# Patient Record
Sex: FEMALE | Race: WHITE | NOT HISPANIC OR LATINO | Employment: OTHER | URBAN - METROPOLITAN AREA
[De-identification: names, ages, dates, MRNs, and addresses within clinical notes are randomized per-mention and may not be internally consistent; named-entity substitution may affect disease eponyms.]

---

## 2017-08-03 ENCOUNTER — TELEPHONE (OUTPATIENT)
Dept: FAMILY MEDICINE | Facility: CLINIC | Age: 62
End: 2017-08-03

## 2017-08-03 NOTE — TELEPHONE ENCOUNTER
Pt scheduled for physical on 8/7/17 on Dr. Anna's schedule, noted not able to eat, feels like she's dying. Please triage. Radha Fritz, CMA

## 2017-08-03 NOTE — TELEPHONE ENCOUNTER
"Pavithra Raines is a 61 year old female-     NURSING ASSESSMENT:  Description:  Patient experiencing decreased appetite and \"desire to eat\" for a while now.  She has had gastric bypass in the past and reports having trouble with eating, this is not new. However, recently she has been feeling more tired, less energy, abdominal cramping and overall not feeling well. Denies chest pain, difficulty breathing, severe abdominal pain, nausea, vomiting or fevers. She is tolerating liquids and drinking plenty. Some days she feels better than others, but it seems as these symptom keep coming back. When writer asked what patient meant by \"feels like she is dying\" she states, \"It's just because I feel as though I have cancer\". No symptoms or pain as to why she feels this way. Declined going to the ER. \"This has been going on for a long time. It's not new.I just finally re-instated my health insurance so I can come in to the doctor\".  Recommended keeping appt on 8/7/2017. Advised to seek emergency care if symptoms worsen. She is in agreement with this plan.    Mel Munoz RN       "

## 2017-08-07 ENCOUNTER — OFFICE VISIT (OUTPATIENT)
Dept: FAMILY MEDICINE | Facility: CLINIC | Age: 62
End: 2017-08-07
Payer: COMMERCIAL

## 2017-08-07 VITALS
TEMPERATURE: 97.7 F | RESPIRATION RATE: 16 BRPM | HEART RATE: 112 BPM | SYSTOLIC BLOOD PRESSURE: 126 MMHG | OXYGEN SATURATION: 99 % | BODY MASS INDEX: 25.69 KG/M2 | HEIGHT: 63 IN | DIASTOLIC BLOOD PRESSURE: 68 MMHG | WEIGHT: 145 LBS

## 2017-08-07 DIAGNOSIS — F41.9 ANXIETY: ICD-10-CM

## 2017-08-07 DIAGNOSIS — Z11.59 NEED FOR HEPATITIS C SCREENING TEST: ICD-10-CM

## 2017-08-07 DIAGNOSIS — F10.10 ALCOHOL ABUSE: ICD-10-CM

## 2017-08-07 DIAGNOSIS — E53.8 VITAMIN B12 DEFICIENCY WITHOUT ANEMIA: ICD-10-CM

## 2017-08-07 DIAGNOSIS — G47.00 INSOMNIA, UNSPECIFIED TYPE: ICD-10-CM

## 2017-08-07 DIAGNOSIS — Z13.6 CARDIOVASCULAR SCREENING; LDL GOAL LESS THAN 160: ICD-10-CM

## 2017-08-07 DIAGNOSIS — R10.13 ABDOMINAL PAIN, EPIGASTRIC: ICD-10-CM

## 2017-08-07 DIAGNOSIS — Z00.01 ENCOUNTER FOR GENERAL ADULT MEDICAL EXAMINATION WITH ABNORMAL FINDINGS: Primary | ICD-10-CM

## 2017-08-07 DIAGNOSIS — F33.9 EPISODE OF RECURRENT MAJOR DEPRESSIVE DISORDER, UNSPECIFIED DEPRESSION EPISODE SEVERITY (H): ICD-10-CM

## 2017-08-07 LAB
ALBUMIN SERPL-MCNC: 2.8 G/DL (ref 3.4–5)
ALP SERPL-CCNC: 208 U/L (ref 40–150)
ALT SERPL W P-5'-P-CCNC: 37 U/L (ref 0–50)
ANION GAP SERPL CALCULATED.3IONS-SCNC: 9 MMOL/L (ref 3–14)
AST SERPL W P-5'-P-CCNC: 71 U/L (ref 0–45)
BASOPHILS # BLD AUTO: 0.1 10E9/L (ref 0–0.2)
BASOPHILS NFR BLD AUTO: 0.5 %
BILIRUB SERPL-MCNC: 0.6 MG/DL (ref 0.2–1.3)
BUN SERPL-MCNC: 4 MG/DL (ref 7–30)
CALCIUM SERPL-MCNC: 9 MG/DL (ref 8.5–10.1)
CHLORIDE SERPL-SCNC: 101 MMOL/L (ref 94–109)
CHOLEST SERPL-MCNC: 155 MG/DL
CO2 SERPL-SCNC: 29 MMOL/L (ref 20–32)
CREAT SERPL-MCNC: 0.73 MG/DL (ref 0.52–1.04)
DIFFERENTIAL METHOD BLD: ABNORMAL
EOSINOPHIL # BLD AUTO: 0.1 10E9/L (ref 0–0.7)
EOSINOPHIL NFR BLD AUTO: 1 %
ERYTHROCYTE [DISTWIDTH] IN BLOOD BY AUTOMATED COUNT: 21.3 % (ref 10–15)
FOLATE SERPL-MCNC: 4.4 NG/ML
GFR SERPL CREATININE-BSD FRML MDRD: 81 ML/MIN/1.7M2
GLUCOSE SERPL-MCNC: 99 MG/DL (ref 70–99)
HCT VFR BLD AUTO: 33.5 % (ref 35–47)
HDLC SERPL-MCNC: 58 MG/DL
HGB BLD-MCNC: 10.4 G/DL (ref 11.7–15.7)
IMM GRANULOCYTES # BLD: 0 10E9/L (ref 0–0.4)
IMM GRANULOCYTES NFR BLD: 0.4 %
LDLC SERPL CALC-MCNC: 80 MG/DL
LIPASE SERPL-CCNC: 173 U/L (ref 73–393)
LYMPHOCYTES # BLD AUTO: 1.9 10E9/L (ref 0.8–5.3)
LYMPHOCYTES NFR BLD AUTO: 17 %
MCH RBC QN AUTO: 25.1 PG (ref 26.5–33)
MCHC RBC AUTO-ENTMCNC: 31 G/DL (ref 31.5–36.5)
MCV RBC AUTO: 81 FL (ref 78–100)
MONOCYTES # BLD AUTO: 1.4 10E9/L (ref 0–1.3)
MONOCYTES NFR BLD AUTO: 12.4 %
NEUTROPHILS # BLD AUTO: 7.6 10E9/L (ref 1.6–8.3)
NEUTROPHILS NFR BLD AUTO: 68.7 %
NONHDLC SERPL-MCNC: 97 MG/DL
PLATELET # BLD AUTO: 505 10E9/L (ref 150–450)
POTASSIUM SERPL-SCNC: 3.4 MMOL/L (ref 3.4–5.3)
PROT SERPL-MCNC: 8 G/DL (ref 6.8–8.8)
RBC # BLD AUTO: 4.14 10E12/L (ref 3.8–5.2)
SODIUM SERPL-SCNC: 139 MMOL/L (ref 133–144)
TRIGL SERPL-MCNC: 86 MG/DL
VIT B12 SERPL-MCNC: 535 PG/ML (ref 193–986)
WBC # BLD AUTO: 11 10E9/L (ref 4–11)

## 2017-08-07 PROCEDURE — 82607 VITAMIN B-12: CPT | Performed by: FAMILY MEDICINE

## 2017-08-07 PROCEDURE — 83690 ASSAY OF LIPASE: CPT | Performed by: FAMILY MEDICINE

## 2017-08-07 PROCEDURE — 80061 LIPID PANEL: CPT | Performed by: FAMILY MEDICINE

## 2017-08-07 PROCEDURE — 82746 ASSAY OF FOLIC ACID SERUM: CPT | Performed by: FAMILY MEDICINE

## 2017-08-07 PROCEDURE — 85025 COMPLETE CBC W/AUTO DIFF WBC: CPT | Performed by: FAMILY MEDICINE

## 2017-08-07 PROCEDURE — 80053 COMPREHEN METABOLIC PANEL: CPT | Performed by: FAMILY MEDICINE

## 2017-08-07 PROCEDURE — 36415 COLL VENOUS BLD VENIPUNCTURE: CPT | Performed by: FAMILY MEDICINE

## 2017-08-07 PROCEDURE — 99214 OFFICE O/P EST MOD 30 MIN: CPT | Mod: 25 | Performed by: FAMILY MEDICINE

## 2017-08-07 PROCEDURE — 99396 PREV VISIT EST AGE 40-64: CPT | Performed by: FAMILY MEDICINE

## 2017-08-07 PROCEDURE — 86803 HEPATITIS C AB TEST: CPT | Performed by: FAMILY MEDICINE

## 2017-08-07 RX ORDER — LORAZEPAM 0.5 MG/1
0.5 TABLET ORAL EVERY 8 HOURS PRN
Qty: 20 TABLET | Refills: 0 | Status: SHIPPED | OUTPATIENT
Start: 2017-08-07 | End: 2017-10-02

## 2017-08-07 RX ORDER — PHENOL 1.4 %
10 AEROSOL, SPRAY (ML) MUCOUS MEMBRANE
COMMUNITY
End: 2018-05-10

## 2017-08-07 RX ORDER — TRAZODONE HYDROCHLORIDE 50 MG/1
50 TABLET, FILM COATED ORAL
Qty: 30 TABLET | Refills: 1 | Status: SHIPPED | OUTPATIENT
Start: 2017-08-07 | End: 2017-10-03

## 2017-08-07 ASSESSMENT — PATIENT HEALTH QUESTIONNAIRE - PHQ9: SUM OF ALL RESPONSES TO PHQ QUESTIONS 1-9: 23

## 2017-08-07 ASSESSMENT — PAIN SCALES - GENERAL: PAINLEVEL: MILD PAIN (3)

## 2017-08-07 NOTE — NURSING NOTE
"Chief Complaint   Patient presents with     Physical       Initial /68  Pulse 112  Temp 97.7  F (36.5  C) (Temporal)  Resp 16  Ht 5' 3\" (1.6 m)  Wt 145 lb (65.8 kg)  SpO2 99%  BMI 25.69 kg/m2 Estimated body mass index is 25.69 kg/(m^2) as calculated from the following:    Height as of this encounter: 5' 3\" (1.6 m).    Weight as of this encounter: 145 lb (65.8 kg).  Medication Reconciliation: complete    "

## 2017-08-07 NOTE — MR AVS SNAPSHOT
After Visit Summary   8/7/2017    Pavithra Raines    MRN: [id number]           Patient Information     Date Of Birth          1955        Visit Information        Provider Department      8/7/2017 11:20 AM Otto Anna MD [facility]        Today's Diagnoses     Abdominal pain, epigastric    -  1    Alcohol abuse        Vitamin B12 deficiency without anemia        Anxiety        Insomnia, unspecified type        CARDIOVASCULAR SCREENING; LDL GOAL LESS THAN 160        Need for hepatitis C screening test          Care Instructions      Preventive Health Recommendations  Female Ages 50 - 64    Yearly exam: See your health care provider every year in order to  o Review health changes.   o Discuss preventive care.    o Review your medicines if your doctor has prescribed any.      Get a Pap test every three years (unless you have an abnormal result and your provider advises testing more often).    If you get Pap tests with HPV test, you only need to test every 5 years, unless you have an abnormal result.     You do not need a Pap test if your uterus was removed (hysterectomy) and you have not had cancer.    You should be tested each year for STDs (sexually transmitted diseases) if you're at risk.     Have a mammogram every 1 to 2 years.    Have a colonoscopy at age 50, or have a yearly FIT test (stool test). These exams screen for colon cancer.      Have a cholesterol test every 5 years, or more often if advised.    Have a diabetes test (fasting glucose) every three years. If you are at risk for diabetes, you should have this test more often.     If you are at risk for osteoporosis (brittle bone disease), think about having a bone density scan (DEXA).    Shots: Get a flu shot each year. Get a tetanus shot every 10 years.    Nutrition:     Eat at least 5 servings of fruits and vegetables each day.    Eat whole-grain bread, whole-wheat pasta and brown rice instead of white  grains and rice.    Talk to your provider about Calcium and Vitamin D.     Lifestyle    Exercise at least 150 minutes a week (30 minutes a day, 5 days a week). This will help you control your weight and prevent disease.    Limit alcohol to one drink per day.    No smoking.     Wear sunscreen to prevent skin cancer.     See your dentist every six months for an exam and cleaning.    See your eye doctor every 1 to 2 years.            Follow-ups after your visit        Your next 10 appointments already scheduled     Aug 10, 2017 11:15 AM CDT   CT ABDOMEN PELVIS W CONTRAST with PHCT1   [facility] CT Scan ([facility])    [address] [id number] [phone]           Please bring any scans or X-rays taken at other hospitals, if similar tests were done. Also bring a list of your medicines, including vitamins, minerals and over-the-counter drugs. It is safest to leave personal items at home.  Be sure to tell your doctor:   If you have any allergies.   If there s any chance you are pregnant.   If you are breastfeeding.   If you have any special needs.  You may have contrast for this exam. To prepare:   Do not eat or drink for 2 hours before your exam. If you need to take medicine, you may take it with small sips of water. (We may ask you to take liquid medicine as well.)   The day before your exam, drink extra fluids at least six 8-ounce glasses (unless your doctor tells you to restrict your fluids).  Patients over 70 or patients with diabetes or kidney problems:   If you haven t had a blood test (creatinine test) within the last 30 days, go to your clinic or Diagnostic Imaging Department for this test.  If you have diabetes:   If your kidney function is normal, continue taking your metformin (Avandamet, Glucophage, Glucovance, Metaglip) on the day of your exam.   If your kidney function is abnormal, wait 48 hours before restarting this medicine.  You will have oral contrast  for this exam:   You will drink the contrast at home. Get this from your clinic or Diagnostic Imaging Department. Please follow the directions given.  Please wear loose clothing, such as a sweat suit or jogging clothes. Avoid snaps, zippers and other metal. We may ask you to undress and put on a hospital gown.  If you have any questions, please call the Imaging Department where you will have your exam.            Aug 14, 2017 10:40 AM CDT   PHYSICAL with Otto Anna MD   [facility] ([facility]    [address] [place] [id number] [phone]              Future tests that were ordered for you today     Open Future Orders        Priority Expected Expires Ordered    CT Abdomen Pelvis w Contrast Routine  8/7/2018 8/7/2017            Who to contact     If you have questions or need follow up information about today's clinic visit or your schedule please contact [facility] directly at [phone].  Normal or non-critical lab and imaging results will be communicated to you by Knozenhart, letter or phone within 4 business days after the clinic has received the results. If you do not hear from us within 7 days, please contact the clinic through [employer] or phone. If you have a critical or abnormal lab result, we will notify you by phone as soon as possible.  Submit refill requests through [employer] or call your pharmacy and they will forward the refill request to us. Please allow 3 business days for your refill to be completed.          Additional Information About Your Visit        [employer] Information     [employer] gives you secure access to your electronic health record. If you see a primary care provider, you can also send messages to your care team and make appointments. If you have questions, please call your primary care clinic.  If you do not have a primary care provider, please call [phone] and they will assist you.        Care EveryWhere ID     This  "is your Care EveryWhere ID. This could be used by other organizations to access your [facility] medical records  [id number]        Your Vitals Were     Pulse Temperature Respirations Height Pulse Oximetry BMI (Body Mass Index)    112 97.7  F (36.5  C) (Temporal) 16 5' 3\" (1.6 m) 99% 25.69 kg/m2       Blood Pressure from Last 3 Encounters:   08/07/17 126/68   06/03/16 120/72   05/27/16 (!) 130/96    Weight from Last 3 Encounters:   08/07/17 145 lb (65.8 kg)   06/03/16 134 lb 14.4 oz (61.2 kg)   05/27/16 130 lb (59 kg)              We Performed the Following     CBC with platelets differential     Comprehensive metabolic panel (BMP + Alb, Alk Phos, ALT, AST, Total. Bili, TP)     Folate     Hepatitis C antibody     Lipase     Lipid Profile     Vitamin B12          Today's Medication Changes          These changes are accurate as of: 8/7/17 12:08 PM.  If you have any questions, ask your nurse or doctor.               Start taking these medicines.        Dose/Directions    LORazepam 0.5 MG tablet   Commonly known as:  ATIVAN   Used for:  Alcohol abuse   Started by:  Otto Anna MD        Dose:  0.5 mg   Take 1 tablet (0.5 mg) by mouth every 8 hours as needed (withdrawl)   Quantity:  20 tablet   Refills:  0       traZODone 50 MG tablet   Commonly known as:  DESYREL   Used for:  Insomnia, unspecified type   Started by:  Otto Anna MD        Dose:  50 mg   Take 1 tablet (50 mg) by mouth nightly as needed for sleep   Quantity:  30 tablet   Refills:  1            Where to get your medicines      These medications were [facility] to [facility] [address]  [address] [place] [address]     Phone:  [phone]     traZODone 50 MG tablet         Some of these will need a paper prescription and others can be bought over the counter.  Ask your nurse if you have questions.     Bring a paper prescription for each of these medications     LORazepam 0.5 MG tablet                " Primary Care Provider Office Phone # Fax #    Soha Lela Boggs [phone]       [facility] [address] [place] [address]        Equal Access to Services     OSCAR FRASER : Suresh don guso Sorubenali, waaxda luqadaha, qaybta kaalmada adeegyada, nazario cadetn t[place] larry robb [place]. So [facility] [phone].    ATENCIÓN: Si habla español, tiene a medina disposición servicios gratuitos de asistencia lingüística. [facility] [phone].    We comply with applicable federal civil rights laws and [place] laws. We do not discriminate on the basis of race, color, national origin, age, disability sex, sexual orientation or gender identity.            Thank you!     Thank you for choosing [facility]  for your care. Our goal is always to provide you with excellent care. Hearing back from our patients is one way we can continue to improve our services. Please take a few minutes to complete the written survey that you may receive in the mail after your visit with us. Thank you!             Your Updated Medication List - Protect others around you: Learn how to safely use, store and throw away your medicines at www.disposemymeds.org.          This list is accurate as of: 8/7/17 12:08 PM.  Always use your most recent med list.                   Brand Name Dispense Instructions for use Diagnosis    BENADRYL PO      Take 25 mg by mouth nightly as needed        LORazepam 0.5 MG tablet    ATIVAN    20 tablet    Take 1 tablet (0.5 mg) by mouth every 8 hours as needed (withdrawl)    Alcohol abuse       Melatonin 10 MG Tabs tablet      Take 10 mg by mouth nightly as needed for sleep        traZODone 50 MG tablet    DESYREL    30 tablet    Take 1 tablet (50 mg) by mouth nightly as needed for sleep    Insomnia, unspecified type
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
## 2017-08-08 LAB — HCV AB SERPL QL IA: ABNORMAL

## 2017-08-10 ENCOUNTER — HOSPITAL ENCOUNTER (OUTPATIENT)
Dept: CT IMAGING | Facility: CLINIC | Age: 62
Discharge: HOME | End: 2017-08-10
Attending: FAMILY MEDICINE | Admitting: FAMILY MEDICINE
Payer: COMMERCIAL

## 2017-08-10 DIAGNOSIS — R10.13 ABDOMINAL PAIN, EPIGASTRIC: ICD-10-CM

## 2017-08-10 PROCEDURE — 25000128 H RX IP 250 OP 636: Performed by: FAMILY MEDICINE

## 2017-08-10 PROCEDURE — 25000125 ZZHC RX 250: Performed by: FAMILY MEDICINE

## 2017-08-10 PROCEDURE — 74177 CT ABD & PELVIS W/CONTRAST: CPT

## 2017-08-10 RX ORDER — IOPAMIDOL 755 MG/ML
500 INJECTION, SOLUTION INTRAVASCULAR ONCE
Status: COMPLETED | OUTPATIENT
Start: 2017-08-10 | End: 2017-08-10

## 2017-08-10 RX ADMIN — IOPAMIDOL 71 ML: 755 INJECTION, SOLUTION INTRAVENOUS at 11:14

## 2017-08-10 RX ADMIN — SODIUM CHLORIDE 60 ML: 9 INJECTION, SOLUTION INTRAVENOUS at 11:14

## 2017-08-14 ENCOUNTER — OFFICE VISIT (OUTPATIENT)
Dept: FAMILY MEDICINE | Facility: CLINIC | Age: 62
End: 2017-08-14
Payer: COMMERCIAL

## 2017-08-14 VITALS
RESPIRATION RATE: 26 BRPM | HEART RATE: 102 BPM | TEMPERATURE: 97.2 F | SYSTOLIC BLOOD PRESSURE: 110 MMHG | WEIGHT: 141 LBS | OXYGEN SATURATION: 99 % | DIASTOLIC BLOOD PRESSURE: 70 MMHG | BODY MASS INDEX: 24.98 KG/M2

## 2017-08-14 DIAGNOSIS — F10.21 ALCOHOL DEPENDENCE IN REMISSION (H): ICD-10-CM

## 2017-08-14 DIAGNOSIS — F41.9 ANXIETY: ICD-10-CM

## 2017-08-14 DIAGNOSIS — B17.10 ACUTE HEPATITIS C VIRUS INFECTION WITHOUT HEPATIC COMA: Primary | ICD-10-CM

## 2017-08-14 PROCEDURE — 87522 HEPATITIS C REVRS TRNSCRPJ: CPT | Performed by: FAMILY MEDICINE

## 2017-08-14 PROCEDURE — 36415 COLL VENOUS BLD VENIPUNCTURE: CPT | Performed by: FAMILY MEDICINE

## 2017-08-14 PROCEDURE — 99214 OFFICE O/P EST MOD 30 MIN: CPT | Performed by: FAMILY MEDICINE

## 2017-08-14 RX ORDER — ESCITALOPRAM OXALATE 10 MG/1
10 TABLET ORAL DAILY
Qty: 30 TABLET | Refills: 1 | Status: SHIPPED | OUTPATIENT
Start: 2017-08-14 | End: 2017-10-13

## 2017-08-14 NOTE — PROGRESS NOTES
CT scan of the abdomen shows no significant abnormalities that would be causing pain. Blood work shows several liver tests off a little bit and screening hepatitis C was positive this needs to be followed up with further tests as this could be a false positive or evidence of past exposure.

## 2017-08-14 NOTE — PROGRESS NOTES
SUBJECTIVE:                                                    Pavithra Raines is a 61 year old female who presents to clinic today for the following health issues:      Chief Complaint   Patient presents with     Results     CT results             Problem list and histories reviewed & adjusted, as indicated.  Additional history: as documented        Reviewed and updated as needed this visit by clinical staff  Tobacco  Allergies  Meds       Reviewed and updated as needed this visit by Provider        SUBJECTIVE:  Pavithra  is a 61 year old female who presents for:  Discussion of her CT results and lab results. She presented for a physical a while back. She has trouble with alcoholism. Is trying to quit on her own. We issued her some Ativan to take she gets shaky and she drops down on her alcohol. She has taken several times which has not been able to get off alcohol. She had some lab abnormalities that we needed to address. She had complained of some epigastric pain. We did a CT scan. She was sure she was dying when I saw her. She presents today with her significant other. He is trying to help her through all this.    Past Medical History:   Diagnosis Date     Abnormal Papanicolaou smear of cervix and cervical HPV 4/07    Colposcopy 2007= HSIL     Genital herpes      History of colposcopy with cervical biopsy 06/2007    HSIL- LEEP recommended     Hypersomnia with sleep apnea, unspecified     CPAP started 2007     Other and unspecified ovarian cyst 2002 or so    s/p resection of ovary and tubes, d&c     Past Surgical History:   Procedure Laterality Date     CHOLECYSTECTOMY, OPEN  age 20s     COLONOSCOPY  3/21/2011    COMBINED COLONOSCOPY, REMOVE TUMOR/POLYP/LESION BY SNARE performed by JUAN FRANCISCO HERNANDEZ at  GI     COLPOSCOPY CERVIX, LOOP ELECTRODE BIOPSY, COMBINED  6/2007    CAN 2/3-patient requires yearly pap smears     GASTRIC BYPASS  3/25/2008    At [facility] DILATION/CURETTAGE  DIAG/THER NON OB  2002     HC ENLARGE BREAST WITH IMPLANT       HC LAPAROSCOPIC MYOMECTOMY, 1 - 4 INTRAMURAL MYOMAS =<250 GM  2002     HC REMOVAL OF BREAST IMPLANT       SALPINGO OOPHORECTOMY,R/L/MATTHEW  2002    Bilateral salpingectomy and unilateral oophorectomy     Social History   Substance Use Topics     Smoking status: Current Some Day Smoker     Last attempt to quit: 8/20/2001     Smokeless tobacco: Never Used      Comment: 2 ppd at this time (chain smoking) 10/2012     Alcohol use Yes     Current Outpatient Prescriptions   Medication Sig Dispense Refill     escitalopram (LEXAPRO) 10 MG tablet Take 1 tablet (10 mg) by mouth daily 30 tablet 1     Melatonin 10 MG TABS tablet Take 10 mg by mouth nightly as needed for sleep       DiphenhydrAMINE HCl (BENADRYL PO) Take 25 mg by mouth nightly as needed       traZODone (DESYREL) 50 MG tablet Take 1 tablet (50 mg) by mouth nightly as needed for sleep 30 tablet 1     LORazepam (ATIVAN) 0.5 MG tablet Take 1 tablet (0.5 mg) by mouth every 8 hours as needed (withdrawl) 20 tablet 0       REVIEW OF SYSTEMS:   5 point ROS negative except as noted above in HPI, including Gen., Resp, CV, GI &  system review.     OBJECTIVE:  Vitals: /70  Pulse 102  Temp 97.2  F (36.2  C) (Temporal)  Resp 26  Wt 141 lb (64 kg)  SpO2 99%  BMI 24.98 kg/m2  BMI= Body mass index is 24.98 kg/(m^2).  She is alert oriented appears in no distress but a little shaky. Appears anxious. Eyes are not jaundiced. Lungs are clear. Heart regular rhythm. Slight tremor noted. Skin is clear. Extremities with no edema. CT of the abdomen shows fatty liver infiltrates. Nothing else very remarkable. Her labs show some abnormal liver function tests and a positive screening hepatitis C.    ASSESSMENT:  #1 positive hepatitis C screen #2 liver abnormalities #3 alcoholism. #4 anxiety    PLAN:  Really counseled her on stopping alcohol. She still has some Ativan left she may need a few more. We'll going to start  her on Lexapro 10 mg a day for the anxiety. Sounds like she was on this once before. Doing RNA for hepatitis today. We'll notify her with the results and move forward from there. No further liver testing done until we find this out.  Tobacco Cessation Action Plan: Self help information given to patient        Otto Anna MD  [facility]

## 2017-08-14 NOTE — MR AVS SNAPSHOT
After Visit Summary   8/14/2017    Pavithra Raines    MRN: [id number]           Patient Information     Date Of Birth          1955        Visit Information        Provider Department      8/14/2017 10:40 AM Otto Anna MD [facility]        Today's Diagnoses     Acute hepatitis C virus infection without hepatic coma    -  1    Anxiety        Alcohol dependence in remission (H)           Follow-ups after your visit        Who to contact     If you have questions or need follow up information about today's clinic visit or your schedule please contact [facility] directly at [phone].  Normal or non-critical lab and imaging results will be communicated to you by [employer], letter or phone within 4 business days after the clinic has received the results. If you do not hear from us within 7 days, please contact the clinic through [employer] or phone. If you have a critical or abnormal lab result, we will notify you by phone as soon as possible.  Submit refill requests through [employer] or call your pharmacy and they will forward the refill request to us. Please allow 3 business days for your refill to be completed.          Additional Information About Your Visit        MyChart Information     [employer] gives you secure access to your electronic health record. If you see a primary care provider, you can also send messages to your care team and make appointments. If you have questions, please call your primary care clinic.  If you do not have a primary care provider, please call [phone] and they will assist you.        Care EveryWhere ID     This is your Care EveryWhere ID. This could be used by other organizations to access your [place] medical records  [id number]        Your Vitals Were     Pulse Temperature Respirations Pulse Oximetry BMI (Body Mass Index)       102 97.2  F (36.2  C) (Temporal) 26 99% 24.98 kg/m2        Blood Pressure from Last 3  Encounters:   08/14/17 110/70   08/07/17 126/68   06/03/16 120/72    Weight from Last 3 Encounters:   08/14/17 141 lb (64 kg)   08/07/17 145 lb (65.8 kg)   06/03/16 134 lb 14.4 oz (61.2 kg)              We Performed the Following     Hepatitis C RNA, quantitative          Today's Medication Changes          These changes are accurate as of: 8/14/17  1:01 PM.  If you have any questions, ask your nurse or doctor.               Start taking these medicines.        Dose/Directions    escitalopram 10 MG tablet   Commonly known as:  LEXAPRO   Used for:  Anxiety   Started by:  Otto Anna MD        Dose:  10 mg   Take 1 tablet (10 mg) by mouth daily   Quantity:  30 tablet   Refills:  1            Where to get your medicines      These medications were sent to [facility] [address]   [address] [facility] [address]     Phone:  [phone]     escitalopram 10 MG tablet                Primary Care Provider Office Phone # Fax #    Soha Lela Boggs [phone]       [address] [facility]   [facility] [address]        Equal Access to Services     [facility] AH: Hadii aad ku hadasho Soomaali, waaxda luqadaha, qaybta kaalmada adeegyada, nazario doherty haylisan [place]. So [facility] [phone].    ATENCIÓN: Si habla español, tiene a medina disposición servicios gratuitos de asistencia lingüística. Llame al [phone].    We comply with applicable federal civil rights laws and [place] laws. We do not discriminate on the basis of race, color, national origin, age, disability sex, sexual orientation or gender identity.            Thank you!     Thank you for choosing [facility]  for your care. Our goal is always to provide you with excellent care. Hearing back from our patients is one way we can continue to improve our services. Please take a few minutes to complete the written survey that you may receive in the mail after your visit with  us. Thank you!             Your Updated Medication List - Protect others around you: Learn how to safely use, store and throw away your medicines at www.disposemymeds.org.          This list is accurate as of: 8/14/17  1:01 PM.  Always use your most recent med list.                   Brand Name Dispense Instructions for use Diagnosis    BENADRYL PO      Take 25 mg by mouth nightly as needed        escitalopram 10 MG tablet    LEXAPRO    30 tablet    Take 1 tablet (10 mg) by mouth daily    Anxiety       LORazepam 0.5 MG tablet    ATIVAN    20 tablet    Take 1 tablet (0.5 mg) by mouth every 8 hours as needed (withdrawl)    Alcohol abuse       Melatonin 10 MG Tabs tablet      Take 10 mg by mouth nightly as needed for sleep        traZODone 50 MG tablet    DESYREL    30 tablet    Take 1 tablet (50 mg) by mouth nightly as needed for sleep    Insomnia, unspecified type

## 2017-08-14 NOTE — NURSING NOTE
"Chief Complaint   Patient presents with     Results     CT results       Initial /70  Pulse 102  Temp 97.2  F (36.2  C) (Temporal)  Resp 26  Wt 141 lb (64 kg)  SpO2 99%  BMI 24.98 kg/m2 Estimated body mass index is 24.98 kg/(m^2) as calculated from the following:    Height as of 8/7/17: 5' 3\" (1.6 m).    Weight as of this encounter: 141 lb (64 kg).  Medication Reconciliation: complete    "

## 2017-08-15 LAB
HCV RNA SERPL NAA+PROBE-ACNC: NORMAL [IU]/ML
HCV RNA SERPL NAA+PROBE-LOG IU: NORMAL LOG IU/ML

## 2017-08-17 ENCOUNTER — TELEPHONE (OUTPATIENT)
Dept: FAMILY MEDICINE | Facility: CLINIC | Age: 62
End: 2017-08-17

## 2017-08-17 NOTE — TELEPHONE ENCOUNTER
Dr. Connolly looked at the test and it was negative.  Patient aware but wants to know what the next step is.

## 2017-08-17 NOTE — TELEPHONE ENCOUNTER
Reason for Call:  Request for results:labs     Name of test or procedure: labs    Date of test of procedure: 8/14/17    Location of the test or procedure: [facility]     OK to leave the result message on voice mail or with a family member? YES    Phone number Patient can be reached at:  Home number on file [phone] (home)    Additional comments: patient would like to get lab results from her lab test for Hep C, please call and advise, Patient is aware that Dr. Anna is out of the office until 8/23/17, patient would like another provider to call her with the results if possible    Call taken on 8/17/2017 at 2:09 PM by Karen Joy

## 2017-08-23 NOTE — TELEPHONE ENCOUNTER
Per Dr. Anna there is not a next step she needs to take and the lab was either a false positive or she previously had an infection that is now cleared up. Patient notified. She is wondering if she can get a higher dose of trazadone because she is taking the trazadone, benadryl, and melatonin and it is still not helping a whole lot and she would like to just take tramadol by itself without the benadryl and the melatonin. Please advise. KB/CMA

## 2017-08-25 NOTE — TELEPHONE ENCOUNTER
Dr. Anna will not increase her trazadone as she is already taking to many medications to sleep and stated she should not be taking the trazadone, benadryl, and melatonin all together. He said she should have a sleep consult if she is having this much trouble sleeping. Patient notified and she does not want us to place a referral at this time. ESSENCE/KARLEE

## 2017-08-27 ENCOUNTER — HEALTH MAINTENANCE LETTER (OUTPATIENT)
Age: 62
End: 2017-08-27

## 2017-09-20 ENCOUNTER — TELEPHONE (OUTPATIENT)
Dept: FAMILY MEDICINE | Facility: CLINIC | Age: 62
End: 2017-09-20

## 2017-09-20 DIAGNOSIS — Z12.11 SPECIAL SCREENING FOR MALIGNANT NEOPLASMS, COLON: Primary | ICD-10-CM

## 2017-09-20 NOTE — TELEPHONE ENCOUNTER
Reason for Call: Request for an order or referral:    Order or referral being requested: colonoscopy     Date needed: as soon as possible    Has the patient been seen by the PCP for this problem? Not Applicable    Additional comments: pt is due for colonoscopy and would like to have order placed so she can have done     Phone number Patient can be reached at:  Home number on file [phone] (home)    Best Time:  Anytime     Can we leave a detailed message on this number?  YES    Call taken on 9/20/2017 at 3:43 PM by Sangeetha Anna

## 2017-09-21 ENCOUNTER — TELEPHONE (OUTPATIENT)
Dept: FAMILY MEDICINE | Facility: CLINIC | Age: 62
End: 2017-09-21

## 2017-09-21 NOTE — TELEPHONE ENCOUNTER
Left message for patient to return call to schedule colonoscopy or EGD. If [place] or [place] are unavailable, please transfer to the surgery center.     OK to schedule with mazin or Alana

## 2017-10-03 DIAGNOSIS — G47.00 INSOMNIA, UNSPECIFIED TYPE: ICD-10-CM

## 2017-10-03 NOTE — TELEPHONE ENCOUNTER
traZODone (DESYREL) 50 MG tablet       Last Written Prescription Date: 8/7/17  Last Fill Quantity: 30; # refills: 1  Last Office Visit with [facility], [facility] or [facility] prescribing provider:  8/14/17   Next 5 appointments (look out 90 days)     Dec 01, 2017  9:00 AM CST   Office Visit with Soha Boggs MD   [facility]    [address] [place] [id number] [phone]                   Last PHQ-9 score on record=   PHQ-9 SCORE 8/7/2017   Total Score -   Total Score 23       Lab Results   Component Value Date    AST 71 08/07/2017     Lab Results   Component Value Date    ALT 37 08/07/2017

## 2017-10-05 RX ORDER — TRAZODONE HYDROCHLORIDE 50 MG/1
TABLET, FILM COATED ORAL
Qty: 30 TABLET | Refills: 1 | Status: SHIPPED | OUTPATIENT
Start: 2017-10-05 | End: 2017-12-01

## 2017-10-05 NOTE — TELEPHONE ENCOUNTER
Prescription approved per [facility] Refill Protocol.    Fatou [facility]  [phone]

## 2017-10-09 ENCOUNTER — ANESTHESIA EVENT (OUTPATIENT)
Dept: GASTROENTEROLOGY | Facility: CLINIC | Age: 62
End: 2017-10-09
Payer: COMMERCIAL

## 2017-10-09 ENCOUNTER — ANESTHESIA (OUTPATIENT)
Dept: GASTROENTEROLOGY | Facility: CLINIC | Age: 62
End: 2017-10-09
Payer: COMMERCIAL

## 2017-10-09 ENCOUNTER — HOSPITAL ENCOUNTER (OUTPATIENT)
Facility: CLINIC | Age: 62
Discharge: HOME | End: 2017-10-09
Attending: FAMILY MEDICINE | Admitting: FAMILY MEDICINE
Payer: COMMERCIAL

## 2017-10-09 ENCOUNTER — SURGERY (OUTPATIENT)
Age: 62
End: 2017-10-09

## 2017-10-09 VITALS
SYSTOLIC BLOOD PRESSURE: 114 MMHG | TEMPERATURE: 99.2 F | RESPIRATION RATE: 14 BRPM | DIASTOLIC BLOOD PRESSURE: 79 MMHG | HEART RATE: 69 BPM | OXYGEN SATURATION: 100 %

## 2017-10-09 LAB — COLONOSCOPY: NORMAL

## 2017-10-09 PROCEDURE — 88305 TISSUE EXAM BY PATHOLOGIST: CPT | Performed by: FAMILY MEDICINE

## 2017-10-09 PROCEDURE — 88305 TISSUE EXAM BY PATHOLOGIST: CPT | Mod: 26 | Performed by: FAMILY MEDICINE

## 2017-10-09 PROCEDURE — 45380 COLONOSCOPY AND BIOPSY: CPT | Performed by: FAMILY MEDICINE

## 2017-10-09 PROCEDURE — 45385 COLONOSCOPY W/LESION REMOVAL: CPT | Performed by: FAMILY MEDICINE

## 2017-10-09 PROCEDURE — 25000125 ZZHC RX 250: Performed by: NURSE ANESTHETIST, CERTIFIED REGISTERED

## 2017-10-09 PROCEDURE — 25000128 H RX IP 250 OP 636: Performed by: NURSE ANESTHETIST, CERTIFIED REGISTERED

## 2017-10-09 PROCEDURE — 45380 COLONOSCOPY AND BIOPSY: CPT | Mod: PT | Performed by: FAMILY MEDICINE

## 2017-10-09 PROCEDURE — 40000297 ZZH STATISTIC ENDO RECOVERY CLASS 1:2 EACH ADDL HOUR: Performed by: FAMILY MEDICINE

## 2017-10-09 PROCEDURE — 40000296 ZZH STATISTIC ENDO RECOVERY CLASS 1:2 FIRST HOUR: Performed by: FAMILY MEDICINE

## 2017-10-09 RX ORDER — ALBUTEROL SULFATE 0.83 MG/ML
2.5 SOLUTION RESPIRATORY (INHALATION) EVERY 4 HOURS PRN
Status: DISCONTINUED | OUTPATIENT
Start: 2017-10-09 | End: 2017-10-09 | Stop reason: HOSPADM

## 2017-10-09 RX ORDER — LIDOCAINE 40 MG/G
CREAM TOPICAL
Status: DISCONTINUED | OUTPATIENT
Start: 2017-10-09 | End: 2017-10-09 | Stop reason: HOSPADM

## 2017-10-09 RX ORDER — PROPOFOL 10 MG/ML
INJECTION, EMULSION INTRAVENOUS CONTINUOUS PRN
Status: DISCONTINUED | OUTPATIENT
Start: 2017-10-09 | End: 2017-10-09

## 2017-10-09 RX ORDER — ONDANSETRON 2 MG/ML
4 INJECTION INTRAMUSCULAR; INTRAVENOUS
Status: DISCONTINUED | OUTPATIENT
Start: 2017-10-09 | End: 2017-10-09 | Stop reason: HOSPADM

## 2017-10-09 RX ORDER — PROPOFOL 10 MG/ML
INJECTION, EMULSION INTRAVENOUS PRN
Status: DISCONTINUED | OUTPATIENT
Start: 2017-10-09 | End: 2017-10-09

## 2017-10-09 RX ORDER — ONDANSETRON 2 MG/ML
4 INJECTION INTRAMUSCULAR; INTRAVENOUS EVERY 30 MIN PRN
Status: DISCONTINUED | OUTPATIENT
Start: 2017-10-09 | End: 2017-10-09 | Stop reason: HOSPADM

## 2017-10-09 RX ORDER — FENTANYL CITRATE 50 UG/ML
25-50 INJECTION, SOLUTION INTRAMUSCULAR; INTRAVENOUS
Status: DISCONTINUED | OUTPATIENT
Start: 2017-10-09 | End: 2017-10-09 | Stop reason: HOSPADM

## 2017-10-09 RX ORDER — SODIUM CHLORIDE, SODIUM LACTATE, POTASSIUM CHLORIDE, CALCIUM CHLORIDE 600; 310; 30; 20 MG/100ML; MG/100ML; MG/100ML; MG/100ML
INJECTION, SOLUTION INTRAVENOUS CONTINUOUS
Status: DISCONTINUED | OUTPATIENT
Start: 2017-10-09 | End: 2017-10-09 | Stop reason: HOSPADM

## 2017-10-09 RX ORDER — ONDANSETRON 4 MG/1
4 TABLET, ORALLY DISINTEGRATING ORAL EVERY 30 MIN PRN
Status: DISCONTINUED | OUTPATIENT
Start: 2017-10-09 | End: 2017-10-09 | Stop reason: HOSPADM

## 2017-10-09 RX ORDER — SODIUM CHLORIDE, SODIUM LACTATE, POTASSIUM CHLORIDE, CALCIUM CHLORIDE 600; 310; 30; 20 MG/100ML; MG/100ML; MG/100ML; MG/100ML
INJECTION, SOLUTION INTRAVENOUS CONTINUOUS PRN
Status: DISCONTINUED | OUTPATIENT
Start: 2017-10-09 | End: 2017-10-09

## 2017-10-09 RX ORDER — LIDOCAINE HYDROCHLORIDE 20 MG/ML
INJECTION, SOLUTION INFILTRATION; PERINEURAL PRN
Status: DISCONTINUED | OUTPATIENT
Start: 2017-10-09 | End: 2017-10-09

## 2017-10-09 RX ORDER — NALOXONE HYDROCHLORIDE 0.4 MG/ML
.1-.4 INJECTION, SOLUTION INTRAMUSCULAR; INTRAVENOUS; SUBCUTANEOUS
Status: DISCONTINUED | OUTPATIENT
Start: 2017-10-09 | End: 2017-10-09 | Stop reason: HOSPADM

## 2017-10-09 RX ADMIN — LIDOCAINE HYDROCHLORIDE 40 MG: 20 INJECTION, SOLUTION INFILTRATION; PERINEURAL at 11:55

## 2017-10-09 RX ADMIN — PROPOFOL 50 MG: 10 INJECTION, EMULSION INTRAVENOUS at 11:55

## 2017-10-09 RX ADMIN — PROPOFOL 30 MG: 10 INJECTION, EMULSION INTRAVENOUS at 11:58

## 2017-10-09 RX ADMIN — SODIUM CHLORIDE, POTASSIUM CHLORIDE, SODIUM LACTATE AND CALCIUM CHLORIDE: 600; 310; 30; 20 INJECTION, SOLUTION INTRAVENOUS at 11:41

## 2017-10-09 RX ADMIN — PROPOFOL 20 MG: 10 INJECTION, EMULSION INTRAVENOUS at 12:11

## 2017-10-09 RX ADMIN — PROPOFOL 150 MCG/KG/MIN: 10 INJECTION, EMULSION INTRAVENOUS at 11:55

## 2017-10-09 ASSESSMENT — LIFESTYLE VARIABLES: TOBACCO_USE: 1

## 2017-10-09 NOTE — IP AVS SNAPSHOT
[facility]    [address] [facility] [address]    Phone:  [phone]                                       After Visit Summary   10/9/2017    Pavithra Raines    MRN: [id number]           After Visit Summary Signature Page     I have received my discharge instructions, and my questions have been answered. I have discussed any challenges I see with this plan with the nurse or doctor.    ..........................................................................................................................................  Patient/Patient Representative Signature      ..........................................................................................................................................  Patient Representative Print Name and Relationship to Patient    ..................................................               ................................................  Date                                            Time    ..........................................................................................................................................  Reviewed by Signature/Title    ...................................................              ..............................................  Date                                                            Time

## 2017-10-09 NOTE — DISCHARGE INSTRUCTIONS
[facility]    Home Care Following Endoscopy    Dr. Sylvester Bright    Activity:    You have just undergone an endoscopic procedure usually performed with conscious sedation.  Do not work or operate machinery (including a car) for at least 12 hours.      I encourage you to walk and attempt to pass this air as soon as possible.    Diet:    Return to the diet you were on before your procedure but eat lightly for the first 12-24 hours.    Drink plenty of water.    Resume any regular medications unless otherwise advised by your physician.  Please begin any new medication prescribed as a result of your procedure as directed by your physician.     If you had any biopsy or polyp removed please refrain from aspirin or aspirin products for 2 days.  If on Coumadin please restart as instructed by your physician.   Pain:    You may take Tylenol as needed for pain.  Expected Recovery:    You can expect some mild abdominal fullness and/or discomfort due to the air used to inflate your intestinal tract. It is also normal to have a mild sore throat after upper endoscopy.    Call Your Physician if You Have:        After Colonoscopy:  o Worsening persisting abdominal pain which is worse with activity.  o Fevers (>101 degrees F), chills or shakes.  o Passage of continued blood with bowel movements.   Any questions or concerns about your recovery, please call [phone] or after hours [phone] Nurse Advice Line.    Follow-up Care:    You should receive a call or letter with your results within 1 week. Please call if you have not received a notification of your results.    If asked to return to clinic please make an appointment 1 week after your procedure.  Call [phone].

## 2017-10-09 NOTE — ANESTHESIA PREPROCEDURE EVALUATION
Anesthesia Evaluation     . Pt has had prior anesthetic. Type: General           ROS/MED HX    ENT/Pulmonary:     (+)tobacco use, Current use , . .    Neurologic:  - neg neurologic ROS     Cardiovascular:     (+) Dyslipidemia, ----. : . . . :. .       METS/Exercise Tolerance:     Hematologic:  - neg hematologic  ROS       Musculoskeletal:  - neg musculoskeletal ROS       GI/Hepatic: Comment: Gastric bypass    (+) GERD Asymptomatic on medication,       Renal/Genitourinary:  - ROS Renal section negative       Endo:  - neg endo ROS       Psychiatric:     (+) psychiatric history anxiety and depression      Infectious Disease:  - neg infectious disease ROS       Malignancy:      - no malignancy   Other:    - neg other ROS                 Physical Exam  Normal systems: cardiovascular and pulmonary    Airway   Mallampati: II  TM distance: >3 FB  Neck ROM: full    Dental   (+) upper dentures    Cardiovascular   Rhythm and rate: regular and normal      Pulmonary    breath sounds clear to auscultation                    Anesthesia Plan      History & Physical Review  History and physical reviewed and following examination; no interval change.    ASA Status:  2 .    NPO Status:  > 6 hours    Plan for MAC Reason for MAC:  Deep or markedly invasive procedure (G8)  PONV prophylaxis:  Ondansetron (or other 5HT-3)  Pt verbalizes understanding of MAC anesthesia; denies further questions.        Postoperative Care  Postoperative pain management:  IV analgesics and Oral pain medications.      Consents  Anesthetic plan, risks, benefits and alternatives discussed with:  Patient.  Use of blood products discussed: No .   .                          .

## 2017-10-09 NOTE — ANESTHESIA POSTPROCEDURE EVALUATION
Patient: Pavithra Raines    Procedure(s):  Colonoscopy with polypectomies by biopsy and snare - Wound Class: II-Clean Contaminated   - Wound Class: II-Clean Contaminated    Diagnosis:screening  Diagnosis Additional Information: No value filed.    Anesthesia Type:  MAC    Note:  Anesthesia Post Evaluation    Patient location during evaluation: Phase 2  Patient participation: Able to fully participate in evaluation  Level of consciousness: awake  Pain management: adequate  Airway patency: patent  Cardiovascular status: acceptable and hemodynamically stable  Respiratory status: acceptable, room air and nonlabored ventilation  Hydration status: stable  PONV: none     Anesthetic complications: None    Comments: Patient was happy with the anesthesia care received and no anesthesia related complications were noted.  I will follow up with the patient again if it is needed.        Last vitals:  Vitals:    10/09/17 1113   BP: 113/90   Pulse: 81   Resp: 16   Temp: 99.2  F (37.3  C)         Electronically Signed By: RICARDO Mathews CRNA  October 9, 2017  12:54 PM

## 2017-10-09 NOTE — ANESTHESIA CARE TRANSFER NOTE
Patient: Pavithra Raines    Procedure(s):  Colonoscopy with polypectomies by biopsy and snare - Wound Class: II-Clean Contaminated   - Wound Class: II-Clean Contaminated    Diagnosis: screening  Diagnosis Additional Information: No value filed.    Anesthesia Type:   MAC     Note:  Airway :Room Air  Patient transferred to:Phase II        Vitals: (Last set prior to Anesthesia Care Transfer)    CRNA VITALS  10/9/2017 1201 - 10/9/2017 1236      10/9/2017             SpO2: 100 %                Electronically Signed By: RICARDO Mathews CRNA  October 9, 2017  12:36 PM

## 2017-10-09 NOTE — H&P
Pre-Endoscopy History and Physical     Pavithra Raines MRN# [id number]   YOB: 1955 Age: 62 year old     Date of Procedure: 10/9/2017  Primary care provider: Soha Boggs  Type of Endoscopy: colonoscopy  Reason for Procedure: surveillance or screening colon cancer  Type of Anesthesia Anticipated: MAC    HPI:    Pavithra is a 62 year old female who will be undergoing the above procedure.      Pavithra is feeling well today. Can get up a single flight of stairs without dyspnea. Estimated METS > 4. The risks and benefits of the procedure and the sedation options and risks were discussed with the patient. These include infection, bleeding, and small risk of colon perforation (1/1000 to 1/73236 depending on patient characteristics and type of procedure). Pavithra was also explained to alternatives for colo-rectal screening. All questions were answered and informed consent was obtained.      Problem list, medications, surgical history, reviewed    REVIEW OF SYSTEMS:     5 point ROS negative except as noted above in HPI, including Gen., Resp., CV, GI &  system review.      PHYSICAL EXAM:   /90  Pulse 81  Temp 99.2  F (37.3  C) (Oral)  Resp 16   GENERAL APPEARANCE: alert and no distress  RESP: lungs clear to auscultation  CV: regular  ABD: soft, nt/nd    DIAGNOSTICS:    Not indicated      IMPRESSION   ASA Class 2 - Mild systemic disease        PLAN:     Plan for colonoscopy. No medical contraindications to proceed, or further work up needed. We discussed the risks, benefits and alternatives and the patient wished to proceed.    The above has been forwarded to the consulting provider.      Signed Electronically by: Sylvester Bright  October 9, 2017

## 2017-10-09 NOTE — IP AVS SNAPSHOT
MRN:[id number]                      After Visit Summary   10/9/2017    Pavithra Raines    MRN: [id number]           Thank you!     Thank you for choosing [facility] for your care. Our goal is always to provide you with excellent care. Hearing back from our patients is one way we can continue to improve our services. Please take a few minutes to complete the written survey that you may receive in the mail after you visit with us. Thank you!        Patient Information     Date Of Birth          1955        About your hospital stay     You were admitted on:  October 9, 2017 You last received care in the:  [facility]    You were discharged on:  October 9, 2017       Who to Call     For medical emergencies, please call 911.  For non-urgent questions about your medical care, please call your primary care provider or clinic, [phone]  For questions related to your surgery, please call your surgery clinic        Attending Provider     Provider Sylvester Gallo MD Family Practice       Primary Care Provider Office Phone # Fax #    Soha Boggs [phone]      Your next 10 appointments already scheduled     Dec 01, 2017  9:00 AM CST   Office Visit with Soha Boggs MD   [facility]    [address] [place] [id number] [phone]           Bring a current list of meds and any records pertaining to this visit. For Physicals, please bring immunization records and any forms needing to be filled out. Please arrive 10 minutes early to complete paperwork.              Further instructions from your care team         [facility]    Home Care Following Endoscopy    Dr. Sylvester Bright    Activity:    You have just undergone an endoscopic procedure usually performed with conscious sedation.  Do not work or operate machinery (including a car) for at  least 12 hours.      I encourage you to walk and attempt to pass this air as soon as possible.    Diet:    Return to the diet you were on before your procedure but eat lightly for the first 12-24 hours.    Drink plenty of water.    Resume any regular medications unless otherwise advised by your physician.  Please begin any new medication prescribed as a result of your procedure as directed by your physician.     If you had any biopsy or polyp removed please refrain from aspirin or aspirin products for 2 days.  If on Coumadin please restart as instructed by your physician.   Pain:    You may take Tylenol as needed for pain.  Expected Recovery:    You can expect some mild abdominal fullness and/or discomfort due to the air used to inflate your intestinal tract. It is also normal to have a mild sore throat after upper endoscopy.    Call Your Physician if You Have:        After Colonoscopy:  o Worsening persisting abdominal pain which is worse with activity.  o Fevers (>101 degrees F), chills or shakes.  o Passage of continued blood with bowel movements.   Any questions or concerns about your recovery, please call [phone] or after hours [phone] Nurse Advice Line.    Follow-up Care:    You should receive a call or letter with your results within 1 week. Please call if you have not received a notification of your results.    If asked to return to clinic please make an appointment 1 week after your procedure.  Call [phone].        Pending Results     Date and Time Order Name Status Description    10/9/2017 1221 Surgical pathology exam In process             Admission Information     Date & Time Provider Department Dept. Phone    10/9/2017 Sylvester Bright MD [facility] Endoscopy [phone]      Your Vitals Were     Blood Pressure Pulse Temperature Respirations          113/90 81 99.2  F (37.3  C) (Oral) 16        MyChart Information     [employer] gives you secure access to your electronic  health record. If you see a primary care provider, you can also send messages to your care team and make appointments. If you have questions, please call your primary care clinic.  If you do not have a primary care provider, please call [phone] and they will assist you.        Care EveryWhere ID     This is your Care EveryWhere ID. This could be used by other organizations to access your [place] medical records  [id number]        Equal Access to Services     OSCAR FRASER : Hadii mesfin ku hadsonamo Soomaali, waaxda luqadaha, qaybta kaalmada adeegyada, waxay idiin haylisaenrico avitiagriseldahank zamudio . So [facility] [phone].    ATENCIÓN: Si habla español, tiene a medina disposición servicios gratuitos de asistencia lingüística. Llame al [phone].    We comply with applicable federal civil rights laws and [place] laws. We do not discriminate on the basis of race, color, national origin, age, disability, sex, sexual orientation, or gender identity.               Review of your medicines      UNREVIEWED medicines. Ask your doctor about these medicines        Dose / Directions    BENADRYL PO        Dose:  25 mg   Take 25 mg by mouth nightly as needed   Refills:  0       escitalopram 10 MG tablet   Commonly known as:  LEXAPRO   Used for:  Anxiety        Dose:  10 mg   Take 1 tablet (10 mg) by mouth daily   Quantity:  30 tablet   Refills:  1       Melatonin 10 MG Tabs tablet        Dose:  10 mg   Take 10 mg by mouth nightly as needed for sleep   Refills:  0       traZODone 50 MG tablet   Commonly known as:  DESYREL   Used for:  Insomnia, unspecified type        TAKE ONE TABLET BY MOUTH NIGHTLY AS NEEDED FOR SLEEP   Quantity:  30 tablet   Refills:  1                Protect others around you: Learn how to safely use, store and throw away your medicines at www.disposemymeds.org.             Medication List: This is a list of all your medications and when to take them. Check marks below indicate your daily home schedule. Keep this  list as a reference.      Medications           Morning Afternoon Evening Bedtime As Needed    BENADRYL PO   Take 25 mg by mouth nightly as needed                                escitalopram 10 MG tablet   Commonly known as:  LEXAPRO   Take 1 tablet (10 mg) by mouth daily                                Melatonin 10 MG Tabs tablet   Take 10 mg by mouth nightly as needed for sleep                                traZODone 50 MG tablet   Commonly known as:  DESYREL   TAKE ONE TABLET BY MOUTH NIGHTLY AS NEEDED FOR SLEEP

## 2017-10-11 LAB — COPATH REPORT: NORMAL

## 2017-10-13 DIAGNOSIS — F41.9 ANXIETY: ICD-10-CM

## 2017-10-13 NOTE — TELEPHONE ENCOUNTER
Lexapro     Last Written Prescription Date: 8/14/2017  Last Fill Quantity: 30, # refills: 1  [place] [facility] [place] [facility] provider:  8/14/2017   Next 5 appointments (look out 90 days)     Dec 01, 2017  9:00 AM CST   Office Visit with Soha Boggs MD   [facility]    [address] [place] [id number] [phone]                   Last PHQ-9 score on record=   PHQ-9 SCORE 8/7/2017   Total Score -   Total Score 23

## 2017-10-16 ENCOUNTER — HOSPITAL ENCOUNTER (EMERGENCY)
Facility: CLINIC | Age: 62
Discharge: HOME | End: 2017-10-17
Attending: EMERGENCY MEDICINE | Admitting: EMERGENCY MEDICINE
Payer: COMMERCIAL

## 2017-10-16 ENCOUNTER — CARE COORDINATION (OUTPATIENT)
Dept: SURGERY | Facility: CLINIC | Age: 62
End: 2017-10-16

## 2017-10-16 ENCOUNTER — TELEPHONE (OUTPATIENT)
Dept: FAMILY MEDICINE | Facility: CLINIC | Age: 62
End: 2017-10-16

## 2017-10-16 DIAGNOSIS — R52 PAIN AGGRAVATED BY EATING OR DRINKING: ICD-10-CM

## 2017-10-16 DIAGNOSIS — Z98.84 HISTORY OF ROUX-EN-Y GASTRIC BYPASS: Primary | ICD-10-CM

## 2017-10-16 DIAGNOSIS — R10.13 ABDOMINAL PAIN, EPIGASTRIC: ICD-10-CM

## 2017-10-16 DIAGNOSIS — D63.8 ANEMIA IN OTHER CHRONIC DISEASES CLASSIFIED ELSEWHERE: ICD-10-CM

## 2017-10-16 LAB
ALBUMIN SERPL-MCNC: 2.6 G/DL (ref 3.4–5)
ALP SERPL-CCNC: 107 U/L (ref 40–150)
ALT SERPL W P-5'-P-CCNC: 13 U/L (ref 0–50)
ANION GAP SERPL CALCULATED.3IONS-SCNC: 10 MMOL/L (ref 3–14)
AST SERPL W P-5'-P-CCNC: 19 U/L (ref 0–45)
BASOPHILS # BLD AUTO: 0 10E9/L (ref 0–0.2)
BASOPHILS NFR BLD AUTO: 0.2 %
BILIRUB SERPL-MCNC: 0.7 MG/DL (ref 0.2–1.3)
BUN SERPL-MCNC: 11 MG/DL (ref 7–30)
CALCIUM SERPL-MCNC: 8.7 MG/DL (ref 8.5–10.1)
CHLORIDE SERPL-SCNC: 104 MMOL/L (ref 94–109)
CO2 SERPL-SCNC: 24 MMOL/L (ref 20–32)
CREAT SERPL-MCNC: 0.84 MG/DL (ref 0.52–1.04)
DIFFERENTIAL METHOD BLD: ABNORMAL
EOSINOPHIL # BLD AUTO: 0.2 10E9/L (ref 0–0.7)
EOSINOPHIL NFR BLD AUTO: 1.2 %
ERYTHROCYTE [DISTWIDTH] IN BLOOD BY AUTOMATED COUNT: 19.4 % (ref 10–15)
GFR SERPL CREATININE-BSD FRML MDRD: 69 ML/MIN/1.7M2
GLUCOSE BLDC GLUCOMTR-MCNC: 91 MG/DL (ref 70–99)
GLUCOSE SERPL-MCNC: 82 MG/DL (ref 70–99)
HCT VFR BLD AUTO: 30.3 % (ref 35–47)
HGB BLD-MCNC: 9.5 G/DL (ref 11.7–15.7)
IMM GRANULOCYTES # BLD: 0 10E9/L (ref 0–0.4)
IMM GRANULOCYTES NFR BLD: 0.2 %
INR PPP: 1.09 (ref 0.86–1.14)
LACTATE BLD-SCNC: 0.9 MMOL/L (ref 0.7–2)
LIPASE SERPL-CCNC: 117 U/L (ref 73–393)
LYMPHOCYTES # BLD AUTO: 3.1 10E9/L (ref 0.8–5.3)
LYMPHOCYTES NFR BLD AUTO: 25.3 %
MCH RBC QN AUTO: 24.5 PG (ref 26.5–33)
MCHC RBC AUTO-ENTMCNC: 31.4 G/DL (ref 31.5–36.5)
MCV RBC AUTO: 78 FL (ref 78–100)
MONOCYTES # BLD AUTO: 1.1 10E9/L (ref 0–1.3)
MONOCYTES NFR BLD AUTO: 8.6 %
NEUTROPHILS # BLD AUTO: 7.9 10E9/L (ref 1.6–8.3)
NEUTROPHILS NFR BLD AUTO: 64.5 %
NRBC # BLD AUTO: 0 10*3/UL
NRBC BLD AUTO-RTO: 0 /100
PLATELET # BLD AUTO: 421 10E9/L (ref 150–450)
POTASSIUM SERPL-SCNC: 3.4 MMOL/L (ref 3.4–5.3)
PROT SERPL-MCNC: 7.1 G/DL (ref 6.8–8.8)
RBC # BLD AUTO: 3.87 10E12/L (ref 3.8–5.2)
SODIUM SERPL-SCNC: 139 MMOL/L (ref 133–144)
TROPONIN I SERPL-MCNC: <0.015 UG/L (ref 0–0.04)
WBC # BLD AUTO: 12.2 10E9/L (ref 4–11)

## 2017-10-16 PROCEDURE — 00000146 ZZHCL STATISTIC GLUCOSE BY METER IP

## 2017-10-16 PROCEDURE — 99285 EMERGENCY DEPT VISIT HI MDM: CPT | Mod: 25

## 2017-10-16 PROCEDURE — 80053 COMPREHEN METABOLIC PANEL: CPT | Performed by: EMERGENCY MEDICINE

## 2017-10-16 PROCEDURE — 99285 EMERGENCY DEPT VISIT HI MDM: CPT | Mod: 25 | Performed by: EMERGENCY MEDICINE

## 2017-10-16 PROCEDURE — 93005 ELECTROCARDIOGRAM TRACING: CPT

## 2017-10-16 PROCEDURE — 85610 PROTHROMBIN TIME: CPT | Performed by: EMERGENCY MEDICINE

## 2017-10-16 PROCEDURE — 25000128 H RX IP 250 OP 636: Performed by: EMERGENCY MEDICINE

## 2017-10-16 PROCEDURE — 93010 ELECTROCARDIOGRAM REPORT: CPT | Mod: Z6 | Performed by: EMERGENCY MEDICINE

## 2017-10-16 PROCEDURE — 85025 COMPLETE CBC W/AUTO DIFF WBC: CPT | Performed by: EMERGENCY MEDICINE

## 2017-10-16 PROCEDURE — 83690 ASSAY OF LIPASE: CPT | Performed by: EMERGENCY MEDICINE

## 2017-10-16 PROCEDURE — 83605 ASSAY OF LACTIC ACID: CPT | Performed by: EMERGENCY MEDICINE

## 2017-10-16 PROCEDURE — 96374 THER/PROPH/DIAG INJ IV PUSH: CPT

## 2017-10-16 PROCEDURE — 96375 TX/PRO/DX INJ NEW DRUG ADDON: CPT

## 2017-10-16 PROCEDURE — 84484 ASSAY OF TROPONIN QUANT: CPT | Performed by: EMERGENCY MEDICINE

## 2017-10-16 RX ORDER — ONDANSETRON 2 MG/ML
8 INJECTION INTRAMUSCULAR; INTRAVENOUS ONCE
Status: COMPLETED | OUTPATIENT
Start: 2017-10-16 | End: 2017-10-16

## 2017-10-16 RX ADMIN — ONDANSETRON 8 MG: 2 INJECTION INTRAMUSCULAR; INTRAVENOUS at 23:01

## 2017-10-16 NOTE — PROGRESS NOTES
"  Possible Revision  Received: Today       Heather Connolly Christina, ILDA; Melissa Escalante, RN       Phone Number: [phone]                     Pt stated that about 13 years ago that she had a sudha en y at [facility]. She stated that she is now having complications (specifically \"stabbing pain). I spoke with Lesly who informed the pt to have a referral letter and records sent and then to [facility] you to contact the pt. Please call her back to discuss.     Thank you!     KI     Please DO NOT send this message and/or reply back to sender.  Call Center Representatives DO NOT respond to messages.                  Patient has hx of sudha en y gastric bypass at [facility] 2004, original surgeon Dr. Pop. Patient is no longer in their network for insurance. Patient has had 20 lb weight loss within the last few months. Patient states her PCP advised her not to drink ETOH. Has not drank alcohol in the past 3 months, patient is able to drink water, juice. Able to eat pureed foods. Still has pain, feels it is chronic and \" crampy\", happens one hour after she eats, radiates to the back. Passes when she rests. Middle of chest. Chills, doubled over in unbearable pain, constipated, has BM q3 days very small.     Advised patient since her symptoms are getting worse and she is dropping weight, she is also unable to eat, she should present to the ED if she is able to make it here safely. She is being referred by her PCP.     Patient verbalized understanding and agreed with plan.                 "

## 2017-10-16 NOTE — ED AVS SNAPSHOT
[facility], [place], Emergency Department    [address] [place] [address]    Phone:  [phone]                                       Pavithra Raines   MRN: [id number]    Department:  [facility], Emergency Department   Date of Visit:  10/16/2017           After Visit Summary Signature Page     I have received my discharge instructions, and my questions have been answered. I have discussed any challenges I see with this plan with the nurse or doctor.    ..........................................................................................................................................  Patient/Patient Representative Signature      ..........................................................................................................................................  Patient Representative Print Name and Relationship to Patient    ..................................................               ................................................  Date                                            Time    ..........................................................................................................................................  Reviewed by Signature/Title    ...................................................              ..............................................  Date                                                            Time

## 2017-10-16 NOTE — ED AVS SNAPSHOT
[facility], Emergency Department    [address] [address]    Phone:  [phone]                                       Pavithra Raines   MRN: [id number]    Department:  [facility], Emergency Department   Date of Visit:  10/16/2017           Patient Information     Date Of Birth          1955        Your diagnoses for this visit were:     Abdominal pain, epigastric     Pain aggravated by eating or drinking     Anemia in other chronic diseases classified elsewhere        You were seen by Ramses Flores MD.        Discharge Instructions       Please make an appointment to follow up with Surgery (Bariatric) (phone: [phone]) as soon as possible.    Return to the ED if you are having worsening symptoms, or any other urgent/life-threatening concerns.       Future Appointments        Provider Department Dept Phone Center    12/1/2017 9:00 AM Soha Boggs MD [facility] [phone] [facility]      24 Hour Appointment Hotline       To make an appointment at any [facility], call [phone] ([phone]). If you don't have a family doctor or clinic, we will help you find one. [facility] (formerly [facility]3] are conveniently located to serve the needs of you and your family.             Review of your medicines      START taking        Dose / Directions Last dose taken    alum & mag hydroxide-simethicone 400-400-40 MG/5ML Susp suspension   Commonly known as:  MYLANTA ES/MAALOX  ES   Dose:  30 mL   Quantity:  355 mL        Take 30 mLs by mouth every 4 hours as needed (epigastric pain)   Refills:  0        ranitidine 150 MG tablet   Commonly known as:  ZANTAC   Dose:  150 mg   Quantity:  40 tablet        Take 1 tablet (150 mg) by mouth 2 times daily for 15 days   Refills:  0          Our records show that you are taking the medicines listed below. If these are incorrect, please call your family doctor or clinic.        Dose / Directions Last dose  taken    BENADRYL PO   Dose:  25 mg        Take 25 mg by mouth nightly as needed   Refills:  0        escitalopram 10 MG tablet   Commonly known as:  LEXAPRO   Dose:  10 mg   Quantity:  30 tablet        Take 1 tablet (10 mg) by mouth daily   Refills:  1        Melatonin 10 MG Tabs tablet   Dose:  10 mg        Take 10 mg by mouth nightly as needed for sleep   Refills:  0        traZODone 50 MG tablet   Commonly known as:  DESYREL   Quantity:  30 tablet        TAKE ONE TABLET BY MOUTH NIGHTLY AS NEEDED FOR SLEEP   Refills:  1                Prescriptions were sent or printed at these locations (2 Prescriptions)                   Other Prescriptions                Printed at Department/Unit printer (2 of 2)         alum & mag hydroxide-simethicone (MYLANTA ES/MAALOX  ES) 400-400-40 MG/5ML SUSP suspension               ranitidine (ZANTAC) 150 MG tablet                Procedures and tests performed during your visit     CBC with platelets differential    CT Abdomen Pelvis w Contrast    Comprehensive metabolic panel    EKG 12 lead    Glucose by meter    INR    Lactic acid whole blood    Lipase    Troponin I      Orders Needing Specimen Collection     None      Pending Results     Date and Time Order Name Status Description    10/16/2017 2230 CT Abdomen Pelvis w Contrast Preliminary     10/16/2017 2230 EKG 12 lead Preliminary             Pending Culture Results     No orders found for last 3 day(s).            Pending Results Instructions     If you had any lab results that were not finalized at the time of your Discharge, you can call the ED Lab Result RN at [phone]. You will be contacted by this team for any positive Lab results or changes in treatment. The nurses are available 7 days a week from 10A to 6:30P.  You can leave a message 24 hours per day and they will return your call.        Thank you for choosing [facility]       Thank you for choosing [facility] for your care. Our goal is always to provide you with  excellent care. Hearing back from our patients is one way we can continue to improve our services. Please take a few minutes to complete the written survey that you may receive in the mail after you visit with us. Thank you!        [facility] Information     [facility] gives you secure access to your electronic health record. If you see a primary care provider, you can also send messages to your care team and make appointments. If you have questions, please call your primary care clinic.  If you do not have a primary care provider, please call [phone] and they will assist you.        Care EveryWhere ID     This is your Care EveryWhere ID. This could be used by other organizations to access your [facility] medical records  [id number]        Equal Access to Services     OSCAR FRASER : Suresh Saavedra, minesh fisher, niya devries, nazario corral. So [facility] [phone].    ATENCIÓN: Si habla español, tiene a medina disposición servicios gratuitos de asistencia lingüística. Llame al [phone].    We comply with applicable federal civil rights laws and [place] laws. We do not discriminate on the basis of race, color, national origin, age, disability, sex, sexual orientation, or gender identity.            After Visit Summary       This is your record. Keep this with you and show to your community pharmacist(s) and doctor(s) at your next visit.

## 2017-10-16 NOTE — TELEPHONE ENCOUNTER
Ok for referral to [facility]. Please assist patient in scheduling this appointment.  Luz Stiles, CMA

## 2017-10-16 NOTE — TELEPHONE ENCOUNTER
Reason for Call: Request for an order or referral:    Order or referral being requested: referral to Bariatric Center. [facility].  Fax# [phone].    Date needed: as soon as possible    Has the patient been seen by the PCP for this problem? NO    Additional comments: pt is having complications from Tom-en-Y surgery done 12 yrs ago at [facility] in [place]. Pt may have a hernia.    Phone number Patient can be reached at:      Best Time:      Can we leave a detailed message on this number?  YES    Call taken on 10/16/2017 at 9:50 AM by Linda Gutierres

## 2017-10-17 ENCOUNTER — CARE COORDINATION (OUTPATIENT)
Dept: SURGERY | Facility: CLINIC | Age: 62
End: 2017-10-17

## 2017-10-17 ENCOUNTER — APPOINTMENT (OUTPATIENT)
Dept: CT IMAGING | Facility: CLINIC | Age: 62
End: 2017-10-17
Attending: EMERGENCY MEDICINE
Payer: COMMERCIAL

## 2017-10-17 VITALS
RESPIRATION RATE: 16 BRPM | BODY MASS INDEX: 22.2 KG/M2 | OXYGEN SATURATION: 97 % | SYSTOLIC BLOOD PRESSURE: 156 MMHG | DIASTOLIC BLOOD PRESSURE: 85 MMHG | TEMPERATURE: 99.3 F | WEIGHT: 125.3 LBS | HEART RATE: 75 BPM

## 2017-10-17 LAB
INTERPRETATION ECG - MUSE: NORMAL
RADIOLOGIST FLAGS: NORMAL

## 2017-10-17 PROCEDURE — 25000128 H RX IP 250 OP 636: Performed by: EMERGENCY MEDICINE

## 2017-10-17 PROCEDURE — 74177 CT ABD & PELVIS W/CONTRAST: CPT

## 2017-10-17 PROCEDURE — 25000125 ZZHC RX 250: Performed by: EMERGENCY MEDICINE

## 2017-10-17 RX ORDER — HYDROMORPHONE HYDROCHLORIDE 1 MG/ML
0.5 INJECTION, SOLUTION INTRAMUSCULAR; INTRAVENOUS; SUBCUTANEOUS
Status: COMPLETED | OUTPATIENT
Start: 2017-10-17 | End: 2017-10-17

## 2017-10-17 RX ORDER — IOPAMIDOL 755 MG/ML
77 INJECTION, SOLUTION INTRAVASCULAR ONCE
Status: COMPLETED | OUTPATIENT
Start: 2017-10-17 | End: 2017-10-17

## 2017-10-17 RX ORDER — ALUMINA, MAGNESIA, AND SIMETHICONE 2400; 2400; 240 MG/30ML; MG/30ML; MG/30ML
30 SUSPENSION ORAL EVERY 4 HOURS PRN
Qty: 355 ML | Refills: 0 | Status: ON HOLD | OUTPATIENT
Start: 2017-10-17 | End: 2018-02-09

## 2017-10-17 RX ADMIN — IOPAMIDOL 77 ML: 755 INJECTION, SOLUTION INTRAVENOUS at 00:39

## 2017-10-17 RX ADMIN — HYDROMORPHONE HYDROCHLORIDE 0.5 MG: 1 INJECTION, SOLUTION INTRAMUSCULAR; INTRAVENOUS; SUBCUTANEOUS at 01:09

## 2017-10-17 RX ADMIN — SODIUM CHLORIDE 70 ML: 9 INJECTION, SOLUTION INTRAVENOUS at 00:39

## 2017-10-17 NOTE — DISCHARGE INSTRUCTIONS
Please make an appointment to follow up with Surgery (Bariatric) (phone: [phone]) as soon as possible.    Return to the ED if you are having worsening symptoms, or any other urgent/life-threatening concerns.

## 2017-10-17 NOTE — PROGRESS NOTES
Patient calling in to schedule appt. Was seen in ED last night.     She was told to follow up with bariatric surgery team in regards to her gastritis. She had sudha en Y gastric bypass by Dr. Pop at [facility] in 2004. Which we do not have the records. Advised her once again we will need these records to see her, writer also did try to call and request records to sent to us however they need patient medical release form to be signed prior to sending. Message sent to Dr Sanchez for review of patient s chart to see which team patient should be scheduled with. GI vs Bariatric, or both.     Will wait to hear from the surgeon prior to scheduling.

## 2017-10-17 NOTE — TELEPHONE ENCOUNTER
Faxed information to [facility] and informed on fax to contact patient with questions as we have no records for this patients previous surgery.   Sangeetha TO

## 2017-10-17 NOTE — ED PROVIDER NOTES
[place] EMERGENCY DEPARTMENT ([facility])  10/16/17   ED 18    History     Chief Complaint   Patient presents with     Abdominal Pain     The history is provided by the patient and medical records.     Pavithra Raines is a 62-year-old female who underwent gastric bypass Tom-en-Y surgery more than 10 years ago presenting with epigastric pain.  Patient reports she has had similar but less severe pains over the last several months intermittently.  The pains generally happen after eating.  They start about 45 minutes after a meal, and last about another 45 minutes.  Over the past week these become progressively more frequent, more intense, and lasting longer.  She has been trying Gas-X and other over-the-counter medications for pain without improvement.  She also endorses constipation, with small, bxbzgyzfn-py-nohi bowel movement every few days.  She has had nausea but no vomiting.  No dysuria, no urgency, no urinary frequency, no fevers. Of note the patient had a CT scan in August which was remarkable only for some fatty liver and no other acute findings.  She also had colonoscopy one week ago with some polyps but no other major abnormalities.      This part of the document was transcribed by Malinda Rico, Medical Scribe.      I have reviewed the Medications, Allergies, Past Medical and Surgical History, and Social History in the Epic system.    Review of Systems  A complete 14-system review of systems was completed with pertinent positives and negatives noted in the HPI, otherwise negative.      Physical Exam   BP: 153/90  Heart Rate: 81  Temp: 99.4  F (37.4  C)  Resp: 16  Weight: 56.8 kg (125 lb 4.8 oz)  SpO2: 100 %       Physical Exam  /85  Temp 99.3  F (37.4  C) (Oral)  Resp 16  Wt 56.8 kg (125 lb 4.8 oz)  SpO2 94%  BMI 22.2 kg/m2  General: well-appearing, no acute distress. Thin female.   HENT: MMM, no oropharyngeal lesions  Eyes: PERRL, normal sclerae, no conjunctival  pallor  Neck: non-tender, supple  Cardio: RRR, normal heart sounds, extremities well perfused  Resp: Normal work of breathing, clear breath sounds  Chest/Back: no visual signs of trauma, no CVA tenderness  Abdomen: epigastric moderate tenderness, non-distended, no rebound, no guarding. No other areas of tenderness. Old surgical scar in LUQ.   Neuro: alert and fully oriented. CN II-XII grossly intact. Grossly normal strength and sensation in all extremities.   MSK: no deformities.   Integumentary/Skin: no rash, normal color  Psych: normal affect, normal behavior    ED Course     ED Course     Procedures             EKG Interpretation:      Interpreted by Ramses Flroes  Time reviewed: 2240  Symptoms at time of EKG: None   Rhythm: normal sinus   Rate: Normal  Axis: Normal  Ectopy: none  Conduction: normal  ST Segments/ T Waves: No acute ischemic changes  Q Waves: none  Comparison to prior: Unchanged from 6/3/2016    Clinical Impression: normal EKG    Critical Care time:  none           Labs Ordered and Resulted from Time of ED Arrival Up to the Time of Departure from the ED   CBC WITH PLATELETS DIFFERENTIAL - Abnormal; Notable for the following:        Result Value    WBC 12.2 (*)     Hemoglobin 9.5 (*)     Hematocrit 30.3 (*)     MCH 24.5 (*)     MCHC 31.4 (*)     RDW 19.4 (*)     All other components within normal limits   COMPREHENSIVE METABOLIC PANEL - Abnormal; Notable for the following:     Albumin 2.6 (*)     All other components within normal limits   GLUCOSE BY METER   INR   LIPASE   LACTIC ACID WHOLE BLOOD   TROPONIN I            Assessments & Plan (with Medical Decision Making)   In the ED, the patient was afebrile and hemodynamically stable. History notable for worsening abdominal pain, mainly after eating, especially in the past week but somewhat present the past few months. Exam notable for epigastric tenderness. No fever nor toxic appearance to suggest serious bacterial infection. ECG without  evidence of acute ischemia, troponin negative - ACS very unlikely. Labs with slightly decreased Hgb since check 2 months ago. Mild leukocytosis. Hypoalbuminemia consistent with poor malnutrition. CT abdomen/pelvis most consistent with gastritis of gastric remnant by initial impression.     The complete clinical picture is most consistent with gastritis. After counseling on the diagnosis, work-up, and treatment plan, the patient was discharged to home. Maalox and ranitidine prescription provided. The patient was advised to follow-up with bariatric surgery in a few days. The patient was advised to return to the ED if worsening symptoms, or if there are any urgent/life-threatening concerns.       Clinical Impression:   Epigastric pain, acute on chronic  Pain worsening with PO intake   Protein calorie malnutrition   Acute on chronic anemia       Ramses Flores MD  Emergency Medicine       I have reviewed the nursing notes.    I have reviewed the findings, diagnosis, plan and need for follow up with the patient.    New Prescriptions    ALUM & MAG HYDROXIDE-SIMETHICONE (MYLANTA ES/MAALOX  ES) 400-400-40 MG/5ML SUSP SUSPENSION    Take 30 mLs by mouth every 4 hours as needed (epigastric pain)    RANITIDINE (ZANTAC) 150 MG TABLET    Take 1 tablet (150 mg) by mouth 2 times daily for 15 days       Final diagnoses:   Abdominal pain, epigastric   Pain aggravated by eating or drinking   Anemia in other chronic diseases classified elsewhere       10/16/2017   [facility], [place], EMERGENCY DEPARTMENT     Ramses Flores MD  10/17/17 0039       Ramses Flores MD  10/17/17 0111

## 2017-10-17 NOTE — ED NOTES
Pt arrived to the ED with c/o abdominal pain. Pt reports she had bariatric surgery 12 years ago. She has had abdominal pain for the past few months and the pain has slowly increased. She alled the nurse line to set up a doctor appointment and they directed her to the ED. Per pt she has also had a steady weight loss. Vitals stable, afebrile.

## 2017-10-17 NOTE — TELEPHONE ENCOUNTER
Routing refill request to provider for review/approval because:  Labs out of range:  PHQ-9  Mey Mary RN

## 2017-10-18 RX ORDER — ESCITALOPRAM OXALATE 10 MG/1
TABLET ORAL
Qty: 90 TABLET | Refills: 0 | Status: SHIPPED | OUTPATIENT
Start: 2017-10-18 | End: 2017-12-01 | Stop reason: DRUGHIGH

## 2017-10-24 ENCOUNTER — CARE COORDINATION (OUTPATIENT)
Dept: SURGERY | Facility: CLINIC | Age: 62
End: 2017-10-24

## 2017-10-24 NOTE — PROGRESS NOTES
RE: Pt questions  Received: Today       Rush Gamble Nisha D, RN       Phone Number: [phone]                     no            Previous Messages       ----- Message -----      From: Melissa Escalante RN      Sent: 10/23/2017   2:48 PM        To: Rush Gamble   Subject: FW: Pt questions                                 Tanner Beverly-     Did you get any records on this patient?     ----- Message -----      From: Nathalie Hernandez      Sent: 10/23/2017   2:32 PM        To: Melissa Escalante RN   Subject: Pt questions                                     The pt is asking for an update from Melissa - have the recs been rcvd? What is the next step? Please call the pt at [phone]     Thanks Abdelrahman Zelaya     Please DO NOT send this message and/or reply back to sender.  Call Center Representatives DO NOT respond to messages.                        Called and spoke to patient. We have not yet received her records. She states she is doing better since she has been taking the medication given to her at the hospital.     She asked about also seeing GI team regarding her reflux. She was advised that she should schedule now since they are booked out. She stated she would like to wait until she sees Dr. Carrasco.    Will wait for records and call patient with plan once these are received.

## 2017-11-01 ENCOUNTER — CARE COORDINATION (OUTPATIENT)
Dept: SURGERY | Facility: CLINIC | Age: 62
End: 2017-11-01

## 2017-11-01 NOTE — PROGRESS NOTES
Called patient and she will call willy to get records sent to us due to the confusion on were they needed to be sent.   She is unable to go back to original surgeon due to her insurance and he is no longer practicing.

## 2017-11-02 ENCOUNTER — TELEPHONE (OUTPATIENT)
Dept: FAMILY MEDICINE | Facility: CLINIC | Age: 62
End: 2017-11-02

## 2017-11-02 NOTE — TELEPHONE ENCOUNTER
Panel Management Review      Patient has the following on her problem list: None      Composite cancer screening  Chart review shows that this patient is due/due soon for the following Mammogram  Summary:    Patient is due/failing the following:   MAMMOGRAM    Action needed:   Needs mammo    Type of outreach:    Sent letter.    Questions for provider review:    None                                                                                                                                    ERS     Chart routed to none .

## 2017-11-02 NOTE — LETTER
[address]   [address]  Tel. [phone] / Fax [phone]    November 2, 2017        Pavithra Raines  [address] [facility] [address]          Dear Pavithra,    Our records show that you are due for a mammogram. You will receive a free breast cancer awareness tote at your visit.  Please call [phone] to schedule your appointment. Thank you.    Sincerely,        Otto Anna M.D.

## 2017-11-07 ENCOUNTER — CARE COORDINATION (OUTPATIENT)
Dept: SURGERY | Facility: CLINIC | Age: 62
End: 2017-11-07

## 2017-11-07 NOTE — PROGRESS NOTES
Having symptoms of pain in upper(L) side pain, when its bad stabbing pain. She is only able to eat small amounts 1/2 cp and then a few hours later pain starts. Has been doing just smoothies that she sips on all day. Weighed 145 in august and now she weighs 122, 5'4. She feels like she is getting anemic due to ER MD told her not to. She is currently taking ranitidine and Mylanta.     Patient is wondering is she has a ulcer? Thinking she may need upper endo?  Will call patient after taking with PA.

## 2017-11-28 DIAGNOSIS — G47.00 INSOMNIA, UNSPECIFIED TYPE: ICD-10-CM

## 2017-11-28 RX ORDER — TRAZODONE HYDROCHLORIDE 50 MG/1
TABLET, FILM COATED ORAL
Qty: 30 TABLET | Refills: 1 | Status: CANCELLED | OUTPATIENT
Start: 2017-11-28

## 2017-12-01 ENCOUNTER — HOSPITAL ENCOUNTER (OUTPATIENT)
Dept: MAMMOGRAPHY | Facility: CLINIC | Age: 62
Discharge: HOME | End: 2017-12-01
Attending: FAMILY MEDICINE | Admitting: FAMILY MEDICINE
Payer: COMMERCIAL

## 2017-12-01 ENCOUNTER — OFFICE VISIT (OUTPATIENT)
Dept: FAMILY MEDICINE | Facility: CLINIC | Age: 62
End: 2017-12-01
Payer: COMMERCIAL

## 2017-12-01 VITALS
SYSTOLIC BLOOD PRESSURE: 102 MMHG | WEIGHT: 119.8 LBS | HEART RATE: 110 BPM | OXYGEN SATURATION: 100 % | BODY MASS INDEX: 21.22 KG/M2 | TEMPERATURE: 97 F | DIASTOLIC BLOOD PRESSURE: 60 MMHG

## 2017-12-01 DIAGNOSIS — R10.13 EPIGASTRIC PAIN: ICD-10-CM

## 2017-12-01 DIAGNOSIS — D50.8 OTHER IRON DEFICIENCY ANEMIA: ICD-10-CM

## 2017-12-01 DIAGNOSIS — Z12.4 SCREENING FOR MALIGNANT NEOPLASM OF CERVIX: Primary | ICD-10-CM

## 2017-12-01 DIAGNOSIS — Z98.84 STATUS POST GASTRIC BYPASS FOR OBESITY: ICD-10-CM

## 2017-12-01 DIAGNOSIS — G47.00 INSOMNIA, UNSPECIFIED TYPE: ICD-10-CM

## 2017-12-01 DIAGNOSIS — Z12.39 SCREENING FOR MALIGNANT NEOPLASM OF BREAST: ICD-10-CM

## 2017-12-01 DIAGNOSIS — D06.9 CARCINOMA IN SITU OF CERVIX, UNSPECIFIED LOCATION: ICD-10-CM

## 2017-12-01 DIAGNOSIS — F41.9 ANXIETY: ICD-10-CM

## 2017-12-01 DIAGNOSIS — F33.9 EPISODE OF RECURRENT MAJOR DEPRESSIVE DISORDER, UNSPECIFIED DEPRESSION EPISODE SEVERITY (H): ICD-10-CM

## 2017-12-01 DIAGNOSIS — D51.9 ANEMIA DUE TO VITAMIN B12 DEFICIENCY, UNSPECIFIED B12 DEFICIENCY TYPE: ICD-10-CM

## 2017-12-01 LAB
ERYTHROCYTE [DISTWIDTH] IN BLOOD BY AUTOMATED COUNT: 18.5 % (ref 10–15)
FERRITIN SERPL-MCNC: 26 NG/ML (ref 8–252)
HCT VFR BLD AUTO: 35.4 % (ref 35–47)
HGB BLD-MCNC: 10.7 G/DL (ref 11.7–15.7)
MCH RBC QN AUTO: 24.6 PG (ref 26.5–33)
MCHC RBC AUTO-ENTMCNC: 30.2 G/DL (ref 31.5–36.5)
MCV RBC AUTO: 81 FL (ref 78–100)
PLATELET # BLD AUTO: 412 10E9/L (ref 150–450)
RBC # BLD AUTO: 4.35 10E12/L (ref 3.8–5.2)
VIT B12 SERPL-MCNC: 304 PG/ML (ref 193–986)
WBC # BLD AUTO: 9.7 10E9/L (ref 4–11)

## 2017-12-01 PROCEDURE — 99214 OFFICE O/P EST MOD 30 MIN: CPT | Mod: 25 | Performed by: FAMILY MEDICINE

## 2017-12-01 PROCEDURE — 87624 HPV HI-RISK TYP POOLED RSLT: CPT | Performed by: FAMILY MEDICINE

## 2017-12-01 PROCEDURE — 36415 COLL VENOUS BLD VENIPUNCTURE: CPT | Performed by: FAMILY MEDICINE

## 2017-12-01 PROCEDURE — 77063 BREAST TOMOSYNTHESIS BI: CPT

## 2017-12-01 PROCEDURE — 82607 VITAMIN B-12: CPT | Performed by: FAMILY MEDICINE

## 2017-12-01 PROCEDURE — 85027 COMPLETE CBC AUTOMATED: CPT | Performed by: FAMILY MEDICINE

## 2017-12-01 PROCEDURE — G0145 SCR C/V CYTO,THINLAYER,RESCR: HCPCS | Performed by: FAMILY MEDICINE

## 2017-12-01 PROCEDURE — G0202 SCR MAMMO BI INCL CAD: HCPCS

## 2017-12-01 PROCEDURE — 82728 ASSAY OF FERRITIN: CPT | Performed by: FAMILY MEDICINE

## 2017-12-01 PROCEDURE — 96372 THER/PROPH/DIAG INJ SC/IM: CPT | Performed by: FAMILY MEDICINE

## 2017-12-01 RX ORDER — TRAZODONE HYDROCHLORIDE 50 MG/1
100 TABLET, FILM COATED ORAL AT BEDTIME
Qty: 60 TABLET | Refills: 3 | Status: SHIPPED | OUTPATIENT
Start: 2017-12-01 | End: 2018-04-13

## 2017-12-01 RX ORDER — ESCITALOPRAM OXALATE 20 MG/1
20 TABLET ORAL DAILY
Qty: 30 TABLET | Refills: 5 | Status: SHIPPED | OUTPATIENT
Start: 2017-12-01 | End: 2017-12-26

## 2017-12-01 NOTE — PROGRESS NOTES
Pavithra, Your iron is low but not that low, and your blood count is actually better than last time, which I'm very happy to see. Still waiting on a couple results.   Soha Boggs MD

## 2017-12-01 NOTE — NURSING NOTE
"Chief Complaint   Patient presents with     Gyn Exam       Initial /60  Pulse 110  Temp 97  F (36.1  C) (Temporal)  Wt 119 lb 12.8 oz (54.3 kg)  SpO2 100%  BMI 21.22 kg/m2 Estimated body mass index is 21.22 kg/(m^2) as calculated from the following:    Height as of 8/7/17: 5' 3\" (1.6 m).    Weight as of this encounter: 119 lb 12.8 oz (54.3 kg).  Medication Reconciliation: complete   Radha Fritz CMA    "

## 2017-12-01 NOTE — NURSING NOTE
The following medication was given:     MEDICATION: Vitamin B12  1000mcg  ROUTE: IM  SITE: Deltoid - Right  DOSE: 1ml  LOT #: [id number]  :  [employer]  EXPIRATION DATE:  02/2019  NDC#: 6546-4723-16  Prior to injection verified patient identity using patient's name and date of birth.  Radha Fritz CMA

## 2017-12-01 NOTE — MR AVS SNAPSHOT
After Visit Summary   12/1/2017    Pavithra Raines    MRN: [id number]           Patient Information     Date Of Birth          1955        Visit Information        Provider Department      12/1/2017 9:00 AM Soha Boggs MD [facility]        Today's Diagnoses     Screening for malignant neoplasm of cervix    -  1    Carcinoma in situ of cervix, unspecified location        Screening for malignant neoplasm of breast        Insomnia, unspecified type        Anxiety        Other iron deficiency anemia        Episode of recurrent major depressive disorder, unspecified depression episode severity (H)        Anemia due to vitamin B12 deficiency, unspecified B12 deficiency type        Epigastric pain        Status post gastric bypass for obesity           Follow-ups after your visit        Who to contact     If you have questions or need follow up information about today's clinic visit or your schedule please contact [facility] directly at [phone].  Normal or non-critical lab and imaging results will be communicated to you by [employer], letter or phone within 4 business days after the clinic has received the results. If you do not hear from us within 7 days, please contact the clinic through [employer] or phone. If you have a critical or abnormal lab result, we will notify you by phone as soon as possible.  Submit refill requests through [employer] or call your pharmacy and they will forward the refill request to us. Please allow 3 business days for your refill to be completed.          Additional Information About Your Visit        MyChart Information     [employer] gives you secure access to your electronic health record. If you see a primary care provider, you can also send messages to your care team and make appointments. If you have questions, please call your primary care clinic.  If you do not have a primary care provider, please call [phone]  and they will assist you.        Care EveryWhere ID     This is your Care EveryWhere ID. This could be used by other organizations to access your [place] medical records  [id number]        Your Vitals Were     Pulse Temperature Pulse Oximetry BMI (Body Mass Index)          110 97  F (36.1  C) (Temporal) 100% 21.22 kg/m2         Blood Pressure from Last 3 Encounters:   12/01/17 102/60   10/17/17 156/85   10/09/17 114/79    Weight from Last 3 Encounters:   12/01/17 119 lb 12.8 oz (54.3 kg)   10/16/17 125 lb 4.8 oz (56.8 kg)   08/14/17 141 lb (64 kg)              We Performed the Following     B12 - 1000 MCG     CBC with platelets     Ferritin     HPV High Risk Types DNA Cervical     INJECTION INTRAMUSCULAR OR SUB-Q     Pap imaged thin layer screen with HPV - recommended age 30 - 65 years (select HPV order below)     Vitamin B12          Today's Medication Changes          These changes are accurate as of: 12/1/17 11:59 PM.  If you have any questions, ask your nurse or doctor.               These medicines have changed or have updated prescriptions.        Dose/Directions    escitalopram 20 MG tablet   Commonly known as:  LEXAPRO   This may have changed:  See the new instructions.   Used for:  Anxiety   Changed by:  Soha Boggs MD        Dose:  20 mg   Take 1 tablet (20 mg) by mouth daily   Quantity:  30 tablet   Refills:  5       traZODone 50 MG tablet   Commonly known as:  DESYREL   This may have changed:  See the new instructions.   Used for:  Insomnia, unspecified type   Changed by:  Soha Boggs MD        Dose:  100 mg   Take 2 tablets (100 mg) by mouth At Bedtime   Quantity:  60 tablet   Refills:  3            Where to get your medicines      These medications were sent to [facility]  [place], [place] - [address]  [address], [place] [address]     Phone:  [phone]     escitalopram 20 MG tablet    traZODone 50 MG tablet                Primary  Care Provider Office Phone # Fax #    Soha Lela Boggs [phone] [address] [place] [address]        Equal Access to Services     GURWINDERRAFAEL BRIA : Hadii mesfin ku guso Sorubenali, waaxda luqadaha, qaybta kaalmada jaycob, nazario godinezenrico ellis. So [facility] [phone].    ATENCIÓN: Si habla español, tiene a medina disposición servicios gratuitos de asistencia lingüística. Llame al [phone].    We comply with applicable federal civil rights laws and [place] laws. We do not discriminate on the basis of race, color, national origin, age, disability, sex, sexual orientation, or gender identity.            Thank you!     Thank you for choosing [facility]  for your care. Our goal is always to provide you with excellent care. Hearing back from our patients is one way we can continue to improve our services. Please take a few minutes to complete the written survey that you may receive in the mail after your visit with us. Thank you!             Your Updated Medication List - Protect others around you: Learn how to safely use, store and throw away your medicines at www.disposemymeds.org.          This list is accurate as of: 12/1/17 11:59 PM.  Always use your most recent med list.                   Brand Name Dispense Instructions for use Diagnosis    alum & mag hydroxide-simethicone 400-400-40 MG/5ML Susp suspension    MYLANTA ES/MAALOX  ES    355 mL    Take 30 mLs by mouth every 4 hours as needed (epigastric pain)        escitalopram 20 MG tablet    LEXAPRO    30 tablet    Take 1 tablet (20 mg) by mouth daily    Anxiety       Melatonin 10 MG Tabs tablet      Take 10 mg by mouth nightly as needed for sleep        ranitidine 150 MG tablet    ZANTAC     Take 150 mg by mouth 2 times daily        traZODone 50 MG tablet    DESYREL    60 tablet    Take 2 tablets (100 mg) by mouth At Bedtime    Insomnia, unspecified type

## 2017-12-01 NOTE — LETTER
December 12, 2017    Pavithra Raines  [address]  [facility] [address]    Dear Pavithra,  We are happy to inform you that your PAP smear result from 12/1/17 is normal.  We are now able to do a follow up test on PAP smears. The DNA test is for HPV (Human Papilloma Virus). Cervical cancer is closely linked with certain types of HPV. Your result showed no evidence of high risk HPV.  Therefore we recommend you return in 3 years for your next pap smear and HPV test.  You will still need to return to the clinic every year for an annual exam and other preventive tests.  Please contact the clinic at [phone] with any questions.  Sincerely,    Soha Boggs MD/dudley

## 2017-12-03 NOTE — PROGRESS NOTES
S:  Pavithra Raines is a 62 year old female here for follow up of the following issues:     Screening for malignant neoplasm of cervix  Carcinoma in situ of cervix, unspecified location  Screening for malignant neoplasm of breast  Insomnia, unspecified type  Anxiety  Other iron deficiency anemia  Episode of recurrent major depressive disorder, unspecified depression episode severity (H)  Anemia due to vitamin B12 deficiency, unspecified B12 deficiency type     She is due for a pap smear.  She had a physical with Dr. Anna in August, but declined her pap smear at that time. 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  Her last pap was 4/11 and was NIL.  She was recommended to have a repeat in 1 year.   She denies any problems related to her pelvis. She is no longer menstruating, no vaginal bleeding.     She has a history of alcohol abuse.  She has been admitted to the ICU due to acute alcohol poisoning, and was also told earlier this year that she had hepatitis C but it came back with a negative RNA test so she was told it was a false positive.  She states that this scared her, and she has completely stopped drinking.  She states that her current fiance has been supportive. She was drinking a lot with her last relationship, but her current relationship is better.      She is having abdominal pain, not sure if it's an ulcer, but she was seen in the ED in October and they diagnosed her with gastritis.  She was told to stop taking her iron supplements.  She was told to follow up with her bariatric surgeon, but can't get an appointment, has been trying to schedule at the [facility].  They have had difficulty finding her records, per patient.  She has pain after eating, is on a very bland diet because she can't tolerate much due to pain. She can't tolerate any meat chunks. She is trying to get protein in supplements, but having a hard time finding one she can tolerate and likes to drink. She  is lactose intolerant and sugar really makes her sick so has to find something low sugar as well.      She has a history of iron deficiency anemia and her last ferritin level was 51 in April 2013.  She also has B12 deficiency, presumably due ot her bariatric surgery but also possibly due to alcohol use.  She used to be on B12 injections but doesn't remember when her last injection was, it has been a while.      She has depression and anxiety, and is on Lexapro. Feels she might need a higher dose.  She is on 10 mg.      She is on Trazodone 50 mg for sleep, and it is not helping that much. She is wondering what else she can take.        O:  Vitals noted.  Patient alert, oriented, and in no acute distress. Oral:  Oropharynx nl without erythema, exudate, mass or other lesions. Neck:  Supple without lymphadenopathy, JVD or masses.   CV:  RRR without murmur.   Respiratory:  Lungs clear to auscultation bilaterally.   Breasts: Visual inspection of the breasts is normal without skin changes.  Palpation reveals no obvious masses or nipple discharge.  No axillary masses.   Abdomen:  Soft, nontender, nondistended with good bowel sounds and no masses or hepatosplenomegaly. Vaginal exam reveals normal external and internal genitalia.  Cervix is closed, long and thick.  No lesions or abnormalities seen. Ap smear collected without incident. Bimanual exam reveals a nontender, nongravid uterus with no CMT.  No adnexal masses or tenderness.          Orders Placed This Encounter   Procedures     B12 - 1000 MCG     INJECTION INTRAMUSCULAR OR SUB-Q     Pap imaged thin layer screen with HPV - recommended age 30 - 65 years (select HPV order below)     HPV High Risk Types DNA Cervical     CBC with platelets     Ferritin     Vitamin B12        A:      ICD-10-CM    1. Screening for malignant neoplasm of cervix Z12.4 Pap imaged thin layer screen with HPV - recommended age 30 - 65 years (select HPV order below)     HPV High Risk Types DNA  Cervical   2. Carcinoma in situ of cervix, unspecified location D06.9    3. Screening for malignant neoplasm of breast Z12.31 CANCELED: *MA Screening Digital Bilateral   4. Insomnia, unspecified type G47.00 traZODone (DESYREL) 50 MG tablet   5. Anxiety F41.9 escitalopram (LEXAPRO) 20 MG tablet   6. Other iron deficiency anemia D50.8 CBC with platelets     Ferritin   7. Episode of recurrent major depressive disorder, unspecified depression episode severity (H) F33.9    8. Anemia due to vitamin B12 deficiency, unspecified B12 deficiency type D51.9 CBC with platelets     Vitamin B12     B12 - 1000 MCG     INJECTION INTRAMUSCULAR OR SUB-Q   9. Epigastric pain R10.13    10. Status post gastric bypass for obesity Z98.84         P:  Will notify with pap smear results.   Will schedule mammogram, recommend every 1-2 years.    Discussed monthly SBE.   She is trying to get in at the . Will discuss with them re: her gastritis symptoms and try to get her an appointment.  She states she is completely off alcohol.  I congratulated her on this and encouraged ongoing abstinence.    I opted to have labs drawn today to recheck her Hgb, and also her B12 since it has been a while since she has been on injections for replacement for this.  Don't know why they told her to stop her iron other than to see if it might be bothering her stomach, or to try to separate it out from calcium supplements.      I refilled her Lexapro but we increased the dose to 20 mg daily. See orders.  Will fu on this in the next 2 months.   I also increased her dose of Trazodone to 100 mg at hs, see orders.      She is given a B12 injection today.  Will notify her with results and recommend she continue to get these, probably monthly, depending on her level.     Will discuss with nutritionist about recommended supplements for lactose free, low sugar protein/calorie supplement options to help her keep her weight up.   shannon now will continue on her ranitidine.       Soha Boggs MD

## 2017-12-04 ENCOUNTER — TELEPHONE (OUTPATIENT)
Dept: FAMILY MEDICINE | Facility: CLINIC | Age: 62
End: 2017-12-04

## 2017-12-04 PROBLEM — G47.00 INSOMNIA, UNSPECIFIED TYPE: Status: ACTIVE | Noted: 2017-12-04

## 2017-12-04 PROBLEM — D50.8 OTHER IRON DEFICIENCY ANEMIA: Status: ACTIVE | Noted: 2017-12-04

## 2017-12-04 PROBLEM — D51.9 ANEMIA DUE TO VITAMIN B12 DEFICIENCY, UNSPECIFIED B12 DEFICIENCY TYPE: Status: ACTIVE | Noted: 2017-12-04

## 2017-12-04 NOTE — TELEPHONE ENCOUNTER
Patient called requesting her lab results from 12/1. I relayed the message from Dr. Boggs under the lab tests. She had no further questions or concerns.     Thank you  Brendan Ceja  Patient Representative

## 2017-12-05 LAB
COPATH REPORT: NORMAL
PAP: NORMAL

## 2017-12-07 LAB
FINAL DIAGNOSIS: NORMAL
HPV HR 12 DNA CVX QL NAA+PROBE: NEGATIVE
HPV16 DNA SPEC QL NAA+PROBE: NEGATIVE
HPV18 DNA SPEC QL NAA+PROBE: NEGATIVE
SPECIMEN DESCRIPTION: NORMAL

## 2017-12-12 RX ORDER — CYANOCOBALAMIN 1000 UG/ML
1 INJECTION, SOLUTION INTRAMUSCULAR; SUBCUTANEOUS
Qty: 1 ML | Refills: 11 | Status: ON HOLD | OUTPATIENT
Start: 2017-12-12 | End: 2018-02-09

## 2017-12-12 NOTE — PROGRESS NOTES
Pavithra, your B12 level is ok, but would likely improve with continuing your B12 injections once monthly. I recommend you do continue to have these done every month.  Please call the clinic to schedule an injection with the nurse at the beginning of every month. I'll make sure you have orders in.   Soha Boggs MD

## 2017-12-14 ENCOUNTER — TELEPHONE (OUTPATIENT)
Dept: FAMILY MEDICINE | Facility: CLINIC | Age: 62
End: 2017-12-14

## 2017-12-14 NOTE — TELEPHONE ENCOUNTER
Reason for Call:  Request for results:    Name of test or procedure: B-12 and pap results     Date of test of procedure: 12/01/17    Location of the test or procedure: [facility]     OK to leave the result message on voice mail or with a family member? YES    Phone number Patient can be reached at:  Cell number on file:    Telephone Information:   Mobile [phone]       Additional comments: patient calling would like to get the results from these tests    Call taken on 12/14/2017 at 12:32 PM by Karen Joy

## 2017-12-15 ENCOUNTER — OFFICE VISIT (OUTPATIENT)
Dept: URGENT CARE | Facility: RETAIL CLINIC | Age: 62
End: 2017-12-15
Payer: COMMERCIAL

## 2017-12-15 VITALS
DIASTOLIC BLOOD PRESSURE: 79 MMHG | TEMPERATURE: 97.1 F | SYSTOLIC BLOOD PRESSURE: 147 MMHG | HEART RATE: 62 BPM | OXYGEN SATURATION: 100 %

## 2017-12-15 DIAGNOSIS — R80.9 PROTEINURIA, UNSPECIFIED TYPE: ICD-10-CM

## 2017-12-15 DIAGNOSIS — R30.0 DYSURIA: Primary | ICD-10-CM

## 2017-12-15 DIAGNOSIS — R03.0 ELEVATED BLOOD PRESSURE READING WITHOUT DIAGNOSIS OF HYPERTENSION: ICD-10-CM

## 2017-12-15 DIAGNOSIS — R82.4 KETONURIA: ICD-10-CM

## 2017-12-15 LAB
APPEARANCE UR: ABNORMAL
BILIRUB UR QL: ABNORMAL
COLOR UR: YELLOW
GLUCOSE URINE: ABNORMAL MG/DL
HGB UR QL: ABNORMAL
KETONES UR QL: ABNORMAL MG/DL
LEUKOCYTE ESTERASE URINE: ABNORMAL
NITRITE UR QL STRIP: ABNORMAL
PH UR STRIP: 6.5 PH (ref 5–7)
PROTEIN ALBUMIN URINE: 30 MG/DL
SOURCE: ABNORMAL
SP GR UR STRIP: 1.03 (ref 1–1.03)
UROBILINOGEN UR QL STRIP: 0.2 EU/DL (ref 0.2–1)

## 2017-12-15 PROCEDURE — 99213 OFFICE O/P EST LOW 20 MIN: CPT | Performed by: PHYSICIAN ASSISTANT

## 2017-12-15 PROCEDURE — 81002 URINALYSIS NONAUTO W/O SCOPE: CPT | Mod: QW | Performed by: PHYSICIAN ASSISTANT

## 2017-12-15 PROCEDURE — 87086 URINE CULTURE/COLONY COUNT: CPT | Performed by: PHYSICIAN ASSISTANT

## 2017-12-15 RX ORDER — NITROFURANTOIN 25; 75 MG/1; MG/1
100 CAPSULE ORAL 2 TIMES DAILY
Qty: 10 CAPSULE | Refills: 0 | Status: SHIPPED | OUTPATIENT
Start: 2017-12-15 | End: 2017-12-17

## 2017-12-15 NOTE — Clinical Note
FYI - came in [facility] for [facility] UTI but I am concerned about her proteinuria and ketonuria and BP - I asked her to contact you for FOLLOW UP plan. ANGUS Tamayo, PAC

## 2017-12-15 NOTE — NURSING NOTE
"Chief Complaint   Patient presents with     Rule out Urinary Tract Infection     burning, frequency x 2 weeks       Initial /79  Pulse 62  Temp 97.1  F (36.2  C) (Oral)  SpO2 100% Estimated body mass index is 21.22 kg/(m^2) as calculated from the following:    Height as of 8/7/17: 5' 3\" (1.6 m).    Weight as of 12/1/17: 119 lb 12.8 oz (54.3 kg).  Medication Reconciliation: complete   Mel Pop      "

## 2017-12-15 NOTE — MR AVS SNAPSHOT
"              After Visit Summary   12/15/2017    Pavithra Raines    MRN: [id number]           Patient Information     Date Of Birth          1955        Visit Information        Provider Department      12/15/2017 5:40 PM Sharon Tamayo PA-C [facility]        Today's Diagnoses     Dysuria    -  1    Ketonuria        Proteinuria, unspecified type        Elevated blood pressure reading without diagnosis of hypertension          Care Instructions       * BLADDER INFECTION,Female (Adult)    A bladder infection (\"cystitis\" or \"UTI\") usually causes a constant urge to urinate and a burning when passing urine. Urine may be cloudy, smelly or dark. There may be pain in the lower abdomen. A bladder infection occurs when bacteria from the vaginal area enter the bladder opening (urethra). This can occur from sexual intercourse, wearing tight clothing, dehydration and other factors.  HOME CARE:  1. Drink lots of fluids (at least 6-8 glasses a day, unless you must restrict fluids for other medical reasons). This will force the medicine into your urinary system and flush the bacteria out of your body. Cranberry juice has been shown to help clear out the bacteria.  2. Avoid sexual intercourse until your symptoms are gone.  3. A bladder infection is treated with antibiotics. You may also be given Pyridium (generic = phenazopyridine) to reduce the burning sensation. This medicine will cause your urine to become a bright orange color. The orange urine may stain clothing. You may wear a pad or panty-liner to protect clothing.  PREVENTING FUTURE INFECTIONS:  1. Always wipe from front to back after a bowel movement.  2. Keep the genital area clean and dry.  3. Drink plenty of fluids each day to avoid dehydration.  4. Urinate right after intercourse to flush out the bladder.  5. Wear cotton underwear and cotton-lined panty hose; avoid tight-fitting pants.  6. If you are on birth control pills and are " having frequent bladder infections, discuss with your doctor.  FOLLOW UP: Return to this facility or see your doctor if ALL symptoms are not gone after three days of treatment.  GET PROMPT MEDICAL ATTENTION if any of the following occur:    Fever over 101 F (38.3 C)    No improvement by the third day of treatment    Increasing back or abdominal pain    Repeated vomiting; unable to keep medicine down    Weakness, dizziness or fainting    Vaginal discharge    Pain, redness or swelling in the labia (outer vaginal area)    3243-0158 [employer]. [address] [place] [address]. All rights reserved. This information is not intended as a substitute for professional medical care. Always follow your healthcare professional's instructions.  This information has been modified by your health care provider with permission from the publisher.    We will contact you if the urine culture indicates a change in treatment plan.    Please FOLLOW UP at primary care clinic if not improving, new symptoms, worse or this does not resolve.  [facility]  [phone]  ....................................  Your blood pressure is elevated at today's visit.  Please check your BP twice in the next week or so.  You should follow up with your primary provider regarding possible hypertension if your recheck are greater than 140/90.  Check your blood pressure several times in the next week or so. You can do this at local pharmacies, grocery stores or with the float nurse at the [facility]. Record your readings and take them with you to your appointment.  Goal BP <140/90 (new < 130/80)  Do not take decongestants - they can raise your BP.  If you have chest pain, unusual headaches, vision changes or any sign or symptoms of stroke seek prompt medical attention.    /79  Pulse 62  Temp 97.1  F (36.2  C) (Oral)  SpO2 100%     ......................  Please contact Dr Boggs about follow up - BP, and  the protein and ketone in your urine   [facility]              Follow-ups after your visit        Who to contact     You can reach your care team any time of the day by calling [phone].  Notification of test results:  If you have an abnormal lab result, we will notify you by phone as soon as possible.         Additional Information About Your Visit        [employer] Information     [employer] gives you secure access to your electronic health record. If you see a primary care provider, you can also send messages to your care team and make appointments. If you have questions, please call your primary care clinic.  If you do not have a primary care provider, please call [phone] and they will assist you.        Care EveryWhere ID     This is your Care EveryWhere ID. This could be used by other organizations to access your [place] medical records  [id number]        Your Vitals Were     Pulse Temperature Pulse Oximetry             62 97.1  F (36.2  C) (Oral) 100%          Blood Pressure from Last 3 Encounters:   12/15/17 147/79   12/01/17 102/60   10/17/17 156/85    Weight from Last 3 Encounters:   12/01/17 119 lb 12.8 oz (54.3 kg)   10/16/17 125 lb 4.8 oz (56.8 kg)   08/14/17 141 lb (64 kg)              We Performed the Following     HCL U/A, W/O MICRO, NON AUTO     Urine Culture Aerobic Bacterial          Today's Medication Changes          These changes are accurate as of: 12/15/17  6:13 PM.  If you have any questions, ask your nurse or doctor.               Start taking these medicines.        Dose/Directions    nitroFURantoin (macrocrystal-monohydrate) 100 MG capsule   Commonly known as:  MACROBID   Used for:  Dysuria   Started by:  Sharon Tamayo PA-C        Dose:  100 mg   Take 1 capsule (100 mg) by mouth 2 times daily   Quantity:  10 capsule   Refills:  0            Where to get your medicines      [facility] [place] [place] - [address]  [address] [place] [address]     Phone:  [phone]     nitroFURantoin (macrocrystal-monohydrate) 100 MG capsule                Primary Care Provider Office Phone # Fax #    Soha Lela Boggs [phone] [address] [place] [address]        Equal Access to Services     [facility]: Hadii aad ku hadasho Soomaali, waaxda luqadaha, qaybta kaalmada adeegyada, waxay idiin hayaan adeeg kharash la'aan . So [facility] [phone].    ATENCIÓN: Si habla español, tiene a medina disposición servicios gratuitos de asistencia lingüística. Flavioame al [phone].    We comply with applicable federal civil rights laws and [place] laws. We do not discriminate on the basis of race, color, national origin, age, disability, sex, sexual orientation, or gender identity.            Thank you!     Thank you for choosing [facility]  for your care. Our goal is always to provide you with excellent care. Hearing back from our patients is one way we can continue to improve our services. Please take a few minutes to complete the written survey that you may receive in the mail after your visit with us. Thank you!             Your Updated Medication List - Protect others around you: Learn how to safely use, store and throw away your medicines at www.disposemymeds.org.          This list is accurate as of: 12/15/17  6:13 PM.  Always use your most recent med list.                   Brand Name Dispense Instructions for use Diagnosis    alum & mag hydroxide-simethicone 400-400-40 MG/5ML Susp suspension    MYLANTA ES/MAALOX  ES    355 mL    Take 30 mLs by mouth every 4 hours as needed (epigastric pain)        cyanocobalamin 1000 MCG/ML injection    VITAMIN B12    1 mL    Inject 1 mL (1,000 mcg) into the muscle every 30 days    Anemia due to vitamin B12 deficiency, unspecified B12 deficiency type       escitalopram 20 MG tablet    LEXAPRO    30 tablet    Take 1 tablet (20 mg) by mouth daily     Anxiety       Melatonin 10 MG Tabs tablet      Take 10 mg by mouth nightly as needed for sleep        nitroFURantoin (macrocrystal-monohydrate) 100 MG capsule    MACROBID    10 capsule    Take 1 capsule (100 mg) by mouth 2 times daily    Dysuria       ranitidine 150 MG tablet    ZANTAC     Take 150 mg by mouth 2 times daily        traZODone 50 MG tablet    DESYREL    60 tablet    Take 2 tablets (100 mg) by mouth At Bedtime    Insomnia, unspecified type

## 2017-12-15 NOTE — PROGRESS NOTES
Pavithra Raines is a 62 year old female who  Presents to the [facility] today for a possible UTI. Symptoms of dysuria, frequency, and polyuria have been going on for 10-14 days. Seems a little worse past few days. There is no history of backache, fever, chills, nausea or vomiting.  No recent history of vaginal discharge or infection. No history of pyelonephritis. No concerns for STD's. This patient does  have a  Remote history of urinary tract infections.       ROS:  ENT - denies ear pain, throat pain. No nasal congestion.  CP - no cough,SOB or chest pain.   GI/ - Appetite - fir. No nausea, vomiting or diarrhea.   No bowel  changes   MSK - no joint pain or swelling.   Skin-  No rashes. No lesions.    Past Medical History:   Diagnosis Date     Abnormal Papanicolaou smear of cervix and cervical HPV 4/07    Colposcopy 2007= HSIL     Genital herpes      History of colposcopy with cervical biopsy 06/2007    HSIL- LEEP recommended     Hypersomnia with sleep apnea, unspecified     CPAP started 2007     Other and unspecified ovarian cyst 2002 or so    s/p resection of ovary and tubes, d&c     Past Surgical History:   Procedure Laterality Date     CHOLECYSTECTOMY, OPEN  age 20s     COLONOSCOPY  3/21/2011    COMBINED COLONOSCOPY, REMOVE TUMOR/POLYP/LESION BY SNARE performed by JUAN FRANCISCO HERNANDEZ at  GI     COLONOSCOPY N/A 10/9/2017    Procedure: COMBINED COLONOSCOPY, SINGLE OR MULTIPLE BIOPSY/POLYPECTOMY BY BIOPSY;;  Surgeon: Sylvester Bright MD;  Location:  GI     COLPOSCOPY CERVIX, LOOP ELECTRODE BIOPSY, COMBINED  6/2007    CAN 2/3-patient requires yearly pap smears     GASTRIC BYPASS  3/25/2008    At [facility]     HC DILATION/CURETTAGE DIAG/THER NON OB  2002     HC ENLARGE BREAST WITH IMPLANT       HC LAPAROSCOPIC MYOMECTOMY, 1 - 4 INTRAMURAL MYOMAS =<250 GM  2002     HC REMOVAL OF BREAST IMPLANT       SALPINGO OOPHORECTOMY,R/L/MATTHEW  2002    Bilateral salpingectomy and unilateral  oophorectomy     Patient Active Problem List   Diagnosis     Esophageal reflux     Restless legs syndrome (RLS)     Hypersomnia with sleep apnea     Anxiety     Status post gastric bypass for obesity     Genital herpes     CARDIOVASCULAR SCREENING; LDL GOAL LESS THAN 160     MDD (major depressive disorder)     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 grade 3     Alcohol dependence in remission (H)     Anemia due to vitamin B12 deficiency, unspecified B12 deficiency type     Insomnia, unspecified type     Other iron deficiency anemia     Current Outpatient Prescriptions   Medication     cyanocobalamin (VITAMIN B12) 1000 MCG/ML injection     ranitidine (ZANTAC) 150 MG tablet     traZODone (DESYREL) 50 MG tablet     escitalopram (LEXAPRO) 20 MG tablet     alum & mag hydroxide-simethicone (MYLANTA ES/MAALOX  ES) 400-400-40 MG/5ML SUSP suspension     Melatonin 10 MG TABS tablet     No current facility-administered medications for this visit.          O: /79  Pulse 62  Temp 97.1  F (36.2  C) (Oral)  SpO2 100%     The patient appears comfortable and in no apparent distress.  Afebrile.  Back: no CVA or flank tenderness.  Abdomen: soft, positive for bowel sounds, some  supra pubic tenderness.  Neck: supple, no adenopathy, and thyroid normal size, non-tender, without nodularity.  Lungs -clear    See lab results.    A:    Dysuria  Ketonuria  Proteinuria, unspecified type  Elevated blood pressure reading without diagnosis of hypertension    P: Prescriptions as below. Discussed indications, dosing, side affects and adverse reactions of medications with  Patient -Macrobid until UC is back  Take a probiotic or eat yogurt while on antibiotics.  Drink plenty of fluids.    Prevention and treatment of UTI's discussed.  Signs and symptoms of pyelonephritis mentioned.    Urine culture pending. Pt will be contacted if change in treatment plan is indicated.  If symptoms do not improve in a few days or if at anytime she is worse  "she will follow up with her primary care provider and needs FOLLOW UP re: proteinuria and ketonuria.  Discussed BP  AVS given and discussed:    Patient Instructions      * BLADDER INFECTION,Female (Adult)    A bladder infection (\"cystitis\" or \"UTI\") usually causes a constant urge to urinate and a burning when passing urine. Urine may be cloudy, smelly or dark. There may be pain in the lower abdomen. A bladder infection occurs when bacteria from the vaginal area enter the bladder opening (urethra). This can occur from sexual intercourse, wearing tight clothing, dehydration and other factors.  HOME CARE:  1. Drink lots of fluids (at least 6-8 glasses a day, unless you must restrict fluids for other medical reasons). This will force the medicine into your urinary system and flush the bacteria out of your body. Cranberry juice has been shown to help clear out the bacteria.  2. Avoid sexual intercourse until your symptoms are gone.  3. A bladder infection is treated with antibiotics. You may also be given Pyridium (generic = phenazopyridine) to reduce the burning sensation. This medicine will cause your urine to become a bright orange color. The orange urine may stain clothing. You may wear a pad or panty-liner to protect clothing.  PREVENTING FUTURE INFECTIONS:  1. Always wipe from front to back after a bowel movement.  2. Keep the genital area clean and dry.  3. Drink plenty of fluids each day to avoid dehydration.  4. Urinate right after intercourse to flush out the bladder.  5. Wear cotton underwear and cotton-lined panty hose; avoid tight-fitting pants.  6. If you are on birth control pills and are having frequent bladder infections, discuss with your doctor.  FOLLOW UP: Return to this facility or see your doctor if ALL symptoms are not gone after three days of treatment.  GET PROMPT MEDICAL ATTENTION if any of the following occur:    Fever over 101 F (38.3 C)    No improvement by the third day of " treatment    Increasing back or abdominal pain    Repeated vomiting; unable to keep medicine down    Weakness, dizziness or fainting    Vaginal discharge    Pain, redness or swelling in the labia (outer vaginal area)    6457-8463 [employer]. [address] [place] [address], [place] [address]. All rights reserved. This information is not intended as a substitute for professional medical care. Always follow your healthcare professional's instructions.  This information has been modified by your health care provider with permission from the publisher.    We will contact you if the urine culture indicates a change in treatment plan.    Please FOLLOW UP at primary care clinic if not improving, new symptoms, worse or this does not resolve.  [facility]  [phone]  ....................................  Your blood pressure is elevated at today's visit.  Please check your BP twice in the next week or so.  You should follow up with your primary provider regarding possible hypertension if your recheck are greater than 140/90.  Check your blood pressure several times in the next week or so. You can do this at local pharmacies, grocery stores or with the float nurse at the [facility]. Record your readings and take them with you to your appointment.  Goal BP <140/90 (new < 130/80)  Do not take decongestants - they can raise your BP.  If you have chest pain, unusual headaches, vision changes or any sign or symptoms of stroke seek prompt medical attention.    /79  Pulse 62  Temp 97.1  F (36.2  C) (Oral)  SpO2 100%     ......................  Please contact Dr Boggs about follow up - BP, and the protein and ketone in your urine   [facility]      Pt is comfortable with this plan.  Electronically signed,  ANGUS Tamayo, PAC

## 2017-12-16 NOTE — PATIENT INSTRUCTIONS
"   * BLADDER INFECTION,Female (Adult)    A bladder infection (\"cystitis\" or \"UTI\") usually causes a constant urge to urinate and a burning when passing urine. Urine may be cloudy, smelly or dark. There may be pain in the lower abdomen. A bladder infection occurs when bacteria from the vaginal area enter the bladder opening (urethra). This can occur from sexual intercourse, wearing tight clothing, dehydration and other factors.  HOME CARE:  1. Drink lots of fluids (at least 6-8 glasses a day, unless you must restrict fluids for other medical reasons). This will force the medicine into your urinary system and flush the bacteria out of your body. Cranberry juice has been shown to help clear out the bacteria.  2. Avoid sexual intercourse until your symptoms are gone.  3. A bladder infection is treated with antibiotics. You may also be given Pyridium (generic = phenazopyridine) to reduce the burning sensation. This medicine will cause your urine to become a bright orange color. The orange urine may stain clothing. You may wear a pad or panty-liner to protect clothing.  PREVENTING FUTURE INFECTIONS:  1. Always wipe from front to back after a bowel movement.  2. Keep the genital area clean and dry.  3. Drink plenty of fluids each day to avoid dehydration.  4. Urinate right after intercourse to flush out the bladder.  5. Wear cotton underwear and cotton-lined panty hose; avoid tight-fitting pants.  6. If you are on birth control pills and are having frequent bladder infections, discuss with your doctor.  FOLLOW UP: Return to this facility or see your doctor if ALL symptoms are not gone after three days of treatment.  GET PROMPT MEDICAL ATTENTION if any of the following occur:    Fever over 101 F (38.3 C)    No improvement by the third day of treatment    Increasing back or abdominal pain    Repeated vomiting; unable to keep medicine down    Weakness, dizziness or fainting    Vaginal discharge    Pain, redness or swelling in " the labia (outer vaginal area)    4024-3784 [employer]. [address] [place] [address], [place] [address]. All rights reserved. This information is not intended as a substitute for professional medical care. Always follow your healthcare professional's instructions.  This information has been modified by your health care provider with permission from the publisher.    We will contact you if the urine culture indicates a change in treatment plan.    Please FOLLOW UP at primary care clinic if not improving, new symptoms, worse or this does not resolve.  [facility]  [phone]  ....................................  Your blood pressure is elevated at today's visit.  Please check your BP twice in the next week or so.  You should follow up with your primary provider regarding possible hypertension if your recheck are greater than 140/90.  Check your blood pressure several times in the next week or so. You can do this at local pharmacies, grocery stores or with the float nurse at the [facility]. Record your readings and take them with you to your appointment.  Goal BP <140/90 (new < 130/80)  Do not take decongestants - they can raise your BP.  If you have chest pain, unusual headaches, vision changes or any sign or symptoms of stroke seek prompt medical attention.    /79  Pulse 62  Temp 97.1  F (36.2  C) (Oral)  SpO2 100%     ......................  Please contact Dr Boggs about follow up - BP, and the protein and ketone in your urine   [facility]

## 2017-12-17 ENCOUNTER — TELEPHONE (OUTPATIENT)
Dept: URGENT CARE | Facility: RETAIL CLINIC | Age: 62
End: 2017-12-17

## 2017-12-17 ENCOUNTER — NURSE TRIAGE (OUTPATIENT)
Dept: NURSING | Facility: CLINIC | Age: 62
End: 2017-12-17

## 2017-12-17 LAB
BACTERIA SPEC CULT: NO GROWTH
Lab: NORMAL
SPECIMEN SOURCE: NORMAL

## 2017-12-17 NOTE — TELEPHONE ENCOUNTER
Please contact patient - UC shows she does not have a bladder infection and she should stop the antibiotic. She needs followup with Dr Boggs or colleague and I encourage her to contact her tomorrow.  [facility]  [phone]  ANGUS Tamayo, PAC

## 2017-12-18 ENCOUNTER — HOSPITAL ENCOUNTER (EMERGENCY)
Facility: CLINIC | Age: 62
Discharge: HOME | End: 2017-12-19
Attending: EMERGENCY MEDICINE | Admitting: EMERGENCY MEDICINE
Payer: COMMERCIAL

## 2017-12-18 ENCOUNTER — NURSE TRIAGE (OUTPATIENT)
Dept: NURSING | Facility: CLINIC | Age: 62
End: 2017-12-18

## 2017-12-18 DIAGNOSIS — M54.6 BILATERAL THORACIC BACK PAIN, UNSPECIFIED CHRONICITY: ICD-10-CM

## 2017-12-18 DIAGNOSIS — R10.13 ABDOMINAL PAIN, EPIGASTRIC: ICD-10-CM

## 2017-12-18 LAB
ALBUMIN SERPL-MCNC: 3 G/DL (ref 3.4–5)
ALBUMIN UR-MCNC: NEGATIVE MG/DL
ALP SERPL-CCNC: 121 U/L (ref 40–150)
ALT SERPL W P-5'-P-CCNC: 12 U/L (ref 0–50)
ANION GAP SERPL CALCULATED.3IONS-SCNC: 10 MMOL/L (ref 3–14)
APPEARANCE UR: CLEAR
AST SERPL W P-5'-P-CCNC: 12 U/L (ref 0–45)
BACTERIA #/AREA URNS HPF: ABNORMAL /HPF
BASOPHILS # BLD AUTO: 0 10E9/L (ref 0–0.2)
BASOPHILS NFR BLD AUTO: 0.2 %
BILIRUB SERPL-MCNC: 0.6 MG/DL (ref 0.2–1.3)
BILIRUB UR QL STRIP: NEGATIVE
BUN SERPL-MCNC: 10 MG/DL (ref 7–30)
CALCIUM SERPL-MCNC: 8.5 MG/DL (ref 8.5–10.1)
CHLORIDE SERPL-SCNC: 104 MMOL/L (ref 94–109)
CO2 SERPL-SCNC: 23 MMOL/L (ref 20–32)
COLOR UR AUTO: YELLOW
CREAT SERPL-MCNC: 0.82 MG/DL (ref 0.52–1.04)
CRP SERPL-MCNC: <2.9 MG/L (ref 0–8)
DIFFERENTIAL METHOD BLD: ABNORMAL
EOSINOPHIL # BLD AUTO: 0.1 10E9/L (ref 0–0.7)
EOSINOPHIL NFR BLD AUTO: 1.5 %
ERYTHROCYTE [DISTWIDTH] IN BLOOD BY AUTOMATED COUNT: 18 % (ref 10–15)
ERYTHROCYTE [SEDIMENTATION RATE] IN BLOOD BY WESTERGREN METHOD: 45 MM/H (ref 0–30)
GFR SERPL CREATININE-BSD FRML MDRD: 71 ML/MIN/1.7M2
GLUCOSE SERPL-MCNC: 85 MG/DL (ref 70–99)
GLUCOSE UR STRIP-MCNC: NEGATIVE MG/DL
HCT VFR BLD AUTO: 30.4 % (ref 35–47)
HGB BLD-MCNC: 9.4 G/DL (ref 11.7–15.7)
HGB UR QL STRIP: NEGATIVE
HYALINE CASTS #/AREA URNS LPF: 3 /LPF (ref 0–2)
IMM GRANULOCYTES # BLD: 0 10E9/L (ref 0–0.4)
IMM GRANULOCYTES NFR BLD: 0.1 %
KETONES UR STRIP-MCNC: 5 MG/DL
LACTATE BLD-SCNC: 1.2 MMOL/L (ref 0.7–2)
LEUKOCYTE ESTERASE UR QL STRIP: NEGATIVE
LIPASE SERPL-CCNC: 156 U/L (ref 73–393)
LYMPHOCYTES # BLD AUTO: 2.9 10E9/L (ref 0.8–5.3)
LYMPHOCYTES NFR BLD AUTO: 30.2 %
MCH RBC QN AUTO: 24.4 PG (ref 26.5–33)
MCHC RBC AUTO-ENTMCNC: 30.9 G/DL (ref 31.5–36.5)
MCV RBC AUTO: 79 FL (ref 78–100)
MONOCYTES # BLD AUTO: 1 10E9/L (ref 0–1.3)
MONOCYTES NFR BLD AUTO: 10.2 %
MUCOUS THREADS #/AREA URNS LPF: PRESENT /LPF
NEUTROPHILS # BLD AUTO: 5.5 10E9/L (ref 1.6–8.3)
NEUTROPHILS NFR BLD AUTO: 57.8 %
NITRATE UR QL: NEGATIVE
PH UR STRIP: 8 PH (ref 5–7)
PLATELET # BLD AUTO: 392 10E9/L (ref 150–450)
POTASSIUM SERPL-SCNC: 3.3 MMOL/L (ref 3.4–5.3)
PROT SERPL-MCNC: 7.1 G/DL (ref 6.8–8.8)
RBC # BLD AUTO: 3.85 10E12/L (ref 3.8–5.2)
RBC #/AREA URNS AUTO: <1 /HPF (ref 0–2)
SODIUM SERPL-SCNC: 137 MMOL/L (ref 133–144)
SOURCE: ABNORMAL
SP GR UR STRIP: 1.01 (ref 1–1.03)
SQUAMOUS #/AREA URNS AUTO: 6 /HPF (ref 0–1)
UROBILINOGEN UR STRIP-MCNC: 0 MG/DL (ref 0–2)
WBC # BLD AUTO: 9.5 10E9/L (ref 4–11)
WBC #/AREA URNS AUTO: 4 /HPF (ref 0–2)

## 2017-12-18 PROCEDURE — 85652 RBC SED RATE AUTOMATED: CPT | Performed by: EMERGENCY MEDICINE

## 2017-12-18 PROCEDURE — 80053 COMPREHEN METABOLIC PANEL: CPT | Performed by: EMERGENCY MEDICINE

## 2017-12-18 PROCEDURE — 96361 HYDRATE IV INFUSION ADD-ON: CPT | Performed by: EMERGENCY MEDICINE

## 2017-12-18 PROCEDURE — 99285 EMERGENCY DEPT VISIT HI MDM: CPT | Mod: Z6 | Performed by: EMERGENCY MEDICINE

## 2017-12-18 PROCEDURE — 81001 URINALYSIS AUTO W/SCOPE: CPT | Performed by: EMERGENCY MEDICINE

## 2017-12-18 PROCEDURE — 83690 ASSAY OF LIPASE: CPT | Performed by: EMERGENCY MEDICINE

## 2017-12-18 PROCEDURE — 99284 EMERGENCY DEPT VISIT MOD MDM: CPT | Mod: 25 | Performed by: EMERGENCY MEDICINE

## 2017-12-18 PROCEDURE — 96360 HYDRATION IV INFUSION INIT: CPT | Performed by: EMERGENCY MEDICINE

## 2017-12-18 PROCEDURE — 25000128 H RX IP 250 OP 636: Performed by: EMERGENCY MEDICINE

## 2017-12-18 PROCEDURE — 86140 C-REACTIVE PROTEIN: CPT | Performed by: EMERGENCY MEDICINE

## 2017-12-18 PROCEDURE — 83605 ASSAY OF LACTIC ACID: CPT | Performed by: EMERGENCY MEDICINE

## 2017-12-18 PROCEDURE — 85025 COMPLETE CBC W/AUTO DIFF WBC: CPT | Performed by: EMERGENCY MEDICINE

## 2017-12-18 RX ORDER — SODIUM CHLORIDE 9 MG/ML
1000 INJECTION, SOLUTION INTRAVENOUS CONTINUOUS
Status: DISCONTINUED | OUTPATIENT
Start: 2017-12-18 | End: 2017-12-19 | Stop reason: HOSPADM

## 2017-12-18 RX ORDER — OXYCODONE HYDROCHLORIDE 5 MG/1
5 TABLET ORAL
Qty: 2 TABLET | Refills: 0 | Status: SHIPPED | OUTPATIENT
Start: 2017-12-18 | End: 2017-12-26

## 2017-12-18 RX ADMIN — SODIUM CHLORIDE 1000 ML: 9 INJECTION, SOLUTION INTRAVENOUS at 22:13

## 2017-12-18 NOTE — ED AVS SNAPSHOT
[facility] Emergency Department    [address]     [place] [address]    Phone:  [phone]    Fax:  [phone]                                       Pavithra Raines   MRN: [id number]    Department:  [facility]   Date of Visit:  12/18/2017           Patient Information     Date Of Birth          1955        Your diagnoses for this visit were:     Abdominal pain, epigastric     Bilateral thoracic back pain, unspecified chronicity        You were seen by Jose Emery MD.      Follow-up Information     Follow up with Soha Boggs MD.    Specialty:  Family Practice    Why:  as scheduled    Contact information:    [address] [place] [id number] [phone]          Discharge Instructions         *Abdominal Pain, Unknown Cause (Female)    The exact cause of your abdominal (stomach) pain is not certain. This does not mean that this is something to worry about, or the right tests were not done. Everyone likes to know the exact cause of the problem, but sometimes with abdominal pain, there is no clear-cut cause, and this could be a good thing. The good news is that your symptoms can be treated, and you will feel better.   Your condition does not seem serious now; however, sometimes the signs of a serious problem may take more time to appear. For this reason, it is important for you to watch for any new symptoms, problems, or worsening of your condition.  Over the next few days, the abdominal pain may come and go, or be continuous. Other common symptoms can include nausea and vomiting. Sometimes it can be difficult to tell if you feel nauseous, you may just feel bad and not associate that feeling with nausea. Constipation, diarrhea, and a fever may go along with the pain.  The pain may continue even if treated correctly over the following days. Depending on how things go, sometimes the cause can become clear and may require  further or different treatment. Additional evaluations, medications, or tests may be needed.  Home care  Your health care provider may prescribe medications for pain, symptoms, or an infection.  Follow the health care provider's instructions for taking these medications.  General care    Rest until your next exam. No strenuous activities.    Try to find positions that ease discomfort. A small pillow placed on the abdomen may help relieve pain.    Something warm on your abdomen (such as a heating pad) may help, but be careful not to burn yourself.  Diet    Do not force yourself to eat, especially if having cramps, vomiting, or diarrhea.    Water is important so you do not get dehydrated. Soup may also be good. Sports drinks may also help, especially if they are not too acidic. Make sure you don't drink sugary drinks as this can make things worse. Take liquids in small amounts. Do not guzzle them.    Caffeine sometimes makes the pain and cramping worse.    Avoid dairy products if you have vomiting or diarrhea.    Don't eat large amounts at a time. Wait a few minutes between bites.    Eat a diet low in fiber (called a low-residue diet). Foods allowed include refined breads, white rice, fruit and vegetable juices without pulp, tender meats. These foods will pass more easily through the intestine.    Avoid fried or fatty foods, dairy, alcohol and spicy foods until your symptoms go away.  Follow-up care  Follow up with your health care provider as instructed, or if your pain does not begin to improve in the next 24 hours.  When to seek medical care  Seek prompt medical care if any of the following occur:    Pain gets worse or moves to the right lower abdomen    New or worsening vomiting or diarrhea    Swelling of the abdomen    Unable to pass stool for more than three days    New fever over 101  F (38.3 C), or rising fever    Blood in vomit or bowel movements (dark red or black color)    Jaundice (yellow color of eyes and  skin)    Weakness, dizziness    Chest, arm, back, neck or jaw pain    Unexpected vaginal bleeding or missed period  Call 911  Call emergency services if any of the following occur:    Trouble breathing    Confusion    Fainting or loss of consciousness    Rapid heart rate    Seizure    4827-1674 Jannette Echols, [address] [place] [address], [place] [address]. All rights reserved. This information is not intended as a substitute for professional medical care. Always follow your healthcare professional's instructions.          24 Hour Appointment Hotline       To make an appointment at any [facility], call [phone] ([phone]). If you don't have a family doctor or clinic, we will help you find one. [facility] are conveniently located to serve the needs of you and your family.             Review of your medicines      START taking        Dose / Directions Last dose taken    oxyCODONE IR 5 MG tablet   Commonly known as:  ROXICODONE   Dose:  5 mg   Quantity:  2 tablet        Take 1 tablet (5 mg) by mouth nightly as needed for moderate to severe pain   Refills:  0          Our records show that you are taking the medicines listed below. If these are incorrect, please call your family doctor or clinic.        Dose / Directions Last dose taken    alum & mag hydroxide-simethicone 400-400-40 MG/5ML Susp suspension   Commonly known as:  MYLANTA ES/MAALOX  ES   Dose:  30 mL   Quantity:  355 mL        Take 30 mLs by mouth every 4 hours as needed (epigastric pain)   Refills:  0        cyanocobalamin 1000 MCG/ML injection   Commonly known as:  VITAMIN B12   Dose:  1 mL   Quantity:  1 mL        Inject 1 mL (1,000 mcg) into the muscle every 30 days   Refills:  11        escitalopram 20 MG tablet   Commonly known as:  LEXAPRO   Dose:  20 mg   Quantity:  30 tablet        Take 1 tablet (20 mg) by mouth daily   Refills:  5        Melatonin 10 MG Tabs tablet   Dose:  10 mg        Take 10 mg by mouth nightly as needed for  sleep   Refills:  0        ranitidine 150 MG tablet   Commonly known as:  ZANTAC   Dose:  150 mg        Take 150 mg by mouth 2 times daily   Refills:  0        traZODone 50 MG tablet   Commonly known as:  DESYREL   Dose:  100 mg   Quantity:  60 tablet        Take 2 tablets (100 mg) by mouth At Bedtime   Refills:  3                Prescriptions were sent or printed at these locations (1 Prescription)                   [facility]   [address] [address]    Telephone:  [phone]   Fax:  [phone]   Hours:                  Printed at Department/Unit printer (1 of 1)         oxyCODONE IR (ROXICODONE) 5 MG tablet                Procedures and tests performed during your visit     CBC with platelets differential    CRP inflammation    Comprehensive metabolic panel    Erythrocyte sedimentation rate auto    Lactic acid whole blood    Lipase    Peripheral IV catheter    UA with Microscopic      Orders Needing Specimen Collection     None      Pending Results     No orders found for last 3 day(s).            Pending Culture Results     No orders found for last 3 day(s).            Pending Results Instructions     If you had any lab results that were not finalized at the time of your Discharge, you can call the ED Lab Result RN at [phone]. You will be contacted by this team for any positive Lab results or changes in treatment. The nurses are available 7 days a week from 10A to 6:30P.  You can leave a message 24 hours per day and they will return your call.        Thank you for choosing [facility]       Thank you for choosing [facility] for your care. Our goal is always to provide you with excellent care. Hearing back from our patients is one way we can continue to improve our services. Please take a few minutes to complete the written survey that you may receive in the mail after you visit with us. Thank you!        [facility] Information     [facility] [employer] gives you secure access  to your electronic health record. If you see a primary care provider, you can also send messages to your care team and make appointments. If you have questions, please call your primary care clinic.  If you do not have a primary care provider, please call [phone] and they will assist you.        Care EveryWhere ID     This is your Care EveryWhere ID. This could be used by other organizations to access your [facility] medical records  [id number]        Equal Access to Services     OSCAR FRASER : Suresh Saavedra, minesh fisher, qahteron kaalmaluisa devries, nazario corral. So [facility] [phone].    ATENCIÓN: Si habla español, tiene a medina disposición servicios gratuitos de asistencia lingüística. Llame al [phone].    We comply with applicable federal civil rights laws and [place] laws. We do not discriminate on the basis of race, color, national origin, age, disability, sex, sexual orientation, or gender identity.            After Visit Summary       This is your record. Keep this with you and show to your community pharmacist(s) and doctor(s) at your next visit.

## 2017-12-18 NOTE — TELEPHONE ENCOUNTER
Pt was advised of result note below. Pt states she stopped antibiotic yesterday so she had 3 days of treatment along with a couple days of Azo. States her symptoms improved after starting medication but she did stop the medication after getting the message about the culture. Pt is questioning if she should still be concerned about the symptoms she had had since they seem to now have improved or just keep watching and be seen again if symptoms start again? Radha Fritz, CMA

## 2017-12-18 NOTE — TELEPHONE ENCOUNTER
Yes -see my initial note -she needs followup with Dr CHAPITO Tamayo, PAC  Spoke with patient - reminded her to FOLLOW UP with Dr URIARTE

## 2017-12-18 NOTE — ED AVS SNAPSHOT
[facility]    [address] [place] [address]    Phone:  [phone]    Fax:  [phone]                                       Pavithra Raines   MRN: [id number]    Department:  [facility]   Date of Visit:  12/18/2017           After Visit Summary Signature Page     I have received my discharge instructions, and my questions have been answered. I have discussed any challenges I see with this plan with the nurse or doctor.    ..........................................................................................................................................  Patient/Patient Representative Signature      ..........................................................................................................................................  Patient Representative Print Name and Relationship to Patient    ..................................................               ................................................  Date                                            Time    ..........................................................................................................................................  Reviewed by Signature/Title    ...................................................              ..............................................  Date                                                            Time

## 2017-12-19 VITALS
TEMPERATURE: 97.3 F | BODY MASS INDEX: 20.32 KG/M2 | WEIGHT: 119 LBS | RESPIRATION RATE: 20 BRPM | SYSTOLIC BLOOD PRESSURE: 140 MMHG | OXYGEN SATURATION: 99 % | DIASTOLIC BLOOD PRESSURE: 95 MMHG | HEART RATE: 71 BPM | HEIGHT: 64 IN

## 2017-12-19 ASSESSMENT — ENCOUNTER SYMPTOMS
DIARRHEA: 0
VOMITING: 0
ABDOMINAL PAIN: 1
FREQUENCY: 0
NAUSEA: 0
DIFFICULTY URINATING: 0
DYSURIA: 0
FEVER: 1
TROUBLE SWALLOWING: 0
BACK PAIN: 1

## 2017-12-19 NOTE — ED PROVIDER NOTES
History     Chief Complaint   Patient presents with     Back Pain     The history is provided by the patient.     Pavithra Raines is a 62 year old female who presented to the ED for evaluation of ongoing epigastric and back pain.  Patient has been seen in the ED several times for this recently and was initially thought to have a UTI when she was seen in urgent care and she was started on antibiotics.  Was reevaluated in the clinic and was determined that she did not have UTI so antibiotics were stopped.  Her symptoms seem to have recurred since then prompting her presentation for evaluation tonight.  Complicating the situation is the patient has had bariatric surgery with the Tom-en-Y resulting in chronic ongoing gastritis with possible ulceration of her anastomosis.  She does report that more recently it has been difficult for her to eat solids and that they get hung up and actually cause her to vomit.  Question is whether she would benefit from an endoscopy to evaluate for Schatzki's ring development or achalasia.  Tonight she reports that she has had a low-grade fever with a temperature of 100 F.  In addition she was pretty hypertensive with a pressure 146/120.  This did improve during her stay in the ED.    Problem List:    Patient Active Problem List    Diagnosis Date Noted     Anemia due to vitamin B12 deficiency, unspecified B12 deficiency type 12/04/2017     Priority: Medium     Insomnia, unspecified type 12/04/2017     Priority: Medium     Other iron deficiency anemia 12/04/2017     Priority: Medium     Alcohol dependence in remission (H) 04/20/2013     Priority: Medium     Brief relapse in April 2013 after 10 years of sobriety.       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 grade 3 04/07/2011     Priority: Medium     12/15/06 ASCUS + high risk HPV  colpo in 2007 showed high grade KAITLYN and LEEP was recommended  LEEP was done in June,2007 with CAN 2/3 confirmed  10/15/07 NIL  9/30/08 NIL/neg  HPV  10/21/09 NIL/neg HPV  4/5/11 NIL- repeat in one year (4/2012 12/1/17 NIL pap/neg HR HPV. Will need pap screening until 2027, regardless of age.  Plan: cotest due 3 yr.       MDD (major depressive disorder) 04/05/2011     Priority: Medium     Needs to est primary care.       CARDIOVASCULAR SCREENING; LDL GOAL LESS THAN 160 10/31/2010     Priority: Medium     Genital herpes      Priority: Medium     IMO update changed this record. Please review for accuracy       Anxiety 08/27/2008     Priority: Medium     Status post gastric bypass for obesity 03/25/2008     Priority: Medium     Performed at [facility].       Restless legs syndrome (RLS) 05/15/2007     Priority: Medium     Hypersomnia with sleep apnea 05/15/2007     Priority: Medium     Problem list name updated by automated process. Provider to review       Esophageal reflux 04/18/2007     Priority: Medium        Past Medical History:    Past Medical History:   Diagnosis Date     Abnormal Papanicolaou smear of cervix and cervical HPV 4/07     Genital herpes      History of colposcopy with cervical biopsy 06/2007     Hypersomnia with sleep apnea, unspecified      Other and unspecified ovarian cyst 2002 or so       Past Surgical History:    Past Surgical History:   Procedure Laterality Date     CHOLECYSTECTOMY, OPEN  age 20s     COLONOSCOPY  3/21/2011    COMBINED COLONOSCOPY, REMOVE TUMOR/POLYP/LESION BY SNARE performed by JUAN FRANCISCO HERNANDEZ at [facility]     COLONOSCOPY N/A 10/9/2017    Procedure: COMBINED COLONOSCOPY, SINGLE OR MULTIPLE BIOPSY/POLYPECTOMY BY BIOPSY;;  Surgeon: Sylvester Bright MD;  Location: [facility]     COLPOSCOPY CERVIX, LOOP ELECTRODE BIOPSY, COMBINED  6/2007    CAN 2/3-patient requires yearly pap smears     GASTRIC BYPASS  3/25/2008    At [facility]     HC DILATION/CURETTAGE DIAG/THER NON OB  2002     HC ENLARGE BREAST WITH IMPLANT       HC LAPAROSCOPIC MYOMECTOMY, 1 - 4 INTRAMURAL MYOMAS =<250 GM  2002     HC REMOVAL OF BREAST IMPLANT    "    SALPINGO OOPHORECTOMY,R/L/MATTHEW  2002    Bilateral salpingectomy and unilateral oophorectomy       Family History:    Family History   Problem Relation Age of Onset     Unknown/Adopted Father      doesn't know birth father     Family History Negative Other        Social History:  Marital Status:  Single [1]  Social History   Substance Use Topics     Smoking status: Current Some Day Smoker     Packs/day: 2.00     Last attempt to quit: 8/20/2001     Smokeless tobacco: Never Used      Comment: 2 ppd at this time (chain smoking) 10/2012     Alcohol use Yes        Medications:      oxyCODONE IR (ROXICODONE) 5 MG tablet   cyanocobalamin (VITAMIN B12) 1000 MCG/ML injection   ranitidine (ZANTAC) 150 MG tablet   traZODone (DESYREL) 50 MG tablet   escitalopram (LEXAPRO) 20 MG tablet   alum & mag hydroxide-simethicone (MYLANTA ES/MAALOX  ES) 400-400-40 MG/5ML SUSP suspension   Melatonin 10 MG TABS tablet         Review of Systems   Constitutional: Positive for fever.   HENT: Negative for trouble swallowing.    Gastrointestinal: Positive for abdominal pain (Epigastric). Negative for diarrhea, nausea and vomiting.   Genitourinary: Negative for difficulty urinating, dysuria, frequency and urgency.   Musculoskeletal: Positive for back pain.   All other systems reviewed and are negative.      Physical Exam   BP: (!) 146/120  Pulse: 71  Heart Rate: 73  Temp: 100  F (37.8  C)  Resp: 20  Height: 162.6 cm (5' 4\")  Weight: 54 kg (119 lb)  SpO2: 99 %      Physical Exam   Constitutional: She is oriented to person, place, and time. She appears well-developed. No distress.   HENT:   Head: Atraumatic.   Mouth/Throat: Oropharynx is clear and moist.   Eyes: EOM are normal. Pupils are equal, round, and reactive to light.   Neck: Normal range of motion.   Cardiovascular: Normal rate, normal heart sounds and intact distal pulses.    Pulmonary/Chest: Effort normal and breath sounds normal.   Abdominal: Soft. Bowel sounds are normal. There is " tenderness (Mild to moderate tenderness with palpation in the right upper quadrant, epigastric region, and left upper quadrant.).   Musculoskeletal: Normal range of motion.   Neurological: She is alert and oriented to person, place, and time.   Skin: Skin is warm.   Psychiatric: She has a normal mood and affect.   Nursing note and vitals reviewed.      ED Course     ED Course     Procedures           Results for orders placed or performed during the hospital encounter of 12/18/17 (from the past 48 hour(s))   CBC with platelets differential   Result Value Ref Range    WBC 9.5 4.0 - 11.0 10e9/L    RBC Count 3.85 3.8 - 5.2 10e12/L    Hemoglobin 9.4 (L) 11.7 - 15.7 g/dL    Hematocrit 30.4 (L) 35.0 - 47.0 %    MCV 79 78 - 100 fl    MCH 24.4 (L) 26.5 - 33.0 pg    MCHC 30.9 (L) 31.5 - 36.5 g/dL    RDW 18.0 (H) 10.0 - 15.0 %    Platelet Count 392 150 - 450 10e9/L    Diff Method Automated Method     % Neutrophils 57.8 %    % Lymphocytes 30.2 %    % Monocytes 10.2 %    % Eosinophils 1.5 %    % Basophils 0.2 %    % Immature Granulocytes 0.1 %    Absolute Neutrophil 5.5 1.6 - 8.3 10e9/L    Absolute Lymphocytes 2.9 0.8 - 5.3 10e9/L    Absolute Monocytes 1.0 0.0 - 1.3 10e9/L    Absolute Eosinophils 0.1 0.0 - 0.7 10e9/L    Absolute Basophils 0.0 0.0 - 0.2 10e9/L    Abs Immature Granulocytes 0.0 0 - 0.4 10e9/L   Comprehensive metabolic panel   Result Value Ref Range    Sodium 137 133 - 144 mmol/L    Potassium 3.3 (L) 3.4 - 5.3 mmol/L    Chloride 104 94 - 109 mmol/L    Carbon Dioxide 23 20 - 32 mmol/L    Anion Gap 10 3 - 14 mmol/L    Glucose 85 70 - 99 mg/dL    Urea Nitrogen 10 7 - 30 mg/dL    Creatinine 0.82 0.52 - 1.04 mg/dL    GFR Estimate 71 >60 mL/min/1.7m2    GFR Estimate If Black 86 >60 mL/min/1.7m2    Calcium 8.5 8.5 - 10.1 mg/dL    Bilirubin Total 0.6 0.2 - 1.3 mg/dL    Albumin 3.0 (L) 3.4 - 5.0 g/dL    Protein Total 7.1 6.8 - 8.8 g/dL    Alkaline Phosphatase 121 40 - 150 U/L    ALT 12 0 - 50 U/L    AST 12 0 - 45 U/L    Lipase   Result Value Ref Range    Lipase 156 73 - 393 U/L   Lactic acid whole blood   Result Value Ref Range    Lactic Acid 1.2 0.7 - 2.0 mmol/L   CRP inflammation   Result Value Ref Range    CRP Inflammation <2.9 0.0 - 8.0 mg/L   Erythrocyte sedimentation rate auto   Result Value Ref Range    Sed Rate 45 (H) 0 - 30 mm/h   UA with Microscopic   Result Value Ref Range    Color Urine Yellow     Appearance Urine Clear     Glucose Urine Negative NEG^Negative mg/dL    Bilirubin Urine Negative NEG^Negative    Ketones Urine 5 (A) NEG^Negative mg/dL    Specific Gravity Urine 1.006 1.003 - 1.035    Blood Urine Negative NEG^Negative    pH Urine 8.0 (H) 5.0 - 7.0 pH    Protein Albumin Urine Negative NEG^Negative mg/dL    Urobilinogen mg/dL 0.0 0.0 - 2.0 mg/dL    Nitrite Urine Negative NEG^Negative    Leukocyte Esterase Urine Negative NEG^Negative    Source Midstream Urine     WBC Urine 4 (H) 0 - 2 /HPF    RBC Urine <1 0 - 2 /HPF    Bacteria Urine Few (A) NEG^Negative /HPF    Squamous Epithelial /HPF Urine 6 (H) 0 - 1 /HPF    Mucous Urine Present (A) NEG^Negative /LPF    Hyaline Casts 3 (H) 0 - 2 /LPF         Assessments & Plan (with Medical Decision Making)  Pavithra Raines is a 62-year-old female presenting to the ED for evaluation of ongoing back pain, epigastric pain and a low-grade fever.  She has been seen by a provider several times in the last several weeks and was initially diagnosed with UTI at urgent care and started on antibiotics.  She had a subsequent follow-up with the clinic who instructed her that there was not any urinary tract infection and to discontinue the antibiotics.  Tonight she presents again with a low-grade fever of 100 F and increasing epigastric and back pain.  She denies any urinary symptoms, nausea or vomiting, constipation or diarrhea, or difficulty swallowing.  On exam, she has significant tenderness to palpation in the right upper quadrant, left upper quadrant, and epigastric  region.  She has normal bowel sounds.  There is no guarding or rebound tenderness.  Remainder of her exam is unremarkable.  Labs were obtained with a CBC showing a hemoglobin of 9.4 with a hematocrit of 30.4.  There is no elevation of her leukocyte count.  A conference of metabolic panel is significant for potassium of 3.3 and an albumin of 3.0 but otherwise is unremarkable.  Her AST and ALT are both 12.  Her lipase is 156.  Lactic acid was obtained and is 1.2.  Her C-reactive protein is less than 2.9.  Erythrocyte sedimentation rate is elevated at 45.  Urinalysis was obtained showing few bacteria, 4 WBCs, squamous epithelium, and mucous.  This most likely is due to contaminant.  Both nitrite and leukocyte esterase are negative eliminating the possibility of a UTI.  I do not provide any additional imaging as these are chronic ongoing symptoms.  She does probably need to have an endoscopy to evaluate for Schatzki's ring and chronic ulceration with scarring chest pain especially at the anastomosis.  I have arranged patient to follow-up with her primary care provider after the first of the year who can facilitate this line of testing.  Patient is in agreement with this plan and is suitable for discharge in satisfactory condition.     I have reviewed the nursing notes.    I have reviewed the findings, diagnosis, plan and need for follow up with the patient.       Discharge Medication List as of 12/19/2017 12:00 AM      START taking these medications    Details   oxyCODONE IR (ROXICODONE) 5 MG tablet Take 1 tablet (5 mg) by mouth nightly as needed for moderate to severe pain, Disp-2 tablet, R-0, Local Print             Final diagnoses:   Abdominal pain, epigastric   Bilateral thoracic back pain, unspecified chronicity       12/18/2017   [facility] EMERGENCY DEPARTMENT     Jose Emery MD  12/19/17 0018

## 2017-12-19 NOTE — DISCHARGE INSTRUCTIONS
*Abdominal Pain, Unknown Cause (Female)    The exact cause of your abdominal (stomach) pain is not certain. This does not mean that this is something to worry about, or the right tests were not done. Everyone likes to know the exact cause of the problem, but sometimes with abdominal pain, there is no clear-cut cause, and this could be a good thing. The good news is that your symptoms can be treated, and you will feel better.   Your condition does not seem serious now; however, sometimes the signs of a serious problem may take more time to appear. For this reason, it is important for you to watch for any new symptoms, problems, or worsening of your condition.  Over the next few days, the abdominal pain may come and go, or be continuous. Other common symptoms can include nausea and vomiting. Sometimes it can be difficult to tell if you feel nauseous, you may just feel bad and not associate that feeling with nausea. Constipation, diarrhea, and a fever may go along with the pain.  The pain may continue even if treated correctly over the following days. Depending on how things go, sometimes the cause can become clear and may require further or different treatment. Additional evaluations, medications, or tests may be needed.  Home care  Your health care provider may prescribe medications for pain, symptoms, or an infection.  Follow the health care provider's instructions for taking these medications.  General care    Rest until your next exam. No strenuous activities.    Try to find positions that ease discomfort. A small pillow placed on the abdomen may help relieve pain.    Something warm on your abdomen (such as a heating pad) may help, but be careful not to burn yourself.  Diet    Do not force yourself to eat, especially if having cramps, vomiting, or diarrhea.    Water is important so you do not get dehydrated. Soup may also be good. Sports drinks may also help, especially if they are not too acidic. Make sure you  don't drink sugary drinks as this can make things worse. Take liquids in small amounts. Do not guzzle them.    Caffeine sometimes makes the pain and cramping worse.    Avoid dairy products if you have vomiting or diarrhea.    Don't eat large amounts at a time. Wait a few minutes between bites.    Eat a diet low in fiber (called a low-residue diet). Foods allowed include refined breads, white rice, fruit and vegetable juices without pulp, tender meats. These foods will pass more easily through the intestine.    Avoid fried or fatty foods, dairy, alcohol and spicy foods until your symptoms go away.  Follow-up care  Follow up with your health care provider as instructed, or if your pain does not begin to improve in the next 24 hours.  When to seek medical care  Seek prompt medical care if any of the following occur:    Pain gets worse or moves to the right lower abdomen    New or worsening vomiting or diarrhea    Swelling of the abdomen    Unable to pass stool for more than three days    New fever over 101  F (38.3 C), or rising fever    Blood in vomit or bowel movements (dark red or black color)    Jaundice (yellow color of eyes and skin)    Weakness, dizziness    Chest, arm, back, neck or jaw pain    Unexpected vaginal bleeding or missed period  Call 911  Call emergency services if any of the following occur:    Trouble breathing    Confusion    Fainting or loss of consciousness    Rapid heart rate    Seizure    [id number] [facility], [address] [place] [address], [place] [address]. All rights reserved. This information is not intended as a substitute for professional medical care. Always follow your healthcare professional's instructions.

## 2017-12-19 NOTE — TELEPHONE ENCOUNTER
"  Reason for Disposition    [1] Unable to urinate (or only a few drops) > 4 hours AND     [2] bladder feels very full (e.g., palpable bladder or strong urge to urinate)    Additional Information    Negative: Shock suspected (e.g., cold/pale/clammy skin, too weak to stand, low BP, rapid pulse)    Negative: Sounds like a life-threatening emergency to the triager    Negative: Followed a genital area injury    Negative: Followed a genital area injury (penis, scrotum)    Negative: Vaginal discharge    Negative: Pus (white, yellow) or bloody discharge from end of penis    Negative: [1] Taking antibiotic for urinary tract infection (UTI) AND [2] female    Negative: [1] Taking antibiotic for urinary tract infection (UTI) AND [2] male    Negative: [1] Discomfort (pain, burning or stinging) when passing urine AND [2] pregnant    Negative: [1] Discomfort (pain, burning or stinging) when passing urine AND [2] postpartum < 1 month    Negative: [1] Discomfort (pain, burning or stinging) when passing urine AND [2] female    Negative: [1] Discomfort (pain, burning or stinging) when passing urine AND [2] male    Negative: Pain or itching in the vulvar area    Negative: Pain in scrotum is main symptom    Negative: Blood in the urine is main symptom    Negative: Symptoms arising from use of a urinary catheter (Barney or Coude)    Protocols used: URINARY SYMPTOMS-ADULT-    Patient reports that she was recently treated for a UTI, but was told to stop antibiotics due to her urine culture not having any growth.  However, the patient reports urinary retention and lower back pain with onset today.  She reports that she has only been able to urinate, \"A few drops,\" since this morning.  Additionally, she reports bladder fullness and shaking/chills.  Advised that patient be seen in the ED per guideline.  Patient with plans to go to [place] ED.    Marbella Fletcher RN  [facility]    "

## 2017-12-19 NOTE — ED NOTES
Started fever and back pain, has been seen a couple times recently was treated for bladder infection and then seen again and told to stop antibiotics

## 2017-12-20 ENCOUNTER — MYC MEDICAL ADVICE (OUTPATIENT)
Dept: FAMILY MEDICINE | Facility: OTHER | Age: 62
End: 2017-12-20

## 2017-12-26 ENCOUNTER — OFFICE VISIT (OUTPATIENT)
Dept: FAMILY MEDICINE | Facility: CLINIC | Age: 62
End: 2017-12-26
Payer: COMMERCIAL

## 2017-12-26 VITALS
BODY MASS INDEX: 20.43 KG/M2 | SYSTOLIC BLOOD PRESSURE: 136 MMHG | TEMPERATURE: 98.1 F | HEART RATE: 90 BPM | OXYGEN SATURATION: 99 % | DIASTOLIC BLOOD PRESSURE: 78 MMHG | WEIGHT: 119 LBS

## 2017-12-26 DIAGNOSIS — Z98.84 STATUS POST GASTRIC BYPASS FOR OBESITY: ICD-10-CM

## 2017-12-26 DIAGNOSIS — F41.9 ANXIETY: ICD-10-CM

## 2017-12-26 DIAGNOSIS — K21.00 GASTROESOPHAGEAL REFLUX DISEASE WITH ESOPHAGITIS: ICD-10-CM

## 2017-12-26 DIAGNOSIS — R10.11 RUQ ABDOMINAL PAIN: Primary | ICD-10-CM

## 2017-12-26 DIAGNOSIS — Z71.6 ENCOUNTER FOR TOBACCO USE CESSATION COUNSELING: ICD-10-CM

## 2017-12-26 PROCEDURE — 99213 OFFICE O/P EST LOW 20 MIN: CPT | Performed by: FAMILY MEDICINE

## 2017-12-26 RX ORDER — ESCITALOPRAM OXALATE 20 MG/1
10 TABLET ORAL DAILY
Start: 2017-12-26 | End: 2018-07-09

## 2017-12-26 RX ORDER — BUPROPION HYDROCHLORIDE 150 MG/1
TABLET, EXTENDED RELEASE ORAL
Qty: 60 TABLET | Refills: 2 | Status: SHIPPED | OUTPATIENT
Start: 2017-12-26 | End: 2018-04-13

## 2017-12-26 ASSESSMENT — PAIN SCALES - GENERAL: PAINLEVEL: MILD PAIN (3)

## 2017-12-26 NOTE — Clinical Note
Can you please call [facility] about getting her in ASAP for upper endoscopy?  We are worried about possible esophageal obstruction/ring/web. She has had sudha-en-Y gastric bypass.  Referral was made a couple months ago and she hasn't heard anything.  Thanks.

## 2017-12-26 NOTE — NURSING NOTE
"Chief Complaint   Patient presents with     [facility] F/U     12/18 ER for abd and back pain       Initial /78  Pulse 90  Temp 98.1  F (36.7  C) (Temporal)  Wt 119 lb (54 kg)  SpO2 99%  BMI 20.43 kg/m2 Estimated body mass index is 20.43 kg/(m^2) as calculated from the following:    Height as of 12/18/17: 5' 4\" (1.626 m).    Weight as of this encounter: 119 lb (54 kg).  Medication Reconciliation: complete   Luz Stiles CMA    "

## 2017-12-26 NOTE — PATIENT INSTRUCTIONS
I would like to start you on a medication called Wellbutrin.  This is intended to help you quit smoking. It may also help with anxiety and depression symptoms.    You will start taking 1 pill daily in the morning for the first 3 days. Then on the 4th day, you'll take two per day, once in the morning and once in the afternoon.      Also you will decrease your dose of Lexapro from 20 mg down to 10 mg. Please cut your pills in half and only take 1/2 per day. If you can't cut yours, let me know and I'll order you a new Rx for 10 mg pills.     I'll be contacting the GI department at the  first thing tomorrow morning to try to get you an appointment.     Soha Boggs MD

## 2017-12-26 NOTE — MR AVS SNAPSHOT
After Visit Summary   12/26/2017    Pavithra Raines    MRN: [id number]           Patient Information     Date Of Birth          1955        Visit Information        Provider Department      12/26/2017 5:15 PM Soha Boggs MD [facility]        Today's Diagnoses     Encounter for tobacco use cessation counseling    -  1    Anxiety          Care Instructions    I would like to start you on a medication called Wellbutrin.  This is intended to help you quit smoking. It may also help with anxiety and depression symptoms.    You will start taking 1 pill daily in the morning for the first 3 days. Then on the 4th day, you'll take two per day, once in the morning and once in the afternoon.      Also you will decrease your dose of Lexapro from 20 mg down to 10 mg. Please cut your pills in half and only take 1/2 per day. If you can't cut yours, let me know and I'll order you a new Rx for 10 mg pills.     I'll be contacting the GI department at the  first thing tomorrow morning to try to get you an appointment.     Soha Boggs MD           Follow-ups after your visit        Who to contact     If you have questions or need follow up information about today's clinic visit or your schedule please contact [facility] directly at [phone].  Normal or non-critical lab and imaging results will be communicated to you by MyChart, letter or phone within 4 business days after the clinic has received the results. If you do not hear from us within 7 days, please contact the clinic through MyChart or phone. If you have a critical or abnormal lab result, we will notify you by phone as soon as possible.  Submit refill requests through [employer] or call your pharmacy and they will forward the refill request to us. Please allow 3 business days for your refill to be completed.          Additional Information About Your Visit        MyChart Information      "LineHop lets you send messages to your doctor, view your test results, renew your prescriptions, schedule appointments and more. To sign up, go to [email or web address] . Click on \"Log in\" on the left side of the screen, which will take you to the Welcome page. Then click on \"Sign up Now\" on the right side of the page.     You will be asked to enter the access code listed below, as well as some personal information. Please follow the directions to create your username and password.     Your access code is: [email or web address]  Expires: 3/26/2018  6:02 PM     Your access code will  in 90 days. If you need help or a new code, please call your [place] clinic or [phone].        Care EveryWhere ID     This is your Care EveryWhere ID. This could be used by other organizations to access your [place] medical records  [id number]        Your Vitals Were     Pulse Temperature Pulse Oximetry BMI (Body Mass Index)          90 98.1  F (36.7  C) (Temporal) 99% 20.43 kg/m2         Blood Pressure from Last 3 Encounters:   17 136/78   17 (!) 140/95   12/15/17 147/79    Weight from Last 3 Encounters:   17 119 lb (54 kg)   17 119 lb (54 kg)   17 119 lb 12.8 oz (54.3 kg)              Today, you had the following     No orders found for display         Today's Medication Changes          These changes are accurate as of: 17  6:02 PM.  If you have any questions, ask your nurse or doctor.               Start taking these medicines.        Dose/Directions    buPROPion 150 MG 12 hr tablet   Commonly known as:  WELLBUTRIN SR   Used for:  Encounter for tobacco use cessation counseling   Started by:  Soha Boggs MD        Take 1 tablet once daily for 3 days and increase to 1 tablet twice daily on the 4th day.   Quantity:  60 tablet   Refills:  2         These medicines have changed or have updated prescriptions.        Dose/Directions    escitalopram 20 MG tablet   Commonly known " as:  LEXAPRO   This may have changed:  how much to take   Used for:  Anxiety   Changed by:  Soha Boggs MD        Dose:  10 mg   Take 0.5 tablets (10 mg) by mouth daily   Refills:  0            Where to get your medicines      These medications were sent to [facility], [place] - [address]  [address] [facility] [address]     Phone:  [phone]     buPROPion 150 MG 12 hr tablet         Some of these will need a paper prescription and others can be bought over the counter.  Ask your nurse if you have questions.     You don't need a prescription for these medications     escitalopram 20 MG tablet                Primary Care Provider Office Phone # Fax #    Soha Boggs [phone] [address]   [facility] [address]        Equal Access to Services     [facility]: Hadii mesfin don hadasho Soomaali, waaxda luqadaha, qaybta kaalmada adeegyada, nazario doherty haymarc zamudio . So [facility] [phone].    ATENCIÓN: Si habla español, tiene a medina disposición servicios gratuitos de asistencia lingüística. Llame al [phone].    We comply with applicable federal civil rights laws and [place] laws. We do not discriminate on the basis of race, color, national origin, age, disability, sex, sexual orientation, or gender identity.            Thank you!     Thank you for choosing [facility]  for your care. Our goal is always to provide you with excellent care. Hearing back from our patients is one way we can continue to improve our services. Please take a few minutes to complete the written survey that you may receive in the mail after your visit with us. Thank you!             Your Updated Medication List - Protect others around you: Learn how to safely use, store and throw away your medicines at www.disposemymeds.org.          This list is accurate as of: 12/26/17  6:02 PM.  Always use your most recent med list.                    Brand Name Dispense Instructions for use Diagnosis    alum & mag hydroxide-simethicone 400-400-40 MG/5ML Susp suspension    MYLANTA ES/MAALOX  ES    355 mL    Take 30 mLs by mouth every 4 hours as needed (epigastric pain)        buPROPion 150 MG 12 hr tablet    WELLBUTRIN SR    60 tablet    Take 1 tablet once daily for 3 days and increase to 1 tablet twice daily on the 4th day.    Encounter for tobacco use cessation counseling       cyanocobalamin 1000 MCG/ML injection    VITAMIN B12    1 mL    Inject 1 mL (1,000 mcg) into the muscle every 30 days    Anemia due to vitamin B12 deficiency, unspecified B12 deficiency type       escitalopram 20 MG tablet    LEXAPRO     Take 0.5 tablets (10 mg) by mouth daily    Anxiety       Melatonin 10 MG Tabs tablet      Take 10 mg by mouth nightly as needed for sleep        ranitidine 150 MG tablet    ZANTAC     Take 150 mg by mouth 2 times daily        traZODone 50 MG tablet    DESYREL    60 tablet    Take 2 tablets (100 mg) by mouth At Bedtime    Insomnia, unspecified type

## 2017-12-26 NOTE — Clinical Note
Tanner Pittman Pavithra is having a hard time getting enough calories in due to esophageal and stomach issues. Any recommendations for a liquid supplement that is lactose free, low sugar, and high in protein/calories? She doesn't tolerate sweet things well. Thanks.  Soha Boggs MD

## 2017-12-27 NOTE — NURSING NOTE
Call placed to [facility] to inquirer on the status of patients appointment. Noted patient is having on going issues and needs to be seen for possible esophageal obstruction. Informed of last noted entry was on 11/7/17 by Cheryl Farley LPN. Informed they would be sending an encounter back to Cheryl to address and to contact patient as soon as possible. Catrina Payne LPN
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
## 2017-12-28 NOTE — TELEPHONE ENCOUNTER
Call to patient to inform we had called the [facility] to inquirer on the status of her appointment. Informed patient of information below and that they would be routing a message back to the care coordinator/nurses for further follow up and to contact patient. Name of nurse given to patient as well as the number to call if she does not hear anything from them today that she should call them again tomorrow. Catrina Payne LPN

## 2017-12-28 NOTE — TELEPHONE ENCOUNTER
Call to [facility] on 12/27/17 to inquirer on status of patients appointment. Informed last entry noted was on 11/7/17 and nothing since. Patient was here and office stating she had not heard back about a scheduled appointment. Stressed that patient needs to be seen for further evaluation for possible esophageal obstruction, history of bariatric surgery. They were stating they would send message back to the nurse to contact and follow up with scheduling the patient. Catrina Payne LPN

## 2017-12-29 ENCOUNTER — TELEPHONE (OUTPATIENT)
Dept: FAMILY MEDICINE | Facility: CLINIC | Age: 62
End: 2017-12-29

## 2017-12-29 NOTE — TELEPHONE ENCOUNTER
Reason for Call:  Other       Detailed comments: Pavithra calls to let Dr Boggs know that she is still having a hard time getting Upper endoscopy scheduled    Phone Number Patient can be reached at: Home number on file [phone] (home)    Best Time: any    Can we leave a detailed message on this number? YES    Call taken on 12/29/2017 at 3:45 PM by Brooke Hernandez

## 2018-01-02 NOTE — TELEPHONE ENCOUNTER
Nursing Note   Catrina Payne LPN (Licensed Nurse)      Call placed to [facility] to inquirer on the status of patients appointment. Noted patient is having on going issues and needs to be seen for possible esophageal obstruction. Informed of last noted entry was on 11/7/17 by Cheryl Farley LPN. Informed they would be sending an encounter back to Cheryl to address and to contact patient as soon as possible. Catrina Payne LPN

## 2018-01-09 ENCOUNTER — CARE COORDINATION (OUTPATIENT)
Dept: SURGERY | Facility: CLINIC | Age: 63
End: 2018-01-09

## 2018-01-09 NOTE — PROGRESS NOTES
Called and left message for patient to call back to discuss getting records and what we can due for her.

## 2018-01-09 NOTE — TELEPHONE ENCOUNTER
Lm for Melissa to return my call to discuss who is supposed to be calling Pavithra back as she has not heard from anyone.

## 2018-01-09 NOTE — TELEPHONE ENCOUNTER
Cheryl at the [facility] talked to Pavithra and they will get her set up once they get records from [place].

## 2018-01-10 NOTE — TELEPHONE ENCOUNTER
Call to patient to inquirer if she had received her message from [facility]? Patient stating yes she has spoken with Cheryl and that they do have her information and that she is just waiting on a return call of next step in plan of treatment for her symptoms. Catrina Payne LPN

## 2018-01-10 NOTE — TELEPHONE ENCOUNTER
TC please look into this again for patient. [facility] contacted patient yesterday and left message.  Luz Stiles, CMA

## 2018-01-11 ENCOUNTER — CARE COORDINATION (OUTPATIENT)
Dept: SURGERY | Facility: CLINIC | Age: 63
End: 2018-01-11

## 2018-01-18 ENCOUNTER — OFFICE VISIT (OUTPATIENT)
Dept: GASTROENTEROLOGY | Facility: CLINIC | Age: 63
End: 2018-01-18

## 2018-01-18 ENCOUNTER — OFFICE VISIT (OUTPATIENT)
Dept: SURGERY | Facility: CLINIC | Age: 63
End: 2018-01-18

## 2018-01-18 VITALS
OXYGEN SATURATION: 100 % | HEIGHT: 64 IN | TEMPERATURE: 99.8 F | BODY MASS INDEX: 19.17 KG/M2 | DIASTOLIC BLOOD PRESSURE: 76 MMHG | SYSTOLIC BLOOD PRESSURE: 126 MMHG | WEIGHT: 112.3 LBS | HEART RATE: 75 BPM

## 2018-01-18 DIAGNOSIS — R10.13 ABDOMINAL PAIN, EPIGASTRIC: ICD-10-CM

## 2018-01-18 DIAGNOSIS — Z98.84 STATUS POST GASTRIC BYPASS FOR OBESITY: Primary | ICD-10-CM

## 2018-01-18 DIAGNOSIS — Z71.3 NUTRITIONAL COUNSELING: ICD-10-CM

## 2018-01-18 RX ORDER — OMEPRAZOLE 40 MG/1
40 CAPSULE, DELAYED RELEASE ORAL DAILY
Qty: 90 CAPSULE | Refills: 3 | Status: SHIPPED | OUTPATIENT
Start: 2018-01-18 | End: 2018-04-18

## 2018-01-18 ASSESSMENT — PAIN SCALES - GENERAL: PAINLEVEL: NO PAIN (0)

## 2018-01-18 NOTE — LETTER
1/18/2018       RE: Pavithra Raines  [address]  [place] [facility] [address]     Dear Colleague,    Thank you for referring your patient, Pavithra Raines, to the [facility] GENERAL SURGERY at [facility]. Please see a copy of my visit note below.    General Surgery Consultation Note    I was asked by Soha Boggs to see this patient for the following problem:    CC: Abdominal pain, epigastric pain, dysphagia, weight loss    HPI:  Patient is here establishing care in relation to weight loss surgery. Since RYGB in 2004 by Dr Pop, has not returned for a follow up visit. Recently seen by her primary care physician as patient was worried about cancer given continued weight loss and inability to eat. Malignancy less favored after workup, and has been referred here for EGD and to establish care. Symptoms typical for marginal ulcer. Has quit alcohol (August 2017), and is down from 2.5 packs of cigarette per day to 0.5 packs per 2 days. Weighed >200 lbs pre-RNYGB, was 145 lbs in August last year, now 112 at clinic visit today.     Location: Epigastric, abdominal  Severity: Moderate  Timing: Intermittent to constant, no specified timing   Duration: Constant   Modifying Factors: None, smoking makes worse, coffee makes worse   Associated Signs/Symptoms: Anorexia, weight loss, intermittent vomiting, abdominal pain     Past Medical History:  Past Medical History:   Diagnosis Date     Abnormal Papanicolaou smear of cervix and cervical HPV 4/07    Colposcopy 2007= HSIL     Genital herpes      History of colposcopy with cervical biopsy 06/2007    HSIL- LEEP recommended     Hypersomnia with sleep apnea, unspecified     CPAP started 2007     Other and unspecified ovarian cyst 2002 or so    s/p resection of ovary and tubes, d&c     Patient Active Problem List   Diagnosis     Esophageal reflux     Restless legs syndrome (RLS)     Hypersomnia with sleep apnea      Anxiety     Status post gastric bypass for obesity     Genital herpes     CARDIOVASCULAR SCREENING; LDL GOAL LESS THAN 160     MDD (major depressive disorder)     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 grade 3     Alcohol dependence in remission (H)     Anemia due to vitamin B12 deficiency, unspecified B12 deficiency type     Insomnia, unspecified type     Other iron deficiency anemia       Surgical History:  Past Surgical History:   Procedure Laterality Date     CHOLECYSTECTOMY, OPEN  age 20s     COLONOSCOPY  3/21/2011    COMBINED COLONOSCOPY, REMOVE TUMOR/POLYP/LESION BY SNARE performed by JUAN FRANCISCO HERNANDEZ at  GI     COLONOSCOPY N/A 10/9/2017    Procedure: COMBINED COLONOSCOPY, SINGLE OR MULTIPLE BIOPSY/POLYPECTOMY BY BIOPSY;;  Surgeon: Sylvester Bright MD;  Location:  GI     COLPOSCOPY CERVIX, LOOP ELECTRODE BIOPSY, COMBINED  6/2007    CAN 2/3-patient requires yearly pap smears     GASTRIC BYPASS  3/25/2008    At [facility]     HC DILATION/CURETTAGE DIAG/THER NON OB  2002     HC ENLARGE BREAST WITH IMPLANT       HC LAPAROSCOPIC MYOMECTOMY, 1 - 4 INTRAMURAL MYOMAS =<250 GM  2002     HC REMOVAL OF BREAST IMPLANT       SALPINGO OOPHORECTOMY,R/L/MATTHEW  2002    Bilateral salpingectomy and unilateral oophorectomy     Medications:  Prior to Admission medications    Medication Sig Start Date End Date Taking? Authorizing Provider   buPROPion (WELLBUTRIN SR) 150 MG 12 hr tablet Take 1 tablet once daily for 3 days and increase to 1 tablet twice daily on the 4th day. 12/26/17  Yes Soha Boggs MD   escitalopram (LEXAPRO) 20 MG tablet Take 0.5 tablets (10 mg) by mouth daily 12/26/17  Yes Soha Boggs MD   cyanocobalamin (VITAMIN B12) 1000 MCG/ML injection Inject 1 mL (1,000 mcg) into the muscle every 30 days 12/12/17 12/12/18 Yes Soha Boggs MD   ranitidine (ZANTAC) 150 MG tablet Take 150 mg by mouth 2 times daily   Yes Reported, Patient   traZODone  (DESYREL) 50 MG tablet Take 2 tablets (100 mg) by mouth At Bedtime 12/1/17  Yes Soha Boggs MD   alum & mag hydroxide-simethicone (MYLANTA ES/MAALOX  ES) 400-400-40 MG/5ML SUSP suspension Take 30 mLs by mouth every 4 hours as needed (epigastric pain) 10/17/17  Yes Ramses Flores MD   Melatonin 10 MG TABS tablet Take 10 mg by mouth nightly as needed for sleep   Yes Reported, Patient     Current Outpatient Prescriptions   Medication Sig Dispense Refill     buPROPion (WELLBUTRIN SR) 150 MG 12 hr tablet Take 1 tablet once daily for 3 days and increase to 1 tablet twice daily on the 4th day. 60 tablet 2     escitalopram (LEXAPRO) 20 MG tablet Take 0.5 tablets (10 mg) by mouth daily       cyanocobalamin (VITAMIN B12) 1000 MCG/ML injection Inject 1 mL (1,000 mcg) into the muscle every 30 days 1 mL 11     ranitidine (ZANTAC) 150 MG tablet Take 150 mg by mouth 2 times daily       traZODone (DESYREL) 50 MG tablet Take 2 tablets (100 mg) by mouth At Bedtime 60 tablet 3     alum & mag hydroxide-simethicone (MYLANTA ES/MAALOX  ES) 400-400-40 MG/5ML SUSP suspension Take 30 mLs by mouth every 4 hours as needed (epigastric pain) 355 mL 0     Melatonin 10 MG TABS tablet Take 10 mg by mouth nightly as needed for sleep       Allergies:  Allergies   Allergen Reactions     Codeine Itching     Vicodin [Hydrocodone-Acetaminophen] Itching     Social History:  Social History     Social History     Marital status: Single     Spouse name: Elijah Hatfield     Number of children: 0     Years of education: N/A     Occupational History     care director [employer]     [employer]      [employer]     Social History Main Topics     Smoking status: Current Some Day Smoker     Packs/day: 2.00     Last attempt to quit: 8/20/2001     Smokeless tobacco: Never Used      Comment: 2 ppd at this time (chain smoking) 10/2012     Alcohol use Yes     Drug use: No     Sexual activity: Yes     Partners: Male  "    Other Topics Concern      Service No     Blood Transfusions No     Social History Narrative     Family History:  Family History   Problem Relation Age of Onset     Unknown/Adopted Father      doesn't know birth father     Family History Negative Other      Review of Systems:  A 14 point review of systems was reviewed in our paper questionnaire.     Physical Examination:  Vital Signs: /76 (BP Location: Left arm, Patient Position: Chair, Cuff Size: Adult Regular)  Pulse 75  Temp 99.8  F (37.7  C)  Ht 1.626 m (5' 4\")  Wt 50.9 kg (112 lb 4.8 oz)  SpO2 100%  BMI 19.28 kg/m2  HEENT: NCAT; MMM; EOMSI; PERRLA  Lungs: Breathing unlabored  Abdomen: soft/nontender/nondistended; incisions  General: Cachectic   Ext WWP  Neuro, no gross deficits     Imaging:  CT's reviewed     Laboratory testing:  Lab Results   Component Value Date    WBC 9.5 12/18/2017     Lab Results   Component Value Date    RBC 3.85 12/18/2017     Lab Results   Component Value Date    HGB 9.4 12/18/2017     Lab Results   Component Value Date    HCT 30.4 12/18/2017     No components found for: MCT  Lab Results   Component Value Date    MCV 79 12/18/2017     Lab Results   Component Value Date    MCH 24.4 12/18/2017     Lab Results   Component Value Date    MCHC 30.9 12/18/2017     Lab Results   Component Value Date    RDW 18.0 12/18/2017     Lab Results   Component Value Date     12/18/2017     Last Basic Metabolic Panel:  Lab Results   Component Value Date     12/18/2017      Lab Results   Component Value Date    POTASSIUM 3.3 12/18/2017     Lab Results   Component Value Date    CHLORIDE 104 12/18/2017     Lab Results   Component Value Date    SHON 8.5 12/18/2017     Lab Results   Component Value Date    CO2 23 12/18/2017     Lab Results   Component Value Date    BUN 10 12/18/2017     Lab Results   Component Value Date    CR 0.82 12/18/2017     Lab Results   Component Value Date    GLC 85 12/18/2017     INR/Prothrombin " Time  Liver Function Studies -   Recent Labs   Lab Test  17   2152   PROTTOTAL  7.1   ALBUMIN  3.0*   BILITOTAL  0.6   ALKPHOS  121   AST  12   ALT  12     Plan:  1. Stop smoking.  2. Schedule endoscopy by Dr. Sanchez.  (Call [facility] to find available times for [phone].)  3. You need to take vitamins.  4. I would like you to schedule a dietician visit.  5. Start omeprazole (prescribed to [facility]); stop ranitidine    Oneal Sanchez MD  Surgery  [phone] (hospital )  [phone] (clinic nurses)

## 2018-01-18 NOTE — PATIENT INSTRUCTIONS
It was nice meeting you today:  -Can try Fairlife lactose free milk (higher protein)  -Consistently eat 3 meals per day with protein at all meals (can have homemade protein smoothie for a meal). At this time, recommend having a protein smoothie each day.   -Prepare/plan meals ahead of time (i/e put cottage cheese in individual containers to take to work or to easily grab and take along individual canned peaches.)  -Restart vitamin and mineral supplements. (see handout)  -Do not drink fluids with meals and stop drinking liquids 30 minutes before meals and 30 minutes after meals.  -Chew food well down to applesauce consistency before swallowing  If you would like to schedule a follow up appointment with Luz Castillo Registered Dietitian, please call [phone].

## 2018-01-18 NOTE — PATIENT INSTRUCTIONS
It was a pleasure meeting with you today.     Thank you for allowing us the privilege of caring for you. We hope we provided you with the excellent service you deserve.     Please let us know if there is anything else we can do for you so that we can be sure you are leaving completely satisfied with your care experience.      You saw Dr Sanchez today.     Instructions per today's visit:       1. Stop smoking.    2. Schedule endoscopy by Dr. Sanchez.  (Call [facility] to find available times for [phone].)    3. You need to take vitamins.    4. I would like you to schedule a dietician visit.    5. Start omeprazole (prescribed to [facility]); stop ranitidine      Endoscopy Instructions:    -Call the Endoscopy Department at [phone] to schedule an endoscopy to be done by your surgeon.  Date:___________  Time:___________    -Have nothing to eat or drink for 6 hours prior to the check-in time.   -Check-in one hour before the procedure time.  -Please bring a  to drive you home after the procedure.  -You cannot drive for 24 hours after the procedure due to the sedation.  -The Endoscopy Department is located on the 1st floor of the [facility].  -The [facility] is at [address] [place], [place] [id number].    -Ph: [phone] for scheduling, directions or questions.  - parking at the front door is available for the same price as parking in the [place] ramp.            To schedule appointments with our team, please call [phone] option #1    Please call during clinic hours Monday through Friday 8:00a - 4:00p if you have questions or you can contact us via [facility] at anytime.      Nurses: [phone] Option # 3 for nurse advice line.  Fax: [phone]  Surgery Scheduler: [phone]    Please call the hospital at [phone] to speak with our on call MDs if you have urgent needs after hours, during weekends, or holidays.    *For  patients who are currently enrolled in our program and have not yet had weight loss surgery please note*:    You must be at your required goal weight at the time of your Anesthesia clinic visit as well as the day of surgery or your surgery will be cancelled. You will not be able to obtain a new surgery date until you have met your goal weight.    Weight loss medications before and after surgery protocol:    Adipex (phentermine): Stop two days before surgery. Do not restart after surgery. May reconsider restarting as needed in the future.    Topimax (topiramate): Can take right up to surgery including morning of surgery and restart post op day #1.    Qsymia (phentermine/topiramate): Hold morning of surgery. Restart after surgery post op day #1    Contrave (bupropion/naltrxone): Fast taper before surgery. 1 tablet twice daily for 1 week and then discontinue 4 days before surgery.  Will reconsider restarting at postop appt once no longer taking narcotic pain meds.  Will slowly ramp up again.    Naltrexone: Stop 4 days before surgery.  Will reconsider starting again at postop appts when no longer taking narcotic pain meds.    Belviq (locaserin): Stop 2 days before surgery and restart immediately after surgery    Victoza(liraglutide)/Saxenda(liraglutide 3mg)/Trulicity (dulaglutide) (Any GLP-1): Can take up to surgery date and restart immediately after surgery.    Main follow-up parameters for all bariatric patients     Patients who have undergone Bariatric surgery:    1. Follow-up interval during the first year: ~1 week, 1 month, 3 month, 6 month,12 months.  Every 6 months until 5 years; and then annually thereafter.  The goal of follow-up sessions is to ensure that food intake behavior (choice of food and eating speed) and exercise/activity level are set appropriately.    2. Yearly lab tests ordered by our clinic.      3. The bariatric team should be aware and potentially evaluate all adverse gastrointestinal symptoms  (dysphagia, abdominal pain, nausea, vomiting, diarrhea, heartburn, reflux, etc), which can be a sign of complication.  The bariatric surgeons perform general surgery procedures in addition to bariatric surgery (laparoscopic cholecystectomy, etc.)    4. Inability to tolerate textured food (chicken, steak, fish, eggs, beans) is NOT normal and may need to be evaluated by endoscopy performed by the bariatric surgeon.

## 2018-01-18 NOTE — PROGRESS NOTES
General Surgery Consultation Note    I was asked by Soha Boggs to see this patient for the following problem:    CC: Abdominal pain, epigastric pain, dysphagia, weight loss    HPI:  Patient is here establishing care in relation to weight loss surgery. Since RYGB in 2004 by Dr Pop, has not returned for a follow up visit. Recently seen by her primary care physician as patient was worried about cancer given continued weight loss and inability to eat. Malignancy less favored after workup, and has been referred here for EGD and to establish care. Symptoms typical for marginal ulcer. Has quit alcohol (August 2017), and is down from 2.5 packs of cigarette per day to 0.5 packs per 2 days. Weighed >200 lbs pre-RNYGB, was 145 lbs in August last year, now 112 at clinic visit today.     Location: Epigastric, abdominal  Severity: Moderate  Timing: Intermittent to constant, no specified timing   Duration: Constant   Modifying Factors: None, smoking makes worse, coffee makes worse   Associated Signs/Symptoms: Anorexia, weight loss, intermittent vomiting, abdominal pain     Past Medical History:  Past Medical History:   Diagnosis Date     Abnormal Papanicolaou smear of cervix and cervical HPV 4/07    Colposcopy 2007= HSIL     Genital herpes      History of colposcopy with cervical biopsy 06/2007    HSIL- LEEP recommended     Hypersomnia with sleep apnea, unspecified     CPAP started 2007     Other and unspecified ovarian cyst 2002 or so    s/p resection of ovary and tubes, d&c     Patient Active Problem List   Diagnosis     Esophageal reflux     Restless legs syndrome (RLS)     Hypersomnia with sleep apnea     Anxiety     Status post gastric bypass for obesity     Genital herpes     CARDIOVASCULAR SCREENING; LDL GOAL LESS THAN 160     MDD (major depressive disorder)     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 grade 3     Alcohol dependence in remission (H)     Anemia due to vitamin B12 deficiency, unspecified  B12 deficiency type     Insomnia, unspecified type     Other iron deficiency anemia       Surgical History:  Past Surgical History:   Procedure Laterality Date     CHOLECYSTECTOMY, OPEN  age 20s     COLONOSCOPY  3/21/2011    COMBINED COLONOSCOPY, REMOVE TUMOR/POLYP/LESION BY SNARE performed by JUAN FRANCISCO HERNANDEZ at  GI     COLONOSCOPY N/A 10/9/2017    Procedure: COMBINED COLONOSCOPY, SINGLE OR MULTIPLE BIOPSY/POLYPECTOMY BY BIOPSY;;  Surgeon: Sylvester Bright MD;  Location:  GI     COLPOSCOPY CERVIX, LOOP ELECTRODE BIOPSY, COMBINED  6/2007    CAN 2/3-patient requires yearly pap smears     GASTRIC BYPASS  3/25/2008    At [facility]     HC DILATION/CURETTAGE DIAG/THER NON OB  2002     HC ENLARGE BREAST WITH IMPLANT       HC LAPAROSCOPIC MYOMECTOMY, 1 - 4 INTRAMURAL MYOMAS =<250 GM  2002     HC REMOVAL OF BREAST IMPLANT       SALPINGO OOPHORECTOMY,R/L/MATTHEW  2002    Bilateral salpingectomy and unilateral oophorectomy     Medications:  Prior to Admission medications    Medication Sig Start Date End Date Taking? Authorizing Provider   buPROPion (WELLBUTRIN SR) 150 MG 12 hr tablet Take 1 tablet once daily for 3 days and increase to 1 tablet twice daily on the 4th day. 12/26/17  Yes Soha Boggs MD   escitalopram (LEXAPRO) 20 MG tablet Take 0.5 tablets (10 mg) by mouth daily 12/26/17  Yes Soha Boggs MD   cyanocobalamin (VITAMIN B12) 1000 MCG/ML injection Inject 1 mL (1,000 mcg) into the muscle every 30 days 12/12/17 12/12/18 Yes Soha Boggs MD   ranitidine (ZANTAC) 150 MG tablet Take 150 mg by mouth 2 times daily   Yes Reported, Patient   traZODone (DESYREL) 50 MG tablet Take 2 tablets (100 mg) by mouth At Bedtime 12/1/17  Yes Soha Boggs MD   alum & mag hydroxide-simethicone (MYLANTA ES/MAALOX  ES) 400-400-40 MG/5ML SUSP suspension Take 30 mLs by mouth every 4 hours as needed (epigastric pain) 10/17/17  Yes Ramses Flores MD    Melatonin 10 MG TABS tablet Take 10 mg by mouth nightly as needed for sleep   Yes Reported, Patient     Current Outpatient Prescriptions   Medication Sig Dispense Refill     buPROPion (WELLBUTRIN SR) 150 MG 12 hr tablet Take 1 tablet once daily for 3 days and increase to 1 tablet twice daily on the 4th day. 60 tablet 2     escitalopram (LEXAPRO) 20 MG tablet Take 0.5 tablets (10 mg) by mouth daily       cyanocobalamin (VITAMIN B12) 1000 MCG/ML injection Inject 1 mL (1,000 mcg) into the muscle every 30 days 1 mL 11     ranitidine (ZANTAC) 150 MG tablet Take 150 mg by mouth 2 times daily       traZODone (DESYREL) 50 MG tablet Take 2 tablets (100 mg) by mouth At Bedtime 60 tablet 3     alum & mag hydroxide-simethicone (MYLANTA ES/MAALOX  ES) 400-400-40 MG/5ML SUSP suspension Take 30 mLs by mouth every 4 hours as needed (epigastric pain) 355 mL 0     Melatonin 10 MG TABS tablet Take 10 mg by mouth nightly as needed for sleep       Allergies:  Allergies   Allergen Reactions     Codeine Itching     Vicodin [Hydrocodone-Acetaminophen] Itching     Social History:  Social History     Social History     Marital status: Single     Spouse name: Elijah Hatfield     Number of children: 0     Years of education: N/A     Occupational History     care director [employer]      [employer]     Social History Main Topics     Smoking status: Current Some Day Smoker     Packs/day: 2.00     Last attempt to quit: 8/20/2001     Smokeless tobacco: Never Used      Comment: 2 ppd at this time (chain smoking) 10/2012     Alcohol use Yes     Drug use: No     Sexual activity: Yes     Partners: Male     Other Topics Concern      Service No     Blood Transfusions No     Social History Narrative     Family History:  Family History   Problem Relation Age of Onset     Unknown/Adopted Father      doesn't know birth father     Family History Negative Other      Review of Systems:  A 14 point review of  "systems was reviewed in our paper questionnaire.     Physical Examination:  Vital Signs: /76 (BP Location: Left arm, Patient Position: Chair, Cuff Size: Adult Regular)  Pulse 75  Temp 99.8  F (37.7  C)  Ht 1.626 m (5' 4\")  Wt 50.9 kg (112 lb 4.8 oz)  SpO2 100%  BMI 19.28 kg/m2  HEENT: NCAT; MMM; EOMSI; PERRLA  Lungs: Breathing unlabored  Abdomen: soft/nontender/nondistended; incisions  General: Cachectic   Ext WWP  Neuro, no gross deficits     Imaging:  CT's reviewed     Laboratory testing:  Lab Results   Component Value Date    WBC 9.5 2017     Lab Results   Component Value Date    RBC 3.85 2017     Lab Results   Component Value Date    HGB 9.4 2017     Lab Results   Component Value Date    HCT 30.4 2017     No components found for: MCT  Lab Results   Component Value Date    MCV 79 2017     Lab Results   Component Value Date    MCH 24.4 2017     Lab Results   Component Value Date    MCHC 30.9 2017     Lab Results   Component Value Date    RDW 18.0 2017     Lab Results   Component Value Date     2017     Last Basic Metabolic Panel:  Lab Results   Component Value Date     2017      Lab Results   Component Value Date    POTASSIUM 3.3 2017     Lab Results   Component Value Date    CHLORIDE 104 2017     Lab Results   Component Value Date    SHON 8.5 2017     Lab Results   Component Value Date    CO2 23 2017     Lab Results   Component Value Date    BUN 10 2017     Lab Results   Component Value Date    CR 0.82 2017     Lab Results   Component Value Date    GLC 85 2017     INR/Prothrombin Time  Liver Function Studies -   Recent Labs   Lab Test  17   2152   PROTTOTAL  7.1   ALBUMIN  3.0*   BILITOTAL  0.6   ALKPHOS  121   AST  12   ALT  12     Plan:  1. Stop smoking.  2. Schedule endoscopy by Dr. Sanchez.  (Call [facility] to find available times for [phone].)  3. You need to take " vitamins.  4. I would like you to schedule a dietician visit.  5. Start omeprazole (prescribed to  [place]); stop ranitidine    Oneal Sanchez MD  Surgery  [phone] (hospital )  [phone] (clinic nurses)

## 2018-01-18 NOTE — PROGRESS NOTES
[facility] Outpatient Medical Nutrition Therapy      Time Spent:  45 minutes  Session Type:  Initial Individual Session  Referring Physician:  Dr. Oneal Sanchez  Reason for RD Visit:   S/p gastric bypass ~12 years ago     Nutrition Assessment:  Patient is here for initial visit with Registered Dietitian (RD).  Patient is a 62 y.o. female with history of gastric bypass surgery about 12 years ago at [facility]. Patient stated that she is having some trouble eating some foods especially meat and having pain with eating. Per MD, possible ulcer and will have endoscopy. Patient stated that she eats about 2 meals per day sometimes 3 meals depending on whether or not she can get a lunch break. Sometimes wakes up in middle of night and if hungry has a greek yogurt. Patient stated that she can tolerate soft boiled eggs, 2x/week will make a large homemade smoothie with greek yogurt, frozen fruit and protein powder, eats banana, split pea soup, stuffed pepper if she removes the outer skin of the pepper, and just found out that she can tolerate cottage cheese and canned peaches and lightly toasted toast with some peanut butter. Drinks 16 oz of whole milk per day and believes ~64 oz water/day. Drinks fluids with meals and tries to chew foods well but unable to take 20-30 minutes to eat meals most times. Currently working a few jobs, at [employer] so sometimes will eat a fish sandwich at work for a meal.Reported that she is working on decreasing smoking. Prior to New Year's she was smoking 2.5 ppd, start wellbutrin and now down to 1/2 pack every few days. Reported history of alcohol abuse and about 3 years ago restarted drinking 3 alcoholic drinks per day up until this past August 2017.     Diet Recall:  (some usual meals)  Meal Food    Breakfast Soft cooked egg with 3-4 ritz crackers   Lunch Skips OR [employer] Breaded fish sandwich (cuts off crust)   Dinner Baked potato with butter OR Breaded fish sandwich (no crust on bread)  "OR 2x/week homemade smoothie (yogurt, protein powder, tart cherry juice and frozen cherry and ghassan and papaya and some times OR split pea soup   Snacks Smoothie OR greek yogurt OR none   Beverages 16 oz whole Lactose free milk, ~64 oz water, ~3 times per week has  iced plain coffee with half and half and splenda. ~2 cans diet soda per week sometimes diet root beer with half and half   Alcohol Intake Discontinued all ETOH in August. Previously drinking 3 drinks per day (wine and/or mixed drinks daily)     Height:   Ht Readings from Last 1 Encounters:   01/18/18 1.626 m (5' 4\")     Weight:  Has had significant weight loss of 10% over the past 3 months  Wt Readings from Last 10 Encounters:   01/18/18 50.9 kg (112 lb 4.8 oz)   12/26/17 54 kg (119 lb)   12/18/17 54 kg (119 lb)   12/01/17 54.3 kg (119 lb 12.8 oz)   10/16/17 56.8 kg (125 lb 4.8 oz)   08/14/17 64 kg (141 lb)   08/07/17 65.8 kg (145 lb)   06/03/16 61.2 kg (134 lb 14.4 oz)   05/27/16 59 kg (130 lb)   02/11/16 59.4 kg (131 lb)      BMI: 19.32 healthy     Labs:  On 12/18/2017 decreased potassium (3.3), albumin (3.0) and hemoglobin (9.4). Reviewed EMR  Pertinent Medications/vitamin and mineral supplements:   Taking gummy women's multivitamin. Stated that she has not been taking other recommended supplements and uncertain of which ones to take. Did discuss starting B12 with MD prior to RD visit today.   Food Allergies:  NKFA. does not tolerate liquid milk or ice cream but does tolerate cheese and yogurt and half and half.    MALNUTRITION:  % Weight Loss:  > 7.5% in 3 months (severe malnutrition)  % Intake:  </= 75% for >/= 1 month (severe malnutrition)  Subcutaneous Fat Loss:  Buccal region visually observed.  Muscle Loss:  Did not perform physical exam  Fluid Retention:  Did not perform physical exam    Malnutrition Diagnosis: Severe malnutrition  In Context of:  Acute illness or injury    Nutrition Diagnosis:    Weight: unintentional weight loss related to " decreased ability to tolerated PO  as evidenced by patient report and significant weight loss of 10% over the past 3 months.    Nutrition Prescription: Recommended consistently eating at least 3 balanced meals with protein at all meals each day consisting of foods patient tolerates +  At least 1 protein drink/smoothie each day.    Nutrition Intervention:    Nutrition Education/Counseling:  Provided diet education. REviewed surgery guidelines and encouraged patient. Encouraged patient to consistently eat 3 balanced meals with protein at all. Discussed sources of protein and tips for softer foods that may be better tolerated by patient in addition to chewing food to applesauce consistency before swallowing. Discussed some sample meals based on patient's preferences and encouraged her to plan ahead/prepare meals ahead of time so she does not skip. Encouraged her to increase and make/drink one protein drink/homemade smoothie each day. Reviewed protein drink guidelines. Reviewed vitamin/mineral supplement recommendations and discuss tips for taking them. Answered all of patient's questions at this time. Patient verbalized understanding of education provided. See Goals below.     Educational Materials Provided:  Vitamin/mineral supplements after RNY, Weight maintenance handout with guidelines after surgery.    Goals:  -Can try Fairlife lactose free milk (higher protein)  -Consistently eat 3 meals per day with protein at all meals (can have homemade protein smoothie for a meal). At this time, recommend having a protein smoothie each day.   -Prepare/plan meals ahead of time (i/e put cottage cheese in individual containers to take to work or to easily grab and take along individual canned peaches.)  -Restart vitamin and mineral supplements. (see handout)  -Do not drink fluids with meals and stop drinking liquids 30 minutes before meals and 30 minutes after meals.  -Chew food well down to applesauce consistency before  swallowing    Nutrition Monitoring and Evaluation: Will monitor adherence to nutrition recommendations at any future RD visits.     Further Medical Nutrition Therapy:  PRN  Next Appointment (if applicable):  PRN  Patient was encouraged to call/contact RD with any further questions.    Luz Castillo MS, RD, LD

## 2018-01-18 NOTE — MR AVS SNAPSHOT
After Visit Summary   2018    Pavithra Raines    MRN: [id number]           Patient Information     Date Of Birth          1955        Visit Information        Provider Department      2018 1:00 PM Oneal Sanchez MD [facility] Surgery        Today's Diagnoses     Status post gastric bypass for obesity    -  1      Care Instructions    It was a pleasure meeting with you today.     Thank you for allowing us the privilege of caring for you. We hope we provided you with the excellent service you deserve.     Please let us know if there is anything else we can do for you so that we can be sure you are leaving completely satisfied with your care experience.      You saw Dr Sanchez today.     Instructions per today's visit:       1. Stop smoking.    2. Schedule endoscopy by Dr. Sanchez.  (Call [facility] to find available times for [phone].)    3. You need to take vitamins.    4. I would like you to schedule a dietician visit.    5. Start omeprazole (prescribed to [facility]); stop ranitidine      Endoscopy Instructions:    -Call the Endoscopy Department at [phone] to schedule an endoscopy to be done by your surgeon.  Date:___________  Time:___________    -Have nothing to eat or drink for 6 hours prior to the check-in time.   -Check-in one hour before the procedure time.  -Please bring a  to drive you home after the procedure.  -You cannot drive for 24 hours after the procedure due to the sedation.  -The Endoscopy Department is located on the 1st floor of the [facility].  -The [facility] is at [address] [place] [address].    -Ph: [phone] for scheduling, directions or questions.  - parking at the front door is available for the same price as parking in the [facility] ramp.            To schedule appointments with our team, please call [phone] option #1    Please call  during clinic hours Monday through Friday 8:00a - 4:00p if you have questions or you can contact us via Hythiam at anytime.      Nurses: [phone] Option # 3 for nurse advice line.  Fax: [phone]  Surgery Scheduler: [phone]    Please call the hospital at [phone] to speak with our on call MDs if you have urgent needs after hours, during weekends, or holidays.    *For patients who are currently enrolled in our program and have not yet had weight loss surgery please note*:    You must be at your required goal weight at the time of your Anesthesia clinic visit as well as the day of surgery or your surgery will be cancelled. You will not be able to obtain a new surgery date until you have met your goal weight.    Weight loss medications before and after surgery protocol:    Adipex (phentermine): Stop two days before surgery. Do not restart after surgery. May reconsider restarting as needed in the future.    Topimax (topiramate): Can take right up to surgery including morning of surgery and restart post op day #1.    Qsymia (phentermine/topiramate): Hold morning of surgery. Restart after surgery post op day #1    Contrave (bupropion/naltrxone): Fast taper before surgery. 1 tablet twice daily for 1 week and then discontinue 4 days before surgery.  Will reconsider restarting at postop appt once no longer taking narcotic pain meds.  Will slowly ramp up again.    Naltrexone: Stop 4 days before surgery.  Will reconsider starting again at postop appts when no longer taking narcotic pain meds.    Belviq (locaserin): Stop 2 days before surgery and restart immediately after surgery    Victoza(liraglutide)/Saxenda(liraglutide 3mg)/Trulicity (dulaglutide) (Any GLP-1): Can take up to surgery date and restart immediately after surgery.    Main follow-up parameters for all bariatric patients     Patients who have undergone Bariatric surgery:    1. Follow-up interval during the first year: ~1 week, 1 month, 3 month, 6  month,12 months.  Every 6 months until 5 years; and then annually thereafter.  The goal of follow-up sessions is to ensure that food intake behavior (choice of food and eating speed) and exercise/activity level are set appropriately.    2. Yearly lab tests ordered by our clinic.      3. The bariatric team should be aware and potentially evaluate all adverse gastrointestinal symptoms (dysphagia, abdominal pain, nausea, vomiting, diarrhea, heartburn, reflux, etc), which can be a sign of complication.  The bariatric surgeons perform general surgery procedures in addition to bariatric surgery (laparoscopic cholecystectomy, etc.)    4. Inability to tolerate textured food (chicken, steak, fish, eggs, beans) is NOT normal and may need to be evaluated by endoscopy performed by the bariatric surgeon.                          Follow-ups after your visit        Follow-up notes from your care team     Return in about 4 weeks (around 2/15/2018).      Who to contact     Please call your clinic at [phone] to:    Ask questions about your health    Make or cancel appointments    Discuss your medicines    Learn about your test results    Speak to your doctor   If you have compliments or concerns about an experience at your clinic, or if you wish to file a complaint, please contact [facility] Patient Relations at [phone] or email us at [email or web address]         Additional Information About Your Visit        MxBiodevicesharGreycork Information     Keukey is an electronic gateway that provides easy, online access to your medical records. With Keukey, you can request a clinic appointment, read your test results, renew a prescription or communicate with your care team.     To sign up for Keukey visit the website at [email or web address]   You will be asked to enter the access code listed below, as well as some personal information. Please follow the directions to create your username and  "password.     Your access code is: [email or web address]  Expires: 3/26/2018  6:02 PM     Your access code will  in 90 days. If you need help or a new code, please contact your [facility] or call [phone] for assistance.        Care EveryWhere ID     This is your Care EveryWhere ID. This could be used by other organizations to access your [facility] medical records  [id number]        Your Vitals Were     Pulse Temperature Height Pulse Oximetry BMI (Body Mass Index)       75 99.8  F (37.7  C) 5' 4\" 100% 19.28 kg/m2        Blood Pressure from Last 3 Encounters:   18 126/76   17 136/78   17 (!) 140/95    Weight from Last 3 Encounters:   18 112 lb 4.8 oz   17 119 lb   17 119 lb              Today, you had the following     No orders found for display         Today's Medication Changes          These changes are accurate as of: 18  2:11 PM.  If you have any questions, ask your nurse or doctor.               Start taking these medicines.        Dose/Directions    omeprazole 40 MG capsule   Commonly known as:  priLOSEC   Used for:  Status post gastric bypass for obesity   Started by:  Oneal Sanchez MD        Dose:  40 mg   Take 1 capsule (40 mg) by mouth daily for 90 doses   Quantity:  90 capsule   Refills:  3            Where to get your medicines      These medications [facility] to [facility] [place] [address] [facility]   [address] [place] [address] , [facility] [address]     Phone:  [phone]     omeprazole 40 MG capsule                Primary Care Provider Office Phone # Fax #    Soha Lela Boggs [phone]       [address] [place] [address]   [facility] [address]        Equal Access to Services     OSCAR FRASER AH: Hadii mesfin webero Sobambi, waaxda luqadaha, qaybta kaalmada adeegyada, nazario corral. So [facility] [phone].    ATENCIÓN: Si habla español, tiene a medina disposición servicios " ginny de asistencia lingüística. Genia morales [phone].    We comply with applicable federal civil rights laws and [place] laws. We do not discriminate on the basis of race, color, national origin, age, disability, sex, sexual orientation, or gender identity.            Thank you!     Thank you for choosing [facility]  for your care. Our goal is always to provide you with excellent care. Hearing back from our patients is one way we can continue to improve our services. Please take a few minutes to complete the written survey that you may receive in the mail after your visit with us. Thank you!             Your Updated Medication List - Protect others around you: Learn how to safely use, store and throw away your medicines at www.disposemymeds.org.          This list is accurate as of: 1/18/18  2:11 PM.  Always use your most recent med list.                   Brand Name Dispense Instructions for use Diagnosis    alum & mag hydroxide-simethicone 400-400-40 MG/5ML Susp suspension    MYLANTA ES/MAALOX  ES    355 mL    Take 30 mLs by mouth every 4 hours as needed (epigastric pain)        buPROPion 150 MG 12 hr tablet    WELLBUTRIN SR    60 tablet    Take 1 tablet once daily for 3 days and increase to 1 tablet twice daily on the 4th day.    Encounter for tobacco use cessation counseling       cyanocobalamin 1000 MCG/ML injection    VITAMIN B12    1 mL    Inject 1 mL (1,000 mcg) into the muscle every 30 days    Anemia due to vitamin B12 deficiency, unspecified B12 deficiency type       escitalopram 20 MG tablet    LEXAPRO     Take 0.5 tablets (10 mg) by mouth daily    Anxiety       Melatonin 10 MG Tabs tablet      Take 10 mg by mouth nightly as needed for sleep        omeprazole 40 MG capsule    priLOSEC    90 capsule    Take 1 capsule (40 mg) by mouth daily for 90 doses    Status post gastric bypass for obesity       ranitidine 150 MG tablet    ZANTAC     Take 150 mg by mouth 2 times daily         traZODone 50 MG tablet    DESYREL    60 tablet    Take 2 tablets (100 mg) by mouth At Bedtime    Insomnia, unspecified type

## 2018-01-18 NOTE — MR AVS SNAPSHOT
After Visit Summary   1/18/2018    Pavithra Raines    MRN: [id number]           Patient Information     Date Of Birth          1955        Visit Information        Provider Department      1/18/2018 2:00 PM Luz Castillo RD M [facility]        Care Instructions    It was nice meeting you today:  -Can try Fairlife lactose free milk (higher protein)  -Consistently eat 3 meals per day with protein at all meals (can have homemade protein smoothie for a meal). At this time, recommend having a protein smoothie each day.   -Prepare/plan meals ahead of time (i/e put cottage cheese in individual containers to take to work or to easily grab and take along individual canned peaches.)  -Restart vitamin and mineral supplements. (see handout)  -Do not drink fluids with meals and stop drinking liquids 30 minutes before meals and 30 minutes after meals.  -Chew food well down to applesauce consistency before swallowing  If you would like to schedule a follow up appointment with Luz Castillo, Registered Dietitian, please call [phone].              Follow-ups after your visit        Who to contact     Please call your clinic at [phone] to:    Ask questions about your health    Make or cancel appointments    Discuss your medicines    Learn about your test results    Speak to your doctor   If you have compliments or concerns about an experience at your clinic, or if you wish to file a complaint, please contact [facility] Patient Relations at [phone] or email us at [email or web address]         Additional Information About Your Visit        Mob Sciencehart Information     ECKey is an electronic gateway that provides easy, online access to your medical records. With ECKey, you can request a clinic appointment, read your test results, renew a prescription or communicate with your care team.     To sign up for ECKey visit the website  at [email or web address]   You will be asked to enter the access code listed below, as well as some personal information. Please follow the directions to create your username and password.     Your access code is: [email or web address]  Expires: 3/26/2018  6:02 PM     Your access code will  in 90 days. If you need help or a new code, please contact your [facility] or call [phone] for assistance.        Care EveryWhere ID     This is your Care EveryWhere ID. This could be used by other organizations to access your [facility] medical records  [id number]         Blood Pressure from Last 3 Encounters:   18 126/76   17 136/78   17 (!) 140/95    Weight from Last 3 Encounters:   18 50.9 kg (112 lb 4.8 oz)   17 54 kg (119 lb)   17 54 kg (119 lb)              Today, you had the following     No orders found for display         Today's Medication Changes          These changes are accurate as of: 18  3:11 PM.  If you have any questions, ask your nurse or doctor.               Start taking these medicines.        Dose/Directions    omeprazole 40 MG capsule   Commonly known as:  priLOSEC   Used for:  Status post gastric bypass for obesity   Started by:  Oneal Sanchez MD        Dose:  40 mg   Take 1 capsule (40 mg) by mouth daily for 90 doses   Quantity:  90 capsule   Refills:  3         Stop taking these medicines if you haven't already. Please contact your care team if you have questions.     ranitidine 150 MG tablet   Commonly known as:  ZANTAC   Stopped by:  Oneal Sanchez MD                Where to get your medicines      These medications were sent to [facility] [place] [facility] [address] [facility]   [address] [facility] , [facility] [address]     Phone:  [phone]     omeprazole 40 MG capsule                Primary Care Provider Office Phone # Fax #    Soha Lela Boggs [phone] [place] [address]        Equal Access to Services     OSCAR FRASER : Hadii aad ku hadsonamcherelle Saavedra, minesh fisher, qanazario sherman. So [facility] [phone].    ATENCIÓN: Si habla español, tiene a medina disposición servicios gratuitos de asistencia lingüística. [facility] [phone].    We comply with applicable federal civil rights laws and [place] laws. We do not discriminate on the basis of race, color, national origin, age, disability, sex, sexual orientation, or gender identity.            Thank you!     Thank you for choosing [facility]  for your care. Our goal is always to provide you with excellent care. Hearing back from our patients is one way we can continue to improve our services. Please take a few minutes to complete the written survey that you may receive in the mail after your visit with us. Thank you!             Your Updated Medication List - Protect others around you: Learn how to safely use, store and throw away your medicines at www.disposemymeds.org.          This list is accurate as of: 1/18/18  3:11 PM.  Always use your most recent med list.                   Brand Name Dispense Instructions for use Diagnosis    alum & mag hydroxide-simethicone 400-400-40 MG/5ML Susp suspension    MYLANTA ES/MAALOX  ES    355 mL    Take 30 mLs by mouth every 4 hours as needed (epigastric pain)        buPROPion 150 MG 12 hr tablet    WELLBUTRIN SR    60 tablet    Take 1 tablet once daily for 3 days and increase to 1 tablet twice daily on the 4th day.    Encounter for tobacco use cessation counseling       cyanocobalamin 1000 MCG/ML injection    VITAMIN B12    1 mL    Inject 1 mL (1,000 mcg) into the muscle every 30 days    Anemia due to vitamin B12 deficiency, unspecified B12 deficiency type       escitalopram 20 MG tablet    LEXAPRO     Take 0.5 tablets (10 mg) by mouth daily    Anxiety       Melatonin 10 MG Tabs  tablet      Take 10 mg by mouth nightly as needed for sleep        omeprazole 40 MG capsule    priLOSEC    90 capsule    Take 1 capsule (40 mg) by mouth daily for 90 doses    Status post gastric bypass for obesity       traZODone 50 MG tablet    DESYREL    60 tablet    Take 2 tablets (100 mg) by mouth At Bedtime    Insomnia, unspecified type

## 2018-01-18 NOTE — LETTER
1/18/2018       RE: Pavithra Raines  [address]  [facility] [address]     Dear Colleague,    Thank you for referring your patient, Pavithra Raines, to the [facility] AND IBD CLINIC at [facility]. Please see a copy of my visit note below.    [facility] Outpatient Medical Nutrition Therapy      Time Spent:  45 minutes  Session Type:  Initial Individual Session  Referring Physician:  Dr. Oneal Sanchez  Reason for RD Visit:   S/p gastric bypass ~12 years ago     Nutrition Assessment:  Patient is here for initial visit with Registered Dietitian ([place]).  Patient is a 62 y.o. female with history of gastric bypass surgery about 12 years ago at [facility]. Patient stated that she is having some trouble eating some foods especially meat and having pain with eating. Per MD, possible ulcer and will have endoscopy. Patient stated that she eats about 2 meals per day sometimes 3 meals depending on whether or not she can get a lunch break. Sometimes wakes up in middle of night and if hungry has a greek yogurt. Patient stated that she can tolerate soft boiled eggs, 2x/week will make a large homemade smoothie with greek yogurt, frozen fruit and protein powder, eats banana, split pea soup, stuffed pepper if she removes the outer skin of the pepper, and just found out that she can tolerate cottage cheese and canned peaches and lightly toasted toast with some peanut butter. Drinks 16 oz of whole milk per day and believes ~64 oz water/day. Drinks fluids with meals and tries to chew foods well but unable to take 20-30 minutes to eat meals most times. Currently working a few jobs, at [employer] so sometimes will eat a fish sandwich at work for a meal.Reported that she is working on decreasing smoking. Prior to New Year's she was smoking 2.5 ppd, start wellbutrin and now down to 1/2 pack every few days. Reported history of alcohol abuse and about 3 years ago  "restarted drinking 3 alcoholic drinks per day up until this past August 2017.     Diet Recall:  (some usual meals)  Meal Food    Breakfast Soft cooked egg with 3-4 ritz crackers   Lunch Skips OR Knox's Breaded fish sandwich (cuts off crust)   Dinner Baked potato with butter OR Breaded fish sandwich (no crust on bread) OR 2x/week homemade smoothie (yogurt, protein powder, tart cherry juice and frozen cherry and ghassan and papaya and some times OR split pea soup   Snacks Smoothie OR greek yogurt OR none   Beverages 16 oz whole Lactose free milk, ~64 oz water, ~3 times per week has  iced plain coffee with half and half and splenda. ~2 cans diet soda per week sometimes diet root beer with half and half   Alcohol Intake Discontinued all ETOH in August. Previously drinking 3 drinks per day (wine and/or mixed drinks daily)     Height:   Ht Readings from Last 1 Encounters:   01/18/18 1.626 m (5' 4\")     Weight:  Has had significant weight loss of 10% over the past 3 months  Wt Readings from Last 10 Encounters:   01/18/18 50.9 kg (112 lb 4.8 oz)   12/26/17 54 kg (119 lb)   12/18/17 54 kg (119 lb)   12/01/17 54.3 kg (119 lb 12.8 oz)   10/16/17 56.8 kg (125 lb 4.8 oz)   08/14/17 64 kg (141 lb)   08/07/17 65.8 kg (145 lb)   06/03/16 61.2 kg (134 lb 14.4 oz)   05/27/16 59 kg (130 lb)   02/11/16 59.4 kg (131 lb)      BMI: 19.32 healthy     Labs:  On 12/18/2017 decreased potassium (3.3), albumin (3.0) and hemoglobin (9.4). Reviewed EMR  Pertinent Medications/vitamin and mineral supplements:   Taking gummy women's multivitamin. Stated that she has not been taking other recommended supplements and uncertain of which ones to take. Did discuss starting B12 with MD prior to RD visit today.   Food Allergies:  NKFA. does not tolerate liquid milk or ice cream but does tolerate cheese and yogurt and half and half.    MALNUTRITION:  % Weight Loss:  > 7.5% in 3 months (severe malnutrition)  % Intake:  </= 75% for >/= 1 month (severe " malnutrition)  Subcutaneous Fat Loss:  Buccal region visually observed.  Muscle Loss:  Did not perform physical exam  Fluid Retention:  Did not perform physical exam    Malnutrition Diagnosis: Severe malnutrition  In Context of:  Acute illness or injury    Nutrition Diagnosis:    Weight: unintentional weight loss related to decreased ability to tolerated PO  as evidenced by patient report and significant weight loss of 10% over the past 3 months.    Nutrition Prescription: Recommended consistently eating at least 3 balanced meals with protein at all meals each day consisting of foods patient tolerates +  At least 1 protein drink/smoothie each day.    Nutrition Intervention:    Nutrition Education/Counseling:  Provided diet education. REviewed surgery guidelines and encouraged patient. Encouraged patient to consistently eat 3 balanced meals with protein at all. Discussed sources of protein and tips for softer foods that may be better tolerated by patient in addition to chewing food to applesauce consistency before swallowing. Discussed some sample meals based on patient's preferences and encouraged her to plan ahead/prepare meals ahead of time so she does not skip. Encouraged her to increase and make/drink one protein drink/homemade smoothie each day. Reviewed protein drink guidelines. Reviewed vitamin/mineral supplement recommendations and discuss tips for taking them. Answered all of patient's questions at this time. Patient verbalized understanding of education provided. See Goals below.     Educational Materials Provided:  Vitamin/mineral supplements after RNY, Weight maintenance handout with guidelines after surgery.    Goals:  -Can try Fairlife lactose free milk (higher protein)  -Consistently eat 3 meals per day with protein at all meals (can have homemade protein smoothie for a meal). At this time, recommend having a protein smoothie each day.   -Prepare/plan meals ahead of time (i/e put cottage cheese in  individual containers to take to work or to easily grab and take along individual canned peaches.)  -Restart vitamin and mineral supplements. (see handout)  -Do not drink fluids with meals and stop drinking liquids 30 minutes before meals and 30 minutes after meals.  -Chew food well down to applesauce consistency before swallowing    Nutrition Monitoring and Evaluation: Will monitor adherence to nutrition recommendations at any future RD visits.     Further Medical Nutrition Therapy:  PRN  Next Appointment (if applicable):  PRN  Patient was encouraged to call/contact RD with any further questions.      Again, thank you for allowing me to participate in the care of your patient.      Sincerely,    Luz Castillo RD

## 2018-01-18 NOTE — NURSING NOTE
"Chief Complaint   Patient presents with     Consult     New Consultation       Vitals:    01/18/18 1235   BP: 126/76   BP Location: Left arm   Patient Position: Chair   Cuff Size: Adult Regular   Pulse: 75   Temp: 99.8  F (37.7  C)   SpO2: 100%   Weight: 112 lb 4.8 oz   Height: 5' 4\"       Body mass index is 19.28 kg/(m^2).    Adrianna Cruz                          "

## 2018-01-22 ENCOUNTER — CARE COORDINATION (OUTPATIENT)
Dept: SURGERY | Facility: CLINIC | Age: 63
End: 2018-01-22

## 2018-01-22 DIAGNOSIS — R13.19 ESOPHAGEAL DYSPHAGIA: Primary | ICD-10-CM

## 2018-01-30 ENCOUNTER — TELEPHONE (OUTPATIENT)
Dept: GASTROENTEROLOGY | Facility: CLINIC | Age: 63
End: 2018-01-30

## 2018-01-30 NOTE — TELEPHONE ENCOUNTER
Patient scheduled for egd    Indication for procedure. Abdominal pain, food sticking    Referring Provider. Dr. Boggs    ? no    Arrival time verified? Yes, 7:30    Facility location verified? [address] [facility], Room 1[address]    Instructions given regarding prep and procedure    Prep Type NPO for 6 hours prior to procedure    Are you taking any anticoagulants or blood thinners? no    Instructions given? Yes, verbally    Electronic implanted devices? no    Pre procedure teaching completed? Yes    Transportation from procedure? Yes, friend    H&P / Pre op physical completed? Na    Fozia Portillo RN

## 2018-02-09 ENCOUNTER — HOSPITAL ENCOUNTER (OUTPATIENT)
Facility: CLINIC | Age: 63
Discharge: HOME | End: 2018-02-09
Attending: SURGERY | Admitting: SURGERY
Payer: COMMERCIAL

## 2018-02-09 ENCOUNTER — SURGERY (OUTPATIENT)
Age: 63
End: 2018-02-09

## 2018-02-09 VITALS
DIASTOLIC BLOOD PRESSURE: 71 MMHG | OXYGEN SATURATION: 100 % | SYSTOLIC BLOOD PRESSURE: 114 MMHG | RESPIRATION RATE: 18 BRPM

## 2018-02-09 DIAGNOSIS — K28.9 GASTROJEJUNAL ULCER: Primary | ICD-10-CM

## 2018-02-09 LAB — UPPER GI ENDOSCOPY: NORMAL

## 2018-02-09 PROCEDURE — 25000128 H RX IP 250 OP 636: Performed by: SURGERY

## 2018-02-09 PROCEDURE — 43235 EGD DIAGNOSTIC BRUSH WASH: CPT | Performed by: SURGERY

## 2018-02-09 PROCEDURE — 99152 MOD SED SAME PHYS/QHP 5/>YRS: CPT | Performed by: SURGERY

## 2018-02-09 PROCEDURE — 25000125 ZZHC RX 250: Performed by: SURGERY

## 2018-02-09 RX ORDER — OMEPRAZOLE 40 MG/1
40 CAPSULE, DELAYED RELEASE ORAL 2 TIMES DAILY
Qty: 180 CAPSULE | Refills: 2 | Status: SHIPPED | OUTPATIENT
Start: 2018-02-09 | End: 2018-07-09

## 2018-02-09 RX ORDER — MULTIPLE VITAMINS W/ MINERALS TAB 9MG-400MCG
1 TAB ORAL DAILY
COMMUNITY
End: 2024-09-30

## 2018-02-09 RX ORDER — CALCIUM CARBONATE 500(1250)
1 TABLET ORAL 2 TIMES DAILY
COMMUNITY

## 2018-02-09 RX ORDER — FENTANYL CITRATE 50 UG/ML
INJECTION, SOLUTION INTRAMUSCULAR; INTRAVENOUS PRN
Status: DISCONTINUED | OUTPATIENT
Start: 2018-02-09 | End: 2018-02-09 | Stop reason: HOSPADM

## 2018-02-09 RX ADMIN — MIDAZOLAM 1 MG: 1 INJECTION INTRAMUSCULAR; INTRAVENOUS at 08:44

## 2018-02-09 RX ADMIN — BENZOCAINE 1 SPRAY: 220 SPRAY, METERED PERIODONTAL at 08:44

## 2018-02-09 RX ADMIN — MIDAZOLAM 1 MG: 1 INJECTION INTRAMUSCULAR; INTRAVENOUS at 08:43

## 2018-02-09 RX ADMIN — MIDAZOLAM 1 MG: 1 INJECTION INTRAMUSCULAR; INTRAVENOUS at 08:41

## 2018-02-09 RX ADMIN — FENTANYL CITRATE 50 MCG: 50 INJECTION, SOLUTION INTRAMUSCULAR; INTRAVENOUS at 08:46

## 2018-02-09 RX ADMIN — FENTANYL CITRATE 100 MCG: 50 INJECTION, SOLUTION INTRAMUSCULAR; INTRAVENOUS at 08:41

## 2018-02-09 NOTE — IP AVS SNAPSHOT
[facility], [place], Endoscopy    [address] [facility] [address]    Phone:  [phone]                                       After Visit Summary   2/9/2018    Pavithra Raines    MRN: [id number]           After Visit Summary Signature Page     I have received my discharge instructions, and my questions have been answered. I have discussed any challenges I see with this plan with the nurse or doctor.    ..........................................................................................................................................  Patient/Patient Representative Signature      ..........................................................................................................................................  Patient Representative Print Name and Relationship to Patient    ..................................................               ................................................  Date                                            Time    ..........................................................................................................................................  Reviewed by Signature/Title    ...................................................              ..............................................  Date                                                            Time

## 2018-02-09 NOTE — IP AVS SNAPSHOT
MRN:[id number]                      After Visit Summary   2/9/2018    Pavithra Raines    MRN: [id number]           Thank you!     Thank you for choosing [facility] for your care. Our goal is always to provide you with excellent care. Hearing back from our patients is one way we can continue to improve our services. Please take a few minutes to complete the written survey that you may receive in the mail after you visit with us. Thank you!        Patient Information     Date Of Birth          1955        About your hospital stay     You were admitted on:  February 9, 2018 You last received care in the:  [facility], Endoscopy    You were discharged on:  February 9, 2018       Who to Call     For medical emergencies, please call 911.  For non-urgent questions about your medical care, please call your primary care provider or clinic, [phone]  For questions related to your surgery, please call your surgery clinic        Attending Provider     Provider Oneal Vidal MD Surgery       Primary Care Provider Office Phone # Fax #    Soha Lela Boggs [phone]      Further instructions from your care team       Discharge Instructions after  Upper Endoscopy (EGD) by Dr Sanchez    Activity and Diet  You were given medicine for pain. You may be dizzy or sleepy.  For 24 hours:    Do not drive or use heavy equipment.    Do not make important decisions.    Do not drink any alcohol.  _X__ You may return to your regular diet.    Discomfort  You may have a sore throat for 2 to 3 days. It may help to:    Use sore throat lozenges.    Gargle as needed with salt water up to 4 times a day. Mix 1 cup of warm water  with 1 teaspoon of salt. Do not swallow.  You may take Tylenol (acetaminophen) for pain unless your doctor has told you not to.    Follow-up  _X__ We took small tissue samples for study.  The physician will send you the results of the test and  "follow up with your Primary physician for further care.    When to call us:  Problems are rare. Call right away if you have:    Unusual throat pain or trouble swallowing    Unusual pain in belly or chest that is not relieved by belching or passing air    Black stools (tar-like looking bowel movement)    Temperature above 100.6  F. (37.5  C).    If you vomit blood or have severe pain, go to an emergency room.    If you have questions, call:  Monday to Friday, 7 a.m. to 4:30 p.m.: Endoscopy: [phone] (We may have to call you back)    After hours: Hospital: [phone] (Ask for the GI fellow on call)    Pending Results     No orders found from 2018 to 2/10/2018.            Admission Information     Date & Time Provider Department Dept. Phone    2018 Oneal Sanchez MD [facility], [place], Endoscopy [phone]      Your Vitals Were     Blood Pressure Respirations Pulse Oximetry             134/86 12 99%         [employer] Information     [employer] lets you send messages to your doctor, view your test results, renew your prescriptions, schedule appointments and more. To sign up, go to [email or web address] . Click on \"Log in\" on the left side of the screen, which will take you to the Welcome page. Then click on \"Sign up Now\" on the right side of the page.     You will be asked to enter the access code listed below, as well as some personal information. Please follow the directions to create your username and password.     Your access code is: [email or web address]  Expires: 3/26/2018  6:02 PM     Your access code will  in 90 days. If you need help or a new code, please call your [place] clinic or [phone].        Care EveryWhere ID     This is your Care EveryWhere ID. This could be used by other organizations to access your [place] medical records  [id number]        Equal Access to Services     [facility] BRIA AH: Suresh Saavedra, minesh fisher, nazario okeefe " [place] avitiajaniya la'aan ah. So [facility] [phone].    ATENCIÓN: Si aminala julia, tiene a medina disposición servicios gratuitos de asistencia lingüística. Genia al [phone].    We comply with applicable federal civil rights laws and [place] laws. We do not discriminate on the basis of race, color, national origin, age, disability, sex, sexual orientation, or gender identity.               Review of your medicines      UNREVIEWED medicines. Ask your doctor about these medicines        Dose / Directions    buPROPion 150 MG 12 hr tablet   Commonly known as:  WELLBUTRIN SR   Used for:  Encounter for tobacco use cessation counseling        Take 1 tablet once daily for 3 days and increase to 1 tablet twice daily on the 4th day.   Quantity:  60 tablet   Refills:  2       calcium carbonate 1250 MG tablet   Commonly known as:  OS-SHON 500 mg [place]. Ca        Dose:  1 tablet   Take 1 tablet by mouth 2 times daily   Refills:  0       escitalopram 20 MG tablet   Commonly known as:  LEXAPRO   Used for:  Anxiety        Dose:  10 mg   Take 0.5 tablets (10 mg) by mouth daily   Refills:  0       Melatonin 10 MG Tabs tablet        Dose:  10 mg   Take 10 mg by mouth nightly as needed for sleep   Refills:  0       Multi-vitamin Tabs tablet        Dose:  1 tablet   Take 1 tablet by mouth daily   Refills:  0       omeprazole 40 MG capsule   Commonly known as:  priLOSEC   Used for:  Status post gastric bypass for obesity        Dose:  40 mg   Take 1 capsule (40 mg) by mouth daily for 90 doses   Quantity:  90 capsule   Refills:  3       traZODone 50 MG tablet   Commonly known as:  DESYREL   Used for:  Insomnia, unspecified type        Dose:  100 mg   Take 2 tablets (100 mg) by mouth At Bedtime   Quantity:  60 tablet   Refills:  3       VITAMIN B 12 PO        Refills:  0                Protect others around you: Learn how to safely use, store and throw away your medicines at www.disposemymeds.org.             Medication List: This is a list of  all your medications and when to take them. Check marks below indicate your daily home schedule. Keep this list as a reference.      Medications           Morning Afternoon Evening Bedtime As Needed    buPROPion 150 MG 12 hr tablet   Commonly known as:  WELLBUTRIN SR   Take 1 tablet once daily for 3 days and increase to 1 tablet twice daily on the 4th day.                                calcium carbonate 1250 MG tablet   Commonly known as:  OS-SHON 500 mg [place]. Ca   Take 1 tablet by mouth 2 times daily                                escitalopram 20 MG tablet   Commonly known as:  LEXAPRO   Take 0.5 tablets (10 mg) by mouth daily                                Melatonin 10 MG Tabs tablet   Take 10 mg by mouth nightly as needed for sleep                                Multi-vitamin Tabs tablet   Take 1 tablet by mouth daily                                omeprazole 40 MG capsule   Commonly known as:  priLOSEC   Take 1 capsule (40 mg) by mouth daily for 90 doses                                traZODone 50 MG tablet   Commonly known as:  DESYREL   Take 2 tablets (100 mg) by mouth At Bedtime                                VITAMIN B 12 PO

## 2018-02-09 NOTE — DISCHARGE INSTRUCTIONS
Discharge Instructions after  Upper Endoscopy (EGD) by Dr Sanchez    Activity and Diet  You were given medicine for pain. You may be dizzy or sleepy.  For 24 hours:    Do not drive or use heavy equipment.    Do not make important decisions.    Do not drink any alcohol.  _X__ You may return to your regular diet.    Discomfort  You may have a sore throat for 2 to 3 days. It may help to:    Use sore throat lozenges.    Gargle as needed with salt water up to 4 times a day. Mix 1 cup of warm water  with 1 teaspoon of salt. Do not swallow.  You may take Tylenol (acetaminophen) for pain unless your doctor has told you not to.    Follow-up  _X__ We took small tissue samples for study.  The physician will send you the results of the test and follow up with your Primary physician for further care.    When to call us:  Problems are rare. Call right away if you have:    Unusual throat pain or trouble swallowing    Unusual pain in belly or chest that is not relieved by belching or passing air    Black stools (tar-like looking bowel movement)    Temperature above 100.6  F. (37.5  C).    If you vomit blood or have severe pain, go to an emergency room.    If you have questions, call:  Monday to Friday, 7 a.m. to 4:30 p.m.: Endoscopy: [phone] (We may have to call you back)    After hours: Hospital: [phone] (Ask for the GI fellow on call)

## 2018-02-19 ENCOUNTER — TELEPHONE (OUTPATIENT)
Dept: GASTROENTEROLOGY | Facility: CLINIC | Age: 63
End: 2018-02-19

## 2018-02-19 DIAGNOSIS — K25.7 GASTRIC ULCER DUE TO HELICOBACTER PYLORI, CHRONIC: Primary | ICD-10-CM

## 2018-02-19 DIAGNOSIS — B96.81 GASTRIC ULCER DUE TO HELICOBACTER PYLORI, CHRONIC: Primary | ICD-10-CM

## 2018-02-19 NOTE — TELEPHONE ENCOUNTER
Per procedure recommendation: REPEAT PROCEDURE IN 3 MONTHS TO DOCUMENT HEALING    Please place order for scheduling    Fozia Portillo RN          I ordered.    Oneal Sanchez MD  Surgery  [phone] (hospital )  [phone] (clinic nurses)

## 2018-02-28 ENCOUNTER — TELEPHONE (OUTPATIENT)
Dept: FAMILY MEDICINE | Facility: CLINIC | Age: 63
End: 2018-02-28

## 2018-02-28 NOTE — TELEPHONE ENCOUNTER
Patient is due for a PHQ-9.  Index start date:1/07/2018  Index end date:3/07/2018    Please call patient.

## 2018-03-07 NOTE — TELEPHONE ENCOUNTER
I have attempted to call the pt to update a PHQ-9. I left message for pt to call back. I will call back another time. Brenda Varma CMA ([facility])

## 2018-03-08 NOTE — TELEPHONE ENCOUNTER
Pt didn't return phone call. PHQ-9 missed. Will close encounter until further outreach. Brenda Varma CMA ([facility])

## 2018-03-16 NOTE — TELEPHONE ENCOUNTER
Message left for patient to call back to schedule follow up EGD with Dr. Sanchez in 3 months. See order.    Fozia Portillo RN

## 2018-03-21 ENCOUNTER — TELEPHONE (OUTPATIENT)
Dept: GASTROENTEROLOGY | Facility: CLINIC | Age: 63
End: 2018-03-21

## 2018-04-13 DIAGNOSIS — G47.00 INSOMNIA, UNSPECIFIED TYPE: ICD-10-CM

## 2018-04-13 DIAGNOSIS — Z71.6 ENCOUNTER FOR TOBACCO USE CESSATION COUNSELING: ICD-10-CM

## 2018-04-13 RX ORDER — TRAZODONE HYDROCHLORIDE 50 MG/1
TABLET, FILM COATED ORAL
Qty: 60 TABLET | Refills: 3 | Status: SHIPPED | OUTPATIENT
Start: 2018-04-13 | End: 2019-01-02

## 2018-04-13 RX ORDER — BUPROPION HYDROCHLORIDE 150 MG/1
150 TABLET, EXTENDED RELEASE ORAL 2 TIMES DAILY
Qty: 60 TABLET | Refills: 2 | Status: SHIPPED | OUTPATIENT
Start: 2018-04-13 | End: 2018-07-09

## 2018-04-13 NOTE — TELEPHONE ENCOUNTER
"Requested Prescriptions   Pending Prescriptions Disp Refills     buPROPion (WELLBUTRIN SR) 150 MG 12 hr tablet [Pharmacy Med Name: BUPROPION HCL ER (SR) 150MG TB12] 60 tablet 2     Sig: TAKE ONE TABLET BY MOUTH EVERY DAY FOR 3 DAYS, THEN INCREASE TO TAKE ONE TABLET BY MOUTH TWICE A DAY ON THE 4TH DAY    SSRIs Protocol Passed    4/13/2018  9:28 AM       Passed - Recent (12 mo) or future (30 days) visit within the authorizing provider's specialty    Patient had office visit in the last 12 months or has a visit in the next 30 days with authorizing provider or within the authorizing provider's specialty.  See \"Patient Info\" tab in inbasket, or \"Choose Columns\" in Meds & Orders section of the refill encounter.           Passed - Medication is Bupropion    If the medication is Bupropion (Wellbutrin), and the patient is taking for smoking cessation; OK to refill.         Passed - Patient is age 18 or older       Passed - No active pregnancy on record       Passed - No positive pregnancy test in last 12 months        traZODone (DESYREL) 50 MG tablet [Pharmacy Med Name: TRAZODONE HCL 50MG TABS] 60 tablet 3     Sig: TAKE TWO TABLETS BY MOUTH EVERY NIGHT AT BEDTIME    Serotonin Modulators Passed    4/13/2018  9:28 AM       Passed - Recent (12 mo) or future (30 days) visit within the authorizing provider's specialty    Patient had office visit in the last 12 months or has a visit in the next 30 days with authorizing provider or within the authorizing provider's specialty.  See \"Patient Info\" tab in inbasket, or \"Choose Columns\" in Meds & Orders section of the refill encounter.           Passed - Patient is age 18 or older       Passed - No active pregnancy on record       Passed - No positive pregnancy test in past 12 months          Last Written Prescription Date:  12/26/17  Last Fill Quantity: 60,  # refills: 2   Last Office Visit with G, P or [facility] prescribing provider:  **12/16/17*   Future Office Visit:   "   Trazodone 50 MG       Last Written Prescription Date:  12/1/17  Last Fill Quantity: 60,   # refills: 3  Last Office Visit: 12/26/17  Future Office visit:

## 2018-05-04 ENCOUNTER — TELEPHONE (OUTPATIENT)
Dept: GASTROENTEROLOGY | Facility: CLINIC | Age: 63
End: 2018-05-04

## 2018-05-04 NOTE — TELEPHONE ENCOUNTER
Patient scheduled for EGD    Indication for procedure. 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 Laura     ? No     Arrival time verified? Patient to arrive at 0700     Facility location verified? [address], [address]     Instructions given regarding prep and procedure. Transportation policy reviewed; verbalized understanding     Prep Type NPO 6-8 hours     Are you taking any anticoagulants or blood thinners? Denies     Instructions given? Yes     Electronic implanted devices? Denies     Pre procedure teaching completed? Yes    Transportation from procedure? Yes,      H&P / Pre op physical completed? N/A    Neil Johnson RN

## 2018-05-07 ENCOUNTER — TELEPHONE (OUTPATIENT)
Dept: FAMILY MEDICINE | Facility: CLINIC | Age: 63
End: 2018-05-07

## 2018-05-07 NOTE — TELEPHONE ENCOUNTER
Patient has been having itching for the past year.  She has tried oatmeal baths and all types of creams and lotions.  She states she has been having other health issues so has kept this on the back burner.  It has been worsening, so is wanting to get this looked at.  She is reporting at one point she thought she had scabies, but does not think she has scabies.  She states she has some very small tiny scabs on her back, but cannot see them to report what they look like.    She is unable to be seen at the opening PCP has on 5/14/18, and would like to know if she can be seen some time in the next couple weeks for this after 1:30 as she works at [employer] and can then just walk up the road to her appointment when she is done working at 1:00.    Routing to PCP for further advice.    Mey Mary, RN    Telephone Triage Protocols for Nurses, 5th edition - Zara Desouza: Itching

## 2018-05-07 NOTE — TELEPHONE ENCOUNTER
Reason for call:  Patient reporting a symptom    Symptom or request: itchy skin/ scalp- neck- trunk- back- arms     Duration (how long have symptoms been present): ongoing over 1 year     Have you been treated for this before? No    Additional comments: Pt would like to speak to a nurse with advice.     Phone Number patient can be reached at:  Home number on file [phone] (home)    Best Time:  Any     Can we leave a detailed message on this number:  YES    Call taken on 5/7/2018 at 1:58 PM by Radha Alba

## 2018-05-08 NOTE — TELEPHONE ENCOUNTER
Patient can be worked in for rash and itching on Tuesday 5/15 at 1:30pm. Please contact patient to advise and confirm. Appointment made to hold time.  Luz Stiles, CMA

## 2018-05-10 ENCOUNTER — TELEPHONE (OUTPATIENT)
Dept: FAMILY MEDICINE | Facility: CLINIC | Age: 63
End: 2018-05-10

## 2018-05-10 NOTE — TELEPHONE ENCOUNTER
Panel Management Review      Patient has the following on her problem list:     Depression / Dysthymia review    Measure:  Needs PHQ-9 score of 4 or less during index window.  Administer PHQ-9 and if score is 5 or more, send encounter to provider for next steps.    5   7 month window range: 2/7/2018    PHQ-9 SCORE 1/29/2016 6/3/2016 8/7/2017   Total Score - - -   Total Score 7 3 23       If PHQ-9 recheck is 5 or more, route to provider for next steps.    Patient is due for:  PHQ9      Composite cancer screening  Chart review shows that this patient is due/due soon for the following None  Summary:    Patient is due/failing the following:   PHQ9    Action needed:   Patient needs to do PHQ9.    Type of outreach:    Phone, left message for patient to call back.     Questions for provider review:    None                                                                                                                                    Luz Stiles CMA      Chart routed to Care Team .

## 2018-05-10 NOTE — TELEPHONE ENCOUNTER
Called pt and completed PHQ9. Pt states she is going through a lot right now with working 3 jobs and health issues so that is why most of her answers are 2 or 3's. She stated she has thought that sometimes it would just be better if she weren't around anymore, but wouldn't hurt herself. Wants it to happen naturally. Routed to provider to review. Radha Fritz, CMA

## 2018-05-11 ENCOUNTER — HOSPITAL ENCOUNTER (OUTPATIENT)
Facility: CLINIC | Age: 63
Discharge: HOME | End: 2018-05-11
Attending: SURGERY | Admitting: SURGERY
Payer: COMMERCIAL

## 2018-05-11 ENCOUNTER — SURGERY (OUTPATIENT)
Age: 63
End: 2018-05-11

## 2018-05-11 VITALS
SYSTOLIC BLOOD PRESSURE: 95 MMHG | RESPIRATION RATE: 11 BRPM | DIASTOLIC BLOOD PRESSURE: 64 MMHG | OXYGEN SATURATION: 97 %

## 2018-05-11 LAB — UPPER GI ENDOSCOPY: NORMAL

## 2018-05-11 PROCEDURE — 25000128 H RX IP 250 OP 636: Performed by: SURGERY

## 2018-05-11 PROCEDURE — 43249 ESOPH EGD DILATION <30 MM: CPT | Performed by: SURGERY

## 2018-05-11 PROCEDURE — 25000132 ZZH RX MED GY IP 250 OP 250 PS 637: Performed by: SURGERY

## 2018-05-11 PROCEDURE — 99152 MOD SED SAME PHYS/QHP 5/>YRS: CPT | Performed by: SURGERY

## 2018-05-11 PROCEDURE — 25000125 ZZHC RX 250: Performed by: SURGERY

## 2018-05-11 RX ORDER — FENTANYL CITRATE 50 UG/ML
INJECTION, SOLUTION INTRAMUSCULAR; INTRAVENOUS PRN
Status: DISCONTINUED | OUTPATIENT
Start: 2018-05-11 | End: 2018-05-11 | Stop reason: HOSPADM

## 2018-05-11 RX ORDER — SIMETHICONE
LIQUID (ML) MISCELLANEOUS PRN
Status: DISCONTINUED | OUTPATIENT
Start: 2018-05-11 | End: 2018-05-11 | Stop reason: HOSPADM

## 2018-05-11 RX ADMIN — MIDAZOLAM 1 MG: 1 INJECTION INTRAMUSCULAR; INTRAVENOUS at 08:20

## 2018-05-11 RX ADMIN — FENTANYL CITRATE 100 MCG: 50 INJECTION, SOLUTION INTRAMUSCULAR; INTRAVENOUS at 08:16

## 2018-05-11 RX ADMIN — TOPICAL ANESTHETIC 1 SPRAY: 200 SPRAY DENTAL; PERIODONTAL at 08:18

## 2018-05-11 RX ADMIN — MIDAZOLAM 1 MG: 1 INJECTION INTRAMUSCULAR; INTRAVENOUS at 08:32

## 2018-05-11 RX ADMIN — MIDAZOLAM 1 MG: 1 INJECTION INTRAMUSCULAR; INTRAVENOUS at 08:17

## 2018-05-11 RX ADMIN — Medication 2 ML: at 08:20

## 2018-05-11 ASSESSMENT — PATIENT HEALTH QUESTIONNAIRE - PHQ9: SUM OF ALL RESPONSES TO PHQ QUESTIONS 1-9: 18

## 2018-05-11 NOTE — OR NURSING
EGD done w/ balloon dilation 8ATM (15 mm) held for 5 mins.  Tolerated well, under conscious sedation on RA w/ VSS.

## 2018-05-11 NOTE — BRIEF OP NOTE
[facility], [place]    Brief Operative Note    Pre-operative diagnosis: S/p RNYGB, anastomotic ulcer  Post-operative diagnosis Same  Procedure: Procedure(s):  EGD - Wound Class: II-Clean Contaminated  Surgeon: Surgeon(s) and Role:     * Oneal Sanchez MD - Primary     * Andrea Amaral - Resident  Anesthesia: Conscious Sedation (100 mcg fentanyl, 3 mg versed)  Estimated blood loss: None  Drains: None  Specimens: * No specimens in log *  Findings:   Resolution of anastomotic ulcer, distal stomach/ GJ dilated ( 8 servando, 15 mm, 5 mins)  Complications: .  Implants: None.

## 2018-05-11 NOTE — DISCHARGE INSTRUCTIONS
Discharge Instructions after  Upper Endoscopy (EGD)    Activity and Diet  You were given medicine for pain. You may be dizzy or sleepy.  For 24 hours:    Do not drive or use heavy equipment.    Do not make important decisions.    Do not drink any alcohol.  ___ You may return to your regular diet.    Discomfort  You may have a sore throat for 2 to 3 days. It may help to:    Avoid hot liquids for 24 hours.    Use sore throat lozenges.    Gargle as needed with salt water up to 4 times a day. Mix 1 cup of warm water  with 1 teaspoon of salt. Do not swallow.  __x_ Your esophagus was dilated (opened) or banded during the exam:    Drink only cool liquids for the rest of the day. Eat a soft diet for the next few days.    You may have a sore chest for 2 to 3 days.    You may take Tylenol (acetaminophen) for pain unless your doctor has told you not to.    Do not take aspirin or ibuprofen (Advil, Motrin) or other NSAIDS  (anti-inflammatory drugs) for __3_ days.      Other instructions_____Continue Omeprazole twice daily___________________________________________________    When to call us:  Problems are rare. Call right away if you have:    Unusual throat pain or trouble swallowing    Unusual pain in belly or chest that is not relieved by belching or passing air    Black stools (tar-like looking bowel movement)    Temperature above 100.6  F. (37.5  C).    If you vomit blood or have severe pain, go to an emergency room.    If you have questions, call:  Monday to Friday, 7 a.m. to 4:30 p.m.: Endoscopy: [phone] (We may have to call you back)    After hours: Hospital: [phone] (Ask for the GI fellow on call)

## 2018-05-11 NOTE — LETTER
May 11, 2018      TO: Pavithra Raines  [address] [facility] [address]         Dear Ms. Pavithra Raines,    We received and reviewed your test results done on 05/11/2018.  You may receive more than one letter if we receive the results on multiple days.  Please share all lab and test results with your primary care provider and keep a copy for your own records.        Your test results are listed below:    If you have any questions, feel free contact us at the Call Center [phone].      Resulted Orders   UPPER GI ENDOSCOPY   Result Value Ref Range    Upper GI Endoscopy       [address] [place] [address] [phone]     Endoscopy Department  _______________________________________________________________________________  Patient Name: Pavithra Raines  Procedure Date: 5/11/2018 7:39 AM  MRN: [id number]                       Account Number: [id number]  YOB: 1955              Admit Type: Outpatient  Age: 62                               Room: [facility]  Gender: Female                        Note Status: Finalized  Attending MD: Oneal Sanchez MD  Total Sedation Time:   _______________________________________________________________________________     Procedure:           Upper GI endoscopy  Providers:           Oneal Sanchez MD, Otto Quezada RN  Patient Profile:     62 yr old underwent prior gastric bypass, [facility]. Has had GJ ulcer last seen by me in                        February; has used ppi, imeprazole since that time.                        Has had weight loss and dysphagia.  Referring MD:        Oneal Sanchez MD  Medicines:           Fentanyl 100 micrograms IV, Midazolam 3 mg IV  Complications:       No immediate complications.  _______________________________________________________________________________  Procedure:           Pre-Anesthesia Assessment:                        - Prior to the procedure, a History and Physical was                        performed, and patient medications and allergies were                        reviewed. The patient is competent. The risks and                        benefits of the procedure and the sedation options and                        risks were discussed with the patient. All questions                        were answered and informed consent was obtained. Patient                        identification and proposed procedure were verified by                        the physician and the nurse in the procedure room.                        Mental Status Examination: alert and  oriented. Airway                        Examination: normal oropharyngeal airway and neck                        mobility. Respiratory Examination: clear to                        auscultation. CV Examination: normal. ASA Grade                        Assessment: II - A patient with mild systemic disease.                        After reviewing the risks and benefits, the patient was                        deemed in satisfactory condition to undergo the                        procedure. The anesthesia plan was to use moderate                        sedation / analgesia (conscious sedation). Immediately                        prior to administration of medications, the patient was                        re-assessed for adequacy to receive sedatives. The heart                        rate, respiratory rate, oxygen saturations, blood                        pressure, adequacy of pulmonary ventilation, and                        response to care were monitored throughout the                         procedure. The physical status of the patient was                        re-assessed after the procedure.                       After obtaining informed consent, the endoscope was                        passed under direct vision. Throughout the procedure,                         the patient's blood pressure, pulse, and oxygen                        saturations were monitored continuously. The Endoscope                        was introduced through the mouth, and advanced to the                        jejunum. The upper GI endoscopy was accomplished with                        ease. The patient tolerated the procedure well.                                                                                   Findings:       The gastroesophageal junction was normal.       Anatomy consistent with prior Tom-en-Y gastric bypass       The gastric pouch measures ~50cc (allows a tight retroflexion of the        scope). Prior ulcer no longer present. Minimal friability at anastomosis.        Gastrojejunostomy measures 10-15 mm on retroflexion view. A TTS dilator        was passed through the scope. Dilation with a 12-13.5-15 mm anastomotic        balloon dilator was performed. The dilation site was examined and showed        moderate improvement in luminal narrowing.                                                                                   Impression:          - Normal gastroesophageal junction.                       - Gastrojejunostomy measures 10-15 mm on retroflexion                        view.                       - Normal Tom limb. Prior ulcer not visible. Some very                        early ulcer diathesis.                       - No specimens collected.  Recommendation:      - Discharge patient to home (ambulatory).                       - Continue omeprazole indefinitely. Smoking cessation;                        complete this.                       - f/u in my office in 3 months.                                                                                      _______________________  Oneal Sanchez MD  5/11/2018 10:35:29 AM  I was physically present for the entire viewing portion of the exam.  __________________________  Signature of teaching physician  Nichole/[email or web address]  MD Laura  Number of Addenda: 0    Note Initiated On: 5/11/2018 7:39 AM  Scope In:  Scope Out:             Sincerely,      Oneal Sanchez MD

## 2018-05-11 NOTE — IP AVS SNAPSHOT
[facility], [place], Endoscopy    [address] [facility] [address]    Phone:  [phone]                                       After Visit Summary   5/11/2018    Pavithra Raines    MRN: [id number]           After Visit Summary Signature Page     I have received my discharge instructions, and my questions have been answered. I have discussed any challenges I see with this plan with the nurse or doctor.    ..........................................................................................................................................  Patient/Patient Representative Signature      ..........................................................................................................................................  Patient Representative Print Name and Relationship to Patient    ..................................................               ................................................  Date                                            Time    ..........................................................................................................................................  Reviewed by Signature/Title    ...................................................              ..............................................  Date                                                            Time

## 2018-05-11 NOTE — IP AVS SNAPSHOT
MRN:[id number]                      After Visit Summary   5/11/2018    Pavithra Raines    MRN: [id number]           Thank you!     Thank you for choosing [facility] for your care. Our goal is always to provide you with excellent care. Hearing back from our patients is one way we can continue to improve our services. Please take a few minutes to complete the written survey that you may receive in the mail after you visit with us. Thank you!        Patient Information     Date Of Birth          1955        About your hospital stay     You were admitted on:  May 11, 2018 You last received care in the:  [facility], Endoscopy    You were discharged on:  May 11, 2018       Who to Call     For medical emergencies, please call 911.  For non-urgent questions about your medical care, please call your primary care provider or clinic, [phone]  For questions related to your surgery, please call your surgery clinic        Attending Provider     Provider Specialty    Oneal Sanchez MD Surgery       Primary Care Provider Office Phone # Fax #    Soha Boggs [phone]      After Care Instructions     Discharge Instructions       Continue PPI BID  Follow-up with Dr. Sanchez in September 2018                  Follow-up Appointments     Adult [facility] Follow-up and recommended labs and tests       Followup with Dr. Sanchez in September 2018    Appointments on [place] and/or [facility] (with [facility] or [facility] provider or service). Call [phone] if you haven't heard regarding these appointments within 7 days of discharge.                  Your next 10 appointments already scheduled     May 15, 2018  1:30 PM CDT   SHORT with Soha Boggs MD   [facility]    [address] [place] [id number] [phone]              Further instructions from your care team         Discharge  Instructions after  Upper Endoscopy (EGD)    Activity and Diet  You were given medicine for pain. You may be dizzy or sleepy.  For 24 hours:    Do not drive or use heavy equipment.    Do not make important decisions.    Do not drink any alcohol.  ___ You may return to your regular diet.    Discomfort  You may have a sore throat for 2 to 3 days. It may help to:    Avoid hot liquids for 24 hours.    Use sore throat lozenges.    Gargle as needed with salt water up to 4 times a day. Mix 1 cup of warm water  with 1 teaspoon of salt. Do not swallow.  __x_ Your esophagus was dilated (opened) or banded during the exam:    Drink only cool liquids for the rest of the day. Eat a soft diet for the next few days.    You may have a sore chest for 2 to 3 days.    You may take Tylenol (acetaminophen) for pain unless your doctor has told you not to.    Do not take aspirin or ibuprofen (Advil, Motrin) or other NSAIDS  (anti-inflammatory drugs) for __3_ days.      Other instructions_____Continue Omeprazole twice daily___________________________________________________    When to call us:  Problems are rare. Call right away if you have:    Unusual throat pain or trouble swallowing    Unusual pain in belly or chest that is not relieved by belching or passing air    Black stools (tar-like looking bowel movement)    Temperature above 100.6  F. (37.5  C).    If you vomit blood or have severe pain, go to an emergency room.    If you have questions, call:  Monday to Friday, 7 a.m. to 4:30 p.m.: Endoscopy: [phone] (We may have to call you back)    After hours: Hospital: [phone] (Ask for the GI fellow on call)    Pending Results     No orders found from 5/9/2018 to 5/12/2018.            Admission Information     Date & Time Provider Department Dept. Phone    5/11/2018 Oneal Sanchez MD [facility], [place], Endoscopy [phone]      Your Vitals Were     Blood Pressure Respirations Pulse Oximetry             92/62 11 98%        "  MyChart Information     [employer] lets you send messages to your doctor, view your test results, renew your prescriptions, schedule appointments and more. To sign up, go to [email or web address] . Click on \"Log in\" on the left side of the screen, which will take you to the Welcome page. Then click on \"Sign up Now\" on the right side of the page.     You will be asked to enter the access code listed below, as well as some personal information. Please follow the directions to create your username and password.     Your access code is: [email or web address]  Expires: 2018  8:58 AM     Your access code will  in 90 days. If you need help or a new code, please call your [facility] clinic or [phone].        Care EveryWhere ID     This is your Care EveryWhere ID. This could be used by other organizations to access your [facility] medical records  [id number]        Equal Access to Services     OSCAR FRASER : Suresh yeung Sobambi, waaxda luroseanne, qaybta kaalmada adelisa, nazario zamudio . So [facility] [phone].    ATENCIÓN: Si aminala español, tiene a medina disposición servicios gratuitos de asistencia lingüística. Llame al [phone].    We comply with applicable federal civil rights laws and [place] laws. We do not discriminate on the basis of race, color, national origin, age, disability, sex, sexual orientation, or gender identity.               Review of your medicines      CONTINUE these medicines which have NOT CHANGED        Dose / Directions    buPROPion 150 MG 12 hr tablet   Commonly known as:  WELLBUTRIN SR   Used for:  Encounter for tobacco use cessation counseling        Dose:  150 mg   Take 1 tablet (150 mg) by mouth 2 times daily   Quantity:  60 tablet   Refills:  2       calcium carbonate 500 MG tablet   Commonly known as:  OS-SHON 500 mg [place]. Ca        Dose:  1 tablet   Take 1 tablet by mouth 2 times daily   Refills:  0       escitalopram 20 MG tablet   Commonly known " as:  LEXAPRO   Used for:  Anxiety        Dose:  10 mg   Take 0.5 tablets (10 mg) by mouth daily   Refills:  0       Multi-vitamin Tabs tablet        Dose:  1 tablet   Take 1 tablet by mouth daily   Refills:  0       OMEPRAZOLE PO        Refills:  0       traZODone 50 MG tablet   Commonly known as:  DESYREL   Used for:  Insomnia, unspecified type        TAKE TWO TABLETS BY MOUTH EVERY NIGHT AT BEDTIME   Quantity:  60 tablet   Refills:  3       VITAMIN B 12 PO        Refills:  0                Protect others around you: Learn how to safely use, store and throw away your medicines at www.disposemymeds.org.             Medication List: This is a list of all your medications and when to take them. Check marks below indicate your daily home schedule. Keep this list as a reference.      Medications           Morning Afternoon Evening Bedtime As Needed    buPROPion 150 MG 12 hr tablet   Commonly known as:  WELLBUTRIN SR   Take 1 tablet (150 mg) by mouth 2 times daily                                calcium carbonate 500 MG tablet   Commonly known as:  OS-SHON 500 mg [place]. Ca   Take 1 tablet by mouth 2 times daily                                escitalopram 20 MG tablet   Commonly known as:  LEXAPRO   Take 0.5 tablets (10 mg) by mouth daily                                Multi-vitamin Tabs tablet   Take 1 tablet by mouth daily                                OMEPRAZOLE PO                                traZODone 50 MG tablet   Commonly known as:  DESYREL   TAKE TWO TABLETS BY MOUTH EVERY NIGHT AT BEDTIME                                VITAMIN B 12 PO

## 2018-05-11 NOTE — TELEPHONE ENCOUNTER
Discussed with MD, she will discuss with patient at her upcoming appointment on 5/15.  Luz Stiles CMA

## 2018-05-15 ENCOUNTER — OFFICE VISIT (OUTPATIENT)
Dept: FAMILY MEDICINE | Facility: CLINIC | Age: 63
End: 2018-05-15
Payer: COMMERCIAL

## 2018-05-15 VITALS
TEMPERATURE: 97.7 F | SYSTOLIC BLOOD PRESSURE: 118 MMHG | HEART RATE: 76 BPM | BODY MASS INDEX: 19.4 KG/M2 | OXYGEN SATURATION: 100 % | WEIGHT: 113 LBS | DIASTOLIC BLOOD PRESSURE: 60 MMHG | RESPIRATION RATE: 14 BRPM

## 2018-05-15 DIAGNOSIS — L28.0 NEURODERMATITIS: Primary | ICD-10-CM

## 2018-05-15 DIAGNOSIS — Z71.6 ENCOUNTER FOR TOBACCO USE CESSATION COUNSELING: ICD-10-CM

## 2018-05-15 PROCEDURE — 99213 OFFICE O/P EST LOW 20 MIN: CPT | Performed by: FAMILY MEDICINE

## 2018-05-15 RX ORDER — DESLORATADINE 5 MG/1
5 TABLET ORAL EVERY MORNING
Qty: 30 TABLET | Refills: 1 | Status: SHIPPED | OUTPATIENT
Start: 2018-05-15 | End: 2018-07-09

## 2018-05-15 ASSESSMENT — PAIN SCALES - GENERAL: PAINLEVEL: NO PAIN (0)

## 2018-05-15 NOTE — PROGRESS NOTES
SUBJECTIVE:   Pavithra Raines is a 62 year old female who presents to clinic today complaining of a rash that has been going on for over a year, seems to be getting worse and it is starting to itch. And when she does scratch it, it gets really inflamed. Has treated with oatmeal baths and eczema lotions. She denies any new exposures, new products, or symptoms of viral illness such as fever.      Patient also asked about smoking patches, she was wondering if it would irritate her skin.  She smokes as little as 3 cigarettes to half a pack a day at the most, and is trying hard to quit.      Overall she is doing much better.  She has been seen at the [facility] and her GI issues are much better.  She just had upper endoscopy on 5/11 that showed resolution of prior ulcer.  She will be seeing GI again in September.      Problem list and histories reviewed & adjusted, as indicated.  Additional history: as documented    Patient Active Problem List   Diagnosis     Esophageal reflux     Restless legs syndrome (RLS)     Hypersomnia with sleep apnea     Anxiety     Status post gastric bypass for obesity     Genital herpes     CARDIOVASCULAR SCREENING; LDL GOAL LESS THAN 160     MDD (major depressive disorder)     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 grade 3     Alcohol dependence in remission (H)     Anemia due to vitamin B12 deficiency, unspecified B12 deficiency type     Insomnia, unspecified type     Other iron deficiency anemia     Past Surgical History:   Procedure Laterality Date     CHOLECYSTECTOMY, OPEN  age 20s     COLONOSCOPY  3/21/2011    COMBINED COLONOSCOPY, REMOVE TUMOR/POLYP/LESION BY SNARE performed by JUAN FRANCISCO HERNANDEZ at  GI     COLONOSCOPY N/A 10/9/2017    Procedure: COMBINED COLONOSCOPY, SINGLE OR MULTIPLE BIOPSY/POLYPECTOMY BY BIOPSY;;  Surgeon: Sylvester Bright MD;  Location:  GI     COLPOSCOPY CERVIX, LOOP ELECTRODE BIOPSY, COMBINED  6/2007    CAN 2/3-patient requires yearly pap  smears     ESOPHAGOSCOPY, GASTROSCOPY, DUODENOSCOPY (EGD), COMBINED N/A 2/9/2018    Procedure: COMBINED ESOPHAGOSCOPY, GASTROSCOPY, DUODENOSCOPY (EGD);  EGD;  Surgeon: Oneal Sanchez MD;  [facility]     GASTRIC BYPASS  3/25/2008    At [facility]     HC DILATION/CURETTAGE DIAG/THER NON OB  2002     HC ENLARGE BREAST WITH IMPLANT       HC LAPAROSCOPIC MYOMECTOMY, 1 - 4 INTRAMURAL MYOMAS =<250 GM  2002     HC REMOVAL OF BREAST IMPLANT       SALPINGO OOPHORECTOMY,R/L/MATTHEW  2002    Bilateral salpingectomy and unilateral oophorectomy       Social History   Substance Use Topics     Smoking status: Current Some Day Smoker     Packs/day: 2.00     Last attempt to quit: 8/20/2001     Smokeless tobacco: Never Used      Comment: 2 ppd at this time (chain smoking) 10/2012     Alcohol use No     Family History   Problem Relation Age of Onset     Unknown/Adopted Father      doesn't know birth father     Family History Negative Other          Current Outpatient Prescriptions   Medication Sig Dispense Refill     buPROPion (WELLBUTRIN SR) 150 MG 12 hr tablet Take 1 tablet (150 mg) by mouth 2 times daily 60 tablet 2     calcium carbonate (OS-SHON 500 MG [place]. [place]) 1250 MG tablet Take 1 tablet by mouth 2 times daily       Cyanocobalamin (VITAMIN B 12 PO)        desloratadine (CLARINEX) 5 MG tablet Take 1 tablet (5 mg) by mouth every morning 30 tablet 1     escitalopram (LEXAPRO) 20 MG tablet Take 0.5 tablets (10 mg) by mouth daily       multivitamin, therapeutic with minerals (MULTI-VITAMIN) TABS tablet Take 1 tablet by mouth daily       nicotine (NICODERM CQ) 7 MG/24HR 24 hr patch Place 1 patch onto the skin every 24 hours 30 patch 0     OMEPRAZOLE PO        traZODone (DESYREL) 50 MG tablet TAKE TWO TABLETS BY MOUTH EVERY NIGHT AT BEDTIME 60 tablet 3     Allergies   Allergen Reactions     Codeine Itching     Vicodin [Hydrocodone-Acetaminophen] Itching       Reviewed and updated as needed this visit by clinical staff  Tobacco   Allergies  Meds  Soc Hx      Reviewed and updated as needed this visit by Provider         ROS:  7 pt ROS is otherwise negative except as noted in HPI.       OBJECTIVE:     /60 (BP Location: Left arm, Patient Position: Sitting, Cuff Size: Adult Regular)  Pulse 76  Temp 97.7  F (36.5  C) (Temporal)  Resp 14  Wt 51.3 kg (113 lb)  SpO2 100%  BMI 19.4 kg/m2  Body mass index is 19.4 kg/(m^2).   GENERAL: healthy, alert and no distress  SKIN: she has no suspicious lesions.  Overall her skin looks mostly clear.  She has a slight excoriation on the anterior right ankle, and on right elbow she has a 4 small red palpables that look consistent with neurodermatitis.  No burrows, vesicles, or other concerning rashes.      Diagnostic Test Results:  No orders of the defined types were placed in this encounter.      ASSESSMENT:       ICD-10-CM    1. Neurodermatitis L28.0 desloratadine (CLARINEX) 5 MG tablet   2. Encounter for tobacco use cessation counseling Z71.6 nicotine (NICODERM CQ) 7 MG/24HR 24 hr patch       PLAN:   Discussed that this appears to be consistent with neurodermatitis. She denies picking, but states that the rash appears AFTER scratching at her skin. Will try her on antihistamine, see orders for clarinex 5mg.  Recommended against scratching, and may use topical moisturizer and anti-itch cream if helpful. Cool towels recommended.     Also suggested nicotine patches can be tried, but only the lowest dose based on how much she is currently smoking.  See orders for Nicotine patches   This document serves as a record of services personally performed by Soha Boggs MD. It was created on their behalf by Tori Huitron, a trained medical scribe. The creation of this record is based on the provider's personal observations and the statements of the patient. This document has been checked and approved by the attending provider.    Soha Boggs MD  [facility]

## 2018-05-15 NOTE — MR AVS SNAPSHOT
"              After Visit Summary   5/15/2018    Pavithra Raines    MRN: [id number]           Patient Information     Date Of Birth          1955        Visit Information        Provider Department      5/15/2018 1:30 PM Soha Boggs MD [facility]        Today's Diagnoses     Neurodermatitis    -  1    Encounter for tobacco use cessation counseling           Follow-ups after your visit        Who to contact     If you have questions or need follow up information about today's clinic visit or your schedule please contact [facility] directly at [phone].  Normal or non-critical lab and imaging results will be communicated to you by Vdancerhart, letter or phone within 4 business days after the clinic has received the results. If you do not hear from us within 7 days, please contact the clinic through [facility] [employer] / [email or web address] or phone. If you have a critical or abnormal lab result, we will notify you by phone as soon as possible.  Submit refill requests through BuildFax or call your pharmacy and they will forward the refill request to us. Please allow 3 business days for your refill to be completed.          Additional Information About Your Visit        MyChart Information     BuildFax lets you send messages to your doctor, view your test results, renew your prescriptions, schedule appointments and more. To sign up, go to [email or web address] . Click on \"Log in\" on the left side of the screen, which will take you to the Welcome page. Then click on \"Sign up Now\" on the right side of the page.     You will be asked to enter the access code listed below, as well as some personal information. Please follow the directions to create your username and password.     Your access code is: [email or web address]  Expires: 2018  8:58 AM     Your access code will  in 90 days. If you need help or a new code, please call your [facility] or [phone].        Care EveryWhere ID     " This is your Care EveryWhere ID. This could be used by other organizations to access your [facility] records  [id number]        Your Vitals Were     Pulse Temperature Respirations Pulse Oximetry BMI (Body Mass Index)       76 97.7  F (36.5  C) (Temporal) 14 100% 19.4 kg/m2        Blood Pressure from Last 3 Encounters:   05/15/18 118/60   05/11/18 95/64   02/09/18 114/71    Weight from Last 3 Encounters:   05/15/18 113 lb (51.3 kg)   01/18/18 112 lb 4.8 oz (50.9 kg)   12/26/17 119 lb (54 kg)              Today, you had the following     No orders found for display         Today's Medication Changes          These changes are accurate as of 5/15/18 11:59 PM.  If you have any questions, ask your nurse or doctor.               Start taking these medicines.        Dose/Directions    desloratadine 5 MG tablet   Commonly known as:  CLARINEX   Used for:  Neurodermatitis   Started by:  Soha Boggs MD        Dose:  5 mg   Take 1 tablet (5 mg) by mouth every morning   Quantity:  30 tablet   Refills:  1       nicotine 7 MG/24HR 24 hr patch   Commonly known as:  NICODERM CQ   Used for:  Encounter for tobacco use cessation counseling   Started by:  Soha Boggs MD        Dose:  1 patch   Place 1 patch onto the skin every 24 hours   Quantity:  30 patch   Refills:  0            Where to get your medicines      These medications [facility] [place] [address] [facility]   Critical access hospital [facility] [address] [facility] [address]     Phone:  [phone]     desloratadine 5 MG tablet    nicotine 7 MG/24HR 24 hr patch                Primary Care Provider Office Phone # Fax #    Soha Boggs [phone]       [address] [address]        Equal Access to Services     RAFAEL FRASER AH: Suresh webero Sobambi, waaxda luqadaha, qaybta kaalmada adeegyada, waxay chas corral. So [facility] [phone].    ATENCIÓN: Si  barrie dumont, tiene a medina disposición servicios gratuitos de asistencia lingüística. Genia morales [phone].    We comply with applicable federal civil rights laws and [place] laws. We do not discriminate on the basis of race, color, national origin, age, disability, sex, sexual orientation, or gender identity.            Thank you!     Thank you for choosing [facility]  for your care. Our goal is always to provide you with excellent care. Hearing back from our patients is one way we can continue to improve our services. Please take a few minutes to complete the written survey that you may receive in the mail after your visit with us. Thank you!             Your Updated Medication List - Protect others around you: Learn how to safely use, store and throw away your medicines at www.disposemymeds.org.          This list is accurate as of 5/15/18 11:59 PM.  Always use your most recent med list.                   Brand Name Dispense Instructions for use Diagnosis    buPROPion 150 MG 12 hr tablet    WELLBUTRIN SR    60 tablet    Take 1 tablet (150 mg) by mouth 2 times daily    Encounter for tobacco use cessation counseling       calcium carbonate 500 MG tablet    OS-SHON 500 mg [place]. Ca     Take 1 tablet by mouth 2 times daily        desloratadine 5 MG tablet    CLARINEX    30 tablet    Take 1 tablet (5 mg) by mouth every morning    Neurodermatitis       escitalopram 20 MG tablet    LEXAPRO     Take 0.5 tablets (10 mg) by mouth daily    Anxiety       Multi-vitamin Tabs tablet      Take 1 tablet by mouth daily        nicotine 7 MG/24HR 24 hr patch    NICODERM CQ    30 patch    Place 1 patch onto the skin every 24 hours    Encounter for tobacco use cessation counseling       OMEPRAZOLE PO           traZODone 50 MG tablet    DESYREL    60 tablet    TAKE TWO TABLETS BY MOUTH EVERY NIGHT AT BEDTIME    Insomnia, unspecified type       VITAMIN B 12 PO

## 2018-05-18 ENCOUNTER — TELEPHONE (OUTPATIENT)
Dept: FAMILY MEDICINE | Facility: CLINIC | Age: 63
End: 2018-05-18

## 2018-05-18 NOTE — TELEPHONE ENCOUNTER
Pavithra just wanted to call and say thank you to Dr Boggs for taking care of her rash.  She has been dealing with it for a year and it was taken care of in about 3 days.  She always takes such good care of me said Pavithra.    No need to call her back, she just really wanted to let Dr Boggs know how much she is appreciated.  Thank you,  Violet RODRIGUES

## 2018-05-31 ENCOUNTER — TELEPHONE (OUTPATIENT)
Dept: FAMILY MEDICINE | Facility: CLINIC | Age: 63
End: 2018-05-31

## 2018-05-31 NOTE — TELEPHONE ENCOUNTER
Pt advised provider is out of clinic until Monday 6/4/18, she is ok with waiting to hear until then. Radha Fritz, CMA

## 2018-05-31 NOTE — TELEPHONE ENCOUNTER
Reason for call:  Patient reporting a symptom    Symptom or request: Rash    Duration (how long have symptoms been present): two weeks    Have you been treated for this before? Yes    Additional comments: patient states that the medication she was given for the rash worked at first and then the rash came back and the medication has stopped working.  Should she increase the dose?      Phone Number patient can be reached at:  Home number on file [phone] (home)    Best Time:  Anytime but she is at work so please leave a message    Can we leave a detailed message on this number:  YES    Call taken on 5/31/2018 at 1:54 PM by Cris Lee

## 2018-06-06 NOTE — TELEPHONE ENCOUNTER
Per Soha Boggs MD the medication she prescribed patient (desloratadine) is only meant to help her symptoms such as itching. The rash that patient has is a chronic rash that will she will have flare ups of but it will not go away. KA is ok with patient increasing the dose of this medication to 2 times per day to help with her symptoms if needed.  LM for patient to call [phone]   Luz Stiles CMA

## 2018-06-07 NOTE — TELEPHONE ENCOUNTER
Patient notified of message.  No further questions at this time.    Thank You,  Cris Lee  Patient Representative, Dyad 1

## 2018-06-08 ENCOUNTER — TELEPHONE (OUTPATIENT)
Dept: FAMILY MEDICINE | Facility: CLINIC | Age: 63
End: 2018-06-08

## 2018-06-08 ENCOUNTER — NURSE TRIAGE (OUTPATIENT)
Dept: NURSING | Facility: CLINIC | Age: 63
End: 2018-06-08

## 2018-06-09 NOTE — TELEPHONE ENCOUNTER
Reason for call:  Same Day Appointment   Requested Provider: Dr. Boggs    PCP: Soha Boggs MD    Reason for visit: episode of feeling faint/collapsing at work    Duration of symptoms: Just happened today at work ([employer])    Have you been treated for this in the past? No    Additional comments: Could be because she works 3 jobs. Thinks she needs blood work. FNA triaged to be seen on Monday. No appointments with any provider until Wednesday. Can Dr. Boggs work her in on Monday, possibly even Tuesday? She doesn't work anywhere on Tuesday.      Phone number to reach patient:  Home number on file [phone] (home) or Cell number on file:    Telephone Information:   Mobile [phone]       Best Time:  Any time    Can we leave a detailed message on this number?  YES     Brooke ALSTON  Central Scheduler

## 2018-06-09 NOTE — TELEPHONE ENCOUNTER
"Caller report she had a  Near fainting  Episode while at work at [employer]  At 11 AM today; states she felt very weak and collapsed to the floor; did not loose consciousness but was unable to get to  sitting position without help; left work, having someone else drive her and has felt better, but fatigued all day.   Caller reports he had multiple episodes of this  Prior to one month ago  before she had  A \"blockage \" cleared by gastro enterology  that resulted from her past bariatric procedure; caller states she know she is anemic; caller states she  Is over worker and exhausted; caller states she did not eat or drink anything for along period of time before this incident   Caller is inquiring if  this is \"serious\"   Triage protocol and EMR reviewed   Advised to be seen in clinic for evaluation of current nutritional and physical state before returning to work in  as further  incidents can be a danger to self or others.  Advised to  Rest, stay well hydrated and nourished  Advised to I call or go to ED if she has further episodes of near syncope    Understands and will comply  Transferred to    Carole Curiel RN  FNA         Additional Information    Dizziness caused by sudden or prolonged standing (all triage questions negative)    Protocols used: DIZZINESS - LIGHTHEADEDNESS-ADULT-AH      "

## 2018-06-11 ENCOUNTER — OFFICE VISIT (OUTPATIENT)
Dept: FAMILY MEDICINE | Facility: CLINIC | Age: 63
End: 2018-06-11
Payer: COMMERCIAL

## 2018-06-11 VITALS
DIASTOLIC BLOOD PRESSURE: 78 MMHG | TEMPERATURE: 98.3 F | WEIGHT: 113.6 LBS | HEART RATE: 106 BPM | SYSTOLIC BLOOD PRESSURE: 144 MMHG | BODY MASS INDEX: 19.5 KG/M2 | OXYGEN SATURATION: 100 %

## 2018-06-11 DIAGNOSIS — R55 SYNCOPE, UNSPECIFIED SYNCOPE TYPE: Primary | ICD-10-CM

## 2018-06-11 LAB
ANION GAP SERPL CALCULATED.3IONS-SCNC: 10 MMOL/L (ref 3–14)
BUN SERPL-MCNC: 10 MG/DL (ref 7–30)
CALCIUM SERPL-MCNC: 8.5 MG/DL (ref 8.5–10.1)
CHLORIDE SERPL-SCNC: 105 MMOL/L (ref 94–109)
CO2 SERPL-SCNC: 26 MMOL/L (ref 20–32)
CREAT SERPL-MCNC: 0.87 MG/DL (ref 0.52–1.04)
ERYTHROCYTE [DISTWIDTH] IN BLOOD BY AUTOMATED COUNT: 18.8 % (ref 10–15)
GFR SERPL CREATININE-BSD FRML MDRD: 66 ML/MIN/1.7M2
GLUCOSE SERPL-MCNC: 92 MG/DL (ref 70–99)
HCT VFR BLD AUTO: 33.7 % (ref 35–47)
HGB BLD-MCNC: 10.4 G/DL (ref 11.7–15.7)
MCH RBC QN AUTO: 24.9 PG (ref 26.5–33)
MCHC RBC AUTO-ENTMCNC: 30.9 G/DL (ref 31.5–36.5)
MCV RBC AUTO: 81 FL (ref 78–100)
PLATELET # BLD AUTO: 278 10E9/L (ref 150–450)
POTASSIUM SERPL-SCNC: 3.9 MMOL/L (ref 3.4–5.3)
RBC # BLD AUTO: 4.17 10E12/L (ref 3.8–5.2)
SODIUM SERPL-SCNC: 141 MMOL/L (ref 133–144)
TSH SERPL DL<=0.005 MIU/L-ACNC: 1.14 MU/L (ref 0.4–4)
WBC # BLD AUTO: 6.5 10E9/L (ref 4–11)

## 2018-06-11 PROCEDURE — 99214 OFFICE O/P EST MOD 30 MIN: CPT | Performed by: FAMILY MEDICINE

## 2018-06-11 PROCEDURE — 93000 ELECTROCARDIOGRAM COMPLETE: CPT | Performed by: FAMILY MEDICINE

## 2018-06-11 PROCEDURE — 85027 COMPLETE CBC AUTOMATED: CPT | Performed by: FAMILY MEDICINE

## 2018-06-11 PROCEDURE — 84443 ASSAY THYROID STIM HORMONE: CPT | Performed by: FAMILY MEDICINE

## 2018-06-11 PROCEDURE — 80048 BASIC METABOLIC PNL TOTAL CA: CPT | Performed by: FAMILY MEDICINE

## 2018-06-11 PROCEDURE — 36415 COLL VENOUS BLD VENIPUNCTURE: CPT | Performed by: FAMILY MEDICINE

## 2018-06-11 ASSESSMENT — PAIN SCALES - GENERAL: PAINLEVEL: NO PAIN (0)

## 2018-06-11 NOTE — PROGRESS NOTES
SUBJECTIVE:   S:  Pavithra Raines is a 62 year old female who complains of syncope. On June 8th she passed out at work.  She said she felt faint and jittery, was stressed, and she went to her manager to tell her how she was feeling and she ended up passing out, reportedly she was shaking when she fell. There was concern for a seizure.  She did say she managed to make it to her knees before falling, she didn't black out but it was more like tunnel vision. It took about 30 minutes to be back to her self, she had to have someone come pick her up, because they wouldn't let her drive home.  She did eat lunch and had something to drink on break as well before this episode, so she doesn't think it was from low blood sugar or dehydration.  She also had water by her while she was working in the back even though they don't allow it, she snuck some back there. She does say that her breathing did get heavier and was having trouble catching her breath before she fainted. She denies any smells such as an olfactory aura.    She denies any injuries from the fall such as cuts/lacerations or bruises. She is sore.  Denies urine incontinence or tongue biting.     Problem list and histories reviewed & adjusted, as indicated.  Additional history: as documented    Patient Active Problem List   Diagnosis     Esophageal reflux     Restless legs syndrome (RLS)     Hypersomnia with sleep apnea     Anxiety     Status post gastric bypass for obesity     Genital herpes     CARDIOVASCULAR SCREENING; LDL GOAL LESS THAN 160     MDD (major depressive disorder)     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 grade 3     Alcohol dependence in remission (H)     Anemia due to vitamin B12 deficiency, unspecified B12 deficiency type     Insomnia, unspecified type     Other iron deficiency anemia     Past Surgical History:   Procedure Laterality Date     CHOLECYSTECTOMY, OPEN  age 20s     COLONOSCOPY  3/21/2011    COMBINED COLONOSCOPY, REMOVE  TUMOR/POLYP/LESION BY SNARE performed by JUAN FRANCISCO HERNANDEZ at  GI     COLONOSCOPY N/A 10/9/2017    Procedure: COMBINED COLONOSCOPY, SINGLE OR MULTIPLE BIOPSY/POLYPECTOMY BY BIOPSY;;  Surgeon: Sylvester Bright MD;  Location:  GI     COLPOSCOPY CERVIX, LOOP ELECTRODE BIOPSY, COMBINED  6/2007    CAN 2/3-patient requires yearly pap smears     ESOPHAGOSCOPY, GASTROSCOPY, DUODENOSCOPY (EGD), COMBINED N/A 2/9/2018    Procedure: COMBINED ESOPHAGOSCOPY, GASTROSCOPY, DUODENOSCOPY (EGD);  EGD;  Surgeon: Oneal Sanchez MD;  Location:  GI     GASTRIC BYPASS  3/25/2008    At [facility]     HC DILATION/CURETTAGE DIAG/THER NON OB  2002     HC ENLARGE BREAST WITH IMPLANT       HC LAPAROSCOPIC MYOMECTOMY, 1 - 4 INTRAMURAL MYOMAS =<250 GM  2002     HC REMOVAL OF BREAST IMPLANT       SALPINGO OOPHORECTOMY,R/L/MATTHEW  2002    Bilateral salpingectomy and unilateral oophorectomy       Social History   Substance Use Topics     Smoking status: Current Some Day Smoker     Packs/day: 2.00     Last attempt to quit: 8/20/2001     Smokeless tobacco: Never Used      Comment: 2 ppd at this time (chain smoking) 10/2012     Alcohol use No     Family History   Problem Relation Age of Onset     Unknown/Adopted Father      doesn't know birth father     Family History Negative Other          Current Outpatient Prescriptions   Medication Sig Dispense Refill     calcium carbonate (OS-SHON 500 MG [place]. CA) 1250 MG tablet Take 1 tablet by mouth 2 times daily       Cyanocobalamin (VITAMIN B 12 PO)        multivitamin, therapeutic with minerals (MULTI-VITAMIN) TABS tablet Take 1 tablet by mouth daily       nicotine (NICODERM CQ) 7 MG/24HR 24 hr patch Place 1 patch onto the skin every 24 hours (Patient not taking: Reported on 7/9/2018) 30 patch 0     OMEPRAZOLE PO        traZODone (DESYREL) 50 MG tablet TAKE TWO TABLETS BY MOUTH EVERY NIGHT AT BEDTIME (Patient taking differently: TAKE TWO TABLETS BY MOUTH EVERY NIGHT AT BEDTIME, prn) 60  tablet 3     CALCIUM ANTACID EXTRA STRENGTH 750 MG CHEW Take 2 tablet by mouth every four hours while awake as needed and at bed  4     clonazePAM (KLONOPIN) 0.5 MG tablet TAKE ONE TABLET BY MOUTH EVERY NIGHT AT BEDTIME AS DIRECTED 30 tablet 0     desloratadine (CLARINEX) 5 MG tablet Take 1 tablet (5 mg) by mouth every morning 90 tablet 1     escitalopram (LEXAPRO) 20 MG tablet Take 1 tablet (20 mg) by mouth daily 90 tablet 1     omeprazole (PRILOSEC) 40 MG capsule Take 1 capsule (40 mg) by mouth 2 times daily 180 capsule 3     sucralfate (CARAFATE) 1 GM tablet Take 1 tablet by mouth four times a day one hour before meals and at bed  3     Allergies   Allergen Reactions     Codeine Itching     Vicodin [Hydrocodone-Acetaminophen] Itching       Reviewed and updated as needed this visit by clinical staff  Tobacco  Allergies  Meds  Med Hx  Surg Hx  Fam Hx  Soc Hx      Reviewed and updated as needed this visit by Provider         ROS:  7 point ROS is negative except as noted in HPI.     OBJECTIVE:     /78  Pulse 106  Temp 98.3  F (36.8  C) (Temporal)  Wt 113 lb 9.6 oz (51.5 kg)  SpO2 100%  BMI 19.5 kg/m2  Body mass index is 19.5 kg/(m^2).   Vitals noted.  Patient alert, oriented, and in no acute distress.  Eyes:  PERRLA, EOMI.  Fundi normal bilaterally.   Ears:  Canals clear, TM's nl bilaterally.  No erythema or fluid.   Neck:  Supple without lymphadenopathy, JVD or masses.   CV:  RRR without murmur.  Respiratory:  Lungs clear to auscultation bilaterally.  Gait normal.   Neuro grossly intact bilateral upper extremities and lower extremities     Diagnostic Test Results:  Orders Placed This Encounter   Procedures     TSH with free T4 reflex     Basic metabolic panel     CBC with platelets     EKG 12-lead complete w/read - Clinics     EKG is normal.     CBC is normal but others are not in yet.  Lab Results   Component Value Date    WBC 6.5 06/11/2018     Lab Results   Component Value Date    RBC 4.17  06/11/2018     Lab Results   Component Value Date    HGB 10.4 06/11/2018     Lab Results   Component Value Date    HCT 33.7 06/11/2018     No components found for: MCT  Lab Results   Component Value Date    MCV 81 06/11/2018     Lab Results   Component Value Date    MCH 24.9 06/11/2018     Lab Results   Component Value Date    MCHC 30.9 06/11/2018     Lab Results   Component Value Date    RDW 18.8 06/11/2018     Lab Results   Component Value Date     06/11/2018          ASSESSMENT:       ICD-10-CM    1. Syncope, unspecified syncope type R55 TSH with free T4 reflex     Basic metabolic panel     CBC with platelets     EKG 12-lead complete w/read - Clinics         PLAN:   We discussed the possible causes of this episode, including vasovagal, seizure, cardiogenic, stress/functional, hypotensive, metabolic/glucose/electrolyte imbalance.    This sounds like it may have been vasovagal but cannot be sure.  Will await labs and since her EKG is normal, if her labs are normal will monitor for recurrence and try to prevent by being aware of early symptoms.  Eat small regular meals and push fluids with electrolytes.    If this happens again, will push for more neuro or cardiac eval.      This document serves as a record of services personally performed by Soha Boggs MD. It was created on their behalf by Tori Huitron, a trained medical scribe. The creation of this record is based on the provider's personal observations and the statements of the patient. This document has been checked and approved by the attending provider.    Soha Boggs MD  [facility]

## 2018-06-11 NOTE — TELEPHONE ENCOUNTER
Spoke with patient and discussed how she is feeling today, she is fatigued and concerned about her recent surgery wondering if she might be bleeding internally. She also states that the fatigue might also be from working 3 jobs. Discussed if she still thinks she would like to be seen and she would like to be seen today. Appointment made   Luz Stiles CMA

## 2018-06-11 NOTE — MR AVS SNAPSHOT
"              After Visit Summary   2018    Pavithra Raines    MRN: [id number]           Patient Information     Date Of Birth          1955        Visit Information        Provider Department      2018 1:15 PM Soha Boggs MD [facility]        Today's Diagnoses     Syncope, unspecified syncope type    -  1       Follow-ups after your visit        Who to contact     If you have questions or need follow up information about today's clinic visit or your schedule please contact [facility] directly at [phone].  Normal or non-critical lab and imaging results will be communicated to you by Vidmakerhart, letter or phone within 4 business days after the clinic has received the results. If you do not hear from us within 7 days, please contact the clinic through Vidmakerhart or phone. If you have a critical or abnormal lab result, we will notify you by phone as soon as possible.  Submit refill requests through OANDA or call your pharmacy and they will forward the refill request to us. Please allow 3 business days for your refill to be completed.          Additional Information About Your Visit        MyChart Information     OANDA lets you send messages to your doctor, view your test results, renew your prescriptions, schedule appointments and more. To sign up, go to [email or web address] . Click on \"Log in\" on the left side of the screen, which will take you to the Welcome page. Then click on \"Sign up Now\" on the right side of the page.     You will be asked to enter the access code listed below, as well as some personal information. Please follow the directions to create your username and password.     Your access code is: [email or web address]  Expires: 2018  8:58 AM     Your access code will  in 90 days. If you need help or a new code, please call your [facility] or [phone].        Care EveryWhere ID     This is your Care EveryWhere ID. " This could be used by other organizations to access your [facility] medical records  [id number]        Your Vitals Were     Pulse Temperature Pulse Oximetry BMI (Body Mass Index)          106 98.3  F (36.8  C) (Temporal) 100% 19.5 kg/m2         Blood Pressure from Last 3 Encounters:   06/11/18 144/78   05/15/18 118/60   05/11/18 95/64    Weight from Last 3 Encounters:   06/11/18 113 lb 9.6 oz (51.5 kg)   05/15/18 113 lb (51.3 kg)   01/18/18 112 lb 4.8 oz (50.9 kg)              We Performed the Following     Basic metabolic panel     CBC with platelets     TSH with free T4 reflex        Primary Care Provider Office Phone # Fax #    Soha Lela Boggs [phone] [address] [place] [address]        Equal Access to Services     [facility]: Hadii aad ku hadasho Sobambi, waaxda luqadaha, qaybta kaalmada adeluciayada, nazario zamudio . So [facility] [phone].    ATENCIÓN: Si habla español, tiene a medina disposición servicios gratuitos de asistencia lingüística. Llame al [phone].    We comply with applicable federal civil rights laws and [place] laws. We do not discriminate on the basis of race, color, national origin, age, disability, sex, sexual orientation, or gender identity.            Thank you!     Thank you for choosing [facility]  for your care. Our goal is always to provide you with excellent care. Hearing back from our patients is one way we can continue to improve our services. Please take a few minutes to complete the written survey that you may receive in the mail after your visit with us. Thank you!             Your Updated Medication List - Protect others around you: Learn how to safely use, store and throw away your medicines at www.disposemymeds.org.          This list is accurate as of 6/11/18  3:17 PM.  Always use your most recent med list.                   Brand Name Dispense Instructions for use Diagnosis     buPROPion 150 MG 12 hr tablet    WELLBUTRIN SR    60 tablet    Take 1 tablet (150 mg) by mouth 2 times daily    Encounter for tobacco use cessation counseling       calcium carbonate 500 MG tablet    OS-SHON 500 mg [place]. Ca     Take 1 tablet by mouth 2 times daily        desloratadine 5 MG tablet    CLARINEX    30 tablet    Take 1 tablet (5 mg) by mouth every morning    Neurodermatitis       escitalopram 20 MG tablet    LEXAPRO     Take 0.5 tablets (10 mg) by mouth daily    Anxiety       Multi-vitamin Tabs tablet      Take 1 tablet by mouth daily        nicotine 7 MG/24HR 24 hr patch    NICODERM CQ    30 patch    Place 1 patch onto the skin every 24 hours    Encounter for tobacco use cessation counseling       OMEPRAZOLE PO           traZODone 50 MG tablet    DESYREL    60 tablet    TAKE TWO TABLETS BY MOUTH EVERY NIGHT AT BEDTIME    Insomnia, unspecified type       VITAMIN B 12 PO

## 2018-06-15 ENCOUNTER — TELEPHONE (OUTPATIENT)
Dept: FAMILY MEDICINE | Facility: CLINIC | Age: 63
End: 2018-06-15

## 2018-06-15 NOTE — PROGRESS NOTES
Please notify patient with nl result. Send copy of labs. Her Hgb is low but actually improved from previous.   Soha Boggs MD

## 2018-06-15 NOTE — TELEPHONE ENCOUNTER
Reason for Call:  Request for results:    Name of test or procedure: labs     Date of test of procedure: 6/11    Location of the test or procedure: [facility] to leave the result message on voice mail or with a family member? YES    Phone number Patient can be reached at:  Home number on file [phone] (home)    Additional comments: any     Call taken on 6/15/2018 at 3:27 PM by Radha Alba

## 2018-06-18 NOTE — TELEPHONE ENCOUNTER
----- Message from Soha Boggs MD sent at 6/15/2018  5:31 PM CDT -----  Please notify patient with nl result. Send copy of labs. Her Hgb is low but actually improved from previous.   Soha Boggs MD

## 2018-06-26 ENCOUNTER — TRANSFERRED RECORDS (OUTPATIENT)
Dept: HEALTH INFORMATION MANAGEMENT | Facility: CLINIC | Age: 63
End: 2018-06-26

## 2018-06-26 ENCOUNTER — TELEPHONE (OUTPATIENT)
Dept: FAMILY MEDICINE | Facility: CLINIC | Age: 63
End: 2018-06-26

## 2018-06-26 NOTE — TELEPHONE ENCOUNTER
Leydi from the ED in [place] is calling to get patient's med list, allergies, problem list and other helpful information as patient was brought in via ambulance and they have no history for this patient.  Patient is on the phone stating she would like information sent as she is having a hard time remembering things.    RN sent info via fax to Leydi working with Dr. Morrow.  Closing this encounter.  Mey Mary, LATRICEN, RN

## 2018-06-28 ENCOUNTER — TELEPHONE (OUTPATIENT)
Dept: FAMILY MEDICINE | Facility: CLINIC | Age: 63
End: 2018-06-28

## 2018-06-28 NOTE — TELEPHONE ENCOUNTER
Reason for Call:  Other question     Detailed comments: Pt states she was told in the ER that she has failure to thrive. She has been reading about this online and is getting concerned. She is hoping a nurse can call and explain it to her better.     Phone Number Patient can be reached at: Home number on file [phone] (home)    Best Time: any     Can we leave a detailed message on this number? YES    Call taken on 6/28/2018 at 3:04 PM by Radha Alba

## 2018-06-28 NOTE — TELEPHONE ENCOUNTER
Patient can be worked in for ER follow up on 7/9 at 1:30pm. If she would like to be seen sooner she will need to see another available provider. Please contact patient to advise and confirm. Appointment made to hold time.  Luz Stiles CMA

## 2018-06-28 NOTE — TELEPHONE ENCOUNTER
"Patient is called, she states when she was in the ED and was a little incoherent, she heard one of the staff say she was \"failure to thrive\".  She has been on Google looking at \"Failure to Thrive\" and would like to know if she is dying.  Patient is informed that sometimes that just means someone is losing weight unintentionally.  She states that yes,. This is true and she just bought a juicer to help her gain weight.  She is informed that the way for her to gain weight is to be taking in lots of protein.  She states she cannot have sugar or milk products and that her body does not absorb food or pills very well.  She is informed that a liquid form is sometimes a good way to help this.  She is informed she should use her time looking on the web for a protein shake/liquid form that is not milk based and low in sugar/glucose.  She will continue to juice to get the vitamins/minrerals via liquid form.    Patient would like to know if she should be taking additional iron as currently she is taking a [place] vitamin with Iron.  Routing to PCP for further advice.    Mey Mary RN    "

## 2018-06-28 NOTE — TELEPHONE ENCOUNTER
Reason for Call:  Same Day Appointment, Requested Provider:  Soha Boggs M.D.    PCP: Soha Boggs    Reason for visit: ER F/U - Seizures - unknown reason - [facility] is aware that Dr Boggs is not in until next week    Duration of symptoms: Tuesday 06/26 in the morning    Have you been treated for this in the past? No    Additional comments: Was told to f/u with PCP in 10 days    Can we leave a detailed message on this number? YES    Phone number patient can be reached at: Home number on file [phone] (home)    Best Time: any    Call taken on 6/28/2018 at 10:48 AM by Brooke Hernandez

## 2018-07-09 ENCOUNTER — OFFICE VISIT (OUTPATIENT)
Dept: FAMILY MEDICINE | Facility: CLINIC | Age: 63
End: 2018-07-09
Payer: COMMERCIAL

## 2018-07-09 VITALS
WEIGHT: 116.4 LBS | DIASTOLIC BLOOD PRESSURE: 72 MMHG | BODY MASS INDEX: 19.98 KG/M2 | SYSTOLIC BLOOD PRESSURE: 116 MMHG | TEMPERATURE: 98.3 F | HEART RATE: 106 BPM | OXYGEN SATURATION: 100 %

## 2018-07-09 DIAGNOSIS — F41.9 ANXIETY: ICD-10-CM

## 2018-07-09 DIAGNOSIS — G47.00 INSOMNIA, UNSPECIFIED TYPE: ICD-10-CM

## 2018-07-09 DIAGNOSIS — L28.0 NEURODERMATITIS: ICD-10-CM

## 2018-07-09 DIAGNOSIS — R55 SYNCOPE, UNSPECIFIED SYNCOPE TYPE: Primary | ICD-10-CM

## 2018-07-09 DIAGNOSIS — R55 NEAR SYNCOPE: ICD-10-CM

## 2018-07-09 DIAGNOSIS — K28.9 GASTROJEJUNAL ULCER: ICD-10-CM

## 2018-07-09 DIAGNOSIS — G25.81 RESTLESS LEGS SYNDROME (RLS): ICD-10-CM

## 2018-07-09 PROCEDURE — 99213 OFFICE O/P EST LOW 20 MIN: CPT | Performed by: FAMILY MEDICINE

## 2018-07-09 RX ORDER — OMEPRAZOLE 40 MG/1
40 CAPSULE, DELAYED RELEASE ORAL 2 TIMES DAILY
Qty: 180 CAPSULE | Refills: 3 | Status: SHIPPED | OUTPATIENT
Start: 2018-07-09 | End: 2019-07-17

## 2018-07-09 RX ORDER — CLONAZEPAM 0.5 MG/1
TABLET ORAL
Qty: 30 TABLET | Refills: 0 | Status: SHIPPED | OUTPATIENT
Start: 2018-07-09 | End: 2018-09-10

## 2018-07-09 RX ORDER — DESLORATADINE 5 MG/1
5 TABLET ORAL EVERY MORNING
Qty: 90 TABLET | Refills: 1 | Status: SHIPPED | OUTPATIENT
Start: 2018-07-09 | End: 2018-12-17

## 2018-07-09 RX ORDER — ESCITALOPRAM OXALATE 20 MG/1
20 TABLET ORAL DAILY
Qty: 90 TABLET | Refills: 1 | Status: SHIPPED | OUTPATIENT
Start: 2018-07-09 | End: 2018-12-17

## 2018-07-09 RX ORDER — DESLORATADINE 5 MG/1
TABLET ORAL
Qty: 30 TABLET | Refills: 0 | Status: CANCELLED | OUTPATIENT
Start: 2018-07-09

## 2018-07-09 RX ORDER — CLONAZEPAM 0.5 MG/1
TABLET ORAL
Refills: 0 | COMMUNITY
Start: 2018-06-26 | End: 2018-07-09

## 2018-07-09 RX ORDER — CALCIUM CARBONATE 750 MG/1
TABLET, CHEWABLE ORAL
Refills: 4 | COMMUNITY
Start: 2018-06-26 | End: 2022-04-20

## 2018-07-09 RX ORDER — SUCRALFATE 1 G/1
TABLET ORAL
Refills: 3 | COMMUNITY
Start: 2018-06-26 | End: 2019-01-18

## 2018-07-09 ASSESSMENT — PAIN SCALES - GENERAL: PAINLEVEL: NO PAIN (0)

## 2018-07-09 NOTE — MR AVS SNAPSHOT
After Visit Summary   7/9/2018    Pavithra Raines    MRN: [id number]           Patient Information     Date Of Birth          1955        Visit Information        Provider Department      7/9/2018 1:30 PM Soha Boggs MD [facility]        Today's Diagnoses     Syncope, unspecified syncope type    -  1    Near syncope        Insomnia, unspecified type        Anxiety        Restless legs syndrome (RLS)        Encounter for tobacco use cessation counseling        Gastrojejunal ulcer        Neurodermatitis           Follow-ups after your visit        Additional Services     NEUROLOGY ADULT REFERRAL       Your provider has referred you for the following:   [place] [facility] [facility] [phone]   [email or web address] and EEG at  [facility] [facility] [phone]   [email or web address]    Please be aware that coverage of these services is subject to the terms and limitations of your health insurance plan.  Call member services at your health plan with any benefit or coverage questions.      Please bring the following with you to your appointment:    (1) Any X-Rays, CTs or MRIs which have been performed.  Contact the facility where they were done to arrange for  prior to your scheduled appointment.    (2) List of current medications  (3) This referral request   (4) Any documents/labs given to you for this referral                  Who to contact     If you have questions or need follow up information about today's clinic visit or your schedule please contact [facility] directly at [phone].  Normal or non-critical lab and imaging results will be communicated to you by MyChart, letter or phone within 4 business days after the clinic has received the results. If  "you do not hear from us within 7 days, please contact the clinic through [employer] or phone. If you have a critical or abnormal lab result, we will notify you by phone as soon as possible.  Submit refill requests through [employer] or call your pharmacy and they will forward the refill request to us. Please allow 3 business days for your refill to be completed.          Additional Information About Your Visit        [employer] Information     [employer] lets you send messages to your doctor, view your test results, renew your prescriptions, schedule appointments and more. To sign up, go to [email or web address] . Click on \"Log in\" on the left side of the screen, which will take you to the Welcome page. Then click on \"Sign up Now\" on the right side of the page.     You will be asked to enter the access code listed below, as well as some personal information. Please follow the directions to create your username and password.     Your access code is: [email or web address]  Expires: 2018  8:58 AM     Your access code will  in 90 days. If you need help or a new code, please call your [facility] clinic or [phone].        Care EveryWhere ID     This is your Care EveryWhere ID. This could be used by other organizations to access your [facility] medical records  [id number]        Your Vitals Were     Pulse Temperature Pulse Oximetry BMI (Body Mass Index)          106 98.3  F (36.8  C) (Temporal) 100% 19.98 kg/m2         Blood Pressure from Last 3 Encounters:   18 116/72   18 144/78   05/15/18 118/60    Weight from Last 3 Encounters:   18 116 lb 6.4 oz (52.8 kg)   18 113 lb 9.6 oz (51.5 kg)   05/15/18 113 lb (51.3 kg)              We Performed the Following     NEUROLOGY ADULT REFERRAL          Today's Medication Changes          These changes are accurate as of 18  2:23 PM.  If you have any questions, ask your nurse or doctor.               These medicines have changed or have updated prescriptions.  "       Dose/Directions    escitalopram 20 MG tablet   Commonly known as:  LEXAPRO   This may have changed:  how much to take   Used for:  Anxiety   Changed by:  Soha Boggs MD        Dose:  20 mg   Take 1 tablet (20 mg) by mouth daily   Quantity:  90 tablet   Refills:  1       * OMEPRAZOLE PO   This may have changed:  Another medication with the same name was added. Make sure you understand how and when to take each.   Changed by:  Soha Boggs MD        Refills:  0       * omeprazole 40 MG capsule   Commonly known as:  priLOSEC   This may have changed:  You were already taking a medication with the same name, and this prescription was added. Make sure you understand how and when to take each.   Used for:  Gastrojejunal ulcer   Changed by:  Soha Boggs MD        Dose:  40 mg   Take 1 capsule (40 mg) by mouth 2 times daily   Quantity:  180 capsule   Refills:  3       traZODone 50 MG tablet   Commonly known as:  DESYREL   This may have changed:  See the new instructions.   Used for:  Insomnia, unspecified type        TAKE TWO TABLETS BY MOUTH EVERY NIGHT AT BEDTIME   Quantity:  60 tablet   Refills:  3       * Notice:  This list has 2 medication(s) that are the same as other medications prescribed for you. Read the directions carefully, and ask your doctor or other care provider to review them with you.         Where to get your medicines      These medications were sent to [facility] [place] - [address]   [address] , [place] [address]     Phone:  [phone]     desloratadine 5 MG tablet    escitalopram 20 MG tablet    omeprazole 40 MG capsule         Some of these will need a paper prescription and others can be bought over the counter.  Ask your nurse if you have questions.     Bring a paper prescription for each of these medications     clonazePAM 0.5 MG tablet                Primary Care Provider Office Phone # Fax #     Soha Boggs [phone]       [address] [place] [address]        Equal Access to Services     OSCAR FRASER : Suresh mesfin don gamal Acunaali, wadanielda anuproseanne, qavinayakta kalayo devries, nazario larry lamahsaenrico ellis. So [facility] [phone].    ATENCIÓN: Si habla español, tiene a medina disposición servicios gratuitos de asistencia lingüística. Llame al [phone].    We comply with applicable federal civil rights laws and [place] laws. We do not discriminate on the basis of race, color, national origin, age, disability, sex, sexual orientation, or gender identity.            Thank you!     Thank you for choosing [facility]  for your care. Our goal is always to provide you with excellent care. Hearing back from our patients is one way we can continue to improve our services. Please take a few minutes to complete the written survey that you may receive in the mail after your visit with us. Thank you!             Your Updated Medication List - Protect others around you: Learn how to safely use, store and throw away your medicines at www.disposemymeds.org.          This list is accurate as of 7/9/18  2:23 PM.  Always use your most recent med list.                   Brand Name Dispense Instructions for use Diagnosis    CALCIUM ANTACID EXTRA STRENGTH 750 MG Chew   Generic drug:  calcium carbonate      Take 2 tablet by mouth every four hours while awake as needed and at bed        calcium carbonate 500 MG tablet    OS-SHON 500 mg [place]. [place]     Take 1 tablet by mouth 2 times daily        clonazePAM 0.5 MG tablet    klonoPIN    30 tablet    TAKE ONE TABLET BY MOUTH EVERY NIGHT AT BEDTIME AS DIRECTED    Insomnia, unspecified type, Anxiety, Restless legs syndrome (RLS)       desloratadine 5 MG tablet    CLARINEX    90 tablet    Take 1 tablet (5 mg) by mouth every morning    Neurodermatitis       escitalopram 20 MG tablet    LEXAPRO    90 tablet    Take 1 tablet  (20 mg) by mouth daily    Anxiety       Multi-vitamin Tabs tablet      Take 1 tablet by mouth daily        nicotine 7 MG/24HR 24 hr patch    NICODERM CQ    30 patch    Place 1 patch onto the skin every 24 hours    Encounter for tobacco use cessation counseling       * OMEPRAZOLE PO           * omeprazole 40 MG capsule    priLOSEC    180 capsule    Take 1 capsule (40 mg) by mouth 2 times daily    Gastrojejunal ulcer       sucralfate 1 GM tablet    CARAFATE     Take 1 tablet by mouth four times a day one hour before meals and at bed        traZODone 50 MG tablet    DESYREL    60 tablet    TAKE TWO TABLETS BY MOUTH EVERY NIGHT AT BEDTIME    Insomnia, unspecified type       VITAMIN B 12 PO           * Notice:  This list has 2 medication(s) that are the same as other medications prescribed for you. Read the directions carefully, and ask your doctor or other care provider to review them with you.

## 2018-07-09 NOTE — TELEPHONE ENCOUNTER
"Requested Prescriptions   Pending Prescriptions Disp Refills     escitalopram (LEXAPRO) 20 MG tablet [Pharmacy Med Name: ESCITALOPRAM OXALATE 20MG TABS] 30 tablet 5     Sig: TAKE ONE TABLET BY MOUTH EVERY DAY    SSRIs Protocol Passed    7/9/2018 10:33 AM       Passed - Recent (12 mo) or future (30 days) visit within the authorizing provider's specialty    Patient had office visit in the last 12 months or has a visit in the next 30 days with authorizing provider or within the authorizing provider's specialty.  See \"Patient Info\" tab in inbasket, or \"Choose Columns\" in Meds & Orders section of the refill encounter.           Passed - Patient is age 18 or older       Passed - No active pregnancy on record       Passed - No positive pregnancy test in last 12 months          Last Written Prescription Date:  7/9/18  Last Fill Quantity: 90,  # refills: 1   Last Office Visit with G, P or [facility] prescribing provider:  7/9/18   Future Office Visit:         "

## 2018-07-09 NOTE — NURSING NOTE
Referrals placed and faxed to the [facility] for need of scheduling of an EEG, and consult with a Neurologist. Patient given the information that they will call her with the appointment. Did instruct if she does not here from them within 3-5 working business days to call. Instructed once she has seen Neurology she should follow up with provider with an appointment. Catrina Payne LPN

## 2018-07-10 NOTE — PROGRESS NOTES
Visit Date:   07/09/2018      SUBJECTIVE:  Pavithra comes in today for followup of another syncopal episode.  I saw her for this in early June because she had an episode in May where she passed out at work.  She states she had another similar episode.  Ambulance was called and although I do not have the ambulance report, she states that they reported she was seizing in the car and they had to give her medication to stop her from shaking.  She admits that she was stressed out at work and she felt like she was going to pass out.  She was seen in the Emergency Room at [facility] in [place], [place].  They described that she had flipped out, started being tremulous and then became near syncopal and nearly passed out or did pass out; they were not sure.   She was tremulous in the ambulance.  She was given benzodiazepines and she was slightly hypertensive and her pulse was in the 90s in the ER.  They treated her just supportively with clonazepam and they checked some labs.  They actually noted that she was still slightly tremulous in the ER.  Her white blood count was 10.6, hemoglobin 11.2, MCV 79 with normal differential.  Iron test and sed rate were drawn.  Urine tox screen was positive for benzos which she had been given in the ED and otherwise was negative.  They attributed her symptoms to fatigue and anemia but could not rule out other causes such as seizure or cardiogenic syncope or presyncope.  They did give her a small prescription for clonazepam to go home with.  Besides the EKG I did for her in June, she has not had any other cardiac or neurologic workup for this.  She does have significant medical history for GI issues and has been following with GI at the .  Her diet has been stable and she is actually starting to put on a couple of pounds, so she feels good about that.  She does admit to being significantly stressed and her Lexapro dose was increased from 10 to 20 mg when in the ER.  She  does need a refill.  She also thinks she needs refills on her omeprazole and Klonopin.  Also, she thinks that her Clarinex is running low and will need a refill on that as well.      OBJECTIVE:   VITAL SIGNS:  Noted.   GENERAL:  The patient is alert, oriented and in no acute distress.  She looks well today.  She has good color.  She is not tremoring.  She has clear speech and normal affect.     NECK:  Supple without lymphadenopathy or JVD.   CARDIOVASCULAR:  Regular rate and rhythm without murmur.   LUNGS:  Clear to auscultation bilaterally.     EXTREMITIES:  Gait is normal.      ASSESSMENT:   1.  Syncope.   2.  Near-syncope.   3.  Insomnia.   4.  Anxiety.   5.  Restless leg syndrome.   6.  Gastrojejunal ulcer.   7.  Neurodermatitis.      I talked with Pavithra about her symptoms.  It is not really clear if she actually had syncope or near-syncope but she has had both so I am going to include both on her list today.  We talked about the other possibilities as well as seizure, cardiogenic syncope, vasovagal syncope, metabolic issues such as low blood sugar, high blood sugar, dehydration and so on.  Certainly, this could just be a stress related functional syncope as well.  We are going to continue her on the higher dose of Celexa and I did give her refills today.  Because of her history of ulcers, I am going to have her refill her omeprazole and stay on that b.i.d.  She was given some Carafate but denies any acid reflux or heartburn symptoms so I told her she can use this p.r.n. in addition to Tums p.r.n.  I would like her to have workup through Neurology to rule out seizure disorder before anything further.  I advised her of the dangers of an undiagnosed seizure disorder and although I think the likelihood of this is low in this case, I think it is worth checking for given that this is the second episode.  If this is negative, then we will pursue other cardiac or metabolic causes.  Initial labs that were checked  last time were normal, so I am less concerned about an acute metabolic syndrome.         MIMI GARZA MD             D: 07/10/2018   T: 07/10/2018   MT: GRIS      Name:     LUIS ALBERTO OAKLEY   MRN:      [id number]        Account:      [id number]   :      1955           Visit Date:   2018      Document: [id number]

## 2018-07-11 RX ORDER — ESCITALOPRAM OXALATE 20 MG/1
TABLET ORAL
Qty: 30 TABLET | Refills: 5 | OUTPATIENT
Start: 2018-07-11

## 2018-07-11 NOTE — TELEPHONE ENCOUNTER
Rx was sent 7/9/2018 for 3 months and 1 refills. Patient should have medication through 1/9/2019  Pharmacy notified via E-prescribe refusal  Mel Munoz, RN, BSN

## 2018-07-19 ENCOUNTER — TRANSFERRED RECORDS (OUTPATIENT)
Dept: HEALTH INFORMATION MANAGEMENT | Facility: CLINIC | Age: 63
End: 2018-07-19

## 2018-07-25 ENCOUNTER — NURSE TRIAGE (OUTPATIENT)
Dept: NURSING | Facility: CLINIC | Age: 63
End: 2018-07-25

## 2018-07-26 NOTE — TELEPHONE ENCOUNTER
"Patient says she has \"toruble eating\" and she ate some eggs that tasted funny tonight. She is concerned that they could make her sick. She has no symptoms whatsoever. Advised patient to call back if she should develop any symptoms.  Dara Griffiths RN  [facility]    "

## 2018-08-30 ENCOUNTER — TRANSFERRED RECORDS (OUTPATIENT)
Dept: HEALTH INFORMATION MANAGEMENT | Facility: CLINIC | Age: 63
End: 2018-08-30

## 2018-08-30 DIAGNOSIS — R25.1 TREMOR: Primary | ICD-10-CM

## 2018-08-30 LAB
ETHANOL SERPL-MCNC: 0.07 G/DL
VIT B12 SERPL-MCNC: 319 PG/ML (ref 193–986)

## 2018-08-30 PROCEDURE — 99000 SPECIMEN HANDLING OFFICE-LAB: CPT | Performed by: PSYCHIATRY & NEUROLOGY

## 2018-08-30 PROCEDURE — 36415 COLL VENOUS BLD VENIPUNCTURE: CPT | Performed by: PSYCHIATRY & NEUROLOGY

## 2018-08-30 PROCEDURE — 84425 ASSAY OF VITAMIN B-1: CPT | Mod: 90 | Performed by: PSYCHIATRY & NEUROLOGY

## 2018-08-30 PROCEDURE — 82607 VITAMIN B-12: CPT | Performed by: PSYCHIATRY & NEUROLOGY

## 2018-08-30 PROCEDURE — 80320 DRUG SCREEN QUANTALCOHOLS: CPT | Performed by: PSYCHIATRY & NEUROLOGY

## 2018-09-03 LAB — VIT B1 BLD-MCNC: 116 NMOL/L (ref 70–180)

## 2018-09-10 DIAGNOSIS — G25.81 RESTLESS LEGS SYNDROME (RLS): ICD-10-CM

## 2018-09-10 DIAGNOSIS — F41.9 ANXIETY: ICD-10-CM

## 2018-09-10 DIAGNOSIS — G47.00 INSOMNIA, UNSPECIFIED TYPE: ICD-10-CM

## 2018-09-10 NOTE — TELEPHONE ENCOUNTER
Clonazepam    0.5 MG    Last Written Prescription Date:  7/9/18  Last Fill Quantity: 30,   # refills: 0  Last Office Visit: 7/9/18  Future Office visit:       Routing refill request to provider for review/approval because:  Drug not on the [facility], P or [facility] refill protocol or controlled substance

## 2018-09-11 RX ORDER — CLONAZEPAM 0.5 MG/1
TABLET ORAL
Qty: 30 TABLET | Refills: 0 | Status: SHIPPED | OUTPATIENT
Start: 2018-09-11 | End: 2018-10-22

## 2018-09-14 ENCOUNTER — HOSPITAL ENCOUNTER (OUTPATIENT)
Dept: MRI IMAGING | Facility: CLINIC | Age: 63
Discharge: HOME | End: 2018-09-14
Attending: PSYCHIATRY & NEUROLOGY | Admitting: PSYCHIATRY & NEUROLOGY
Payer: COMMERCIAL

## 2018-09-14 DIAGNOSIS — R25.1 SPELLS OF TREMBLING: ICD-10-CM

## 2018-09-14 PROCEDURE — 70551 MRI BRAIN STEM W/O DYE: CPT

## 2018-09-18 ENCOUNTER — NURSE TRIAGE (OUTPATIENT)
Dept: NURSING | Facility: CLINIC | Age: 63
End: 2018-09-18

## 2018-09-19 NOTE — TELEPHONE ENCOUNTER
Pavithra calling for MRI results;  Did advise of impression (no changes) noted in EPIC.  Transferred to scheduling to make f/u appt with PCP.        Additional Information    [1] Follow-up call to recent contact AND [2] information only call, no triage required    Protocols used: INFORMATION ONLY CALL-ADULT-

## 2018-09-20 ENCOUNTER — TELEPHONE (OUTPATIENT)
Dept: FAMILY MEDICINE | Facility: CLINIC | Age: 63
End: 2018-09-20

## 2018-09-20 NOTE — TELEPHONE ENCOUNTER
Reason for Call:  Same Day Appointment, Requested Provider:  Soha Boggs M.D.    PCP: Soha Boggs    Reason for visit: post MRI, discuss plan from here, as results were normal.    Duration of symptoms: ongoing    Have you been treated for this in the past? Yes    Additional comments: asking to see Dr Boggs    Can we leave a detailed message on this number? YES    Phone number patient can be reached at: Cell number on file:    Telephone Information:   Mobile [phone]       Best Time: any time    Call taken on 9/20/2018 at 10:37 AM by Taisha Moss

## 2018-09-21 NOTE — TELEPHONE ENCOUNTER
Left message for patient to call back and speak with any .  Thank you,  Meka Sanders  Patient Representative

## 2018-09-21 NOTE — TELEPHONE ENCOUNTER
Patient can be worked in for MRI results discuss next step on 10/1 at 1:30pm. Please contact patient to advise and confirm. Appointment made to hold time.  Luz Stiles CMA

## 2018-10-01 ENCOUNTER — OFFICE VISIT (OUTPATIENT)
Dept: FAMILY MEDICINE | Facility: CLINIC | Age: 63
End: 2018-10-01
Payer: COMMERCIAL

## 2018-10-01 VITALS
HEART RATE: 98 BPM | TEMPERATURE: 97.5 F | SYSTOLIC BLOOD PRESSURE: 104 MMHG | BODY MASS INDEX: 21.39 KG/M2 | OXYGEN SATURATION: 99 % | DIASTOLIC BLOOD PRESSURE: 56 MMHG | WEIGHT: 124.6 LBS

## 2018-10-01 DIAGNOSIS — F33.9 EPISODE OF RECURRENT MAJOR DEPRESSIVE DISORDER, UNSPECIFIED DEPRESSION EPISODE SEVERITY (H): ICD-10-CM

## 2018-10-01 DIAGNOSIS — G40.909 SEIZURE DISORDER (H): ICD-10-CM

## 2018-10-01 DIAGNOSIS — E53.8 VITAMIN B12 DEFICIENCY (NON ANEMIC): ICD-10-CM

## 2018-10-01 LAB
ALBUMIN SERPL-MCNC: 3 G/DL (ref 3.4–5)
ALP SERPL-CCNC: 185 U/L (ref 40–150)
ALT SERPL W P-5'-P-CCNC: 40 U/L (ref 0–50)
ANION GAP SERPL CALCULATED.3IONS-SCNC: 8 MMOL/L (ref 3–14)
AST SERPL W P-5'-P-CCNC: 77 U/L (ref 0–45)
BILIRUB SERPL-MCNC: 0.6 MG/DL (ref 0.2–1.3)
BUN SERPL-MCNC: 5 MG/DL (ref 7–30)
CALCIUM SERPL-MCNC: 7.8 MG/DL (ref 8.5–10.1)
CHLORIDE SERPL-SCNC: 102 MMOL/L (ref 94–109)
CO2 SERPL-SCNC: 26 MMOL/L (ref 20–32)
CREAT SERPL-MCNC: 0.62 MG/DL (ref 0.52–1.04)
ERYTHROCYTE [DISTWIDTH] IN BLOOD BY AUTOMATED COUNT: 19.8 % (ref 10–15)
ETHANOL SERPL-MCNC: 0.14 G/DL
GFR SERPL CREATININE-BSD FRML MDRD: >90 ML/MIN/1.7M2
GLUCOSE SERPL-MCNC: 80 MG/DL (ref 70–99)
HCT VFR BLD AUTO: 36.8 % (ref 35–47)
HGB BLD-MCNC: 12.1 G/DL (ref 11.7–15.7)
MCH RBC QN AUTO: 29.5 PG (ref 26.5–33)
MCHC RBC AUTO-ENTMCNC: 32.9 G/DL (ref 31.5–36.5)
MCV RBC AUTO: 90 FL (ref 78–100)
PLATELET # BLD AUTO: 217 10E9/L (ref 150–450)
POTASSIUM SERPL-SCNC: 3.7 MMOL/L (ref 3.4–5.3)
PROT SERPL-MCNC: 7.4 G/DL (ref 6.8–8.8)
RBC # BLD AUTO: 4.1 10E12/L (ref 3.8–5.2)
SODIUM SERPL-SCNC: 136 MMOL/L (ref 133–144)
VIT B12 SERPL-MCNC: 543 PG/ML (ref 193–986)
WBC # BLD AUTO: 8.8 10E9/L (ref 4–11)

## 2018-10-01 PROCEDURE — 99215 OFFICE O/P EST HI 40 MIN: CPT | Mod: 25 | Performed by: FAMILY MEDICINE

## 2018-10-01 PROCEDURE — 80320 DRUG SCREEN QUANTALCOHOLS: CPT | Performed by: FAMILY MEDICINE

## 2018-10-01 PROCEDURE — 84425 ASSAY OF VITAMIN B-1: CPT | Mod: 90 | Performed by: FAMILY MEDICINE

## 2018-10-01 PROCEDURE — 36415 COLL VENOUS BLD VENIPUNCTURE: CPT | Performed by: FAMILY MEDICINE

## 2018-10-01 PROCEDURE — 82607 VITAMIN B-12: CPT | Performed by: FAMILY MEDICINE

## 2018-10-01 PROCEDURE — 85027 COMPLETE CBC AUTOMATED: CPT | Performed by: FAMILY MEDICINE

## 2018-10-01 PROCEDURE — 99000 SPECIMEN HANDLING OFFICE-LAB: CPT | Performed by: FAMILY MEDICINE

## 2018-10-01 PROCEDURE — 80053 COMPREHEN METABOLIC PANEL: CPT | Performed by: FAMILY MEDICINE

## 2018-10-01 PROCEDURE — 96372 THER/PROPH/DIAG INJ SC/IM: CPT | Performed by: FAMILY MEDICINE

## 2018-10-01 RX ORDER — CYANOCOBALAMIN 1000 UG/ML
1 INJECTION, SOLUTION INTRAMUSCULAR; SUBCUTANEOUS
Qty: 1 ML | Refills: 11 | COMMUNITY
Start: 2018-10-01 | End: 2020-01-06

## 2018-10-01 RX ORDER — LORAZEPAM 1 MG/1
TABLET ORAL
Qty: 30 TABLET | Refills: 0 | Status: SHIPPED | OUTPATIENT
Start: 2018-10-01 | End: 2018-10-08

## 2018-10-01 ASSESSMENT — PAIN SCALES - GENERAL: PAINLEVEL: NO PAIN (0)

## 2018-10-01 NOTE — MR AVS SNAPSHOT
After Visit Summary   10/1/2018    Pavithra Raines    MRN: [id number]           Patient Information     Date Of Birth          1955        Visit Information        Provider Department      10/1/2018 1:30 PM Soha Boggs MD [facility]        Today's Diagnoses     Seizure disorder (H)    -  1    Alcohol dependence in remission (H)        Vitamin B12 deficiency (non anemic)        Alcohol abuse, episodic drinking behavior           Follow-ups after your visit        Your next 10 appointments already scheduled     Nov 02, 2018  1:00 PM CDT   SHORT with Soha Boggs MD   [facility]    [address] [place] [address] [phone]              Who to contact     If you have questions or need follow up information about today's clinic visit or your schedule please contact [facility] directly at [phone].  Normal or non-critical lab and imaging results will be communicated to you by MyChart, letter or phone within 4 business days after the clinic has received the results. If you do not hear from us within 7 days, please contact the clinic through MyChart or phone. If you have a critical or abnormal lab result, we will notify you by phone as soon as possible.  Submit refill requests through [employer] or call your pharmacy and they will forward the refill request to us. Please allow 3 business days for your refill to be completed.          Additional Information About Your Visit        Care EveryWhere ID     This is your Care EveryWhere ID. This could be used by other organizations to access your [place] medical records  [id number]        Your Vitals Were     Pulse Temperature Last Period Pulse Oximetry Breastfeeding? BMI (Body Mass Index)    98 97.5  F (36.4  C) (Temporal) (LMP Unknown) 99% No 21.39 kg/m2       Blood Pressure from Last 3 Encounters:   10/01/18 104/56    07/09/18 116/72   06/11/18 144/78    Weight from Last 3 Encounters:   10/01/18 124 lb 9.6 oz (56.5 kg)   07/09/18 116 lb 6.4 oz (52.8 kg)   06/11/18 113 lb 9.6 oz (51.5 kg)              We Performed the Following     Alcohol ethyl     CBC with platelets     Comprehensive metabolic panel (BMP + Alb, Alk Phos, ALT, AST, Total. Bili, TP)     DEPRESSION ACTION PLAN (DAP)     Vitamin B1 whole blood     Vitamin B12          Today's Medication Changes          These changes are accurate as of 10/1/18  3:32 PM.  If you have any questions, ask your nurse or doctor.               Start taking these medicines.        Dose/Directions    LORazepam 1 MG tablet   Commonly known as:  ATIVAN   Used for:  Alcohol abuse, episodic drinking behavior, Seizure disorder (H)   Started by:  Soha Boggs MD        Take 1 tablet every 1-2 hours as needed for withdrawal symptoms or seizure activity   Quantity:  30 tablet   Refills:  0         These medicines have changed or have updated prescriptions.        Dose/Directions    omeprazole 40 MG capsule   Commonly known as:  priLOSEC   This may have changed:  additional instructions   Used for:  Gastrojejunal ulcer        Dose:  40 mg   Take 1 capsule (40 mg) by mouth 2 times daily   Quantity:  180 capsule   Refills:  3       traZODone 50 MG tablet   Commonly known as:  DESYREL   This may have changed:  See the new instructions.   Used for:  Insomnia, unspecified type        TAKE TWO TABLETS BY MOUTH EVERY NIGHT AT BEDTIME   Quantity:  60 tablet   Refills:  3            Where to get your medicines      Some of these will need a paper prescription and others can be bought over the counter.  Ask your nurse if you have questions.     Bring a paper prescription for each of these medications     LORazepam 1 MG tablet                Primary Care Provider Office Phone # Fax #    Soha Boggs [phone]       [address] [place] [address]         Equal Access to Services     [facility]: Hadii aad ku hadsonamcherelle Judibambi, waaxda luqadaha, qaybta kaalmanazario singleton. So [facility] [phone].    ATENCIÓN: Si habla julia, tiene a medina disposición servicios gratuitos de asistencia lingüística. Flavioame al [phone].    We comply with applicable federal civil rights laws and [place] laws. We do not discriminate on the basis of race, color, national origin, age, disability, sex, sexual orientation, or gender identity.            Thank you!     Thank you for choosing [facility]  for your care. Our goal is always to provide you with excellent care. Hearing back from our patients is one way we can continue to improve our services. Please take a few minutes to complete the written survey that you may receive in the mail after your visit with us. Thank you!             Your Updated Medication List - Protect others around you: Learn how to safely use, store and throw away your medicines at www.disposemymeds.org.          This list is accurate as of 10/1/18  3:32 PM.  Always use your most recent med list.                   Brand Name Dispense Instructions for use Diagnosis    CALCIUM ANTACID EXTRA STRENGTH 750 MG Chew   Generic drug:  calcium carbonate      Take 2 tablet by mouth every four hours while awake as needed and at bed        calcium carbonate 500 mg {elemental} 500 MG tablet    OS-SHON     Take 1 tablet by mouth 2 times daily        clonazePAM 0.5 MG tablet    klonoPIN    30 tablet    TAKE ONE TABLET BY MOUTH EVERY NIGHT AT BEDTIME AS DIRECTED    Insomnia, unspecified type, Anxiety, Restless legs syndrome (RLS)       desloratadine 5 MG tablet    CLARINEX    90 tablet    Take 1 tablet (5 mg) by mouth every morning    Neurodermatitis       escitalopram 20 MG tablet    LEXAPRO    90 tablet    Take 1 tablet (20 mg) by mouth daily    Anxiety       LORazepam 1 MG tablet    ATIVAN    30 tablet    Take 1 tablet  every 1-2 hours as needed for withdrawal symptoms or seizure activity    Alcohol abuse, episodic drinking behavior, Seizure disorder (H)       Multi-vitamin Tabs tablet      Take 1 tablet by mouth daily        nicotine 7 MG/24HR 24 hr patch    NICODERM CQ    30 patch    Place 1 patch onto the skin every 24 hours    Encounter for tobacco use cessation counseling       omeprazole 40 MG capsule    priLOSEC    180 capsule    Take 1 capsule (40 mg) by mouth 2 times daily    Gastrojejunal ulcer       sucralfate 1 GM tablet    CARAFATE     Take 1 tablet by mouth four times a day one hour before meals and at bed        traZODone 50 MG tablet    DESYREL    60 tablet    TAKE TWO TABLETS BY MOUTH EVERY NIGHT AT BEDTIME    Insomnia, unspecified type       * VITAMIN B 12 PO           * cyanocobalamin 1000 MCG/ML injection    VITAMIN B12    1 mL    Inject 1 mL (1,000 mcg) into the muscle every 30 days    Vitamin B12 deficiency (non anemic)       * Notice:  This list has 2 medication(s) that are the same as other medications prescribed for you. Read the directions carefully, and ask your doctor or other care provider to review them with you.

## 2018-10-02 ASSESSMENT — PATIENT HEALTH QUESTIONNAIRE - PHQ9: SUM OF ALL RESPONSES TO PHQ QUESTIONS 1-9: 13
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
## 2018-10-05 LAB — VIT B1 BLD-MCNC: 102 NMOL/L (ref 70–180)

## 2018-10-08 DIAGNOSIS — G40.909 SEIZURE DISORDER (H): ICD-10-CM

## 2018-10-08 RX ORDER — LORAZEPAM 1 MG/1
TABLET ORAL
Qty: 30 TABLET | Refills: 0 | Status: SHIPPED | OUTPATIENT
Start: 2018-10-08 | End: 2018-10-22

## 2018-10-08 NOTE — TELEPHONE ENCOUNTER
Lorazepam  1 MG      Last Written Prescription Date:  10/1/18  Last Fill Quantity: 30,   # refills: 0  Last Office Visit: 10/1/18  Future Office visit:    Next 5 appointments (look out 90 days)     Nov 02, 2018  1:00 PM CDT   SHORT with Soha Boggs MD   [facility]    [address] [place] [id number]   [phone]                   Routing refill request to provider for review/approval because:  Drug not on the [facility], [facility] or [facility] refill protocol or controlled substance

## 2018-10-12 ENCOUNTER — TELEPHONE (OUTPATIENT)
Dept: FAMILY MEDICINE | Facility: CLINIC | Age: 63
End: 2018-10-12

## 2018-10-12 NOTE — TELEPHONE ENCOUNTER
Reason for Call:  Other patient information     Detailed comments: patient calling states that provider wanted her to call her after her assessment to let her know how it went. Please call.     Phone Number Patient can be reached at: Cell number on file:    Telephone Information:   Mobile [phone]       Best Time: any    Can we leave a detailed message on this number? YES    Call taken on 10/12/2018 at 12:15 PM by Karen Joy

## 2018-10-15 NOTE — TELEPHONE ENCOUNTER
Spoke with patient regarding her recent assessment. She states she is on her way in to her recovery and is so far doing good. She will be meeting with [facility] every Wednesday and Thursday for 2 hours outpatient. If insurance covers it she will meet with psychologist for her dual diagnosis. She is unsure when that will start because she is waiting on her insurance. She will keep MD updated.  Luz Stiles, CMA

## 2018-10-22 DIAGNOSIS — G25.81 RESTLESS LEGS SYNDROME (RLS): ICD-10-CM

## 2018-10-22 DIAGNOSIS — G47.00 INSOMNIA, UNSPECIFIED TYPE: ICD-10-CM

## 2018-10-22 DIAGNOSIS — G40.909 SEIZURE DISORDER (H): ICD-10-CM

## 2018-10-22 DIAGNOSIS — F41.9 ANXIETY: ICD-10-CM
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
## 2018-10-23 RX ORDER — LORAZEPAM 1 MG/1
TABLET ORAL
Qty: 30 TABLET | Refills: 0 | Status: SHIPPED | OUTPATIENT
Start: 2018-10-23 | End: 2018-11-12

## 2018-10-23 RX ORDER — CLONAZEPAM 0.5 MG/1
TABLET ORAL
Qty: 30 TABLET | Refills: 0 | Status: SHIPPED | OUTPATIENT
Start: 2018-10-23 | End: 2018-12-05

## 2018-10-23 NOTE — TELEPHONE ENCOUNTER
Clonazepam 0.5 MG       Last Written Prescription Date:  9-11-18  Last Fill Quantity: 30,   # refills: 0  Last Office Visit: 10/1/18  Future Office visit:    Next 5 appointments (look out 90 days)     Nov 02, 2018  1:00 PM CDT   SHORT with Soha Boggs MD   [facility] ([address] [address]   [phone]                   Routing refill request to provider for review/approval because:  Drug not on the [facility], [facility] or [facility] refill protocol or controlled substance  Lorazepam 1 MG       Last Written Prescription Date:  10/8/18  Last Fill Quantity: 30,   # refills: 0  Last Office Visit: 10/1/18  Future Office visit:    Next 5 appointments (look out 90 days)     Nov 02, 2018  1:00 PM CDT   SHORT with Soha Boggs MD   [facility] ([address] [address]   [phone]                   Routing refill request to provider for review/approval because:  Drug not on the [facility], [facility] or [facility] refill protocol or controlled substance

## 2018-11-02 ENCOUNTER — OFFICE VISIT (OUTPATIENT)
Dept: FAMILY MEDICINE | Facility: CLINIC | Age: 63
End: 2018-11-02
Payer: COMMERCIAL

## 2018-11-02 VITALS
OXYGEN SATURATION: 100 % | WEIGHT: 123 LBS | SYSTOLIC BLOOD PRESSURE: 92 MMHG | BODY MASS INDEX: 21.11 KG/M2 | HEART RATE: 90 BPM | DIASTOLIC BLOOD PRESSURE: 62 MMHG | TEMPERATURE: 98.1 F

## 2018-11-02 DIAGNOSIS — E53.8 VITAMIN B12 DEFICIENCY (NON ANEMIC): ICD-10-CM

## 2018-11-02 DIAGNOSIS — F10.21 ALCOHOL DEPENDENCE IN REMISSION (H): Primary | ICD-10-CM

## 2018-11-02 DIAGNOSIS — R79.89 ELEVATED LFTS: ICD-10-CM

## 2018-11-02 DIAGNOSIS — Z23 NEED FOR PROPHYLACTIC VACCINATION AND INOCULATION AGAINST INFLUENZA: ICD-10-CM

## 2018-11-02 LAB
ALBUMIN SERPL-MCNC: 3 G/DL (ref 3.4–5)
ALP SERPL-CCNC: 92 U/L (ref 40–150)
ALT SERPL W P-5'-P-CCNC: 20 U/L (ref 0–50)
ANION GAP SERPL CALCULATED.3IONS-SCNC: 6 MMOL/L (ref 3–14)
AST SERPL W P-5'-P-CCNC: 18 U/L (ref 0–45)
BILIRUB SERPL-MCNC: 0.2 MG/DL (ref 0.2–1.3)
BUN SERPL-MCNC: 8 MG/DL (ref 7–30)
CALCIUM SERPL-MCNC: 7.7 MG/DL (ref 8.5–10.1)
CHLORIDE SERPL-SCNC: 109 MMOL/L (ref 94–109)
CO2 SERPL-SCNC: 27 MMOL/L (ref 20–32)
CREAT SERPL-MCNC: 0.91 MG/DL (ref 0.52–1.04)
GFR SERPL CREATININE-BSD FRML MDRD: 63 ML/MIN/1.7M2
GLUCOSE SERPL-MCNC: 82 MG/DL (ref 70–99)
POTASSIUM SERPL-SCNC: 4.2 MMOL/L (ref 3.4–5.3)
PROT SERPL-MCNC: 7.2 G/DL (ref 6.8–8.8)
SODIUM SERPL-SCNC: 142 MMOL/L (ref 133–144)

## 2018-11-02 PROCEDURE — 36415 COLL VENOUS BLD VENIPUNCTURE: CPT | Performed by: FAMILY MEDICINE

## 2018-11-02 PROCEDURE — 96372 THER/PROPH/DIAG INJ SC/IM: CPT | Performed by: FAMILY MEDICINE

## 2018-11-02 PROCEDURE — 99214 OFFICE O/P EST MOD 30 MIN: CPT | Mod: 25 | Performed by: FAMILY MEDICINE

## 2018-11-02 PROCEDURE — 90682 RIV4 VACC RECOMBINANT DNA IM: CPT | Performed by: FAMILY MEDICINE

## 2018-11-02 PROCEDURE — 80053 COMPREHEN METABOLIC PANEL: CPT | Performed by: FAMILY MEDICINE

## 2018-11-02 PROCEDURE — 90471 IMMUNIZATION ADMIN: CPT | Performed by: FAMILY MEDICINE

## 2018-11-02 ASSESSMENT — PAIN SCALES - GENERAL: PAINLEVEL: NO PAIN (0)

## 2018-11-02 NOTE — MR AVS SNAPSHOT
After Visit Summary   11/2/2018    Pavithra Raines    MRN: [id number]           Patient Information     Date Of Birth          1955        Visit Information        Provider Department      11/2/2018 1:00 PM Soha Boggs MD [facility]        Today's Diagnoses     Alcohol dependence in remission (H)    -  1    Anemia due to vitamin B12 deficiency, unspecified B12 deficiency type        Vitamin B12 deficiency (non anemic)           Follow-ups after your visit        Your next 10 appointments already scheduled     Dec 07, 2018  1:15 PM CST   SHORT with Soha Boggs MD   [facility]    [address] [place] [address] [phone]              Who to contact     If you have questions or need follow up information about today's clinic visit or your schedule please contact [facility] directly at [phone].  Normal or non-critical lab and imaging results will be communicated to you by MyChart, letter or phone within 4 business days after the clinic has received the results. If you do not hear from us within 7 days, please contact the clinic through MyChart or phone. If you have a critical or abnormal lab result, we will notify you by phone as soon as possible.  Submit refill requests through [employer] or call your pharmacy and they will forward the refill request to us. Please allow 3 business days for your refill to be completed.          Additional Information About Your Visit        Care EveryWhere ID     This is your Care EveryWhere ID. This could be used by other organizations to access your [place] [facility] medical records  [id number]        Your Vitals Were     Pulse Temperature Last Period Pulse Oximetry BMI (Body Mass Index)       90 98.1  F (36.7  C) (Temporal) (LMP Unknown) 100% 21.11 kg/m2        Blood Pressure from Last 3 Encounters:   11/02/18 92/62   10/01/18 104/56    07/09/18 116/72    Weight from Last 3 Encounters:   11/02/18 123 lb (55.8 kg)   10/01/18 124 lb 9.6 oz (56.5 kg)   07/09/18 116 lb 6.4 oz (52.8 kg)              We Performed the Following     Comprehensive metabolic panel (BMP + Alb, Alk Phos, ALT, AST, Total. Bili, TP)          Today's Medication Changes          These changes are accurate as of 11/2/18  2:04 PM.  If you have any questions, ask your nurse or doctor.               These medicines have changed or have updated prescriptions.        Dose/Directions    cyanocobalamin 1000 MCG/ML injection   Commonly known as:  VITAMIN B12   This may have changed:  Another medication with the same name was removed. Continue taking this medication, and follow the directions you see here.   Used for:  Vitamin B12 deficiency (non anemic)   Changed by:  Soha Boggs MD        Dose:  1 mL   Inject 1 mL (1,000 mcg) into the muscle every 30 days   Quantity:  1 mL   Refills:  11       omeprazole 40 MG capsule   Commonly known as:  priLOSEC   This may have changed:  additional instructions   Used for:  Gastrojejunal ulcer        Dose:  40 mg   Take 1 capsule (40 mg) by mouth 2 times daily   Quantity:  180 capsule   Refills:  3       traZODone 50 MG tablet   Commonly known as:  DESYREL   This may have changed:  See the new instructions.   Used for:  Insomnia, unspecified type        TAKE TWO TABLETS BY MOUTH EVERY NIGHT AT BEDTIME   Quantity:  60 tablet   Refills:  3                Primary Care Provider Office Phone # Fax #    Soha Boggs [phone]       [address] [place] [address]        Equal Access to Services     [facility]: Hadii aad ku hadasho Soomaali, waaxda luqadaha, qaybta kaalmada adeegyada, nazario corral. So [facility] [phone].    ATENCIÓN: Si habla español, tiene a medina disposición servicios gratuitos de asistencia lingüística. Llame al [phone].    We comply with  applicable federal civil rights laws and [place] laws. We do not discriminate on the basis of race, color, national origin, age, disability, sex, sexual orientation, or gender identity.            Thank you!     Thank you for choosing [facility]  for your care. Our goal is always to provide you with excellent care. Hearing back from our patients is one way we can continue to improve our services. Please take a few minutes to complete the written survey that you may receive in the mail after your visit with us. Thank you!             Your Updated Medication List - Protect others around you: Learn how to safely use, store and throw away your medicines at www.disposemymeds.org.          This list is accurate as of 11/2/18  2:04 PM.  Always use your most recent med list.                   Brand Name Dispense Instructions for use Diagnosis    CALCIUM ANTACID EXTRA STRENGTH 750 MG Chew   Generic drug:  calcium carbonate      Take 2 tablet by mouth every four hours while awake as needed and at bed        calcium carbonate 500 mg (elemental) 500 MG tablet    OS-SHON     Take 1 tablet by mouth 2 times daily        clonazePAM 0.5 MG tablet    klonoPIN    30 tablet    TAKE ONE TABLET BY MOUTH EVERY NIGHT AT BEDTIME AS DIRECTED    Insomnia, unspecified type, Anxiety, Restless legs syndrome (RLS)       cyanocobalamin 1000 MCG/ML injection    VITAMIN B12    1 mL    Inject 1 mL (1,000 mcg) into the muscle every 30 days    Vitamin B12 deficiency (non anemic)       desloratadine 5 MG tablet    CLARINEX    90 tablet    Take 1 tablet (5 mg) by mouth every morning    Neurodermatitis       escitalopram 20 MG tablet    LEXAPRO    90 tablet    Take 1 tablet (20 mg) by mouth daily    Anxiety       LORazepam 1 MG tablet    ATIVAN    30 tablet    Take 1 tablet every 1-2 hours as needed for withdrawal symptoms or seizure activity    Seizure disorder (H), Alcoholism /alcohol abuse (H)       Multi-vitamin Tabs tablet      Take  1 tablet by mouth daily        nicotine 7 MG/24HR 24 hr patch    NICODERM CQ    30 patch    Place 1 patch onto the skin every 24 hours    Encounter for tobacco use cessation counseling       omeprazole 40 MG capsule    priLOSEC    180 capsule    Take 1 capsule (40 mg) by mouth 2 times daily    Gastrojejunal ulcer       sucralfate 1 GM tablet    CARAFATE     Take 1 tablet by mouth four times a day one hour before meals and at bed        traZODone 50 MG tablet    DESYREL    60 tablet    TAKE TWO TABLETS BY MOUTH EVERY NIGHT AT BEDTIME    Insomnia, unspecified type

## 2018-11-05 ENCOUNTER — TELEPHONE (OUTPATIENT)
Dept: FAMILY MEDICINE | Facility: CLINIC | Age: 63
End: 2018-11-05

## 2018-11-05 NOTE — PROGRESS NOTES
Notify her that her liver tests are back to normal. This is very good, keep up the good work on avoiding alcohol and continuing with treatment.    Soha Boggs MD

## 2018-11-05 NOTE — TELEPHONE ENCOUNTER
Patient called back and I gave her the message about results. Patient has no other question at this time.       Thank you,  Marsha Ramirez   for [facility]

## 2018-11-05 NOTE — TELEPHONE ENCOUNTER
----- Message from Soha Boggs MD sent at 11/5/2018  9:30 AM CST -----  Notify her that her liver tests are back to normal. This is very good, keep up the good work on avoiding alcohol and continuing with treatment.    Soha Boggs MD

## 2018-11-06 NOTE — PROGRESS NOTES
Visit Date:   11/02/2018      SUBJECTIVE:  Pavithra comes in today to follow up on her alcoholism.  I last saw her on 10/01/2018.  At that visit, we discussed the need to quit drinking and she states that she has since quit.  She is doing well.  She is going through outpatient therapy twice a week.  She had an initial intake assessment done.  She states that she still thinks about drinking a lot.  She thinks about it every morning when she wakes up because that is what she used to do.  She is itching a lot as well.  She is taking Ativan in the morning and she is taking Klonopin at night, actually on the way home from work because it takes a while to kick it.  She has an appointment coming up on 11/29 with Ruddy Hdz.  She has had 2 seizures since her last appointment with me where she blacked out and she was at the store.  Ambulance was called, but she did not go in.  She basically refused care because she thought she was okay.  On one of the occasions, her blood pressure and sugar were a little bit low and they gave her something to eat and then they let her go home.  Otherwise, she feels like she is doing well.  She would like some vaccine today, including a shingles shot and a flu shot and she would like her B12 injection.  She is due for a lab test.  Her recent liver functions have been elevated, but improving.  No other concerns today.      Please see Epic for her past medical history, problem list and medication list, which were reviewed today.      OBJECTIVE:   VITAL SIGNS:  Noted.   GENERAL:  The patient is alert, oriented and in no acute distress.  There is no smell of alcohol.  She is not shaking.  Her speech is clear and her thoughts are coherent.  She makes good eye contact.   NECK:  Supple without lymphadenopathy.   CARDIOVASCULAR:  Regular rate and rhythm without murmur.   LUNGS:  Clear to auscultation bilaterally.     EXTREMITIES:  Her hands show very minimal tremor; nothing more than what I  would consider normal.      Comprehensive panel is pending.      ASSESSMENT:   1.  Alcohol dependence, in remission.   2.  Vitamin B12 deficiency.   3.  Influenza vaccination.   4.  Elevated liver function tests.      PLAN:  I congratulated Shanika on her continued abstinence from alcohol.  She asked about using Vivitrol, and I explained to her that this would be a possibility, but that I do not prescribe it and she would have to be connected with a provider who prescribes that for ongoing monitoring.  She is going to continue with her outpatient treatment and consider this.  I will look into available providers for her.  We will notify her with her lab results.  We did talk about getting the shingles vaccine at a local pharmacy, and she is going to look into that.  I am going to see her back in 1 month for recheck and I will see her sooner if needed.  She was also given her B12 injection and her flu shot today.         MIMI GARZA MD             D: 2018   T: 2018   MT: SAEED      Name:     LUIS ALBERTO OAKLEY   MRN:      [id number]        Account:      [id number]   :      1955           Visit Date:   2018      Document: [id number]

## 2018-11-12 DIAGNOSIS — G40.909 SEIZURE DISORDER (H): ICD-10-CM

## 2018-11-13 RX ORDER — LORAZEPAM 1 MG/1
TABLET ORAL
Qty: 30 TABLET | Refills: 0 | Status: SHIPPED | OUTPATIENT
Start: 2018-11-13 | End: 2018-12-05

## 2018-11-14 ENCOUNTER — TELEPHONE (OUTPATIENT)
Dept: FAMILY MEDICINE | Facility: CLINIC | Age: 63
End: 2018-11-14

## 2018-11-14 NOTE — TELEPHONE ENCOUNTER
Called to let patient know her paper script is ready and to question if she wanted to pick it up or have it walked over to the pharmacy.   Sabine TORREZ MA

## 2018-12-05 DIAGNOSIS — F41.9 ANXIETY: ICD-10-CM

## 2018-12-05 DIAGNOSIS — G40.909 SEIZURE DISORDER (H): ICD-10-CM

## 2018-12-05 DIAGNOSIS — G25.81 RESTLESS LEGS SYNDROME (RLS): ICD-10-CM

## 2018-12-05 DIAGNOSIS — G47.00 INSOMNIA, UNSPECIFIED TYPE: ICD-10-CM

## 2018-12-06 NOTE — TELEPHONE ENCOUNTER
Lorazepam  1 MG      Last Written Prescription Date:  11/13/18  Last Fill Quantity: 30,   # refills: 0  Last Office Visit: 11/2/18  Future Office visit:    Next 5 appointments (look out 90 days)     Dec 07, 2018  1:15 PM CST   SHORT with Soha Boggs MD   [facility] ([address] [address]   [phone]                   Routing refill request to provider for review/approval because:  Drug not on the [facility], [facility] or [facility] refill protocol or controlled substance  Clonazepam 0.5 MG       Last Written Prescription Date:  10/23/18  Last Fill Quantity: 30,   # refills: 0  Last Office Visit: 11/2/18  Future Office visit:    Next 5 appointments (look out 90 days)     Dec 07, 2018  1:15 PM CST   SHORT with Soha Boggs MD   [facility] ([address] [address]   [phone]                   Routing refill request to provider for review/approval because:  Drug not on the [facility], [facility] or [facility] refill protocol or controlled substance

## 2018-12-07 ENCOUNTER — TELEPHONE (OUTPATIENT)
Dept: FAMILY MEDICINE | Facility: CLINIC | Age: 63
End: 2018-12-07

## 2018-12-07 RX ORDER — CLONAZEPAM 0.5 MG/1
TABLET ORAL
Qty: 30 TABLET | Refills: 0 | Status: SHIPPED | OUTPATIENT
Start: 2018-12-07 | End: 2019-01-07

## 2018-12-07 RX ORDER — LORAZEPAM 1 MG/1
TABLET ORAL
Qty: 30 TABLET | Refills: 0 | Status: SHIPPED | OUTPATIENT
Start: 2018-12-07 | End: 2019-01-07

## 2018-12-07 NOTE — TELEPHONE ENCOUNTER
Called patient and informed her that her prescriptions were ready, and that I was walking to pharmacy.   Sabine TORREZ MA

## 2018-12-17 ENCOUNTER — OFFICE VISIT (OUTPATIENT)
Dept: FAMILY MEDICINE | Facility: CLINIC | Age: 63
End: 2018-12-17
Payer: COMMERCIAL

## 2018-12-17 ENCOUNTER — TELEPHONE (OUTPATIENT)
Dept: FAMILY MEDICINE | Facility: CLINIC | Age: 63
End: 2018-12-17

## 2018-12-17 VITALS
OXYGEN SATURATION: 100 % | RESPIRATION RATE: 18 BRPM | TEMPERATURE: 97.8 F | HEART RATE: 106 BPM | BODY MASS INDEX: 20.39 KG/M2 | WEIGHT: 118.8 LBS | SYSTOLIC BLOOD PRESSURE: 98 MMHG | DIASTOLIC BLOOD PRESSURE: 60 MMHG

## 2018-12-17 DIAGNOSIS — F41.9 ANXIETY: ICD-10-CM

## 2018-12-17 DIAGNOSIS — L28.0 NEURODERMATITIS: ICD-10-CM

## 2018-12-17 DIAGNOSIS — F43.10 PTSD (POST-TRAUMATIC STRESS DISORDER): ICD-10-CM

## 2018-12-17 DIAGNOSIS — D51.9 ANEMIA DUE TO VITAMIN B12 DEFICIENCY, UNSPECIFIED B12 DEFICIENCY TYPE: ICD-10-CM

## 2018-12-17 DIAGNOSIS — F10.21 ALCOHOL DEPENDENCE IN REMISSION (H): Primary | ICD-10-CM

## 2018-12-17 PROCEDURE — 99214 OFFICE O/P EST MOD 30 MIN: CPT | Performed by: FAMILY MEDICINE

## 2018-12-17 RX ORDER — ESCITALOPRAM OXALATE 20 MG/1
20 TABLET ORAL DAILY
Qty: 90 TABLET | Refills: 1 | Status: SHIPPED | OUTPATIENT
Start: 2018-12-17 | End: 2019-06-21

## 2018-12-17 RX ORDER — BUSPIRONE HYDROCHLORIDE 5 MG/1
5 TABLET ORAL 2 TIMES DAILY
Qty: 90 TABLET | Refills: 1 | Status: SHIPPED | OUTPATIENT
Start: 2018-12-17 | End: 2019-02-15

## 2018-12-17 RX ORDER — CYANOCOBALAMIN 1000 UG/ML
1000 INJECTION, SOLUTION INTRAMUSCULAR; SUBCUTANEOUS
Status: DISPENSED | COMMUNITY
Start: 2018-12-17 | End: 2019-11-12

## 2018-12-17 RX ORDER — DESLORATADINE 5 MG/1
5 TABLET ORAL 2 TIMES DAILY
Qty: 180 TABLET | Refills: 1 | Status: SHIPPED | OUTPATIENT
Start: 2018-12-17 | End: 2019-06-06 | Stop reason: ALTCHOICE

## 2018-12-17 ASSESSMENT — PAIN SCALES - GENERAL: PAINLEVEL: NO PAIN (0)

## 2018-12-17 NOTE — PATIENT INSTRUCTIONS
Stay on your lexapro, I sent in refills for you.     Also, I'm starting you on a medication for anxiety called Buspar. Take 1 pill twice daily, in the morning and in the afternoon.     If you don't think it's really helping in the next week, increase to 1 1/2 pills twice daily.     I'll be seeing you back in 1 month to recheck again.     I also placed an order for a referral to mental health again to see if we can find you a counselor that does PTSD treatment.     Also, increase your itch pills (Clarinex or desloratidine) to twice daily.      Keep up the good work on NOT using alcohol.     Call or see me sooner if you are not doing well.     Soha Boggs MD

## 2018-12-18 NOTE — TELEPHONE ENCOUNTER
Prior Authorization Retail Medication Request    Medication/Dose: Clarinex 5mg, insurance allows 1 tablet daily, needs prior auth  ICD code (if different than what is on RX):  --  Previously Tried and Failed:  --  Rationale:  Prior Auth Needed for 2 tablets daily on Clarinex 5mg    Insurance Name:  [employer] [phone]  Insurance ID:  [id number]      Pharmacy Information (if different than what is on RX)  Name:  [facility]  Phone:  [phone]    Mireya Corona, Pharmacy Technician  [facility]  [phone]

## 2018-12-18 NOTE — PROGRESS NOTES
S:  Pavithra Raines is a 63 year old female here for follow up of her alcohol addiction. She missed her appt on 12/7, thought it was today (12/17).  She states she has not been drinking.  No alcohol use since before her last appointment on November 2.  She denies any seizure activity.  She continues taking her Lexapro and clonazepam and has not needed her Ativan often.  She still has a bottle with her and she is wondering if I would take it since she does not think she needs it anymore.  She is drinking more coffee.  She feels like she has replaced her alcohol addiction with coffee addiction.  She is disappointed today because her appointment with Ruddy Hdz had to get canceled because he was not covered by her insurance.  She would like to see a provider who does PTSD treatment.    She thinks the trazodone helps her sleep.  She takes 2 before bed and sometimes if she wakes up at night she takes a third.  She still feels anxious however.  Wondering if there is something better for the anxiety than Ativan.    Also in need of a B12 shot today.    She is still itching a lot from her neurodermatitis and wonders if she can increase her anti-itch pills.    Patient Active Problem List    Diagnosis Date Noted     Anemia due to vitamin B12 deficiency, unspecified B12 deficiency type 12/04/2017     Priority: Medium     Insomnia, unspecified type 12/04/2017     Priority: Medium     Other iron deficiency anemia 12/04/2017     Priority: Medium     Alcohol dependence in remission (H) 04/20/2013     Priority: Medium     Brief relapse in April 2013 after 10 years of sobriety.       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 grade 3 04/07/2011     Priority: Medium     12/15/06 ASCUS + high risk HPV  colpo in 2007 showed high grade KAITLYN and LEEP was recommended  LEEP was done in June,2007 with CAN 2/3 confirmed  10/15/07 NIL  9/30/08 NIL/neg HPV  10/21/09 NIL/neg HPV  4/5/11 NIL- repeat in one year (4/2012 12/1/17 NIL  pap/neg HR HPV. Will need pap screening until 2027, regardless of age.  Plan: cotest due 3 yr.       MDD (major depressive disorder) 04/05/2011     Priority: Medium     Needs to est primary care.       CARDIOVASCULAR SCREENING; LDL GOAL LESS THAN 160 10/31/2010     Priority: Medium     Genital herpes      Priority: Medium     IMO update changed this record. Please review for accuracy       Anxiety 08/27/2008     Priority: Medium     Status post gastric bypass for obesity 03/25/2008     Priority: Medium     Performed at [facility].       Restless legs syndrome (RLS) 05/15/2007     Priority: Medium     Hypersomnia with sleep apnea 05/15/2007     Priority: Medium     Problem list name updated by automated process. Provider to review       Esophageal reflux 04/18/2007     Priority: Medium        Past Medical History:   Diagnosis Date     Abnormal Papanicolaou smear of cervix and cervical HPV 4/07    Colposcopy 2007= HSIL     Genital herpes      History of colposcopy with cervical biopsy 06/2007    HSIL- LEEP recommended     Hypersomnia with sleep apnea, unspecified     CPAP started 2007     Other and unspecified ovarian cyst 2002 or so    s/p resection of ovary and tubes, d&c        Past Surgical History:   Procedure Laterality Date     CHOLECYSTECTOMY, OPEN  age 20s     COLONOSCOPY  3/21/2011    COMBINED COLONOSCOPY, REMOVE TUMOR/POLYP/LESION BY SNARE performed by JUAN FRANCISCO HERNANDEZ at  GI     COLONOSCOPY N/A 10/9/2017    Procedure: COMBINED COLONOSCOPY, SINGLE OR MULTIPLE BIOPSY/POLYPECTOMY BY BIOPSY;;  Surgeon: Sylvester Bright MD;  Location:  GI     COLPOSCOPY CERVIX, LOOP ELECTRODE BIOPSY, COMBINED  6/2007    CAN 2/3-patient requires yearly pap smears     ESOPHAGOSCOPY, GASTROSCOPY, DUODENOSCOPY (EGD), COMBINED N/A 2/9/2018    Procedure: COMBINED ESOPHAGOSCOPY, GASTROSCOPY, DUODENOSCOPY (EGD);  EGD;  Surgeon: Oneal Sanchez MD;  Location:  GI     GASTRIC BYPASS  3/25/2008    At [facility]      HC DILATION/CURETTAGE DIAG/THER NON OB  2002     HC ENLARGE BREAST WITH IMPLANT       HC LAPAROSCOPIC MYOMECTOMY, 1 - 4 INTRAMURAL MYOMAS =<250 GM  2002     HC REMOVAL OF BREAST IMPLANT       SALPINGO OOPHORECTOMY,R/L/MATTHEW  2002    Bilateral salpingectomy and unilateral oophorectomy        Family History   Problem Relation Age of Onset     Unknown/Adopted Father         doesn't know birth father     Family History Negative Other          O:   Vitals:    12/17/18 1328   BP: 98/60   Pulse: 106   Resp: 18   Temp: 97.8  F (36.6  C)   TempSrc: Temporal   SpO2: 100%   Weight: 53.9 kg (118 lb 12.8 oz)      Vitals noted.  Patient alert, oriented, and in no acute distress.   No smell of alcohol.   There is a smell of smoke or cloves or something scented.   Her speech is clear. Affect is normal. She makes good eye contact.   She is scratching a little bit at her skin but no obvious rash or open lesions.     She is not otherwise examined.    Assessment:    ICD-10-CM    1. Alcohol dependence in remission (H) F10.21 MENTAL HEALTH REFERRAL  - Adult; Outpatient Treatment; Individual/Couples/Family/Group Therapy/Health Psychology; [facility] [phone]; We will contact you to schedule the appointment or please call with any questions   2. PTSD (post-traumatic stress disorder) F43.10 MENTAL HEALTH REFERRAL  - Adult; Outpatient Treatment; Individual/Couples/Family/Group Therapy/Health Psychology; [facility] [phone]; We will contact you to schedule the appointment or please call with any questions   3. Anxiety F41.9 MENTAL HEALTH REFERRAL  - Adult; Outpatient Treatment; Individual/Couples/Family/Group Therapy/Health Psychology; [facility] [phone]; We will contact you to schedule the appointment or please call with any questions     busPIRone (BUSPAR) 5 MG tablet     escitalopram (LEXAPRO) 20 MG tablet   4. Neurodermatitis L28.0 desloratadine (CLARINEX)  5 MG tablet   5. Anemia due to vitamin B12 deficiency, unspecified B12 deficiency type D51.9 vitamin B-12 (CYANOCOBALAMIN) injection 1,000 mcg       P: We decided to keep her on her Lexapro and I refilled this.  We talked about adding an anxiety pill that is not addictive that she can use daily.  I am starting her on BuSpar low-dose.  We will start with 5 mg twice daily and after a week if no help she can increase to 7-1/2 mg twice daily.  See instructions below.  I encouraged her to keep her bottle of Ativan because there might come a time when she needs it again.  She feels safe not abusing it.  She has kept at this long and she feels she can just put it away and keep it for emergencies.    She will increase her Clarinex to 5 mg twice daily.    She was given a B12 shot today.    I placed a new referral for mental health evaluation to try to find another counselor for her that can do PTSD treatment.    I will see her back again in 1 month and reminded her to call or come in sooner if things are worsening or any medication issues.  Congratulated her on her sobriety for now.    Total time spent face to face with patient was 30 minutes, over 50% in counselling.     Stay on your lexapro, I sent in refills for you.     Also, I'm starting you on a medication for anxiety called Buspar. Take 1 pill twice daily, in the morning and in the afternoon.     If you don't think it's really helping in the next week, increase to 1 1/2 pills twice daily.     I'll be seeing you back in 1 month to recheck again.     I also placed an order for a referral to mental health again to see if we can find you a counselor that does PTSD treatment.     Also, increase your itch pills (Clarinex or desloratidine) to twice daily.      Keep up the good work on NOT using alcohol.     Call or see me sooner if you are not doing well.     Soha Boggs MD

## 2018-12-19 ENCOUNTER — TELEPHONE (OUTPATIENT)
Dept: FAMILY MEDICINE | Facility: CLINIC | Age: 63
End: 2018-12-19

## 2018-12-19 NOTE — TELEPHONE ENCOUNTER
Reason for Call:  Other appointment    Detailed comments: Pt states you were going to refer her to a specialist that deals with PTSD. She wanted to let you know that her insurance will be changing 1-1-19, so you may want to wait until after that to make sure the provider will be in her network. New insurance is [employer]     Phone Number Patient can be reached at: Home number on file [phone] (home)    Best Time: any     Can we leave a detailed message on this number? YES    Call taken on 12/19/2018 at 12:17 PM by Radha Alba

## 2018-12-19 NOTE — TELEPHONE ENCOUNTER
PA Initiation    Medication: Clarinex 5mg  Insurance Company: [employer] - Phone [phone] Fax [phone]  Pharmacy Filling the Rx: [address]   Filling Pharmacy Phone: [phone]  Filling Pharmacy Fax:    Start Date: 12/19/2018    Central Prior Authorization Team   Phone: [phone]

## 2018-12-21 NOTE — TELEPHONE ENCOUNTER
Prior Authorization Not Needed per Insurance    Medication: Clarinex 5mg  Insurance Company: [employer] - Phone [phone] Fax [phone]  Pharmacy Filling the Rx: [facility] [address]   Pharmacy Notified: Yes pharmacy was able to get it thorough with DUR override **Pharmacy will notify patient when script is ready for .**

## 2019-01-02 DIAGNOSIS — G47.00 INSOMNIA, UNSPECIFIED TYPE: ICD-10-CM

## 2019-01-04 RX ORDER — TRAZODONE HYDROCHLORIDE 50 MG/1
TABLET, FILM COATED ORAL
Qty: 60 TABLET | Refills: 0 | Status: SHIPPED | OUTPATIENT
Start: 2019-01-04 | End: 2019-01-21

## 2019-01-04 NOTE — TELEPHONE ENCOUNTER
"Requested Prescriptions   Pending Prescriptions Disp Refills     traZODone (DESYREL) 50 MG tablet [Pharmacy Med Name: TRAZODONE HCL 50MG TABS] 60 tablet 3    Last Written Prescription Date:  4/13/18  Last Fill Quantity: 60,  # refills: 3   Last office visit: 12/17/2018 with prescribing provider:     Future Office Visit:   Next 5 appointments (look out 90 days)    Jan 18, 2019  8:45 AM CST  SHORT with Soha Boggs MD  [address] [address]  [phone]   Feb 15, 2019 10:30 AM CST  Office Visit with Soha Boggs MD  [address] [address]  [phone]          Sig: TAKE TWO TABLETS BY MOUTH EVERY NIGHT AT BEDTIME    Serotonin Modulators Passed - 1/2/2019  4:36 PM       Passed - Recent (12 mo) or future (30 days) visit within the authorizing provider's specialty    Patient had office visit in the last 12 months or has a visit in the next 30 days with authorizing provider or within the authorizing provider's specialty.  See \"Patient Info\" tab in inbasket, or \"Choose Columns\" in Meds & Orders section of the refill encounter.           Passed - Patient is age 18 or older       Passed - No active pregnancy on record       Passed - No positive pregnancy test in past 12 months      Prescription approved per [facility] Refill Protocol.    Amanda Pickering RN      "

## 2019-01-07 DIAGNOSIS — G40.909 SEIZURE DISORDER (H): ICD-10-CM

## 2019-01-07 DIAGNOSIS — F41.9 ANXIETY: ICD-10-CM

## 2019-01-07 DIAGNOSIS — G47.00 INSOMNIA, UNSPECIFIED TYPE: ICD-10-CM

## 2019-01-07 DIAGNOSIS — G25.81 RESTLESS LEGS SYNDROME (RLS): ICD-10-CM

## 2019-01-07 RX ORDER — LORAZEPAM 1 MG/1
TABLET ORAL
Qty: 30 TABLET | Refills: 0 | Status: SHIPPED | OUTPATIENT
Start: 2019-01-07 | End: 2019-02-15

## 2019-01-07 RX ORDER — CLONAZEPAM 0.5 MG/1
TABLET ORAL
Qty: 30 TABLET | Refills: 0 | Status: SHIPPED | OUTPATIENT
Start: 2019-01-07 | End: 2019-03-04

## 2019-01-07 NOTE — TELEPHONE ENCOUNTER
Clonazepam 0.5 MG       Last Written Prescription Date:  12/7/18  Last Fill Quantity: 30,   # refills: 0  Last Office Visit: 12/17/18  Future Office visit:    Next 5 appointments (look out 90 days)    Jan 18, 2019  8:45 AM CST  SHORT with Soha Boggs MD  [facility] ([address] [address]  [phone]   Feb 15, 2019 10:30 AM CST  [facility] Visit with S[facility]gs [facility]  [facility] [address] [place] [address]  [phone]           Routing refill request to provider for review/approval because:  Drug not on the [facility], [facility] or [facility] refill protocol or controlled substance  Lorazepam 1 MG       Last Written Prescription Date:  12/7/18  Last Fill Quantity: 30,   # refills: 0  Last Office Visit: 12/17/18  Future Office visit:    Next 5 appointments (look out 90 days)    Jan 18, 2019  8:45 AM CST  SHORT with Soha Boggs MD  [facility] [address] [place] [address]  [phone]   Feb 15, 2019 10:30 AM CST  Office Visit with Soha Boggs MD  [facility] [address] [place] [address]  [phone]           Routing refill request to provider for review/approval because:  Drug not on the [facility], [facility] or [facility] refill protocol or controlled substance

## 2019-01-18 ENCOUNTER — OFFICE VISIT (OUTPATIENT)
Dept: FAMILY MEDICINE | Facility: CLINIC | Age: 64
End: 2019-01-18

## 2019-01-18 VITALS
HEART RATE: 72 BPM | HEIGHT: 64 IN | WEIGHT: 120 LBS | SYSTOLIC BLOOD PRESSURE: 90 MMHG | DIASTOLIC BLOOD PRESSURE: 54 MMHG | RESPIRATION RATE: 18 BRPM | TEMPERATURE: 97 F | BODY MASS INDEX: 20.49 KG/M2 | OXYGEN SATURATION: 100 %

## 2019-01-18 DIAGNOSIS — F10.21 ALCOHOL DEPENDENCE IN REMISSION (H): Primary | ICD-10-CM

## 2019-01-18 DIAGNOSIS — F33.9 EPISODE OF RECURRENT MAJOR DEPRESSIVE DISORDER, UNSPECIFIED DEPRESSION EPISODE SEVERITY (H): ICD-10-CM

## 2019-01-18 DIAGNOSIS — E53.8 VITAMIN B12 DEFICIENCY (NON ANEMIC): ICD-10-CM

## 2019-01-18 DIAGNOSIS — G47.00 INSOMNIA, UNSPECIFIED TYPE: ICD-10-CM

## 2019-01-18 DIAGNOSIS — F41.9 ANXIETY: ICD-10-CM

## 2019-01-18 DIAGNOSIS — K28.9 GASTROJEJUNAL ULCER: ICD-10-CM

## 2019-01-18 DIAGNOSIS — G25.81 RESTLESS LEGS SYNDROME (RLS): ICD-10-CM

## 2019-01-18 DIAGNOSIS — G40.909 SEIZURE DISORDER (H): ICD-10-CM

## 2019-01-18 PROCEDURE — 99214 OFFICE O/P EST MOD 30 MIN: CPT | Performed by: FAMILY MEDICINE

## 2019-01-18 ASSESSMENT — PAIN SCALES - GENERAL: PAINLEVEL: NO PAIN (0)

## 2019-01-18 ASSESSMENT — ANXIETY QUESTIONNAIRES
3. WORRYING TOO MUCH ABOUT DIFFERENT THINGS: NEARLY EVERY DAY
5. BEING SO RESTLESS THAT IT IS HARD TO SIT STILL: SEVERAL DAYS
1. FEELING NERVOUS, ANXIOUS, OR ON EDGE: SEVERAL DAYS
7. FEELING AFRAID AS IF SOMETHING AWFUL MIGHT HAPPEN: MORE THAN HALF THE DAYS
6. BECOMING EASILY ANNOYED OR IRRITABLE: NOT AT ALL
IF YOU CHECKED OFF ANY PROBLEMS ON THIS QUESTIONNAIRE, HOW DIFFICULT HAVE THESE PROBLEMS MADE IT FOR YOU TO DO YOUR WORK, TAKE CARE OF THINGS AT HOME, OR GET ALONG WITH OTHER PEOPLE: NOT DIFFICULT AT ALL
2. NOT BEING ABLE TO STOP OR CONTROL WORRYING: MORE THAN HALF THE DAYS
GAD7 TOTAL SCORE: 9

## 2019-01-18 ASSESSMENT — MIFFLIN-ST. JEOR: SCORE: 1094.32

## 2019-01-18 ASSESSMENT — PATIENT HEALTH QUESTIONNAIRE - PHQ9
SUM OF ALL RESPONSES TO PHQ QUESTIONS 1-9: 15
5. POOR APPETITE OR OVEREATING: NOT AT ALL

## 2019-01-18 NOTE — PROGRESS NOTES
"  SUBJECTIVE:   Pavithra Raines is a 63 year old female who presents to clinic today for the following health issues:       Alcohol dependence in remission (H)  Anxiety  Episode of recurrent major depressive disorder, unspecified depression episode severity (H)  Vitamin B12 deficiency (non anemic)  Insomnia, unspecified type  Restless legs syndrome (RLS)  Seizure disorder (H)  Gastrojejunal ulcer     She quit drinking several months ago but then she had a \"slip up\" on New Years Michelle. She drank 3 drinks of Louann/coke because her SO and her neighbors were drinking and they told her \"you can have one\", then one led to 2 and 2 led to 3.  Then she quit.   She felt horrible and hungover the next day. She doesn't want to drink again.   She is using Ativan approximately twice daily. She is not really using the Klonopin often.      Depression and Anxiety Follow-Up    Status since last visit: Improved but now getting bad again.  She did get a new counselor, saw Ruddy Hdz. She thought it went well and he told her he thinks she is on the right meds. She finds the Buspar helpful.  She will be going back to see him again on 1/24.  She also has new insurance.      Other associated symptoms:None    Complicating factors:     Significant life event: No     Current substance abuse: Alcohol-New Years Michelle as above.     PHQ 5/10/2018 10/1/2018 1/18/2019   PHQ-9 Total Score 18 13 15   Q9: Suicide Ideation Nearly every day Several days Nearly every day   F/U: Thoughts of suicide or self-harm No - Yes   F/U: Self harm-plan - - No   F/U: Self-harm action - - No   F/U: Safety concerns No - No     DANYEL-7 SCORE 6/3/2016 1/18/2019   Total Score 5 9     see below     PHQ-9  English  PHQ-9   Any Language  DANYEL-7  Suicide Assessment Five-step Evaluation and Treatment (SAFE-T)    Amount of exercise or physical activity: 6-7 days/week for an average of greater than 60 minutes, she walks a lot.     Problems taking medications regularly: " No    Medication side effects: none    Diet: regular (no restrictions)    She is working on keeping her weight stable. She is still smoking about 1 1/2 ppd.    She is taking her omeprazole twice daily.  Not using carafate.   She still has diarrhea fairly often, wondering if it's ok to use OTC meds or something to help slow it down.     Problem list and histories reviewed & adjusted, as indicated.  Additional history: as documented    Patient Active Problem List   Diagnosis     Esophageal reflux     Restless legs syndrome (RLS)     Hypersomnia with sleep apnea     Anxiety     Status post gastric bypass for obesity     Genital herpes     CARDIOVASCULAR SCREENING; LDL GOAL LESS THAN 160     MDD (major depressive disorder)     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 grade 3     Alcohol dependence in remission (H)     Anemia due to vitamin B12 deficiency, unspecified B12 deficiency type     Insomnia, unspecified type     Other iron deficiency anemia     Past Surgical History:   Procedure Laterality Date     CHOLECYSTECTOMY, OPEN  age 20s     COLONOSCOPY  3/21/2011    COMBINED COLONOSCOPY, REMOVE TUMOR/POLYP/LESION BY SNARE performed by JUAN FRANCISCO HERNANDEZ at  GI     COLONOSCOPY N/A 10/9/2017    Procedure: COMBINED COLONOSCOPY, SINGLE OR MULTIPLE BIOPSY/POLYPECTOMY BY BIOPSY;;  Surgeon: Sylvester Bright MD;  Location:  GI     COLPOSCOPY CERVIX, LOOP ELECTRODE BIOPSY, COMBINED  6/2007    CAN 2/3-patient requires yearly pap smears     ESOPHAGOSCOPY, GASTROSCOPY, DUODENOSCOPY (EGD), COMBINED N/A 2/9/2018    Procedure: COMBINED ESOPHAGOSCOPY, GASTROSCOPY, DUODENOSCOPY (EGD);  EGD;  Surgeon: Oneal Sanchez MD;  Location:  GI     GASTRIC BYPASS  3/25/2008    At [facility]     HC DILATION/CURETTAGE DIAG/THER NON OB  2002     HC ENLARGE BREAST WITH IMPLANT       HC LAPAROSCOPIC MYOMECTOMY, 1 - 4 INTRAMURAL MYOMAS =<250 GM  2002     HC REMOVAL OF BREAST IMPLANT       SALPINGO OOPHORECTOMY,R/L/MATTHEW  2002     "Bilateral salpingectomy and unilateral oophorectomy       Social History     Tobacco Use     Smoking status: Current Some Day Smoker     Packs/day: 2.00     Last attempt to quit: 2001     Years since quittin.4     Smokeless tobacco: Never Used     Tobacco comment: 2 ppd at this time (chain smoking) 10/2012   Substance Use Topics     Alcohol use: Yes     Comment: 2-3 boxes of wine per week     Family History   Problem Relation Age of Onset     Unknown/Adopted Father         doesn't know birth father     Family History Negative Other            Reviewed and updated as needed this visit by clinical staff  Tobacco  Allergies  Meds  Problems  Med Hx  Surg Hx  Fam Hx       Reviewed and updated as needed this visit by Provider  Tobacco  Allergies  Meds  Problems  Med Hx  Surg Hx  Fam Hx         ROS:  10 pt ROS is otherwise negative except as noted in HPI.      OBJECTIVE:     BP 90/54   Pulse 72   Temp 97  F (36.1  C) (Temporal)   Resp 18   Ht 1.626 m (5' 4\")   Wt 54.4 kg (120 lb)   LMP  (LMP Unknown)   SpO2 100%   Breastfeeding? No   BMI 20.60 kg/m    Body mass index is 20.6 kg/m .  Neck:  Supple without lymphadenopathy, JVD or masses.   CV:  RRR without murmur.   Respiratory:  Lungs clear to auscultation bilaterally.     Diagnostic Test Results:  None today     ASSESSMENT/PLAN:       ICD-10-CM    1. Alcohol dependence in remission (H) F10.21    2. Anxiety F41.9    3. Episode of recurrent major depressive disorder, unspecified depression episode severity (H) F33.9    4. Vitamin B12 deficiency (non anemic) E53.8    5. Insomnia, unspecified type G47.00 traZODone (DESYREL) 50 MG tablet   6. Restless legs syndrome (RLS) G25.81    7. Seizure disorder (H) G40.909    8. Gastrojejunal ulcer K28.9      We discussed the need to have Jacky abstain from drinking especially when around her, and to help encourage her in her efforts for sobriety. Also she cannot hang around her neighbors if they are going " to encourage her to drink.   She was given a B12 injection today as she was due.   Had her complete REBECCA forms for me to communicate with Ruddy Andreina regarding her treatment plan. For now I did not make any changes to her anxiety medication.   I am going to see her back in 2 months, she'll have probably 3-4 visits with Ruddy between now and then.   Will try decreasing her Omeprazole, as she is not drinking often she may do better on a lower dose.   Will allow her to use OTC anti-diarrheals.   She'll call when she needs refill on her Ativan.   Encouraged her to quit smoking but for now she is not ready.     See Patient Instructions below.       Try reducing your Omeprazole to once daily for the next few weeks and see if the diarrhea slows down.     Also you can use over the counter diarrhea medications such as Imodium.      See me back again in 2 months to follow up on your sobriety.     You will still need to come in 1 month for your B12 injection (shot).       Soha Boggs MD  [facility]

## 2019-01-18 NOTE — NURSING NOTE
The following medication was given:     MEDICATION: Vitamin B12  1000mcg  ROUTE: IM  SITE: Deltoid - Left  DOSE: 1ml  LOT #: [id number]  :  [employer] [place]  EXPIRATION DATE:  5/2020  NDC#: 1311-4385-75  Luz Stiles CMA

## 2019-01-18 NOTE — PATIENT INSTRUCTIONS
Try reducing your Omeprazole to once daily for the next few weeks and see if the diarrhea slows down.     Also you can use over the counter diarrhea medications such as Imodium.      See me back again in 2 months to follow up on your sobriety.     You will still need to come in 1 month for your B12 injection (shot).     Soha Boggs MD

## 2019-01-19 ASSESSMENT — ANXIETY QUESTIONNAIRES: GAD7 TOTAL SCORE: 9

## 2019-01-21 RX ORDER — TRAZODONE HYDROCHLORIDE 50 MG/1
TABLET, FILM COATED ORAL
Qty: 60 TABLET | Refills: 0 | Status: CANCELLED | OUTPATIENT
Start: 2019-01-21

## 2019-01-21 RX ORDER — LORAZEPAM 1 MG/1
TABLET ORAL
Qty: 30 TABLET | Refills: 0 | Status: CANCELLED | OUTPATIENT
Start: 2019-01-21

## 2019-01-21 RX ORDER — BUSPIRONE HYDROCHLORIDE 5 MG/1
5 TABLET ORAL 2 TIMES DAILY
Qty: 90 TABLET | Refills: 1 | Status: CANCELLED | OUTPATIENT
Start: 2019-01-21

## 2019-01-21 RX ORDER — CLONAZEPAM 0.5 MG/1
TABLET ORAL
Qty: 30 TABLET | Refills: 0 | Status: CANCELLED | OUTPATIENT
Start: 2019-01-21

## 2019-01-21 RX ORDER — ESCITALOPRAM OXALATE 20 MG/1
20 TABLET ORAL DAILY
Qty: 90 TABLET | Refills: 1 | Status: CANCELLED | OUTPATIENT
Start: 2019-01-21

## 2019-01-21 RX ORDER — OMEPRAZOLE 40 MG/1
40 CAPSULE, DELAYED RELEASE ORAL 2 TIMES DAILY
Qty: 180 CAPSULE | Refills: 3 | Status: CANCELLED | OUTPATIENT
Start: 2019-01-21

## 2019-01-21 RX ORDER — TRAZODONE HYDROCHLORIDE 50 MG/1
TABLET, FILM COATED ORAL
Qty: 60 TABLET | Refills: 3 | Status: SHIPPED | OUTPATIENT
Start: 2019-01-21 | End: 2019-03-15

## 2019-01-29 ENCOUNTER — MEDICAL CORRESPONDENCE (OUTPATIENT)
Dept: HEALTH INFORMATION MANAGEMENT | Facility: CLINIC | Age: 64
End: 2019-01-29

## 2019-02-15 ENCOUNTER — ALLIED HEALTH/NURSE VISIT (OUTPATIENT)
Dept: FAMILY MEDICINE | Facility: CLINIC | Age: 64
End: 2019-02-15
Payer: COMMERCIAL

## 2019-02-15 DIAGNOSIS — G40.909 SEIZURE DISORDER (H): ICD-10-CM

## 2019-02-15 DIAGNOSIS — E53.8 VITAMIN B12 DEFICIENCY (NON ANEMIC): Primary | ICD-10-CM

## 2019-02-15 PROCEDURE — 96372 THER/PROPH/DIAG INJ SC/IM: CPT

## 2019-02-15 RX ORDER — LORAZEPAM 1 MG/1
TABLET ORAL
Qty: 30 TABLET | Refills: 0 | Status: SHIPPED | OUTPATIENT
Start: 2019-02-15 | End: 2019-03-04

## 2019-02-15 RX ADMIN — CYANOCOBALAMIN 1000 MCG: 1000 INJECTION, SOLUTION INTRAMUSCULAR; SUBCUTANEOUS at 10:47

## 2019-02-15 NOTE — TELEPHONE ENCOUNTER
Pt was in for her B12 injection today. Pt is requesting a refill on Lorazepam today ASAP. States she is out and doesn't want to have to come back to the clinic to .   Lorazepam      Last Written Prescription Date:  1/7/19  Last Fill Quantity: 30,   # refills: 0  Last Office Visit: 1/18/19  Future Office visit:    Next 5 appointments (look out 90 days)    Mar 15, 2019 10:15 AM CDT  SHORT with Soha Boggs MD  [facility] [address] [place] [address] [phone]           Routing refill request to provider for review/approval because:  Drug not on the [facility], [facility] or [facility] refill protocol or controlled substance

## 2019-03-04 DIAGNOSIS — G40.909 SEIZURE DISORDER (H): ICD-10-CM

## 2019-03-04 DIAGNOSIS — F41.9 ANXIETY: ICD-10-CM

## 2019-03-04 DIAGNOSIS — G47.00 INSOMNIA, UNSPECIFIED TYPE: ICD-10-CM

## 2019-03-04 DIAGNOSIS — G25.81 RESTLESS LEGS SYNDROME (RLS): ICD-10-CM

## 2019-03-04 RX ORDER — CLONAZEPAM 0.5 MG/1
TABLET ORAL
Qty: 30 TABLET | Refills: 0 | Status: SHIPPED | OUTPATIENT
Start: 2019-03-04 | End: 2019-04-05

## 2019-03-04 RX ORDER — LORAZEPAM 1 MG/1
TABLET ORAL
Qty: 30 TABLET | Refills: 0 | Status: CANCELLED | OUTPATIENT
Start: 2019-03-04

## 2019-03-04 RX ORDER — LORAZEPAM 1 MG/1
TABLET ORAL
Qty: 30 TABLET | Refills: 0 | Status: SHIPPED | OUTPATIENT
Start: 2019-03-04 | End: 2019-05-31

## 2019-03-04 NOTE — TELEPHONE ENCOUNTER
Reason for Call:  Other prescription    Detailed comments: patient states she called this morning and requested a refill from both pharmacy and the clinic.  She would like to get refills of her Clonazepam and Trazodone.  She states she is out and would like to get these today if at all possible.    Phone Number Patient can be reached at: Home number on file [phone] (home)    Best Time: any    Can we leave a detailed message on this number? yes    Call taken on 3/4/2019 at 1:09 PM by Dion Herrera

## 2019-03-04 NOTE — TELEPHONE ENCOUNTER
Pt calling again. She does NOT need the Trazodone refilled. She is needing the Lorazepam and Klonopin refilled.   Thank you,  Radha Alba- Pt Rep.

## 2019-03-06 NOTE — TELEPHONE ENCOUNTER
Requested Prescriptions   Pending Prescriptions Disp Refills     LORazepam (ATIVAN) 1 MG tablet 30 tablet 0     Sig: Take 1 tablet every 1-2 hours as needed for withdrawal symptoms or seizure activity    There is no refill protocol information for this order        Last Written Prescription Date:  3/4/19  Last Fill Quantity: 30,  # refills: 0   Last office visit: 1/18/2019 with prescribing provider:     Future Office Visit:   Next 5 appointments (look out 90 days)    Mar 15, 2019 10:15 AM CDT  SHORT with Soha Boggs MD  [facility]) [address] [place] [address] [phone]           This was filled on 3/4/19.  Declined.  Closing this encounter.  Mey Mary, LATRICEN, RN

## 2019-03-07 ENCOUNTER — TELEPHONE (OUTPATIENT)
Dept: FAMILY MEDICINE | Facility: CLINIC | Age: 64
End: 2019-03-07

## 2019-03-07 NOTE — TELEPHONE ENCOUNTER
"Patient is due for a PHQ-9.  Index start date:2/01/2019  Index end date:6/01/2019    Please call patient. Pt has an appt scheduled for 3/15/2019. I have put \"Give PHQ-9\" in the appt note and will postpone the encounter. Brenda Varma CMA ([facility])      "

## 2019-03-08 ENCOUNTER — TELEPHONE (OUTPATIENT)
Dept: FAMILY MEDICINE | Facility: CLINIC | Age: 64
End: 2019-03-08

## 2019-03-08 DIAGNOSIS — F41.9 ANXIETY: ICD-10-CM

## 2019-03-08 RX ORDER — BUSPIRONE HYDROCHLORIDE 5 MG/1
10 TABLET ORAL 2 TIMES DAILY
Qty: 180 TABLET | Refills: 1 | Status: SHIPPED | OUTPATIENT
Start: 2019-03-08 | End: 2019-03-15

## 2019-03-08 NOTE — TELEPHONE ENCOUNTER
Reason for Call:  Other call back    Detailed comments: patient calling would like Dr. Boggs to call her, as she has a lot going on right now,patient didn't want to tell me what it was about.     Phone Number Patient can be reached at: Cell number on file:    Telephone Information:   Mobile [phone]       Best Time: any    Can we leave a detailed message on this number? YES    Call taken on 3/8/2019 at 10:48 AM by Karen Joy
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
## 2019-03-15 ENCOUNTER — OFFICE VISIT (OUTPATIENT)
Dept: FAMILY MEDICINE | Facility: CLINIC | Age: 64
End: 2019-03-15
Payer: COMMERCIAL

## 2019-03-15 VITALS
OXYGEN SATURATION: 100 % | TEMPERATURE: 98 F | SYSTOLIC BLOOD PRESSURE: 92 MMHG | DIASTOLIC BLOOD PRESSURE: 52 MMHG | HEART RATE: 100 BPM | BODY MASS INDEX: 20.49 KG/M2 | WEIGHT: 119.4 LBS | RESPIRATION RATE: 14 BRPM

## 2019-03-15 DIAGNOSIS — Z98.84 STATUS POST GASTRIC BYPASS FOR OBESITY: ICD-10-CM

## 2019-03-15 DIAGNOSIS — G47.00 INSOMNIA, UNSPECIFIED TYPE: ICD-10-CM

## 2019-03-15 DIAGNOSIS — F10.21 ALCOHOL DEPENDENCE IN REMISSION (H): ICD-10-CM

## 2019-03-15 DIAGNOSIS — L28.0 NEURODERMATITIS: ICD-10-CM

## 2019-03-15 DIAGNOSIS — F41.9 ANXIETY: Primary | ICD-10-CM

## 2019-03-15 LAB
ALBUMIN SERPL-MCNC: 3.5 G/DL (ref 3.4–5)
ALP SERPL-CCNC: 97 U/L (ref 40–150)
ALT SERPL W P-5'-P-CCNC: 14 U/L (ref 0–50)
ANION GAP SERPL CALCULATED.3IONS-SCNC: 6 MMOL/L (ref 3–14)
AST SERPL W P-5'-P-CCNC: 14 U/L (ref 0–45)
BILIRUB SERPL-MCNC: 0.4 MG/DL (ref 0.2–1.3)
BUN SERPL-MCNC: 10 MG/DL (ref 7–30)
CALCIUM SERPL-MCNC: 8.5 MG/DL (ref 8.5–10.1)
CHLORIDE SERPL-SCNC: 112 MMOL/L (ref 94–109)
CO2 SERPL-SCNC: 25 MMOL/L (ref 20–32)
CREAT SERPL-MCNC: 0.75 MG/DL (ref 0.52–1.04)
ERYTHROCYTE [DISTWIDTH] IN BLOOD BY AUTOMATED COUNT: 17.2 % (ref 10–15)
GFR SERPL CREATININE-BSD FRML MDRD: 86 ML/MIN/{1.73_M2}
GLUCOSE SERPL-MCNC: 91 MG/DL (ref 70–99)
HCT VFR BLD AUTO: 36.5 % (ref 35–47)
HGB BLD-MCNC: 11.1 G/DL (ref 11.7–15.7)
MCH RBC QN AUTO: 26.4 PG (ref 26.5–33)
MCHC RBC AUTO-ENTMCNC: 30.4 G/DL (ref 31.5–36.5)
MCV RBC AUTO: 87 FL (ref 78–100)
PLATELET # BLD AUTO: 310 10E9/L (ref 150–450)
POTASSIUM SERPL-SCNC: 4 MMOL/L (ref 3.4–5.3)
PROT SERPL-MCNC: 7.4 G/DL (ref 6.8–8.8)
RBC # BLD AUTO: 4.2 10E12/L (ref 3.8–5.2)
SODIUM SERPL-SCNC: 143 MMOL/L (ref 133–144)
WBC # BLD AUTO: 7.8 10E9/L (ref 4–11)

## 2019-03-15 PROCEDURE — 80053 COMPREHEN METABOLIC PANEL: CPT | Performed by: FAMILY MEDICINE

## 2019-03-15 PROCEDURE — 85027 COMPLETE CBC AUTOMATED: CPT | Performed by: FAMILY MEDICINE

## 2019-03-15 PROCEDURE — 36415 COLL VENOUS BLD VENIPUNCTURE: CPT | Performed by: FAMILY MEDICINE

## 2019-03-15 PROCEDURE — 96372 THER/PROPH/DIAG INJ SC/IM: CPT | Performed by: FAMILY MEDICINE

## 2019-03-15 PROCEDURE — 99214 OFFICE O/P EST MOD 30 MIN: CPT | Mod: 25 | Performed by: FAMILY MEDICINE

## 2019-03-15 RX ORDER — BUSPIRONE HYDROCHLORIDE 5 MG/1
10 TABLET ORAL 3 TIMES DAILY
Qty: 180 TABLET | Refills: 3 | Status: SHIPPED | OUTPATIENT
Start: 2019-03-15 | End: 2019-08-22

## 2019-03-15 RX ORDER — TRAZODONE HYDROCHLORIDE 50 MG/1
TABLET, FILM COATED ORAL
Qty: 60 TABLET | Refills: 3 | Status: SHIPPED | OUTPATIENT
Start: 2019-03-15 | End: 2023-03-06

## 2019-03-15 RX ORDER — TRIAMCINOLONE ACETONIDE 1 MG/G
CREAM TOPICAL
Qty: 60 G | Refills: 1 | Status: SHIPPED | OUTPATIENT
Start: 2019-03-15 | End: 2019-12-10

## 2019-03-15 RX ADMIN — CYANOCOBALAMIN 1000 MCG: 1000 INJECTION, SOLUTION INTRAMUSCULAR; SUBCUTANEOUS at 11:02

## 2019-03-15 ASSESSMENT — PAIN SCALES - GENERAL: PAINLEVEL: NO PAIN (0)

## 2019-03-15 ASSESSMENT — PATIENT HEALTH QUESTIONNAIRE - PHQ9: SUM OF ALL RESPONSES TO PHQ QUESTIONS 1-9: 26

## 2019-03-15 NOTE — PROGRESS NOTES
Prior to injection, verified patient identity using patient's name and date of birth.  Due to injection administration, patient instructed to remain in clinic for 15 minutes  afterwards, and to report any adverse reaction to me immediately.    Vitamin B12 1000 mcg    Drug Amount Wasted:  None.  Vial/Syringe: Single dose vial  Expiration Date:  8/2020    Luz Stiles CMA

## 2019-03-15 NOTE — TELEPHONE ENCOUNTER
Pt completed PHQ-9 at [facility].    PHQ-9 SCORE 3/15/2019   PHQ-9 Total Score -   PHQ-9 Total Score 26     Brenda Varma CMA ([facility])
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
## 2019-04-03 DIAGNOSIS — F41.9 ANXIETY: ICD-10-CM

## 2019-04-03 DIAGNOSIS — G25.81 RESTLESS LEGS SYNDROME (RLS): ICD-10-CM

## 2019-04-03 DIAGNOSIS — G47.00 INSOMNIA, UNSPECIFIED TYPE: ICD-10-CM

## 2019-04-05 RX ORDER — CLONAZEPAM 0.5 MG/1
TABLET ORAL
Qty: 30 TABLET | Refills: 0 | Status: SHIPPED | OUTPATIENT
Start: 2019-04-05 | End: 2019-04-22

## 2019-04-09 ENCOUNTER — TELEPHONE (OUTPATIENT)
Dept: FAMILY MEDICINE | Facility: CLINIC | Age: 64
End: 2019-04-09

## 2019-04-09 NOTE — TELEPHONE ENCOUNTER
Reason for call:  Patient reporting a symptom    Symptom or request: Patient was helping someone move and was carrying a mini refrigerator down two sets of steps. She is having a lot of pain ever since. She is thinking that she might have broken a rib. Wondering if she needs to be seen or if they are able to do anything for a broken rib.     Duration (how long have symptoms been present): 3 days    Have you been treated for this before? No    Additional comments:     Phone Number patient can be reached at:  Cell number on file:    Telephone Information:   Mobile [phone]     Best Time:  any    Can we leave a detailed message on this number:  YES    Call taken on 4/9/2019 at 11:04 AM by Pavithra Wilson

## 2019-04-09 NOTE — TELEPHONE ENCOUNTER
": 1955  PHONE #'s: [phone] (home)     PRESENTING PROBLEM:  C/O pain with coughing and laying in bed at night after carrying a little mini-frig. 3 days ago. She is wondering if she could of Fx a rib?  I have a sore sport on the left side. \" I was moving my Ex out and wanted everything out so I moved it myself. \"     NURSING ASSESSMENT  Description:   Said she is wearing a back brace to help her during the day.   Onset/duration:   3 days ago.   Precip. factors:   As above.   Assoc. Sx:  Just pain when she coughs hard, sneezes, or turns a certain way in bed.   Improves/worsens Sx:   same  Pain scale (1-10)   6/10 at times of movement , but not constant.   Sx specific meds:   Ibuprofen 400 mg 3 times per day since onset.   LMP/preg/breast feeding:   NA  Last exam/Tx:  Has NOT been seen .     Rn reviewed what to do for fractured rib as patient inquired about it.    Rib fractures don't need a cast like other bones. They will heal by themselves in about 4 to 6 weeks. The first 3 to 4 weeks will be the most painful. During this time deep breathing, coughing, or changing position from sitting to lying down, may cause the broken ends to move slightly.  Home care    Rest. You should not be doing any heavy lifting or strenuous exertion until the pain goes away.    It hurts to breathe when you have a broken rib. This puts you at risk of getting pneumonia from poor airflow through your lungs. To prevent this:  ? Take several very deep breaths once an hour while you're awake. Breathe out through pursed lips as if you are blowing up a balloon. If possible, actually blow up a balloon or a rubber glove. This exercise builds up pressure inside the lung and prevents collapse of the small air sacs of the lung. This exercise may cause some pain at the site of injury. This is normal.  ? You may have gotten a breathing exercise device called an incentive spirometer. Use it at least 4 times a day, or as directed.    Apply an " ice pack over the injured area for 15 to 20 minutes every 1 to 2 hours. You should do this for the first 24 to 48 hours. To make an ice pack, put ice cubes in a plastic bag that seals at the top. Wrap the bag in a clean, thin towel or cloth. Never put ice or an ice pack directly on the skin. Keep using ice packs as needed for the relief of pain and swelling.    You may use over-the-counter pain medicine to control pain, unless another pain medicine was prescribed. If you have chronic liver or kidney disease or ever had a stomach ulcer or GI (gastrointestinal) bleeding, talk with your healthcare provider before using these medicines.    If your pain is not controlled, contact your healthcare provider. Sometimes a stronger pain medicine may be needed. A nerve block can be done in case of severe pain. It will numb the nerve between the ribs.  Follow-up care  Follow up with your healthcare provider, or as advised. In rare cases, a broken rib will cause complications in the first few days that may not be clearly seen during your initial exam. This can include collapsed lung, bleeding around the lung or into the belly (abdomen), or pneumonia. So watch for the signs below.  If X-rays were taken, you will be told of any new findings that may affect your care.  Call 911  Call 911 if you have:    Dizziness, weakness or fainting    Shortness of breath with or without chest discomfort    New or worsening abdominal pain    Discomfort in other areas of your upper body such as your shoulders, jaw, neck, or arms  When to seek medical advice  Call your healthcare provider right away if any of these occur:    Increasing chest pain with breathing    Fever of 100.4 F (38 C) or above, or as directed by your healthcare provider    Congested cough, nausea, or vomiting  Date Last Reviewed: 4/1/2018 2000-2018 [employer]. [address] [facility], [place] [address]. All rights reserved. This information is not intended as a  substitute for professional medical care. Always follow your healthcare professional's instructions.    She should call back and schedule appt if pain worsens, She would need to be seen.     Will comply with recommendation: YES   If further questions/concerns or if Sx do not improve, worsen or new Sx develop, call your PCP or [facility] as soon as possible.    NOTES:  Disposition was determined by the first positive assessment question, therefore all previous assessment questions were negative.  Informed to check provider manual or call insurance company to assure coverage.    Guideline used: Rib / Back pain and RX rib in Clinical References.   Telephone Triage Protocols for Nurses, Fifth Edition, Zara Lopez RN

## 2019-04-09 NOTE — TELEPHONE ENCOUNTER
Patient Contact    Attempt # 1    Was call answered?  No.  Left message on voicemail with information to call the clinic back, [phone]. She may speak with any RN......................DIAMOND Harvey

## 2019-04-19 DIAGNOSIS — G47.00 INSOMNIA, UNSPECIFIED TYPE: ICD-10-CM

## 2019-04-19 DIAGNOSIS — F41.9 ANXIETY: ICD-10-CM

## 2019-04-19 DIAGNOSIS — G25.81 RESTLESS LEGS SYNDROME (RLS): ICD-10-CM

## 2019-04-19 NOTE — TELEPHONE ENCOUNTER
Requested Prescriptions   Pending Prescriptions Disp Refills     clonazePAM (KLONOPIN) 0.5 MG tablet 30 tablet 0     Sig: TAKE ONE TABLET BY MOUTH EVERY NIGHT AT BEDTIME AS DIRECTED       There is no refill protocol information for this order      Last Written Prescription Date:  4/5/19  Last Fill Quantity: 30,  # refills: 0   Last office visit: 3/15/2019 with prescribing provider:     Future Office Visit:   Next 5 appointments (look out 90 days)    May 24, 2019 10:15 AM CDT  SHORT with S[facility]gs MD  [facility] [address] [place] [address] [id number] [phone]       Routing refill request to provider for review/approval because:  Drug not on the [facility] refill protocol     Amanda Pickering RN

## 2019-04-22 RX ORDER — CLONAZEPAM 0.5 MG/1
0.5 TABLET ORAL 2 TIMES DAILY PRN
Qty: 45 TABLET | Refills: 0 | Status: SHIPPED | OUTPATIENT
Start: 2019-04-22 | End: 2019-08-02

## 2019-04-22 NOTE — TELEPHONE ENCOUNTER
If this is the once nightly med, she should have plenty left. Please clarify with patient how she is taking and make sure she requested this refill.   Soha Boggs MD

## 2019-04-22 NOTE — TELEPHONE ENCOUNTER
"Spoke with pt and she stated she \"may be taking more than 1 a day\". States her brother is nearing the end and her mother is not doing well. States she probably has been using more than 1 a day during more stressful times. States if she were to take 1/day now she would maybe have about 1 weeks worth of medication. Radha Fritz, [facility]    "

## 2019-04-22 NOTE — TELEPHONE ENCOUNTER
Notify her that I will change her dosage instructions for now so that she can still use it occasionally more than once a day.  I still want her to try to keep it to a minimum if possible but I understand she might need something more than once a day.  Soha Boggs MD

## 2019-04-24 NOTE — TELEPHONE ENCOUNTER
Pavithra calls about this refill again this morning to update Dr Boggs.  Pavithra states she found a bottle of Clonazepam at her home that still had #25 pills in it.  She is ok with that at this time if Dr Boggs doesn't want her to have this additional refill.

## 2019-04-26 ENCOUNTER — ALLIED HEALTH/NURSE VISIT (OUTPATIENT)
Dept: FAMILY MEDICINE | Facility: CLINIC | Age: 64
End: 2019-04-26
Payer: COMMERCIAL

## 2019-04-26 ENCOUNTER — TELEPHONE (OUTPATIENT)
Dept: FAMILY MEDICINE | Facility: CLINIC | Age: 64
End: 2019-04-26

## 2019-04-26 DIAGNOSIS — E53.8 VITAMIN B12 DEFICIENCY (NON ANEMIC): Primary | ICD-10-CM

## 2019-04-26 PROCEDURE — 96372 THER/PROPH/DIAG INJ SC/IM: CPT

## 2019-04-26 PROCEDURE — 99207 ZZC NO CHARGE NURSE ONLY: CPT

## 2019-04-26 RX ADMIN — CYANOCOBALAMIN 1000 MCG: 1000 INJECTION, SOLUTION INTRAMUSCULAR; SUBCUTANEOUS at 16:20

## 2019-04-26 NOTE — NURSING NOTE
Prior to injection, verified patient identity using patient's name and date of birth.  Due to injection administration, patient instructed to remain in clinic for 15 minutes  afterwards, and to report any adverse reaction to me immediately.    B12    Drug Amount Wasted:  None.  Vial/Syringe: Single dose vial  Expiration Date:  11/2019  Mauricio Fritz CMA

## 2019-04-26 NOTE — TELEPHONE ENCOUNTER
"Patient is due for a PHQ-9.  Index start date:1/31/2019  Index end date:5/31/2019    Please call patient. Pt has an appt scheduled for 5/24/2019. I have put \"Give PHQ-9\" in the appt note and will postpone the encounter. Brenda Varma CMA ([facility])      "

## 2019-05-24 ENCOUNTER — OFFICE VISIT (OUTPATIENT)
Dept: FAMILY MEDICINE | Facility: CLINIC | Age: 64
End: 2019-05-24
Payer: COMMERCIAL

## 2019-05-24 VITALS
SYSTOLIC BLOOD PRESSURE: 118 MMHG | DIASTOLIC BLOOD PRESSURE: 70 MMHG | TEMPERATURE: 97.5 F | OXYGEN SATURATION: 98 % | HEART RATE: 100 BPM | WEIGHT: 125.6 LBS | RESPIRATION RATE: 22 BRPM | BODY MASS INDEX: 21.56 KG/M2

## 2019-05-24 DIAGNOSIS — F33.9 EPISODE OF RECURRENT MAJOR DEPRESSIVE DISORDER, UNSPECIFIED DEPRESSION EPISODE SEVERITY (H): ICD-10-CM

## 2019-05-24 DIAGNOSIS — F41.9 ANXIETY: Primary | ICD-10-CM

## 2019-05-24 DIAGNOSIS — G40.909 SEIZURE DISORDER (H): ICD-10-CM

## 2019-05-24 PROCEDURE — 96372 THER/PROPH/DIAG INJ SC/IM: CPT | Performed by: FAMILY MEDICINE

## 2019-05-24 PROCEDURE — 99214 OFFICE O/P EST MOD 30 MIN: CPT | Mod: 25 | Performed by: FAMILY MEDICINE

## 2019-05-24 RX ORDER — LORAZEPAM 1 MG/1
TABLET ORAL
Qty: 30 TABLET | Refills: 0 | Status: CANCELLED | OUTPATIENT
Start: 2019-05-24

## 2019-05-24 RX ADMIN — CYANOCOBALAMIN 1000 MCG: 1000 INJECTION, SOLUTION INTRAMUSCULAR; SUBCUTANEOUS at 11:38

## 2019-05-24 ASSESSMENT — PATIENT HEALTH QUESTIONNAIRE - PHQ9
SUM OF ALL RESPONSES TO PHQ QUESTIONS 1-9: 11
5. POOR APPETITE OR OVEREATING: NOT AT ALL

## 2019-05-24 ASSESSMENT — ANXIETY QUESTIONNAIRES
3. WORRYING TOO MUCH ABOUT DIFFERENT THINGS: NEARLY EVERY DAY
GAD7 TOTAL SCORE: 14
6. BECOMING EASILY ANNOYED OR IRRITABLE: NOT AT ALL
2. NOT BEING ABLE TO STOP OR CONTROL WORRYING: NEARLY EVERY DAY
IF YOU CHECKED OFF ANY PROBLEMS ON THIS QUESTIONNAIRE, HOW DIFFICULT HAVE THESE PROBLEMS MADE IT FOR YOU TO DO YOUR WORK, TAKE CARE OF THINGS AT HOME, OR GET ALONG WITH OTHER PEOPLE: SOMEWHAT DIFFICULT
7. FEELING AFRAID AS IF SOMETHING AWFUL MIGHT HAPPEN: NEARLY EVERY DAY
1. FEELING NERVOUS, ANXIOUS, OR ON EDGE: NEARLY EVERY DAY
5. BEING SO RESTLESS THAT IT IS HARD TO SIT STILL: MORE THAN HALF THE DAYS
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
## 2019-05-25 ASSESSMENT — ANXIETY QUESTIONNAIRES: GAD7 TOTAL SCORE: 14

## 2019-05-28 NOTE — TELEPHONE ENCOUNTER
Pt completed PHQ-9 at [facility].    PHQ-9 SCORE 5/24/2019   PHQ-9 Total Score -   PHQ-9 Total Score 11     Brenda Varma CMA ([facility])

## 2019-05-31 DIAGNOSIS — G40.909 SEIZURE DISORDER (H): ICD-10-CM

## 2019-05-31 RX ORDER — LORAZEPAM 1 MG/1
TABLET ORAL
Qty: 30 TABLET | Refills: 0 | Status: SHIPPED | OUTPATIENT
Start: 2019-05-31 | End: 2019-07-19

## 2019-05-31 NOTE — TELEPHONE ENCOUNTER
Requested Prescriptions   Pending Prescriptions Disp Refills     LORazepam (ATIVAN) 1 MG tablet 30 tablet 0     Sig: Take 1 tablet every 1-2 hours as needed for withdrawal symptoms or seizure activity       There is no refill protocol information for this order        Last Written Prescription Date:  3/4/2019  Last Fill Quantity: 30,  # refills: 0   Last office visit: No previous visit found with prescribing provider:     Future Office Visit:   Next 5 appointments (look out 90 days)    Jul 26, 2019  1:15 PM CDT  Office Visit with Soha [facility]gs MD  [facility] [address] [place] [phone] [phone]         Routing refill request to provider for review/approval because:  Drug not on the [facility] refill protocol     Amanda Pickering RN

## 2019-06-06 DIAGNOSIS — L28.0 NEURODERMATITIS: Primary | ICD-10-CM

## 2019-06-06 NOTE — TELEPHONE ENCOUNTER
RN T'd up new medication.  This medication says it exceeds recommended dose if she takes it BID as the last one was prescribed.  This may need to be switched.  Routing to PCP for further advice.    Mey Mary RN

## 2019-06-06 NOTE — TELEPHONE ENCOUNTER
Patient's insurance excludes Desloratadine from her plan.(Out of pocket cost is 73.54) Insurance formulary is Levocetirizine and would cost 20.00 per month. Please send new Rx if you would like to change to this. Thanks  Radha Giordano [facility] Float [facility].  [facility]

## 2019-06-07 ENCOUNTER — HOSPITAL ENCOUNTER (EMERGENCY)
Facility: CLINIC | Age: 64
Discharge: HOME | End: 2019-06-07
Attending: EMERGENCY MEDICINE | Admitting: EMERGENCY MEDICINE
Payer: COMMERCIAL

## 2019-06-07 ENCOUNTER — NURSE TRIAGE (OUTPATIENT)
Dept: FAMILY MEDICINE | Facility: CLINIC | Age: 64
End: 2019-06-07

## 2019-06-07 ENCOUNTER — TELEPHONE (OUTPATIENT)
Dept: FAMILY MEDICINE | Facility: CLINIC | Age: 64
End: 2019-06-07

## 2019-06-07 VITALS
SYSTOLIC BLOOD PRESSURE: 117 MMHG | OXYGEN SATURATION: 100 % | BODY MASS INDEX: 22.14 KG/M2 | TEMPERATURE: 98.6 F | HEART RATE: 72 BPM | DIASTOLIC BLOOD PRESSURE: 76 MMHG | WEIGHT: 129 LBS | RESPIRATION RATE: 20 BRPM

## 2019-06-07 DIAGNOSIS — F43.0 ACUTE REACTION TO STRESS: ICD-10-CM

## 2019-06-07 PROCEDURE — 99283 EMERGENCY DEPT VISIT LOW MDM: CPT | Mod: Z6 | Performed by: EMERGENCY MEDICINE

## 2019-06-07 PROCEDURE — 99282 EMERGENCY DEPT VISIT SF MDM: CPT | Performed by: EMERGENCY MEDICINE

## 2019-06-07 RX ORDER — LEVOCETIRIZINE DIHYDROCHLORIDE 5 MG/1
5 TABLET, FILM COATED ORAL EVERY EVENING
Qty: 90 TABLET | Refills: 1 | Status: SHIPPED | OUTPATIENT
Start: 2019-06-07 | End: 2020-01-09

## 2019-06-07 NOTE — ED AVS SNAPSHOT
[facility] Emergency Department  [address] [place] [address]  Phone:  [phone]  Fax:  [phone]                                    Pavithra Raines   MRN: [id number]    Department:  [facility] Emergency Department   Date of Visit:  6/7/2019           After Visit Summary Signature Page    I have received my discharge instructions, and my questions have been answered. I have discussed any challenges I see with this plan with the nurse or doctor.    ..........................................................................................................................................  Patient/Patient Representative Signature      ..........................................................................................................................................  Patient Representative Print Name and Relationship to Patient    ..................................................               ................................................  Date                                   Time    ..........................................................................................................................................  Reviewed by Signature/Title    ...................................................              ..............................................  Date                                               Time          22EPIC Rev 08/18

## 2019-06-07 NOTE — TELEPHONE ENCOUNTER
Left detailed msg for pt letting her know Dr. Boggs has already left for the day from clinic. Will need to wait to address until Monday. Radha Fritz, CMA

## 2019-06-07 NOTE — ED TRIAGE NOTES
"Patient states she had a seizure at 0900 this morning that was witnessed by co-workers. \"I need a note clearing me to go back to work on Monday\".   "

## 2019-06-07 NOTE — TELEPHONE ENCOUNTER
Pavithra Raines is a 63 year old female who calls with concerns of seizures today.  She states she got up this am and noticed her face had swelling on one side. She has two bug bites on her face.  She noticed on her way to work she was drooling and had numbness in her arms/hands.  She then stopped at one of her places of employement and they called 911. The paramentics said she did not have a stroke but should go into the ED to have a CT scan done.  Pavithra declined.  She was able to have someone drive her home. Pavithra then stopped at the pharmacy and said she had another seizue.  Her legs were trembling and had to sit down in a wheel chair.  Pavithra is home now and states she feels better. She said she is working three jobs, trying to do yard work and her brother is in the hospital with cancer. She states she sometimes has seizure activity with stress.  She is now worried about her job and she may need a note to return to work.  Pavithra states she just took a klonopin and is calming down now.      RECOMMENDED DISPOSITION:  To ED, another person to drive   Will comply with recommendation: No- Barriers to comply with plan of care states she is better now but will go in if symptoms return. .  If further questions/concerns or if symptoms do not improve, worsen or new symptoms develop, call your PCP or [facility] as soon as possible.      Guideline used:  Telephone Triage Protocols for Nurses, Fifth Edition, Zara Anna RN

## 2019-06-07 NOTE — DISCHARGE INSTRUCTIONS
Return to the ER if you develop new or worsening symptoms.  Otherwise follow-up with your primary care doctor.

## 2019-06-07 NOTE — LETTER
June 7, 2019      To Whom It May Concern:      Pavithra Raines was seen in our Emergency Department today, 06/07/19.  I expect her condition to improve over the next 1 day.  She may return to work on Monday.     Sincerely,        Oneal Brooks MD

## 2019-06-07 NOTE — TELEPHONE ENCOUNTER
Reason for Call:  Other note for work    Detailed comments: Pt missed work today due to a seizure. She is needing a note stating it is OK for her to go to work on Monday. Can you write her a letter? Will  at  when ready.     Phone Number Patient can be reached at: Home number on file [phone] (home)    Best Time: any     Can we leave a detailed message on this number? YES    Call taken on 6/7/2019 at 4:14 PM by Radha Alba

## 2019-06-07 NOTE — ED PROVIDER NOTES
History     Chief Complaint   Patient presents with     Seizures     The history is provided by the patient.     Pavithra Raines is a 63 year old female who presents to the emergency department requesting a doctor's note to return to work. The patient has a history of what she calls seizure activity related to stress and PTSD. She has been under stress lately with working three jobs and her brother recently starting chemo treatments. The patient woke up this morning and had some numbness in her bilateral hands. While she was driving to work she stopped at one of her work places and was drinking coffee and was drooling down her mouth. When the patient got in her car she remembers her body shaking. Her coworkers called 911 and EMS checked the patient out. They ruled out a stroke, but EMS recommended that she come to the ED anyway. The patient did not want to come to the ED because she has had this seizure like activity several times in the past and she knew there was nothing wrong. The patient says she now needs a note saying she is able to return to work.  She had no loss of consciousness and remembers everything pull over safely to the side of the road.  She thinks this is all related to stress.  She has been seen by neurology in the past and had an EEG and was told that she does not have epilepsy.  These are stress related reactions.    Allergies:  Allergies   Allergen Reactions     Codeine Itching     Vicodin [Hydrocodone-Acetaminophen] Itching       Problem List:    Patient Active Problem List    Diagnosis Date Noted     Anemia due to vitamin B12 deficiency, unspecified B12 deficiency type 12/04/2017     Priority: Medium     Insomnia, unspecified type 12/04/2017     Priority: Medium     Other iron deficiency anemia 12/04/2017     Priority: Medium     Alcohol dependence in remission (H) 04/20/2013     Priority: Medium     Brief relapse in April 2013 after 10 years of sobriety.       CAN 3 - cervical  intraepithelial neoplasia grade 3 04/07/2011     Priority: Medium     12/15/06 ASCUS + high risk HPV  colpo in 2007 showed high grade KAITLYN and LEEP was recommended  LEEP was done in June,2007 with CAN 2/3 confirmed  10/15/07 NIL  9/30/08 NIL/neg HPV  10/21/09 NIL/neg HPV  4/5/11 NIL- repeat in one year (4/2012 12/1/17 NIL pap/neg HR HPV. Will need pap screening until 2027, regardless of age.  Plan: cotest due 3 yr.       MDD (major depressive disorder) 04/05/2011     Priority: Medium     Needs to est primary care.       CARDIOVASCULAR SCREENING; LDL GOAL LESS THAN 160 10/31/2010     Priority: Medium     Genital herpes      Priority: Medium     IMO update changed this record. Please review for accuracy       Anxiety 08/27/2008     Priority: Medium     Patient is followed by MIMI GARZA for ongoing prescription of benzodiazepines.  All refills should be approved by this provider, or covering partner.    Medication(s): Clonazepam.   Maximum quantity per month: 30  Clinic visit frequency required:      Controlled substance agreement on file: No  Benzodiazepine use reviewed by psychiatry:  No    Last [facility] website verification:  done on 4/5/19  [email or web address]         Status post gastric bypass for obesity 03/25/2008     Priority: Medium     Performed at [facility].       Restless legs syndrome (RLS) 05/15/2007     Priority: Medium     Hypersomnia with sleep apnea 05/15/2007     Priority: Medium     Problem list name updated by automated process. Provider to review       Esophageal reflux 04/18/2007     Priority: Medium        Past Medical History:    Past Medical History:   Diagnosis Date     Abnormal Papanicolaou smear of cervix and cervical HPV 4/07     Genital herpes      History of colposcopy with cervical biopsy 06/2007     Hypersomnia with sleep apnea, unspecified      Other and unspecified ovarian cyst 2002 or so       Past Surgical History:    Past Surgical History:    Procedure Laterality Date     CHOLECYSTECTOMY, OPEN  age 20s     COLONOSCOPY  3/21/2011    COMBINED COLONOSCOPY, REMOVE TUMOR/POLYP/LESION BY SNARE performed by JUAN FRANCISCO HERNANDEZ at  GI     COLONOSCOPY N/A 10/9/2017    Procedure: COMBINED COLONOSCOPY, SINGLE OR MULTIPLE BIOPSY/POLYPECTOMY BY BIOPSY;;  Surgeon: Sylvester Bright MD;  Location:  GI     COLPOSCOPY CERVIX, LOOP ELECTRODE BIOPSY, COMBINED  2007    CAN 2/3-patient requires yearly pap smears     ESOPHAGOSCOPY, GASTROSCOPY, DUODENOSCOPY (EGD), COMBINED N/A 2018    Procedure: COMBINED ESOPHAGOSCOPY, GASTROSCOPY, DUODENOSCOPY (EGD);  EGD;  Surgeon: Oneal Sanchez MD;  Location:  GI     GASTRIC BYPASS  3/25/2008    At [facility]     HC DILATION/CURETTAGE DIAG/THER NON OB       HC ENLARGE BREAST WITH IMPLANT       HC LAPAROSCOPIC MYOMECTOMY, 1 - 4 INTRAMURAL MYOMAS =<250 GM       HC REMOVAL OF BREAST IMPLANT       SALPINGO OOPHORECTOMY,R/L/MATTHEW      Bilateral salpingectomy and unilateral oophorectomy       Family History:    Family History   Problem Relation Age of Onset     Unknown/Adopted Father         doesn't know birth father     Family History Negative Other        Social History:  Marital Status:  Single [1]  Social History     Tobacco Use     Smoking status: Current Some Day Smoker     Packs/day: 2.00     Last attempt to quit: 2001     Years since quittin.8     Smokeless tobacco: Never Used     Tobacco comment: 2 ppd at this time (chain smoking) 10/2012   Substance Use Topics     Alcohol use: Yes     Comment: 2-3 boxes of wine per week     Drug use: No        Medications:      busPIRone (BUSPAR) 5 MG tablet   CALCIUM ANTACID EXTRA STRENGTH 750 MG CHEW   calcium carbonate (OS-SHON 500 MG [place]. [place]) 1250 MG tablet   clonazePAM (KLONOPIN) 0.5 MG tablet   cyanocobalamin (VITAMIN B12) 1000 MCG/ML injection   escitalopram (LEXAPRO) 20 MG tablet   levocetirizine (XYZAL) 5 MG tablet   LORazepam (ATIVAN) 1 MG  tablet   multivitamin, therapeutic with minerals (MULTI-VITAMIN) TABS tablet   omeprazole (PRILOSEC) 40 MG capsule   traZODone (DESYREL) 50 MG tablet   triamcinolone (KENALOG) 0.1 % external cream         Review of Systems   All other systems reviewed and are negative.      Physical Exam   BP: (!) 135/91  Pulse: 86  Temp: 98.6  F (37  C)  Resp: 20  Weight: 58.5 kg (129 lb)  SpO2: 100 %      Physical Exam   Constitutional: She is oriented to person, place, and time. She appears well-developed and well-nourished. No distress.   Does not smell of alcohol   HENT:   Head: Normocephalic and atraumatic.   Eyes: No scleral icterus.   Neck: Normal range of motion. Neck supple.   Cardiovascular: Normal rate.   Pulmonary/Chest: Effort normal.   Neurological: She is alert and oriented to person, place, and time.   Skin: Skin is warm and dry. No rash noted. She is not diaphoretic. No erythema. No pallor.   Psychiatric: She has a normal mood and affect. Her behavior is normal.   Nursing note and vitals reviewed.      ED Course        Procedures                 No results found for this or any previous visit (from the past 24 hour(s)).    Medications - No data to display    Assessments & Plan (with Medical Decision Making)  63-year-old female who is here for work note.  I do not think she had seizures today epilepsy apparently has been ruled out by neurology according to the patient.  She was conscious the whole time.  There is no evidence for stroke.  I think these are anxiety/stress reactions.  The patient is in agreement with that.  I think she is okay to go to back to work on Monday.  Work note given.      I have reviewed the nursing notes.    I have reviewed the findings, diagnosis, plan and need for follow up with the patient.         Medication List      There are no discharge medications for this visit.         Final diagnoses:   Acute reaction to stress     This document serves as a record of services personally performed  by Oneal Brooks MD. It was created on their behalf by Pat Berumen, a trained medical scribe. The creation of this record is based on the provider's personal observations and the statements of the patient. This document has been checked and approved by the attending provider.  Note: Chart documentation done in part with Dragon Voice Recognition software. Although reviewed after completion, some word and grammatical errors may remain.    6/7/2019   [facility] EMERGENCY DEPARTMENT     Oneal Brooks MD  06/07/19 0213

## 2019-06-07 NOTE — ED NOTES
Pt states she has been under a lot of stress lately. Feels that is why she had two seizures today.

## 2019-06-10 NOTE — TELEPHONE ENCOUNTER
Patient called back, she is in [place] working today. She is working every this week.     Patient states call at anytime -  [phone].

## 2019-06-10 NOTE — TELEPHONE ENCOUNTER
Called patient and left message for her to call clinic back. Dr. Boggs would like to have a phone visit with Pavithra today. Can you please schedule @ 1:00 if she calls back. Thank you.  Radha Kathleen CMA

## 2019-06-21 DIAGNOSIS — F41.9 ANXIETY: ICD-10-CM

## 2019-06-25 RX ORDER — ESCITALOPRAM OXALATE 20 MG/1
TABLET ORAL
Qty: 90 TABLET | Refills: 1 | Status: SHIPPED | OUTPATIENT
Start: 2019-06-25 | End: 2020-01-09

## 2019-06-25 NOTE — TELEPHONE ENCOUNTER
"Requested Prescriptions   Pending Prescriptions Disp Refills     escitalopram (LEXAPRO) 20 MG tablet [Pharmacy Med Name: ESCITALOPRAM OXALATE 20MG TABS] 90 tablet 1     Sig: TAKE ONE TABLET BY MOUTH ONCE DAILY   Last Written Prescription Date:  12/17/18  Last Fill Quantity: 90,  # refills: 1   Last office visit: No previous visit found with prescribing provider:  5/24/19   Future Office Visit:   Next 5 appointments (look out 90 days)    Jul 26, 2019  1:15 PM CDT  [facility] [address] [place] [id number] [phone]             SSRIs Protocol Passed - 6/21/2019  4:59 PM   PHQ-9 SCORE 1/18/2019 3/15/2019 5/24/2019   PHQ-9 Total Score - - -   PHQ-9 Total Score 15 26 11          Passed - Recent (12 mo) or future (30 days) visit within the authorizing provider's specialty     Patient had office visit in the last 12 months or has a visit in the next 30 days with authorizing provider or within the authorizing provider's specialty.  See \"Patient Info\" tab in inbasket, or \"Choose Columns\" in Meds & Orders section of the refill encounter.              Passed - Medication is active on med list        Passed - Patient is age 18 or older        Passed - No active pregnancy on record        Passed - No positive pregnancy test in last 12 months        Routing refill request to provider for review/approval because:  Labs out of range:  PHQ-9    LATRICE CorreaN, RN      "

## 2019-07-03 NOTE — TELEPHONE ENCOUNTER
I have attempted to contact this patient by phone with the following results: left message to return my call on answering machine- ok to leave detailed message.    I left detailed message stating she would need a phone visit IF she still needs this letter for work. I told her to call us back and let us know either way.  Jordana Ramirez, [facility]

## 2019-07-17 DIAGNOSIS — K28.9 GASTROJEJUNAL ULCER: ICD-10-CM

## 2019-07-17 DIAGNOSIS — G40.909 SEIZURE DISORDER (H): ICD-10-CM

## 2019-07-17 RX ORDER — OMEPRAZOLE 40 MG/1
CAPSULE, DELAYED RELEASE ORAL
Qty: 180 CAPSULE | Refills: 3 | Status: SHIPPED | OUTPATIENT
Start: 2019-07-17 | End: 2020-09-01

## 2019-07-17 NOTE — TELEPHONE ENCOUNTER
"Requested Prescriptions   Pending Prescriptions Disp Refills     omeprazole (PRILOSEC) 40 MG DR capsule [Pharmacy Med Name: OMEPRAZOLE 40MG CPDR] 180 capsule 3     Sig: TAKE ONE CAPSULE BY MOUTH TWICE A DAY   Last Written Prescription Date:  7/9/19  Last Fill Quantity: 180,  # refills: 3   Last office visit: 5/24/2019 with prescribing provider:     Future Office Visit:   Next 5 appointments (look out 90 days)    Aug 02, 2019 12:45 PM CDT  Office Visit with Soha Boggs MD  [facility] ([facility]) [address] [place] [address] [phone]             PPI Protocol Passed - 7/17/2019  4:26 PM        Passed - Not on Clopidogrel (unless Pantoprazole ordered)        Passed - No diagnosis of osteoporosis on record        Passed - Recent (12 mo) or future (30 days) visit within the authorizing provider's specialty     Patient had office visit in the last 12 months or has a visit in the next 30 days with authorizing provider or within the authorizing provider's specialty.  See \"Patient Info\" tab in inbasket, or \"Choose Columns\" in Meds & Orders section of the refill encounter.              Passed - Medication is active on med list        Passed - Patient is age 18 or older        Passed - No active pregnacy on record        Passed - No positive pregnancy test in past 12 months        Rx refilled per RN protocol.  YAYO Correa, RN    "

## 2019-07-18 NOTE — TELEPHONE ENCOUNTER
Requested Prescriptions   Pending Prescriptions Disp Refills     LORazepam (ATIVAN) 1 MG tablet 30 tablet 0     Sig: Take 1 tablet every 1-2 hours as needed for withdrawal symptoms or seizure activity       There is no refill protocol information for this order        Last Written Prescription Date:  5/31/19  Last Fill Quantity: 30,  # refills: 0   Last office visit: 5/24/2019 with prescribing provider:     Future Office Visit:   Next 5 appointments (look out 90 days)    Aug 02, 2019 12:45 PM CDT  Office Visit with Soha [facility]gs MD  [facility] [address] [place] [id number] [phone]         Routing refill request to provider for review/approval because:  Drug not on the [facility] refill protocol   LATRICE CorreaN, RN

## 2019-07-19 RX ORDER — LORAZEPAM 1 MG/1
TABLET ORAL
Qty: 30 TABLET | Refills: 0 | Status: SHIPPED | OUTPATIENT
Start: 2019-07-19 | End: 2019-08-22

## 2019-08-02 ENCOUNTER — OFFICE VISIT (OUTPATIENT)
Dept: FAMILY MEDICINE | Facility: CLINIC | Age: 64
End: 2019-08-02
Payer: COMMERCIAL

## 2019-08-02 VITALS
HEART RATE: 90 BPM | SYSTOLIC BLOOD PRESSURE: 104 MMHG | OXYGEN SATURATION: 98 % | BODY MASS INDEX: 22.14 KG/M2 | DIASTOLIC BLOOD PRESSURE: 60 MMHG | TEMPERATURE: 97 F | WEIGHT: 129 LBS

## 2019-08-02 DIAGNOSIS — G47.00 INSOMNIA, UNSPECIFIED TYPE: ICD-10-CM

## 2019-08-02 DIAGNOSIS — F10.21 ALCOHOL USE DISORDER, MODERATE, IN EARLY REMISSION (H): Primary | ICD-10-CM

## 2019-08-02 DIAGNOSIS — G25.81 RESTLESS LEGS SYNDROME (RLS): ICD-10-CM

## 2019-08-02 DIAGNOSIS — F41.9 ANXIETY: ICD-10-CM

## 2019-08-02 PROCEDURE — 96372 THER/PROPH/DIAG INJ SC/IM: CPT | Performed by: FAMILY MEDICINE

## 2019-08-02 PROCEDURE — 99214 OFFICE O/P EST MOD 30 MIN: CPT | Mod: 25 | Performed by: FAMILY MEDICINE

## 2019-08-02 RX ORDER — CLONAZEPAM 0.5 MG/1
0.5 TABLET ORAL 2 TIMES DAILY PRN
Qty: 45 TABLET | Refills: 0 | Status: SHIPPED | OUTPATIENT
Start: 2019-08-02 | End: 2019-09-03

## 2019-08-02 RX ADMIN — CYANOCOBALAMIN 1000 MCG: 1000 INJECTION, SOLUTION INTRAMUSCULAR; SUBCUTANEOUS at 13:46

## 2019-08-02 ASSESSMENT — ANXIETY QUESTIONNAIRES
GAD7 TOTAL SCORE: 6
4. TROUBLE RELAXING: NOT AT ALL
GAD7 TOTAL SCORE: 6
GAD7 TOTAL SCORE: 6
3. WORRYING TOO MUCH ABOUT DIFFERENT THINGS: SEVERAL DAYS
7. FEELING AFRAID AS IF SOMETHING AWFUL MIGHT HAPPEN: NEARLY EVERY DAY
6. BECOMING EASILY ANNOYED OR IRRITABLE: NOT AT ALL
1. FEELING NERVOUS, ANXIOUS, OR ON EDGE: SEVERAL DAYS
7. FEELING AFRAID AS IF SOMETHING AWFUL MIGHT HAPPEN: NEARLY EVERY DAY
2. NOT BEING ABLE TO STOP OR CONTROL WORRYING: SEVERAL DAYS
5. BEING SO RESTLESS THAT IT IS HARD TO SIT STILL: NOT AT ALL

## 2019-08-02 ASSESSMENT — PATIENT HEALTH QUESTIONNAIRE - PHQ9
SUM OF ALL RESPONSES TO PHQ QUESTIONS 1-9: 5
SUM OF ALL RESPONSES TO PHQ QUESTIONS 1-9: 5

## 2019-08-02 NOTE — NURSING NOTE
Clinic Administered Medication Documentation      Injectable Medication Documentation    Patient was given Cyanocobalamin (B-12). Prior to medication administration, verified patients identity using patient s name and date of birth. Please see MAR and medication order for additional information. Patient instructed to remain in clinic for 15 minutes.      Was entire vial of medication used? Yes  Vial/Syringe: Single dose vial  Expiration Date:  08/20  Was this medication supplied by the patient? No   Radha Fritz CMA

## 2019-08-03 ASSESSMENT — ANXIETY QUESTIONNAIRES: GAD7 TOTAL SCORE: 6

## 2019-08-14 ENCOUNTER — HOSPITAL ENCOUNTER (EMERGENCY)
Facility: CLINIC | Age: 64
Discharge: HOME | End: 2019-08-14
Attending: FAMILY MEDICINE | Admitting: FAMILY MEDICINE
Payer: COMMERCIAL

## 2019-08-14 VITALS
DIASTOLIC BLOOD PRESSURE: 105 MMHG | OXYGEN SATURATION: 100 % | RESPIRATION RATE: 30 BRPM | SYSTOLIC BLOOD PRESSURE: 138 MMHG | TEMPERATURE: 98.2 F

## 2019-08-14 DIAGNOSIS — S69.91XA FISH HOOK INJURY OF FINGER OF RIGHT HAND, INITIAL ENCOUNTER: ICD-10-CM

## 2019-08-14 PROCEDURE — 99284 EMERGENCY DEPT VISIT MOD MDM: CPT | Mod: 25 | Performed by: FAMILY MEDICINE

## 2019-08-14 PROCEDURE — 99283 EMERGENCY DEPT VISIT LOW MDM: CPT | Mod: 25 | Performed by: FAMILY MEDICINE

## 2019-08-14 PROCEDURE — 10120 INC&RMVL FB SUBQ TISS SMPL: CPT | Mod: Z6 | Performed by: FAMILY MEDICINE

## 2019-08-14 PROCEDURE — 10120 INC&RMVL FB SUBQ TISS SMPL: CPT | Performed by: FAMILY MEDICINE

## 2019-08-14 RX ORDER — CEPHALEXIN 500 MG/1
500 CAPSULE ORAL 2 TIMES DAILY
Qty: 10 CAPSULE | Refills: 0 | Status: SHIPPED | OUTPATIENT
Start: 2019-08-14 | End: 2019-08-19

## 2019-08-14 NOTE — ED AVS SNAPSHOT
[facility] Emergency Department  [address] [place] [address]  Phone:  [phone]  Fax:  [phone]                                    Pavithra Raines   MRN: [id number]    Department:  [facility] Emergency Department   Date of Visit:  8/14/2019           After Visit Summary Signature Page    I have received my discharge instructions, and my questions have been answered. I have discussed any challenges I see with this plan with the nurse or doctor.    ..........................................................................................................................................  Patient/Patient Representative Signature      ..........................................................................................................................................  Patient Representative Print Name and Relationship to Patient    ..................................................               ................................................  Date                                   Time    ..........................................................................................................................................  Reviewed by Signature/Title    ...................................................              ..............................................  Date                                               Time          22EPIC Rev 08/18

## 2019-08-15 ENCOUNTER — ALLIED HEALTH/NURSE VISIT (OUTPATIENT)
Dept: FAMILY MEDICINE | Facility: CLINIC | Age: 64
End: 2019-08-15
Payer: COMMERCIAL

## 2019-08-15 DIAGNOSIS — F10.21 ALCOHOL USE DISORDER, MODERATE, IN EARLY REMISSION (H): Primary | ICD-10-CM

## 2019-08-15 PROCEDURE — 96372 THER/PROPH/DIAG INJ SC/IM: CPT

## 2019-08-15 NOTE — DISCHARGE INSTRUCTIONS
We are sorry you got a fishhook in the right index finger.  We were able to remove this.  Keep the wound clean, and begin daily wound care by washing gently with soap and water, and covering with a bandage.  Wear a glove if you are working.  Take Keflex 500 mg 2 times a day for 5 days.

## 2019-08-15 NOTE — PROGRESS NOTES
Clinic Administered Medication Documentation      Vivitrol Documentation     Prior to injection, verified patient identity using patient's name and date of birth. Medication was administered. Please see MAR and medication order for additional information. Patient instructed to remain in clinic for 15 minutes and report any adverse reaction to staff immediately .    Indication: Vivitrol  (naltrexone for extended-release injectable suspension) is a prescription medicine used for the:    Treatment of alcohol dependence in patients who are able to abstain from  alcohol in an outpatient setting. Patients should not be actively drinking at  the  time of initial Vivitrol administration.    Prevention of relapse to opioid dependence, following opioid detoxification.    Patients must be opioid-free for a minimum of 7-10 days before starting Vivitrol  treatment.    Vivitrol should be part of a comprehensive management program that includes  psychosocial support.    1.  When was the last injection?  This was the first injection  2.  Did they check with their insurance for this calendar year?  Unknown  3.  Is there a current order in the chart?  Yes    The following steps were completed to comply with the REMS program for Vivitrol:     Reviewed information in the Medication Guide and Patient Counseling Tool,  including the serious risks of Vivitrol  and the symptoms of each risk.    Advised patient to seek prompt medical attention if they have signs or symptoms  of any of the serious risks.    Provided each patient a copy of the Medication Guide    Was entire vial of medication used? Yes  Vial/Syringe: Single dose vial  Expiration Date:  09/2021  Ginny Howard CMA ([facility])

## 2019-08-15 NOTE — ED PROVIDER NOTES
ED Provider Note     Pavithra Raines  [id number]  August 14, 2019      CC:     Chief Complaint   Patient presents with     Hand Injury          History is obtained from the patient.  She is accompanied by her significant other.    HPI: Pavithra Raines is a 63 year old female presenting with fishhook to the right index finger.  Patient states that she was stuck to a chair.  Her significant other had to cut the hook.  Injury occurred about a half an hour ago.  Patient reportedly had a seizure and cannot stop shaking.  She has a history of anxiety.  Her last tetanus shot was less than 5 years ago.    PMH/Problem List:   Past Medical History:   Diagnosis Date     Abnormal Papanicolaou smear of cervix and cervical HPV 4/07     Genital herpes      History of colposcopy with cervical biopsy 06/2007     Hypersomnia with sleep apnea, unspecified      Other and unspecified ovarian cyst 2002 or so       PSH:   Past Surgical History:   Procedure Laterality Date     CHOLECYSTECTOMY, OPEN  age 20s     COLONOSCOPY  3/21/2011    COMBINED COLONOSCOPY, REMOVE TUMOR/POLYP/LESION BY SNARE performed by UJAN FRANCISCO HERNANDEZ at  GI     COLONOSCOPY N/A 10/9/2017    Procedure: COMBINED COLONOSCOPY, SINGLE OR MULTIPLE BIOPSY/POLYPECTOMY BY BIOPSY;;  Surgeon: Sylvester Bright MD;  Location:  GI     COLPOSCOPY CERVIX, LOOP ELECTRODE BIOPSY, COMBINED  6/2007    CAN 2/3-patient requires yearly pap smears     ESOPHAGOSCOPY, GASTROSCOPY, DUODENOSCOPY (EGD), COMBINED N/A 2/9/2018    Procedure: COMBINED ESOPHAGOSCOPY, GASTROSCOPY, DUODENOSCOPY (EGD);  EGD;  Surgeon: Oneal Sanchez MD;  Location:  GI     GASTRIC BYPASS  3/25/2008    At [facility]     HC DILATION/CURETTAGE DIAG/THER NON OB  2002     HC ENLARGE BREAST WITH IMPLANT       HC LAPAROSCOPIC MYOMECTOMY, 1 - 4 INTRAMURAL MYOMAS =<250 GM  2002     HC REMOVAL OF BREAST IMPLANT       SALPINGO  OOPHORECTOMY,R/L/MATTHEW  2002    Bilateral salpingectomy and unilateral oophorectomy       MEDS:   Current Facility-Administered Medications on File Prior to Encounter:  naltrexone (VIVITROL) injection 380 mg   vitamin B-12 (CYANOCOBALAMIN) injection 1,000 mcg     Current Outpatient Medications on File Prior to Encounter:  busPIRone (BUSPAR) 5 MG tablet Take 2 tablets (10 mg) by mouth 3 times daily (Patient taking differently: Take 10 mg by mouth 3 times daily )   CALCIUM ANTACID EXTRA STRENGTH 750 MG CHEW Take 2 tablet by mouth every four hours while awake as needed and at bed   calcium carbonate (OS-SHON 500 MG [place]. [place]) 1250 MG tablet Take 1 tablet by mouth 2 times daily   clonazePAM (KLONOPIN) 0.5 MG tablet Take 1 tablet (0.5 mg) by mouth 2 times daily as needed for anxiety TAKE ONE TABLET BY MOUTH EVERY NIGHT AT BEDTIME AS DIRECTED   cyanocobalamin (VITAMIN B12) 1000 MCG/ML injection Inject 1 mL (1,000 mcg) into the muscle every 30 days   escitalopram (LEXAPRO) 20 MG tablet TAKE ONE TABLET BY MOUTH ONCE DAILY   levocetirizine (XYZAL) 5 MG tablet Take 1 tablet (5 mg) by mouth every evening   LORazepam (ATIVAN) 1 MG tablet Take 1 tablet every 1-2 hours as needed for withdrawal symptoms or seizure activity   multivitamin, therapeutic with minerals (MULTI-VITAMIN) TABS tablet Take 1 tablet by mouth daily   omeprazole (PRILOSEC) 40 MG DR capsule TAKE ONE CAPSULE BY MOUTH TWICE A DAY   traZODone (DESYREL) 50 MG tablet TAKE TWO TABLETS BY MOUTH EVERY NIGHT AT BEDTIME   triamcinolone (KENALOG) 0.1 % external cream Apply sparingly to itchy rash areas up to three times daily as needed       Allergies: Codeine and Vicodin [hydrocodone-acetaminophen]    Triage and nursing notes were reviewed.    ROS: All other systems were reviewed and are negative    Physical Exam:  Vitals:    08/14/19 1858   BP: (!) 138/105   Resp: 30   Temp: 98.2  F (36.8  C)   TempSrc: Temporal   SpO2: 100%     GENERAL APPEARANCE: Alert, anxious,  shaking  HEAD: atraumatic  EYES: PER  HENT: Normal external exam  RESP:  normal respiratory effort  EXT: [place] in the right index finger with the hook cut proximally  SKIN:  as above  NEURO: Alert and oriented, no signs of postictal state    Labs/Imaging Results:  No results found for this or any previous visit (from the past 24 hour(s)).      Procedure Note: The patient's right index finger was locally anesthetized with lidocaine without epinephrine.  The hook was pushed through the other end, and eventually grasped with a needle stiles and pulled through.      Impression:  Final diagnoses:   Fish hook injury of finger of right hand, initial encounter         ED Course & Medical Decision Making (Plan):  Pavithra Raines is a 63 year old female with an embedded fishhook to the right index finger.  This was cut fairly short, and I had difficulty getting the fishhook from the other side.  After making a small incision, was able to grasp the barbed and did not pull the rest of the hook through.  The wound was then cleaned, and dressed.  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  She would like to start an antibiotic.  Take Keflex 500 mg twice a day for 5 days.  Keep the wound clean.  Recheck as needed.    Written after-visit summary and instructions were given at the time of discharge.              Disclaimer: This note consists of words and symbols derived from keyboarding and dictation using voice recognition software.  As a result, there may be errors that have gone undetected.  Please consider this when interpreting information found in this note.       Ashley Mraia MD  08/14/19 7730

## 2019-08-16 ENCOUNTER — TELEPHONE (OUTPATIENT)
Dept: FAMILY MEDICINE | Facility: CLINIC | Age: 64
End: 2019-08-16

## 2019-08-16 NOTE — TELEPHONE ENCOUNTER
Notified that she will have to have her injections done in infusion. See therapy plan/orders.    Soha Boggs MD

## 2019-08-22 DIAGNOSIS — G40.909 SEIZURE DISORDER (H): ICD-10-CM

## 2019-08-22 DIAGNOSIS — F41.9 ANXIETY: ICD-10-CM

## 2019-08-22 NOTE — TELEPHONE ENCOUNTER
Requested Prescriptions   Pending Prescriptions Disp Refills     LORazepam (ATIVAN) 1 MG tablet 30 tablet 0     Sig: Take 1 tablet every 1-2 hours as needed for withdrawal symptoms or seizure activity       There is no refill protocol information for this order        Last Written Prescription Date:  7/19/2019  Last Fill Quantity: 30,  # refills: 0   Last office visit: 8/2/2019 with prescribing provider:     Future Office Visit:   Next 5 appointments (look out 90 days)    Oct 11, 2019 12:45 PM CDT  SHORT with [facility] [address] [place] [id number] [phone]         Seizure disorder (H) [G40.909]       Alcoholism /alcohol abuse (H) [F10.20]           Routing refill request to provider for review/approval because:  Drug not on the [facility] refill protocol     Amanda Pickering RN

## 2019-08-23 RX ORDER — LORAZEPAM 1 MG/1
TABLET ORAL
Qty: 30 TABLET | Refills: 0 | Status: SHIPPED | OUTPATIENT
Start: 2019-08-23 | End: 2019-12-10

## 2019-08-23 RX ORDER — BUSPIRONE HYDROCHLORIDE 10 MG/1
TABLET ORAL
Qty: 90 TABLET | Refills: 5 | Status: SHIPPED | OUTPATIENT
Start: 2019-08-23 | End: 2020-03-02

## 2019-08-23 NOTE — TELEPHONE ENCOUNTER
"Last Written Prescription Date:  3/15/19  Last Fill Quantity: 180,  # refills: 3   Last office visit: 8/15/2019 with prescribing provider:  Soha Boggs   Future Office Visit:   Next 5 appointments (look out 90 days)    Oct 11, 2019 12:45 PM CDT  SHORT with Soha Boggs MD  [facility]) [address] [place] [id number] [phone]         Requested Prescriptions   Pending Prescriptions Disp Refills     busPIRone (BUSPAR) 10 MG tablet [Pharmacy Med Name: BUSPIRONE HCL 10MG TABS] 90 tablet 3     Sig: TAKE ONE TABLET BY MOUTH THREE TIMES A DAY       Atypical Antidepressants Protocol Passed - 8/22/2019 10:34 AM        Passed - Recent (12 mo) or future (30 days) visit within the authorizing provider's specialty     Patient had office visit in the last 12 months or has a visit in the next 30 days with authorizing provider or within the authorizing provider's specialty.  See \"Patient Info\" tab in inbasket, or \"Choose Columns\" in Meds & Orders section of the refill encounter.              Passed - Medication active on med list        Passed - Patient is age 18 or older        Passed - No active pregnancy on record        Passed - No positive pregnancy test in past 12 mos        Routing refill request to provider for review/approval because:  Patient reported taking differently than prescribed. PCP to review sig on Rx.    Carleen Zabala RN on 8/23/2019 at 9:52 AM          Carleen Zabala RN on 8/23/2019 at 9:51 AM    "

## 2019-09-03 DIAGNOSIS — G47.00 INSOMNIA, UNSPECIFIED TYPE: ICD-10-CM

## 2019-09-03 DIAGNOSIS — G25.81 RESTLESS LEGS SYNDROME (RLS): ICD-10-CM

## 2019-09-03 DIAGNOSIS — F41.9 ANXIETY: ICD-10-CM

## 2019-09-04 RX ORDER — CLONAZEPAM 0.5 MG/1
0.5 TABLET ORAL
Qty: 45 TABLET | Refills: 0 | Status: SHIPPED | OUTPATIENT
Start: 2019-09-04 | End: 2019-12-10

## 2019-09-04 NOTE — TELEPHONE ENCOUNTER
She is a little early on this medication. I will approve it but she can't get it filled until 9/11. If she is out, let me know and we can try to fill it earlier.     Soha Boggs MD

## 2019-09-04 NOTE — TELEPHONE ENCOUNTER
Routing refill request to provider for review/approval because:  Drug not on the [facility] refill protocol     Klonopin  Last Written Prescription Date:  8/2/2019  Last Fill Quantity: 45,  # refills: 0   Last office visit: 8/2/2019 with prescribing provider:  Flakita   Future Office Visit:   Next 5 appointments (look out 90 days)    Oct 11, 2019 12:45 PM CDT  SHORT with S[facility]gs MD  [facility] [address] [place] [id number] [phone]           Requested Prescriptions   Pending Prescriptions Disp Refills     clonazePAM (KLONOPIN) 0.5 MG tablet 45 tablet 0     Sig: Take 1 tablet (0.5 mg) by mouth 2 times daily as needed for anxiety TAKE ONE TABLET BY MOUTH EVERY NIGHT AT BEDTIME AS DIRECTED       There is no refill protocol information for this order        Mel Munoz RN on 9/4/2019 at 10:01 AM

## 2019-09-06 ENCOUNTER — INFUSION THERAPY VISIT (OUTPATIENT)
Dept: INFUSION THERAPY | Facility: CLINIC | Age: 64
End: 2019-09-06
Attending: FAMILY MEDICINE
Payer: COMMERCIAL

## 2019-09-06 ENCOUNTER — TELEPHONE (OUTPATIENT)
Dept: FAMILY MEDICINE | Facility: CLINIC | Age: 64
End: 2019-09-06

## 2019-09-06 ENCOUNTER — ALLIED HEALTH/NURSE VISIT (OUTPATIENT)
Dept: FAMILY MEDICINE | Facility: CLINIC | Age: 64
End: 2019-09-06
Payer: COMMERCIAL

## 2019-09-06 VITALS
HEART RATE: 61 BPM | TEMPERATURE: 98.1 F | SYSTOLIC BLOOD PRESSURE: 122 MMHG | DIASTOLIC BLOOD PRESSURE: 71 MMHG | OXYGEN SATURATION: 100 % | RESPIRATION RATE: 16 BRPM

## 2019-09-06 DIAGNOSIS — F10.21 ALCOHOL DEPENDENCE IN REMISSION (H): Primary | ICD-10-CM

## 2019-09-06 DIAGNOSIS — D51.9 ANEMIA DUE TO VITAMIN B12 DEFICIENCY, UNSPECIFIED B12 DEFICIENCY TYPE: Primary | ICD-10-CM

## 2019-09-06 PROCEDURE — 96372 THER/PROPH/DIAG INJ SC/IM: CPT

## 2019-09-06 PROCEDURE — 25000128 H RX IP 250 OP 636: Performed by: FAMILY MEDICINE

## 2019-09-06 RX ORDER — CYANOCOBALAMIN 1000 UG/ML
1000 INJECTION, SOLUTION INTRAMUSCULAR; SUBCUTANEOUS
Status: CANCELLED
Start: 2019-10-04

## 2019-09-06 RX ADMIN — CYANOCOBALAMIN 1000 MCG: 1000 INJECTION, SOLUTION INTRAMUSCULAR; SUBCUTANEOUS at 11:32

## 2019-09-06 RX ADMIN — NALTREXONE 380 MG: KIT at 13:29

## 2019-09-06 ASSESSMENT — PAIN SCALES - GENERAL: PAINLEVEL: NO PAIN (0)

## 2019-09-06 NOTE — PROGRESS NOTES
Infusion Nursing Note:  Pavithra JALEN Olu presents today for Vivitrol injection. .    Patient seen by provider today: No   present during visit today: Not Applicable.    Note: Pt rec'd B12 injection in clinic prior to visit.  Message sent to Dr Boggs for orders to be able to get the B12 here next time she comes for her Vivitrol.  Medication guide given to patient and copy of pt counseling tool/ reviewed before med administration.     Intravenous Access:  No Intravenous access/labs at this visit.    Treatment Conditions:  Not Applicable.      Post Infusion Assessment:  Patient tolerated injection without incident.  Patient observed for 15 minutes post Vivitrol injection per protocol.       Discharge Plan:   Discharge instructions reviewed with: Patient.  Patient discharged in stable condition accompanied by: self.  Departure Mode: Ambulatory.    Serena De Leon RN, RN                        Indication: Vivitrol  (naltrexone for extended-release injectable suspension) is a prescription medicine used for the:      Treatment of alcohol dependence in patients who are able to abstain from alcohol in an outpatient setting. Patients should not be actively drinking at the time of initial Vivitrol administration    Prevention of relapse to opioid dependence, following opioid detoxification    Patients must be opioid-free for a minimum of 7-10 days before starting Vivitrol treatment.    Vivitrol should be part of a comprehensive management program that includes psychosocial support     1.  When was the last injection? 8/15/2019  2.  Did they check with their insurance for this calendar year?  yes  3.  Is there a current order in the chart?  yes  4.  Did you have the patient wait 15 minutes after the injection?  Yes   5.  Remember to use .injection under the medication notes    The following steps were completed to comply with the REMS program for Vivitrol:  Reviewed information in the Medication Guide and  Patient Counseling Tool, including the serious risks of Vivitrol  and the symptoms of each risk.  Advised patient to seek prompt medical attention if they have signs or symptoms of any of the serious risks.  Provided each patient a copy of the Medication Guide  I   To go home

## 2019-09-06 NOTE — NURSING NOTE
Clinic Administered Medication Documentation    MEDICATION LIST:   Injectable Medication Documentation    Patient was given Cyanocobalamin (B-12). Prior to medication administration, verified patients identity using patient s name and date of birth. Please see MAR and medication order for additional information. Patient instructed to remain in clinic for 15 minutes and report any adverse reaction to staff immediately .      Was entire vial of medication used? Yes  Vial/Syringe: Single dose vial  Expiration Date:  11/210  Was this medication supplied by the patient? No     Patient is requesting to get vivitrol injection today. This cannot be done in clinic anymore. [facility] through infusion. Patient was also notified of this by Dr. Boggs on 8/16/2019.   Heather Krause MA     9/6/2019

## 2019-09-06 NOTE — TELEPHONE ENCOUNTER
----- Message from Serena De Leon RN sent at 9/6/2019  1:42 PM CDT -----  Regarding: b12  Dr Tristian Arshad would like to get her B12 here in Infusion when she gets her Vivitrol.    Is that ok with you and if so, can you please enter orders in the therapy plan?    Thank you - Serena

## 2019-09-09 ENCOUNTER — TELEPHONE (OUTPATIENT)
Dept: FAMILY MEDICINE | Facility: CLINIC | Age: 64
End: 2019-09-09

## 2019-09-09 NOTE — TELEPHONE ENCOUNTER
Per KA patient needs appointment to discuss switching to pill. Patient states that she will return call to schedule appointment when she knows she is available.  Marbella Vance MA

## 2019-09-09 NOTE — TELEPHONE ENCOUNTER
Reason for Call:  Other call back    Detailed comments: Patient states this is the 2nd time she has received this Vivitrol injection and it gives her very bad side effects. Can she switch to the pill form?     Phone Number Patient can be reached at: Cell number on file:    Telephone Information:   Mobile [phone]       Best Time: any     Can we leave a detailed message on this number? YES    Call taken on 9/9/2019 at 1:29 PM by Pavithra Sanchez

## 2019-10-04 ENCOUNTER — INFUSION THERAPY VISIT (OUTPATIENT)
Dept: INFUSION THERAPY | Facility: CLINIC | Age: 64
End: 2019-10-04
Attending: FAMILY MEDICINE
Payer: COMMERCIAL

## 2019-10-04 VITALS
SYSTOLIC BLOOD PRESSURE: 106 MMHG | HEART RATE: 95 BPM | BODY MASS INDEX: 20.84 KG/M2 | OXYGEN SATURATION: 100 % | RESPIRATION RATE: 18 BRPM | WEIGHT: 122.1 LBS | TEMPERATURE: 98 F | DIASTOLIC BLOOD PRESSURE: 65 MMHG | HEIGHT: 64 IN

## 2019-10-04 DIAGNOSIS — D51.9 ANEMIA DUE TO VITAMIN B12 DEFICIENCY, UNSPECIFIED B12 DEFICIENCY TYPE: ICD-10-CM

## 2019-10-04 DIAGNOSIS — F10.21 ALCOHOL DEPENDENCE IN REMISSION (H): Primary | ICD-10-CM

## 2019-10-04 PROCEDURE — 96372 THER/PROPH/DIAG INJ SC/IM: CPT

## 2019-10-04 PROCEDURE — 25000128 H RX IP 250 OP 636: Performed by: FAMILY MEDICINE

## 2019-10-04 RX ORDER — CYANOCOBALAMIN 1000 UG/ML
1000 INJECTION, SOLUTION INTRAMUSCULAR; SUBCUTANEOUS
Status: DISCONTINUED | OUTPATIENT
Start: 2019-10-04 | End: 2019-10-04 | Stop reason: HOSPADM

## 2019-10-04 RX ORDER — CYANOCOBALAMIN 1000 UG/ML
1000 INJECTION, SOLUTION INTRAMUSCULAR; SUBCUTANEOUS
Status: CANCELLED
Start: 2019-10-31

## 2019-10-04 RX ADMIN — CYANOCOBALAMIN 1000 MCG: 1000 INJECTION, SOLUTION INTRAMUSCULAR; SUBCUTANEOUS at 09:20

## 2019-10-04 RX ADMIN — NALTREXONE 380 MG: KIT at 09:38

## 2019-10-04 ASSESSMENT — MIFFLIN-ST. JEOR: SCORE: 1098.84

## 2019-10-04 ASSESSMENT — PAIN SCALES - GENERAL: PAINLEVEL: NO PAIN (0)

## 2019-10-04 NOTE — PATIENT INSTRUCTIONS
Pt to return on 11/01/19 for Vivitrol/B12. Copies of medication list and upcoming appointments given prior to discharge.

## 2019-10-04 NOTE — PROGRESS NOTES
Indication: Vivitrol  (naltrexone for extended-release injectable suspension) is a prescription medicine used for the:      Treatment of alcohol dependence in patients who are able to abstain from alcohol in an outpatient setting. Patients should not be actively drinking at the time of initial Vivitrol administration    Prevention of relapse to opioid dependence, following opioid detoxification    Patients must be opioid-free for a minimum of 7-10 days before starting Vivitrol treatment.    Vivitrol should be part of a comprehensive management program that includes psychosocial support     1.  When was the last injection?  09/06/19  2.  Did they check with their insurance for this calendar year?  Yes  3.  Is there a current order in the chart?  Yes  4.  Did you have the patient wait 15 minutes after the injection?  Yes  5.  Remember to use .injection under the medication notes    The following steps were completed to comply with the REMS program for Vivitrol:  Reviewed information in the Medication Guide and Patient Counseling Tool, including the serious risks of Vivitrol  and the symptoms of each risk.  Advised patient to seek prompt medical attention if they have signs or symptoms of any of the serious risks.  Provided each patient a copy of the Medication Guide. Patient declined to take both handouts as she has them at home from last injection.    Infusion Nursing Note:  Pavithra Raines presents today for Vivitrol.    Patient seen by provider today: No   present during visit today: Not Applicable.    Note: Patient states she gets flu-like symptoms, achey joints, chills, malaise, the day after injection and lasts about 3-4 days. Is going to monitor this cycle and sees MD next week for follow up.    Intravenous Access:  No Intravenous access/labs at this visit.    Treatment Conditions:  See above checklist for Vivitrol.      Post Infusion Assessment:  Patient tolerated injection without  incident.  Patient observed for 15 minutes post injection per protocol.  Site patent and intact, free from redness, edema or discomfort.       Discharge Plan:   Discharge instructions reviewed with: Patient.  Patient and/or family verbalized understanding of discharge instructions and all questions answered.  Patient discharged in stable condition accompanied by: self.  Departure Mode: Ambulatory.    Sharon Mcmullen RN, RN                    \

## 2019-10-11 ENCOUNTER — OFFICE VISIT (OUTPATIENT)
Dept: FAMILY MEDICINE | Facility: CLINIC | Age: 64
End: 2019-10-11
Payer: COMMERCIAL

## 2019-10-11 VITALS
OXYGEN SATURATION: 100 % | TEMPERATURE: 98.4 F | DIASTOLIC BLOOD PRESSURE: 68 MMHG | WEIGHT: 119.8 LBS | BODY MASS INDEX: 20.56 KG/M2 | HEART RATE: 82 BPM | SYSTOLIC BLOOD PRESSURE: 116 MMHG | RESPIRATION RATE: 18 BRPM

## 2019-10-11 DIAGNOSIS — D64.9 ANEMIA, UNSPECIFIED TYPE: ICD-10-CM

## 2019-10-11 DIAGNOSIS — R20.2 PARESTHESIA OF BOTH HANDS: ICD-10-CM

## 2019-10-11 DIAGNOSIS — F10.21 ALCOHOL DEPENDENCE IN REMISSION (H): Primary | ICD-10-CM

## 2019-10-11 DIAGNOSIS — F41.9 ANXIETY: ICD-10-CM

## 2019-10-11 DIAGNOSIS — Z12.31 OTHER SCREENING MAMMOGRAM: ICD-10-CM

## 2019-10-11 DIAGNOSIS — G25.81 RESTLESS LEGS SYNDROME (RLS): ICD-10-CM

## 2019-10-11 DIAGNOSIS — D50.8 OTHER IRON DEFICIENCY ANEMIA: ICD-10-CM

## 2019-10-11 LAB
ERYTHROCYTE [DISTWIDTH] IN BLOOD BY AUTOMATED COUNT: 17.3 % (ref 10–15)
FERRITIN SERPL-MCNC: 15 NG/ML (ref 8–252)
HCT VFR BLD AUTO: 34.7 % (ref 35–47)
HGB BLD-MCNC: 10.6 G/DL (ref 11.7–15.7)
IRON SATN MFR SERPL: 4 % (ref 15–46)
IRON SERPL-MCNC: 17 UG/DL (ref 35–180)
MCH RBC QN AUTO: 26.1 PG (ref 26.5–33)
MCHC RBC AUTO-ENTMCNC: 30.5 G/DL (ref 31.5–36.5)
MCV RBC AUTO: 86 FL (ref 78–100)
PLATELET # BLD AUTO: 390 10E9/L (ref 150–450)
RBC # BLD AUTO: 4.06 10E12/L (ref 3.8–5.2)
TIBC SERPL-MCNC: 419 UG/DL (ref 240–430)
WBC # BLD AUTO: 9.2 10E9/L (ref 4–11)

## 2019-10-11 PROCEDURE — 83550 IRON BINDING TEST: CPT | Performed by: FAMILY MEDICINE

## 2019-10-11 PROCEDURE — 85027 COMPLETE CBC AUTOMATED: CPT | Performed by: FAMILY MEDICINE

## 2019-10-11 PROCEDURE — 82728 ASSAY OF FERRITIN: CPT | Performed by: FAMILY MEDICINE

## 2019-10-11 PROCEDURE — 99214 OFFICE O/P EST MOD 30 MIN: CPT | Performed by: FAMILY MEDICINE

## 2019-10-11 PROCEDURE — 83540 ASSAY OF IRON: CPT | Performed by: FAMILY MEDICINE

## 2019-10-11 PROCEDURE — 36415 COLL VENOUS BLD VENIPUNCTURE: CPT | Performed by: FAMILY MEDICINE

## 2019-10-11 RX ORDER — NALTREXONE HYDROCHLORIDE 50 MG/1
50 TABLET, FILM COATED ORAL DAILY
Qty: 30 TABLET | Refills: 1 | Status: SHIPPED | OUTPATIENT
Start: 2019-10-11 | End: 2019-12-10

## 2019-10-11 RX ORDER — FERROUS GLUCONATE 324(38)MG
TABLET ORAL
Qty: 120 TABLET | Refills: 3 | Status: SHIPPED | OUTPATIENT
Start: 2019-10-11 | End: 2021-01-19

## 2019-10-11 NOTE — PROGRESS NOTES
Subjective     Pavithra Raines is a 63 year old female who presents to clinic today for the following health issues:       Alcohol use disorder, moderate, in early remission (H)  Insomnia, unspecified type  Anxiety  Restless legs syndrome (RLS)     She is not drinking, but still dealing with a lot of anxiety and would like to go on Naltrexone to deter from alcohol use.   Has talked with her therapist about this and he recommended it.   She is using her ativan prn cravings or withdrawal symptoms, has 15 left from the Rx for 30 pills she received on 7/19.   She is using her Klonopin prn at bedtime, has 1 left from her Rx for 45 pills on 4/23.    She is taking her buspar 1 pill tid, thinks it is helpful.     She would like to see a provider named Kiran Bauer in [place] that does hypnosis for anxiety and addiction.     She is also due for her B12 injection today.   Patient Active Problem List    Diagnosis Date Noted     Anemia due to vitamin B12 deficiency, unspecified B12 deficiency type 12/04/2017     Priority: Medium     Insomnia, unspecified type 12/04/2017     Priority: Medium     Other iron deficiency anemia 12/04/2017     Priority: Medium     Alcohol dependence in remission (H) 04/20/2013     Priority: Medium     Brief relapse in April 2013 after 10 years of sobriety.       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 grade 3 04/07/2011     Priority: Medium     12/15/06 ASCUS + high risk HPV  colpo in 2007 showed high grade KAITLYN and LEEP was recommended  LEEP was done in June,2007 with CAN 2/3 confirmed  10/15/07 NIL  9/30/08 NIL/neg HPV  10/21/09 NIL/neg HPV  4/5/11 NIL- repeat in one year (4/2012 12/1/17 NIL pap/neg HR HPV. Will need pap screening until 2027, regardless of age.  Plan: cotest due 3 yr.       MDD (major depressive disorder) 04/05/2011     Priority: Medium     Needs to est primary care.       CARDIOVASCULAR SCREENING; LDL GOAL LESS THAN 160 10/31/2010     Priority: Medium     Genital herpes       Priority: Medium     IMO update changed this record. Please review for accuracy       Anxiety 08/27/2008     Priority: Medium     Patient is followed by MIMI GARZA for ongoing prescription of benzodiazepines.  All refills should be approved by this provider, or covering partner.    Medication(s): Clonazepam.   Maximum quantity per month: 30  Clinic visit frequency required:      Controlled substance agreement on file: No  Benzodiazepine use reviewed by psychiatry:  No    Last [facility] website verification:  done on 4/5/19  [email or web address]         Status post gastric bypass for obesity 03/25/2008     Priority: Medium     Performed at [facility].       Restless legs syndrome (RLS) 05/15/2007     Priority: Medium     Hypersomnia with sleep apnea 05/15/2007     Priority: Medium     Problem list name updated by automated process. Provider to review       Esophageal reflux 04/18/2007     Priority: Medium        Past Medical History:   Diagnosis Date     Abnormal Papanicolaou smear of cervix and cervical HPV 4/07    Colposcopy 2007= HSIL     Genital herpes      History of colposcopy with cervical biopsy 06/2007    HSIL- LEEP recommended     Hypersomnia with sleep apnea, unspecified     CPAP started 2007     Other and unspecified ovarian cyst 2002 or so    s/p resection of ovary and tubes, d&c        Past Surgical History:   Procedure Laterality Date     CHOLECYSTECTOMY, OPEN  age 20s     COLONOSCOPY  3/21/2011    COMBINED COLONOSCOPY, REMOVE TUMOR/POLYP/LESION BY SNARE performed by JUAN FRANCISCO HERNANDEZ at  GI     COLONOSCOPY N/A 10/9/2017    Procedure: COMBINED COLONOSCOPY, SINGLE OR MULTIPLE BIOPSY/POLYPECTOMY BY BIOPSY;;  Surgeon: Sylvester Bright MD;  Location:  GI     COLPOSCOPY CERVIX, LOOP ELECTRODE BIOPSY, COMBINED  6/2007    CAN 2/3-patient requires yearly pap smears     ESOPHAGOSCOPY, GASTROSCOPY, DUODENOSCOPY (EGD), COMBINED N/A 2/9/2018    Procedure: COMBINED  ESOPHAGOSCOPY, GASTROSCOPY, DUODENOSCOPY (EGD);  EGD;  Surgeon: Oneal Sanchez MD;  Location:  GI     GASTRIC BYPASS  3/25/2008    At [facility]     HC DILATION/CURETTAGE DIAG/THER NON OB  2002     HC ENLARGE BREAST WITH IMPLANT       HC LAPAROSCOPIC MYOMECTOMY, 1 - 4 INTRAMURAL MYOMAS =<250 GM  2002     HC REMOVAL OF BREAST IMPLANT       SALPINGO OOPHORECTOMY,R/L/MATTHEW  2002    Bilateral salpingectomy and unilateral oophorectomy        Family History   Problem Relation Age of Onset     Unknown/Adopted Father         doesn't know birth father     Lung Cancer Brother      Family History Negative Other            Amount of exercise or physical activity: always running doing things, cleaning houses, dog grooming    Problems taking medications regularly: No    Medication side effects: none    Diet: regular (no restrictions)    Review of Systems   10 pt ROS is otherwise negative except as noted in HPI.    O:  /60   Pulse 90   Temp 97  F (36.1  C) (Temporal)   Wt 58.5 kg (129 lb)   LMP  (LMP Unknown)   SpO2 98%   BMI 22.14 kg/m    Vitals noted.  Patient alert, oriented, and in no acute distress. She is coherent, looks tired and slightly disheveled but pleasant and calm.   Neck:  Supple without lymphadenopathy, JVD or masses.   CV:  RRR without murmur.   Respiratory:  Lungs clear to auscultation bilaterally.     A:      ICD-10-CM    1. Alcohol use disorder, moderate, in early remission (H) F10.21 naltrexone (VIVITROL) injection 380 mg     MENTAL HEALTH REFERRAL  - Adult; Outpatient Treatment; Chemical Dependency Treatment; Other: Not Listed - Enter Referral Details in Scheduling Comments Below     INJECTION INTRAMUSCULAR OR SUB-Q     VITAMIN B12 INJ /1000MCG   2. Insomnia, unspecified type G47.00 DISCONTINUED: clonazePAM (KLONOPIN) 0.5 MG tablet   3. Anxiety F41.9 DISCONTINUED: clonazePAM (KLONOPIN) 0.5 MG tablet   4. Restless legs syndrome (RLS) G25.81 DISCONTINUED: clonazePAM (KLONOPIN) 0.5 MG tablet       P:    I did place a referral for the provider she wants to see in [place], hopefully she can get an appointment and it will be covered by her insurance.   Also I did agree to give her a shot of Naltrexone.    I did refill her Klonopin today.   She was given her B12 injection today.   Will see her back in 2 months for ongoing follow up, continue current medications and continue abstinence from alcohol.   Follow up sooner prn.     Soha Boggs MD   Answers for HPI/ROS submitted by the patient on 8/2/2019   PHQ9 TOTAL SCORE: 5  DANYEL 7 TOTAL SCORE: 6

## 2019-10-11 NOTE — PROGRESS NOTES
"Subjective     Pavithra Raines is a 64 year old female who presents to clinic today for the following health issues:    HPI     (F10.21) Alcohol dependence in remission (H)   Comment: She has a history of Alcohol Dependence in Remission. At her last visit, she requested to start Naltrexone injections for this and wanted a referral to Kiran Bauer for hypnosis for anxiety and addiction. She has been getting Vivitrol injections since then, has had 3 times. She contacted the clinic on 2019 complaining that she has been experiencing bad side effects from the Vivitrol and she'd like to switch to the pill. I told her she needed to follow up in clinic to discuss this. Her last Vivitrol infusion was on 10/4/2019. She says that with her first Vivitrol injection, she experienced 2 days of soreness at the injection site, fatigue, and myalgias. The second time she got the injection, she experienced these symptoms as well as a fever and they lasted 4 days. Her last injection was on 10/4, 7 days ago. She experienced these symptoms again, they were slightly less severe than the time before, lasting 2-3 days. She otherwise feels that this injection works well. She hasn't been drinking. She says that \"she wouldn't have been able to get through the death of her brother and her Mom's illness if she had been drinking. She is hoping that she can switch to the Vivitrol pills because of the side effects of the injection.   Her brother  in August and her mom is very ill with advanced COPD, lives alone. This has been hard on Shanika and the family.       (G25.81) Restless legs syndrome (RLS)  Comment: The has a history of restless leg syndrome treated in the past with Mirapex. She is on Klonopin for anxiety and sleep, and says that the Klonopin helps her to sleep, but doesn't reduce the sensation of restless legs. Massaging her legs gives her some relief.      (R20.2) Paresthesia of both hands  Comment: She complains that her " bilateral hands are often numb. Her fingers occasionally cramp up/ stiffen up, sometimes so severely she has to assist with the other hand to move her fingers. She has also noticed that her fingers are not as strong as they used to be, she has been dropping things more often. She often experiences these symptoms in all her fingers, but not all at the same time, can be more severe in different fingers at different times. She does experience these symptoms in her pinky fingers as well. This wakes her up in the night occasionally.  She works as a , so using her hands a lot.     (D64.9) Anemia, unspecified type  Comment: Her HGB was low on her CBC on 3/15/2019. She is due for a recheck today.     (F41.9) Anxiety  Comment: She has a history of Anxiety treated with Buspar 10 mg tid, Lexapro 20 mg qd, Ativan 1 mg prn (hasn't used it often lately), and Klonopin 0.5 mg prn. Her brother passed away about 2 months ago and now her mother is in end stage COPD so she has been increasingly stressed (as above). She has helped care for her Mom and set her Mom up with home health care. She said that she temporarily took more Klonopin and Ativan during this stressful time, but she is feeling better and not using as much now. She says that work is going well and she feels good overall.     (Z12.31) Other screening mammogram  Comment: Her last mammogram was negative in 12/2017. She is due for a repeat mammogram.     Patient Active Problem List   Diagnosis     Esophageal reflux     Restless legs syndrome (RLS)     Hypersomnia with sleep apnea     Anxiety     Status post gastric bypass for obesity     Genital herpes     CARDIOVASCULAR SCREENING; LDL GOAL LESS THAN 160     MDD (major depressive disorder)     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 grade 3     Alcohol dependence in remission (H)     Anemia due to vitamin B12 deficiency, unspecified B12 deficiency type     Insomnia, unspecified type     Other iron deficiency  anemia     Past Surgical History:   Procedure Laterality Date     CHOLECYSTECTOMY, OPEN  age 20s     COLONOSCOPY  3/21/2011    COMBINED COLONOSCOPY, REMOVE TUMOR/POLYP/LESION BY SNARE performed by JUAN FRANCISCO HERNANDEZ at  GI     COLONOSCOPY N/A 10/9/2017    polyps, repeat 3 years     COLPOSCOPY CERVIX, LOOP ELECTRODE BIOPSY, COMBINED  2007    CAN 2/3-patient requires yearly pap smears     ESOPHAGOSCOPY, GASTROSCOPY, DUODENOSCOPY (EGD), COMBINED N/A 2018    Procedure: COMBINED ESOPHAGOSCOPY, GASTROSCOPY, DUODENOSCOPY (EGD);  EGD;  Surgeon: Oneal Sanchez MD;  Location:  GI     GASTRIC BYPASS  3/25/2008    At [facility]     HC DILATION/CURETTAGE DIAG/THER NON OB       HC ENLARGE BREAST WITH IMPLANT       HC LAPAROSCOPIC MYOMECTOMY, 1 - 4 INTRAMURAL MYOMAS =<250 GM       HC REMOVAL OF BREAST IMPLANT       SALPINGO OOPHORECTOMY,R/L/MATTHEW      Bilateral salpingectomy and unilateral oophorectomy       Social History     Tobacco Use     Smoking status: Current Some Day Smoker     Packs/day: 2.00     Last attempt to quit: 2001     Years since quittin.1     Smokeless tobacco: Never Used     Tobacco comment: 2 ppd at this time (chain smoking) 10/2012   Substance Use Topics     Alcohol use: Yes     Comment: 2-3 boxes of wine per week     Family History   Problem Relation Age of Onset     Unknown/Adopted Father         doesn't know birth father     Lung Cancer Brother      Family History Negative Other          Current Outpatient Medications   Medication Sig Dispense Refill     busPIRone (BUSPAR) 10 MG tablet TAKE ONE TABLET BY MOUTH THREE TIMES A DAY 90 tablet 5     clonazePAM (KLONOPIN) 0.5 MG tablet Take 1 tablet (0.5 mg) by mouth nightly as needed for anxiety 45 tablet 0     cyanocobalamin (VITAMIN B12) 1000 MCG/ML injection Inject 1 mL into the muscle every 30 days Given 10/04/19 [id number] Exp: See MAR 1 mL 11     escitalopram (LEXAPRO) 20 MG tablet TAKE ONE TABLET BY MOUTH ONCE  DAILY 90 tablet 1     levocetirizine (XYZAL) 5 MG tablet Take 1 tablet (5 mg) by mouth every evening 90 tablet 1     LORazepam (ATIVAN) 1 MG tablet Take 1 tablet every 1-2 hours as needed for withdrawal symptoms or seizure activity 30 tablet 0     multivitamin, therapeutic with minerals (MULTI-VITAMIN) TABS tablet Take 1 tablet by mouth daily       naltrexone (DEPADE/REVIA) 50 MG tablet Take 1 tablet (50 mg) by mouth daily 30 tablet 1     naltrexone (VIVITROL) 380 MG SUSR Inject 380 mg into the muscle every 28 days Given 10/04/19 [id number] Exp:09/21 See Mar       omeprazole (PRILOSEC) 40 MG DR capsule TAKE ONE CAPSULE BY MOUTH TWICE A  capsule 3     traZODone (DESYREL) 50 MG tablet TAKE TWO TABLETS BY MOUTH EVERY NIGHT AT BEDTIME 60 tablet 3     triamcinolone (KENALOG) 0.1 % external cream Apply sparingly to itchy rash areas up to three times daily as needed 60 g 1     CALCIUM ANTACID EXTRA STRENGTH 750 MG CHEW Take 2 tablet by mouth every four hours while awake as needed and at bed  4     calcium carbonate (OS-SHON 500 MG [place]. [place]) 1250 MG tablet Take 1 tablet by mouth 2 times daily       Allergies   Allergen Reactions     Codeine Itching     Vicodin [Hydrocodone-Acetaminophen] Itching     BP Readings from Last 3 Encounters:   10/11/19 116/68   10/04/19 106/65   09/06/19 122/71    Wt Readings from Last 3 Encounters:   10/11/19 54.3 kg (119 lb 12.8 oz)   10/04/19 55.4 kg (122 lb 1.6 oz)   08/02/19 58.5 kg (129 lb)              Review of Systems   ENT: Positive for dentures. She is following up with her dentist regularly.   10 point ROS is negative except as noted in HPI or above.       Objective    /68   Pulse 82   Temp 98.4  F (36.9  C) (Temporal)   Resp 18   Wt 54.3 kg (119 lb 12.8 oz)   LMP  (LMP Unknown)   SpO2 100%   BMI 20.56 kg/m    Body mass index is 20.56 kg/m .  Physical Exam   Vitals noted.  Patient alert, oriented, and in no acute distress. She is pleasant, in good spirits,  bright. No smell of alcohol. No shaking.  Speech is clear.    CV:  RRR without murmur. She has strong radial and ulnar pulses bilaterally. Good capillary refill of bilateral hands. Normal  strength. Hands are warm to touch.   Respiratory:  Lungs clear to auscultation bilaterally.  Extremities: warm and dry without cyanosis, clubbing or edema. Mild degenerative arthritic changes in bilateral hands.       Diagnostic Test Results:  Orders Placed This Encounter   Procedures     *MA Screening Digital Bilateral     CBC with platelets     Ferritin     Iron and iron binding capacity           Assessment & Plan   Assessment    ICD-10-CM    1. Alcohol dependence in remission (H) F10.21 naltrexone (DEPADE/REVIA) 50 MG tablet   2. Restless legs syndrome (RLS) G25.81    3. Paresthesia of both hands R20.2    4. Anemia, unspecified type D64.9 CBC with platelets     Ferritin     Iron and iron binding capacity   5. Anxiety F41.9    6. Other screening mammogram Z12.31 *MA Screening Digital Bilateral   7. Other iron deficiency anemia D50.8 ferrous gluconate (FERGON) 324 (38 Fe) MG tablet       Plan    (F10.21) Alcohol dependence in remission (H)   Plan: I am fine with her switching from the Naltrexone injection to the tablets. Prescribed Naltrexone 50 mg every day, see orders. Discussed that she should not start on this until next month, as she just got her Naltrexone injection. Encouraged her to continue to abstain from alcohol. If she is not compliant with the pills, will recommend she return to the injection. Continue counseling/therapy for addiction.     naltrexone (DEPADE/REVIA) 50 MG tablet    (G25.81) Restless legs syndrome (RLS)  Plan: Discussed stretches that she can do to relieve her restless leg syndrome. She will work on doing these nightly and monitor for improvement. If her symptoms persist, I will consider starting her back on Mirapex.     (R20.2) Paresthesia of both hands  Plan: Labs collected, will notify with  results. Discussed that her paresthesias may be related to anemia. If her blood tests do not help, I may recommend nighttime braces or an EMG. She does have one brace at home. I encouraged her to use this brace at night to see if the treated hand improves and the unbraced hand does not. Also minimize overuse at work.     (D64.9) Anemia, unspecified type  Plan: CBC, Ferritin, and Iron labs collected, will notify with results and discuss further evaluation/ treatment if needed at that time.    CBC with platelets,     Ferritin,     Iron and iron binding capacity    (F41.9) Anxiety  Plan: She will continue on Buspar and Lexapro every day and Ativan and Klonopin prn. She uses the Klonopin more at night and the ativan really only for cravings or withdrawal symptoms.     (Z12.31) Other screening mammogram  Plan: Mammogram ordered. She will schedule this at her convenience. Will notify with results and discuss further evaluation/ treatment if needed.     *MA Screening Digital Bilateral    This document serves as a record of services personally performed by Soha Boggs MD. It was created on their behalf by Ginny Fleming, a trained medical scribe. The creation of this record is based on the provider's personal observations and the statements of the patient. This document has been checked and approved by the attending provider.    Soha Boggs MD  [facility]

## 2019-10-11 NOTE — RESULT ENCOUNTER NOTE
Notify Shanika that her iron is really low again.  I am going to prescribe her iron pills to take twice daily for 1 month, then decrease to once daily.  This could be why her hands are tingling.  Also I sent a prescription for her naltrexone pills to take the place of her shot.  She can start that within a few days of when her shot would be due.  Soha Boggs MD

## 2019-10-18 ENCOUNTER — HOSPITAL ENCOUNTER (OUTPATIENT)
Dept: MAMMOGRAPHY | Facility: CLINIC | Age: 64
Discharge: HOME | End: 2019-10-18
Attending: FAMILY MEDICINE | Admitting: FAMILY MEDICINE
Payer: COMMERCIAL

## 2019-10-18 DIAGNOSIS — Z12.31 VISIT FOR SCREENING MAMMOGRAM: ICD-10-CM

## 2019-10-18 PROCEDURE — 77063 BREAST TOMOSYNTHESIS BI: CPT

## 2019-12-08 ENCOUNTER — HOSPITAL ENCOUNTER (EMERGENCY)
Facility: CLINIC | Age: 64
Discharge: HOME | End: 2019-12-08
Attending: FAMILY MEDICINE | Admitting: FAMILY MEDICINE
Payer: COMMERCIAL

## 2019-12-08 VITALS
RESPIRATION RATE: 18 BRPM | DIASTOLIC BLOOD PRESSURE: 93 MMHG | OXYGEN SATURATION: 98 % | SYSTOLIC BLOOD PRESSURE: 120 MMHG

## 2019-12-08 DIAGNOSIS — S61.012A THUMB LACERATION, LEFT, INITIAL ENCOUNTER: ICD-10-CM

## 2019-12-08 PROCEDURE — 99283 EMERGENCY DEPT VISIT LOW MDM: CPT | Performed by: FAMILY MEDICINE

## 2019-12-08 PROCEDURE — 12001 RPR S/N/AX/GEN/TRNK 2.5CM/<: CPT | Mod: Z6 | Performed by: FAMILY MEDICINE

## 2019-12-08 PROCEDURE — 99283 EMERGENCY DEPT VISIT LOW MDM: CPT | Mod: 25 | Performed by: FAMILY MEDICINE

## 2019-12-08 PROCEDURE — 12001 RPR S/N/AX/GEN/TRNK 2.5CM/<: CPT | Performed by: FAMILY MEDICINE

## 2019-12-08 NOTE — DISCHARGE INSTRUCTIONS
We are sorry that you had a laceration to the left thumb.  This was closed with 3 stitches.  Keep the dressing clean and dry for the next 12-24 hours, and then begin daily wound care.  Wash gently with soap and water, and cover with bacitracin ointment and sterile bandage.  Change the dressing more than once a day if needed.  Have your stitches removed in 10 days.  Return if you have any signs of infection--this is the time we would start an antibiotic.  I hope that everything turns out well for you with your bank and the IRS.

## 2019-12-08 NOTE — ED AVS SNAPSHOT
[facility] Emergency Department  [address] [place] [address]  Phone:  [phone]  Fax:  [phone]                                    Pavithra Raines   MRN: [id number]    Department:  [facility] Emergency Department   Date of Visit:  12/8/2019           After Visit Summary Signature Page    I have received my discharge instructions, and my questions have been answered. I have discussed any challenges I see with this plan with the nurse or doctor.    ..........................................................................................................................................  Patient/Patient Representative Signature      ..........................................................................................................................................  Patient Representative Print Name and Relationship to Patient    ..................................................               ................................................  Date                                   Time    ..........................................................................................................................................  Reviewed by Signature/Title    ...................................................              ..............................................  Date                                               Time          22EPIC Rev 08/18

## 2019-12-08 NOTE — ED PROVIDER NOTES
ED Provider Note     Pavithra Raines  [id number]  December 8, 2019      CC:     Chief Complaint   Patient presents with     Laceration          History is obtained from the patient.    HPI: Pavithra Raines is a 64 year old female presenting with laceration to the left thumb.  Patient was cooking chili, and cutting some celery with a sharp knife when she excellently cut her left thumb.  She was playing some cards with neighbors, and when she cut herself, her neighbors drove her in.  She was smoking marijuana and had to rum drinks before coming.  While here in the emergency department, patient reported that she was feeling depressed with the news of her mother being airlifted to the hospital earlier this week, and she had all of her wages garnished and money taken out of her account by the [email or web address].  She called the [employer], and is not able to deal with this until Monday.  Patient denies any suicidal plans.  She feels shaky and anxious when she is stressed out and has functional seizures.    PMH/Problem List:   Past Medical History:   Diagnosis Date     Abnormal Papanicolaou smear of cervix and cervical HPV 4/07     Genital herpes      History of colposcopy with cervical biopsy 06/2007     Hypersomnia with sleep apnea, unspecified      Other and unspecified ovarian cyst 2002 or so       PSH:   Past Surgical History:   Procedure Laterality Date     CHOLECYSTECTOMY, OPEN  age 20s     COLONOSCOPY  3/21/2011    COMBINED COLONOSCOPY, REMOVE TUMOR/POLYP/LESION BY SNARE performed by JUAN FRANCISCO HERNANDEZ at  GI     COLONOSCOPY N/A 10/9/2017    polyps, repeat 3 years     COLPOSCOPY CERVIX, LOOP ELECTRODE BIOPSY, COMBINED  6/2007    CAN 2/3-patient requires yearly pap smears     ESOPHAGOSCOPY, GASTROSCOPY, DUODENOSCOPY (EGD), COMBINED N/A 2/9/2018    Procedure: COMBINED ESOPHAGOSCOPY, GASTROSCOPY, DUODENOSCOPY (EGD);  EGD;  Surgeon: Oneal Sanchez MD;   Location: [facility]     GASTRIC BYPASS  3/25/2008    At [facility]     HC DILATION/CURETTAGE DIAG/THER NON OB       HC ENLARGE BREAST WITH IMPLANT       HC LAPAROSCOPIC MYOMECTOMY, 1 - 4 INTRAMURAL MYOMAS =<250 GM       HC REMOVAL OF BREAST IMPLANT       SALPINGO OOPHORECTOMY,R/L/MATTHEW      Bilateral salpingectomy and unilateral oophorectomy       MEDS: No current facility-administered medications on file prior to encounter.   busPIRone (BUSPAR) 10 MG tablet, TAKE ONE TABLET BY MOUTH THREE TIMES A DAY  CALCIUM ANTACID EXTRA STRENGTH 750 MG CHEW, Take 2 tablet by mouth every four hours while awake as needed and at bed  calcium carbonate (OS-SHON 500 MG [place]. [place]) 1250 MG tablet, Take 1 tablet by mouth 2 times daily  [] cephALEXin (KEFLEX) 500 MG capsule, Take 1 capsule (500 mg) by mouth 2 times daily for 5 days  clonazePAM (KLONOPIN) 0.5 MG tablet, Take 1 tablet (0.5 mg) by mouth nightly as needed for anxiety  cyanocobalamin (VITAMIN B12) 1000 MCG/ML injection, Inject 1 mL into the muscle every 30 days Given 10/04/19 [id number] Exp: See MAR  escitalopram (LEXAPRO) 20 MG tablet, TAKE ONE TABLET BY MOUTH ONCE DAILY  ferrous gluconate (FERGON) 324 (38 Fe) MG tablet, Take 1 tablet (324 mg) by mouth 2 times daily (with meals) for 30 days, THEN 1 tablet (324 mg) daily (with breakfast).  levocetirizine (XYZAL) 5 MG tablet, Take 1 tablet (5 mg) by mouth every evening  LORazepam (ATIVAN) 1 MG tablet, Take 1 tablet every 1-2 hours as needed for withdrawal symptoms or seizure activity  multivitamin, therapeutic with minerals (MULTI-VITAMIN) TABS tablet, Take 1 tablet by mouth daily  naltrexone (DEPADE/REVIA) 50 MG tablet, Take 1 tablet (50 mg) by mouth daily  naltrexone (VIVITROL) 380 MG SUSR, Inject 380 mg into the muscle every 28 days Given 10/04/19 [id number] Exp: See Mar  omeprazole (PRILOSEC) 40 MG DR capsule, TAKE ONE CAPSULE BY MOUTH TWICE A DAY  traZODone (DESYREL) 50 MG tablet, TAKE TWO  TABLETS BY MOUTH EVERY NIGHT AT BEDTIME  triamcinolone (KENALOG) 0.1 % external cream, Apply sparingly to itchy rash areas up to three times daily as needed        Allergies: Codeine and Vicodin [hydrocodone-acetaminophen]    Triage and nursing notes were reviewed.    ROS: All other systems were reviewed and are negative    Physical Exam:  Vitals:    12/08/19 0054 12/08/19 0055   BP:  (!) 120/93   Resp: 18    SpO2: 98%      GENERAL APPEARANCE: Alert, patient was shaking from a functional seizure when I walked into the room.  She is responsive and I am able to distract her enough that she stop shaking.  HEAD: atraumatic  EYES: PER  HENT: Normal external exam  RESP: Normal respiratory effort  EXT: 1 cm laceration on the radial side of the distal left thumb.  Laceration does not involve the nail  SKIN: as above  NEURO: mentation and speech normal; no focal deficits  PSYCH: Patient endorses feeling anxious and stressed out    Labs/Imaging Results:  No results found for this or any previous visit (from the past 24 hour(s)).      Procedure Note: Patient has a 1 cm laceration to the left distal thumb.  It does not involve the nail.  The wound was locally anesthetized with approximately 1/2 cc of 1% lidocaine without epinephrine.  The laceration was closed with 3 stitches.  Aftercare instructions were discussed.      Impression:  Final diagnoses:   Thumb laceration, left, initial encounter         ED Course & Medical Decision Making (Plan):  Pavithra Raines is a 64 year old female with a 1 semi-laceration to the left distal thumb.  Patient's tetanus status is up-to-date.  Her laceration was still bleeding and it was subsequently closed with 3 stitches.  Continue daily wound care, and remove stitches in approximately 10 days.  Follow-up if any signs of infection.  Patient was assessed for her suicidality, and she feels safe and would not do anything to hurt her self.  She has distal care for her mother who is in the  hospital still.  She will deal with the garnished wages and her absent bank funds on Monday.    Written after-visit summary and instructions were given at the time of discharge.              Disclaimer: This note consists of words and symbols derived from keyboarding and dictation using voice recognition software.  As a result, there may be errors that have gone undetected.  Please consider this when interpreting information found in this note.       Ashley Maria MD  12/08/19 0144

## 2019-12-08 NOTE — ED NOTES
Dr Maria states that pt is not suicidal and is not at risk of self harm at this time.  No additional mental health precautions needed.

## 2019-12-08 NOTE — ED NOTES
Tdap last in 2011.  Pt states that she has been drinking alcohol tonight and smoked pot on the way to the hospital.  Pt answered yes to the suicide questions.  Pt stated that maybe she had thoughts tonight of suicide.  Denied that laceration was an attempt.  Pt breathing into a blue emesis bag, she stated that she feels a panic attack coming and this helps.  Will have provider assess mental health.

## 2019-12-10 DIAGNOSIS — G47.00 INSOMNIA, UNSPECIFIED TYPE: ICD-10-CM

## 2019-12-10 DIAGNOSIS — G40.909 SEIZURE DISORDER (H): ICD-10-CM

## 2019-12-10 DIAGNOSIS — F41.9 ANXIETY: ICD-10-CM

## 2019-12-10 DIAGNOSIS — F10.21 ALCOHOL DEPENDENCE IN REMISSION (H): ICD-10-CM

## 2019-12-10 DIAGNOSIS — L28.0 NEURODERMATITIS: ICD-10-CM

## 2019-12-10 DIAGNOSIS — G25.81 RESTLESS LEGS SYNDROME (RLS): ICD-10-CM

## 2019-12-10 RX ORDER — TRIAMCINOLONE ACETONIDE 1 MG/G
CREAM TOPICAL
Qty: 60 G | Refills: 1 | Status: SHIPPED | OUTPATIENT
Start: 2019-12-10 | End: 2020-12-14

## 2019-12-10 NOTE — TELEPHONE ENCOUNTER
"Lorazepam  Last Written Prescription Date:  08/23/2019  Last Fill Quantity: 30,  # refills: 0   Last office visit: 10/11/2019 with prescribing provider:     Future Office Visit:      Clonazepam  Last Written Prescription Date:  09/04/2019  Last Fill Quantity: 45,  # refills: 0   Last office visit: 10/11/2019 with prescribing provider:     Future Office Visit:      Naltrexone  Last Written Prescription Date:  10/11/2019  Last Fill Quantity: 30,  # refills: 1   Last office visit: 10/11/2019 with prescribing provider:     Future Office Visit:      Triamcinolone  Last Written Prescription Date:  03/15/2019  Last Fill Quantity: 60 gm,  # refills: 1   Last office visit: 10/11/2019 with prescribing provider:     Future Office Visit:      Requested Prescriptions   Pending Prescriptions Disp Refills     triamcinolone (KENALOG) 0.1 % external cream [Pharmacy Med Name: TRIAMCINOLONE ACETONIDE 0.1% CREA] 60 g 1     Sig: APPLY SPARINGLY TO ITCHY RASH AREAS UP TO THREE TIMES A DAY AS NEEDED       Topical Steroids and Nonsteroidals Protocol Passed - 12/10/2019 11:20 AM        Passed - Patient is age 6 or older        Passed - Authorizing prescriber's most recent note related to this medication read.     If refill request is for ophthalmic use, please forward request to provider for approval.          Passed - High potency steroid not ordered        Passed - Recent (12 mo) or future (30 days) visit within the authorizing provider's specialty     Patient has had an office visit with the authorizing provider or a provider within the authorizing providers department within the previous 12 mos or has a future within next 30 days. See \"Patient Info\" tab in inbasket, or \"Choose Columns\" in Meds & Orders section of the refill encounter.              Passed - Medication is active on med list        naltrexone (DEPADE/REVIA) 50 MG tablet [Pharmacy Med Name: NALTREXONE HCL 50MG TABS] 30 tablet 1     Sig: TAKE ONE TABLET BY MOUTH ONCE DAILY "       There is no refill protocol information for this order        clonazePAM (KLONOPIN) 0.5 MG tablet [Pharmacy Med Name: CLONAZEPAM 0.5MG TABS] 45 tablet 0     Sig: TAKE ONE TABLET (0.5 MG) BY MOUTH NIGHTLY AS NEEDED FOR ANXIETY. OK TO FILL ON/AFTER 09/11/19.       There is no refill protocol information for this order        LORazepam (ATIVAN) 1 MG tablet [Pharmacy Med Name: LORAZEPAM 1MG TABS] 30 tablet 0     Sig: TAKE ONE TABLET BY MOUTH EVERY 1-2 HOURS AS NEEDED FOR WITHDRAWAL SYMPTOMS OR SEIZURE ACTIVITY       There is no refill protocol information for this order

## 2019-12-10 NOTE — TELEPHONE ENCOUNTER
Unable to fill lorazepam, clonazepam, and naltrexone.  Routing refill request to provider for review/approval because:  Drug not on the [facility] refill protocol       Karissa Luong RN BSN

## 2019-12-12 RX ORDER — NALTREXONE HYDROCHLORIDE 50 MG/1
TABLET, FILM COATED ORAL
Qty: 30 TABLET | Refills: 1 | Status: SHIPPED | OUTPATIENT
Start: 2019-12-12 | End: 2020-01-30

## 2019-12-12 RX ORDER — CLONAZEPAM 0.5 MG/1
TABLET ORAL
Qty: 45 TABLET | Refills: 0 | Status: SHIPPED | OUTPATIENT
Start: 2019-12-12 | End: 2020-01-30

## 2019-12-12 RX ORDER — LORAZEPAM 1 MG/1
TABLET ORAL
Qty: 30 TABLET | Refills: 0 | Status: SHIPPED | OUTPATIENT
Start: 2019-12-12 | End: 2020-03-18

## 2019-12-30 ENCOUNTER — TELEPHONE (OUTPATIENT)
Dept: FAMILY MEDICINE | Facility: CLINIC | Age: 64
End: 2019-12-30

## 2019-12-30 ENCOUNTER — ALLIED HEALTH/NURSE VISIT (OUTPATIENT)
Dept: FAMILY MEDICINE | Facility: CLINIC | Age: 64
End: 2019-12-30
Payer: COMMERCIAL

## 2019-12-30 DIAGNOSIS — E53.8 VITAMIN B12 DEFICIENCY (NON ANEMIC): ICD-10-CM

## 2019-12-30 DIAGNOSIS — D51.9 ANEMIA DUE TO VITAMIN B12 DEFICIENCY, UNSPECIFIED B12 DEFICIENCY TYPE: Primary | ICD-10-CM

## 2019-12-30 PROCEDURE — 96372 THER/PROPH/DIAG INJ SC/IM: CPT

## 2019-12-30 PROCEDURE — 99207 ZZC NO CHARGE NURSE ONLY: CPT

## 2019-12-30 RX ORDER — CYANOCOBALAMIN 1000 UG/ML
1000 INJECTION, SOLUTION INTRAMUSCULAR; SUBCUTANEOUS
Status: CANCELLED | OUTPATIENT
Start: 2020-01-29 | End: 2020-11-23

## 2019-12-30 RX ORDER — CYANOCOBALAMIN 1000 UG/ML
1000 INJECTION, SOLUTION INTRAMUSCULAR; SUBCUTANEOUS
Status: DISCONTINUED | OUTPATIENT
Start: 2019-12-30 | End: 2023-12-11

## 2019-12-30 RX ADMIN — CYANOCOBALAMIN 1000 MCG: 1000 INJECTION, SOLUTION INTRAMUSCULAR; SUBCUTANEOUS at 14:49

## 2019-12-30 NOTE — PROGRESS NOTES
Clinic Administered Medication Documentation    MEDICATION LIST:   Injectable Medication Documentation    Patient was given Cyanocobalamin (B-12). Prior to medication administration, verified patients identity using patient s name and date of birth. Please see MAR and medication order for additional information. Patient instructed to remain in clinic for 15 minutes and report any adverse reaction to staff immediately .      Was entire vial of medication used? No, The remainder 0.4ML of 1.4ML was discarded as unavoidable waste.  Vial/Syringe: Single dose vial  Expiration Date:  04/21  Was this medication supplied by the patient? No     Ginny Howard CMA ([facility])

## 2019-12-30 NOTE — TELEPHONE ENCOUNTER
Please add B12 to patient's MAR if you would like the patient to continue receiving. Patient came in and there was no order in her chart.     Order pending for signature.     Thank you,    Ginny Howard CMA ([facility])

## 2020-01-06 RX ORDER — CYANOCOBALAMIN 1000 UG/ML
1 INJECTION, SOLUTION INTRAMUSCULAR; SUBCUTANEOUS
Qty: 1 ML | Refills: 11 | Status: SHIPPED | DISCHARGE
Start: 2020-01-06 | End: 2021-10-22

## 2020-01-08 RX ORDER — CYANOCOBALAMIN 1000 UG/ML
1000 INJECTION, SOLUTION INTRAMUSCULAR; SUBCUTANEOUS
Status: DISCONTINUED | OUTPATIENT
Start: 2020-01-08 | End: 2022-04-01

## 2020-01-09 DIAGNOSIS — F41.9 ANXIETY: ICD-10-CM

## 2020-01-09 DIAGNOSIS — L28.0 NEURODERMATITIS: ICD-10-CM

## 2020-01-09 RX ORDER — LEVOCETIRIZINE DIHYDROCHLORIDE 5 MG/1
TABLET, FILM COATED ORAL
Qty: 90 TABLET | Refills: 1 | Status: SHIPPED | OUTPATIENT
Start: 2020-01-09 | End: 2020-10-06

## 2020-01-09 RX ORDER — ESCITALOPRAM OXALATE 20 MG/1
TABLET ORAL
Qty: 90 TABLET | Refills: 1 | Status: SHIPPED | OUTPATIENT
Start: 2020-01-09 | End: 2020-07-27

## 2020-01-09 NOTE — TELEPHONE ENCOUNTER
"lexapro  Last Written Prescription Date:  06/25/2019  Last Fill Quantity: 90,  # refills: 1   Last office visit: 10/11/2019 with prescribing provider:     Future Office Visit:      xyzal  Last Written Prescription Date:  06/07/2019  Last Fill Quantity: 90,  # refills: 1   Last office visit: 10/11/2019 with prescribing provider:     Future Office Visit:      Requested Prescriptions   Pending Prescriptions Disp Refills     levocetirizine (XYZAL) 5 MG tablet [Pharmacy Med Name: LEVOCETIRIZINE 5MG TAB] 90 tablet 1     Sig: TAKE ONE TABLET BY MOUTH EVERY EVENING       Antihistamines Protocol Passed - 1/9/2020 12:53 PM        Passed - Patient is 3-64 years of age     Apply weight-based dosing for peds patients age 3 - 12 years of age.    Forward request to provider for patients under the age of 3 or over the age of 64.          Passed - Recent (12 mo) or future (30 days) visit within the authorizing provider's specialty     Patient has had an office visit with the authorizing provider or a provider within the authorizing providers department within the previous 12 mos or has a future within next 30 days. See \"Patient Info\" tab in inbasket, or \"Choose Columns\" in Meds & Orders section of the refill encounter.              Passed - Medication is active on med list        escitalopram (LEXAPRO) 20 MG tablet [Pharmacy Med Name: ESCITALOPRAM OXALATE 20MG TABS] 90 tablet 1     Sig: TAKE ONE TABLET BY MOUTH ONCE DAILY       SSRIs Protocol Passed - 1/9/2020 12:53 PM        Passed - Recent (12 mo) or future (30 days) visit within the authorizing provider's specialty     Patient has had an office visit with the authorizing provider or a provider within the authorizing providers department within the previous 12 mos or has a future within next 30 days. See \"Patient Info\" tab in inbasket, or \"Choose Columns\" in Meds & Orders section of the refill encounter.              Passed - Medication is active on med list        Passed - " Patient is age 18 or older        Passed - No active pregnancy on record        Passed - No positive pregnancy test in last 12 months

## 2020-01-30 DIAGNOSIS — G47.00 INSOMNIA, UNSPECIFIED TYPE: ICD-10-CM

## 2020-01-30 DIAGNOSIS — F10.21 ALCOHOL DEPENDENCE IN REMISSION (H): ICD-10-CM

## 2020-01-30 DIAGNOSIS — F41.9 ANXIETY: ICD-10-CM

## 2020-01-30 DIAGNOSIS — G25.81 RESTLESS LEGS SYNDROME (RLS): ICD-10-CM

## 2020-01-30 NOTE — TELEPHONE ENCOUNTER
Reason for Call:  Medication or medication refill:    Do you use a [facility]?  Name of the pharmacy and phone number for the current request:  [facility] [phone]    Name of the medication requested: clonazePAM & 1 other medication (Stated DULCE will know-stated it is fast acting).    Other request: pt stated she is in need of 2 medications to help her through her alcohol withdrawals. Stated OK to wait for DULCE to address tomorrow    Can we leave a detailed message on this number? YES    Phone number patient can be reached at: Home number on file [phone] (home)    Best Time:     Call taken on 1/30/2020 at 10:17 AM by Linda Gutierres

## 2020-01-30 NOTE — TELEPHONE ENCOUNTER
CLONAZEPAM      Last Written Prescription Date:  12/12/19  Last Fill Quantity: 45,   # refills: 0  Last Office Visit: 10/11/19  Future Office visit:       Routing refill request to provider for review/approval because:  Drug not on the [facility], [facility] or [facility] refill protocol or controlled substance    NALTREXONE      Last Written Prescription Date:  12/12/19  Last Fill Quantity: 30,   # refills: 1  Last Office Visit: 10/11/19  Future Office visit:       Routing refill request to provider for review/approval because:  Drug not on the [facility], [facility] or [facility] refill protocol or controlled substance

## 2020-01-31 RX ORDER — CLONAZEPAM 0.5 MG/1
TABLET ORAL
Qty: 45 TABLET | Refills: 0 | Status: SHIPPED | OUTPATIENT
Start: 2020-01-31 | End: 2020-04-03

## 2020-01-31 RX ORDER — NALTREXONE HYDROCHLORIDE 50 MG/1
50 TABLET, FILM COATED ORAL DAILY
Qty: 30 TABLET | Refills: 1 | Status: SHIPPED | OUTPATIENT
Start: 2020-01-31 | End: 2020-04-03

## 2020-02-12 ENCOUNTER — NURSE TRIAGE (OUTPATIENT)
Dept: FAMILY MEDICINE | Facility: CLINIC | Age: 65
End: 2020-02-12

## 2020-02-12 ENCOUNTER — APPOINTMENT (OUTPATIENT)
Dept: CT IMAGING | Facility: CLINIC | Age: 65
End: 2020-02-12
Attending: FAMILY MEDICINE
Payer: COMMERCIAL

## 2020-02-12 ENCOUNTER — HOSPITAL ENCOUNTER (EMERGENCY)
Facility: CLINIC | Age: 65
Discharge: HOME | End: 2020-02-12
Attending: FAMILY MEDICINE | Admitting: FAMILY MEDICINE
Payer: COMMERCIAL

## 2020-02-12 VITALS
SYSTOLIC BLOOD PRESSURE: 93 MMHG | HEART RATE: 75 BPM | RESPIRATION RATE: 29 BRPM | OXYGEN SATURATION: 96 % | DIASTOLIC BLOOD PRESSURE: 55 MMHG

## 2020-02-12 DIAGNOSIS — R56.9 ALCOHOL RELATED SEIZURE (H): ICD-10-CM

## 2020-02-12 DIAGNOSIS — F10.929 ALCOHOLIC INTOXICATION WITH COMPLICATION (H): ICD-10-CM

## 2020-02-12 LAB
ALBUMIN SERPL-MCNC: 3 G/DL (ref 3.4–5)
ALP SERPL-CCNC: 159 U/L (ref 40–150)
ALT SERPL W P-5'-P-CCNC: 19 U/L (ref 0–50)
ANION GAP SERPL CALCULATED.3IONS-SCNC: 10 MMOL/L (ref 3–14)
AST SERPL W P-5'-P-CCNC: 21 U/L (ref 0–45)
BASOPHILS # BLD AUTO: 0 10E9/L (ref 0–0.2)
BASOPHILS NFR BLD AUTO: 0.5 %
BILIRUB SERPL-MCNC: 0.3 MG/DL (ref 0.2–1.3)
BUN SERPL-MCNC: 4 MG/DL (ref 7–30)
CALCIUM SERPL-MCNC: 7.8 MG/DL (ref 8.5–10.1)
CHLORIDE SERPL-SCNC: 109 MMOL/L (ref 94–109)
CO2 SERPL-SCNC: 23 MMOL/L (ref 20–32)
CREAT SERPL-MCNC: 0.73 MG/DL (ref 0.52–1.04)
DIFFERENTIAL METHOD BLD: ABNORMAL
EOSINOPHIL NFR BLD AUTO: 1.3 %
ERYTHROCYTE [DISTWIDTH] IN BLOOD BY AUTOMATED COUNT: 19.8 % (ref 10–15)
ETHANOL SERPL-MCNC: 0.32 G/DL
GFR SERPL CREATININE-BSD FRML MDRD: 88 ML/MIN/{1.73_M2}
GLUCOSE SERPL-MCNC: 75 MG/DL (ref 70–99)
HCT VFR BLD AUTO: 35 % (ref 35–47)
HGB BLD-MCNC: 11.7 G/DL (ref 11.7–15.7)
IMM GRANULOCYTES # BLD: 0 10E9/L (ref 0–0.4)
IMM GRANULOCYTES NFR BLD: 0.2 %
LIPASE SERPL-CCNC: 175 U/L (ref 73–393)
LYMPHOCYTES # BLD AUTO: 2.2 10E9/L (ref 0.8–5.3)
LYMPHOCYTES NFR BLD AUTO: 36 %
MCH RBC QN AUTO: 29.8 PG (ref 26.5–33)
MCHC RBC AUTO-ENTMCNC: 33.4 G/DL (ref 31.5–36.5)
MCV RBC AUTO: 89 FL (ref 78–100)
MONOCYTES # BLD AUTO: 0.6 10E9/L (ref 0–1.3)
MONOCYTES NFR BLD AUTO: 9 %
NEUTROPHILS # BLD AUTO: 3.3 10E9/L (ref 1.6–8.3)
NEUTROPHILS NFR BLD AUTO: 53 %
NRBC # BLD AUTO: 0 10*3/UL
NRBC BLD AUTO-RTO: 0 /100
PLATELET # BLD AUTO: 288 10E9/L (ref 150–450)
POTASSIUM SERPL-SCNC: 3.6 MMOL/L (ref 3.4–5.3)
PROT SERPL-MCNC: 6.9 G/DL (ref 6.8–8.8)
RBC # BLD AUTO: 3.93 10E12/L (ref 3.8–5.2)
SODIUM SERPL-SCNC: 142 MMOL/L (ref 133–144)
WBC # BLD AUTO: 6.2 10E9/L (ref 4–11)

## 2020-02-12 PROCEDURE — 25800030 ZZH RX IP 258 OP 636: Performed by: FAMILY MEDICINE

## 2020-02-12 PROCEDURE — 99285 EMERGENCY DEPT VISIT HI MDM: CPT | Mod: 25 | Performed by: FAMILY MEDICINE

## 2020-02-12 PROCEDURE — 96361 HYDRATE IV INFUSION ADD-ON: CPT | Performed by: FAMILY MEDICINE

## 2020-02-12 PROCEDURE — 70450 CT HEAD/BRAIN W/O DYE: CPT

## 2020-02-12 PROCEDURE — 96374 THER/PROPH/DIAG INJ IV PUSH: CPT | Performed by: FAMILY MEDICINE

## 2020-02-12 PROCEDURE — 83690 ASSAY OF LIPASE: CPT | Performed by: FAMILY MEDICINE

## 2020-02-12 PROCEDURE — 80053 COMPREHEN METABOLIC PANEL: CPT | Performed by: FAMILY MEDICINE

## 2020-02-12 PROCEDURE — 99285 EMERGENCY DEPT VISIT HI MDM: CPT | Mod: Z6 | Performed by: FAMILY MEDICINE

## 2020-02-12 PROCEDURE — 25000128 H RX IP 250 OP 636: Performed by: FAMILY MEDICINE

## 2020-02-12 PROCEDURE — 82075 ASSAY OF BREATH ETHANOL: CPT | Performed by: FAMILY MEDICINE

## 2020-02-12 PROCEDURE — 80320 DRUG SCREEN QUANTALCOHOLS: CPT | Performed by: FAMILY MEDICINE

## 2020-02-12 PROCEDURE — 85025 COMPLETE CBC W/AUTO DIFF WBC: CPT | Performed by: FAMILY MEDICINE

## 2020-02-12 RX ORDER — SODIUM CHLORIDE 9 MG/ML
1000 INJECTION, SOLUTION INTRAVENOUS CONTINUOUS
Status: DISCONTINUED | OUTPATIENT
Start: 2020-02-12 | End: 2020-02-12 | Stop reason: HOSPADM

## 2020-02-12 RX ORDER — LORAZEPAM 2 MG/ML
1 INJECTION INTRAMUSCULAR ONCE
Status: COMPLETED | OUTPATIENT
Start: 2020-02-12 | End: 2020-02-12

## 2020-02-12 RX ADMIN — SODIUM CHLORIDE 1000 ML: 9 INJECTION, SOLUTION INTRAVENOUS at 16:23

## 2020-02-12 RX ADMIN — LORAZEPAM 1 MG: 2 INJECTION INTRAMUSCULAR; INTRAVENOUS at 15:57

## 2020-02-12 RX ADMIN — SODIUM CHLORIDE 1000 ML: 9 INJECTION, SOLUTION INTRAVENOUS at 15:50

## 2020-02-12 NOTE — TELEPHONE ENCOUNTER
S-(situation): Shanika states she had two seizers this am.     B-(background): hx of seizures.    A-(assessment): states she feels a bit confused and her face is droopy on one side.    R-(recommendations): call 911.       Reason for Disposition    Two or more seizures and stays confused between seizures    Protocols used: [id number]

## 2020-02-12 NOTE — ED TRIAGE NOTES
Presents to ER with complaints of headache and seizure activity times two today. She states the right side of her face is numb. Upon rooming pt noted to stumble and admits to drinking today. Code stroke cancelled

## 2020-02-12 NOTE — ED PROVIDER NOTES
History     Chief Complaint   Patient presents with     Headache     Seizures     Numbness     right side of face     HPI  Pavithra Raines is a 64 year old female who presents with concerns of 2 seizures today and a headache and some facial numbness.  Patient is a known alcoholic and states that she did drink last night but states she only had 1 glass of wine today.  She states she started getting a headache and then she had 2 seizures.  She denies any recent trauma.  She denies any current nausea or any vomiting.  Denies any neck pain.  She states that she took 1 of her lorazepam earlier but did not take her other medications.    Allergies:  Allergies   Allergen Reactions     Codeine Itching     Vicodin [Hydrocodone-Acetaminophen] Itching       Problem List:    Patient Active Problem List    Diagnosis Date Noted     Anemia due to vitamin B12 deficiency, unspecified B12 deficiency type 12/04/2017     Priority: Medium     Insomnia, unspecified type 12/04/2017     Priority: Medium     Other iron deficiency anemia 12/04/2017     Priority: Medium     Alcohol dependence in remission (H) 04/20/2013     Priority: Medium     Brief relapse in April 2013 after 10 years of sobriety.       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 grade 3 04/07/2011     Priority: Medium     12/15/06 ASCUS + high risk HPV  colpo in 2007 showed high grade KAITLYN and LEEP was recommended  LEEP was done in June,2007 with CAN 2/3 confirmed  10/15/07 NIL  9/30/08 NIL/neg HPV  10/21/09 NIL/neg HPV  4/5/11 NIL- repeat in one year (4/2012 12/1/17 NIL pap/neg HR HPV. Will need pap screening until 2027, regardless of age.  Plan: cotest due 3 yr.       MDD (major depressive disorder) 04/05/2011     Priority: Medium     Needs to est primary care.       CARDIOVASCULAR SCREENING; LDL GOAL LESS THAN 160 10/31/2010     Priority: Medium     Genital herpes      Priority: Medium     IMO update changed this record. Please review for accuracy       Anxiety  08/27/2008     Priority: Medium     Patient is followed by MIMI GARZA for ongoing prescription of benzodiazepines.  All refills should be approved by this provider, or covering partner.    Medication(s): Clonazepam.   Maximum quantity per month: 30  Clinic visit frequency required:      Controlled substance agreement on file: No  Benzodiazepine use reviewed by psychiatry:  No    Last [facility] website verification:  done on 4/5/19  [email or web address]         Status post gastric bypass for obesity 03/25/2008     Priority: Medium     Performed at [facility].       Restless legs syndrome (RLS) 05/15/2007     Priority: Medium     Hypersomnia with sleep apnea 05/15/2007     Priority: Medium     Problem list name updated by automated process. Provider to review       Esophageal reflux 04/18/2007     Priority: Medium        Past Medical History:    Past Medical History:   Diagnosis Date     Abnormal Papanicolaou smear of cervix and cervical HPV 4/07     Genital herpes      History of colposcopy with cervical biopsy 06/2007     Hypersomnia with sleep apnea, unspecified      Other and unspecified ovarian cyst 2002 or so       Past Surgical History:    Past Surgical History:   Procedure Laterality Date     CHOLECYSTECTOMY, OPEN  age 20s     COLONOSCOPY  3/21/2011    COMBINED COLONOSCOPY, REMOVE TUMOR/POLYP/LESION BY SNARE performed by JUAN FRANCISCO HERNANDEZ at  GI     COLONOSCOPY N/A 10/9/2017    polyps, repeat 3 years     COLPOSCOPY CERVIX, LOOP ELECTRODE BIOPSY, COMBINED  6/2007    CAN 2/3-patient requires yearly pap smears     ESOPHAGOSCOPY, GASTROSCOPY, DUODENOSCOPY (EGD), COMBINED N/A 2/9/2018    Procedure: COMBINED ESOPHAGOSCOPY, GASTROSCOPY, DUODENOSCOPY (EGD);  EGD;  Surgeon: Oneal Sanchez MD;  Location:  GI     GASTRIC BYPASS  3/25/2008    At [facility]     HC DILATION/CURETTAGE DIAG/THER NON OB  2002     HC ENLARGE BREAST WITH IMPLANT       HC LAPAROSCOPIC MYOMECTOMY, 1 -  4 INTRAMURAL MYOMAS =<250 GM       HC REMOVAL OF BREAST IMPLANT       SALPINGO OOPHORECTOMY,R/L/MATTHEW  2002    Bilateral salpingectomy and unilateral oophorectomy       Family History:    Family History   Problem Relation Age of Onset     Unknown/Adopted Father         doesn't know birth father     Lung Cancer Brother      Family History Negative Other        Social History:  Marital Status:  Single [1]  Social History     Tobacco Use     Smoking status: Current Some Day Smoker     Packs/day: 1.00     Years: 10.00     Pack years: 10.00     Last attempt to quit: 2001     Years since quittin.4     Smokeless tobacco: Never Used     Tobacco comment: 2 ppd at this time (chain smoking) 10/2012   Substance Use Topics     Alcohol use: Yes     Comment: 2-3 boxes of wine per week     Drug use: Yes     Types: Marijuana     Comment: smoked tonight        Medications:    busPIRone (BUSPAR) 10 MG tablet  CALCIUM ANTACID EXTRA STRENGTH 750 MG CHEW  calcium carbonate (OS-SHON 500 MG [place]) 1250 MG tablet  clonazePAM (KLONOPIN) 0.5 MG tablet  cyanocobalamin (CYANOCOBALAMIN) 1000 MCG/ML injection  escitalopram (LEXAPRO) 20 MG tablet  levocetirizine (XYZAL) 5 MG tablet  LORazepam (ATIVAN) 1 MG tablet  multivitamin, therapeutic with minerals (MULTI-VITAMIN) TABS tablet  naltrexone (DEPADE/REVIA) 50 MG tablet  naltrexone (VIVITROL) 380 MG SUSR  omeprazole (PRILOSEC) 40 MG DR capsule  traZODone (DESYREL) 50 MG tablet  triamcinolone (KENALOG) 0.1 % external cream          Review of Systems   All other systems reviewed and are negative.      Physical Exam   BP: 102/69  Pulse: 67  Heart Rate: 68  Resp: 23  SpO2: 96 %      Physical Exam  Vitals signs and nursing note reviewed.   Constitutional:       General: She is not in acute distress.     Appearance: She is well-developed. She is not diaphoretic.      Comments: There is alcohol in the breath   HENT:      Head: Normocephalic and atraumatic.      Nose: Nose normal.       Mouth/Throat:      Pharynx: No oropharyngeal exudate.   Eyes:      Conjunctiva/sclera: Conjunctivae normal.   Neck:      Musculoskeletal: Normal range of motion and neck supple.   Cardiovascular:      Rate and Rhythm: Normal rate and regular rhythm.      Heart sounds: Normal heart sounds. No murmur. No friction rub.   Pulmonary:      Effort: Pulmonary effort is normal. No respiratory distress.      Breath sounds: Normal breath sounds. No stridor. No wheezing or rales.   Abdominal:      General: Bowel sounds are normal. There is no distension.      Palpations: Abdomen is soft. There is no mass.      Tenderness: There is no abdominal tenderness. There is no guarding.   Musculoskeletal: Normal range of motion.         General: No tenderness.   Skin:     General: Skin is warm and dry.      Capillary Refill: Capillary refill takes less than 2 seconds.      Findings: No erythema.   Neurological:      Mental Status: She is alert and oriented to person, place, and time.   Psychiatric:         Judgment: Judgment normal.         ED Course        Procedures               Results for orders placed or performed during the hospital encounter of 02/12/20 (from the past 24 hour(s))   CBC with platelets differential   Result Value Ref Range    WBC 6.2 4.0 - 11.0 10e9/L    RBC Count 3.93 3.8 - 5.2 10e12/L    Hemoglobin 11.7 11.7 - 15.7 g/dL    Hematocrit 35.0 35.0 - 47.0 %    MCV 89 78 - 100 fl    MCH 29.8 26.5 - 33.0 pg    MCHC 33.4 31.5 - 36.5 g/dL    RDW 19.8 (H) 10.0 - 15.0 %    Platelet Count 288 150 - 450 10e9/L    Diff Method Automated Method     % Neutrophils 53.0 %    % Lymphocytes 36.0 %    % Monocytes 9.0 %    % Eosinophils 1.3 %    % Basophils 0.5 %    % Immature Granulocytes 0.2 %    Nucleated RBCs 0 0 /100    Absolute Neutrophil 3.3 1.6 - 8.3 10e9/L    Absolute Lymphocytes 2.2 0.8 - 5.3 10e9/L    Absolute Monocytes 0.6 0.0 - 1.3 10e9/L    Absolute Basophils 0.0 0.0 - 0.2 10e9/L    Abs Immature Granulocytes 0.0 0 - 0.4  10e9/L    Absolute Nucleated RBC 0.0    Comprehensive metabolic panel   Result Value Ref Range    Sodium 142 133 - 144 mmol/L    Potassium 3.6 3.4 - 5.3 mmol/L    Chloride 109 94 - 109 mmol/L    Carbon Dioxide 23 20 - 32 mmol/L    Anion Gap 10 3 - 14 mmol/L    Glucose 75 70 - 99 mg/dL    Urea Nitrogen 4 (L) 7 - 30 mg/dL    Creatinine 0.73 0.52 - 1.04 mg/dL    GFR Estimate 88 >60 mL/min/[1.73_m2]    GFR Estimate If Black >90 >60 mL/min/[1.73_m2]    Calcium 7.8 (L) 8.5 - 10.1 mg/dL    Bilirubin Total 0.3 0.2 - 1.3 mg/dL    Albumin 3.0 (L) 3.4 - 5.0 g/dL    Protein Total 6.9 6.8 - 8.8 g/dL    Alkaline Phosphatase 159 (H) 40 - 150 U/L    ALT 19 0 - 50 U/L    AST 21 0 - 45 U/L   Lipase   Result Value Ref Range    Lipase 175 73 - 393 U/L   Alcohol ethyl   Result Value Ref Range    Ethanol g/dL 0.32 (HH) <0.01 g/dL   CT Head w/o Contrast    Narrative    CT OF THE HEAD WITHOUT CONTRAST 2/12/2020 4:08 PM     COMPARISON: Brain MR 9/14/2018    HISTORY: Headache, recent seizures.    TECHNIQUE: 5 mm thick axial CT images of the head were acquired  without IV contrast material.    FINDINGS: There is mild diffuse cerebral volume loss. The ventricles  and basal cisterns are within normal limits in configuration given the  degree of cerebral volume loss. There is no midline shift. There are  no extra-axial fluid collections.    No intracranial hemorrhage, mass or recent infarct.    The visualized paranasal sinuses are well-aerated. There is no  mastoiditis. There are no fractures of the visualized bones.      Impression    IMPRESSION: Mild, diffuse cerebral volume loss again noted. No  evidence for acute intracranial pathology.        Radiation dose for this scan was reduced using automated exposure  control, adjustment of the mA and/or kV according to patient size, or  iterative reconstruction technique         Medications   0.9% sodium chloride BOLUS (0 mLs Intravenous Stopped 2/12/20 2733)     Followed by   sodium chloride 0.9%  infusion (1,000 mLs Intravenous New Bag 2/12/20 4968)   LORazepam (ATIVAN) injection 1 mg (1 mg Intravenous Given 2/12/20 6267)     Labs are reviewed and were unremarkable.  CT scan of the head was normal.  Patient was given some Ativan and allowed to rest.  Patient's blood alcohol level was significantly high, I think she is minimizing her alcohol use and this is most likely why she had her seizures earlier.  I offered her multiple times to go to detox or some facility to help her get sober but she states she has too many things to do at home and wants to go home.  Patient has sobered up now and has not talking with slurred words and is able to walk with a steady gait.  She walked unassisted to the bathroom.  At this point think she has a mental capacity to make decisions to discharged home.  Friend is willing to take her home and will stay with her this evening.  Patient is safe to be discharged home.    Assessments & Plan (with Medical Decision Making)  Alcohol intoxication, seizures     I have reviewed the nursing notes.    I have reviewed the findings, diagnosis, plan and need for follow up with the patient.              2/12/2020   [facility] EMERGENCY DEPARTMENT     Aayush Pinto MD  02/12/20 6798

## 2020-02-12 NOTE — ED AVS SNAPSHOT
[facility] Emergency Department  [address] [place] [address]  Phone:  [phone]  Fax:  [phone]                                    Pavithra Raines   MRN: [id number]    Department:  [facility] Emergency Department   Date of Visit:  2/12/2020           After Visit Summary Signature Page    I have received my discharge instructions, and my questions have been answered. I have discussed any challenges I see with this plan with the nurse or doctor.    ..........................................................................................................................................  Patient/Patient Representative Signature      ..........................................................................................................................................  Patient Representative Print Name and Relationship to Patient    ..................................................               ................................................  Date                                   Time    ..........................................................................................................................................  Reviewed by Signature/Title    ...................................................              ..............................................  Date                                               Time          22EPIC Rev 08/18

## 2020-02-29 DIAGNOSIS — F41.9 ANXIETY: ICD-10-CM

## 2020-03-01 ENCOUNTER — HOSPITAL ENCOUNTER (EMERGENCY)
Facility: CLINIC | Age: 65
Discharge: HOME | End: 2020-03-01
Attending: FAMILY MEDICINE | Admitting: FAMILY MEDICINE
Payer: COMMERCIAL

## 2020-03-01 VITALS
HEART RATE: 81 BPM | WEIGHT: 120 LBS | DIASTOLIC BLOOD PRESSURE: 96 MMHG | RESPIRATION RATE: 16 BRPM | BODY MASS INDEX: 20.6 KG/M2 | TEMPERATURE: 99.2 F | OXYGEN SATURATION: 96 % | SYSTOLIC BLOOD PRESSURE: 134 MMHG
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
## 2020-03-01 DIAGNOSIS — S61.052A CAT BITE OF LEFT THUMB, INITIAL ENCOUNTER: ICD-10-CM

## 2020-03-01 DIAGNOSIS — L03.114 LEFT ARM CELLULITIS: ICD-10-CM

## 2020-03-01 LAB
BASOPHILS # BLD AUTO: 0 10E9/L (ref 0–0.2)
BASOPHILS NFR BLD AUTO: 0.4 %
CRP SERPL-MCNC: <2.9 MG/L (ref 0–8)
DIFFERENTIAL METHOD BLD: ABNORMAL
EOSINOPHIL NFR BLD AUTO: 0.5 %
ERYTHROCYTE [DISTWIDTH] IN BLOOD BY AUTOMATED COUNT: 19.3 % (ref 10–15)
HCT VFR BLD AUTO: 36.6 % (ref 35–47)
HGB BLD-MCNC: 12.3 G/DL (ref 11.7–15.7)
IMM GRANULOCYTES # BLD: 0.1 10E9/L (ref 0–0.4)
IMM GRANULOCYTES NFR BLD: 0.5 %
LYMPHOCYTES # BLD AUTO: 1.7 10E9/L (ref 0.8–5.3)
LYMPHOCYTES NFR BLD AUTO: 16.1 %
MCH RBC QN AUTO: 31.2 PG (ref 26.5–33)
MCHC RBC AUTO-ENTMCNC: 33.6 G/DL (ref 31.5–36.5)
MCV RBC AUTO: 93 FL (ref 78–100)
MONOCYTES # BLD AUTO: 1.4 10E9/L (ref 0–1.3)
MONOCYTES NFR BLD AUTO: 12.5 %
NEUTROPHILS # BLD AUTO: 7.6 10E9/L (ref 1.6–8.3)
NEUTROPHILS NFR BLD AUTO: 70 %
NRBC # BLD AUTO: 0 10*3/UL
NRBC BLD AUTO-RTO: 0 /100
PLATELET # BLD AUTO: 224 10E9/L (ref 150–450)
RBC # BLD AUTO: 3.94 10E12/L (ref 3.8–5.2)
WBC # BLD AUTO: 10.8 10E9/L (ref 4–11)

## 2020-03-01 PROCEDURE — 90471 IMMUNIZATION ADMIN: CPT | Performed by: FAMILY MEDICINE

## 2020-03-01 PROCEDURE — 86140 C-REACTIVE PROTEIN: CPT | Performed by: FAMILY MEDICINE

## 2020-03-01 PROCEDURE — 25000132 ZZH RX MED GY IP 250 OP 250 PS 637: Performed by: FAMILY MEDICINE

## 2020-03-01 PROCEDURE — 99283 EMERGENCY DEPT VISIT LOW MDM: CPT | Mod: 25 | Performed by: FAMILY MEDICINE

## 2020-03-01 PROCEDURE — 85025 COMPLETE CBC W/AUTO DIFF WBC: CPT | Performed by: FAMILY MEDICINE

## 2020-03-01 PROCEDURE — 25000128 H RX IP 250 OP 636: Performed by: FAMILY MEDICINE

## 2020-03-01 PROCEDURE — 99284 EMERGENCY DEPT VISIT MOD MDM: CPT | Mod: Z6 | Performed by: FAMILY MEDICINE

## 2020-03-01 PROCEDURE — 90715 TDAP VACCINE 7 YRS/> IM: CPT | Performed by: FAMILY MEDICINE

## 2020-03-01 PROCEDURE — 99284 EMERGENCY DEPT VISIT MOD MDM: CPT | Performed by: FAMILY MEDICINE

## 2020-03-01 RX ADMIN — CLOSTRIDIUM TETANI TOXOID ANTIGEN (FORMALDEHYDE INACTIVATED), CORYNEBACTERIUM DIPHTHERIAE TOXOID ANTIGEN (FORMALDEHYDE INACTIVATED), BORDETELLA PERTUSSIS TOXOID ANTIGEN (GLUTARALDEHYDE INACTIVATED), BORDETELLA PERTUSSIS FILAMENTOUS HEMAGGLUTININ ANTIGEN (FORMALDEHYDE INACTIVATED), BORDETELLA PERTUSSIS PERTACTIN ANTIGEN, AND BORDETELLA PERTUSSIS FIMBRIAE 2/3 ANTIGEN 0.5 ML: 5; 2; 2.5; 5; 3; 5 INJECTION, SUSPENSION INTRAMUSCULAR at 20:08

## 2020-03-01 RX ADMIN — AMOXICILLIN AND CLAVULANATE POTASSIUM 1 TABLET: 875; 125 TABLET, FILM COATED ORAL at 20:12

## 2020-03-01 ASSESSMENT — ENCOUNTER SYMPTOMS
WOUND: 1
CHILLS: 1
FATIGUE: 1

## 2020-03-01 NOTE — ED AVS SNAPSHOT
[facility] Emergency Department  [address] [place] [address]  Phone:  [phone]  Fax:  [phone]                                    Pavithra Raines   MRN: [id number]    Department:  [facility] Emergency Department   Date of Visit:  3/1/2020           After Visit Summary Signature Page    I have received my discharge instructions, and my questions have been answered. I have discussed any challenges I see with this plan with the nurse or doctor.    ..........................................................................................................................................  Patient/Patient Representative Signature      ..........................................................................................................................................  Patient Representative Print Name and Relationship to Patient    ..................................................               ................................................  Date                                   Time    ..........................................................................................................................................  Reviewed by Signature/Title    ...................................................              ..............................................  Date                                               Time          22EPIC Rev 08/18

## 2020-03-02 RX ORDER — BUSPIRONE HYDROCHLORIDE 10 MG/1
TABLET ORAL
Qty: 90 TABLET | Refills: 0 | Status: SHIPPED | OUTPATIENT
Start: 2020-03-02 | End: 2020-09-30

## 2020-03-02 NOTE — ED TRIAGE NOTES
Cat bite to the left thumb. Healing puncture noted.  Streaking up the forearm noted. Patient states she knows that the cat is vaccinated.

## 2020-03-02 NOTE — TELEPHONE ENCOUNTER
"Buspar  Last Written Prescription Date:  08/23/2019  Last Fill Quantity: 90,  # refills: 5   Last office visit: 10/11/2019 with prescribing provider:  Flakita   Future Office Visit:   Next 5 appointments (look out 90 days)    Mar 05, 2020 11:00 AM CST  SHORT with Rush Roland NP  [address] [address]  [phone]   Mar 06, 2020  2:20 PM CST  Nurse Only with MEL HUERTA MA  [address] [address]  [phone]         Requested Prescriptions   Pending Prescriptions Disp Refills     busPIRone (BUSPAR) 10 MG tablet [Pharmacy Med Name: BUSPIRONE HCL 10MG TABS] 90 tablet 5     Sig: TAKE ONE TABLET BY MOUTH THREE TIMES A DAY       Atypical Antidepressants Protocol Passed - 2/29/2020 12:19 PM        Passed - Recent (12 mo) or future (30 days) visit within the authorizing provider's specialty     Patient has had an office visit with the authorizing provider or a provider within the authorizing providers department within the previous 12 mos or has a future within next 30 days. See \"Patient Info\" tab in inbasket, or \"Choose Columns\" in Meds & Orders section of the refill encounter.              Passed - Medication active on med list        Passed - Patient is age 18 or older        Passed - No active pregnancy on record        Passed - No positive pregnancy test in past 12 mos        Karissa Luong RN BSN    "

## 2020-03-02 NOTE — ED PROVIDER NOTES
History     Chief Complaint   Patient presents with     Cat Bite     The history is provided by the patient.     Pavithra Raines is a 64 year old female who presents to the emergency department with a cat bite to her left thumb yesterday morning. The patient started to feel very fatigued last night. When she woke up this morning she noticed a red streak going up her forearm. She has been feeling chilled and generally not well today. Patient notes that the last time she had a cat bite, she became septic and had to be on IV antibiotics for a day.  She is hopeful she came in early enough this time to be placed on oral antibiotics because she plans to go to [place] to assist her mother who just got out a hospital yesterday with her post hospital cares.    Allergies:  Allergies   Allergen Reactions     Codeine Itching     Vicodin [Hydrocodone-Acetaminophen] Itching       Problem List:    Patient Active Problem List    Diagnosis Date Noted     Anemia due to vitamin B12 deficiency, unspecified B12 deficiency type 12/04/2017     Priority: Medium     Insomnia, unspecified type 12/04/2017     Priority: Medium     Other iron deficiency anemia 12/04/2017     Priority: Medium     Alcohol dependence in remission (H) 04/20/2013     Priority: Medium     Brief relapse in April 2013 after 10 years of sobriety.       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 grade 3 04/07/2011     Priority: Medium     12/15/06 ASCUS + high risk HPV  colpo in 2007 showed high grade KAITLYN and LEEP was recommended  LEEP was done in June,2007 with CAN 2/3 confirmed  10/15/07 NIL  9/30/08 NIL/neg HPV  10/21/09 NIL/neg HPV  4/5/11 NIL- repeat in one year (4/2012 12/1/17 NIL pap/neg HR HPV. Will need pap screening until 2027, regardless of age.  Plan: cotest due 3 yr.       MDD (major depressive disorder) 04/05/2011     Priority: Medium     Needs to est primary care.       CARDIOVASCULAR SCREENING; LDL GOAL LESS THAN 160 10/31/2010     Priority:  Medium     Genital herpes      Priority: Medium     IMO update changed this record. Please review for accuracy       Anxiety 08/27/2008     Priority: Medium     Patient is followed by MIMI GARZA for ongoing prescription of benzodiazepines.  All refills should be approved by this provider, or covering partner.    Medication(s): Clonazepam.   Maximum quantity per month: 30  Clinic visit frequency required:      Controlled substance agreement on file: No  Benzodiazepine use reviewed by psychiatry:  No    Last [facility] website verification:  done on 4/5/19  [email or web address]         Status post gastric bypass for obesity 03/25/2008     Priority: Medium     Performed at [facility].       Restless legs syndrome (RLS) 05/15/2007     Priority: Medium     Hypersomnia with sleep apnea 05/15/2007     Priority: Medium     Problem list name updated by automated process. Provider to review       Esophageal reflux 04/18/2007     Priority: Medium        Past Medical History:    Past Medical History:   Diagnosis Date     Abnormal Papanicolaou smear of cervix and cervical HPV 4/07     Genital herpes      History of colposcopy with cervical biopsy 06/2007     Hypersomnia with sleep apnea, unspecified      Other and unspecified ovarian cyst 2002 or so       Past Surgical History:    Past Surgical History:   Procedure Laterality Date     CHOLECYSTECTOMY, OPEN  age 20s     COLONOSCOPY  3/21/2011    COMBINED COLONOSCOPY, REMOVE TUMOR/POLYP/LESION BY SNARE performed by JUAN FRANCISCO HERNANDEZ at  GI     COLONOSCOPY N/A 10/9/2017    polyps, repeat 3 years     COLPOSCOPY CERVIX, LOOP ELECTRODE BIOPSY, COMBINED  6/2007    CAN 2/3-patient requires yearly pap smears     ESOPHAGOSCOPY, GASTROSCOPY, DUODENOSCOPY (EGD), COMBINED N/A 2/9/2018    Procedure: COMBINED ESOPHAGOSCOPY, GASTROSCOPY, DUODENOSCOPY (EGD);  EGD;  Surgeon: Oneal Sanchez MD;  Location:  GI     GASTRIC BYPASS  3/25/2008    At  [facility]     HC DILATION/CURETTAGE DIAG/THER NON OB       HC ENLARGE BREAST WITH IMPLANT       HC LAPAROSCOPIC MYOMECTOMY, 1 - 4 INTRAMURAL MYOMAS =<250 GM       HC REMOVAL OF BREAST IMPLANT       SALPINGO OOPHORECTOMY,R/L/MATTHEW      Bilateral salpingectomy and unilateral oophorectomy       Family History:    Family History   Problem Relation Age of Onset     Unknown/Adopted Father         doesn't know birth father     Lung Cancer Brother      Family History Negative Other        Social History:  Marital Status:  Single [1]  Social History     Tobacco Use     Smoking status: Current Some Day Smoker     Packs/day: 1.00     Years: 10.00     Pack years: 10.00     Last attempt to quit: 2001     Years since quittin.5     Smokeless tobacco: Never Used     Tobacco comment: 2 ppd at this time (chain smoking) 10/2012   Substance Use Topics     Alcohol use: Yes     Comment: 2-3 boxes of wine per week     Drug use: Yes     Types: Marijuana     Comment: smoked tonight        Medications:    amoxicillin-clavulanate (AUGMENTIN) 875-125 MG tablet  busPIRone (BUSPAR) 10 MG tablet  CALCIUM ANTACID EXTRA STRENGTH 750 MG CHEW  calcium carbonate (OS-SHON 500 MG [place]) 1250 MG tablet  clonazePAM (KLONOPIN) 0.5 MG tablet  cyanocobalamin (CYANOCOBALAMIN) 1000 MCG/ML injection  escitalopram (LEXAPRO) 20 MG tablet  levocetirizine (XYZAL) 5 MG tablet  LORazepam (ATIVAN) 1 MG tablet  multivitamin, therapeutic with minerals (MULTI-VITAMIN) TABS tablet  naltrexone (DEPADE/REVIA) 50 MG tablet  naltrexone (VIVITROL) 380 MG SUSR  omeprazole (PRILOSEC) 40 MG DR capsule  traZODone (DESYREL) 50 MG tablet  triamcinolone (KENALOG) 0.1 % external cream      Immunization History   Administered Date(s) Administered     Influenza (IIV3) PF 12/15/2006, 2009, 2010, 2012     Influenza Quad, Recombinant, p-free (RIV4) 2018     Influenza Vaccine IM > 6 months Valent IIV4 2019     Mantoux Tuberculin Skin  Test 07/25/2006, 04/10/2007     TD (ADULT, 7+) 06/12/2007     TDAP Vaccine (Adacel) 04/05/2011     Zoster vaccine recombinant adjuvanted (SHINGRIX) 09/25/2019         Review of Systems   Constitutional: Positive for chills and fatigue.   Skin: Positive for rash (left anterior forearm - streak.) and wound (left thumb).   All other systems reviewed and are negative.      Physical Exam   BP: (!) 134/96  Pulse: 81  Temp: 99.2  F (37.3  C)  Resp: 16  Weight: 54.4 kg (120 lb)  SpO2: 96 %      Physical Exam  Vitals signs and nursing note reviewed.   Constitutional:       General: She is not in acute distress.  Musculoskeletal:         General: Signs of injury (closed wound base of thumb no obvious fluid collection or fluctuance. Full ROM.) present.   Skin:     Capillary Refill: Capillary refill takes less than 2 seconds.      Findings: Erythema (See picture), lesion (Small lesion to the skin of the base of hier left thumb/thenar eminence.) and rash present.   Neurological:      Mental Status: She is alert and oriented to person, place, and time.                ED Course        Procedures        The area of inflammation and rash was outlined with a skin marker.         Critical Care time:  none               Results for orders placed or performed during the hospital encounter of 03/01/20 (from the past 24 hour(s))   CBC with platelets differential   Result Value Ref Range    WBC 10.8 4.0 - 11.0 10e9/L    RBC Count 3.94 3.8 - 5.2 10e12/L    Hemoglobin 12.3 11.7 - 15.7 g/dL    Hematocrit 36.6 35.0 - 47.0 %    MCV 93 78 - 100 fl    MCH 31.2 26.5 - 33.0 pg    MCHC 33.6 31.5 - 36.5 g/dL    RDW 19.3 (H) 10.0 - 15.0 %    Platelet Count 224 150 - 450 10e9/L    Diff Method Automated Method     % Neutrophils 70.0 %    % Lymphocytes 16.1 %    % Monocytes 12.5 %    % Eosinophils 0.5 %    % Basophils 0.4 %    % Immature Granulocytes 0.5 %    Nucleated RBCs 0 0 /100    Absolute Neutrophil 7.6 1.6 - 8.3 10e9/L    Absolute Lymphocytes 1.7  0.8 - 5.3 10e9/L    Absolute Monocytes 1.4 (H) 0.0 - 1.3 10e9/L    Absolute Basophils 0.0 0.0 - 0.2 10e9/L    Abs Immature Granulocytes 0.1 0 - 0.4 10e9/L    Absolute Nucleated RBC 0.0    CRP inflammation   Result Value Ref Range    CRP Inflammation <2.9 0.0 - 8.0 mg/L       Medications   Tdap (tetanus-diphtheria-acell pertussis) (ADACEL) injection 0.5 mL (has no administration in time range)   amoxicillin-clavulanate (AUGMENTIN) 875-125 MG per tablet 1 tablet (has no administration in time range)       Assessments & Plan (with Medical Decision Making)  64-year-old female to the ER with history for a cat bite to her left thumb area occurring yesterday morning and now with increased redness and a streak going up her left forearm.  The area of streaking was outlined with a skin marker.  Patient has been afebrile without chills or significant pain to the area.  No drainage from the wound site.  No obvious signs of fluctuance or abscess on exam.  Patient needs to leave for [place] to care for her mother and so would like trial of outpatient treatment.  She was initiated on a course of Augmentin.  She was updated on her tetanus booster today.  She was encouraged to be rechecked in 2 days to make sure that the redness is improving.  She is to be back in this area on Wednesday and appointment was made for her for recheck in the clinic.  To return to the ER if she has increase or worsening symptoms.     I have reviewed the nursing notes.    I have reviewed the findings, diagnosis, plan and need for follow up with the patient.       New Prescriptions    AMOXICILLIN-CLAVULANATE (AUGMENTIN) 875-125 MG TABLET    Take 1 tablet by mouth 2 times daily for 7 days            I verbally discussed the findings of the evaluation today in the ER. I have verbally discussed with Pavithra the suggested treatment(s) as described in the discharge instructions and handouts. I have prescribed the above listed medications and instructed her  on appropriate use of these medications.      I have verbally suggested she follow-up in her clinic or return to the ER for increased symptoms. See the follow-up recommendations documented  in the after visit summary in this visit's EPIC chart.    Final diagnoses:   Cat bite of left thumb, initial encounter   Left arm cellulitis     This document serves as a record of services personally performed by Kimani Palacios,*. It was created on their behalf by Pat Berumen, a trained medical scribe. The creation of this record is based on the provider's personal observations and the statements of the patient. This document has been checked and approved by the attending provider.  Note: Chart documentation done in part with Dragon Voice Recognition software. Although reviewed after completion, some word and grammatical errors may remain.    3/1/2020   [facility] EMERGENCY DEPARTMENT     Kimani Palacios,   03/01/20 [id number]

## 2020-03-17 DIAGNOSIS — G40.909 SEIZURE DISORDER (H): ICD-10-CM

## 2020-03-17 NOTE — TELEPHONE ENCOUNTER
lorazepam      Last Written Prescription Date:  12/12/19  Last Fill Quantity: 30,   # refills: 0  Last Office Visit: 3/5/20  Future Office visit:       Routing refill request to provider for review/approval because:  Drug not on the [facility], P or [facility] refill protocol or controlled substance

## 2020-03-18 RX ORDER — LORAZEPAM 1 MG/1
TABLET ORAL
Qty: 30 TABLET | Refills: 0 | Status: SHIPPED | OUTPATIENT
Start: 2020-03-18 | End: 2020-04-28

## 2020-04-01 DIAGNOSIS — D51.9 ANEMIA DUE TO VITAMIN B12 DEFICIENCY, UNSPECIFIED B12 DEFICIENCY TYPE: Primary | ICD-10-CM

## 2020-04-01 DIAGNOSIS — F10.21 ALCOHOL DEPENDENCE IN REMISSION (H): ICD-10-CM

## 2020-04-01 DIAGNOSIS — G25.81 RESTLESS LEGS SYNDROME (RLS): ICD-10-CM

## 2020-04-01 DIAGNOSIS — F41.9 ANXIETY: ICD-10-CM

## 2020-04-01 DIAGNOSIS — G47.00 INSOMNIA, UNSPECIFIED TYPE: ICD-10-CM

## 2020-04-01 NOTE — TELEPHONE ENCOUNTER
Reason for Call: Request for an order or referral:    Order or referral being requested: Patient usually comes in the clinic monthly for a B12 injection. She would prefer to not come into the clinic at this time. She used to give them to herself at home. Wondering if that would be an option. Please call her back to discuss.     Date needed: as soon as possible    Has the patient been seen by the PCP for this problem? YES    Additional comments: Uses [facility]    Phone number Patient can be reached at:  Home number on file [phone] (home)    Best Time:  any    Can we leave a detailed message on this number?  YES    Call taken on 4/1/2020 at 11:19 AM by Pavithra Wilson CNA

## 2020-04-01 NOTE — TELEPHONE ENCOUNTER
clonazepam  Last Written Prescription Date:  1/31/2020  Last Fill Quantity: 45,  # refills: 0   Last office visit: 12/30/2019 with prescribing provider:      Future Office Visit:      Requested Prescriptions   Pending Prescriptions Disp Refills     clonazePAM (KLONOPIN) 0.5 MG tablet [Pharmacy Med Name: CLONAZEPAM 0.5MG TABS] 45 tablet 0     Sig: TAKE 1 TABLET BY MOUTH NIGHTLY AS NEEDED FOR ANXIETY . OK TO FILL ON / AFTER 09/11/19       There is no refill protocol information for this order          Routing refill request to provider for review/approval because:  Drug not on the [facility] refill protocol        Kayce Anna RN on 4/1/2020 at 11:29 AM

## 2020-04-02 RX ORDER — SYRINGE AND NEEDLE,INSULIN,1ML 25GX1"
SYRINGE, EMPTY DISPOSABLE MISCELLANEOUS
Qty: 3 EACH | Refills: 3 | Status: SHIPPED | OUTPATIENT
Start: 2020-04-02 | End: 2022-01-08

## 2020-04-02 RX ORDER — CYANOCOBALAMIN 1000 UG/ML
1 INJECTION, SOLUTION INTRAMUSCULAR; SUBCUTANEOUS
Qty: 3 ML | Refills: 3 | Status: SHIPPED | OUTPATIENT
Start: 2020-04-02 | End: 2021-08-04

## 2020-04-02 NOTE — TELEPHONE ENCOUNTER
Patient notified of order being placed and sent to the pharmacy for her B12. Patient stating she has picked up already. Patient is now also requesting refill on her Naltrexone. Catrina Payne LPN    NALTREXONE      Last Written Prescription Date:  1/31/2020  Last Fill Quantity: 30,   # refills: 1  Last Office Visit: 10/11/19  Future Office visit:       Routing refill request to provider for review/approval because:  Drug not on the [facility], P or [facility] refill protocol or controlled substance

## 2020-04-02 NOTE — TELEPHONE ENCOUNTER
Patient calling back, states her insurance is ok with it, as she has done this in the past, just needs an order for it.

## 2020-04-03 RX ORDER — CLONAZEPAM 0.5 MG/1
TABLET ORAL
Qty: 45 TABLET | Refills: 0 | Status: SHIPPED | OUTPATIENT
Start: 2020-04-03 | End: 2020-06-01

## 2020-04-03 RX ORDER — NALTREXONE HYDROCHLORIDE 50 MG/1
50 TABLET, FILM COATED ORAL DAILY
Qty: 30 TABLET | Refills: 3 | Status: SHIPPED | OUTPATIENT
Start: 2020-04-03 | End: 2020-10-06

## 2020-04-27 ENCOUNTER — NURSE TRIAGE (OUTPATIENT)
Dept: NURSING | Facility: CLINIC | Age: 65
End: 2020-04-27

## 2020-04-27 ENCOUNTER — HOSPITAL ENCOUNTER (EMERGENCY)
Facility: CLINIC | Age: 65
Discharge: HOME | End: 2020-04-28
Attending: FAMILY MEDICINE | Admitting: FAMILY MEDICINE
Payer: COMMERCIAL

## 2020-04-27 DIAGNOSIS — E83.39 HYPOPHOSPHATEMIA: ICD-10-CM

## 2020-04-27 DIAGNOSIS — E87.6 HYPOKALEMIA: ICD-10-CM

## 2020-04-27 DIAGNOSIS — E83.51 HYPOCALCEMIA: ICD-10-CM

## 2020-04-27 DIAGNOSIS — M62.81 GENERALIZED MUSCLE WEAKNESS: ICD-10-CM

## 2020-04-27 DIAGNOSIS — E86.0 DEHYDRATION: Primary | ICD-10-CM

## 2020-04-27 PROCEDURE — 82330 ASSAY OF CALCIUM: CPT | Performed by: FAMILY MEDICINE

## 2020-04-27 PROCEDURE — 25800030 ZZH RX IP 258 OP 636: Performed by: FAMILY MEDICINE

## 2020-04-27 PROCEDURE — 83605 ASSAY OF LACTIC ACID: CPT | Performed by: FAMILY MEDICINE

## 2020-04-27 PROCEDURE — 84484 ASSAY OF TROPONIN QUANT: CPT | Performed by: FAMILY MEDICINE

## 2020-04-27 PROCEDURE — 93005 ELECTROCARDIOGRAM TRACING: CPT | Performed by: FAMILY MEDICINE

## 2020-04-27 PROCEDURE — 83880 ASSAY OF NATRIURETIC PEPTIDE: CPT | Performed by: FAMILY MEDICINE

## 2020-04-27 PROCEDURE — 36415 COLL VENOUS BLD VENIPUNCTURE: CPT | Mod: 59 | Performed by: FAMILY MEDICINE

## 2020-04-27 PROCEDURE — 80053 COMPREHEN METABOLIC PANEL: CPT | Performed by: FAMILY MEDICINE

## 2020-04-27 PROCEDURE — 82306 VITAMIN D 25 HYDROXY: CPT | Performed by: FAMILY MEDICINE

## 2020-04-27 PROCEDURE — 99284 EMERGENCY DEPT VISIT MOD MDM: CPT | Mod: Z6 | Performed by: FAMILY MEDICINE

## 2020-04-27 PROCEDURE — 84443 ASSAY THYROID STIM HORMONE: CPT | Performed by: FAMILY MEDICINE

## 2020-04-27 PROCEDURE — 84100 ASSAY OF PHOSPHORUS: CPT | Performed by: FAMILY MEDICINE

## 2020-04-27 PROCEDURE — 85025 COMPLETE CBC W/AUTO DIFF WBC: CPT | Performed by: FAMILY MEDICINE

## 2020-04-27 PROCEDURE — 99285 EMERGENCY DEPT VISIT HI MDM: CPT | Mod: 25 | Performed by: FAMILY MEDICINE

## 2020-04-27 PROCEDURE — 83735 ASSAY OF MAGNESIUM: CPT | Performed by: FAMILY MEDICINE

## 2020-04-27 PROCEDURE — 96361 HYDRATE IV INFUSION ADD-ON: CPT | Performed by: FAMILY MEDICINE

## 2020-04-27 PROCEDURE — 83970 ASSAY OF PARATHORMONE: CPT | Performed by: FAMILY MEDICINE

## 2020-04-27 RX ORDER — SODIUM CHLORIDE 9 MG/ML
1000 INJECTION, SOLUTION INTRAVENOUS CONTINUOUS
Status: DISCONTINUED | OUTPATIENT
Start: 2020-04-28 | End: 2020-04-28 | Stop reason: HOSPADM

## 2020-04-27 RX ADMIN — SODIUM CHLORIDE 1000 ML: 9 INJECTION, SOLUTION INTRAVENOUS at 23:30

## 2020-04-27 ASSESSMENT — MIFFLIN-ST. JEOR: SCORE: 1066.64

## 2020-04-27 NOTE — ED AVS SNAPSHOT
[facility]  [address] [place] [address]  Phone:  [phone]  Fax:  [phone]                                    Pavithra Raines   MRN: [id number]    Department:  [facility] Emergency Department   Date of Visit:  4/27/2020           After Visit Summary Signature Page    I have received my discharge instructions, and my questions have been answered. I have discussed any challenges I see with this plan with the nurse or doctor.    ..........................................................................................................................................  Patient/Patient Representative Signature      ..........................................................................................................................................  Patient Representative Print Name and Relationship to Patient    ..................................................               ................................................  Date                                   Time    ..........................................................................................................................................  Reviewed by Signature/Title    ...................................................              ..............................................  Date                                               Time          22EPIC Rev 08/18

## 2020-04-28 ENCOUNTER — VIRTUAL VISIT (OUTPATIENT)
Dept: FAMILY MEDICINE | Facility: CLINIC | Age: 65
End: 2020-04-28
Payer: COMMERCIAL

## 2020-04-28 ENCOUNTER — APPOINTMENT (OUTPATIENT)
Dept: GENERAL RADIOLOGY | Facility: CLINIC | Age: 65
End: 2020-04-28
Attending: FAMILY MEDICINE
Payer: COMMERCIAL

## 2020-04-28 VITALS
BODY MASS INDEX: 19.63 KG/M2 | HEIGHT: 64 IN | DIASTOLIC BLOOD PRESSURE: 80 MMHG | SYSTOLIC BLOOD PRESSURE: 107 MMHG | HEART RATE: 83 BPM | TEMPERATURE: 98.7 F | RESPIRATION RATE: 20 BRPM | WEIGHT: 115 LBS | OXYGEN SATURATION: 97 %

## 2020-04-28 DIAGNOSIS — G40.909 SEIZURE DISORDER (H): ICD-10-CM

## 2020-04-28 DIAGNOSIS — E55.9 VITAMIN D DEFICIENCY: ICD-10-CM

## 2020-04-28 LAB
ALBUMIN SERPL-MCNC: 2.4 G/DL (ref 3.4–5)
ALBUMIN UR-MCNC: NEGATIVE MG/DL
ALP SERPL-CCNC: 123 U/L (ref 40–150)
ALT SERPL W P-5'-P-CCNC: 54 U/L (ref 0–50)
ANION GAP SERPL CALCULATED.3IONS-SCNC: 9 MMOL/L (ref 3–14)
APPEARANCE UR: CLEAR
AST SERPL W P-5'-P-CCNC: 70 U/L (ref 0–45)
BASOPHILS # BLD AUTO: 0.1 10E9/L (ref 0–0.2)
BASOPHILS NFR BLD AUTO: 0.6 %
BILIRUB SERPL-MCNC: 0.5 MG/DL (ref 0.2–1.3)
BILIRUB UR QL STRIP: NEGATIVE
BUN SERPL-MCNC: 10 MG/DL (ref 7–30)
CA-I BLD-MCNC: 4 MG/DL (ref 4.4–5.2)
CALCIUM SERPL-MCNC: 6.9 MG/DL (ref 8.5–10.1)
CHLORIDE SERPL-SCNC: 109 MMOL/L (ref 94–109)
CO2 SERPL-SCNC: 21 MMOL/L (ref 20–32)
COLOR UR AUTO: YELLOW
CREAT SERPL-MCNC: 0.79 MG/DL (ref 0.52–1.04)
DEPRECATED CALCIDIOL+CALCIFEROL SERPL-MC: <4 UG/L (ref 20–75)
DIFFERENTIAL METHOD BLD: ABNORMAL
EOSINOPHIL NFR BLD AUTO: 1.5 %
ERYTHROCYTE [DISTWIDTH] IN BLOOD BY AUTOMATED COUNT: 14.1 % (ref 10–15)
GFR SERPL CREATININE-BSD FRML MDRD: 80 ML/MIN/{1.73_M2}
GLUCOSE SERPL-MCNC: 84 MG/DL (ref 70–99)
GLUCOSE UR STRIP-MCNC: NEGATIVE MG/DL
HCT VFR BLD AUTO: 32.8 % (ref 35–47)
HGB BLD-MCNC: 11.1 G/DL (ref 11.7–15.7)
HGB UR QL STRIP: NEGATIVE
IMM GRANULOCYTES # BLD: 0 10E9/L (ref 0–0.4)
IMM GRANULOCYTES NFR BLD: 0.5 %
KETONES UR STRIP-MCNC: NEGATIVE MG/DL
LACTATE BLD-SCNC: 2.3 MMOL/L (ref 0.7–2)
LACTATE BLD-SCNC: 2.6 MMOL/L (ref 0.7–2)
LEUKOCYTE ESTERASE UR QL STRIP: NEGATIVE
LYMPHOCYTES # BLD AUTO: 2.5 10E9/L (ref 0.8–5.3)
LYMPHOCYTES NFR BLD AUTO: 28.2 %
MAGNESIUM SERPL-MCNC: 1.9 MG/DL (ref 1.6–2.3)
MCH RBC QN AUTO: 34.6 PG (ref 26.5–33)
MCHC RBC AUTO-ENTMCNC: 33.8 G/DL (ref 31.5–36.5)
MCV RBC AUTO: 102 FL (ref 78–100)
MONOCYTES # BLD AUTO: 0.8 10E9/L (ref 0–1.3)
MONOCYTES NFR BLD AUTO: 9.5 %
MUCOUS THREADS #/AREA URNS LPF: PRESENT /LPF
NEUTROPHILS # BLD AUTO: 5.3 10E9/L (ref 1.6–8.3)
NEUTROPHILS NFR BLD AUTO: 59.7 %
NITRATE UR QL: NEGATIVE
NRBC # BLD AUTO: 0 10*3/UL
NRBC BLD AUTO-RTO: 0 /100
NT-PROBNP SERPL-MCNC: 182 PG/ML (ref 0–900)
PH UR STRIP: 5 PH (ref 5–7)
PHOSPHATE SERPL-MCNC: 2.2 MG/DL (ref 2.5–4.5)
PLATELET # BLD AUTO: 210 10E9/L (ref 150–450)
POTASSIUM SERPL-SCNC: 3.3 MMOL/L (ref 3.4–5.3)
PROT SERPL-MCNC: 5.5 G/DL (ref 6.8–8.8)
PTH-INTACT SERPL-MCNC: 83 PG/ML (ref 18–80)
RBC # BLD AUTO: 3.21 10E12/L (ref 3.8–5.2)
RBC #/AREA URNS AUTO: <1 /HPF (ref 0–2)
SODIUM SERPL-SCNC: 139 MMOL/L (ref 133–144)
SOURCE: ABNORMAL
SP GR UR STRIP: 1 (ref 1–1.03)
SQUAMOUS #/AREA URNS AUTO: <1 /HPF (ref 0–1)
TROPONIN I SERPL-MCNC: <0.015 UG/L (ref 0–0.04)
TSH SERPL DL<=0.005 MIU/L-ACNC: 2.99 MU/L (ref 0.4–4)
UROBILINOGEN UR STRIP-MCNC: 0 MG/DL (ref 0–2)
WBC # BLD AUTO: 8.8 10E9/L (ref 4–11)
WBC #/AREA URNS AUTO: <1 /HPF (ref 0–5)

## 2020-04-28 PROCEDURE — 96361 HYDRATE IV INFUSION ADD-ON: CPT | Performed by: FAMILY MEDICINE

## 2020-04-28 PROCEDURE — 81001 URINALYSIS AUTO W/SCOPE: CPT | Performed by: FAMILY MEDICINE

## 2020-04-28 PROCEDURE — 96374 THER/PROPH/DIAG INJ IV PUSH: CPT | Performed by: FAMILY MEDICINE

## 2020-04-28 PROCEDURE — 25000128 H RX IP 250 OP 636: Performed by: FAMILY MEDICINE

## 2020-04-28 PROCEDURE — 71045 X-RAY EXAM CHEST 1 VIEW: CPT | Mod: TC

## 2020-04-28 PROCEDURE — 36415 COLL VENOUS BLD VENIPUNCTURE: CPT | Performed by: FAMILY MEDICINE

## 2020-04-28 PROCEDURE — 25800030 ZZH RX IP 258 OP 636: Performed by: FAMILY MEDICINE

## 2020-04-28 PROCEDURE — 83605 ASSAY OF LACTIC ACID: CPT | Performed by: FAMILY MEDICINE

## 2020-04-28 PROCEDURE — 25000132 ZZH RX MED GY IP 250 OP 250 PS 637: Performed by: FAMILY MEDICINE

## 2020-04-28 PROCEDURE — 99214 OFFICE O/P EST MOD 30 MIN: CPT | Mod: 95 | Performed by: FAMILY MEDICINE

## 2020-04-28 RX ORDER — LORAZEPAM 1 MG/1
TABLET ORAL
Qty: 30 TABLET | Refills: 0 | Status: SHIPPED | OUTPATIENT
Start: 2020-04-28 | End: 2020-04-28

## 2020-04-28 RX ORDER — CALCIUM GLUCONATE 94 MG/ML
1 INJECTION, SOLUTION INTRAVENOUS ONCE
Status: COMPLETED | OUTPATIENT
Start: 2020-04-28 | End: 2020-04-28

## 2020-04-28 RX ORDER — CHOLECALCIFEROL (VITAMIN D3) 50 MCG
1 TABLET ORAL DAILY
Qty: 90 TABLET | Refills: 1 | Status: SHIPPED | OUTPATIENT
Start: 2020-04-28 | End: 2020-12-14

## 2020-04-28 RX ORDER — LORAZEPAM 1 MG/1
TABLET ORAL
Qty: 30 TABLET | Refills: 0 | Status: SHIPPED | OUTPATIENT
Start: 2020-04-28 | End: 2020-07-10

## 2020-04-28 RX ORDER — POTASSIUM CHLORIDE 1.5 G/1.58G
40 POWDER, FOR SOLUTION ORAL ONCE
Status: COMPLETED | OUTPATIENT
Start: 2020-04-28 | End: 2020-04-28

## 2020-04-28 RX ADMIN — POTASSIUM CHLORIDE 40 MEQ: 1.5 POWDER, FOR SOLUTION ORAL at 01:50

## 2020-04-28 RX ADMIN — CALCIUM GLUCONATE 1 G: 98 INJECTION, SOLUTION INTRAVENOUS at 01:58

## 2020-04-28 RX ADMIN — SODIUM CHLORIDE 1000 ML: 9 INJECTION, SOLUTION INTRAVENOUS at 00:11

## 2020-04-28 ASSESSMENT — PATIENT HEALTH QUESTIONNAIRE - PHQ9: SUM OF ALL RESPONSES TO PHQ QUESTIONS 1-9: 8

## 2020-04-28 NOTE — ED PROVIDER NOTES
"  History     Chief Complaint   Patient presents with     Dizziness     HPI  Pavithra Raines is a 64 year old female who presents to the ED this evening with increasing fatigue and dyspnea on exertion.  She has not felt well since last week.  There were a couple of days where she slept essentially 24 hours and just got up to go to the bathroom and then went back to bed.  Oral intake was down during that time.  Now over the past 3 days she reports fatigue with any kind of exertion such as walking in the house or climbing stairs.  She has no stamina and does note some dyspnea on exertion.  Fluid intake has been down and she gets a bit lightheaded if she gets up.  She denies any fevers chills or sweats.  She does have a little bit of a cough in the morning but that is usual for her as she smokes.  Status post gastric bypass and does have some problems with vomiting when things get stuck.  She now has had some occasional diarrhea maybe 2 or 3 times a day.    She did have some fish to eat today and some hydration fluid that she ordered over the Internet.    She states she is not diabetic and has no underlying lung disease.  No hypertension.  She tells me that she is on some seizure medicines and mentions Klonopin and Ativan and BuSpar.  Sounds like she takes all those more for anxiety.  She will take the Ativan if she feels a \"seizure \"not coming on.  States that her medication is managed by Dr. Boggs is she also sees Ruddy Hdz for counseling.        Allergies:  Allergies   Allergen Reactions     Codeine Itching     Vicodin [Hydrocodone-Acetaminophen] Itching       Problem List:    Patient Active Problem List    Diagnosis Date Noted     Anemia due to vitamin B12 deficiency, unspecified B12 deficiency type 12/04/2017     Priority: Medium     Insomnia, unspecified type 12/04/2017     Priority: Medium     Other iron deficiency anemia 12/04/2017     Priority: Medium     Alcohol dependence in remission (H) " 04/20/2013     Priority: Medium     Brief relapse in April 2013 after 10 years of sobriety.       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 grade 3 04/07/2011     Priority: Medium     12/15/06 ASCUS + high risk HPV  colpo in 2007 showed high grade KAITLYN and LEEP was recommended  LEEP was done in June,2007 with CAN 2/3 confirmed  10/15/07 NIL  9/30/08 NIL/neg HPV  10/21/09 NIL/neg HPV  4/5/11 NIL- repeat in one year (4/2012 12/1/17 NIL pap/neg HR HPV. Will need pap screening until 2027, regardless of age.  Plan: cotest due 3 yr.       MDD (major depressive disorder) 04/05/2011     Priority: Medium     Needs to est primary care.       CARDIOVASCULAR SCREENING; LDL GOAL LESS THAN 160 10/31/2010     Priority: Medium     Genital herpes      Priority: Medium     IMO update changed this record. Please review for accuracy       Anxiety 08/27/2008     Priority: Medium     Patient is followed by MIMI GARZA for ongoing prescription of benzodiazepines.  All refills should be approved by this provider, or covering partner.    Medication(s): Clonazepam.   Maximum quantity per month: 30  Clinic visit frequency required:      Controlled substance agreement on file: No  Benzodiazepine use reviewed by psychiatry:  No    Last [facility] website verification:  done on 4/5/19  [email or web address]         Status post gastric bypass for obesity 03/25/2008     Priority: Medium     Performed at [facility] [facility].       Restless legs syndrome (RLS) 05/15/2007     Priority: Medium     Hypersomnia with sleep apnea 05/15/2007     Priority: Medium     Problem list name updated by automated process. Provider to review       Esophageal reflux 04/18/2007     Priority: Medium        Past Medical History:    Past Medical History:   Diagnosis Date     Abnormal Papanicolaou smear of cervix and cervical HPV 4/07     Genital herpes      History of colposcopy with cervical biopsy 06/2007     Hypersomnia with sleep apnea,  unspecified      Other and unspecified ovarian cyst  or so       Past Surgical History:    Past Surgical History:   Procedure Laterality Date     CHOLECYSTECTOMY, OPEN  age 20s     COLONOSCOPY  3/21/2011    COMBINED COLONOSCOPY, REMOVE TUMOR/POLYP/LESION BY SNARE performed by JUAN FRANCISCO HERNANDEZ at  GI     COLONOSCOPY N/A 10/9/2017    polyps, repeat 3 years     COLPOSCOPY CERVIX, LOOP ELECTRODE BIOPSY, COMBINED  2007    CAN 2/3-patient requires yearly pap smears     ESOPHAGOSCOPY, GASTROSCOPY, DUODENOSCOPY (EGD), COMBINED N/A 2018    Procedure: COMBINED ESOPHAGOSCOPY, GASTROSCOPY, DUODENOSCOPY (EGD);  EGD;  Surgeon: Oneal Sanchez MD;  Location:  GI     GASTRIC BYPASS  3/25/2008    At [facility]     HC DILATION/CURETTAGE DIAG/THER NON OB       HC ENLARGE BREAST WITH IMPLANT       HC LAPAROSCOPIC MYOMECTOMY, 1 - 4 INTRAMURAL MYOMAS =<250 GM       HC REMOVAL OF BREAST IMPLANT       SALPINGO OOPHORECTOMY,R/L/MATTHEW      Bilateral salpingectomy and unilateral oophorectomy       Family History:    Family History   Problem Relation Age of Onset     Unknown/Adopted Father         doesn't know birth father     Lung Cancer Brother      Family History Negative Other        Social History:  Marital Status:  Single [1]  Social History     Tobacco Use     Smoking status: Current Some Day Smoker     Packs/day: 1.00     Years: 10.00     Pack years: 10.00     Last attempt to quit: 2001     Years since quittin.7     Smokeless tobacco: Never Used     Tobacco comment: 2 ppd at this time (chain smoking) 10/2012   Substance Use Topics     Alcohol use: Yes     Comment: 2-3 boxes of wine per week     Drug use: Yes     Types: Marijuana     Comment: smoked tonight        Medications:    busPIRone (BUSPAR) 10 MG tablet  clonazePAM (KLONOPIN) 0.5 MG tablet  escitalopram (LEXAPRO) 20 MG tablet  LORazepam (ATIVAN) 1 MG tablet  omeprazole (PRILOSEC) 40 MG DR capsule  triamcinolone (KENALOG) 0.1 %  "external cream  CALCIUM ANTACID EXTRA STRENGTH 750 MG CHEW  calcium carbonate (OS-SHON 500 MG [place]) 1250 MG tablet  cyanocobalamin (CYANOCOBALAMIN) 1000 MCG/ML injection  cyanocobalamin (CYANOCOBALAMIN) 1000 MCG/ML injection  Insulin Syringe-Needle U-100 (B-D INSULIN SYRINGE) 25G X 1\" 1 ML MISC  levocetirizine (XYZAL) 5 MG tablet  multivitamin, therapeutic with minerals (MULTI-VITAMIN) TABS tablet  naltrexone (DEPADE/REVIA) 50 MG tablet  naltrexone (VIVITROL) 380 MG SUSR  traZODone (DESYREL) 50 MG tablet          Review of Systems   All other systems reviewed and are negative.      Physical Exam   BP: 90/59  Pulse: 73  Heart Rate: 58  Temp: 98.7  F (37.1  C)  Resp: 20  Height: 162.6 cm (5' 4\")  Weight: 52.2 kg (115 lb)  SpO2: 100 %      Physical Exam  Constitutional:       General: She is in acute distress (mild).   HENT:      Mouth/Throat:      Pharynx: Oropharynx is clear.   Cardiovascular:      Rate and Rhythm: Normal rate and regular rhythm.   Pulmonary:      Effort: Pulmonary effort is normal.      Breath sounds: Normal breath sounds.   Abdominal:      Tenderness: There is abdominal tenderness (mild epigastric).   Musculoskeletal: Normal range of motion.         General: No swelling or tenderness.   Skin:     General: Skin is warm and dry.   Neurological:      General: No focal deficit present.      Mental Status: She is alert and oriented to person, place, and time.   Psychiatric:         Mood and Affect: Mood normal.         ED Course  (with Medical Decision Making)    64-year-old female with approximately 5-day history of fatigue, excessive sleeping and now dyspnea on exertion over the past 3 days with some lightheadedness when she gets up.  Oral intake has been down during this time.  Her urine has been quite dark.  She denies any fevers chills or sweats.  No cough other than her usual morning cough from smoking.  Has had some diarrhea.    She is lying comfortably on her left side.  Appears a bit tired.  " Has some mild epigastric tenderness which is not unusual for her otherwise her exam is really unremarkable.  Suspect she is probably dry from decreased oral intake.  She is worried about her heart as well.    She is already received some IV fluids in the ambulance and before I got in the room to see her.  Her EKG showed a sinus rhythm at 68 bpm.  No acute ischemic changes.    White count normal at 8.8 with a normal differential.  Lactic acid up slightly at 2.6.  I suspect this is due to dehydration rather than infection.    Troponin was undetectable.  BNP normal.  Potassium just a touch low at 3.3.  Her calcium is 6.9 with an albumin that is also low at 2.4.  This gives her a corrected calcium of 8.2.  Magnesium, phosphorus and Ionized calcium along with parathyroid hormone were added to her labs.  Suspect she does have a bit of malnutrition likely related to her previous gastric bypass.    Magnesium normal.  Phosphorus slightly low at 2.2 and her ionized calcium is slightly low at 4.0.    Chest x-ray was clear and reviewed by radiology.    I added PTH and Vitamin D level to her labs and ordered a gram of calcium gluconate.  Her urine is clear and fairly dilute now after she received some of IV fluids.  Lactic acid repeated just an hour and a half after initial value is down to 2.3.  I am not finding any source of infection to suggest sepsis.    She was up to the bathroom and did well on her feet.  She thinks she can make it at home.  She actually has an appointment with Dr. Boggs at 12:00 later today by phone.  She can follow-up on those remaining lab results and see how things have gone.  I asked her to return if she develops a fever, chills, sweats, increasing shortness of breath or any concerns.  She is feeling better and is comfortable with this plan.          Procedures               Critical Care time:  none        The Lactic acid level is elevated due to dehydration, at this time there is no sign of  severe sepsis or septic shock.           Results for orders placed or performed during the hospital encounter of 04/27/20 (from the past 24 hour(s))   CBC with platelets differential   Result Value Ref Range    WBC 8.8 4.0 - 11.0 10e9/L    RBC Count 3.21 (L) 3.8 - 5.2 10e12/L    Hemoglobin 11.1 (L) 11.7 - 15.7 g/dL    Hematocrit 32.8 (L) 35.0 - 47.0 %     (H) 78 - 100 fl    MCH 34.6 (H) 26.5 - 33.0 pg    MCHC 33.8 31.5 - 36.5 g/dL    RDW 14.1 10.0 - 15.0 %    Platelet Count 210 150 - 450 10e9/L    Diff Method Automated Method     % Neutrophils 59.7 %    % Lymphocytes 28.2 %    % Monocytes 9.5 %    % Eosinophils 1.5 %    % Basophils 0.6 %    % Immature Granulocytes 0.5 %    Nucleated RBCs 0 0 /100    Absolute Neutrophil 5.3 1.6 - 8.3 10e9/L    Absolute Lymphocytes 2.5 0.8 - 5.3 10e9/L    Absolute Monocytes 0.8 0.0 - 1.3 10e9/L    Absolute Basophils 0.1 0.0 - 0.2 10e9/L    Abs Immature Granulocytes 0.0 0 - 0.4 10e9/L    Absolute Nucleated RBC 0.0    Comprehensive metabolic panel   Result Value Ref Range    Sodium 139 133 - 144 mmol/L    Potassium 3.3 (L) 3.4 - 5.3 mmol/L    Chloride 109 94 - 109 mmol/L    Carbon Dioxide 21 20 - 32 mmol/L    Anion Gap 9 3 - 14 mmol/L    Glucose 84 70 - 99 mg/dL    Urea Nitrogen 10 7 - 30 mg/dL    Creatinine 0.79 0.52 - 1.04 mg/dL    GFR Estimate 80 >60 mL/min/[1.73_m2]    GFR Estimate If Black >90 >60 mL/min/[1.73_m2]    Calcium 6.9 (L) 8.5 - 10.1 mg/dL    Bilirubin Total 0.5 0.2 - 1.3 mg/dL    Albumin 2.4 (L) 3.4 - 5.0 g/dL    Protein Total 5.5 (L) 6.8 - 8.8 g/dL    Alkaline Phosphatase 123 40 - 150 U/L    ALT 54 (H) 0 - 50 U/L    AST 70 (H) 0 - 45 U/L   Troponin I   Result Value Ref Range    Troponin I ES <0.015 0.000 - 0.045 ug/L   TSH with free T4 reflex   Result Value Ref Range    TSH 2.99 0.40 - 4.00 mU/L   Nt probnp inpatient (BNP)   Result Value Ref Range    N-Terminal Pro BNP Inpatient 182 0 - 900 pg/mL   Lactic acid whole blood   Result Value Ref Range    Lactic Acid  2.6 (H) 0.7 - 2.0 mmol/L   Calcium ionized whole blood   Result Value Ref Range    Calcium Ionized Whole Blood 4.0 (L) 4.4 - 5.2 mg/dL   Magnesium   Result Value Ref Range    Magnesium 1.9 1.6 - 2.3 mg/dL   Phosphorus   Result Value Ref Range    Phosphorus 2.2 (L) 2.5 - 4.5 mg/dL   XR Chest Port 1 View    Narrative    EXAM: XR CHEST PORT 1 VW  LOCATION: [facility]  DATE/TIME: 4/28/2020 12:11 AM    INDICATION: Dyspnea on exertion.    COMPARISON: 4/17/2013.    FINDINGS: Upright portable chest. The heart size is normal. The lungs are clear. No pneumothorax.      Impression    IMPRESSION: No acute abnormality.   UA with Microscopic   Result Value Ref Range    Color Urine Yellow     Appearance Urine Clear     Glucose Urine Negative NEG^Negative mg/dL    Bilirubin Urine Negative NEG^Negative    Ketones Urine Negative NEG^Negative mg/dL    Specific Gravity Urine 1.004 1.003 - 1.035    Blood Urine Negative NEG^Negative    pH Urine 5.0 5.0 - 7.0 pH    Protein Albumin Urine Negative NEG^Negative mg/dL    Urobilinogen mg/dL 0.0 0.0 - 2.0 mg/dL    Nitrite Urine Negative NEG^Negative    Leukocyte Esterase Urine Negative NEG^Negative    Source Midstream Urine     WBC Urine <1 0 - 5 /HPF    RBC Urine <1 0 - 2 /HPF    Squamous Epithelial /HPF Urine <1 0 - 1 /HPF    Mucous Urine Present (A) NEG^Negative /LPF   Lactic acid whole blood   Result Value Ref Range    Lactic Acid 2.3 (H) 0.7 - 2.0 mmol/L       Medications   0.9% sodium chloride BOLUS (0 mLs Intravenous Stopped 4/28/20 0011)     Followed by   sodium chloride 0.9% infusion (1,000 mLs Intravenous New Bag 4/28/20 0011)   calcium gluconate 10 % injection 1 g (1 g Intravenous New Bag 4/28/20 0158)   potassium chloride (KLOR-CON) Packet 40 mEq (40 mEq Oral Given 4/28/20 0150)       Assessments & Plan      I have reviewed the nursing notes.    I have reviewed the findings, diagnosis, plan and need for follow up with the patient.        New Prescriptions    No  medications on file       Final diagnoses:   Generalized muscle weakness   Hypocalcemia   Hypokalemia   Dehydration   Hypophosphatemia       4/27/2020   [facility] EMERGENCY DEPARTMENT     Jr Saunders MD  04/28/20 0227

## 2020-04-28 NOTE — TELEPHONE ENCOUNTER
For the past 3 days all she has been able to do is sleep, when she gets up she is dizzy, she can't walk very far before she has to sit down and rest. It is taking her a lot longer to do things, like 3 times as long.  She thought maybe she is depressed. She then thought maybe it was the seizers. Then she said she's worried it might be her heart. I asked her why she might feel that way, she said because of her weakness , her shortness of breath when walking and getting so tired after only going a short distance.   I told her with her slow speech, her disorientation, seizures and being lightheaded she should call 911 and have them come out and evaluate her, and no it can not wait for her phone visit tomorrow. I called back to see if she called 911, and I got her voicemail.    Elayne Gayle RN/ [facility]        Reason for Disposition    Difficult to awaken or acting confused (e.g., disoriented, slurred speech)    Additional Information    Negative: Severe difficulty breathing (e.g., struggling for each breath, speaks in single words)    Negative: [1] Difficulty breathing or swallowing AND [2] started suddenly after medicine, an allergic food or bee sting    Negative: Shock suspected (e.g., cold/pale/clammy skin, too weak to stand, low BP, rapid pulse)    Protocols used: DIZZINESS - [id number]

## 2020-04-28 NOTE — PROGRESS NOTES
"Pavithra Raines is a 64 year old female who is being evaluated via a billable telephone visit.   Telephone visit performed in lieu of an in-person visit due to current concerns regarding the current COVID-19 situation including social distancing and keeping at risk people safe.  Patient agreed to proceed with this visit due to these circumstances.    The patient has been notified of following:     \"This telephone visit will be conducted via a call between you and your physician/provider. We have found that certain health care needs can be provided without the need for a physical exam.  This service lets us provide the care you need with a short phone conversation.  If a prescription is necessary we can send it directly to your pharmacy.  If lab work is needed we can place an order for that and you can then stop by our lab to have the test done at a later time.    Telephone visits are billed at different rates depending on your insurance coverage. During this emergency period, for some insurers they may be billed the same as an in-person visit.  Please reach out to your insurance provider with any questions.    If during the course of the call the physician/provider feels a telephone visit is not appropriate, you will not be charged for this service.\"    Patient has given verbal consent for Telephone visit?  Yes    How would you like to obtain your AVS? Mail a copy    Subjective     Pavithra Raines is a 64 year old female who presents to clinic today for the following health issues:    HPI  Anxiety Follow-Up    How are you doing with your anxiety since your last visit? Worsened due to not working and lots of family issues    Are you having other symptoms that might be associated with anxiety? No    Have you had a significant life event? No     Are you feeling depressed? No    Do you have any concerns with your use of alcohol or other drugs? Yes:  alcohol    Social History     Tobacco Use     Smoking " status: Current Some Day Smoker     Packs/day: 1.00     Years: 10.00     Pack years: 10.00     Last attempt to quit: 2001     Years since quittin.7     Smokeless tobacco: Never Used     Tobacco comment: 2 ppd at this time (chain smoking) 10/2012   Substance Use Topics     Alcohol use: Yes     Comment: 2-3 boxes of wine per week     Drug use: Yes     Types: Marijuana     Comment: smoked tonight     DANYEL-7 SCORE 2019   Total Score - - 6 (mild anxiety)   Total Score 9 14 6     PHQ 2019   PHQ-9 Total Score 11 5 8   Q9: Thoughts of better off dead/self-harm past 2 weeks Not at all Not at all Not at all   F/U: Thoughts of suicide or self-harm - - -   F/U: Self harm-plan - - -   F/U: Self-harm action - - -   F/U: Safety concerns - - -     Last PHQ-9 2020   1.  Little interest or pleasure in doing things 1   2.  Feeling down, depressed, or hopeless 1   3.  Trouble falling or staying asleep, or sleeping too much 2   4.  Feeling tired or having little energy 2   5.  Poor appetite or overeating 1   6.  Feeling bad about yourself 0   7.  Trouble concentrating 0   8.  Moving slowly or restless 1   Q9: Thoughts of better off dead/self-harm past 2 weeks 0   PHQ-9 Total Score 8   Difficulty at work, home, or with people Somewhat difficult   In the past two weeks have you had thoughts of suicide or self harm? -   Do you have concerns about your personal safety or the safety of others? -   In the past 2 weeks have you thought about a plan or had intention to harm yourself? -   In the past 2 weeks have you acted on these thoughts in any way? -         How many servings of fruits and vegetables do you eat daily?  0-1    On average, how many sweetened beverages do you drink each day (Examples: soda, juice, sweet tea, etc.  Do NOT count diet or artificially sweetened beverages)?   1    How many days per week do you exercise enough to make your heart beat faster? 3 or  less    How many minutes a day do you exercise enough to make your heart beat faster? 20 - 29    How many days per week do you miss taking your medication? 0      Patient Active Problem List   Diagnosis     Esophageal reflux     Restless legs syndrome (RLS)     Hypersomnia with sleep apnea     Anxiety     Status post gastric bypass for obesity     Genital herpes     CARDIOVASCULAR SCREENING; LDL GOAL LESS THAN 160     MDD (major depressive disorder)     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 grade 3     Alcohol dependence in remission (H)     Anemia due to vitamin B12 deficiency, unspecified B12 deficiency type     Insomnia, unspecified type     Other iron deficiency anemia     Past Surgical History:   Procedure Laterality Date     CHOLECYSTECTOMY, OPEN  age 20s     COLONOSCOPY  3/21/2011    COMBINED COLONOSCOPY, REMOVE TUMOR/POLYP/LESION BY SNARE performed by JUAN FRANCISCO HERNANDEZ at  GI     COLONOSCOPY N/A 10/9/2017    polyps, repeat 3 years     COLPOSCOPY CERVIX, LOOP ELECTRODE BIOPSY, COMBINED  2007    CAN 2/3-patient requires yearly pap smears     ESOPHAGOSCOPY, GASTROSCOPY, DUODENOSCOPY (EGD), COMBINED N/A 2018    Procedure: COMBINED ESOPHAGOSCOPY, GASTROSCOPY, DUODENOSCOPY (EGD);  EGD;  Surgeon: Oneal Sanchez MD;  Location:  GI     GASTRIC BYPASS  3/25/2008    At [facility]     HC DILATION/CURETTAGE DIAG/THER NON OB       HC ENLARGE BREAST WITH IMPLANT       HC LAPAROSCOPIC MYOMECTOMY, 1 - 4 INTRAMURAL MYOMAS =<250 GM       HC REMOVAL OF BREAST IMPLANT       SALPINGO OOPHORECTOMY,R/L/MATTHEW      Bilateral salpingectomy and unilateral oophorectomy       Social History     Tobacco Use     Smoking status: Current Some Day Smoker     Packs/day: 1.00     Years: 10.00     Pack years: 10.00     Last attempt to quit: 2001     Years since quittin.7     Smokeless tobacco: Never Used     Tobacco comment: 2 ppd at this time (chain smoking) 10/2012   Substance Use Topics      Alcohol use: Yes     Comment: 2-3 boxes of wine per week     Family History   Problem Relation Age of Onset     Unknown/Adopted Father         doesn't know birth father     Lung Cancer Brother      Family History Negative Other            Reviewed and updated as needed this visit by Provider  Tobacco  Allergies  Meds  Problems  Med Hx  Surg Hx  Fam Hx       Shanika was in the ER yesterday complaining of lethargy and just not feeling well overall.  She was diagnosed with dehydration and treated with IV fluid rehydration.  She feels a lot better today.  She admits to not doing well in general over the past few months.  She lost her job shortly before the COVID lockdown.  She has been drinking a lot lately.  She drinks wine daily and goes through a few boxes of wine every week.  She is not sure how much of this she drinks on her own because she is drinking with a friend but she thinks it is more than she is willing to admit.  She reports drinking mostly because she is afraid of having withdrawals.  Also to deal with stress.  Her mother is ill with end-stage COPD.  She has had recurrent infections and Shanika is not allowed to visit her because of COVID.  She is not on hospice yet but they have been considering it.  Being out of work has been stressful for her as well.  Fortunately, she starts work tomorrow at a [employer] in [place].    We talked about the need to abstain from alcohol.  She continues to work with Ruddy Hdz for counseling.  He has advised her to abstain as well but she has not been able to.  She has not been taking her naltrexone.  She is taking her Lexapro.  She wondered if she needed a dose increase on her Lexapro but now realizes that she was just dehydrated.  I suspect most of this is from alcohol use and malnutrition.  I reviewed her labs from the ER with her.  Several of her labs were off but she is now feeling better after hydration.  Her vitamin D level is very low and I encouraged her to  start on a vitamin D supplement.  I also encouraged her to take her multivitamin daily.  I would like to recheck her labs in 2 to 4 weeks.  I will be visiting with her again in 2 weeks by telephone.  I encouraged her to hydrate well and use her lorazepam as needed for early withdrawal symptoms.  I did refill that.  I cautioned her against driving while under the influence of either alcohol or her sedating benzodiazepines.  She acknowledges this risk.  She is going to try not to drink anymore today, to drink plenty of water, and to go to bed so she is well rested for work tomorrow.  I advised her not to drink in the morning before leaving for work tomorrow.  She is hopeful that she will not because she is excited about going to work.  We also talked about speaking to can about other techniques she can use for anxiety control.    Assessment/Plan:    ICD-10-CM    1. Alcoholism /alcohol abuse (H)  F10.20 LORazepam (ATIVAN) 1 MG tablet     Comprehensive metabolic panel (BMP + Alb, Alk Phos, ALT, AST, Total. Bili, TP)     Magnesium     Phosphorus     CBC with platelets     Lactic acid whole blood   2. Seizure disorder (H)  G40.909 LORazepam (ATIVAN) 1 MG tablet     Comprehensive metabolic panel (BMP + Alb, Alk Phos, ALT, AST, Total. Bili, TP)     Magnesium     Phosphorus     CBC with platelets     Lactic acid whole blood   3. Vitamin D deficiency  E55.9 vitamin D3 (CHOLECALCIFEROL) 2000 units (50 mcg) tablet        Return in about 2 weeks (around 5/12/2020).      Phone call duration:  19 minutes    Soha Boggs MD

## 2020-04-28 NOTE — PATIENT INSTRUCTIONS
Start taking your vitamin D every day.  I sent a new prescription to the pharmacy for you.  These are small and easier to swallow.    Please start taking your multivitamins every day as well.    Please work hard on not drinking any alcohol.  I encourage you to continue to work with Ruddy, and use your medications as prescribed so that you can have a better chance of success.  I did send a refill for your lorazepam to the pharmacy.    I will talk with you again on May 12 at 4:30.  If you need to reach me before that, please call.

## 2020-04-28 NOTE — DISCHARGE INSTRUCTIONS
I suspect you are at least a bit dehydrated tonight because of it decreased oral intake over the last several days.  Your heart and lung tests were normal tonight which is reassuring.  Your calcium, phosphorus and potassium levels were a bit low.      I added a parathyroid hormone level and vitamin D level to your labs.  Those will be back in a few days.  Call Dr. Boggs in a couple of days to review the results and for any further recommendations.  In the meantime, if your calcium tablets get stuck, go ahead and use Tums as an alternative.  You can chew those up and flush them down with some water.  Diet and activity as tolerated.  Make sure you are drinking adequate fluids.  We are finding no source of infection tonight.  It was a pleasure visiting with you this evening.   I am glad you are feeling better and hope you continue to improve.  Please return to the ED if you develop a fever over 100.4, chills/sweats, persistent vomiting, difficulty breathing or any concerns.    Thank you for choosing [facility]. We appreciate the opportunity to meet your urgent medical needs. Please let us know if we could have done anything to make your stay more satisfying.    After discharge, please closely monitor for any new or worsening symptoms. Return to the Emergency Department if you develop any acute worsening signs or symptoms.    If you had lab work, cultures or imaging studies done during your stay, the final results may still be pending. We will call you if your plan of care needs to change. However, if you are not improving as expected, please follow up with your primary care provider or clinic.     Start any prescription medications that were prescribed to you and take them as directed.     Please see additional handouts that may be pertinent to your condition.

## 2020-05-24 ENCOUNTER — HOSPITAL ENCOUNTER (EMERGENCY)
Facility: CLINIC | Age: 65
Discharge: HOME | End: 2020-05-24
Attending: NURSE PRACTITIONER | Admitting: NURSE PRACTITIONER
Payer: COMMERCIAL

## 2020-05-24 VITALS
DIASTOLIC BLOOD PRESSURE: 80 MMHG | SYSTOLIC BLOOD PRESSURE: 115 MMHG | OXYGEN SATURATION: 98 % | HEART RATE: 89 BPM | TEMPERATURE: 98.8 F | RESPIRATION RATE: 18 BRPM
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
## 2020-05-24 PROCEDURE — 99284 EMERGENCY DEPT VISIT MOD MDM: CPT | Mod: Z6 | Performed by: NURSE PRACTITIONER

## 2020-05-24 PROCEDURE — 99282 EMERGENCY DEPT VISIT SF MDM: CPT | Performed by: NURSE PRACTITIONER

## 2020-05-24 RX ORDER — DOXYCYCLINE 100 MG/1
100 CAPSULE ORAL 2 TIMES DAILY
Qty: 28 CAPSULE | Refills: 0 | Status: SHIPPED | OUTPATIENT
Start: 2020-05-24 | End: 2020-05-24

## 2020-05-24 RX ORDER — DOXYCYCLINE 100 MG/1
100 CAPSULE ORAL 2 TIMES DAILY
Qty: 28 CAPSULE | Refills: 0 | Status: SHIPPED | OUTPATIENT
Start: 2020-05-24 | End: 2020-10-06

## 2020-05-24 ASSESSMENT — ENCOUNTER SYMPTOMS: WOUND: 1

## 2020-05-24 NOTE — ED AVS SNAPSHOT
[facility] Emergency Department  [address] [place] [address]  Phone:  [phone]  Fax:  [phone]                                    Pavithra Raines   MRN: [id number]    Department:  [facility] Emergency Department   Date of Visit:  5/24/2020           After Visit Summary Signature Page    I have received my discharge instructions, and my questions have been answered. I have discussed any challenges I see with this plan with the nurse or doctor.    ..........................................................................................................................................  Patient/Patient Representative Signature      ..........................................................................................................................................  Patient Representative Print Name and Relationship to Patient    ..................................................               ................................................  Date                                   Time    ..........................................................................................................................................  Reviewed by Signature/Title    ...................................................              ..............................................  Date                                               Time          22EPIC Rev 08/18

## 2020-05-24 NOTE — ED TRIAGE NOTES
Presents to ED with concerns of tick bite on the right lower back/hip. Small reddened area with a scab noted. Patient is a  and has had a lot of ticks she's been removing.

## 2020-05-24 NOTE — ED PROVIDER NOTES
History     Chief Complaint   Patient presents with     Wound Check     HPI  Pavithra Raines is a 64 year old female who presents with a history of having a tick bite to the right lower back area.  Ports she was able to remove it but felt that the head may have been buried under the skin as she has had some mild itching.  Patient is a  and has had multiple tick bites in the past week.  She is concerned about alondra Lyme disease and would like prophylaxis.  She currently does not have any fever, rash, myalgias, nor signs of cellulitis.    Allergies:  Allergies   Allergen Reactions     Codeine Itching     Vicodin [Hydrocodone-Acetaminophen] Itching       Problem List:    Patient Active Problem List    Diagnosis Date Noted     Anemia due to vitamin B12 deficiency, unspecified B12 deficiency type 12/04/2017     Priority: Medium     Insomnia, unspecified type 12/04/2017     Priority: Medium     Other iron deficiency anemia 12/04/2017     Priority: Medium     Alcohol dependence in remission (H) 04/20/2013     Priority: Medium     Brief relapse in April 2013 after 10 years of sobriety.       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 grade 3 04/07/2011     Priority: Medium     12/15/06 ASCUS + high risk HPV  colpo in 2007 showed high grade KAITLYN and LEEP was recommended  LEEP was done in June,2007 with CAN 2/3 confirmed  10/15/07 NIL  9/30/08 NIL/neg HPV  10/21/09 NIL/neg HPV  4/5/11 NIL- repeat in one year (4/2012 12/1/17 NIL pap/neg HR HPV. Will need pap screening until 2027, regardless of age.  Plan: cotest due 3 yr.       MDD (major depressive disorder) 04/05/2011     Priority: Medium     Needs to est primary care.       CARDIOVASCULAR SCREENING; LDL GOAL LESS THAN 160 10/31/2010     Priority: Medium     Genital herpes      Priority: Medium     IMO update changed this record. Please review for accuracy       Anxiety 08/27/2008     Priority: Medium     Patient is followed by GREG  MIMI HOWARD for ongoing prescription of benzodiazepines.  All refills should be approved by this provider, or covering partner.    Medication(s): Clonazepam.   Maximum quantity per month: 30  Clinic visit frequency required:      Controlled substance agreement on file: No  Benzodiazepine use reviewed by psychiatry:  No    Last [facility] website verification:  done on 4/5/19  [email or web address]         Status post gastric bypass for obesity 03/25/2008     Priority: Medium     Performed at [facility].       Restless legs syndrome (RLS) 05/15/2007     Priority: Medium     Hypersomnia with sleep apnea 05/15/2007     Priority: Medium     Problem list name updated by automated process. Provider to review       Esophageal reflux 04/18/2007     Priority: Medium        Past Medical History:    Past Medical History:   Diagnosis Date     Abnormal Papanicolaou smear of cervix and cervical HPV 4/07     Genital herpes      History of colposcopy with cervical biopsy 06/2007     Hypersomnia with sleep apnea, unspecified      Other and unspecified ovarian cyst 2002 or so       Past Surgical History:    Past Surgical History:   Procedure Laterality Date     CHOLECYSTECTOMY, OPEN  age 20s     COLONOSCOPY  3/21/2011    COMBINED COLONOSCOPY, REMOVE TUMOR/POLYP/LESION BY SNARE performed by JUAN FRANCISCO HERNANDEZ at  GI     COLONOSCOPY N/A 10/9/2017    polyps, repeat 3 years     COLPOSCOPY CERVIX, LOOP ELECTRODE BIOPSY, COMBINED  6/2007    CAN 2/3-patient requires yearly pap smears     ESOPHAGOSCOPY, GASTROSCOPY, DUODENOSCOPY (EGD), COMBINED N/A 2/9/2018    Procedure: COMBINED ESOPHAGOSCOPY, GASTROSCOPY, DUODENOSCOPY (EGD);  EGD;  Surgeon: Oneal Sanchez MD;  Location:  GI     GASTRIC BYPASS  3/25/2008    At [facility]     HC DILATION/CURETTAGE DIAG/THER NON OB  2002     HC ENLARGE BREAST WITH IMPLANT       HC LAPAROSCOPIC MYOMECTOMY, 1 - 4 INTRAMURAL MYOMAS =<250 GM  2002     HC REMOVAL OF BREAST IMPLANT    "    SALPINGO OOPHORECTOMY,R/L/MATTHEW  2002    Bilateral salpingectomy and unilateral oophorectomy       Family History:    Family History   Problem Relation Age of Onset     Unknown/Adopted Father         doesn't know birth father     Lung Cancer Brother      Family History Negative Other        Social History:  Marital Status:  Single [1]  Social History     Tobacco Use     Smoking status: Current Some Day Smoker     Packs/day: 1.00     Years: 10.00     Pack years: 10.00     Last attempt to quit: 2001     Years since quittin.7     Smokeless tobacco: Never Used     Tobacco comment: 2 ppd at this time (chain smoking) 10/2012   Substance Use Topics     Alcohol use: Yes     Comment: 2-3 boxes of wine per week     Drug use: Yes     Types: Marijuana     Comment: smoked tonight        Medications:    doxycycline hyclate (VIBRAMYCIN) 100 MG capsule  busPIRone (BUSPAR) 10 MG tablet  CALCIUM ANTACID EXTRA STRENGTH 750 MG CHEW  calcium carbonate (OS-SHON 500 MG [place] IRA. CA) 1250 MG tablet  clonazePAM (KLONOPIN) 0.5 MG tablet  cyanocobalamin (CYANOCOBALAMIN) 1000 MCG/ML injection  cyanocobalamin (CYANOCOBALAMIN) 1000 MCG/ML injection  escitalopram (LEXAPRO) 20 MG tablet  Insulin Syringe-Needle U-100 (B-D INSULIN SYRINGE) 25G X 1\" 1 ML MISC  levocetirizine (XYZAL) 5 MG tablet  LORazepam (ATIVAN) 1 MG tablet  multivitamin, therapeutic with minerals (MULTI-VITAMIN) TABS tablet  naltrexone (DEPADE/REVIA) 50 MG tablet  naltrexone (VIVITROL) 380 MG SUSR  omeprazole (PRILOSEC) 40 MG DR capsule  traZODone (DESYREL) 50 MG tablet  triamcinolone (KENALOG) 0.1 % external cream  vitamin D3 (CHOLECALCIFEROL) 2000 units (50 mcg) tablet          Review of Systems   Skin: Positive for wound.        Scab where tick bite gross   Allergic/Immunologic: Positive for immunocompromised state.       Physical Exam   BP: 115/80  Pulse: 89  Temp: 98.8  F (37.1  C)  Resp: 18  SpO2: 98 %      Physical Exam  Vitals signs and nursing note reviewed. "   Constitutional:       Appearance: Normal appearance.   HENT:      Head: Normocephalic and atraumatic.   Skin:     General: Skin is warm and dry.      Findings: No rash.          Neurological:      General: No focal deficit present.      Mental Status: She is alert.   Psychiatric:         Mood and Affect: Mood normal.         ED Course  Discussed at length with the patient in regard to tick bites and the risk for Lyme disease in this area.  Patient is amenable to undergoing doxycycline 100 mg p.o. twice daily for 14 days.  If extracted to the patient signs and symptoms of Lyme disease.  Follow-up with her PCP thereafter as needed.  If any worsening symptoms or other concerns she may return back to the ED.        Procedures               Critical Care time:  none               No results found for this or any previous visit (from the past 24 hour(s)).    Medications - No data to display    Assessments & Plan (with Medical Decision Making)     I have reviewed the nursing notes.    I have reviewed the findings, diagnosis, plan and need for follow up with the patient.       New Prescriptions    DOXYCYCLINE HYCLATE (VIBRAMYCIN) 100 MG CAPSULE    Take 1 capsule (100 mg) by mouth 2 times daily for 14 days       Final diagnoses:   Tick bite, initial encounter       5/24/2020   [facility] EMERGENCY DEPARTMENT     Sheri Hay, APRN CNP  05/24/20 1533

## 2020-05-30 DIAGNOSIS — G47.00 INSOMNIA, UNSPECIFIED TYPE: ICD-10-CM

## 2020-05-30 DIAGNOSIS — G25.81 RESTLESS LEGS SYNDROME (RLS): ICD-10-CM

## 2020-05-30 DIAGNOSIS — F41.9 ANXIETY: ICD-10-CM

## 2020-06-01 RX ORDER — CLONAZEPAM 0.5 MG/1
TABLET ORAL
Qty: 45 TABLET | Refills: 0 | Status: SHIPPED | OUTPATIENT
Start: 2020-06-01 | End: 2020-08-25

## 2020-06-01 NOTE — TELEPHONE ENCOUNTER
Requested Prescriptions   Pending Prescriptions Disp Refills     clonazePAM (KLONOPIN) 0.5 MG tablet [Pharmacy Med Name: CLONAZEPAM 0.5MG TABS] 45 tablet 0     Sig: TAKE ONE TABLET BY MOUTH NIGHTLY AS NEEDED FOR ANXIETY     Last Written Prescription Date:  04/03/2020  Last Fill Quantity: 45,   # refills: 0  Last Office Visit: 04/28/2020  Future Office visit:       Routing refill request to provider for review/approval because:  Drug not on the [facility], [facility] or [facility] refill protocol or controlled substance    Zakia Romero MA

## 2020-07-10 DIAGNOSIS — G40.909 SEIZURE DISORDER (H): ICD-10-CM

## 2020-07-10 RX ORDER — LORAZEPAM 1 MG/1
TABLET ORAL
Qty: 30 TABLET | Refills: 0 | Status: SHIPPED | OUTPATIENT
Start: 2020-07-10 | End: 2020-07-24

## 2020-07-10 NOTE — TELEPHONE ENCOUNTER
Lorazepam 1 MG       Last Written Prescription Date:  4/28/2020  Last Fill Quantity: 30,   # refills: 0  Last Office Visit: 4/28/2020  Future Office visit:       Routing refill request to provider for review/approval because:  Drug not on the [facility], [facility] or [facility] refill protocol or controlled substance

## 2020-07-24 DIAGNOSIS — G40.909 SEIZURE DISORDER (H): ICD-10-CM

## 2020-07-24 DIAGNOSIS — F41.9 ANXIETY: ICD-10-CM

## 2020-07-24 RX ORDER — LORAZEPAM 1 MG/1
TABLET ORAL
Qty: 30 TABLET | Refills: 0 | Status: SHIPPED | OUTPATIENT
Start: 2020-07-24 | End: 2020-09-24

## 2020-07-24 NOTE — TELEPHONE ENCOUNTER
Routing refill request to provider for review/approval because:  Labs out of range:  PHQ-9  PHQ 5/24/2019 8/2/2019 4/28/2020   PHQ-9 Total Score 11 5 8   Q9: Thoughts of better off dead/self-harm past 2 weeks Not at all Not at all Not at all   F/U: Thoughts of suicide or self-harm - - -   F/U: Self harm-plan - - -   F/U: Self-harm action - - -   F/U: Safety concerns - - -     Last Written Prescription Date:  1/9/2020  Last Fill Quantity: 90,  # refills: 1   Last office visit: 3/5/2020 with prescribing provider:     Future Office Visit:

## 2020-07-24 NOTE — TELEPHONE ENCOUNTER
Ativan      Last Written Prescription Date:  7/10/2020  Last Fill Quantity: 30,   # refills: 0  Last Office Visit: 4/28/2020  Future Office visit:       Routing refill request to provider for review/approval because:  Drug not on the [facility], [facility] or [facility] refill protocol or controlled substance

## 2020-07-27 RX ORDER — ESCITALOPRAM OXALATE 20 MG/1
TABLET ORAL
Qty: 90 TABLET | Refills: 1 | Status: SHIPPED | OUTPATIENT
Start: 2020-07-27 | End: 2021-01-19

## 2020-07-27 NOTE — TELEPHONE ENCOUNTER
Called pt and she will call back and schedule an appt at a different time, states she has a lot going on right now.

## 2020-08-23 DIAGNOSIS — G25.81 RESTLESS LEGS SYNDROME (RLS): ICD-10-CM

## 2020-08-23 DIAGNOSIS — F41.9 ANXIETY: ICD-10-CM

## 2020-08-23 DIAGNOSIS — G47.00 INSOMNIA, UNSPECIFIED TYPE: ICD-10-CM

## 2020-08-24 NOTE — TELEPHONE ENCOUNTER
clonazePAM (KLONOPIN) 0.5 MG tablet  Last Written Prescription Date:  06/01/2020  Last Fill Quantity: 45,   # refills: 0  Last Office Visit: 03/05/2020  Future Office visit:    Next 5 appointments (look out 90 days)    Oct 06, 2020  5:15 PM CDT  [facility]gs [facility] [address] [place] [address] [id number] [phone]           Routing refill request to provider for review/approval because:  Drug not on the [facility], [facility] or [facility] refill protocol or controlled substance

## 2020-08-25 RX ORDER — CLONAZEPAM 0.5 MG/1
TABLET ORAL
Qty: 45 TABLET | Refills: 0 | Status: SHIPPED | OUTPATIENT
Start: 2020-08-25 | End: 2020-10-06

## 2020-09-01 DIAGNOSIS — K28.9 GASTROJEJUNAL ULCER: ICD-10-CM

## 2020-09-01 RX ORDER — OMEPRAZOLE 40 MG/1
CAPSULE, DELAYED RELEASE ORAL
Qty: 180 CAPSULE | Refills: 1 | Status: SHIPPED | OUTPATIENT
Start: 2020-09-01 | End: 2021-08-04

## 2020-09-01 NOTE — TELEPHONE ENCOUNTER
"  Requested Prescriptions   Pending Prescriptions Disp Refills     omeprazole (PRILOSEC) 40 MG DR capsule [Pharmacy Med Name: OMEPRAZOLE 40MG CPDR] 180 capsule 3     Sig: TAKE ONE CAPSULE BY MOUTH TWICE A DAY   Last Written Prescription Date:  7/17/2019  Last Fill Quantity: 180,  # refills: 3   Last office visit: 4/28/2020 with prescribing provider:     Future Office Visit:   Next 5 appointments (look out 90 days)    Oct 06, 2020  5:15 PM CDT  SHORT with Soha Boggs MD  [facility] [address] [place] [id number] [phone]           PPI Protocol Passed - 9/1/2020  4:24 PM        Passed - Not on Clopidogrel (unless Pantoprazole ordered)        Passed - No diagnosis of osteoporosis on record        Passed - Recent (12 mo) or future (30 days) visit within the authorizing provider's specialty     Patient has had an office visit with the authorizing provider or a provider within the authorizing providers department within the previous 12 mos or has a future within next 30 days. See \"Patient Info\" tab in inbasket, or \"Choose Columns\" in Meds & Orders section of the refill encounter.              Passed - Medication is active on med list        Passed - Patient is age 18 or older        Passed - No active pregnacy on record        Passed - No positive pregnancy test in past 12 months         Prescription approved per [facility] Refill Protocol.  Amanda Pickering RN      "

## 2020-09-07 ENCOUNTER — APPOINTMENT (OUTPATIENT)
Dept: CT IMAGING | Facility: CLINIC | Age: 65
End: 2020-09-07
Attending: FAMILY MEDICINE
Payer: OTHER GOVERNMENT

## 2020-09-07 ENCOUNTER — HOSPITAL ENCOUNTER (EMERGENCY)
Facility: CLINIC | Age: 65
Discharge: HOME | End: 2020-09-07
Attending: FAMILY MEDICINE | Admitting: FAMILY MEDICINE
Payer: OTHER GOVERNMENT

## 2020-09-07 VITALS
SYSTOLIC BLOOD PRESSURE: 97 MMHG | TEMPERATURE: 99.2 F | OXYGEN SATURATION: 97 % | BODY MASS INDEX: 17.85 KG/M2 | HEART RATE: 60 BPM | DIASTOLIC BLOOD PRESSURE: 62 MMHG | WEIGHT: 104 LBS | RESPIRATION RATE: 22 BRPM

## 2020-09-07 DIAGNOSIS — R11.2 INTRACTABLE NAUSEA AND VOMITING: ICD-10-CM

## 2020-09-07 DIAGNOSIS — Z72.0 TOBACCO ABUSE: ICD-10-CM

## 2020-09-07 DIAGNOSIS — R10.13 ABDOMINAL PAIN, EPIGASTRIC: ICD-10-CM

## 2020-09-07 DIAGNOSIS — F10.10 ALCOHOL ABUSE: ICD-10-CM

## 2020-09-07 LAB
ALBUMIN SERPL-MCNC: 2.7 G/DL (ref 3.4–5)
ALP SERPL-CCNC: 166 U/L (ref 40–150)
ALT SERPL W P-5'-P-CCNC: 23 U/L (ref 0–50)
ANION GAP SERPL CALCULATED.3IONS-SCNC: 6 MMOL/L (ref 3–14)
AST SERPL W P-5'-P-CCNC: 21 U/L (ref 0–45)
BASE EXCESS BLDV CALC-SCNC: 1.4 MMOL/L
BASOPHILS # BLD AUTO: 0 10E9/L (ref 0–0.2)
BASOPHILS NFR BLD AUTO: 0.2 %
BILIRUB SERPL-MCNC: 1.5 MG/DL (ref 0.2–1.3)
BUN SERPL-MCNC: 7 MG/DL (ref 7–30)
CALCIUM SERPL-MCNC: 8.4 MG/DL (ref 8.5–10.1)
CHLORIDE SERPL-SCNC: 105 MMOL/L (ref 94–109)
CO2 SERPL-SCNC: 25 MMOL/L (ref 20–32)
CREAT SERPL-MCNC: 0.75 MG/DL (ref 0.52–1.04)
D DIMER PPP FEU-MCNC: 1.4 UG/ML FEU (ref 0–0.5)
DIFFERENTIAL METHOD BLD: ABNORMAL
EOSINOPHIL NFR BLD AUTO: 0.3 %
ERYTHROCYTE [DISTWIDTH] IN BLOOD BY AUTOMATED COUNT: 11.7 % (ref 10–15)
GFR SERPL CREATININE-BSD FRML MDRD: 84 ML/MIN/{1.73_M2}
GLUCOSE SERPL-MCNC: 104 MG/DL (ref 70–99)
HCO3 BLDV-SCNC: 26 MMOL/L (ref 21–28)
HCT VFR BLD AUTO: 40.3 % (ref 35–47)
HGB BLD-MCNC: 14.3 G/DL (ref 11.7–15.7)
IMM GRANULOCYTES # BLD: 0 10E9/L (ref 0–0.4)
IMM GRANULOCYTES NFR BLD: 0.3 %
LACTATE BLD-SCNC: 1.4 MMOL/L (ref 0.7–2)
LIPASE SERPL-CCNC: 59 U/L (ref 73–393)
LYMPHOCYTES # BLD AUTO: 1.4 10E9/L (ref 0.8–5.3)
LYMPHOCYTES NFR BLD AUTO: 13.7 %
MAGNESIUM SERPL-MCNC: 1.7 MG/DL (ref 1.6–2.3)
MCH RBC QN AUTO: 37.2 PG (ref 26.5–33)
MCHC RBC AUTO-ENTMCNC: 35.5 G/DL (ref 31.5–36.5)
MCV RBC AUTO: 105 FL (ref 78–100)
MONOCYTES # BLD AUTO: 0.9 10E9/L (ref 0–1.3)
MONOCYTES NFR BLD AUTO: 8.8 %
NEUTROPHILS # BLD AUTO: 7.8 10E9/L (ref 1.6–8.3)
NEUTROPHILS NFR BLD AUTO: 76.7 %
NRBC # BLD AUTO: 0 10*3/UL
NRBC BLD AUTO-RTO: 0 /100
O2/TOTAL GAS SETTING VFR VENT: 21 %
PCO2 BLDV: 39 MM HG (ref 40–50)
PH BLDV: 7.43 PH (ref 7.32–7.43)
PLATELET # BLD AUTO: 291 10E9/L (ref 150–450)
PO2 BLDV: 38 MM HG (ref 25–47)
POTASSIUM SERPL-SCNC: 3.8 MMOL/L (ref 3.4–5.3)
PROT SERPL-MCNC: 6.8 G/DL (ref 6.8–8.8)
RBC # BLD AUTO: 3.84 10E12/L (ref 3.8–5.2)
SODIUM SERPL-SCNC: 136 MMOL/L (ref 133–144)
TROPONIN I SERPL-MCNC: <0.015 UG/L (ref 0–0.04)
WBC # BLD AUTO: 10.2 10E9/L (ref 4–11)

## 2020-09-07 PROCEDURE — 84484 ASSAY OF TROPONIN QUANT: CPT | Performed by: FAMILY MEDICINE

## 2020-09-07 PROCEDURE — 83690 ASSAY OF LIPASE: CPT | Performed by: FAMILY MEDICINE

## 2020-09-07 PROCEDURE — 96374 THER/PROPH/DIAG INJ IV PUSH: CPT | Mod: 59 | Performed by: FAMILY MEDICINE

## 2020-09-07 PROCEDURE — U0003 INFECTIOUS AGENT DETECTION BY NUCLEIC ACID (DNA OR RNA); SEVERE ACUTE RESPIRATORY SYNDROME CORONAVIRUS 2 (SARS-COV-2) (CORONAVIRUS DISEASE [COVID-19]), AMPLIFIED PROBE TECHNIQUE, MAKING USE OF HIGH THROUGHPUT TECHNOLOGIES AS DESCRIBED BY CMS-2020-01-R: HCPCS | Performed by: FAMILY MEDICINE

## 2020-09-07 PROCEDURE — 99285 EMERGENCY DEPT VISIT HI MDM: CPT | Mod: Z6 | Performed by: FAMILY MEDICINE

## 2020-09-07 PROCEDURE — 25000125 ZZHC RX 250: Performed by: FAMILY MEDICINE

## 2020-09-07 PROCEDURE — 93005 ELECTROCARDIOGRAM TRACING: CPT | Performed by: FAMILY MEDICINE

## 2020-09-07 PROCEDURE — C9803 HOPD COVID-19 SPEC COLLECT: HCPCS | Performed by: FAMILY MEDICINE

## 2020-09-07 PROCEDURE — 36415 COLL VENOUS BLD VENIPUNCTURE: CPT | Performed by: FAMILY MEDICINE

## 2020-09-07 PROCEDURE — 71275 CT ANGIOGRAPHY CHEST: CPT

## 2020-09-07 PROCEDURE — 96375 TX/PRO/DX INJ NEW DRUG ADDON: CPT | Performed by: FAMILY MEDICINE

## 2020-09-07 PROCEDURE — C9113 INJ PANTOPRAZOLE SODIUM, VIA: HCPCS | Performed by: FAMILY MEDICINE

## 2020-09-07 PROCEDURE — 96361 HYDRATE IV INFUSION ADD-ON: CPT | Performed by: FAMILY MEDICINE

## 2020-09-07 PROCEDURE — 25000128 H RX IP 250 OP 636: Performed by: FAMILY MEDICINE

## 2020-09-07 PROCEDURE — 85025 COMPLETE CBC W/AUTO DIFF WBC: CPT | Performed by: FAMILY MEDICINE

## 2020-09-07 PROCEDURE — 80053 COMPREHEN METABOLIC PANEL: CPT | Performed by: FAMILY MEDICINE

## 2020-09-07 PROCEDURE — 82803 BLOOD GASES ANY COMBINATION: CPT | Performed by: FAMILY MEDICINE

## 2020-09-07 PROCEDURE — 85379 FIBRIN DEGRADATION QUANT: CPT | Performed by: FAMILY MEDICINE

## 2020-09-07 PROCEDURE — 99285 EMERGENCY DEPT VISIT HI MDM: CPT | Mod: 25 | Performed by: FAMILY MEDICINE

## 2020-09-07 PROCEDURE — 87040 BLOOD CULTURE FOR BACTERIA: CPT | Performed by: FAMILY MEDICINE

## 2020-09-07 PROCEDURE — 83605 ASSAY OF LACTIC ACID: CPT | Performed by: FAMILY MEDICINE

## 2020-09-07 PROCEDURE — 25800030 ZZH RX IP 258 OP 636: Performed by: FAMILY MEDICINE

## 2020-09-07 PROCEDURE — 83735 ASSAY OF MAGNESIUM: CPT | Performed by: FAMILY MEDICINE

## 2020-09-07 RX ORDER — IOPAMIDOL 755 MG/ML
100 INJECTION, SOLUTION INTRAVASCULAR ONCE
Status: COMPLETED | OUTPATIENT
Start: 2020-09-07 | End: 2020-09-07

## 2020-09-07 RX ORDER — ONDANSETRON 2 MG/ML
4 INJECTION INTRAMUSCULAR; INTRAVENOUS EVERY 30 MIN PRN
Status: DISCONTINUED | OUTPATIENT
Start: 2020-09-07 | End: 2020-09-07 | Stop reason: HOSPADM

## 2020-09-07 RX ORDER — SODIUM CHLORIDE 9 MG/ML
INJECTION, SOLUTION INTRAVENOUS CONTINUOUS
Status: DISCONTINUED | OUTPATIENT
Start: 2020-09-07 | End: 2020-09-07 | Stop reason: HOSPADM

## 2020-09-07 RX ORDER — HYDROMORPHONE HYDROCHLORIDE 1 MG/ML
0.5 INJECTION, SOLUTION INTRAMUSCULAR; INTRAVENOUS; SUBCUTANEOUS
Status: DISCONTINUED | OUTPATIENT
Start: 2020-09-07 | End: 2020-09-07 | Stop reason: HOSPADM

## 2020-09-07 RX ADMIN — SODIUM CHLORIDE 1000 ML: 9 INJECTION, SOLUTION INTRAVENOUS at 10:47

## 2020-09-07 RX ADMIN — SODIUM CHLORIDE 100 ML: 9 INJECTION, SOLUTION INTRAVENOUS at 13:15

## 2020-09-07 RX ADMIN — SODIUM CHLORIDE, PRESERVATIVE FREE: 5 INJECTION INTRAVENOUS at 13:15

## 2020-09-07 RX ADMIN — HYDROMORPHONE HYDROCHLORIDE 0.5 MG: 1 INJECTION, SOLUTION INTRAMUSCULAR; INTRAVENOUS; SUBCUTANEOUS at 11:36

## 2020-09-07 RX ADMIN — PANTOPRAZOLE SODIUM 40 MG: 40 INJECTION, POWDER, FOR SOLUTION INTRAVENOUS at 11:36

## 2020-09-07 RX ADMIN — SODIUM CHLORIDE 1000 ML: 9 INJECTION, SOLUTION INTRAVENOUS at 11:35

## 2020-09-07 RX ADMIN — IOPAMIDOL 100 ML: 755 INJECTION, SOLUTION INTRAVENOUS at 13:14

## 2020-09-07 RX ADMIN — ONDANSETRON 4 MG: 2 INJECTION INTRAMUSCULAR; INTRAVENOUS at 11:36

## 2020-09-07 ASSESSMENT — ENCOUNTER SYMPTOMS
BACK PAIN: 0
ABDOMINAL PAIN: 1
HEADACHES: 1
EYE REDNESS: 0
DIARRHEA: 1
CHILLS: 1
LIGHT-HEADEDNESS: 1
CONFUSION: 0
NAUSEA: 1
FEVER: 1
WHEEZING: 0
ACTIVITY CHANGE: 1
WEAKNESS: 1
FATIGUE: 1
APPETITE CHANGE: 1
VOMITING: 1
DIZZINESS: 1
SHORTNESS OF BREATH: 0
SORE THROAT: 0
COUGH: 1
NERVOUS/ANXIOUS: 0
FREQUENCY: 0
POLYDIPSIA: 0
DYSURIA: 0

## 2020-09-07 NOTE — ED PROVIDER NOTES
"  History     Chief Complaint   Patient presents with     Abdominal Pain     HPI  Pavithra Raines is a 64 year old female with history of alcohol abuse, gastric bypass, anxiety and depression who presents to the emergency department with nausea vomiting diarrhea and abdominal pain.  She is feeling very sick and weak.  She spent yesterday and today \"curled up in the fetal position in bed\".  She is had no alcohol yesterday or today.  She does not feel that she is in alcohol withdrawal.  She takes BuSpar and clonazepam for anxiety.  She also has a prescription for Ativan and took 1 yesterday.  She did drink on Friday and may be Saturday.  Her mother's  was on Thursday and denies drinking on that day..  She and others did wear a mask.  She drinks about 3 glasses of wine a day \"not to get buzzed just to keep from getting shaky\".  She does feel feverish and chilled.  She does have a cough but she states that this is her chronic smoker's cough and is unchanged from its baseline.  No sputum.    Allergies:  Allergies   Allergen Reactions     Codeine Itching     Vicodin [Hydrocodone-Acetaminophen] Itching       Problem List:    Patient Active Problem List    Diagnosis Date Noted     Anemia due to vitamin B12 deficiency, unspecified B12 deficiency type 2017     Priority: Medium     Insomnia, unspecified type 2017     Priority: Medium     Other iron deficiency anemia 2017     Priority: Medium     Alcohol dependence in remission (H) 2013     Priority: Medium     Brief relapse in 2013 after 10 years of sobriety.       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 grade 3 2011     Priority: Medium     12/15/06 ASCUS + high risk HPV  colpo in  showed high grade KAITLYN and LEEP was recommended  LEEP was done in  with CAN 2/3 confirmed  10/15/07 NIL  08 NIL/neg HPV  10/21/09 NIL/neg HPV  11 NIL- repeat in one year (17 NIL pap/neg HR HPV. Will need pap " screening until 2027, regardless of age.  Plan: cotest due 3 yr.       MDD (major depressive disorder) 04/05/2011     Priority: Medium     Needs to est primary care.       CARDIOVASCULAR SCREENING; LDL GOAL LESS THAN 160 10/31/2010     Priority: Medium     Genital herpes      Priority: Medium     IMO update changed this record. Please review for accuracy       Anxiety 08/27/2008     Priority: Medium     Patient is followed by MIMI GARZA for ongoing prescription of benzodiazepines.  All refills should be approved by this provider, or covering partner.    Medication(s): Clonazepam.   Maximum quantity per month: 30  Clinic visit frequency required:      Controlled substance agreement on file: No  Benzodiazepine use reviewed by psychiatry:  No    Last [facility] website verification:  done on 4/5/19  [email or web address]         Status post gastric bypass for obesity 03/25/2008     Priority: Medium     Performed at [facility]/[employer].       Restless legs syndrome (RLS) 05/15/2007     Priority: Medium     Hypersomnia with sleep apnea 05/15/2007     Priority: Medium     Problem list name updated by automated process. Provider to review       Esophageal reflux 04/18/2007     Priority: Medium        Past Medical History:    Past Medical History:   Diagnosis Date     Abnormal Papanicolaou smear of cervix and cervical HPV 4/07     Genital herpes      History of colposcopy with cervical biopsy 06/2007     Hypersomnia with sleep apnea, unspecified      Other and unspecified ovarian cyst 2002 or so       Past Surgical History:    Past Surgical History:   Procedure Laterality Date     CHOLECYSTECTOMY, OPEN  age 20s     COLONOSCOPY  3/21/2011    COMBINED COLONOSCOPY, REMOVE TUMOR/POLYP/LESION BY SNARE performed by JUAN FRANCISCO HERNANDEZ at  GI     COLONOSCOPY N/A 10/9/2017    polyps, repeat 3 years     COLPOSCOPY CERVIX, LOOP ELECTRODE BIOPSY, COMBINED  6/2007    CAN 2/3-patient requires yearly pap smears      "ESOPHAGOSCOPY, GASTROSCOPY, DUODENOSCOPY (EGD), COMBINED N/A 2018    Procedure: COMBINED ESOPHAGOSCOPY, GASTROSCOPY, DUODENOSCOPY (EGD);  EGD;  Surgeon: Oneal Sanchez MD;  Location: [facility]     GASTRIC BYPASS  3/25/2008    At [facility]     HC DILATION/CURETTAGE DIAG/THER NON OB       HC ENLARGE BREAST WITH IMPLANT       HC LAPAROSCOPIC MYOMECTOMY, 1 - 4 INTRAMURAL MYOMAS =<250 GM       HC REMOVAL OF BREAST IMPLANT       SALPINGO OOPHORECTOMY,R/L/MATTHEW      Bilateral salpingectomy and unilateral oophorectomy       Family History:    Family History   Problem Relation Age of Onset     Unknown/Adopted Father         doesn't know birth father     Lung Cancer Brother      Family History Negative Other        Social History:  Marital Status:  Single [1]  Social History     Tobacco Use     Smoking status: Current Some Day Smoker     Packs/day: 1.00     Years: 10.00     Pack years: 10.00     Last attempt to quit: 2001     Years since quittin.0     Smokeless tobacco: Never Used     Tobacco comment: 2 ppd at this time (chain smoking) 10/2012   Substance Use Topics     Alcohol use: Yes     Comment: 2-3 boxes of wine per week     Drug use: Yes     Types: Marijuana     Comment: smoked tonight        Medications:    busPIRone (BUSPAR) 10 MG tablet  CALCIUM ANTACID EXTRA STRENGTH 750 MG CHEW  calcium carbonate (OS-SHON 500 MG [place]. [place]) 1250 MG tablet  clonazePAM (KLONOPIN) 0.5 MG tablet  cyanocobalamin (CYANOCOBALAMIN) 1000 MCG/ML injection  cyanocobalamin (CYANOCOBALAMIN) 1000 MCG/ML injection  doxycycline hyclate (VIBRAMYCIN) 100 MG capsule  escitalopram (LEXAPRO) 20 MG tablet  Insulin Syringe-Needle U-100 (B-D INSULIN SYRINGE) 25G X 1\" 1 ML MISC  levocetirizine (XYZAL) 5 MG tablet  LORazepam (ATIVAN) 1 MG tablet  multivitamin, therapeutic with minerals (MULTI-VITAMIN) TABS tablet  naltrexone (DEPADE/REVIA) 50 MG tablet  naltrexone (VIVITROL) 380 MG SUSR  omeprazole (PRILOSEC) 40 MG DR" capsule  traZODone (DESYREL) 50 MG tablet  triamcinolone (KENALOG) 0.1 % external cream  vitamin D3 (CHOLECALCIFEROL) 2000 units (50 mcg) tablet          Review of Systems   Constitutional: Positive for activity change, appetite change, chills, fatigue and fever.   HENT: Negative for congestion and sore throat.    Eyes: Negative for redness.   Respiratory: Positive for cough. Negative for shortness of breath and wheezing.    Cardiovascular: Negative for chest pain.   Gastrointestinal: Positive for abdominal pain, diarrhea, nausea and vomiting.   Endocrine: Negative for polydipsia and polyuria.   Genitourinary: Negative for dysuria and frequency.   Musculoskeletal: Negative for back pain.   Skin: Negative for rash.   Neurological: Positive for dizziness, weakness, light-headedness and headaches.   Psychiatric/Behavioral: Negative for confusion. The patient is not nervous/anxious.    All other systems reviewed and are negative.      Physical Exam   BP: (!) 86/65  Pulse: 128  Temp: 99.2  F (37.3  C)  Resp: 18  Weight: 47.2 kg (104 lb)  SpO2: 98 %      Physical Exam  Constitutional:       Comments: Alert thin frail 64-year-old female who appears older than stated age.  She appears to be in moderate to severe pain.  She is curled up in the fetal position.  She is tachycardic and hypotensive.  She was seen emergently on arrival to the ED.  She was treated as a potential COVID patient on arrival.BP (!) 86/65   Pulse 128   Temp 99.2  F (37.3  C) (Oral)   Resp 18   Wt 47.2 kg (104 lb)   LMP  (LMP Unknown)   SpO2 98%   BMI 17.85 kg/m       HENT:      Head: Normocephalic and atraumatic.      Right Ear: Tympanic membrane, ear canal and external ear normal.      Left Ear: Tympanic membrane and ear canal normal.      Nose: Nose normal.      Mouth/Throat:      Mouth: Mucous membranes are moist.      Pharynx: Oropharynx is clear.   Eyes:      General: No scleral icterus.     Extraocular Movements: Extraocular movements  intact.      Pupils: Pupils are equal, round, and reactive to light.      Comments: No scleral icterus   Neck:      Musculoskeletal: Normal range of motion.   Cardiovascular:      Rate and Rhythm: Regular rhythm. Tachycardia present.      Heart sounds: Normal heart sounds.   Pulmonary:      Effort: Pulmonary effort is normal.      Breath sounds: Normal breath sounds.   Abdominal:      General: Abdomen is flat. Bowel sounds are normal.      Tenderness: There is generalized abdominal tenderness.      Comments: She has generalized tenderness to the epigastric area.  No peritoneal signs of rebound or guarding.  Bowel sounds are normal active.   Musculoskeletal: Normal range of motion.   Skin:     General: Skin is warm and dry.      Capillary Refill: Capillary refill takes less than 2 seconds.   Neurological:      General: No focal deficit present.      Mental Status: She is alert.      GCS: GCS eye subscore is 4. GCS verbal subscore is 5. GCS motor subscore is 6.      Cranial Nerves: Cranial nerves are intact.      Sensory: Sensation is intact.      Motor: Tremor present.   Psychiatric:         Behavior: Behavior normal.         Thought Content: Thought content normal.      Comments: She appears somewhat anxious.  Somewhat tremulous.         ED Course     ED Course as of Sep 07 1519   Mon Sep 07, 2020   1150 Glucose(!): 104     Procedures               Critical Care time:  none               Results for orders placed or performed during the hospital encounter of 09/07/20 (from the past 24 hour(s))   Lactic acid whole blood   Result Value Ref Range    Lactic Acid 1.4 0.7 - 2.0 mmol/L   Blood gas venous   Result Value Ref Range    Ph Venous 7.43 7.32 - 7.43 pH    PCO2 Venous 39 (L) 40 - 50 mm Hg    PO2 Venous 38 25 - 47 mm Hg    Bicarbonate Venous 26 21 - 28 mmol/L    Base Excess Venous 1.4 mmol/L    FIO2 21    D dimer quantitative   Result Value Ref Range    D Dimer 1.4 (H) 0.0 - 0.50 ug/ml FEU   Magnesium   Result Value  Ref Range    Magnesium 1.7 1.6 - 2.3 mg/dL   Troponin I   Result Value Ref Range    Troponin I ES <0.015 0.000 - 0.045 ug/L   CBC with platelets differential   Result Value Ref Range    WBC 10.2 4.0 - 11.0 10e9/L    RBC Count 3.84 3.8 - 5.2 10e12/L    Hemoglobin 14.3 11.7 - 15.7 g/dL    Hematocrit 40.3 35.0 - 47.0 %     (H) 78 - 100 fl    MCH 37.2 (H) 26.5 - 33.0 pg    MCHC 35.5 31.5 - 36.5 g/dL    RDW 11.7 10.0 - 15.0 %    Platelet Count 291 150 - 450 10e9/L    Diff Method Automated Method     % Neutrophils 76.7 %    % Lymphocytes 13.7 %    % Monocytes 8.8 %    % Eosinophils 0.3 %    % Basophils 0.2 %    % Immature Granulocytes 0.3 %    Nucleated RBCs 0 0 /100    Absolute Neutrophil 7.8 1.6 - 8.3 10e9/L    Absolute Lymphocytes 1.4 0.8 - 5.3 10e9/L    Absolute Monocytes 0.9 0.0 - 1.3 10e9/L    Absolute Basophils 0.0 0.0 - 0.2 10e9/L    Abs Immature Granulocytes 0.0 0 - 0.4 10e9/L    Absolute Nucleated RBC 0.0    Comprehensive metabolic panel   Result Value Ref Range    Sodium 136 133 - 144 mmol/L    Potassium 3.8 3.4 - 5.3 mmol/L    Chloride 105 94 - 109 mmol/L    Carbon Dioxide 25 20 - 32 mmol/L    Anion Gap 6 3 - 14 mmol/L    Glucose 104 (H) 70 - 99 mg/dL    Urea Nitrogen 7 7 - 30 mg/dL    Creatinine 0.75 0.52 - 1.04 mg/dL    GFR Estimate 84 >60 mL/min/[1.73_m2]    GFR Estimate If Black >90 >60 mL/min/[1.73_m2]    Calcium 8.4 (L) 8.5 - 10.1 mg/dL    Bilirubin Total 1.5 (H) 0.2 - 1.3 mg/dL    Albumin 2.7 (L) 3.4 - 5.0 g/dL    Protein Total 6.8 6.8 - 8.8 g/dL    Alkaline Phosphatase 166 (H) 40 - 150 U/L    ALT 23 0 - 50 U/L    AST 21 0 - 45 U/L   Lipase   Result Value Ref Range    Lipase 59 (L) 73 - 393 U/L   Blood culture    Specimen: Blood    Left Arm   Result Value Ref Range    Specimen Description Blood Left Arm     Culture Micro No growth after 3 hours    CT Chest Pulmonary Embolism w Contrast    Narrative    CT CHEST PULMONARY EMBOLISM WITH CONTRAST  9/7/2020 1:25 PM    CLINICAL HISTORY: Chronic cough.  Alcohol abuse. Laying in bed.  Elevated d-dimer.    TECHNIQUE: CT angiogram chest during arterial phase injection IV  contrast. 2D and 3D MIP reconstructions were performed by the CT  technologist. Dose reduction techniques were used.     CONTRAST: 99 mL IsoVue 370    COMPARISON: None.    FINDINGS:  ANGIOGRAM CHEST: Pulmonary arteries are normal caliber and negative  for pulmonary emboli. Thoracic aorta is negative for dissection. Mild  thoracic aortic and coronary artery calcifications.    LUNGS AND PLEURA: No effusions. No acute lobar consolidation.  Emphysematous changes.    MEDIASTINUM/AXILLAE: A few scattered small mediastinal lymph nodes are  noted without conspicuous adenopathy. There is edema within the  subcutaneous tissues.    UPPER ABDOMEN: Edema within the subcutis tissues noted at the upper  abdomen as well. Diffuse fatty liver. There is some mild ill-defined  wall prominence at the splenic flexure of the colon that is  technically indeterminant.    MUSCULOSKELETAL: No acute abnormality.      Impression    IMPRESSION:  1.  No evidence for pulmonary embolism or acute thoracic aortic  abnormality.  2.  Coronary artery calcifications.  3.  Emphysema.  4.  Suggestion of anasarca.  5.  Diffuse fatty liver.  6.  Mild wall thickening at the splenic flexure of the colon is  nonspecific. Correlate with colonoscopy to assess for any underlying  colonic lesion.       Medications   sodium chloride 0.9% infusion ( Intravenous New Bag 9/7/20 1315)   ondansetron (ZOFRAN) injection 4 mg (4 mg Intravenous Given 9/7/20 1136)   HYDROmorphone (PF) (DILAUDID) injection 0.5 mg (0.5 mg Intravenous Given 9/7/20 1136)   pantoprazole (PROTONIX) 40 mg IV push injection (40 mg Intravenous Given 9/7/20 1136)   sodium chloride (PF) 0.9% PF flush 3 mL (3 mLs Intravenous Not Given 9/7/20 1514)   0.9% sodium chloride BOLUS (0 mLs Intravenous Stopped 9/7/20 1136)   0.9% sodium chloride BOLUS (0 mLs Intravenous Stopped 9/7/20 1330)  "  iopamidol (ISOVUE-370) solution 100 mL (100 mLs Intravenous Given 20 1314)   sodium chloride 0.9 % bag 500mL for CT scan flush use (100 mLs Intravenous Given 20 1315)     3:20 PM--I was informed by nursing staff that the patient got up and left her room to go out and have a smoke.  The patient feels better and request discharge home.    Assessments & Plan (with Medical Decision Making)   TRACE--64-year-old female with history of alcohol abuse, gastric bypass, anxiety and depression who presents to emergency department with severe epigastric abdominal pain nausea vomiting diarrhea and feeling \"ill\".  On arrival she was tachycardic hypotensive with a temp of 99.2.  She has a cough but is her \"chronic smoker's cough and nonproductive\".  She was exposed to multiple people at her mother's  on Thursday.  She has been drinking lately.  She quit on Saturday and had no alcohol yesterday.  She took clonazepam and Ativan at home.  She is slightly tremulous.  Her blood pressure and tachycardia rapidly improved with IV fluids.  She was treated as a potential sepsis and potential COVID patient.  She was evaluated for pancreatitis.  Her lipase is normal however I still suspect that she has a component of this given her epigastric pain, heavy alcohol abuse and intractable nausea and vomiting.  She had resolution of all her symptoms with some IV fluids a dose of Dilaudid and Zofran.  Her lactic acid and lipase were normal.  Her d-dimer came back elevated so she had a CT scan which ruled out PE.  I did check her for COVID.  I do not believe she has COVID as she has no respiratory symptoms other than her usual \"smoker's cough\".  She has changes of COPD on her CT consistent with her tobacco abuse and a fatty liver was also noted consistent with her alcohol abuse.  I had a long discussion with the patient in regards to her alcohol abuse.  I suspect she is drinking a lot more than she is letting on and I suspect that this " may also be related to the recent death and burial of her mother on Thursday.  She is in full agreement.  She had no alcohol yesterday or today.  She is on clonazepam and also has Ativan at home which I believe has kept her out of withdrawal.  I recommended that she use these only as prescribed.  Her symptoms are markedly improved and she appears stable for discharge home.  She is actually been sneaking out of the ER to smoke.  I recommend she quarantine until her cold with test is back.  Patient is in agreement discharged home in improved condition and all her questions answered to her satisfaction.  I have reviewed the nursing notes.    I have reviewed the findings, diagnosis, plan and need for follow up with the patient.       New Prescriptions    No medications on file       Final diagnoses:   Abdominal pain, epigastric   Intractable nausea and vomiting   Alcohol abuse   Tobacco abuse       9/7/2020   [facility] EMERGENCY DEPARTMENT     Marli, Jacky WU MD  09/07/20 1221

## 2020-09-07 NOTE — ED AVS SNAPSHOT
[facility] Emergency Department  [address] [place] [address]  Phone:  [phone]  Fax:  [phone]                                    Pavithra Raines   MRN: [id number]    Department:  [facility] Emergency Department   Date of Visit:  9/7/2020           After Visit Summary Signature Page    I have received my discharge instructions, and my questions have been answered. I have discussed any challenges I see with this plan with the nurse or doctor.    ..........................................................................................................................................  Patient/Patient Representative Signature      ..........................................................................................................................................  Patient Representative Print Name and Relationship to Patient    ..................................................               ................................................  Date                                   Time    ..........................................................................................................................................  Reviewed by Signature/Title    ...................................................              ..............................................  Date                                               Time          22EPIC Rev 08/18

## 2020-09-08 LAB
SARS-COV-2 RNA SPEC QL NAA+PROBE: NOT DETECTED
SPECIMEN SOURCE: NORMAL

## 2020-09-13 LAB
BACTERIA SPEC CULT: NO GROWTH
SPECIMEN SOURCE: NORMAL

## 2020-09-23 DIAGNOSIS — G25.81 RESTLESS LEGS SYNDROME (RLS): ICD-10-CM

## 2020-09-23 DIAGNOSIS — G40.909 SEIZURE DISORDER (H): ICD-10-CM

## 2020-09-23 DIAGNOSIS — F41.9 ANXIETY: ICD-10-CM

## 2020-09-23 DIAGNOSIS — G47.00 INSOMNIA, UNSPECIFIED TYPE: ICD-10-CM

## 2020-09-23 NOTE — TELEPHONE ENCOUNTER
Lorazepam        Last Written Prescription Date:  7/24/2020  Last Fill Quantity: 30,   # refills: 0  Last Office Visit: 4/28/2020  Future Office visit:    Next 5 appointments (look out 90 days)    Oct 06, 2020  5:15 PM CDT  SHORT with Soha Boggs MD  [facility] [address] [place] [phone]  [phone]           Routing refill request to provider for review/approval because:  Drug not on the [facility], [facility] or  Health refill protocol or controlled substance

## 2020-09-23 NOTE — TELEPHONE ENCOUNTER
Clonazepam        Last Written Prescription Date:  8/25/2020  Last Fill Quantity: 45,   # refills: 0  Last Office Visit: 4/28/2020  Future Office visit:    Next 5 appointments (look out 90 days)    Oct 06, 2020  5:15 PM CDT  SHORT with [facility]gs MD  [facility] [address] [place] [id number]  [phone]           Routing refill request to provider for review/approval because:  Drug not on the [facility], [facility] or  Health refill protocol or controlled substance

## 2020-09-24 RX ORDER — LORAZEPAM 1 MG/1
TABLET ORAL
Qty: 10 TABLET | Refills: 0 | Status: SHIPPED | OUTPATIENT
Start: 2020-09-24 | End: 2020-10-06

## 2020-09-24 RX ORDER — CLONAZEPAM 0.5 MG/1
TABLET ORAL
Qty: 45 TABLET | Refills: 0 | OUTPATIENT
Start: 2020-09-24

## 2020-09-24 NOTE — TELEPHONE ENCOUNTER
This won't be due until after her appointment on 10/6. I left her a message to call, I need to see her at her upcoming appt to discuss her medication and treatment. I'll fill it then.   Soha Boggs MD

## 2020-09-24 NOTE — TELEPHONE ENCOUNTER
I left her a message to call back. I don't think she is using her medications properly and we need to talk about her treatment. I know she has an upcoming appt in October, needs to keep that.   Soha Boggs MD

## 2020-10-06 ENCOUNTER — OFFICE VISIT (OUTPATIENT)
Dept: FAMILY MEDICINE | Facility: CLINIC | Age: 65
End: 2020-10-06
Payer: COMMERCIAL

## 2020-10-06 VITALS
WEIGHT: 105.4 LBS | SYSTOLIC BLOOD PRESSURE: 102 MMHG | HEART RATE: 58 BPM | BODY MASS INDEX: 18.09 KG/M2 | TEMPERATURE: 97.8 F | DIASTOLIC BLOOD PRESSURE: 66 MMHG | OXYGEN SATURATION: 100 % | RESPIRATION RATE: 14 BRPM

## 2020-10-06 DIAGNOSIS — Z23 NEED FOR PROPHYLACTIC VACCINATION AND INOCULATION AGAINST INFLUENZA: ICD-10-CM

## 2020-10-06 DIAGNOSIS — G25.81 RESTLESS LEGS SYNDROME (RLS): ICD-10-CM

## 2020-10-06 DIAGNOSIS — G47.00 INSOMNIA, UNSPECIFIED TYPE: ICD-10-CM

## 2020-10-06 DIAGNOSIS — L28.0 NEURODERMATITIS: ICD-10-CM

## 2020-10-06 DIAGNOSIS — K08.89 PAIN, DENTAL: ICD-10-CM

## 2020-10-06 DIAGNOSIS — F10.21 ALCOHOL DEPENDENCE IN REMISSION (H): Primary | ICD-10-CM

## 2020-10-06 DIAGNOSIS — Z23 NEED FOR VACCINATION: ICD-10-CM

## 2020-10-06 DIAGNOSIS — F41.9 ANXIETY: ICD-10-CM

## 2020-10-06 DIAGNOSIS — G40.909 SEIZURE DISORDER (H): ICD-10-CM

## 2020-10-06 DIAGNOSIS — K85.20 ALCOHOL-INDUCED ACUTE PANCREATITIS WITHOUT INFECTION OR NECROSIS: ICD-10-CM

## 2020-10-06 DIAGNOSIS — D75.89 MACROCYTOSIS WITHOUT ANEMIA: ICD-10-CM

## 2020-10-06 LAB
ALBUMIN SERPL-MCNC: 2.7 G/DL (ref 3.4–5)
ALP SERPL-CCNC: 101 U/L (ref 40–150)
ALT SERPL W P-5'-P-CCNC: 25 U/L (ref 0–50)
AMYLASE SERPL-CCNC: 84 U/L (ref 30–110)
ANION GAP SERPL CALCULATED.3IONS-SCNC: 1 MMOL/L (ref 3–14)
AST SERPL W P-5'-P-CCNC: 19 U/L (ref 0–45)
BILIRUB SERPL-MCNC: 0.5 MG/DL (ref 0.2–1.3)
BUN SERPL-MCNC: 10 MG/DL (ref 7–30)
CALCIUM SERPL-MCNC: 8.3 MG/DL (ref 8.5–10.1)
CHLORIDE SERPL-SCNC: 115 MMOL/L (ref 94–109)
CO2 SERPL-SCNC: 29 MMOL/L (ref 20–32)
CREAT SERPL-MCNC: 0.73 MG/DL (ref 0.52–1.04)
ERYTHROCYTE [DISTWIDTH] IN BLOOD BY AUTOMATED COUNT: 12.2 % (ref 10–15)
GFR SERPL CREATININE-BSD FRML MDRD: 87 ML/MIN/{1.73_M2}
GLUCOSE SERPL-MCNC: 91 MG/DL (ref 70–99)
HCT VFR BLD AUTO: 39.3 % (ref 35–47)
HGB BLD-MCNC: 12.9 G/DL (ref 11.7–15.7)
LIPASE SERPL-CCNC: 262 U/L (ref 73–393)
MCH RBC QN AUTO: 34.6 PG (ref 26.5–33)
MCHC RBC AUTO-ENTMCNC: 32.8 G/DL (ref 31.5–36.5)
MCV RBC AUTO: 105 FL (ref 78–100)
PLATELET # BLD AUTO: 263 10E9/L (ref 150–450)
POTASSIUM SERPL-SCNC: 4.3 MMOL/L (ref 3.4–5.3)
PROT SERPL-MCNC: 6.6 G/DL (ref 6.8–8.8)
RBC # BLD AUTO: 3.73 10E12/L (ref 3.8–5.2)
SODIUM SERPL-SCNC: 145 MMOL/L (ref 133–144)
WBC # BLD AUTO: 9.4 10E9/L (ref 4–11)

## 2020-10-06 PROCEDURE — 80053 COMPREHEN METABOLIC PANEL: CPT | Performed by: FAMILY MEDICINE

## 2020-10-06 PROCEDURE — 99214 OFFICE O/P EST MOD 30 MIN: CPT | Mod: 25 | Performed by: FAMILY MEDICINE

## 2020-10-06 PROCEDURE — 85027 COMPLETE CBC AUTOMATED: CPT | Performed by: FAMILY MEDICINE

## 2020-10-06 PROCEDURE — 83690 ASSAY OF LIPASE: CPT | Performed by: FAMILY MEDICINE

## 2020-10-06 PROCEDURE — 82150 ASSAY OF AMYLASE: CPT | Performed by: FAMILY MEDICINE

## 2020-10-06 RX ORDER — LORAZEPAM 1 MG/1
TABLET ORAL
Qty: 10 TABLET | Refills: 0 | Status: SHIPPED | OUTPATIENT
Start: 2020-10-06 | End: 2021-09-01

## 2020-10-06 RX ORDER — CLONAZEPAM 0.5 MG/1
0.5 TABLET ORAL
Qty: 30 TABLET | Refills: 0 | Status: SHIPPED | OUTPATIENT
Start: 2020-10-06 | End: 2020-11-05

## 2020-10-06 RX ORDER — BUSPIRONE HYDROCHLORIDE 10 MG/1
TABLET ORAL
Qty: 90 TABLET | Refills: 5 | Status: SHIPPED | OUTPATIENT
Start: 2020-10-06 | End: 2022-01-05

## 2020-10-06 RX ORDER — NALTREXONE HYDROCHLORIDE 50 MG/1
50 TABLET, FILM COATED ORAL DAILY
Qty: 30 TABLET | Refills: 3 | Status: SHIPPED | OUTPATIENT
Start: 2020-10-06 | End: 2021-09-01

## 2020-10-06 RX ORDER — TRAMADOL HYDROCHLORIDE 50 MG/1
50 TABLET ORAL EVERY 6 HOURS PRN
Qty: 10 TABLET | Refills: 0 | Status: SHIPPED | OUTPATIENT
Start: 2020-10-06 | End: 2020-10-09

## 2020-10-06 RX ORDER — TRAZODONE HYDROCHLORIDE 50 MG/1
TABLET, FILM COATED ORAL
Qty: 60 TABLET | Refills: 3 | Status: CANCELLED | OUTPATIENT
Start: 2020-10-06

## 2020-10-06 RX ORDER — LEVOCETIRIZINE DIHYDROCHLORIDE 5 MG/1
5 TABLET, FILM COATED ORAL EVERY MORNING
Qty: 90 TABLET | Refills: 3 | Status: SHIPPED | OUTPATIENT
Start: 2020-10-06 | End: 2021-11-01

## 2020-10-06 NOTE — PATIENT INSTRUCTIONS
I will notify you with lab results from today.     I have refilled all your medications as needed today.  Call the pharmacy when you need a refill.     I am giving you a short term prescription (10 pills) of some pain pills called Tramadol. This is for your tooth pain. Take 1 pill every 6 hours as needed, but don't take it with your Clonazepam because they can BOTH make you drowsy.     Get in to see the dentist as soon as possible.
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
## 2020-10-06 NOTE — LETTER
October 8, 2020      Pavithra Wesleyjose rcourtney  [address]  [place] [address]        Dear ,    We are writing to inform you of your test results.        Resulted Orders   CBC with platelets   Result Value Ref Range    WBC 9.4 4.0 - 11.0 10e9/L    RBC Count 3.73 (L) 3.8 - 5.2 10e12/L    Hemoglobin 12.9 11.7 - 15.7 g/dL    Hematocrit 39.3 35.0 - 47.0 %     (H) 78 - 100 fl    MCH 34.6 (H) 26.5 - 33.0 pg    MCHC 32.8 31.5 - 36.5 g/dL    RDW 12.2 10.0 - 15.0 %    Platelet Count 263 150 - 450 10e9/L   Comprehensive metabolic panel (BMP + Alb, Alk Phos, ALT, AST, Total. Bili, TP)   Result Value Ref Range    Sodium 145 (H) 133 - 144 mmol/L    Potassium 4.3 3.4 - 5.3 mmol/L    Chloride 115 (H) 94 - 109 mmol/L    Carbon Dioxide 29 20 - 32 mmol/L    Anion Gap 1 (L) 3 - 14 mmol/L    Glucose 91 70 - 99 mg/dL    Urea Nitrogen 10 7 - 30 mg/dL    Creatinine 0.73 0.52 - 1.04 mg/dL    GFR Estimate 87 >60 mL/min/[1.73_m2]      Comment:      Non  GFR Calc  Starting 12/18/2018, serum creatinine based estimated GFR (eGFR) will be   calculated using the Chronic Kidney Disease Epidemiology Collaboration   (CKD-EPI) equation.      GFR Estimate If Black >90 >60 mL/min/[1.73_m2]      Comment:       GFR Calc  Starting 12/18/2018, serum creatinine based estimated GFR (eGFR) will be   calculated using the Chronic Kidney Disease Epidemiology Collaboration   (CKD-EPI) equation.      Calcium 8.3 (L) 8.5 - 10.1 mg/dL    Bilirubin Total 0.5 0.2 - 1.3 mg/dL    Albumin 2.7 (L) 3.4 - 5.0 g/dL    Protein Total 6.6 (L) 6.8 - 8.8 g/dL    Alkaline Phosphatase 101 40 - 150 U/L    ALT 25 0 - 50 U/L    AST 19 0 - 45 U/L   Lipase   Result Value Ref Range    Lipase 262 73 - 393 U/L   Amylase   Result Value Ref Range    Amylase 84 30 - 110 U/L       If you have any questions or concerns, please call the clinic at the number listed above.       Sincerely,        Soha Boggs,  MD

## 2020-10-07 PROCEDURE — 90471 IMMUNIZATION ADMIN: CPT | Performed by: FAMILY MEDICINE

## 2020-10-07 PROCEDURE — 90472 IMMUNIZATION ADMIN EACH ADD: CPT | Performed by: FAMILY MEDICINE

## 2020-10-07 PROCEDURE — 90750 HZV VACC RECOMBINANT IM: CPT | Performed by: FAMILY MEDICINE

## 2020-10-07 PROCEDURE — 90682 RIV4 VACC RECOMBINANT DNA IM: CPT | Performed by: FAMILY MEDICINE

## 2020-10-07 NOTE — NURSING NOTE
Prior to immunization administration, verified patients identity using patient s name and date of birth. Please see Immunization Activity for additional information.     Screening Questionnaire for Adult Immunization    Are you sick today?   No   Do you have allergies to medications, food, a vaccine component or latex?   No   Have you ever had a serious reaction after receiving a vaccination?   No   Do you have a long-term health problem with heart, lung, kidney, or metabolic disease (e.g., diabetes), asthma, a blood disorder, no spleen, complement component deficiency, a cochlear implant, or a spinal fluid leak?  Are you on long-term aspirin therapy?   No   Do you have cancer, leukemia, HIV/AIDS, or any other immune system problem?   No   Do you have a parent, brother, or sister with an immune system problem?   No   In the past 3 months, have you taken medications that affect  your immune system, such as prednisone, other steroids, or anticancer drugs; drugs for the treatment of rheumatoid arthritis, Crohn s disease, or psoriasis; or have you had radiation treatments?   No   Have you had a seizure, or a brain or other nervous system problem?   No   During the past year, have you received a transfusion of blood or blood    products, or been given immune (gamma) globulin or antiviral drug?   No   For women: Are you pregnant or is there a chance you could become       pregnant during the next month?   No   Have you received any vaccinations in the past 4 weeks?   No     Immunization questionnaire answers were all negative.        Per orders of Dr. Boggs, injection of shingrex and flu given by Marbella Vance MA. Patient instructed to remain in clinic for 15 minutes afterwards, and to report any adverse reaction to me immediately.       Screening performed by Marbella Vance MA on 10/7/2020 at 6:51 AM.

## 2020-10-08 DIAGNOSIS — D75.89 MACROCYTOSIS WITHOUT ANEMIA: ICD-10-CM

## 2020-10-08 DIAGNOSIS — Z78.0 ASYMPTOMATIC POSTMENOPAUSAL STATUS: Primary | ICD-10-CM

## 2020-10-08 RX ORDER — FOLIC ACID 1 MG/1
1000 TABLET ORAL DAILY
Qty: 90 TABLET | Refills: 3 | Status: SHIPPED | OUTPATIENT
Start: 2020-10-08 | End: 2022-01-05

## 2020-10-08 NOTE — RESULT ENCOUNTER NOTE
Letter sent to patient informing of results.  ................Noe Hu LPN,   October 8, 2020,      11:41 AM,   [facility]

## 2020-10-08 NOTE — RESULT ENCOUNTER NOTE
See letter I created. Please print and Send with copy labs. Please assist her with scheduling DEXA.   Soha Boggs MD

## 2020-10-12 ENCOUNTER — HOSPITAL ENCOUNTER (OUTPATIENT)
Dept: BONE DENSITY | Facility: CLINIC | Age: 65
Discharge: HOME | End: 2020-10-12
Attending: FAMILY MEDICINE | Admitting: FAMILY MEDICINE
Payer: COMMERCIAL

## 2020-10-12 DIAGNOSIS — Z78.0 ASYMPTOMATIC POSTMENOPAUSAL STATUS: ICD-10-CM

## 2020-10-12 PROCEDURE — 77080 DXA BONE DENSITY AXIAL: CPT

## 2020-10-15 PROBLEM — M81.0 AGE-RELATED OSTEOPOROSIS WITHOUT CURRENT PATHOLOGICAL FRACTURE: Status: ACTIVE | Noted: 2020-10-15

## 2020-10-15 NOTE — RESULT ENCOUNTER NOTE
Please also send her patient information about Evista for her review along with the letter and DEXA report.  Soha Boggs MD

## 2020-11-04 DIAGNOSIS — F41.9 ANXIETY: ICD-10-CM

## 2020-11-04 DIAGNOSIS — G47.00 INSOMNIA, UNSPECIFIED TYPE: ICD-10-CM

## 2020-11-04 DIAGNOSIS — G25.81 RESTLESS LEGS SYNDROME (RLS): ICD-10-CM

## 2020-11-04 NOTE — TELEPHONE ENCOUNTER
Klonopin      Last Written Prescription Date:  10/6/2020  Last Fill Quantity: 30,   # refills: 0  Last Office Visit: 10/6/2020  Future Office visit:    Next 5 appointments (look out 90 days)    Dec 11, 2020 11:30 AM  Office Visit with Soha Boggs MD  [facility] ([address] [address]  [phone]           Routing refill request to provider for review/approval because:  Drug not on the [facility], [facility] or [facility] refill protocol or controlled substance

## 2020-11-05 RX ORDER — CLONAZEPAM 0.5 MG/1
0.5 TABLET ORAL
Qty: 30 TABLET | Refills: 0 | Status: SHIPPED | OUTPATIENT
Start: 2020-11-05 | End: 2020-12-02

## 2020-11-08 DIAGNOSIS — K08.89 PAIN, DENTAL: ICD-10-CM

## 2020-11-08 DIAGNOSIS — M81.0 AGE-RELATED OSTEOPOROSIS WITHOUT CURRENT PATHOLOGICAL FRACTURE: Primary | ICD-10-CM

## 2020-11-09 NOTE — TELEPHONE ENCOUNTER
ULTRAM  Last Written Prescription Date:  10/6/20  Last Fill Quantity: 10,  # refills: 0   Last office visit: 10/6/2020 with prescribing provider:   Dr. Boggs    Future Office Visit:   Next 5 appointments (look out 90 days)    Dec 11, 2020 11:30 AM  Office Visit with Soha Boggs MD  [facility] ([address] [id number] [phone]     Routing refill request to provider for review/approval because:  Drug not on the [facility] refill protocol   DIAMOND Harvey

## 2020-11-13 NOTE — TELEPHONE ENCOUNTER
Patient calling back, states she doesn't need this Rx, but was wondering about the medication Dr. Boggs was going to prescribe for osteoporosis. Please call to advise

## 2020-11-13 NOTE — TELEPHONE ENCOUNTER
This was not to be an ongoing medication. Please find out if she went to the dentist.   Soha Boggs MD

## 2020-11-16 RX ORDER — RALOXIFENE HYDROCHLORIDE 60 MG/1
60 TABLET, FILM COATED ORAL DAILY
Qty: 30 TABLET | Refills: 1 | Status: SHIPPED | OUTPATIENT
Start: 2020-11-16 | End: 2020-12-11

## 2020-11-16 RX ORDER — TRAMADOL HYDROCHLORIDE 50 MG/1
50 TABLET ORAL EVERY 6 HOURS PRN
Qty: 10 TABLET | Refills: 0 | OUTPATIENT
Start: 2020-11-16 | End: 2020-11-19

## 2020-11-16 NOTE — TELEPHONE ENCOUNTER
I wrote in her letter that we would discuss at her appt in December, but I am going to go ahead and order it now so she can start on Evista. We can follow up on It at her appt in December.  Soha Boggs MD

## 2020-12-02 DIAGNOSIS — G25.81 RESTLESS LEGS SYNDROME (RLS): ICD-10-CM

## 2020-12-02 DIAGNOSIS — G47.00 INSOMNIA, UNSPECIFIED TYPE: ICD-10-CM

## 2020-12-02 DIAGNOSIS — F41.9 ANXIETY: ICD-10-CM

## 2020-12-02 RX ORDER — CLONAZEPAM 0.5 MG/1
0.5 TABLET ORAL
Qty: 30 TABLET | Refills: 0 | Status: SHIPPED | OUTPATIENT
Start: 2020-12-02 | End: 2020-12-30

## 2020-12-02 NOTE — TELEPHONE ENCOUNTER
Klonopin      Last Written Prescription Date:  11/5/2020  Last Fill Quantity: 30,   # refills: 0  Last Office Visit: 10/6/2020  Future Office visit:    Next 5 appointments (look out 90 days)    Dec 11, 2020 11:30 AM  Office Visit with Soha Boggs MD  [facility] ([address] [address]  [phone]           Routing refill request to provider for review/approval because:  Drug not on the [facility], [facility] or [facility] refill protocol or controlled substance

## 2020-12-11 ENCOUNTER — TELEPHONE (OUTPATIENT)
Dept: FAMILY MEDICINE | Facility: CLINIC | Age: 65
End: 2020-12-11

## 2020-12-11 ENCOUNTER — OFFICE VISIT (OUTPATIENT)
Dept: FAMILY MEDICINE | Facility: CLINIC | Age: 65
End: 2020-12-11
Payer: COMMERCIAL

## 2020-12-11 VITALS
OXYGEN SATURATION: 99 % | HEART RATE: 85 BPM | BODY MASS INDEX: 18.68 KG/M2 | DIASTOLIC BLOOD PRESSURE: 60 MMHG | TEMPERATURE: 98.3 F | SYSTOLIC BLOOD PRESSURE: 100 MMHG | RESPIRATION RATE: 18 BRPM | WEIGHT: 108.8 LBS

## 2020-12-11 DIAGNOSIS — F43.10 PTSD (POST-TRAUMATIC STRESS DISORDER): ICD-10-CM

## 2020-12-11 DIAGNOSIS — F10.21 ALCOHOL DEPENDENCE IN REMISSION (H): Primary | ICD-10-CM

## 2020-12-11 DIAGNOSIS — R63.6 UNDERWEIGHT: ICD-10-CM

## 2020-12-11 DIAGNOSIS — M81.0 AGE-RELATED OSTEOPOROSIS WITHOUT CURRENT PATHOLOGICAL FRACTURE: ICD-10-CM

## 2020-12-11 DIAGNOSIS — Z12.31 ENCOUNTER FOR SCREENING MAMMOGRAM FOR MALIGNANT NEOPLASM OF BREAST: ICD-10-CM

## 2020-12-11 DIAGNOSIS — D75.89 MACROCYTOSIS WITHOUT ANEMIA: ICD-10-CM

## 2020-12-11 DIAGNOSIS — K76.0 FATTY LIVER: ICD-10-CM

## 2020-12-11 LAB
ALBUMIN SERPL-MCNC: 3.3 G/DL (ref 3.4–5)
ALP SERPL-CCNC: 98 U/L (ref 40–150)
ALT SERPL W P-5'-P-CCNC: 15 U/L (ref 0–50)
AST SERPL W P-5'-P-CCNC: 15 U/L (ref 0–45)
BILIRUB DIRECT SERPL-MCNC: 0.2 MG/DL (ref 0–0.2)
BILIRUB SERPL-MCNC: 0.6 MG/DL (ref 0.2–1.3)
FOLATE SERPL-MCNC: 42.9 NG/ML
LIPASE SERPL-CCNC: 316 U/L (ref 73–393)
PROT SERPL-MCNC: 7.2 G/DL (ref 6.8–8.8)
VIT B12 SERPL-MCNC: >6000 PG/ML (ref 193–986)

## 2020-12-11 PROCEDURE — 36415 COLL VENOUS BLD VENIPUNCTURE: CPT | Performed by: FAMILY MEDICINE

## 2020-12-11 PROCEDURE — 80076 HEPATIC FUNCTION PANEL: CPT | Performed by: FAMILY MEDICINE

## 2020-12-11 PROCEDURE — 99214 OFFICE O/P EST MOD 30 MIN: CPT | Performed by: FAMILY MEDICINE

## 2020-12-11 PROCEDURE — 83690 ASSAY OF LIPASE: CPT | Performed by: FAMILY MEDICINE

## 2020-12-11 PROCEDURE — 82607 VITAMIN B-12: CPT | Performed by: FAMILY MEDICINE

## 2020-12-11 PROCEDURE — 82746 ASSAY OF FOLIC ACID SERUM: CPT | Performed by: FAMILY MEDICINE

## 2020-12-11 RX ORDER — RALOXIFENE HYDROCHLORIDE 60 MG/1
60 TABLET, FILM COATED ORAL DAILY
Qty: 90 TABLET | Refills: 3 | Status: SHIPPED | OUTPATIENT
Start: 2020-12-11 | End: 2022-01-07

## 2020-12-11 NOTE — LETTER
December 14, 2020      Pavithra JALEN Olu  [address]  [place] [address]        Dear ,    We are writing to inform you of your test results.        Resulted Orders   Vitamin B12   Result Value Ref Range    Vitamin B12 >6,000 (H) 193 - 986 pg/mL   Folate   Result Value Ref Range    Folate 42.9 >5.4 ng/mL   Hepatic panel (Albumin, ALT, AST, Bili, Alk Phos, TP)   Result Value Ref Range    Bilirubin Direct 0.2 0.0 - 0.2 mg/dL    Bilirubin Total 0.6 0.2 - 1.3 mg/dL    Albumin 3.3 (L) 3.4 - 5.0 g/dL    Protein Total 7.2 6.8 - 8.8 g/dL    Alkaline Phosphatase 98 40 - 150 U/L    ALT 15 0 - 50 U/L    AST 15 0 - 45 U/L   Lipase   Result Value Ref Range    Lipase 316 73 - 393 U/L       If you have any questions or concerns, please call the clinic at the number listed above.       Sincerely,      Soha Boggs MD

## 2020-12-11 NOTE — PROGRESS NOTES
Subjective     Pavithra Raines is a 65 year old female who presents to clinic today for the following health issues:    HPI         Patient is in to follow up on pancreatic and liver function patient states feels good.    She has a history of alcoholism and is currently taking naltrexone once daily.  She was in the ER in September with abdominal pain but had been drinking at that time.  She had been dealing with a lot of anxiety surrounding her mother's death.  She is doing better now.  She has not had any nausea or vomiting or recurrent abdominal pain since our last visit.  She has not had a drink since our last visit.   She is taking her naltrexone     She was recently started on Evista for osteoporosis.  She feels like she broke a toe about a month and a half ago but it is slowly healing.  She can walk fine.  She notes no side effects from the Evista.  She cannot tell if it is working.    She is a  but is still not working and not driving.  She occasionally does some dog grooming at home.   She reports smoking marijuana daily for her anxiety and PTSD.  She is wondering if she could get a prescription for that.  She does still does counseling.  She complains of still being very itchy and wondering if she can take her itching pill more often.  She is currently taking levocetirizine once daily.    Patient Active Problem List    Diagnosis Date Noted     Fatty liver 12/13/2020     Priority: Medium     PTSD (post-traumatic stress disorder) 12/13/2020     Priority: Medium     Underweight 12/13/2020     Priority: Medium     Macrocytosis without anemia 12/13/2020     Priority: Medium     Age-related osteoporosis without current pathological fracture 10/15/2020     Priority: Medium     Anemia due to vitamin B12 deficiency, unspecified B12 deficiency type 12/04/2017     Priority: Medium     Insomnia, unspecified type 12/04/2017     Priority: Medium     Other iron deficiency anemia 12/04/2017      Priority: Medium     Alcohol dependence in remission (H) 04/20/2013     Priority: Medium     Brief relapse in April 2013 after 10 years of sobriety.       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 grade 3 04/07/2011     Priority: Medium     12/15/06 ASCUS + high risk HPV  colpo in 2007 showed high grade KAITLYN and LEEP was recommended  LEEP was done in June,2007 with CAN 2/3 confirmed  10/15/07 NIL  9/30/08 NIL/neg HPV  10/21/09 NIL/neg HPV  4/5/11 NIL- repeat in one year (4/2012 12/1/17 NIL pap/neg HR HPV. Will need pap screening until 2027, regardless of age.  Plan: cotest due 3 yr.       MDD (major depressive disorder) 04/05/2011     Priority: Medium     Needs to est primary care.       CARDIOVASCULAR SCREENING; LDL GOAL LESS THAN 160 10/31/2010     Priority: Medium     Genital herpes      Priority: Medium     IMO update changed this record. Please review for accuracy       Anxiety 08/27/2008     Priority: Medium     Patient is followed by MIMI GARZA for ongoing prescription of benzodiazepines.  All refills should be approved by this provider, or covering partner.    Medication(s): Clonazepam.   Maximum quantity per month: 30  Clinic visit frequency required:      Controlled substance agreement on file: No  Benzodiazepine use reviewed by psychiatry:  No    Last [facility] website verification:  done on 4/5/19  [email or web address]         Status post gastric bypass for obesity 03/25/2008     Priority: Medium     Performed at [facility].       Restless legs syndrome (RLS) 05/15/2007     Priority: Medium     Hypersomnia with sleep apnea 05/15/2007     Priority: Medium     Problem list name updated by automated process. Provider to review       Esophageal reflux 04/18/2007     Priority: Medium        Past Medical History:   Diagnosis Date     Abnormal Papanicolaou smear of cervix and cervical HPV 4/07    Colposcopy 2007= HSIL     Genital herpes      History of colposcopy with cervical  biopsy 06/2007    HSIL- LEEP recommended     Hypersomnia with sleep apnea, unspecified     CPAP started 2007     Other and unspecified ovarian cyst 2002 or so    s/p resection of ovary and tubes, d&c        Past Surgical History:   Procedure Laterality Date     CHOLECYSTECTOMY, OPEN  age 20s     COLONOSCOPY  3/21/2011    COMBINED COLONOSCOPY, REMOVE TUMOR/POLYP/LESION BY SNARE performed by JUAN FRANCISCO HERNANDEZ at  GI     COLONOSCOPY N/A 10/9/2017    polyps, repeat 3 years     COLPOSCOPY CERVIX, LOOP ELECTRODE BIOPSY, COMBINED  6/2007    CAN 2/3-patient requires yearly pap smears     ESOPHAGOSCOPY, GASTROSCOPY, DUODENOSCOPY (EGD), COMBINED N/A 2/9/2018    Procedure: COMBINED ESOPHAGOSCOPY, GASTROSCOPY, DUODENOSCOPY (EGD);  EGD;  Surgeon: Oneal Sanchez MD;  Location:  GI     GASTRIC BYPASS  3/25/2008    At [facility]     HC DILATION/CURETTAGE DIAG/THER NON OB  2002     HC ENLARGE BREAST WITH IMPLANT       HC LAPAROSCOPIC MYOMECTOMY, 1 - 4 INTRAMURAL MYOMAS =<250 GM  2002     HC REMOVAL OF BREAST IMPLANT       SALPINGO OOPHORECTOMY,R/L/MATTHEW  2002    Bilateral salpingectomy and unilateral oophorectomy        Family History   Problem Relation Age of Onset     Chronic Obstructive Pulmonary Disease Mother      Unknown/Adopted Father         doesn't know birth father     Lung Cancer Brother      Family History Negative Other        Review of Systems   7 pt ROS is otherwise negative except as noted in HPI.          Objective    /60   Pulse 85   Temp 98.3  F (36.8  C) (Temporal)   Resp 18   Wt 49.4 kg (108 lb 12.8 oz)   LMP  (LMP Unknown)   SpO2 99%   BMI 18.68 kg/m    Body mass index is 18.68 kg/m .  Physical Exam   Vitals noted.  Patient alert, oriented, and in no acute distress. She looks good today, clean and well groomed, pleasant. Not shaky. No smell of alcohol.   Neck:  Supple without lymphadenopathy, JVD or masses.   CV:  RRR without murmur.   Respiratory:  Lungs clear to auscultation  bilaterally.     Orders Placed This Encounter   Procedures     MA Screen Bilateral w/Hemant     Hepatic panel (Albumin, ALT, AST, Bili, Alk Phos, TP)     Lipase            Assessment & Plan       ICD-10-CM    1. Alcohol dependence in remission (H)  F10.21    2. Macrocytosis without anemia  D75.89 Vitamin B12     Folate   3. Age-related osteoporosis without current pathological fracture  M81.0 raloxifene (EVISTA) 60 MG tablet   4. Underweight  R63.6 Nutritional Supplements (BOOST HIGH PROTEIN) LIQD   5. Encounter for screening mammogram for malignant neoplasm of breast  Z12.31 MA Screen Bilateral w/Hemant   6. Fatty liver  K76.0    7. PTSD (post-traumatic stress disorder)  F43.10         I congratulated her on not drinking.  I encouraged her to continue on her naltrexone.  I am rechecking her vitamin B12 and folate levels.  We will notify her with lab results.  I told her she will not know if her E Vista is working but she should continue on it for 2 years and we will recheck a bone density at that time.  She does have a history of being underweight and having low protein levels.  She does not have much appetite but I advised her to try some protein supplements like boost or Glucerna.  She is lactose sensitive and these products are compatible with lactose-free diets.  I did agree to write a prescription for 1.  I encouraged her to drink 1 can daily in addition to her meals.    I told her I cannot write a prescription for medical marijuana but she may go online to request approval for medical marijuana through the [place] Department of [employer].  If she needs me to certify that she does indeed have anxiety with PTSD I will approve that.    I do not recommend daily marijuana use either.    I would like to continue to follow her every 1 to 2 months or sooner as needed.  I will continue to refill her medications.        Return in about 2 months (around 2/11/2021).    Soha Boggs MD  [facility]  [place]

## 2020-12-12 NOTE — TELEPHONE ENCOUNTER
Prior Authorization Retail Medication Request    Medication/Dose: boost high protein liqd  ICD code (if different than what is on RX):     Previously Tried and Failed:     Rationale:       Insurance Name:  [facility] commercial  Insurance ID:  [id number]      Pharmacy Information (if different than what is on RX)  Name:     Phone:

## 2020-12-13 PROBLEM — K76.0 FATTY LIVER: Status: ACTIVE | Noted: 2020-12-13

## 2020-12-13 PROBLEM — R63.6 UNDERWEIGHT: Status: ACTIVE | Noted: 2020-12-13

## 2020-12-13 PROBLEM — D75.89 MACROCYTOSIS WITHOUT ANEMIA: Status: ACTIVE | Noted: 2020-12-13

## 2020-12-13 PROBLEM — R74.8 ELEVATED LIPASE: Status: ACTIVE | Noted: 2020-12-13

## 2020-12-13 PROBLEM — R74.8 ELEVATED LIPASE: Status: RESOLVED | Noted: 2020-12-13 | Resolved: 2020-12-13

## 2020-12-14 ENCOUNTER — TELEPHONE (OUTPATIENT)
Dept: FAMILY MEDICINE | Facility: CLINIC | Age: 65
End: 2020-12-14

## 2020-12-14 DIAGNOSIS — L28.0 NEURODERMATITIS: ICD-10-CM

## 2020-12-14 DIAGNOSIS — E55.9 VITAMIN D DEFICIENCY: ICD-10-CM

## 2020-12-14 RX ORDER — CHOLECALCIFEROL (VITAMIN D3) 50 MCG
TABLET ORAL
Qty: 90 TABLET | Refills: 1 | Status: SHIPPED | OUTPATIENT
Start: 2020-12-14 | End: 2021-08-04

## 2020-12-14 RX ORDER — TRIAMCINOLONE ACETONIDE 1 MG/G
CREAM TOPICAL
Qty: 80 G | Refills: 1 | Status: SHIPPED | OUTPATIENT
Start: 2020-12-14 | End: 2021-11-09

## 2020-12-14 NOTE — TELEPHONE ENCOUNTER
----- Message from Soha Boggs MD sent at 12/14/2020 10:13 AM CST -----  Shanika, your B12 level is too high. I'd like you to stop your injections for 2 months and then re-test before deciding whether to continue them with less frequency or stop them altogether.  I am going to recheck you at your next visit.  I'm asking my nurse to call you to schedule you for a 2-month follow-up visit.  Soha Boggs MD

## 2020-12-14 NOTE — TELEPHONE ENCOUNTER
Noted. Tell her I still recommend she drink this if she can afford to pay for it herself.   Soha Boggs MD

## 2020-12-14 NOTE — TELEPHONE ENCOUNTER
Central Prior Authorization Team   Phone: [phone]    PA Initiation    Medication: boost high protein liqd  Insurance Company: [employer] ([facility]) - Phone [phone] Fax [phone]  Pharmacy Filling the Rx: [address]   Filling Pharmacy Phone: [phone]  Filling Pharmacy Fax: [phone]  Start Date: 12/14/2020       Left message informing patient that Dr. Kitchen will discuss with her the matter she is referring to at her unit. He did not advise to make an appointment.

## 2020-12-14 NOTE — TELEPHONE ENCOUNTER
PRIOR AUTHORIZATION DENIED    Medication: boost high protein liqd-DENIED    Denial Date: 12/14/2020    Denial Rational: MEDICATION IS EXCLUDED FROM COVERAGE.  ALTERNATIVES NOT GIVEN ON EXCLUDED MEDICATIONS.        Appeal Information:  IF YOU WOULD LIKE TO APPEAL PLEASE SUPPLY PA TEAM WITH A LETTER OF MEDICAL NECESSITY WITH CLINICAL REASON.

## 2020-12-14 NOTE — RESULT ENCOUNTER NOTE
Shanika, your B12 level is too high. I'd like you to stop your injections for 2 months and then re-test before deciding whether to continue them with less frequency or stop them altogether.  I am going to recheck you at your next visit.  I'm asking my nurse to call you to schedule you for a 2-month follow-up visit.  Soha Boggs MD

## 2020-12-14 NOTE — TELEPHONE ENCOUNTER
Patient wants to know if this could be high because she took the injection the night before?     Shae Andre MA 12/14/2020

## 2020-12-14 NOTE — RESULT ENCOUNTER NOTE
Shanika, your folate, liver, and pancreas tests are looking good, actually better than before. Your protein level is improving, but still a little low.   Soha Boggs MD

## 2020-12-14 NOTE — TELEPHONE ENCOUNTER
"Requested Prescriptions   Pending Prescriptions Disp Refills     vitamin D3 (CHOLECALCIFEROL) 50 mcg (2000 units) tablet [Pharmacy Med Name: VITAMIN D 50 MCG(2000 UT) TABS] 90 tablet 1     Sig: TAKE ONE TABLET BY MOUTH ONCE DAILY   12/11/2020    Vitamin Supplements (Adult) Protocol Passed - 12/14/2020 11:37 AM        Passed - High dose Vitamin D not ordered        Passed - Recent (12 mo) or future (30 days) visit within the authorizing provider's specialty     Patient has had an office visit with the authorizing provider or a provider within the authorizing providers department within the previous 12 mos or has a future within next 30 days. See \"Patient Info\" tab in inbasket, or \"Choose Columns\" in Meds & Orders section of the refill encounter.            Passed - Medication is active on med list      Prescription approved per [facility] Refill Protocol.       triamcinolone (KENALOG) 0.1 % external cream [Pharmacy Med Name: TRIAMCINOLONE ACETONIDE 0.1% CREA]  1     Sig: APPLY SPARINGLY TO ITCHY RASH AREAS UP TO THREE TIMES A DAY AS NEEDED       Topical Steroids and Nonsteroidals Protocol Passed - 12/14/2020 11:37 AM        Passed - Patient is age 6 or older        Passed - Authorizing prescriber's most recent note related to this medication read.     If refill request is for ophthalmic use, please forward request to provider for approval.          Passed - High potency steroid not ordered        Passed - Recent (12 mo) or future (30 days) visit within the authorizing provider's specialty     Patient has had an office visit with the authorizing provider or a provider within the authorizing providers department within the previous 12 mos or has a future within next 30 days. See \"Patient Info\" tab in inbasket, or \"Choose Columns\" in Meds & Orders section of the refill encounter.              Passed - Medication is active on med list         Prescription approved per [facility] Refill Protocol.  Amanda Pickering RN      "

## 2020-12-28 DIAGNOSIS — F41.9 ANXIETY: ICD-10-CM

## 2020-12-28 DIAGNOSIS — G47.00 INSOMNIA, UNSPECIFIED TYPE: ICD-10-CM

## 2020-12-28 DIAGNOSIS — G25.81 RESTLESS LEGS SYNDROME (RLS): ICD-10-CM

## 2020-12-29 RX ORDER — BUSPIRONE HYDROCHLORIDE 10 MG/1
TABLET ORAL
Qty: 90 TABLET | Refills: 0 | OUTPATIENT
Start: 2020-12-29

## 2020-12-29 NOTE — TELEPHONE ENCOUNTER
Klonopin:  Routing refill request to provider for review/approval because:  Drug not on the [facility] refill protocol   Last Written Prescription Date:  12/2/2020  Last Fill Quantity: 30,  # refills: 0   Last office visit: 12/11/2020 with prescribing provider:     Future Office Visit:   Next 5 appointments (look out 90 days)    Feb 15, 2021  9:45 AM  Office Visit with Soha Boggs MD  [facility] ([facility]) [address] [place] [id number] [phone]           LATRICE CorreaN, RN

## 2020-12-30 RX ORDER — CLONAZEPAM 0.5 MG/1
0.5 TABLET ORAL
Qty: 30 TABLET | Refills: 0 | Status: SHIPPED | OUTPATIENT
Start: 2020-12-30 | End: 2021-02-02

## 2021-01-02 DIAGNOSIS — F41.9 ANXIETY: ICD-10-CM

## 2021-01-02 DIAGNOSIS — G25.81 RESTLESS LEGS SYNDROME (RLS): ICD-10-CM

## 2021-01-02 DIAGNOSIS — G47.00 INSOMNIA, UNSPECIFIED TYPE: ICD-10-CM

## 2021-01-04 NOTE — TELEPHONE ENCOUNTER
Klonopin      Last Written Prescription Date:  12/30/2020  Last Fill Quantity: 30,   # refills: 0  Last Office Visit: 12/11/2020  Future Office visit:    Next 5 appointments (look out 90 days)    Feb 15, 2021  9:45 AM  Office Visit with Soha Boggs MD  [facility] ([address] [id number] [phone]           Routing refill request to provider for review/approval because:  Drug not on the [facility], [facility] or [facility] refill protocol or controlled substance

## 2021-01-05 RX ORDER — CLONAZEPAM 0.5 MG/1
0.5 TABLET ORAL
Qty: 30 TABLET | Refills: 0 | OUTPATIENT
Start: 2021-01-05

## 2021-01-15 ENCOUNTER — HEALTH MAINTENANCE LETTER (OUTPATIENT)
Age: 66
End: 2021-01-15

## 2021-01-19 ENCOUNTER — MYC MEDICAL ADVICE (OUTPATIENT)
Dept: FAMILY MEDICINE | Facility: CLINIC | Age: 66
End: 2021-01-19

## 2021-01-19 DIAGNOSIS — D50.8 OTHER IRON DEFICIENCY ANEMIA: ICD-10-CM

## 2021-01-19 DIAGNOSIS — F41.9 ANXIETY: ICD-10-CM

## 2021-01-19 NOTE — TELEPHONE ENCOUNTER
"Requested Prescriptions   Pending Prescriptions Disp Refills     ferrous gluconate (FERGON) 324 (38 Fe) MG tablet [Pharmacy Med Name: FERROUS GLUCONATE 324MG TABS] 120 tablet 3     Sig: TAKE ONE TABLET BY MOUTH TWICE A DAY WITH MEALS FOR 30 DAYS THEN TAKE ONE TABLET BY MOUTH ONCE DAILY WITH BREAKFAST       Iron Supplements Failed - 1/19/2021 10:17 AM        Failed - Medication is active on med list        Passed - Patient is 12 years of age or older        Passed - Recent (12 mo) or future (30 days) visit within the authorizing provider's specialty     Patient has had an office visit with the authorizing provider or a provider within the authorizing providers department within the previous 12 mos or has a future within next 30 days. See \"Patient Info\" tab in inbasket, or \"Choose Columns\" in Meds & Orders section of the refill encounter.              Passed - Hgb OR Hct on record within the past 12 mos.     Patient need only have had a HGB or HCT on file in the past 12 mos. That result does not need to be normal.    Recent Labs   Lab Test 10/06/20  1829 09/07/20  1043 04/27/20  2353   HGB 12.9 14.3 11.1*     Recent Labs   Lab Test 10/06/20  1829 09/07/20  1043 04/27/20  2353   HCT 39.3 40.3 32.8*       Please verify a HGB or HCT has been checked SINCE THE LAST DOSE CHANGE.               escitalopram (LEXAPRO) 20 MG tablet [Pharmacy Med Name: ESCITALOPRAM OXALATE 20MG TABS] 90 tablet 1     Sig: TAKE ONE TABLET BY MOUTH ONCE DAILY   PHQ-9 score:    PHQ 4/28/2020   PHQ-9 Total Score 8   Q9: Thoughts of better off dead/self-harm past 2 weeks Not at all   F/U: Thoughts of suicide or self-harm -   F/U: Self harm-plan -   F/U: Self-harm action -   F/U: Safety concerns -         SSRIs Protocol Passed - 1/19/2021 10:17 AM        Passed - Recent (12 mo) or future (30 days) visit within the authorizing provider's specialty     Patient has had an office visit with the authorizing provider or a provider within the authorizing " "providers department within the previous 12 mos or has a future within next 30 days. See \"Patient Info\" tab in inbasket, or \"Choose Columns\" in Meds & Orders section of the refill encounter.              Passed - Medication is active on med list        Passed - Patient is age 18 or older        Passed - No active pregnancy on record        Passed - No positive pregnancy test in last 12 months             "

## 2021-01-19 NOTE — TELEPHONE ENCOUNTER
ferrous gluconate (FERGON) 324 (38 Fe) MG tablet [Pharmacy Med Name: FERROUS GLUCONATE 324MG TABS] 120 tablet 3    Sig: TAKE ONE TABLET BY MOUTH TWICE A DAY WITH MEALS FOR 30 DAYS THEN TAKE ONE TABLET BY MOUTH ONCE DAILY WITH BREAKFAST   Last Written Prescription Date:  NA  Last Fill Quantity: NA,  # refills: NA   Last office visit: 12/11/2020 with prescribing provider:     Future Office Visit:   Next 5 appointments (look out 90 days)    Feb 15, 2021  9:45 AM  Office Visit with Soha Boggs MD  [facility] ([address] [id number] [phone]      Routing refill request to provider for review/approval because:  Drug not active on patient's medication list        escitalopram (LEXAPRO) 20 MG tablet [Pharmacy Med Name: ESCITALOPRAM OXALATE 20MG TABS] 90 tablet 1     Sig: TAKE ONE TABLET BY MOUTH ONCE DAILY   PHQ-9 score:    PHQ 4/28/2020   PHQ-9 Total Score 8   Q9: Thoughts of better off dead/self-harm past 2 weeks Not at all   F/U: Thoughts of suicide or self-harm -   F/U: Self harm-plan -   F/U: Self-harm action -   F/U: Safety concerns -   Last Written Prescription Date:  7/27/2020  Last Fill Quantity: 90,  # refills: 1   Last office visit: 12/11/2020 with prescribing provider:        Routing refill request to provider for review/approval because:  PHQ9 score not current, out of range  T'd up 1 month for provider review.    Amanda Pickering, RN

## 2021-01-19 NOTE — TELEPHONE ENCOUNTER
Patient has appointment 2/15 for f/u patient states she is fine waiting until that appointment to discuss medication patient informed will place on our list of patients wanting vaccine  Marbella Vance MA

## 2021-01-21 RX ORDER — FERROUS GLUCONATE 324(38)MG
324 TABLET ORAL
Qty: 90 TABLET | Refills: 3 | Status: SHIPPED | OUTPATIENT
Start: 2021-01-21 | End: 2022-01-08

## 2021-01-21 RX ORDER — ESCITALOPRAM OXALATE 20 MG/1
TABLET ORAL
Qty: 90 TABLET | Refills: 1 | Status: SHIPPED | OUTPATIENT
Start: 2021-01-21 | End: 2021-08-04

## 2021-02-02 DIAGNOSIS — G25.81 RESTLESS LEGS SYNDROME (RLS): ICD-10-CM

## 2021-02-02 DIAGNOSIS — G47.00 INSOMNIA, UNSPECIFIED TYPE: ICD-10-CM

## 2021-02-02 DIAGNOSIS — F41.9 ANXIETY: ICD-10-CM

## 2021-02-02 RX ORDER — CLONAZEPAM 0.5 MG/1
TABLET ORAL
Qty: 30 TABLET | Refills: 0 | Status: SHIPPED | OUTPATIENT
Start: 2021-02-02 | End: 2021-03-03

## 2021-02-02 NOTE — TELEPHONE ENCOUNTER
Requested Prescriptions   Pending Prescriptions Disp Refills     clonazePAM (KLONOPIN) 0.5 MG tablet [Pharmacy Med Name: CLONAZEPAM 0.5MG TABS] 30 tablet 0     Sig: TAKE ONE TABLET BY MOUTH NIGHTLY AS NEEDED FOR ANXIETY     Last Written Prescription Date:  12/30/2020  Last Fill Quantity: 30,   # refills: 0  Last Office Visit: 12/11/2020  Future Office visit:    Next 5 appointments (look out 90 days)    Feb 15, 2021  9:45 AM  Office Visit with Soha Boggs MD  [facility] ([facility]) [address] [place] [id number] [phone]           Routing refill request to provider for review/approval because:  Drug not on the [facility], [facility] or [facility] refill protocol or controlled substance    Zakia Romero MA

## 2021-02-14 ENCOUNTER — HEALTH MAINTENANCE LETTER (OUTPATIENT)
Age: 66
End: 2021-02-14

## 2021-02-15 ENCOUNTER — OFFICE VISIT (OUTPATIENT)
Dept: FAMILY MEDICINE | Facility: CLINIC | Age: 66
End: 2021-02-15
Payer: COMMERCIAL

## 2021-02-15 VITALS
HEART RATE: 89 BPM | DIASTOLIC BLOOD PRESSURE: 56 MMHG | BODY MASS INDEX: 19.43 KG/M2 | OXYGEN SATURATION: 98 % | TEMPERATURE: 98.7 F | WEIGHT: 113.2 LBS | SYSTOLIC BLOOD PRESSURE: 100 MMHG | RESPIRATION RATE: 16 BRPM

## 2021-02-15 DIAGNOSIS — F10.21 ALCOHOL DEPENDENCE IN REMISSION (H): ICD-10-CM

## 2021-02-15 DIAGNOSIS — N30.01 ACUTE CYSTITIS WITH HEMATURIA: Primary | ICD-10-CM

## 2021-02-15 DIAGNOSIS — D75.89 MACROCYTOSIS WITHOUT ANEMIA: ICD-10-CM

## 2021-02-15 DIAGNOSIS — E53.8 VITAMIN B12 DEFICIENCY (NON ANEMIC): ICD-10-CM

## 2021-02-15 DIAGNOSIS — R63.6 UNDERWEIGHT: ICD-10-CM

## 2021-02-15 DIAGNOSIS — R68.83 CHILLS (WITHOUT FEVER): ICD-10-CM

## 2021-02-15 DIAGNOSIS — F41.9 ANXIETY: ICD-10-CM

## 2021-02-15 DIAGNOSIS — D50.8 OTHER IRON DEFICIENCY ANEMIA: ICD-10-CM

## 2021-02-15 DIAGNOSIS — R11.0 NAUSEA: ICD-10-CM

## 2021-02-15 DIAGNOSIS — F43.10 PTSD (POST-TRAUMATIC STRESS DISORDER): ICD-10-CM

## 2021-02-15 DIAGNOSIS — R30.0 DYSURIA: ICD-10-CM

## 2021-02-15 LAB
ALBUMIN SERPL-MCNC: 3.6 G/DL (ref 3.4–5)
ALBUMIN UR-MCNC: NEGATIVE MG/DL
ALP SERPL-CCNC: 115 U/L (ref 40–150)
ALT SERPL W P-5'-P-CCNC: 17 U/L (ref 0–50)
ANION GAP SERPL CALCULATED.3IONS-SCNC: 4 MMOL/L (ref 3–14)
APPEARANCE UR: ABNORMAL
AST SERPL W P-5'-P-CCNC: 16 U/L (ref 0–45)
BACTERIA #/AREA URNS HPF: ABNORMAL /HPF
BILIRUB SERPL-MCNC: 0.7 MG/DL (ref 0.2–1.3)
BILIRUB UR QL STRIP: NEGATIVE
BUN SERPL-MCNC: 12 MG/DL (ref 7–30)
CALCIUM SERPL-MCNC: 8.6 MG/DL (ref 8.5–10.1)
CHLORIDE SERPL-SCNC: 107 MMOL/L (ref 94–109)
CO2 SERPL-SCNC: 28 MMOL/L (ref 20–32)
COLOR UR AUTO: YELLOW
CREAT SERPL-MCNC: 0.72 MG/DL (ref 0.52–1.04)
ERYTHROCYTE [DISTWIDTH] IN BLOOD BY AUTOMATED COUNT: 14.8 % (ref 10–15)
FERRITIN SERPL-MCNC: 90 NG/ML (ref 8–252)
GFR SERPL CREATININE-BSD FRML MDRD: 89 ML/MIN/{1.73_M2}
GLUCOSE SERPL-MCNC: 98 MG/DL (ref 70–99)
GLUCOSE UR STRIP-MCNC: NEGATIVE MG/DL
HCT VFR BLD AUTO: 35.5 % (ref 35–47)
HGB BLD-MCNC: 11.6 G/DL (ref 11.7–15.7)
HGB UR QL STRIP: ABNORMAL
KETONES UR STRIP-MCNC: NEGATIVE MG/DL
LABORATORY COMMENT REPORT: NORMAL
LEUKOCYTE ESTERASE UR QL STRIP: ABNORMAL
MCH RBC QN AUTO: 29.4 PG (ref 26.5–33)
MCHC RBC AUTO-ENTMCNC: 32.7 G/DL (ref 31.5–36.5)
MCV RBC AUTO: 90 FL (ref 78–100)
MUCOUS THREADS #/AREA URNS LPF: PRESENT /LPF
NITRATE UR QL: NEGATIVE
PH UR STRIP: 5 PH (ref 5–7)
PLATELET # BLD AUTO: 233 10E9/L (ref 150–450)
POTASSIUM SERPL-SCNC: 3.8 MMOL/L (ref 3.4–5.3)
PROT SERPL-MCNC: 7.4 G/DL (ref 6.8–8.8)
RBC # BLD AUTO: 3.95 10E12/L (ref 3.8–5.2)
RBC #/AREA URNS AUTO: 23 /HPF (ref 0–2)
SARS-COV-2 RNA RESP QL NAA+PROBE: NEGATIVE
SARS-COV-2 RNA RESP QL NAA+PROBE: NORMAL
SODIUM SERPL-SCNC: 139 MMOL/L (ref 133–144)
SOURCE: ABNORMAL
SP GR UR STRIP: 1.02 (ref 1–1.03)
SPECIMEN SOURCE: NORMAL
SPECIMEN SOURCE: NORMAL
SQUAMOUS #/AREA URNS AUTO: 6 /HPF (ref 0–1)
UROBILINOGEN UR STRIP-MCNC: 2 MG/DL (ref 0–2)
VIT B12 SERPL-MCNC: 409 PG/ML (ref 193–986)
WBC # BLD AUTO: 8.9 10E9/L (ref 4–11)
WBC #/AREA URNS AUTO: 130 /HPF (ref 0–5)
WBC CLUMPS #/AREA URNS HPF: PRESENT /HPF

## 2021-02-15 PROCEDURE — 87186 SC STD MICRODIL/AGAR DIL: CPT | Performed by: FAMILY MEDICINE

## 2021-02-15 PROCEDURE — 82607 VITAMIN B-12: CPT | Performed by: FAMILY MEDICINE

## 2021-02-15 PROCEDURE — U0005 INFEC AGEN DETEC AMPLI PROBE: HCPCS | Performed by: FAMILY MEDICINE

## 2021-02-15 PROCEDURE — 81001 URINALYSIS AUTO W/SCOPE: CPT | Performed by: FAMILY MEDICINE

## 2021-02-15 PROCEDURE — 87086 URINE CULTURE/COLONY COUNT: CPT | Performed by: FAMILY MEDICINE

## 2021-02-15 PROCEDURE — 85027 COMPLETE CBC AUTOMATED: CPT | Performed by: FAMILY MEDICINE

## 2021-02-15 PROCEDURE — 36415 COLL VENOUS BLD VENIPUNCTURE: CPT | Performed by: FAMILY MEDICINE

## 2021-02-15 PROCEDURE — 82728 ASSAY OF FERRITIN: CPT | Performed by: FAMILY MEDICINE

## 2021-02-15 PROCEDURE — 87088 URINE BACTERIA CULTURE: CPT | Performed by: FAMILY MEDICINE

## 2021-02-15 PROCEDURE — 99214 OFFICE O/P EST MOD 30 MIN: CPT | Performed by: FAMILY MEDICINE

## 2021-02-15 PROCEDURE — U0003 INFECTIOUS AGENT DETECTION BY NUCLEIC ACID (DNA OR RNA); SEVERE ACUTE RESPIRATORY SYNDROME CORONAVIRUS 2 (SARS-COV-2) (CORONAVIRUS DISEASE [COVID-19]), AMPLIFIED PROBE TECHNIQUE, MAKING USE OF HIGH THROUGHPUT TECHNOLOGIES AS DESCRIBED BY CMS-2020-01-R: HCPCS | Performed by: FAMILY MEDICINE

## 2021-02-15 PROCEDURE — 80053 COMPREHEN METABOLIC PANEL: CPT | Performed by: FAMILY MEDICINE

## 2021-02-15 RX ORDER — NITROFURANTOIN 25; 75 MG/1; MG/1
100 CAPSULE ORAL 2 TIMES DAILY
Qty: 10 CAPSULE | Refills: 0 | Status: SHIPPED | OUTPATIENT
Start: 2021-02-15 | End: 2021-02-25

## 2021-02-15 ASSESSMENT — PATIENT HEALTH QUESTIONNAIRE - PHQ9: SUM OF ALL RESPONSES TO PHQ QUESTIONS 1-9: 6

## 2021-02-15 NOTE — PROGRESS NOTES
Assessment & Plan       ICD-10-CM    1. Acute cystitis with hematuria  N30.01 *UA reflex to Microscopic and Culture ([facility]; [facility]; [facility]; [facility]; [facility]; [place]; [place])     CBC with platelets     Urine Culture Aerobic Bacterial     nitroFURantoin macrocrystal-monohydrate (MACROBID) 100 MG capsule   2. PTSD (post-traumatic stress disorder)  F43.10    3. Alcohol dependence in remission (H)  F10.21    4. Dysuria  R30.0 *UA reflex to Microscopic and Culture ([facility]; [facility]; [facility]; [facility]; [facility]; [place]; [place])     Comprehensive metabolic panel (BMP + Alb, Alk Phos, ALT, AST, Total. Bili, TP)     CBC with platelets     Urine Culture Aerobic Bacterial     SARS-CoV-2 COVID-19 Virus (Coronavirus) by PCR   5. Chills (without fever)  R68.83 *UA reflex to Microscopic and Culture ([place]; [facility]; [facility]; [facility]; [facility]; [facility]; [place]; [place])     Symptomatic COVID-19 Virus (Coronavirus) by PCR     Comprehensive metabolic panel (BMP + Alb, Alk Phos, ALT, AST, Total. Bili, TP)     CBC with platelets     SARS-CoV-2 COVID-19 Virus (Coronavirus) by PCR   6. Nausea  R11.0 Symptomatic COVID-19 Virus (Coronavirus) by PCR     Comprehensive metabolic panel (BMP + Alb, Alk Phos, ALT, AST, Total. Bili, TP)     CBC with platelets     SARS-CoV-2 COVID-19 Virus (Coronavirus) by PCR   7. Vitamin B12 deficiency (non anemic)  E53.8 Vitamin B12   8. Other iron deficiency anemia  D50.8 Ferritin   9. Underweight  R63.6    10. Anxiety  F41.9    11. Macrocytosis without anemia  D75.89 Vitamin B12     CBC with platelets          25 minutes spent on the date of the encounter doing chart review, history and exam, documentation and further activities as noted above     I am going to treat her for UTI, see orders for Macrobid. Also opted to run a COVID test today. I am reassured by her normal CBC. In  fact this is improved in regards to her MCV as well, likely due to her B12 injections. Will notify her with remainder of results, assuming her B12 is down a bit she will be recommended to resume her injections.     Discussed ongoing sobriety. I also agreed to certify her for medical marijuana on the basis of her PTSD history. Online application completed today, she will follow up with [place] Dept of Health for ongoing registration. Recommend she continue to take her preventive meds as prescribed and use her prn meds as needed. She has been appropriate with these.     Her weight is up a little, stable, and she is happy about that. She is not using the supplement drinks, doesn't like them. Encourage ongoing healthy eating.   I would like to see her again in about 2 months for physical with pap smear, sooner if she is not improving from infection standpoint.     Return in about 2 months (around 4/15/2021) for Routine preventive.    Soha Boggs MD  [facility]    Martha Voss is a 65 year old who presents for the following health issues:    HPI       Genitourinary - Female  Onset/Duration: 3 days  Description:   Painful urination (Dysuria): YES           Frequency: YES  Blood in urine (Hematuria): no  Delay in urine (Hesitency): YES  Intensity: moderate  Progression of Symptoms:  worsening  Accompanying Signs & Symptoms:  Fever/chills: YES- chills  Flank pain: no  Nausea and vomiting: YES  Vaginal symptoms: none  Abdominal/Pelvic Pain: YES  History:   History of frequent UTI s: YES  History of kidney stones: no  Sexually Active: YES  Possibility of pregnancy: No  Precipitating or alleviating factors: None  Therapies tried and outcome: none tried      Also needs routine follow up on her chronic health issues related to alcohol use, anxiety, PTSD, B12 deficiency.   Overall lately she has been doing well. She did have a lapse, used alcohol about 3 weeks ago. She took a drink before  she even realized what she was doing, didn't like it and didn't finish. She was cleaning out her mom's stuff and this was stressful for her.   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   Her anxiety has been stable except today she was more anxious because her ride failed her, but then she was able to get another ride here. She was able to pull herself together and not let it overwhelm her. She occasionally forgets her Buspar. She takes her klonopin every morning.     She is not feeling well today, as above. She thinks she may have a bladder infection. She is tired, feels dizzy, nauseated, some pain with urination. No blood in urine but has to go often and nothing comes out. Feels chilled. Also has a slight cough, nothing new for her, not taking her temp.     Her hands get numb, this is somewhat chronic for her.     She is using THC daily for her anxiety/PTSD. Wondering about medical marijuana. She did look into registering, states she needs certification from me as to her diagnosis of PTSD.   She is not using her lorazepam, has none, and not using trazodone.     She is still taking omeprazole every morning, sometimes bid if she remembers. Takes her xyzal daily in am.     Review of Systems   Constitutional, HEENT, cardiovascular, pulmonary, GI, , musculoskeletal, neuro, skin, endocrine and psych systems are negative, except as otherwise noted.      Objective    /56   Pulse 89   Temp 98.7  F (37.1  C) (Temporal)   Resp 16   Wt 51.3 kg (113 lb 3.2 oz)   LMP  (LMP Unknown)   SpO2 98%   BMI 19.43 kg/m    Body mass index is 19.43 kg/m .  Physical Exam   Vitals noted.  Patient alert, oriented, and in no acute distress.   CV:  RRR without murmur.   Neck:  Supple without lymphadenopathy, JVD or masses.   Respiratory:  Lungs clear to auscultation bilaterally.   Abdomen:  Soft, nontender, nondistended with good bowel sounds and no masses or hepatosplenomegaly.      Orders Placed This Encounter   Procedures     Vitamin B12     *UA reflex to Microscopic and Culture ([place]; [facility]; [facility]; [facility]; [facility]; [facility]; [place]; [place])     Symptomatic COVID-19 Virus (Coronavirus) by PCR     Comprehensive metabolic panel (BMP + Alb, Alk Phos, ALT, AST, Total. Bili, TP)     CBC with platelets     Ferritin     SARS-CoV-2 COVID-19 Virus (Coronavirus) by PCR      Color Urine (no units)   Date Value   02/15/2021 Yellow     Appearance Urine (no units)   Date Value   02/15/2021 Cloudy     Glucose Urine (mg/dL)   Date Value   02/15/2021 Negative     Bilirubin Urine (no units)   Date Value   02/15/2021 Negative     Ketones Urine (mg/dL)   Date Value   02/15/2021 Negative     Specific Gravity Urine (no units)   Date Value   02/15/2021 1.023     pH Urine (pH)   Date Value   02/15/2021 5.0     Protein Albumin Urine (mg/dL)   Date Value   02/15/2021 Negative     Urobilinogen Urine (EU/dL)   Date Value   12/15/2017 0.2     Nitrite Urine (no units)   Date Value   02/15/2021 Negative     Leukocyte Esterase Urine (no units)   Date Value   02/15/2021 Moderate (A)

## 2021-02-16 LAB
BACTERIA SPEC CULT: ABNORMAL
Lab: ABNORMAL
SPECIMEN SOURCE: ABNORMAL

## 2021-02-17 ENCOUNTER — TELEPHONE (OUTPATIENT)
Dept: FAMILY MEDICINE | Facility: CLINIC | Age: 66
End: 2021-02-17

## 2021-02-17 NOTE — RESULT ENCOUNTER NOTE
Notify Shanika that her B12 level is back down, she can resume her shots. They were definitely helping.   Also her COVID test is negative. Her urine culture shows that the antibiotics I put her on should work well for her bladder infection. Her iron level is much better also.   Soha Boggs MD

## 2021-02-17 NOTE — TELEPHONE ENCOUNTER
Called and LM for patient to call back. Please relay results below from Dr. Boggs below.   Zakia Romero MA

## 2021-02-17 NOTE — TELEPHONE ENCOUNTER
----- Message from Soha Boggs MD sent at 2/16/2021 10:40 PM CST -----  Notify Shanika that her B12 level is back down, she can resume her shots. They were definitely helping.   Also her COVID test is negative. Her urine culture shows that the antibiotics I put her on should work well for her bladder infection. Her iron level is much better also.   Soha Boggs MD

## 2021-02-22 DIAGNOSIS — N30.01 ACUTE CYSTITIS WITH HEMATURIA: ICD-10-CM

## 2021-02-23 ENCOUNTER — MYC MEDICAL ADVICE (OUTPATIENT)
Dept: FAMILY MEDICINE | Facility: CLINIC | Age: 66
End: 2021-02-23

## 2021-02-23 NOTE — TELEPHONE ENCOUNTER
Requested Prescriptions   Pending Prescriptions Disp Refills     nitroFURantoin macrocrystal-monohydrate (MACROBID) 100 MG capsule [Pharmacy Med Name: NITROFURANTOIN MONOHYD  CAPS] 10 capsule 0     Sig: TAKE 1 CAPSULE (100 MG) BY MOUTH 2 TIMES DAILY     Last Written Prescription Date:  02/15/2021  Last Fill Quantity: 10,   # refills: 0  Last Office Visit: 02/15/2021  Future Office visit:       Routing refill request to provider for review/approval because:  Drug not on the [facility], [facility] or [facility] refill protocol or controlled substance    Zakia Romero MA

## 2021-02-25 RX ORDER — NITROFURANTOIN 25; 75 MG/1; MG/1
100 CAPSULE ORAL 2 TIMES DAILY
Qty: 10 CAPSULE | Refills: 0 | Status: SHIPPED | OUTPATIENT
Start: 2021-02-25 | End: 2021-05-19

## 2021-03-01 ENCOUNTER — TELEPHONE (OUTPATIENT)
Dept: FAMILY MEDICINE | Facility: CLINIC | Age: 66
End: 2021-03-01

## 2021-03-01 NOTE — RESULT ENCOUNTER NOTE
Please see the previous note, I did recommend she resume her shots. Please clarify this with the patient.   Soha Boggs MD

## 2021-03-01 NOTE — TELEPHONE ENCOUNTER
----- Message from Soha Boggs MD sent at 3/1/2021  6:54 AM CST -----  Please see the previous note, I did recommend she resume her shots. Please clarify this with the patient.   Soha Boggs MD

## 2021-03-02 DIAGNOSIS — G47.00 INSOMNIA, UNSPECIFIED TYPE: ICD-10-CM

## 2021-03-02 DIAGNOSIS — G25.81 RESTLESS LEGS SYNDROME (RLS): ICD-10-CM

## 2021-03-02 DIAGNOSIS — F41.9 ANXIETY: ICD-10-CM

## 2021-03-02 NOTE — TELEPHONE ENCOUNTER
Routing refill request to provider for review/approval because:  Drug not on the [facility] refill protocol     Requested Prescriptions   Pending Prescriptions Disp Refills     clonazePAM (KLONOPIN) 0.5 MG tablet [Pharmacy Med Name: CLONAZEPAM 0.5MG TABS] 30 tablet 0     Sig: TAKE ONE TABLE BY MOUTH NIGHTLY AS NEEDED FOR ANXIETY       There is no refill protocol information for this order      Last Written Prescription Date:  2/2/2021  Last Fill Quantity: 30,  # refills: 0   Last office visit: 2/15/2021 with prescribing provider:     Future Office Visit:    Insomnia, unspecified type [G47.00]       Anxiety [F41.9]       Restless legs syndrome (RLS) [G25.81]         Amanda Pickering RN

## 2021-03-03 RX ORDER — CLONAZEPAM 0.5 MG/1
TABLET ORAL
Qty: 30 TABLET | Refills: 0 | Status: SHIPPED | OUTPATIENT
Start: 2021-03-03 | End: 2021-03-31

## 2021-03-07 ENCOUNTER — MYC REFILL (OUTPATIENT)
Dept: FAMILY MEDICINE | Facility: CLINIC | Age: 66
End: 2021-03-07

## 2021-03-07 DIAGNOSIS — G47.00 INSOMNIA, UNSPECIFIED TYPE: ICD-10-CM

## 2021-03-07 DIAGNOSIS — F41.9 ANXIETY: ICD-10-CM

## 2021-03-07 DIAGNOSIS — G25.81 RESTLESS LEGS SYNDROME (RLS): ICD-10-CM

## 2021-03-08 RX ORDER — CLONAZEPAM 0.5 MG/1
TABLET ORAL
Qty: 30 TABLET | Refills: 0 | OUTPATIENT
Start: 2021-03-08

## 2021-03-08 NOTE — TELEPHONE ENCOUNTER
Just wanted to let you know that your patient requested a refill of this medication 5 days after you sent her 30 tablets. I have refused this refill.    James Vega PA-C on 3/8/2021 at 1:10 PM

## 2021-03-08 NOTE — TELEPHONE ENCOUNTER
clonazePAM (KLONOPIN) 0.5 MG tablet    Last Written Prescription Date:  03/03/2021  Last Fill Quantity: 30,   # refills: 0  Last Office Visit: 02/15/2021  Future Office visit:       Routing refill request to provider for review/approval because:  Drug not on the [facility], [facility] or [facility] refill protocol or controlled substance

## 2021-03-11 NOTE — TELEPHONE ENCOUNTER
I verified  date from pharmacy was 3/8, previous 2/8 so she was not aware the one written on 3/3 was approved. (Klonopin)  This is appropriate.   Soha Boggs MD

## 2021-03-29 DIAGNOSIS — G25.81 RESTLESS LEGS SYNDROME (RLS): ICD-10-CM

## 2021-03-29 DIAGNOSIS — G47.00 INSOMNIA, UNSPECIFIED TYPE: ICD-10-CM

## 2021-03-29 DIAGNOSIS — F41.9 ANXIETY: ICD-10-CM

## 2021-03-29 NOTE — TELEPHONE ENCOUNTER
Requested Prescriptions   Pending Prescriptions Disp Refills     clonazePAM (KLONOPIN) 0.5 MG tablet [Pharmacy Med Name: CLONAZEPAM 0.5MG TABS] 30 tablet 0     Sig: TAKE ONE TABLE BY MOUTH NIGHTLY AS NEEDED FOR ANXIETY     Last Written Prescription Date:  03/03/2021  Last Fill Quantity: 30,   # refills: 0  Last Office Visit: 02/15/2021  Future Office visit:       Routing refill request to provider for review/approval because:  Drug not on the [facility], [facility] or [facility] refill protocol or controlled substance    Zakia Romero MA

## 2021-03-31 RX ORDER — CLONAZEPAM 0.5 MG/1
TABLET ORAL
Qty: 30 TABLET | Refills: 0 | Status: SHIPPED | OUTPATIENT
Start: 2021-03-31 | End: 2021-05-05

## 2021-04-16 ENCOUNTER — HOSPITAL ENCOUNTER (EMERGENCY)
Facility: CLINIC | Age: 66
Discharge: HOME | End: 2021-04-16
Attending: EMERGENCY MEDICINE | Admitting: EMERGENCY MEDICINE
Payer: COMMERCIAL

## 2021-04-16 VITALS
RESPIRATION RATE: 14 BRPM | TEMPERATURE: 97.7 F | OXYGEN SATURATION: 98 % | BODY MASS INDEX: 20.94 KG/M2 | SYSTOLIC BLOOD PRESSURE: 90 MMHG | DIASTOLIC BLOOD PRESSURE: 64 MMHG | WEIGHT: 122 LBS

## 2021-04-16 DIAGNOSIS — S01.81XA LACERATION OF FOREHEAD, INITIAL ENCOUNTER: ICD-10-CM

## 2021-04-16 PROCEDURE — 12011 RPR F/E/E/N/L/M 2.5 CM/<: CPT | Performed by: EMERGENCY MEDICINE

## 2021-04-16 PROCEDURE — 272N000047 HC ADHESIVE DERMABOND SKIN: Performed by: EMERGENCY MEDICINE

## 2021-04-16 PROCEDURE — 99282 EMERGENCY DEPT VISIT SF MDM: CPT | Mod: 25 | Performed by: EMERGENCY MEDICINE

## 2021-04-16 PROCEDURE — 99282 EMERGENCY DEPT VISIT SF MDM: CPT | Performed by: EMERGENCY MEDICINE

## 2021-04-16 NOTE — DISCHARGE INSTRUCTIONS
The glue acts like a scab to help keep the wound edges together.  Do not place any ointments, socially anything Vaseline-based, over the glue as it will break it down.  It should stay in place about 5 days and start to peel off on its own.    Watch for any signs of infection and follow-up in clinic or return for concerns.    I hope you have a wonderful wedding in [place]!!

## 2021-04-16 NOTE — ED PROVIDER NOTES
History     Chief Complaint   Patient presents with     Laceration     HPI  History per patient and medical records    This is a 65-year-old female, history as below, presenting with forehead laceration.  Patient was attempting to fix a buzzing noise on her television.  The sound bar fell off the top and hit her in the forehead.  She noted a lot of bleeding but it is now controlled.  No loss of consciousness.  This occurred about 2 hours ago.  She acknowledges that she is alcoholic and has been drinking.  Her tetanus is up-to-date.  She is not on blood thinners.  No other injuries or complaints.    Allergies:  Allergies   Allergen Reactions     Codeine Itching     Vicodin [Hydrocodone-Acetaminophen] Itching       Problem List:    Patient Active Problem List    Diagnosis Date Noted     Fatty liver 12/13/2020     Priority: Medium     PTSD (post-traumatic stress disorder) 12/13/2020     Priority: Medium     Underweight 12/13/2020     Priority: Medium     Macrocytosis without anemia 12/13/2020     Priority: Medium     Age-related osteoporosis without current pathological fracture 10/15/2020     Priority: Medium     Anemia due to vitamin B12 deficiency, unspecified B12 deficiency type 12/04/2017     Priority: Medium     Insomnia, unspecified type 12/04/2017     Priority: Medium     Other iron deficiency anemia 12/04/2017     Priority: Medium     Alcohol dependence in remission (H) 04/20/2013     Priority: Medium     Brief relapse in April 2013 after 10 years of sobriety.       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 grade 3 04/07/2011     Priority: Medium     12/15/06 ASCUS + high risk HPV  colpo in 2007 showed high grade KAITLYN and LEEP was recommended  LEEP was done in June,2007 with CAN 2/3 confirmed  10/15/07 NIL  9/30/08 NIL/neg HPV  10/21/09 NIL/neg HPV  4/5/11 NIL- repeat in one year (4/2012 12/1/17 NIL pap/neg HR HPV. Will need pap screening until 2027, regardless of age.  Plan: cotest due 3 yr.       MDD  (major depressive disorder) 04/05/2011     Priority: Medium     Needs to est primary care.       CARDIOVASCULAR SCREENING; LDL GOAL LESS THAN 160 10/31/2010     Priority: Medium     Genital herpes      Priority: Medium     IMO update changed this record. Please review for accuracy       Anxiety 08/27/2008     Priority: Medium     Patient is followed by MIMI GARZA for ongoing prescription of benzodiazepines.  All refills should be approved by this provider, or covering partner.    Medication(s): Clonazepam.   Maximum quantity per month: 30  Clinic visit frequency required:      Controlled substance agreement on file: No  Benzodiazepine use reviewed by psychiatry:  No    Last [facility] website verification:  done on 4/5/19  [email or web address]         Status post gastric bypass for obesity 03/25/2008     Priority: Medium     Performed at [facility].       Restless legs syndrome (RLS) 05/15/2007     Priority: Medium     Hypersomnia with sleep apnea 05/15/2007     Priority: Medium     Problem list name updated by automated process. Provider to review       Esophageal reflux 04/18/2007     Priority: Medium        Past Medical History:    Past Medical History:   Diagnosis Date     Abnormal Papanicolaou smear of cervix and cervical HPV 4/07     Genital herpes      History of colposcopy with cervical biopsy 06/2007     Hypersomnia with sleep apnea, unspecified      Other and unspecified ovarian cyst 2002 or so       Past Surgical History:    Past Surgical History:   Procedure Laterality Date     CHOLECYSTECTOMY, OPEN  age 20s     COLONOSCOPY  3/21/2011    COMBINED COLONOSCOPY, REMOVE TUMOR/POLYP/LESION BY SNARE performed by JUAN FRANCISCO HERNANDEZ at  GI     COLONOSCOPY N/A 10/9/2017    polyps, repeat 3 years     COLPOSCOPY CERVIX, LOOP ELECTRODE BIOPSY, COMBINED  6/2007    CAN 2/3-patient requires yearly pap smears     ESOPHAGOSCOPY, GASTROSCOPY, DUODENOSCOPY (EGD), COMBINED N/A 2/9/2018     "Procedure: COMBINED ESOPHAGOSCOPY, GASTROSCOPY, DUODENOSCOPY (EGD);  EGD;  Surgeon: Oneal Sanchez MD;  Location: [facility]     GASTRIC BYPASS  3/25/2008    At [facility]     HC DILATION/CURETTAGE DIAG/THER NON OB       HC ENLARGE BREAST WITH IMPLANT       HC LAPAROSCOPIC MYOMECTOMY, 1 - 4 INTRAMURAL MYOMAS =<250 GM       HC REMOVAL OF BREAST IMPLANT       SALPINGO OOPHORECTOMY,R/L/MATTHEW      Bilateral salpingectomy and unilateral oophorectomy       Family History:    Family History   Problem Relation Age of Onset     Chronic Obstructive Pulmonary Disease Mother      Unknown/Adopted Father         doesn't know birth father     Lung Cancer Brother      Family History Negative Other        Social History:  Marital Status:  Single [1]  Social History     Tobacco Use     Smoking status: Current Some Day Smoker     Packs/day: 1.00     Years: 10.00     Pack years: 10.00     Last attempt to quit: 2001     Years since quittin.6     Smokeless tobacco: Never Used     Tobacco comment: 2 ppd at this time (chain smoking) 10/2012   Substance Use Topics     Alcohol use: Yes     Comment: 2-3 boxes of wine per week     Drug use: Yes     Types: Marijuana     Comment: smoked tonight        Medications:    busPIRone (BUSPAR) 10 MG tablet  CALCIUM ANTACID EXTRA STRENGTH 750 MG CHEW  calcium carbonate (OS-SHON 500 MG [place]. [place]) 1250 MG tablet  clonazePAM (KLONOPIN) 0.5 MG tablet  cyanocobalamin (CYANOCOBALAMIN) 1000 MCG/ML injection  cyanocobalamin (CYANOCOBALAMIN) 1000 MCG/ML injection  escitalopram (LEXAPRO) 20 MG tablet  ferrous gluconate (FERGON) 324 (38 Fe) MG tablet  folic acid (FOLVITE) 1 MG tablet  Insulin Syringe-Needle U-100 (B-D INSULIN SYRINGE) 25G X 1\" 1 ML MISC  levocetirizine (XYZAL) 5 MG tablet  LORazepam (ATIVAN) 1 MG tablet  multivitamin, therapeutic with minerals (MULTI-VITAMIN) TABS tablet  naltrexone (DEPADE/REVIA) 50 MG tablet  nitroFURantoin macrocrystal-monohydrate (MACROBID) 100 MG " capsule  Nutritional Supplements (BOOST HIGH PROTEIN) LIQD  omeprazole (PRILOSEC) 40 MG DR capsule  raloxifene (EVISTA) 60 MG tablet  traZODone (DESYREL) 50 MG tablet  triamcinolone (KENALOG) 0.1 % external cream  vitamin D3 (CHOLECALCIFEROL) 50 mcg (2000 units) tablet          Review of Systems   All other ROS reviewed and are negative or non-contributory except as stated in HPI.     Physical Exam   BP: 90/64  Temp: 97.7  F (36.5  C)  Resp: 14  Weight: 55.3 kg (122 lb)  SpO2: 98 %      Physical Exam  Vitals signs and nursing note reviewed.   Constitutional:       Comments: Thin female sitting in the bed   HENT:      Head:     Eyes:      Extraocular Movements: Extraocular movements intact.      Conjunctiva/sclera: Conjunctivae normal.   Neck:      Musculoskeletal: Normal range of motion and neck supple.   Cardiovascular:      Rate and Rhythm: Normal rate.   Pulmonary:      Effort: Pulmonary effort is normal.   Musculoskeletal: Normal range of motion.   Skin:     General: Skin is warm and dry.   Neurological:      General: No focal deficit present.      Mental Status: She is alert.   Psychiatric:         Behavior: Behavior normal.      Comments: Mildly anxious         ED Course (with Medical Decision Making)    Pt seen and examined by me.  RN and EPIC notes reviewed.       Patient with forehead laceration.  On exam, it is superficial but there is a small area that gapes.    Please see procedure note.    Laceration care discussed.  Watch for signs of infection and follow-up in clinic.  Return for concerns.        [facility]    -Laceration Repair    Date/Time: 4/16/2021 9:13 AM  Performed by: Sherice Lynne MD  Authorized by: Sherice Lynne MD     LACERATION DETAILS     Location:  Face    Face location:  Forehead    Length (cm):  2    REPAIR TYPE:     Repair type:  Simple      EXPLORATION:     Wound exploration comment:  Wound explored and entire depth  visualized    Contaminated: no      TREATMENT:     Amount of cleaning:  Standard    Irrigation solution:  Sterile water    Irrigation volume:  200    Visualized foreign bodies/material removed: no      SKIN REPAIR     Repair method:  Tissue adhesive    APPROXIMATION     Approximation:  Close    POST-PROCEDURE DETAILS     Dressing:  Adhesive bandage      PROCEDURE   Patient Tolerance:  Patient tolerated the procedure well with no immediate complications          Assessments & Plan     I have reviewed the findings, diagnosis, plan and need for follow up with the patient.    Discharge Medication List as of 4/16/2021  8:51 AM          Final diagnoses:   Laceration of forehead, initial encounter     Disposition: Patient discharged home in stable condition.  Plan as above.  Return for concerns.     Note: Chart documentation done in part with Dragon Voice Recognition software. Although reviewed after completion, some word and grammatical errors may remain.     4/16/2021   [facility] EMERGENCY DEPT     Sherice Lynne MD  04/16/21 0915

## 2021-04-16 NOTE — ED TRIAGE NOTES
Here with laceration to mid forehead. States her sound bar fell off the wall and hit head. Denies LOC.

## 2021-05-01 DIAGNOSIS — G25.81 RESTLESS LEGS SYNDROME (RLS): ICD-10-CM

## 2021-05-01 DIAGNOSIS — G47.00 INSOMNIA, UNSPECIFIED TYPE: ICD-10-CM

## 2021-05-01 DIAGNOSIS — F41.9 ANXIETY: ICD-10-CM

## 2021-05-03 NOTE — TELEPHONE ENCOUNTER
Routing refill request to provider for review/approval because:  Drug not on the [facility] refill protocol     Requested Prescriptions   Pending Prescriptions Disp Refills     clonazePAM (KLONOPIN) 0.5 MG tablet [Pharmacy Med Name: CLONAZEPAM 0.5MG TABS] 30 tablet 0     Sig: TAKE ONE TABLE BY MOUTH NIGHTLY AS NEEDED FOR ANXIETY       There is no refill protocol information for this order      Last Written Prescription Date:  3/31/2021  Last Fill Quantity: 30,  # refills: 0   Last office visit: 2/15/2021 with prescribing provider:     Future Office Visit:    Insomnia, unspecified type [G47.00]       Anxiety [F41.9]       Restless legs syndrome (RLS) [G25.81]         Amanda Pickering RN

## 2021-05-05 RX ORDER — CLONAZEPAM 0.5 MG/1
TABLET ORAL
Qty: 30 TABLET | Refills: 0 | Status: SHIPPED | OUTPATIENT
Start: 2021-05-05 | End: 2021-06-02

## 2021-05-17 ENCOUNTER — IMMUNIZATION (OUTPATIENT)
Dept: FAMILY MEDICINE | Facility: CLINIC | Age: 66
End: 2021-05-17
Payer: COMMERCIAL

## 2021-05-17 PROCEDURE — 91301 PR COVID VAC MODERNA 100 MCG/0.5 ML IM: CPT

## 2021-05-17 PROCEDURE — 0011A PR COVID VAC MODERNA 100 MCG/0.5 ML IM: CPT

## 2021-05-19 ENCOUNTER — OFFICE VISIT (OUTPATIENT)
Dept: FAMILY MEDICINE | Facility: CLINIC | Age: 66
End: 2021-05-19
Payer: COMMERCIAL

## 2021-05-19 VITALS
SYSTOLIC BLOOD PRESSURE: 126 MMHG | TEMPERATURE: 98.7 F | DIASTOLIC BLOOD PRESSURE: 72 MMHG | HEIGHT: 64 IN | WEIGHT: 122 LBS | RESPIRATION RATE: 16 BRPM | HEART RATE: 80 BPM | BODY MASS INDEX: 20.83 KG/M2

## 2021-05-19 DIAGNOSIS — F10.21 ALCOHOL DEPENDENCE IN REMISSION (H): ICD-10-CM

## 2021-05-19 DIAGNOSIS — Z11.52 ENCOUNTER FOR SCREENING FOR COVID-19: ICD-10-CM

## 2021-05-19 DIAGNOSIS — Z98.84 STATUS POST GASTRIC BYPASS FOR OBESITY: ICD-10-CM

## 2021-05-19 DIAGNOSIS — Z01.818 PREOP GENERAL PHYSICAL EXAM: Primary | ICD-10-CM

## 2021-05-19 DIAGNOSIS — Z41.1 ENCOUNTER FOR COSMETIC SURGERY: ICD-10-CM

## 2021-05-19 LAB
ERYTHROCYTE [DISTWIDTH] IN BLOOD BY AUTOMATED COUNT: 14.1 % (ref 10–15)
HCT VFR BLD AUTO: 34.1 % (ref 35–47)
HGB BLD-MCNC: 11.4 G/DL (ref 11.7–15.7)
LABORATORY COMMENT REPORT: NORMAL
MCH RBC QN AUTO: 31.1 PG (ref 26.5–33)
MCHC RBC AUTO-ENTMCNC: 33.4 G/DL (ref 31.5–36.5)
MCV RBC AUTO: 93 FL (ref 78–100)
PLATELET # BLD AUTO: 259 10E9/L (ref 150–450)
RBC # BLD AUTO: 3.67 10E12/L (ref 3.8–5.2)
SARS-COV-2 RNA RESP QL NAA+PROBE: NEGATIVE
SARS-COV-2 RNA RESP QL NAA+PROBE: NORMAL
SPECIMEN SOURCE: NORMAL
SPECIMEN SOURCE: NORMAL
WBC # BLD AUTO: 7.4 10E9/L (ref 4–11)

## 2021-05-19 PROCEDURE — 36415 COLL VENOUS BLD VENIPUNCTURE: CPT | Performed by: PHYSICIAN ASSISTANT

## 2021-05-19 PROCEDURE — 99214 OFFICE O/P EST MOD 30 MIN: CPT | Performed by: PHYSICIAN ASSISTANT

## 2021-05-19 PROCEDURE — U0005 INFEC AGEN DETEC AMPLI PROBE: HCPCS | Performed by: PHYSICIAN ASSISTANT

## 2021-05-19 PROCEDURE — 85027 COMPLETE CBC AUTOMATED: CPT | Performed by: PHYSICIAN ASSISTANT

## 2021-05-19 PROCEDURE — U0003 INFECTIOUS AGENT DETECTION BY NUCLEIC ACID (DNA OR RNA); SEVERE ACUTE RESPIRATORY SYNDROME CORONAVIRUS 2 (SARS-COV-2) (CORONAVIRUS DISEASE [COVID-19]), AMPLIFIED PROBE TECHNIQUE, MAKING USE OF HIGH THROUGHPUT TECHNOLOGIES AS DESCRIBED BY CMS-2020-01-R: HCPCS | Performed by: PHYSICIAN ASSISTANT

## 2021-05-19 ASSESSMENT — MIFFLIN-ST. JEOR: SCORE: 1085.45

## 2021-05-19 NOTE — PROGRESS NOTES
[address] [address]  Phone: [phone]  Fax: [phone]  Primary Provider: Soha Boggs  Pre-op Performing Provider: ANTONIO SANTOS    PREOPERATIVE EVALUATION:  Today's date: 5/19/2021    Pavithra Raines is a 65 year old female who presents for a preoperative evaluation.    Surgical Information:  Surgery/Procedure: breast augmentation  Surgery Location: [facility] phone [phone]  Sur[facility]  Surgery Date: 5/26/21  Time of Surgery:   Where patient plans to recover: At home with family  Fax number for surgical facility: [phone]    Type of Anesthesia Anticipated: to be determined    Subjective     HPI related to upcoming procedure: breast augmentation    Preop Questions 5/19/2021   1. Have you ever had a heart attack or stroke? No   2. Have you ever had surgery on your heart or blood vessels, such as a stent placement, a coronary artery bypass, or surgery on an artery in your head, neck, heart, or legs? No   3. Do you have chest pain with activity? No   4. Do you have a history of  heart failure? No   5. Do you currently have a cold, bronchitis or symptoms of other infection? No   6. Do you have a cough, shortness of breath, or wheezing? No   7. Do you or anyone in your family have previous history of blood clots? No   8. Do you or does anyone in your family have a serious bleeding problem such as prolonged bleeding following surgeries or cuts? No   9. Have you ever had problems with anemia or been told to take iron pills? YES - chronic iron and B12 deficiency as S/P bariatric surgery   10. Have you had any abnormal blood loss such as black, tarry or bloody stools, or abnormal vaginal bleeding? No   11. Have you ever had a blood transfusion? No   12. Are you willing to have a blood transfusion if it is medically needed before, during, or after your surgery? Yes   13. Have you or any  of your relatives ever had problems with anesthesia? No   14. Do you have sleep apnea, excessive snoring or daytime drowsiness? No   15. Do you have any artifical heart valves or other implanted medical devices like a pacemaker, defibrillator, or continuous glucose monitor? No   16. Do you have artificial joints? No   17. Are you allergic to latex? No       Health Care Directive:  Patient does not have a Health Care Directive or Living Will: Discussed advance care planning with patient; however, patient declined at this time.    Preoperative Review of :   reviewed - controlled substances reflected in medication list.    Status of Chronic Conditions:  See problem list for active medical problems.  Problems all longstanding and stable, except as noted/documented.  See ROS for pertinent symptoms related to these conditions.      Review of Systems  CONSTITUTIONAL: NEGATIVE for fever, chills, change in weight  INTEGUMENTARY/SKIN: NEGATIVE for worrisome rashes, moles or lesions  EYES: NEGATIVE for vision changes or irritation  ENT/MOUTH: NEGATIVE for ear, mouth and throat problems  RESP: NEGATIVE for significant cough or SOB  BREAST: NEGATIVE for masses, tenderness or discharge  CV: NEGATIVE for chest pain, palpitations or peripheral edema  GI: NEGATIVE for nausea, abdominal pain, heartburn, or change in bowel habits  : NEGATIVE for frequency, dysuria, or hematuria  MUSCULOSKELETAL: NEGATIVE for significant arthralgias or myalgia  NEURO: NEGATIVE for weakness, dizziness or paresthesias  ENDOCRINE: NEGATIVE for temperature intolerance, skin/hair changes  HEME: NEGATIVE for bleeding problems  PSYCHIATRIC: NEGATIVE for changes in mood or affect    Patient Active Problem List    Diagnosis Date Noted     Fatty liver 12/13/2020     Priority: Medium     PTSD (post-traumatic stress disorder) 12/13/2020     Priority: Medium     Underweight 12/13/2020     Priority: Medium     Macrocytosis without anemia 12/13/2020      Priority: Medium     Age-related osteoporosis without current pathological fracture 10/15/2020     Priority: Medium     Anemia due to vitamin B12 deficiency, unspecified B12 deficiency type 12/04/2017     Priority: Medium     Insomnia, unspecified type 12/04/2017     Priority: Medium     Other iron deficiency anemia 12/04/2017     Priority: Medium     Alcohol dependence in remission (H) 04/20/2013     Priority: Medium     Brief relapse in April 2013 after 10 years of sobriety.       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 grade 3 04/07/2011     Priority: Medium     12/15/06 ASCUS + high risk HPV  colpo in 2007 showed high grade KAITLYN and LEEP was recommended  LEEP was done in June,2007 with CAN 2/3 confirmed  10/15/07 NIL  9/30/08 NIL/neg HPV  10/21/09 NIL/neg HPV  4/5/11 NIL- repeat in one year (4/2012 12/1/17 NIL pap/neg HR HPV. Will need pap screening until 2027, regardless of age.  Plan: cotest due 3 yr.       MDD (major depressive disorder) 04/05/2011     Priority: Medium     Needs to est primary care.       CARDIOVASCULAR SCREENING; LDL GOAL LESS THAN 160 10/31/2010     Priority: Medium     Genital herpes      Priority: Medium     IMO update changed this record. Please review for accuracy       Anxiety 08/27/2008     Priority: Medium     Patient is followed by MIMI GARZA for ongoing prescription of benzodiazepines.  All refills should be approved by this provider, or covering partner.    Medication(s): Clonazepam.   Maximum quantity per month: 30  Clinic visit frequency required:      Controlled substance agreement on file: No  Benzodiazepine use reviewed by psychiatry:  No    Last [facility] website verification:  done on 4/5/19  [email or web address]         Status post gastric bypass for obesity 03/25/2008     Priority: Medium     Performed at [facility].       Restless legs syndrome (RLS) 05/15/2007     Priority: Medium     Hypersomnia with sleep apnea 05/15/2007      Priority: Medium     Problem list name updated by automated process. Provider to review       Esophageal reflux 04/18/2007     Priority: Medium      Past Medical History:   Diagnosis Date     Abnormal Papanicolaou smear of cervix and cervical HPV 4/07    Colposcopy 2007= HSIL     Genital herpes      History of colposcopy with cervical biopsy 06/2007    HSIL- LEEP recommended     Hypersomnia with sleep apnea, unspecified     CPAP started 2007     Other and unspecified ovarian cyst 2002 or so    s/p resection of ovary and tubes, d&c     Past Surgical History:   Procedure Laterality Date     CHOLECYSTECTOMY, OPEN  age 20s     COLONOSCOPY  3/21/2011    COMBINED COLONOSCOPY, REMOVE TUMOR/POLYP/LESION BY SNARE performed by JUAN FRANCISCO HERNANDEZ at  GI     COLONOSCOPY N/A 10/9/2017    polyps, repeat 3 years     COLPOSCOPY CERVIX, LOOP ELECTRODE BIOPSY, COMBINED  6/2007    CAN 2/3-patient requires yearly pap smears     ESOPHAGOSCOPY, GASTROSCOPY, DUODENOSCOPY (EGD), COMBINED N/A 2/9/2018    Procedure: COMBINED ESOPHAGOSCOPY, GASTROSCOPY, DUODENOSCOPY (EGD);  EGD;  Surgeon: Oneal [place] MD;  Location:  GI     GASTRIC BYPASS  3/25/2008    At [facility]     HC DILATION/CURETTAGE DIAG/THER NON OB  2002     HC ENLARGE BREAST WITH IMPLANT       HC LAPAROSCOPIC MYOMECTOMY, 1 - 4 INTRAMURAL MYOMAS =<250 GM  2002     HC REMOVAL OF BREAST IMPLANT       SALPINGO OOPHORECTOMY,R/L/MATTHEW  2002    Bilateral salpingectomy and unilateral oophorectomy     Current Outpatient Medications   Medication Sig Dispense Refill     busPIRone (BUSPAR) 10 MG tablet TAKE ONE TABLET BY MOUTH THREE TIMES A DAY 90 tablet 5     CALCIUM ANTACID EXTRA STRENGTH 750 MG CHEW Take 2 tablet by mouth every four hours while awake as needed and at bed  4     calcium carbonate (OS-SHON 500 MG [place]. [place]) 1250 MG tablet Take 1 tablet by mouth 2 times daily       clonazePAM (KLONOPIN) 0.5 MG tablet TAKE ONE TABLE BY MOUTH NIGHTLY AS NEEDED FOR ANXIETY 30 tablet 0  "    cyanocobalamin (CYANOCOBALAMIN) 1000 MCG/ML injection Inject 1 mL (1,000 mcg) into the muscle every 30 days Given 10/04/19 [id number] Exp:12/20 See MAR 1 mL 11     escitalopram (LEXAPRO) 20 MG tablet TAKE ONE TABLET BY MOUTH ONCE DAILY 90 tablet 1     ferrous gluconate (FERGON) 324 (38 Fe) MG tablet Take 1 tablet (324 mg) by mouth daily (with breakfast) 90 tablet 3     folic acid (FOLVITE) 1 MG tablet Take 1 tablet (1,000 mcg) by mouth daily 90 tablet 3     Insulin Syringe-Needle U-100 (B-D INSULIN SYRINGE) 25G X 1\" 1 ML MISC Use once every 30 days for B12 injection 3 each 3     levocetirizine (XYZAL) 5 MG tablet Take 1 tablet (5 mg) by mouth every morning 90 tablet 3     LORazepam (ATIVAN) 1 MG tablet TAKE ONE TABLET BY MOUTH EVERY 1 TO 2 HOURS AS NEEDED FOR ONSET OF WITHDRAWAL SYMPTOMS OR SEIZURES 10 tablet 0     omeprazole (PRILOSEC) 40 MG DR capsule TAKE ONE CAPSULE BY MOUTH TWICE A  capsule 1     raloxifene (EVISTA) 60 MG tablet Take 1 tablet (60 mg) by mouth daily 90 tablet 3     traZODone (DESYREL) 50 MG tablet TAKE TWO TABLETS BY MOUTH EVERY NIGHT AT BEDTIME 60 tablet 3     triamcinolone (KENALOG) 0.1 % external cream APPLY SPARINGLY TO ITCHY RASH AREAS UP TO THREE TIMES A DAY AS NEEDED 80 g 1     vitamin D3 (CHOLECALCIFEROL) 50 mcg (2000 units) tablet TAKE ONE TABLET BY MOUTH ONCE DAILY 90 tablet 1     cyanocobalamin (CYANOCOBALAMIN) 1000 MCG/ML injection Inject 1 mL (1,000 mcg) into the muscle every 30 days (Patient not taking: Reported on 2/15/2021) 3 mL 3     multivitamin, therapeutic with minerals (MULTI-VITAMIN) TABS tablet Take 1 tablet by mouth daily       naltrexone (DEPADE/REVIA) 50 MG tablet Take 1 tablet (50 mg) by mouth daily 30 tablet 3       Allergies   Allergen Reactions     Codeine Itching     Vicodin [Hydrocodone-Acetaminophen] Itching        Social History     Tobacco Use     Smoking status: Former Smoker     Packs/day: 1.00     Years: 10.00     Pack years: 10.00     Quit date: " "2021     Years since quittin.2     Smokeless tobacco: Never Used     Tobacco comment: 2 ppd at this time (chain smoking) 10/2012   Substance Use Topics     Alcohol use: Yes     Comment: 2-3 boxes of wine per week     Family History   Problem Relation Age of Onset     Chronic Obstructive Pulmonary Disease Mother      Unknown/Adopted Father         doesn't know birth father     Lung Cancer Brother      Family History Negative Other      History   Drug Use     Types: Marijuana     Comment: smoked tonight         Objective     /72   Pulse 80   Temp 98.7  F (37.1  C) (Temporal)   Resp 16   Ht 1.613 m (5' 3.5\")   Wt 55.3 kg (122 lb)   LMP  (LMP Unknown)   BMI 21.27 kg/m      Physical Exam    GENERAL APPEARANCE: healthy, alert and no distress     EYES: EOMI, PERRL     HENT: ear canals and TM's normal and nose and mouth without ulcers or lesions     NECK: no adenopathy, no asymmetry, masses, or scars and thyroid normal to palpation     RESP: lungs clear to auscultation - no rales, rhonchi or wheezes     CV: regular rates and rhythm, normal S1 S2, no S3 or S4 and no murmur, click or rub     ABDOMEN:  soft, nontender, no HSM or masses and bowel sounds normal     MS: extremities normal- no gross deformities noted, no evidence of inflammation in joints, FROM in all extremities.     SKIN: no suspicious lesions or rashes     NEURO: Normal strength and tone, sensory exam grossly normal, mentation intact and speech normal     PSYCH: mentation appears normal. and affect normal/bright     LYMPHATICS: No cervical adenopathy    Recent Labs   Lab Test 02/15/21  1044 10/06/20  1829   HGB 11.6* 12.9    263    145*   POTASSIUM 3.8 4.3   CR 0.72 0.73      Diagnostics:  Recent Results (from the past 720 hour(s))   CBC with platelets    Collection Time: 21  1:38 PM   Result Value Ref Range    WBC 7.4 4.0 - 11.0 10e9/L    RBC Count 3.67 (L) 3.8 - 5.2 10e12/L    Hemoglobin 11.4 (L) 11.7 - 15.7 g/dL    " Hematocrit 34.1 (L) 35.0 - 47.0 %    MCV 93 78 - 100 fl    MCH 31.1 26.5 - 33.0 pg    MCHC 33.4 31.5 - 36.5 g/dL    RDW 14.1 10.0 - 15.0 %    Platelet Count 259 150 - 450 10e9/L      No EKG required, no history of coronary heart disease, significant arrhythmia, peripheral arterial disease or other structural heart disease.    Revised Cardiac Risk Index (RCRI):  The patient has the following serious cardiovascular risks for perioperative complications:   - No serious cardiac risks = 0 points     RCRI Interpretation: 0 points: Class I (very low risk - 0.4% complication rate)        Assessment & Plan     The proposed surgical procedure is considered INTERMEDIATE risk.    Preop general physical exam  - CBC with platelets    Encounter for cosmetic surgery    Status post gastric bypass for obesity    Alcohol dependence in remission (H)  Long-term remission    Encounter for screening for COVID-19  - Asymptomatic COVID-19 Virus (Coronavirus) by PCR; Future  - Asymptomatic COVID-19 Virus (Coronavirus) by PCR    Risks and Recommendations:  The patient has the following additional risks and recommendations for perioperative complications:   - No identified additional risk factors other than previously addressed   - COVID testing completed today    Medication Instructions:  Patient is to take all scheduled medications on the day of surgery    RECOMMENDATION:  APPROVAL GIVEN to proceed with proposed procedure, without further diagnostic evaluation.    Signed Electronically by: Miladis Dalal PA-C  Copy of this evaluation report is provided to requesting physician.

## 2021-05-19 NOTE — PATIENT INSTRUCTIONS

## 2021-06-02 DIAGNOSIS — G25.81 RESTLESS LEGS SYNDROME (RLS): ICD-10-CM

## 2021-06-02 DIAGNOSIS — F41.9 ANXIETY: ICD-10-CM

## 2021-06-02 DIAGNOSIS — G47.00 INSOMNIA, UNSPECIFIED TYPE: ICD-10-CM

## 2021-06-02 RX ORDER — CLONAZEPAM 0.5 MG/1
TABLET ORAL
Qty: 30 TABLET | Refills: 0 | Status: SHIPPED | OUTPATIENT
Start: 2021-06-02 | End: 2021-07-02

## 2021-06-14 ENCOUNTER — IMMUNIZATION (OUTPATIENT)
Dept: FAMILY MEDICINE | Facility: CLINIC | Age: 66
End: 2021-06-14
Attending: FAMILY MEDICINE
Payer: COMMERCIAL

## 2021-06-14 PROCEDURE — 91301 PR COVID VAC MODERNA 100 MCG/0.5 ML IM: CPT

## 2021-06-14 PROCEDURE — 0012A PR COVID VAC MODERNA 100 MCG/0.5 ML IM: CPT

## 2021-07-02 DIAGNOSIS — F41.9 ANXIETY: ICD-10-CM

## 2021-07-02 DIAGNOSIS — G47.00 INSOMNIA, UNSPECIFIED TYPE: ICD-10-CM

## 2021-07-02 DIAGNOSIS — G25.81 RESTLESS LEGS SYNDROME (RLS): ICD-10-CM

## 2021-07-02 RX ORDER — CLONAZEPAM 0.5 MG/1
TABLET ORAL
Qty: 30 TABLET | Refills: 0 | Status: SHIPPED | OUTPATIENT
Start: 2021-07-02 | End: 2021-08-03

## 2021-07-02 NOTE — TELEPHONE ENCOUNTER
Clonazepam        Last Written Prescription Date:  6/2/2021  Last Fill Quantity: 30,   # refills: 0  Last Office Visit: 2/15/2021  Future Office visit:       Routing refill request to provider for review/approval because:  Drug not on the [facility], P or [facility] refill protocol or controlled substance

## 2021-07-10 ENCOUNTER — HOSPITAL ENCOUNTER (EMERGENCY)
Facility: CLINIC | Age: 66
Discharge: HOME | End: 2021-07-10
Attending: FAMILY MEDICINE | Admitting: FAMILY MEDICINE
Payer: COMMERCIAL

## 2021-07-10 VITALS
WEIGHT: 119.9 LBS | TEMPERATURE: 98.3 F | HEART RATE: 64 BPM | DIASTOLIC BLOOD PRESSURE: 88 MMHG | BODY MASS INDEX: 20.91 KG/M2 | SYSTOLIC BLOOD PRESSURE: 110 MMHG | RESPIRATION RATE: 18 BRPM | OXYGEN SATURATION: 100 %
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
## 2021-07-10 DIAGNOSIS — S61.452A CAT BITE OF LEFT HAND, INITIAL ENCOUNTER: ICD-10-CM

## 2021-07-10 PROCEDURE — 99284 EMERGENCY DEPT VISIT MOD MDM: CPT | Performed by: FAMILY MEDICINE

## 2021-07-10 PROCEDURE — 96365 THER/PROPH/DIAG IV INF INIT: CPT | Performed by: FAMILY MEDICINE

## 2021-07-10 PROCEDURE — 96375 TX/PRO/DX INJ NEW DRUG ADDON: CPT | Performed by: FAMILY MEDICINE

## 2021-07-10 PROCEDURE — 99284 EMERGENCY DEPT VISIT MOD MDM: CPT | Mod: 25 | Performed by: FAMILY MEDICINE

## 2021-07-10 PROCEDURE — 250N000011 HC RX IP 250 OP 636: Performed by: FAMILY MEDICINE

## 2021-07-10 RX ORDER — KETOROLAC TROMETHAMINE 30 MG/ML
30 INJECTION, SOLUTION INTRAMUSCULAR; INTRAVENOUS ONCE
Status: COMPLETED | OUTPATIENT
Start: 2021-07-10 | End: 2021-07-10

## 2021-07-10 RX ORDER — AMPICILLIN AND SULBACTAM 2; 1 G/1; G/1
3 INJECTION, POWDER, FOR SOLUTION INTRAMUSCULAR; INTRAVENOUS ONCE
Status: COMPLETED | OUTPATIENT
Start: 2021-07-10 | End: 2021-07-10

## 2021-07-10 RX ADMIN — KETOROLAC TROMETHAMINE 30 MG: 30 INJECTION, SOLUTION INTRAMUSCULAR; INTRAVENOUS at 17:18

## 2021-07-10 RX ADMIN — AMPICILLIN SODIUM AND SULBACTAM SODIUM 3 G: 2; 1 INJECTION, POWDER, FOR SOLUTION INTRAMUSCULAR; INTRAVENOUS at 16:52

## 2021-07-10 NOTE — ED TRIAGE NOTES
"Pt stated \"about half hour ago I was grooming a cat and the cat turned and bit my left wrist, it was squirting everywhere. I was able to wrap it really tight and stop the bleeding.\"   "

## 2021-07-10 NOTE — ED PROVIDER NOTES
"                                                            ED Provider Note     Pavithra Raines  [id number]  July 10, 2021      CC:     Chief Complaint   Patient presents with     Cat Bite          History is obtained from patient.    HPI: Pavithra Raines is a 65 year old female presenting with cat bite to the left hand.  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  However she was just combing the cat at the very end and remove the muscle when she was bitten in the left hand.  She had some significant swelling and bleeding from the left hand.  She is concerned because this is the worst bite she has ever had.  She has had previous \"sepsis\" from the cat bite requiring IV antibiotics.  She states that this has happened within a few hours of her bite.  She had her hand wrapped tightly by her boyfriend.  The patient's last tetanus shot was in 2020.      PMH/Problem List:   Past Medical History:   Diagnosis Date     Abnormal Papanicolaou smear of cervix and cervical HPV 4/07     Genital herpes      History of colposcopy with cervical biopsy 06/2007     Hypersomnia with sleep apnea, unspecified      Other and unspecified ovarian cyst 2002 or so       PSH:   Past Surgical History:   Procedure Laterality Date     CHOLECYSTECTOMY, OPEN  age 20s     COLONOSCOPY  3/21/2011    COMBINED COLONOSCOPY, REMOVE TUMOR/POLYP/LESION BY SNARE performed by JUAN FRANCISCO HERNANDEZ at  GI     COLONOSCOPY N/A 10/9/2017    polyps, repeat 3 years     COLPOSCOPY CERVIX, LOOP ELECTRODE BIOPSY, COMBINED  6/2007    CAN 2/3-patient requires yearly pap smears     ESOPHAGOSCOPY, GASTROSCOPY, DUODENOSCOPY (EGD), COMBINED N/A 2/9/2018    Procedure: COMBINED ESOPHAGOSCOPY, GASTROSCOPY, DUODENOSCOPY (EGD);  EGD;  Surgeon: Oneal [place] MD;  Location:  GI     GASTRIC BYPASS  3/25/2008    At [facility] DILATION/CURETTAGE DIAG/THER NON OB  " "2002     HC ENLARGE BREAST WITH IMPLANT       HC LAPAROSCOPIC MYOMECTOMY, 1 - 4 INTRAMURAL MYOMAS =<250 GM  2002     HC REMOVAL OF BREAST IMPLANT       SALPINGO OOPHORECTOMY,R/L/MATTHEW  2002    Bilateral salpingectomy and unilateral oophorectomy       MEDS: cyanocobalamin injection 1,000 mcg  cyanocobalamin injection 1,000 mcg    busPIRone (BUSPAR) 10 MG tablet, TAKE ONE TABLET BY MOUTH THREE TIMES A DAY  CALCIUM ANTACID EXTRA STRENGTH 750 MG CHEW, Take 2 tablet by mouth every four hours while awake as needed and at bed  calcium carbonate (OS-SHON 500 MG [place]. [place]) 1250 MG tablet, Take 1 tablet by mouth 2 times daily  clonazePAM (KLONOPIN) 0.5 MG tablet, TAKE ONE TABLET BY MOUTH NIGHTLY AS NEEDED FOR ANXIETY  cyanocobalamin (CYANOCOBALAMIN) 1000 MCG/ML injection, Inject 1 mL (1,000 mcg) into the muscle every 30 days (Patient not taking: Reported on 2/15/2021)  cyanocobalamin (CYANOCOBALAMIN) 1000 MCG/ML injection, Inject 1 mL (1,000 mcg) into the muscle every 30 days Given 10/04/19 [id number] Exp:12/20 See MAR  escitalopram (LEXAPRO) 20 MG tablet, TAKE ONE TABLET BY MOUTH ONCE DAILY  ferrous gluconate (FERGON) 324 (38 Fe) MG tablet, Take 1 tablet (324 mg) by mouth daily (with breakfast)  folic acid (FOLVITE) 1 MG tablet, Take 1 tablet (1,000 mcg) by mouth daily  Insulin Syringe-Needle U-100 (B-D INSULIN SYRINGE) 25G X 1\" 1 ML MISC, Use once every 30 days for B12 injection  levocetirizine (XYZAL) 5 MG tablet, Take 1 tablet (5 mg) by mouth every morning  LORazepam (ATIVAN) 1 MG tablet, TAKE ONE TABLET BY MOUTH EVERY 1 TO 2 HOURS AS NEEDED FOR ONSET OF WITHDRAWAL SYMPTOMS OR SEIZURES  multivitamin, therapeutic with minerals (MULTI-VITAMIN) TABS tablet, Take 1 tablet by mouth daily  naltrexone (DEPADE/REVIA) 50 MG tablet, Take 1 tablet (50 mg) by mouth daily  omeprazole (PRILOSEC) 40 MG DR capsule, TAKE ONE CAPSULE BY MOUTH TWICE A DAY  raloxifene (EVISTA) 60 MG tablet, Take 1 tablet (60 mg) by mouth daily  traZODone " (DESYREL) 50 MG tablet, TAKE TWO TABLETS BY MOUTH EVERY NIGHT AT BEDTIME  triamcinolone (KENALOG) 0.1 % external cream, APPLY SPARINGLY TO ITCHY RASH AREAS UP TO THREE TIMES A DAY AS NEEDED  vitamin D3 (CHOLECALCIFEROL) 50 mcg (2000 units) tablet, TAKE ONE TABLET BY MOUTH ONCE DAILY        Allergies: Codeine and Vicodin [hydrocodone-acetaminophen]    Triage and nursing notes were reviewed.    ROS: All other systems were reviewed and are negative    Physical Exam:  Vitals:    07/10/21 1610   BP: 110/88   Pulse: 64   Resp: 18   Temp: 98.3  F (36.8  C)   TempSrc: Oral   SpO2: 100%   Weight: 54.4 kg (119 lb 14.4 oz)     GENERAL APPEARANCE: Alert, no distress; patient is anxious  HEAD: atraumatic  EYES: PER  HENT: Normal external exam  RESP: Normal respiratory effort  EXT: Left hand with swelling over the dorsal aspect.  There is several minor puncture wounds without any active bleeding at this time.  There was a very tight pressure dressing that was placed and this was removed.  SKIN: No active bleeding.  Significant bruising and swelling of the dorsal aspect of the hand  NEURO: mentation and speech normal; no focal deficits    Labs/Imaging Results:  No results found for this or any previous visit (from the past 24 hour(s)).          Impression:  Final diagnoses:   Cat bite of left hand         ED Course & Medical Decision Making (Plan):  Pavithra Raines is a 65 year old female with acute cat bite to the left hand resulting in active bleeding.  A pressure dressing was applied tightly.  Patient was seen shortly after arrival.  Vital signs were stable.  Patient had a dusky appearance to the fingers.  Pressure dressing was relieved.  She had good CMS following this.  Bleeding has been controlled and there is no active bleeding.  The wound was dressed with gauze and Kerlix.  Patient was given a dose of IV Unasyn and will begin Augmentin 8 and 75 mg twice a day for 10 days.  Follow-up in 2-3 days for recheck.   Return to the ED if symptoms worsen.    Written after-visit summary and instructions were given at the time of discharge.          Follow Up Plan:  Soha Boggs MD  [address] [place] [address]  [phone]    In 3 days  if not improving    [facility]  [address] [place] [id number] [phone]    If symptoms worsen        Discharge Instructions:  Please leave the dressing in place for the next 24 hours.  Begin routine wound care and dressing changes tomorrow.  Begin Augmentin 875 mg twice a day for 10 days.  Return to the emergency department at any time if your symptoms worsen.        Disclaimer: This note consists of words and symbols derived from keyboarding and dictation using voice recognition software.  As a result, there may be errors that have gone undetected.  Please consider this when interpreting information found in this note.       Ashley Maria MD  07/10/21 3046

## 2021-07-10 NOTE — DISCHARGE INSTRUCTIONS
Please leave the dressing in place for the next 24 hours.  Begin routine wound care and dressing changes tomorrow.  Begin Augmentin 875 mg twice a day for 10 days.  Return to the emergency department at any time if your symptoms worsen.

## 2021-08-02 DIAGNOSIS — K28.9 GASTROJEJUNAL ULCER: ICD-10-CM

## 2021-08-02 DIAGNOSIS — F41.9 ANXIETY: ICD-10-CM

## 2021-08-02 DIAGNOSIS — G47.00 INSOMNIA, UNSPECIFIED TYPE: ICD-10-CM

## 2021-08-02 DIAGNOSIS — D51.9 ANEMIA DUE TO VITAMIN B12 DEFICIENCY, UNSPECIFIED B12 DEFICIENCY TYPE: ICD-10-CM

## 2021-08-02 DIAGNOSIS — G25.81 RESTLESS LEGS SYNDROME (RLS): ICD-10-CM

## 2021-08-02 DIAGNOSIS — E55.9 VITAMIN D DEFICIENCY: ICD-10-CM

## 2021-08-02 RX ORDER — SYRINGE WITH NEEDLE, 1 ML 25GX5/8"
SYRINGE, EMPTY DISPOSABLE MISCELLANEOUS
Qty: 3 EACH | Refills: 3 | Status: CANCELLED | OUTPATIENT
Start: 2021-08-02

## 2021-08-03 RX ORDER — CLONAZEPAM 0.5 MG/1
TABLET ORAL
Qty: 30 TABLET | Refills: 0 | Status: SHIPPED | OUTPATIENT
Start: 2021-08-03 | End: 2021-09-10

## 2021-08-03 NOTE — TELEPHONE ENCOUNTER
Klonopin       Last Written Prescription Date:  7/2/2021  Last Fill Quantity: 30,   # refills: 0  Last Office Visit: 5/19/2021  Future Office visit:       Routing refill request to provider for review/approval because:  Drug not on the [facility], P or [facility] refill protocol or controlled substance

## 2021-08-04 RX ORDER — OMEPRAZOLE 40 MG/1
CAPSULE, DELAYED RELEASE ORAL
Qty: 180 CAPSULE | Refills: 1 | Status: SHIPPED | OUTPATIENT
Start: 2021-08-04 | End: 2022-01-08

## 2021-08-04 RX ORDER — ESCITALOPRAM OXALATE 20 MG/1
TABLET ORAL
Qty: 90 TABLET | Refills: 1 | Status: SHIPPED | OUTPATIENT
Start: 2021-08-04 | End: 2022-01-08

## 2021-08-04 RX ORDER — CHOLECALCIFEROL (VITAMIN D3) 50 MCG
TABLET ORAL
Qty: 90 TABLET | Refills: 1 | Status: SHIPPED | OUTPATIENT
Start: 2021-08-04 | End: 2022-01-08

## 2021-08-04 RX ORDER — CYANOCOBALAMIN 1000 UG/ML
INJECTION, SOLUTION INTRAMUSCULAR; SUBCUTANEOUS
Qty: 3 ML | Refills: 1 | Status: SHIPPED | OUTPATIENT
Start: 2021-08-04 | End: 2022-02-23

## 2021-08-04 NOTE — TELEPHONE ENCOUNTER
Routing refill request to provider for review/approval because:  Patient has Osteoporosis.    Last Written Prescription Date:  9/1/2020  Last Fill Quantity: 180,  # refills: 1   Last office visit: 5/19/2021 with prescribing provider:     Future Office Visit:      LATRICE CorreaN, RN

## 2021-09-01 ENCOUNTER — HOSPITAL ENCOUNTER (EMERGENCY)
Facility: CLINIC | Age: 66
Discharge: HOME | End: 2021-09-01
Attending: FAMILY MEDICINE | Admitting: FAMILY MEDICINE
Payer: COMMERCIAL

## 2021-09-01 VITALS
TEMPERATURE: 98.2 F | SYSTOLIC BLOOD PRESSURE: 151 MMHG | RESPIRATION RATE: 12 BRPM | BODY MASS INDEX: 19.81 KG/M2 | OXYGEN SATURATION: 98 % | HEART RATE: 64 BPM | DIASTOLIC BLOOD PRESSURE: 93 MMHG | HEIGHT: 64 IN | WEIGHT: 116 LBS

## 2021-09-01 DIAGNOSIS — K29.20 ACUTE ALCOHOLIC GASTRITIS WITHOUT HEMORRHAGE: ICD-10-CM

## 2021-09-01 DIAGNOSIS — F10.239 ALCOHOL DEPENDENCE WITH WITHDRAWAL WITH COMPLICATION (H): ICD-10-CM

## 2021-09-01 LAB
ALBUMIN SERPL-MCNC: 2.9 G/DL (ref 3.4–5)
ALP SERPL-CCNC: 167 U/L (ref 40–150)
ALT SERPL W P-5'-P-CCNC: 17 U/L (ref 0–50)
AMYLASE SERPL-CCNC: 60 U/L (ref 30–110)
ANION GAP SERPL CALCULATED.3IONS-SCNC: 7 MMOL/L (ref 3–14)
APTT PPP: 27 SECONDS (ref 22–38)
AST SERPL W P-5'-P-CCNC: 15 U/L (ref 0–45)
BASOPHILS # BLD AUTO: 0.1 10E3/UL (ref 0–0.2)
BASOPHILS NFR BLD AUTO: 1 %
BILIRUB DIRECT SERPL-MCNC: 0.4 MG/DL (ref 0–0.2)
BILIRUB SERPL-MCNC: 1 MG/DL (ref 0.2–1.3)
BUN SERPL-MCNC: 6 MG/DL (ref 7–30)
CALCIUM SERPL-MCNC: 8.5 MG/DL (ref 8.5–10.1)
CHLORIDE BLD-SCNC: 108 MMOL/L (ref 94–109)
CO2 SERPL-SCNC: 23 MMOL/L (ref 20–32)
CREAT SERPL-MCNC: 0.81 MG/DL (ref 0.52–1.04)
EOSINOPHIL # BLD AUTO: 0.2 10E3/UL (ref 0–0.7)
EOSINOPHIL NFR BLD AUTO: 2 %
ERYTHROCYTE [DISTWIDTH] IN BLOOD BY AUTOMATED COUNT: 13.4 % (ref 10–15)
ETHANOL SERPL-MCNC: <0.01 G/DL
GFR SERPL CREATININE-BSD FRML MDRD: 78 ML/MIN/1.73M2
GLUCOSE BLD-MCNC: 102 MG/DL (ref 70–99)
HCT VFR BLD AUTO: 37.9 % (ref 35–47)
HGB BLD-MCNC: 13 G/DL (ref 11.7–15.7)
HOLD SPECIMEN: NORMAL
IMM GRANULOCYTES # BLD: 0 10E3/UL
IMM GRANULOCYTES NFR BLD: 0 %
INR PPP: 1 (ref 0.85–1.15)
LIPASE SERPL-CCNC: 57 U/L (ref 73–393)
LYMPHOCYTES # BLD AUTO: 2.1 10E3/UL (ref 0.8–5.3)
LYMPHOCYTES NFR BLD AUTO: 21 %
MCH RBC QN AUTO: 33.4 PG (ref 26.5–33)
MCHC RBC AUTO-ENTMCNC: 34.3 G/DL (ref 31.5–36.5)
MCV RBC AUTO: 97 FL (ref 78–100)
MONOCYTES # BLD AUTO: 1 10E3/UL (ref 0–1.3)
MONOCYTES NFR BLD AUTO: 10 %
NEUTROPHILS # BLD AUTO: 6.8 10E3/UL (ref 1.6–8.3)
NEUTROPHILS NFR BLD AUTO: 66 %
NRBC # BLD AUTO: 0 10E3/UL
NRBC BLD AUTO-RTO: 0 /100
PLATELET # BLD AUTO: 285 10E3/UL (ref 150–450)
POTASSIUM BLD-SCNC: 3.7 MMOL/L (ref 3.4–5.3)
PROT SERPL-MCNC: 7.1 G/DL (ref 6.8–8.8)
RBC # BLD AUTO: 3.89 10E6/UL (ref 3.8–5.2)
SARS-COV-2 RNA RESP QL NAA+PROBE: NEGATIVE
SODIUM SERPL-SCNC: 138 MMOL/L (ref 133–144)
TROPONIN I SERPL-MCNC: <0.015 UG/L (ref 0–0.04)
WBC # BLD AUTO: 10.2 10E3/UL (ref 4–11)

## 2021-09-01 PROCEDURE — 82040 ASSAY OF SERUM ALBUMIN: CPT | Performed by: FAMILY MEDICINE

## 2021-09-01 PROCEDURE — 83690 ASSAY OF LIPASE: CPT | Performed by: FAMILY MEDICINE

## 2021-09-01 PROCEDURE — 82077 ASSAY SPEC XCP UR&BREATH IA: CPT | Performed by: FAMILY MEDICINE

## 2021-09-01 PROCEDURE — C9803 HOPD COVID-19 SPEC COLLECT: HCPCS

## 2021-09-01 PROCEDURE — 87635 SARS-COV-2 COVID-19 AMP PRB: CPT | Performed by: FAMILY MEDICINE

## 2021-09-01 PROCEDURE — 96361 HYDRATE IV INFUSION ADD-ON: CPT

## 2021-09-01 PROCEDURE — 85025 COMPLETE CBC W/AUTO DIFF WBC: CPT | Performed by: FAMILY MEDICINE

## 2021-09-01 PROCEDURE — 36415 COLL VENOUS BLD VENIPUNCTURE: CPT | Performed by: FAMILY MEDICINE

## 2021-09-01 PROCEDURE — 85610 PROTHROMBIN TIME: CPT | Performed by: FAMILY MEDICINE

## 2021-09-01 PROCEDURE — 258N000003 HC RX IP 258 OP 636: Performed by: FAMILY MEDICINE

## 2021-09-01 PROCEDURE — 80048 BASIC METABOLIC PNL TOTAL CA: CPT | Performed by: FAMILY MEDICINE

## 2021-09-01 PROCEDURE — 99284 EMERGENCY DEPT VISIT MOD MDM: CPT | Mod: 25

## 2021-09-01 PROCEDURE — 96374 THER/PROPH/DIAG INJ IV PUSH: CPT

## 2021-09-01 PROCEDURE — 99284 EMERGENCY DEPT VISIT MOD MDM: CPT | Performed by: FAMILY MEDICINE

## 2021-09-01 PROCEDURE — 84484 ASSAY OF TROPONIN QUANT: CPT | Performed by: FAMILY MEDICINE

## 2021-09-01 PROCEDURE — 250N000011 HC RX IP 250 OP 636: Performed by: FAMILY MEDICINE

## 2021-09-01 PROCEDURE — 85730 THROMBOPLASTIN TIME PARTIAL: CPT | Performed by: FAMILY MEDICINE

## 2021-09-01 PROCEDURE — 82150 ASSAY OF AMYLASE: CPT | Performed by: FAMILY MEDICINE

## 2021-09-01 RX ORDER — SODIUM CHLORIDE 9 MG/ML
INJECTION, SOLUTION INTRAVENOUS CONTINUOUS
Status: DISCONTINUED | OUTPATIENT
Start: 2021-09-01 | End: 2021-09-01 | Stop reason: HOSPADM

## 2021-09-01 RX ORDER — SUCRALFATE 1 G/1
1 TABLET ORAL 4 TIMES DAILY
Qty: 40 TABLET | Refills: 0 | Status: SHIPPED | OUTPATIENT
Start: 2021-09-01 | End: 2021-09-11

## 2021-09-01 RX ORDER — LORAZEPAM 2 MG/ML
1 INJECTION INTRAMUSCULAR ONCE
Status: COMPLETED | OUTPATIENT
Start: 2021-09-01 | End: 2021-09-01

## 2021-09-01 RX ADMIN — LORAZEPAM 1 MG: 2 INJECTION INTRAMUSCULAR; INTRAVENOUS at 08:10

## 2021-09-01 RX ADMIN — SODIUM CHLORIDE 1000 ML: 9 INJECTION, SOLUTION INTRAVENOUS at 08:08

## 2021-09-01 ASSESSMENT — MIFFLIN-ST. JEOR: SCORE: 1066.17

## 2021-09-01 NOTE — ED TRIAGE NOTES
Patient presents with concerns for possible pancreatitis. Nausea, vomiting and diarrhea. She reports being a daily drinker of one box of wine a day and returned yesterday from a month long vacation/wedding to [facility]. She has had ETOH W/D before. She is tachypnec and appears in distress. Her last drink was yesterday at the airport about 0900. Shanelle Ngo RN

## 2021-09-01 NOTE — ED PROVIDER NOTES
History     Chief Complaint   Patient presents with     Vomiting     HPI  Pavithra Raines is a 65 year old female who presents with nausea vomiting and abdominal pain which feels similar to when she had pancreatitis.  Patient just came back from [place] from a 10-day trip where she is drinking pretty heavily on her trip.  Patient has a history of alcohol abuse and has been in treatment before.  Patient states last time she had pancreatitis like 3 months ago it was related to alcohol then also.  She states most the pain is on the left side of her abdomen and radiates through to her back.  Denies any recent fevers or chills.  Patient has a chronic cough which is from smoking.  Patient states that she has had some darker stools but denies any blood in her vomit.  Nothing makes her symptoms better or worse.    Allergies:  Allergies   Allergen Reactions     Codeine Itching     Vicodin [Hydrocodone-Acetaminophen] Itching       Problem List:    Patient Active Problem List    Diagnosis Date Noted     Fatty liver 12/13/2020     Priority: Medium     PTSD (post-traumatic stress disorder) 12/13/2020     Priority: Medium     Underweight 12/13/2020     Priority: Medium     Macrocytosis without anemia 12/13/2020     Priority: Medium     Age-related osteoporosis without current pathological fracture 10/15/2020     Priority: Medium     Anemia due to vitamin B12 deficiency, unspecified B12 deficiency type 12/04/2017     Priority: Medium     Insomnia, unspecified type 12/04/2017     Priority: Medium     Other iron deficiency anemia 12/04/2017     Priority: Medium     Alcohol dependence in remission (H) 04/20/2013     Priority: Medium     Brief relapse in April 2013 after 10 years of sobriety.       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 grade 3 04/07/2011     Priority: Medium     12/15/06 ASCUS + high risk HPV  colpo in 2007 showed high grade KAITLYN and LEEP was recommended  LEEP was done in June,2007 with CAN 2/3  confirmed  10/15/07 NIL  9/30/08 NIL/neg HPV  10/21/09 NIL/neg HPV  4/5/11 NIL- repeat in one year (4/2012 12/1/17 NIL pap/neg HR HPV. Will need pap screening until 2027, regardless of age.  Plan: cotest due 3 yr.       MDD (major depressive disorder) 04/05/2011     Priority: Medium     Needs to est primary care.       CARDIOVASCULAR SCREENING; LDL GOAL LESS THAN 160 10/31/2010     Priority: Medium     Genital herpes      Priority: Medium     IMO update changed this record. Please review for accuracy       Anxiety 08/27/2008     Priority: Medium     Patient is followed by MIMI GARZA for ongoing prescription of benzodiazepines.  All refills should be approved by this provider, or covering partner.    Medication(s): Clonazepam.   Maximum quantity per month: 30  Clinic visit frequency required:      Controlled substance agreement on file: No  Benzodiazepine use reviewed by psychiatry:  No    Last [facility] website verification:  done on 4/5/19  [email or web address]         Status post gastric bypass for obesity 03/25/2008     Priority: Medium     Performed at [email or web address].       Restless legs syndrome (RLS) 05/15/2007     Priority: Medium     Hypersomnia with sleep apnea 05/15/2007     Priority: Medium     Problem list name updated by automated process. Provider to review       Esophageal reflux 04/18/2007     Priority: Medium        Past Medical History:    Past Medical History:   Diagnosis Date     Abnormal Papanicolaou smear of cervix and cervical HPV 4/07     Genital herpes      History of colposcopy with cervical biopsy 06/2007     Hypersomnia with sleep apnea, unspecified      Other and unspecified ovarian cyst 2002 or so       Past Surgical History:    Past Surgical History:   Procedure Laterality Date     CHOLECYSTECTOMY, OPEN  age 20s     COLONOSCOPY  3/21/2011    COMBINED COLONOSCOPY, REMOVE TUMOR/POLYP/LESION BY SNARE performed by JUAN FRANCISCO HERNANDEZ at  GI     COLONOSCOPY  N/A 10/9/2017    polyps, repeat 3 years     COLPOSCOPY CERVIX, LOOP ELECTRODE BIOPSY, COMBINED  2007    CAN 2/3-patient requires yearly pap smears     ESOPHAGOSCOPY, GASTROSCOPY, DUODENOSCOPY (EGD), COMBINED N/A 2018    Procedure: COMBINED ESOPHAGOSCOPY, GASTROSCOPY, DUODENOSCOPY (EGD);  EGD;  Surgeon: Oneal Sanchez MD;  Location:  GI     GASTRIC BYPASS  3/25/2008    At [facility]     HC DILATION/CURETTAGE DIAG/THER NON OB       HC ENLARGE BREAST WITH IMPLANT       HC LAPAROSCOPIC MYOMECTOMY, 1 - 4 INTRAMURAL MYOMAS =<250 GM       HC REMOVAL OF BREAST IMPLANT       SALPINGO OOPHORECTOMY,R/L/MATTHEW      Bilateral salpingectomy and unilateral oophorectomy       Family History:    Family History   Problem Relation Age of Onset     Chronic Obstructive Pulmonary Disease Mother      Unknown/Adopted Father         doesn't know birth father     Lung Cancer Brother      Family History Negative Other        Social History:  Marital Status:   [2]  Social History     Tobacco Use     Smoking status: Former Smoker     Packs/day: 1.00     Years: 10.00     Pack years: 10.00     Quit date: 2021     Years since quittin.5     Smokeless tobacco: Never Used     Tobacco comment: 2 ppd at this time (chain smoking) 10/2012   Substance Use Topics     Alcohol use: Yes     Comment: 2-3 boxes of wine per week     Drug use: Yes     Types: Marijuana     Comment: smoked tonight        Medications:    busPIRone (BUSPAR) 10 MG tablet  CALCIUM ANTACID EXTRA STRENGTH 750 MG CHEW  calcium carbonate (OS-SHON 500 MG [place]. [place]) 1250 MG tablet  clonazePAM (KLONOPIN) 0.5 MG tablet  cyanocobalamin (CYANOCOBALAMIN) 1000 MCG/ML injection  escitalopram (LEXAPRO) 20 MG tablet  ferrous gluconate (FERGON) 324 (38 Fe) MG tablet  folic acid (FOLVITE) 1 MG tablet  levocetirizine (XYZAL) 5 MG tablet  multivitamin, therapeutic with minerals (MULTI-VITAMIN) TABS tablet  omeprazole (PRILOSEC) 40 MG DR capsule  raloxifene (EVISTA)  "60 MG tablet  traZODone (DESYREL) 50 MG tablet  VITAMIN D3 50 MCG (2000 UT) tablet  cyanocobalamin (CYANOCOBALAMIN) 1000 MCG/ML injection  Insulin Syringe-Needle U-100 (B-D INSULIN SYRINGE) 25G X 1\" 1 ML MISC  syringe/needle, disp, (LUER LOCK SAFETY SYRINGES) 25G X 1\" 3 ML MISC  triamcinolone (KENALOG) 0.1 % external cream          Review of Systems   All other systems reviewed and are negative.      Physical Exam   BP: (!) 142/97  Pulse: 71  Temp: 98.7  F (37.1  C)  Resp: 24  Height: 162.6 cm (5' 4\")  Weight: 52.6 kg (116 lb)  SpO2: 99 %      Physical Exam  Vitals and nursing note reviewed.   Constitutional:       General: She is not in acute distress.     Appearance: She is well-developed. She is not diaphoretic.   HENT:      Head: Normocephalic and atraumatic.      Nose: Nose normal.      Mouth/Throat:      Pharynx: No oropharyngeal exudate.   Eyes:      Conjunctiva/sclera: Conjunctivae normal.   Cardiovascular:      Rate and Rhythm: Normal rate and regular rhythm.      Heart sounds: Normal heart sounds. No murmur heard.   No friction rub.   Pulmonary:      Effort: Pulmonary effort is normal. No respiratory distress.      Breath sounds: Normal breath sounds. No stridor. No wheezing or rales.   Abdominal:      General: Bowel sounds are normal. There is no distension.      Palpations: Abdomen is soft. There is no mass.      Tenderness: There is abdominal tenderness (luq). There is no guarding.   Musculoskeletal:         General: No tenderness. Normal range of motion.      Cervical back: Normal range of motion and neck supple.   Skin:     General: Skin is warm and dry.      Capillary Refill: Capillary refill takes less than 2 seconds.      Findings: No erythema.   Neurological:      Mental Status: She is alert and oriented to person, place, and time.   Psychiatric:         Judgment: Judgment normal.         ED Course        Procedures        Results for orders placed or performed during the hospital encounter of " 09/01/21 (from the past 24 hour(s))   Melvin Draw    Narrative    The following orders were created for panel order Melvin Draw.  Procedure                               Abnormality         Status                     ---------                               -----------         ------                     Extra Blue Top Tube[[id number]]                              Final result               Extra Red Top Tube[[id number]]                               Final result               Extra Green Top (Lithium...[[id number]]                      Final result               Extra Green Top (Lithium...[[id number]]                      Final result                 Please view results for these tests on the individual orders.   Extra Blue Top Tube   Result Value Ref Range    Hold Specimen JIC    Extra Red Top Tube   Result Value Ref Range    Hold Specimen JIC    Extra Green Top (Lithium Heparin) ON ICE   Result Value Ref Range    Hold Specimen JIC    Extra Green Top (Lithium Heparin) ON ICE   Result Value Ref Range    Hold Specimen JIC    Melvin Draw    Narrative    The following orders were created for panel order Melvin Draw.  Procedure                               Abnormality         Status                     ---------                               -----------         ------                     Extra Green Top (Lithium...[[id number]]                      Final result               Extra Purple Top Tube[[id number]]                            Final result                 Please view results for these tests on the individual orders.   Extra Green Top (Lithium Heparin) Tube   Result Value Ref Range    Hold Specimen JIC    Extra Purple Top Tube   Result Value Ref Range    Hold Specimen JIC    CBC with platelets differential    Narrative    The following orders were created for panel order CBC with platelets differential.  Procedure                               Abnormality         Status                     ---------                                -----------         ------                     CBC with platelets and d...[[id number]]  Abnormal            Final result                 Please view results for these tests on the individual orders.   INR   Result Value Ref Range    INR 1.00 0.85 - 1.15   Partial thromboplastin time   Result Value Ref Range    aPTT 27 22 - 38 Seconds   Basic metabolic panel   Result Value Ref Range    Sodium 138 133 - 144 mmol/L    Potassium 3.7 3.4 - 5.3 mmol/L    Chloride 108 94 - 109 mmol/L    Carbon Dioxide (CO2) 23 20 - 32 mmol/L    Anion Gap 7 3 - 14 mmol/L    Urea Nitrogen 6 (L) 7 - 30 mg/dL    Creatinine 0.81 0.52 - 1.04 mg/dL    Calcium 8.5 8.5 - 10.1 mg/dL    Glucose 102 (H) 70 - 99 mg/dL    GFR Estimate 78 >60 mL/min/1.73m2   Lipase   Result Value Ref Range    Lipase 57 (L) 73 - 393 U/L   Hepatic panel   Result Value Ref Range    Bilirubin Total 1.0 0.2 - 1.3 mg/dL    Bilirubin Direct 0.4 (H) 0.0 - 0.2 mg/dL    Protein Total 7.1 6.8 - 8.8 g/dL    Albumin 2.9 (L) 3.4 - 5.0 g/dL    Alkaline Phosphatase 167 (H) 40 - 150 U/L    AST 15 0 - 45 U/L    ALT 17 0 - 50 U/L   Amylase   Result Value Ref Range    Amylase 60 30 - 110 U/L   Troponin I   Result Value Ref Range    Troponin I <0.015 0.000 - 0.045 ug/L   Ethyl Alcohol Level   Result Value Ref Range    Alcohol ethyl <0.01 <=0.01 g/dL   CBC with platelets and differential   Result Value Ref Range    WBC Count 10.2 4.0 - 11.0 10e3/uL    RBC Count 3.89 3.80 - 5.20 10e6/uL    Hemoglobin 13.0 11.7 - 15.7 g/dL    Hematocrit 37.9 35.0 - 47.0 %    MCV 97 78 - 100 fL    MCH 33.4 (H) 26.5 - 33.0 pg    MCHC 34.3 31.5 - 36.5 g/dL    RDW 13.4 10.0 - 15.0 %    Platelet Count 285 150 - 450 10e3/uL    % Neutrophils 66 %    % Lymphocytes 21 %    % Monocytes 10 %    % Eosinophils 2 %    % Basophils 1 %    % Immature Granulocytes 0 %    NRBCs per 100 WBC 0 <1 /100    Absolute Neutrophils 6.8 1.6 - 8.3 10e3/uL    Absolute Lymphocytes 2.1 0.8 - 5.3 10e3/uL    Absolute Monocytes 1.0  0.0 - 1.3 10e3/uL    Absolute Eosinophils 0.2 0.0 - 0.7 10e3/uL    Absolute Basophils 0.1 0.0 - 0.2 10e3/uL    Absolute Immature Granulocytes 0.0 <=0.0 10e3/uL    Absolute NRBCs 0.0 10e3/uL   Symptomatic COVID-19 Virus (Coronavirus) by PCR Nasopharyngeal    Specimen: Nasopharyngeal; Swab   Result Value Ref Range    SARS CoV2 PCR Negative Negative    Narrative    Testing was performed using the paty  SARS-CoV-2 & Influenza A/B Assay on the paty  Maryan  System.  This test should be ordered for the detection of SARS-COV-2 in individuals who meet SARS-CoV-2 clinical and/or epidemiological criteria. Test performance is unknown in asymptomatic patients.  This test is for in vitro diagnostic use under the FDA EUA for laboratories certified under CLIA to perform moderate and/or high complexity testing. This test has not been FDA cleared or approved.  A negative test does not rule out the presence of PCR inhibitors in the specimen or target RNA in concentration below the limit of detection for the assay. The possibility of a false negative should be considered if the patient's recent exposure or clinical presentation suggests COVID-19.  [facility] are certified under the Clinical Laboratory Improvement Amendments of 1988 (CLIA-88) as qualified to perform moderate and/or high complexity laboratory testing.       Medications - No data to display     Labs all came back and were normal including a normal lipase and amylase. There is no signs of acute pancreatitis. Covid test also came back negative. At this point I think that her belly pain could be more from her recent drinking and may be some gastritis. I am going to add Carafate to her omeprazole that she is taking to get better acid blocking coverage. Recommend that she follow-up with her doctor for further treatment. Patient requested some benzodiazepines to help her get over her alcohol withdrawal but I told her this is not something that we do from  the emergency department. She can contact her psychiatrist or primary care doctor to see if that would be an option that they feel comfortable doing. Otherwise we will give her some treatment options for her alcohol use and she can talk to her doctor about this also.  Patient has no signs of severe withdrawal, no indications for hospitalization to treat this.    Assessments & Plan (with Medical Decision Making)  Gastritis, alcohol withdrawal     I have reviewed the nursing notes.    I have reviewed the findings, diagnosis, plan and need for follow up with the patient.              9/1/2021   [facility] EMERGENCY DEPT     Aayush Pinto MD  09/01/21 3055

## 2021-09-08 DIAGNOSIS — F41.9 ANXIETY: ICD-10-CM

## 2021-09-08 DIAGNOSIS — G25.81 RESTLESS LEGS SYNDROME (RLS): ICD-10-CM

## 2021-09-08 DIAGNOSIS — G47.00 INSOMNIA, UNSPECIFIED TYPE: ICD-10-CM

## 2021-09-09 NOTE — TELEPHONE ENCOUNTER
Pending Prescriptions:                       Disp   Refills    clonazePAM (KLONOPIN) 0.5 MG tablet [Pharm*30 tab*0        Sig: TAKE ONE TABLE BY MOUTH NIGHTLY AS NEEDED FOR ANXIETY    Routing refill request to provider for review/approval because:  Drug not on the [facility] refill protocol

## 2021-09-10 RX ORDER — CLONAZEPAM 0.5 MG/1
TABLET ORAL
Qty: 30 TABLET | Refills: 0 | Status: SHIPPED | OUTPATIENT
Start: 2021-09-10 | End: 2021-10-08

## 2021-09-25 ENCOUNTER — HEALTH MAINTENANCE LETTER (OUTPATIENT)
Age: 66
End: 2021-09-25

## 2021-10-08 ENCOUNTER — TELEPHONE (OUTPATIENT)
Dept: FAMILY MEDICINE | Facility: CLINIC | Age: 66
End: 2021-10-08

## 2021-10-08 DIAGNOSIS — G25.81 RESTLESS LEGS SYNDROME (RLS): ICD-10-CM

## 2021-10-08 DIAGNOSIS — G47.00 INSOMNIA, UNSPECIFIED TYPE: ICD-10-CM

## 2021-10-08 DIAGNOSIS — F41.9 ANXIETY: ICD-10-CM

## 2021-10-08 RX ORDER — CLONAZEPAM 0.5 MG/1
TABLET ORAL
Qty: 30 TABLET | Refills: 0 | Status: SHIPPED | OUTPATIENT
Start: 2021-10-08 | End: 2021-11-11

## 2021-10-08 NOTE — TELEPHONE ENCOUNTER
If she has a mass, she needs to be seen for evaluation to have diagnostic testing ordered.  Will have staff notify patient and set up appointment.    Tristan Connolly MD

## 2021-10-08 NOTE — TELEPHONE ENCOUNTER
Patient is calling and stated she has a scheduled office visit with Dr. Flakita MD on 10/22/2021.  She stated she has discovered 3 lumps in her right breast that are tender to the touch and seem to be growing even in the past few days.  She stated she felt a lump approximately 2-3 days ago and now there are 3.  Patient stated her grandmother  of breast cancer and she is very worried on the seemingly rapid growth.  Patient verbalized she had a mammogram this past spring and there were no concerns at this time.  Patient is requesting a order for radiology as soon as possible prior to her scheduled visit.  Patient is also requesting a refill on her anxiety medication.    Will forward to provider in PCP absence for review.  Patient is requesting to have a team staff call her with medication request and radiology request information today.    Amanda Pickering RN

## 2021-10-08 NOTE — TELEPHONE ENCOUNTER
Reason for Call:  Medication or medication refill:    Do you use a [facility]?  Name of the pharmacy and phone number for the current request:  [facility] - [place] - [phone]    Name of the medication requested: clonazePAM (KLONOPIN) 0.5 MG tablet    Other request: Patient is concerned about finding the lumps in her breast. Would like to get in sooner    Can we leave a detailed message on this number? yes    Phone number patient can be reached at: Home number on file [phone] (home)    Best Time: anytime    Call taken on 10/8/2021 at 10:28 AM by Nika Patrick

## 2021-10-12 ENCOUNTER — NURSE TRIAGE (OUTPATIENT)
Dept: FAMILY MEDICINE | Facility: CLINIC | Age: 66
End: 2021-10-12

## 2021-10-12 NOTE — CONFIDENTIAL NOTE
"S-(situation): Pt can feel 3 breast lumps in her R outer breast area. \" 2 are the size of a pea and one is large like a 50 cent piece. \" RN asked if she has any other SX?   \" I do have some white discharge coming out of my nipple. \"    B-(background):  Last mammogram  10/18/19- 3D Digital = negative.    A-(assessment): breast lumps of unknown etiology.     R-(recommendations):  Needs to be seen . Given appt with Dr. Geronimo Hussein in [place],  tomorrow at 10:40 AM..............DIAMOND Harvey                  "

## 2021-10-13 ENCOUNTER — HOSPITAL ENCOUNTER (OUTPATIENT)
Dept: MAMMOGRAPHY | Facility: CLINIC | Age: 66
Discharge: HOME | End: 2021-10-13
Attending: STUDENT IN AN ORGANIZED HEALTH CARE EDUCATION/TRAINING PROGRAM | Admitting: STUDENT IN AN ORGANIZED HEALTH CARE EDUCATION/TRAINING PROGRAM
Payer: COMMERCIAL

## 2021-10-13 ENCOUNTER — OFFICE VISIT (OUTPATIENT)
Dept: FAMILY MEDICINE | Facility: CLINIC | Age: 66
End: 2021-10-13
Payer: COMMERCIAL

## 2021-10-13 VITALS
OXYGEN SATURATION: 100 % | SYSTOLIC BLOOD PRESSURE: 136 MMHG | RESPIRATION RATE: 20 BRPM | TEMPERATURE: 97.1 F | BODY MASS INDEX: 20.32 KG/M2 | WEIGHT: 118.38 LBS | HEART RATE: 120 BPM | DIASTOLIC BLOOD PRESSURE: 70 MMHG

## 2021-10-13 DIAGNOSIS — N63.10 BREAST MASS, RIGHT: Primary | ICD-10-CM

## 2021-10-13 DIAGNOSIS — N63.10 BREAST MASS, RIGHT: ICD-10-CM

## 2021-10-13 PROCEDURE — 99213 OFFICE O/P EST LOW 20 MIN: CPT | Performed by: STUDENT IN AN ORGANIZED HEALTH CARE EDUCATION/TRAINING PROGRAM

## 2021-10-13 PROCEDURE — 77063 BREAST TOMOSYNTHESIS BI: CPT

## 2021-10-13 ASSESSMENT — PAIN SCALES - GENERAL: PAINLEVEL: NO PAIN (0)

## 2021-10-13 ASSESSMENT — PATIENT HEALTH QUESTIONNAIRE - PHQ9: SUM OF ALL RESPONSES TO PHQ QUESTIONS 1-9: 17

## 2021-10-13 NOTE — PROGRESS NOTES
Assessment & Plan     Breast mass, right  Possible scar tissue from previous implants.  We will have her obtain mammogram now and go from there.  Follow-up as needed.   - *MA Screening Digital Bilateral      Return for Follow up pending imaging.    Geronimo Hussein MD  [facility] is a 66 year old who presents for the following health issues    HPI     Breast Concern  Onset/Duration:  Last Thursday  Description:   Location: upper outer quadrant  Pain or tenderness: YES, when touched  Redness:  no   Intensity: moderate  Progression of Symptoms: worsening, grew in size  Accompanying Signs & Symptoms:  Any lumps in axillary region:  no   Movable: YES  Nipple discharge: whitish thick and then will be clear  Changes in the skin or nipple: None  On Hormone therapy:  no   Does it change with menstrual cycle: not applicable  Previous history of similar problem: None  First degree relative with breast cancer: a positive family history for breast cancer in her maternal grandmother,  of breast cancer in her 80's  Precipitating factors:           Worsened by: None  Alleviating factors:            Improved by: None  Therapies tried and outcome: None  No LMP recorded (lmp unknown). Patient is postmenopausal.    History of breast implants when she was younger and had removed due to irritation.  Does not note any redness but does note some possible discharge when she works on squeezing her nipple.  Says she has been able to do that for years.  Most recent lump was realized when she recently got .  She does note wanting to get breast implants in the spring but was not able to do so due to a sprained ankle.  Says she had further imaging at that time but nothing able to be seen in care everywhere or in her records.  Did have a mammogram 2 years ago that was normal.    Review of Systems   Constitutional, HEENT, cardiovascular, pulmonary, gi and gu systems are negative,  except as otherwise noted.      Objective    /70   Pulse 120   Temp 97.1  F (36.2  C) (Temporal)   Resp 20   Wt 53.7 kg (118 lb 6 oz)   LMP  (LMP Unknown)   SpO2 100%   Breastfeeding Yes   BMI 20.32 kg/m    Body mass index is 20.32 kg/m .  Physical Exam   GENERAL: healthy, alert and no distress  EYES: Eyes grossly normal to inspection, PERRL and conjunctivae and sclerae normal  HENT: ear canals and TM's normal, nose and mouth without ulcers or lesions  NECK: no adenopathy, no asymmetry, masses, or scars and thyroid normal to palpation  RESP: lungs clear to auscultation - no rales, rhonchi or wheezes  BREAST: right upper quadrant palpable mass  Mildtenderness at mass, no nipple discharge and no palpable axillary masses or adenopathy  CV: regular rate and rhythm, normal S1 S2, no S3 or S4, no murmur, click or rub, no peripheral edema and peripheral pulses strong  MS: no gross musculoskeletal defects noted, no edema  SKIN: no suspicious lesions or rashes  NEURO: Normal strength and tone, mentation intact and speech normal  PSYCH: mentation appears normal, affect normal/bright

## 2021-10-19 ENCOUNTER — HOSPITAL ENCOUNTER (OUTPATIENT)
Dept: ULTRASOUND IMAGING | Facility: CLINIC | Age: 66
End: 2021-10-19
Attending: STUDENT IN AN ORGANIZED HEALTH CARE EDUCATION/TRAINING PROGRAM
Payer: COMMERCIAL

## 2021-10-19 ENCOUNTER — HOSPITAL ENCOUNTER (OUTPATIENT)
Dept: MAMMOGRAPHY | Facility: CLINIC | Age: 66
End: 2021-10-19
Attending: STUDENT IN AN ORGANIZED HEALTH CARE EDUCATION/TRAINING PROGRAM
Payer: COMMERCIAL

## 2021-10-19 DIAGNOSIS — R92.8 ABNORMAL MAMMOGRAM: ICD-10-CM

## 2021-10-19 PROCEDURE — 77061 BREAST TOMOSYNTHESIS UNI: CPT | Mod: LT

## 2021-10-19 PROCEDURE — 76642 ULTRASOUND BREAST LIMITED: CPT | Mod: 50

## 2021-10-22 ENCOUNTER — OFFICE VISIT (OUTPATIENT)
Dept: FAMILY MEDICINE | Facility: CLINIC | Age: 66
End: 2021-10-22
Payer: COMMERCIAL

## 2021-10-22 ENCOUNTER — HOSPITAL ENCOUNTER (EMERGENCY)
Facility: CLINIC | Age: 66
Discharge: NURSING, REHAB OR LONG-TERM CARE | End: 2021-10-22
Attending: EMERGENCY MEDICINE | Admitting: EMERGENCY MEDICINE
Payer: COMMERCIAL

## 2021-10-22 VITALS
DIASTOLIC BLOOD PRESSURE: 65 MMHG | HEART RATE: 102 BPM | TEMPERATURE: 96.8 F | RESPIRATION RATE: 20 BRPM | OXYGEN SATURATION: 97 % | SYSTOLIC BLOOD PRESSURE: 100 MMHG

## 2021-10-22 VITALS
DIASTOLIC BLOOD PRESSURE: 66 MMHG | WEIGHT: 122 LBS | BODY MASS INDEX: 20.94 KG/M2 | HEART RATE: 76 BPM | OXYGEN SATURATION: 98 % | RESPIRATION RATE: 16 BRPM | SYSTOLIC BLOOD PRESSURE: 96 MMHG | TEMPERATURE: 96.8 F

## 2021-10-22 DIAGNOSIS — F10.220 ALCOHOL DEPENDENCE WITH UNCOMPLICATED INTOXICATION (H): Primary | ICD-10-CM

## 2021-10-22 DIAGNOSIS — F10.20 ALCOHOLISM (H): ICD-10-CM

## 2021-10-22 LAB
ALBUMIN SERPL-MCNC: 2.8 G/DL (ref 3.4–5)
ALP SERPL-CCNC: 151 U/L (ref 40–150)
ALT SERPL W P-5'-P-CCNC: 28 U/L (ref 0–50)
ANION GAP SERPL CALCULATED.3IONS-SCNC: 7 MMOL/L (ref 3–14)
AST SERPL W P-5'-P-CCNC: 33 U/L (ref 0–45)
BILIRUB SERPL-MCNC: 0.3 MG/DL (ref 0.2–1.3)
BUN SERPL-MCNC: 4 MG/DL (ref 7–30)
CALCIUM SERPL-MCNC: 7.4 MG/DL (ref 8.5–10.1)
CHLORIDE BLD-SCNC: 107 MMOL/L (ref 94–109)
CO2 SERPL-SCNC: 26 MMOL/L (ref 20–32)
CREAT SERPL-MCNC: 0.72 MG/DL (ref 0.52–1.04)
ERYTHROCYTE [DISTWIDTH] IN BLOOD BY AUTOMATED COUNT: 13.3 % (ref 10–15)
ETHANOL SERPL-MCNC: 0.34 G/DL
GFR SERPL CREATININE-BSD FRML MDRD: 89 ML/MIN/1.73M2
GLUCOSE BLD-MCNC: 89 MG/DL (ref 70–99)
HCT VFR BLD AUTO: 38.1 % (ref 35–47)
HGB BLD-MCNC: 12.9 G/DL (ref 11.7–15.7)
MCH RBC QN AUTO: 33.7 PG (ref 26.5–33)
MCHC RBC AUTO-ENTMCNC: 33.9 G/DL (ref 31.5–36.5)
MCV RBC AUTO: 100 FL (ref 78–100)
PLATELET # BLD AUTO: 201 10E3/UL (ref 150–450)
POTASSIUM BLD-SCNC: 3.6 MMOL/L (ref 3.4–5.3)
PROT SERPL-MCNC: 6.8 G/DL (ref 6.8–8.8)
RBC # BLD AUTO: 3.83 10E6/UL (ref 3.8–5.2)
SODIUM SERPL-SCNC: 140 MMOL/L (ref 133–144)
WBC # BLD AUTO: 6.1 10E3/UL (ref 4–11)

## 2021-10-22 PROCEDURE — 96372 THER/PROPH/DIAG INJ SC/IM: CPT | Performed by: EMERGENCY MEDICINE

## 2021-10-22 PROCEDURE — 99214 OFFICE O/P EST MOD 30 MIN: CPT | Performed by: FAMILY MEDICINE

## 2021-10-22 PROCEDURE — 85027 COMPLETE CBC AUTOMATED: CPT | Performed by: FAMILY MEDICINE

## 2021-10-22 PROCEDURE — 250N000013 HC RX MED GY IP 250 OP 250 PS 637: Performed by: EMERGENCY MEDICINE

## 2021-10-22 PROCEDURE — 82077 ASSAY SPEC XCP UR&BREATH IA: CPT | Performed by: FAMILY MEDICINE

## 2021-10-22 PROCEDURE — 250N000011 HC RX IP 250 OP 636: Performed by: EMERGENCY MEDICINE

## 2021-10-22 PROCEDURE — 80053 COMPREHEN METABOLIC PANEL: CPT | Performed by: FAMILY MEDICINE

## 2021-10-22 PROCEDURE — 99285 EMERGENCY DEPT VISIT HI MDM: CPT | Performed by: EMERGENCY MEDICINE

## 2021-10-22 PROCEDURE — 99285 EMERGENCY DEPT VISIT HI MDM: CPT | Mod: 25

## 2021-10-22 PROCEDURE — 36415 COLL VENOUS BLD VENIPUNCTURE: CPT | Performed by: FAMILY MEDICINE

## 2021-10-22 RX ORDER — LORAZEPAM 2 MG/ML
1 INJECTION INTRAMUSCULAR ONCE
Status: COMPLETED | OUTPATIENT
Start: 2021-10-22 | End: 2021-10-22

## 2021-10-22 RX ORDER — NICOTINE 21 MG/24HR
1 PATCH, TRANSDERMAL 24 HOURS TRANSDERMAL DAILY
Status: DISCONTINUED | OUTPATIENT
Start: 2021-10-22 | End: 2021-10-22 | Stop reason: HOSPADM

## 2021-10-22 RX ADMIN — LORAZEPAM 1 MG: 2 INJECTION INTRAMUSCULAR; INTRAVENOUS at 11:57

## 2021-10-22 RX ADMIN — Medication 1 PATCH: at 11:59

## 2021-10-22 NOTE — ED PROVIDER NOTES
"  History     Chief Complaint   Patient presents with     Alcohol Problem     HPI  Pavithra Raines is a 66 year old female alcoholic who presents to the emergency department secondary to needing detox.  She was seen in the clinic today and has struggled with alcohol for many many years and is currently intoxicated at a level of 0.3.  She drinks a box of wine every day.  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  She fell off the wagon again and its \"been a while \".  She has tried AA in the past which did not work out for her.  She has been to detox a long time ago in her 20s.  She has a plan to go to a women's group for sobriety this time around but would like to start with detox.  She was brought here from the clinic in order to arrange transport to detox and give her Ativan as she is getting tremulous.  Her last drink was just a few hours prior to arrival.  She normally drinks all day every day.  She has not had any nausea, vomiting, abdominal pain, bloody stools.  She normally does not have any trouble with nausea and vomiting.  She does have history of PTSD and has a counselor.    Allergies:  Allergies   Allergen Reactions     Codeine Itching     Vicodin [Hydrocodone-Acetaminophen] Itching       Problem List:    Patient Active Problem List    Diagnosis Date Noted     Fatty liver 12/13/2020     Priority: Medium     PTSD (post-traumatic stress disorder) 12/13/2020     Priority: Medium     Underweight 12/13/2020     Priority: Medium     Macrocytosis without anemia 12/13/2020     Priority: Medium     Age-related osteoporosis without current pathological fracture 10/15/2020     Priority: Medium     Anemia due to vitamin B12 deficiency, unspecified B12 deficiency type 12/04/2017     Priority: Medium     Insomnia, unspecified type 12/04/2017     Priority: Medium     Other iron deficiency anemia 12/04/2017     Priority: Medium     Alcohol dependence in remission (H) " 04/20/2013     Priority: Medium     Brief relapse in April 2013 after 10 years of sobriety.       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 grade 3 04/07/2011     Priority: Medium     12/15/06 ASCUS + high risk HPV  colpo in 2007 showed high grade KAITLYN and LEEP was recommended  LEEP was done in June,2007 with CAN 2/3 confirmed  10/15/07 NIL  9/30/08 NIL/neg HPV  10/21/09 NIL/neg HPV  4/5/11 NIL- repeat in one year (4/2012 12/1/17 NIL pap/neg HR HPV. Will need pap screening until 2027, regardless of age.  Plan: cotest due 3 yr.       MDD (major depressive disorder) 04/05/2011     Priority: Medium     Needs to est primary care.       CARDIOVASCULAR SCREENING; LDL GOAL LESS THAN 160 10/31/2010     Priority: Medium     Genital herpes      Priority: Medium     IMO update changed this record. Please review for accuracy       Anxiety 08/27/2008     Priority: Medium     Patient is followed by MIMI GARZA for ongoing prescription of benzodiazepines.  All refills should be approved by this provider, or covering partner.    Medication(s): Clonazepam.   Maximum quantity per month: 30  Clinic visit frequency required:      Controlled substance agreement on file: No  Benzodiazepine use reviewed by psychiatry:  No    Last [facility] website verification:  done on 4/5/19  [email or web address]         Status post gastric bypass for obesity 03/25/2008     Priority: Medium     Performed at [facility].       Restless legs syndrome (RLS) 05/15/2007     Priority: Medium     Hypersomnia with sleep apnea 05/15/2007     Priority: Medium     Problem list name updated by automated process. Provider to review       Esophageal reflux 04/18/2007     Priority: Medium        Past Medical History:    Past Medical History:   Diagnosis Date     Abnormal Papanicolaou smear of cervix and cervical HPV 4/07     Genital herpes      History of colposcopy with cervical biopsy 06/2007     Hypersomnia with sleep apnea,  unspecified      Other and unspecified ovarian cyst  or so       Past Surgical History:    Past Surgical History:   Procedure Laterality Date     CHOLECYSTECTOMY, OPEN  age 20s     COLONOSCOPY  3/21/2011    COMBINED COLONOSCOPY, REMOVE TUMOR/POLYP/LESION BY SNARE performed by JUAN FRANCISCO HERNANDEZ at  GI     COLONOSCOPY N/A 10/9/2017    polyps, repeat 3 years     COLPOSCOPY CERVIX, LOOP ELECTRODE BIOPSY, COMBINED  2007    CAN 2/3-patient requires yearly pap smears     ESOPHAGOSCOPY, GASTROSCOPY, DUODENOSCOPY (EGD), COMBINED N/A 2018    Procedure: COMBINED ESOPHAGOSCOPY, GASTROSCOPY, DUODENOSCOPY (EGD);  EGD;  Surgeon: Oneal Sanchez MD;  Location:  GI     GASTRIC BYPASS  3/25/2008    At [facility]     HC DILATION/CURETTAGE DIAG/THER NON OB       HC ENLARGE BREAST WITH IMPLANT       HC LAPAROSCOPIC MYOMECTOMY, 1 - 4 INTRAMURAL MYOMAS =<250 GM       HC REMOVAL OF BREAST IMPLANT       SALPINGO OOPHORECTOMY,R/L/MATTHEW      Bilateral salpingectomy and unilateral oophorectomy       Family History:    Family History   Problem Relation Age of Onset     Chronic Obstructive Pulmonary Disease Mother      Unknown/Adopted Father         doesn't know birth father     Lung Cancer Brother      Family History Negative Other        Social History:  Marital Status:   [2]  Social History     Tobacco Use     Smoking status: Former Smoker     Packs/day: 1.00     Years: 10.00     Pack years: 10.00     Quit date: 2021     Years since quittin.7     Smokeless tobacco: Never Used     Tobacco comment: 2 ppd at this time (chain smoking) 10/2012   Vaping Use     Vaping Use: Never used   Substance Use Topics     Alcohol use: Yes     Comment: 2-3 boxes of wine per week     Drug use: Yes     Types: Marijuana     Comment: smoked tonight        Medications:    busPIRone (BUSPAR) 10 MG tablet  CALCIUM ANTACID EXTRA STRENGTH 750 MG CHEW  calcium carbonate (OS-SHON 500 MG [place]. [place]) 1250 MG tablet  clonazePAM  "(KLONOPIN) 0.5 MG tablet  cyanocobalamin (CYANOCOBALAMIN) 1000 MCG/ML injection  escitalopram (LEXAPRO) 20 MG tablet  ferrous gluconate (FERGON) 324 (38 Fe) MG tablet  folic acid (FOLVITE) 1 MG tablet  Insulin Syringe-Needle U-100 (B-D INSULIN SYRINGE) 25G X 1\" 1 ML MISC  levocetirizine (XYZAL) 5 MG tablet  multivitamin, therapeutic with minerals (MULTI-VITAMIN) TABS tablet  omeprazole (PRILOSEC) 40 MG DR capsule  raloxifene (EVISTA) 60 MG tablet  syringe/needle, disp, (LUER LOCK SAFETY SYRINGES) 25G X 1\" 3 ML MISC  traZODone (DESYREL) 50 MG tablet  triamcinolone (KENALOG) 0.1 % external cream  VITAMIN D3 50 MCG (2000 UT) tablet          Review of Systems   All other systems reviewed and are negative.      Physical Exam   BP: 109/63  Pulse: 120  Temp: 96.8  F (36  C)  Resp: 20  SpO2: 99 %      Physical Exam  Vitals and nursing note reviewed.   Constitutional:       General: She is not in acute distress.     Appearance: She is well-developed. She is not diaphoretic.   HENT:      Head: Normocephalic and atraumatic.      Right Ear: External ear normal.      Left Ear: External ear normal.      Mouth/Throat:      Mouth: Mucous membranes are moist.   Eyes:      General: No scleral icterus.     Pupils: Pupils are equal, round, and reactive to light.   Cardiovascular:      Rate and Rhythm: Normal rate and regular rhythm.   Pulmonary:      Effort: Pulmonary effort is normal.      Breath sounds: Normal breath sounds.   Musculoskeletal:         General: Normal range of motion.      Cervical back: Normal range of motion and neck supple.      Right lower leg: No edema.      Left lower leg: No edema.   Skin:     General: Skin is warm and dry.      Coloration: Skin is not pale.      Findings: No erythema or rash.   Neurological:      Mental Status: She is alert and oriented to person, place, and time.      Comments: Resting tremor, right hand   Psychiatric:         Mood and Affect: Mood normal.         Behavior: Behavior normal. "         ED Course        Procedures                  Results for orders placed or performed in visit on 10/22/21 (from the past 24 hour(s))   CBC with platelets   Result Value Ref Range    WBC Count 6.1 4.0 - 11.0 10e3/uL    RBC Count 3.83 3.80 - 5.20 10e6/uL    Hemoglobin 12.9 11.7 - 15.7 g/dL    Hematocrit 38.1 35.0 - 47.0 %     78 - 100 fL    MCH 33.7 (H) 26.5 - 33.0 pg    MCHC 33.9 31.5 - 36.5 g/dL    RDW 13.3 10.0 - 15.0 %    Platelet Count 201 150 - 450 10e3/uL   Comprehensive metabolic panel (BMP + Alb, Alk Phos, ALT, AST, Total. Bili, TP)   Result Value Ref Range    Sodium 140 133 - 144 mmol/L    Potassium 3.6 3.4 - 5.3 mmol/L    Chloride 107 94 - 109 mmol/L    Carbon Dioxide (CO2) 26 20 - 32 mmol/L    Anion Gap 7 3 - 14 mmol/L    Urea Nitrogen 4 (L) 7 - 30 mg/dL    Creatinine 0.72 0.52 - 1.04 mg/dL    Calcium 7.4 (L) 8.5 - 10.1 mg/dL    Glucose 89 70 - 99 mg/dL    Alkaline Phosphatase 151 (H) 40 - 150 U/L    AST 33 0 - 45 U/L    ALT 28 0 - 50 U/L    Protein Total 6.8 6.8 - 8.8 g/dL    Albumin 2.8 (L) 3.4 - 5.0 g/dL    Bilirubin Total 0.3 0.2 - 1.3 mg/dL    GFR Estimate 89 >60 mL/min/1.73m2   Ethyl Alcohol Level   Result Value Ref Range    Alcohol ethyl 0.34 (HH) <=0.01 g/dL       Medications   LORazepam (ATIVAN) injection 1 mg (1 mg Intramuscular Given 10/22/21 1157)       Assessments & Plan (with Medical Decision Making)  Alcoholic, currently intoxicated but also having withdrawal symptoms.  Patient was given IM Ativan with improvement of her symptoms.  She was transferred to detox in stable condition.  Treatment course discussed with the patient who agrees with the plan.     I have reviewed the nursing notes.    I have reviewed the findings, diagnosis, plan and need for follow up with the patient.      Discharge Medication List as of 10/22/2021 12:19 PM          Final diagnoses:   Alcoholism (H)       10/22/2021   [facility] EMERGENCY DEPT     Oneal Brooks MD  10/22/21  [id number]

## 2021-10-22 NOTE — PROGRESS NOTES
"  Assessment & Plan       ICD-10-CM    1. Alcohol dependence with uncomplicated intoxication (H)  F10.220 CBC with platelets     Comprehensive metabolic panel (BMP + Alb, Alk Phos, ALT, AST, Total. Bili, TP)     Ethyl Alcohol Level     CBC with platelets     Comprehensive metabolic panel (BMP + Alb, Alk Phos, ALT, AST, Total. Bili, TP)     Ethyl Alcohol Level        40 minutes spent on the date of the encounter doing chart review, history and exam, documentation and further activities per the note     I made contact with [facility] center and they agreed to take Shanika in for detox. However, while waiting for her labs to come back and arrangements to be made for transfer to detox, her  got upset at the wait and left with a friend. This was upsetting to Shanika, and along with her anxiety and desire for alcohol she started to get very shaky and anxious. She took a Klonopin of her own supply while here in the office, but it didn't help enough.     We agreed that she needed transport to detox, as she was unable to drive herself and her ride left. We agreed that bringing her to the ED to receive an IV and some Ativan prior to ambulance transfer would be safest for Shanika, so she was brought to ED for further treatment and transportation via ambulance. She continued to voice desire and willingness to sign herself willingly into detox, stating \"I need help\" and \"I don't want to do this again\" referring to drinking.     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 I advised her that she will need to have assessment for ability and need for ongoing treatment after detox.     No follow-ups on file.    Soha Boggs MD  [facility]    Subjective   Shanika is a 66 year old who presents for the following health issues  accompanied by her spouse Bran.   They report that they were " "recently  in August in [place], and that she has been drinking heavily for quite some time. She would like to have detox, has been trying to find a place to take her in but can't do it alone, needs help. She has been drinking \"continuously\" up until now, including this am. She states she has been drinking at home (lives in [place]) as well as at a bar in [place]. She is afraid of having seizures when she stops. She has been taking her medications as prescribed, as well, and brings her bag of medication bottles with her today.   She feels well otherwise, not sick at this time.     She has been concerned about left breast pain. Her nipple is sore. She was recently seen and had a screening mammogram which showed asymmetry, and followed up with diagnostic mammo and ultrasound which were normal. She continues to worry about the left nipple. No drainage.     HPI     Chief Complaint   Patient presents with     Breast Mass     follow up breast mass and go over mammogram and u/s results     Her bigger concern is that she wants to go to detox. She has a longstanding history of alcoholism, has been in remission in the past, has been treated with Naltrexone and has done outpatient counseling. She has had complications with pancreatitis in 2020, but not recently. In September she was in ED with abdominal pain suspected due to gastritis from alcohol use. Her labs were normal at that time.     Her  Bran denies any alcohol use but states he uses Meth, was using last night but they both deny that Shanika uses any meth or any other drug besides alcohol.     Review of Systems   Constitutional, HEENT, cardiovascular, pulmonary, gi and gu systems are negative, except as otherwise noted.      Objective    BP 96/66   Pulse 76   Temp 96.8  F (36  C) (Temporal)   Resp 16   Wt 55.3 kg (122 lb)   LMP  (LMP Unknown)   SpO2 98%   BMI 20.94 kg/m    Body mass index is 20.94 kg/m .  Physical Exam   Vitals noted.  Patient " alert, oriented, and in no acute distress. She is obviously intoxicated but is not combative or aggressive. Her gait is staggering, but her speech is clear. She is kind and appreciative.   CV:  RRR without murmur.   Neck:  Supple without lymphadenopathy, JVD or masses.   Respiratory:  Lungs clear to auscultation bilaterally.     Results for orders placed or performed in visit on 10/22/21   CBC with platelets     Status: Abnormal   Result Value Ref Range    WBC Count 6.1 4.0 - 11.0 10e3/uL    RBC Count 3.83 3.80 - 5.20 10e6/uL    Hemoglobin 12.9 11.7 - 15.7 g/dL    Hematocrit 38.1 35.0 - 47.0 %     78 - 100 fL    MCH 33.7 (H) 26.5 - 33.0 pg    MCHC 33.9 31.5 - 36.5 g/dL    RDW 13.3 10.0 - 15.0 %    Platelet Count 201 150 - 450 10e3/uL   Comprehensive metabolic panel (BMP + Alb, Alk Phos, ALT, AST, Total. Bili, TP)     Status: Abnormal   Result Value Ref Range    Sodium 140 133 - 144 mmol/L    Potassium 3.6 3.4 - 5.3 mmol/L    Chloride 107 94 - 109 mmol/L    Carbon Dioxide (CO2) 26 20 - 32 mmol/L    Anion Gap 7 3 - 14 mmol/L    Urea Nitrogen 4 (L) 7 - 30 mg/dL    Creatinine 0.72 0.52 - 1.04 mg/dL    Calcium 7.4 (L) 8.5 - 10.1 mg/dL    Glucose 89 70 - 99 mg/dL    Alkaline Phosphatase 151 (H) 40 - 150 U/L    AST 33 0 - 45 U/L    ALT 28 0 - 50 U/L    Protein Total 6.8 6.8 - 8.8 g/dL    Albumin 2.8 (L) 3.4 - 5.0 g/dL    Bilirubin Total 0.3 0.2 - 1.3 mg/dL    GFR Estimate 89 >60 mL/min/1.73m2   Ethyl Alcohol Level     Status: Abnormal   Result Value Ref Range    Alcohol ethyl 0.34 (HH) <=0.01 g/dL

## 2021-10-22 NOTE — DISCHARGE INSTRUCTIONS
-you will go directly to detox  -I hope you get sober and find success in your treatment  -it was a pleasure to meet you

## 2021-10-22 NOTE — ED NOTES
"States she drank wine at 0730. She reports drinking daily. \"sometimes I get up in the middle of the night to get me through\".  "

## 2021-10-31 DIAGNOSIS — L28.0 NEURODERMATITIS: ICD-10-CM

## 2021-11-01 RX ORDER — LEVOCETIRIZINE DIHYDROCHLORIDE 5 MG/1
5 TABLET, FILM COATED ORAL EVERY MORNING
Qty: 90 TABLET | Refills: 3 | Status: SHIPPED | OUTPATIENT
Start: 2021-11-01 | End: 2023-01-12

## 2021-11-02 ENCOUNTER — TELEPHONE (OUTPATIENT)
Dept: FAMILY MEDICINE | Facility: CLINIC | Age: 66
End: 2021-11-02
Payer: COMMERCIAL

## 2021-11-02 NOTE — TELEPHONE ENCOUNTER
Patient states she went to detox Friday night thru the weekend and was released on Monday. She states she is very fatigued she thinks from the withdrawal. She wonders if Dr. Boggs can prescribe the medication she did before to help with craving the alcohol. Please advise

## 2021-11-07 DIAGNOSIS — L28.0 NEURODERMATITIS: ICD-10-CM

## 2021-11-08 DIAGNOSIS — G47.00 INSOMNIA, UNSPECIFIED TYPE: ICD-10-CM

## 2021-11-08 DIAGNOSIS — G25.81 RESTLESS LEGS SYNDROME (RLS): ICD-10-CM

## 2021-11-08 DIAGNOSIS — F41.9 ANXIETY: ICD-10-CM

## 2021-11-09 RX ORDER — TRIAMCINOLONE ACETONIDE 1 MG/G
CREAM TOPICAL
Qty: 80 G | Refills: 1 | Status: SHIPPED | OUTPATIENT
Start: 2021-11-09 | End: 2022-07-26

## 2021-11-09 NOTE — TELEPHONE ENCOUNTER
Prescription approved per [facility] Refill Protocol.  Liz Soto RN on 11/9/2021 at 7:57 AM

## 2021-11-09 NOTE — TELEPHONE ENCOUNTER
Pending Prescriptions:                       Disp   Refills    clonazePAM (KLONOPIN) 0.5 MG tablet [Pharm*30 tab*0        Sig: TAKE ONE TABLE BY MOUTH NIGHTLY AS NEEDED FOR ANXIETY      Routing refill request to provider for review/approval because:  Drug not on the [facility] [place] refill protocol     Liz Soto RN on 11/9/2021 at 7:07 AM

## 2021-11-11 ENCOUNTER — MYC REFILL (OUTPATIENT)
Dept: FAMILY MEDICINE | Facility: CLINIC | Age: 66
End: 2021-11-11
Payer: COMMERCIAL

## 2021-11-11 DIAGNOSIS — G47.00 INSOMNIA, UNSPECIFIED TYPE: ICD-10-CM

## 2021-11-11 DIAGNOSIS — F41.9 ANXIETY: ICD-10-CM

## 2021-11-11 DIAGNOSIS — G25.81 RESTLESS LEGS SYNDROME (RLS): ICD-10-CM

## 2021-11-11 RX ORDER — CLONAZEPAM 0.5 MG/1
TABLET ORAL
Qty: 30 TABLET | Refills: 0 | Status: SHIPPED | OUTPATIENT
Start: 2021-11-11 | End: 2021-12-08

## 2021-11-11 NOTE — TELEPHONE ENCOUNTER
clonazepam      Last Written Prescription Date:  10-8-2021  Last Fill Quantity: 30,   # refills: 0  Last Office Visit: 10-  Future Office visit:       Routing refill request to provider for review/approval because:  Drug not on the [facility] or [facility] refill protocol or controlled substance

## 2021-11-12 RX ORDER — CLONAZEPAM 0.5 MG/1
TABLET ORAL
Qty: 30 TABLET | Refills: 0 | OUTPATIENT
Start: 2021-11-12

## 2021-12-07 DIAGNOSIS — G47.00 INSOMNIA, UNSPECIFIED TYPE: ICD-10-CM

## 2021-12-07 DIAGNOSIS — G25.81 RESTLESS LEGS SYNDROME (RLS): ICD-10-CM

## 2021-12-07 DIAGNOSIS — F41.9 ANXIETY: ICD-10-CM

## 2021-12-08 RX ORDER — CLONAZEPAM 0.5 MG/1
TABLET ORAL
Qty: 30 TABLET | Refills: 0 | Status: SHIPPED | OUTPATIENT
Start: 2021-12-08 | End: 2022-01-07

## 2021-12-08 NOTE — TELEPHONE ENCOUNTER
Called patient to let know refill was sent and to schedule a follow up for further refills.    Tori Tamayo CMA

## 2021-12-08 NOTE — TELEPHONE ENCOUNTER
Patient is calling to see of she can get this medication filled today as she doesn't drive and has a ride to the pharmacy for today?  Please call to notify.

## 2021-12-08 NOTE — TELEPHONE ENCOUNTER
reviewed refill sent patient will need to follow-up with her primary care for further refills    Thank you  Carmita Vance CNP

## 2021-12-08 NOTE — TELEPHONE ENCOUNTER
Pending Prescriptions:                       Disp   Refills    clonazePAM (KLONOPIN) 0.5 MG tablet [Pharm*30 tab*0        Sig: TAKE ONE TABLE BY MOUTH NIGHTLY AS NEEDED FOR ANXIETY      Routing refill request to provider for review/approval because:  Drug not on the [facility] refill protocol     Liz Soto RN on 12/8/2021 at 5:30 AM

## 2022-01-04 DIAGNOSIS — F10.20 ALCOHOL DEPENDENCE, CONTINUOUS (H): ICD-10-CM

## 2022-01-04 DIAGNOSIS — G25.81 RESTLESS LEGS SYNDROME (RLS): ICD-10-CM

## 2022-01-04 DIAGNOSIS — D75.89 MACROCYTOSIS WITHOUT ANEMIA: ICD-10-CM

## 2022-01-04 DIAGNOSIS — F41.9 ANXIETY: ICD-10-CM

## 2022-01-04 DIAGNOSIS — G47.00 INSOMNIA, UNSPECIFIED TYPE: ICD-10-CM

## 2022-01-04 NOTE — TELEPHONE ENCOUNTER
Pending Prescriptions:                       Disp   Refills    clonazePAM (KLONOPIN) 0.5 MG tablet [Pharm*30 tab*0        Sig: TAKE ONE TABLET BY MOUTH NIGHTLY AS NEEDED FOR           ANXIETY    Routing refill request to provider for review/approval because:  Drug not on the [facility] refill protocol     clonazePAM (KLONOPIN) 0.5 MG tablet 30 tablet 0 12/8/2021  No   Sig: TAKE ONE TABLE BY MOUTH NIGHTLY AS NEEDED FOR ANXIETY   Sent to pharmacy as: clonazePAM 0.5 MG Oral Tablet (klonoPIN)   Class: E-Prescribe   Order: [id number]   E-Prescribing Status: Receipt confirmed by pharmacy (12/8/2021  8:50 AM CST)

## 2022-01-05 RX ORDER — CLONAZEPAM 0.5 MG/1
TABLET ORAL
Qty: 30 TABLET | Refills: 0 | OUTPATIENT
Start: 2022-01-05

## 2022-01-05 RX ORDER — FOLIC ACID 1 MG/1
1000 TABLET ORAL DAILY
Qty: 90 TABLET | Refills: 1 | Status: SHIPPED | OUTPATIENT
Start: 2022-01-05 | End: 2022-06-14

## 2022-01-05 NOTE — TELEPHONE ENCOUNTER
I have not seen this patient in past. Last month rehana refill given. She will need to follow up with PCP for further refills         Thank you  Carmita Vance CNP

## 2022-01-05 NOTE — TELEPHONE ENCOUNTER
Naltrexone  Routing refill request to provider for review/approval because:  Drug not on the [facility] refill protocol     Folvite  Prescription approved per [facility] Refill Protocol.    Buspar  Routing refill request to provider for review/approval because:  Drug interaction warning      Amanda Pickering RN

## 2022-01-06 RX ORDER — NALTREXONE HYDROCHLORIDE 50 MG/1
TABLET, FILM COATED ORAL
Qty: 30 TABLET | Refills: 1 | Status: SHIPPED | OUTPATIENT
Start: 2022-01-06 | End: 2022-04-01

## 2022-01-06 RX ORDER — BUSPIRONE HYDROCHLORIDE 10 MG/1
TABLET ORAL
Qty: 90 TABLET | Refills: 3 | Status: SHIPPED | OUTPATIENT
Start: 2022-01-06 | End: 2022-01-07

## 2022-01-07 ENCOUNTER — VIRTUAL VISIT (OUTPATIENT)
Dept: FAMILY MEDICINE | Facility: CLINIC | Age: 67
End: 2022-01-07
Payer: COMMERCIAL

## 2022-01-07 DIAGNOSIS — Z23 NEED FOR COVID-19 VACCINE: ICD-10-CM

## 2022-01-07 DIAGNOSIS — G40.909 SEIZURE DISORDER (H): ICD-10-CM

## 2022-01-07 DIAGNOSIS — G47.00 INSOMNIA, UNSPECIFIED TYPE: ICD-10-CM

## 2022-01-07 DIAGNOSIS — F33.9 EPISODE OF RECURRENT MAJOR DEPRESSIVE DISORDER, UNSPECIFIED DEPRESSION EPISODE SEVERITY (H): ICD-10-CM

## 2022-01-07 DIAGNOSIS — Z23 NEED FOR INFLUENZA VACCINATION: ICD-10-CM

## 2022-01-07 DIAGNOSIS — F10.21 ALCOHOL DEPENDENCE IN REMISSION (H): Primary | ICD-10-CM

## 2022-01-07 DIAGNOSIS — E55.9 VITAMIN D DEFICIENCY: ICD-10-CM

## 2022-01-07 DIAGNOSIS — K28.9 GASTROJEJUNAL ULCER: ICD-10-CM

## 2022-01-07 DIAGNOSIS — F43.10 PTSD (POST-TRAUMATIC STRESS DISORDER): ICD-10-CM

## 2022-01-07 DIAGNOSIS — M81.0 AGE-RELATED OSTEOPOROSIS WITHOUT CURRENT PATHOLOGICAL FRACTURE: ICD-10-CM

## 2022-01-07 DIAGNOSIS — G25.81 RESTLESS LEGS SYNDROME (RLS): ICD-10-CM

## 2022-01-07 DIAGNOSIS — D50.8 OTHER IRON DEFICIENCY ANEMIA: ICD-10-CM

## 2022-01-07 DIAGNOSIS — D51.9 ANEMIA DUE TO VITAMIN B12 DEFICIENCY, UNSPECIFIED B12 DEFICIENCY TYPE: ICD-10-CM

## 2022-01-07 DIAGNOSIS — F41.9 ANXIETY: ICD-10-CM

## 2022-01-07 PROCEDURE — 99214 OFFICE O/P EST MOD 30 MIN: CPT | Mod: TEL | Performed by: FAMILY MEDICINE

## 2022-01-07 PROCEDURE — 96127 BRIEF EMOTIONAL/BEHAV ASSMT: CPT | Mod: TEL | Performed by: FAMILY MEDICINE

## 2022-01-07 RX ORDER — BUSPIRONE HYDROCHLORIDE 15 MG/1
15 TABLET ORAL 2 TIMES DAILY
Qty: 60 TABLET | Refills: 3 | Status: SHIPPED | OUTPATIENT
Start: 2022-01-07 | End: 2022-03-27

## 2022-01-07 RX ORDER — RALOXIFENE HYDROCHLORIDE 60 MG/1
60 TABLET, FILM COATED ORAL DAILY
Qty: 90 TABLET | Refills: 3 | Status: SHIPPED | OUTPATIENT
Start: 2022-01-07 | End: 2023-03-01

## 2022-01-07 RX ORDER — CLONAZEPAM 0.5 MG/1
0.5 TABLET ORAL AT BEDTIME
Qty: 30 TABLET | Refills: 0 | Status: SHIPPED | OUTPATIENT
Start: 2022-01-07 | End: 2022-02-08

## 2022-01-07 ASSESSMENT — ANXIETY QUESTIONNAIRES
5. BEING SO RESTLESS THAT IT IS HARD TO SIT STILL: SEVERAL DAYS
7. FEELING AFRAID AS IF SOMETHING AWFUL MIGHT HAPPEN: NEARLY EVERY DAY
1. FEELING NERVOUS, ANXIOUS, OR ON EDGE: NEARLY EVERY DAY
6. BECOMING EASILY ANNOYED OR IRRITABLE: SEVERAL DAYS
IF YOU CHECKED OFF ANY PROBLEMS ON THIS QUESTIONNAIRE, HOW DIFFICULT HAVE THESE PROBLEMS MADE IT FOR YOU TO DO YOUR WORK, TAKE CARE OF THINGS AT HOME, OR GET ALONG WITH OTHER PEOPLE: SOMEWHAT DIFFICULT
GAD7 TOTAL SCORE: 15
3. WORRYING TOO MUCH ABOUT DIFFERENT THINGS: NEARLY EVERY DAY
2. NOT BEING ABLE TO STOP OR CONTROL WORRYING: NEARLY EVERY DAY

## 2022-01-07 ASSESSMENT — PATIENT HEALTH QUESTIONNAIRE - PHQ9
5. POOR APPETITE OR OVEREATING: SEVERAL DAYS
SUM OF ALL RESPONSES TO PHQ QUESTIONS 1-9: 14

## 2022-01-07 NOTE — PROGRESS NOTES
"Shanika is a 66 year old who is being evaluated via a billable telephone visit.      Soha Boggs MD     What phone number would you like to be contacted at? [phone]  How would you like to obtain your AVS? MyChart    Assessment & Plan       ICD-10-CM    1. Alcohol dependence in remission (H)  F10.21    2. Anxiety  F41.9 busPIRone (BUSPAR) 15 MG tablet     clonazePAM (KLONOPIN) 0.5 MG tablet     escitalopram (LEXAPRO) 20 MG tablet   3. Insomnia, unspecified type  G47.00 clonazePAM (KLONOPIN) 0.5 MG tablet   4. Restless legs syndrome (RLS)  G25.81 clonazePAM (KLONOPIN) 0.5 MG tablet   5. Age-related osteoporosis without current pathological fracture  M81.0 raloxifene (EVISTA) 60 MG tablet   6. PTSD (post-traumatic stress disorder)  F43.10    7. Episode of recurrent major depressive disorder, unspecified depression episode severity (H)  F33.9    8. Seizure disorder (H)  G40.909    9. Other iron deficiency anemia  D50.8 ferrous gluconate (FERGON) 324 (38 Fe) MG tablet   10. Anemia due to vitamin B12 deficiency, unspecified B12 deficiency type  D51.9 Insulin Syringe-Needle U-100 (B-D INSULIN SYRINGE) 25G X 1\" 1 ML MISC   11. Gastrojejunal ulcer  K28.9 omeprazole (PRILOSEC) 40 MG DR capsule   12. Vitamin D deficiency  E55.9 vitamin D3 (VITAMIN D3) 50 mcg (2000 units) tablet   13. Need for influenza vaccination  Z23 FLU VACCINE, 3 YRS +, IM (FLUZONE)   14. Need for COVID-19 vaccine  Z23 COVID-19,PF,MODERNA (18+ YRS BOOSTER .25ML)      I did refill her medications.    She asked about increasing her dose of clonazepam, but she is not using her BuSpar as she should.  I encouraged her to try going up on her BuSpar more regularly before increasing her Klonopin. because she cannot remember 3 times a day, we're going to increase her to 15 mg twice daily instead and we could go up from there if needed.  I congratulated her on her sobriety and encouraged her to continue taking her medications and avoiding alcohol use.  " She continues to do counseling and will continue to do so.    I am going to follow-up with her in 2 months and this can be virtual or in person depending on what she prefers.    She is also due for a preventive physical with Pap smear and colonoscopy and other preventive services need to be reviewed.  She is also going to make a float appointment to have her flu and COVID vaccines.    25 minutes spent on the date of the encounter doing chart review, history and exam, documentation and further activities per the note      Return in about 2 months (around 3/7/2022).    Soha Boggs MD  [facility]    Martha Voss is a 66 year old who presents for the following health issues:    HPI     Alcohol dependence, Depression and Anxiety Follow-Up    How are you doing with your depression since your last visit? Improved     How are you doing with your anxiety since your last visit?  Improved     Are you having other symptoms that might be associated with depression or anxiety? No    Have you had a significant life event? Health Concerns No longer able to drink alcohol     Do you have any concerns with your use of alcohol or other drugs? Yes:  See above    I saw her in the clinic in October and she was admitted to detox for alcoholism. since then she states she has not been drinking at all. she feels good about her sobriety, but sometimes still would like to drink.  She does have cravings.  She will sometimes allow herself a nonalcoholic beer just to feel like she is treating herself.  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    She feels her anxiety is a concern.  She is only taking her BuSpar once on most days because she forgets her second or third dose she is wondering if she can go up higher on her Klonopin.    She has trazodone at home, has plenty left from previous prescriptions, but only takes it very occasionally.  She is taking her other medications as  prescribed.  Social History     Tobacco Use     Smoking status: Former Smoker     Packs/day: 1.00     Years: 10.00     Pack years: 10.00     Quit date: 2021     Years since quittin.9     Smokeless tobacco: Never Used     Tobacco comment: 2 ppd at this time (chain smoking) 10/2012   Vaping Use     Vaping Use: Never used   Substance Use Topics     Alcohol use: Yes     Comment: 2-3 boxes of wine per week     Drug use: Yes     Types: Marijuana     Comment: smoked tonight     PHQ 2020 2/15/2021 10/13/2021   PHQ-9 Total Score 8 6 17   Q9: Thoughts of better off dead/self-harm past 2 weeks Not at all Not at all Not at all   F/U: Thoughts of suicide or self-harm - - -   F/U: Self harm-plan - - -   F/U: Self-harm action - - -   F/U: Safety concerns - - -     DANYEL-7 SCORE 2019   Total Score - - 6 (mild anxiety)   Total Score 9 14 6     Last PHQ-9 2022   1.  Little interest or pleasure in doing things 2   2.  Feeling down, depressed, or hopeless 1   3.  Trouble falling or staying asleep, or sleeping too much 3   4.  Feeling tired or having little energy 3   5.  Poor appetite or overeating 3   6.  Feeling bad about yourself 1   7.  Trouble concentrating 0   8.  Moving slowly or restless 1   Q9: Thoughts of better off dead/self-harm past 2 weeks 0   PHQ-9 Total Score 14   Difficulty at work, home, or with people Somewhat difficult   In the past two weeks have you had thoughts of suicide or self harm? -   Do you have concerns about your personal safety or the safety of others? -   In the past 2 weeks have you thought about a plan or had intention to harm yourself? -   In the past 2 weeks have you acted on these thoughts in any way? -     DANYEL-7  2022   1. Feeling nervous, anxious, or on edge 3   2. Not being able to stop or control worrying 3   3. Worrying too much about different things 3   4. Trouble relaxing 1   5. Being so restless that it is hard to sit still 1   6. Becoming easily  annoyed or irritable 1   7. Feeling afraid, as if something awful might happen 3   DANYEL-7 Total Score 15   If you checked any problems, how difficult have they made it for you to do your work, take care of things at home, or get along with other people? Somewhat difficult       Suicide Assessment Five-step Evaluation and Treatment (SAFE-T)      How many servings of fruits and vegetables do you eat daily?  0-1    On average, how many sweetened beverages do you drink each day (Examples: soda, juice, sweet tea, etc.  Do NOT count diet or artificially sweetened beverages)?   0    How many days per week do you exercise enough to make your heart beat faster? 3 or less    How many minutes a day do you exercise enough to make your heart beat faster? 9 or less  How many days per week do you miss taking your medication? 1    What makes it hard for you to take your medications?  remembering to take        Review of Systems   Constitutional, HEENT, cardiovascular, pulmonary, gi and gu systems are negative, except as otherwise noted.      Objective           Vitals:  No vitals were obtained today due to virtual visit.    Physical Exam   healthy, alert and no distress  PSYCH: Alert and oriented times 3; coherent speech, normal   rate and volume, able to articulate logical thoughts, able   to abstract reason, no tangential thoughts, no hallucinations   or delusions  Her affect is normal and pleasant  RESP: No cough, no audible wheezing, able to talk in full sentences  Remainder of exam unable to be completed due to telephone visits          Phone call duration: 14 minutes

## 2022-01-08 PROBLEM — G40.909 SEIZURE DISORDER (H): Status: ACTIVE | Noted: 2022-01-08

## 2022-01-08 PROBLEM — F33.9 EPISODE OF RECURRENT MAJOR DEPRESSIVE DISORDER, UNSPECIFIED DEPRESSION EPISODE SEVERITY (H): Status: ACTIVE | Noted: 2022-01-08

## 2022-01-08 PROBLEM — F10.21 ALCOHOL DEPENDENCE IN REMISSION (H): Status: ACTIVE | Noted: 2022-01-08

## 2022-01-08 RX ORDER — CHOLECALCIFEROL (VITAMIN D3) 50 MCG
1 TABLET ORAL DAILY
Qty: 90 TABLET | Refills: 3 | Status: ON HOLD | OUTPATIENT
Start: 2022-01-08 | End: 2023-12-11

## 2022-01-08 RX ORDER — OMEPRAZOLE 40 MG/1
CAPSULE, DELAYED RELEASE ORAL
Qty: 180 CAPSULE | Refills: 3 | Status: SHIPPED | OUTPATIENT
Start: 2022-01-08 | End: 2023-03-01

## 2022-01-08 RX ORDER — FERROUS GLUCONATE 324(38)MG
324 TABLET ORAL
Qty: 90 TABLET | Refills: 3 | Status: SHIPPED | OUTPATIENT
Start: 2022-01-08 | End: 2023-02-28

## 2022-01-08 RX ORDER — SYRINGE AND NEEDLE,INSULIN,1ML 25GX1"
SYRINGE, EMPTY DISPOSABLE MISCELLANEOUS
Qty: 3 EACH | Refills: 3 | Status: SHIPPED | OUTPATIENT
Start: 2022-01-08 | End: 2023-03-06

## 2022-01-08 RX ORDER — ESCITALOPRAM OXALATE 20 MG/1
20 TABLET ORAL DAILY
Qty: 90 TABLET | Refills: 3 | Status: SHIPPED | OUTPATIENT
Start: 2022-01-08 | End: 2022-03-27

## 2022-01-08 ASSESSMENT — ANXIETY QUESTIONNAIRES: GAD7 TOTAL SCORE: 15

## 2022-01-08 NOTE — PATIENT INSTRUCTIONS
Shanika, I refilled your medications and I put in orders for you to get your flu and COVID vaccines.     I will see you back in 2 months, but you are also due for a physical. Please schedule that soon, since you are due for a pap smear and colonoscopy and other preventive services.     Soha Boggs MD

## 2022-01-10 ENCOUNTER — ALLIED HEALTH/NURSE VISIT (OUTPATIENT)
Dept: FAMILY MEDICINE | Facility: CLINIC | Age: 67
End: 2022-01-10
Payer: COMMERCIAL

## 2022-01-10 DIAGNOSIS — Z23 NEED FOR PROPHYLACTIC VACCINATION AND INOCULATION AGAINST INFLUENZA: Primary | ICD-10-CM

## 2022-01-10 PROCEDURE — 91306 COVID-19,PF,MODERNA (18+ YRS BOOSTER .25ML): CPT

## 2022-01-10 PROCEDURE — 99207 PR NO CHARGE NURSE ONLY: CPT

## 2022-01-10 PROCEDURE — 90682 RIV4 VACC RECOMBINANT DNA IM: CPT

## 2022-01-10 PROCEDURE — 90471 IMMUNIZATION ADMIN: CPT

## 2022-01-10 PROCEDURE — 0064A COVID-19,PF,MODERNA (18+ YRS BOOSTER .25ML): CPT

## 2022-02-06 DIAGNOSIS — G25.81 RESTLESS LEGS SYNDROME (RLS): ICD-10-CM

## 2022-02-06 DIAGNOSIS — G47.00 INSOMNIA, UNSPECIFIED TYPE: ICD-10-CM

## 2022-02-06 DIAGNOSIS — F41.9 ANXIETY: ICD-10-CM

## 2022-02-08 RX ORDER — CLONAZEPAM 0.5 MG/1
0.5 TABLET ORAL AT BEDTIME
Qty: 30 TABLET | Refills: 0 | Status: SHIPPED | OUTPATIENT
Start: 2022-02-08 | End: 2022-03-11

## 2022-02-08 NOTE — TELEPHONE ENCOUNTER
Pending Prescriptions:                       Disp   Refills    clonazePAM (KLONOPIN) 0.5 MG tablet [Pharm*30 tab*0        Sig: Take 1 tablet (0.5 mg) by mouth At Bedtime    Routing refill request to provider for review/approval because:  Drug not on the [facility] refill protocol     clonazePAM (KLONOPIN) 0.5 MG tablet 30 tablet 0 1/7/2022  No   Sig - Route: Take 1 tablet (0.5 mg) by mouth At Bedtime - Oral   Sent to pharmacy as: clonazePAM 0.5 MG Oral Tablet (klonoPIN)   Class: E-Prescribe   Order: [id number]   E-Prescribing Status: Receipt confirmed by pharmacy (1/7/2022  2:25 PM CST)

## 2022-02-23 DIAGNOSIS — D51.9 ANEMIA DUE TO VITAMIN B12 DEFICIENCY, UNSPECIFIED B12 DEFICIENCY TYPE: ICD-10-CM

## 2022-02-23 RX ORDER — CYANOCOBALAMIN 1000 UG/ML
INJECTION, SOLUTION INTRAMUSCULAR; SUBCUTANEOUS
Qty: 3 ML | Refills: 3 | Status: SHIPPED | OUTPATIENT
Start: 2022-02-23 | End: 2022-07-18

## 2022-03-06 ENCOUNTER — HEALTH MAINTENANCE LETTER (OUTPATIENT)
Age: 67
End: 2022-03-06

## 2022-03-07 ENCOUNTER — TELEPHONE (OUTPATIENT)
Dept: FAMILY MEDICINE | Facility: CLINIC | Age: 67
End: 2022-03-07

## 2022-03-07 NOTE — TELEPHONE ENCOUNTER
Pt called in and she missed her appt. Thought it was virtual but we had it as in person. Next virtual available isn't until 25th . Able to work her in sooner?

## 2022-03-09 NOTE — TELEPHONE ENCOUNTER
Patient calling to state unsure of what happened and the mix up that she missed her appointment on Monday as she thought it was a phone visit. Patient has been rescheduled for a virtual to further discuss her meds for anxiety and depression. Virtual is scheduled for 3/23 at 1:40 pm. Catrina Payne LPN

## 2022-03-11 DIAGNOSIS — G25.81 RESTLESS LEGS SYNDROME (RLS): ICD-10-CM

## 2022-03-11 DIAGNOSIS — G47.00 INSOMNIA, UNSPECIFIED TYPE: ICD-10-CM

## 2022-03-11 DIAGNOSIS — F41.9 ANXIETY: ICD-10-CM

## 2022-03-11 RX ORDER — CLONAZEPAM 0.5 MG/1
0.5 TABLET ORAL AT BEDTIME
Qty: 30 TABLET | Refills: 0 | Status: SHIPPED | OUTPATIENT
Start: 2022-03-11 | End: 2022-04-12

## 2022-03-11 NOTE — TELEPHONE ENCOUNTER
Pending Prescriptions:                       Disp   Refills    clonazePAM (KLONOPIN) 0.5 MG tablet [Pharm*30 tab*0        Sig: TAKE 1 TABLET (0.5 MG) BY MOUTH AT BEDTIME    Routing refill request to provider for review/approval because:  Drug not on the [facility] refill protocol     Amanda Pickering RN

## 2022-03-23 ENCOUNTER — NURSE TRIAGE (OUTPATIENT)
Dept: FAMILY MEDICINE | Facility: CLINIC | Age: 67
End: 2022-03-23

## 2022-03-23 ENCOUNTER — VIRTUAL VISIT (OUTPATIENT)
Dept: FAMILY MEDICINE | Facility: CLINIC | Age: 67
End: 2022-03-23
Payer: COMMERCIAL

## 2022-03-23 DIAGNOSIS — F33.9 EPISODE OF RECURRENT MAJOR DEPRESSIVE DISORDER, UNSPECIFIED DEPRESSION EPISODE SEVERITY (H): ICD-10-CM

## 2022-03-23 DIAGNOSIS — F43.10 PTSD (POST-TRAUMATIC STRESS DISORDER): ICD-10-CM

## 2022-03-23 DIAGNOSIS — F41.9 ANXIETY: Primary | ICD-10-CM

## 2022-03-23 DIAGNOSIS — F10.21 ALCOHOL DEPENDENCE IN REMISSION (H): ICD-10-CM

## 2022-03-23 PROCEDURE — 99214 OFFICE O/P EST MOD 30 MIN: CPT | Mod: TEL | Performed by: FAMILY MEDICINE

## 2022-03-23 PROCEDURE — 96127 BRIEF EMOTIONAL/BEHAV ASSMT: CPT | Mod: TEL | Performed by: FAMILY MEDICINE

## 2022-03-23 ASSESSMENT — PATIENT HEALTH QUESTIONNAIRE - PHQ9
SUM OF ALL RESPONSES TO PHQ QUESTIONS 1-9: 23
5. POOR APPETITE OR OVEREATING: SEVERAL DAYS

## 2022-03-23 ASSESSMENT — ANXIETY QUESTIONNAIRES
7. FEELING AFRAID AS IF SOMETHING AWFUL MIGHT HAPPEN: NEARLY EVERY DAY
GAD7 TOTAL SCORE: 19
2. NOT BEING ABLE TO STOP OR CONTROL WORRYING: NEARLY EVERY DAY
6. BECOMING EASILY ANNOYED OR IRRITABLE: NEARLY EVERY DAY
5. BEING SO RESTLESS THAT IT IS HARD TO SIT STILL: NEARLY EVERY DAY
3. WORRYING TOO MUCH ABOUT DIFFERENT THINGS: NEARLY EVERY DAY
IF YOU CHECKED OFF ANY PROBLEMS ON THIS QUESTIONNAIRE, HOW DIFFICULT HAVE THESE PROBLEMS MADE IT FOR YOU TO DO YOUR WORK, TAKE CARE OF THINGS AT HOME, OR GET ALONG WITH OTHER PEOPLE: EXTREMELY DIFFICULT
1. FEELING NERVOUS, ANXIOUS, OR ON EDGE: NEARLY EVERY DAY
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
## 2022-03-24 ASSESSMENT — ANXIETY QUESTIONNAIRES: GAD7 TOTAL SCORE: 19

## 2022-03-27 DIAGNOSIS — F41.9 ANXIETY: ICD-10-CM

## 2022-03-27 RX ORDER — BUSPIRONE HYDROCHLORIDE 15 MG/1
TABLET ORAL
Qty: 60 TABLET | Refills: 3 | OUTPATIENT
Start: 2022-03-27

## 2022-03-27 RX ORDER — BUSPIRONE HYDROCHLORIDE 15 MG/1
15 TABLET ORAL 3 TIMES DAILY
Qty: 90 TABLET | Refills: 3 | Status: SHIPPED | OUTPATIENT
Start: 2022-03-27 | End: 2023-03-06

## 2022-03-27 RX ORDER — ESCITALOPRAM OXALATE 20 MG/1
20 TABLET ORAL DAILY
Qty: 90 TABLET | Refills: 3 | OUTPATIENT
Start: 2022-03-27

## 2022-03-27 RX ORDER — ESCITALOPRAM OXALATE 20 MG/1
30 TABLET ORAL DAILY
Qty: 135 TABLET | Refills: 3 | Status: SHIPPED | OUTPATIENT
Start: 2022-03-27 | End: 2023-03-06

## 2022-04-01 ENCOUNTER — NURSE TRIAGE (OUTPATIENT)
Dept: FAMILY MEDICINE | Facility: CLINIC | Age: 67
End: 2022-04-01

## 2022-04-01 ENCOUNTER — TELEPHONE (OUTPATIENT)
Dept: FAMILY MEDICINE | Facility: CLINIC | Age: 67
End: 2022-04-01

## 2022-04-01 ENCOUNTER — VIRTUAL VISIT (OUTPATIENT)
Dept: FAMILY MEDICINE | Facility: OTHER | Age: 67
End: 2022-04-01
Payer: COMMERCIAL

## 2022-04-01 DIAGNOSIS — N30.00 ACUTE CYSTITIS WITHOUT HEMATURIA: Primary | ICD-10-CM

## 2022-04-01 PROCEDURE — 99213 OFFICE O/P EST LOW 20 MIN: CPT | Mod: TEL | Performed by: PHYSICIAN ASSISTANT

## 2022-04-01 RX ORDER — NITROFURANTOIN 25; 75 MG/1; MG/1
100 CAPSULE ORAL 2 TIMES DAILY
Qty: 10 CAPSULE | Refills: 0 | Status: SHIPPED | OUTPATIENT
Start: 2022-04-01 | End: 2022-04-06

## 2022-04-01 NOTE — TELEPHONE ENCOUNTER
"Patient is reporting dysuria, burning with urination, and chills. She reports she has a history of UTI's and believes she has another one. She is unable to drive, so she was scheduled for a virtual visit today.     Isabela Donovan RN    Reason for Disposition    Patient wants to be seen    Additional Information    Negative: Shock suspected (e.g., cold/pale/clammy skin, too weak to stand, low BP, rapid pulse)    Negative: Sounds like a life-threatening emergency to the triager    Negative: Followed a genital area injury    Negative: Followed a genital area injury (penis, scrotum)    Negative: Vaginal discharge    Negative: Pus (white, yellow) or bloody discharge from end of penis    Negative: Discomfort (pain, burning or stinging) when passing urine and pregnant    Negative: Discomfort (pain, burning or stinging) when passing urine and female    Negative: Discomfort (pain, burning or stinging) when passing urine and male    Negative: Pain or itching in the vulvar area    Negative: Pain in scrotum is main symptom    Negative: Blood in the urine is main symptom    Negative: Symptoms arising from use of a urinary catheter (Barney or Coude)    Negative: Unable to urinate (or only a few drops) > 4 hours and bladder feels very full (e.g., palpable bladder or strong urge to urinate)    Negative: Decreased urination and drinking very little and dehydration suspected (e.g., dark urine, no urine > 12 hours, very dry mouth, very lightheaded)    Negative: Patient sounds very sick or weak to the triager    Negative: Fever > 100.4 F (38.0 C)    Answer Assessment - Initial Assessment Questions  1. SYMPTOM: \"What's the main symptom you're concerned about?\" (e.g., frequency, incontinence)      Dysuria, pain with urination, chills  2. ONSET: \"When did the  symptoms  start?\"      The last 2 days  3. PAIN: \"Is there any pain?\" If so, ask: \"How bad is it?\" (Scale: 1-10; mild, moderate, severe)      Moderate pain with urination  4. CAUSE: " "\"What do you think is causing the symptoms?\"      UTI  5. OTHER SYMPTOMS: \"Do you have any other symptoms?\" (e.g., fever, flank pain, blood in urine, pain with urination)      Pain with urination, dysuria, urgency  6. PREGNANCY: \"Is there any chance you are pregnant?\" \"When was your last menstrual period?\"      N/A    Protocols used: URINARY SYMPTOMS-A-[place]      "

## 2022-04-01 NOTE — PROGRESS NOTES
Shanika is a 66 year old who is being evaluated via a billable telephone visit.      What phone number would you like to be contacted at? [phone]  How would you like to obtain your AVS? MyChart    Assessment & Plan     Acute cystitis without hematuria  Symptoms are consistent with acute UTI, she isn't on antibiotics regularly.  She was on macrobid for last UTI a year ago and tolerated well.  Will try this again. Advised if not improving she needs to be seen next week or if worse over the weekend needs to go to .  Encouraged to increase water intake as well.    - nitroFURantoin macrocrystal-monohydrate (MACROBID) 100 MG capsule; Take 1 capsule (100 mg) by mouth 2 times daily for 5 days    Return in about 3 days (around 4/4/2022) for If not improving, sooner if worse or new concerns.     Options for treatment and follow-up care were reviewed with the patient and/or guardian. Patient and/or guardian engaged in the decision making process and verbalized understanding of the options discussed and agreed with the final plan.     Xavier Nichole PA-C  [facility]   Shanika is a 66 year old who presents for the following health issues     Genitourinary - Female  Onset/Duration: 1 day - Started yesterday, started overnight, the chills started.    Description:   Painful urination (Dysuria): YES           Frequency: YES  Blood in urine (Hematuria): no  Delay in urine (Hesitency): no  Intensity: moderate  Progression of Symptoms:  worsening  Accompanying Signs & Symptoms:  Fever/chills: YES - a lot of chills and sweating.  Doesn't have a thermometer.   Flank pain: no  Nausea and vomiting: no  Vaginal symptoms: none  Abdominal/Pelvic Pain: no  History:   History of frequent UTI s: YES  History of kidney stones: no  Sexually Active: YES  Possibility of pregnancy: No  Precipitating or alleviating factors: None  Therapies tried and outcome: none    - Has had UTIs in the past.  Just a few drops  will trickle out.    - No vaginal symptoms.   - Not drinking as much water and is having hard time eating due to dental surgeries.      Review of Systems   Constitutional, HEENT, cardiovascular, pulmonary, gi and gu systems are negative, except as otherwise noted.      Objective           Vitals:  No vitals were obtained today due to virtual visit.    Physical Exam   healthy, alert and no distress  PSYCH: Alert and oriented times 3; coherent speech, normal   rate and volume, able to articulate logical thoughts, able   to abstract reason, no tangential thoughts, no hallucinations   or delusions  Her affect is normal  RESP: No cough, no audible wheezing, able to talk in full sentences  Remainder of exam unable to be completed due to telephone visits            Phone call duration: 5 minutes

## 2022-04-11 DIAGNOSIS — G25.81 RESTLESS LEGS SYNDROME (RLS): ICD-10-CM

## 2022-04-11 DIAGNOSIS — F41.9 ANXIETY: ICD-10-CM

## 2022-04-11 DIAGNOSIS — G47.00 INSOMNIA, UNSPECIFIED TYPE: ICD-10-CM

## 2022-04-12 RX ORDER — CLONAZEPAM 0.5 MG/1
0.5 TABLET ORAL AT BEDTIME
Qty: 30 TABLET | Refills: 0 | Status: SHIPPED | OUTPATIENT
Start: 2022-04-12 | End: 2022-05-12

## 2022-04-20 ENCOUNTER — VIRTUAL VISIT (OUTPATIENT)
Dept: FAMILY MEDICINE | Facility: CLINIC | Age: 67
End: 2022-04-20

## 2022-04-20 DIAGNOSIS — F33.9 EPISODE OF RECURRENT MAJOR DEPRESSIVE DISORDER, UNSPECIFIED DEPRESSION EPISODE SEVERITY (H): ICD-10-CM

## 2022-04-20 DIAGNOSIS — F10.21 ALCOHOL DEPENDENCE IN REMISSION (H): Primary | ICD-10-CM

## 2022-04-20 DIAGNOSIS — F41.9 ANXIETY: ICD-10-CM

## 2022-04-20 DIAGNOSIS — F43.10 PTSD (POST-TRAUMATIC STRESS DISORDER): ICD-10-CM

## 2022-04-20 PROCEDURE — 99213 OFFICE O/P EST LOW 20 MIN: CPT | Mod: 95 | Performed by: FAMILY MEDICINE

## 2022-04-20 ASSESSMENT — PATIENT HEALTH QUESTIONNAIRE - PHQ9
5. POOR APPETITE OR OVEREATING: NOT AT ALL
SUM OF ALL RESPONSES TO PHQ QUESTIONS 1-9: 7

## 2022-04-20 ASSESSMENT — ANXIETY QUESTIONNAIRES
2. NOT BEING ABLE TO STOP OR CONTROL WORRYING: MORE THAN HALF THE DAYS
5. BEING SO RESTLESS THAT IT IS HARD TO SIT STILL: NOT AT ALL
3. WORRYING TOO MUCH ABOUT DIFFERENT THINGS: NEARLY EVERY DAY
GAD7 TOTAL SCORE: 9
1. FEELING NERVOUS, ANXIOUS, OR ON EDGE: MORE THAN HALF THE DAYS
7. FEELING AFRAID AS IF SOMETHING AWFUL MIGHT HAPPEN: MORE THAN HALF THE DAYS
IF YOU CHECKED OFF ANY PROBLEMS ON THIS QUESTIONNAIRE, HOW DIFFICULT HAVE THESE PROBLEMS MADE IT FOR YOU TO DO YOUR WORK, TAKE CARE OF THINGS AT HOME, OR GET ALONG WITH OTHER PEOPLE: NOT DIFFICULT AT ALL
6. BECOMING EASILY ANNOYED OR IRRITABLE: NOT AT ALL

## 2022-04-20 NOTE — PROGRESS NOTES
Shanika is a 66 year old who is being evaluated via a billable telephone visit.      Telephone visit performed in lieu of an in-person visit due to current concerns regarding social distancing and keeping people safe.  Physician and patient agreed this was appropriate for concerns addressed.     What phone number would you like to be contacted at? [phone]  How would you like to obtain your AVS? MyChart    Assessment & Plan       ICD-10-CM    1. Alcohol dependence in remission (H)  F10.21    2. PTSD (post-traumatic stress disorder)  F43.10    3. Episode of recurrent major depressive disorder, unspecified depression episode severity (H)  F33.9    4. Anxiety  F41.9       She is currently doing very well.  I did not make any changes to her medications today.  I previously gave her refills.  If she continues doing well I would like to see her in 3 months.  I encouraged her to remain sober and continue with her mental health care.  I encouraged her to follow-up sooner with any changes or stresses.    15 minutes spent on the date of the encounter doing chart review, history and exam, documentation and further activities per the note      No follow-ups on file.    Soha Boggs MD  [facility]    Shanika is a 66 year old who presents for the following health issues     HPI     Depression and Anxiety Follow-Up    How are you doing with your depression since your last visit? Improved     How are you doing with your anxiety since your last visit?  Improved     Are you having other symptoms that might be associated with depression or anxiety? No    Have you had a significant life event? No     Do you have any concerns with your use of alcohol or other drugs? No    Social History     Tobacco Use     Smoking status: Current Every Day Smoker     Packs/day: 1.50     Years: 10.00     Pack years: 15.00     Last attempt to quit: 2021     Years since quittin.3     Smokeless  tobacco: Never Used     Tobacco comment: 2 ppd at this time (chain smoking) 10/2012   Vaping Use     Vaping Use: Every day     Substances: Nicotine, THC     Devices: Pre-filled or refillable cartridge   Substance Use Topics     Alcohol use: Not Currently     Comment: Quit 6/6/2022     Drug use: Yes     Types: Marijuana     Comment: smoked tonight     PHQ 3/23/2022 4/20/2022 6/14/2022   PHQ-9 Total Score 23 7 5   Q9: Thoughts of better off dead/self-harm past 2 weeks Nearly every day Not at all Not at all   F/U: Thoughts of suicide or self-harm - - -   F/U: Self harm-plan - - -   F/U: Self-harm action - - -   F/U: Safety concerns - - -     DANYEL-7 SCORE 1/7/2022 3/23/2022 4/20/2022   Total Score - - -   Total Score 15 19 9     Last PHQ-9 6/14/2022   1.  Little interest or pleasure in doing things 1   2.  Feeling down, depressed, or hopeless 1   3.  Trouble falling or staying asleep, or sleeping too much 1   4.  Feeling tired or having little energy 1   5.  Poor appetite or overeating 0   6.  Feeling bad about yourself 1   7.  Trouble concentrating 0   8.  Moving slowly or restless 0   Q9: Thoughts of better off dead/self-harm past 2 weeks 0   PHQ-9 Total Score 5   Difficulty at work, home, or with people -   In the past two weeks have you had thoughts of suicide or self harm? -   Do you have concerns about your personal safety or the safety of others? -   In the past 2 weeks have you thought about a plan or had intention to harm yourself? -   In the past 2 weeks have you acted on these thoughts in any way? -     DANYEL-7  4/20/2022   1. Feeling nervous, anxious, or on edge 2   2. Not being able to stop or control worrying 2   3. Worrying too much about different things 3   4. Trouble relaxing 0   5. Being so restless that it is hard to sit still 0   6. Becoming easily annoyed or irritable 0   7. Feeling afraid, as if something awful might happen 2   DANYEL-7 Total Score 9   If you checked any problems, how difficult have  they made it for you to do your work, take care of things at home, or get along with other people? Not difficult at all       Suicide Assessment Five-step Evaluation and Treatment (SAFE-T)      How many servings of fruits and vegetables do you eat daily?  0-1    On average, how many sweetened beverages do you drink each day (Examples: soda, juice, sweet tea, etc.  Do NOT count diet or artificially sweetened beverages)?   0    How many days per week do you exercise enough to make your heart beat faster? yoga    How many minutes a day do you exercise enough to make your heart beat faster? yoga    How many days per week do you miss taking your medication? 0    She has a history of alcohol abuse as well as depression and anxiety, and PTSD.  Our last visit was March 23.  At that time I increased her Lexapro to 30 mg daily and her BuSpar to 15 mg 3 times daily.  She finds this very helpful.  Since then she is doing great.  She states that she and her  are doing great.  She remains sober.  The stress at home has been much less and she has been handling it well.  She and Bran are going on a trip in May for 1 week, [place] to [place].  She is very much looking forward to this.  She has no additional concerns today.    Review of Systems   Constitutional, HEENT, cardiovascular, pulmonary, gi and gu systems are negative, except as otherwise noted.      Objective           Vitals:  No vitals were obtained today due to virtual visit.    Physical Exam   healthy, alert and no distress  PSYCH: Alert and oriented times 3; coherent speech, normal   rate and volume, able to articulate logical thoughts, able   to abstract reason, no tangential thoughts, no hallucinations   or delusions  Her affect is normal and pleasant  RESP: No cough, no audible wheezing, able to talk in full sentences  Remainder of exam unable to be completed due to telephone visits          Phone call duration: 8 minutes

## 2022-04-20 NOTE — TELEPHONE ENCOUNTER
This has been reviewed and she has had her virtual visit.  Closing this encounter.  LATRICE CorreaN, RN

## 2022-04-21 ASSESSMENT — ANXIETY QUESTIONNAIRES: GAD7 TOTAL SCORE: 9

## 2022-05-08 DIAGNOSIS — G25.81 RESTLESS LEGS SYNDROME (RLS): ICD-10-CM

## 2022-05-08 DIAGNOSIS — F41.9 ANXIETY: ICD-10-CM

## 2022-05-08 DIAGNOSIS — G47.00 INSOMNIA, UNSPECIFIED TYPE: ICD-10-CM

## 2022-05-12 RX ORDER — CLONAZEPAM 0.5 MG/1
0.5 TABLET ORAL AT BEDTIME
Qty: 30 TABLET | Refills: 0 | Status: SHIPPED | OUTPATIENT
Start: 2022-05-12 | End: 2022-06-09

## 2022-05-12 NOTE — TELEPHONE ENCOUNTER
Patient is leaving on vacation tomorrow, she will be gone for a week and will run out of the medication while she is gone.  Patient is calling for a refill prior to leaving tomorrow and will need to have the medication APPROVED FOR AN EARLY FILL.    Please let patient know when this has been taken care of.    Zaida Zabala XRO/

## 2022-05-31 DIAGNOSIS — F41.9 ANXIETY: ICD-10-CM

## 2022-05-31 DIAGNOSIS — G47.00 INSOMNIA, UNSPECIFIED TYPE: ICD-10-CM

## 2022-05-31 DIAGNOSIS — G25.81 RESTLESS LEGS SYNDROME (RLS): ICD-10-CM

## 2022-06-01 ENCOUNTER — VIRTUAL VISIT (OUTPATIENT)
Dept: FAMILY MEDICINE | Facility: CLINIC | Age: 67
End: 2022-06-01

## 2022-06-01 DIAGNOSIS — F10.220 ALCOHOL DEPENDENCE WITH UNCOMPLICATED INTOXICATION (H): Primary | ICD-10-CM

## 2022-06-01 PROCEDURE — 99213 OFFICE O/P EST LOW 20 MIN: CPT | Mod: 95 | Performed by: FAMILY MEDICINE

## 2022-06-01 NOTE — TELEPHONE ENCOUNTER
Klonopin      Last Written Prescription Date:  5/12/2022  Last Fill Quantity: 30,   # refills: 0  Last Office Visit: 4/20/2022  Future Office visit:    Next 5 appointments (look out 90 days)      Jul 20, 2022  1:00 PM  (Arrive by 12:40 PM)  Provider Visit with Soha Boggs MD  [facility] ) [address] [place] [id number] [phone]             Routing refill request to provider for review/approval because:  Drug not on the [facility], [facility] or [facility] refill protocol or controlled substance

## 2022-06-01 NOTE — PROGRESS NOTES
Shanika is a 66 year old who is being evaluated via a billable telephone visit.      What phone number would you like to be contacted at? [phone]  How would you like to obtain your AVS? MyChart    Assessment & Plan     Alcohol dependence with uncomplicated intoxication (H)  She admits to going back to drinking alcohol and wants to get into detox.  She admits to drinking today.  I told her she needs to go to the emergency room so they can make arrangements for detox.  She was put on my schedule (as virtual) just a short time before this visit.  She spoke with an RN and I am not sure why the advice  to go into the ER was not suggested because that is my advice.  She seems to understand.  She states she is busy but I told her she needs to get in.  Looks like I will refill has been made for her Klonopin.  She seems motivated to get into detox and stop drinking again.                   No follow-ups on file.    Otto Anna MD  [facility]   Shanika is a 66 year old who presents for the following health issues : Alcohol dependence and wants to get into detox.    HPI     Chief Complaint   Patient presents with     Alcohol Problem     Discuss detox           Review of Systems   Constitutional, HEENT, cardiovascular, pulmonary, gi and gu systems are negative, except as otherwise noted.      Objective           Vitals:  No vitals were obtained today due to virtual visit.    Physical Exam   healthy, alert and no distress  PSYCH: Alert and oriented times 3; coherent speech, normal   rate and volume, able to articulate logical thoughts, able   to abstract reason, no tangential thoughts, no hallucinations   or delusions  Her affect is normal and pleasant  RESP: No cough, no audible wheezing, able to talk in full sentences  Remainder of exam unable to be completed due to telephone visits                Phone call duration: 7 minutes

## 2022-06-04 ENCOUNTER — NURSE TRIAGE (OUTPATIENT)
Dept: NURSING | Facility: CLINIC | Age: 67
End: 2022-06-04

## 2022-06-04 NOTE — TELEPHONE ENCOUNTER
Pt called in yesterday, stating she is at the point where she needs detox again, pt had virtual visit with PCP.  Pt was told yesterday to go into ED.  Pt calling today to confirm this plan - Triage RN read Dr. Anna's note to pt, pt states she will today follow this plan.  Alma Nichole RN  [facility] Nurse Advisor

## 2022-06-05 ENCOUNTER — HOSPITAL ENCOUNTER (EMERGENCY)
Facility: CLINIC | Age: 67
Discharge: HOME | End: 2022-06-05
Attending: EMERGENCY MEDICINE | Admitting: EMERGENCY MEDICINE

## 2022-06-05 VITALS
SYSTOLIC BLOOD PRESSURE: 109 MMHG | BODY MASS INDEX: 19.22 KG/M2 | OXYGEN SATURATION: 96 % | RESPIRATION RATE: 16 BRPM | TEMPERATURE: 99 F | WEIGHT: 112 LBS | HEART RATE: 76 BPM | DIASTOLIC BLOOD PRESSURE: 75 MMHG

## 2022-06-05 DIAGNOSIS — F10.20 ALCOHOLISM (H): ICD-10-CM

## 2022-06-05 DIAGNOSIS — F10.930 ALCOHOL WITHDRAWAL SYNDROME WITHOUT COMPLICATION (H): ICD-10-CM

## 2022-06-05 PROCEDURE — 99285 EMERGENCY DEPT VISIT HI MDM: CPT | Performed by: EMERGENCY MEDICINE

## 2022-06-05 PROCEDURE — 96372 THER/PROPH/DIAG INJ SC/IM: CPT | Performed by: EMERGENCY MEDICINE

## 2022-06-05 PROCEDURE — 250N000011 HC RX IP 250 OP 636: Performed by: EMERGENCY MEDICINE

## 2022-06-05 RX ORDER — GABAPENTIN 100 MG/1
CAPSULE ORAL
Qty: 90 CAPSULE | Refills: 0 | Status: SHIPPED | OUTPATIENT
Start: 2022-06-05 | End: 2023-03-06

## 2022-06-05 RX ORDER — LORAZEPAM 2 MG/ML
1 INJECTION INTRAMUSCULAR ONCE
Status: COMPLETED | OUTPATIENT
Start: 2022-06-05 | End: 2022-06-05

## 2022-06-05 RX ORDER — ONDANSETRON 4 MG/1
4 TABLET, ORALLY DISINTEGRATING ORAL ONCE
Status: COMPLETED | OUTPATIENT
Start: 2022-06-05 | End: 2022-06-05

## 2022-06-05 RX ORDER — LORAZEPAM 1 MG/1
1 TABLET ORAL EVERY 6 HOURS PRN
Qty: 20 TABLET | Refills: 0 | Status: ON HOLD | OUTPATIENT
Start: 2022-06-05 | End: 2023-10-24

## 2022-06-05 RX ADMIN — LORAZEPAM 1 MG: 2 INJECTION INTRAMUSCULAR; INTRAVENOUS at 10:52

## 2022-06-05 RX ADMIN — ONDANSETRON 4 MG: 4 TABLET, ORALLY DISINTEGRATING ORAL at 10:52

## 2022-06-05 NOTE — ED NOTES
"Patient is declining offer for transfer to Detox. \"I have to work on Wednesday. My  will be home to keep an eye on me.\" meds given as ordered. Seizure pads are in place.   "

## 2022-06-05 NOTE — ED NOTES
Discharge reviewed with patient and her . They both stated they understand med instructions and will return as needed. She denies pain. Tremors continue but are improved.

## 2022-06-05 NOTE — ED TRIAGE NOTES
Patient is requesting assistance with alcohol withdrawal. She states she has a history of seizures when withdrawing in the past. Her last drink was at 0920     Triage Assessment     Row Name 06/05/22 0929       Triage Assessment (Adult)    Airway WDL WDL       Respiratory WDL    Respiratory WDL WDL       Skin Circulation/Temperature WDL    Skin Circulation/Temperature WDL WDL       Cardiac WDL    Cardiac WDL WDL       Peripheral/Neurovascular WDL    Peripheral Neurovascular WDL WDL       Cognitive/Neuro/Behavioral WDL    Cognitive/Neuro/Behavioral WDL WDL

## 2022-06-05 NOTE — DISCHARGE INSTRUCTIONS
Continue to drink plenty of fluids, frequent small sips.    Start an multivitamin with thiamine daily.    Neurontin as prescribed.  This medication is used to help with withdrawal and decrease seizure potential.    Lorazepam every 6 hours as needed for withdrawal symptoms.      If you have any significant concerns return promptly at any time.    You can do this!  Please be sure you follow-up with your therapist!

## 2022-06-05 NOTE — ED PROVIDER NOTES
"  History     Chief Complaint   Patient presents with     Alcohol Intoxication     HPI  History per patient and medical records    This is a 66-year-old female, history of alcoholism, PTSD, fatty liver, anxiety, other history as below presenting with request for alcohol detoxification.  Patient states that she is a \"hard-core alcoholic\".  She drinks about 1 box of wine per day.  Last drink was just prior to coming to the ED.  She notes that it is \"time\".  She was sober for 20 years but started drinking after her mother passed away and has been on and off alcohol for quite some time since then.  She currently has been drinking for about 2 months and prior to that was sober for about 2 months.  Per past medical records, patient was in detox in fall 2021 at her request.  She notes nausea, significant decreased oral intake, generally feeling unwell.  She has detoxed at home in the past and she would like to try again.  She states she has blood pressure monitors, nurse friends, and the support of her  who does not drink alcohol.  She is also a smoker.  She has had a smoker's cough.  She denies fevers, chills, urinary symptoms.    Allergies:  Allergies   Allergen Reactions     Codeine Itching     Vicodin [Hydrocodone-Acetaminophen] Itching       Problem List:    Patient Active Problem List    Diagnosis Date Noted     Alcohol dependence in remission (H) 01/08/2022     Priority: Medium     Episode of recurrent major depressive disorder, unspecified depression episode severity (H) 01/08/2022     Priority: Medium     Seizure disorder (H) 01/08/2022     Priority: Medium     Fatty liver 12/13/2020     Priority: Medium     PTSD (post-traumatic stress disorder) 12/13/2020     Priority: Medium     Underweight 12/13/2020     Priority: Medium     Macrocytosis without anemia 12/13/2020     Priority: Medium     Age-related osteoporosis without current pathological fracture 10/15/2020     Priority: Medium     Anemia due to " vitamin B12 deficiency, unspecified B12 deficiency type 12/04/2017     Priority: Medium     Insomnia, unspecified type 12/04/2017     Priority: Medium     Other iron deficiency anemia 12/04/2017     Priority: Medium     Alcohol dependence with intoxication (H) 04/20/2013     Priority: Medium     Brief relapse in April 2013 after 10 years of sobriety.       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 grade 3 04/07/2011     Priority: Medium     12/15/06 ASCUS + high risk HPV  colpo in 2007 showed high grade KAITLYN and LEEP was recommended  LEEP was done in June,2007 with CAN 2/3 confirmed  10/15/07 NIL  9/30/08 NIL/neg HPV  10/21/09 NIL/neg HPV  4/5/11 NIL- repeat in one year (4/2012 12/1/17 NIL pap/neg HR HPV. Will need pap screening until 2027, regardless of age.  Plan: cotest due 3 yr.       MDD (major depressive disorder) 04/05/2011     Priority: Medium     Needs to est primary care.       CARDIOVASCULAR SCREENING; LDL GOAL LESS THAN 160 10/31/2010     Priority: Medium     Genital herpes      Priority: Medium     IMO update changed this record. Please review for accuracy       Anxiety 08/27/2008     Priority: Medium     Patient is followed by MIMI GARZA for ongoing prescription of benzodiazepines.  All refills should be approved by this provider, or covering partner.    Medication(s): Clonazepam.   Maximum quantity per month: 30  Clinic visit frequency required:      Controlled substance agreement on file: No  Benzodiazepine use reviewed by psychiatry:  No    Last [facility] website verification:  done on 4/5/19  [email or web address]         Status post gastric bypass for obesity 03/25/2008     Priority: Medium     Performed at [facility].       Restless legs syndrome (RLS) 05/15/2007     Priority: Medium     Hypersomnia with sleep apnea 05/15/2007     Priority: Medium     Problem list name updated by automated process. Provider to review       Esophageal reflux 04/18/2007     Priority:  Medium        Past Medical History:    Past Medical History:   Diagnosis Date     Abnormal Papanicolaou smear of cervix and cervical HPV      Genital herpes      History of colposcopy with cervical biopsy 2007     Hypersomnia with sleep apnea, unspecified      Other and unspecified ovarian cyst  or so       Past Surgical History:    Past Surgical History:   Procedure Laterality Date     CHOLECYSTECTOMY, OPEN  age 20s     COLONOSCOPY  2011    COMBINED COLONOSCOPY, REMOVE TUMOR/POLYP/LESION BY SNARE performed by JUAN FRANCISCO HERNANDEZ at  GI     COLONOSCOPY N/A 10/09/2017    polyps, repeat 3 years     COLPOSCOPY CERVIX, LOOP ELECTRODE BIOPSY, COMBINED  2007    CAN 2/3-patient requires yearly pap smears     dental pegs       ESOPHAGOSCOPY, GASTROSCOPY, DUODENOSCOPY (EGD), COMBINED N/A 2018    Procedure: COMBINED ESOPHAGOSCOPY, GASTROSCOPY, DUODENOSCOPY (EGD);  EGD;  Surgeon: Oneal Sanchez MD;  Location:  GI     GASTRIC BYPASS  2008    At [facility]     HC DILATION/CURETTAGE DIAG/THER NON OB       HC ENLARGE BREAST WITH IMPLANT       HC LAPAROSCOPIC MYOMECTOMY, 1 - 4 INTRAMURAL MYOMAS =<250 GM       HC REMOVAL OF BREAST IMPLANT       SALPINGO OOPHORECTOMY,R/L/MATTHEW      Bilateral salpingectomy and unilateral oophorectomy       Family History:    Family History   Problem Relation Age of Onset     Chronic Obstructive Pulmonary Disease Mother      Unknown/Adopted Father         doesn't know birth father     Lung Cancer Brother      Family History Negative Other        Social History:  Marital Status:   [2]  Social History     Tobacco Use     Smoking status: Current Every Day Smoker     Packs/day: 1.50     Years: 10.00     Pack years: 15.00     Last attempt to quit: 2021     Years since quittin.3     Smokeless tobacco: Never Used     Tobacco comment: 2 ppd at this time (chain smoking) 10/2012   Vaping Use     Vaping Use: Every day     Substances: Nicotine,  "THC     Devices: Pre-filled or refillable cartridge   Substance Use Topics     Alcohol use: Yes     Comment: 2-3 boxes of wine per week     Drug use: Yes     Types: Marijuana     Comment: smoked tonight        Medications:    gabapentin (NEURONTIN) 100 MG capsule  LORazepam (ATIVAN) 1 MG tablet  busPIRone (BUSPAR) 15 MG tablet  calcium carbonate (OS-SHON 500 MG [place]. [place]) 1250 MG tablet  clonazePAM (KLONOPIN) 0.5 MG tablet  cyanocobalamin (CYANOCOBALAMIN) 1000 MCG/ML injection  escitalopram (LEXAPRO) 20 MG tablet  ferrous gluconate (FERGON) 324 (38 Fe) MG tablet  folic acid (FOLVITE) 1 MG tablet  Insulin Syringe-Needle U-100 (B-D INSULIN SYRINGE) 25G X 1\" 1 ML MISC  levocetirizine (XYZAL) 5 MG tablet  multivitamin w/minerals (THERA-VIT-M) tablet  omeprazole (PRILOSEC) 40 MG DR capsule  raloxifene (EVISTA) 60 MG tablet  syringe/needle, disp, (LUER LOCK SAFETY SYRINGES) 25G X 1\" 3 ML MISC  traZODone (DESYREL) 50 MG tablet  triamcinolone (KENALOG) 0.1 % external cream  vitamin D3 (VITAMIN D3) 50 mcg (2000 units) tablet          Review of Systems   All other ROS reviewed and are negative or non-contributory except as stated in HPI.     Physical Exam   BP: 103/70  Pulse: 88  Temp: 99  F (37.2  C)  Resp: 16  Weight: 50.8 kg (112 lb)  SpO2: 99 %      Physical Exam  Vitals and nursing note reviewed.   Constitutional:       Comments: Very thin female sitting in the bed   HENT:      Head: Normocephalic.      Nose: Nose normal.      Mouth/Throat:      Mouth: Mucous membranes are moist.      Pharynx: Oropharynx is clear.      Comments: Edentulous  Eyes:      General: No scleral icterus.     Extraocular Movements: Extraocular movements intact.      Conjunctiva/sclera: Conjunctivae normal.   Cardiovascular:      Rate and Rhythm: Normal rate and regular rhythm.      Pulses: Normal pulses.      Heart sounds: Normal heart sounds.   Pulmonary:      Effort: Pulmonary effort is normal.      Breath sounds: Normal breath sounds. "   Abdominal:      Palpations: Abdomen is soft.   Musculoskeletal:         General: Normal range of motion.      Cervical back: Normal range of motion and neck supple.      Right lower leg: No edema.      Left lower leg: No edema.   Skin:     General: Skin is warm and dry.      Coloration: Skin is not jaundiced.   Neurological:      General: No focal deficit present.      Mental Status: She is alert. Mental status is at baseline.   Psychiatric:         Behavior: Behavior normal.         ED Course (with Medical Decision Making)    Pt seen and examined by me.  RN and EPIC notes reviewed.       Patient with alcoholism presenting with requests to help assist with alcohol withdrawal.  She would like to try to do this at home although in the past she has gone to detox.  She states that she is motivated.    It is very unfortunate that she has relapsed many many times after 20 years of sobriety.  I did try to talk to her about getting some increased mental health assistance as this may help her stay sober.  She apparently has a lot of friends and family that can help her out.  In the ED, her breathalyzer is 0.08.    In the ED, she was given IM Ativan and ODT Zofran.    I will try to give her the benefit of the doubt and prescribe her Ativan and gabapentin to use for her withdrawal symptoms.  She is a small lady so I am going to go for lower doses.  Her  was comfortable with trying to manage her at home.  We talked about calling 911 at anytime for concerns.  I will send a message to her primary care provider about her ED visit.      Procedures      No results found for this or any previous visit (from the past 24 hour(s)).    Medications   ondansetron (ZOFRAN ODT) ODT tab 4 mg (4 mg Oral Given 6/5/22 1052)   LORazepam (ATIVAN) injection 1 mg (1 mg Intramuscular Given 6/5/22 1052)       Assessments & Plan      I have reviewed the findings, diagnosis, plan and need for follow up with the patient.    Discharge Medication  List as of 6/5/2022 11:40 AM      START taking these medications    Details   gabapentin (NEURONTIN) 100 MG capsule Take 6 capsules (600 mg) by mouth 3 times daily for 3 days, THEN 3 capsules (300 mg) 3 times daily for 3 days, THEN 1 capsule (100 mg) 3 times daily for 3 days., Disp-90 capsule, R-0, E-Prescribe      LORazepam (ATIVAN) 1 MG tablet Take 1 tablet (1 mg) by mouth every 6 hours as needed for anxiety or withdrawal, Disp-20 tablet, R-0, E-Prescribe             Final diagnoses:   Alcoholism (H)   Alcohol withdrawal syndrome without complication (H)     Disposition: Patient discharged home in stable condition.  Plan as above.  Return for concerns.     Note: Chart documentation done in part with Dragon Voice Recognition software. Although reviewed after completion, some word and grammatical errors may remain.     6/5/2022   [facility] EMERGENCY DEPT     Sherice Lynne MD  06/06/22 0240

## 2022-06-06 ENCOUNTER — TELEPHONE (OUTPATIENT)
Dept: FAMILY MEDICINE | Facility: CLINIC | Age: 67
End: 2022-06-06

## 2022-06-06 NOTE — TELEPHONE ENCOUNTER
Patient returning Dr. Boggs's missed call from today    Please call patient back on phone #[phone] may leave a message if sh does not answer.    Richa Wood     [facility]

## 2022-06-07 NOTE — TELEPHONE ENCOUNTER
----- Message from Sherice Lynne MD sent at 6/6/2022  2:40 AM CDT -----  Hello,    I saw this patient in the ED for alcohol use.  She wants to try to detox outpatient on her own.  I am not sure if this will be successful but I gave her a little bit of medicine to try to manage.  I also encouraged her heavily to be in close contact with her therapist.  I am hoping you can check in with her and make sure she is managing.    Thank you,  Sherice     0

## 2022-06-07 NOTE — TELEPHONE ENCOUNTER
I called and left Shanika a voicemail today.  She has tried outpatient therapy so many times that I am not recommending it again.  I really think she needs inpatient residential treatment for alcohol addiction.  Would like her to set up an appointment with me or another provider as soon as possible.  She has quit so many times recently and continues to go back to it, her sobriety has not been long-lasting.  I am worried about her.  I know this is hard on her financially to be away from work, but she is not going to be able to work long term if she does not get her sobriety on track.   Soha Boggs MD

## 2022-06-07 NOTE — TELEPHONE ENCOUNTER
Patient scheduled appointment on June 14th. At 10:30 with Dr. Patton.  Thank you.  Claribel WINKLER

## 2022-06-09 RX ORDER — CLONAZEPAM 0.5 MG/1
0.5 TABLET ORAL AT BEDTIME
Qty: 30 TABLET | Refills: 0 | Status: SHIPPED | OUTPATIENT
Start: 2022-06-09 | End: 2022-06-14

## 2022-06-09 NOTE — TELEPHONE ENCOUNTER
Patient informed via mychart to schedule, 6/14 reminder if not read send letter.     Zakia Romero MA

## 2022-06-14 ENCOUNTER — OFFICE VISIT (OUTPATIENT)
Dept: FAMILY MEDICINE | Facility: CLINIC | Age: 67
End: 2022-06-14

## 2022-06-14 VITALS — BODY MASS INDEX: 19.4 KG/M2 | WEIGHT: 113 LBS | TEMPERATURE: 97.2 F | OXYGEN SATURATION: 99 % | HEART RATE: 96 BPM

## 2022-06-14 DIAGNOSIS — D50.8 OTHER IRON DEFICIENCY ANEMIA: ICD-10-CM

## 2022-06-14 DIAGNOSIS — K21.00 GASTROESOPHAGEAL REFLUX DISEASE WITH ESOPHAGITIS WITHOUT HEMORRHAGE: ICD-10-CM

## 2022-06-14 DIAGNOSIS — F43.10 PTSD (POST-TRAUMATIC STRESS DISORDER): ICD-10-CM

## 2022-06-14 DIAGNOSIS — D75.89 MACROCYTOSIS WITHOUT ANEMIA: ICD-10-CM

## 2022-06-14 DIAGNOSIS — K76.0 FATTY LIVER: ICD-10-CM

## 2022-06-14 DIAGNOSIS — D51.9 ANEMIA DUE TO VITAMIN B12 DEFICIENCY, UNSPECIFIED B12 DEFICIENCY TYPE: ICD-10-CM

## 2022-06-14 DIAGNOSIS — Z12.11 SCREEN FOR COLON CANCER: ICD-10-CM

## 2022-06-14 DIAGNOSIS — R63.6 UNDERWEIGHT: ICD-10-CM

## 2022-06-14 DIAGNOSIS — E55.9 VITAMIN D DEFICIENCY: ICD-10-CM

## 2022-06-14 DIAGNOSIS — F10.21 ALCOHOL DEPENDENCE IN REMISSION (H): Primary | ICD-10-CM

## 2022-06-14 DIAGNOSIS — F41.9 ANXIETY: ICD-10-CM

## 2022-06-14 DIAGNOSIS — G47.00 INSOMNIA, UNSPECIFIED TYPE: ICD-10-CM

## 2022-06-14 DIAGNOSIS — G25.81 RESTLESS LEGS SYNDROME (RLS): ICD-10-CM

## 2022-06-14 LAB
ALBUMIN SERPL-MCNC: 2.7 G/DL (ref 3.4–5)
ALP SERPL-CCNC: 100 U/L (ref 40–150)
ALT SERPL W P-5'-P-CCNC: 17 U/L (ref 0–50)
ANION GAP SERPL CALCULATED.3IONS-SCNC: 4 MMOL/L (ref 3–14)
AST SERPL W P-5'-P-CCNC: 15 U/L (ref 0–45)
BILIRUB SERPL-MCNC: 0.3 MG/DL (ref 0.2–1.3)
BUN SERPL-MCNC: 7 MG/DL (ref 7–30)
CALCIUM SERPL-MCNC: 7.9 MG/DL (ref 8.5–10.1)
CHLORIDE BLD-SCNC: 111 MMOL/L (ref 94–109)
CO2 SERPL-SCNC: 27 MMOL/L (ref 20–32)
CREAT SERPL-MCNC: 0.86 MG/DL (ref 0.52–1.04)
DEPRECATED CALCIDIOL+CALCIFEROL SERPL-MC: 44 UG/L (ref 20–75)
ERYTHROCYTE [DISTWIDTH] IN BLOOD BY AUTOMATED COUNT: 13.7 % (ref 10–15)
GFR SERPL CREATININE-BSD FRML MDRD: 75 ML/MIN/1.73M2
GLUCOSE BLD-MCNC: 106 MG/DL (ref 70–99)
HCT VFR BLD AUTO: 33.2 % (ref 35–47)
HGB BLD-MCNC: 11.2 G/DL (ref 11.7–15.7)
MCH RBC QN AUTO: 35 PG (ref 26.5–33)
MCHC RBC AUTO-ENTMCNC: 33.7 G/DL (ref 31.5–36.5)
MCV RBC AUTO: 104 FL (ref 78–100)
PLATELET # BLD AUTO: 336 10E3/UL (ref 150–450)
POTASSIUM BLD-SCNC: 4 MMOL/L (ref 3.4–5.3)
PROT SERPL-MCNC: 6.3 G/DL (ref 6.8–8.8)
RBC # BLD AUTO: 3.2 10E6/UL (ref 3.8–5.2)
SODIUM SERPL-SCNC: 142 MMOL/L (ref 133–144)
TSH SERPL DL<=0.005 MIU/L-ACNC: 1.48 MU/L (ref 0.4–4)
VIT B12 SERPL-MCNC: 1029 PG/ML (ref 193–986)
WBC # BLD AUTO: 7.1 10E3/UL (ref 4–11)

## 2022-06-14 PROCEDURE — 90471 IMMUNIZATION ADMIN: CPT | Performed by: FAMILY MEDICINE

## 2022-06-14 PROCEDURE — 82607 VITAMIN B-12: CPT | Performed by: FAMILY MEDICINE

## 2022-06-14 PROCEDURE — 36415 COLL VENOUS BLD VENIPUNCTURE: CPT | Performed by: FAMILY MEDICINE

## 2022-06-14 PROCEDURE — 90677 PCV20 VACCINE IM: CPT | Performed by: FAMILY MEDICINE

## 2022-06-14 PROCEDURE — 80053 COMPREHEN METABOLIC PANEL: CPT | Performed by: FAMILY MEDICINE

## 2022-06-14 PROCEDURE — 99215 OFFICE O/P EST HI 40 MIN: CPT | Mod: 25 | Performed by: FAMILY MEDICINE

## 2022-06-14 PROCEDURE — 82306 VITAMIN D 25 HYDROXY: CPT | Performed by: FAMILY MEDICINE

## 2022-06-14 PROCEDURE — 85027 COMPLETE CBC AUTOMATED: CPT | Performed by: FAMILY MEDICINE

## 2022-06-14 PROCEDURE — 84443 ASSAY THYROID STIM HORMONE: CPT | Performed by: FAMILY MEDICINE

## 2022-06-14 RX ORDER — FOLIC ACID 1 MG/1
1000 TABLET ORAL DAILY
Qty: 90 TABLET | Refills: 3 | Status: SHIPPED | OUTPATIENT
Start: 2022-06-14 | End: 2023-03-06

## 2022-06-14 RX ORDER — CLONAZEPAM 0.5 MG/1
0.5 TABLET ORAL AT BEDTIME
Qty: 30 TABLET | Refills: 0 | Status: SHIPPED | OUTPATIENT
Start: 2022-06-14 | End: 2022-07-14

## 2022-06-14 ASSESSMENT — PATIENT HEALTH QUESTIONNAIRE - PHQ9
SUM OF ALL RESPONSES TO PHQ QUESTIONS 1-9: 5
10. IF YOU CHECKED OFF ANY PROBLEMS, HOW DIFFICULT HAVE THESE PROBLEMS MADE IT FOR YOU TO DO YOUR WORK, TAKE CARE OF THINGS AT HOME, OR GET ALONG WITH OTHER PEOPLE: SOMEWHAT DIFFICULT
SUM OF ALL RESPONSES TO PHQ QUESTIONS 1-9: 5

## 2022-06-14 ASSESSMENT — PAIN SCALES - GENERAL: PAINLEVEL: NO PAIN (0)

## 2022-06-14 NOTE — NURSING NOTE
Prior to immunization administration, verified patients identity using patient s name and date of birth. Please see Immunization Activity for additional information.     Screening Questionnaire for Adult Immunization    Are you sick today?   No   Do you have allergies to medications, food, a vaccine component or latex?   No   Have you ever had a serious reaction after receiving a vaccination?   No   Do you have a long-term health problem with heart, lung, kidney, or metabolic disease (e.g., diabetes), asthma, a blood disorder, no spleen, complement component deficiency, a cochlear implant, or a spinal fluid leak?  Are you on long-term aspirin therapy?   No   Do you have cancer, leukemia, HIV/AIDS, or any other immune system problem?   No   Do you have a parent, brother, or sister with an immune system problem?   No   In the past 3 months, have you taken medications that affect  your immune system, such as prednisone, other steroids, or anticancer drugs; drugs for the treatment of rheumatoid arthritis, Crohn s disease, or psoriasis; or have you had radiation treatments?   No   Have you had a seizure, or a brain or other nervous system problem?   No   During the past year, have you received a transfusion of blood or blood    products, or been given immune (gamma) globulin or antiviral drug?   No   For women: Are you pregnant or is there a chance you could become       pregnant during the next month?   No   Have you received any vaccinations in the past 4 weeks?   No     Immunization questionnaire answers were all negative.        Per orders of Dr. Boggs, injection of Prevnar 20 given by Brenda Varma CMA. Patient instructed to remain in clinic for 15 minutes afterwards, and to report any adverse reaction to me immediately.       Screening performed by Brenda Varma CMA on 6/14/2022 at 11:16 AM.

## 2022-06-14 NOTE — PROGRESS NOTES
Answers for HPI/ROS submitted by the patient on 6/14/2022  If you checked off any problems, how difficult have these problems made it for you to do your work, take care of things at home, or get along with other people?: Somewhat difficult  PHQ9 TOTAL SCORE: 5  What is the reason for your visit today? : Follow up  How many servings of fruits and vegetables do you eat daily?: 2-3  On average, how many sweetened beverages do you drink each day (Examples: soda, juice, sweet tea, etc.  Do NOT count diet or artificially sweetened beverages)?: 1  How many minutes a day do you exercise enough to make your heart beat faster?: 10 to 19  How many days a week do you exercise enough to make your heart beat faster?: 4  How many days per week do you miss taking your medication?: 0      Assessment & Plan       ICD-10-CM    1. Alcohol dependence in remission (H)  F10.21 Pneumococcal 20 Valent Conjugate (Prevnar 20)     Comprehensive metabolic panel (BMP + Alb, Alk Phos, ALT, AST, Total. Bili, TP)     Vitamin B12     Comprehensive metabolic panel (BMP + Alb, Alk Phos, ALT, AST, Total. Bili, TP)     Vitamin B12   2. PTSD (post-traumatic stress disorder)  F43.10 Comprehensive metabolic panel (BMP + Alb, Alk Phos, ALT, AST, Total. Bili, TP)     Comprehensive metabolic panel (BMP + Alb, Alk Phos, ALT, AST, Total. Bili, TP)   3. Anxiety  F41.9 clonazePAM (KLONOPIN) 0.5 MG tablet   4. Underweight  R63.6 TSH with free T4 reflex     TSH with free T4 reflex   5. Fatty liver  K76.0 Comprehensive metabolic panel (BMP + Alb, Alk Phos, ALT, AST, Total. Bili, TP)     Comprehensive metabolic panel (BMP + Alb, Alk Phos, ALT, AST, Total. Bili, TP)   6. Anemia due to vitamin B12 deficiency, unspecified B12 deficiency type  D51.9 Pneumococcal 20 Valent Conjugate (Prevnar 20)     CBC with platelets     Vitamin B12     CBC with platelets     Vitamin B12   7. Macrocytosis without anemia  D75.89 folic acid (FOLVITE) 1 MG tablet     CBC with platelets      CBC with platelets   8. Other iron deficiency anemia  D50.8 CBC with platelets     CBC with platelets   9. Restless legs syndrome (RLS)  G25.81 clonazePAM (KLONOPIN) 0.5 MG tablet   10. Vitamin D deficiency  E55.9 Vitamin D Deficiency     Vitamin D Deficiency   11. Gastroesophageal reflux disease with esophagitis without hemorrhage  K21.00 Comprehensive metabolic panel (BMP + Alb, Alk Phos, ALT, AST, Total. Bili, TP)     Comprehensive metabolic panel (BMP + Alb, Alk Phos, ALT, AST, Total. Bili, TP)   12. Insomnia, unspecified type  G47.00 clonazePAM (KLONOPIN) 0.5 MG tablet   13. Screen for colon cancer  Z12.11 Adult Gastro Ref - Procedure Only      I advised Shanika that I recommend inpatient treatment for her.  I have recommended this in the past and I recommended again now.  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  She is aware of that but states that she is not going to drink anymore.  We discussed the difficulty in quitting when there are underlying disorders that she is treating with alcohol, such as PTSD and anxiety.  Her  is not effectively supportive.  He says that he is going to help her by not using drugs anymore, but he enables her.  She tells me that he has agreed to give up drugs as well because he saw her so close to death.  I told her I hope this is true that they both remain abstinent from here on but my concern remains, based on her past pattern of behavior, that she does not have all of the coping skills necessary to avoid future use.  She assures me she is never going to drink again.    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  I did renew her Klonopin because the previous prescription from June 9 was never received by the pharmacy.  I  am checking her routine labs today because of her history of anemia, iron deficiency, vitamin D deficiency B12 deficiency and liver disease.  I am checking a thyroid level on her because of her underweight status although she has no history of thyroid disease.    She is also due for routine preventive care and we did schedule a physical for her in September.  She will be due for a Pap smear at that time.  Also I gave her pneumonia vaccine today and referred her for colonoscopy which is due.    Will notify her with lab results.  I did not make any medication changes today.    45 minutes spent on the date of the encounter doing chart review, history and exam, documentation and further activities per the note       Return in about 12 weeks (around 9/6/2022) for Routine preventive.    Soha Boggs MD  [facility] [place] BETTY Voss is a 66 year old who presents for the following health issues     History of Present Illness       Reason for visit:  Follow up    She eats 2-3 servings of fruits and vegetables daily.She consumes 1 sweetened beverage(s) daily.She exercises with enough effort to increase her heart rate 10 to 19 minutes per day.  She exercises with enough effort to increase her heart rate 4 days per week.   She is taking medications regularly.    Today's PHQ-9         PHQ-9 Total Score: 5    PHQ-9 Q9 Thoughts of better off dead/self-harm past 2 weeks :   Not at all    How difficult have these problems made it for you to do your work, take care of things at home, or get along with other people: Somewhat difficult       ED/UC Followup:    Facility:  [facility]  Date of visit: 6/5/2022  Reason for visit: Alcoolism  Current Status: Patient states that things are really good.     She has a history of alcohol abuse.  She and her  were recently on vacation and she was using alcohol before and during the trip.  She has a lot of anxiety and states that her   "is not easy to travel with.  He wanted her to put his drugs in her mouth so they could get through security screening.  On the shuttle to their lodging she lost her pills so she went through withdrawal and ended up having seizures and ending up in the emergency room on her vacation in [place].  She also was in the emergency room here on June 5 after returning from her vacation.    She states that she drank alcohol because she mentioned to her  that she wished she could have a drink and her  encouraged her to go out to dinner with him and celebrate and that she \"earned it\".  Then while on their vacation she wanted to try a bunch of different drinks because they looked good and she wanted to enjoy her vacation.    She is feeling better.  She states she is back to feeling good, having no physical issues right now.  She will need a refill on her Klonopin.  I filled on June 9 but they did not receive it at the pharmacy.  She has only a couple pills left.  She is not taking her trazodone except for very rarely.  She has no trouble falling asleep.  She is working on maintaining her weight, drinking protein shakes and has gained 1 pound.  She is using her BuSpar at least 2 times a day, usually 3 but sometimes forgets the dose.  She denies any stomach issues at this time.    She does have a history of significant vitamin D deficiency as well as B12 deficiency.  She is on B12 shots and needs to  her refill.  She also has been high on B12 in the past and is due for recheck.    Review of Systems   Constitutional, HEENT, cardiovascular, pulmonary, gi and gu systems are negative, except as otherwise noted.      Objective    Pulse 96   Temp 97.2  F (36.2  C) (Temporal)   Wt 51.3 kg (113 lb)   LMP  (LMP Unknown)   SpO2 99%   BMI 19.40 kg/m    Body mass index is 19.4 kg/m .  Physical Exam   Vitals noted.  Patient alert, oriented, and in no acute distress.   She does look good today.  Speech is clear. "  No smell of alcohol.  She is not shaky.  Her color is good.  CV:  RRR without murmur.   Respiratory:  Lungs clear to auscultation bilaterally.   Neck:  Supple without lymphadenopathy, JVD or masses.   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    Orders Placed This Encounter   Procedures     Pneumococcal 20 Valent Conjugate (Prevnar 20)     CBC with platelets     Comprehensive metabolic panel (BMP + Alb, Alk Phos, ALT, AST, Total. Bili, TP)     Vitamin B12     TSH with free T4 reflex     Vitamin D Deficiency     Adult Gastro Ref - Procedure Only

## 2022-06-15 NOTE — RESULT ENCOUNTER NOTE
Shanika, your vitamin D level is much better.  Please continue on your vitamin D.  Your vitamin B-12 level is also good.  Please continue on that.  Your thyroid is normal.  Your liver, kidneys, electrolytes, and blood count are all reasonably normal.  As long as you remain off alcohol, this should stay well.  Soha Boggs MD

## 2022-06-20 ENCOUNTER — HOSPITAL ENCOUNTER (OUTPATIENT)
Facility: CLINIC | Age: 67
End: 2022-06-20
Attending: SPECIALIST | Admitting: SPECIALIST

## 2022-06-20 ENCOUNTER — TELEPHONE (OUTPATIENT)
Dept: GASTROENTEROLOGY | Facility: CLINIC | Age: 67
End: 2022-06-20

## 2022-06-20 NOTE — TELEPHONE ENCOUNTER
Screening Questions  BlueKIND OF PREP RedLOCATION [review exclusion criteria] GreenSEDATION TYPE  1. Are you active on mychart? Yes- send info by mail    2. Ordering/Referring Provider: Soha Boggs MD    3. What insurance is in the chart? united     4. Do you have a legal guardian or medical Power of ?  Are you able to give consent for your medical care? n   (Sedation review/consideration needed)    3.   BMI 19.40 [BMI OVER 40-EXTENDED PREP]  If greater than 40 review exclusion criteria [PAC APPT IF @ [facility]]      4. Have you had a positive covid test in the last 90 days? n     5.  Respiratory Screening :  [If yes to any of the following HOSPITAL setting only]     Do you use daily home oxygen? n  Do you have mod to severe Obstructive Sleep Apnea? n [[facility]]   Do you have Pulmonary Hypertension? n   Do you have UNCONTROLLED asthma? n      6.   Have you had a heart or lung transplant? n      7.   Are you currently on dialysis? n [ If yes, G-PREP & HOSPITAL setting only]     8.   Do you have chronic kidney disease? n[ If yes, G-PREP ]    9.   Have you had a stroke or Transient ischemic attack (TIA - aka  mini stroke ) within 6 months?  n(If yes, please review exclusion criteria)    10.   In the past 6 months, have you had any heart related issues including cardiomyopathy or heart attack? n          If yes, did it require cardiac stenting or other implantable device? n      11.   Do you have any implantable devices in your body (pacemaker, defib, LVAD)? n (If yes, please review exclusion criteria)    12.   Do you take nitroglycerin? n           If yes, how often? n  (if yes, HOSPITAL setting ONLY)    13.   Are you currently taking any blood thinners? n           [IF YES, INFORM PATIENT TO FOLLOW UP W/ ORDERING PROVIDER FOR BRIDGING INSTRUCTIONS]     14.   Do you have a diagnosis of diabetes? n [ If yes, G-PREP ]    15.   [FEMALES] Are you currently pregnant? n    If yes,  how many weeks? n    16.   Are you taking any prescription pain medications on a routine schedule?  n [ If yes, EXTENDED PREP.] [If yes, MAC]    17.   Do you have any chemical dependencies such as alcohol, street drugs, or methadone?  alch [If yes, MAC]    18.   Do you have any history of post-traumatic stress syndrome, severe anxiety or history of psychosis?  yes [If yes, MAC]    19.   Do you transfer independently?  y    20.  On a regular basis do you go 3-5 days between bowel movements? n [ If yes, EXTENDED PREP.]    21.   Preferred LOCAL Pharmacy for Pre Prescription      [facility] [address]         Scheduling Details      Caller :  Pavithra   (Please ask for phone number if not scheduled by patient)    Type of Procedure Scheduled: Lower Endoscopy [Colonoscopy]  Which Colonoscopy Prep was Sent?: [email or web address]    Surgeon: shamir  Date of Procedure: 09/20 1130am  Location:     Sedation Type: mac    Conscious Sedation- Needs  for 6 hours after the procedure  MAC/General-Needs  for 24 hours after procedure    Pre-op Required at [facility] [place], [place] and OR for MAC sedation: n  (advise patient they will need a pre-op prior to procedure -)      Informed patient they will need an adult  y  Cannot take any type of public or medical transportation alone    Pre-Procedure Covid test to be completed at [facility] or Externally: home    Confirmed Nurse will call to complete assessment y    Additional comments:    Info sent via Letter

## 2022-07-13 DIAGNOSIS — G25.81 RESTLESS LEGS SYNDROME (RLS): ICD-10-CM

## 2022-07-13 DIAGNOSIS — F41.9 ANXIETY: ICD-10-CM

## 2022-07-13 DIAGNOSIS — G47.00 INSOMNIA, UNSPECIFIED TYPE: ICD-10-CM

## 2022-07-14 DIAGNOSIS — D51.9 ANEMIA DUE TO VITAMIN B12 DEFICIENCY, UNSPECIFIED B12 DEFICIENCY TYPE: ICD-10-CM

## 2022-07-14 RX ORDER — CLONAZEPAM 0.5 MG/1
TABLET ORAL
Qty: 30 TABLET | Refills: 0 | Status: SHIPPED | OUTPATIENT
Start: 2022-07-14 | End: 2022-07-26

## 2022-07-14 NOTE — TELEPHONE ENCOUNTER
Pending Prescriptions:                       Disp   Refills    clonazePAM (KLONOPIN) 0.5 MG tablet [Pharm*30 tab*0        Sig: TAKE ONE TABLET BY MOUTH EVERY NIGHT AT BEDTIME    Last Written Prescription Date:  6/14/2022  Last Fill Quantity: 30,   # refills: 0  Last Office Visit: 6/22/2022  Future Office visit:    Next 5 appointments (look out 90 days)      Jul 20, 2022  1:00 PM  (Arrive by 12:40 PM)  [facility] [address] [place] [id number]  [phone]     Sep 02, 2022 11:20 AM  (Arrive by 11:00 AM)  Adult Preventative Visit with Soha [facility]gs MD  [facility] [address] [place] [id number]  [phone]             Routing refill request to provider for review/approval because:  Drug not on the [facility], [facility] or [facility] refill protocol or controlled substance      Familia Corcoran RN

## 2022-07-18 RX ORDER — CYANOCOBALAMIN 1000 UG/ML
INJECTION, SOLUTION INTRAMUSCULAR; SUBCUTANEOUS
Qty: 3 ML | Refills: 1 | Status: SHIPPED | OUTPATIENT
Start: 2022-07-18 | End: 2023-03-06

## 2022-07-25 DIAGNOSIS — G25.81 RESTLESS LEGS SYNDROME (RLS): ICD-10-CM

## 2022-07-25 DIAGNOSIS — F41.9 ANXIETY: ICD-10-CM

## 2022-07-25 DIAGNOSIS — G47.00 INSOMNIA, UNSPECIFIED TYPE: ICD-10-CM

## 2022-07-25 NOTE — TELEPHONE ENCOUNTER
clonazepam      Last Written Prescription Date:  7/14/2022  Last Fill Quantity: 30,   # refills: 0  Last Office Visit: 6/14/2022  Future Office visit:    Next 5 appointments (look out 90 days)      Aug 31, 2022  2:00 PM  (Arrive by 1:40 PM)  Provider Visit with Soha Boggs MD  [facility] [address] [place] [id number]  [phone]     Sep 02, 2022 11:20 AM  (Arrive by 11:00 AM)  Adult Preventative Visit with Soha Boggs MD  [facility] ([facility] ) [address] [place] [id number]  [phone]             Routing refill request to provider for review/approval because:  Drug not on the [facility], [facility] or [facility] refill protocol or controlled substance    Familia Corcoran RN

## 2022-07-26 ENCOUNTER — MYC MEDICAL ADVICE (OUTPATIENT)
Dept: FAMILY MEDICINE | Facility: CLINIC | Age: 67
End: 2022-07-26

## 2022-07-26 DIAGNOSIS — G25.81 RESTLESS LEGS SYNDROME (RLS): ICD-10-CM

## 2022-07-26 DIAGNOSIS — F41.9 ANXIETY: ICD-10-CM

## 2022-07-26 DIAGNOSIS — G47.00 INSOMNIA, UNSPECIFIED TYPE: ICD-10-CM

## 2022-07-26 RX ORDER — CLONAZEPAM 0.5 MG/1
TABLET ORAL
Qty: 30 TABLET | Refills: 0 | OUTPATIENT
Start: 2022-07-26

## 2022-07-26 RX ORDER — CLONAZEPAM 0.5 MG/1
0.5 TABLET ORAL AT BEDTIME
Qty: 30 TABLET | Refills: 0 | Status: SHIPPED | OUTPATIENT
Start: 2022-08-13 | End: 2022-09-23

## 2022-07-26 NOTE — TELEPHONE ENCOUNTER
Clonazepam was refilled 7/14 - instructions to take 1 nightly. Should not be out of medication. Rx denied.     Miladis Dalal PA-C  Covering for Soha Boggs

## 2022-07-26 NOTE — TELEPHONE ENCOUNTER
Med refilled x1 in Dr. Boggs's absence.  Will have staff notify patient that she can schedule her follow-up appointment with Dr. Boggs whenever she decides makes the most sense but will need to discuss her raloxifene options with Dr. Boggs at that follow-up visit.    Could also get a referral for Tylor Somers PharmD [facility] pharmacist to discuss medication pricing options.    Tristan Connolly MD

## 2022-09-21 DIAGNOSIS — G25.81 RESTLESS LEGS SYNDROME (RLS): ICD-10-CM

## 2022-09-21 DIAGNOSIS — G47.00 INSOMNIA, UNSPECIFIED TYPE: ICD-10-CM

## 2022-09-21 DIAGNOSIS — F41.9 ANXIETY: ICD-10-CM

## 2022-09-23 RX ORDER — CLONAZEPAM 0.5 MG/1
0.5 TABLET ORAL AT BEDTIME
Qty: 30 TABLET | Refills: 0 | Status: SHIPPED | OUTPATIENT
Start: 2022-09-23 | End: 2022-10-28

## 2022-09-28 ENCOUNTER — TELEPHONE (OUTPATIENT)
Dept: FAMILY MEDICINE | Facility: CLINIC | Age: 67
End: 2022-09-28

## 2022-09-28 NOTE — TELEPHONE ENCOUNTER
Patient calling to state she had to cancel her colonoscopy due to no ride, and having had bariatric surgery, she can not tolerate all the liquid/prep that she needs to complete. Patient then proceeded to state she has been having pain to left breast, noting lumps, and drainage from nipple. States this has been going on for 1 month. Informed she should be seen for evaluation of her breast. Patient stating her brother passed away recently and it would be best for the appointment next week. Appointment scheduled with Dr. Connolly on Monday,10/3 with a 9:20 am arrival. Catrina Payne LPN

## 2022-10-03 ENCOUNTER — OFFICE VISIT (OUTPATIENT)
Dept: FAMILY MEDICINE | Facility: CLINIC | Age: 67
End: 2022-10-03

## 2022-10-03 ENCOUNTER — E-CONSULT (OUTPATIENT)
Dept: GASTROENTEROLOGY | Facility: CLINIC | Age: 67
End: 2022-10-03
Payer: COMMERCIAL

## 2022-10-03 VITALS
HEART RATE: 105 BPM | WEIGHT: 111.5 LBS | DIASTOLIC BLOOD PRESSURE: 60 MMHG | SYSTOLIC BLOOD PRESSURE: 102 MMHG | TEMPERATURE: 96.8 F | BODY MASS INDEX: 19.04 KG/M2 | HEIGHT: 64 IN | OXYGEN SATURATION: 98 %

## 2022-10-03 DIAGNOSIS — Z86.0100 HISTORY OF COLONIC POLYPS: ICD-10-CM

## 2022-10-03 DIAGNOSIS — Z23 NEED FOR PROPHYLACTIC VACCINATION AND INOCULATION AGAINST INFLUENZA: ICD-10-CM

## 2022-10-03 DIAGNOSIS — N64.4 BREAST PAIN, LEFT: ICD-10-CM

## 2022-10-03 DIAGNOSIS — N64.52 BREAST DISCHARGE: Primary | ICD-10-CM

## 2022-10-03 DIAGNOSIS — Z98.84 STATUS POST GASTRIC BYPASS FOR OBESITY: ICD-10-CM

## 2022-10-03 DIAGNOSIS — Z23 HIGH PRIORITY FOR 2019-NCOV VACCINE: ICD-10-CM

## 2022-10-03 PROCEDURE — 90471 IMMUNIZATION ADMIN: CPT | Performed by: FAMILY MEDICINE

## 2022-10-03 PROCEDURE — 90662 IIV NO PRSV INCREASED AG IM: CPT | Performed by: FAMILY MEDICINE

## 2022-10-03 PROCEDURE — 0124A COVID-19,PF,PFIZER BOOSTER BIVALENT: CPT | Performed by: FAMILY MEDICINE

## 2022-10-03 PROCEDURE — 99451 NTRPROF PH1/NTRNET/EHR 5/>: CPT | Performed by: INTERNAL MEDICINE

## 2022-10-03 PROCEDURE — 91312 COVID-19,PF,PFIZER BOOSTER BIVALENT: CPT | Performed by: FAMILY MEDICINE

## 2022-10-03 PROCEDURE — 99213 OFFICE O/P EST LOW 20 MIN: CPT | Mod: 25 | Performed by: FAMILY MEDICINE

## 2022-10-03 ASSESSMENT — PAIN SCALES - GENERAL: PAINLEVEL: MILD PAIN (2)

## 2022-10-03 NOTE — PROGRESS NOTES
10/3/2022     E-Consult has been accepted.    Interprofessional consultation requested by:  Tristan Connolly MD      Clinical Question/Purpose: MY CLINICAL QUESTION IS: best option for colon cancer screening in patient who has known polyps, recommended 3 years follow-up colonoscopy and cannot tolerate traditional bowel prep options based on history of gastric bypass and inability to take in large fluid volumes.    Patient assessment and information reviewed:   1. History of small tubular adenomas of the colon (3 in 2017). Recommendation was to have repeat colonoscopy in 3 years  2. Difficulty with volume of prep  3. History of gastric bypass    Given the patient has had tubular adenomas in the past the best test for her is a colonoscopy. Stool tests (including FIT and cologuard) are not appropriate for patients with history of adenomatous colon polyps.    The smallest volume prep is the suprep. Pt can be prescribed this with anti-emetics (zofran) to help tolerate the prep. Insurance often does not cover suprep and patient may have to pay out of pocket.    If patient refuses any prep choices a CT colonography could be ordered but patient does also have to take some bowel prep with this. If CT colonography shows a polyp then the patient would require a colonoscopy    Recommendations:   -colonoscopy is preferred test given her history of tubular adenomas  -suprep is the lowest volume prep  -if patient is not able to do even that prep then a CT colonography can be ordered (not ideal given known history of colon polyps) - some prep is needed prior to CT colonography to ensure a good exam.       The recommendations provided in this E-Consult are based on a review of clinical data pertinent to the clinical question presented, without a review of the patient's complete medical record or, the benefit of a comprehensive in-person or virtual patient evaluation. This consultation should not replace the clinical  judgement and evaluation of the provider ordering this E-Consult. Any new clinical issues, or changes in patient status since the filing of this E-Consult will need to be taken into account when assessing these recommendations. Please contact me if you have further questions.    My total time spent reviewing clinical information and formulating assessment was 15 minutes.        Feliciano Armendariz MD

## 2022-10-03 NOTE — PROGRESS NOTES
Assessment & Plan     ASSESSMENT/ORDERS:    ICD-10-CM    1. Breast discharge  N64.52 MA Diagnostic Digital Bilateral     US Breast Left Limited 1-3 Quadrants   2. Breast pain, left  N64.4 MA Diagnostic Digital Bilateral     US Breast Left Limited 1-3 Quadrants   3. Status post gastric bypass for obesity  Z98.84 Adult E-Consult to Gastroenterology (Outpt Provider to Specialist Written Question & Response)   4. History of colonic polyps  Z86.010 Adult E-Consult to Gastroenterology (Outpt Provider to Specialist Written Question & Response)   5. Need for prophylactic vaccination and inoculation against influenza  Z23 INFLUENZA, QUAD, HIGH DOSE, PF, 65YR + (FLUZONE HD)   6. High priority for 2019-nCoV vaccine  Z23 COVID-19,PF,PFIZER BOOSTER BIVALENT 12+Yrs     PLAN:  1.  Diagnostic mammogram and ultrasound ordered.  2.  Patient consent to e-consult with GI to find out best colon cancer screening options.                 No follow-ups on file.    Tristan Connolly MD  [facility]    Martha Voss is a 67 year old, presenting for the following health issues:  Breast Pain, Imm/Inj (Flu Shot), and Imm/Inj (COVID-19 VACCINE)      History of Present Illness       Reason for visit:  L breast    She eats 0-1 servings of fruits and vegetables daily.She consumes 0 sweetened beverage(s) daily.She exercises with enough effort to increase her heart rate 20 to 29 minutes per day.    She is taking medications regularly.           Discharge left breast 1 month ago now resolved.  Bloody, purulent with increased left breast pain.  All better except mild pain around nipple.  No masses noted. Previous breast implants, now removed.  Last mammogram/ultrasound 1 year ago and within normal limits.  Done for right breast mass and left brest changes.    She also wants to discuss colon cancer screening opitons.  Cannot take in large fluid volumes due to s/p gastric bypass.  She cannot complete prep.  She had polyps  "on 2017 colonoscopy adenomatous and recommended 3 year follow-up colonosocopy.     Review of Systems         Objective    /60   Pulse 105   Temp 96.8  F (36  C) (Temporal)   Ht 1.626 m (5' 4\")   Wt 50.6 kg (111 lb 8 oz)   LMP  (LMP Unknown)   SpO2 98%   BMI 19.14 kg/m    Body mass index is 19.14 kg/m .  Physical Exam  Constitutional:       Appearance: She is underweight.   Chest:   Breasts:      Right: No swelling, bleeding, inverted nipple, mass, nipple discharge, skin change, tenderness or axillary adenopathy.      Left: No swelling, bleeding, inverted nipple, mass, nipple discharge, skin change, tenderness or axillary adenopathy.         Lymphadenopathy:      Upper Body:      Right upper body: No axillary adenopathy.      Left upper body: No axillary adenopathy.   Neurological:      Mental Status: She is alert.                            "

## 2022-10-17 ENCOUNTER — HOSPITAL ENCOUNTER (OUTPATIENT)
Dept: MAMMOGRAPHY | Facility: CLINIC | Age: 67
Discharge: HOME | End: 2022-10-17
Attending: FAMILY MEDICINE
Payer: COMMERCIAL

## 2022-10-17 ENCOUNTER — HOSPITAL ENCOUNTER (OUTPATIENT)
Dept: ULTRASOUND IMAGING | Facility: CLINIC | Age: 67
Discharge: HOME | End: 2022-10-17
Attending: FAMILY MEDICINE
Payer: COMMERCIAL

## 2022-10-17 DIAGNOSIS — N64.52 BREAST DISCHARGE: ICD-10-CM

## 2022-10-17 DIAGNOSIS — N64.4 BREAST PAIN, LEFT: ICD-10-CM

## 2022-10-17 PROCEDURE — 76642 ULTRASOUND BREAST LIMITED: CPT | Mod: LT

## 2022-10-17 PROCEDURE — 77062 BREAST TOMOSYNTHESIS BI: CPT

## 2022-10-24 DIAGNOSIS — G47.00 INSOMNIA, UNSPECIFIED TYPE: ICD-10-CM

## 2022-10-24 DIAGNOSIS — F41.9 ANXIETY: ICD-10-CM

## 2022-10-24 DIAGNOSIS — G25.81 RESTLESS LEGS SYNDROME (RLS): ICD-10-CM

## 2022-10-25 NOTE — TELEPHONE ENCOUNTER
Routing refill request to provider for review/approval because:    Requested Prescriptions   Pending Prescriptions Disp Refills    clonazePAM (KLONOPIN) 0.5 MG tablet [Pharmacy Med Name: CLONAZEPAM 0.5MG TABS] 30 tablet 0     Sig: TAKE ONE TABLET BY MOUTH EVERY NIGHT AT BEDTIME       There is no refill protocol information for this order                 No

## 2022-10-28 RX ORDER — CLONAZEPAM 0.5 MG/1
TABLET ORAL
Qty: 30 TABLET | Refills: 0 | Status: SHIPPED | OUTPATIENT
Start: 2022-10-28 | End: 2022-12-06

## 2022-10-31 NOTE — RESULT ENCOUNTER NOTE
Shanika,  I see your results were discussed with you by the radiology team.  Please let me know if you have any questions.    Sincerely,  Dr. Connolly

## 2022-12-06 DIAGNOSIS — G25.81 RESTLESS LEGS SYNDROME (RLS): ICD-10-CM

## 2022-12-06 DIAGNOSIS — F41.9 ANXIETY: ICD-10-CM

## 2022-12-06 DIAGNOSIS — G47.00 INSOMNIA, UNSPECIFIED TYPE: ICD-10-CM

## 2022-12-06 NOTE — TELEPHONE ENCOUNTER
Requested Prescriptions   Pending Prescriptions Disp Refills    clonazePAM (KLONOPIN) 0.5 MG tablet 30 tablet 0     Sig: Take 1 tablet (0.5 mg) by mouth At Bedtime       There is no refill protocol information for this order          LATRICE TrujilloN, RN

## 2022-12-07 RX ORDER — CLONAZEPAM 0.5 MG/1
0.5 TABLET ORAL AT BEDTIME
Qty: 30 TABLET | Refills: 0 | Status: SHIPPED | OUTPATIENT
Start: 2022-12-07 | End: 2023-01-09

## 2022-12-10 ENCOUNTER — HOSPITAL ENCOUNTER (EMERGENCY)
Facility: CLINIC | Age: 67
Discharge: OTHER INSTITUTION | End: 2022-12-10
Attending: FAMILY MEDICINE | Admitting: FAMILY MEDICINE
Payer: COMMERCIAL

## 2022-12-10 VITALS
SYSTOLIC BLOOD PRESSURE: 110 MMHG | OXYGEN SATURATION: 93 % | HEART RATE: 77 BPM | TEMPERATURE: 98.8 F | WEIGHT: 114.5 LBS | DIASTOLIC BLOOD PRESSURE: 45 MMHG | RESPIRATION RATE: 18 BRPM | BODY MASS INDEX: 19.65 KG/M2

## 2022-12-10 DIAGNOSIS — F10.920 ALCOHOLIC INTOXICATION WITHOUT COMPLICATION (H): ICD-10-CM

## 2022-12-10 LAB — ALCOHOL BREATH TEST: 0.25 (ref 0–0.01)

## 2022-12-10 PROCEDURE — 82075 ASSAY OF BREATH ETHANOL: CPT | Performed by: FAMILY MEDICINE

## 2022-12-10 PROCEDURE — 99283 EMERGENCY DEPT VISIT LOW MDM: CPT | Performed by: FAMILY MEDICINE

## 2022-12-10 PROCEDURE — 99284 EMERGENCY DEPT VISIT MOD MDM: CPT | Performed by: FAMILY MEDICINE

## 2022-12-10 PROCEDURE — 250N000013 HC RX MED GY IP 250 OP 250 PS 637: Performed by: FAMILY MEDICINE

## 2022-12-10 RX ORDER — LORAZEPAM 1 MG/1
1 TABLET ORAL ONCE
Status: COMPLETED | OUTPATIENT
Start: 2022-12-10 | End: 2022-12-10

## 2022-12-10 RX ADMIN — LORAZEPAM 1 MG: 1 TABLET ORAL at 18:03

## 2022-12-10 RX ADMIN — LORAZEPAM 1 MG: 1 TABLET ORAL at 18:15

## 2022-12-10 ASSESSMENT — ACTIVITIES OF DAILY LIVING (ADL)
ADLS_ACUITY_SCORE: 35
ADLS_ACUITY_SCORE: 35

## 2022-12-10 NOTE — ED TRIAGE NOTES
"Pt reports she is wanting detox and can not do it on her own, her alcohol of choice is wine, she reports it is really bad now, she is sick and can not eat and she says \"I have to get sober and I need to go to detox\"      "

## 2022-12-10 NOTE — ED PROVIDER NOTES
History     Chief Complaint   Patient presents with     Alcohol Problem     HPI  Pavithra Raines is a 67 year old female who presents requesting to get help with her alcoholism.  Patient states that she has been trying to get off the alcohol but she just cannot.  When she stops drinking she starts getting really shaky and then has to start drinking right away again.  She has been in detox and treatment once before.  She states currently she is drinking about a box of wine a day and she has been adding fireball also.  Denies any pain anywhere.  Denies any shortness of breath.  Has not noticed any abdominal swelling or leg swelling.  Patient has been eating and drinking normally, denies any recent vomiting.  Patient has not noticed any melena or bloody stools.  Patient denies any extremity numbness or tingling.  Patient denies any drug use.  Patient states that when she stopped drinking before she has had a history of seizures.    Allergies:  Allergies   Allergen Reactions     Codeine Itching     Vicodin [Hydrocodone-Acetaminophen] Itching       Problem List:    Patient Active Problem List    Diagnosis Date Noted     Alcohol dependence in remission (H) 01/08/2022     Priority: Medium     Episode of recurrent major depressive disorder, unspecified depression episode severity (H) 01/08/2022     Priority: Medium     Seizure disorder (H) 01/08/2022     Priority: Medium     Fatty liver 12/13/2020     Priority: Medium     PTSD (post-traumatic stress disorder) 12/13/2020     Priority: Medium     Underweight 12/13/2020     Priority: Medium     Macrocytosis without anemia 12/13/2020     Priority: Medium     Age-related osteoporosis without current pathological fracture 10/15/2020     Priority: Medium     Anemia due to vitamin B12 deficiency, unspecified B12 deficiency type 12/04/2017     Priority: Medium     Insomnia, unspecified type 12/04/2017     Priority: Medium     Other iron deficiency anemia 12/04/2017      Priority: Medium     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 grade 3 04/07/2011     Priority: Medium     12/15/06 ASCUS + high risk HPV  colpo in 2007 showed high grade KAITLYN and LEEP was recommended  LEEP was done in June,2007 with CAN 2/3 confirmed  10/15/07 NIL  9/30/08 NIL/neg HPV  10/21/09 NIL/neg HPV  4/5/11 NIL- repeat in one year (4/2012 12/1/17 NIL pap/neg HR HPV. Will need pap screening until 2027, regardless of age.  Plan: cotest due 3 yr.       MDD (major depressive disorder) 04/05/2011     Priority: Medium     Needs to est primary care.       CARDIOVASCULAR SCREENING; LDL GOAL LESS THAN 160 10/31/2010     Priority: Medium     Genital herpes      Priority: Medium     IMO update changed this record. Please review for accuracy       Anxiety 08/27/2008     Priority: Medium     Patient is followed by MIMI GARZA for ongoing prescription of benzodiazepines.  All refills should be approved by this provider, or covering partner.    Medication(s): Clonazepam.   Maximum quantity per month: 30  Clinic visit frequency required:      Controlled substance agreement on file: No  Benzodiazepine use reviewed by psychiatry:  No    Last [facility] website verification:  done on 4/5/19  [email or web address]         Status post gastric bypass for obesity 03/25/2008     Priority: Medium     Performed at [facility].       Restless legs syndrome (RLS) 05/15/2007     Priority: Medium     Hypersomnia with sleep apnea 05/15/2007     Priority: Medium     Problem list name updated by automated process. Provider to review       Esophageal reflux 04/18/2007     Priority: Medium        Past Medical History:    Past Medical History:   Diagnosis Date     Abnormal Papanicolaou smear of cervix and cervical HPV 4/07     Genital herpes      History of colposcopy with cervical biopsy 06/2007     Hypersomnia with sleep apnea, unspecified      Other and unspecified ovarian cyst 2002 or so       Past Surgical  History:    Past Surgical History:   Procedure Laterality Date     CHOLECYSTECTOMY, OPEN  age 20s     COLONOSCOPY  03/21/2011    COMBINED COLONOSCOPY, REMOVE TUMOR/POLYP/LESION BY SNARE performed by JUAN FRANCISCO HERNANDEZ at  GI     COLONOSCOPY N/A 10/09/2017    polyps, repeat 3 years     COLPOSCOPY CERVIX, LOOP ELECTRODE BIOPSY, COMBINED  06/2007    CAN 2/3-patient requires yearly pap smears     dental pegs       ESOPHAGOSCOPY, GASTROSCOPY, DUODENOSCOPY (EGD), COMBINED N/A 02/09/2018    Procedure: COMBINED ESOPHAGOSCOPY, GASTROSCOPY, DUODENOSCOPY (EGD);  EGD;  Surgeon: Oneal Sanchez MD;  Location:  GI     GASTRIC BYPASS  03/25/2008    At [facility]     HC DILATION/CURETTAGE DIAG/THER NON OB  2002     HC ENLARGE BREAST WITH IMPLANT       HC LAPAROSCOPIC MYOMECTOMY, 1 - 4 INTRAMURAL MYOMAS =<250 GM  2002     HC REMOVAL OF BREAST IMPLANT       SALPINGO OOPHORECTOMY,R/L/MATTHEW  2002    Bilateral salpingectomy and unilateral oophorectomy       Family History:    Family History   Problem Relation Age of Onset     Chronic Obstructive Pulmonary Disease Mother      Unknown/Adopted Father         doesn't know birth father     Lung Cancer Brother      Family History Negative Other        Social History:  Marital Status:   [2]  Social History     Tobacco Use     Smoking status: Every Day     Packs/day: 2.00     Years: 10.00     Pack years: 20.00     Types: Cigarettes     Smokeless tobacco: Never     Tobacco comments:     2 ppd at this time (chain smoking) 10/2012   Vaping Use     Vaping Use: Every day     Substances: Nicotine, THC     Devices: Pre-filled or refillable cartridge   Substance Use Topics     Alcohol use: Yes     Comment: daily, drinks a box     Drug use: Yes     Types: Marijuana     Comment: medical        Medications:    busPIRone (BUSPAR) 15 MG tablet  calcium carbonate (OS-SHON 500 MG [place]. [place]) 1250 MG tablet  clonazePAM (KLONOPIN) 0.5 MG tablet  cyanocobalamin (CYANOCOBALAMIN) 1000 MCG/ML  "injection  escitalopram (LEXAPRO) 20 MG tablet  ferrous gluconate (FERGON) 324 (38 Fe) MG tablet  folic acid (FOLVITE) 1 MG tablet  gabapentin (NEURONTIN) 100 MG capsule  Insulin Syringe-Needle U-100 (B-D INSULIN SYRINGE) 25G X 1\" 1 ML MISC  levocetirizine (XYZAL) 5 MG tablet  LORazepam (ATIVAN) 1 MG tablet  multivitamin w/minerals (THERA-VIT-M) tablet  omeprazole (PRILOSEC) 40 MG DR capsule  raloxifene (EVISTA) 60 MG tablet  traZODone (DESYREL) 50 MG tablet  triamcinolone (KENALOG) 0.1 % external cream  vitamin D3 (VITAMIN D3) 50 mcg (2000 units) tablet          Review of Systems   All other systems reviewed and are negative.      Physical Exam   BP: 105/74  Pulse: 71  Temp: 98.8  F (37.1  C)  Resp: 18  Weight: 51.9 kg (114 lb 8 oz)  SpO2: 99 %      Physical Exam  Vitals and nursing note reviewed.   Constitutional:       General: She is not in acute distress.     Appearance: She is well-developed and well-nourished. She is not diaphoretic.      Comments: Patient is visibly intoxicated   HENT:      Mouth/Throat:      Mouth: Oropharynx is clear and moist.   Eyes:      Conjunctiva/sclera: Conjunctivae normal.   Cardiovascular:      Rate and Rhythm: Normal rate and regular rhythm.      Pulses: Intact distal pulses.      Heart sounds: Normal heart sounds. No murmur heard.    No friction rub. No gallop.   Pulmonary:      Effort: Pulmonary effort is normal. No respiratory distress.      Breath sounds: Normal breath sounds. No wheezing or rales.   Chest:      Chest wall: No tenderness.   Abdominal:      General: Bowel sounds are normal. There is no distension.      Palpations: Abdomen is soft. There is no mass.      Tenderness: There is no abdominal tenderness. There is no guarding.   Musculoskeletal:         General: No tenderness or edema. Normal range of motion.      Cervical back: Normal range of motion and neck supple.   Skin:     General: Skin is warm and dry.      Findings: No rash.   Neurological:      Mental " Status: She is alert and oriented to person, place, and time.   Psychiatric:         Mood and Affect: Mood and affect normal.         Judgment: Judgment normal.         ED Course                 Procedures           Results for orders placed or performed during the hospital encounter of 12/10/22 (from the past 24 hour(s))   Alcohol breath test POCT   Result Value Ref Range    Alcohol Breath Test 0.251 (A) 0.00 - 0.01       Medications - No data to display     This is a 67-year-old female who is presenting requesting to go to detox.  Vitals are stable exam is unremarkable.  I do not think we necessarily need to do any screening labs.  Patient is having no vomiting and can take oral well.  Patient's breathalyzer shows a blood alcohol of 0.251.  We did call over to [facility] and they do have a bed and will review her case.    Patient was accepted at [facility], patient will be transferred via ground ambulance at this time.  Patient remained stable and is safe for transfer.    Assessments & Plan (with Medical Decision Making)  Alcohol intoxication     I have reviewed the nursing notes.    I have reviewed the findings, diagnosis, plan and need for follow up with the patient.              12/10/2022   [facility] EMERGENCY DEPT     Aayush Pinto MD  12/10/22 0092

## 2022-12-26 ENCOUNTER — HEALTH MAINTENANCE LETTER (OUTPATIENT)
Age: 67
End: 2022-12-26

## 2023-01-06 DIAGNOSIS — G47.00 INSOMNIA, UNSPECIFIED TYPE: ICD-10-CM

## 2023-01-06 DIAGNOSIS — G25.81 RESTLESS LEGS SYNDROME (RLS): ICD-10-CM

## 2023-01-06 DIAGNOSIS — F41.9 ANXIETY: ICD-10-CM

## 2023-01-09 ENCOUNTER — MYC MEDICAL ADVICE (OUTPATIENT)
Dept: FAMILY MEDICINE | Facility: CLINIC | Age: 68
End: 2023-01-09

## 2023-01-09 DIAGNOSIS — L28.0 NEURODERMATITIS: ICD-10-CM

## 2023-01-09 RX ORDER — CLONAZEPAM 0.5 MG/1
0.5 TABLET ORAL AT BEDTIME
Qty: 30 TABLET | Refills: 0 | Status: SHIPPED | OUTPATIENT
Start: 2023-01-09 | End: 2023-02-10

## 2023-01-09 NOTE — TELEPHONE ENCOUNTER
Clonazepam refill sent to pharmacy but patient needs a follow-up with PCP. Please ensure this is scheduled upon PCP return.     Miladis Dalal PA-C

## 2023-01-10 NOTE — TELEPHONE ENCOUNTER
"Requested Prescriptions   Pending Prescriptions Disp Refills    levocetirizine (XYZAL) 5 MG tablet [Pharmacy Med Name: LEVOCETIRIZINE 5MG TAB] 90 tablet 3     Sig: TAKE 1 TABLET (5 MG) BY MOUTH EVERY MORNING       Antihistamines Protocol Failed - 1/9/2023  8:50 AM        Failed - Patient is 3-64 years of age     Apply weight-based dosing for peds patients age 3 - 12 years of age.    Forward request to provider for patients under the age of 3 or over the age of 64.          Passed - Recent (12 mo) or future (30 days) visit within the authorizing provider's specialty     Patient has had an office visit with the authorizing provider or a provider within the authorizing providers department within the previous 12 mos or has a future within next 30 days. See \"Patient Info\" tab in inbasket, or \"Choose Columns\" in Meds & Orders section of the refill encounter.              Passed - Medication is active on med list             "

## 2023-01-12 RX ORDER — LEVOCETIRIZINE DIHYDROCHLORIDE 5 MG/1
5 TABLET, FILM COATED ORAL EVERY MORNING
Qty: 90 TABLET | Refills: 0 | Status: SHIPPED | OUTPATIENT
Start: 2023-01-12 | End: 2023-06-01

## 2023-02-10 DIAGNOSIS — G47.00 INSOMNIA, UNSPECIFIED TYPE: ICD-10-CM

## 2023-02-10 DIAGNOSIS — F41.9 ANXIETY: ICD-10-CM

## 2023-02-10 DIAGNOSIS — G25.81 RESTLESS LEGS SYNDROME (RLS): ICD-10-CM

## 2023-02-10 RX ORDER — CLONAZEPAM 0.5 MG/1
TABLET ORAL
Qty: 30 TABLET | Refills: 0 | Status: SHIPPED | OUTPATIENT
Start: 2023-02-10 | End: 2023-03-06

## 2023-02-25 DIAGNOSIS — D50.8 OTHER IRON DEFICIENCY ANEMIA: ICD-10-CM

## 2023-02-25 DIAGNOSIS — M81.0 AGE-RELATED OSTEOPOROSIS WITHOUT CURRENT PATHOLOGICAL FRACTURE: ICD-10-CM

## 2023-02-25 DIAGNOSIS — K28.9 GASTROJEJUNAL ULCER: ICD-10-CM

## 2023-02-28 RX ORDER — FERROUS GLUCONATE 324(38)MG
324 TABLET ORAL
Qty: 90 TABLET | Refills: 1 | Status: SHIPPED | OUTPATIENT
Start: 2023-02-28 | End: 2024-05-06

## 2023-02-28 NOTE — TELEPHONE ENCOUNTER
Marc  [facility]  Routing refill request to provider for review/approval because:   Failed - Dexa on file within past 2 years    Please review last Dexa result.          Fergon  Prescription approved per [facility] Refill Protocol.    Amanda Pickering RN

## 2023-03-01 RX ORDER — OMEPRAZOLE 40 MG/1
CAPSULE, DELAYED RELEASE ORAL
Qty: 180 CAPSULE | Refills: 0 | Status: ON HOLD | OUTPATIENT
Start: 2023-03-01 | End: 2023-12-21

## 2023-03-01 RX ORDER — RALOXIFENE HYDROCHLORIDE 60 MG/1
TABLET, FILM COATED ORAL
Qty: 90 TABLET | Refills: 0 | Status: SHIPPED | OUTPATIENT
Start: 2023-03-01 | End: 2023-06-16

## 2023-03-06 ENCOUNTER — TELEPHONE (OUTPATIENT)
Dept: FAMILY MEDICINE | Facility: CLINIC | Age: 68
End: 2023-03-06

## 2023-03-06 ENCOUNTER — VIRTUAL VISIT (OUTPATIENT)
Dept: FAMILY MEDICINE | Facility: CLINIC | Age: 68
End: 2023-03-06

## 2023-03-06 DIAGNOSIS — G40.909 SEIZURE DISORDER (H): ICD-10-CM

## 2023-03-06 DIAGNOSIS — F33.9 EPISODE OF RECURRENT MAJOR DEPRESSIVE DISORDER, UNSPECIFIED DEPRESSION EPISODE SEVERITY (H): ICD-10-CM

## 2023-03-06 DIAGNOSIS — G25.81 RESTLESS LEGS SYNDROME (RLS): ICD-10-CM

## 2023-03-06 DIAGNOSIS — G47.00 INSOMNIA, UNSPECIFIED TYPE: ICD-10-CM

## 2023-03-06 DIAGNOSIS — D51.9 ANEMIA DUE TO VITAMIN B12 DEFICIENCY, UNSPECIFIED B12 DEFICIENCY TYPE: ICD-10-CM

## 2023-03-06 DIAGNOSIS — F41.9 ANXIETY: Primary | ICD-10-CM

## 2023-03-06 DIAGNOSIS — D75.89 MACROCYTOSIS WITHOUT ANEMIA: ICD-10-CM

## 2023-03-06 DIAGNOSIS — F10.21 ALCOHOL DEPENDENCE IN REMISSION (H): ICD-10-CM

## 2023-03-06 PROCEDURE — 99214 OFFICE O/P EST MOD 30 MIN: CPT | Mod: 95 | Performed by: FAMILY MEDICINE

## 2023-03-06 RX ORDER — BUSPIRONE HYDROCHLORIDE 15 MG/1
15 TABLET ORAL 3 TIMES DAILY
Qty: 90 TABLET | Refills: 3 | Status: SHIPPED | OUTPATIENT
Start: 2023-03-06 | End: 2024-05-06

## 2023-03-06 RX ORDER — SYRINGE AND NEEDLE,INSULIN,1ML 25GX1"
SYRINGE, EMPTY DISPOSABLE MISCELLANEOUS
Qty: 3 EACH | Refills: 3 | Status: SHIPPED | OUTPATIENT
Start: 2023-03-06 | End: 2024-05-06

## 2023-03-06 RX ORDER — TRAZODONE HYDROCHLORIDE 50 MG/1
25 TABLET, FILM COATED ORAL AT BEDTIME
Qty: 30 TABLET | Refills: 5 | Status: SHIPPED | OUTPATIENT
Start: 2023-03-06 | End: 2023-10-26

## 2023-03-06 RX ORDER — ESCITALOPRAM OXALATE 20 MG/1
30 TABLET ORAL DAILY
Qty: 135 TABLET | Refills: 3 | Status: SHIPPED | OUTPATIENT
Start: 2023-03-06 | End: 2024-02-01

## 2023-03-06 RX ORDER — CLONAZEPAM 0.5 MG/1
TABLET ORAL
Qty: 30 TABLET | Refills: 0 | Status: SHIPPED | OUTPATIENT
Start: 2023-03-06 | End: 2023-04-24

## 2023-03-06 RX ORDER — CYANOCOBALAMIN 1000 UG/ML
INJECTION, SOLUTION INTRAMUSCULAR; SUBCUTANEOUS
Qty: 3 ML | Refills: 3 | Status: ON HOLD | OUTPATIENT
Start: 2023-03-06 | End: 2023-10-24

## 2023-03-06 RX ORDER — VENLAFAXINE HYDROCHLORIDE 37.5 MG/1
37.5 CAPSULE, EXTENDED RELEASE ORAL DAILY
Qty: 30 CAPSULE | Refills: 1 | Status: SHIPPED | OUTPATIENT
Start: 2023-03-06 | End: 2023-04-05

## 2023-03-06 RX ORDER — FOLIC ACID 1 MG/1
1000 TABLET ORAL DAILY
Qty: 90 TABLET | Refills: 3 | Status: SHIPPED | OUTPATIENT
Start: 2023-03-06 | End: 2024-02-26

## 2023-03-06 NOTE — TELEPHONE ENCOUNTER
Patient returning call to make a 1 month follow up appointment with Dr. Boggs. Virtual/phone visit has been scheduled on 4/3 at 1:00 pm for follow up on her anxiety and depression. Catrina Payne LPN
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
## 2023-03-10 ENCOUNTER — TELEPHONE (OUTPATIENT)
Dept: FAMILY MEDICINE | Facility: CLINIC | Age: 68
End: 2023-03-10

## 2023-03-10 NOTE — TELEPHONE ENCOUNTER
Reason for Call:  Patient calling to find out where to schedule her appointment for a follow up mammogram that was done in October of 2022.      Patient was so intoxicated I did not even understand her, she was very confused.  But what  I did make of the conversation she wants to know where she needs to go follow up for the 10/22 results.... she thinks [facility] down by or [facility]    Please call patient for any information you can find for her follow up.    Phone Number Patient can be reached at:  [phone]    Best Time: Anytime    Can we leave a detailed message on this number? YES    Call taken on 3/10/2023 at 4:05 PM by Richa Wood

## 2023-03-21 NOTE — TELEPHONE ENCOUNTER
Called patient and left a message on phone #  [phone].       [facility]

## 2023-03-21 NOTE — TELEPHONE ENCOUNTER
She has orders in for a breast MRI, hasn't yet been completed and that was the recommended back in October. Please call and help direct her how to schedule this, I suspect she can do this in [place].   Soha Boggs MD

## 2023-03-27 NOTE — TELEPHONE ENCOUNTER
Called patient on phone number [phone] and left a second message.  Closing the encounter patient has not returned our calls.       [facility]

## 2023-04-03 ENCOUNTER — VIRTUAL VISIT (OUTPATIENT)
Dept: FAMILY MEDICINE | Facility: CLINIC | Age: 68
End: 2023-04-03

## 2023-04-03 DIAGNOSIS — F33.9 EPISODE OF RECURRENT MAJOR DEPRESSIVE DISORDER, UNSPECIFIED DEPRESSION EPISODE SEVERITY (H): ICD-10-CM

## 2023-04-03 DIAGNOSIS — F41.9 ANXIETY: Primary | ICD-10-CM

## 2023-04-03 PROCEDURE — 99214 OFFICE O/P EST MOD 30 MIN: CPT | Mod: 95 | Performed by: FAMILY MEDICINE

## 2023-04-03 ASSESSMENT — PATIENT HEALTH QUESTIONNAIRE - PHQ9
SUM OF ALL RESPONSES TO PHQ QUESTIONS 1-9: 8
10. IF YOU CHECKED OFF ANY PROBLEMS, HOW DIFFICULT HAVE THESE PROBLEMS MADE IT FOR YOU TO DO YOUR WORK, TAKE CARE OF THINGS AT HOME, OR GET ALONG WITH OTHER PEOPLE: SOMEWHAT DIFFICULT
SUM OF ALL RESPONSES TO PHQ QUESTIONS 1-9: 8

## 2023-04-03 ASSESSMENT — ANXIETY QUESTIONNAIRES
GAD7 TOTAL SCORE: 9
2. NOT BEING ABLE TO STOP OR CONTROL WORRYING: SEVERAL DAYS
8. IF YOU CHECKED OFF ANY PROBLEMS, HOW DIFFICULT HAVE THESE MADE IT FOR YOU TO DO YOUR WORK, TAKE CARE OF THINGS AT HOME, OR GET ALONG WITH OTHER PEOPLE?: NOT DIFFICULT AT ALL
1. FEELING NERVOUS, ANXIOUS, OR ON EDGE: SEVERAL DAYS
7. FEELING AFRAID AS IF SOMETHING AWFUL MIGHT HAPPEN: NEARLY EVERY DAY
4. TROUBLE RELAXING: NOT AT ALL
IF YOU CHECKED OFF ANY PROBLEMS ON THIS QUESTIONNAIRE, HOW DIFFICULT HAVE THESE PROBLEMS MADE IT FOR YOU TO DO YOUR WORK, TAKE CARE OF THINGS AT HOME, OR GET ALONG WITH OTHER PEOPLE: NOT DIFFICULT AT ALL
GAD7 TOTAL SCORE: 9
3. WORRYING TOO MUCH ABOUT DIFFERENT THINGS: NEARLY EVERY DAY
GAD7 TOTAL SCORE: 9
6. BECOMING EASILY ANNOYED OR IRRITABLE: SEVERAL DAYS
5. BEING SO RESTLESS THAT IT IS HARD TO SIT STILL: NOT AT ALL
7. FEELING AFRAID AS IF SOMETHING AWFUL MIGHT HAPPEN: NEARLY EVERY DAY

## 2023-04-03 ASSESSMENT — ENCOUNTER SYMPTOMS: COUGH: 1
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
## 2023-04-05 RX ORDER — VENLAFAXINE HYDROCHLORIDE 37.5 MG/1
37.5 CAPSULE, EXTENDED RELEASE ORAL DAILY
Qty: 30 CAPSULE | Refills: 1 | Status: SHIPPED | OUTPATIENT
Start: 2023-04-05 | End: 2023-05-12

## 2023-04-10 ENCOUNTER — TELEPHONE (OUTPATIENT)
Dept: FAMILY MEDICINE | Facility: CLINIC | Age: 68
End: 2023-04-10
Payer: MEDICARE

## 2023-04-10 DIAGNOSIS — Z63.6 CAREGIVER BURDEN: Primary | ICD-10-CM

## 2023-04-10 SDOH — SOCIAL STABILITY - SOCIAL INSECURITY: DEPENDENT RELATIVE NEEDING CARE AT HOME: Z63.6

## 2023-04-10 NOTE — TELEPHONE ENCOUNTER
Provider asked this RN to call and check on patient. Wanting RN to get more information on her stressful situation and grandbaby. Wanting to let patient know provider will be calling her later this afternoon.

## 2023-04-10 NOTE — TELEPHONE ENCOUNTER
I did speak with Shanika today, and I advised her that I think the next step would be to get some  involvement.  She thinks maybe the baby girl, Milla, is starting to improve a little bit with her motor skills but Shanika really does not feel adequately prepared to handle this baby and the parents are not doing their part.  For example they went to the grocery store and Shanika asked them to get milk for the baby because they are out, and they got doughnuts but no milk for the baby.  They really do not seem to want to be involved in her care.  The other grandmother that had been involved lives in [place] and all the baby's records are in [place].    I did place a call to our social work care coordination and also placed a referral in epic so hopefully someone can reach out to Shanika and give her some resources.  Soha Boggs MD

## 2023-04-10 NOTE — TELEPHONE ENCOUNTER
RN did call and speak with patient.  Patient states she has quite the situation going on. Patient had her granddaughter and the parents show up on their door and ask for help. Came from [place]. No socks, no shoes, no jacket, etc. Patient states the grand baby is 1 yo and is very delayed in development. She will only lay on her back. She does not walk, does not crawl.  She does not get stimulation unless patient gives her it. She said baby is doing well. Eating 5-6 times her day. Always has a sippy cup. Has many wet diapers a day. Denies any sickness, denies fevers or cough. Denies feeling baby is having any emergent issues.  She has been propping baby up in a high chair to get her sat up and to eat. Patient denies knowing what patient's medical background is. She could find out more. She is in contact with the baby's other grandmother, Fozia, in [place]. The baby's mother and father figure are staying with patient and her , though they do not really care for the baby, they leave it up to the patient. Patient believes the baby is supposed to be on a special formula and doing therapy, all of this was left behind in [place]. The parents do not work. Patient does have other grandmother's phone number but did not have it readily available. She is not sure where to get or how to get her records. Patient states she (patient) is doing ok, stressed out, not quite sure what to do other than care for grand baby as she needs her. Patient does state she has never had a child to know for sure that she is doing everything.     Advised patient that Dr. Boggs will call her later today and discuss all of this with her and to also discuss patient's stress level and how she is doing.     Patient verbalized understanding. She will gladly await a call back from provider.

## 2023-04-11 ENCOUNTER — PATIENT OUTREACH (OUTPATIENT)
Dept: CARE COORDINATION | Facility: CLINIC | Age: 68
End: 2023-04-11
Payer: MEDICARE

## 2023-04-11 NOTE — PROGRESS NOTES
Clinic Care Coordination Contact  [facility]/Voicemail    Referral Source: PCP  Clinical Data: Care Coordinator Outreach  Outreach attempted x 1.  Left message on patient's voicemail with call back information and requested return call.  Plan: Care Coordinator will send care coordination introduction letter with care coordinator contact information and explanation of care coordination services via [employer]. Care Coordinator will try to reach patient again in 1-2 business days.    ANUP Walker  [facility] - Care Coordinator   4/11/2023   9:54 AM  [phone]

## 2023-04-12 NOTE — PROGRESS NOTES
Clinic Care Coordination Contact  [facility]/Voicemail    Referral Source: PCP  Clinical Data: Care Coordinator Outreach  Outreach attempted x 2.  Left message on patient's voicemail with call back information and requested return call.  Plan: Care Coordinator sent care coordination introduction letter on 4/11/23 via [employer]. Care Coordinator will do no further outreaches at this time.    ANUP Walker  [facility] - Care Coordinator   4/12/2023   1:55 PM  [phone]

## 2023-04-13 NOTE — PROGRESS NOTES
Clinic Care Coordination Contact  Patient returned call.  She talked about her situation with the baby and parent appearing at her door step.  At this time the baby is with her spouses ex-wife.  Discussed option and did provide the [facility] number.     Pt noted challenges with finances and mental health.  She as no insurance noted and she does have Medicare.  Encouraged  Her to call billing to update this information so that they bill correctly.      Scheduled assessment for care coordination for 2 pm on 4/18/23.     Sent questionnaires, billing office, and [place]  via [employer].      Will call at planned outreach date/time.     ANUP Walker  [facility] - Care Coordinator   4/13/2023   1:14 PM  [phone]

## 2023-04-18 ENCOUNTER — PATIENT OUTREACH (OUTPATIENT)
Dept: FAMILY MEDICINE | Facility: CLINIC | Age: 68
End: 2023-04-18
Payer: MEDICARE

## 2023-04-18 SDOH — ECONOMIC STABILITY: TRANSPORTATION INSECURITY
IN THE PAST 12 MONTHS, HAS THE LACK OF TRANSPORTATION KEPT YOU FROM MEDICAL APPOINTMENTS OR FROM GETTING MEDICATIONS?: NO

## 2023-04-18 SDOH — ECONOMIC STABILITY: FOOD INSECURITY: WITHIN THE PAST 12 MONTHS, YOU WORRIED THAT YOUR FOOD WOULD RUN OUT BEFORE YOU GOT MONEY TO BUY MORE.: NEVER TRUE

## 2023-04-18 SDOH — ECONOMIC STABILITY: FOOD INSECURITY: WITHIN THE PAST 12 MONTHS, THE FOOD YOU BOUGHT JUST DIDN'T LAST AND YOU DIDN'T HAVE MONEY TO GET MORE.: NEVER TRUE

## 2023-04-18 SDOH — ECONOMIC STABILITY: INCOME INSECURITY: IN THE LAST 12 MONTHS, WAS THERE A TIME WHEN YOU WERE NOT ABLE TO PAY THE MORTGAGE OR RENT ON TIME?: NO

## 2023-04-18 SDOH — HEALTH STABILITY: PHYSICAL HEALTH: ON AVERAGE, HOW MANY MINUTES DO YOU ENGAGE IN EXERCISE AT THIS LEVEL?: 0 MIN

## 2023-04-18 SDOH — ECONOMIC STABILITY: TRANSPORTATION INSECURITY
IN THE PAST 12 MONTHS, HAS LACK OF TRANSPORTATION KEPT YOU FROM MEETINGS, WORK, OR FROM GETTING THINGS NEEDED FOR DAILY LIVING?: NO

## 2023-04-18 SDOH — HEALTH STABILITY: PHYSICAL HEALTH: ON AVERAGE, HOW MANY DAYS PER WEEK DO YOU ENGAGE IN MODERATE TO STRENUOUS EXERCISE (LIKE A BRISK WALK)?: 0 DAYS

## 2023-04-18 ASSESSMENT — SOCIAL DETERMINANTS OF HEALTH (SDOH)
HOW OFTEN DO YOU ATTEND CHURCH OR RELIGIOUS SERVICES?: NEVER
HOW OFTEN DO YOU ATTENT MEETINGS OF THE CLUB OR ORGANIZATION YOU BELONG TO?: 1 TO 4 TIMES PER YEAR
HOW HARD IS IT FOR YOU TO PAY FOR THE VERY BASICS LIKE FOOD, HOUSING, MEDICAL CARE, AND HEATING?: VERY HARD
HOW OFTEN DO YOU GET TOGETHER WITH FRIENDS OR RELATIVES?: MORE THAN THREE TIMES A WEEK
DO YOU BELONG TO ANY CLUBS OR ORGANIZATIONS SUCH AS CHURCH GROUPS UNIONS, FRATERNAL OR ATHLETIC GROUPS, OR SCHOOL GROUPS?: YES
IN A TYPICAL WEEK, HOW MANY TIMES DO YOU TALK ON THE PHONE WITH FAMILY, FRIENDS, OR NEIGHBORS?: MORE THAN THREE TIMES A WEEK

## 2023-04-18 ASSESSMENT — ACTIVITIES OF DAILY LIVING (ADL): DEPENDENT_IADLS:: TRANSPORTATION

## 2023-04-18 NOTE — PROGRESS NOTES
Clinic Care Coordination Contact    Clinic Care Coordination Contact  OUTREACH    Referral Information:  Referral Source: PCP    Primary Diagnosis: Behavioral Health    Chief Complaint   Patient presents with     Clinic Care Coordination - Initial     SW CC        Supply Utilization: potential under utilization related to finances  Clinic Utilization  Difficulty keeping appointments:: No  Compliance Concerns: No  No-Show Concerns: No  No PCP office visit in Past Year: No  Utilization    Hospital Admissions  0             ED Visits  2             No Show Count (past year)  0                Current as of: 4/17/2023  7:06 AM              Clinical Concerns:  Current Medical Concerns:  Pt noted nutrition challenges due to affects of bariatric surgery.  She noted it is hard to eat much and some foods are hard to eat due to no teeth.     Current Behavioral Concerns: pt has PTSD from past trauma and is working with counselor and on medications.  She notes that she also has nightmares that she says are story nightmares and that if she does not get up to do something after her spouse wakes her, she will go right back into them.   Pt feels that some of her past trauma and incidents interferes in her ability to complete paperwork as it causes a lot of stress.  Education Provided to patient: Discussed the importance of continuing to work with her counselor on her mental health.  Discussed how her past trauma can be triggered by events and understanding the triggers is important.      Encouraged pt to  Put herself first sometimes and to work with counselor on how to manage her responses to triggers.      Pain  Pain (GOAL):: No  Health Maintenance Reviewed: Due/Overdue   Health Maintenance Due   Topic Date Due     COLORECTAL CANCER SCREENING  10/09/2020     LUNG CANCER SCREENING  09/07/2021     LIPID  08/07/2022     MEDICARE ANNUAL WELLNESS VISIT  09/17/2022       Clinical Pathway: None    Medication Management:  Medication  review status: Medications reviewed and no changes reported per patient.        Pt noted no challenges with taking her medications as prescribed.     Functional Status:  Dependent ADLs:: Independent  Dependent IADLs:: Transportation  Bed or wheelchair confined:: No  Mobility Status: Independent  Fallen 2 or more times in the past year?: No  Any fall with injury in the past year?: No    Living Situation:  Current living arrangement:: I live in a private home with spouse  Type of residence:: Private home - stairs    Lifestyle & Psychosocial Needs:    Social Determinants of Health     Tobacco Use: High Risk (4/3/2023)    Patient History      Smoking Tobacco Use: Every Day      Smokeless Tobacco Use: Never      Passive Exposure: Not on file   Alcohol Use: Not on file   Financial Resource Strain: High Risk (4/18/2023)    Overall Financial Resource Strain (CARDIA)      Difficulty of Paying Living Expenses: Very hard   Food Insecurity: No Food Insecurity (4/18/2023)    Hunger Vital Sign      Worried About Running Out of Food in the Last Year: Never true      Ran Out of Food in the Last Year: Never true   Transportation Needs: No Transportation Needs (4/18/2023)    PRAPARE - Transportation      Lack of Transportation (Medical): No      Lack of Transportation (Non-Medical): No   Physical Activity: Inactive (4/18/2023)    Exercise Vital Sign      Days of Exercise per Week: 0 days      Minutes of Exercise per Session: 0 min   Stress: Stress Concern Present (4/18/2023)    [employer] - Occupational Stress Questionnaire      Feeling of Stress : Rather much   Social Connections: Moderately Integrated (4/18/2023)    Social Connection and Isolation Panel [NHANES]      Frequency of Communication with Friends and Family: More than three times a week      Frequency of Social Gatherings with Friends and Family: More than three times a week      Attends Adventism Services: Never      Active Member of Clubs  or Organizations: Yes      Attends Club or Organization Meetings: 1 to 4 times per year      Marital Status:    Intimate Partner Violence: Not on file   Depression: Not at risk (4/3/2023)    PHQ-2      PHQ-2 Score: 2   Housing Stability: Unknown (4/18/2023)    Housing Stability Vital Sign      Unable to Pay for Housing in the Last Year: No      Number of Places Lived in the Last Year: Not on file      Unstable Housing in the Last Year: No     Diet:: Regular  Inadequate nutrition (GOAL):: Yes (related to the bariatric surgery and can only eat a little at a time and no teeth)  Tube Feeding: No  Inadequate activity/exercise (GOAL):: No  Significant changes in sleep pattern (GOAL): Yes (nightmares - story - don't quit)  Transportation means:: Family, Friend     Uatsdin or spiritual beliefs that impact treatment:: No  Mental health DX:: Yes  Mental health DX how managed:: Medication, Outpatient Counseling  Mental health management concern (GOAL):: No  Chemical Dependency Status: Past Concern  Chemical Dependency Management: Other (see comment) (counseling - on line program [facility])  Informal Support system:: Spouse, Friends, Other (dog)        Pt noted that she has financial challenges.  She is behind on her taxes and would like to get a start on getting these up to date.  She noted that she struggles with paperwork and was not fully able to describe what part of the process stops her.  A goal was started yet not fully outlined as pt was feeling overwhelmed to try and do this today.  An appointment was scheduled for one week and that appointment will focus on identifying action steps to help her make progress on her taxes, as this is a big stressor of hers. She owns her home yet struggles with insurance and taxes.  Due to her work from home she does need to maintain her utilities.       Patient noted that her spouse, some friends, and her dog are her supports.  She has had on and off again struggles with alcohol  and utilizes counseling and an online HAMMS program to help her.  She noted that AA was not a good option for her.  She socializes on the phone a lot and sees neighbors/friends often.  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     Patient and spouse do not drive and this can be a limiting factor in meeting needs.       Resources and Interventions:  Current Resources:      Community Resources: None  Supplies Currently Used at Home: None  Equipment Currently Used at Home: none  Employment Status: employed part-time         Advance Care Plan/Directive  Advanced Care Plans/Directives on file:: No            Care Plan:  Will develop on next call    Patient/Caregiver understanding: n/a       Future Appointments              In 1 week [facility]          Plan: will call pt in one week to complete goal development.     ANUP Walker  [facility] - Care Coordinator   4/18/2023   3:18 PM  [phone]

## 2023-04-21 DIAGNOSIS — G25.81 RESTLESS LEGS SYNDROME (RLS): ICD-10-CM

## 2023-04-21 DIAGNOSIS — F41.9 ANXIETY: ICD-10-CM

## 2023-04-21 DIAGNOSIS — G47.00 INSOMNIA, UNSPECIFIED TYPE: ICD-10-CM

## 2023-04-24 RX ORDER — CLONAZEPAM 0.5 MG/1
TABLET ORAL
Qty: 30 TABLET | Refills: 0 | Status: SHIPPED | OUTPATIENT
Start: 2023-04-24 | End: 2023-06-01

## 2023-04-24 RX ORDER — CLONAZEPAM 0.5 MG/1
TABLET ORAL
Qty: 30 TABLET | Refills: 0 | OUTPATIENT
Start: 2023-04-24

## 2023-04-28 ENCOUNTER — NURSE TRIAGE (OUTPATIENT)
Dept: FAMILY MEDICINE | Facility: CLINIC | Age: 68
End: 2023-04-28
Payer: MEDICARE

## 2023-04-28 ENCOUNTER — PATIENT OUTREACH (OUTPATIENT)
Dept: CARE COORDINATION | Facility: CLINIC | Age: 68
End: 2023-04-28
Payer: MEDICARE

## 2023-04-28 NOTE — TELEPHONE ENCOUNTER
"Patient is calling and stated she has a small area inside the inner part of her right leg by her knee.  She stated intermittently it will feel like a \"swoosh\" of warm water from the inside and last approximately 1-2 seconds.  She stated it started approximately 1 month ago and is now a little more frequent, maybe up to 20 times a day.  She stated no other symptoms are present at this time.  Advised patient to monitor for redness, warm to the touch, fever, rash, the feeling going down or up her leg and seek urgent/ emergency care for difficulty breathing, chest pain, and SOB.  Patient stated understanding and will call to schedule a visit if it gets more bothersome.    Additional Information    Negative: Sounds like a life-threatening emergency to the triager    Negative: Information only call about a Well Adult (no illness or injury)    Negative: Nursing judgment    Negative: Nursing judgment    Negative: Nursing judgment    Negative: Nursing judgment    Negative: Nursing judgment    Negative: Nursing judgment    Negative: Patient wants to be seen    Negative: Nursing judgment    Negative: Nursing judgment    Negative: Nursing judgment    Negative: Nursing judgment    Nursing judgment    Answer Assessment - Initial Assessment Questions  1. REASON FOR CALL: \"What is your main concern right now?\"      Right leg inside by knee and thigh, weird feeling intermittently that will feel like a warm swoosh of blood - lasts 3-5 seconds happening approximately 20 times a day only in approximately the size of a quarter in area that the warm feeling is present    2. ONSET: \"When did this start?\"      Approximately 1 month ago  - doesn't matter if standing or sitting, not painful    3. SEVERITY: \"How bad is the condition?\"      Unknown    4. FEVER: \"Do you have a fever?\"      No    5. OTHER SYMPTOMS: \"Do you have any other new symptoms?\"      No    6. INTERVENTIONS AND RESPONSE: \"What have you done so far to try to make this " "better? What medications have you used?\"      Not tried anything  7. PREGNANCY: \"Is there any chance you are pregnant?\"     Unknown    Protocols used: NO PROTOCOL AVAILABLE - SICK ADULT-A-[place]    Amanda Pickering RN    "

## 2023-04-28 NOTE — PROGRESS NOTES
Clinic Care Coordination Contact  [facility]/Voicemail       Clinical Data: Care Coordinator Outreach  Outreach attempted x 1.  Left message on patient's voicemail with call back information and requested return call.  Plan: Care Coordinator will send mychart message to ask to reschedule missed appt (clinician initiated).  Care Coordinator will try to reach patient again in 7-10 business days.    ANUP Walker  [facility] - Care Coordinator   4/28/2023   1:34 PM  [phone]

## 2023-05-11 ASSESSMENT — PATIENT HEALTH QUESTIONNAIRE - PHQ9: SUM OF ALL RESPONSES TO PHQ QUESTIONS 1-9: 9

## 2023-05-11 NOTE — PROGRESS NOTES
Shanika is a 67 year old who is being evaluated via a billable telephone visit.      Telephone visit performed in lieu of an in-person visit due to current concerns regarding social distancing and keeping people safe.  Physician and patient agreed this was appropriate for concerns addressed.     What phone number would you like to be contacted at? [phone]  How would you like to obtain your AVS? MyChart  Distant Location (provider location):  On-site    Assessment & Plan       ICD-10-CM    1. Anxiety  F41.9 venlafaxine (EFFEXOR XR) 37.5 MG 24 hr capsule      2. Episode of recurrent major depressive disorder, unspecified depression episode severity (H)  F33.9 venlafaxine (EFFEXOR XR) 37.5 MG 24 hr capsule      3. Incontinence of feces, unspecified fecal incontinence type  R15.9 Adult GI  Referral - Consult Only      4. Urge incontinence of urine  N39.41 UA Macroscopic with reflex to Microscopic and Culture         I agreed to refill her medication, we will keep her on the same dose.  I refilled for 6 months, but would like to follow-up with her in about 2 months for stability.  I congratulated her on not drinking and with connecting with her AA support people.    Regarding her urine incontinence, I recommend she leave a urinalysis to check for infection.  I am also going to set her up for a GI referral because of her fecal incontinence concerns.  She is due for screening colonoscopy but may need to have other work-up for the bowel movements.  Then if all is normal and she is still having issues then we might need to refer her for pelvic floor physical therapy.    16 minutes spent by me on the date of the encounter doing chart review, history and exam, documentation and further activities per the note       Soha Boggs MD  [facility]   Shanika is a 67 year old, presenting for the following health issues:  Recheck Medication        4/3/2023    12:46 PM    Additional Questions   Roomed by Tim Rosario     History of Present Illness       Reason for visit:  Recheck medication        Medication Followup of venlafaxine    Taking Medication as prescribed: yes    Side Effects:  None    Medication Helping Symptoms:  yes    Pt states is having incontinence issues wanting to discuss with doctor    Emotionally, overall she is doing well.  She is taking the venlafaxine without side effects.  The previous whooshing side effect is gone.  She thinks it is helpful and wants to stay on it.  She has not had any alcohol intake since our last visit.  She states that things are much less stressful at home.  The baby she was caring for has been taken over by the other grandma and the kids that were staying with her have moved onto their own place.  Work is very busy for her.  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  She feels well supported and has connected with some of her AA friends that she used to know.  She is going to some speaker meetings, will not go to group meetings because of some bad contacts she has had in the past.    Her mother-in-law is not doing well.  She has cancer and COPD and has just been put on hospice.  This has been hard on her  but they are doing okay.  She does have a Buddhism counselor that she has a good relationship with.  She also went back on her trazodone for sleep and she finds it is cutting down on her bad dreams.    Her bigger concern is she has had fecal and urinary incontinence and urgency.  She says has been going on for couple years but really is gotten worse in the last few months.  She has had several accidents if she does not make it to the bathroom fast enough.  She also has nocturia couple times each night.      Review of Systems   Constitutional, HEENT, cardiovascular, pulmonary, gi and gu systems are negative, except as otherwise noted.      Objective             Physical Exam   healthy, alert  and no distress  PSYCH: Alert and oriented times 3; coherent speech, normal   rate and volume, able to articulate logical thoughts, able   to abstract reason, no tangential thoughts, no hallucinations   or delusions  Her affect is normal and pleasant  RESP: No cough, no audible wheezing, able to talk in full sentences  Remainder of exam unable to be completed due to telephone visits    Orders Placed This Encounter   Procedures     UA Macroscopic with reflex to Microscopic and Culture     Adult GI  Referral - Consult Only            Phone call duration: 11 minutes

## 2023-05-12 ENCOUNTER — VIRTUAL VISIT (OUTPATIENT)
Dept: FAMILY MEDICINE | Facility: CLINIC | Age: 68
End: 2023-05-12
Payer: MEDICARE

## 2023-05-12 DIAGNOSIS — F33.9 EPISODE OF RECURRENT MAJOR DEPRESSIVE DISORDER, UNSPECIFIED DEPRESSION EPISODE SEVERITY (H): ICD-10-CM

## 2023-05-12 DIAGNOSIS — N39.41 URGE INCONTINENCE OF URINE: ICD-10-CM

## 2023-05-12 DIAGNOSIS — R15.9 INCONTINENCE OF FECES, UNSPECIFIED FECAL INCONTINENCE TYPE: ICD-10-CM

## 2023-05-12 DIAGNOSIS — F41.9 ANXIETY: Primary | ICD-10-CM

## 2023-05-12 PROCEDURE — 99214 OFFICE O/P EST MOD 30 MIN: CPT | Mod: 95 | Performed by: FAMILY MEDICINE

## 2023-05-12 RX ORDER — VENLAFAXINE HYDROCHLORIDE 37.5 MG/1
37.5 CAPSULE, EXTENDED RELEASE ORAL DAILY
Qty: 90 CAPSULE | Refills: 1 | Status: SHIPPED | OUTPATIENT
Start: 2023-05-12 | End: 2024-02-01

## 2023-05-12 NOTE — PATIENT INSTRUCTIONS
Shanika, I went ahead and refilled your medication.  I also put a lab order in for a urine test so call and make a lab appointment to come in and drop off a urine sample.  Lets make sure you do not have a bladder infection.  I also put in a referral for you to see the stomach doctor, also known as a gastroenterologist.  I would like them to visit with you to try to figure out what is going on with your bowel movements.  They can also talk with you about getting set up for your next colonoscopy.    Lets plan to follow-up again in about 2 months but I am happy to see you sooner if you need to.  Keep up the good work.

## 2023-05-15 ENCOUNTER — PATIENT OUTREACH (OUTPATIENT)
Dept: CARE COORDINATION | Facility: CLINIC | Age: 68
End: 2023-05-15

## 2023-05-15 NOTE — PROGRESS NOTES
Clinic Care Coordination Contact  [facility]/Voicemail       Clinical Data: Care Coordinator Outreach  Outreach attempted x 2.  Left message on patient's voicemail with call back information and requested return call.  Plan:  Care Coordinator will try to reach patient again in 8-10 business days.    ANUP Walker  [facility] - Care Coordinator   5/15/2023   1:53 PM  [phone]

## 2023-05-17 NOTE — PROGRESS NOTES
Clinic Care Coordination Contact  [facility]/Voicemail       Clinical Data: Care Coordinator Outreach  Outreach attempted x 3.  Pt did leave a message after last call.  Left message on patient's voicemail with call back information and requested return call.  Plan:  Care Coordinator will try to reach patient again in 5-7 business days.    ANUP Walker  [facility] - Care Coordinator   5/17/2023   3:05 PM  [phone]

## 2023-05-19 NOTE — PROGRESS NOTES
Clinic Care Coordination Contact    Spoke with pt and scheduled a phone call for 5/25/23 at 2 pm.  She noted she does not have insurance as she missed her premiums.  She noted that she was told she has to wait for open enrollment to reapply.  She is not sure of her income for potential [place] or [place].  She has not filed taxes for 5 years and uncertain how this will affect her abilities to get insurance/supports.     appt scheduled for Thursday 5/25/23 to talk and Sw will do some research to try and find out if no taxes completed will affect her ability to get help.     ANUP Walker  [facility] - Care Coordinator   5/19/2023   1:12 PM  [phone]

## 2023-05-22 DIAGNOSIS — L28.0 NEURODERMATITIS: ICD-10-CM

## 2023-05-24 NOTE — TELEPHONE ENCOUNTER
"Routing refill request to provider for review/approval because:    Requested Prescriptions   Pending Prescriptions Disp Refills    levocetirizine (XYZAL) 5 MG tablet [Pharmacy Med Name: LEVOCETIRIZINE 5MG TAB] 90 tablet 0     Sig: TAKE 1 TABLET (5 MG) BY MOUTH EVERY MORNING       Antihistamines Protocol Failed - 5/22/2023  1:14 PM        Failed - Patient is 3-64 years of age     Apply weight-based dosing for peds patients age 3 - 12 years of age.    Forward request to provider for patients under the age of 3 or over the age of 64.            Passed - Recent (12 mo) or future (30 days) visit within the authorizing provider's specialty     Patient has had an office visit with the authorizing provider or a provider within the authorizing providers department within the previous 12 mos or has a future within next 30 days. See \"Patient Info\" tab in inbasket, or \"Choose Columns\" in Meds & Orders section of the refill encounter.              Passed - Medication is active on med list                   "

## 2023-05-25 ENCOUNTER — PATIENT OUTREACH (OUTPATIENT)
Dept: FAMILY MEDICINE | Facility: CLINIC | Age: 68
End: 2023-05-25

## 2023-05-25 NOTE — PROGRESS NOTES
Clinic Care Coordination Contact    Follow Up Progress Note      Assessment: talked with patient and formalized the goal to help with financial stability.  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  She is worried about getting her meds and insurance and food.     Completed the application for the weekly food box that will be delivered and sent her the name.    Provided pt with information about financial supports (see letter sent) and where to get a free phone.     Completed the FRW assessment for assistance in applying for insurance.     Care Gaps:    Health Maintenance Due   Topic Date Due     COLORECTAL CANCER SCREENING  10/09/2020     LUNG CANCER SCREENING  09/07/2021     LIPID  08/07/2022     MEDICARE ANNUAL WELLNESS VISIT  09/17/2022     COVID-19 Vaccine (5 - Moderna series) 02/03/2023       Declined due to no insurance     Care Plans  Care Plan: Financial Wellbeing     Problem: Patient expresses financial resource strain     Goal: Create an action plan to increase financial stability     Start Date: 5/25/2023 Expected End Date: 11/21/2023    Note:     Barriers: limited income, miss deadlines  Strengths: understanding the need  Patient expressed understanding of goal: yes  Action steps to achieve this goal:  1. I will answer the Financial resource worker call and work with them to apply for insurance  2. I will reach out to resources sent  3. I will reach out to Jenny as needs arise between scheduled calls.                           Intervention/Education provided during outreach: encouraged follow through on the resources sent via [employer] and mail.         Plan:   Pt to answer when FRW calls.  Pt to reach out to resources sent.   Care Coordinator will follow up in one month       Financial Resource Worker Screening    County Benefits  Is patient requesting help applying for county benefits?: Yes  Have you recently applied for any county benefits?: No  How many people in your  household?: 2  Do you buy/eat food together?: Yes  What is the monthly gross income for the household (wages, social security, workers comp, and pension)? : 2000    Insurance:  [place]?: No  Is this an insurance renewal?: No  Is this a new insurance application request?: Yes  Have you recently applied for insurance?: No  How many people in your household? : 2  Do you file taxes?: No  What is the monthly gross income for the household (wages, social security, workers comp, and pension)? : 2000    Any other information for the FRW?: to apply for SNAP and any programs she qualifies for    Care Coordination team will tell patient:   Thank you for answering all the questions, based on screening questions, our Financial Resource Worker will reach out to you with additional questions and next steps.    ANUP Walker  [facility] - Care Coordinator   5/25/2023   3:17 PM  [phone]

## 2023-05-25 NOTE — LETTER
[address] [address]  Clinic: [phone]      5/25/2023      Pavithra Raines  [address] [facility] [address]      Dear  Shanika,    Here are the resources we talked about on the phone.  I hope you find them helpful.     Warmly,     Jenny Hewitt, [facility] Primary Care - Care Coordination  [facility]   [phone]      If you can get to these websites they have a variety of support options     "[employer]   https://[email or web address] enter your zip code to access a multitude of resource options  30 days foundation - check out the akhil section for applying for help offers up to $500 and depends on availability of funds - not available for multiple use by one person  https://www.knm24-xzmczpdrbfflid.org      [employer] program is the program we applied for    Transportation  [employer] bus/cab                   [phone] or [phone]  (Amandeep Winkler [place], [place], [place])    [employer]  [phone]                                            senior transportation, volunteers    Phone:    Need to talk to someone about your eligibility or application, or need technical support? Please call us first!  8-488-YydpZfrv ([phone])    Life line assistance program  1-774.732.2790. [employer] is a service of [facility] ([employer])   2-139-W-LINK43/ [phone]

## 2023-05-30 DIAGNOSIS — F41.9 ANXIETY: ICD-10-CM

## 2023-05-30 DIAGNOSIS — G47.00 INSOMNIA, UNSPECIFIED TYPE: ICD-10-CM

## 2023-05-30 DIAGNOSIS — G25.81 RESTLESS LEGS SYNDROME (RLS): ICD-10-CM

## 2023-05-31 DIAGNOSIS — L28.0 NEURODERMATITIS: ICD-10-CM

## 2023-06-01 ENCOUNTER — PATIENT OUTREACH (OUTPATIENT)
Dept: CARE COORDINATION | Facility: CLINIC | Age: 68
End: 2023-06-01

## 2023-06-01 ENCOUNTER — HOSPITAL ENCOUNTER (EMERGENCY)
Facility: CLINIC | Age: 68
Discharge: HOME | End: 2023-06-01
Attending: FAMILY MEDICINE | Admitting: FAMILY MEDICINE
Payer: MEDICARE

## 2023-06-01 VITALS
HEART RATE: 62 BPM | BODY MASS INDEX: 18.78 KG/M2 | SYSTOLIC BLOOD PRESSURE: 120 MMHG | RESPIRATION RATE: 16 BRPM | WEIGHT: 110 LBS | DIASTOLIC BLOOD PRESSURE: 72 MMHG | OXYGEN SATURATION: 98 % | HEIGHT: 64 IN | TEMPERATURE: 98.2 F
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
## 2023-06-01 DIAGNOSIS — S51.851A CAT BITE OF FOREARM, RIGHT, INITIAL ENCOUNTER: ICD-10-CM

## 2023-06-01 LAB
BASOPHILS # BLD AUTO: 0.1 10E3/UL (ref 0–0.2)
BASOPHILS NFR BLD AUTO: 0 %
CRP SERPL-MCNC: <3 MG/L
EOSINOPHIL # BLD AUTO: 0.3 10E3/UL (ref 0–0.7)
EOSINOPHIL NFR BLD AUTO: 3 %
ERYTHROCYTE [DISTWIDTH] IN BLOOD BY AUTOMATED COUNT: 12.8 % (ref 10–15)
HCT VFR BLD AUTO: 34.1 % (ref 35–47)
HGB BLD-MCNC: 11.7 G/DL (ref 11.7–15.7)
IMM GRANULOCYTES # BLD: 0 10E3/UL
IMM GRANULOCYTES NFR BLD: 0 %
LYMPHOCYTES # BLD AUTO: 2.4 10E3/UL (ref 0.8–5.3)
LYMPHOCYTES NFR BLD AUTO: 20 %
MCH RBC QN AUTO: 33.1 PG (ref 26.5–33)
MCHC RBC AUTO-ENTMCNC: 34.3 G/DL (ref 31.5–36.5)
MCV RBC AUTO: 97 FL (ref 78–100)
MONOCYTES # BLD AUTO: 0.8 10E3/UL (ref 0–1.3)
MONOCYTES NFR BLD AUTO: 7 %
NEUTROPHILS # BLD AUTO: 8.1 10E3/UL (ref 1.6–8.3)
NEUTROPHILS NFR BLD AUTO: 70 %
NRBC # BLD AUTO: 0 10E3/UL
NRBC BLD AUTO-RTO: 0 /100
PLATELET # BLD AUTO: 214 10E3/UL (ref 150–450)
RBC # BLD AUTO: 3.53 10E6/UL (ref 3.8–5.2)
WBC # BLD AUTO: 11.7 10E3/UL (ref 4–11)

## 2023-06-01 PROCEDURE — 99284 EMERGENCY DEPT VISIT MOD MDM: CPT | Performed by: FAMILY MEDICINE

## 2023-06-01 PROCEDURE — 96365 THER/PROPH/DIAG IV INF INIT: CPT | Performed by: FAMILY MEDICINE

## 2023-06-01 PROCEDURE — 86140 C-REACTIVE PROTEIN: CPT | Performed by: FAMILY MEDICINE

## 2023-06-01 PROCEDURE — 36415 COLL VENOUS BLD VENIPUNCTURE: CPT | Performed by: FAMILY MEDICINE

## 2023-06-01 PROCEDURE — 85025 COMPLETE CBC W/AUTO DIFF WBC: CPT | Performed by: FAMILY MEDICINE

## 2023-06-01 PROCEDURE — 99284 EMERGENCY DEPT VISIT MOD MDM: CPT | Mod: 25 | Performed by: FAMILY MEDICINE

## 2023-06-01 PROCEDURE — 250N000011 HC RX IP 250 OP 636: Performed by: FAMILY MEDICINE

## 2023-06-01 RX ORDER — AMPICILLIN AND SULBACTAM 2; 1 G/1; G/1
3 INJECTION, POWDER, FOR SOLUTION INTRAMUSCULAR; INTRAVENOUS ONCE
Status: COMPLETED | OUTPATIENT
Start: 2023-06-01 | End: 2023-06-01

## 2023-06-01 RX ORDER — LEVOCETIRIZINE DIHYDROCHLORIDE 5 MG/1
5 TABLET, FILM COATED ORAL EVERY MORNING
Qty: 90 TABLET | Refills: 0 | Status: ON HOLD | OUTPATIENT
Start: 2023-06-01 | End: 2023-10-24

## 2023-06-01 RX ORDER — CLONAZEPAM 0.5 MG/1
TABLET ORAL
Qty: 30 TABLET | Refills: 0 | Status: SHIPPED | OUTPATIENT
Start: 2023-06-01 | End: 2023-07-07

## 2023-06-01 RX ORDER — TRIAMCINOLONE ACETONIDE 1 MG/G
CREAM TOPICAL
Qty: 80 G | Refills: 0 | Status: SHIPPED | OUTPATIENT
Start: 2023-06-01 | End: 2024-02-01

## 2023-06-01 RX ADMIN — AMPICILLIN SODIUM AND SULBACTAM SODIUM 3 G: 2; 1 INJECTION, POWDER, FOR SOLUTION INTRAMUSCULAR; INTRAVENOUS at 04:19

## 2023-06-01 ASSESSMENT — ACTIVITIES OF DAILY LIVING (ADL): ADLS_ACUITY_SCORE: 35

## 2023-06-01 ASSESSMENT — ENCOUNTER SYMPTOMS
WOUND: 1
FEVER: 0
CHILLS: 0

## 2023-06-01 NOTE — ED TRIAGE NOTES
"Has multiple cat bites on right arm.  Feeling \"achy\" now.     Triage Assessment     Row Name 06/01/23 0339       Triage Assessment (Adult)    Airway WDL WDL       Respiratory WDL    Respiratory WDL WDL       Skin Circulation/Temperature WDL    Skin Circulation/Temperature WDL WDL       Cardiac WDL    Cardiac WDL WDL       Peripheral/Neurovascular WDL    Peripheral Neurovascular WDL WDL       Cognitive/Neuro/Behavioral WDL    Cognitive/Neuro/Behavioral WDL WDL              "

## 2023-06-01 NOTE — PROGRESS NOTES
Clinic Care Coordination Contact    Situation: Patient chart reviewed by care coordinator.    Background: pt to ED due to cat bites    Assessment: patient was to the ED to get preventative treatment for cat bites.  Previously she had sepses due to cat bite so came in for ABX.    Pt treated and educated on what to watch for and discharged.     Plan/Recommendations: will not call related to this ED visit.  Will follow up as planned.    ANUP Walker  [facility] Primary [facility] - Care Coordinator   6/1/2023   7:58 AM  [phone]

## 2023-06-01 NOTE — TELEPHONE ENCOUNTER
Three medication refills all completed as requested.    Will have staff notify patient.    Tristan Connolly MD

## 2023-06-01 NOTE — TELEPHONE ENCOUNTER
Patient calling on status of this refill requested on 5/22/23 and hoping this can be filled today. Catrina Payne LPN

## 2023-06-01 NOTE — ED PROVIDER NOTES
History     Chief Complaint   Patient presents with     Cat Bite     HPI  Pavithra Raines is a 67 year old female who presented to the emergency room today secondary to concerns of multiple cat bites to the right wrist area.  Bites occurred about 3 to 4 hours ago per patient.  Bites were caused by a rescue cat the patient took in.  Patient states that she is not aware of the cats immunization status but states that it is an indoor cat and has not been outside or exposed to other animals.  Patient states that she is up-to-date on her tetanus immunization.  She is concerned about possible cat bite infection.  Patient's had issues with cat bites in the past and subsequent sepsis.  She denies any fever chills or red streaks up the arm but states came in early this time to initiate treatment to prevent sepsis.    Allergies:  Allergies   Allergen Reactions     Codeine Itching     Vicodin [Hydrocodone-Acetaminophen] Itching       Problem List:    Patient Active Problem List    Diagnosis Date Noted     Alcohol dependence in remission (H) 01/08/2022     Priority: Medium     Episode of recurrent major depressive disorder, unspecified depression episode severity (H) 01/08/2022     Priority: Medium     Seizure disorder (H) 01/08/2022     Priority: Medium     Fatty liver 12/13/2020     Priority: Medium     PTSD (post-traumatic stress disorder) 12/13/2020     Priority: Medium     Underweight 12/13/2020     Priority: Medium     Macrocytosis without anemia 12/13/2020     Priority: Medium     Age-related osteoporosis without current pathological fracture 10/15/2020     Priority: Medium     Anemia due to vitamin B12 deficiency, unspecified B12 deficiency type 12/04/2017     Priority: Medium     Insomnia, unspecified type 12/04/2017     Priority: Medium     Other iron deficiency anemia 12/04/2017     Priority: Medium     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 grade 3 04/07/2011     Priority: Medium     12/15/06 ASCUS +  high risk HPV  colpo in 2007 showed high grade KAITLYN and LEEP was recommended  LEEP was done in June,2007 with CAN 2/3 confirmed  10/15/07 NIL  9/30/08 NIL/neg HPV  10/21/09 NIL/neg HPV  4/5/11 NIL- repeat in one year (4/2012 12/1/17 NIL pap/neg HR HPV. Will need pap screening until 2027, regardless of age.  Plan: cotest due 3 yr.       MDD (major depressive disorder) 04/05/2011     Priority: Medium     Needs to est primary care.       CARDIOVASCULAR SCREENING; LDL GOAL LESS THAN 160 10/31/2010     Priority: Medium     Genital herpes      Priority: Medium     IMO update changed this record. Please review for accuracy       Anxiety 08/27/2008     Priority: Medium     Patient is followed by MIMI GARZA for ongoing prescription of benzodiazepines.  All refills should be approved by this provider, or covering partner.    Medication(s): Clonazepam.   Maximum quantity per month: 30  Clinic visit frequency required:      Controlled substance agreement on file: No  Benzodiazepine use reviewed by psychiatry:  No    Last [facility] website verification:  done on 4/5/19  [email or web address]         Status post gastric bypass for obesity 03/25/2008     Priority: Medium     Performed at [facility].       Restless legs syndrome (RLS) 05/15/2007     Priority: Medium     Hypersomnia with sleep apnea 05/15/2007     Priority: Medium     Problem list name updated by automated process. Provider to review       Esophageal reflux 04/18/2007     Priority: Medium        Past Medical History:    Past Medical History:   Diagnosis Date     Abnormal Papanicolaou smear of cervix and cervical HPV 4/07     Genital herpes      History of colposcopy with cervical biopsy 06/2007     Hypersomnia with sleep apnea, unspecified      Other and unspecified ovarian cyst 2002 or so       Past Surgical History:    Past Surgical History:   Procedure Laterality Date     CHOLECYSTECTOMY, OPEN  age 20s     COLONOSCOPY  03/21/2011     COMBINED COLONOSCOPY, REMOVE TUMOR/POLYP/LESION BY SNARE performed by JUAN FRANCISCO HERNANDEZ at  GI     COLONOSCOPY N/A 10/09/2017    polyps, repeat 3 years     COLPOSCOPY CERVIX, LOOP ELECTRODE BIOPSY, COMBINED  06/2007    CAN 2/3-patient requires yearly pap smears     dental pegs       ESOPHAGOSCOPY, GASTROSCOPY, DUODENOSCOPY (EGD), COMBINED N/A 02/09/2018    Procedure: COMBINED ESOPHAGOSCOPY, GASTROSCOPY, DUODENOSCOPY (EGD);  EGD;  Surgeon: Oneal Sanchez MD;  Location:  GI     GASTRIC BYPASS  03/25/2008    At [facility]     HC DILATION/CURETTAGE DIAG/THER NON OB  2002     HC ENLARGE BREAST WITH IMPLANT       HC LAPAROSCOPIC MYOMECTOMY, 1 - 4 INTRAMURAL MYOMAS =<250 GM  2002     HC REMOVAL OF BREAST IMPLANT       SALPINGO OOPHORECTOMY,R/L/MATTHEW  2002    Bilateral salpingectomy and unilateral oophorectomy       Family History:    Family History   Problem Relation Age of Onset     Chronic Obstructive Pulmonary Disease Mother      Unknown/Adopted Father         doesn't know birth father     Lung Cancer Brother      Family History Negative Other        Social History:  Marital Status:   [2]  Social History     Tobacco Use     Smoking status: Every Day     Packs/day: 2.00     Years: 10.00     Pack years: 20.00     Types: Cigarettes     Smokeless tobacco: Never     Tobacco comments:     2 ppd at this time (chain smoking) 10/2012   Vaping Use     Vaping status: Every Day     Substances: Nicotine, THC     Devices: Pre-filled or refillable cartridge     Passive vaping exposure: Yes   Substance Use Topics     Alcohol use: Yes     Comment: daily, drinks a box     Drug use: Yes     Types: Marijuana     Comment: medical        Medications:    amoxicillin-clavulanate (AUGMENTIN) 875-125 MG tablet  busPIRone (BUSPAR) 15 MG tablet  calcium carbonate (OS-SHON 500 MG [place]. CA) 1250 MG tablet  clonazePAM (KLONOPIN) 0.5 MG tablet  cyanocobalamin (CYANOCOBALAMIN) 1000 MCG/ML injection  escitalopram (LEXAPRO) 20 MG  "tablet  ferrous gluconate (FERGON) 324 (38 Fe) MG tablet  folic acid (FOLVITE) 1 MG tablet  Insulin Syringe-Needle U-100 (B-D INSULIN SYRINGE) 25G X 1\" 1 ML MISC  levocetirizine (XYZAL) 5 MG tablet  LORazepam (ATIVAN) 1 MG tablet  multivitamin w/minerals (THERA-VIT-M) tablet  omeprazole (PRILOSEC) 40 MG DR capsule  raloxifene (EVISTA) 60 MG tablet  traZODone (DESYREL) 50 MG tablet  triamcinolone (KENALOG) 0.1 % external cream  venlafaxine (EFFEXOR XR) 37.5 MG 24 hr capsule  vitamin D3 (VITAMIN D3) 50 mcg (2000 units) tablet      Immunization History   Administered Date(s) Administered     COVID-19 Bivalent 12+ (Pfizer) 10/03/2022     COVID-19 Monovalent 18+ (Moderna) 05/17/2021, 06/14/2021     COVID-19 Monovalent Booster 18+ (Moderna) 01/10/2022     Influenza (IIV3) PF 12/15/2006, 09/24/2009, 09/28/2010, 09/28/2012     Influenza Vaccine 50-64 or 18-64 w/egg allergy (Flublok) 11/02/2018, 10/07/2020, 01/10/2022     Influenza Vaccine 65+ (Fluzone HD) 10/03/2022     Influenza Vaccine >6 months (Alfuria,Fluzone) 09/25/2019     Mantoux Tuberculin Skin Test 07/25/2006, 04/10/2007     Pneumococcal 20 valent Conjugate (Prevnar 20) 06/14/2022     TD,PF 7+ (Tenivac) 06/12/2007     TDAP Vaccine (Adacel) 04/05/2011, 03/01/2020     Zoster recombinant adjuvanted (SHINGRIX) 09/25/2019, 10/07/2020         Review of Systems   Constitutional: Negative for chills and fever.   Skin: Positive for wound (right wrist area cat bites - multiple).   All other systems reviewed and are negative.      Physical Exam   BP: 123/75  Pulse: 66  Temp: 98.2  F (36.8  C)  Resp: 16  Height: 162.6 cm (5' 4\")  Weight: 49.9 kg (110 lb)  SpO2: 100 %      Physical Exam  Vitals and nursing note reviewed.   Skin:     Capillary Refill: Capillary refill takes less than 2 seconds.      Comments: Patient with 7 puncture sites to the right wrist and hand consistent with cat bites per the patient's history.  Some tenderness surrounding the area of bites.  No " significant erythema noted.  See the picture below.   Neurological:      Mental Status: She is alert and oriented to person, place, and time.             ED Course                 Procedures              Critical Care time:  none               Results for orders placed or performed during the hospital encounter of 06/01/23 (from the past 24 hour(s))   CBC with platelets differential    Narrative    The following orders were created for panel order CBC with platelets differential.  Procedure                               Abnormality         Status                     ---------                               -----------         ------                     CBC with platelets and d...[[id number]]  Abnormal            Final result                 Please view results for these tests on the individual orders.   CRP inflammation   Result Value Ref Range    CRP Inflammation <3.00 <5.00 mg/L   CBC with platelets and differential   Result Value Ref Range    WBC Count 11.7 (H) 4.0 - 11.0 10e3/uL    RBC Count 3.53 (L) 3.80 - 5.20 10e6/uL    Hemoglobin 11.7 11.7 - 15.7 g/dL    Hematocrit 34.1 (L) 35.0 - 47.0 %    MCV 97 78 - 100 fL    MCH 33.1 (H) 26.5 - 33.0 pg    MCHC 34.3 31.5 - 36.5 g/dL    RDW 12.8 10.0 - 15.0 %    Platelet Count 214 150 - 450 10e3/uL    % Neutrophils 70 %    % Lymphocytes 20 %    % Monocytes 7 %    % Eosinophils 3 %    % Basophils 0 %    % Immature Granulocytes 0 %    NRBCs per 100 WBC 0 <1 /100    Absolute Neutrophils 8.1 1.6 - 8.3 10e3/uL    Absolute Lymphocytes 2.4 0.8 - 5.3 10e3/uL    Absolute Monocytes 0.8 0.0 - 1.3 10e3/uL    Absolute Eosinophils 0.3 0.0 - 0.7 10e3/uL    Absolute Basophils 0.1 0.0 - 0.2 10e3/uL    Absolute Immature Granulocytes 0.0 <=0.4 10e3/uL    Absolute NRBCs 0.0 10e3/uL       Medications   ampicillin-sulbactam (UNASYN) 3 g vial to attach to  mL bag (3 g Intravenous $New Bag 6/1/23 1709)       Assessments & Plan (with Medical Decision Making)  67-year-old female to the ER  secondary concerns of cat bite to her right wrist area.  Bite occurred about 3 hours prior.  Patient's had history of sepsis associated with cat bite in the past so came in early this time after being bitten to initiate antibiotic treatment.  Patient states that the cat can be monitored for the next 2 weeks and is an indoor cat and has not been exposed to others despite not knowing its immunization status.  She was warned of the potential risk of rabies and agrees to monitor the cat closely for any signs of illness and if noted to get the cat checked at the vet and she will return to the ER.  Patient initiated on antibiotic therapy.  I have asked her to make an appointment for recheck and follow-up in the clinic in the next 3 days.  To return the ER for increase or worsening symptoms.     I have reviewed the nursing notes.    I have reviewed the findings, diagnosis, plan and need for follow up with the patient.           Medical Decision Making  The patient's presentation was of moderate complexity (an acute complicated injury).    The patient's evaluation involved:  ordering and/or review of 2 test(s) in this encounter (see separate area of note for details)    The patient's management necessitated moderate risk (prescription drug management including medications given in the ED).        New Prescriptions    AMOXICILLIN-CLAVULANATE (AUGMENTIN) 875-125 MG TABLET    Take 1 tablet by mouth 2 times daily for 7 days            I verbally discussed the findings of the evaluation today in the ER. I have verbally discussed with Pavithra the suggested treatment(s) as described in the discharge instructions and handouts. I have prescribed the above listed medications and instructed her on appropriate use of these medications.      I have verbally suggested she follow-up in her clinic or return to the ER for increased symptoms. See the follow-up recommendations documented  in the after visit summary in this visit's EPIC  chart.      Disclaimer: This note consists of words and symbols derived from keyboarding and dictation using voice recognition software.  As a result, there may be errors that have gone undetected.  Please consider this when interpreting information found in this note.    Final diagnoses:   Cat bite of forearm, right, initial encounter       6/1/2023   [facility] EMERGENCY DEPT     Kimani Palacios, DO  06/01/23 0447

## 2023-06-01 NOTE — TELEPHONE ENCOUNTER
Patient stating she is out and hoping this can be filled today. Catrina Payne HETALN    CLONAZEPAM 0.5 mg      Last Written Prescription Date:  4/24/23  Last Fill Quantity: 30,   # refills: 0  Last Office Visit: 5/12/23, virtual with Flakita  Future Office visit:    Next 5 appointments (look out 90 days)    Aug 18, 2023  7:00 AM  (Arrive by 6:45 AM)  Annual Wellness Visit with Soha Boggs MD  [facility] ([facility] ) [address] [place] [id number] [phone]           Routing refill request to provider for review/approval because:  Drug not on the [facility], [facility] or [facility] refill protocol or controlled substance

## 2023-06-01 NOTE — TELEPHONE ENCOUNTER
Attempted to call patient but unavailable. Did send mychart message letting her know medication was sent to the pharmacy.     Tori BAR CMA

## 2023-06-05 ENCOUNTER — PATIENT OUTREACH (OUTPATIENT)
Dept: CARE COORDINATION | Facility: CLINIC | Age: 68
End: 2023-06-05

## 2023-06-05 NOTE — TELEPHONE ENCOUNTER
Clinic Care Coordination Contact  Program: [facility] Benefit-Snap  County: [facility] Case #:  [facility] Worker:   Kevin #:   Subscriber #:   Renewal:  Date Applied:     FRW Outreach:   6/5/23- Outreach attempted x 1. Left message on voicemail with call back information and requested return call.  Plan: FRW will call again within one week.         Health Insurance:      Referral/Screening:  Financial Resource Worker Screening     [facility] Benefits  Is patient requesting help applying for [facility] benefits?: Yes  Have you recently applied for any [facility] benefits?: No  How many people in your household?: 2  Do you buy/eat food together?: Yes  What is the monthly gross income for the household (wages, social security, workers comp, and pension)? : 2000     Insurance:  [place] insurance?: No  Is this an insurance renewal?: No  Is this a new insurance application request?: Yes  Have you recently applied for insurance?: No  How many people in your household? : 2  Do you file taxes?: No  What is the monthly gross income for the household (wages, social security, workers comp, and pension)? : 2000     Any other information for the FRW?: to apply for SNAP and any programs she qualifies for

## 2023-06-12 ENCOUNTER — PATIENT OUTREACH (OUTPATIENT)
Dept: CARE COORDINATION | Facility: CLINIC | Age: 68
End: 2023-06-12

## 2023-06-12 NOTE — TELEPHONE ENCOUNTER
Clinic Care Coordination Contact  Program: [facility] Benefit-Snap  County: [facility] Case #:  [facility] Worker:   Kevin #:   Subscriber #:   Renewal:  Date Applied:     FRW Outreach:   6/12/23- FRW spoke with pt, she was in middle of something and she requested an appt for 6/13/23 at 10 am. frw will call pt then to discuss more.   6/5/23- Outreach attempted x 1. Left message on voicemail with call back information and requested return call.  Plan: FRW will call again within one week.         Health Insurance:      Referral/Screening:  Financial Resource Worker Screening     [facility] Benefits  Is patient requesting help applying for [facility] benefits?: Yes  Have you recently applied for any [facility] benefits?: No  How many people in your household?: 2  Do you buy/eat food together?: Yes  What is the monthly gross income for the household (wages, social security, workers comp, and pension)? : 2000     Insurance:  [place]?: No  Is this an insurance renewal?: No  Is this a new insurance application request?: Yes  Have you recently applied for insurance?: No  How many people in your household? : 2  Do you file taxes?: No  What is the monthly gross income for the household (wages, social security, workers comp, and pension)? : 2000     Any other information for the FRW?: to apply for SNAP and any programs she qualifies for

## 2023-06-13 ENCOUNTER — PATIENT OUTREACH (OUTPATIENT)
Dept: CARE COORDINATION | Facility: CLINIC | Age: 68
End: 2023-06-13

## 2023-06-13 ENCOUNTER — APPOINTMENT (OUTPATIENT)
Dept: CARE COORDINATION | Facility: CLINIC | Age: 68
End: 2023-06-13

## 2023-06-13 NOTE — TELEPHONE ENCOUNTER
Clinic Care Coordination Contact  Program: [facility] Benefit-Snap  County: [facility] Case #:  [facility] Worker:   Kevin #:   Subscriber #:   Renewal:  Date Applied:     FRW Outreach:   6/13/23- FRW called pt at appt time, no answer left a vm. I'll follow up in a week.    6/12/23- FRW spoke with pt, she was in middle of something and she requested an appt for 6/13/23 at 10 am. frw will call pt then to discuss more.   6/5/23- Outreach attempted x 1. Left message on voicemail with call back information and requested return call.  Plan: FRW will call again within one week.         Health Insurance:      Referral/Screening:  Financial Resource Worker Screening     [facility] Benefits  Is patient requesting help applying for [facility] benefits?: Yes  Have you recently applied for any [facility] benefits?: No  How many people in your household?: 2  Do you buy/eat food together?: Yes  What is the monthly gross income for the household (wages, social security, workers comp, and pension)? : 2000     Insurance:  [place]?: No  Is this an insurance renewal?: No  Is this a new insurance application request?: Yes  Have you recently applied for insurance?: No  How many people in your household? : 2  Do you file taxes?: No  What is the monthly gross income for the household (wages, social security, workers comp, and pension)? : 2000     Any other information for the FRW?: to apply for SNAP and any programs she qualifies for

## 2023-06-13 NOTE — TELEPHONE ENCOUNTER
Clinic Care Coordination Contact  Program: [facility] Benefit-[place]  County: [facility] Case #:  [facility] Worker:   Negraure #:   Subscriber #:   Renewal:  Date Applied:     FRW Outreach:     23- pt called back frw, I screened her but we couldn't complete application as she didn't have her  information and income. Pt request another appt on 23 at 11 am, frw will follow up then.     SNAP/CASH Application Screenin. Have you had [facility] benefits before? no  2. How many people in the household, do you eat/buy food together? 2  3. What is your monthly income (include all tax members)? $2600  4. Do you have a bank account?   5. Do you have any utility bills (electricity, rent, mortgage, phone, insurance, medical bills, etc.)? Utilities- $600  6. Do you have social security cards and/or green cards? yes      23- FRW called pt at appt time, no answer left a vm. I'll follow up in a week.    23- FRW spoke with pt, she was in middle of something and she requested an appt for 23 at 10 am. frw will call pt then to discuss more.   23- Outreach attempted x 1. Left message on voicemail with call back information and requested return call.  Plan: FRW will call again within one week.         Health Insurance:      Referral/Screening:  Financial Resource Worker Screening     [facility] Benefits  Is patient requesting help applying for [facility] benefits?: Yes  Have you recently applied for any [facility] benefits?: No  How many people in your household?: 2  Do you buy/eat food together?: Yes  What is the monthly gross income for the household (wages, social security, workers comp, and pension)? :      Ins[place]:  [place] No  Is this an insurance renewal?: No  Is this a new insurance application request?: Yes  Have you recently applied for insurance?: No  How many people in your household? : 2  Do you file taxes?: No  What is the monthly gross income for the household  (wages, social security, workers comp, and pension)? : 2000     Any other information for the FRW?: to apply for SNAP and any programs she qualifies for

## 2023-06-16 DIAGNOSIS — M81.0 AGE-RELATED OSTEOPOROSIS WITHOUT CURRENT PATHOLOGICAL FRACTURE: ICD-10-CM

## 2023-06-16 RX ORDER — RALOXIFENE HYDROCHLORIDE 60 MG/1
TABLET, FILM COATED ORAL
Qty: 90 TABLET | Refills: 0 | Status: ON HOLD | OUTPATIENT
Start: 2023-06-16 | End: 2023-12-21

## 2023-06-16 NOTE — TELEPHONE ENCOUNTER
"Requested Prescriptions   Pending Prescriptions Disp Refills    raloxifene (EVISTA) 60 MG tablet [Pharmacy Med Name: RALOXIFENE HCL 60MG TABS] 90 tablet 0     Sig: TAKE ONE TABLET BY MOUTH ONCE DAILY       Bisphosphonates Failed - 6/16/2023 11:15 AM        Failed - Dexa on file within past 2 years     Please review last Dexa result.             Failed - Normal serum creatinine on file within past 12 months     Recent Labs   Lab Test 06/14/22  1121   CR 0.86       Ok to refill medication if creatinine is low          Passed - Recent (12 mo) or future (30 days) visit within the authorizing provider's specialty     Patient has had an office visit with the authorizing provider or a provider within the authorizing providers department within the previous 12 mos or has a future within next 30 days. See \"Patient Info\" tab in inbasket, or \"Choose Columns\" in Meds & Orders section of the refill encounter.              Passed - Medication is active on med list        Passed - Patient is age 18 or older             "

## 2023-06-19 ENCOUNTER — PATIENT OUTREACH (OUTPATIENT)
Dept: CARE COORDINATION | Facility: CLINIC | Age: 68
End: 2023-06-19

## 2023-06-19 NOTE — TELEPHONE ENCOUNTER
Clinic Care Coordination Contact  Program: [facility] Benefit-Snap  County: [facility] Case #:  [facility] Worker:   Kevin #:   Subscriber #:   Renewal:  Date Applied:     FRW Outreach:   23- FRW spoke with pt, after recalculating their income. Shanika and  do not qualify for SNAP. Pt is interesting yasemin care, as she some medical bills however can provide tax document as she hasn't filed in 5 year. FRW will reach to billing team to ask about other alternative, then follow up with pt in a week.    23- pt called back frw, I screened her but we couldn't complete application as she didn't have her  information and income. Pt request another appt on 23 at 11 am, frw will follow up then.     SNAP/CASH Application Screenin. Have you had [facility] benefits before? no  2. How many people in the household, do you eat/buy food together? 2  3. What is your monthly income (include all tax members)? $2600  4. Do you have a bank account?   5. Do you have any utility bills (electricity, rent, mortgage, phone, insurance, medical bills, etc.)? Utilities- $600  6. Do you have social security cards and/or green cards? yes      23- FRW called pt at appt time, no answer left a vm. I'll follow up in a week.    23- FRW spoke with pt, she was in middle of something and she requested an appt for 23 at 10 am. frw will call pt then to discuss more.   23- Outreach attempted x 1. Left message on voicemail with call back information and requested return call.  Plan: FRW will call again within one week.         Health Insurance:      Referral/Screening:  Financial Resource Worker Screening     [facility] Benefits  Is patient requesting help applying for [facility] benefits?: Yes  Have you recently applied for any [facility] benefits?: No  How many people in your household?: 2  Do you buy/eat food together?: Yes  What is the monthly gross income for the household (wages, social security, workers comp, and  pension)? : 2000     Insurance:  [place]?: No  Is this an insurance renewal?: No  Is this a new insurance application request?: Yes  Have you recently applied for insurance?: No  How many people in your household? : 2  Do you file taxes?: No  What is the monthly gross income for the household (wages, social security, workers comp, and pension)? : 2000     Any other information for the FRW?: to apply for SNAP and any programs she qualifies for

## 2023-06-26 ENCOUNTER — PATIENT OUTREACH (OUTPATIENT)
Dept: CARE COORDINATION | Facility: CLINIC | Age: 68
End: 2023-06-26

## 2023-06-26 NOTE — TELEPHONE ENCOUNTER
Clinic Care Coordination Contact  Program: [facility] Benefit/yasemin care  County: [facility] Case #:  [facility] Worker:   Negraure #:   Subscriber #:   Renewal:  Date Applied:     FRW Outreach:   23- FRW spoke with pt, I have informed her that she can provide bank statement and self-employed income statement instead of the 1040 forms and Bank statements for her  who gets $1900 in [place] payment.  She said that she overwhelmed right now with her mother passing and want me to check in 2 weeks; frw will follow up then.     23- FRW spoke with pt, after recalculating their income. Shanika and  do not qualify for SNAP. Pt is interesting yasemin care, as she some medical bills however can provide tax document as she hasn't filed in 5 year. FRW will reach to billing team to ask about other alternative, then follow up with pt in a week.    23- pt called back frw, I screened her but we couldn't complete application as she didn't have her  information and income. Pt request another appt on 23 at 11 am, frw will follow up then.     SNAP/CASH Application Screenin. Have you had [facility] benefits before? no  2. How many people in the household, do you eat/buy food together? 2  3. What is your monthly income (include all tax members)? $2600  4. Do you have a bank account?   5. Do you have any utility bills (electricity, rent, mortgage, phone, insurance, medical bills, etc.)? Utilities- $600  6. Do you have social security cards and/or green cards? yes      23- FRW called pt at appt time, no answer left a vm. I'll follow up in a week.    23- FRW spoke with pt, she was in middle of something and she requested an appt for 23 at 10 am. frw will call pt then to discuss more.   23- Outreach attempted x 1. Left message on voicemail with call back information and requested return call.  Plan: FRW will call again within one week.         Health  Insurance:      Referral/Screening:  Financial Resource Worker Screening     County Benefits  Is patient requesting help applying for county benefits?: Yes  Have you recently applied for any county benefits?: No  How many people in your household?: 2  Do you buy/eat food together?: Yes  What is the monthly gross income for the household (wages, social security, workers comp, and pension)? : 2000     Insurance:  [place]?: No  Is this an insurance renewal?: No  Is this a new insurance application request?: Yes  Have you recently applied for insurance?: No  How many people in your household? : 2  Do you file taxes?: No  What is the monthly gross income for the household (wages, social security, workers comp, and pension)? : 2000     Any other information for the FRW?: to apply for SNAP and any programs she qualifies for

## 2023-06-26 NOTE — PROGRESS NOTES
Clinic Care Coordination Contact  [facility]/Voicemail       Clinical Data: Care Coordinator Outreach  Outreach attempted x 1.  Left message on patient's voicemail with call back information and requested return call.  Plan:  Care Coordinator will try to reach patient again in 8-10 business days.    ANUP Walker  [facility] Primary Care - Care Coordinator   6/26/2023   10:17 AM  [phone]

## 2023-06-30 NOTE — PROGRESS NOTES
Clinic Care Coordination Contact  Follow Up Progress Note      Assessment: patient noted she is working to get her bank account copies for the FRW.  She did not just loose her mother as the FRW had noted.  She is worried she will not qualify for insurance.  Encouraged her to still try as she may qualify for [place], [place] or [place]emin care.     She has not gotten the Friday food box and SW will follow up on trying to find out what happened.    Care Gaps:    Health Maintenance Due   Topic Date Due    COLORECTAL CANCER SCREENING  10/09/2020    LUNG CANCER SCREENING  09/07/2021    LIPID  08/07/2022    MEDICARE ANNUAL WELLNESS VISIT  09/17/2022    COVID-19 Vaccine (5 - Moderna series) 02/03/2023       Postponed to after she gets insurance     Care Plans  Care Plan: Financial Wellbeing       Problem: Patient expresses financial resource strain       Goal: Create an action plan to increase financial stability       Start Date: 5/25/2023 Expected End Date: 11/21/2023    This Visit's Progress: 10%    Note:     Barriers: limited income, miss deadlines  Strengths: understanding the need  Patient expressed understanding of goal: yes  Action steps to achieve this goal:  1. I will answer the Financial resource worker call and work with them to apply for insurance  2. I will reach out to resources sent  3. I will reach out to Jenny as needs arise between scheduled calls.                                 Intervention/Education provided during outreach: encouraged pt to continue to work with the FRW to apply for supports/insurance.      Outreach Frequency: monthly      Plan:   Pt to compile information for FRW and apply.   Care Coordinator will follow up in one month - or sooner if information about food box is found.     ANUP Walker  [facility] Primary Care - Care Coordinator   6/30/2023   2:54 PM  [phone]

## 2023-06-30 NOTE — PROGRESS NOTES
Clinic Care Coordination Contact  [facility]/Voicemail       Clinical Data: Care Coordinator Outreach  Outreach attempted x 2, pt did leave a message back after last call.  Left message on patient's voicemail with call back information and requested return call.  Plan: Care Coordinator will try to reach patient again in 3-5 business days.    ANUP Walker  [facility] Primary Care - Care Coordinator   6/30/2023   1:48 PM  [phone]

## 2023-07-05 DIAGNOSIS — F41.9 ANXIETY: ICD-10-CM

## 2023-07-05 DIAGNOSIS — G47.00 INSOMNIA, UNSPECIFIED TYPE: ICD-10-CM

## 2023-07-05 DIAGNOSIS — G25.81 RESTLESS LEGS SYNDROME (RLS): ICD-10-CM

## 2023-07-07 RX ORDER — CLONAZEPAM 0.5 MG/1
TABLET ORAL
Qty: 30 TABLET | Refills: 0 | Status: SHIPPED | OUTPATIENT
Start: 2023-07-07 | End: 2023-08-21

## 2023-07-14 ENCOUNTER — PATIENT OUTREACH (OUTPATIENT)
Dept: CARE COORDINATION | Facility: CLINIC | Age: 68
End: 2023-07-14

## 2023-07-14 NOTE — TELEPHONE ENCOUNTER
Clinic Care Coordination Contact  Program: [facility] Benefit/yasemin care  County: [facility] Case #:  [facility] Worker:   Kevin #:   Subscriber #:   Renewal:  Date Applied:     FRW Outreach:   23- FRW called pt no answer left a vm. I'll follow up in a week.  Leona Negrete  Financial Resource Worker  [facility]  Clinic Care Coordination  [phone]     23- FRW spoke with pt, I have informed her that she can provide bank statement and self-employed income statement instead of the 1040 forms and Bank statements for her  who gets $1900 in [place] payment.  She said that she overwhelmed right now with her mother passing and want me to check in 2 weeks; frw will follow up then.     23- FRW spoke with pt, after recalculating their income. Shanika and  do not qualify for SNAP. Pt is interesting yasemin care, as she some medical bills however can provide tax document as she hasn't filed in 5 year. FRW will reach to billing team to ask about other alternative, then follow up with pt in a week.    23- pt called back frw, I screened her but we couldn't complete application as she didn't have her  information and income. Pt request another appt on 23 at 11 am, frw will follow up then.     SNAP/CASH Application Screenin. Have you had county benefits before? no  2. How many people in the household, do you eat/buy food together? 2  3. What is your monthly income (include all tax members)? $2600  4. Do you have a bank account?   5. Do you have any utility bills (electricity, rent, mortgage, phone, insurance, medical bills, etc.)? Utilities- $600  6. Do you have social security cards and/or green cards? yes      23- FRW called pt at appt time, no answer left a vm. I'll follow up in a week.    23- FRW spoke with pt, she was in middle of something and she requested an appt for 23 at 10 am. frw will call pt then to discuss more.   23- Outreach attempted  x 1. Left message on voicemail with call back information and requested return call.  Plan: FRW will call again within one week.         Health Insurance:      Referral/Screening:  Financial Resource Worker Screening     County Benefits  Is patient requesting help applying for [facility] benefits?: Yes  Have you recently applied for any county benefits?: No  How many people in your household?: 2  Do you buy/eat food together?: Yes  What is the monthly gross income for the household (wages, social security, workers comp, and pension)? : 2000     Insurance:  [place]?: No  Is this an insurance renewal?: No  Is this a new insurance application request?: Yes  Have you recently applied for insurance?: No  How many people in your household? : 2  Do you file taxes?: No  What is the monthly gross income for the household (wages, social security, workers comp, and pension)? : 2000     Any other information for the FRW?: to apply for SNAP and any programs she qualifies for

## 2023-07-28 ENCOUNTER — PATIENT OUTREACH (OUTPATIENT)
Dept: CARE COORDINATION | Facility: CLINIC | Age: 68
End: 2023-07-28

## 2023-07-28 NOTE — PROGRESS NOTES
Clinic Care Coordination Contact  [facility]/Voicemail       Clinical Data: Care Coordinator Outreach  Outreach attempted x 1.  Left message on patient's voicemail with call back information and requested return call.  Plan: Care Coordinator will try to reach patient again in 3-5 business days.    ANUP Walker  [facility] Primary Care - Care Coordinator   7/28/2023   2:54 PM  [phone]

## 2023-07-28 NOTE — LETTER
I have been trying to reach you for a check in.  I hope you are now getting the food boxes each week.  Please give me a call when you can.  I would like to provide you with the enclosed information for your records.  As part of care coordination, we are developing care plans to assist in accomplishing your health care goals.  When we speak next, please feel free to let me know if you want to add or change any information on your care plans.    As always, feel free to contact me if you have any questions or concerns.  I look forward to working with you in the effort to achieve your health care and wellness goals .        Sincerely,      Jenny Hewitt, [facility]  Clinic Care Coordination  [phone]    [facility]  Patient Centered Plan of Care  About Me:        Patient Name:  Pavithra Raines    YOB: 1955  Age:         67 year old   Lissette MRN:    [id number] Telephone Information:  Home Phone [phone]   Mobile [phone]       Address:  [address] [place] [id number] Email address:  [email or web address]      Emergency Contact(s)    Name Relationship Lgl Grd Work Phone Home Phone Mobile Phone   1. RIANFRAN Friend   [phone]   2. SAMANTA GENAO Spouse   [phone]   3. KANIKA LOPEZ Friend   [phone]            Primary language:  English     needed? No   [employer]:  [phone] op. 1  Other communication barriers:Other (does not do well if addressed with a negative tone or other person coming across accusatory/lieing, any triggers similar to  past experiences can cause lack of communication)    Preferred Method of Communication:  Mail  Current living arrangement: I live in a private home with spouse    Mobility Status/ Medical Equipment: Independent        Health Maintenance  Health Maintenance Reviewed: Due/Overdue   Health Maintenance Due   Topic Date Due    COLORECTAL CANCER SCREENING  10/09/2020     LUNG CANCER SCREENING  09/07/2021    LIPID  08/07/2022    MEDICARE ANNUAL WELLNESS VISIT  09/17/2022    COVID-19 Vaccine (5 - Moderna series) 02/03/2023           My Access Plan  Medical Emergency 911   Primary Clinic Line [facility] - [phone]   24 Hour Appointment Line [phone] or  [phone]) (toll-free)   24 Hour Nurse Line [phone] (toll-free)   Preferred Urgent Care [facility], [phone]     Preferred Hospital [facility] [place]  [phone]     Preferred Pharmacy [facility], [place] - [address]      Behavioral Health Crisis Line The National Suicide Prevention Lifeline at 1-578.478.5730 or Text/Call 338             My Care Team Members  Patient Care Team         Relationship Specialty Notifications Start End    Soha Boggs MD PCP - General Family Medicine  6/1/23     Phone: [phone] Fax: [phone] [address] [place] [address]    Soha Boggs MD Assigned PCP   12/31/17     Phone: [phone] Fax: [phone] [address] [place] [address]    Jenny Hewitt LSW Lead Care Coordinator Primary Care - CC Admissions 4/11/23     Phone: [phone] Fax: [phone]        Selina Lester APRN CNP Nurse Practitioner Gastroenterology  5/18/23     Phone: [phone] Fax: [phone]         [address] [place] [address]              My Care Plans  Self Management and Treatment Plan  Care Plan  Care Plan: Financial Wellbeing       Problem: Patient expresses financial resource strain       Goal: Create an action plan to increase financial stability       Start Date: 5/25/2023 Expected End Date: 11/21/2023    This Visit's Progress: 10%    Note:     Barriers: limited income, miss deadlines  Strengths: understanding the need  Patient expressed understanding of goal: yes  Action steps to achieve this  goal:  1. I will answer the Financial resource worker call and work with them to apply for insurance  2. I will reach out to resources sent  3. I will reach out to Jenny as needs arise between scheduled calls.                                  Action Plans on File:                       Advance Care Plans/Directives Type:   No data recorded    My Medical and Care Information  Problem List   Patient Active Problem List   Diagnosis    Esophageal reflux    Restless legs syndrome (RLS)    Hypersomnia with sleep apnea    Anxiety    Status post gastric bypass for obesity    Genital herpes    CARDIOVASCULAR SCREENING; LDL GOAL LESS THAN 160    MDD (major depressive disorder)    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 grade 3    Anemia due to vitamin B12 deficiency, unspecified B12 deficiency type    Insomnia, unspecified type    Other iron deficiency anemia    Age-related osteoporosis without current pathological fracture    Fatty liver    PTSD (post-traumatic stress disorder)    Underweight    Macrocytosis without anemia    Alcohol dependence in remission (H)    Episode of recurrent major depressive disorder, unspecified depression episode severity (H)    Seizure disorder (H)      Current Medications and Allergies:     Allergies   Allergen Reactions    Codeine Itching    Vicodin [Hydrocodone-Acetaminophen] Itching         Current Outpatient Medications:     busPIRone (BUSPAR) 15 MG tablet, Take 1 tablet (15 mg) by mouth 3 times daily, Disp: 90 tablet, Rfl: 3    calcium carbonate (OS-SHON 500 MG [place]. [place]) 1250 MG tablet, Take 1 tablet by mouth 2 times daily, Disp: , Rfl:     clonazePAM (KLONOPIN) 0.5 MG tablet, TAKE 1 TABLET (0.5 MG) BY MOUTH DAILY AS NEEDED FOR ANXIETY, Disp: 30 tablet, Rfl: 0    cyanocobalamin (CYANOCOBALAMIN) 1000 MCG/ML injection, INJECT 1 ML (1,000 MCG) INTRAMUSCULARLY ONCE EVERY 30 DAYS, Disp: 3 mL, Rfl: 3    escitalopram (LEXAPRO) 20 MG tablet, Take 1.5 tablets (30 mg) by mouth daily, Disp: 135  "tablet, Rfl: 3    ferrous gluconate (FERGON) 324 (38 Fe) MG tablet, TAKE 1 TABLET (324 MG) BY MOUTH DAILY (WITH BREAKFAST), Disp: 90 tablet, Rfl: 1    folic acid (FOLVITE) 1 MG tablet, Take 1 tablet (1,000 mcg) by mouth daily, Disp: 90 tablet, Rfl: 3    Insulin Syringe-Needle U-100 (B-D INSULIN SYRINGE) 25G X 1\" 1 ML MISC, Use once every 30 days for B12 injection, Disp: 3 each, Rfl: 3    levocetirizine (XYZAL) 5 MG tablet, TAKE 1 TABLET (5 MG) BY MOUTH EVERY MORNING, Disp: 90 tablet, Rfl: 0    LORazepam (ATIVAN) 1 MG tablet, Take 1 tablet (1 mg) by mouth every 6 hours as needed for anxiety or withdrawal, Disp: 20 tablet, Rfl: 0    multivitamin w/minerals (THERA-VIT-M) tablet, Take 1 tablet by mouth daily, Disp: , Rfl:     omeprazole (PRILOSEC) 40 MG DR capsule, TAKE ONE CAPSULE BY MOUTH TWICE A DAY, Disp: 180 capsule, Rfl: 0    raloxifene (EVISTA) 60 MG tablet, TAKE ONE TABLET BY MOUTH ONCE DAILY, Disp: 90 tablet, Rfl: 0    traZODone (DESYREL) 50 MG tablet, Take 0.5 tablets (25 mg) by mouth At Bedtime, Disp: 30 tablet, Rfl: 5    triamcinolone (KENALOG) 0.1 % external cream, APPLY SPARINGLY TO ITCHY RASH AREAS UP TO THREE TIMES A DAY AS NEEDED, Disp: 80 g, Rfl: 0    venlafaxine (EFFEXOR XR) 37.5 MG 24 hr capsule, Take 1 capsule (37.5 mg) by mouth daily, Disp: 90 capsule, Rfl: 1    vitamin D3 (VITAMIN D3) 50 mcg (2000 units) tablet, Take 1 tablet (50 mcg) by mouth daily, Disp: 90 tablet, Rfl: 3    Current Facility-Administered Medications:     cyanocobalamin injection 1,000 mcg, 1,000 mcg, Intramuscular, Q30 Days, Sam Chang MD, 1,000 mcg at 12/30/19 1449      Care Coordination Start Date: 4/10/2023   Frequency of Care Coordination: monthly     Form Last Updated: 08/01/2023       "

## 2023-08-01 ENCOUNTER — PATIENT OUTREACH (OUTPATIENT)
Dept: CARE COORDINATION | Facility: CLINIC | Age: 68
End: 2023-08-01

## 2023-08-01 NOTE — TELEPHONE ENCOUNTER
Clinic Care Coordination Contact  Program: [facility] Benefit/yasemin care  County: [facility] Case #:  [facility] Worker:   Kevin #:   Subscriber #:   Renewal:  Date Applied:     FRW Outreach:   23- FRW called pt, she still hesitant about applying for yasemin care as she is currently dealing with a lot. Pt ask that I share application and she will apply. FRW will graduate pt and remove from panel.  Leona Negrete  Financial Resource Worker  [facility] Care Coordination  [phone]     23- FRW called pt no answer left a vm. I'll follow up in a week.  Leona Negrete  Financial Resource Worker  [facility] Care Coordination  [phone]     23- FRW spoke with pt, I have informed her that she can provide bank statement and self-employed income statement instead of the 1040 forms and Bank statements for her  who gets $1900 in [employer] payment.  She said that she overwhelmed right now with her mother passing and want me to check in 2 weeks; frw will follow up then.     23- FRW spoke with pt, after recalculating their income. [place] and  do not qualify for SNAP. Pt is interesting yasemin care, as she some medical bills however can provide tax document as she hasn't filed in 5 year. FRW will reach to billing team to ask about other alternative, then follow up with pt in a week.    23- pt called back frw, I screened her but we couldn't complete application as she didn't have her  information and income. Pt request another appt on 23 at 11 am, frw will follow up then.     SNAP/CASH Application Screenin. Have you had county benefits before? no  2. How many people in the household, do you eat/buy food together? 2  3. What is your monthly income (include all tax members)? $2600  4. Do you have a bank account?   5. Do you have any utility bills (electricity, rent, mortgage, phone, insurance, medical bills, etc.)? Utilities- $600  6. Do  you have social security cards and/or green cards? yes      6/13/23- FRW called pt at appt time, no answer left a vm. I'll follow up in a week.    6/12/23- FRW spoke with pt, she was in middle of something and she requested an appt for 6/13/23 at 10 am. frw will call pt then to discuss more.   6/5/23- Outreach attempted x 1. Left message on voicemail with call back information and requested return call.  Plan: FRW will call again within one week.         Health Insurance:      Referral/Screening:  Financial Resource Worker Screening     [facility] Benefits  Is patient requesting help applying for Critical access hospital benefits?: Yes  Have you recently applied for any Critical access hospital benefits?: No  How many people in your household?: 2  Do you buy/eat food together?: Yes  What is the monthly gross income for the household (wages, social security, workers comp, and pension)? : 2000     Insurance:  Was [place] for active insurance?: No  Is this an insurance renewal?: No  Is this a new insurance application request?: Yes  Have you recently applied for insurance?: No  How many people in your household? : 2  Do you file taxes?: No  What is the monthly gross income for the household (wages, social security, workers comp, and pension)? : 2000     Any other information for the FRW?: to apply for SNAP and any programs she qualifies for

## 2023-08-01 NOTE — PROGRESS NOTES
Clinic Care Coordination Contact  [facility]/Voicemail       Clinical Data: Care Coordinator Outreach  Outreach attempted x 2.  Left message on patient's voicemail with call back information and requested return call.  Plan: Care Coordinator will send unable to contact letter with care coordinator contact information via mail along with care plan. Care Coordinator will try to reach patient again in 1 month.    ANUP Walker  [facility] - Care Coordinator   8/1/2023   1:05 PM  [phone]

## 2023-08-21 DIAGNOSIS — G25.81 RESTLESS LEGS SYNDROME (RLS): ICD-10-CM

## 2023-08-21 DIAGNOSIS — G47.00 INSOMNIA, UNSPECIFIED TYPE: ICD-10-CM

## 2023-08-21 DIAGNOSIS — F41.9 ANXIETY: ICD-10-CM

## 2023-08-21 RX ORDER — CLONAZEPAM 0.5 MG/1
TABLET ORAL
Qty: 30 TABLET | Refills: 0 | Status: SHIPPED | OUTPATIENT
Start: 2023-08-21 | End: 2023-09-25

## 2023-08-29 ENCOUNTER — PATIENT OUTREACH (OUTPATIENT)
Dept: CARE COORDINATION | Facility: CLINIC | Age: 68
End: 2023-08-29

## 2023-08-29 NOTE — PROGRESS NOTES
Clinic Care Coordination Contact  Follow Up Progress Note      Assessment: patient reported that she has been finding the food boxes very beneficial.  She said that the quality was great and the recipes have been great.  She has gotten the utility bills figured out and the phone bill.        She is still wanting to put the medical stuff on hold.  She has fallen and noted she is sore and that she needs to move.  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      Care Gaps:    Health Maintenance Due   Topic Date Due    COLORECTAL CANCER SCREENING  10/09/2020    LUNG CANCER SCREENING  09/07/2021    LIPID  08/07/2022    MEDICARE ANNUAL WELLNESS VISIT  09/17/2022    COVID-19 Vaccine (5 - Moderna series) 02/03/2023       Declined due to no insurance     Care Plans  Care Plan: Financial Wellbeing       Problem: Patient expresses financial resource strain       Goal: Create an action plan to increase financial stability       Start Date: 5/25/2023 Expected End Date: 11/21/2023    This Visit's Progress: 30% Recent Progress: 10%    Note:     Barriers: limited income, miss deadlines  Strengths: understanding the need  Patient expressed understanding of goal: yes  Action steps to achieve this goal:  1. I will answer the Financial resource worker call and work with them to apply for insurance  2. I will reach out to resources sent  3. I will reach out to Jenny as needs arise between scheduled calls.                                 Intervention/Education provided during outreach: encouraged continued work on improving her finances.  She is not wanting to apply for yasemin care or other programs until she is better situated with compiling her records.      Outreach Frequency: monthly        Plan:   Pt to continue to work on her finances.  Care Coordinator will follow up in one month.     ANUP Walker  [facility] - Care Coordinator   8/29/2023   12:47  PM  [phone]

## 2023-09-21 DIAGNOSIS — F41.9 ANXIETY: ICD-10-CM

## 2023-09-21 DIAGNOSIS — G47.00 INSOMNIA, UNSPECIFIED TYPE: ICD-10-CM

## 2023-09-21 DIAGNOSIS — G25.81 RESTLESS LEGS SYNDROME (RLS): ICD-10-CM

## 2023-09-25 RX ORDER — CLONAZEPAM 0.5 MG/1
TABLET ORAL
Qty: 30 TABLET | Refills: 0 | Status: ON HOLD | OUTPATIENT
Start: 2023-09-25 | End: 2023-10-24

## 2023-09-28 ENCOUNTER — PATIENT OUTREACH (OUTPATIENT)
Dept: CARE COORDINATION | Facility: CLINIC | Age: 68
End: 2023-09-28

## 2023-09-28 NOTE — LETTER
[facility] [address] [address]  Clinic: [phone]      10/5/2023      Pavithra Raines  [address] [facility] [id number]      Dear  Radhika Arshad is the Colleen care application.     colleen care application    Instructions:     How do I apply?    Thank you for your interest in [facility] Financial Assistance program.    To apply, review the instructions below and complete our Financial Assistance application form. The application is also available Bulgarian, Nicaraguan, Bengali, Hmong and Toya.    Step 1: Complete and sign this form.    List the names and birth dates for each family member applying for the program. If you do not list them on the form, they will not be included.  If your spouse is also applying for this program, both of you must sign the form.  Your family includes a spouse, dependent children and any person for whom you have legal guardianship.    Step 2: Attach these items to the form. We will keep your records confidential (private). Please include records for all adults in your household.    A copy of your most recent 1040 Federal Income Tax form. Do not include W2 forms.  Records of income are to include copies of the two most recent payroll stubs. (Example: pay stubs that show your year-to-date earnings.)  Copies of bank statements for all checking and savings accounts for the last 30 days. Include the last statement for any CDs (Certificates of Deposit).  Records of all FDC savings: employee pension plans, 401K plans, 403b plans, annuities, IRAs.  Record of current balances in all health savings accounts (HSA).  Optional: a letter explaining any recent events that might affect your ability to pay your medical bills.  Step 3: Return the form with the above records to the following address:    Patient Financial Services  Attn:  Financial Assistance  [address] [facility] [address] [facility]  [address]th [place] [address]    Step 4: If  you have applied for insurance coverage via [facility] or the Affordable Care Act, send the application results.    You will keep receiving bills until we have your complete application. This includes the records listed above. If there are legal fees related to your account, you are not eligible for financial assistance.      Jenny Campos, RUFUS   [facility] - Care Coordination  [facility]   [phone]

## 2023-09-28 NOTE — PROGRESS NOTES
Clinic Care Coordination Contact  [facility]/Voicemail       Clinical Data: Care Coordinator Outreach  Outreach attempted x 1.  Left message on patient's voicemail with call back information and requested return call.  Plan: Care Coordinator will try to reach patient again in 12-14 business days - SW out of office in a week.    ANUP Walker  [facility] - Care Coordinator   9/28/2023   3:28 PM  [phone]

## 2023-10-04 NOTE — PROGRESS NOTES
Clinic Care Coordination Contact  [facility]/Voicemail       Clinical Data: Care Coordinator Outreach  Outreach attempted x 2, pt left a message on  voice mail yesterday afternoon.  Left message on patient's voicemail with call back information and requested return call.  Plan: Care Coordinator will try to reach patient again in 10 business days.    ANUP Walker  [facility] Primary Care - Care Coordinator   10/4/2023   2:18 PM  [phone]

## 2023-10-05 NOTE — PROGRESS NOTES
Clinic Care Coordination Contact  Follow Up Progress Note      Assessment: patient reports she still struggles day to day with finances.  Her spouse took one of his recent checks and gambled it away.  She will apply for insurance through open enrollment and hope she gets something affordable.      She is able to obtain her meds and sending yasemin care.  She continues to be hesitant to complete applications due to not having taxes completed the last couple of years.  Encouraged her to try.     Care Gaps:    Health Maintenance Due   Topic Date Due    COLORECTAL CANCER SCREENING  10/09/2020    LUNG CANCER SCREENING  09/07/2021    LIPID  08/07/2022    MEDICARE ANNUAL WELLNESS VISIT  09/17/2022    INFLUENZA VACCINE (1) 09/01/2023    COVID-19 Vaccine (5 - 2023-24 season) 09/01/2023       After she has insurance she will schedule    Care Plans  Care Plan: Financial Wellbeing       Problem: Patient expresses financial resource strain       Goal: Create an action plan to increase financial stability       Start Date: 5/25/2023 Expected End Date: 11/21/2023    This Visit's Progress: 20% Recent Progress: 30%    Note:     Barriers: limited income, miss deadlines  Strengths: understanding the need  Patient expressed understanding of goal: yes  Action steps to achieve this goal:  1. I will answer the Financial resource worker call and work with them to apply for insurance  2. I will reach out to resources sent  3. I will reach out to Jenny as needs arise between scheduled calls.                                 Intervention/Education provided during outreach: educated on yasemin care and veggie boxes available at the clinic.      Outreach Frequency: monthly      Plan:   Pt to complete yasemin care application.  Pt to checkout the veggie drops at the clinic on Wednesday.   Care Coordinator will follow up in one month,     ANUP Walker  [facility] Primary Care - Care Coordinator   10/5/2023   1:39  PM  [phone]

## 2023-10-07 ENCOUNTER — HOSPITAL ENCOUNTER (INPATIENT)
Facility: CLINIC | Age: 68
LOS: 17 days | Discharge: NURSING, REHAB OR LONG-TERM CARE | DRG: 870 | End: 2023-10-24
Attending: FAMILY MEDICINE | Admitting: INTERNAL MEDICINE
Payer: MEDICARE

## 2023-10-07 ENCOUNTER — APPOINTMENT (OUTPATIENT)
Dept: CT IMAGING | Facility: CLINIC | Age: 68
DRG: 870 | End: 2023-10-07
Attending: FAMILY MEDICINE
Payer: MEDICARE

## 2023-10-07 DIAGNOSIS — Z72.0 TOBACCO ABUSE: ICD-10-CM

## 2023-10-07 DIAGNOSIS — E46 MALNUTRITION, UNSPECIFIED TYPE (H): ICD-10-CM

## 2023-10-07 DIAGNOSIS — J18.9 MULTIFOCAL PNEUMONIA: ICD-10-CM

## 2023-10-07 DIAGNOSIS — E87.1 HYPONATREMIA: ICD-10-CM

## 2023-10-07 DIAGNOSIS — E86.0 DEHYDRATION: ICD-10-CM

## 2023-10-07 DIAGNOSIS — R53.1 WEAKNESS GENERALIZED: ICD-10-CM

## 2023-10-07 DIAGNOSIS — F10.20 ALCOHOL USE DISORDER, SEVERE, DEPENDENCE (H): ICD-10-CM

## 2023-10-07 DIAGNOSIS — R78.81 GRAM-POSITIVE BACTEREMIA: Primary | ICD-10-CM

## 2023-10-07 DIAGNOSIS — F10.931 ALCOHOL WITHDRAWAL, WITH DELIRIUM (H): ICD-10-CM

## 2023-10-07 LAB
ALBUMIN SERPL BCG-MCNC: 2.3 G/DL (ref 3.5–5.2)
ALP SERPL-CCNC: 119 U/L (ref 35–104)
ALT SERPL W P-5'-P-CCNC: 13 U/L (ref 0–50)
ANION GAP SERPL CALCULATED.3IONS-SCNC: 17 MMOL/L (ref 7–15)
AST SERPL W P-5'-P-CCNC: 39 U/L (ref 0–45)
BASE EXCESS BLDV CALC-SCNC: -0.7 MMOL/L (ref -7.7–1.9)
BASO+EOS+MONOS # BLD AUTO: ABNORMAL 10*3/UL
BASO+EOS+MONOS NFR BLD AUTO: ABNORMAL %
BASOPHILS # BLD AUTO: 0.1 10E3/UL (ref 0–0.2)
BASOPHILS NFR BLD AUTO: 1 %
BILIRUB SERPL-MCNC: 1.3 MG/DL
BUN SERPL-MCNC: 23.9 MG/DL (ref 8–23)
CALCIUM SERPL-MCNC: 8.3 MG/DL (ref 8.8–10.2)
CHLORIDE SERPL-SCNC: 87 MMOL/L (ref 98–107)
CREAT SERPL-MCNC: 1.22 MG/DL (ref 0.51–0.95)
D DIMER PPP FEU-MCNC: 4 UG/ML FEU (ref 0–0.5)
DEPRECATED HCO3 PLAS-SCNC: 19 MMOL/L (ref 22–29)
EGFRCR SERPLBLD CKD-EPI 2021: 49 ML/MIN/1.73M2
EOSINOPHIL # BLD AUTO: 0.3 10E3/UL (ref 0–0.7)
EOSINOPHIL NFR BLD AUTO: 3 %
ERYTHROCYTE [DISTWIDTH] IN BLOOD BY AUTOMATED COUNT: 12.5 % (ref 10–15)
ETHANOL SERPL-MCNC: <0.01 G/DL
FLUAV RNA SPEC QL NAA+PROBE: NEGATIVE
FLUBV RNA RESP QL NAA+PROBE: NEGATIVE
GLUCOSE SERPL-MCNC: 80 MG/DL (ref 70–99)
HCO3 BLDV-SCNC: 23 MMOL/L (ref 21–28)
HCT VFR BLD AUTO: 35 % (ref 35–47)
HGB BLD-MCNC: 12.6 G/DL (ref 11.7–15.7)
IMM GRANULOCYTES # BLD: 0.3 10E3/UL
IMM GRANULOCYTES NFR BLD: 4 %
LACTATE SERPL-SCNC: 4.4 MMOL/L (ref 0.7–2)
LIPASE SERPL-CCNC: 9 U/L (ref 13–60)
LYMPHOCYTES # BLD AUTO: 0.3 10E3/UL (ref 0.8–5.3)
LYMPHOCYTES NFR BLD AUTO: 3 %
MAGNESIUM SERPL-MCNC: 1.6 MG/DL (ref 1.7–2.3)
MCH RBC QN AUTO: 34.6 PG (ref 26.5–33)
MCHC RBC AUTO-ENTMCNC: 36 G/DL (ref 31.5–36.5)
MCV RBC AUTO: 96 FL (ref 78–100)
MONOCYTES # BLD AUTO: 0.3 10E3/UL (ref 0–1.3)
MONOCYTES NFR BLD AUTO: 4 %
NEUTROPHILS # BLD AUTO: 7.4 10E3/UL (ref 1.6–8.3)
NEUTROPHILS NFR BLD AUTO: 85 %
NRBC # BLD AUTO: 0 10E3/UL
NRBC BLD AUTO-RTO: 0 /100
NT-PROBNP SERPL-MCNC: 1791 PG/ML (ref 0–900)
O2/TOTAL GAS SETTING VFR VENT: 21 %
PCO2 BLDV: 35 MM HG (ref 40–50)
PH BLDV: 7.43 [PH] (ref 7.32–7.43)
PLATELET # BLD AUTO: 168 10E3/UL (ref 150–450)
PO2 BLDV: 15 MM HG (ref 25–47)
POTASSIUM SERPL-SCNC: 3.3 MMOL/L (ref 3.4–5.3)
PROT SERPL-MCNC: 6.5 G/DL (ref 6.4–8.3)
RBC # BLD AUTO: 3.64 10E6/UL (ref 3.8–5.2)
RSV RNA SPEC NAA+PROBE: NEGATIVE
SARS-COV-2 RNA RESP QL NAA+PROBE: NEGATIVE
SODIUM SERPL-SCNC: 123 MMOL/L (ref 135–145)
TROPONIN T SERPL HS-MCNC: 9 NG/L
WBC # BLD AUTO: 8.7 10E3/UL (ref 4–11)

## 2023-10-07 PROCEDURE — 258N000003 HC RX IP 258 OP 636: Performed by: FAMILY MEDICINE

## 2023-10-07 PROCEDURE — 80053 COMPREHEN METABOLIC PANEL: CPT | Performed by: FAMILY MEDICINE

## 2023-10-07 PROCEDURE — 85379 FIBRIN DEGRADATION QUANT: CPT | Performed by: FAMILY MEDICINE

## 2023-10-07 PROCEDURE — 250N000011 HC RX IP 250 OP 636: Mod: JZ | Performed by: INTERNAL MEDICINE

## 2023-10-07 PROCEDURE — 87149 DNA/RNA DIRECT PROBE: CPT | Performed by: FAMILY MEDICINE

## 2023-10-07 PROCEDURE — 36415 COLL VENOUS BLD VENIPUNCTURE: CPT | Performed by: FAMILY MEDICINE

## 2023-10-07 PROCEDURE — 84484 ASSAY OF TROPONIN QUANT: CPT | Performed by: FAMILY MEDICINE

## 2023-10-07 PROCEDURE — 250N000011 HC RX IP 250 OP 636: Performed by: FAMILY MEDICINE

## 2023-10-07 PROCEDURE — C9113 INJ PANTOPRAZOLE SODIUM, VIA: HCPCS | Mod: JZ | Performed by: FAMILY MEDICINE

## 2023-10-07 PROCEDURE — 96361 HYDRATE IV INFUSION ADD-ON: CPT

## 2023-10-07 PROCEDURE — 250N000009 HC RX 250: Performed by: FAMILY MEDICINE

## 2023-10-07 PROCEDURE — 82803 BLOOD GASES ANY COMBINATION: CPT | Performed by: FAMILY MEDICINE

## 2023-10-07 PROCEDURE — 120N000001 HC R&B MED SURG/OB

## 2023-10-07 PROCEDURE — 83690 ASSAY OF LIPASE: CPT | Performed by: FAMILY MEDICINE

## 2023-10-07 PROCEDURE — 83735 ASSAY OF MAGNESIUM: CPT | Performed by: FAMILY MEDICINE

## 2023-10-07 PROCEDURE — 99285 EMERGENCY DEPT VISIT HI MDM: CPT | Mod: 25

## 2023-10-07 PROCEDURE — 87077 CULTURE AEROBIC IDENTIFY: CPT | Performed by: FAMILY MEDICINE

## 2023-10-07 PROCEDURE — 85027 COMPLETE CBC AUTOMATED: CPT | Performed by: INTERNAL MEDICINE

## 2023-10-07 PROCEDURE — 96365 THER/PROPH/DIAG IV INF INIT: CPT | Mod: 59

## 2023-10-07 PROCEDURE — 87637 SARSCOV2&INF A&B&RSV AMP PRB: CPT | Performed by: FAMILY MEDICINE

## 2023-10-07 PROCEDURE — 71275 CT ANGIOGRAPHY CHEST: CPT | Mod: ME

## 2023-10-07 PROCEDURE — 83880 ASSAY OF NATRIURETIC PEPTIDE: CPT | Performed by: FAMILY MEDICINE

## 2023-10-07 PROCEDURE — 99285 EMERGENCY DEPT VISIT HI MDM: CPT | Mod: 25 | Performed by: FAMILY MEDICINE

## 2023-10-07 PROCEDURE — 83605 ASSAY OF LACTIC ACID: CPT | Performed by: FAMILY MEDICINE

## 2023-10-07 PROCEDURE — 85025 COMPLETE CBC W/AUTO DIFF WBC: CPT | Performed by: FAMILY MEDICINE

## 2023-10-07 PROCEDURE — 96375 TX/PRO/DX INJ NEW DRUG ADDON: CPT

## 2023-10-07 PROCEDURE — 82077 ASSAY SPEC XCP UR&BREATH IA: CPT | Performed by: FAMILY MEDICINE

## 2023-10-07 PROCEDURE — 36415 COLL VENOUS BLD VENIPUNCTURE: CPT | Performed by: INTERNAL MEDICINE

## 2023-10-07 RX ORDER — BUSPIRONE HYDROCHLORIDE 5 MG/1
15 TABLET ORAL 3 TIMES DAILY
Status: DISCONTINUED | OUTPATIENT
Start: 2023-10-08 | End: 2023-10-24 | Stop reason: HOSPADM

## 2023-10-07 RX ORDER — NICOTINE POLACRILEX 4 MG
15-30 LOZENGE BUCCAL
Status: DISCONTINUED | OUTPATIENT
Start: 2023-10-07 | End: 2023-10-08

## 2023-10-07 RX ORDER — ONDANSETRON 2 MG/ML
4 INJECTION INTRAMUSCULAR; INTRAVENOUS ONCE
Status: COMPLETED | OUTPATIENT
Start: 2023-10-07 | End: 2023-10-07

## 2023-10-07 RX ORDER — PANTOPRAZOLE SODIUM 40 MG/1
40 TABLET, DELAYED RELEASE ORAL 2 TIMES DAILY
Status: DISCONTINUED | OUTPATIENT
Start: 2023-10-08 | End: 2023-10-10

## 2023-10-07 RX ORDER — VITAMIN B COMPLEX
50 TABLET ORAL DAILY
Status: DISCONTINUED | OUTPATIENT
Start: 2023-10-08 | End: 2023-10-24 | Stop reason: HOSPADM

## 2023-10-07 RX ORDER — THIAMINE HYDROCHLORIDE 100 MG/ML
200 INJECTION, SOLUTION INTRAMUSCULAR; INTRAVENOUS 3 TIMES DAILY
Status: DISPENSED | OUTPATIENT
Start: 2023-10-08 | End: 2023-10-10

## 2023-10-07 RX ORDER — FOLIC ACID 5 MG/ML
1 INJECTION, SOLUTION INTRAMUSCULAR; INTRAVENOUS; SUBCUTANEOUS ONCE
Status: DISCONTINUED | OUTPATIENT
Start: 2023-10-07 | End: 2023-10-08

## 2023-10-07 RX ORDER — LEVOCETIRIZINE DIHYDROCHLORIDE 5 MG/1
5 TABLET, FILM COATED ORAL EVERY MORNING
Status: DISCONTINUED | OUTPATIENT
Start: 2023-10-08 | End: 2023-10-10

## 2023-10-07 RX ORDER — VENLAFAXINE HYDROCHLORIDE 37.5 MG/1
37.5 CAPSULE, EXTENDED RELEASE ORAL DAILY
Status: DISCONTINUED | OUTPATIENT
Start: 2023-10-08 | End: 2023-10-24 | Stop reason: HOSPADM

## 2023-10-07 RX ORDER — FOLIC ACID 1 MG/1
1 TABLET ORAL DAILY
Status: DISCONTINUED | OUTPATIENT
Start: 2023-10-10 | End: 2023-10-17

## 2023-10-07 RX ORDER — HALOPERIDOL 5 MG/ML
2.5-5 INJECTION INTRAMUSCULAR EVERY 4 HOURS PRN
Status: DISCONTINUED | OUTPATIENT
Start: 2023-10-07 | End: 2023-10-10

## 2023-10-07 RX ORDER — FOLIC ACID 1 MG/1
1000 TABLET ORAL DAILY
Status: DISCONTINUED | OUTPATIENT
Start: 2023-10-08 | End: 2023-10-08

## 2023-10-07 RX ORDER — FERROUS GLUCONATE 324(38)MG
324 TABLET ORAL
Status: DISCONTINUED | OUTPATIENT
Start: 2023-10-08 | End: 2023-10-24 | Stop reason: HOSPADM

## 2023-10-07 RX ORDER — BUDESONIDE 0.5 MG/2ML
0.5 INHALANT ORAL 2 TIMES DAILY
Status: DISCONTINUED | OUTPATIENT
Start: 2023-10-07 | End: 2023-10-08

## 2023-10-07 RX ORDER — METOPROLOL TARTRATE 1 MG/ML
5 INJECTION, SOLUTION INTRAVENOUS EVERY 6 HOURS PRN
Status: DISCONTINUED | OUTPATIENT
Start: 2023-10-07 | End: 2023-10-11

## 2023-10-07 RX ORDER — ESCITALOPRAM OXALATE 10 MG/1
30 TABLET ORAL DAILY
Status: DISCONTINUED | OUTPATIENT
Start: 2023-10-08 | End: 2023-10-17

## 2023-10-07 RX ORDER — LORAZEPAM 1 MG/1
1-2 TABLET ORAL EVERY 30 MIN PRN
Status: DISCONTINUED | OUTPATIENT
Start: 2023-10-07 | End: 2023-10-09

## 2023-10-07 RX ORDER — IOPAMIDOL 755 MG/ML
500 INJECTION, SOLUTION INTRAVASCULAR ONCE
Status: COMPLETED | OUTPATIENT
Start: 2023-10-07 | End: 2023-10-07

## 2023-10-07 RX ORDER — CEFTRIAXONE 2 G/1
2 INJECTION, POWDER, FOR SOLUTION INTRAMUSCULAR; INTRAVENOUS EVERY 24 HOURS
Status: DISCONTINUED | OUTPATIENT
Start: 2023-10-07 | End: 2023-10-08

## 2023-10-07 RX ORDER — FLUMAZENIL 0.1 MG/ML
0.2 INJECTION, SOLUTION INTRAVENOUS
Status: DISCONTINUED | OUTPATIENT
Start: 2023-10-07 | End: 2023-10-11

## 2023-10-07 RX ORDER — AZITHROMYCIN 500 MG/1
500 INJECTION, POWDER, LYOPHILIZED, FOR SOLUTION INTRAVENOUS EVERY 24 HOURS
Status: DISCONTINUED | OUTPATIENT
Start: 2023-10-08 | End: 2023-10-10

## 2023-10-07 RX ORDER — GABAPENTIN 300 MG/1
600 CAPSULE ORAL EVERY 8 HOURS
Status: DISCONTINUED | OUTPATIENT
Start: 2023-10-11 | End: 2023-10-08

## 2023-10-07 RX ORDER — IPRATROPIUM BROMIDE AND ALBUTEROL SULFATE 2.5; .5 MG/3ML; MG/3ML
3 SOLUTION RESPIRATORY (INHALATION)
Status: DISCONTINUED | OUTPATIENT
Start: 2023-10-08 | End: 2023-10-08

## 2023-10-07 RX ORDER — GABAPENTIN 400 MG/1
1200 CAPSULE ORAL ONCE
Status: COMPLETED | OUTPATIENT
Start: 2023-10-07 | End: 2023-10-08

## 2023-10-07 RX ORDER — MULTIPLE VITAMINS W/ MINERALS TAB 9MG-400MCG
1 TAB ORAL DAILY
Status: DISCONTINUED | OUTPATIENT
Start: 2023-10-08 | End: 2023-10-08

## 2023-10-07 RX ORDER — AZITHROMYCIN 500 MG/1
500 INJECTION, POWDER, LYOPHILIZED, FOR SOLUTION INTRAVENOUS EVERY 24 HOURS
Status: COMPLETED | OUTPATIENT
Start: 2023-10-07 | End: 2023-10-08

## 2023-10-07 RX ORDER — FOLIC ACID 5 MG/ML
1 INJECTION, SOLUTION INTRAMUSCULAR; INTRAVENOUS; SUBCUTANEOUS DAILY
Status: COMPLETED | OUTPATIENT
Start: 2023-10-08 | End: 2023-10-09

## 2023-10-07 RX ORDER — RALOXIFENE HYDROCHLORIDE 60 MG/1
60 TABLET, FILM COATED ORAL DAILY
Status: DISCONTINUED | OUTPATIENT
Start: 2023-10-08 | End: 2023-10-24 | Stop reason: HOSPADM

## 2023-10-07 RX ORDER — CLONIDINE HYDROCHLORIDE 0.1 MG/1
0.1 TABLET ORAL EVERY 8 HOURS
Status: DISCONTINUED | OUTPATIENT
Start: 2023-10-07 | End: 2023-10-10

## 2023-10-07 RX ORDER — GABAPENTIN 300 MG/1
900 CAPSULE ORAL EVERY 8 HOURS
Status: DISCONTINUED | OUTPATIENT
Start: 2023-10-08 | End: 2023-10-08

## 2023-10-07 RX ORDER — ONDANSETRON 2 MG/ML
4 INJECTION INTRAMUSCULAR; INTRAVENOUS EVERY 6 HOURS PRN
Status: DISCONTINUED | OUTPATIENT
Start: 2023-10-07 | End: 2023-10-24 | Stop reason: HOSPADM

## 2023-10-07 RX ORDER — ONDANSETRON 4 MG/1
4 TABLET, ORALLY DISINTEGRATING ORAL EVERY 6 HOURS PRN
Status: DISCONTINUED | OUTPATIENT
Start: 2023-10-07 | End: 2023-10-24 | Stop reason: HOSPADM

## 2023-10-07 RX ORDER — GABAPENTIN 100 MG/1
100 CAPSULE ORAL EVERY 8 HOURS
Status: DISCONTINUED | OUTPATIENT
Start: 2023-10-15 | End: 2023-10-08

## 2023-10-07 RX ORDER — CEFTRIAXONE 1 G/1
1 INJECTION, POWDER, FOR SOLUTION INTRAMUSCULAR; INTRAVENOUS EVERY 24 HOURS
Status: DISCONTINUED | OUTPATIENT
Start: 2023-10-08 | End: 2023-10-08

## 2023-10-07 RX ORDER — DEXTROSE MONOHYDRATE 25 G/50ML
25-50 INJECTION, SOLUTION INTRAVENOUS
Status: DISCONTINUED | OUTPATIENT
Start: 2023-10-07 | End: 2023-10-08

## 2023-10-07 RX ORDER — CLONAZEPAM 0.5 MG/1
0.5 TABLET ORAL DAILY PRN
Status: DISCONTINUED | OUTPATIENT
Start: 2023-10-07 | End: 2023-10-10

## 2023-10-07 RX ORDER — NICOTINE 21 MG/24HR
1 PATCH, TRANSDERMAL 24 HOURS TRANSDERMAL DAILY
Status: DISCONTINUED | OUTPATIENT
Start: 2023-10-08 | End: 2023-10-24 | Stop reason: HOSPADM

## 2023-10-07 RX ORDER — MULTIPLE VITAMINS W/ MINERALS TAB 9MG-400MCG
1 TAB ORAL DAILY
Status: DISCONTINUED | OUTPATIENT
Start: 2023-10-08 | End: 2023-10-10

## 2023-10-07 RX ORDER — LORAZEPAM 2 MG/ML
1-2 INJECTION INTRAMUSCULAR EVERY 30 MIN PRN
Status: DISCONTINUED | OUTPATIENT
Start: 2023-10-07 | End: 2023-10-09

## 2023-10-07 RX ORDER — GABAPENTIN 300 MG/1
300 CAPSULE ORAL EVERY 8 HOURS
Status: DISCONTINUED | OUTPATIENT
Start: 2023-10-13 | End: 2023-10-08

## 2023-10-07 RX ADMIN — ONDANSETRON 4 MG: 2 INJECTION INTRAMUSCULAR; INTRAVENOUS at 20:41

## 2023-10-07 RX ADMIN — PANTOPRAZOLE SODIUM 40 MG: 40 INJECTION, POWDER, FOR SOLUTION INTRAVENOUS at 20:40

## 2023-10-07 RX ADMIN — IOPAMIDOL 50 ML: 755 INJECTION, SOLUTION INTRAVENOUS at 21:23

## 2023-10-07 RX ADMIN — ONDANSETRON 4 MG: 2 INJECTION INTRAMUSCULAR; INTRAVENOUS at 23:58

## 2023-10-07 RX ADMIN — SODIUM CHLORIDE 70 ML: 9 INJECTION, SOLUTION INTRAVENOUS at 21:22

## 2023-10-07 RX ADMIN — FOLIC ACID: 5 INJECTION, SOLUTION INTRAMUSCULAR; INTRAVENOUS; SUBCUTANEOUS at 21:05

## 2023-10-07 RX ADMIN — AZITHROMYCIN MONOHYDRATE 500 MG: 500 INJECTION, POWDER, LYOPHILIZED, FOR SOLUTION INTRAVENOUS at 23:05

## 2023-10-07 RX ADMIN — SODIUM CHLORIDE 1000 ML: 9 INJECTION, SOLUTION INTRAVENOUS at 20:56

## 2023-10-07 RX ADMIN — CEFTRIAXONE SODIUM 2 G: 2 INJECTION, POWDER, FOR SOLUTION INTRAMUSCULAR; INTRAVENOUS at 22:24

## 2023-10-07 RX ADMIN — SODIUM CHLORIDE 1000 ML: 9 INJECTION, SOLUTION INTRAVENOUS at 20:36

## 2023-10-07 ASSESSMENT — ACTIVITIES OF DAILY LIVING (ADL)
ADLS_ACUITY_SCORE: 37
ADLS_ACUITY_SCORE: 37

## 2023-10-08 PROBLEM — Z72.0 TOBACCO ABUSE: Status: ACTIVE | Noted: 2023-10-08

## 2023-10-08 PROBLEM — J43.9 EMPHYSEMA/COPD (H): Status: ACTIVE | Noted: 2023-10-08

## 2023-10-08 PROBLEM — Z87.898 HISTORY OF SEIZURE DUE TO ALCOHOL WITHDRAWAL: Status: ACTIVE | Noted: 2023-10-08

## 2023-10-08 PROBLEM — F43.10 PTSD (POST-TRAUMATIC STRESS DISORDER): Status: ACTIVE | Noted: 2020-12-13

## 2023-10-08 PROBLEM — Z86.59 HISTORY OF SEIZURE DUE TO ALCOHOL WITHDRAWAL: Status: ACTIVE | Noted: 2023-10-08

## 2023-10-08 PROBLEM — E83.42 HYPOMAGNESEMIA: Status: ACTIVE | Noted: 2023-10-08

## 2023-10-08 PROBLEM — R78.81 GRAM-POSITIVE BACTEREMIA: Status: ACTIVE | Noted: 2023-10-08

## 2023-10-08 PROBLEM — R65.21 SEPTIC SHOCK (H): Status: ACTIVE | Noted: 2023-10-08

## 2023-10-08 PROBLEM — A41.9 SEPTIC SHOCK (H): Status: ACTIVE | Noted: 2023-10-08

## 2023-10-08 PROBLEM — E46 MALNUTRITION (H): Status: ACTIVE | Noted: 2023-10-08

## 2023-10-08 LAB
ALBUMIN SERPL BCG-MCNC: 1.9 G/DL (ref 3.5–5.2)
ALBUMIN UR-MCNC: 30 MG/DL
ALLEN'S TEST: YES
ALP SERPL-CCNC: 59 U/L (ref 35–104)
ALT SERPL W P-5'-P-CCNC: 9 U/L (ref 0–50)
AMPHETAMINES UR QL SCN: NORMAL
ANION GAP SERPL CALCULATED.3IONS-SCNC: 11 MMOL/L (ref 7–15)
APPEARANCE UR: ABNORMAL
AST SERPL W P-5'-P-CCNC: 28 U/L (ref 0–45)
BARBITURATES UR QL SCN: NORMAL
BASE EXCESS BLDA CALC-SCNC: -5 MMOL/L (ref -9–1.8)
BENZODIAZ UR QL SCN: NORMAL
BILIRUB SERPL-MCNC: 0.5 MG/DL
BILIRUB UR QL STRIP: NEGATIVE
BUN SERPL-MCNC: 23.4 MG/DL (ref 8–23)
BZE UR QL SCN: NORMAL
C PNEUM DNA SPEC QL NAA+PROBE: NOT DETECTED
CALCIUM SERPL-MCNC: 6.9 MG/DL (ref 8.8–10.2)
CANNABINOIDS UR QL SCN: NORMAL
CHLORIDE SERPL-SCNC: 96 MMOL/L (ref 98–107)
CHOLEST SERPL-MCNC: 49 MG/DL
COLOR UR AUTO: ABNORMAL
CREAT SERPL-MCNC: 1.11 MG/DL (ref 0.51–0.95)
CREAT UR-MCNC: 132.1 MG/DL
CRP SERPL-MCNC: 188.57 MG/L
DEPRECATED HCO3 PLAS-SCNC: 19 MMOL/L (ref 22–29)
EGFRCR SERPLBLD CKD-EPI 2021: 55 ML/MIN/1.73M2
ENTEROCOCCUS FAECALIS: NOT DETECTED
ENTEROCOCCUS FAECIUM: NOT DETECTED
ERYTHROCYTE [DISTWIDTH] IN BLOOD BY AUTOMATED COUNT: 12.5 % (ref 10–15)
ERYTHROCYTE [DISTWIDTH] IN BLOOD BY AUTOMATED COUNT: 12.9 % (ref 10–15)
FENTANYL UR QL: NORMAL
FLUAV H1 2009 PAND RNA SPEC QL NAA+PROBE: NOT DETECTED
FLUAV H1 RNA SPEC QL NAA+PROBE: NOT DETECTED
FLUAV H3 RNA SPEC QL NAA+PROBE: NOT DETECTED
FLUAV RNA SPEC QL NAA+PROBE: NOT DETECTED
FLUBV RNA SPEC QL NAA+PROBE: NOT DETECTED
GLUCOSE BLDC GLUCOMTR-MCNC: 162 MG/DL (ref 70–99)
GLUCOSE BLDC GLUCOMTR-MCNC: 234 MG/DL (ref 70–99)
GLUCOSE BLDC GLUCOMTR-MCNC: 260 MG/DL (ref 70–99)
GLUCOSE BLDC GLUCOMTR-MCNC: 269 MG/DL (ref 70–99)
GLUCOSE BLDC GLUCOMTR-MCNC: 77 MG/DL (ref 70–99)
GLUCOSE SERPL-MCNC: 195 MG/DL (ref 70–99)
GLUCOSE UR STRIP-MCNC: NEGATIVE MG/DL
HADV DNA SPEC QL NAA+PROBE: NOT DETECTED
HCO3 BLD-SCNC: 20 MMOL/L (ref 21–28)
HCOV PNL SPEC NAA+PROBE: NOT DETECTED
HCT VFR BLD AUTO: 25.4 % (ref 35–47)
HCT VFR BLD AUTO: 28.1 % (ref 35–47)
HDLC SERPL-MCNC: 10 MG/DL
HGB BLD-MCNC: 10.1 G/DL (ref 11.7–15.7)
HGB BLD-MCNC: 9 G/DL (ref 11.7–15.7)
HGB UR QL STRIP: ABNORMAL
HMPV RNA SPEC QL NAA+PROBE: NOT DETECTED
HOLD SPECIMEN: NORMAL
HOLD SPECIMEN: NORMAL
HPIV1 RNA SPEC QL NAA+PROBE: NOT DETECTED
HPIV2 RNA SPEC QL NAA+PROBE: NOT DETECTED
HPIV3 RNA SPEC QL NAA+PROBE: NOT DETECTED
HPIV4 RNA SPEC QL NAA+PROBE: NOT DETECTED
KETONES UR STRIP-MCNC: 5 MG/DL
LACTATE SERPL-SCNC: 1.3 MMOL/L (ref 0.7–2)
LACTATE SERPL-SCNC: 1.4 MMOL/L (ref 0.7–2)
LDLC SERPL CALC-MCNC: 11 MG/DL
LEUKOCYTE ESTERASE UR QL STRIP: ABNORMAL
LISTERIA SPECIES (DETECTED/NOT DETECTED): NOT DETECTED
M PNEUMO DNA SPEC QL NAA+PROBE: NOT DETECTED
MAGNESIUM SERPL-MCNC: 1.5 MG/DL (ref 1.7–2.3)
MAGNESIUM SERPL-MCNC: 2.3 MG/DL (ref 1.7–2.3)
MCH RBC QN AUTO: 34.7 PG (ref 26.5–33)
MCH RBC QN AUTO: 34.7 PG (ref 26.5–33)
MCHC RBC AUTO-ENTMCNC: 35.4 G/DL (ref 31.5–36.5)
MCHC RBC AUTO-ENTMCNC: 35.9 G/DL (ref 31.5–36.5)
MCV RBC AUTO: 97 FL (ref 78–100)
MCV RBC AUTO: 98 FL (ref 78–100)
MICROALBUMIN UR-MCNC: <12 MG/L
MICROALBUMIN/CREAT UR: NORMAL MG/G{CREAT}
MRSA DNA SPEC QL NAA+PROBE: NEGATIVE
MUCOUS THREADS #/AREA URNS LPF: PRESENT /LPF
NITRATE UR QL: NEGATIVE
NONHDLC SERPL-MCNC: 39 MG/DL
O2/TOTAL GAS SETTING VFR VENT: 21 %
OPIATES UR QL SCN: NORMAL
OSMOLALITY SERPL: 273 MMOL/KG (ref 280–301)
OSMOLALITY UR: 454 MMOL/KG (ref 100–1200)
PCO2 BLD: 35 MM HG (ref 35–45)
PCP QUAL URINE (ROCHE): NORMAL
PH BLD: 7.36 [PH] (ref 7.35–7.45)
PH UR STRIP: 5 [PH] (ref 5–7)
PHOSPHATE SERPL-MCNC: 2.9 MG/DL (ref 2.5–4.5)
PLATELET # BLD AUTO: 121 10E3/UL (ref 150–450)
PLATELET # BLD AUTO: 139 10E3/UL (ref 150–450)
PO2 BLD: 63 MM HG (ref 80–105)
POTASSIUM SERPL-SCNC: 2.7 MMOL/L (ref 3.4–5.3)
POTASSIUM SERPL-SCNC: 3.5 MMOL/L (ref 3.4–5.3)
PROCALCITONIN SERPL IA-MCNC: 10.57 NG/ML
PROT SERPL-MCNC: 4.7 G/DL (ref 6.4–8.3)
RBC # BLD AUTO: 2.59 10E6/UL (ref 3.8–5.2)
RBC # BLD AUTO: 2.91 10E6/UL (ref 3.8–5.2)
RBC URINE: 2 /HPF
RSV RNA SPEC QL NAA+PROBE: NOT DETECTED
RSV RNA SPEC QL NAA+PROBE: NOT DETECTED
RV+EV RNA SPEC QL NAA+PROBE: NOT DETECTED
SA TARGET DNA: NEGATIVE
SODIUM SERPL-SCNC: 124 MMOL/L (ref 135–145)
SODIUM SERPL-SCNC: 126 MMOL/L (ref 135–145)
SODIUM SERPL-SCNC: 126 MMOL/L (ref 135–145)
SODIUM SERPL-SCNC: 127 MMOL/L (ref 135–145)
SODIUM SERPL-SCNC: 128 MMOL/L (ref 135–145)
SODIUM SERPL-SCNC: 130 MMOL/L (ref 135–145)
SODIUM UR-SCNC: <20 MMOL/L
SP GR UR STRIP: 1.04 (ref 1–1.03)
SQUAMOUS EPITHELIAL: 4 /HPF
STAPHYLOCOCCUS AUREUS: NOT DETECTED
STAPHYLOCOCCUS EPIDERMIDIS: NOT DETECTED
STAPHYLOCOCCUS LUGDUNENSIS: NOT DETECTED
STAPHYLOCOCCUS SPECIES: NOT DETECTED
STREPTOCOCCUS AGALACTIAE: NOT DETECTED
STREPTOCOCCUS ANGINOSUS GROUP: NOT DETECTED
STREPTOCOCCUS PNEUMONIAE: DETECTED
STREPTOCOCCUS PYOGENES: NOT DETECTED
TRANSITIONAL EPI: <1 /HPF
TRIGL SERPL-MCNC: 142 MG/DL
TSH SERPL DL<=0.005 MIU/L-ACNC: 0.64 UIU/ML (ref 0.3–4.2)
UROBILINOGEN UR STRIP-MCNC: 2 MG/DL
WBC # BLD AUTO: 8.1 10E3/UL (ref 4–11)
WBC # BLD AUTO: 8.5 10E3/UL (ref 4–11)
WBC URINE: 10 /HPF

## 2023-10-08 PROCEDURE — 84300 ASSAY OF URINE SODIUM: CPT | Performed by: INTERNAL MEDICINE

## 2023-10-08 PROCEDURE — 87493 C DIFF AMPLIFIED PROBE: CPT | Performed by: FAMILY MEDICINE

## 2023-10-08 PROCEDURE — P9047 ALBUMIN (HUMAN), 25%, 50ML: HCPCS | Performed by: INTERNAL MEDICINE

## 2023-10-08 PROCEDURE — 86140 C-REACTIVE PROTEIN: CPT | Performed by: FAMILY MEDICINE

## 2023-10-08 PROCEDURE — 80061 LIPID PANEL: CPT | Performed by: INTERNAL MEDICINE

## 2023-10-08 PROCEDURE — 250N000009 HC RX 250

## 2023-10-08 PROCEDURE — 87040 BLOOD CULTURE FOR BACTERIA: CPT | Performed by: FAMILY MEDICINE

## 2023-10-08 PROCEDURE — 250N000011 HC RX IP 250 OP 636: Mod: JZ | Performed by: FAMILY MEDICINE

## 2023-10-08 PROCEDURE — 99418 PROLNG IP/OBS E/M EA 15 MIN: CPT | Performed by: INTERNAL MEDICINE

## 2023-10-08 PROCEDURE — 83735 ASSAY OF MAGNESIUM: CPT | Performed by: INTERNAL MEDICINE

## 2023-10-08 PROCEDURE — 87641 MR-STAPH DNA AMP PROBE: CPT | Performed by: FAMILY MEDICINE

## 2023-10-08 PROCEDURE — 36415 COLL VENOUS BLD VENIPUNCTURE: CPT | Performed by: FAMILY MEDICINE

## 2023-10-08 PROCEDURE — 87581 M.PNEUMON DNA AMP PROBE: CPT | Performed by: FAMILY MEDICINE

## 2023-10-08 PROCEDURE — 83930 ASSAY OF BLOOD OSMOLALITY: CPT | Performed by: INTERNAL MEDICINE

## 2023-10-08 PROCEDURE — 87040 BLOOD CULTURE FOR BACTERIA: CPT | Performed by: INTERNAL MEDICINE

## 2023-10-08 PROCEDURE — 250N000013 HC RX MED GY IP 250 OP 250 PS 637: Performed by: FAMILY MEDICINE

## 2023-10-08 PROCEDURE — 84443 ASSAY THYROID STIM HORMONE: CPT | Performed by: FAMILY MEDICINE

## 2023-10-08 PROCEDURE — 82803 BLOOD GASES ANY COMBINATION: CPT | Performed by: INTERNAL MEDICINE

## 2023-10-08 PROCEDURE — 84132 ASSAY OF SERUM POTASSIUM: CPT | Performed by: INTERNAL MEDICINE

## 2023-10-08 PROCEDURE — 250N000011 HC RX IP 250 OP 636: Mod: JZ | Performed by: INTERNAL MEDICINE

## 2023-10-08 PROCEDURE — 250N000011 HC RX IP 250 OP 636: Mod: JZ | Performed by: STUDENT IN AN ORGANIZED HEALTH CARE EDUCATION/TRAINING PROGRAM

## 2023-10-08 PROCEDURE — 84295 ASSAY OF SERUM SODIUM: CPT | Performed by: INTERNAL MEDICINE

## 2023-10-08 PROCEDURE — 83935 ASSAY OF URINE OSMOLALITY: CPT | Performed by: INTERNAL MEDICINE

## 2023-10-08 PROCEDURE — 250N000009 HC RX 250: Performed by: FAMILY MEDICINE

## 2023-10-08 PROCEDURE — 85027 COMPLETE CBC AUTOMATED: CPT | Performed by: FAMILY MEDICINE

## 2023-10-08 PROCEDURE — 84134 ASSAY OF PREALBUMIN: CPT | Performed by: INTERNAL MEDICINE

## 2023-10-08 PROCEDURE — 81001 URINALYSIS AUTO W/SCOPE: CPT | Performed by: FAMILY MEDICINE

## 2023-10-08 PROCEDURE — 250N000009 HC RX 250: Performed by: INTERNAL MEDICINE

## 2023-10-08 PROCEDURE — 84100 ASSAY OF PHOSPHORUS: CPT | Performed by: INTERNAL MEDICINE

## 2023-10-08 PROCEDURE — 258N000003 HC RX IP 258 OP 636: Performed by: INTERNAL MEDICINE

## 2023-10-08 PROCEDURE — 36415 COLL VENOUS BLD VENIPUNCTURE: CPT | Performed by: INTERNAL MEDICINE

## 2023-10-08 PROCEDURE — 250N000012 HC RX MED GY IP 250 OP 636 PS 637: Performed by: INTERNAL MEDICINE

## 2023-10-08 PROCEDURE — 83605 ASSAY OF LACTIC ACID: CPT | Performed by: INTERNAL MEDICINE

## 2023-10-08 PROCEDURE — 99291 CRITICAL CARE FIRST HOUR: CPT | Performed by: FAMILY MEDICINE

## 2023-10-08 PROCEDURE — 82570 ASSAY OF URINE CREATININE: CPT | Performed by: INTERNAL MEDICINE

## 2023-10-08 PROCEDURE — 80053 COMPREHEN METABOLIC PANEL: CPT | Performed by: INTERNAL MEDICINE

## 2023-10-08 PROCEDURE — 80307 DRUG TEST PRSMV CHEM ANLYZR: CPT | Performed by: FAMILY MEDICINE

## 2023-10-08 PROCEDURE — 250N000013 HC RX MED GY IP 250 OP 250 PS 637: Performed by: INTERNAL MEDICINE

## 2023-10-08 PROCEDURE — 84145 PROCALCITONIN (PCT): CPT | Performed by: FAMILY MEDICINE

## 2023-10-08 PROCEDURE — 200N000001 HC R&B ICU

## 2023-10-08 PROCEDURE — 99233 SBSQ HOSP IP/OBS HIGH 50: CPT | Mod: 25 | Performed by: INTERNAL MEDICINE

## 2023-10-08 PROCEDURE — 83735 ASSAY OF MAGNESIUM: CPT | Performed by: STUDENT IN AN ORGANIZED HEALTH CARE EDUCATION/TRAINING PROGRAM

## 2023-10-08 RX ORDER — ALBUMIN (HUMAN) 12.5 G/50ML
25 SOLUTION INTRAVENOUS ONCE
Status: COMPLETED | OUTPATIENT
Start: 2023-10-08 | End: 2023-10-08

## 2023-10-08 RX ORDER — IPRATROPIUM BROMIDE AND ALBUTEROL SULFATE 2.5; .5 MG/3ML; MG/3ML
3 SOLUTION RESPIRATORY (INHALATION) EVERY 4 HOURS PRN
Status: DISCONTINUED | OUTPATIENT
Start: 2023-10-08 | End: 2023-10-21

## 2023-10-08 RX ORDER — SODIUM CHLORIDE 9 MG/ML
INJECTION, SOLUTION INTRAVENOUS CONTINUOUS
Status: DISCONTINUED | OUTPATIENT
Start: 2023-10-08 | End: 2023-10-08

## 2023-10-08 RX ORDER — MIDODRINE HYDROCHLORIDE 2.5 MG/1
5 TABLET ORAL ONCE
Status: COMPLETED | OUTPATIENT
Start: 2023-10-08 | End: 2023-10-08

## 2023-10-08 RX ORDER — NICOTINE POLACRILEX 4 MG
15-30 LOZENGE BUCCAL
Status: DISCONTINUED | OUTPATIENT
Start: 2023-10-08 | End: 2023-10-24 | Stop reason: HOSPADM

## 2023-10-08 RX ORDER — MAGNESIUM OXIDE 400 MG/1
400 TABLET ORAL 2 TIMES DAILY
Status: DISCONTINUED | OUTPATIENT
Start: 2023-10-08 | End: 2023-10-08

## 2023-10-08 RX ORDER — VANCOMYCIN HYDROCHLORIDE 1 G/200ML
1000 INJECTION, SOLUTION INTRAVENOUS EVERY 24 HOURS
Status: DISCONTINUED | OUTPATIENT
Start: 2023-10-08 | End: 2023-10-09

## 2023-10-08 RX ORDER — ROPIVACAINE IN 0.9% SOD CHL/PF 0.1 %
.03-.125 PLASTIC BAG, INJECTION (ML) EPIDURAL CONTINUOUS
Status: DISCONTINUED | OUTPATIENT
Start: 2023-10-08 | End: 2023-10-11

## 2023-10-08 RX ORDER — ACETAMINOPHEN 325 MG/1
650 TABLET ORAL EVERY 6 HOURS PRN
Status: DISCONTINUED | OUTPATIENT
Start: 2023-10-08 | End: 2023-10-10

## 2023-10-08 RX ORDER — WATER 10 ML/10ML
INJECTION INTRAMUSCULAR; INTRAVENOUS; SUBCUTANEOUS
Status: COMPLETED
Start: 2023-10-08 | End: 2023-10-08

## 2023-10-08 RX ORDER — CEFTRIAXONE 1 G/1
1 INJECTION, POWDER, FOR SOLUTION INTRAMUSCULAR; INTRAVENOUS ONCE
Status: COMPLETED | OUTPATIENT
Start: 2023-10-08 | End: 2023-10-08

## 2023-10-08 RX ORDER — CEFTRIAXONE 2 G/1
2 INJECTION, POWDER, FOR SOLUTION INTRAMUSCULAR; INTRAVENOUS EVERY 24 HOURS
Status: DISCONTINUED | OUTPATIENT
Start: 2023-10-08 | End: 2023-10-16

## 2023-10-08 RX ORDER — MAGNESIUM SULFATE HEPTAHYDRATE 40 MG/ML
2 INJECTION, SOLUTION INTRAVENOUS ONCE
Status: COMPLETED | OUTPATIENT
Start: 2023-10-08 | End: 2023-10-08

## 2023-10-08 RX ORDER — POTASSIUM CHLORIDE 750 MG/1
10 TABLET, EXTENDED RELEASE ORAL ONCE
Status: COMPLETED | OUTPATIENT
Start: 2023-10-08 | End: 2023-10-08

## 2023-10-08 RX ORDER — POTASSIUM CHLORIDE 1500 MG/1
20 TABLET, EXTENDED RELEASE ORAL ONCE
Status: COMPLETED | OUTPATIENT
Start: 2023-10-08 | End: 2023-10-08

## 2023-10-08 RX ORDER — DEXTROSE MONOHYDRATE 25 G/50ML
25-50 INJECTION, SOLUTION INTRAVENOUS
Status: DISCONTINUED | OUTPATIENT
Start: 2023-10-08 | End: 2023-10-24 | Stop reason: HOSPADM

## 2023-10-08 RX ORDER — POTASSIUM CHLORIDE 7.45 MG/ML
10 INJECTION INTRAVENOUS
Status: COMPLETED | OUTPATIENT
Start: 2023-10-08 | End: 2023-10-08

## 2023-10-08 RX ORDER — METHYLPREDNISOLONE SODIUM SUCCINATE 40 MG/ML
30 INJECTION, POWDER, LYOPHILIZED, FOR SOLUTION INTRAMUSCULAR; INTRAVENOUS EVERY 12 HOURS
Status: DISCONTINUED | OUTPATIENT
Start: 2023-10-08 | End: 2023-10-11

## 2023-10-08 RX ADMIN — MIDODRINE HYDROCHLORIDE 5 MG: 2.5 TABLET ORAL at 06:42

## 2023-10-08 RX ADMIN — ONDANSETRON 4 MG: 4 TABLET, ORALLY DISINTEGRATING ORAL at 22:04

## 2023-10-08 RX ADMIN — Medication 1 PATCH: at 09:25

## 2023-10-08 RX ADMIN — DEXTROSE AND SODIUM CHLORIDE: 5; 450 INJECTION, SOLUTION INTRAVENOUS at 23:31

## 2023-10-08 RX ADMIN — BUSPIRONE HYDROCHLORIDE 15 MG: 5 TABLET ORAL at 16:18

## 2023-10-08 RX ADMIN — THIAMINE HYDROCHLORIDE 200 MG: 100 INJECTION, SOLUTION INTRAMUSCULAR; INTRAVENOUS at 20:07

## 2023-10-08 RX ADMIN — PANTOPRAZOLE SODIUM 40 MG: 40 TABLET, DELAYED RELEASE ORAL at 09:23

## 2023-10-08 RX ADMIN — FLUMAZENIL 0.2 MG: 0.1 INJECTION, SOLUTION INTRAVENOUS at 05:50

## 2023-10-08 RX ADMIN — CEFTRIAXONE SODIUM 1 G: 1 INJECTION, POWDER, FOR SOLUTION INTRAMUSCULAR; INTRAVENOUS at 09:17

## 2023-10-08 RX ADMIN — CEFTRIAXONE SODIUM 2 G: 2 INJECTION, POWDER, FOR SOLUTION INTRAMUSCULAR; INTRAVENOUS at 20:07

## 2023-10-08 RX ADMIN — ALBUMIN HUMAN 25 G: 0.25 SOLUTION INTRAVENOUS at 04:27

## 2023-10-08 RX ADMIN — VENLAFAXINE HYDROCHLORIDE 37.5 MG: 37.5 CAPSULE, EXTENDED RELEASE ORAL at 09:23

## 2023-10-08 RX ADMIN — METHYLPREDNISOLONE SODIUM SUCCINATE 30 MG: 40 INJECTION INTRAMUSCULAR; INTRAVENOUS at 06:37

## 2023-10-08 RX ADMIN — MAGNESIUM SULFATE HEPTAHYDRATE 2 G: 40 INJECTION, SOLUTION INTRAVENOUS at 03:26

## 2023-10-08 RX ADMIN — MULTIPLE VITAMINS W/ MINERALS TAB 1 TABLET: TAB at 09:23

## 2023-10-08 RX ADMIN — INSULIN ASPART 1 UNITS: 100 INJECTION, SOLUTION INTRAVENOUS; SUBCUTANEOUS at 09:21

## 2023-10-08 RX ADMIN — GABAPENTIN 1200 MG: 400 CAPSULE ORAL at 02:21

## 2023-10-08 RX ADMIN — POTASSIUM CHLORIDE 10 MEQ: 10 INJECTION, SOLUTION INTRAVENOUS at 07:51

## 2023-10-08 RX ADMIN — METHYLPREDNISOLONE SODIUM SUCCINATE 30 MG: 40 INJECTION INTRAMUSCULAR; INTRAVENOUS at 18:10

## 2023-10-08 RX ADMIN — BUSPIRONE HYDROCHLORIDE 15 MG: 5 TABLET ORAL at 09:23

## 2023-10-08 RX ADMIN — POTASSIUM CHLORIDE 10 MEQ: 10 INJECTION, SOLUTION INTRAVENOUS at 08:47

## 2023-10-08 RX ADMIN — FOLIC ACID 1 MG: 5 INJECTION, SOLUTION INTRAMUSCULAR; INTRAVENOUS; SUBCUTANEOUS at 09:31

## 2023-10-08 RX ADMIN — AZITHROMYCIN MONOHYDRATE 500 MG: 500 INJECTION, POWDER, LYOPHILIZED, FOR SOLUTION INTRAVENOUS at 20:56

## 2023-10-08 RX ADMIN — NOREPINEPHRINE BITARTRATE 0.03 MCG/KG/MIN: 0.02 INJECTION, SOLUTION INTRAVENOUS at 05:53

## 2023-10-08 RX ADMIN — LEVOCETIRIZINE DIHYDROCHLORIDE 5 MG: 5 TABLET ORAL at 09:23

## 2023-10-08 RX ADMIN — BUSPIRONE HYDROCHLORIDE 15 MG: 5 TABLET ORAL at 20:07

## 2023-10-08 RX ADMIN — DEXTROSE AND SODIUM CHLORIDE 500 ML: 5; 450 INJECTION, SOLUTION INTRAVENOUS at 04:00

## 2023-10-08 RX ADMIN — POTASSIUM CHLORIDE 10 MEQ: 10 INJECTION, SOLUTION INTRAVENOUS at 09:47

## 2023-10-08 RX ADMIN — PANTOPRAZOLE SODIUM 40 MG: 40 TABLET, DELAYED RELEASE ORAL at 20:07

## 2023-10-08 RX ADMIN — INSULIN ASPART 1 UNITS: 100 INJECTION, SOLUTION INTRAVENOUS; SUBCUTANEOUS at 18:03

## 2023-10-08 RX ADMIN — ESCITALOPRAM OXALATE 30 MG: 10 TABLET ORAL at 09:22

## 2023-10-08 RX ADMIN — IPRATROPIUM BROMIDE AND ALBUTEROL SULFATE 3 ML: .5; 3 SOLUTION RESPIRATORY (INHALATION) at 23:30

## 2023-10-08 RX ADMIN — THIAMINE HYDROCHLORIDE 200 MG: 100 INJECTION, SOLUTION INTRAMUSCULAR; INTRAVENOUS at 09:27

## 2023-10-08 RX ADMIN — FERROUS GLUCONATE 324 MG: 324 TABLET ORAL at 09:23

## 2023-10-08 RX ADMIN — THIAMINE HYDROCHLORIDE 200 MG: 100 INJECTION, SOLUTION INTRAMUSCULAR; INTRAVENOUS at 16:20

## 2023-10-08 RX ADMIN — Medication 50 MCG: at 09:22

## 2023-10-08 RX ADMIN — Medication 1 TABLET: at 09:23

## 2023-10-08 RX ADMIN — BUDESONIDE 0.5 MG: 0.5 INHALANT RESPIRATORY (INHALATION) at 02:06

## 2023-10-08 RX ADMIN — Medication 1 TABLET: at 20:07

## 2023-10-08 RX ADMIN — DEXTROSE AND SODIUM CHLORIDE: 5; 450 INJECTION, SOLUTION INTRAVENOUS at 11:32

## 2023-10-08 RX ADMIN — POTASSIUM CHLORIDE 10 MEQ: 750 TABLET, EXTENDED RELEASE ORAL at 16:18

## 2023-10-08 RX ADMIN — WATER 1 ML: 1 INJECTION INTRAMUSCULAR; INTRAVENOUS; SUBCUTANEOUS at 06:38

## 2023-10-08 RX ADMIN — VANCOMYCIN HYDROCHLORIDE 1000 MG: 1 INJECTION, SOLUTION INTRAVENOUS at 19:05

## 2023-10-08 RX ADMIN — INSULIN ASPART 2 UNITS: 100 INJECTION, SOLUTION INTRAVENOUS; SUBCUTANEOUS at 13:19

## 2023-10-08 RX ADMIN — WATER 10 ML: 1 INJECTION INTRAMUSCULAR; INTRAVENOUS; SUBCUTANEOUS at 18:13

## 2023-10-08 RX ADMIN — POTASSIUM CHLORIDE 10 MEQ: 10 INJECTION, SOLUTION INTRAVENOUS at 06:39

## 2023-10-08 RX ADMIN — RALOXIFENE HYDROCHLORIDE 60 MG: 60 TABLET, FILM COATED ORAL at 09:23

## 2023-10-08 RX ADMIN — POTASSIUM CHLORIDE 20 MEQ: 1500 TABLET, EXTENDED RELEASE ORAL at 03:00

## 2023-10-08 ASSESSMENT — ACTIVITIES OF DAILY LIVING (ADL)
ADLS_ACUITY_SCORE: 41
ADLS_ACUITY_SCORE: 39
ADLS_ACUITY_SCORE: 41
ADLS_ACUITY_SCORE: 39
ADLS_ACUITY_SCORE: 41
ADLS_ACUITY_SCORE: 41
ADLS_ACUITY_SCORE: 40
ADLS_ACUITY_SCORE: 41
ADLS_ACUITY_SCORE: 41
ADLS_ACUITY_SCORE: 39
ADLS_ACUITY_SCORE: 41
ADLS_ACUITY_SCORE: 39

## 2023-10-08 NOTE — PROVIDER NOTIFICATION
DATE: 10/8/2023    TIME OF RECEIPT FROM LAB:  0820  LAB TEST:  Procalcitonin  LAB VALUE:  10.57  RESULTS GIVEN WITH READ-BACK TO (PROVIDER):  Dr Connolly  TIME LAB VALUE REPORTED TO PROVIDER:   0825  NEW ORDERS: no

## 2023-10-08 NOTE — PROVIDER NOTIFICATION
MAP 60 and patient is lethargic. Provider notified that unable to give midodrine. New orders for levophed and to give flumazenil. Within 30 seconds of giving flumazenil dose, patient became awake and was able to converse, asking for her phone, making phone calls to friend. Patient denies taking any type of benzodiazapine. Stating that she only took the medications that were given to her by our nurses. MAP remained <65 so levophed was started at 0.03 and was effective.

## 2023-10-08 NOTE — PROVIDER NOTIFICATION
Notified Dr. Sister: Do you want to modify BP parameters for Clonidine? SBP 95, but concern for BP decreasing with current illness. Also, BG =77. Pt states she is not diabetic. What other IV fluids do you want after her current banana bag that is infusing?   New orders received to hold Clonidine for SBP<130, IV fluids ordered.

## 2023-10-08 NOTE — H&P
CC: Weakness, fatigue, weight loss     HPI: Pavithra Raines is an 68 year old female who presents with above complaints.  Patient's past medical history significant for alcohol dependence, seizures related to alcohol withdrawal, history of gastric bypass, emphysema, chronic tobacco abuse, psychiatric illness.  The patient complains of decreased appetite, weight loss.  She stated that she does not have dentures therefore digesting food is difficult, although she agrees that she is able to tolerate mashed potatoes and related food.  Patient continue to drink heavily.  Patient endorses nausea, vomiting and diarrhea.  She denies any hematemesis or hematochezia.  Patient currently incontinent of urine and stool.  On evaluation in the emergency room patient found to be febrile.  Signs of dehydration.  Imaging studies suggestive of pneumonia.  Patient's sodium level checked out to be 123.            Past Medical History:   Diagnosis Date    Abnormal Papanicolaou smear of cervix and cervical HPV 4/07    Colposcopy 2007= HSIL    Genital herpes     History of colposcopy with cervical biopsy 06/2007    HSIL- LEEP recommended    Hypersomnia with sleep apnea, unspecified     CPAP started 2007    Other and unspecified ovarian cyst 2002 or so    s/p resection of ovary and tubes, d&c       Allergies:   Allergies   Allergen Reactions    Codeine Itching    Vicodin [Hydrocodone-Acetaminophen] Itching       Principal Problem:    Multifocal pneumonia  Active Problems:    Dehydration    Hyponatremia    Weakness generalized    Alcohol use disorder, severe, dependence (H)    Blood pressure 102/71, pulse 101, temperature 99.6  F (37.6  C), temperature source Oral, resp. rate 19, SpO2 96 %, not currently breastfeeding.    Review of Systems  ROS:    1.General: See above  2.HEENT: no HA, no blurry vision, no swallow problems, no nasal congestion, no sore throat  3.Pulmonary: no cough, SOB, wheezes  4.CVS: no CP, no palpitations, no  CARVALHO, no SOB, no intermittent claudication  5.GI: no nausea, vomiting or diarrhea, no abdominal pain, no constipation, no hematemesis or hematochezia  6.: no renal colic, no hematuria, urinary frequency or urgency  7.Extremities: no edema  8.Neurological: no dizziness, vertigo, double or blurry vision, no no focal weakness, no paresthesia, no swallow or speech problems  9.Musculosceletal: no joint pains, no joint swelling, no back pain  10.Dermatological: no skin rashes, no lesions, no pruritus  11.Hematological: no bleeding, no hx/o clots  12.Endocrinological: no heat/cold intolerance, no hx/o diabetes  13.Psychiatric: no suicidal or homicidal thoughts  Physical Exam  Physical Exam:    Not in distress, pleasant, lucid, cooperative, somewhat somnolent but easily arousable.  Thin stature, frail.  Muscle wasting.  Lacking dentures.  Head - atraumatic, eyes - pupils equal, round, reactive to light, extra ocular movement intact, MMM  Neck - supple, thyroid not enlarged, LN not palpated  Lungs -coarse breath sounds bilaterally, mild wheezing, bibasilar Rales.    CVS - heart sounds S1, S2, no additional murmurs gallop, regular rate and rhythm  Gastrointestinal-abdomen is soft, non-tender, non-distended, no organomegaly, positive bowel sounds  Extremities no clubbing, cyanosis or edema  Neurological-cranial nerve II-XII grossly intact, no meningeal signs, no cerebellar signs, no sensory deficit  Musculoskeletal -  joints, no effusions, ROM preserved  Dermatological - the skin dry, warm, no rashes  Psychiatric-patient is AAO X3, patient has normal affect    Results of the lab work  Studies reviewed independently by me    Assessment  And plan:    Community-acquired pneumonia in a patient with underlying COPD-continues oxygen supplementation  Started on empiric broad-spectrum antibiotics  Follow-up results of the cultures    2.  COPD exacerbation/chronic tobacco use-patient counseled to stop tobacco  Provide nicotine  patch  Started on inhaled bronchodilators and steroids  As above    3.  Weight loss-suspect multifactorial.  Possibly related to absence of dentures and may be some swallow problems.  Alcohol-related starvation?.  Mild ingestion due to chronic gastritis or pancreatitis due to chronic alcoholism?  I have the prealbumin  Requested nutritional assessment  Going to ask for speech therapy also evaluate and treat    4.  ETOH withdrawal - will initiate CIWA protocol utilizing frequient Ativan administration based on scoring  - close monitoring of the VSs  - will use Clonidin PRN for elevated BP  - monitor and replace electrolytes as needed  - will start on Thyamin and Folate daily    5. Hyponatremia-reason unclear  Suspect chronic-patient continued to maintain reasonably well  We will check urine and serum osmolarity  We will check random urine creatinine and protein  I am going to check urine sodium excretion  Continue close monitoring of sodium levels.  Goal of correction 6-8 mEq/ 24 hours    6.  CODE STATUS-patient opted to be DNR/DNI      As the provider for the telehealth service, I attest that I introduced myself to the patient, provided my credentials, disclosed by location and determined that based on a review of the patient's chart and discussion with members of the patient's treatment team, telemedicine via real-time, 2 way, and interactive audio and video platform is an appropriate and effective means of providing the service.  The patient and I mutually agree this visit is appropriate for telemedicine.  The virtual encounter was taken place from  [place], [place].  The encounter took approximately 35 minutes.  The nurse was present during the entire time and I was able to move the stethoscope in appropriate directions.  The patient was evaluated at the Hospital         Portions of this note may be dictated using Dragon voice recognition software. Variances in spelling and vocabulary are possible and unintentional.  Not all errors may be caught and/or corrected. Please notify the author if any discrepancies are noted and/or if the meaning of any statement is unclear.          Patient verbally consented for treatment via video visit with patient currently located at [facility] and provider located in [place].         Time Spent: 40 minutes     Mark Madera MD  10/7/2023

## 2023-10-08 NOTE — PROGRESS NOTES
S-(situation): Patient arrives to room 270 via cart from ED    B-(background): multifocal pneumonia    A-(assessment): pt is alert and oriented x4. Generalized weakness, reports generalized pain 7/10, achy. Voided 50mL.     R-(recommendations): Orders reviewed with pt. Will monitor patient per MD orders. Monitor on tele, labs, electrolyte replacements, IV fluids.     Inpatient nursing criteria listed below were met:    Health care directives status obtained and documented: Yes  VTE ordered/documented: Yes  Skin issues/needs documented:Yes  Isolation addressed and Signage used: Yes  Fall Prevention: Care plan updated Yes Education given and documented Yes  Care Plan initiated and Co-Morbidities added: Yes  Education Assessment documented:Yes  Admission Education Documented: Yes  If present CAUTI/CLABI Education done: NA  New medication patient education completed and documented (Possible Side Effects of Common Medications handout): Yes  Allergies Reviewed: Yes  Admission Medication Reconciliation completed: Yes  Home medications if not able to send immediately home with family stored here: Yes  Reminder note placed in discharge instructions regarding home meds: Yes  Individualized care needs/preferences addressed and charted: Yes  Provider Notified that patient has arrived to the unit: Yes     270

## 2023-10-08 NOTE — PROGRESS NOTES
S- Transfer to ICU from med/surg Rm 270.    B- multifocal pneumonia    A- Brief systems assessment: pt is lethargic, awakes to gentle shaking/voice. BP 70s/50s. Dr. Madera camera into room to see pt. See provider notification notes. ICU transfers order placed.    R- Transfer to ICU per physician orders. Continue to monitor pt and update physician as needed.      Code status: DNR / DNI  Skin: pale, dry skin  Fall Risk: Yes- Department fall risk interventions implemented.  Isolation and Signage: Droplet  Medication drips upon transfer: Albumin IV infusion, IV multivitamin bag, D51/2 NS  Blue Bin checked and medications transfer out with patient Yes

## 2023-10-08 NOTE — PHARMACY-VANCOMYCIN DOSING SERVICE
Pharmacy Vancomycin Initial Note  Date of Service 2023  Patient's  1955  68 year old, female    Indication: Bacteremia and Sepsis    Current estimated CrCl = Estimated Creatinine Clearance: 38.3 mL/min (A) (based on SCr of 1.11 mg/dL (H)).    Creatinine for last 3 days  10/7/2023:  8:31 PM Creatinine 1.22 mg/dL  10/8/2023:  5:31 AM Creatinine 1.11 mg/dL    Recent Vancomycin Level(s) for last 3 days  No results found for requested labs within last 3 days.      Vancomycin IV Administrations (past 72 hours)        No vancomycin orders with administrations in past 72 hours.                    Nephrotoxins and other renal medications (From now, onward)      Start     Dose/Rate Route Frequency Ordered Stop    10/08/23 1730  vancomycin (VANCOCIN) 1,000 mg in 200 mL dextrose intermittent infusion         1,000 mg  200 mL/hr over 1 Hours Intravenous EVERY 24 HOURS 10/08/23 1700      10/08/23 0600  norepinephrine (LEVOPHED) 4 mg in  mL PERIPHERAL infusion         0.03-0.125 mcg/kg/min × 48.6 kg  5.5-22.8 mL/hr  Intravenous CONTINUOUS 10/08/23 0542              Contrast Orders - past 72 hours (72h ago, onward)      Start     Dose/Rate Route Frequency Stop    10/07/23 2120  iopamidol (ISOVUE-370) solution 500 mL         500 mL Intravenous ONCE 10/07/23 2123            [employer] Prediction of Planned Initial Vancomycin Regimen  Regimen: 1000 mg IV every 24 hours.  Start time: 16:59 on 10/08/2023  Exposure target: AUC24 (range)400-600 mg/L.hr   AUC24,ss: 488 mg/L.hr  Probability of AUC24 > 400: 74 %  Ctrough,ss: 14.5 mg/L  Probability of Ctrough,ss > 20: 17 %  Probability of nephrotoxicity (Lodise CALVIN ): 10 %          Plan:  Start vancomycin  1000 mg IV q24h.   Vancomycin monitoring method: AUC  Vancomycin therapeutic monitoring goal: 400-600 mg*h/L  Pharmacy will check vancomycin levels as appropriate in 1-3 Days.    Serum creatinine levels will be ordered daily for the first week of therapy and at  least twice weekly for subsequent weeks.      Jacky Levi, RPH

## 2023-10-08 NOTE — ED PROVIDER NOTES
[facility] ED Provider Note   Patient: Pavithra Raines  MRN #:  [id number]  Date of Visit: October 7, 2023    CC:     Chief Complaint   Patient presents with    Shortness of Breath    Abdominal Pain    Flank Pain    Generalized Weakness     HPI:  Pavithra Raines is a 68 year old female with history of alcohol dependence, seizures with alcohol withdrawal, status post gastric bypass, emphysema with continued tobacco use, mood disorder and malnutrition who presented to the emergency department with decreased appetite over the past week, with generalized weakness, shortness of breath, abdominal and flank pain with a decline over the past several weeks.  Patient has a history of alcohol dependence, and tobacco use, with reported last use by her  about 30 minutes ago.  Per her  Bran, patient drinks daily, and her usual consumption is 12 beers, 1 bottle of wine, and 10 shooters of fireball.  Patient states that she has not been able to keep anything down for the past several days.  Patient has felt subjectively hot and cold, has a nonproductive cough, feels short of breath, and has been developing some incontinence of both urine and stool.  Her stools have been slightly loose and dark-colored but she thinks it is from taking iron.  Patient states that she has some chest wall pain from broken ribs.  She denies any recent falls.  Patient states that she was urged to come to the ED by her  and friends.  She has not been able to get out of bed without assistance.    Problem List:  Patient Active Problem List    Diagnosis Date Noted    Dehydration 10/07/2023     Priority: Medium    Hyponatremia 10/07/2023     Priority: Medium    Weakness generalized 10/07/2023     Priority: Medium    Alcohol use disorder, severe, dependence (H) 10/07/2023     Priority: Medium    Multifocal pneumonia 10/07/2023     Priority: Medium     Alcohol dependence in remission (H) 01/08/2022     Priority: Medium    Episode of recurrent major depressive disorder, unspecified depression episode severity (H24) 01/08/2022     Priority: Medium    Seizure disorder (H) 01/08/2022     Priority: Medium    Fatty liver 12/13/2020     Priority: Medium    PTSD (post-traumatic stress disorder) 12/13/2020     Priority: Medium    Underweight 12/13/2020     Priority: Medium    Macrocytosis without anemia 12/13/2020     Priority: Medium    Age-related osteoporosis without current pathological fracture 10/15/2020     Priority: Medium    Anemia due to vitamin B12 deficiency, unspecified B12 deficiency type 12/04/2017     Priority: Medium    Insomnia, unspecified type 12/04/2017     Priority: Medium    Other iron deficiency anemia 12/04/2017     Priority: Medium    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 grade 3 04/07/2011     Priority: Medium     12/15/06 ASCUS + high risk HPV  colpo in 2007 showed high grade KAITLYN and LEEP was recommended  LEEP was done in June,2007 with CAN 2/3 confirmed  10/15/07 NIL  9/30/08 NIL/neg HPV  10/21/09 NIL/neg HPV  4/5/11 NIL- repeat in one year (4/2012 12/1/17 NIL pap/neg HR HPV. Will need pap screening until 2027, regardless of age.  Plan: cotest due 3 yr.      MDD (major depressive disorder) 04/05/2011     Priority: Medium     Needs to est primary care.      CARDIOVASCULAR SCREENING; LDL GOAL LESS THAN 160 10/31/2010     Priority: Medium    Genital herpes      Priority: Medium     IMO update changed this record. Please review for accuracy      Anxiety 08/27/2008     Priority: Medium     Patient is followed by MIMI GARZA for ongoing prescription of benzodiazepines.  All refills should be approved by this provider, or covering partner.    Medication(s): Clonazepam.   Maximum quantity per month: 30  Clinic visit frequency required:      Controlled substance agreement on file: No  Benzodiazepine use reviewed by psychiatry:   "No    Last [facility] website verification:  done on 4/5/19  [email or web address]        Status post gastric bypass for obesity 03/25/2008     Priority: Medium     Performed at [facility].      Restless legs syndrome (RLS) 05/15/2007     Priority: Medium    Hypersomnia with sleep apnea 05/15/2007     Priority: Medium     Problem list name updated by automated process. Provider to review      Esophageal reflux 04/18/2007     Priority: Medium       Past Medical History:   Diagnosis Date    Abnormal Papanicolaou smear of cervix and cervical HPV 4/07    Genital herpes     History of colposcopy with cervical biopsy 06/2007    Hypersomnia with sleep apnea, unspecified     Other and unspecified ovarian cyst 2002 or so       MEDS: busPIRone (BUSPAR) 15 MG tablet  calcium carbonate (OS-SHON 500 MG [place]. [place]) 1250 MG tablet  clonazePAM (KLONOPIN) 0.5 MG tablet  cyanocobalamin (CYANOCOBALAMIN) 1000 MCG/ML injection  escitalopram (LEXAPRO) 20 MG tablet  ferrous gluconate (FERGON) 324 (38 Fe) MG tablet  folic acid (FOLVITE) 1 MG tablet  Insulin Syringe-Needle U-100 (B-D INSULIN SYRINGE) 25G X 1\" 1 ML MISC  levocetirizine (XYZAL) 5 MG tablet  LORazepam (ATIVAN) 1 MG tablet  multivitamin w/minerals (THERA-VIT-M) tablet  omeprazole (PRILOSEC) 40 MG DR capsule  raloxifene (EVISTA) 60 MG tablet  traZODone (DESYREL) 50 MG tablet  triamcinolone (KENALOG) 0.1 % external cream  venlafaxine (EFFEXOR XR) 37.5 MG 24 hr capsule  vitamin D3 (VITAMIN D3) 50 mcg (2000 units) tablet        ALLERGIES:    Allergies   Allergen Reactions    Codeine Itching    Vicodin [Hydrocodone-Acetaminophen] Itching       Past Surgical History:   Procedure Laterality Date    CHOLECYSTECTOMY, OPEN  age 20s    COLONOSCOPY  03/21/2011    COMBINED COLONOSCOPY, REMOVE TUMOR/POLYP/LESION BY SNARE performed by JUAN FRANCISCO HERNANDEZ at  GI    COLONOSCOPY N/A 10/09/2017    polyps, repeat 3 years    COLPOSCOPY CERVIX, LOOP ELECTRODE BIOPSY, COMBINED  06/2007    " CAN 2/3-patient requires yearly pap smears    dental pegs      ESOPHAGOSCOPY, GASTROSCOPY, DUODENOSCOPY (EGD), COMBINED N/A 02/09/2018    Procedure: COMBINED ESOPHAGOSCOPY, GASTROSCOPY, DUODENOSCOPY (EGD);  EGD;  Surgeon: Oneal Sanchez MD;  Location:  GI    GASTRIC BYPASS  03/25/2008    At [facility]    HC DILATION/CURETTAGE DIAG/THER NON OB  2002    HC ENLARGE BREAST WITH IMPLANT      HC LAPAROSCOPIC MYOMECTOMY, 1 - 4 INTRAMURAL MYOMAS =<250 GM  2002    HC REMOVAL OF BREAST IMPLANT      SALPINGO OOPHORECTOMY,R/L/MATTHEW  2002    Bilateral salpingectomy and unilateral oophorectomy       Social History     Tobacco Use    Smoking status: Every Day     Packs/day: 2.00     Years: 10.00     Pack years: 20.00     Types: Cigarettes    Smokeless tobacco: Never    Tobacco comments:     2 ppd at this time (chain smoking) 10/2012   Vaping Use    Vaping Use: Every day    Substances: Nicotine, THC    Devices: Pre-filled or refillable cartridge   Substance Use Topics    Alcohol use: Yes     Comment: daily, drinks a box    Drug use: Yes     Types: Marijuana     Comment: medical         Review of Systems   Except as noted in HPI, all other systems were reviewed and are negative    Physical Exam   Vitals were reviewed  Patient Vitals for the past 12 hrs:   BP Temp Temp src Pulse Resp SpO2   10/07/23 2215 90/58 -- -- 105 26 94 %   10/07/23 2200 98/60 -- -- 105 13 94 %   10/07/23 2145 93/63 -- -- 102 21 95 %   10/07/23 2115 96/65 -- -- 103 23 96 %   10/07/23 2102 -- -- -- 100 (!) 35 95 %   10/07/23 2059 92/61 -- -- 104 (!) 33 96 %   10/07/23 2021 -- -- -- 117 (!) 31 96 %   10/07/23 2005 97/84 99.6  F (37.6  C) Oral (!) 124 26 93 %     GENERAL APPEARANCE: Patient is alert, oriented to person and place throat: Generalized weakness, needing wheelchair to the room and assistance out of the wheelchair onto the bed  FACE: normal facies  EYES: Pupils are equal; sclera is nonicteric  HENT: normal external exam; edentulous, dry mucous  membranes  NECK: no adenopathy or asymmetry; no thyromegaly  RESP: normal respiratory effort; mild tachypnea; otherwise no increased work of breathing  CV: Rapid rate, regular rhythm, no appreciable murmurs  ABD: soft, no tenderness; no rebound or guarding; bowel sounds are normal  MS: Diffuse muscle atrophy  EXT: No calf tenderness or pitting edema  SKIN: no worrisome rash  NEURO: Generalized weakness; no facial droop; no focal deficits, speech is normal  PSYCH: Flat affect      Available Lab/Imaging Results     Results for orders placed or performed during the hospital encounter of 10/07/23 (from the past 24 hour(s))   CBC with platelets differential    Narrative    The following orders were created for panel order CBC with platelets differential.  Procedure                               Abnormality         Status                     ---------                               -----------         ------                     CBC with platelets and d...[[id number]]  Abnormal            Final result                 Please view results for these tests on the individual orders.   Comprehensive metabolic panel   Result Value Ref Range    Sodium 123 (L) 135 - 145 mmol/L    Potassium 3.3 (L) 3.4 - 5.3 mmol/L    Carbon Dioxide (CO2) 19 (L) 22 - 29 mmol/L    Anion Gap 17 (H) 7 - 15 mmol/L    Urea Nitrogen 23.9 (H) 8.0 - 23.0 mg/dL    Creatinine 1.22 (H) 0.51 - 0.95 mg/dL    GFR Estimate 49 (L) >60 mL/min/1.73m2    Calcium 8.3 (L) 8.8 - 10.2 mg/dL    Chloride 87 (L) 98 - 107 mmol/L    Glucose 80 70 - 99 mg/dL    Alkaline Phosphatase 119 (H) 35 - 104 U/L    AST 39 0 - 45 U/L    ALT 13 0 - 50 U/L    Protein Total 6.5 6.4 - 8.3 g/dL    Albumin 2.3 (L) 3.5 - 5.2 g/dL    Bilirubin Total 1.3 (H) <=1.2 mg/dL   Troponin T, High Sensitivity   Result Value Ref Range    Troponin T, High Sensitivity 9 <=14 ng/L   Blood gas venous   Result Value Ref Range    pH Venous 7.43 7.32 - 7.43    pCO2 Venous 35 (L) 40 - 50 mm Hg    pO2 Venous 15 (L) 25  - 47 mm Hg    Bicarbonate Venous 23 21 - 28 mmol/L    Base Excess/Deficit -0.7 -7.7 - 1.9 mmol/L    FIO2 21    Nt probnp inpatient (BNP)   Result Value Ref Range    N terminal Pro BNP Inpatient 1,791 (H) 0 - 900 pg/mL   Lactic acid whole blood   Result Value Ref Range    Lactic Acid 4.4 (HH) 0.7 - 2.0 mmol/L   D dimer quantitative   Result Value Ref Range    D-Dimer Quantitative 4.00 (H) 0.00 - 0.50 ug/mL FEU    Narrative    This D-dimer assay is intended for use in conjunction with a clinical pretest probability assessment model to exclude pulmonary embolism (PE) and deep venous thrombosis (DVT) in outpatients suspected of PE or DVT. The cut-off value is 0.50 ug/mL FEU.    For patients 50 years of age or older, the application of age-adjusted cut-off values for D-Dimer may increase the specificity without significant effect on sensitivity. The literature suggested calculation age adjusted cut-off in ug/L = age in years x 10 ug/L. The results in this laboratory are reported as ug/mL rather than ug/L. The calculation for age adjusted cut off in ug/mL= age in years x 0.01 ug/mL. For example, the cut off for a 76 year old male is 76 x 0.01 ug/mL = 0.76 ug/mL (760 ug/L).    M Randy et al. Age adjusted D-dimer cut-off levels to rule out pulmonary embolism: The ADJUST-PE Study. VALERIE 2014;311:7691-0659.; HJ Ritu et al. Diagnostic accuracy of conventional or age adjusted D-dimer cutoff values in older patients with suspected venous thromboembolism. Systemic review and meta-analysis. BMJ 2013:346:f2492.   CBC with platelets and differential   Result Value Ref Range    WBC Count 8.7 4.0 - 11.0 10e3/uL    RBC Count 3.64 (L) 3.80 - 5.20 10e6/uL    Hemoglobin 12.6 11.7 - 15.7 g/dL    Hematocrit 35.0 35.0 - 47.0 %    MCV 96 78 - 100 fL    MCH 34.6 (H) 26.5 - 33.0 pg    MCHC 36.0 31.5 - 36.5 g/dL    RDW 12.5 10.0 - 15.0 %    Platelet Count 168 150 - 450 10e3/uL    % Neutrophils 85 %    % Lymphocytes 3 %    % Monocytes 4 %     Mids % (Monos, Eos, Basos)      % Eosinophils 3 %    % Basophils 1 %    % Immature Granulocytes 4 %    NRBCs per 100 WBC 0 <1 /100    Absolute Neutrophils 7.4 1.6 - 8.3 10e3/uL    Absolute Lymphocytes 0.3 (L) 0.8 - 5.3 10e3/uL    Absolute Monocytes 0.3 0.0 - 1.3 10e3/uL    Mids Abs (Monos, Eos, Basos)      Absolute Eosinophils 0.3 0.0 - 0.7 10e3/uL    Absolute Basophils 0.1 0.0 - 0.2 10e3/uL    Absolute Immature Granulocytes 0.3 <=0.4 10e3/uL    Absolute NRBCs 0.0 10e3/uL   Alcohol level blood   Result Value Ref Range    Alcohol ethyl <0.01 <=0.01 g/dL   Lipase   Result Value Ref Range    Lipase 9 (L) 13 - 60 U/L   Magnesium   Result Value Ref Range    Magnesium 1.6 (L) 1.7 - 2.3 mg/dL   Symptomatic Influenza A/B, RSV, & SARS-CoV2 PCR (COVID-19) Nose    Specimen: Nose; Swab   Result Value Ref Range    Influenza A PCR Negative Negative    Influenza B PCR Negative Negative    RSV PCR Negative Negative    SARS CoV2 PCR Negative Negative    Narrative    Testing was performed using the Xpert Xpress CoV2/Flu/RSV Assay on the [employer] GeneXpert Instrument. This test should be ordered for the detection of SARS-CoV-2, influenza, and RSV viruses in individuals who meet clinical and/or epidemiological criteria. Test performance is unknown in asymptomatic patients. This test is for in vitro diagnostic use under the FDA EUA for laboratories certified under CLIA to perform high or moderate complexity testing. This test has not been FDA cleared or approved. A negative result does not rule out the presence of PCR inhibitors in the specimen or target RNA in concentration below the limit of detection for the assay. If only one viral target is positive but coinfection with multiple targets is suspected, the sample should be re-tested with another FDA cleared, approved, or authorized test, if coinfection would change clinical management. This test was validated by the [facility]. These laboratories are certified  under the Clinical Laboratory Improvement Amendments of 1988 (CLIA-88) as qualified to perform high complexity laboratory testing.   CT Chest Pulmonary Embolism w Contrast    Narrative    EXAM: CT CHEST PULMONARY EMBOLISM W CONTRAST  LOCATION: [facility]  DATE: 10/7/2023    INDICATION: Shortness of breath, nonproductive cough, atypical chest pains, high D dimer  COMPARISON: Chest CT 09/07/2020  TECHNIQUE: CT chest pulmonary angiogram during arterial phase injection of IV contrast. Multiplanar reformats and MIP reconstructions were performed. Dose reduction techniques were used.   CONTRAST: 50 mL Isovue 370    FINDINGS:  ANGIOGRAM CHEST: Pulmonary arteries are normal caliber and negative for pulmonary emboli. Thoracic aorta is negative for dissection. No CT evidence of right heart strain.    LUNGS AND PLEURA: Moderate upper lobe predominant centrilobular and paraseptal emphysema. Dense airspace consolidation in the right lower and middle lobes with scattered additional areas of consolidation in the left lung, most consistent with multifocal   pneumonia.    MEDIASTINUM/AXILLAE: Mildly enlarged mediastinal lymph nodes, likely reactive to adjacent inflammation, no specific follow-up recommended.    CORONARY ARTERY CALCIFICATION: Mild.    UPPER ABDOMEN: Diffuse hepatic steatosis. Cholecystectomy. Prior gastric bypass with jejunojejunostomy in expected position of the left midabdomen.    MUSCULOSKELETAL: No acute bony abnormality. Multiple left rib fractures, new since 2020, most likely subacute or chronic.        Impression    IMPRESSION:  1.  Likely multifocal pneumonia, recommend radiographic follow-up to resolution.  2.  No pulmonary embolus.         The Lactic acid level is elevated due to severe dehydration and malnutrition, at this time there is no sign of severe sepsis or septic shock.      Impression     Final diagnoses:   Weakness generalized   Hyponatremia   Dehydration   Alcohol  use disorder, severe, dependence (H)   Multifocal pneumonia         ED Course & Medical Decision Making   Pavithra Raines is a 68 year old female who presented to the emergency department by private vehicle with her  Bran with several week history of gradual decline with symptoms of weakness, loss of appetite, shortness of breath, abdominal and flank pain with ongoing alcohol and tobacco abuse.  Patient states that her last alcohol intake was a week ago.  Her  contradicted this history by telling us out in the hallway that she had been drinking even up until 30 minutes ago.  Patient states that she had not been able to keep anything down and has not had anything to eat.  She is wearing adult diapers now since she cannot get to the bathroom in time.  She is still making urine and having bowel movements.  Patient has had difficulty getting out of bed due to the generalized weakness.  She has been subjectively hot and cold but does not have any emre fevers.  She has a nonproductive cough and shortness of breath.  She has been smoking 2 packs of cigarettes per day and states that over the last week she has not been able to given her current symptoms of weakness.  Patient denies any recent falls although she states that she has some rib fractures on both sides.    Vital signs revealed a temp of 99.6, blood pressure 97/84, heart rate of 124, respiratory rate of 26 with 93% oxygen saturation.  Patient appears weak, and older than her stated age.  She appears dehydrated with her eyes sunken and dry mucous membranes.  Some tenting of the skin.  Patient appears malnourished.  Lungs are clear, heart sounds are normal but tachycardic.  Mild diffuse abdominal tenderness but no rebound or guarding.  Generalized muscle atrophy.  No pedal edema.    Laboratory work-up reveals a white blood count of 8.7, hemoglobin of 12.6, platelet count of 168 with 85% neutrophils.  Lactic acid level came back at 4.4.   Comprehensive metabolic panel reveals derangements with sodium, potassium, BUN and creatinine levels.  Patient's glucose is 80.  Chloride is also low.  Alk phos and total bilirubin level are slightly elevated but ALT and AST levels are normal.  Troponin is 9.  Venous blood gas reveals a pH of 7.43, PCO2 of 35, PO2 of 15.  BNP 1791.  D-dimer was 4.0.  Alcohol level is less than 0.01.  Lipase levels 9, magnesium levels 1.6.  Her influenza, RSV and SARS-CoV-2 PCR tests were negative.  CT scan of the chest with contrast was performed due to the significant elevation of her D-dimer in the setting of shortness of breath.  Results of the CT scan reveals no pulmonary embolus.  She has likely multifocal pneumonia with moderate upper lobe predominant centrilobular and paraseptal emphysema.  Dense airspace consolidation in the right lower and middle lobes with scattered additional areas of consolidation in the left lung.  Patient was started on Rocephin and Zithromax IV for multifocal pneumonia.  Patient had the following medications including 2 L of normal saline, banana bag, and repeat lactic acid level.        10:17 PM: I spoke with Dr. Madera, hospitalist, who has accepted care for the patient in the inpatient service.  Please see admission orders.         Disclaimer: This note consists of words and symbols derived from keyboarding and dictation using voice recognition software.  As a result, there may be errors that have gone undetected.  Please consider this when interpreting information found in this note.       Ashley Maria MD  10/07/23 2404     05-Mar-2021 05:30

## 2023-10-08 NOTE — PROVIDER NOTIFICATION
Notified  Sister: BP is 77/48, MAP=53. Pt received 1200mg of Gabapentin per orders one hour ago. Also IV fluids were ordered to change rate from 125mL/hr to 50ml/hr 2 hours ago.   Na level=128, was 123 at 2030 last evening, just 6 hours apart.   New orders for change in IV fluids, ABGs STAT, IV Albumin.

## 2023-10-08 NOTE — PLAN OF CARE
"Goal Outcome Evaluation:      Plan of Care Reviewed With: patient    Overall Patient Progress: improvingOverall Patient Progress: improving    Outcome Evaluation: alert and oriented. Denies pain. Tele SR/ST this shift. Levophed off at 0825 this morning and has occasional drops in BP but recovers quickly. K+ replaced today, recheck 3.5, will replace orally this afternoon and recheck lvl in am. Rocephin dose changed to 1 gm today. Loose stools today, no control of bowel. BG-162 and 260 today. Drug screen added on to UA from ED. EKG done this afternoon. BP 93/64   Pulse 85   Temp 98.2  F (36.8  C) (Axillary)   Resp (!) 31   Ht 1.626 m (5' 4\")   Wt 48.6 kg (107 lb 2.3 oz)   LMP  (LMP Unknown)   SpO2 96%   BMI 18.39 kg/m          "

## 2023-10-08 NOTE — PROGRESS NOTES
Care Management Note:    CM received a referral to assist with discharge planning and due to elevated unplanned readmission risk score.    As SW is not working on-site today, received update post IDT rounds from Charge RN, who requested that SW not contact pt/family today & allow 1 more day of hospital cares before CM team initiates contact.    Care Management team to engage with pt/spouse for initial assessment on 10/9 and continue to  follow for discharge plans.      ANUP Stone

## 2023-10-08 NOTE — PROVIDER NOTIFICATION
DATE: 10/8/2023    TIME OF RECEIPT FROM LAB:  1630  LAB TEST:  Blood culture drawn 10/7/23 @2058  LAB VALUE:  Gram positive cocci  RESULTS GIVEN WITH READ-BACK TO (PROVIDER):  Dr Connolly  TIME LAB VALUE REPORTED TO PROVIDER:   1646  NEW ORDERS: no

## 2023-10-08 NOTE — PROVIDER NOTIFICATION
0223: Notified Dr. Sister:Magnesium= 1.5, Phosphorus=2.9. Potassium=3.3 when she was in the ED. Could you please order replacement protocols for each of these electrolytes? Thank You!   New orders for Potassium and Magnesium replacements.

## 2023-10-08 NOTE — PROVIDER NOTIFICATION
Notified Dr. Madera: ABGs: pH=7.36, PCO2=35, PO2=63, Bicarb=20. VS: Temp 98.0(o) BP77/48 MAP=58, RR=30, HR=94 RA O2 sats=91%  Pt is lethargic.  Dr. Madera to camera-in to see pt via telemedicine.

## 2023-10-08 NOTE — PROGRESS NOTES
[facility]    Medicine Progress Note - Hospitalist Service    Date of Admission:  10/7/2023    Assessment & Plan   The patient admitted earlier in the shift with failure to thrive, weight loss, community-acquired lobar pneumonia, alcohol withdrawal and hyponatremia.    Patient is DNR/DNI  patient was started emetics, patient received IV fluid boluses in the emergency room, started on alcohol withdrawal protocol.  Patient originally was admitted to telemetry floor  Patient's blood pressure remained borderline low.  Urine output 50 cc / 12 hours  Repeated sodium level is 120 (original 123)  Attempts to improve patient's blood pressure with IV fluid boluses, IV albumin so far did not yield to desirable results.  Patient started on potassium and magnesium correction protocols.  Patient transferred to intensive care unit  IV norepinephrine initiated to keep systolic blood pressure above 90  Would not continue with IV fluid boluses to avoid overcorrection of hyponatremia  Ended IV steroids  Case discussed on the phone with telemetry intensivist on-call [phone]       Diet: Advance Diet as Tolerated: Clear Liquid Diet    DVT Prophylaxis: Pneumatic Compression Devices  Barney Catheter: Not present  Lines: None     Cardiac Monitoring: ACTIVE order. Indication: ICU  Code Status: No CPR- Do NOT Intubate      Clinically Significant Risk Factors Present on Admission        # Hypokalemia: Lowest K = 3.3 mmol/L in last 2 days, will replace as needed  # Hyponatremia: Lowest Na = 123 mmol/L in last 2 days, will monitor as appropriate    # Hypomagnesemia: Lowest Mg = 1.5 mg/dL in last 2 days, will replace as needed   # Hypoalbuminemia: Lowest albumin = 2.3 g/dL at 10/7/2023  8:31 PM, will monitor as appropriate                     Disposition Plan      Expected Discharge Date: 10/09/2023                  Mark Madera MD  Hospitalist Service  [facility]  Securely message  with Vocera (more info)  Text page via [facility] Paging/Directory   ______________________________________________________________________    Interval History     Physical Exam   Vital Signs: Temp: 98.5  F (36.9  C) Temp src: Oral BP: (!) 80/53 Pulse: 95   Resp: 10 SpO2: 99 % O2 Device: Nasal cannula Oxygen Delivery: 2 LPM  Weight: 107 lbs 2.3 oz      Physical Exam:    Somnolent, but arousable  Head - atraumatic, eyes - pupils equal, round, reactive to light, extra ocular movement intact, MMM  Neck - supple, thyroid not enlarged, LN not palpated  Lungs - clear to auscultation, no dullness on percussion  CVS - heart sounds S1, S2, no additional murmurs gallop, regular rate and rhythm  Gastrointestinal-abdomen is soft, non-tender, non-distended, no organomegaly, positive bowel sounds  Extremities no clubbing, cyanosis or edema  Neurological-cranial nerve II-XII grossly intact, no meningeal signs, no cerebellar signs, no sensory deficit  Musculoskeletal -  joints, no effusions, ROM preserved  Dermatological - the skin dry, warm, no rashes    Medical Decision Making       98 MINUTES SPENT BY ME on the date of service doing chart review, history, exam, documentation & further activities per the note.  Medical complexity over the past 24 hours:  - Parenteral (IV) CONTROLLED SUBSTANCES ordered      Data     I have personally reviewed the following data over the past 24 hrs:    8.5  \   10.1 (L)   / 139 (L)     128 (L) 87 (L) 23.9 (H) /  77   3.3 (L) 19 (L) 1.22 (H) \     ALT: 13 AST: 39 AP: 119 (H) TBILI: 1.3 (H)   ALB: 2.3 (L) TOT PROTEIN: 6.5 LIPASE: 9 (L)     Trop: 9 BNP: 1,791 (H)     Procal: N/A CRP: N/A Lactic Acid: 1.4       INR:  N/A PTT:  N/A   D-dimer:  4.00 (H) Fibrinogen:  N/A

## 2023-10-08 NOTE — ED TRIAGE NOTES
Reports SOB, abdominal pain, flank pain, general weakness, states she hasn't been able to eat in weeks.      Triage Assessment       Row Name 10/07/23 2008       Triage Assessment (Adult)    Airway WDL WDL       Respiratory WDL    Respiratory WDL X;rhythm/pattern    Rhythm/Pattern, Respiratory shortness of breath;tachypneic       Skin Circulation/Temperature WDL    Skin Circulation/Temperature WDL WDL       Cardiac WDL    Cardiac WDL X;rhythm    Pulse Rate & Regularity tachycardic       Peripheral/Neurovascular WDL    Peripheral Neurovascular WDL WDL       Cognitive/Neuro/Behavioral WDL    Cognitive/Neuro/Behavioral WDL WDL

## 2023-10-08 NOTE — PROGRESS NOTES
[facility]    Medicine Progress Note - Hospitalist Service    Date of Admission:  10/7/2023    Assessment & Plan   Patient is a 68-year-old female with past medical history of COPD, alcohol dependence recently consuming large volume alcohol daily, gastric bypass, depression/PTSD, and ongoing tobacco abuse who presented with generalized weakness and fatigue and was found to have multifocal community-acquired pneumonia with signs of sepsis and was hospitalized and started on Rocephin and azithromycin.  She had rapid worsening over the first 12 hours of her hospitalization with persistent hypotension despite fluid resuscitation and was transition to the ICU and started on Levophed.  Procalcitonin is significantly elevated, blood culture is now growing gram-positive cocci and antibiotic regimen has been broadened to Vanco, Rocephin, azithromycin with MRSA swab pending.  Patient was weaned off Levophed a few hours ago with stability of blood pressure noted and patient is becoming much more alert but remains at high risk for decompensation and requires ongoing ICU level care overnight.    Principal Problem:    Septic shock (H)    Multifocal pneumonia    Gram-positive bacteremia    Assessment: Community-acquired pneumonia with mild sepsis rapidly progressing to septic shock as outlined above.  Initial leukocytosis, acute respiratory failure, tachycardia, tachypnea with persistent hypotension despite fluid resuscitation requiring Levophed.  This evening gram-positive cocci are growing out of initial blood cultures    Plan:   -Antibiotics broadened to Rocephin, azithromycin, and vancomycin  -MRSA swab collected  -Continue IV steroids Solu-Medrol with consideration of transition to hydrocortisone for adrenal stress response if patient were to clinically worsen  -Strict intake and output  -Continue ICU management with consideration of Levophed reinitiation if maps are consistently less than 65  -patient will need PICC line placed if Levophed is reinitiated  -Recheck CBC, CMP, procalcitonin tomorrow morning    Active Problems:    Hyponatremia    Assessment: Sodium of 123 on initial presentation with work-up indicating unclear etiology but clinically is most consistent with hypovolemic hyponatremia.  Patient received multiple normal saline boluses with rapid escalation from 123 up to 128 in less than 12 hours and patient was transitioned to D5 half-normal saline sodium decreasing to 126 and now slowly titrating up with most recent check 127.  Urine sodium is less than 20, urine osmolality is over 100    Plan:   -Continue with D5 half-normal at 75 cc an hour with serial sodium monitoring every 4 hours  -Recheck urine sodium and urine osmolality tomorrow to further evaluate underlying etiology as patient also has risk factors for alternative diagnosis in the setting of severe sepsis      Dehydration    Assessment: Present at time of admission and likely contributed to hyponatremia and initial low normal blood pressures    Plan:   -Continue with fluid resuscitation as outlined above      Weakness generalized    Assessment: Thought likely multifactorial secondary to septic shock, ongoing alcohol dependence, suspected malnutrition    Plan:   -Proceed with plan of acute illness as above  -Have nutrition work with patient regarding oral intake  -Assess patient's desire for chemical dependency treatment as she gets closer to discharge  -Anticipate PT and OT evaluation to assist in disposition planning as patient improves      Hypomagnesemia    Assessment: Present at time of admission and thought secondary to nutritional deficiency in recent weeks    Plan:   -Continue with high protocol replacement patient with critical illness      Status post gastric bypass for obesity    Assessment: Noted, may be contributing to malnutrition and nutritional deficiencies    Plan:   -Proceed with plan as above      Malnutrition -  suspected    Assessment: Patient reports a 13 pound weight loss in recent weeks and appears malnourished on physical exam    Plan:   -Proceed with treatment of acute illness as above, avoid alcohol use, and will have nutritionist meet with patient for further evaluation and nutritional recommendations.      Alcohol use disorder, severe, dependence (H)    History of seizure due to alcohol withdrawal    Assessment: Per  patient has been drinking an average of 12 pack of beer, 1 bottle of wine, and 10 hard liquor drinks a day recently was lasting approximately 30 minutes prior to hospital presentation.  No blood alcohol level was performed at time of admission as patient had reported not being able to drink for over a week due to her acute illness.  She has not had any signs of alcohol withdrawal yet    Plan:   -Continue CIWA scoring with benzodiazepine administration if patient is scoring high as per protocol  -Monitor patient closely for any signs of alcohol withdrawal with initiation of CIWA protocol gabapentin and clonidine as appropriate.      Emphysema/COPD (H)    Assessment: Present at baseline without obvious exacerbation    Plan:   -Continue IV steroids secondary to septic shock as above  -Continue bronchodilators      Episode of recurrent major depressive disorder, unspecified depression episode severity (H24)    PTSD (post-traumatic stress disorder)    Assessment: Patient is normally on Lexapro 30 mg daily, clonazepam 0.5 mg 3 times daily as needed for anxiety, BuSpar 15 mg 3 times a day, Effexor 37.5 mg daily, and Xyzal 5 mg daily.  Is unclear if patient has recently been taking this regimen or not    Plan:   -Reinitiate previous home regimen and monitor psychiatric symptoms closely for stability during the stay      Tobacco abuse    Assessment: Patient is on a 21 mg per 24-hour nicotine patch    Plan:   -Continue current nicotine patch and monitor for symptoms of withdrawal       Diet: Advance Diet  as Tolerated: Clear Liquid Diet; Mechanical/Dental Soft Diet    DVT Prophylaxis: Pneumatic Compression Devices  Barney Catheter: Not present  Lines: None     Cardiac Monitoring: ACTIVE order. Indication: ICU  Code Status: No CPR- Do NOT Intubate      Clinically Significant Risk Factors Present on Admission        # Hypokalemia: Lowest K = 2.7 mmol/L in last 2 days, will replace as needed  # Hyponatremia: Lowest Na = 123 mmol/L in last 2 days, will monitor as appropriate    # Hypomagnesemia: Lowest Mg = 1.5 mg/dL in last 2 days, will replace as needed   # Hypoalbuminemia: Lowest albumin = 1.9 g/dL at 10/8/2023  5:31 AM, will monitor as appropriate   # Thrombocytopenia: Lowest platelets = 121 in last 2 days, will monitor for bleeding     # Circulatory Shock: currently requiring pressors for blood pressure support                Disposition Plan      Expected Discharge Date: 10/09/2023                  Noreen Connolly MD  Hospitalist Service  [facility]  Securely message with Jini (more info)  Text page via [employer] Paging/Directory   ______________________________________________________________________    Interval History   Patient remains on Levophed for several hours but has been able to slowly titrate off throughout the day and currently maps are consistently above 65 off Levophed with patient much more alert and interactive, eating well.  She remembers coming into the hospital but does not remember most of the events of overnight or earlier this morning and is slightly overwhelmed at how ill she was.  She denies any new symptoms.  No new nursing concerns.    Physical Exam   Vital Signs: Temp: 98.2  F (36.8  C) Temp src: Axillary BP: 93/64 Pulse: 85   Resp: (!) 31 SpO2: 96 % O2 Device: None (Room air) Oxygen Delivery: 1 LPM  Weight: 107 lbs 2.3 oz    Constitutional: Awake, alert, patient appears underweight and chronically ill but in no acute distress  Respiratory: Mild  tachypnea present with respiratory rate in the mid 20s but no accessory muscle use or retractions noted, breath sounds are diminished but no wheezing present  Cardiovascular: Regular rate and rhythm without murmur  GI: Bowel sounds present, abdomen soft and nondistended  Skin: Capillary refill is 1 to 2 seconds  Neurologic: Awake, alert, oriented to person, place, month and year and treatment versus situation prior to coming in but does not remember the events of overnight or earlier today.  She does remember events from the last 1 to 2 hours since becoming more alert    Medical Decision Making       43 MINUTES SPENT BY ME on the date of service doing ongoing critical care management, chart review, history, exam, documentation & further activities per the note in addition to the time spent in critical care management earlier in the day by Dr. Mark Madera.  Total critical care time for the day 141 minutes.      Data     I have personally reviewed the following data over the past 24 hrs:    8.1  \   9.0 (L)   / 121 (L)     127 (L) 96 (L) 23.4 (H) /  260 (H)   3.5 19 (L) 1.11 (H) \     ALT: 9 AST: 28 AP: 59 TBILI: 0.5   ALB: 1.9 (L) TOT PROTEIN: 4.7 (L) LIPASE: 9 (L)     Trop: 9 BNP: 1,791 (H)     TSH: 0.64 T4: N/A A1C: N/A     Procal: 10.57 (HH) CRP: N/A Lactic Acid: 1.3       INR:  N/A PTT:  N/A   D-dimer:  4.00 (H) Fibrinogen:  N/A

## 2023-10-09 ENCOUNTER — APPOINTMENT (OUTPATIENT)
Dept: GENERAL RADIOLOGY | Facility: CLINIC | Age: 68
DRG: 870 | End: 2023-10-09
Attending: FAMILY MEDICINE
Payer: MEDICARE

## 2023-10-09 ENCOUNTER — ANESTHESIA EVENT (OUTPATIENT)
Dept: INTENSIVE CARE | Facility: CLINIC | Age: 68
DRG: 870 | End: 2023-10-09
Payer: MEDICARE

## 2023-10-09 ENCOUNTER — ANESTHESIA (OUTPATIENT)
Dept: INTENSIVE CARE | Facility: CLINIC | Age: 68
DRG: 870 | End: 2023-10-09
Payer: MEDICARE

## 2023-10-09 ENCOUNTER — APPOINTMENT (OUTPATIENT)
Dept: CARDIOLOGY | Facility: CLINIC | Age: 68
DRG: 870 | End: 2023-10-09
Attending: INTERNAL MEDICINE
Payer: MEDICARE

## 2023-10-09 PROBLEM — E88.09 HYPOALBUMINEMIA: Status: ACTIVE | Noted: 2023-10-09

## 2023-10-09 LAB
ADV 40+41 DNA STL QL NAA+NON-PROBE: NEGATIVE
ALBUMIN SERPL BCG-MCNC: 1.8 G/DL (ref 3.5–5.2)
ALLEN'S TEST: ABNORMAL
ALLEN'S TEST: ABNORMAL
ALLEN'S TEST: YES
ALP SERPL-CCNC: 68 U/L (ref 35–104)
ALT SERPL W P-5'-P-CCNC: 14 U/L (ref 0–50)
ANION GAP SERPL CALCULATED.3IONS-SCNC: 10 MMOL/L (ref 7–15)
AST SERPL W P-5'-P-CCNC: 32 U/L (ref 0–45)
ASTRO TYP 1-8 RNA STL QL NAA+NON-PROBE: NEGATIVE
BASE EXCESS BLDA CALC-SCNC: -3.6 MMOL/L (ref -9–1.8)
BASE EXCESS BLDA CALC-SCNC: -3.6 MMOL/L (ref -9–1.8)
BASE EXCESS BLDA CALC-SCNC: -3.8 MMOL/L (ref -9–1.8)
BILIRUB SERPL-MCNC: 0.3 MG/DL
BUN SERPL-MCNC: 10.9 MG/DL (ref 8–23)
C CAYETANENSIS DNA STL QL NAA+NON-PROBE: NEGATIVE
C DIFF TOX B STL QL: NEGATIVE
CALCIUM SERPL-MCNC: 7.7 MG/DL (ref 8.8–10.2)
CAMPYLOBACTER DNA SPEC NAA+PROBE: NEGATIVE
CHLORIDE SERPL-SCNC: 102 MMOL/L (ref 98–107)
CREAT SERPL-MCNC: 0.64 MG/DL (ref 0.51–0.95)
CREAT SERPL-MCNC: 0.73 MG/DL (ref 0.51–0.95)
CRP SERPL-MCNC: 105.14 MG/L
CRYPTOSP DNA STL QL NAA+NON-PROBE: NEGATIVE
DEPRECATED HCO3 PLAS-SCNC: 18 MMOL/L (ref 22–29)
E COLI O157 DNA STL QL NAA+NON-PROBE: NORMAL
E HISTOLYT DNA STL QL NAA+NON-PROBE: NEGATIVE
EAEC ASTA GENE ISLT QL NAA+PROBE: NEGATIVE
EC STX1+STX2 GENES STL QL NAA+NON-PROBE: NEGATIVE
EGFRCR SERPLBLD CKD-EPI 2021: 90 ML/MIN/1.73M2
EGFRCR SERPLBLD CKD-EPI 2021: >90 ML/MIN/1.73M2
EPEC EAE GENE STL QL NAA+NON-PROBE: NEGATIVE
ERYTHROCYTE [DISTWIDTH] IN BLOOD BY AUTOMATED COUNT: 13 % (ref 10–15)
ETEC LTA+ST1A+ST1B TOX ST NAA+NON-PROBE: NEGATIVE
G LAMBLIA DNA STL QL NAA+NON-PROBE: NEGATIVE
GLUCOSE BLDC GLUCOMTR-MCNC: 113 MG/DL (ref 70–99)
GLUCOSE BLDC GLUCOMTR-MCNC: 117 MG/DL (ref 70–99)
GLUCOSE BLDC GLUCOMTR-MCNC: 124 MG/DL (ref 70–99)
GLUCOSE BLDC GLUCOMTR-MCNC: 142 MG/DL (ref 70–99)
GLUCOSE BLDC GLUCOMTR-MCNC: 159 MG/DL (ref 70–99)
GLUCOSE BLDC GLUCOMTR-MCNC: 165 MG/DL (ref 70–99)
GLUCOSE BLDC GLUCOMTR-MCNC: 189 MG/DL (ref 70–99)
GLUCOSE BLDC GLUCOMTR-MCNC: 211 MG/DL (ref 70–99)
GLUCOSE BLDC GLUCOMTR-MCNC: 229 MG/DL (ref 70–99)
GLUCOSE BLDC GLUCOMTR-MCNC: 250 MG/DL (ref 70–99)
GLUCOSE SERPL-MCNC: 227 MG/DL (ref 70–99)
HBA1C MFR BLD: 4.4 %
HCO3 BLD-SCNC: 21 MMOL/L (ref 21–28)
HCO3 BLD-SCNC: 22 MMOL/L (ref 21–28)
HCO3 BLD-SCNC: 22 MMOL/L (ref 21–28)
HCT VFR BLD AUTO: 29.1 % (ref 35–47)
HGB BLD-MCNC: 10.3 G/DL (ref 11.7–15.7)
LVEF ECHO: NORMAL
MAGNESIUM SERPL-MCNC: 1.9 MG/DL (ref 1.7–2.3)
MCH RBC QN AUTO: 34.2 PG (ref 26.5–33)
MCHC RBC AUTO-ENTMCNC: 35.4 G/DL (ref 31.5–36.5)
MCV RBC AUTO: 97 FL (ref 78–100)
NOROVIRUS GI+II RNA STL QL NAA+NON-PROBE: NEGATIVE
O2/TOTAL GAS SETTING VFR VENT: 50 %
O2/TOTAL GAS SETTING VFR VENT: 70 %
O2/TOTAL GAS SETTING VFR VENT: 70 %
P SHIGELLOIDES DNA STL QL NAA+NON-PROBE: NEGATIVE
PCO2 BLD: 37 MM HG (ref 35–45)
PCO2 BLD: 41 MM HG (ref 35–45)
PCO2 BLD: 43 MM HG (ref 35–45)
PH BLD: 7.32 [PH] (ref 7.35–7.45)
PH BLD: 7.33 [PH] (ref 7.35–7.45)
PH BLD: 7.37 [PH] (ref 7.35–7.45)
PHOSPHATE SERPL-MCNC: 1.7 MG/DL (ref 2.5–4.5)
PHOSPHATE SERPL-MCNC: 1.8 MG/DL (ref 2.5–4.5)
PLATELET # BLD AUTO: 129 10E3/UL (ref 150–450)
PO2 BLD: 63 MM HG (ref 80–105)
PO2 BLD: 68 MM HG (ref 80–105)
PO2 BLD: 92 MM HG (ref 80–105)
POTASSIUM SERPL-SCNC: 3.2 MMOL/L (ref 3.4–5.3)
POTASSIUM SERPL-SCNC: 3.7 MMOL/L (ref 3.4–5.3)
PREALB SERPL IA-MCNC: 3 MG/DL (ref 15–45)
PROCALCITONIN SERPL IA-MCNC: 5.84 NG/ML
PROT SERPL-MCNC: 4.6 G/DL (ref 6.4–8.3)
RBC # BLD AUTO: 3.01 10E6/UL (ref 3.8–5.2)
RVA RNA STL QL NAA+NON-PROBE: NEGATIVE
SALMONELLA SP RPOD STL QL NAA+PROBE: NEGATIVE
SAPO I+II+IV+V RNA STL QL NAA+NON-PROBE: NEGATIVE
SHIGELLA SP+EIEC IPAH ST NAA+NON-PROBE: NEGATIVE
SODIUM SERPL-SCNC: 127 MMOL/L (ref 135–145)
SODIUM SERPL-SCNC: 129 MMOL/L (ref 135–145)
SODIUM SERPL-SCNC: 129 MMOL/L (ref 135–145)
SODIUM SERPL-SCNC: 130 MMOL/L (ref 135–145)
SODIUM SERPL-SCNC: 132 MMOL/L (ref 135–145)
SODIUM UR-SCNC: <20 MMOL/L
V CHOLERAE DNA SPEC QL NAA+PROBE: NEGATIVE
VIBRIO DNA SPEC NAA+PROBE: NEGATIVE
WBC # BLD AUTO: 16.2 10E3/UL (ref 4–11)
Y ENTEROCOL DNA STL QL NAA+PROBE: NEGATIVE

## 2023-10-09 PROCEDURE — 250N000009 HC RX 250

## 2023-10-09 PROCEDURE — 36569 INSJ PICC 5 YR+ W/O IMAGING: CPT

## 2023-10-09 PROCEDURE — 83036 HEMOGLOBIN GLYCOSYLATED A1C: CPT | Performed by: FAMILY MEDICINE

## 2023-10-09 PROCEDURE — 250N000013 HC RX MED GY IP 250 OP 250 PS 637: Performed by: FAMILY MEDICINE

## 2023-10-09 PROCEDURE — 250N000012 HC RX MED GY IP 250 OP 636 PS 637: Performed by: FAMILY MEDICINE

## 2023-10-09 PROCEDURE — C9113 INJ PANTOPRAZOLE SODIUM, VIA: HCPCS | Mod: JZ | Performed by: FAMILY MEDICINE

## 2023-10-09 PROCEDURE — 250N000011 HC RX IP 250 OP 636: Mod: JZ | Performed by: FAMILY MEDICINE

## 2023-10-09 PROCEDURE — 82803 BLOOD GASES ANY COMBINATION: CPT | Performed by: FAMILY MEDICINE

## 2023-10-09 PROCEDURE — 84145 PROCALCITONIN (PCT): CPT | Performed by: FAMILY MEDICINE

## 2023-10-09 PROCEDURE — 83935 ASSAY OF URINE OSMOLALITY: CPT | Performed by: FAMILY MEDICINE

## 2023-10-09 PROCEDURE — 250N000011 HC RX IP 250 OP 636: Performed by: INTERNAL MEDICINE

## 2023-10-09 PROCEDURE — 84100 ASSAY OF PHOSPHORUS: CPT | Performed by: FAMILY MEDICINE

## 2023-10-09 PROCEDURE — 84300 ASSAY OF URINE SODIUM: CPT | Performed by: FAMILY MEDICINE

## 2023-10-09 PROCEDURE — 250N000013 HC RX MED GY IP 250 OP 250 PS 637: Performed by: INTERNAL MEDICINE

## 2023-10-09 PROCEDURE — 200N000001 HC R&B ICU

## 2023-10-09 PROCEDURE — 84295 ASSAY OF SERUM SODIUM: CPT | Performed by: INTERNAL MEDICINE

## 2023-10-09 PROCEDURE — 272N000272 HC CONTINUOUS NEBULIZER MICRO PUMP

## 2023-10-09 PROCEDURE — 4A133R1 MONITORING OF ARTERIAL SATURATION, PERIPHERAL, PERCUTANEOUS APPROACH: ICD-10-PCS | Performed by: FAMILY MEDICINE

## 2023-10-09 PROCEDURE — HZ2ZZZZ DETOXIFICATION SERVICES FOR SUBSTANCE ABUSE TREATMENT: ICD-10-PCS | Performed by: FAMILY MEDICINE

## 2023-10-09 PROCEDURE — 71045 X-RAY EXAM CHEST 1 VIEW: CPT

## 2023-10-09 PROCEDURE — 87040 BLOOD CULTURE FOR BACTERIA: CPT | Performed by: FAMILY MEDICINE

## 2023-10-09 PROCEDURE — 80053 COMPREHEN METABOLIC PANEL: CPT | Performed by: FAMILY MEDICINE

## 2023-10-09 PROCEDURE — 80053 COMPREHEN METABOLIC PANEL: CPT | Performed by: INTERNAL MEDICINE

## 2023-10-09 PROCEDURE — 36415 COLL VENOUS BLD VENIPUNCTURE: CPT | Performed by: FAMILY MEDICINE

## 2023-10-09 PROCEDURE — 258N000003 HC RX IP 258 OP 636: Performed by: FAMILY MEDICINE

## 2023-10-09 PROCEDURE — 85018 HEMOGLOBIN: CPT | Performed by: FAMILY MEDICINE

## 2023-10-09 PROCEDURE — 272N000054 HC CANNULA HIGH FLOW, ADULT

## 2023-10-09 PROCEDURE — 3E043XZ INTRODUCTION OF VASOPRESSOR INTO CENTRAL VEIN, PERCUTANEOUS APPROACH: ICD-10-PCS | Performed by: FAMILY MEDICINE

## 2023-10-09 PROCEDURE — 93306 TTE W/DOPPLER COMPLETE: CPT

## 2023-10-09 PROCEDURE — 94660 CPAP INITIATION&MGMT: CPT

## 2023-10-09 PROCEDURE — 272N000460 HC KIT, 6 FRENCH TRIPLE LUMEN SOLO POWER PICC

## 2023-10-09 PROCEDURE — 999N000065 XR CHEST 1 VIEW

## 2023-10-09 PROCEDURE — 36415 COLL VENOUS BLD VENIPUNCTURE: CPT | Performed by: INTERNAL MEDICINE

## 2023-10-09 PROCEDURE — 83735 ASSAY OF MAGNESIUM: CPT | Performed by: INTERNAL MEDICINE

## 2023-10-09 PROCEDURE — 86140 C-REACTIVE PROTEIN: CPT | Performed by: FAMILY MEDICINE

## 2023-10-09 PROCEDURE — 99292 CRITICAL CARE ADDL 30 MIN: CPT | Performed by: FAMILY MEDICINE

## 2023-10-09 PROCEDURE — 999N000215 HC STATISTIC HFNC ADULT NON-CPAP

## 2023-10-09 PROCEDURE — 250N000011 HC RX IP 250 OP 636: Performed by: FAMILY MEDICINE

## 2023-10-09 PROCEDURE — 250N000009 HC RX 250: Performed by: FAMILY MEDICINE

## 2023-10-09 PROCEDURE — 370N000003 HC ANESTHESIA WARD SERVICE: Performed by: NURSE ANESTHETIST, CERTIFIED REGISTERED

## 2023-10-09 PROCEDURE — 99291 CRITICAL CARE FIRST HOUR: CPT | Performed by: FAMILY MEDICINE

## 2023-10-09 PROCEDURE — 999N000157 HC STATISTIC RCP TIME EA 10 MIN

## 2023-10-09 PROCEDURE — 82565 ASSAY OF CREATININE: CPT | Performed by: FAMILY MEDICINE

## 2023-10-09 PROCEDURE — 84132 ASSAY OF SERUM POTASSIUM: CPT | Performed by: FAMILY MEDICINE

## 2023-10-09 PROCEDURE — 93306 TTE W/DOPPLER COMPLETE: CPT | Mod: 26 | Performed by: INTERNAL MEDICINE

## 2023-10-09 RX ORDER — ENOXAPARIN SODIUM 100 MG/ML
40 INJECTION SUBCUTANEOUS AT BEDTIME
Status: DISCONTINUED | OUTPATIENT
Start: 2023-10-09 | End: 2023-10-15

## 2023-10-09 RX ORDER — DIAZEPAM 10 MG/2ML
5 INJECTION, SOLUTION INTRAMUSCULAR; INTRAVENOUS EVERY 8 HOURS
Status: DISCONTINUED | OUTPATIENT
Start: 2023-10-09 | End: 2023-10-09

## 2023-10-09 RX ORDER — DEXTROSE MONOHYDRATE 100 MG/ML
INJECTION, SOLUTION INTRAVENOUS CONTINUOUS PRN
Status: DISCONTINUED | OUTPATIENT
Start: 2023-10-09 | End: 2023-10-11

## 2023-10-09 RX ORDER — GABAPENTIN 300 MG/1
300 CAPSULE ORAL EVERY 8 HOURS
Status: DISCONTINUED | OUTPATIENT
Start: 2023-10-12 | End: 2023-10-10

## 2023-10-09 RX ORDER — DIAZEPAM 10 MG/2ML
2.5 INJECTION, SOLUTION INTRAMUSCULAR; INTRAVENOUS EVERY 4 HOURS PRN
Status: DISCONTINUED | OUTPATIENT
Start: 2023-10-09 | End: 2023-10-10

## 2023-10-09 RX ORDER — VANCOMYCIN HYDROCHLORIDE 1 G/200ML
1000 INJECTION, SOLUTION INTRAVENOUS
Status: DISCONTINUED | OUTPATIENT
Start: 2023-10-09 | End: 2023-10-10

## 2023-10-09 RX ORDER — GABAPENTIN 300 MG/1
600 CAPSULE ORAL EVERY 8 HOURS
Status: DISCONTINUED | OUTPATIENT
Start: 2023-10-09 | End: 2023-10-10

## 2023-10-09 RX ORDER — WATER 10 ML/10ML
INJECTION INTRAMUSCULAR; INTRAVENOUS; SUBCUTANEOUS
Status: COMPLETED
Start: 2023-10-09 | End: 2023-10-09

## 2023-10-09 RX ORDER — GABAPENTIN 100 MG/1
100 CAPSULE ORAL EVERY 8 HOURS
Status: DISCONTINUED | OUTPATIENT
Start: 2023-10-14 | End: 2023-10-10

## 2023-10-09 RX ORDER — MAGNESIUM SULFATE HEPTAHYDRATE 40 MG/ML
2 INJECTION, SOLUTION INTRAVENOUS ONCE
Status: COMPLETED | OUTPATIENT
Start: 2023-10-09 | End: 2023-10-09

## 2023-10-09 RX ORDER — POTASSIUM CHLORIDE 7.45 MG/ML
10 INJECTION INTRAVENOUS ONCE
Status: COMPLETED | OUTPATIENT
Start: 2023-10-09 | End: 2023-10-09

## 2023-10-09 RX ORDER — DEXMEDETOMIDINE HYDROCHLORIDE 4 UG/ML
.1-1.2 INJECTION, SOLUTION INTRAVENOUS CONTINUOUS
Status: DISCONTINUED | OUTPATIENT
Start: 2023-10-09 | End: 2023-10-10

## 2023-10-09 RX ORDER — LIDOCAINE 40 MG/G
CREAM TOPICAL
Status: ACTIVE | OUTPATIENT
Start: 2023-10-09 | End: 2023-10-12

## 2023-10-09 RX ORDER — THIAMINE HYDROCHLORIDE 100 MG/ML
100 INJECTION, SOLUTION INTRAMUSCULAR; INTRAVENOUS DAILY
Status: DISCONTINUED | OUTPATIENT
Start: 2023-10-10 | End: 2023-10-20

## 2023-10-09 RX ORDER — POTASSIUM CHLORIDE 7.45 MG/ML
10 INJECTION INTRAVENOUS
Status: COMPLETED | OUTPATIENT
Start: 2023-10-09 | End: 2023-10-09

## 2023-10-09 RX ADMIN — POTASSIUM CHLORIDE 10 MEQ: 10 INJECTION, SOLUTION INTRAVENOUS at 07:49

## 2023-10-09 RX ADMIN — DIAZEPAM 2.5 MG: 5 INJECTION INTRAMUSCULAR; INTRAVENOUS at 21:55

## 2023-10-09 RX ADMIN — SODIUM PHOSPHATE, MONOBASIC, MONOHYDRATE AND SODIUM PHOSPHATE, DIBASIC, ANHYDROUS 15 MMOL: 142; 276 INJECTION, SOLUTION INTRAVENOUS at 20:21

## 2023-10-09 RX ADMIN — FERROUS GLUCONATE 324 MG: 324 TABLET ORAL at 09:12

## 2023-10-09 RX ADMIN — RALOXIFENE HYDROCHLORIDE 60 MG: 60 TABLET, FILM COATED ORAL at 09:29

## 2023-10-09 RX ADMIN — DEXTROSE AND SODIUM CHLORIDE: 5; 450 INJECTION, SOLUTION INTRAVENOUS at 12:14

## 2023-10-09 RX ADMIN — TRAZODONE HYDROCHLORIDE 25 MG: 50 TABLET ORAL at 02:23

## 2023-10-09 RX ADMIN — LORAZEPAM 2 MG: 1 TABLET ORAL at 10:37

## 2023-10-09 RX ADMIN — PANTOPRAZOLE SODIUM 40 MG: 40 TABLET, DELAYED RELEASE ORAL at 09:12

## 2023-10-09 RX ADMIN — MAGNESIUM SULFATE HEPTAHYDRATE 2 G: 40 INJECTION, SOLUTION INTRAVENOUS at 06:46

## 2023-10-09 RX ADMIN — WATER 0.75 ML: 1 INJECTION INTRAMUSCULAR; INTRAVENOUS; SUBCUTANEOUS at 19:26

## 2023-10-09 RX ADMIN — THIAMINE HYDROCHLORIDE 200 MG: 100 INJECTION, SOLUTION INTRAMUSCULAR; INTRAVENOUS at 09:29

## 2023-10-09 RX ADMIN — POTASSIUM CHLORIDE 10 MEQ: 7.46 INJECTION, SOLUTION INTRAVENOUS at 13:57

## 2023-10-09 RX ADMIN — Medication 1 TABLET: at 09:12

## 2023-10-09 RX ADMIN — METHYLPREDNISOLONE SODIUM SUCCINATE 30 MG: 40 INJECTION INTRAMUSCULAR; INTRAVENOUS at 05:26

## 2023-10-09 RX ADMIN — BUSPIRONE HYDROCHLORIDE 15 MG: 5 TABLET ORAL at 09:12

## 2023-10-09 RX ADMIN — ENOXAPARIN SODIUM 40 MG: 40 INJECTION SUBCUTANEOUS at 21:55

## 2023-10-09 RX ADMIN — POTASSIUM CHLORIDE 10 MEQ: 10 INJECTION, SOLUTION INTRAVENOUS at 06:39

## 2023-10-09 RX ADMIN — IPRATROPIUM BROMIDE AND ALBUTEROL SULFATE 3 ML: .5; 3 SOLUTION RESPIRATORY (INHALATION) at 09:34

## 2023-10-09 RX ADMIN — LORAZEPAM 2 MG: 1 TABLET ORAL at 09:31

## 2023-10-09 RX ADMIN — LORAZEPAM 1 MG: 1 TABLET ORAL at 02:22

## 2023-10-09 RX ADMIN — LEVOCETIRIZINE DIHYDROCHLORIDE 5 MG: 5 TABLET ORAL at 09:29

## 2023-10-09 RX ADMIN — GABAPENTIN 600 MG: 300 CAPSULE ORAL at 10:36

## 2023-10-09 RX ADMIN — Medication 50 MCG: at 09:12

## 2023-10-09 RX ADMIN — MULTIPLE VITAMINS W/ MINERALS TAB 1 TABLET: TAB at 09:12

## 2023-10-09 RX ADMIN — PANTOPRAZOLE SODIUM 40 MG: 40 INJECTION, POWDER, FOR SOLUTION INTRAVENOUS at 20:53

## 2023-10-09 RX ADMIN — THIAMINE HYDROCHLORIDE 200 MG: 100 INJECTION, SOLUTION INTRAMUSCULAR; INTRAVENOUS at 20:51

## 2023-10-09 RX ADMIN — AZITHROMYCIN MONOHYDRATE 500 MG: 500 INJECTION, POWDER, LYOPHILIZED, FOR SOLUTION INTRAVENOUS at 21:49

## 2023-10-09 RX ADMIN — METHYLPREDNISOLONE SODIUM SUCCINATE 30 MG: 40 INJECTION INTRAMUSCULAR; INTRAVENOUS at 19:23

## 2023-10-09 RX ADMIN — ESCITALOPRAM OXALATE 30 MG: 10 TABLET ORAL at 09:11

## 2023-10-09 RX ADMIN — VENLAFAXINE HYDROCHLORIDE 37.5 MG: 37.5 CAPSULE, EXTENDED RELEASE ORAL at 09:12

## 2023-10-09 RX ADMIN — DEXTROSE AND SODIUM CHLORIDE: 5; 450 INJECTION, SOLUTION INTRAVENOUS at 23:38

## 2023-10-09 RX ADMIN — DEXMEDETOMIDINE HYDROCHLORIDE 0.2 MCG/KG/HR: 4 INJECTION, SOLUTION INTRAVENOUS at 13:44

## 2023-10-09 RX ADMIN — CEFTRIAXONE SODIUM 2 G: 2 INJECTION, POWDER, FOR SOLUTION INTRAMUSCULAR; INTRAVENOUS at 20:47

## 2023-10-09 RX ADMIN — Medication 1 PATCH: at 09:29

## 2023-10-09 RX ADMIN — LIDOCAINE HYDROCHLORIDE 3 ML: 10 INJECTION, SOLUTION EPIDURAL; INFILTRATION; INTRACAUDAL; PERINEURAL at 18:21

## 2023-10-09 RX ADMIN — INSULIN HUMAN 1 UNITS/HR: 1 INJECTION, SOLUTION INTRAVENOUS at 18:27

## 2023-10-09 RX ADMIN — WATER 1 ML: 1 INJECTION INTRAMUSCULAR; INTRAVENOUS; SUBCUTANEOUS at 05:26

## 2023-10-09 RX ADMIN — VANCOMYCIN HYDROCHLORIDE 1000 MG: 1 INJECTION, SOLUTION INTRAVENOUS at 12:56

## 2023-10-09 RX ADMIN — INSULIN ASPART 2 UNITS: 100 INJECTION, SOLUTION INTRAVENOUS; SUBCUTANEOUS at 17:17

## 2023-10-09 RX ADMIN — FOLIC ACID 1 MG: 5 INJECTION, SOLUTION INTRAMUSCULAR; INTRAVENOUS; SUBCUTANEOUS at 09:12

## 2023-10-09 ASSESSMENT — ACTIVITIES OF DAILY LIVING (ADL)
ADLS_ACUITY_SCORE: 40
ADLS_ACUITY_SCORE: 41
ADLS_ACUITY_SCORE: 40
ADLS_ACUITY_SCORE: 41
ADLS_ACUITY_SCORE: 40
ADLS_ACUITY_SCORE: 41
ADLS_ACUITY_SCORE: 41
ADLS_ACUITY_SCORE: 40
ADLS_ACUITY_SCORE: 40
ADLS_ACUITY_SCORE: 41

## 2023-10-09 NOTE — PROGRESS NOTES
Antimicrobial Stewardship Team Note    Antimicrobial Stewardship Program - A joint venture between [facility] and  Physicians to optimize antibiotic management.  NOT a formal consult - Restricted Antimicrobial Review     Patient: Pavithra Raines  MRN: [id number]  Allergies: Codeine and Vicodin [hydrocodone-acetaminophen]    Brief Summary: Shanika Raines is a 68 year old female with a PMHx of COPD (not on baseline O2), h/o gastric bypass, depression, PTSD, and EtOH use disorder w/ h/o withdrawal seizures and tobacco use disorder who was admitted on 10/7/2023 with generalized weakness and fatigue.     History of Present Illness: Patient endorsed decline in her health in the preceding weeks of admission. She reported a decreased appetite for ~1 week along with some shortness of breath and abdominal pain. Patient also reported subjectively feeling hot and cold and had a non-productive cough. She had not been able to get out of bed on her own, prompting further evaluation. She drinks daily with her usual EtOH consumption being 12 beers, 1 bottle of wine, and 10 shooters of fireball per notes.     On presentation to the ED, she was afebrile (Tmax 99.6), mildly tachycardic (HR low 100s) with soft BPs (90s/60s), tachypneic (RR 33), and initially on room air with 96% oxygen saturations. Baseline labs included a WBC count of 8.7  with neutrophil predominance, lactic acid 4.4, creatinine 1.22 (baseline ~0.7-0.8), BNP 1791, procalcitonin 10.57, and electrolyte derangements (Na+ 123, K+ 3.3, Mg2+ 1.6). Chest CT with no PE, moderate upper lobe predominant centrilobular and paraseptal emphysema, dense airspace consolidation in the RLL and RML w/ scattered additional areas of consolidation in the L lung consistent w/ multifocal pneumonia. She was started on ceftriaxone plus azithromycin. Vancomycin was added yesterday as 10/7 blood cultures with GPCs in 2/2 peripheral bottles (1/2 sets), identified as  Streptococcus pneumoniae per Verigene. Repeat blood cultures from 10/8 and 10/9 are pending. Respiratory panel and COVID-19 tests were negative. MRSA nares pcr resulted negative. C.difficile pcr and enteric panel were also negative.               Active Anti-infective Medications   (From admission, onward)                 Start     Stop    10/09/23 1300  vancomycin  1,000 mg,   Intravenous,   200 mL/hr,   EVERY 18 HOURS        Bacteremia, Sepsis       --    10/08/23 2200  azithromycin  500 mg,   Intravenous,   EVERY 24 HOURS        Community Acquired Pneumonia       --    10/08/23 2100  cefTRIAXone  2 g,   Intravenous,   EVERY 24 HOURS        Community Acquired Pneumonia       --                  Assessment: Streptococcus pneumoniae bacteremia secondary to community-acquired pneumonia  Today, patient appears more stable. She required a small amount of pressor support yesterday morning, but has remained off with stabilization in blood pressure. She also continues to be afebrile and tachycardia has improved. Patient's have steadily increased this morning. She is now requiring high-flow. In addition, she has a new leukocytosis (WBC 16.2), though think this may be partially steroid-induced. The source of GPCs in the blood, identified as Strep pneumoniae per Verigene, is most likely from a respiratory source with imaging findings and supplemental oxygen needs. TTE was without any obvious vegetations that would be suggestive of an endocardiac source. Per physical exam documentation, patient was alert and oriented to person and place with generalized weakness and no reports of meningoencephalitis symptoms. In addition, patient appears to not have any teeth, lowering concerns for oropharyngeal bacteremia source. Strep pneumoniae is a highly virulent pathogen. Reasonable to keep vancomycin IV on board pending further speciation and sensitivities along with improvement in supplemental oxygen requirements. If Strep pneumoniae  is isolated and ceftriaxone is sensitive, can stop vancomycin and complete 14-day ceftriaxone course from first negative blood culture. Given the virulence and limited oral antibiotic options that achieve reliable concentrations in the blood, prefer IV antibiotic therapy. Once outpatient, patient will need weekly lab monitoring with CBC w/ diff and BMP. May place central line if deemed safe for patient after repeat blood cultures are negative for at least 48 hours. Recommend stopping azithromycin after today's dose to complete an adequate course for atypical pathogen coverage.           Recommendations:  Reasonable to keep vancomycin IV on board pending further speciation and sensitivities and improvement in supplemental oxygen requirements  If Strep pneumoniae is isolated on blood culture and ceftriaxone is sensitive, can stop vancomycin and complete 14-day ceftriaxone 2 g IV q24h course from first negative blood culture; will need weekly lab monitoring with CBC w/ diff and BMP as outpatient  May place central line if deemed safe for patient after repeat blood cultures are negative for at least 48 hours  Recommend stopping azithromycin after today's dose to complete an adequate course for atypical pathogen coverage    Pharmacy took the following actions: Electronic note created, Sticky note reminder created.    Discussed with ID Staff JOSHUA Dickens MD, MS  Radah Gordon, [facility]    Vital Signs/Clinical Features:  Vitals         10/07 0700  10/08 0659 10/08 0700  10/09 0659 10/09 0700  10/09 1232   Most Recent      Temp ( F) 98 -  99.6    98 -  98.7      98.3     98.3 (36.8) 10/09 0830    Pulse 89 -  124    78 -  108    61 -  97     92 10/09 1130    Resp 8 -  37    7 -  46    31 -  39     31 10/09 1030    BP 71/47 -  106/64    77/53 -  115/73    96/64 -  100/67     96/64 10/09 1130    SpO2 (%) 89 -  100    85 -  100    90 -  96     96 10/09 1130            Labs  Estimated Creatinine Clearance: 58 mL/min (based  on SCr of 0.73 mg/dL).  Recent Labs   Lab Test 09/01/21  0808 10/22/21  0930 06/14/22  1121 10/07/23  2031 10/08/23  0531 10/09/23  0535   CR 0.81 0.72 0.86 1.22* 1.11* 0.73       Recent Labs   Lab Test 08/07/17  1219 10/16/17  2218 12/01/17  0943 12/18/17  2152 06/11/18  1359 02/12/20  1548 03/01/20  1939 04/27/20  2353 09/07/20  1043 10/06/20  1829 06/14/22  1121 06/01/23  0413 10/07/23  2031 10/07/23  2355 10/08/23  0531 10/09/23  0535   WBC 11.0 12.2*   < > 9.5   < > 6.2 10.8 8.8 10.2   < > 7.1 11.7* 8.7 8.5 8.1 16.2*   ANEU 7.6 7.9  --  5.5  --  3.3 7.6 5.3 7.8  --   --   --   --   --   --   --    ALYM 1.9 3.1  --  2.9  --  2.2 1.7 2.5 1.4  --   --   --   --   --   --   --    MARTHA 1.4* 1.1  --  1.0  --  0.6 1.4* 0.8 0.9  --   --   --   --   --   --   --    AEOS 0.1 0.2  --  0.1  --   --   --   --   --   --   --   --   --   --   --   --    HGB 10.4* 9.5*   < > 9.4*   < > 11.7 12.3 11.1* 14.3   < > 11.2* 11.7 12.6 10.1* 9.0* 10.3*   HCT 33.5* 30.3*   < > 30.4*   < > 35.0 36.6 32.8* 40.3   < > 33.2* 34.1* 35.0 28.1* 25.4* 29.1*   MCV 81 78   < > 79   < > 89 93 102* 105*   < > 104* 97 96 97 98 97   * 421   < > 392   < > 288 224 210 291   < > 336 214 168 139* 121* 129*    < > = values in this interval not displayed.       Recent Labs   Lab Test 09/01/21  0808 10/22/21  0930 06/14/22  1121 10/07/23  2031 10/08/23  0531 10/09/23  0535   BILITOTAL 1.0 0.3 0.3 1.3* 0.5 0.3   ALKPHOS 167* 151* 100 119* 59 68   ALBUMIN 2.9* 2.8* 2.7* 2.3* 1.9* 1.8*   AST 15 33 15 39 28 32   ALT 17 28 17 13 9 14       Recent Labs   Lab Test 12/18/17  2152 03/01/20  1939 04/27/20  2353 04/28/20  0120 09/07/20  1037 06/01/23  0413 10/07/23  2031 10/07/23  2346 10/08/23  0531 10/08/23  0724 10/08/23  1337 10/09/23  0535   PCAL  --   --   --   --   --   --   --   --  10.57*  --   --  5.84*   LACT 1.2  --  2.6* 2.3* 1.4  --  4.4* 1.4  --  1.3  --   --    CRP <2.9 <2.9  --   --   --   --   --   --   --   --   --   --    CRPI  --   --   --    --   --  <3.00  --   --   --   --  188.57* 105.14*   SED 45*  --   --   --   --   --   --   --   --   --   --   --              Culture Results:  7-Day Micro Results       Procedure Component Value Units Date/Time    Blood Culture Hand, Left [[id number]] Collected: 10/09/23 0535    Order Status: Resulted Lab Status: In process Updated: 10/09/23 0541    Specimen: Blood from Hand, Left     Blood Culture Hand, Right [[id number]] Collected: 10/09/23 0535    Order Status: Resulted Lab Status: In process Updated: 10/09/23 0541    Specimen: Blood from Hand, Right     Enteric Bacteria and Virus Panel PCR [[id number]]  (Normal) Collected: 10/08/23 1928    Order Status: Completed Lab Status: Final result Updated: 10/09/23 0445    Specimen: Stool from Per Rectum      Campylobacter species Negative     Salmonella species Negative     Vibrio species Negative     Vibrio cholerae Negative     Yersinia enterocolitica Negative     Enteropathogenic E. coli (EPEC) Negative     Shiga-like toxin-producing E. coli (STEC) Negative     Shigella/Enteroinvasive E. coli (EIEC) Negative     Cryptosporidium species Negative     Giardia lamblia Negative     Norovirus Gl/Gll Negative     Rotavirus A Negative     Plesiomonas shigelloides Negative     Enteroaggregative E. coli (EAEC) Negative     Enterotoxigenic E. coli (ETEC) Negative     E. coli O157 NA     Cyclospora cayetanensis Negative     Entamoeba histolytica Negative     Adenovirus F40/41 Negative     Astrovirus Negative     Sapovirus Negative    Narrative:      Assay performed using the FDA-cleared FilmArray GI Panel from [employer]  A negative result should not rule out infection in patients with a probability for gastrointestinal infection. The assay does not test for all potential infectious agents of diarrheal disease.  Positive results do not distinguish between a viable or replicating organism and the presence of a nonviable organism or nucleic acid, nor do  they exclude the possibility of coinfection by organisms not in the panel.  Results are intended to aid in the diagnosis of illness and are meant to be used in conjunction with other clinical findings.  This test has been verified and is performed by the Infect[facility] Diseases Diagnostic Laboratory at [facility]. This laboratory is certified under the Clinical Laboratory Improvement Amendments of 1988 (CLIA-88) as qualified to perform high complexity clinical laboratory testing.    C. difficile Toxin B PCR with reflex to C. difficile Antigen and Toxins A/B EIA [[id number]]  (Normal) Collected: 10/08/23 1928    Order Status: Completed Lab Status: Final result Updated: 10/09/23 0412    Specimen: Stool from Per Rectum      C Difficile Toxin B by PCR Negative     Comment: A negative result does not exclude actual disease due to C. difficile and may be due to improper collection, handling and storage of the specimen or the number of organisms in the specimen is below the detection limit of the assay.       Narrative:      The [employer] Xpert C. difficile Assay, performed on the [employer]  Instrument Systems, is a qualitative in vitro diagnostic test for rapid detection of toxin B gene sequences from unformed (liquid or soft) stool specimens collected from patients suspected of having Clostridioides difficile infection (CDI). The test utilizes automated real-time polymerase chain reaction (PCR) to detect toxin gene sequences associated with toxin producing C. difficile. The Xpert C. difficile Assay is intended as an aid in the diagnosis of CDI.    Blood Culture Hand, Left [[id number]]  (Normal) Collected: 10/08/23 1830    Order Status: Completed Lab Status: Preliminary result Updated: 10/09/23 0902    Specimen: Blood from Hand, Left      Culture No growth after 12 hours    Narrative:      Only an Aerobic Blood Culture Bottle was collected, interpret results with caution.      MRSA MSSA PCR, Nasal Swab  [[id number]] Collected: 10/08/23 1814    Order Status: Completed Lab Status: Final result Updated: 10/08/23 2211    Specimen: Swab from Nares, Bilateral      MRSA Target DNA Negative     SA Target DNA Negative    Narrative:      The [employer]  Xpert SA Nasal Complete assay performed in the [employer]  Dx System is a qualitative in vitro diagnostic test designed for rapid detection of Staphylococcus aureus (SA) and methicillin-resistant Staphylococcus aureus (MRSA) from nasal swabs in patients at risk for nasal colonization. The test utilizes automated real-time polymerase chain reaction (PCR) to detect MRSA/SA DNA. The Xpert SA Nasal Complete assay is intended to aid in the prevention and control of MRSA/SA infections in healthcare settings. The assay is not intended to diagnose, guide or monitor treatment for MRSA/SA infections, or provide results of susceptibility to methicillin. A negative result does not preclude MRSA/SA nasal colonization.     Blood Culture Arm, Left [[id number]]  (Normal) Collected: 10/08/23 1810    Order Status: Completed Lab Status: Preliminary result Updated: 10/09/23 0902    Specimen: Blood from Arm, Left      Culture No growth after 12 hours    Narrative:      Only an Aerobic Blood Culture Bottle was collected, interpret results with caution.      Respiratory Panel PCR [[id number]]  (Normal) Collected: 10/08/23 1320    Order Status: Completed Lab Status: Final result Updated: 10/08/23 2118    Specimen: Swab from Nasopharyngeal      Adenovirus Not Detected     Coronavirus Not Detected     Comment: This test detects Coronavirus 229E, HKU1, NL63 and OC43 but does not distinguish between them. It does not detect MERS ( Respiratory Syndrome), SARS (Severe Acute Respiratory Syndrome) or 2019-nCoV (Novel 2019) Coronavirus.        Human Metapneumovirus Not Detected     Human Rhin/Enterovirus Not Detected     Influenza A Not Detected     Influenza A, H1 Not Detected     Influenza  A 2009 H1N1 Not Detected     Influenza A, H3 Not Detected     Influenza B Not Detected     Parainfluenza Virus 1 Not Detected     Parainfluenza Virus 2 Not Detected     Parainfluenza Virus 3 Not Detected     Parainfluenza Virus 4 Not Detected     Respiratory Syncytial Virus A Not Detected     Respiratory Syncytial Virus B Not Detected     Chlamydia Pneumoniae Not Detected     Mycoplasma Pneumoniae Not Detected    Narrative:      The [employer]lex Respiratory Panel is a qualitative nucleic acid, multiplex, in vitro diagnostic test for the simultaneous detection and identification of multiple respiratory viral and bacterial nucleic acids in nasopharyngeal swabs collected in viral transport media from individual exhibiting signs and symptoms of respiratory infection. The assay has received FDA approval for the testing of nasopharyngeal (NP) swabs only. The Infect[facility] Diseases Diagnostic Laboratory at [facility] has validated the performance characteristics for bronchial alveolar lavage specimens. This test is used for clinical purposes and should not be regarded as investigational or for research. This laboratory is certified under the Clinical Laboratory Improvement Amendments of 1988 (CLIA-88) as qualified to perform high complexity clinical laboratory testing.     Blood Culture Arm, Left [[id number]]  (Normal) Collected: 10/08/23 0224    Order Status: Completed Lab Status: Preliminary result Updated: 10/09/23 0201    Specimen: Blood from Arm, Left      Culture No growth after 12 hours    Blood Culture Arm, Left [[id number]]  (Normal) Collected: 10/08/23 0113    Order Status: Completed Lab Status: Preliminary result Updated: 10/09/23 0402    Specimen: Blood from Arm, Left      Culture No growth after 1 day    Blood Culture Peripheral Blood [[id number]]  (Normal) Collected: 10/07/23 2058    Order Status: Completed Lab Status: Preliminary result Updated: 10/09/23 0402    Specimen: Peripheral Blood      Culture No  growth after 1 day    Blood Culture Peripheral Blood [[id number]]  (Abnormal) Collected: 10/07/23 2058    Order Status: Completed Lab Status: Preliminary result Updated: 10/08/23 1815    Specimen: Peripheral Blood      Culture Positive on the 1st day of incubation      Gram positive cocci     Comment: 2 of 2 bottles       Verigene GP Panel [[id number]]  (Abnormal) Collected: 10/07/23 2058    Order Status: Completed Lab Status: Final result Updated: 10/08/23 1919    Specimen: Peripheral Blood      Staphylococcus species Not Detected     Staphylococcus aureus Not Detected     Staphylococcus epidermidis Not Detected     Staphylococcus lugdunensis Not Detected     Enterococcus faecalis Not Detected     Enterococcus faecium Not Detected     Streptococcus agalactiae Not Detected     Streptococcus anginosus group Not Detected     Streptococcus pneumoniae Detected     Comment: Positive for Streptococcus pneumoniae by revoPT multiplex nucleic acid test. Note: Streptococcus mitis group is known to cross-react with Streptococcus pneumoniae target. Correlation with culture results and clinical presentation is necessary. Final identification and antimicrobial susceptibility testing will be verified by standard methods.        Streptococcus pyogenes Not Detected     Listeria species Not Detected    Narrative:      Specimen tested with Grafoidigene multiplex, gram-positive blood culture nucleic acid test for the following targets: Staphylococcus aureus, Staphylococcus epidermidis, Staphylococcus lugdunensis, other Staphylococcus species, Enterococcus faecalis, Enterococcus faecium, Streptococcus species, Streptococcus agalactiae, Streptococcus anginosus group, Streptococcus pneumoniae, Streptococcus pyogenes, Listeria species, mecA (methicillin resistance), and Kash/vanB (vancomycin resistance).            Recent Labs   Lab Test 12/15/17  0000 12/18/17  2250 04/28/20  0037 02/15/21  1020 10/08/23  0047   URINEPH 6.5 8.0* 5.0 5.0  5.0   NITRITE Neg Negative Negative Negative Negative   LEUKEST Neg Negative Negative Moderate* Trace*   WBCU  --  4* <1 130* 10*             Recent Labs   Lab Test 10/08/23  1320   IFLUA Not Detected   FLUAH1 Not Detected   FLUAH3 Not Detected   WR0234 Not Detected   IFLUB Not Detected   RSVA Not Detected   RSVB Not Detected   PIV1 Not Detected   PIV2 Not Detected   PIV3 Not Detected   HMPV Not Detected       Recent Labs   Lab Test 10/08/23  1928   CDBPCT Negative       Imaging: XR Chest Port 1 View    Result Date: 10/9/2023  XR CHEST PORT 1 VIEW 10/9/2023 9:52 AM HISTORY: strep pneumoniae pneumonia with septic shock, worsening respiratory needs, re-evaluate pneumonia severity COMPARISON: 2020 chest x-ray. Chest CT 10/7/2023 FINDINGS: There are increased interstitial lung markings. Patchy and consolidative airspace disease is better seen on prior chest CT, previously in the right lung. Continued plain radiographic surveillance until clearance is recommended. No large effusions or pneumothorax. Normal cardiac size. ИРИНА WILSON MD   SYSTEM ID:  [id number]    Echocardiogram Complete    Result Date: 10/9/2023  [id number]^SISTER^IAN  [facility] [address] [place] [address]  Name: LUIS ALBERTO OAKLEY MRN: [id number] : 1955 Study Date: 10/09/2023 07:47 AM Age: 68 yrs Gender: Female Patient Location: [facility] Reason For Study: Syncope Ordering Physician: IAN CHRISTIANSEN Referring Physician: MIMI GARZA Performed By: Marbella Dobbs RDCS  BSA: 1.5 m2 Height: 64 in Weight: 107 lb ______________________________________________________________________________ Procedure Complete Portable Echo Adult. ______________________________________________________________________________ Interpretation Summary  The left ventricle is normal in size. Left ventricular systolic function is normal. The visual ejection fraction is  55-60%. No regional wall motion abnormalities noted. The right ventricle is normal in structure, function and size. There is mild (1+) tricuspid regurgitation. Right ventricular systolic pressure is elevated, consistent with mild pulmonary hypertension. There is no comparison study available. ______________________________________________________________________________ Left Ventricle The left ventricle is normal in size. There is normal left ventricular wall thickness. Left ventricular systolic function is normal. Diastolic Doppler findings (E/E' ratio and/or other parameters) suggest left ventricular filling pressures are indeterminate. The visual ejection fraction is 55-60%. No regional wall motion abnormalities noted.  Right Ventricle The right ventricle is normal in structure, function and size.  Atria Normal left atrial size. The right atrium is mildly dilated. There is no atrial shunt seen.  Mitral Valve The mitral valve is normal in structure and function. There is trace mitral regurgitation.  Tricuspid Valve There is mild (1+) tricuspid regurgitation. The right ventricular systolic pressure is approximated at 34.4 mmHg plus the right atrial pressure. Right ventricular systolic pressure is elevated, consistent with mild pulmonary hypertension. IVC diameter <2.1 cm collapsing >50% with sniff suggests a normal RA pressure of 3 mmHg.  Aortic Valve The aortic valve is trileaflet with aortic valve sclerosis. No aortic regurgitation is present. No aortic stenosis is present.  Pulmonic Valve The pulmonic valve is not well seen, but is grossly normal. There is trace pulmonic valvular regurgitation.  Vessels Normal size aorta. The inferior vena cava was normal in size with preserved respiratory variability.  Pericardium There is no pericardial effusion.  Rhythm Sinus rhythm was noted. ______________________________________________________________________________ MMode/2D Measurements & Calculations IVSd: 0.80 cm  LVIDd: 4.1 cm LVIDs: 2.8 cm LVPWd: 0.90 cm FS: 31.4 % LV mass(C)d: 105.6 grams LV mass(C)dI: 70.4 grams/m2 Ao root diam: 3.0 cm LA dimension: 3.3 cm asc Aorta Diam: 3.4 cm LA/Ao: 1.1 Ao root diam index Ht(cm/m): 1.9 Ao root diam index BSA (cm/m2): 2.0 Asc Ao diam index BSA (cm/m2): 2.3 Asc Ao diam index Ht(cm/m): 2.1 RV Base: 3.5 cm RWT: 0.44 TAPSE: 3.0 cm  Doppler Measurements & Calculations MV E max vadim: 78.8 cm/sec MV A max vadim: 87.4 cm/sec MV E/A: 0.90 MV dec time: 0.19 sec TR max vadim: 293.2 cm/sec TR max P.4 mmHg E/E' av.4 Lateral E/e': 7.3 Medial E/e': 9.5 RV S Vadim: 15.3 cm/sec  ______________________________________________________________________________ Report approved by: Lupe Hope 10/09/2023 10:06 AM       CT Chest Pulmonary Embolism w Contrast    Result Date: 10/7/2023  EXAM: CT CHEST PULMONARY EMBOLISM W CONTRAST LOCATION: [facility] DATE: 10/7/2023 INDICATION: Shortness of breath, nonproductive cough, atypical chest pains, high D dimer COMPARISON: Chest CT 2020 TECHNIQUE: CT chest pulmonary angiogram during arterial phase injection of IV contrast. Multiplanar reformats and MIP reconstructions were performed. Dose reduction techniques were used. CONTRAST: 50 mL Isovue 370 FINDINGS: ANGIOGRAM CHEST: Pulmonary arteries are normal caliber and negative for pulmonary emboli. Thoracic aorta is negative for dissection. No CT evidence of right heart strain. LUNGS AND PLEURA: Moderate upper lobe predominant centrilobular and paraseptal emphysema. Dense airspace consolidation in the right lower and middle lobes with scattered additional areas of consolidation in the left lung, most consistent with multifocal pneumonia. MEDIASTINUM/AXILLAE: Mildly enlarged mediastinal lymph nodes, likely reactive to adjacent inflammation, no specific follow-up recommended. CORONARY ARTERY CALCIFICATION: Mild. UPPER ABDOMEN: Diffuse hepatic steatosis. Cholecystectomy. Prior  gastric bypass with jejunojejunostomy in expected position of the left midabdomen. MUSCULOSKELETAL: No acute bony abnormality. Multiple left rib fractures, new since 2020, most likely subacute or chronic.     IMPRESSION: 1.  Likely multifocal pneumonia, recommend radiographic follow-up to resolution. 2.  No pulmonary embolus.

## 2023-10-09 NOTE — ANESTHESIA PREPROCEDURE EVALUATION
Anesthesia Pre-Procedure Evaluation    Patient: Pavithra Raines   MRN: [id number] : 1955        Procedure :           Past Medical History:   Diagnosis Date    Abnormal Papanicolaou smear of cervix and cervical HPV     Colposcopy = HSIL    Genital herpes     History of colposcopy with cervical biopsy 2007    HSIL- LEEP recommended    Hypersomnia with sleep apnea, unspecified     CPAP started     Other and unspecified ovarian cyst  or so    s/p resection of ovary and tubes, d&c      Past Surgical History:   Procedure Laterality Date    CHOLECYSTECTOMY, OPEN  age 20s    COLONOSCOPY  2011    COMBINED COLONOSCOPY, REMOVE TUMOR/POLYP/LESION BY SNARE performed by JUAN FRANCISCO HERNANDEZ at  GI    COLONOSCOPY N/A 10/09/2017    polyps, repeat 3 years    COLPOSCOPY CERVIX, LOOP ELECTRODE BIOPSY, COMBINED  2007    CAN 2/3-patient requires yearly pap smears    dental pegs      ESOPHAGOSCOPY, GASTROSCOPY, DUODENOSCOPY (EGD), COMBINED N/A 2018    Procedure: COMBINED ESOPHAGOSCOPY, GASTROSCOPY, DUODENOSCOPY (EGD);  EGD;  Surgeon: Oneal Sanchez MD;  Location:  GI    GASTRIC BYPASS  2008    At [facility]    HC DILATION/CURETTAGE DIAG/THER NON OB      HC ENLARGE BREAST WITH IMPLANT      HC LAPAROSCOPIC MYOMECTOMY, 1 - 4 INTRAMURAL MYOMAS =<250 GM      HC REMOVAL OF BREAST IMPLANT      SALPINGO OOPHORECTOMY,R/L/MATTHEW      Bilateral salpingectomy and unilateral oophorectomy      Allergies   Allergen Reactions    Codeine Itching    Vicodin [Hydrocodone-Acetaminophen] Itching      Social History     Tobacco Use    Smoking status: Every Day     Packs/day: 2.00     Years: 10.00     Pack years: 20.00     Types: Cigarettes    Smokeless tobacco: Never    Tobacco comments:     2 ppd at this time (chain smoking) 10/2012   Substance Use Topics    Alcohol use: Yes     Comment: daily, drinks a box      Wt Readings from Last 1 Encounters:   10/09/23 48.5 kg (107 lb)               OUTSIDE LABS:  CBC:   Lab Results   Component Value Date    WBC 16.2 (H) 10/09/2023    WBC 8.1 10/08/2023    HGB 10.3 (L) 10/09/2023    HGB 9.0 (L) 10/08/2023    HCT 29.1 (L) 10/09/2023    HCT 25.4 (L) 10/08/2023     (L) 10/09/2023     (L) 10/08/2023     BMP:   Lab Results   Component Value Date     (L) 10/09/2023     (L) 10/09/2023    POTASSIUM 3.7 10/09/2023    POTASSIUM 3.2 (L) 10/09/2023    CHLORIDE 102 10/09/2023    CHLORIDE 96 (L) 10/08/2023    CO2 18 (L) 10/09/2023    CO2 19 (L) 10/08/2023    BUN 10.9 10/09/2023    BUN 23.4 (H) 10/08/2023    CR 0.73 10/09/2023    CR 1.11 (H) 10/08/2023     (H) 10/09/2023     (H) 10/09/2023     COAGS:   Lab Results   Component Value Date    PTT 27 09/01/2021    INR 1.00 09/01/2021     POC:   Lab Results   Component Value Date    BGM 91 10/16/2017    HCG Negative 04/17/2013     HEPATIC:   Lab Results   Component Value Date    ALBUMIN 1.8 (L) 10/09/2023    PROTTOTAL 4.6 (L) 10/09/2023    ALT 14 10/09/2023    AST 32 10/09/2023    ALKPHOS 68 10/09/2023    BILITOTAL 0.3 10/09/2023     OTHER:   Lab Results   Component Value Date    PH 7.33 (L) 10/09/2023    LACT 1.3 10/08/2023    SHON 7.7 (L) 10/09/2023    PHOS 1.8 (L) 10/09/2023    MAG 1.9 10/09/2023    LIPASE 9 (L) 10/07/2023    AMYLASE 60 09/01/2021    TSH 0.64 10/08/2023    CRP <2.9 03/01/2020    SED 45 (H) 12/18/2017       Anesthesia Plan    ASA Status:  3, emergent       Anesthesia Type:.Anesthesia Type: arterial line placement.         Techniques and Equipment:     - Lines/Monitors: Arterial Line     Consents    Anesthesia Plan(s) and associated risks, benefits, and realistic alternatives discussed. Questions answered and patient/representative(s) expressed understanding.     - Discussed:     - Discussed with:  Spouse            Postoperative Care            Comments:    Other Comments: Pt has an active pneumonia on top of her COPD.  Called by RN for Arterial line placement at  the physicians request.  No contraindications noted.  I called the pt's spouse with the RN to obtain consent.  This was given as the pt was sedated with ativan.            RICARDO Rodríguez CRNA

## 2023-10-09 NOTE — PROGRESS NOTES
[facility]  Procedure Note         PICC      Pavithra Raines  MRN# [id number]   October 9, 2023, 6:26 PM Indication: Hypotension  Medication administration           Pause for the cause: Consent for catheter placement procedure signed  Time out completed  Patient ID's verified using two distinct indicators  All necessary equipment is present  Site marked if extremity to be used has been predetermined   Type of line to be used: PICC   Full barrier precautions used: Yes   Skin preparation: Chloraprep   Date of insertion: October 9, 2023, 6:26 PM   Device type: Triple lumen, valved, 5.0   Catheter brand: Spare Backup   Lot number: [id number]   Insertion location: Left basilic vein   Method of placement: Venipuncture  MST  Ultrasound   Number of attempts: With ultrasound: 1   Without ultrasound: 0   Difficulty threading: No   Midline IV device: Dressing dry and intact  Transparent semmipermeable dressing applied  Chlorhexidine patch  Catheter securement device   Arm circumference: Adults 10 cm   Midline extremity circumference: 23 cm   Internal length: 42 cm   Midline visible catheter length: 0 cm   Total catheter length: 42 cm   Tip termination: DISTAL SVC per MD Booth   Method of verification: Chest x-ray   Midline patency post placement: Positive blood return  Flushes without difficulty  Saline locked   Line flush: Line flush documented on the eMAR Y   Placement verified by: Physician   Catheter placed by: Cindy Cheng RN   Discontinuation form initiated: No   Patient tolerance: Tolerated well  Intradermal injection   PICC Insertion Education Complete: No      Summary:  This procedure was performed without difficulty and she tolerated the procedure well with no immediate complications.       5F triple lumen PICC line placed in the left basilic. 1 poke. Pt tolerated well. Brisk blood return and flushes well. CXR x1. Tip at the distal SVC, per MD Booth. Staff nurse notified ok to use as  PICC.

## 2023-10-09 NOTE — ANESTHESIA PROCEDURE NOTES
Arterial Line Procedure Note    Pre-Procedure   Staff -        CRNA: Jeison Flores APRN CRNA       Performed By: CRNA       Location: ICU       Pre-Anesthestic Checklist: patient identified, risks and benefits discussed, informed consent, monitors and equipment checked and pre-op evaluation  Timeout:       Correct Patient: Yes        Correct Procedure: Yes        Correct Site: Yes        Correct Position: Yes   Line Placement:   This line was placed Pre Induction starting at 10/9/2023 2:05 PM and ending at 10/9/2023 2:32 PM  Procedure   Procedure: arterial line, new line and emergent       Laterality: right       Insertion Site: radial.  Sterile Prep        All elements of maximal sterile barrier technique followed       Patient Prep/Sterile Barriers: hand hygiene, sterile gloves, hat, mask, draped, sterile gown, patient draped, draped       Skin prep: Chloraprep  Insertion/Injection        Technique: ultrasound guided, Seldinger Technique and Gael's test completed        1. Ultrasound was used to evaluate the access site.       2. Artery evaluated via ultrasound for patency/adequacy.       3. Using real-time ultrasound the needle/catheter was observed entering the artery/vein.       5. The visualized structures were anatomically normal.       6. There were no apparent abnormal pathologic findings.       Catheter size: 20 Ga, 15cm.  Narrative         Secured by: suture       Tegaderm and Biopatch dressing used.       Complications: None apparent,        Arterial waveform: Yes        IBP within 10% of NIBP: Yes   Comments:  Pt tolerated the procedure well.  Care of the line was turned over to the RN's on the floor after it was placed and waveform was verified.

## 2023-10-09 NOTE — ANESTHESIA CARE TRANSFER NOTE
Patient: Pavithra Raines    Procedure: * No procedures listed *       Diagnosis: * No pre-op diagnosis entered *  Diagnosis Additional Information: No value filed.    Anesthesia Type:   No value filed.     Note:    Oropharynx: oropharynx clear of all foreign objects  Level of Consciousness: drowsy  Oxygen Supplementation: nasal cannula    Independent Airway: airway patency satisfactory and stable  Dentition: dentition unchanged  Vital Signs Stable: post-procedure vital signs reviewed and stable  Report to RN Given: handoff report given  Patient transferred to: ICU    ICU Handoff: Call for PAUSE to initiate/utilize ICU HANDOFF, Identified Patient, Identified Responsible Provider, Reviewed the Pertinent Medical History, Discussed Surgical Course, Reviewed Intra-OP Anesthesia Management and Issues during Anesthesia, Set Expectations for Post Procedure Period and Allowed Opportunity for Questions and Acknowledgement of Understanding      Vitals:  Vitals Value Taken Time   BP     Temp     Pulse 73 10/09/23 1658   Resp 37 10/09/23 1658   SpO2 97 % 10/09/23 1658   Vitals shown include unvalidated device data.    Electronically Signed By: RICARDO Rodríguez CRNA  October 9, 2023  4:59 PM

## 2023-10-09 NOTE — PHARMACY-VANCOMYCIN DOSING SERVICE
Pharmacy Vancomycin Note  Date of Service 2023  Patient's  1955   68 year old, female    Indication: Bacteremia and Sepsis  Day of Therapy: 2  Current vancomycin regimen:  1000 mg IV q24h  Current vancomycin monitoring method: AUC  Current vancomycin therapeutic monitoring goal: 400-600 mg*h/L    Insight[employer] Prediction of Current Vancomycin Regimen  Regimen: 1000 mg IV every 24 hours.  Start time: 10:26 on 10/09/2023  Exposure target: AUC24 (range)400-600 mg/L.hr   AUC24,ss: 391 mg/L.hr  Probability of AUC24 > 400: 47 %  Ctrough,ss: 10.3 mg/L  Probability of Ctrough,ss > 20: 7 %  Probability of nephrotoxicity (Lodise CALVIN ): 6 %      Current estimated CrCl = Estimated Creatinine Clearance: 58 mL/min (based on SCr of 0.73 mg/dL).    Creatinine for last 3 days  10/7/2023:  8:31 PM Creatinine 1.22 mg/dL  10/8/2023:  5:31 AM Creatinine 1.11 mg/dL  10/9/2023:  5:35 AM Creatinine 0.73 mg/dL    Recent Vancomycin Levels (past 3 days)  No results found for requested labs within last 3 days.    Vancomycin IV Administrations (past 72 hours)                     vancomycin (VANCOCIN) 1,000 mg in 200 mL dextrose intermittent infusion (mg) 1,000 mg New Bag 10/08/23 1905                    Nephrotoxins and other renal medications (From now, onward)      Start     Dose/Rate Route Frequency Ordered Stop    10/09/23 1300  vancomycin (VANCOCIN) 1,000 mg in 200 mL dextrose intermittent infusion         1,000 mg  200 mL/hr over 1 Hours Intravenous EVERY 18 HOURS 10/09/23 1029      10/08/23 0600  norepinephrine (LEVOPHED) 4 mg in  mL PERIPHERAL infusion         0.03-0.125 mcg/kg/min × 48.6 kg  5.5-22.8 mL/hr  Intravenous CONTINUOUS 10/08/23 0542                 Contrast Orders - past 72 hours (72h ago, onward)      Start     Dose/Rate Route Frequency Stop    10/07/23 212  iopamidol (ISOVUE-370) solution 500 mL         500 mL Intravenous ONCE 10/07/23 2123            Interpretation of levels and current  regimen:  Has serum creatinine changed greater than 50% in last 72 hours: No    Urine output:  good urine output    Renal Function: Improving    InsightRX Prediction of Planned New Vancomycin Regimen  Regimen: 1000 mg IV every 18 hours.  Start time: 10:26 on 10/09/2023  Exposure target: AUC24 (range)400-600 mg/L.hr   AUC24,ss: 511 mg/L.hr  Probability of AUC24 > 400: 76 %  Ctrough,ss: 14.8 mg/L  Probability of Ctrough,ss > 20: 25 %  Probability of nephrotoxicity (Lodise CALVIN 2009): 10 %      Plan:  Increase Dose to 1000 mg IV Q18H  Vancomycin monitoring method: AUC  Vancomycin therapeutic monitoring goal: 400-600 mg*h/L  Pharmacy will check vancomycin levels as appropriate in 1-3 Days.  Serum creatinine levels will be ordered daily for the first week of therapy and at least twice weekly for subsequent weeks.    Jacky Levi RPH

## 2023-10-09 NOTE — PROGRESS NOTES
[facility]    Medicine Progress Note - Hospitalist Service    Date of Admission:  10/7/2023    Assessment & Plan   Patient is a 68-year-old female with past medical history of COPD, alcohol dependence recently consuming large volume alcohol daily, gastric bypass, depression/PTSD, and ongoing tobacco abuse who presented with generalized weakness and fatigue and was found to have multifocal community-acquired pneumonia with signs of sepsis and was hospitalized and started on Rocephin and azithromycin.  She had rapid worsening over the first 12 hours of her hospitalization with persistent hypotension despite fluid resuscitation and was transition to the ICU and started on Levophed.  Blood culture from time of admission positive for strep pneumoniae.  Patient was weaned off Levophed on 10/8 with blood pressure stability overnight however patient's respiratory status is rapidly worsening this morning with patient having increased work of breathing, increased oxygen needs with concern for worsening pneumonia.  She is also started to go into alcohol withdrawal with increased agitation/restlessness, delirium with hallucination, tremor, anxiety and has started to require CIWA based Ativan.  RR has been in the 30-40s most of the night with oxygen needs now rapidly worsening.  RT will transition patient to HFNC in an effort to improve work of breathing and stabilize respiratory effort.  Will start gabapentin scheduled to offset withdrawal, however patient is high risk for further decompensation, need for Precedex drip for withdrawal, intubation or even death and requires ongoing critical care management.      Principal Problem:    Septic shock (H)    Strep pneumonia bacteremia     Multifocal pneumonia    Acute respiratory failure with hypoxia    Assessment: Community-acquired pneumonia with mild sepsis rapidly progressing to septic shock as outlined above.  Initial leukocytosis, acute  respiratory failure, tachycardia, tachypnea with persistent hypotension despite fluid resuscitation requiring Levophed initially, weaned off on 10/8/23 with blood pressures stable.  Procalcitonin 10 but now decreasing to 5 but with respiratory failure worsening rapidly in the past 12 hours as above.  Repeat blood cultures negative to date.     Plan:   -Continue Rocephin, azithromycin, and vancomycin but await sensitivities with anticipated antibiotic narrowing as appropriate  -Start HFNC for worsening respiratory status, tachypnea  and perform blood gas after an hour at HFNC settling that allow for improved oxygenation with consideration support escalation to Bipap or intubation as appropriate.   -perform stat Chest x-ray now for re-evaluation - anticipate infection worsening but assess for alterative diagnosis.    -Continue IV steroids Solu-Medrol 30 mg BID  -Strict intake and output  -Continue ICU management with consideration of Levophed reinitiation if maps are consistently less than 65 -patient will need PICC line placed if Levophed is reinitiated  -Recheck CBC, CMP, procalcitonin tomorrow morning    Active Problems:    Alcohol use disorder, severe, dependence now with signs of acute withdrawal and DTs    History of seizure due to alcohol withdrawal    Assessment: Per  patient has been drinking an average of 12 pack of beer, 1 bottle of wine, and 10 hard liquor drinks a day recently was lasting approximately 30 minutes prior to hospital presentation.  No blood alcohol level was performed at time of admission.  Patient is now starting to show signs of withdrawal in the past few hours with CIWA requiring Benzo administration, patient having increased anxiety and tremor, increased confusion and concern for hallucination onset in context of also worsening respiratory failure.      Plan:   -start gabapentin regimen with taper  -hold clonidine given low normal blood pressures at baseline and at this  time  -Continue CIWA scoring with benzodiazepine administration if patient is scoring high as per protocol  -Consideration of Precedex drip if patient's symptoms rapidly escalate       Hyponatremia    Assessment: Sodium of 123 on initial presentation with work-up indicating unclear etiology but clinically is most consistent with hypovolemic hyponatremia.  Patient received multiple normal saline boluses with rapid escalation from 123 up to 128 in less than 12 hours and patient was transitioned to D5 half-normal saline sodium decreasing to 126 and now stabilizing in the 130 range.  Urine sodium is less than 20, urine osmolality is over 100    Plan:   -Continue with D5 half-normal at 75 cc an hour with serial sodium monitoring every 4 hours  -Recheck urine sodium today      Dehydration    Assessment: Present at time of admission and likely contributed to hyponatremia and initial low normal blood pressures, no resolved    Plan:   -Continue with fluid resuscitation as outlined above      Weakness generalized    Assessment: Thought likely multifactorial secondary to septic shock, ongoing alcohol dependence, suspected malnutrition    Plan:   -Proceed with plan of acute illness as above  -Have nutrition work with patient regarding oral intake  -Assess patient's desire for chemical dependency treatment as she gets closer to discharge  -Anticipate PT and OT evaluation to assist in disposition planning as patient improves      Hypomagnesemia    Hypophosphatemia     Hypokalemia    Assessment: thought secondary to nutritional deficiency in recent weeks    Plan:   -Continue with high protocol replacements patient with critical illness      Status post gastric bypass for obesity    Assessment: Noted, may be contributing to malnutrition and nutritional deficiencies    Plan:   -Proceed with plan as above      Malnutrition - suspected    Hypoalbuminemia     Assessment: Patient reports a 13 pound weight loss in recent weeks and  appears malnourished on physical exam    Plan:   -Proceed with treatment of acute illness as above, avoid alcohol use, and will have nutritionist meet with patient for further evaluation and nutritional recommendations.      Emphysema/COPD (H)    Assessment: Present at baseline without obvious exacerbation    Plan:   -Continue IV steroids secondary to septic shock as above  -Continue bronchodilators      Episode of recurrent major depressive disorder, unspecified depression episode severity (H24)    PTSD (post-traumatic stress disorder)    Assessment: Patient is normally on Lexapro 30 mg daily, clonazepam 0.5 mg 3 times daily as needed for anxiety, BuSpar 15 mg 3 times a day, Effexor 37.5 mg daily, and Xyzal 5 mg daily.  Is unclear if patient has recently been taking this regimen or not    Plan:   -continue home regimen for now      Tobacco abuse    Assessment: Patient is on a 21 mg per 24-hour nicotine patch    Plan:   -Continue current nicotine patch and monitor for symptoms of withdrawal       Diet: Advance Diet as Tolerated: Full Liquid Diet; Mechanical/Dental Soft Diet  Snacks/Supplements Adult: Ensure Enlive; With Meals    DVT Prophylaxis: Pneumatic Compression Devices  Barney Catheter: Not present  Lines: None     Cardiac Monitoring: ACTIVE order. Indication: ICU  Code Status: Full Code      Clinically Significant Risk Factors        # Hypokalemia: Lowest K = 2.7 mmol/L in last 2 days, will replace as needed  # Hyponatremia: Lowest Na = 123 mmol/L in last 2 days, will monitor as appropriate    # Hypomagnesemia: Lowest Mg = 1.5 mg/dL in last 2 days, will replace as needed   # Hypoalbuminemia: Lowest albumin = 1.8 g/dL at 10/9/2023  5:35 AM, will monitor as appropriate   # Thrombocytopenia: Lowest platelets = 121 in last 2 days, will monitor for bleeding                     Disposition Plan   Unknown        Noreen Connolly MD  Hospitalist Service  [facility]  Securely  message with Du (more info)  Text page via [facility] Paging/Directory   ______________________________________________________________________    Interval History   Patient remained off Levophed with blood pressure stable and she was more alert yesterday evening however overnight she has become more somnolent with increased work of breathing and respiratory rate now in the 30-40 range with patient titrating from 1 to 4 L and now desaturating even on 4 L.  She has started to go into active withdrawal with increased tremor and onset of hallucinations as above    Physical Exam   Vital Signs: Temp: 98.3  F (36.8  C) Temp src: Oral BP: 96/64 Pulse: 97   Resp: (!) 36 SpO2: 96 % O2 Device: High Flow Nasal Cannula (HFNC) Oxygen Delivery: 25 LPM  Weight: 107 lbs 0 oz    Constitutional: Patient is awake, is very tachypneic with shallow breathing, is only oriented to self and appears to be staring at something across the room but unclear what  Respiratory: Respiratory rate in the upper 30s, patient has diffuse crackles which are increased from previous, no wheezing  Cardiovascular: Regular rate and rhythm in the upper 90s  GI: Bowel sounds present, abdomen is soft and nondistended  Musculoskeletal: Patient has fine tremor present in bilateral hands even at rest  Neurologic: Awake, alert, oriented only to self and patient is not answering most of the questions given if she is distracted by something across the room without evident disturbance noted by this provider.    Medical Decision Making       47 MINUTES SPENT BY ME performing critical care management on this patient so far today doing chart review, history, exam, documentation & further activities per the note so far today.  Final amount of time spent in critical care management will be noted on the last note by this provider on today's date.      Data     I have personally reviewed the following data over the past 24 hrs:    16.2 (H)  \   10.3 (L)   / 129 (L)     130 (L)  102 10.9 /  142 (H)   3.7 18 (L) 0.73 \     ALT: 14 AST: 32 AP: 68 TBILI: 0.3   ALB: 1.8 (L) TOT PROTEIN: 4.6 (L) LIPASE: N/A     TSH: N/A T4: N/A A1C: 4.4     Procal: 5.84 (HH) CRP: 105.14 (H) Lactic Acid: N/A

## 2023-10-09 NOTE — PLAN OF CARE
Goal Outcome Evaluation:      Plan of Care Reviewed With: patient    Overall Patient Progress: improving    Outcome Evaluation: Max CIWA of 9, 1 mg ativan given per protocol.  Denies pain. Tele SR/ST this shift. Levophed remains off, rare drops in BP but recovers quickly. Rocephin, zithro and vanco continue. Purewick in place. Sodium 130, 127. Trazadone given late per patiets preference.

## 2023-10-09 NOTE — PROGRESS NOTES
Patient continue to require HFNC 40L and 50% FiO2 with sats in the mid 90s.  RR continues to be in the 40 with intermittent abdominal breathing but no retractions.  Patient received CIWA dosed Ativan twice three hours ago and is essentially obtunded and not arousable but except to sternal rub.    ABG 7.37/37/63/21    Discussed with Tele-ICU provider as there was concern intubation was needed given patient's mentation, severity of alcohol withdrawal and severity of pneumonia and following plan has been made:  -continue with HFNC for now - no intubation is recommended.  Will increase FiO2 to 70% to see if this will allow improved pO2 and recheck ABG again in 2 hours.  Given severity of patient's pneumonia the mismatch between oximetry and pO2 is not unexpected   -start precedex drip   -Stop CIWA based benzos given her severe sensitivity to benzos and start low dose Valium scheduled every 8 hours instead  -Provide Haldol as needed for hallucinations prior   -Continue with current antibiotics and steroids.      26 minutes spent in the above.     Electronically Signed:  Noreen Connolly MD

## 2023-10-09 NOTE — CONSULTS
CLINICAL NUTRITION SERVICES - ASSESSMENT NOTE     Nutrition Prescription    RECOMMENDATIONS FOR MDs/PROVIDERS TO ORDER:  None at this time    Malnutrition Status:    Unable to determine due to lack of NFPE, nutrition hx PTA - will meet with pt as able as overall status improves    Recommendations already ordered by Registered Dietitian (RD):  Will offer Ensure enlive (any flavor, variety) TID with each meal to support oral intake and weight maintenance/gain      Future/Additional Recommendations:  Will follow to monitor oral intake, weight changes, GI symptoms/BMs, and nutritional supplement tolerance     REASON FOR ASSESSMENT  Pavithra Raines is a/an 68 year old female assessed by the dietitian via RN consult - weight loss 13 pounds, decreased appetite and intake and identified at risk via screening criteria    MST score of 2: recent weight loss of 2-13 lbs, poor appetite noted    NUTRITION HISTORY  Pt admitted with decreased appetite over the pats week, generalized weakness, SOB, abdominal and flank pain. Per , pt drinks daily - usual consumption is 12 beers, 1 bottle of wine, and 10 shooters of fireball. Has not been able to keep anything down for the past few days, subjectively hot/cold, nonproductive cough, SOB, some incontinence of urine and stool. Stools slightly loose and dark colored but pt thinks it's from taking iron. Has some chest wall pain from broken ribs (?), denies any recent falls. Is not able to get out of bed without assistance.     Found to have alcohol withdrawal, septic shock, multifocal pneumonia    Dxhx of alcohol use disorder severe dependence, hx of seizure due to alcohol withdrawal, s/p gastric bypass surgery for obesity, PTSD, episode of recurrent major depressive disorder, esophageal reflux, RLS, hypersomnia with sleep apnea, anxiety, genital herpes, hyperlipidemia, MDD, CAN 3, anemia due to vitamin B12 def, insomnia, age-related osteoporosis, fatty liver, underweight,  "macrocytosis without anemia, seizure disorder    Per chart review, pt has a hx of OP diet education w/ findings of severe malnutrition. Was provided diet education for 3 balanced meals with increased protein.    Per IDT rounding this AM 10/9, pt does not have dentures in *order is in for mechanical, dental soft diet.    CURRENT NUTRITION ORDERS  Diet: Orders Placed This Encounter      Advance Diet as Tolerated: Full Liquid Diet; Mechanical/Dental Soft Diet    Intake/Tolerance: 10-25% so far during admission w/ some fluid intake noted. Appetite rated as \"poor.\" GI is WDL, no concerns but pt did have nausea intermittent earlier in admission yesterday. Last BM was yesterday - loose, brown.     LABS  Labs reviewed - sodium low at 130, low potassium, low CO2, low calcium, elevated CRP, elevated BGs, low HDL updated, low lipase, elevated BNP, low osmolality, critically elevated procalcitonin, low hemoglobin and hematocrit    MEDICATIONS  Medications reviewed - zithromax IV, buspar, oscal, rocephin IV, clonidine (held), lexapro, fergon, folic acid, insulin, levocetirizine, solu-medrol, multivitamin with minerals, nicotine patch, protonix, raloxifene, IV thiamine, trazodone, vancomycin IV, effexor, vitamin D3, dextrose IVF at 75 mL/hr = 1,800 mL per day, levophed (stopped), PRN romazicon given yesterday 10/8, PRN neb given yesterday 10/8, PRN ativan given today 10/9, PRN zofran given yesterday 10/8, PRN IV zofran given 10/7    ANTHROPOMETRICS  Height: 162.6 cm (5' 4\")  Most Recent Weight: 48.5 kg (107 lb) via bed scale  IBW: 120 lbs  BMI: Underweight BMI <18.5 *18.37  Weight History:   Wt Readings from Last 15 Encounters:   10/09/23 48.5 kg (107 lb)   06/01/23 49.9 kg (110 lb)   12/10/22 51.9 kg (114 lb 8 oz)   10/03/22 50.6 kg (111 lb 8 oz)   06/14/22 51.3 kg (113 lb)   06/05/22 50.8 kg (112 lb)   10/22/21 55.3 kg (122 lb)   10/13/21 53.7 kg (118 lb 6 oz)   09/01/21 52.6 kg (116 lb)   07/10/21 54.4 kg (119 lb 14.4 oz) "   05/19/21 55.3 kg (122 lb)   04/16/21 55.3 kg (122 lb)   02/15/21 51.3 kg (113 lb 3.2 oz)   12/11/20 49.4 kg (108 lb 12.8 oz)   10/06/20 47.8 kg (105 lb 6.4 oz)   2.7% weight loss in approx. 4 months  6.1% weight loss in approx. 10 months  12.3% weight loss in approx. 1 year and 11.5 months    *weight loss does not meet criteria for malnutrition    Dosing Weight: 48.5 kg using actual BW    ASSESSED NUTRITION NEEDS  Estimated Energy Needs: 1,455-1,698 kcals/day (30 - 35 kcals/kg )  Justification: Increased needs, Repletion, and Underweight  Estimated Protein Needs: 58-73 grams protein/day (1.2 - 1.5 grams of pro/kg)  Justification: Hypercatabolism with acute illness, Increased needs, and Repletion  Estimated Fluid Needs: 1,455 mL/day (30 mL/kg)   Justification: Maintenance    PHYSICAL FINDINGS  See malnutrition section below.  Per chart review, pt appears malnourished. Dehydrated with eyes sunken and dry mucous membranes. Some tenting of skin. Generalized muscle atrophy.     MALNUTRITION  % Intake: Unable to assess PTA, likely meets criteria but will verify with pt  % Weight Loss: Weight loss does not meet criteria  Subcutaneous Fat Loss: Unable to assess *likely per chart review  Muscle Loss: Unable to assess *likely per chart review  Fluid Accumulation/Edema: None noted  Malnutrition Diagnosis: Unable to determine due to lack of NFPE, nutrition hx PTA    NUTRITION DIAGNOSIS  Inadequate oral intake related to alcohol use, poor appetite, acute on chronic illness as evidenced by intake of 10-25% during admission so far, underweight BMI of 18.37, weight loss of 6.1% in approx. 10 months, increased needs above baseline, and alcohol use likely displacing other nutrients in diet    INTERVENTIONS  Implementation  Nutrition Education: Not appropriate at this time due to patient condition *will continue to follow and provide diet ed as indicated *RD added high protein, high calorie nutrition therapy handouts to pt's  discharge instructions. Will review with pt as able during admission.     Collaboration with other providers  Medical food supplement therapy - will offer Ensure enlive (any flavor, variety) TID with each meal - plan to adjust based on pt preference, tolerance  Multivitamin/mineral supplement therapy - please continue as ordered    Goals  Patient to consume % of nutritionally adequate meal trays TID, or the equivalent with supplements/snacks  Pt weight will remain stable during admission  Pt will tolerate nutritional supplement      Monitoring/Evaluation  Progress toward goals will be monitored and evaluated per protocol.  Lori Angulo RD, LD  Clinical Dietitian  Office: [phone]  [facility] pager: [phone]

## 2023-10-09 NOTE — PROGRESS NOTES
Patient has tolerated transition to BiPAP10/4 with FiO2 remaining at 70% without difficulty with improvement in work of breathing - although respiratory rate continues to be in the 30s to low 40s patient does not have as much accessory muscle use and appears more comfortable overall.  Patient's respiratory status does appear to have overall stabilized throughout the day with patient not having sight of further rapid decompensation in the past 8 hours.  Patient has not received scheduled Valium at this time as she still has not woken up from the CIWA based dosing that was given earlier today and per nursing she is mainly sedated with mild brief episodes of slight restlessness but settles down almost immediately.      PICC line and art line in place to assist on ongoing management of critical ill patient at high risk of further decompensation     ABG:  pH 7.32/43/92/22 - of note overall stable from 1600 draw except patient's oxygenation much improved with Bipap settings     Blood sugars consistently elevated in the mid 200s.       Reviewed other notes from today and antibiotic stewardship reviewed case and recommends ongoing Rocephin and Vanco, discontinuation of azithromycin.  They also recommended holding off on Central line placement, however is already in place - repeat cultures from 10/8/23 at 0100 are negative to date (just over 40 hours prior to time of PICC line placement).      PLAN:  -Continue Bipap at current settings overnight.  Follow respiratory status closely and consider repeat ABG overnight if there is increased signs of respiratory distress on current Bipap settings or increased oxygen needs, otherwise recheck in AM.    -will take off Precedex drip for now until patient is more alert and restart at lowest dose when symptoms of withdrawal are noted  -stop scheduled Valium but start Valium 2.5 mg IV every 4 hours as needed for withdrawal or restlessness  -continue use of Haldol 2.5 mg as needed for  delirium  -start insulin drip for improved control of blood sugars  -continue to monitor for negative blood cultures    Total today 109 minutes has been spent in critical management of this patient.      Electronically Signed:  Noreen Connolly MD

## 2023-10-10 ENCOUNTER — APPOINTMENT (OUTPATIENT)
Dept: CT IMAGING | Facility: CLINIC | Age: 68
DRG: 870 | End: 2023-10-10
Attending: INTERNAL MEDICINE
Payer: MEDICARE

## 2023-10-10 ENCOUNTER — APPOINTMENT (OUTPATIENT)
Dept: GENERAL RADIOLOGY | Facility: CLINIC | Age: 68
DRG: 870 | End: 2023-10-10
Attending: PEDIATRICS
Payer: MEDICARE

## 2023-10-10 ENCOUNTER — APPOINTMENT (OUTPATIENT)
Dept: GENERAL RADIOLOGY | Facility: CLINIC | Age: 68
DRG: 870 | End: 2023-10-10
Attending: SURGERY
Payer: MEDICARE

## 2023-10-10 ENCOUNTER — APPOINTMENT (OUTPATIENT)
Dept: GENERAL RADIOLOGY | Facility: CLINIC | Age: 68
DRG: 870 | End: 2023-10-10
Attending: INTERNAL MEDICINE
Payer: MEDICARE

## 2023-10-10 PROBLEM — D69.6 THROMBOCYTOPENIA (H): Status: ACTIVE | Noted: 2023-10-10

## 2023-10-10 PROBLEM — B95.3 BACTEREMIA DUE TO STREPTOCOCCUS PNEUMONIAE: Status: ACTIVE | Noted: 2023-10-08

## 2023-10-10 PROBLEM — E83.39 HYPOPHOSPHATEMIA: Status: ACTIVE | Noted: 2023-10-10

## 2023-10-10 PROBLEM — F10.931 ALCOHOL WITHDRAWAL, WITH DELIRIUM (H): Status: ACTIVE | Noted: 2023-10-10

## 2023-10-10 PROBLEM — E87.6 HYPOKALEMIA: Status: ACTIVE | Noted: 2023-10-10

## 2023-10-10 PROBLEM — D64.9 ANEMIA, UNSPECIFIED TYPE: Status: ACTIVE | Noted: 2023-10-10

## 2023-10-10 PROBLEM — J96.01 ACUTE RESPIRATORY FAILURE WITH HYPOXIA (H): Status: ACTIVE | Noted: 2023-10-10

## 2023-10-10 LAB
ALBUMIN SERPL BCG-MCNC: 1.8 G/DL (ref 3.5–5.2)
ALLEN'S TEST: ABNORMAL
ALLEN'S TEST: ABNORMAL
ALP SERPL-CCNC: 69 U/L (ref 35–104)
ALT SERPL W P-5'-P-CCNC: 18 U/L (ref 0–50)
ANION GAP SERPL CALCULATED.3IONS-SCNC: 7 MMOL/L (ref 7–15)
ANION GAP SERPL CALCULATED.3IONS-SCNC: 7 MMOL/L (ref 7–15)
APTT PPP: 30 SECONDS (ref 22–38)
AST SERPL W P-5'-P-CCNC: 40 U/L (ref 0–45)
BASE EXCESS BLDA CALC-SCNC: -2.9 MMOL/L (ref -9–1.8)
BASE EXCESS BLDA CALC-SCNC: -5.1 MMOL/L (ref -9–1.8)
BILIRUB SERPL-MCNC: <0.2 MG/DL
BUN SERPL-MCNC: 6.7 MG/DL (ref 8–23)
CALCIUM SERPL-MCNC: 7.4 MG/DL (ref 8.8–10.2)
CHLORIDE SERPL-SCNC: 104 MMOL/L (ref 98–107)
CHLORIDE SERPL-SCNC: 105 MMOL/L (ref 98–107)
CREAT SERPL-MCNC: 0.63 MG/DL (ref 0.51–0.95)
CRP SERPL-MCNC: 42.92 MG/L
DEPRECATED HCO3 PLAS-SCNC: 18 MMOL/L (ref 22–29)
DEPRECATED HCO3 PLAS-SCNC: 20 MMOL/L (ref 22–29)
EGFRCR SERPLBLD CKD-EPI 2021: >90 ML/MIN/1.73M2
ERYTHROCYTE [DISTWIDTH] IN BLOOD BY AUTOMATED COUNT: 13.4 % (ref 10–15)
GLUCOSE BLDC GLUCOMTR-MCNC: 108 MG/DL (ref 70–99)
GLUCOSE BLDC GLUCOMTR-MCNC: 110 MG/DL (ref 70–99)
GLUCOSE BLDC GLUCOMTR-MCNC: 116 MG/DL (ref 70–99)
GLUCOSE BLDC GLUCOMTR-MCNC: 116 MG/DL (ref 70–99)
GLUCOSE BLDC GLUCOMTR-MCNC: 118 MG/DL (ref 70–99)
GLUCOSE BLDC GLUCOMTR-MCNC: 118 MG/DL (ref 70–99)
GLUCOSE BLDC GLUCOMTR-MCNC: 124 MG/DL (ref 70–99)
GLUCOSE BLDC GLUCOMTR-MCNC: 133 MG/DL (ref 70–99)
GLUCOSE BLDC GLUCOMTR-MCNC: 79 MG/DL (ref 70–99)
GLUCOSE BLDC GLUCOMTR-MCNC: 87 MG/DL (ref 70–99)
GLUCOSE BLDC GLUCOMTR-MCNC: 93 MG/DL (ref 70–99)
GLUCOSE BLDC GLUCOMTR-MCNC: 94 MG/DL (ref 70–99)
GLUCOSE SERPL-MCNC: 88 MG/DL (ref 70–99)
HCO3 BLD-SCNC: 21 MMOL/L (ref 21–28)
HCO3 BLD-SCNC: 23 MMOL/L (ref 21–28)
HCT VFR BLD AUTO: 26.9 % (ref 35–47)
HGB BLD-MCNC: 9.4 G/DL (ref 11.7–15.7)
INR PPP: 1.16 (ref 0.85–1.15)
MAGNESIUM SERPL-MCNC: 1.8 MG/DL (ref 1.7–2.3)
MCH RBC QN AUTO: 34.7 PG (ref 26.5–33)
MCHC RBC AUTO-ENTMCNC: 34.9 G/DL (ref 31.5–36.5)
MCV RBC AUTO: 99 FL (ref 78–100)
O2/TOTAL GAS SETTING VFR VENT: 40 %
O2/TOTAL GAS SETTING VFR VENT: 80 %
OSMOLALITY SERPL: 283 MMOL/KG (ref 280–301)
OSMOLALITY UR: 186 MMOL/KG (ref 100–1200)
PCO2 BLD: 43 MM HG (ref 35–45)
PCO2 BLD: 45 MM HG (ref 35–45)
PH BLD: 7.29 [PH] (ref 7.35–7.45)
PH BLD: 7.34 [PH] (ref 7.35–7.45)
PHOSPHATE SERPL-MCNC: 2.6 MG/DL (ref 2.5–4.5)
PLATELET # BLD AUTO: 148 10E3/UL (ref 150–450)
PO2 BLD: 130 MM HG (ref 80–105)
PO2 BLD: 48 MM HG (ref 80–105)
POTASSIUM SERPL-SCNC: 3.4 MMOL/L (ref 3.4–5.3)
POTASSIUM SERPL-SCNC: 3.4 MMOL/L (ref 3.4–5.3)
POTASSIUM SERPL-SCNC: 4.1 MMOL/L (ref 3.4–5.3)
PROCALCITONIN SERPL IA-MCNC: 2.77 NG/ML
PROT SERPL-MCNC: 4.5 G/DL (ref 6.4–8.3)
RBC # BLD AUTO: 2.71 10E6/UL (ref 3.8–5.2)
SODIUM SERPL-SCNC: 129 MMOL/L (ref 135–145)
SODIUM SERPL-SCNC: 131 MMOL/L (ref 135–145)
SODIUM SERPL-SCNC: 132 MMOL/L (ref 135–145)
WBC # BLD AUTO: 19.3 10E3/UL (ref 4–11)

## 2023-10-10 PROCEDURE — 85610 PROTHROMBIN TIME: CPT | Performed by: PEDIATRICS

## 2023-10-10 PROCEDURE — 250N000011 HC RX IP 250 OP 636: Mod: JZ | Performed by: INTERNAL MEDICINE

## 2023-10-10 PROCEDURE — 84132 ASSAY OF SERUM POTASSIUM: CPT | Performed by: PEDIATRICS

## 2023-10-10 PROCEDURE — 250N000009 HC RX 250: Performed by: PEDIATRICS

## 2023-10-10 PROCEDURE — 82803 BLOOD GASES ANY COMBINATION: CPT | Performed by: FAMILY MEDICINE

## 2023-10-10 PROCEDURE — 250N000009 HC RX 250: Performed by: FAMILY MEDICINE

## 2023-10-10 PROCEDURE — 85730 THROMBOPLASTIN TIME PARTIAL: CPT | Performed by: PEDIATRICS

## 2023-10-10 PROCEDURE — G1010 CDSM STANSON: HCPCS

## 2023-10-10 PROCEDURE — 250N000013 HC RX MED GY IP 250 OP 250 PS 637: Performed by: INTERNAL MEDICINE

## 2023-10-10 PROCEDURE — 86140 C-REACTIVE PROTEIN: CPT | Performed by: FAMILY MEDICINE

## 2023-10-10 PROCEDURE — 250N000011 HC RX IP 250 OP 636: Performed by: FAMILY MEDICINE

## 2023-10-10 PROCEDURE — 250N000009 HC RX 250

## 2023-10-10 PROCEDURE — 250N000011 HC RX IP 250 OP 636: Performed by: NURSE ANESTHETIST, CERTIFIED REGISTERED

## 2023-10-10 PROCEDURE — 84295 ASSAY OF SERUM SODIUM: CPT | Performed by: PEDIATRICS

## 2023-10-10 PROCEDURE — 80053 COMPREHEN METABOLIC PANEL: CPT | Performed by: INTERNAL MEDICINE

## 2023-10-10 PROCEDURE — 99292 CRITICAL CARE ADDL 30 MIN: CPT | Performed by: PEDIATRICS

## 2023-10-10 PROCEDURE — 94002 VENT MGMT INPAT INIT DAY: CPT

## 2023-10-10 PROCEDURE — 999N000065 XR ABDOMEN PORT 1 VIEW

## 2023-10-10 PROCEDURE — 250N000011 HC RX IP 250 OP 636: Performed by: INTERNAL MEDICINE

## 2023-10-10 PROCEDURE — 84100 ASSAY OF PHOSPHORUS: CPT | Performed by: FAMILY MEDICINE

## 2023-10-10 PROCEDURE — 84145 PROCALCITONIN (PCT): CPT | Performed by: FAMILY MEDICINE

## 2023-10-10 PROCEDURE — 5A1955Z RESPIRATORY VENTILATION, GREATER THAN 96 CONSECUTIVE HOURS: ICD-10-PCS | Performed by: PEDIATRICS

## 2023-10-10 PROCEDURE — 250N000011 HC RX IP 250 OP 636: Performed by: PEDIATRICS

## 2023-10-10 PROCEDURE — 99291 CRITICAL CARE FIRST HOUR: CPT | Performed by: PEDIATRICS

## 2023-10-10 PROCEDURE — 250N000011 HC RX IP 250 OP 636: Mod: JZ | Performed by: PEDIATRICS

## 2023-10-10 PROCEDURE — 80051 ELECTROLYTE PANEL: CPT | Performed by: FAMILY MEDICINE

## 2023-10-10 PROCEDURE — 83930 ASSAY OF BLOOD OSMOLALITY: CPT | Performed by: PEDIATRICS

## 2023-10-10 PROCEDURE — C9113 INJ PANTOPRAZOLE SODIUM, VIA: HCPCS | Mod: JZ | Performed by: FAMILY MEDICINE

## 2023-10-10 PROCEDURE — 85027 COMPLETE CBC AUTOMATED: CPT | Performed by: FAMILY MEDICINE

## 2023-10-10 PROCEDURE — 200N000001 HC R&B ICU

## 2023-10-10 PROCEDURE — 258N000003 HC RX IP 258 OP 636: Performed by: FAMILY MEDICINE

## 2023-10-10 PROCEDURE — 999N000157 HC STATISTIC RCP TIME EA 10 MIN

## 2023-10-10 PROCEDURE — 83735 ASSAY OF MAGNESIUM: CPT | Performed by: FAMILY MEDICINE

## 2023-10-10 PROCEDURE — 999N000065 XR ABDOMEN 1 VIEW

## 2023-10-10 PROCEDURE — 999N000065 XR CHEST PORT 1 VIEW

## 2023-10-10 RX ORDER — PROPOFOL 10 MG/ML
INJECTION, EMULSION INTRAVENOUS PRN
Status: DISCONTINUED | OUTPATIENT
Start: 2023-10-10 | End: 2023-10-10

## 2023-10-10 RX ORDER — NALOXONE HYDROCHLORIDE 0.4 MG/ML
0.2 INJECTION, SOLUTION INTRAMUSCULAR; INTRAVENOUS; SUBCUTANEOUS
Status: DISCONTINUED | OUTPATIENT
Start: 2023-10-10 | End: 2023-10-24 | Stop reason: HOSPADM

## 2023-10-10 RX ORDER — METOCLOPRAMIDE HYDROCHLORIDE 5 MG/5ML
10 SOLUTION ORAL EVERY 6 HOURS
Status: COMPLETED | OUTPATIENT
Start: 2023-10-10 | End: 2023-10-10

## 2023-10-10 RX ORDER — DEXTROSE MONOHYDRATE 100 MG/ML
INJECTION, SOLUTION INTRAVENOUS CONTINUOUS PRN
Status: DISCONTINUED | OUTPATIENT
Start: 2023-10-10 | End: 2023-10-11

## 2023-10-10 RX ORDER — HYDROMORPHONE HYDROCHLORIDE 1 MG/ML
0.5 INJECTION, SOLUTION INTRAMUSCULAR; INTRAVENOUS; SUBCUTANEOUS ONCE
Status: COMPLETED | OUTPATIENT
Start: 2023-10-10 | End: 2023-10-10

## 2023-10-10 RX ORDER — METOCLOPRAMIDE HYDROCHLORIDE 5 MG/ML
5 INJECTION INTRAMUSCULAR; INTRAVENOUS ONCE
Status: COMPLETED | OUTPATIENT
Start: 2023-10-10 | End: 2023-10-10

## 2023-10-10 RX ORDER — DEXMEDETOMIDINE HYDROCHLORIDE 4 UG/ML
.1-1.2 INJECTION, SOLUTION INTRAVENOUS CONTINUOUS
Status: DISCONTINUED | OUTPATIENT
Start: 2023-10-10 | End: 2023-10-11

## 2023-10-10 RX ORDER — NALOXONE HYDROCHLORIDE 0.4 MG/ML
0.4 INJECTION, SOLUTION INTRAMUSCULAR; INTRAVENOUS; SUBCUTANEOUS
Status: DISCONTINUED | OUTPATIENT
Start: 2023-10-10 | End: 2023-10-24 | Stop reason: HOSPADM

## 2023-10-10 RX ORDER — POTASSIUM CHLORIDE 7.45 MG/ML
10 INJECTION INTRAVENOUS
Status: COMPLETED | OUTPATIENT
Start: 2023-10-10 | End: 2023-10-10

## 2023-10-10 RX ORDER — LIDOCAINE HYDROCHLORIDE 20 MG/ML
5 SOLUTION OROPHARYNGEAL ONCE
Status: COMPLETED | OUTPATIENT
Start: 2023-10-10 | End: 2023-10-10

## 2023-10-10 RX ORDER — WATER 10 ML/10ML
INJECTION INTRAMUSCULAR; INTRAVENOUS; SUBCUTANEOUS
Status: COMPLETED
Start: 2023-10-10 | End: 2023-10-10

## 2023-10-10 RX ORDER — METOCLOPRAMIDE HYDROCHLORIDE 5 MG/ML
10 INJECTION INTRAMUSCULAR; INTRAVENOUS EVERY 6 HOURS
Status: COMPLETED | OUTPATIENT
Start: 2023-10-10 | End: 2023-10-10

## 2023-10-10 RX ORDER — MAGNESIUM SULFATE HEPTAHYDRATE 40 MG/ML
2 INJECTION, SOLUTION INTRAVENOUS ONCE
Status: COMPLETED | OUTPATIENT
Start: 2023-10-10 | End: 2023-10-10

## 2023-10-10 RX ORDER — DEXTROSE MONOHYDRATE 100 MG/ML
INJECTION, SOLUTION INTRAVENOUS CONTINUOUS PRN
Status: CANCELLED | OUTPATIENT
Start: 2023-10-10

## 2023-10-10 RX ORDER — GUAIFENESIN 600 MG/1
15 TABLET, EXTENDED RELEASE ORAL DAILY
Status: DISCONTINUED | OUTPATIENT
Start: 2023-10-10 | End: 2023-10-20

## 2023-10-10 RX ORDER — METOCLOPRAMIDE 5 MG/1
10 TABLET ORAL EVERY 6 HOURS
Status: COMPLETED | OUTPATIENT
Start: 2023-10-10 | End: 2023-10-10

## 2023-10-10 RX ADMIN — POTASSIUM CHLORIDE 10 MEQ: 10 INJECTION, SOLUTION INTRAVENOUS at 09:35

## 2023-10-10 RX ADMIN — PROPOFOL 150 MG: 10 INJECTION, EMULSION INTRAVENOUS at 03:39

## 2023-10-10 RX ADMIN — MIDAZOLAM 1 MG: 1 INJECTION INTRAMUSCULAR; INTRAVENOUS at 13:55

## 2023-10-10 RX ADMIN — METHYLPREDNISOLONE SODIUM SUCCINATE 30 MG: 40 INJECTION INTRAMUSCULAR; INTRAVENOUS at 17:39

## 2023-10-10 RX ADMIN — POTASSIUM CHLORIDE 10 MEQ: 10 INJECTION, SOLUTION INTRAVENOUS at 16:45

## 2023-10-10 RX ADMIN — PANTOPRAZOLE SODIUM 40 MG: 40 INJECTION, POWDER, FOR SOLUTION INTRAVENOUS at 21:00

## 2023-10-10 RX ADMIN — ENOXAPARIN SODIUM 40 MG: 40 INJECTION SUBCUTANEOUS at 21:00

## 2023-10-10 RX ADMIN — SODIUM PHOSPHATE, MONOBASIC, MONOHYDRATE AND SODIUM PHOSPHATE, DIBASIC, ANHYDROUS 9 MMOL: 142; 276 INJECTION, SOLUTION INTRAVENOUS at 09:39

## 2023-10-10 RX ADMIN — Medication 100 MG: at 03:39

## 2023-10-10 RX ADMIN — CEFTRIAXONE SODIUM 2 G: 2 INJECTION, POWDER, FOR SOLUTION INTRAMUSCULAR; INTRAVENOUS at 20:59

## 2023-10-10 RX ADMIN — DIAZEPAM 2.5 MG: 5 INJECTION INTRAMUSCULAR; INTRAVENOUS at 01:50

## 2023-10-10 RX ADMIN — HYDROMORPHONE HYDROCHLORIDE 0.3 MG/HR: 10 INJECTION, SOLUTION INTRAMUSCULAR; INTRAVENOUS; SUBCUTANEOUS at 05:22

## 2023-10-10 RX ADMIN — METHYLPREDNISOLONE SODIUM SUCCINATE 30 MG: 40 INJECTION INTRAMUSCULAR; INTRAVENOUS at 06:28

## 2023-10-10 RX ADMIN — DEXTROSE AND SODIUM CHLORIDE: 5; 450 INJECTION, SOLUTION INTRAVENOUS at 23:03

## 2023-10-10 RX ADMIN — METOCLOPRAMIDE HYDROCHLORIDE 10 MG: 5 INJECTION INTRAMUSCULAR; INTRAVENOUS at 14:52

## 2023-10-10 RX ADMIN — LIDOCAINE HYDROCHLORIDE 5 ML: 20 SOLUTION ORAL; TOPICAL at 13:53

## 2023-10-10 RX ADMIN — PANTOPRAZOLE SODIUM 40 MG: 40 INJECTION, POWDER, FOR SOLUTION INTRAVENOUS at 08:37

## 2023-10-10 RX ADMIN — WATER 0.75 ML: 1 INJECTION INTRAMUSCULAR; INTRAVENOUS; SUBCUTANEOUS at 17:40

## 2023-10-10 RX ADMIN — MIDAZOLAM 2 MG: 1 INJECTION INTRAMUSCULAR; INTRAVENOUS at 05:11

## 2023-10-10 RX ADMIN — Medication 1 PATCH: at 08:37

## 2023-10-10 RX ADMIN — DEXMEDETOMIDINE HYDROCHLORIDE 0.9 MCG/KG/HR: 4 INJECTION, SOLUTION INTRAVENOUS at 19:50

## 2023-10-10 RX ADMIN — DEXMEDETOMIDINE HYDROCHLORIDE 0.9 MCG/KG/HR: 4 INJECTION, SOLUTION INTRAVENOUS at 11:53

## 2023-10-10 RX ADMIN — DEXMEDETOMIDINE HYDROCHLORIDE 0.2 MCG/KG/HR: 4 INJECTION, SOLUTION INTRAVENOUS at 03:57

## 2023-10-10 RX ADMIN — DEXTROSE AND SODIUM CHLORIDE: 5; 450 INJECTION, SOLUTION INTRAVENOUS at 10:14

## 2023-10-10 RX ADMIN — POTASSIUM CHLORIDE 10 MEQ: 10 INJECTION, SOLUTION INTRAVENOUS at 08:36

## 2023-10-10 RX ADMIN — VANCOMYCIN HYDROCHLORIDE 1000 MG: 1 INJECTION, SOLUTION INTRAVENOUS at 06:39

## 2023-10-10 RX ADMIN — POTASSIUM CHLORIDE 10 MEQ: 10 INJECTION, SOLUTION INTRAVENOUS at 17:39

## 2023-10-10 RX ADMIN — METOCLOPRAMIDE HYDROCHLORIDE 5 MG: 5 INJECTION INTRAMUSCULAR; INTRAVENOUS at 13:48

## 2023-10-10 RX ADMIN — THIAMINE HYDROCHLORIDE 100 MG: 100 INJECTION, SOLUTION INTRAMUSCULAR; INTRAVENOUS at 08:37

## 2023-10-10 RX ADMIN — METOCLOPRAMIDE HYDROCHLORIDE 10 MG: 5 INJECTION INTRAMUSCULAR; INTRAVENOUS at 21:00

## 2023-10-10 RX ADMIN — MAGNESIUM SULFATE HEPTAHYDRATE 2 G: 40 INJECTION, SOLUTION INTRAVENOUS at 08:42

## 2023-10-10 ASSESSMENT — ACTIVITIES OF DAILY LIVING (ADL)
ADLS_ACUITY_SCORE: 41
ADLS_ACUITY_SCORE: 48
ADLS_ACUITY_SCORE: 41
ADLS_ACUITY_SCORE: 46
ADLS_ACUITY_SCORE: 41
ADLS_ACUITY_SCORE: 41
ADLS_ACUITY_SCORE: 46
ADLS_ACUITY_SCORE: 41

## 2023-10-10 NOTE — PROGRESS NOTES
Initiation of Restraints      Right wrist and Left wrist restraints initiated on patient on 10/10/2023 at 0300 AM      Type of restraints applied- Soft wrist  Where restraints were applied- L and R wrist  Provider Notified (name)-  Real  Order received within 1 hour of initiation- yes  All flowsheet rows filled out including less restrictive alternatives- yes  Care Plan initiated- yes  Education initiated and documented  yes       Danica Melton RN

## 2023-10-10 NOTE — PROGRESS NOTES
Patient has been on HFNC 40L and 70% with ongoing RR in the 40s and ongoing use of abdominal muscles intermittently but no obvious worsening.  Still sedated from the CIWA Ativan given earlier today.  Precedex started at 0.2 mcg/kg/hr as previously planned and no signs of withdrawal at this time.  Nursing is having difficulty drawing blood and maintaining reliable IV access.      ABG 7.33/41/68/22 - minimal improvement of pO2 despite increase in FiO2    Discussed with Dr. Montana, tele-ICU intensivist, again and at this time he does not recommend intubation given her overall respiratory stability in the past few hours and underlying factors that would make extubation challenging going forward, but instead would recommend transition to Bipap to see if that would improved oxygenation and work of breathing.  Agrees with need for PICC line and art line given ongoing critical illness     PLAN:  -proceed with PICC line for consistent IV access in critically ill patient with negative blood cultures over 36 hours  - place art line for reliable blood pressure monitoring and obtaining frequent ABGs  -transition to Bipap 10/4 in effort to improve oxygenation and stabilize work of breathing in effort to avoid intubation in this patient as due to her underlying COPD and severe malnutrition would be challenging to extubate.   - Will hold off on scheduled Valium at this time and monitor for patient to become more alert in the next 1-2 hours.     17 minutes spent in the above.     Electronically Signed:  Noreen Connolly MD

## 2023-10-10 NOTE — CONSULTS
Nutrition Therapy Update: Brief Note    Writer consulted via provider order - Registered Dietitian to Assess and Order TF per Medical Nutrition Therapy Protocol     Please see full initial RD assessment from yesterday 10/9 for additional details. Pt currently NPO with no exceptions - was intubated early this AM 10/10.     Updated labs as of 10/10:  Sodium - low at 132  Potassium - 3.4  Magnesium - 1.8  Phosphorus - 2.6  Glucose - 88    Meds - precedex, dextrose IVF at 100 mL/hr = 2,400 mL per day, insulin IV, levophed *no propofol noted    Dosing Weight: 48.5 kg using actual BW     ASSESSED NUTRITION NEEDS  Estimated Energy Needs: 1,455-1,698 kcals/day (30 - 35 kcals/kg)  Justification: Increased needs, Repletion, and Underweight  Estimated Protein Needs: 58-73 grams protein/day (1.2 - 1.5 grams of pro/kg)  Justification: Hypercatabolism with acute illness, Increased needs, and Repletion  Estimated Fluid Needs: 1,455 mL/day (30 mL/kg)   Justification: Maintenance    Recommendations  Prior to feeds starting:  -Obtain and verify enteral access.  -Obtain baseline K+, Mag and Phos labs and ensure they are WNL.   -Patient is hemodynamically stable.    Please do NOT start or advance unless K+ is = or > 3.0, Mg++ is = or > 1.5, and Phos is = or > 1.9. Hold at current rate and replace electrolytes. When K+ and Mag are WNL and Phos  >/= 2.0 may advance as above.     Once the above are met, recommend the following TF regimen as below:    1) Vital 1.5 via OG tube at 10 mL/hr x 24 hrs. 240 mL, 360 kcal (7 kcal/kg), 16 g pro, 45 g CHO, 14 g fat, 1 g fiber, 183 mL free water    2) Recheck lytes. If WDL, please advance TF to 25 mL/hr x 24 hrs. 600 mL, 900 kcal, 41 g pro, 112 g CHO, 34 g fat, 4 g fiber, 458 mL free water    3) Recheck lytes. If WDL, please advance to goal rate of 40 mL/hr x 24 hrs.     At goal rate, formula provides total volume of 960 mL, 1,440 kcal (30 kcal/kg, 100% needs), 65 gm Pro (1.3 gm/kg, 100% needs), 180  gm CHO, 55 gm fat, 6 gm fiber, and 733 mL free water per DW of 48.5 kg.    2) Free water flushes of 120 mL q 4 hrs. Total fluid (free water + flushes + IVF) = 3,853 mL per day with IVF.  2,400 mL from dextrose IVF + 733 mL free water from formula + 720 mL from FWF    4) If plan is to prone, hold feeds x 1 hr prior to turning from supine to prone, and from prone to supine.    5) Multivitamin with minerals already ordered, MD changed to liquid this AM    -Recommend MD decrease or discontinue IVF as TF advances to prevent overhydration. Please consider switch to non-dextrose containing IVF prior to starting TF/as TF advances to lower risk of refeeding syndrome.    -RD to adjust free water flushes pending IVF and per MD recommendation pending fluid status.    Lori Angulo RD, LD  Clinical Dietitian  Office: [phone]  Weekend pager: [phone]

## 2023-10-10 NOTE — TELECONSULT
Called to see patient for tachypnea and encephalopathy , has been off dexmedetomidine since 5 pm.     Camera'ed in to see patient--she's barely arousable on bipap mask with shallow respirations in mid 40's      CXR yesterday with extensive progressive bilateral infiltrates.      A :worsening respiratory failure--pneumonia seconday to strep and strep bacteremia in the setting of malnutrition and alcoholism. Patient is full code.      Rec:  intubation. 22 x 350 PEEP 7.   Then her GEMA can be treated more effectively and she can be fed enterally. Might need an LP in AM to r/o CNS infection with the bacteremia and encephalopathy.    Can use the dexmedetomidine for sedation

## 2023-10-10 NOTE — ANESTHESIA PROCEDURE NOTES
Airway       Patient location during procedure: OR       Procedure Start/Stop Times: 10/10/2023 3:42 AM  Staff -        CRNA: Jeison Flores APRN CRNA       Performed By: CRNA  Consent for Airway        Urgency: elective  Indications and Patient Condition       Indications for airway management: airway protection and respiratory insufficiency       Induction type:RSI       Mask difficulty assessment: 0 - not attempted    Final Airway Details       Final airway type: endotracheal airway       Successful airway: ETT - single  Endotracheal Airway Details        ETT size (mm): 7.0       Cuffed: yes       Successful intubation technique: video laryngoscopy       VL Blade Size: Steiner 3       Grade View of Cords: 1       Adjucts: stylet       Position: Right       Measured from: lips       Secured at (cm): 22    Post intubation assessment        Placement verified by: capnometry, equal breath sounds and chest rise        Number of attempts at approach: 1       Number of other approaches attempted: 0       Secured with: commercial tube stiles       Ease of procedure: easy       Dentition: Intact and Unchanged    Medication(s) Administered   Medication Administration Time: 10/10/2023 3:42 AM    Additional Comments       Pt was on BIPAP and sats were 100%.  Easy intubation and lowest SAT was 98%.  Pt placed on vent after color change ETCO2 and bilateral breath sounds.  Care turned over to the ICU RN with a CXR to follow for placement.

## 2023-10-10 NOTE — ANESTHESIA POSTPROCEDURE EVALUATION
Patient: Pavithra Raines    Procedure: * No procedures listed *       Anesthesia Type:  No value filed.    Note:  Disposition: Inpatient   Postop Pain Control: Uneventful            Sign Out: Well controlled pain   PONV: No   Neuro/Psych: Uneventful            Sign Out: Acceptable/Baseline neuro status   Airway/Respiratory: Uneventful            Sign Out: AIRWAY IN SITU/Resp. Support               Airway in situ/Resp. Support: ETT   CV/Hemodynamics: Uneventful            Sign Out: Acceptable CV status; No obvious hypovolemia; No obvious fluid overload   Other NRE: NONE   DID A NON-ROUTINE EVENT OCCUR? No    Event details/Postop Comments:  Patient remains intubated, sedated and arterial line in good functioning condition. Will be available if any additional interventions are needed.           Last vitals:  Vitals:    10/10/23 0700 10/10/23 0730 10/10/23 0850   BP: 127/86 134/84 127/82   Pulse: 67 68 61   Resp: 21 20 28   Temp:   98  F (36.7  C)   SpO2: 98% 98% 96%       Electronically Signed By: RICARDO Harris CRNA  October 10, 2023  10:39 AM

## 2023-10-10 NOTE — PLAN OF CARE
Goal Outcome Evaluation:      Plan of Care Reviewed With: patient    Overall Patient Progress: no changeOverall Patient Progress: no change    Outcome Evaluation: Patient has been very lethargic this shift, sternal rub to arouse. VSS. Denies pain when asked.  Valium given x1 as aroused patient and she started having severe bilateral upper extremity tremor and almost appeared to be having seizure activity with head rigid and back, but able to communicate verbally with writer during incident. Tolerating bipap well. RR continues to be 30-40's. Lung sounds diminished in bases otherwise clear, good air movement. A-line positional. BP stable.  PICC and mccabe placed earlier.  3 Rings removed as tight and knuckles large. Placed in specimen cup in patient's purse. Turned and repositioned every 2 hours.  Phosphorus being replaced.    Brendan Camara RN

## 2023-10-10 NOTE — PROGRESS NOTES
[facility]    Medicine Progress Note - Hospitalist Service    Date of Admission:  10/7/2023    Assessment & Plan   Patient is a 68-year-old female with COPD, alcohol dependence recently consuming large volume alcohol daily, gastric bypass surgery, depression/PTSD, and ongoing tobacco abuse who presented with generalized weakness and fatigue and was admitted due to concerns for multifocal community-acquired pneumonia and sepsis.  She had rapid worsening over the first 12 hours of her hospitalization with persistent hypotension despite fluid resuscitation and was transferred to the ICU and started on vasopressor therapy with Levophed due to concern for septic shock.  After initial improvement in septic shock, patient developed worsening respiratory failure and worsening alcohol withdrawal with delirium requiring sedative therapy and subsequent intubation for mechanical ventilation in early a.m. 10/10.  She remains critically ill with potentially life-threatening respiratory failure, pneumonia, septic shock, and alcohol withdrawal.    Principal Problem:    Multifocal pneumonia  Active Problems:    Septic shock (H)    Bacteremia due to Streptococcus pneumoniae    Acute respiratory failure with hypoxia (H)    Alcohol withdrawal, with delirium (H)    Hyponatremia    Weakness generalized    Alcohol use disorder, severe, dependence (H)    Hypomagnesemia    Emphysema/COPD (H)    Malnutrition - suspected    Hypoalbuminemia    Hypophosphatemia    Hypokalemia    Thrombocytopenia (H24)    Anemia, unspecified type    Status post gastric bypass for obesity    PTSD (post-traumatic stress disorder)    Episode of recurrent major depressive disorder, unspecified depression episode severity (H24)    Dehydration    History of seizure due to alcohol withdrawal    Tobacco abuse    Septic shock due to streptococcal pneumoniae bacteremia and multifocal pneumonia  Acute respiratory failure with  hypoxia  Presented with weakness and had bilateral infiltrates concerning for community-acquired pneumonia.  Blood culture from admission grew Streptococcus pneumoniae.  Sepsis rapidly progressed to septic shock requiring initiation of vasopressors.  Although hemodynamic status subsequently improved, she developed worsening delirium and respiratory failure necessitating intubation and initiation of mechanical ventilation in a.m. 10/10.  PaO2 to FiO2 ratio prior to intubation was as low as 120 and has improved after intubation.  -Continue Rocephin, azithromycin, and vancomycin while awaiting sensitivities    -Continue IV steroids Solu-Medrol 30 mg BID  -consider reinitiating vasopressors with Levophed if hypotension recurs  -Ordered recheck WBC, ABG  -continue mechanical ventilation titrating to keep oxygen saturations 90 to 95%  -Continue Precedex infusion for sedation titrated to RASS score -1-0 or calm to drowsy  -Continue hydromorphone infusion for analgesia while on ventilator  -Added bolus doses of Versed as needed for sedation  -Continue triple-lumen PICC line central venous catheter  -Continue arterial line catheter  -Continue indwelling urinary catheter  -Patient is being monitored with continuous cardiac monitoring and pulse oximetry and has indwelling devices including Arterial Line and PICC Line and indwelling urinary catheter.  Patient's mentation is abnormal due to multifactorial delirium which limits her ability to follow instructions reliably. Due to intermittent spontaneous movements of limbs including purposeful movements, patient is at risk for inadvertently pulling on, displacing, or removing these monitors or devices which could lead to patient harm.  For the patient's safety, restraint of their limbs is therefore recommended at this time. These restraints will be discontinued once criteria for discontinuation have been met.    Alcohol use disorder (severe dependence) acute alcohol withdrawal  with delirium   History of seizure due to alcohol withdrawal  Per  patient had been drinking an average of 12 pack of beer, 1 bottle of wine, and 10 hard liquor drinks a day recently with most recent alcoholic drink approximately 30 minutes prior to hospital presentation.  Blood alcohol level was not tested at time of admission.  Patient developed overt signs of withdrawal on 10/9 including increased anxiety, tremor, confusion and possible hallucination.  Initial treatment of alcohol withdrawal per CIWA protocol was complicated by sedation that may have exacerbated respiratory failure.  Nursing reports that patient's mentation was normal early in her hospital stay prior to onset of alcohol withdrawal.  Patient has not had overt meningismus lowering suspicion for infectious meningoencephalitis.  -Continuing treatment for respiratory failure as outlined above and while she is intubated will use a combination of Precedex infusion, hydromorphone infusion, and Versed as needed for sedation  -Discontinue gabapentin due to the other sedation she is receiving while intubated  -Discontinue clonidine that had been started for alcohol withdrawal protocol due to tenuous hemodynamic status  -discontinue CIWA scoring while intubated, now using RASS score to assess level of sedation  -Not recommending diagnostic lumbar puncture at this time because of low clinical suspicion for infectious meningoencephalitis and concern that potential risks of procedure outweigh benefit in this critically ill patient receiving mechanical ventilation and who is thrombocytopenic     Suspected malnutrition  Hypoalbuminemia   Status post gastric bypass for obesity  Has remote history of gastric bypass surgery for obesity.  Reported 13 pound weight loss in recent weeks and appears underweight and possibly cachectic on exam.  Alcoholism probably contributes to malnutrition, and previous gastric bypass status could also contribute.  Serum albumin  was low at admission at 2.8 and has decreased.   -Ordered postpyloric feeding tube placement today now that she is intubated  -Registered dietitian consulted to assist with recommendations for initiation of enteral feeding through tube feeds and free water supplementation after placement of postpyloric feeding tube today    Hypomagnesemia  Hypophosphatemia   Hypokalemia  Presented with low serum magnesium and potassium levels and subsequently developed low serum phosphorus level all likely secondary to nutritional deficiency.  These electrolytes have improved with supplementation.  -Continue with potassium, magnesium, and phosphorus supplementation per protocol in patient with critical illness  -Monitor magnesium, phosphorus, and potassium as indicated    Hyponatremia  Sodium 123 on admission with suspected hypovolemic hyponatremia.  After treatment with multiple normal saline boluses, sodium increased from 123 up to 128 in less than 12 hours after which IV fluids were switched to D5 half-normal saline.  Sodium then decreased to 126 and later stabilized at about 130 and today is 132.  Urine sodium is less than 20 consistent with hypovolemic hyponatremia.  -Continue current IV fluid infusion with D5 with 1/2NS titrating IV fluid rate to support fluid needs but also treat hyponatremia  -Ordered recheck of serum sodium and osmolality    Hyperglycemia  Patient has had mild to moderate hyperglycemia while in the ICU.  Hemoglobin A1c was 4.4 and she is not known to have underlying diabetes.  Insulin infusion was started for management of hyperglycemia in the ICU.  Anticipate hyperglycemia will worsen with initiation of enteral feeding.  -Continue to monitor blood sugars per protocol  -Continue insulin infusion per protocol for management of hyperglycemia    Thrombocytopenia  Anemia, unspecified type  Presented with thrombocytopenia and anemia at admission both of which initially worsened but have subsequently stabilized.   Both are probably due to septic shock.  Active bleeding has not been suspected so far.  -Ordered recheck of hemoglobin and platelet  -Ordered check of INR and PTT    Emphysema/COPD  Tobacco abuse  Known chronic emphysema and COPD without overt acute exacerbation.  Patient reportedly has continued to smoke.  -Continuing IV steroids secondary to septic shock and pneumococcal pneumonia  -Continue bronchodilators as needed  -Nicotine patch supplementation started during hospitalization.    Dehydration  Presented with dehydration which likely contributed to hyponatremia and initial low normal blood pressures, now resolved after IV fluid treatment.  -Continue IV fluids for now as outlined above    Weakness generalized  Suspect presenting complaint of generalized weakness was multifactorial including infection, severe alcohol dependence, and suspected malnutrition with nutrient including electrolyte deficiencies.  -Treat acute medical problems  -Anticipate PT and OT evaluations if she recovers from life-threatening acute illness once patient improves    Episode of recurrent major depressive disorder, unspecified depression episode severity (H24)  PTSD (post-traumatic stress disorder)  Carries previous diagnosis of depression and PTSD treated chronically with Lexapro 30 mg daily, clonazepam 0.5 mg 3 times daily as needed for anxiety, BuSpar 15 mg 3 times a day, Effexor 37.5 mg daily, and Xyzal 5 mg daily.  At admission it was unclear whether the patient has recently been taking these medications reliably.  She is now intubated and being sedated.  -Holding these chronic medications for now     Diet: NPO for Medical/Clinical Reasons Except for: No Exceptions    DVT Prophylaxis: Pneumatic Compression Devices  Barney Catheter: PRESENT, indication: Strict 1-2 Hour I&O  Lines: PRESENT      PICC 10/09/23 Triple Lumen Left Basilic OK TO USE per PICC STAT RN-Site Assessment: SOILA  Arterial Line 10/09/23 Radial-Site Assessment: SOILA       Cardiac Monitoring: ACTIVE order. Indication: ICU  Code Status: Full Code      Clinically Significant Risk Factors        # Hypokalemia: Lowest K = 3.2 mmol/L in last 2 days, will replace as needed  # Hyponatremia: Lowest Na = 124 mmol/L in last 2 days, will monitor as appropriate      # Hypoalbuminemia: Lowest albumin = 1.8 g/dL at 10/10/2023  5:33 AM, will monitor as appropriate   # Thrombocytopenia: Lowest platelets = 129 in last 2 days, will monitor for bleeding                     Disposition Plan    unknown       Elijah Johnson MD  Hospitalist Service  [facility], located within [facility]  Securely message with MedSocket (more info)  Text page via [facility] Paging/Directory   ______________________________________________________________________    Interval History   Patient had persistent encephalopathy with somnolence overnight coinciding with severe acute illness but also administration of medications for treatment of alcohol withdrawal delirium tremens.  She also had persistent tachypnea and signs of respiratory failure and about 3:30 AM today was intubated.  After intubation, Precedex infusion was restarted and hydromorphone infusion was added.  Nursing reports that the patient has appeared more comfortable after those adjustments with a RASS score generally -1 to -2.  Signs of alcohol withdrawal have been difficult to assess by CIWA protocol after she was intubated and started on sedative and analgesic infusions.  She remains afebrile.  She has been generally stable hemodynamically overnight not requiring vasopressors.  She has had adequate urine output overnight.  She has had some spontaneous limb movements.  In addition to PICC line, patient has an arterial line catheter and an indwelling urinary catheter.    Physical Exam   Vital Signs: Temp: 98  F (36.7  C) Temp src: Oral BP: 127/82 Pulse: 61   Resp: 28 SpO2: 96 % O2 Device: Mechanical Ventilator   Vent Mode: CMV/AC  (Continuous Mandatory  Ventilation/ Assist Control)  FiO2 (%): 55 %  Resp Rate (Set): 22 breaths/min  Tidal Volume (Set, mL): 350 mL  PEEP (cm H2O): 7 cmH2O  Resp: 28    Ventilator measurements: Peak inspiratory pressure 29, mean airway pressure 11, plateau pressure 24, minute volume 8.55    Patient Vitals for the past 24 hrs:   BP Temp Temp src Pulse Resp SpO2   10/10/23 0850 127/82 98  F (36.7  C) Oral 61 28 96 %   10/10/23 0730 134/84 -- -- 68 20 98 %   10/10/23 0700 127/86 -- -- 67 21 98 %   10/10/23 0630 109/78 -- -- 60 24 97 %   10/10/23 0600 104/73 -- -- 61 30 97 %   10/10/23 0548 96/66 -- -- 62 26 97 %   10/10/23 0542 91/67 -- -- 63 27 98 %   10/10/23 0533 (!) 88/61 -- -- 61 (!) 31 100 %   10/10/23 0511 91/70 -- -- 83 (!) 38 95 %   10/10/23 0500 109/82 -- -- 92 (!) 59 (!) 85 %   10/10/23 0427 130/84 -- -- 98 (!) 39 98 %   10/10/23 0400 105/71 -- -- 84 (!) 42 100 %   10/10/23 0330 (!) 141/92 -- -- 71 (!) 41 100 %   10/10/23 0300 (!) 132/90 -- -- 73 (!) 37 100 %   10/10/23 0200 121/83 -- -- 78 (!) 43 94 %   10/10/23 0156 -- -- -- 80 (!) 41 93 %   10/10/23 0155 -- -- -- 80 (!) 45 94 %   10/10/23 0154 122/83 -- -- 80 (!) 44 94 %   10/10/23 0153 -- -- -- 80 (!) 45 94 %   10/10/23 0152 -- -- -- 81 (!) 49 93 %   10/10/23 0151 -- -- -- 80 (!) 45 93 %   10/10/23 0150 -- -- -- 81 (!) 44 93 %   10/10/23 0149 -- -- -- 80 29 93 %   10/10/23 0148 -- -- -- -- (!) 53 94 %   10/10/23 0147 -- -- -- 81 (!) 44 94 %   10/10/23 0146 -- -- -- 83 (!) 46 94 %   10/10/23 0145 -- -- -- 83 (!) 46 94 %   10/10/23 0144 -- -- -- 82 (!) 45 93 %   10/10/23 0143 -- -- -- 85 (!) 32 92 %   10/10/23 0142 -- -- -- 85 -- 94 %   10/10/23 0141 -- -- -- 85 -- 94 %   10/10/23 0140 -- -- -- 85 -- 93 %   10/10/23 0139 -- -- -- 77 -- 93 %   10/10/23 0138 -- -- -- -- -- 91 %   10/10/23 0137 -- -- -- -- -- 91 %   10/10/23 0136 122/89 -- -- 90 -- 90 %   10/10/23 0135 -- -- -- -- -- (!) 89 %   10/10/23 0134 -- -- -- -- -- 97 %   10/10/23 0133 -- -- -- -- (!) 31 95 %   10/10/23  0132 -- -- -- -- (!) 36 97 %   10/10/23 0131 -- -- -- -- 23 97 %   10/10/23 0047 -- -- -- 92 (!) 48 (!) 75 %   10/10/23 0046 -- -- -- -- (!) 49 (!) 77 %   10/10/23 0045 -- -- -- 89 (!) 41 (!) 87 %   10/10/23 0044 -- -- -- 87 (!) 36 92 %   10/10/23 0043 -- -- -- 81 12 95 %   10/10/23 0042 -- -- -- 81 (!) 39 95 %   10/10/23 0041 -- -- -- 81 (!) 37 95 %   10/10/23 0040 -- -- -- 80 (!) 39 95 %   10/10/23 0039 -- -- -- 81 (!) 41 95 %   10/10/23 0038 -- -- -- 80 (!) 41 95 %   10/10/23 0037 -- -- -- 80 (!) 41 95 %   10/10/23 0036 -- -- -- 81 (!) 43 95 %   10/10/23 0035 -- -- -- 80 (!) 41 95 %   10/10/23 0034 -- -- -- 82 (!) 38 95 %   10/10/23 0033 -- -- -- 82 (!) 41 95 %   10/10/23 0032 -- -- -- 82 (!) 40 95 %   10/10/23 0031 -- -- -- 82 (!) 40 95 %   10/10/23 0030 -- -- -- 84 (!) 41 95 %   10/10/23 0029 -- -- -- 84 (!) 40 95 %   10/10/23 0028 -- -- -- 84 (!) 37 95 %   10/10/23 0027 -- -- -- 85 (!) 41 95 %   10/10/23 0026 -- -- -- 85 (!) 41 95 %   10/10/23 0025 -- -- -- 85 (!) 41 96 %   10/10/23 0024 -- -- -- 85 (!) 40 96 %   10/10/23 0023 -- -- -- 85 (!) 45 97 %   10/10/23 0022 -- -- -- 86 (!) 38 --   10/10/23 0021 -- -- -- 86 (!) 40 98 %   10/10/23 0020 -- -- -- 85 27 98 %   10/10/23 0019 -- -- -- 85 (!) 37 98 %   10/10/23 0018 -- -- -- 86 (!) 39 98 %   10/10/23 0017 -- -- -- 86 (!) 40 98 %   10/10/23 0016 -- -- -- 86 (!) 39 98 %   10/10/23 0015 -- -- -- 86 (!) 40 98 %   10/10/23 0014 -- -- -- 86 19 98 %   10/10/23 0013 -- -- -- 87 (!) 38 98 %   10/10/23 0012 -- -- -- 86 (!) 40 99 %   10/10/23 0011 -- -- -- 84 (!) 38 99 %   10/10/23 0010 -- -- -- 85 (!) 37 100 %   10/10/23 0009 -- -- -- 87 (!) 39 100 %   10/10/23 0008 -- -- -- 86 (!) 42 100 %   10/10/23 0007 -- -- -- 88 (!) 37 100 %   10/10/23 0006 -- -- -- 87 (!) 39 100 %   10/10/23 0005 -- -- -- 88 (!) 39 100 %   10/10/23 0004 -- -- -- 89 (!) 36 100 %   10/10/23 0003 -- -- -- 88 (!) 39 100 %   10/10/23 0002 -- -- -- 87 (!) 41 99 %   10/10/23 0001 -- -- -- 88 (!) 38 99  %   10/10/23 0000 111/74 -- -- 89 (!) 38 99 %   10/09/23 2359 -- -- -- 89 (!) 38 99 %   10/09/23 2358 -- -- -- 90 (!) 39 99 %   10/09/23 2357 -- -- -- 90 (!) 40 99 %   10/09/23 2356 -- -- -- 90 (!) 42 99 %   10/09/23 2355 -- -- -- 93 (!) 40 97 %   10/09/23 2354 -- -- -- 94 (!) 41 96 %   10/09/23 2353 -- -- -- 95 (!) 41 96 %   10/09/23 2352 -- -- -- 95 (!) 43 96 %   10/09/23 2351 -- -- -- 95 (!) 43 95 %   10/09/23 2350 -- -- -- 96 (!) 42 93 %   10/09/23 2349 -- -- -- 97 (!) 51 92 %   10/09/23 2348 -- -- -- 99 (!) 47 90 %   10/09/23 2347 -- -- -- -- (!) 60 (!) 79 %   10/09/23 2346 -- -- -- 96 (!) 38 93 %   10/09/23 2345 -- 97.5  F (36.4  C) Axillary 94 27 97 %   10/09/23 2344 -- -- -- 91 (!) 38 96 %   10/09/23 2343 -- -- -- 90 (!) 42 96 %   10/09/23 2342 -- -- -- 90 (!) 47 97 %   10/09/23 2341 -- -- -- 87 19 98 %   10/09/23 2340 -- -- -- 81 (!) 36 98 %   10/09/23 2339 -- -- -- 80 (!) 36 99 %   10/09/23 2338 -- -- -- 80 (!) 41 99 %   10/09/23 2337 -- -- -- 79 (!) 41 99 %   10/09/23 2336 -- -- -- 80 (!) 38 99 %   10/09/23 2335 -- -- -- 81 (!) 41 99 %   10/09/23 2334 -- -- -- 81 (!) 42 99 %   10/09/23 2333 -- -- -- 81 (!) 38 99 %   10/09/23 2332 -- -- -- 82 (!) 38 99 %   10/09/23 2331 -- -- -- 83 (!) 40 98 %   10/09/23 2330 -- -- -- 81 (!) 40 99 %   10/09/23 2329 -- -- -- 81 (!) 41 98 %   10/09/23 2328 -- -- -- 82 (!) 37 98 %   10/09/23 2327 -- -- -- 82 (!) 40 98 %   10/09/23 2326 -- -- -- 82 (!) 41 98 %   10/09/23 2325 -- -- -- 82 (!) 39 98 %   10/09/23 2324 -- -- -- 82 (!) 38 98 %   10/09/23 2323 -- -- -- 83 (!) 38 98 %   10/09/23 2322 -- -- -- 83 (!) 38 98 %   10/09/23 2321 -- -- -- 82 (!) 37 98 %   10/09/23 2320 -- -- -- 83 (!) 38 98 %   10/09/23 2319 -- -- -- 82 (!) 39 98 %   10/09/23 2318 -- -- -- 84 (!) 40 98 %   10/09/23 2317 -- -- -- 83 (!) 38 98 %   10/09/23 2316 -- -- -- 82 (!) 39 98 %   10/09/23 2315 -- -- -- 84 (!) 38 98 %   10/09/23 2314 -- -- -- 85 (!) 39 98 %   10/09/23 2313 -- -- -- 85 (!) 39 98 %    10/09/23 2312 -- -- -- 85 (!) 36 97 %   10/09/23 2311 -- -- -- 84 (!) 36 98 %   10/09/23 2310 -- -- -- 86 (!) 40 98 %   10/09/23 2309 -- -- -- 86 (!) 39 98 %   10/09/23 2308 -- -- -- 87 (!) 31 98 %   10/09/23 2307 -- -- -- 86 (!) 36 98 %   10/09/23 2306 -- -- -- 87 (!) 37 97 %   10/09/23 2305 -- -- -- 87 (!) 37 98 %   10/09/23 2304 -- -- -- 88 (!) 37 97 %   10/09/23 2303 -- -- -- 90 (!) 39 97 %   10/09/23 2302 -- -- -- 89 (!) 44 96 %   10/09/23 2301 -- -- -- 90 (!) 38 97 %   10/09/23 2300 102/73 -- -- 90 (!) 37 97 %   10/09/23 2200 115/81 -- -- 98 (!) 41 99 %   10/09/23 2100 107/64 -- -- 98 (!) 42 97 %   10/09/23 2030 -- 97.6  F (36.4  C) Axillary 103 (!) 35 96 %   10/09/23 2000 108/69 -- -- 100 (!) 42 99 %   10/09/23 1930 -- -- -- 90 (!) 40 98 %   10/09/23 1900 109/53 -- -- 77 (!) 38 98 %   10/09/23 1830 -- -- -- 73 (!) 38 100 %   10/09/23 1800 103/69 -- -- 88 (!) 38 100 %   10/09/23 1730 -- -- -- 78 (!) 37 98 %   10/09/23 1700 105/74 -- -- 77 (!) 45 94 %   10/09/23 1630 -- -- -- 83 (!) 41 --   10/09/23 1625 108/73 -- -- 89 -- --   10/09/23 1500 92/63 97.7  F (36.5  C) Axillary 76 (!) 33 97 %   10/09/23 1430 100/67 -- -- 76 (!) 40 99 %   10/09/23 1400 100/63 -- -- 80 (!) 36 98 %   10/09/23 1330 91/59 -- -- 77 (!) 36 97 %   10/09/23 1325 94/58 -- -- 77 (!) 39 97 %   10/09/23 1230 99/68 -- -- 85 24 100 %   10/09/23 1200 95/68 98  F (36.7  C) -- 89 (!) 43 96 %   10/09/23 1130 96/64 -- -- 92 -- 96 %   10/09/23 1030 98/64 -- -- 97 (!) 31 90 %   10/09/23 1000 96/64 -- -- 97 (!) 36 96 %   10/09/23 0858 -- -- -- 89 (!) 39 92 %     Weight: 107 lbs 0 oz Body mass index is 18.37 kg/m .      Intake/Output Summary (Last 24 hours) at 10/10/2023 0854  Last data filed at 10/10/2023 0630  Gross per 24 hour   Intake 3350.7 ml   Output 1855 ml   Net 1495.7 ml     General Appearance: Thin underweight and even cachectic appearing elderly woman without signs of acute distress while intubated and sedated  Respiratory: Respiratory  effort synchronous with ventilator, symmetric breath sounds, clear lung fields, no stridor  Cardiovascular: Regular rate and rhythm, brisk capillary refill, no peripheral edema  GI: Hypoactive bowel sounds, nondistended abdomen, soft  Skin: No rashes, skin entry sites of PICC line in left upper arm and right radial arterial line catheter are without bleeding, drainage, or surrounding skin erythema  Neuro: No response to voice or touch, no spontaneous eye opening, some spontaneous purposeful limb movements, pupils are very small bilaterally, no overt signs of meningismus, no apparent involuntary movements, no clonus    Medical Decision Making       75 MINUTES SPENT BY ME on the date of service doing chart review, history, exam, documentation & further activities per the note.   This included time spent reassessing hemodynamic status, respiratory status, and neurologic status and adjusting mechanical ventilation and other adjunctive treatments of life-threatening respiratory failure.    Data     I have personally reviewed the following data over the past 24 hrs:    19.3 (H)  \   9.4 (L)   / 148 (L)     132 (L) 105 6.7 (L) /  133 (H)   3.4 20 (L) 0.63 \     ALT: 18 AST: 40 AP: 69 TBILI: <0.2   ALB: 1.8 (L) TOT PROTEIN: 4.5 (L) LIPASE: N/A     TSH: N/A T4: N/A A1C: N/A     Procal: 2.77 (H) CRP: 42.92 (H) Lactic Acid: N/A         Magnesium 1.8, phosphorus 2.6  Blood sugars range  over the last day, hemoglobin A1c was 4.4  For comparison, procalcitonin previously was 5.84  1 set of blood cultures from October 7 grew Streptococcus pneumoniae but other set of blood cultures from October 7 remains negative to date  Blood cultures from October 8 and October 9 are all negative so far    Imaging results reviewed over the past 24 hrs:   Recent Results (from the past 24 hour(s))   Echocardiogram Complete   Result Value    LVEF  55-60%    Narrative    [id number]  [id number]  [id number]  [id number]^SISTER^[facility]  [facility]  Echocardiography Laboratory  [address] [place] [address]     Name: LUIS ALBERTO OAKLEY  MRN: [id number]  : 1955  Study Date: 10/09/2023 07:47 AM  Age: 68 yrs  Gender: Female  Patient Location: [facility]  Reason For Study: Syncope  Ordering Physician: SISTERIAN  Referring Physician: MIMI GARZA  Performed By: Marbella Dobbs RDCS     BSA: 1.5 m2  Height: 64 in  Weight: 107 lb  ______________________________________________________________________________  Procedure  Complete Portable Echo Adult.  ______________________________________________________________________________  Interpretation Summary     The left ventricle is normal in size.  Left ventricular systolic function is normal. The visual ejection fraction is  55-60%.  No regional wall motion abnormalities noted.  The right ventricle is normal in structure, function and size.  There is mild (1+) tricuspid regurgitation.  Right ventricular systolic pressure is elevated, consistent with mild  pulmonary hypertension.  There is no comparison study available.  ______________________________________________________________________________  Left Ventricle  The left ventricle is normal in size. There is normal left ventricular wall  thickness. Left ventricular systolic function is normal. Diastolic Doppler  findings (E/E' ratio and/or other parameters) suggest left ventricular filling  pressures are indeterminate. The visual ejection fraction is 55-60%. No  regional wall motion abnormalities noted.     Right Ventricle  The right ventricle is normal in structure, function and size.     Atria  Normal left atrial size. The right atrium is mildly dilated. There is no  atrial shunt seen.     Mitral Valve  The mitral valve is normal in structure and function. There is trace mitral  regurgitation.     Tricuspid Valve  There is mild (1+) tricuspid regurgitation. The right ventricular systolic  pressure is approximated at 34.4  mmHg plus the right atrial pressure. Right  ventricular systolic pressure is elevated, consistent with mild pulmonary  hypertension. IVC diameter <2.1 cm collapsing >50% with sniff suggests a  normal RA pressure of 3 mmHg.     Aortic Valve  The aortic valve is trileaflet with aortic valve sclerosis. No aortic  regurgitation is present. No aortic stenosis is present.     Pulmonic Valve  The pulmonic valve is not well seen, but is grossly normal. There is trace  pulmonic valvular regurgitation.     Vessels  Normal size aorta. The inferior vena cava was normal in size with preserved  respiratory variability.     Pericardium  There is no pericardial effusion.     Rhythm  Sinus rhythm was noted.  ______________________________________________________________________________  MMode/2D Measurements & Calculations  IVSd: 0.80 cm  LVIDd: 4.1 cm  LVIDs: 2.8 cm  LVPWd: 0.90 cm  FS: 31.4 %  LV mass(C)d: 105.6 grams  LV mass(C)dI: 70.4 grams/m2  Ao root diam: 3.0 cm  LA dimension: 3.3 cm  asc Aorta Diam: 3.4 cm  LA/Ao: 1.1  Ao root diam index Ht(cm/m): 1.9  Ao root diam index BSA (cm/m2): 2.0  Asc Ao diam index BSA (cm/m2): 2.3  Asc Ao diam index Ht(cm/m): 2.1  RV Base: 3.5 cm  RWT: 0.44  TAPSE: 3.0 cm     Doppler Measurements & Calculations  MV E max vadim: 78.8 cm/sec  MV A max vadim: 87.4 cm/sec  MV E/A: 0.90  MV dec time: 0.19 sec  TR max vadim: 293.2 cm/sec  TR max P.4 mmHg  E/E' av.4  Lateral E/e': 7.3  Medial E/e': 9.5  RV S Vadim: 15.3 cm/sec     ______________________________________________________________________________  Report approved by: Lupe Hope 10/09/2023 10:06 AM         XR Chest Port 1 View    Narrative    XR CHEST PORT 1 VIEW 10/9/2023 9:52 AM    HISTORY: strep pneumoniae pneumonia with septic shock, worsening  respiratory needs, re-evaluate pneumonia severity    COMPARISON: 2020 chest x-ray. Chest CT 10/7/2023    FINDINGS: There are increased interstitial lung markings. Patchy  and  consolidative airspace disease is better seen on prior chest CT,  previously in the right lung. Continued plain radiographic  surveillance until clearance is recommended. No large effusions or  pneumothorax. Normal cardiac size.    ИРИНА WILSON MD         SYSTEM ID:  [id number]   XR Chest 1 View    Narrative    EXAM: XR CHEST 1 VIEW  LOCATION: [facility]  DATE: 10/9/2023    INDICATION: RN placed PICC   verify tip placement  COMPARISON: CT chest 10/07/2023      Impression    IMPRESSION: Heart size within normal limits. Extensive airspace opacities throughout both lungs have progressed. No effusions. No pneumothorax. Left PICC line tip distal SVC.   CT Head w/o contrast*    Narrative    EXAM: CT HEAD W/O CONTRAST  LOCATION: [facility]  DATE: 10/10/2023    INDICATION: unequal pupils, AMS  COMPARISON: 02/12/2020  TECHNIQUE: Routine CT Head without IV contrast. Multiplanar reformats. Dose reduction techniques were used.    FINDINGS: Mildly motion degraded exam.  INTRACRANIAL CONTENTS: No intracranial hemorrhage, extraaxial collection, or mass effect.  No CT evidence of acute infarct. Mild presumed chronic small vessel ischemic changes. Mild to moderate generalized volume loss. No hydrocephalus.     VISUALIZED ORBITS/SINUSES/MASTOIDS: No intraorbital abnormality. Nonspecific fluid in the sphenoid sinuses. No middle ear or mastoid effusion.    BONES/SOFT TISSUES: No acute abnormality.      Impression    IMPRESSION:  1.  No CT evidence for acute intracranial process.  2.  Brain atrophy and presumed chronic microvascular ischemic changes as above.   XR Chest Port 1 View    Narrative    EXAM: XR CHEST PORT 1 VIEW  LOCATION: [facility]  DATE: 10/10/2023    INDICATION: ETT placement. Ventilator support.  COMPARISON: Portable chest single view 10/09/2023 at 1805 hours.      Impression    IMPRESSION: Interval placement of  endotracheal tube, tip 3.6 cm above the juan m. Left PICC tip at the cavoatrial junction, satisfactory positioning. Extensive bilateral infiltrates, more apparent on the right, with an associated small right pleural   effusion, slight further worsening. No appreciable effusion on the left. Normal cardiac size. Degenerative changes both shoulders and the spine. Monitoring leads overlying the chest.                  Recent Labs   Lab 10/10/23  0813 10/10/23  0634 10/10/23  0533 10/10/23  0152 10/10/23  0150 10/09/23  2230 10/09/23  2224 10/09/23  1205 10/09/23  1114 10/09/23  0835 10/09/23  0535 10/08/23  0740 10/08/23  0531 10/07/23  2058 10/07/23  2031   WBC  --   --  19.3*  --   --   --   --   --   --   --  16.2*  --  8.1   < > 8.7   HGB  --   --  9.4*  --   --   --   --   --   --   --  10.3*  --  9.0*   < > 12.6   MCV  --   --  99  --   --   --   --   --   --   --  97  --  98   < > 96   PLT  --   --  148*  --   --   --   --   --   --   --  129*  --  121*   < > 168   NA  --   --  132*  --  131*  --  132*   < > 129*  --  130*  130*   < > 126*   < > 123*   POTASSIUM  --   --  3.4  --   --   --   --   --  3.7  --  3.2*   < > 2.7*  --  3.3*   CHLORIDE  --   --  105  --   --   --   --   --   --   --  102  --  96*  --  87*   CO2  --   --  20*  --   --   --   --   --   --   --  18*  --  19*  --  19*   BUN  --   --  6.7*  --   --   --   --   --   --   --  10.9  --  23.4*  --  23.9*   CR  --   --  0.63  --   --   --  0.64  --   --   --  0.73  --  1.11*  --  1.22*   ANIONGAP  --   --  7  --   --   --   --   --   --   --  10  --  11  --  17*   SHON  --   --  7.4*  --   --   --   --   --   --   --  7.7*  --  6.9*  --  8.3*   * 94 88   < >  --    < >  --    < >  --    < > 227*   < > 195*   < > 80   ALBUMIN  --   --  1.8*  --   --   --   --   --   --   --  1.8*  --  1.9*  --  2.3*   PROTTOTAL  --   --  4.5*  --   --   --   --   --   --   --  4.6*  --  4.7*  --  6.5   BILITOTAL  --   --  <0.2  --   --   --   --   --   --    --  0.3  --  0.5  --  1.3*   ALKPHOS  --   --  69  --   --   --   --   --   --   --  68  --  59  --  119*   ALT  --   --  18  --   --   --   --   --   --   --  14  --  9  --  13   AST  --   --  40  --   --   --   --   --   --   --  32  --  28  --  39   LIPASE  --   --   --   --   --   --   --   --   --   --   --   --   --   --  9*    < > = values in this interval not displayed.

## 2023-10-10 NOTE — PLAN OF CARE
Goal Outcome Evaluation:    Plan of Care Reviewed With: spouse, patient    Overall Patient Progress: no change    Outcome Evaluation: Pt very lethargic at start of shift. Able to open eyes and inconsistently follow some commands. Left pupil had a sluggish response, weak hand grasps. MD informed and head CT ordered. Pt's resp rate continued to be 40-50 on bipap. Called tele ICU and intubation ordered. Pt intubated at 0330. Pt's RASS intially -2 to 0 after intubation. Pt started to become agitated, pulling at lines, and fighting the vent. Precedex gtt and restraints ordered. Pt continued to breathe over the vent with RR in the 40s. Dilaudid gtt and one time dose of versed given. Pt's RASS now -1 with Precedex and dilaudid gtts. ETT is at 22 cm at the lip. Thick cream/white secretions have been suctioned. Barney present with adequate UOP. Insulin gtt continues on algorithm 1 at 0.5 units/hr. Insulin gtt turned off at 0630.

## 2023-10-10 NOTE — PLAN OF CARE
"Goal Outcome Evaluation:      Plan of Care Reviewed With: patient    Overall Patient Progress: decliningOverall Patient Progress: declining    Outcome Evaluation: CIWAs scored 3-13. Ativan given protocol this AM.  Deies pain. Tele SR. Pt desaturated on NC. Tachypenic continued w/ RR in the 30s-40. Pt transitioned to HFNC, ABGs obtained, see results. D/t ABGs pt placed on BiPap. PICC placed.  Art line placed. IVF D5/ 0.45%NS increased to 100 ml/hr. Barney placed w/ good output. Insulin drip started. Bgs- 159 and 169. . Phos protocol started. K+ replaced with AM recheck. Mag AM recheck. Remains Afebrile.     /74   Pulse 77   Temp 97.7  F (36.5  C) (Axillary)   Resp (!) 45   Ht 1.626 m (5' 4\")   Wt 48.5 kg (107 lb)   LMP  (LMP Unknown)   SpO2 94%   BMI 18.37 kg/m      "

## 2023-10-10 NOTE — PROGRESS NOTES
TELE ICU    The patient is a 68 year old woman who was admitted to the ICU for acute respiratory failure due to multifocal pneumonia. The patient's additional medical issues include alcohol dependence, seizures due to alcohol withdrawal, prior gastric bypass, emphysema, chronic tobacco abuse, and psychiatric illness.  The patient was intubated last night at the request of the TELE staff; the patient has been supported overnight using conventional vent settings.     PAST MEDICAL HISTORY:  Past Medical History:   Diagnosis Date    Abnormal Papanicolaou smear of cervix and cervical HPV 4/07    Colposcopy 2007= HSIL    Genital herpes     History of colposcopy with cervical biopsy 06/2007    HSIL- LEEP recommended    Hypersomnia with sleep apnea, unspecified     CPAP started 2007    Other and unspecified ovarian cyst 2002 or so    s/p resection of ovary and tubes, d&c     PAST SURGICAL HISTORY:  Past Surgical History:   Procedure Laterality Date    CHOLECYSTECTOMY, OPEN  age 22s    COLONOSCOPY  03/21/2011    COMBINED COLONOSCOPY, REMOVE TUMOR/POLYP/LESION BY SNARE performed by JUAN FRANCISCO HERNANDEZ at  GI    COLONOSCOPY N/A 10/09/2017    polyps, repeat 3 years    COLPOSCOPY CERVIX, LOOP ELECTRODE BIOPSY, COMBINED  06/2007    CAN 2/3-patient requires yearly pap smears    dental pegs      ESOPHAGOSCOPY, GASTROSCOPY, DUODENOSCOPY (EGD), COMBINED N/A 02/09/2018    Procedure: COMBINED ESOPHAGOSCOPY, GASTROSCOPY, DUODENOSCOPY (EGD);  EGD;  Surgeon: Oneal Sanchez MD;  Location:  GI    GASTRIC BYPASS  03/25/2008    At [facility]    HC DILATION/CURETTAGE DIAG/THER NON OB  2002    HC ENLARGE BREAST WITH IMPLANT      HC LAPAROSCOPIC MYOMECTOMY, 1 - 4 INTRAMURAL MYOMAS =<250 GM  2002    HC REMOVAL OF BREAST IMPLANT      SALPINGO OOPHORECTOMY,R/L/MATTHEW  2002    Bilateral salpingectomy and unilateral oophorectomy     MEDICATIONS:  Current Facility-Administered Medications   Medication    [Held by provider] busPIRone (BUSPAR)  tablet 15 mg    [Held by provider] calcium carbonate-vitamin D (OSCAL) 500-5 MG-MCG per tablet 1 tablet    cefTRIAXone (ROCEPHIN) 2 g vial to attach to  ml bag for ADULTS or NS 50 ml bag for PEDS    dexmedeTOMIDine (PRECEDEX) 4 mcg/mL in NS infusion    dextrose 10% infusion    dextrose 10% infusion    dextrose 10% infusion    dextrose 5% and 0.45% NaCl infusion    glucose gel 15-30 g    Or    dextrose 50 % injection 25-50 mL    Or    glucagon injection 1 mg    enoxaparin ANTICOAGULANT (LOVENOX) injection 40 mg    [Held by provider] escitalopram (LEXAPRO) tablet 30 mg    [Held by provider] ferrous gluconate (FERGON) tablet 324 mg    flumazenil (ROMAZICON) injection 0.2 mg    [Held by provider] folic acid (FOLVITE) tablet 1 mg    HYDROmorphone (DILAUDID) 0.2 mg/mL infusion ADULT/PEDS GREATER than or EQUAL to 20 kg    insulin regular (MYXREDLIN) 1 unit/mL infusion    ipratropium - albuterol 0.5 mg/2.5 mg/3 mL (DUONEB) neb solution 3 mL    lidocaine (LMX4) cream    lidocaine (viscous) (XYLOCAINE) 2 % solution 5 mL    lidocaine 1 % 0.1-5 mL    melatonin tablet 1 mg    methylPREDNISolone sodium succinate (solu-MEDROL) injection 30 mg    metoclopramide (REGLAN) injection 5 mg    metoprolol (LOPRESSOR) injection 5 mg    midazolam (VERSED) injection 1 mg    multivitamins w/minerals liquid 15 mL    naloxone (NARCAN) injection 0.2 mg    Or    naloxone (NARCAN) injection 0.4 mg    Or    naloxone (NARCAN) injection 0.2 mg    Or    naloxone (NARCAN) injection 0.4 mg    nicotine (NICODERM CQ) 21 MG/24HR 24 hr patch 1 patch    nicotine Patch in Place    norepinephrine (LEVOPHED) 4 mg in  mL PERIPHERAL infusion    ondansetron (ZOFRAN ODT) ODT tab 4 mg    Or    ondansetron (ZOFRAN) injection 4 mg    pantoprazole (PROTONIX) IV push injection 40 mg    [Held by provider] raloxifene (EVISTA) tablet 60 mg    sodium chloride (PF) 0.9% PF flush 10-20 mL    sodium chloride (PF) 0.9% PF flush 10-40 mL    sodium chloride (PF) 0.9%  PF flush 10-40 mL    sodium chloride (PF) 0.9% PF flush 10-40 mL    sodium phosphate 9 mmol in 250 mL NS intermittent infusion    thiamine (B-1) injection 100 mg    [Held by provider] venlafaxine (EFFEXOR XR) 24 hr capsule 37.5 mg    [Held by provider] Vitamin D3 (CHOLECALCIFEROL) tablet 50 mcg     ALLERGIES:  Allergies   Allergen Reactions    Codeine Itching    Vicodin [Hydrocodone-Acetaminophen] Itching     SOCIAL HISTORY:  Social History     Socioeconomic History    Marital status:      Spouse name: Bran    Number of children: 0   Occupational History    Occupation: care director     Employer: [employer]    Occupation: [employer]     Employer: SELF     Employer: [employer]   Tobacco Use    Smoking status: Every Day     Packs/day: 2.00     Years: 10.00     Pack years: 20.00     Types: Cigarettes    Smokeless tobacco: Never    Tobacco comments:     2 ppd at this time (chain smoking) 10/2012   Vaping Use    Vaping Use: Every day    Substances: Nicotine, THC    Devices: Pre-filled or refillable cartridge   Substance and Sexual Activity    Alcohol use: Yes     Comment: daily, drinks a box    Drug use: Yes     Types: Marijuana     Comment: medical    Sexual activity: Yes     Partners: Male   Other Topics Concern     Service No    Blood Transfusions No     Social Determinants of Health     Financial Resource Strain: High Risk (4/24/2023)    Overall Financial Resource Strain (CARDIA)     Difficulty of Paying Living Expenses: Hard   Food Insecurity: Food Insecurity Present (4/24/2023)    Hunger Vital Sign     Worried About Running Out of Food in the Last Year: Sometimes true     Ran Out of Food in the Last Year: Sometimes true   Transportation Needs: Unmet Transportation Needs (4/24/2023)    PRAPARE - Transportation     Lack of Transportation (Medical): Yes     Lack of Transportation (Non-Medical): Yes   Physical Activity: Inactive (4/24/2023)    Exercise Vital Sign     Days of Exercise  "per Week: 0 days     Minutes of Exercise per Session: 0 min   Stress: Stress Concern Present (4/24/2023)    [place] [employer] - Occupational Stress Questionnaire     Feeling of Stress : Very much   Social Connections: Unknown (4/24/2023)    Social Connection and Isolation Panel [NHANES]     Frequency of Communication with Friends and Family: More than three times a week     Frequency of Social Gatherings with Friends and Family: Once a week     Attends Ep[employer] Services: Patient refused     Active Member of Clubs or Organizations: No     Attends Club or Organization Meetings: 1 to 4 times per year     Marital Status:    Housing Stability: Low Risk  (4/24/2023)    Housing Stability Vital Sign     Unable to Pay for Housing in the Last Year: No     Number of Places Lived in the Last Year: 1     Unstable Housing in the Last Year: No     FAMILY HISTORY:  Family History   Problem Relation Age of Onset    Chronic Obstructive Pulmonary Disease Mother     Unknown/Adopted Father         doesn't know birth father    Lung Cancer Brother     Family History Negative Other      Vital Signs: /84   Pulse 71   Temp 97.6  F (36.4  C) (Oral)   Resp 24   Ht 1.626 m (5' 4\")   Wt 48.5 kg (107 lb)   LMP  (LMP Unknown)   SpO2 96%   BMI 18.37 kg/m      Neuro:  Intubated and sedated with Precedex; likelihood of ETOH withdrawal is not an issues now that the patient is intubated and on sedation.  Continue thiamine and folate coverage.     Cardiac: Continue on the cardiac monitor.     Pulm: Acute hypoxic and hypercapnic acute respiratory failure due to community-acquired pneumonia and pre-existing COPD exacerbation. The patient had been counseled to stop tobacco; continue nicotine patch; continue inhaled bronchodilators and steroids.       GI: ETOH withdrawal unlikely now that the patient is intubated and sedated. Weight loss most likely due to ETOH starvation; agree with enteral nutritional support " and follow for possible refeeding syndrome.  The feeding tube appears to be in the pouch of the gastric bypass; the gastrografin demonstrates the outlet of the pouch leading to the jejunum.  Will obtain another abdominal film in the am and review gastrografin study again in the am and hope that the feeding tube will passing in to the jejunum overnight.  Radiologic staff physician coverage appears to be limited at the [facility].      : continue to replace electrolytes.    ID: community-acquired pneumonia; continue empiric broad-spectrum antibiotics with Rocephin, azithromycin, and vancomycin    HEME: chronic thrombocytopenia and anemia; continue to follow the CBC.    A/P: I discussed the patient with the [facility] staff physician, Dr. Elijah Johnson, today.  The patient remains critically ill and is fully supported on the vent.  Agree with treatment for COPD exacerbation and continued appropriate antibiotic coverage.  TELE will follow.    Elijah Bonilla MD, PhD

## 2023-10-10 NOTE — PROVIDER NOTIFICATION
Dr. Ramos notified of left pupil sluggish, right brisk. Head CT completed, no acute findings.   In addition, patient with tachypnea with respiratory rate in the 40s and BiPAP at 10/4 with FiO2 at 40%. SaO2 in the 90s. Ph 7.34, PO2 48 from artline. Dr. Ramos consulted on respiratory status and recommendation made to discuss with Tele ICU. Awaiting further direction.     Orders received to intubate. Patient's spouse updated via phone, reported he would be in during visiting hours, but he did arrive shortly after intubation. Did not see patient as multiple nursing tasks were being completed

## 2023-10-11 ENCOUNTER — APPOINTMENT (OUTPATIENT)
Dept: GENERAL RADIOLOGY | Facility: CLINIC | Age: 68
DRG: 870 | End: 2023-10-11
Attending: PEDIATRICS
Payer: MEDICARE

## 2023-10-11 ENCOUNTER — APPOINTMENT (OUTPATIENT)
Dept: GENERAL RADIOLOGY | Facility: CLINIC | Age: 68
DRG: 870 | End: 2023-10-11
Attending: SURGERY
Payer: MEDICARE

## 2023-10-11 PROBLEM — J95.859: Status: ACTIVE | Noted: 2023-10-11

## 2023-10-11 PROBLEM — D68.9 COAGULOPATHY (H): Status: ACTIVE | Noted: 2023-10-11

## 2023-10-11 PROBLEM — I27.20 PULMONARY HYPERTENSION (H): Status: ACTIVE | Noted: 2023-10-11

## 2023-10-11 LAB
ALBUMIN SERPL BCG-MCNC: 1.7 G/DL (ref 3.5–5.2)
ALLEN'S TEST: ABNORMAL
ALLEN'S TEST: YES
ALP SERPL-CCNC: 79 U/L (ref 35–104)
ALT SERPL W P-5'-P-CCNC: 15 U/L (ref 0–50)
ANION GAP SERPL CALCULATED.3IONS-SCNC: 6 MMOL/L (ref 7–15)
AST SERPL W P-5'-P-CCNC: 37 U/L (ref 0–45)
BASE EXCESS BLDA CALC-SCNC: -1.9 MMOL/L (ref -9–1.8)
BASE EXCESS BLDA CALC-SCNC: -2.6 MMOL/L (ref -9–1.8)
BASE EXCESS BLDA CALC-SCNC: -3.4 MMOL/L (ref -9–1.8)
BASE EXCESS BLDA CALC-SCNC: -4.2 MMOL/L (ref -9–1.8)
BASE EXCESS BLDA CALC-SCNC: -4.5 MMOL/L (ref -9–1.8)
BASE EXCESS BLDA CALC-SCNC: -4.7 MMOL/L (ref -9–1.8)
BASE EXCESS BLDA CALC-SCNC: -5.3 MMOL/L (ref -9–1.8)
BASE EXCESS BLDA CALC-SCNC: -6.5 MMOL/L (ref -9–1.8)
BASE EXCESS BLDA CALC-SCNC: -9.8 MMOL/L (ref -9–1.8)
BILIRUB SERPL-MCNC: 0.2 MG/DL
BUN SERPL-MCNC: 6.3 MG/DL (ref 8–23)
CALCIUM SERPL-MCNC: 7.1 MG/DL (ref 8.8–10.2)
CHLORIDE SERPL-SCNC: 108 MMOL/L (ref 98–107)
CREAT SERPL-MCNC: 0.58 MG/DL (ref 0.51–0.95)
DEPRECATED HCO3 PLAS-SCNC: 19 MMOL/L (ref 22–29)
EGFRCR SERPLBLD CKD-EPI 2021: >90 ML/MIN/1.73M2
ERYTHROCYTE [DISTWIDTH] IN BLOOD BY AUTOMATED COUNT: 13.9 % (ref 10–15)
GLUCOSE BLDC GLUCOMTR-MCNC: 100 MG/DL (ref 70–99)
GLUCOSE BLDC GLUCOMTR-MCNC: 101 MG/DL (ref 70–99)
GLUCOSE BLDC GLUCOMTR-MCNC: 107 MG/DL (ref 70–99)
GLUCOSE BLDC GLUCOMTR-MCNC: 76 MG/DL (ref 70–99)
GLUCOSE BLDC GLUCOMTR-MCNC: 77 MG/DL (ref 70–99)
GLUCOSE BLDC GLUCOMTR-MCNC: 88 MG/DL (ref 70–99)
GLUCOSE BLDC GLUCOMTR-MCNC: 90 MG/DL (ref 70–99)
GLUCOSE BLDC GLUCOMTR-MCNC: 92 MG/DL (ref 70–99)
GLUCOSE BLDC GLUCOMTR-MCNC: 98 MG/DL (ref 70–99)
GLUCOSE SERPL-MCNC: 96 MG/DL (ref 70–99)
HCO3 BLD-SCNC: 16 MMOL/L (ref 21–28)
HCO3 BLD-SCNC: 21 MMOL/L (ref 21–28)
HCO3 BLD-SCNC: 21 MMOL/L (ref 21–28)
HCO3 BLD-SCNC: 22 MMOL/L (ref 21–28)
HCO3 BLD-SCNC: 22 MMOL/L (ref 21–28)
HCO3 BLD-SCNC: 23 MMOL/L (ref 21–28)
HCO3 BLD-SCNC: 24 MMOL/L (ref 21–28)
HCT VFR BLD AUTO: 30.5 % (ref 35–47)
HGB BLD-MCNC: 10.4 G/DL (ref 11.7–15.7)
MAGNESIUM SERPL-MCNC: 1.6 MG/DL (ref 1.7–2.3)
MCH RBC QN AUTO: 34.8 PG (ref 26.5–33)
MCHC RBC AUTO-ENTMCNC: 34.1 G/DL (ref 31.5–36.5)
MCV RBC AUTO: 102 FL (ref 78–100)
NT-PROBNP SERPL-MCNC: 6788 PG/ML (ref 0–900)
O2/TOTAL GAS SETTING VFR VENT: 45 %
O2/TOTAL GAS SETTING VFR VENT: 50 %
O2/TOTAL GAS SETTING VFR VENT: 55 %
O2/TOTAL GAS SETTING VFR VENT: 65 %
OXYHGB MFR BLD: 93 % (ref 92–100)
OXYHGB MFR BLD: 98 % (ref 92–100)
PCO2 BLD: 35 MM HG (ref 35–45)
PCO2 BLD: 44 MM HG (ref 35–45)
PCO2 BLD: 45 MM HG (ref 35–45)
PCO2 BLD: 45 MM HG (ref 35–45)
PCO2 BLD: 46 MM HG (ref 35–45)
PCO2 BLD: 47 MM HG (ref 35–45)
PCO2 BLD: 48 MM HG (ref 35–45)
PH BLD: 7.25 [PH] (ref 7.35–7.45)
PH BLD: 7.28 [PH] (ref 7.35–7.45)
PH BLD: 7.28 [PH] (ref 7.35–7.45)
PH BLD: 7.29 [PH] (ref 7.35–7.45)
PH BLD: 7.29 [PH] (ref 7.35–7.45)
PH BLD: 7.3 [PH] (ref 7.35–7.45)
PH BLD: 7.31 [PH] (ref 7.35–7.45)
PH BLD: 7.33 [PH] (ref 7.35–7.45)
PH BLD: 7.33 [PH] (ref 7.35–7.45)
PHOSPHATE SERPL-MCNC: 2.1 MG/DL (ref 2.5–4.5)
PLATELET # BLD AUTO: 157 10E3/UL (ref 150–450)
PO2 BLD: 124 MM HG (ref 80–105)
PO2 BLD: 134 MM HG (ref 80–105)
PO2 BLD: 60 MM HG (ref 80–105)
PO2 BLD: 60 MM HG (ref 80–105)
PO2 BLD: 63 MM HG (ref 80–105)
PO2 BLD: 64 MM HG (ref 80–105)
PO2 BLD: 69 MM HG (ref 80–105)
PO2 BLD: 69 MM HG (ref 80–105)
PO2 BLD: 84 MM HG (ref 80–105)
POTASSIUM SERPL-SCNC: 3.6 MMOL/L (ref 3.4–5.3)
PROT SERPL-MCNC: 4.6 G/DL (ref 6.4–8.3)
RBC # BLD AUTO: 2.99 10E6/UL (ref 3.8–5.2)
SODIUM SERPL-SCNC: 133 MMOL/L (ref 135–145)
WBC # BLD AUTO: 28.2 10E3/UL (ref 4–11)

## 2023-10-11 PROCEDURE — 250N000011 HC RX IP 250 OP 636: Mod: JZ | Performed by: PEDIATRICS

## 2023-10-11 PROCEDURE — 99291 CRITICAL CARE FIRST HOUR: CPT | Performed by: PEDIATRICS

## 2023-10-11 PROCEDURE — 250N000013 HC RX MED GY IP 250 OP 250 PS 637: Performed by: PEDIATRICS

## 2023-10-11 PROCEDURE — 999N000065 XR ABDOMEN PORT 1 VIEW

## 2023-10-11 PROCEDURE — 200N000001 HC R&B ICU

## 2023-10-11 PROCEDURE — 250N000009 HC RX 250: Performed by: PEDIATRICS

## 2023-10-11 PROCEDURE — 85027 COMPLETE CBC AUTOMATED: CPT | Performed by: PEDIATRICS

## 2023-10-11 PROCEDURE — 82803 BLOOD GASES ANY COMBINATION: CPT | Performed by: PEDIATRICS

## 2023-10-11 PROCEDURE — 999N000065 XR ABDOMEN 1 VIEW

## 2023-10-11 PROCEDURE — 250N000013 HC RX MED GY IP 250 OP 250 PS 637: Performed by: INTERNAL MEDICINE

## 2023-10-11 PROCEDURE — C9113 INJ PANTOPRAZOLE SODIUM, VIA: HCPCS | Mod: JZ | Performed by: FAMILY MEDICINE

## 2023-10-11 PROCEDURE — 250N000011 HC RX IP 250 OP 636: Performed by: HOSPITALIST

## 2023-10-11 PROCEDURE — 250N000011 HC RX IP 250 OP 636: Mod: JZ | Performed by: INTERNAL MEDICINE

## 2023-10-11 PROCEDURE — 258N000003 HC RX IP 258 OP 636: Performed by: PEDIATRICS

## 2023-10-11 PROCEDURE — P9047 ALBUMIN (HUMAN), 25%, 50ML: HCPCS | Performed by: PEDIATRICS

## 2023-10-11 PROCEDURE — 82805 BLOOD GASES W/O2 SATURATION: CPT | Performed by: INTERNAL MEDICINE

## 2023-10-11 PROCEDURE — 250N000011 HC RX IP 250 OP 636: Mod: JZ | Performed by: FAMILY MEDICINE

## 2023-10-11 PROCEDURE — 258N000003 HC RX IP 258 OP 636: Performed by: HOSPITALIST

## 2023-10-11 PROCEDURE — G0508 CRIT CARE TELEHEA CONSULT 60: HCPCS | Mod: 95 | Performed by: INTERNAL MEDICINE

## 2023-10-11 PROCEDURE — 250N000009 HC RX 250

## 2023-10-11 PROCEDURE — 80053 COMPREHEN METABOLIC PANEL: CPT | Performed by: PEDIATRICS

## 2023-10-11 PROCEDURE — 94003 VENT MGMT INPAT SUBQ DAY: CPT

## 2023-10-11 PROCEDURE — 82805 BLOOD GASES W/O2 SATURATION: CPT | Performed by: PEDIATRICS

## 2023-10-11 PROCEDURE — 82803 BLOOD GASES ANY COMBINATION: CPT | Performed by: FAMILY MEDICINE

## 2023-10-11 PROCEDURE — 999N000157 HC STATISTIC RCP TIME EA 10 MIN

## 2023-10-11 PROCEDURE — 84100 ASSAY OF PHOSPHORUS: CPT | Performed by: FAMILY MEDICINE

## 2023-10-11 PROCEDURE — 83735 ASSAY OF MAGNESIUM: CPT | Performed by: FAMILY MEDICINE

## 2023-10-11 PROCEDURE — 83880 ASSAY OF NATRIURETIC PEPTIDE: CPT | Performed by: PEDIATRICS

## 2023-10-11 RX ORDER — MAGNESIUM SULFATE HEPTAHYDRATE 40 MG/ML
2 INJECTION, SOLUTION INTRAVENOUS ONCE
Status: COMPLETED | OUTPATIENT
Start: 2023-10-11 | End: 2023-10-11

## 2023-10-11 RX ORDER — MINERAL OIL, PETROLATUM 425; 573 MG/G; MG/G
OINTMENT OPHTHALMIC EVERY 8 HOURS
Status: DISCONTINUED | OUTPATIENT
Start: 2023-10-11 | End: 2023-10-19

## 2023-10-11 RX ORDER — DEXMEDETOMIDINE HYDROCHLORIDE 4 UG/ML
.1-1.2 INJECTION, SOLUTION INTRAVENOUS CONTINUOUS
Status: DISCONTINUED | OUTPATIENT
Start: 2023-10-11 | End: 2023-10-11

## 2023-10-11 RX ORDER — SODIUM CHLORIDE, SODIUM LACTATE, POTASSIUM CHLORIDE, CALCIUM CHLORIDE 600; 310; 30; 20 MG/100ML; MG/100ML; MG/100ML; MG/100ML
INJECTION, SOLUTION INTRAVENOUS CONTINUOUS
Status: DISCONTINUED | OUTPATIENT
Start: 2023-10-11 | End: 2023-10-13

## 2023-10-11 RX ORDER — METHYLPREDNISOLONE SODIUM SUCCINATE 40 MG/ML
30 INJECTION, POWDER, LYOPHILIZED, FOR SOLUTION INTRAMUSCULAR; INTRAVENOUS EVERY 24 HOURS
Status: DISCONTINUED | OUTPATIENT
Start: 2023-10-12 | End: 2023-10-19

## 2023-10-11 RX ORDER — FUROSEMIDE 10 MG/ML
20 INJECTION INTRAMUSCULAR; INTRAVENOUS ONCE
Status: COMPLETED | OUTPATIENT
Start: 2023-10-11 | End: 2023-10-11

## 2023-10-11 RX ORDER — POTASSIUM CHLORIDE 7.45 MG/ML
10 INJECTION INTRAVENOUS ONCE
Status: COMPLETED | OUTPATIENT
Start: 2023-10-11 | End: 2023-10-11

## 2023-10-11 RX ORDER — PROPOFOL 10 MG/ML
5-75 INJECTION, EMULSION INTRAVENOUS CONTINUOUS
Status: DISCONTINUED | OUTPATIENT
Start: 2023-10-11 | End: 2023-10-18

## 2023-10-11 RX ORDER — WATER 10 ML/10ML
INJECTION INTRAMUSCULAR; INTRAVENOUS; SUBCUTANEOUS
Status: COMPLETED
Start: 2023-10-11 | End: 2023-10-11

## 2023-10-11 RX ORDER — FUROSEMIDE 10 MG/ML
20 INJECTION INTRAMUSCULAR; INTRAVENOUS EVERY 8 HOURS
Status: DISCONTINUED | OUTPATIENT
Start: 2023-10-11 | End: 2023-10-13

## 2023-10-11 RX ORDER — NOREPINEPHRINE BITARTRATE 0.02 MG/ML
.01-.6 INJECTION, SOLUTION INTRAVENOUS CONTINUOUS
Status: DISCONTINUED | OUTPATIENT
Start: 2023-10-11 | End: 2023-10-19

## 2023-10-11 RX ORDER — ALBUMIN (HUMAN) 12.5 G/50ML
12.5 SOLUTION INTRAVENOUS ONCE
Status: COMPLETED | OUTPATIENT
Start: 2023-10-11 | End: 2023-10-11

## 2023-10-11 RX ADMIN — FUROSEMIDE 20 MG: 10 INJECTION, SOLUTION INTRAMUSCULAR; INTRAVENOUS at 20:24

## 2023-10-11 RX ADMIN — PROPOFOL 75 MCG/KG/MIN: 10 INJECTION, EMULSION INTRAVENOUS at 20:31

## 2023-10-11 RX ADMIN — DIATRIZOATE MEGLUMINE AND DIATRIZOATE SODIUM 10 ML: 660; 100 SOLUTION ORAL; RECTAL at 11:06

## 2023-10-11 RX ADMIN — FUROSEMIDE 20 MG: 10 INJECTION, SOLUTION INTRAMUSCULAR; INTRAVENOUS at 11:56

## 2023-10-11 RX ADMIN — MIDAZOLAM 1 MG: 1 INJECTION INTRAMUSCULAR; INTRAVENOUS at 08:08

## 2023-10-11 RX ADMIN — POTASSIUM CHLORIDE 10 MEQ: 10 INJECTION, SOLUTION INTRAVENOUS at 09:57

## 2023-10-11 RX ADMIN — PROPOFOL 10 MCG/KG/MIN: 10 INJECTION, EMULSION INTRAVENOUS at 10:38

## 2023-10-11 RX ADMIN — NOREPINEPHRINE BITARTRATE 0.09 MCG/KG/MIN: 0.02 INJECTION, SOLUTION INTRAVENOUS at 23:18

## 2023-10-11 RX ADMIN — PANTOPRAZOLE SODIUM 40 MG: 40 INJECTION, POWDER, FOR SOLUTION INTRAVENOUS at 20:27

## 2023-10-11 RX ADMIN — NOREPINEPHRINE BITARTRATE 0.03 MCG/KG/MIN: 0.02 INJECTION, SOLUTION INTRAVENOUS at 04:48

## 2023-10-11 RX ADMIN — DEXTROSE AND SODIUM CHLORIDE: 5; 450 INJECTION, SOLUTION INTRAVENOUS at 11:36

## 2023-10-11 RX ADMIN — PANTOPRAZOLE SODIUM 40 MG: 40 INJECTION, POWDER, FOR SOLUTION INTRAVENOUS at 09:20

## 2023-10-11 RX ADMIN — ALBUMIN HUMAN 12.5 G: 0.25 SOLUTION INTRAVENOUS at 12:21

## 2023-10-11 RX ADMIN — SODIUM CHLORIDE, POTASSIUM CHLORIDE, SODIUM LACTATE AND CALCIUM CHLORIDE: 600; 310; 30; 20 INJECTION, SOLUTION INTRAVENOUS at 13:34

## 2023-10-11 RX ADMIN — SODIUM PHOSPHATE, MONOBASIC, MONOHYDRATE AND SODIUM PHOSPHATE, DIBASIC, ANHYDROUS 9 MMOL: 142; 276 INJECTION, SOLUTION INTRAVENOUS at 08:46

## 2023-10-11 RX ADMIN — DEXMEDETOMIDINE HYDROCHLORIDE 0.9 MCG/KG/HR: 4 INJECTION, SOLUTION INTRAVENOUS at 03:41

## 2023-10-11 RX ADMIN — MIDAZOLAM 1 MG: 1 INJECTION INTRAMUSCULAR; INTRAVENOUS at 09:40

## 2023-10-11 RX ADMIN — SODIUM CHLORIDE 500 ML: 9 INJECTION, SOLUTION INTRAVENOUS at 03:07

## 2023-10-11 RX ADMIN — Medication 1 PATCH: at 10:48

## 2023-10-11 RX ADMIN — METHYLPREDNISOLONE SODIUM SUCCINATE 30 MG: 40 INJECTION INTRAMUSCULAR; INTRAVENOUS at 06:38

## 2023-10-11 RX ADMIN — ENOXAPARIN SODIUM 40 MG: 40 INJECTION SUBCUTANEOUS at 20:24

## 2023-10-11 RX ADMIN — MINERAL OIL, PETROLATUM: 425; 573 OINTMENT OPHTHALMIC at 18:22

## 2023-10-11 RX ADMIN — CEFTRIAXONE SODIUM 2 G: 2 INJECTION, POWDER, FOR SOLUTION INTRAMUSCULAR; INTRAVENOUS at 20:23

## 2023-10-11 RX ADMIN — MIDAZOLAM 1 MG: 1 INJECTION INTRAMUSCULAR; INTRAVENOUS at 18:22

## 2023-10-11 RX ADMIN — MAGNESIUM SULFATE HEPTAHYDRATE 2 G: 40 INJECTION, SOLUTION INTRAVENOUS at 08:46

## 2023-10-11 RX ADMIN — WATER 10 ML: 1 INJECTION INTRAMUSCULAR; INTRAVENOUS; SUBCUTANEOUS at 06:42

## 2023-10-11 RX ADMIN — THIAMINE HYDROCHLORIDE 100 MG: 100 INJECTION, SOLUTION INTRAMUSCULAR; INTRAVENOUS at 11:05

## 2023-10-11 ASSESSMENT — ACTIVITIES OF DAILY LIVING (ADL)
ADLS_ACUITY_SCORE: 48

## 2023-10-11 NOTE — PROGRESS NOTES
"ICU progress note    Additional events today:  Sedation switched from Precedex infusion to propofol infusion  Insulin infusion discontinued  IV fluids switched to lactated Ringer's infusion at lower rate  IV albumin administered  IV Lasix started  Arterial line catheter replaced due to malfunction  Enteral gastric feeding started 10 mL/h with plan to advance only very gradually due to Tom-en-Y gastric bypass status, gastric volume status reported at 30 cc according to surgeon's operative report from Tom-en-Y gastric bypass surgery on March 25, 2008  Enteral free water supplementation discontinued due to Tom-en-Y gastric bypass status (likely would not tolerate volume of 120 mL for same)  Ventilator settings adjusted including increasing PEEP from 7 to 10    Patient reevaluated:  BP 94/61   Pulse 87   Temp 98.6  F (37  C) (Oral)   Resp 23   Ht 1.626 m (5' 4\")   Wt 45.8 kg (101 lb)   LMP  (LMP Unknown)   SpO2 94%   BMI 17.34 kg/m    Presently appears to be resting comfortably on the ventilator, respiratory effort synchronous with the ventilator  Regular rate and rhythm  Opens eyes to voice and fixes gaze on writer briefly in response to verbal cue but not following instructions and no purposeful limb movements  Symmetric and normal limb tone with symmetric DTRs, no involuntary movements, and no sustained clonus    Recent Labs   Lab 10/11/23  1651 10/11/23  1516 10/11/23  1242 10/11/23  1104   PH 7.28* 7.29* 7.28* 7.29*   PCO2 48* 46* 35 44   PO2 64* 124* 63* 60*   HCO3 23 22 16* 21   O2PER 55 65 65 65     PaO2 to FiO2 ratio has decreased from 191 this afternoon now to 116    Plan:  Continue current antibiotic  Wean IV steroids to every 24 hours and anticipate stopping steroids tomorrow  Continue IV Lasix and reconsider additional doses of IV albumin as indicated  Continue sedation with propofol infusion and boluses of Versed as needed titrated to RASS goal  Continue analgesia with hydromorphone " infusion  Continue norepinephrine infusion titrated to keep MAP 65 and higher  Adjusting ventilator settings based on clinical course and ABG results  Ordered prone positioning   Continue enteral feeding at 10 mL/h and do not advance feeding rate while monitoring for tolerance of low rate of enteral feeding, not starting enteral free water supplementation  Continue IV fluids at lower rate to support fluid needs due to inability to do administer enteral free water supplementation    Discussed treatment plan with intensivist Dr. Gonzalez by telephone today    Elijah Johnson MD

## 2023-10-11 NOTE — PROGRESS NOTES
[facility]    Medicine Progress Note - Hospitalist Service    Date of Admission:  10/7/2023    Assessment & Plan   Patient is a 68-year-old female with COPD, alcohol dependence recently consuming large volume alcohol daily, gastric bypass surgery, depression/PTSD, and ongoing tobacco abuse who presented with generalized weakness and fatigue and was admitted due to concerns for multifocal community-acquired pneumonia and sepsis.  She had rapid worsening over the first 12 hours of her hospitalization with persistent hypotension despite fluid resuscitation and was transferred to the ICU and started on vasopressor therapy with Levophed due to concern for septic shock.  After initial improvement in septic shock, patient developed worsening respiratory failure and worsening alcohol withdrawal with delirium requiring sedative therapy and subsequent intubation for mechanical ventilation in early a.m. 10/10.  She remains critically ill with potentially life-threatening respiratory failure, pneumonia, septic shock, and alcohol withdrawal.    Septic shock due to streptococcal pneumoniae bacteremia and multifocal pneumonia  Acute respiratory failure with hypoxia  Patient ventilator dyssynchrony  Presented with weakness and had bilateral infiltrates concerning for community-acquired pneumonia.  Blood culture from admission grew Streptococcus pneumoniae.  Sepsis rapidly progressed to septic shock requiring initiation of vasopressors.  Although hemodynamic status improved, she developed worsening delirium and respiratory failure necessitating intubation and initiation of mechanical ventilation in a.m. 10/10.  PaO2 to FiO2 ratio prior to intubation was as low as 120 and initially improved after intubation.  Over the last 24 hours, respiratory status has deteriorated with increased signs of patient ventilator dyssynchrony that likely contribute to worsening hypercapnia and hypoxia.  Leukocytosis has  worsened for unclear reasons but she remains afebrile with stable pulmonary radiographic findings.  Hypotension worsened probably due to sedation.  -Continue Rocephin    -Continue IV steroids which were started 10/8, considered stopping steroids in the context of pneumococcal sepsis but because of tenuous hemodynamic status we will continue them for now  -Titrate vasopressors with Levophed to keep MAP 65 or higher  -Ordered recheck WBC, ABG  -Adjusting mechanical ventilation to attempt to optimize oxygenation (keep oxygen saturations 90 to 95%), may allow permissive hypercapnia  -Switch Precedex infusion to propofol infusion for sedation titrated to RASS score -3 to -4 due to worsening patient ventilator dyssynchrony and worsening respiratory status  -Continue hydromorphone infusion for analgesia while on ventilator  -Continue bolus doses of Versed as needed for sedation  -If respiratory status does not improve or worsens despite heavier sedation, could consider adding paralytic agent  -Continue triple-lumen PICC line central venous catheter  -Continue arterial line catheter  -Continue indwelling urinary catheter    Alcohol use disorder (severe dependence) acute alcohol withdrawal with delirium   History of seizure due to alcohol withdrawal  Per  patient had been drinking an average of 12 pack of beer, 1 bottle of wine, and 10 hard liquor drinks a day recently with most recent alcoholic drink approximately 30 minutes prior to hospital presentation.  Blood alcohol level was not tested at time of admission.  Patient developed overt signs of withdrawal on 10/9 including increased anxiety, tremor, confusion and possible hallucination.  Initial treatment of alcohol withdrawal per CIWA protocol was complicated by sedation that may have exacerbated respiratory failure.  Nursing reports that patient's mentation was normal early in her hospital stay prior to onset of alcohol withdrawal.  Patient has not had overt  meningismus lowering suspicion for infectious meningoencephalitis.  Signs of alcohol withdrawal are difficult to assess because of current sedation while on the ventilator but persistent or worsening alcohol withdrawal could contribute to increased patient ventilator dyssynchrony  -Continuing treatment for respiratory failure as outlined above and while she is intubated will use a combination of propofol infusion, hydromorphone infusion, and Versed as needed for sedation    Suspected malnutrition  Hypoalbuminemia   Status post gastric bypass for obesity  Has remote history of Tom-en-Y gastric bypass surgery for obesity in March 2008.  Reported 13 pound weight loss in recent weeks and appears underweight and probably cachectic on exam.  Alcoholism likely contributes to malnutrition as may previous gastric bypass status.  Serum albumin was low at admission at 2.8 and has decreased and she is now starting to show signs of peripheral edema.  Noncardiogenic pulmonary edema from hypoalbuminemia could exacerbate respiratory failure.  Her previous Tom-en-Y gastric bypass surgery may limit ability to advance feeding tube beyond the gastric remnant.  -Attempting to adjust feeding tube placement today to advance tip beyond the gastric remnant if possible but interventional radiology and/or fluoroscopy are not available at this hospital to assist with such placement  -Registered dietitian assisting with recommendations for initiation of enteral feeding, anticipate starting very low rate enteral feeding with tube feeds today depending on positioning of the feeding tube but would defer initiation of free water supplementation today (continuing IV fluids)  -Consider IV albumin administration with IV Lasix to support hemodynamic status and/or respiratory status depending upon clinical course    Hypomagnesemia  Hypophosphatemia   Hypokalemia  Presented with low serum magnesium and potassium levels and subsequently developed low  serum phosphorus level all likely secondary to nutritional deficiency.  These electrolytes have improved with supplementation.  -Continue with potassium, magnesium, and phosphorus supplementation per protocol in patient with critical illness  -Monitor magnesium, phosphorus, and potassium as indicated    Hyponatremia  Sodium 123 on admission with suspected hypovolemic hyponatremia.  After treatment with multiple normal saline boluses, sodium increased from 123 up to 128 in less than 12 hours after which IV fluids were switched to D5 half-normal saline.  Sodium then decreased to 126 and later stabilized at about 130 and today is 133.  Urine sodium was less than 20 consistent with hypovolemic hyponatremia.  -Continue current IV fluid infusion with D5 with 1/2NS titrating IV fluid rate to support fluid needs until enteral feeding and/or free water can be advanced and treat hyponatremia  -Ordered recheck of serum sodium and osmolality    Hyperglycemia  Patient has had mild to moderate hyperglycemia while in the ICU.  Hemoglobin A1c was 4.4 and she is not known to have underlying diabetes.  Insulin infusion was started for management of hyperglycemia in the ICU.  Anticipate hyperglycemia will worsen with initiation of enteral feeding.  -Continue to monitor blood sugars per protocol  -Continue insulin infusion per protocol for management of hyperglycemia  -Continuing IV fluids containing dextrose while receiving insulin infusion    Thrombocytopenia  Anemia, unspecified type  Coagulopathy  Presented with thrombocytopenia and anemia at admission both of which initially worsened but have subsequently stabilized.  She also is mildly coagulopathic with prolonged INR.  Thrombocytopenia, coagulopathy, and anemia are probably due to septic shock.  Active bleeding has not been suspected so far.  -Ordered recheck of hemoglobin and platelet  -Ordered recheck of INR    Emphysema/COPD  Tobacco abuse  Known chronic emphysema and COPD  without overt acute exacerbation.  Patient reportedly has continued to smoke.  -Continuing IV steroids secondary to septic shock and pneumococcal pneumonia but also COPD  -Continue bronchodilators as needed  -Nicotine patch supplementation started during hospitalization.    Dehydration  Presented with dehydration which likely contributed to hyponatremia and initial low normal blood pressures, now resolved after IV fluid treatment.  -Continue IV fluids for now as outlined above    Weakness generalized  Suspect presenting complaint of generalized weakness was multifactorial including infection, severe alcohol dependence, and suspected malnutrition with nutrient including electrolyte deficiencies.  -Treat acute medical problems  -Anticipate PT and OT evaluations if she recovers from life-threatening acute illness once patient improves    Episode of recurrent major depressive disorder, unspecified depression episode severity (H24)  PTSD (post-traumatic stress disorder)  Carries previous diagnosis of depression and PTSD treated chronically with Lexapro 30 mg daily, clonazepam 0.5 mg 3 times daily as needed for anxiety, BuSpar 15 mg 3 times a day, Effexor 37.5 mg daily, and Xyzal 5 mg daily.  At admission it was unclear whether the patient has recently been taking these medications reliably.  She is now intubated and being sedated.  -Holding these chronic medications for now (no reliable enteral access yet) and abrupt discontinuation of these medications could conceivably contribute to increased anxiety/agitation          Diet: NPO for Medical/Clinical Reasons Except for: No Exceptions  Adult Formula Drip Feeding: Continuous Vital 1.5; Orojejunal; Goal Rate: 40; mL/hr; From: 12:00 AM; To: 12:00 AM; Please start at 10 mL/hr x 24 hrs. Recheck lytes. If WDL, advance to 25 mL/hr x 24 hrs. Recheck lytes. If WDL, advance to goal rate o...    DVT Prophylaxis: Enoxaparin (Lovenox) SQ  Barney Catheter: PRESENT, indication: Strict  "1-2 Hour I&O  Lines: PRESENT      PICC 10/09/23 Triple Lumen Left Basilic OK TO USE per PICC STAT RN-Site Assessment: WDL  Arterial Line 10/09/23 Radial-Site Assessment: WDL      Cardiac Monitoring: ACTIVE order. Indication: ICU  Code Status: Full Code      Clinically Significant Risk Factors         # Hyponatremia: Lowest Na = 129 mmol/L in last 2 days, will monitor as appropriate    # Hypomagnesemia: Lowest Mg = 1.6 mg/dL in last 2 days, will replace as needed   # Hypoalbuminemia: Lowest albumin = 1.7 g/dL at 10/11/2023  6:12 AM, will monitor as appropriate  # Coagulation Defect: INR = 1.16 (Ref range: 0.85 - 1.15) and/or PTT = 30 Seconds (Ref range: 22 - 38 Seconds), will monitor for bleeding           # Cachexia: Estimated body mass index is 17.34 kg/m  as calculated from the following:    Height as of this encounter: 1.626 m (5' 4\").    Weight as of this encounter: 45.8 kg (101 lb)., PRESENT ON ADMISSION            Disposition Plan    unknown         Elijah Johnson MD  Hospitalist Service  [facility]  Securely message with [employer] (more info)  Text page via [facility] Paging/Directory   ______________________________________________________________________    Interval History   Antibiotics were de-escalated to monotherapy with Rocephin yesterday based on culture results.  An attempt was made to place nasogastric feeding tube yesterday.  Patient has had previous Tom-en-Y gastric bypass surgery.  After placement, x-ray appeared to demonstrate tip of the feeding tube in the gastric remnant.  Enteral feeding has not yet been initiated.  Overnight the patient was given 1 dose 20 mg IV Lasix when urine output was noted to be lower.  She developed worsening hypotension, so norepinephrine infusion was restarted overnight.  This morning she has had worsening hypoxia with associated tachypnea overbreathing the ventilator while receiving continuous Precedex infusion.  After additional sedation " with bolus doses of Versed, patient appeared to rest more comfortably although continued to overbreathe the ventilator rate.  Ventilator was switched from AC to SIMV mode with pressure support.  She has remained afebrile.  Heart rate has been normal.  Urine output has been borderline oliguric.  She has not had a bowel movement in over 24 hours.    Physical Exam   Vital Signs: Temp: 98.7  F (37.1  C) Temp src: Oral BP: 96/66 Pulse: 71   Resp: (!) 31 SpO2: 91 % O2 Device: Mechanical Ventilator    Vent Mode: SIMV  (Synchronized Intermittent Mandatory Ventilation)  FiO2 (%): 55 %  Resp Rate (Set): 22 breaths/min  Tidal Volume (Set, mL): 350 mL  PEEP (cm H2O): 7 cmH2O  Pressure Support (cm H2O): 10 cmH2O  Resp: (!) 31    Ventilator measurements: Peak inspiratory pressure 24, mean airway pressure 12, plateau pressure 24, minute volume 8.46 while on AC mode overnight    Patient Vitals for the past 24 hrs:   BP Temp Temp src Pulse Resp SpO2 Weight   10/11/23 0945 96/66 -- -- 71 (!) 31 91 % --   10/11/23 0930 107/72 -- -- 75 28 92 % --   10/11/23 0915 101/64 -- -- 79 28 97 % --   10/11/23 0900 117/80 -- -- 77 28 97 % --   10/11/23 0845 124/84 -- -- 92 27 99 % --   10/11/23 0830 109/83 -- -- 93 27 99 % --   10/11/23 0815 127/87 -- -- 98 15 96 % --   10/11/23 0800 112/78 -- -- 90 21 (!) 77 % --   10/11/23 0745 107/77 98.7  F (37.1  C) Oral 66 28 -- --   10/11/23 0730 119/72 -- -- 66 20 -- --   10/11/23 0715 110/72 -- -- 67 28 -- --   10/11/23 0642 -- -- -- -- -- -- 45.8 kg (101 lb)   10/11/23 0620 (!) 84/52 -- -- 71 20 (!) 79 % --   10/11/23 0615 (!) 81/57 -- -- 70 13 92 % --   10/11/23 0610 -- -- -- 71 14 92 % --   10/11/23 0605 -- -- -- 71 19 92 % --   10/11/23 0600 (!) 86/58 -- -- 71 (!) 0 95 % --   10/11/23 0555 112/76 -- -- 74 (!) 0 96 % --   10/11/23 0550 -- -- -- 69 (!) 36 (!) 65 % --   10/11/23 0545 (!) 170/105 -- -- 92 16 93 % --   10/11/23 0540 -- -- -- 86 29 92 % --   10/11/23 0535 -- -- -- 89 28 92 % --   10/11/23  0530 (!) 139/91 -- -- 68 15 93 % --   10/11/23 0525 -- -- -- 70 15 93 % --   10/11/23 0520 -- -- -- 69 24 96 % --   10/11/23 0515 (!) 146/101 -- -- 82 28 94 % --   10/11/23 0510 -- -- -- 70 23 93 % --   10/11/23 0505 -- -- -- 88 30 94 % --   10/11/23 0500 115/75 -- -- 56 (!) 9 95 % --   10/11/23 0455 -- -- -- 67 (!) 7 92 % --   10/11/23 0450 -- -- -- 67 13 95 % --   10/11/23 0445 106/73 -- -- 67 17 93 % --   10/11/23 0440 -- -- -- 55 22 93 % --   10/11/23 0435 -- -- -- 55 19 95 % --   10/11/23 0430 94/66 -- -- 54 14 94 % --   10/11/23 0425 -- -- -- 53 27 94 % --   10/11/23 0420 -- -- -- 54 22 96 % --   10/11/23 0415 101/71 -- -- 61 11 93 % --   10/11/23 0410 -- -- -- 54 17 93 % --   10/11/23 0405 -- -- -- 54 17 94 % --   10/11/23 0400 92/64 -- -- 54 23 93 % --   10/11/23 0345 -- -- -- 53 13 94 % --   10/11/23 0330 -- -- -- 53 12 95 % --   10/11/23 0315 -- -- -- 53 18 95 % --   10/11/23 0300 102/72 98.8  F (37.1  C) Oral 54 19 96 % --   10/11/23 0245 -- -- -- 53 11 95 % --   10/11/23 0230 -- -- -- 53 14 95 % --   10/11/23 0215 -- -- -- 52 23 91 % --   10/11/23 0200 103/72 -- -- 53 19 94 % --   10/11/23 0145 -- -- -- 54 27 93 % --   10/11/23 0130 100/71 -- -- 54 22 94 % --   10/11/23 0115 -- -- -- 54 28 94 % --   10/11/23 0100 (!) 82/59 -- -- 53 22 90 % --   10/11/23 0045 91/54 -- -- 53 (!) 5 93 % --   10/11/23 0030 -- -- -- 55 25 93 % --   10/11/23 0015 -- -- -- 56 29 94 % --   10/11/23 0000 102/61 -- -- 55 27 94 % --   10/10/23 2300 106/69 97.8  F (36.6  C) Axillary 56 23 96 % --   10/10/23 2200 126/82 -- -- 67 27 94 % --   10/10/23 2100 118/80 -- -- 60 29 (!) 89 % --   10/10/23 2000 126/87 97.4  F (36.3  C) Oral 68 17 -- --   10/10/23 1900 131/83 -- -- 71 28 92 % --   10/10/23 1840 128/88 98  F (36.7  C) Oral 70 30 99 % --   10/10/23 1730 95/68 -- -- 54 21 94 % --   10/10/23 1700 124/82 -- -- 57 27 95 % --   10/10/23 1630 118/80 -- -- 58 23 93 % --   10/10/23 1545 97/69 97.9  F (36.6  C) Oral 56 23 93 % --    10/10/23 1515 100/64 -- -- 55 14 98 % --   10/10/23 1400 125/76 -- -- 61 28 94 % --   10/10/23 1300 122/78 -- -- 63 28 95 % --   10/10/23 1240 -- 97.6  F (36.4  C) Oral 71 24 96 % --   10/10/23 1200 131/84 -- -- 68 15 100 % 46.4 kg (102 lb 4.8 oz)   10/10/23 1135 -- -- -- 70 (!) 35 97 % --   10/10/23 1020 112/77 -- -- 60 26 96 % --     Weight: 101 lbs 0 oz  Vitals:    10/08/23 0048 10/09/23 0548 10/10/23 1200 10/11/23 0642   Weight: 48.6 kg (107 lb 2.3 oz) 48.5 kg (107 lb) 46.4 kg (102 lb 4.8 oz) 45.8 kg (101 lb)       Intake/Output Summary (Last 24 hours) at 10/11/2023 1005  Last data filed at 10/11/2023 0700  Gross per 24 hour   Intake 3593.58 ml   Output 909 ml   Net 2684.58 ml     General Appearance: Cachectic elderly woman, intubated and sedated  Respiratory: Tachypneic overbreathing ventilator rate, not using accessory muscles, no stridor, no crepitus in the neck, symmetric breath sounds with scattered coarse rhonchi, no wheezes  Cardiovascular: Regular rate and rhythm, weak but palpable radial pulse, normal capillary refill in the hands and feet which are pink  GI: Hypoactive somewhat tympanitic bowel sounds, nondistended abdomen, soft  Skin: No rashes, left upper arm and right radial PICC and arterial line sites are without bleeding, drainage, or surrounding skin erythema  Neuro: Unresponsive, intermittent spontaneous movements of the limbs    Medical Decision Making       Total critical care time 65 minutes so far today including time spent reassessing respiratory status and adjusting mechanical ventilation treatment of life-threatening respiratory failure       Data     I have personally reviewed the following data over the past 24 hrs:    28.2 (H)  \   10.4 (L)   / 157     133 (L) 108 (H) 6.3 (L) /  100 (H)   3.6 19 (L) 0.58 \     ALT: 15 AST: 37 AP: 79 TBILI: 0.2   ALB: 1.7 (L) TOT PROTEIN: 4.6 (L) LIPASE: N/A     INR:  1.16 (H) PTT:  30   D-dimer:  N/A Fibrinogen:  N/A       Blood sugar    Magnesium 1.6, phosphorus 2.1  Blood cultures from October 7, eighth, and ninth are negative (except for initial positive blood culture on October 7)  Stool testing for enteric pathogens and clostridia difficile was negative    Recent Labs   Lab 10/11/23  0902 10/11/23  0636 10/10/23  0533 10/10/23  0044   PH 7.30* 7.25* 7.29* 7.34*   PCO2 45 47* 45 43   PO2 60* 69* 130* 48*   HCO3 22 21 21 23   O2PER 65 55 80 40     PaO2 to FiO2 ratio 92, worsened compared with 125 this morning and 163 yesterday morning    Imaging results reviewed over the past 24 hrs:   Recent Results (from the past 24 hour(s))   XR Abdomen Port 1 View    Narrative    EXAM: XR ABDOMEN PORT 1 VIEW  LOCATION: [facility]  DATE/TIME: 10/10/2023 4:04 PM CDT    INDICATION: Enteric tube placement  COMPARISON: Chest CT from 10/07/2023      Impression    IMPRESSION:   Enteric tube terminates at the level of the proximal stomach. Gastric bypass changes. There is gaseous distention of the small and large bowel. Patchy multifocal pulmonary opacities.   X-ray Abdomen 1 vw    Narrative    EXAM: XR ABDOMEN 1 VIEW  LOCATION: [facility]  DATE: 10/10/2023    INDICATION: Please portalbe obtain upright abdominal x ray after administration of gastrograffin.  COMPARISON: 10/10/2023 at 1600 hours      Impression    IMPRESSION: Enteric tube in the stomach. Small amount of Gastrografin contrast in the stomach. Multiple dilated loops of small bowel again noted in the upper abdomen.   X-ray Abdomen 1 vw    Addendum: 10/11/2023    ADDENDUM:    Addendum for clarification on enteric tube position. The enteric tube tip is below the diaphragmatic hiatus in the gastric remnant, near the gastrojejunal anastomotic staple line related to prior gastric bypass surgery.    END OF ADDENDUM      Narrative    EXAM: XR ABDOMEN 1 VIEW  LOCATION: [facility]  DATE: 10/11/2023    INDICATION:  Gastrograffin study for feeding tube placement.  COMPARISON: Prior day abdominal radiograph.      Impression    IMPRESSION: Stable lung bases including diffuse reticulonodular opacities and left central venous catheter which terminates at the superior cavoatrial junction. Enteric tube contains some contrast however no significant contrast residual within the   stomach, or elsewhere within the bowel. Correlate with tube on suction. Gas dilated loops of small and large bowel overlie the upper abdomen. Degenerative changes of the thoracolumbar spine.     Recent Labs   Lab 10/11/23  0841 10/11/23  0612 10/11/23  0349 10/10/23  2154 10/10/23  2120 10/10/23  1338 10/10/23  1223 10/10/23  0634 10/10/23  0533 10/09/23  2230 10/09/23  2224 10/09/23  0835 10/09/23  0535 10/07/23  2355 10/07/23  2031   WBC  --  28.2*  --   --   --   --   --   --  19.3*  --   --   --  16.2*   < > 8.7   HGB  --  10.4*  --   --   --   --   --   --  9.4*  --   --   --  10.3*   < > 12.6   MCV  --  102*  --   --   --   --   --   --  99  --   --   --  97   < > 96   PLT  --  157  --   --   --   --   --   --  148*  --   --   --  129*   < > 168   INR  --   --   --   --   --   --  1.16*  --   --   --   --   --   --   --   --    NA  --  133*  --   --   --   --  129*  --  132*   < > 132*   < > 130*  130*   < > 123*   POTASSIUM  --  3.6  --   --  4.1  --  3.4  --  3.4  --   --    < > 3.2*   < > 3.3*   CHLORIDE  --  108*  --   --   --   --  104  --  105  --   --   --  102   < > 87*   CO2  --  19*  --   --   --   --  18*  --  20*  --   --   --  18*   < > 19*   BUN  --  6.3*  --   --   --   --   --   --  6.7*  --   --   --  10.9   < > 23.9*   CR  --  0.58  --   --   --   --   --   --  0.63  --  0.64  --  0.73   < > 1.22*   ANIONGAP  --  6*  --   --   --   --  7  --  7  --   --   --  10   < > 17*   SHON  --  7.1*  --   --   --   --   --   --  7.4*  --   --   --  7.7*   < > 8.3*   * 96 98   < >  --    < >  --    < > 88   < >  --    < > 227*   < > 80    ALBUMIN  --  1.7*  --   --   --   --   --   --  1.8*  --   --   --  1.8*   < > 2.3*   PROTTOTAL  --  4.6*  --   --   --   --   --   --  4.5*  --   --   --  4.6*   < > 6.5   BILITOTAL  --  0.2  --   --   --   --   --   --  <0.2  --   --   --  0.3   < > 1.3*   ALKPHOS  --  79  --   --   --   --   --   --  69  --   --   --  68   < > 119*   ALT  --  15  --   --   --   --   --   --  18  --   --   --  14   < > 13   AST  --  37  --   --   --   --   --   --  40  --   --   --  32   < > 39   LIPASE  --   --   --   --   --   --   --   --   --   --   --   --   --   --  9*    < > = values in this interval not displayed.

## 2023-10-11 NOTE — PROGRESS NOTES
TELE ICU Progress Note          Assessment and Plan:     Pavithra Raines is a 68 year old patient being cared for in the ICU for multifocal pneumonia. Pertinent assessment and recommendations include:  Neurology: Needs sedation and analgesia to tolerate ventilator. Agree with targeting a lower RASS for now and using combination of opioid and sedatives as you are.    Cardiovascular / Hemodynamics: Requiring low to moderate dose of Norepinephrine. Patient does not have pre existing diagnosis of hypertension. May be appropriate to target MAP of 60 if only on a very small dose.   Pulmonary: Severe ARDS. Presenting problem was multi focal pneumonia however has progressed. P/f less than 100 on 60 on 65% FiO2. No evidence of COPD exacerbation. Peak pressure is less than 20, although patient interacting with ventilator.  - Agree w increasing peep and sedation  - If these measures do not work should place this patient in prone position. Would recommend using back up SIMV settings unless patient is overbreathing ventilator in which case should match her underlying RR (within reason).   - TV is about 8ml/kg, which is within guidelines If plateau pressures rise significantly once placed prone and very synchronous, should decrease TV and increase RR as necessary to achieve pH more than 7.2. Tele will monitor for these changes but please call us if you have questions, especially after 7pm as we may be attending to patients who are physically on our unit.  - Agree with decreasing/stopping steroids   GI and Nutrition: Unable to pass feeding tube past gastric remnant. Reviewed CT with our radiologist who was not able to estimate its size.  - Agree with trial of tube feeding. Hospitalist will ask whether there is a way to concentrate formula.   Renal, Fluid and Electrolytes: Nothing to add.   -Agree w trial of diuresis   Infectious Disease: Nothing to add   Hematology and Oncology: Nothing to add   Endocrinology: As above.  Agree w stopping steroids.    Intensive Care: Central line: picc. Is needed  Arterial line: Radial. Is needed  Restraint: defer  Barney catheter: Per unit protocol  DVT prophylaxis: Enoxaparin  GI prophylaxis: Pantoprazole   Primary Team Communication: Dr. Elizabeth Rosario: Total critical care time spent by me, excluding procedures, was 45 minutes.      Ramila Gonzalez MD  10/11/2023           Hospital Course and Key events       The patient remains critically ill with Hypoxic respiratory failure. In the last 24 hours, her oxygenation has deteriorated. Bedside doctor has increased her sedation for dyssynchrony which was helpful and has also increased her PEEP; repeat blood gas pending.             Medications:      [Held by provider] busPIRone  15 mg Oral TID    [Held by provider] calcium carbonate-vitamin D  1 tablet Oral BID    cefTRIAXone  2 g Intravenous Q24H    enoxaparin ANTICOAGULANT  40 mg Subcutaneous At Bedtime    [Held by provider] escitalopram  30 mg Oral Daily    [Held by provider] ferrous gluconate  324 mg Oral Daily with breakfast    [Held by provider] folic acid  1 mg Oral Daily    furosemide  20 mg Intravenous Q8H    [START ON 10/12/2023] methylPREDNISolone  30 mg Intravenous Q24H    multivitamins w/minerals  15 mL Per Feeding Tube Daily    nicotine  1 patch Transdermal Daily    nicotine   Transdermal Q8H    pantoprazole  40 mg Intravenous BID    [Held by provider] raloxifene  60 mg Oral Daily    sodium chloride (PF)  10-40 mL Intracatheter Q7 Days    thiamine  100 mg Intravenous Daily    [Held by provider] venlafaxine  37.5 mg Oral Daily    [Held by provider] vitamin D3  50 mcg Oral Daily      dextrose      HYDROmorphone 0.3 mg/hr (10/11/23 1024)    lactated ringers 50 mL/hr at 10/11/23 1334    propofol 15 mcg/kg/min (10/11/23 1046)    And    - MEDICATION INSTRUCTIONS -      norepinephrine 0.1 mcg/kg/min (10/11/23 1108)            Vitals and Exam:       Vital Sign Ranges  Temperature Temp   Av.1  F (36.7  C)  Min: 97.4  F (36.3  C)  Max: 98.8  F (37.1  C)   Blood pressure Systolic (24hrs), Av , Min:80 , Max:170        Diastolic (24hrs), Av, Min:51, Max:105      Pulse Pulse  Av.4  Min: 52  Max: 98   Respirations Resp  Av.3  Min: 0  Max: 36   Pulse oximetry SpO2  Av %  Min: 65 %  Max: 99 %       I/O    Intake/Output Summary (Last 24 hours) at 10/11/2023 1243  Last data filed at 10/11/2023 1136  Gross per 24 hour   Intake 3779.78 ml   Output 859 ml   Net 2920.78 ml       Vent Mode: SIMV/PS  (Synchronized Intermittent Mandatory Ventilation with Pressure Support)  FiO2 (%): 55 %  Resp Rate (Set): 22 breaths/min  Tidal Volume (Set, mL): 350 mL  PEEP (cm H2O): 7 cmH2O  Pressure Support (cm H2O): 10 cmH2O  Resp: 26    Peak pressures: upper teens    Physical Examination:   I examined this patient remotely using the telemedicine camera in the [facility]. The patient is located at [facility] ([place]    General Appearance:   Stated age, intubated   HEENT:   Endotrachael tube is present     Respiratory:   Synchronous when I view her.      Neuro:   Sedation: appears well sedated            Pertinent Data:     All pertinent labs and diagnostic studies were reviewed    Labs:  CMP  Recent Labs   Lab 10/11/23  1230 10/11/23  1114 10/11/23  0841 10/11/23  0612 10/10/23  2154 10/10/23  2120 10/10/23  1338 10/10/23  1223 10/10/23  0634 10/10/23  0533 10/10/23  0152 10/10/23  0150 10/09/23  2230 10/09/23  2224 10/09/23  1825 10/09/23  1809 10/09/23  0835 10/09/23  0535 10/08/23  0740 10/08/23  0531   NA  --   --   --  133*  --   --   --  129*  --  132*  --  131*  --  132*  --  130*   < > 130*  130*   < > 126*   POTASSIUM  --   --   --  3.6  --  4.1  --  3.4  --  3.4  --   --   --   --   --   --    < > 3.2*   < > 2.7*   CHLORIDE  --   --   --  108*  --   --   --  104  --  105  --   --   --   --   --   --   --  102  --  96*   CO2  --   --   --  19*  --   --    --  18*  --  20*  --   --   --   --   --   --   --  18*  --  19*   ANIONGAP  --   --   --  6*  --   --   --  7  --  7  --   --   --   --   --   --   --  10  --  11   * 77 100* 96   < >  --    < >  --    < > 88   < >  --    < >  --    < >  --    < > 227*   < > 195*   BUN  --   --   --  6.3*  --   --   --   --   --  6.7*  --   --   --   --   --   --   --  10.9  --  23.4*   CR  --   --   --  0.58  --   --   --   --   --  0.63  --   --   --  0.64  --   --   --  0.73  --  1.11*   GFRESTIMATED  --   --   --  >90  --   --   --   --   --  >90  --   --   --  >90  --   --   --  90  --  55*   SHON  --   --   --  7.1*  --   --   --   --   --  7.4*  --   --   --   --   --   --   --  7.7*  --  6.9*   MAG  --   --   --  1.6*  --   --   --   --   --  1.8  --   --   --   --   --   --   --  1.9  --  2.3   PHOS  --   --   --  2.1*  --   --   --   --   --  2.6  --   --   --   --   --  1.7*  --  1.8*  --   --    PROTTOTAL  --   --   --  4.6*  --   --   --   --   --  4.5*  --   --   --   --   --   --   --  4.6*  --  4.7*   ALBUMIN  --   --   --  1.7*  --   --   --   --   --  1.8*  --   --   --   --   --   --   --  1.8*  --  1.9*   BILITOTAL  --   --   --  0.2  --   --   --   --   --  <0.2  --   --   --   --   --   --   --  0.3  --  0.5   ALKPHOS  --   --   --  79  --   --   --   --   --  69  --   --   --   --   --   --   --  68  --  59   AST  --   --   --  37  --   --   --   --   --  40  --   --   --   --   --   --   --  32  --  28   ALT  --   --   --  15  --   --   --   --   --  18  --   --   --   --   --   --   --  14  --  9    < > = values in this interval not displayed.     CBC  Recent Labs   Lab 10/11/23  0612 10/10/23  0533 10/09/23  0535 10/08/23  0531   WBC 28.2* 19.3* 16.2* 8.1   RBC 2.99* 2.71* 3.01* 2.59*   HGB 10.4* 9.4* 10.3* 9.0*   HCT 30.5* 26.9* 29.1* 25.4*   * 99 97 98   MCH 34.8* 34.7* 34.2* 34.7*   MCHC 34.1 34.9 35.4 35.4   RDW 13.9 13.4 13.0 12.9    148* 129* 121*     INR  Recent Labs   Lab  10/10/23  1223   INR 1.16*     Arterial Blood Gas  Recent Labs   Lab 10/11/23  1104 10/11/23  0902 10/11/23  0636 10/10/23  0533   PH 7.29* 7.30* 7.25* 7.29*   PCO2 44 45 47* 45   PO2 60* 60* 69* 130*   HCO3 21 22 21 21   O2PER 65 65 55 80     Lactic Acid   Date Value Ref Range Status   10/08/2023 1.3 0.7 - 2.0 mmol/L Final   09/07/2020 1.4 0.7 - 2.0 mmol/L Final       Imaging:  XR Abdomen Port 1 View  Narrative: ABDOMEN ONE VIEW PORTABLE  10/11/2023 11:07 AM     HISTORY: History of Tom-en-y gastric bypass, now intubated, check  feeding tube placement.    COMPARISON: Abdominal radiograph 10/11/2023 and prior.   Impression: IMPRESSION: Feeding tube tip projects in the region of the gastric  pouch with small amount of contrast within proximal jejunal loops,  likely the Tom limb. No convincing extraluminal contrast, however  evaluation is limited with single AP view.     Similar extensive bibasilar reticulonodular opacities. Persistent  dilated loops of small and large bowel within the visualized abdomen.  X-ray Abdomen 1 vw  Addendum: ADDENDUM:     Addendum for clarification on enteric tube position. The enteric tube tip  is below the diaphragmatic hiatus in the gastric remnant, near the  gastrojejunal anastomotic staple line related to prior gastric bypass  surgery.     END OF ADDENDUM  Narrative: EXAM: XR ABDOMEN 1 VIEW  LOCATION: [facility]  DATE: 10/11/2023    INDICATION: Gastrograffin study for feeding tube placement.  COMPARISON: Prior day abdominal radiograph.  Impression: IMPRESSION: Stable lung bases including diffuse reticulonodular opacities and left central venous catheter which terminates at the superior cavoatrial junction. Enteric tube contains some contrast however no significant contrast residual within the   stomach, or elsewhere within the bowel. Correlate with tube on suction. Gas dilated loops of small and large bowel overlie the upper abdomen. Degenerative  changes of the thoracolumbar spine.

## 2023-10-11 NOTE — ANESTHESIA PROCEDURE NOTES
Arterial Line Procedure Note    Pre-Procedure   Staff -        CRNA: Cabrera Palacios APRN CRNA       Other Anesthesia Staff: Kayy Booker       Performed By: CRNA       Location: ICU       Pre-Anesthestic Checklist: patient identified, risks and benefits discussed, informed consent, monitors and equipment checked and pre-op evaluation  Timeout:       Correct Patient: Yes        Correct Procedure: Yes        Correct Site: Yes        Correct Position: Yes   Line Placement:   This line was placed Post Induction  Procedure   Procedure: arterial line and new line       Laterality: left       Insertion Site: radial.  Sterile Prep        All elements of maximal sterile barrier technique followed       Patient Prep/Sterile Barriers: hand hygiene, sterile gloves, hat, mask, draped, sterile gown, patient draped, draped       Skin prep: Chloraprep  Insertion/Injection        Technique: ultrasound guided and Seldinger Technique        1. Ultrasound was used to evaluate the access site.       2. Artery evaluated via ultrasound for patency/adequacy.       3. Using real-time ultrasound the needle/catheter was observed entering the artery/vein.       5. The visualized structures were anatomically normal.       6. There were no apparent abnormal pathologic findings.       Catheter size: 20 Ga, 15cm.  Narrative         Secured by: suture       Tegaderm and Biopatch dressing used.       Complications: None apparent,        Arterial waveform: Yes        IBP within 10% of NIBP: Yes   Comments:  Right  art line had poor wave form and not drawing back. Replaced a new one on the left side.

## 2023-10-11 NOTE — PLAN OF CARE
Goal Outcome Evaluation:      Plan of Care Reviewed With: patient    Overall Patient Progress: no changeOverall Patient Progress: no change    Outcome Evaluation: note    S-(situation): End of Shift Note    B-(background): Dehydration [E86.0]  Hyponatremia [E87.1]  Weakness generalized [R53.1]  Alcohol use disorder, severe, dependence (H) [F10.20]  Multifocal pneumonia [J18.9]    A-(assessment): Major shift events:   Changed sedation for goal RASS -3 to -4  New ART line placed  Started Enteral Tube feeding at 1530 at 10ml/hour via feeding tube  Stopped precedex and Continuous insulin infusion  Replaced Mg, K+, and Phosph = all to be rechecked in am  Discontinued wrist restraints  Nasal Bridle attached to tube feeding 1 inch below nares    Neuro: RASS 0 to - and now -3 after changing precedex to propofol  CMS: had 1 episode of ridgity at 1200 with repositioning -- all limbs rigid and difficulty bending any joints - lasted approx 1 hour  Pulmonary: coarse LS through out.  Vent settings per md and RT.  Has remained on Fio2 65% and now down to 55%  CV: SR  +1 edema this am and now slight to none  GI: +flatus.  Okay to use feeding tube - verified by xray. Started trickle feed  : Barney catheter - 20mg IV lasix ++good urine output after clear yellow  Skin: warm and dry  Pain: continuous dilaudid gtt - CPOT 4 this am and now 0  Activity: bedrest with turns every 2 hours, desats with repositioning  Hygiene: CHG and cath wipes done this shift  Lines/Tubes/Drains: triple lumen PICC, Feeding Tube, ART line. 1 peripheral IV line  Drips: dilaudid, levophed, propofol, LR

## 2023-10-11 NOTE — PLAN OF CARE
Goal Outcome Evaluation:     Discontinuation of Restraints    S- Restraint    B- Removal - patient sedated    A- Circumstances that led to the removal of restraints = patient unable to make voluntary movement to pull lines out. Specific behaviors that met discontinuation criteria= sedated. Preventive measures used to reduce the need to reapply restraints = monitor.    R- monitor closely

## 2023-10-11 NOTE — PLAN OF CARE
Goal Outcome Evaluation:      Plan of Care Reviewed With: patient    Overall Patient Progress: no changeOverall Patient Progress: no change    Outcome Evaluation: Pt remained drowsy/ lightly sedated all shift. Arouses to repeated stimuli. Pupils are reactive, left pupil is sluggish- unchanged. Tele SR/SB. LS diminished. Good urine output w/ mccabe. Lower lip swelling noted, oral care performed and tube position changed. IVF D5/0.45 Na 50 ml/hr. Precedex gtt remains at 0.9 mcg/kg/hr. Dilaudid gtt remains at 0.3 mg/hr. Post-Pyloric tube attempted to be placed, gastrografin ordered w/ repeat xray ordered for 0700. Insulin gtt, stopped at 1740. K+ replaced, recheck  at 2100. Mag and Phos replaced with AM recheck.

## 2023-10-11 NOTE — PROGRESS NOTES
S: RT Note  B: Resp failure/ MV  A: pt remains on MV. See flowsheet for parameters. No vent changes this shift.  Sx x4 for moderate amt white/yellow per ETT. ETT 24 at lips.  ETT securement device changed. ETT moved Q2 per protocol by RT/RNs  No SBT done per MD order.  R: RT to follow

## 2023-10-11 NOTE — PLAN OF CARE
Goal Outcome Evaluation:           Overall Patient Progress: no changeOverall Patient Progress: no change    Outcome Evaluation: Vent - FiO2 55%, RR 22, PEEP 7, Vt 350. ET 23cm at the lips. Insulin currently off, continuing with Q2H blood sugars. Precedex at 0.8 mcg/kg/hr, Dilaudid at 0.3 mg/hr. Patient able to respond to commands and open eyes but appears comfortable. Low urine output and borderline MAPs, 500cc fluid bolus given per Hospitalist. Order was placed by Tele ICU for Lasix due to low urine output, but upon further discussion with them it was not given due to soft pressures, borderline MAP and bolus being given.  Levophed initiated for MAPs < 50-60. Post pyloric feeding tube clamped, awaiting repeat xray this morning to verify placement. Maintenance fluids had been decreased to 50cc/hr in anticipation of tube feedings but tube remains clamped per provider orders. Order received to increase fluids to 75cc/hr overnight.

## 2023-10-11 NOTE — PROGRESS NOTES
Patient desats with repositioning - on VENT settings per ordered =sats decreased 80%-88% for 5 minutes after turning

## 2023-10-11 NOTE — ADDENDUM NOTE
Addendum  created 10/11/23 1610 by Cabrera Palacios APRN CRNA    Child order released for a procedure order, Clinical Note Signed, Intraprocedure Blocks edited, LDA properties accepted, SmartForm saved

## 2023-10-12 LAB
ALLEN'S TEST: ABNORMAL
ALLEN'S TEST: ABNORMAL
ALLEN'S TEST: NORMAL
ALLEN'S TEST: NORMAL
BASE EXCESS BLDA CALC-SCNC: -0.1 MMOL/L (ref -9–1.8)
BASE EXCESS BLDA CALC-SCNC: -0.2 MMOL/L (ref -9–1.8)
BASE EXCESS BLDA CALC-SCNC: 0.6 MMOL/L (ref -9–1.8)
BASE EXCESS BLDA CALC-SCNC: 1.4 MMOL/L (ref -9–1.8)
CREAT SERPL-MCNC: 0.65 MG/DL (ref 0.51–0.95)
EGFRCR SERPLBLD CKD-EPI 2021: >90 ML/MIN/1.73M2
GLUCOSE BLDC GLUCOMTR-MCNC: 103 MG/DL (ref 70–99)
GLUCOSE BLDC GLUCOMTR-MCNC: 105 MG/DL (ref 70–99)
GLUCOSE BLDC GLUCOMTR-MCNC: 105 MG/DL (ref 70–99)
GLUCOSE BLDC GLUCOMTR-MCNC: 108 MG/DL (ref 70–99)
GLUCOSE BLDC GLUCOMTR-MCNC: 86 MG/DL (ref 70–99)
GLUCOSE BLDC GLUCOMTR-MCNC: 86 MG/DL (ref 70–99)
GLUCOSE SERPL-MCNC: 147 MG/DL (ref 70–99)
HCO3 BLD-SCNC: 25 MMOL/L (ref 21–28)
HCO3 BLD-SCNC: 25 MMOL/L (ref 21–28)
HCO3 BLD-SCNC: 26 MMOL/L (ref 21–28)
HCO3 BLD-SCNC: 26 MMOL/L (ref 21–28)
MAGNESIUM SERPL-MCNC: 1.5 MG/DL (ref 1.7–2.3)
MAGNESIUM SERPL-MCNC: 1.9 MG/DL (ref 1.7–2.3)
O2/TOTAL GAS SETTING VFR VENT: 45 %
O2/TOTAL GAS SETTING VFR VENT: 55 %
PCO2 BLD: 39 MM HG (ref 35–45)
PCO2 BLD: 41 MM HG (ref 35–45)
PCO2 BLD: 44 MM HG (ref 35–45)
PCO2 BLD: 46 MM HG (ref 35–45)
PH BLD: 7.37 [PH] (ref 7.35–7.45)
PH BLD: 7.37 [PH] (ref 7.35–7.45)
PH BLD: 7.39 [PH] (ref 7.35–7.45)
PH BLD: 7.43 [PH] (ref 7.35–7.45)
PHOSPHATE SERPL-MCNC: 2.3 MG/DL (ref 2.5–4.5)
PLATELET # BLD AUTO: 201 10E3/UL (ref 150–450)
PO2 BLD: 100 MM HG (ref 80–105)
PO2 BLD: 104 MM HG (ref 80–105)
PO2 BLD: 105 MM HG (ref 80–105)
PO2 BLD: 120 MM HG (ref 80–105)
POTASSIUM SERPL-SCNC: 2.6 MMOL/L (ref 3.4–5.3)
POTASSIUM SERPL-SCNC: 3.7 MMOL/L (ref 3.4–5.3)

## 2023-10-12 PROCEDURE — 250N000011 HC RX IP 250 OP 636: Performed by: PEDIATRICS

## 2023-10-12 PROCEDURE — C9113 INJ PANTOPRAZOLE SODIUM, VIA: HCPCS | Mod: JZ | Performed by: FAMILY MEDICINE

## 2023-10-12 PROCEDURE — 85049 AUTOMATED PLATELET COUNT: CPT | Performed by: FAMILY MEDICINE

## 2023-10-12 PROCEDURE — 250N000011 HC RX IP 250 OP 636: Mod: JZ | Performed by: FAMILY MEDICINE

## 2023-10-12 PROCEDURE — 94003 VENT MGMT INPAT SUBQ DAY: CPT

## 2023-10-12 PROCEDURE — 83735 ASSAY OF MAGNESIUM: CPT | Performed by: PEDIATRICS

## 2023-10-12 PROCEDURE — 84100 ASSAY OF PHOSPHORUS: CPT | Performed by: PEDIATRICS

## 2023-10-12 PROCEDURE — 999N000157 HC STATISTIC RCP TIME EA 10 MIN

## 2023-10-12 PROCEDURE — 99291 CRITICAL CARE FIRST HOUR: CPT | Performed by: INTERNAL MEDICINE

## 2023-10-12 PROCEDURE — 82803 BLOOD GASES ANY COMBINATION: CPT | Performed by: INTERNAL MEDICINE

## 2023-10-12 PROCEDURE — 250N000009 HC RX 250: Performed by: PEDIATRICS

## 2023-10-12 PROCEDURE — 82803 BLOOD GASES ANY COMBINATION: CPT | Performed by: FAMILY MEDICINE

## 2023-10-12 PROCEDURE — 82947 ASSAY GLUCOSE BLOOD QUANT: CPT | Performed by: INTERNAL MEDICINE

## 2023-10-12 PROCEDURE — 250N000013 HC RX MED GY IP 250 OP 250 PS 637: Performed by: INTERNAL MEDICINE

## 2023-10-12 PROCEDURE — 84132 ASSAY OF SERUM POTASSIUM: CPT | Performed by: INTERNAL MEDICINE

## 2023-10-12 PROCEDURE — 250N000011 HC RX IP 250 OP 636: Mod: JZ | Performed by: INTERNAL MEDICINE

## 2023-10-12 PROCEDURE — 258N000003 HC RX IP 258 OP 636: Performed by: INTERNAL MEDICINE

## 2023-10-12 PROCEDURE — 99207 PR NO BILLABLE SERVICE THIS VISIT: CPT | Performed by: INTERNAL MEDICINE

## 2023-10-12 PROCEDURE — 250N000013 HC RX MED GY IP 250 OP 250 PS 637: Performed by: PEDIATRICS

## 2023-10-12 PROCEDURE — 83735 ASSAY OF MAGNESIUM: CPT | Performed by: INTERNAL MEDICINE

## 2023-10-12 PROCEDURE — 82565 ASSAY OF CREATININE: CPT | Performed by: FAMILY MEDICINE

## 2023-10-12 PROCEDURE — 200N000001 HC R&B ICU

## 2023-10-12 PROCEDURE — 250N000009 HC RX 250: Performed by: INTERNAL MEDICINE

## 2023-10-12 PROCEDURE — 258N000003 HC RX IP 258 OP 636: Performed by: PEDIATRICS

## 2023-10-12 RX ORDER — MAGNESIUM SULFATE HEPTAHYDRATE 40 MG/ML
2 INJECTION, SOLUTION INTRAVENOUS ONCE
Status: COMPLETED | OUTPATIENT
Start: 2023-10-12 | End: 2023-10-12

## 2023-10-12 RX ORDER — POTASSIUM CHLORIDE 7.45 MG/ML
10 INJECTION INTRAVENOUS
Status: COMPLETED | OUTPATIENT
Start: 2023-10-12 | End: 2023-10-12

## 2023-10-12 RX ORDER — POTASSIUM CHLORIDE 7.45 MG/ML
10 INJECTION INTRAVENOUS ONCE
Status: COMPLETED | OUTPATIENT
Start: 2023-10-12 | End: 2023-10-12

## 2023-10-12 RX ADMIN — PROPOFOL 75 MCG/KG/MIN: 10 INJECTION, EMULSION INTRAVENOUS at 06:26

## 2023-10-12 RX ADMIN — METHYLPREDNISOLONE SODIUM SUCCINATE 30 MG: 40 INJECTION INTRAMUSCULAR; INTRAVENOUS at 06:02

## 2023-10-12 RX ADMIN — POTASSIUM CHLORIDE 10 MEQ: 10 INJECTION, SOLUTION INTRAVENOUS at 09:23

## 2023-10-12 RX ADMIN — MINERAL OIL, PETROLATUM: 425; 573 OINTMENT OPHTHALMIC at 01:33

## 2023-10-12 RX ADMIN — POTASSIUM CHLORIDE 10 MEQ: 7.46 INJECTION, SOLUTION INTRAVENOUS at 18:03

## 2023-10-12 RX ADMIN — FUROSEMIDE 20 MG: 10 INJECTION, SOLUTION INTRAMUSCULAR; INTRAVENOUS at 12:37

## 2023-10-12 RX ADMIN — SODIUM CHLORIDE, POTASSIUM CHLORIDE, SODIUM LACTATE AND CALCIUM CHLORIDE: 600; 310; 30; 20 INJECTION, SOLUTION INTRAVENOUS at 20:10

## 2023-10-12 RX ADMIN — PROPOFOL 75 MCG/KG/MIN: 10 INJECTION, EMULSION INTRAVENOUS at 01:33

## 2023-10-12 RX ADMIN — MINERAL OIL, PETROLATUM: 425; 573 OINTMENT OPHTHALMIC at 11:06

## 2023-10-12 RX ADMIN — PANTOPRAZOLE SODIUM 40 MG: 40 INJECTION, POWDER, FOR SOLUTION INTRAVENOUS at 21:03

## 2023-10-12 RX ADMIN — FUROSEMIDE 20 MG: 10 INJECTION, SOLUTION INTRAMUSCULAR; INTRAVENOUS at 04:18

## 2023-10-12 RX ADMIN — POTASSIUM CHLORIDE 10 MEQ: 10 INJECTION, SOLUTION INTRAVENOUS at 08:25

## 2023-10-12 RX ADMIN — POTASSIUM CHLORIDE 10 MEQ: 10 INJECTION, SOLUTION INTRAVENOUS at 13:34

## 2023-10-12 RX ADMIN — ENOXAPARIN SODIUM 40 MG: 40 INJECTION SUBCUTANEOUS at 21:03

## 2023-10-12 RX ADMIN — SODIUM CHLORIDE, POTASSIUM CHLORIDE, SODIUM LACTATE AND CALCIUM CHLORIDE: 600; 310; 30; 20 INJECTION, SOLUTION INTRAVENOUS at 06:02

## 2023-10-12 RX ADMIN — HYDROMORPHONE HYDROCHLORIDE 0.3 MG/HR: 10 INJECTION, SOLUTION INTRAMUSCULAR; INTRAVENOUS; SUBCUTANEOUS at 21:58

## 2023-10-12 RX ADMIN — MINERAL OIL, PETROLATUM: 425; 573 OINTMENT OPHTHALMIC at 18:32

## 2023-10-12 RX ADMIN — SODIUM PHOSPHATE, MONOBASIC, MONOHYDRATE AND SODIUM PHOSPHATE, DIBASIC, ANHYDROUS 9 MMOL: 142; 276 INJECTION, SOLUTION INTRAVENOUS at 08:03

## 2023-10-12 RX ADMIN — POTASSIUM CHLORIDE 10 MEQ: 10 INJECTION, SOLUTION INTRAVENOUS at 10:30

## 2023-10-12 RX ADMIN — PROPOFOL 45 MCG/KG/MIN: 10 INJECTION, EMULSION INTRAVENOUS at 20:33

## 2023-10-12 RX ADMIN — POTASSIUM CHLORIDE 10 MEQ: 10 INJECTION, SOLUTION INTRAVENOUS at 11:27

## 2023-10-12 RX ADMIN — PANTOPRAZOLE SODIUM 40 MG: 40 INJECTION, POWDER, FOR SOLUTION INTRAVENOUS at 08:25

## 2023-10-12 RX ADMIN — MAGNESIUM SULFATE HEPTAHYDRATE 2 G: 40 INJECTION, SOLUTION INTRAVENOUS at 14:12

## 2023-10-12 RX ADMIN — Medication 15 ML: at 11:06

## 2023-10-12 RX ADMIN — THIAMINE HYDROCHLORIDE 100 MG: 100 INJECTION, SOLUTION INTRAMUSCULAR; INTRAVENOUS at 08:25

## 2023-10-12 RX ADMIN — POTASSIUM CHLORIDE 10 MEQ: 10 INJECTION, SOLUTION INTRAVENOUS at 12:37

## 2023-10-12 RX ADMIN — PROPOFOL 45 MCG/KG/MIN: 10 INJECTION, EMULSION INTRAVENOUS at 12:05

## 2023-10-12 RX ADMIN — CEFTRIAXONE SODIUM 2 G: 2 INJECTION, POWDER, FOR SOLUTION INTRAMUSCULAR; INTRAVENOUS at 21:03

## 2023-10-12 RX ADMIN — MAGNESIUM SULFATE HEPTAHYDRATE 2 G: 40 INJECTION, SOLUTION INTRAVENOUS at 08:29

## 2023-10-12 RX ADMIN — Medication 1 PATCH: at 08:27

## 2023-10-12 RX ADMIN — FUROSEMIDE 20 MG: 10 INJECTION, SOLUTION INTRAMUSCULAR; INTRAVENOUS at 20:00

## 2023-10-12 RX ADMIN — NOREPINEPHRINE BITARTRATE 0.09 MCG/KG/MIN: 0.02 INJECTION, SOLUTION INTRAVENOUS at 15:28

## 2023-10-12 ASSESSMENT — ACTIVITIES OF DAILY LIVING (ADL)
ADLS_ACUITY_SCORE: 48

## 2023-10-12 NOTE — PROGRESS NOTES
10/12/23 1045   Ventilator Data   Vent ID [id number]   Ventilation Method Invasive   Ventilator Start - Date 10/10/23   Ventilation Day(s) 3   Ventilator Settings    Vent Mode CMV/AC  (Continuous Mandatory Ventilation/ Assist Control)   Resp Rate (Set) 22 breaths/min   Tidal Volume (Set, mL) 350 mL   FiO2 45 %   PEEP (cm H2O) 10 cmH2O   Inspiratory Time (sec) 1 sec   Heater Temperature 37.3   Ventilator - Patient    Patient Resp Rate  22 breaths/min   Expiratory Vt (ml) 353   Minute Volume (ml) 7.09 L/min   Peak Inspiratory Pressure (cm H2O) 23 cmH2O   Mean Airway Pressure (cm H2O) 16 cmH2O   Plateau Pressure (cm H2O) 20 cmH2O   Dynamic Compliance (mL/cm H2O) 33.3 mL/cm H2O   Airway Resistance 15.8   Auto/ Intrinsic PEEP (cm H2O) 0.9 cmH2O   Ventilator Alarms   High Inspiratory Pressure Alarm (cmH2O) 40 cm H2O   High Respiratory Rate (breaths/min) 40 breaths/min   Minute Ventilation High (L) 14 L/min   Minute Ventilation Low (L) 3 L/min   Vt High (mL) 1060 mL   Weaning Assessment   Pulse 63   /72   Oxygen Therapy   SpO2 100 %   O2 Device Mechanical Ventilator   FiO2 (%) 45 %   Respiratory Secretions Assessment   Suction Device ETT;Inline;Oral   Secretions Amount moderate   Secretions Color cloudy;white   Secretions Consistency thin   Suction Tolerance Tolerated well   Suctioning Adverse Effects None   Respiratory WDL   Respiratory WDL X   Rhythm/Pattern, Respiratory assisted mechanically     Patient flipped from prone to supine with no complications.

## 2023-10-12 NOTE — PLAN OF CARE
Goal Outcome Evaluation:      Plan of Care Reviewed With: patient    Overall Patient Progress: no change     Outcome Evaluation:     Vent - 55% FiO2 with SpO2 of 100% no adjustments to FiO2 per recommendation from RT. RR = 22, Vt = 350, PEEP = 10. [place]ausring at 23cm at the lip. Swelling noted on bottom lip. Oral cares completed per protocols. Inline suction to remove thick secretions PRN. Lung sounds coarse throughout    Propofol maxed at 75 mcg/kg/hr.   RASS of -3. Patient appears comfortable and tolerating head turns.   Dilaudid at 0.3 mg.   Levo at 0.09 mcg/kg/hr    Proned. Head turns completed per protocol. Prone pillows in use. ROM completed.     Diuresing with 20mg Lasix q8h, excellent urine output. Bilateral lower extremity edema noted 1+/2+.     Trickle tube feed at 10ml/hr. Residuals ~3ml.     Lines and drains - Barney, art line, PICC, PPFT.      called for update, no plans to visit at this time he states.

## 2023-10-12 NOTE — PROGRESS NOTES
10/12/23 0755   Ventilator Settings    Vent Mode CMV/AC  (Continuous Mandatory Ventilation/ Assist Control)   Resp Rate (Set) 22 breaths/min   Tidal Volume (Set, mL) 350 mL   FiO2 (S)  45 %   PEEP (cm H2O) 10 cmH2O   Inspiratory Time (sec) 1 sec   Heater Temperature 36.9   Ventilator - Patient    Patient Resp Rate  22 breaths/min   Expiratory Vt (ml) 352   Minute Volume (ml) 7.06 L/min   Peak Inspiratory Pressure (cm H2O) 24 cmH2O   Mean Airway Pressure (cm H2O) 15 cmH2O   Plateau Pressure (cm H2O) 21 cmH2O   Dynamic Compliance (mL/cm H2O) 32.4 mL/cm H2O   Airway Resistance 16.1   Auto/ Intrinsic PEEP (cm H2O) (S)  10.8 cmH2O     Patient auto peeping 0.8 total peep 10.8  FIO2 decreased PF ratio 189 this am  ET tube 7.0, 22 cm at teeth , cuff pressure 40 (green zone)  Sxn small white thin   Patient prone head turns completed on even hours, patient in swimming possition

## 2023-10-12 NOTE — PLAN OF CARE
Goal Outcome Evaluation:           Overall Patient Progress: no changeOverall Patient Progress: no change     Major shift events: Patient went supine at 1045 am. Pt tolerated well.   Neuro: PEARLA 2mm. Pt remains sedated.   CMS: Patient has edema extremities 1-2+ and scleral edema. Cap refill is less than 3 seconds.    Pulmonary: Lung sounds are diminished and were noted to be coarse posterior. Pt is in AC, vent settings 45% FiO2, 22 RR, 350 TV, 10 PEEP. Pt has been suctioned via inline. Secretions are thick white. Only triggering vent in supine position.  CV: NSR, blood pressures are stable on 0.09 levophed. No changes made to rate this shift.   GI: Bowel sounds are active, no bowel movement this shift. Tube feeding infusing at 10mL/hr. Residual check was 15 mL.  : Mccabe catheter in place. Urine is clear yellow without strong odor. Good urine output this shift.   Skin: Intact. Mepiplex in place to pressure points.   Pain: Pt is on continuous dilaudid infusion. Currently infusing at 0.3 mg per hour. No nonverbal indicators of pain noted this shift.   Activity: Bedrest. Pt was initially in prone position this morning with head turns q2hrs. Pt turned into a supine position at 1045 AM.  Pt is tolerating position changes.  Hygiene: Oral cares megan, mccabe catheter cares done and brianna wipes done.  Lines/Tubes/Drains: Vent, triple lumen Picc line, peripheral IV, ART line, NG tube feeding, mccabe catheter   Drips: Levophed, Dilaudid, propofol and LR    Plan: Electrolytes replaced following protocol for rechecks and continue to monitor respiratory status. Plan to recheck labs at 1800 to decide if proning will be needed overnights.

## 2023-10-12 NOTE — PROGRESS NOTES
[facility]    Medicine Progress Note - Hospitalist Service    Date of Admission:  10/7/2023    Assessment & Plan   Patient is a 68-year-old female with COPD, alcohol dependence recently consuming large volume alcohol daily, gastric bypass surgery, depression/PTSD, and ongoing tobacco abuse who presented with generalized weakness and fatigue and was admitted due to concerns for multifocal community-acquired pneumonia and sepsis.  She had rapid worsening over the first 12 hours of her hospitalization with persistent hypotension despite fluid resuscitation and was transferred to the ICU and started on vasopressor therapy with Levophed due to concern for septic shock.  After initial improvement in septic shock, patient developed worsening respiratory failure and worsening alcohol withdrawal with delirium requiring sedative therapy and subsequent intubation for mechanical ventilation in early a.m. 10/10.  She remains critically ill with potentially life-threatening respiratory failure, pneumonia, septic shock, and alcohol withdrawal.    Septic shock due to streptococcal pneumoniae bacteremia and multifocal pneumonia  Acute respiratory failure with hypoxia  Patient ventilator dyssynchrony  Presented with weakness and had bilateral infiltrates concerning for community-acquired pneumonia.  Blood culture from admission grew Streptococcus pneumoniae.  Sepsis rapidly progressed to septic shock requiring initiation of vasopressors.  Although hemodynamic status improved, she developed worsening delirium and respiratory failure necessitating intubation and initiation of mechanical ventilation in a.m. 10/10.  PaO2 to FiO2 ratio prior to intubation was as low as 120 and initially improved after intubation.  Over the last 24 hours, respiratory status has deteriorated with increased signs of patient ventilator dyssynchrony that likely contribute to worsening hypercapnia and hypoxia.  Leukocytosis has  worsened for unclear reasons but she remains afebrile with stable pulmonary radiographic findings.  Hypotension worsened probably due to sedation.  -Continue Rocephin    -will stop IV steroids and monitor.   -Titrate vasopressors with Levophed to keep MAP 65 or higher  -Ordered recheck WBC, ABG  -Adjusting mechanical ventilation to attempt to optimize oxygenation (keep oxygen saturations 90 to 95%), may allow permissive hypercapnia.  Will try to decrease PEEP to 8 and see how she does.   -propofol infusion for sedation titrated to RASS score -3 to -4 due to worsening patient ventilator dyssynchrony and worsening respiratory status  -Continue hydromorphone infusion for analgesia while on ventilator  -Continue bolus doses of Versed as needed for sedation  -If respiratory status does not improve or worsens despite heavier sedation, could consider adding paralytic agent  -Continue triple-lumen PICC line central venous catheter  -Continue arterial line catheter  -Continue indwelling urinary catheter    Alcohol use disorder (severe dependence) acute alcohol withdrawal with delirium   History of seizure due to alcohol withdrawal  Per  patient had been drinking an average of 12 pack of beer, 1 bottle of wine, and 10 hard liquor drinks a day recently with most recent alcoholic drink approximately 30 minutes prior to hospital presentation.  Blood alcohol level was not tested at time of admission.  Patient developed overt signs of withdrawal on 10/9 including increased anxiety, tremor, confusion and possible hallucination.  Initial treatment of alcohol withdrawal per CIWA protocol was complicated by sedation that may have exacerbated respiratory failure.  Nursing reports that patient's mentation was normal early in her hospital stay prior to onset of alcohol withdrawal.  Patient has not had overt meningismus lowering suspicion for infectious meningoencephalitis.  Signs of alcohol withdrawal are difficult to assess  because of current sedation while on the ventilator but persistent or worsening alcohol withdrawal could contribute to increased patient ventilator dyssynchrony  -Continuing treatment for respiratory failure as outlined above and while she is intubated will use a combination of propofol infusion, hydromorphone infusion, and Versed as needed for sedation    Suspected malnutrition  Hypoalbuminemia   Status post gastric bypass for obesity  Has remote history of Tom-en-Y gastric bypass surgery for obesity in March 2008.  Reported 13 pound weight loss in recent weeks and appears underweight and probably cachectic on exam.  Alcoholism likely contributes to malnutrition as may previous gastric bypass status.  Serum albumin was low at admission at 2.8 and has decreased and she is now starting to show signs of peripheral edema.  Noncardiogenic pulmonary edema from hypoalbuminemia could exacerbate respiratory failure.  Her previous Tom-en-Y gastric bypass surgery may limit ability to advance feeding tube beyond the gastric remnant.  -Attempting to adjust feeding tube placement today to advance tip beyond the gastric remnant if possible but interventional radiology and/or fluoroscopy are not available at this hospital to assist with such placement  -Registered dietitian assisting with recommendations for initiation of enteral feeding, anticipate starting very low rate enteral feeding with tube feeds today depending on positioning of the feeding tube but would defer initiation of free water supplementation today (continuing IV fluids).  Tolerating 10 ml per hour, consider increasing slowly.   -Consider IV albumin administration with IV Lasix to support hemodynamic status and/or respiratory status depending upon clinical course    Hypomagnesemia  Hypophosphatemia   Hypokalemia  Presented with low serum magnesium and potassium levels and subsequently developed low serum phosphorus level all likely secondary to nutritional  deficiency.  These electrolytes have improved with supplementation.  -Continue with potassium, magnesium, and phosphorus supplementation per protocol in patient with critical illness  -Monitor magnesium, phosphorus, and potassium as indicated    Hyponatremia  Sodium 123 on admission with suspected hypovolemic hyponatremia.  After treatment with multiple normal saline boluses, sodium increased from 123 up to 128 in less than 12 hours after which IV fluids were switched to D5 half-normal saline.  Sodium then decreased to 126 and later stabilized at about 130 and today is 133.  Urine sodium was less than 20 consistent with hypovolemic hyponatremia.  -Continue current IV fluid infusion with D5 with 1/2NS titrating IV fluid rate to support fluid needs until enteral feeding and/or free water can be advanced and treat hyponatremia  -Ordered recheck of serum sodium and osmolality  -on IV Lasix for the moment and will continue to assess.      Hyperglycemia  Patient has had mild to moderate hyperglycemia while in the ICU.  Hemoglobin A1c was 4.4 and she is not known to have underlying diabetes.  Insulin infusion was started for management of hyperglycemia in the ICU.  Anticipate hyperglycemia will worsen with initiation of enteral feeding.  -Continue to monitor blood sugars per protocol  -Continue insulin infusion per protocol for management of hyperglycemia  -Continuing IV fluids containing dextrose while receiving insulin infusion    Thrombocytopenia  Anemia, unspecified type  Coagulopathy  Presented with thrombocytopenia and anemia at admission both of which initially worsened but have subsequently stabilized.  She also is mildly coagulopathic with prolonged INR.  Thrombocytopenia, coagulopathy, and anemia are probably due to septic shock.  Active bleeding has not been suspected so far.  -Ordered recheck of hemoglobin and platelet, plt now 200  -Ordered recheck of INR, 1.16    Emphysema/COPD  Tobacco abuse  Known chronic  emphysema and COPD without overt acute exacerbation.  Patient reportedly has continued to smoke.  -Continuing IV steroids secondary to septic shock and pneumococcal pneumonia but also COPD. Stopping steroids today.  -Continue bronchodilators as needed  -Nicotine patch supplementation started during hospitalization.    Dehydration  Presented with dehydration which likely contributed to hyponatremia and initial low normal blood pressures, now resolved after IV fluid treatment.  -Continue IV fluids for now as outlined above    Weakness generalized  Suspect presenting complaint of generalized weakness was multifactorial including infection, severe alcohol dependence, and suspected malnutrition with nutrient including electrolyte deficiencies.  -Treat acute medical problems  -Anticipate PT and OT evaluations if she recovers from life-threatening acute illness once patient improves    Episode of recurrent major depressive disorder, unspecified depression episode severity (H24)  PTSD (post-traumatic stress disorder)  Carries previous diagnosis of depression and PTSD treated chronically with Lexapro 30 mg daily, clonazepam 0.5 mg 3 times daily as needed for anxiety, BuSpar 15 mg 3 times a day, Effexor 37.5 mg daily, and Xyzal 5 mg daily.  At admission it was unclear whether the patient has recently been taking these medications reliably.  She is now intubated and being sedated.  -Holding these chronic medications for now (no reliable enteral access yet) and abrupt discontinuation of these medications could conceivably contribute to increased anxiety/agitation          Diet: NPO for Medical/Clinical Reasons Except for: No Exceptions  Adult Formula Drip Feeding: Continuous Vital 1.5; Nasogastric tube; Goal Rate: 10; mL/hr; From: 12:00 AM; To: 12:00 AM; Please start at 10 mL/hr but do not advance until reassessed on 10/12; Do not advance tube feeding rate unless K+ is = or > 3.0...    DVT Prophylaxis: Enoxaparin (Lovenox)  "SQ  Barney Catheter: PRESENT, indication: Strict 1-2 Hour I&O  Lines: PRESENT      PICC 10/09/23 Triple Lumen Left Basilic OK TO USE per PICC STAT RN-Site Assessment: WDL  Arterial Line 10/11/23 Radial-Site Assessment: WDL  [REMOVED] Arterial Line 10/09/23 Radial-Site Assessment: WDL Except;Positional      Cardiac Monitoring: ACTIVE order. Indication: ICU  Code Status: Full Code      Clinically Significant Risk Factors        # Hypokalemia: Lowest K = 2.6 mmol/L in last 2 days, will replace as needed  # Hyponatremia: Lowest Na = 129 mmol/L in last 2 days, will monitor as appropriate    # Hypomagnesemia: Lowest Mg = 1.5 mg/dL in last 2 days, will replace as needed   # Hypoalbuminemia: Lowest albumin = 1.7 g/dL at 10/11/2023  6:12 AM, will monitor as appropriate    # Coagulation Defect: INR = 1.16 (Ref range: 0.85 - 1.15) and/or PTT = 30 Seconds (Ref range: 22 - 38 Seconds), will monitor for bleeding                      Disposition Plan    unknown     CC time 50\"    Victoriano Lopez MD  Hospitalist Service  [facility]  Securely message with [employer] (more info)  Text page via [employer] Paging/Directory   ______________________________________________________________________    Interval History   Patient sedated and unable to obtain history or review of systems.  Proned night prior and now planning supine with repeat abg.       Physical Exam   Vital Signs: Temp: 97.7  F (36.5  C) Temp src: Rectal BP: 122/72 Pulse: 63   Resp: 23 SpO2: 100 % O2 Device: Mechanical Ventilator    Vent Mode: CMV/AC  (Continuous Mandatory Ventilation/ Assist Control)  FiO2 (%): 45 %  Resp Rate (Set): 22 breaths/min  Tidal Volume (Set, mL): 350 mL  PEEP (cm H2O): 10 cmH2O  Pressure Support (cm H2O): 10 cmH2O  Resp: 23    Ventilator measurements: Peak inspiratory pressure 24, mean airway pressure 12, plateau pressure 24, minute volume 8.46 while on AC mode overnight    Patient Vitals for the past 24 hrs:   BP Temp Temp " src Pulse Resp SpO2 Weight   10/12/23 1123 -- -- -- -- -- -- 48.1 kg (106 lb)   10/12/23 1045 122/72 -- -- 63 23 100 % --   10/12/23 1000 109/72 -- -- 72 -- 99 % --   10/12/23 0930 101/69 -- -- 71 -- -- --   10/12/23 0900 97/61 97.7  F (36.5  C) Rectal 72 22 100 % --   10/12/23 0800 103/67 -- -- 76 22 97 % --   10/12/23 0755 103/64 -- -- 76 22 99 % --   10/12/23 0430 -- 98.8  F (37.1  C) Rectal -- -- -- --   10/11/23 2300 -- 98.8  F (37.1  C) Rectal -- -- -- --   10/11/23 1945 -- 97.9  F (36.6  C) Rectal -- -- -- --   10/11/23 1845 109/71 -- -- 83 10 100 % --   10/11/23 1840 109/70 -- -- 81 21 100 % --   10/11/23 1835 -- -- -- 87 22 100 % --   10/11/23 1830 (!) 85/60 -- -- 89 22 100 % --   10/11/23 1800 -- -- -- 69 20 97 % --   10/11/23 1745 97/62 -- -- 70 22 97 % --   10/11/23 1730 99/64 -- -- 78 23 97 % --   10/11/23 1715 94/57 -- -- 77 20 96 % --   10/11/23 1700 92/59 -- -- 80 23 95 % --   10/11/23 1645 97/60 -- -- 84 21 95 % --   10/11/23 1630 96/60 -- -- 84 18 99 % --   10/11/23 1615 94/61 -- -- 87 23 98 % --   10/11/23 1600 106/63 -- -- 93 22 94 % --   10/11/23 1545 110/84 -- -- 77 22 100 % --   10/11/23 1530 122/88 -- -- 77 30 100 % --   10/11/23 1515 129/82 -- -- 97 12 100 % --   10/11/23 1500 116/86 98.6  F (37  C) Oral 80 11 100 % --   10/11/23 1445 116/71 -- -- 75 23 100 % --   10/11/23 1430 111/79 -- -- 80 29 100 % --   10/11/23 1415 112/75 -- -- 82 26 100 % --   10/11/23 1400 126/78 -- -- 81 19 100 % --   10/11/23 1345 119/81 -- -- 84 20 -- --   10/11/23 1330 108/70 98.6  F (37  C) Oral 80 19 99 % --   10/11/23 1315 113/66 -- -- 82 (!) 32 99 % --   10/11/23 1300 111/70 -- -- 84 30 98 % --   10/11/23 1245 (!) 86/65 -- -- 85 28 98 % --   10/11/23 1230 (!) 81/59 -- -- 86 30 98 % --   10/11/23 1215 -- -- -- 96 22 96 % --     Weight: 106 lbs 0 oz  Vitals:    10/08/23 0048 10/09/23 0548 10/10/23 1200 10/11/23 0642   Weight: 48.6 kg (107 lb 2.3 oz) 48.5 kg (107 lb) 46.4 kg (102 lb 4.8 oz) 45.8 kg (101 lb)     10/12/23 1123   Weight: 48.1 kg (106 lb)       Intake/Output Summary (Last 24 hours) at 10/11/2023 1005  Last data filed at 10/11/2023 0700  Gross per 24 hour   Intake 3593.58 ml   Output 909 ml   Net 2684.58 ml     General Appearance: Cachectic elderly woman, intubated and sedated  Respiratory: , not using accessory muscles, no stridor, no crepitus in the neck, symmetric breath sounds with scattered coarse rhonchi, no wheezes  Cardiovascular: Regular rate and rhythm to bradycardic, normal capillary refill in the hands and feet which are pink  GI: Hypoactive somewhat tympanitic bowel sounds, nondistended abdomen, soft  Skin: No rashes, left upper arm and right radial PICC and arterial line sites are without bleeding, drainage, or surrounding skin erythema  Neuro: Unresponsive, intermittent spontaneous movements of the limbs    Medical Decision Making       Total critical care time 65 minutes so far today including time spent reassessing respiratory status and adjusting mechanical ventilation treatment of life-threatening respiratory failure       Data     I have personally reviewed the following data over the past 24 hrs:    N/A  \   N/A   / 201     N/A N/A N/A /  105 (H)   2.6 (LL) N/A 0.65 \       Blood sugar   Magnesium 1.6, phosphorus 2.1  Blood cultures from October 7, eighth, and ninth are negative (except for initial positive blood culture on October 7)  Stool testing for enteric pathogens and clostridia difficile was negative    Recent Labs   Lab 10/12/23  0908 10/12/23  0553 10/11/23  2314 10/11/23  2113   PH 7.37 7.37 7.33* 7.33*   PCO2 44 46* 46* 45   PO2 105 104 69* 84   HCO3 25 26 24 23   O2PER 45 55 45 50     PaO2 to FiO2 ratio 92, worsened compared with 125 this morning and 163 yesterday morning    Imaging results reviewed over the past 24 hrs:   No results found for this or any previous visit (from the past 24 hour(s)).    Recent Labs   Lab 10/12/23  0846 10/12/23  0753 10/12/23  0553  10/12/23  0404 10/11/23  0841 10/11/23  0612 10/10/23  2154 10/10/23  2120 10/10/23  1338 10/10/23  1223 10/10/23  0634 10/10/23  0533 10/09/23  0835 10/09/23  0535 10/07/23  2355 10/07/23  2031   WBC  --   --   --   --   --  28.2*  --   --   --   --   --  19.3*  --  16.2*   < > 8.7   HGB  --   --   --   --   --  10.4*  --   --   --   --   --  9.4*  --  10.3*   < > 12.6   MCV  --   --   --   --   --  102*  --   --   --   --   --  99  --  97   < > 96   PLT  --   --  201  --   --  157  --   --   --   --   --  148*  --  129*   < > 168   INR  --   --   --   --   --   --   --   --   --  1.16*  --   --   --   --   --   --    NA  --   --   --   --   --  133*  --   --   --  129*  --  132*   < > 130*  130*   < > 123*   POTASSIUM  --   --  2.6*  --   --  3.6  --  4.1  --  3.4  --  3.4   < > 3.2*   < > 3.3*   CHLORIDE  --   --   --   --   --  108*  --   --   --  104  --  105  --  102   < > 87*   CO2  --   --   --   --   --  19*  --   --   --  18*  --  20*  --  18*   < > 19*   BUN  --   --   --   --   --  6.3*  --   --   --   --   --  6.7*  --  10.9   < > 23.9*   CR  --   --  0.65  --   --  0.58  --   --   --   --   --  0.63   < > 0.73   < > 1.22*   ANIONGAP  --   --   --   --   --  6*  --   --   --  7  --  7  --  10   < > 17*   SHON  --   --   --   --   --  7.1*  --   --   --   --   --  7.4*  --  7.7*   < > 8.3*   * 108*  --  86   < > 96   < >  --    < >  --    < > 88   < > 227*   < > 80   ALBUMIN  --   --   --   --   --  1.7*  --   --   --   --   --  1.8*  --  1.8*   < > 2.3*   PROTTOTAL  --   --   --   --   --  4.6*  --   --   --   --   --  4.5*  --  4.6*   < > 6.5   BILITOTAL  --   --   --   --   --  0.2  --   --   --   --   --  <0.2  --  0.3   < > 1.3*   ALKPHOS  --   --   --   --   --  79  --   --   --   --   --  69  --  68   < > 119*   ALT  --   --   --   --   --  15  --   --   --   --   --  18  --  14   < > 13   AST  --   --   --   --   --  37  --   --   --   --   --  40  --  32   < > 39   LIPASE  --   --   --    --   --   --   --   --   --   --   --   --   --   --   --  9*    < > = values in this interval not displayed.

## 2023-10-12 NOTE — PROGRESS NOTES
10/12/23 1759   Ventilator Data   Vent Owner On Site Equipment   Vent Brand [email or web address]   Ventilator Start - Date 10/10/23   Ventilation Day(s) 3   Ventilator   Ventilator Adult   Ventilator Settings    Vent Mode CMV/AC  (Continuous Mandatory Ventilation/ Assist Control)   Resp Rate (Set) 22 breaths/min   Tidal Volume (Set, mL) 350 mL   FiO2 45 %   PEEP (cm H2O) 10 cmH2O   Inspiratory Time (sec) 1 sec   Sensitivity Flow Triggered (L/min) 2 L/min   Heater Temperature 37.2   Ventilator - Patient    Patient Resp Rate  22 breaths/min   Expiratory Vt (ml) 344   Minute Volume (ml) 7.07 L/min   Peak Inspiratory Pressure (cm H2O) 25 cmH2O   Mean Airway Pressure (cm H2O) 15 cmH2O   Plateau Pressure (cm H2O) 21 cmH2O   Dynamic Compliance (mL/cm H2O) 28.6 mL/cm H2O   Airway Resistance 16.8   Auto/ Intrinsic PEEP (cm H2O) 1.2 cmH2O   Ventilator Alarms   High Inspiratory Pressure Alarm (cmH2O) 40 cm H2O   High Respiratory Rate (breaths/min) 40 breaths/min   Low Respiratory Rate (breaths/min) 14 breaths/min   Minute Ventilation High (L) 3 L/min   Vt Low (mL) 1060 mL   Weaning Assessment   Safety Screen Weaning Assessment PEEP >8 cm H2O   RASS (Lyons Agitation-Sedation Scale) -3-->moderate sedation   Pulse 61   Oxygen Therapy   SpO2 100 %   O2 Device Mechanical Ventilator   FiO2 (%) 45 %   ETT Cuffed Single 7 mm   Placement Date/Time: 10/10/23 (c) 0342   Mask Ventilation: Not attempted  Induction Type: RSI  Ease of Intubation: Easy  Laryngoscopy Technique: Video laryngoscopy  Cuffed: Cuffed  ETT Type: Single  Single Lumen Tube Size: 7 mm  VL Blade Type: Steiner...   Secured at (cm) 22 cm   Measured from Teeth/Gums   Position Right   Secured by Commercial tube stiles   Bite Block None Present   Site Appearance Clean;Dry   Cuff Assessment Cuff Pressure   Cuff Pressure - cm H2O 30 cmH20   Safety Measures Manual resuscitator at bedside   Respiratory Secretions Assessment   Suction Device ETT;Inline   Secretions Amount small;moderate    Secretions Color white   Secretions Consistency thick;thin   Suction Tolerance Tolerated well   Suctioning Adverse Effects None   RT Device Skin Assessment   Oxygen Delivery Device ETT Lynn   Interface Endotracheal tube   Ventilator Arm In Place Yes   Site Appearance neck circumference Clean and dry   Site Appearance lips Clean and dry   Site Appearance bridge of nose Clean and dry   Site Appearance occiput Clean and dry   Strap Tightness Finger Allowance between head and device strap   Device Skin Interventions Taken No adjustments needed   Respiratory WDL   Respiratory WDL X   Rhythm/Pattern, Respiratory assisted mechanically   Cough Frequency infrequent   Cough Type congested;fair   Breath Sounds   Breath Sounds All Fields   All Lung Fields Breath Sounds coarse     Component  Ref Range & Units  6:01 PM 12:22 PM  9:08 AM  5:53 AM 1 d ago 1 d ago 1 d ago     pH Arterial  7.35 - 7.45 7.43 7.39 7.37 7.37 7.33 Low  7.33 Low  7.31 Low     pCO2 Arterial  35 - 45 mm Hg 39 41 44 46 High  46 High  45 46 High     pO2 Arterial  80 - 105 mm Hg 120 High  100 105 104 69 Low  84 134 High     FIO2 45 45 45 55 45 50 55    Bicarbonate Arterial  21 - 28 mmol/L 26 25 25 26 24 23 23    Base Excess/Deficit  -9.0 - 1.8 mmol/L 1.4 -0.2 -0.1 0.6 -1.9 -2.6 -3.4    Gael's Test Artline Artline Artline Artline Artline Artline Yes   Resulting Agency [facility] NM LAB [facility]              Specimen Collected: 10/12/23  6:01 PM Last Resulted: 10/12/23  6:10 PM         PF Ratio currently: 266  Patient turned to supine at 1045 this morning   FIO2 weaned  Will rest for the night consider weaning Peep then FIO2 , weaning trials once peep is below 8  Patient sedated and resting comfortably.

## 2023-10-12 NOTE — PLAN OF CARE
Goal Outcome Evaluation:      Overall Patient Progress: no changeOverall Patient Progress: no change     Major Shift Event: Weaned down Levophed and Propofol    Neuro: Patient is sedated with a RASS of -3.   Respiratory: Lungs are diminished with some coarseness. Vent FiO2 remains at 45%, Peep 10, TV of 350 and rate of 22. ABG sent at 1800.  Cardiovascular: NSR. BP stable with Levophed weaned to 0.07. map >65.  GI: No Bm, but active bowel sounds. TF remains at 10ml/hr.  : Barney in place with a total of 325 out in 4 hour shift.   Neurovascular: Pulses +2, cap refill <3 secs.   Skin: edema throughout +1/+2.   Lines: PICC, ART, PIV, Vent, Barney, Post pyloric.  Drips: Levophed 0.07, Propofol 40, dilaudid at 0.3, 1 more bump of Potassium running with am recheck and LR at 50.    Plan: Continue to monitor ABGs, adjust drips as needed.

## 2023-10-12 NOTE — CONSULTS
Care Management Note:    Care Management consult received for Elevated Risk score and discharge planning.  Patient currently on a vent and not medically stable.     Care Management will assess once patient is stable.    ANUP Juares  [facility] [phone]/ [facility] [phone]  Care Management

## 2023-10-12 NOTE — PROGRESS NOTES
I took over care of this pt at 0300.Q2 head turns tolerated well ETT stable in position and bilateral BBS noted.   Pt suctioned prior to head turns and post, moderate amounts of thick white secretions. [facility]. Settings: AV/ vt 22 rr +10 PEEP, 55% FIO2. No weaning done per MD over night. Pt is to remained prone until re-evaluation is completed sometime this morning.   RT will continue to follow   Ambu bag and mask are at bedside  RT Darya on 10/12/2023 at 6:06 AM

## 2023-10-12 NOTE — CONSULTS
[facility] Progress Note    I visited Shanika, in the ICU,  on the Medical Surgical Unit at [facility].      Patient/Family Understanding of Illness and Goals of Care - Shanika is sedated and on a vent.  I offered her words of encouragement and support.    Plan of Care - I will continue to follow patient and be available for any ongoing support needs until her discharge.     Frank Parekh, Ph.D,   Sp[facility]  [address] [place] [address] [phone]  [email or web address]

## 2023-10-13 ENCOUNTER — APPOINTMENT (OUTPATIENT)
Dept: GENERAL RADIOLOGY | Facility: CLINIC | Age: 68
DRG: 870 | End: 2023-10-13
Attending: INTERNAL MEDICINE
Payer: MEDICARE

## 2023-10-13 LAB
ALLEN'S TEST: ABNORMAL
BACTERIA BLD CULT: NO GROWTH
BASE EXCESS BLDA CALC-SCNC: 5.2 MMOL/L (ref -9–1.8)
GLUCOSE BLDC GLUCOMTR-MCNC: 100 MG/DL (ref 70–99)
GLUCOSE BLDC GLUCOMTR-MCNC: 112 MG/DL (ref 70–99)
GLUCOSE BLDC GLUCOMTR-MCNC: 124 MG/DL (ref 70–99)
GLUCOSE BLDC GLUCOMTR-MCNC: 138 MG/DL (ref 70–99)
GLUCOSE BLDC GLUCOMTR-MCNC: 149 MG/DL (ref 70–99)
GLUCOSE BLDC GLUCOMTR-MCNC: 89 MG/DL (ref 70–99)
GLUCOSE SERPL-MCNC: 66 MG/DL (ref 70–99)
HCO3 BLD-SCNC: 29 MMOL/L (ref 21–28)
MAGNESIUM SERPL-MCNC: 1 MG/DL (ref 1.7–2.3)
MAGNESIUM SERPL-MCNC: 2.5 MG/DL (ref 1.7–2.3)
O2/TOTAL GAS SETTING VFR VENT: 45 %
PCO2 BLD: 39 MM HG (ref 35–45)
PH BLD: 7.48 [PH] (ref 7.35–7.45)
PHOSPHATE SERPL-MCNC: 1.6 MG/DL (ref 2.5–4.5)
PHOSPHATE SERPL-MCNC: 3.9 MG/DL (ref 2.5–4.5)
PO2 BLD: 76 MM HG (ref 80–105)
POTASSIUM SERPL-SCNC: 1.8 MMOL/L (ref 3.4–5.3)
POTASSIUM SERPL-SCNC: 4.3 MMOL/L (ref 3.4–5.3)

## 2023-10-13 PROCEDURE — 94003 VENT MGMT INPAT SUBQ DAY: CPT

## 2023-10-13 PROCEDURE — 84100 ASSAY OF PHOSPHORUS: CPT | Performed by: INTERNAL MEDICINE

## 2023-10-13 PROCEDURE — C9113 INJ PANTOPRAZOLE SODIUM, VIA: HCPCS | Mod: JZ | Performed by: FAMILY MEDICINE

## 2023-10-13 PROCEDURE — 250N000013 HC RX MED GY IP 250 OP 250 PS 637: Performed by: INTERNAL MEDICINE

## 2023-10-13 PROCEDURE — 250N000011 HC RX IP 250 OP 636: Mod: JZ | Performed by: INTERNAL MEDICINE

## 2023-10-13 PROCEDURE — 250N000011 HC RX IP 250 OP 636: Mod: JZ | Performed by: PEDIATRICS

## 2023-10-13 PROCEDURE — 83735 ASSAY OF MAGNESIUM: CPT | Performed by: INTERNAL MEDICINE

## 2023-10-13 PROCEDURE — 250N000009 HC RX 250: Performed by: PEDIATRICS

## 2023-10-13 PROCEDURE — 84132 ASSAY OF SERUM POTASSIUM: CPT | Performed by: INTERNAL MEDICINE

## 2023-10-13 PROCEDURE — 250N000009 HC RX 250: Performed by: INTERNAL MEDICINE

## 2023-10-13 PROCEDURE — 250N000013 HC RX MED GY IP 250 OP 250 PS 637: Performed by: PEDIATRICS

## 2023-10-13 PROCEDURE — 999N000157 HC STATISTIC RCP TIME EA 10 MIN

## 2023-10-13 PROCEDURE — 250N000009 HC RX 250

## 2023-10-13 PROCEDURE — 82803 BLOOD GASES ANY COMBINATION: CPT | Performed by: INTERNAL MEDICINE

## 2023-10-13 PROCEDURE — 99232 SBSQ HOSP IP/OBS MODERATE 35: CPT | Performed by: INTERNAL MEDICINE

## 2023-10-13 PROCEDURE — 200N000001 HC R&B ICU

## 2023-10-13 PROCEDURE — 258N000003 HC RX IP 258 OP 636: Performed by: INTERNAL MEDICINE

## 2023-10-13 PROCEDURE — 74018 RADEX ABDOMEN 1 VIEW: CPT

## 2023-10-13 PROCEDURE — 82947 ASSAY GLUCOSE BLOOD QUANT: CPT | Performed by: INTERNAL MEDICINE

## 2023-10-13 PROCEDURE — 250N000011 HC RX IP 250 OP 636: Mod: JZ | Performed by: FAMILY MEDICINE

## 2023-10-13 RX ORDER — FUROSEMIDE 10 MG/ML
20 INJECTION INTRAMUSCULAR; INTRAVENOUS EVERY 12 HOURS
Status: DISCONTINUED | OUTPATIENT
Start: 2023-10-13 | End: 2023-10-15

## 2023-10-13 RX ORDER — WATER 10 ML/10ML
INJECTION INTRAMUSCULAR; INTRAVENOUS; SUBCUTANEOUS
Status: COMPLETED
Start: 2023-10-13 | End: 2023-10-13

## 2023-10-13 RX ORDER — BISACODYL 10 MG
10 SUPPOSITORY, RECTAL RECTAL DAILY PRN
Status: DISCONTINUED | OUTPATIENT
Start: 2023-10-13 | End: 2023-10-21

## 2023-10-13 RX ORDER — MAGNESIUM SULFATE HEPTAHYDRATE 40 MG/ML
4 INJECTION, SOLUTION INTRAVENOUS ONCE
Status: COMPLETED | OUTPATIENT
Start: 2023-10-13 | End: 2023-10-13

## 2023-10-13 RX ORDER — BISACODYL 5 MG
5 TABLET, DELAYED RELEASE (ENTERIC COATED) ORAL DAILY PRN
Status: DISCONTINUED | OUTPATIENT
Start: 2023-10-13 | End: 2023-10-13

## 2023-10-13 RX ORDER — POTASSIUM CHLORIDE 7.45 MG/ML
10 INJECTION INTRAVENOUS
Status: DISCONTINUED | OUTPATIENT
Start: 2023-10-13 | End: 2023-10-13

## 2023-10-13 RX ORDER — POTASSIUM CHLORIDE 1500 MG/1
40 TABLET, EXTENDED RELEASE ORAL ONCE
Status: COMPLETED | OUTPATIENT
Start: 2023-10-13 | End: 2023-10-13

## 2023-10-13 RX ORDER — POTASSIUM CHLORIDE 29.8 MG/ML
20 INJECTION INTRAVENOUS
Status: COMPLETED | OUTPATIENT
Start: 2023-10-13 | End: 2023-10-13

## 2023-10-13 RX ADMIN — PANTOPRAZOLE SODIUM 40 MG: 40 INJECTION, POWDER, FOR SOLUTION INTRAVENOUS at 08:13

## 2023-10-13 RX ADMIN — NOREPINEPHRINE BITARTRATE 0.14 MCG/KG/MIN: 0.02 INJECTION, SOLUTION INTRAVENOUS at 05:13

## 2023-10-13 RX ADMIN — MINERAL OIL, PETROLATUM: 425; 573 OINTMENT OPHTHALMIC at 01:52

## 2023-10-13 RX ADMIN — MINERAL OIL, PETROLATUM: 425; 573 OINTMENT OPHTHALMIC at 16:45

## 2023-10-13 RX ADMIN — PROPOFOL 50 MCG/KG/MIN: 10 INJECTION, EMULSION INTRAVENOUS at 04:11

## 2023-10-13 RX ADMIN — FUROSEMIDE 20 MG: 10 INJECTION, SOLUTION INTRAVENOUS at 15:28

## 2023-10-13 RX ADMIN — PANTOPRAZOLE SODIUM 40 MG: 40 INJECTION, POWDER, FOR SOLUTION INTRAVENOUS at 21:40

## 2023-10-13 RX ADMIN — FUROSEMIDE 20 MG: 10 INJECTION, SOLUTION INTRAMUSCULAR; INTRAVENOUS at 03:40

## 2023-10-13 RX ADMIN — WATER: 1 INJECTION INTRAMUSCULAR; INTRAVENOUS; SUBCUTANEOUS at 07:06

## 2023-10-13 RX ADMIN — POTASSIUM CHLORIDE: 2 INJECTION, SOLUTION, CONCENTRATE INTRAVENOUS at 11:34

## 2023-10-13 RX ADMIN — POTASSIUM CHLORIDE 20 MEQ: 400 INJECTION, SOLUTION INTRAVENOUS at 08:02

## 2023-10-13 RX ADMIN — Medication 10 MG: at 04:20

## 2023-10-13 RX ADMIN — CEFTRIAXONE SODIUM 2 G: 2 INJECTION, POWDER, FOR SOLUTION INTRAMUSCULAR; INTRAVENOUS at 21:43

## 2023-10-13 RX ADMIN — METHYLPREDNISOLONE SODIUM SUCCINATE 30 MG: 40 INJECTION INTRAMUSCULAR; INTRAVENOUS at 07:03

## 2023-10-13 RX ADMIN — POTASSIUM CHLORIDE 20 MEQ: 400 INJECTION, SOLUTION INTRAVENOUS at 09:44

## 2023-10-13 RX ADMIN — THIAMINE HYDROCHLORIDE 100 MG: 100 INJECTION, SOLUTION INTRAMUSCULAR; INTRAVENOUS at 08:14

## 2023-10-13 RX ADMIN — PROPOFOL 55 MCG/KG/MIN: 10 INJECTION, EMULSION INTRAVENOUS at 10:43

## 2023-10-13 RX ADMIN — MAGNESIUM SULFATE HEPTAHYDRATE 4 G: 40 INJECTION, SOLUTION INTRAVENOUS at 06:56

## 2023-10-13 RX ADMIN — SODIUM PHOSPHATE, MONOBASIC, MONOHYDRATE AND SODIUM PHOSPHATE, DIBASIC, ANHYDROUS 15 MMOL: 142; 276 INJECTION, SOLUTION INTRAVENOUS at 06:48

## 2023-10-13 RX ADMIN — Medication 1 PATCH: at 08:12

## 2023-10-13 RX ADMIN — POTASSIUM CHLORIDE 20 MEQ: 400 INJECTION, SOLUTION INTRAVENOUS at 08:57

## 2023-10-13 RX ADMIN — POTASSIUM CHLORIDE 40 MEQ: 1500 TABLET, EXTENDED RELEASE ORAL at 06:43

## 2023-10-13 RX ADMIN — MINERAL OIL, PETROLATUM: 425; 573 OINTMENT OPHTHALMIC at 08:21

## 2023-10-13 RX ADMIN — ENOXAPARIN SODIUM 40 MG: 40 INJECTION SUBCUTANEOUS at 21:40

## 2023-10-13 RX ADMIN — NOREPINEPHRINE BITARTRATE 0.12 MCG/KG/MIN: 0.02 INJECTION, SOLUTION INTRAVENOUS at 16:46

## 2023-10-13 RX ADMIN — PROPOFOL 40 MCG/KG/MIN: 10 INJECTION, EMULSION INTRAVENOUS at 18:11

## 2023-10-13 RX ADMIN — Medication 15 ML: at 08:17

## 2023-10-13 ASSESSMENT — ACTIVITIES OF DAILY LIVING (ADL)
ADLS_ACUITY_SCORE: 48
ADLS_ACUITY_SCORE: 52

## 2023-10-13 NOTE — PROGRESS NOTES
10/13/23 0615   Ventilator Data   Vent Owner On Site Equipment   Vent Brand Drager   Vent ID [id number]   Ventilator Start - Date 10/10/23   Ventilation Day(s) 4   $Vent Subsequent Subsequent   Ventilator   Ventilator Adult   Ventilator Settings    Vent Mode CMV/AC  (Continuous Mandatory Ventilation/ Assist Control)   Resp Rate (Set) 22 breaths/min   Tidal Volume (Set, mL) 350 mL   FiO2 45 %   PEEP (cm H2O) 10 cmH2O   Inspiratory Time (sec) 1 sec   Sensitivity Flow Triggered (L/min) 2 L/min   Ventilator - Patient    Patient Resp Rate  22 breaths/min   Expiratory Vt (ml) 351   Minute Volume (ml) 7.11 L/min   Peak Inspiratory Pressure (cm H2O) 25 cmH2O   Mean Airway Pressure (cm H2O) 15 cmH2O   Plateau Pressure (cm H2O) 21 cmH2O   Dynamic Compliance (mL/cm H2O) 28.2 mL/cm H2O   Airway Resistance 16.5   Auto/ Intrinsic PEEP (cm H2O) 0.8 cmH2O   Ventilator Alarms   High Inspiratory Pressure Alarm (cmH2O) 40 cm H2O   High Respiratory Rate (breaths/min) 40 breaths/min   Minute Ventilation High (L) 14 L/min   Minute Ventilation Low (L) 3 L/min   Vt High (mL) 1060 mL   Weaning Assessment   Weaning Assessment Complete Y   Safety Screen Weaning Assessment PEEP >8 cm H2O   Pulse 60   Oxygen Therapy   SpO2 98 %   O2 Device Mechanical Ventilator   FiO2 (%) 45 %   ETT Cuffed Single 7 mm   Placement Date/Time: 10/10/23 (c) 0342   Mask Ventilation: Not attempted  Induction Type: RSI  Ease of Intubation: Easy  Laryngoscopy Technique: Video laryngoscopy  Cuffed: Cuffed  ETT Type: Single  Single Lumen Tube Size: 7 mm  VL Blade Type: Steiner...   Secured at (cm) 22 cm   Measured from Teeth/Gums   Position Center   Secured by Commercial tube stiles   Bite Block None Present   Site Appearance Clean;Dry   Tube Care Site care done   Cuff Assessment Cuff Pressure   Cuff Pressure - cm H2O 28 cmH20   Safety Measures Manual resuscitator/PEEP valve in room   Respiratory Secretions Assessment   Suction Device ETT;Inline;Oral   Secretions Amount  large   Secretions Color creamy;white   Secretions Consistency thick;thin   Suction Tolerance Tolerated well   Suctioning Adverse Effects None   RT Device Skin Assessment   Oxygen Delivery Device ETT Lynn   Interface Endotracheal tube   Ventilator Arm In Place Yes   Site Appearance neck circumference Clean and dry   Site Appearance lips Clean and dry   Site Appearance bridge of nose Clean and dry   Site Appearance occiput Clean and dry   Strap Tightness Finger Allowance between head and device strap   Device Skin Interventions Taken No adjustments needed   Respiratory WDL   Respiratory WDL X   Rhythm/Pattern, Respiratory assisted mechanically   Cough Frequency frequent   Cough Type good;loose   Ambu at Bedside present   Trachea Assessment Midline   Breath Sounds   Breath Sounds All Fields   All Lung Fields Breath Sounds clear;equal bilaterally

## 2023-10-13 NOTE — PLAN OF CARE
"Goal Outcome Evaluation:      Plan of Care Reviewed With: patient    Overall Patient Progress: decliningOverall Patient Progress: declining    Outcome Evaluation: Vent- 45% PEEP10 RR 22 , ordered to not adjust vet settings overnight unless pt decompensates.  Levophed increased overnight to achieve MAP >65. 2 bolus's Dilaudid given and 1 bous Propofol given for agitation during turns.  All Electrolytes low in AM, MD made aware of criticals and orderes recieved to Replace IV and give PO dose as well.  Constant in-line and oral suctioning done overnight; Still thick and white.  Barney with large UOP, TF @10 ml/hr    /64   Pulse 54   Temp 98.2  F (36.8  C) (Axillary)   Resp 22   Ht 1.626 m (5' 4\")   Wt 48.7 kg (107 lb 4.8 oz)   LMP  (LMP Unknown)   SpO2 99%   BMI 18.42 kg/m     "

## 2023-10-13 NOTE — PROGRESS NOTES
Neuro: Pt remains sedated.   CMS: Edema 1-2+,Cap refill is less than 3 seconds.    Pulmonary: LS diminished, coarse at times. Suction provided.Vent settings 40% FiO2, 22 RR, 350 TV, 8 PEEP.   CV: NSR and Sinus hank present, blood pressures stable.   GI: BS active. No bowel movement this shift. PRN order for suppository. Tube feeding infusing at 10mL/hr.   : Mccabe catheter in place.   Skin: Intact. Slight bruising on upper extremities  Pain: Pt is on continuous dilaudid infusion infusing at 0.3 mg per hour. Nonverbal pain indicators are absent.  Activity: Bedrest. Q2 turns.   Hygiene: Oral cares megan, mccabe catheter cares done and brianna wipes done.  Lines/Tubes/Drains: Vent, triple lumen Picc line, peripheral IV, ART line, NG tube feeding, mccabe catheter   Drips:   Levophed at 0.12 mcg  Dilaudid at 0.3 mg  Propofol at 40 mcg  D5LR 20K at 50ml/hr    Mag, phosp, and K+ replaced this shift with rechecks for 10/14. Lasix decreased.

## 2023-10-13 NOTE — PROGRESS NOTES
Nutrition Therapy Update: Brief Note    Writer reviewing chart to follow-up on pt TF tolerance. Feedings are currently running at 10 mL/hr x 24 hrs. Pt remains intubated and sedated, is now supine.     Updated labs as of 10/13:  Sodium: 133 (low)  Potassium: 1.8 (critically low)  Magnesium: 1.0 (low)  Phosphorus: 1.6 (low)  *pt exhibiting signs of refeeding syndrome, drop in lytes after initiation of feeding and increase in propofol.    Propofol running at 15.1 mL/hr = 362 mL per day, 399 kcal     TF running at 10 mL/hr = 240 mL per day, 360 kcal    759 kcal per day (17 kcal/kg using lowest bed scale weight during admission - 45.8 kg).    Recommendations  Please hold TF at 10 mL/hr x 24 hrs and replace lytes. Will increase as able pending pt tolerance to volume, lytes, and propofol rate.     *due to low TF rate, will switch from L bottles to 237 mL cartons to reduce amount of formula used. At 10 mL/hr, pt will use 80 mL every 8 hrs. Cartons have a hang time of 8 hrs maximum, please pour carton into feeding bag and fully replace formula every 8 hrs with new carton.    Lori Angulo RD, LD  Clinical Dietitian  Office: [phone]  Weekend pager: [phone]

## 2023-10-13 NOTE — PROGRESS NOTES
Tele-ICU  Case discussed with bedside RN and Dr. Lopez and appreciate input from both.   Labs and radiographic data reviewed  Patient assessed on AV link.     Assessment and Plan   Acute respiratory failure with history of COPD and likely underlying exacerbation - agree with bronchodilators and steroids. She is currently on FIO2 45% and +8 PEEP (decreased from +10 earlier) and oxygen saturations running 99%, with some slow improvement over time.   Pneumonia, likely due to S. Pneumoniae also found in blood culture. She may have other acute/chronic pulmonary conditions and agree with longer duration of IV antibiotics/rocephin (recommend 14 day course).   Septic shock due to # 2 = she remains on Levophed at 0.09 with some slow improvement over time.   Alcoholism   Severe protein - calorie malnutrition   History of gastric bypass for obesity   Clinically Significant Risk Factors        # Hypokalemia: Lowest K = 1.8 mmol/L in last 2 days, will replace as needed     # Hypomagnesemia: Lowest Mg = 1 mg/dL in last 2 days, will replace as needed   # Hypoalbuminemia: Lowest albumin = 1.7 g/dL at 10/11/2023  6:12 AM, will monitor as appropriate                          Overall acute and critical. I spent 35 minutes of critical care time with her today.     Farhana machado  October 13, 2023    Addendum: Please note that this was a tele-ICU interaction by me from the Tele-ICU Hub at [facility]. daiana
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
## 2023-10-13 NOTE — PROGRESS NOTES
[facility]    Medicine Progress Note - Hospitalist Service    Date of Admission:  10/7/2023    Assessment & Plan   Patient is a 68-year-old female with COPD, alcohol dependence recently consuming large volume alcohol daily, gastric bypass surgery, depression/PTSD, and ongoing tobacco abuse who presented with generalized weakness and fatigue and was admitted due to concerns for multifocal community-acquired pneumonia and sepsis.  She had rapid worsening over the first 12 hours of her hospitalization with persistent hypotension despite fluid resuscitation and was transferred to the ICU and started on vasopressor therapy with Levophed due to concern for septic shock.  After initial improvement in septic shock, patient developed worsening respiratory failure and worsening alcohol withdrawal with delirium requiring sedative therapy and subsequent intubation for mechanical ventilation in early a.m. 10/10.  She remains critically ill with potentially life-threatening respiratory failure, pneumonia, septic shock, and alcohol withdrawal.    Septic shock due to streptococcal pneumoniae bacteremia and multifocal pneumonia  Acute respiratory failure with hypoxia  Patient ventilator dyssynchrony  Presented with weakness and had bilateral infiltrates concerning for community-acquired pneumonia.  Blood culture from admission grew Streptococcus pneumoniae.  Sepsis rapidly progressed to septic shock requiring initiation of vasopressors.  Although hemodynamic status improved, she developed worsening delirium and respiratory failure necessitating intubation and initiation of mechanical ventilation in a.m. 10/10.  PaO2 to FiO2 ratio prior to intubation was as low as 120 and initially improved after intubation.  Over the last 24 hours, respiratory status has deteriorated with increased signs of patient ventilator dyssynchrony that likely contribute to worsening hypercapnia and hypoxia.  Leukocytosis has  worsened for unclear reasons but she remains afebrile with stable pulmonary radiographic findings.  Hypotension worsened probably due to sedation.  -Continue Rocephin    -will stop IV steroids and monitor.   -Titrate vasopressors with Levophed to keep MAP 65 or higher  -Ordered recheck WBC, ABG  -Adjusting mechanical ventilation to attempt to optimize oxygenation (keep oxygen saturations 90 to 95%), may allow permissive hypercapnia.  Will try to decrease PEEP to 8 and see how she does.   -propofol infusion for sedation titrated to RASS score -3 to -4 due to worsening patient ventilator dyssynchrony and worsening respiratory status  -Continue hydromorphone infusion for analgesia while on ventilator  -Continue bolus doses of Versed as needed for sedation  -If respiratory status does not improve or worsens despite heavier sedation, could consider adding paralytic agent  -Continue triple-lumen PICC line central venous catheter  -Continue arterial line catheter  -Continue indwelling urinary catheter    Alcohol use disorder (severe dependence) acute alcohol withdrawal with delirium   History of seizure due to alcohol withdrawal  Per  patient had been drinking an average of 12 pack of beer, 1 bottle of wine, and 10 hard liquor drinks a day recently with most recent alcoholic drink approximately 30 minutes prior to hospital presentation.  Blood alcohol level was not tested at time of admission.  Patient developed overt signs of withdrawal on 10/9 including increased anxiety, tremor, confusion and possible hallucination.  Initial treatment of alcohol withdrawal per CIWA protocol was complicated by sedation that may have exacerbated respiratory failure.  Nursing reports that patient's mentation was normal early in her hospital stay prior to onset of alcohol withdrawal.  Patient has not had overt meningismus lowering suspicion for infectious meningoencephalitis.  Signs of alcohol withdrawal are difficult to assess  because of current sedation while on the ventilator but persistent or worsening alcohol withdrawal could contribute to increased patient ventilator dyssynchrony  -Continuing treatment for respiratory failure as outlined above and while she is intubated will use a combination of propofol infusion, hydromorphone infusion, and Versed as needed for sedation    Suspected malnutrition  Hypoalbuminemia   Status post gastric bypass for obesity  Has remote history of Tom-en-Y gastric bypass surgery for obesity in March 2008.  Reported 13 pound weight loss in recent weeks and appears underweight and probably cachectic on exam.  Alcoholism likely contributes to malnutrition as may previous gastric bypass status.  Serum albumin was low at admission at 2.8 and has decreased and she is now starting to show signs of peripheral edema.  Noncardiogenic pulmonary edema from hypoalbuminemia could exacerbate respiratory failure.  Her previous Tom-en-Y gastric bypass surgery may limit ability to advance feeding tube beyond the gastric remnant.  -Attempting to adjust feeding tube placement today to advance tip beyond the gastric remnant if possible but interventional radiology and/or fluoroscopy are not available at this hospital to assist with such placement  -Registered dietitian assisting with recommendations for initiation of enteral feeding, anticipate starting very low rate enteral feeding with tube feeds today depending on positioning of the feeding tube but would defer initiation of free water supplementation today (continuing IV fluids).  Tolerating 10 ml per hour, consider increasing slowly.   -Consider IV albumin administration with IV Lasix to support hemodynamic status and/or respiratory status depending upon clinical course    Hypomagnesemia  Hypophosphatemia   Hypokalemia  Presented with low serum magnesium and potassium levels and subsequently developed low serum phosphorus level all likely secondary to nutritional  deficiency.  These electrolytes have improved with supplementation.  -Continue with potassium, magnesium, and phosphorus supplementation per protocol in patient with critical illness  -Monitor magnesium, phosphorus, and potassium as indicated    Hyponatremia  Sodium 123 on admission with suspected hypovolemic hyponatremia.  After treatment with multiple normal saline boluses, sodium increased from 123 up to 128 in less than 12 hours after which IV fluids were switched to D5 half-normal saline.  Sodium then decreased to 126 and later stabilized at about 130 and today is 133.  Urine sodium was less than 20 consistent with hypovolemic hyponatremia.  -Continue current IV fluid infusion with D5 with 1/2NS titrating IV fluid rate to support fluid needs until enteral feeding and/or free water can be advanced and treat hyponatremia  -Ordered recheck of serum sodium and osmolality  -on IV Lasix for the moment and will continue to assess.      Hyperglycemia  Patient has had mild to moderate hyperglycemia while in the ICU.  Hemoglobin A1c was 4.4 and she is not known to have underlying diabetes.  Insulin infusion was started for management of hyperglycemia in the ICU.  Anticipate hyperglycemia will worsen with initiation of enteral feeding.  -Continue to monitor blood sugars per protocol  -Continue insulin infusion per protocol for management of hyperglycemia  -Continuing IV fluids containing dextrose while receiving insulin infusion    Thrombocytopenia  Anemia, unspecified type  Coagulopathy  Presented with thrombocytopenia and anemia at admission both of which initially worsened but have subsequently stabilized.  She also is mildly coagulopathic with prolonged INR.  Thrombocytopenia, coagulopathy, and anemia are probably due to septic shock.  Active bleeding has not been suspected so far.  -Ordered recheck of hemoglobin and platelet, plt now 200  -Ordered recheck of INR, 1.16    Emphysema/COPD  Tobacco abuse  Known chronic  emphysema and COPD without overt acute exacerbation.  Patient reportedly has continued to smoke.  -Continuing IV steroids secondary to septic shock and pneumococcal pneumonia but also COPD. Stopping steroids today.  -Continue bronchodilators as needed  -Nicotine patch supplementation started during hospitalization.    Dehydration  Presented with dehydration which likely contributed to hyponatremia and initial low normal blood pressures, now resolved after IV fluid treatment.  -Continue IV fluids for now as outlined above    Weakness generalized  Suspect presenting complaint of generalized weakness was multifactorial including infection, severe alcohol dependence, and suspected malnutrition with nutrient including electrolyte deficiencies.  -Treat acute medical problems  -Anticipate PT and OT evaluations if she recovers from life-threatening acute illness once patient improves    Episode of recurrent major depressive disorder, unspecified depression episode severity (H24)  PTSD (post-traumatic stress disorder)  Carries previous diagnosis of depression and PTSD treated chronically with Lexapro 30 mg daily, clonazepam 0.5 mg 3 times daily as needed for anxiety, BuSpar 15 mg 3 times a day, Effexor 37.5 mg daily, and Xyzal 5 mg daily.  At admission it was unclear whether the patient has recently been taking these medications reliably.  She is now intubated and being sedated.  -Holding these chronic medications for now (no reliable enteral access yet) and abrupt discontinuation of these medications could conceivably contribute to increased anxiety/agitation          Diet: NPO for Medical/Clinical Reasons Except for: No Exceptions  Adult Formula Drip Feeding: Continuous Vital 1.5; Nasogastric tube; Goal Rate: 10; mL/hr; From: 12:00 AM; To: 12:00 AM; Please hold at 10 mL/hr x 24 hrs. If using cartons instead of L bottle, hang time is 8 hrs. Please replace carton after 8 hrs.;...    DVT Prophylaxis: Enoxaparin (Lovenox)  "SQ  Barney Catheter: PRESENT, indication: Strict 1-2 Hour I&O  Lines: PRESENT      PICC 10/09/23 Triple Lumen Left Basilic OK TO USE per PICC STAT RN-Site Assessment: WDL  Arterial Line 10/11/23 Radial-Site Assessment: WDL      Cardiac Monitoring: ACTIVE order. Indication: ICU  Code Status: Full Code      Clinically Significant Risk Factors        # Hypokalemia: Lowest K = 1.8 mmol/L in last 2 days, will replace as needed     # Hypomagnesemia: Lowest Mg = 1 mg/dL in last 2 days, will replace as needed   # Hypoalbuminemia: Lowest albumin = 1.7 g/dL at 10/11/2023  6:12 AM, will monitor as appropriate                         Disposition Plan    unknown     CC time 50\"    Victoriano Lopez MD  Hospitalist Service  [facility]  Securely message with [employer] (more info)  Text page via [employer] Paging/Directory   ______________________________________________________________________    Interval History   Patient sedated and unable to obtain history or review of systems.  Patient now supine since yesterday.  Oxygenation has been improved and is now on FiO2 of 45% with good oxygenation.      Physical Exam   Vital Signs: Temp: 98.5  F (36.9  C) Temp src: Oral BP: 133/74 Pulse: (!) 44   Resp: 22 SpO2: 100 % O2 Device: Mechanical Ventilator    Vent Mode: CMV/AC  (Continuous Mandatory Ventilation/ Assist Control)  FiO2 (%): 45 %  Resp Rate (Set): 22 breaths/min  Tidal Volume (Set, mL): 350 mL  PEEP (cm H2O): 10 cmH2O  Resp: 22    Ventilator measurements: Peak inspiratory pressure 24, mean airway pressure 12, plateau pressure 24, minute volume 8.46 while on AC mode overnight    Patient Vitals for the past 24 hrs:   BP Temp Temp src Pulse Resp SpO2 Weight   10/13/23 1200 133/74 -- -- (!) 44 22 100 % --   10/13/23 1158 -- -- -- (!) 44 22 -- --   10/13/23 1140 -- -- -- (!) 47 22 -- --   10/13/23 1120 -- -- -- (!) 47 22 -- --   10/13/23 1100 127/76 -- -- 50 22 -- --   10/13/23 1054 -- -- -- 59 -- -- -- "   10/13/23 1030 -- -- -- 51 22 99 % --   10/13/23 1000 (!) 144/85 98.5  F (36.9  C) Oral 56 22 97 % --   10/13/23 0930 -- -- -- 61 22 95 % --   10/13/23 0900 126/74 -- -- 59 22 95 % --   10/13/23 0830 -- -- -- 60 22 96 % --   10/13/23 0800 118/69 -- -- 56 22 97 % --   10/13/23 0758 118/69 98.6  F (37  C) Oral 59 22 97 % --   10/13/23 0708 -- -- -- 54 22 99 % --   10/13/23 0700 110/64 -- -- 60 22 99 % --   10/13/23 0630 118/66 -- -- 53 18 -- --   10/13/23 0615 -- -- -- 60 22 98 % --   10/13/23 0600 107/67 -- -- 58 22 98 % --   10/13/23 0515 -- -- -- 59 22 99 % --   10/13/23 0500 111/63 -- -- 60 22 -- 48.7 kg (107 lb 4.8 oz)   10/13/23 0400 125/63 -- -- 59 22 -- --   10/13/23 0346 -- 98.2  F (36.8  C) Axillary 60 22 -- --   10/13/23 0330 118/60 -- -- 61 22 -- --   10/13/23 0300 116/58 -- -- 60 22 92 % --   10/13/23 0236 130/66 -- -- 60 22 92 % --   10/13/23 0200 115/63 -- -- 59 22 -- --   10/13/23 0153 -- -- -- 60 22 95 % --   10/13/23 0130 111/59 -- -- 60 22 95 % --   10/13/23 0124 111/59 -- -- 67 10 94 % --   10/13/23 0100 123/76 -- -- 61 22 100 % --   10/13/23 0052 -- -- -- 60 22 100 % --   10/13/23 0000 107/64 -- -- 61 22 100 % --   10/12/23 2300 111/67 -- -- 60 22 100 % --   10/12/23 2257 -- 97.2  F (36.2  C) Axillary -- -- 100 % --   10/12/23 2230 110/64 -- -- 60 22 100 % --   10/12/23 2212 -- -- -- 58 22 100 % --   10/12/23 2200 98/64 -- -- 61 22 100 % --   10/12/23 2130 107/66 -- -- 61 22 95 % --   10/12/23 2100 108/66 -- -- 62 22 100 % --   10/12/23 2030 100/62 -- -- 62 22 100 % --   10/12/23 2004 100/64 98.2  F (36.8  C) Axillary 58 22 100 % --   10/12/23 2000 -- -- -- 60 22 100 % --   10/12/23 1930 95/62 -- -- 60 22 100 % --   10/12/23 1800 103/71 -- -- 61 22 100 % --   10/12/23 1751 -- -- -- 61 22 100 % --   10/12/23 1730 110/67 -- -- 62 22 100 % --   10/12/23 1700 109/69 -- -- 61 22 100 % --   10/12/23 1630 103/70 -- -- 63 17 100 % --   10/12/23 1600 105/71 98.1  F (36.7  C) Oral 62 22 99 % --   10/12/23  1530 -- -- -- 63 22 98 % --   10/12/23 1500 114/74 -- -- 60 22 99 % --   10/12/23 1300 103/66 -- -- 64 22 100 % --   10/12/23 1245 -- 98.6  F (37  C) Oral 64 22 99 % --   10/12/23 1228 -- -- -- 70 17 97 % --     Weight: 107 lbs 4.8 oz  Vitals:    10/09/23 0548 10/10/23 1200 10/11/23 0642 10/12/23 1123   Weight: 48.5 kg (107 lb) 46.4 kg (102 lb 4.8 oz) 45.8 kg (101 lb) 48.1 kg (106 lb)    10/13/23 0500   Weight: 48.7 kg (107 lb 4.8 oz)       Intake/Output Summary (Last 24 hours) at 10/11/2023 1005  Last data filed at 10/11/2023 0700  Gross per 24 hour   Intake 3593.58 ml   Output 909 ml   Net 2684.58 ml     General Appearance: Cachectic elderly woman, intubated and sedated  Respiratory: , not using accessory muscles, no stridor, no crepitus in the neck, symmetric breath sounds with scattered coarse rhonchi, no wheezes  Cardiovascular: Regular rate and rhythm to bradycardic, normal capillary refill in the hands and feet which are pink  GI: Hypoactive somewhat tympanitic bowel sounds, nondistended abdomen, soft  Skin: No rashes, left upper arm and right radial PICC and arterial line sites are without bleeding, drainage, or surrounding skin erythema  Neuro: Unresponsive, intermittent spontaneous movements of the limbs    Medical Decision Making       Total critical care time 65 minutes so far today including time spent reassessing respiratory status and adjusting mechanical ventilation treatment of life-threatening respiratory failure       Data     I have personally reviewed the following data over the past 24 hrs:    N/A  \   N/A   / N/A     N/A N/A N/A /  138 (H)   1.8 (LL) N/A N/A \       Blood sugar   Magnesium 1.6, phosphorus 2.1  Blood cultures from October 7, eighth, and ninth are negative (except for initial positive blood culture on October 7)  Stool testing for enteric pathogens and clostridia difficile was negative    Recent Labs   Lab 10/13/23  0615 10/12/23  1801 10/12/23  1222 10/12/23  0908   PH  7.48* 7.43 7.39 7.37   PCO2 39 39 41 44   PO2 76* 120* 100 105   HCO3 29* 26 25 25   O2PER 45 45 45 45     PaO2 to FiO2 ratio 92, worsened compared with 125 this morning and 163 yesterday morning    Imaging results reviewed over the past 24 hrs:   No results found for this or any previous visit (from the past 24 hour(s)).    Recent Labs   Lab 10/13/23  1157 10/13/23  0747 10/13/23  0546 10/12/23  2110 10/12/23  1646 10/12/23  0753 10/12/23  0553 10/11/23  0841 10/11/23  0612 10/10/23  1338 10/10/23  1223 10/10/23  0634 10/10/23  0533 10/09/23  0835 10/09/23  0535 10/07/23  2355 10/07/23  2031   WBC  --   --   --   --   --   --   --   --  28.2*  --   --   --  19.3*  --  16.2*   < > 8.7   HGB  --   --   --   --   --   --   --   --  10.4*  --   --   --  9.4*  --  10.3*   < > 12.6   MCV  --   --   --   --   --   --   --   --  102*  --   --   --  99  --  97   < > 96   PLT  --   --   --   --   --   --  201  --  157  --   --   --  148*  --  129*   < > 168   INR  --   --   --   --   --   --   --   --   --   --  1.16*  --   --   --   --   --   --    NA  --   --   --   --   --   --   --   --  133*  --  129*  --  132*   < > 130*  130*   < > 123*   POTASSIUM  --   --  1.8*  --  3.7  --  2.6*  --  3.6   < > 3.4  --  3.4   < > 3.2*   < > 3.3*   CHLORIDE  --   --   --   --   --   --   --   --  108*  --  104  --  105  --  102   < > 87*   CO2  --   --   --   --   --   --   --   --  19*  --  18*  --  20*  --  18*   < > 19*   BUN  --   --   --   --   --   --   --   --  6.3*  --   --   --  6.7*  --  10.9   < > 23.9*   CR  --   --   --   --   --   --  0.65  --  0.58  --   --   --  0.63   < > 0.73   < > 1.22*   ANIONGAP  --   --   --   --   --   --   --   --  6*  --  7  --  7  --  10   < > 17*   SHON  --   --   --   --   --   --   --   --  7.1*  --   --   --  7.4*  --  7.7*   < > 8.3*   * 100* 66*   < >  --    < >  --    < > 96   < >  --    < > 88   < > 227*   < > 80   ALBUMIN  --   --   --   --   --   --   --   --  1.7*  --   --    --  1.8*  --  1.8*   < > 2.3*   PROTTOTAL  --   --   --   --   --   --   --   --  4.6*  --   --   --  4.5*  --  4.6*   < > 6.5   BILITOTAL  --   --   --   --   --   --   --   --  0.2  --   --   --  <0.2  --  0.3   < > 1.3*   ALKPHOS  --   --   --   --   --   --   --   --  79  --   --   --  69  --  68   < > 119*   ALT  --   --   --   --   --   --   --   --  15  --   --   --  18  --  14   < > 13   AST  --   --   --   --   --   --   --   --  37  --   --   --  40  --  32   < > 39   LIPASE  --   --   --   --   --   --   --   --   --   --   --   --   --   --   --   --  9*    < > = values in this interval not displayed.

## 2023-10-13 NOTE — PROGRESS NOTES
[facility]    Medicine Progress Note - Hospitalist Service    Date of Admission:  10/7/2023    Assessment & Plan   Patient is a 68-year-old female with COPD, alcohol dependence recently consuming large volume alcohol daily, gastric bypass surgery, depression/PTSD, and ongoing tobacco abuse who presented with generalized weakness and fatigue and was admitted due to concerns for multifocal community-acquired pneumonia and sepsis.  She had rapid worsening over the first 12 hours of her hospitalization with persistent hypotension despite fluid resuscitation and was transferred to the ICU and started on vasopressor therapy with Levophed due to concern for septic shock.  After initial improvement in septic shock, patient developed worsening respiratory failure and worsening alcohol withdrawal with delirium requiring sedative therapy and subsequent intubation for mechanical ventilation in early a.m. 10/10.  She remains critically ill with potentially life-threatening respiratory failure, pneumonia, septic shock, and alcohol withdrawal.    Septic shock due to streptococcal pneumoniae bacteremia and multifocal pneumonia  Acute respiratory failure with hypoxia  Patient ventilator dyssynchrony  Presented with weakness and had bilateral infiltrates concerning for community-acquired pneumonia.  Blood culture from admission grew Streptococcus pneumoniae.  Sepsis rapidly progressed to septic shock requiring initiation of vasopressors.  Although hemodynamic status improved, she developed worsening delirium and respiratory failure necessitating intubation and initiation of mechanical ventilation in a.m. 10/10.  PaO2 to FiO2 ratio prior to intubation was as low as 120 and initially improved after intubation.  Over the last 24 hours, respiratory status has deteriorated with increased signs of patient ventilator dyssynchrony that likely contribute to worsening hypercapnia and hypoxia.  Leukocytosis has  worsened for unclear reasons but she remains afebrile with stable pulmonary radiographic findings.  Hypotension worsened probably due to sedation.  -Continue Rocephin    -will stop IV steroids and monitor.   -Titrate vasopressors with Levophed to keep MAP 65 or higher  -Ordered recheck WBC, ABG  -Adjusting mechanical ventilation to attempt to optimize oxygenation (keep oxygen saturations 90 to 95%), may allow permissive hypercapnia.  Will try to decrease PEEP to 8 and see how she does.   -propofol infusion for sedation titrated to RASS score -3 to -4 due to worsening patient ventilator dyssynchrony and worsening respiratory status  -Continue hydromorphone infusion for analgesia while on ventilator  -Continue bolus doses of Versed as needed for sedation  -If respiratory status does not improve or worsens despite heavier sedation, could consider adding paralytic agent  -Continue triple-lumen PICC line central venous catheter  -Continue arterial line catheter  -Continue indwelling urinary catheter    Alcohol use disorder (severe dependence) acute alcohol withdrawal with delirium   History of seizure due to alcohol withdrawal  Per  patient had been drinking an average of 12 pack of beer, 1 bottle of wine, and 10 hard liquor drinks a day recently with most recent alcoholic drink approximately 30 minutes prior to hospital presentation.  Blood alcohol level was not tested at time of admission.  Patient developed overt signs of withdrawal on 10/9 including increased anxiety, tremor, confusion and possible hallucination.  Initial treatment of alcohol withdrawal per CIWA protocol was complicated by sedation that may have exacerbated respiratory failure.  Nursing reports that patient's mentation was normal early in her hospital stay prior to onset of alcohol withdrawal.  Patient has not had overt meningismus lowering suspicion for infectious meningoencephalitis.  Signs of alcohol withdrawal are difficult to assess  because of current sedation while on the ventilator but persistent or worsening alcohol withdrawal could contribute to increased patient ventilator dyssynchrony  -Continuing treatment for respiratory failure as outlined above and while she is intubated will use a combination of propofol infusion, hydromorphone infusion, and Versed as needed for sedation    Suspected malnutrition  Hypoalbuminemia   Status post gastric bypass for obesity  Has remote history of Tom-en-Y gastric bypass surgery for obesity in March 2008.  Reported 13 pound weight loss in recent weeks and appears underweight and probably cachectic on exam.  Alcoholism likely contributes to malnutrition as may previous gastric bypass status.  Serum albumin was low at admission at 2.8 and has decreased and she is now starting to show signs of peripheral edema.  Noncardiogenic pulmonary edema from hypoalbuminemia could exacerbate respiratory failure.  Her previous Tom-en-Y gastric bypass surgery may limit ability to advance feeding tube beyond the gastric remnant.  -Attempting to adjust feeding tube placement today to advance tip beyond the gastric remnant if possible but interventional radiology and/or fluoroscopy are not available at this hospital to assist with such placement  -Registered dietitian assisting with recommendations for initiation of enteral feeding, anticipate starting very low rate enteral feeding with tube feeds today depending on positioning of the feeding tube but would defer initiation of free water supplementation today (continuing IV fluids).  Tolerating 10 ml per hour, consider increasing slowly.   -Consider IV albumin administration with IV Lasix to support hemodynamic status and/or respiratory status depending upon clinical course    Hypomagnesemia  Hypophosphatemia   Hypokalemia  Presented with low serum magnesium and potassium levels and subsequently developed low serum phosphorus level all likely secondary to nutritional  deficiency.  These electrolytes have improved with supplementation.  -Continue with potassium, magnesium, and phosphorus supplementation per protocol in patient with critical illness  -Monitor magnesium, phosphorus, and potassium as indicated    Hyponatremia  Sodium 123 on admission with suspected hypovolemic hyponatremia.  After treatment with multiple normal saline boluses, sodium increased from 123 up to 128 in less than 12 hours after which IV fluids were switched to D5 half-normal saline.  Sodium then decreased to 126 and later stabilized at about 130 and today is 133.  Urine sodium was less than 20 consistent with hypovolemic hyponatremia.  -Continue current IV fluid infusion with D5 with 1/2NS titrating IV fluid rate to support fluid needs until enteral feeding and/or free water can be advanced and treat hyponatremia  -Ordered recheck of serum sodium and osmolality  -on IV Lasix for the moment and will continue to assess.      Hyperglycemia  Patient has had mild to moderate hyperglycemia while in the ICU.  Hemoglobin A1c was 4.4 and she is not known to have underlying diabetes.  Insulin infusion was started for management of hyperglycemia in the ICU.  Anticipate hyperglycemia will worsen with initiation of enteral feeding.  -Continue to monitor blood sugars per protocol  -Continue insulin infusion per protocol for management of hyperglycemia  -Continuing IV fluids containing dextrose while receiving insulin infusion    Thrombocytopenia  Anemia, unspecified type  Coagulopathy  Presented with thrombocytopenia and anemia at admission both of which initially worsened but have subsequently stabilized.  She also is mildly coagulopathic with prolonged INR.  Thrombocytopenia, coagulopathy, and anemia are probably due to septic shock.  Active bleeding has not been suspected so far.  -Ordered recheck of hemoglobin and platelet, plt now 200  -Ordered recheck of INR, 1.16    Emphysema/COPD  Tobacco abuse  Known chronic  emphysema and COPD without overt acute exacerbation.  Patient reportedly has continued to smoke.  -Continuing IV steroids secondary to septic shock and pneumococcal pneumonia but also COPD. Stopping steroids today.  -Continue bronchodilators as needed  -Nicotine patch supplementation started during hospitalization.    Dehydration  Presented with dehydration which likely contributed to hyponatremia and initial low normal blood pressures, now resolved after IV fluid treatment.  -Continue IV fluids for now as outlined above    Weakness generalized  Suspect presenting complaint of generalized weakness was multifactorial including infection, severe alcohol dependence, and suspected malnutrition with nutrient including electrolyte deficiencies.  -Treat acute medical problems  -Anticipate PT and OT evaluations if she recovers from life-threatening acute illness once patient improves    Episode of recurrent major depressive disorder, unspecified depression episode severity (H24)  PTSD (post-traumatic stress disorder)  Carries previous diagnosis of depression and PTSD treated chronically with Lexapro 30 mg daily, clonazepam 0.5 mg 3 times daily as needed for anxiety, BuSpar 15 mg 3 times a day, Effexor 37.5 mg daily, and Xyzal 5 mg daily.  At admission it was unclear whether the patient has recently been taking these medications reliably.  She is now intubated and being sedated.  -Holding these chronic medications for now (no reliable enteral access yet) and abrupt discontinuation of these medications could conceivably contribute to increased anxiety/agitation          Diet: NPO for Medical/Clinical Reasons Except for: No Exceptions  Adult Formula Drip Feeding: Continuous Vital 1.5; Nasogastric tube; Goal Rate: 10; mL/hr; From: 12:00 AM; To: 12:00 AM; Please hold at 10 mL/hr x 24 hrs. If using cartons instead of L bottle, hang time is 8 hrs. Please replace carton after 8 hrs.;...    DVT Prophylaxis: Enoxaparin (Lovenox)  "SQ  Barney Catheter: PRESENT, indication: Strict 1-2 Hour I&O  Lines: PRESENT      PICC 10/09/23 Triple Lumen Left Basilic OK TO USE per PICC STAT RN-Site Assessment: WDL  Arterial Line 10/11/23 Radial-Site Assessment: WDL      Cardiac Monitoring: ACTIVE order. Indication: ICU  Code Status: Full Code      Clinically Significant Risk Factors        # Hypokalemia: Lowest K = 1.8 mmol/L in last 2 days, will replace as needed     # Hypomagnesemia: Lowest Mg = 1 mg/dL in last 2 days, will replace as needed   # Hypoalbuminemia: Lowest albumin = 1.7 g/dL at 10/11/2023  6:12 AM, will monitor as appropriate                         Disposition Plan    unknown     CC time 50\"    Victoriano Lopez MD  Hospitalist Service  [facility]  Securely message with [employer] (more info)  Text page via [employer] Paging/Directory   ______________________________________________________________________    Interval History   Patient sedated and unable to obtain history or review of systems.  Patient now supine since yesterday.  Oxygenation has been improved and is now on FiO2 of 45% with good oxygenation.      Physical Exam   Vital Signs: Temp: 98.5  F (36.9  C) Temp src: Oral BP: 133/74 Pulse: (!) 44   Resp: 22 SpO2: 100 % O2 Device: Mechanical Ventilator    Vent Mode: CMV/AC  (Continuous Mandatory Ventilation/ Assist Control)  FiO2 (%): 45 %  Resp Rate (Set): 22 breaths/min  Tidal Volume (Set, mL): 350 mL  PEEP (cm H2O): 10 cmH2O  Resp: 22    Ventilator measurements: Peak inspiratory pressure 24, mean airway pressure 12, plateau pressure 24, minute volume 8.46 while on AC mode overnight    Patient Vitals for the past 24 hrs:   BP Temp Temp src Pulse Resp SpO2 Weight   10/13/23 1200 133/74 -- -- (!) 44 22 100 % --   10/13/23 1158 -- -- -- (!) 44 22 -- --   10/13/23 1140 -- -- -- (!) 47 22 -- --   10/13/23 1120 -- -- -- (!) 47 22 -- --   10/13/23 1100 127/76 -- -- 50 22 -- --   10/13/23 1054 -- -- -- 59 -- -- -- "   10/13/23 1030 -- -- -- 51 22 99 % --   10/13/23 1000 (!) 144/85 98.5  F (36.9  C) Oral 56 22 97 % --   10/13/23 0930 -- -- -- 61 22 95 % --   10/13/23 0900 126/74 -- -- 59 22 95 % --   10/13/23 0830 -- -- -- 60 22 96 % --   10/13/23 0800 118/69 -- -- 56 22 97 % --   10/13/23 0758 118/69 98.6  F (37  C) Oral 59 22 97 % --   10/13/23 0708 -- -- -- 54 22 99 % --   10/13/23 0700 110/64 -- -- 60 22 99 % --   10/13/23 0630 118/66 -- -- 53 18 -- --   10/13/23 0615 -- -- -- 60 22 98 % --   10/13/23 0600 107/67 -- -- 58 22 98 % --   10/13/23 0515 -- -- -- 59 22 99 % --   10/13/23 0500 111/63 -- -- 60 22 -- 48.7 kg (107 lb 4.8 oz)   10/13/23 0400 125/63 -- -- 59 22 -- --   10/13/23 0346 -- 98.2  F (36.8  C) Axillary 60 22 -- --   10/13/23 0330 118/60 -- -- 61 22 -- --   10/13/23 0300 116/58 -- -- 60 22 92 % --   10/13/23 0236 130/66 -- -- 60 22 92 % --   10/13/23 0200 115/63 -- -- 59 22 -- --   10/13/23 0153 -- -- -- 60 22 95 % --   10/13/23 0130 111/59 -- -- 60 22 95 % --   10/13/23 0124 111/59 -- -- 67 10 94 % --   10/13/23 0100 123/76 -- -- 61 22 100 % --   10/13/23 0052 -- -- -- 60 22 100 % --   10/13/23 0000 107/64 -- -- 61 22 100 % --   10/12/23 2300 111/67 -- -- 60 22 100 % --   10/12/23 2257 -- 97.2  F (36.2  C) Axillary -- -- 100 % --   10/12/23 2230 110/64 -- -- 60 22 100 % --   10/12/23 2212 -- -- -- 58 22 100 % --   10/12/23 2200 98/64 -- -- 61 22 100 % --   10/12/23 2130 107/66 -- -- 61 22 95 % --   10/12/23 2100 108/66 -- -- 62 22 100 % --   10/12/23 2030 100/62 -- -- 62 22 100 % --   10/12/23 2004 100/64 98.2  F (36.8  C) Axillary 58 22 100 % --   10/12/23 2000 -- -- -- 60 22 100 % --   10/12/23 1930 95/62 -- -- 60 22 100 % --   10/12/23 1800 103/71 -- -- 61 22 100 % --   10/12/23 1751 -- -- -- 61 22 100 % --   10/12/23 1730 110/67 -- -- 62 22 100 % --   10/12/23 1700 109/69 -- -- 61 22 100 % --   10/12/23 1630 103/70 -- -- 63 17 100 % --   10/12/23 1600 105/71 98.1  F (36.7  C) Oral 62 22 99 % --   10/12/23  1530 -- -- -- 63 22 98 % --   10/12/23 1500 114/74 -- -- 60 22 99 % --   10/12/23 1300 103/66 -- -- 64 22 100 % --   10/12/23 1245 -- 98.6  F (37  C) Oral 64 22 99 % --   10/12/23 1228 -- -- -- 70 17 97 % --     Weight: 107 lbs 4.8 oz  Vitals:    10/09/23 0548 10/10/23 1200 10/11/23 0642 10/12/23 1123   Weight: 48.5 kg (107 lb) 46.4 kg (102 lb 4.8 oz) 45.8 kg (101 lb) 48.1 kg (106 lb)    10/13/23 0500   Weight: 48.7 kg (107 lb 4.8 oz)       Intake/Output Summary (Last 24 hours) at 10/11/2023 1005  Last data filed at 10/11/2023 0700  Gross per 24 hour   Intake 3593.58 ml   Output 909 ml   Net 2684.58 ml     General Appearance: Cachectic elderly woman, intubated and sedated  Respiratory: , not using accessory muscles, no stridor, no crepitus in the neck, symmetric breath sounds with scattered coarse rhonchi, no wheezes  Cardiovascular: Regular rate and rhythm to bradycardic, normal capillary refill in the hands and feet which are pink  GI: Hypoactive somewhat tympanitic bowel sounds, nondistended abdomen, soft  Skin: No rashes, left upper arm and right radial PICC and arterial line sites are without bleeding, drainage, or surrounding skin erythema  Neuro: Unresponsive, intermittent spontaneous movements of the limbs    Medical Decision Making       Total critical care time 65 minutes so far today including time spent reassessing respiratory status and adjusting mechanical ventilation treatment of life-threatening respiratory failure       Data     I have personally reviewed the following data over the past 24 hrs:    N/A  \   N/A   / N/A     N/A N/A N/A /  138 (H)   1.8 (LL) N/A N/A \       Blood sugar   Magnesium 1.6, phosphorus 2.1  Blood cultures from October 7, eighth, and ninth are negative (except for initial positive blood culture on October 7)  Stool testing for enteric pathogens and clostridia difficile was negative    Recent Labs   Lab 10/13/23  0615 10/12/23  1801 10/12/23  1222 10/12/23  0908   PH  7.48* 7.43 7.39 7.37   PCO2 39 39 41 44   PO2 76* 120* 100 105   HCO3 29* 26 25 25   O2PER 45 45 45 45     PaO2 to FiO2 ratio 92, worsened compared with 125 this morning and 163 yesterday morning    Imaging results reviewed over the past 24 hrs:   No results found for this or any previous visit (from the past 24 hour(s)).    Recent Labs   Lab 10/13/23  1157 10/13/23  0747 10/13/23  0546 10/12/23  2110 10/12/23  1646 10/12/23  0753 10/12/23  0553 10/11/23  0841 10/11/23  0612 10/10/23  1338 10/10/23  1223 10/10/23  0634 10/10/23  0533 10/09/23  0835 10/09/23  0535 10/07/23  2355 10/07/23  2031   WBC  --   --   --   --   --   --   --   --  28.2*  --   --   --  19.3*  --  16.2*   < > 8.7   HGB  --   --   --   --   --   --   --   --  10.4*  --   --   --  9.4*  --  10.3*   < > 12.6   MCV  --   --   --   --   --   --   --   --  102*  --   --   --  99  --  97   < > 96   PLT  --   --   --   --   --   --  201  --  157  --   --   --  148*  --  129*   < > 168   INR  --   --   --   --   --   --   --   --   --   --  1.16*  --   --   --   --   --   --    NA  --   --   --   --   --   --   --   --  133*  --  129*  --  132*   < > 130*  130*   < > 123*   POTASSIUM  --   --  1.8*  --  3.7  --  2.6*  --  3.6   < > 3.4  --  3.4   < > 3.2*   < > 3.3*   CHLORIDE  --   --   --   --   --   --   --   --  108*  --  104  --  105  --  102   < > 87*   CO2  --   --   --   --   --   --   --   --  19*  --  18*  --  20*  --  18*   < > 19*   BUN  --   --   --   --   --   --   --   --  6.3*  --   --   --  6.7*  --  10.9   < > 23.9*   CR  --   --   --   --   --   --  0.65  --  0.58  --   --   --  0.63   < > 0.73   < > 1.22*   ANIONGAP  --   --   --   --   --   --   --   --  6*  --  7  --  7  --  10   < > 17*   SHON  --   --   --   --   --   --   --   --  7.1*  --   --   --  7.4*  --  7.7*   < > 8.3*   * 100* 66*   < >  --    < >  --    < > 96   < >  --    < > 88   < > 227*   < > 80   ALBUMIN  --   --   --   --   --   --   --   --  1.7*  --   --    --  1.8*  --  1.8*   < > 2.3*   PROTTOTAL  --   --   --   --   --   --   --   --  4.6*  --   --   --  4.5*  --  4.6*   < > 6.5   BILITOTAL  --   --   --   --   --   --   --   --  0.2  --   --   --  <0.2  --  0.3   < > 1.3*   ALKPHOS  --   --   --   --   --   --   --   --  79  --   --   --  69  --  68   < > 119*   ALT  --   --   --   --   --   --   --   --  15  --   --   --  18  --  14   < > 13   AST  --   --   --   --   --   --   --   --  37  --   --   --  40  --  32   < > 39   LIPASE  --   --   --   --   --   --   --   --   --   --   --   --   --   --   --   --  9*    < > = values in this interval not displayed.

## 2023-10-14 LAB
ALLEN'S TEST: NO
ANION GAP SERPL CALCULATED.3IONS-SCNC: 7 MMOL/L (ref 7–15)
BACTERIA BLD CULT: NO GROWTH
BACTERIA BLD CULT: NO GROWTH
BASE EXCESS BLDA CALC-SCNC: 6.3 MMOL/L (ref -9–1.8)
BUN SERPL-MCNC: 5.8 MG/DL (ref 8–23)
CALCIUM SERPL-MCNC: 7.4 MG/DL (ref 8.8–10.2)
CHLORIDE SERPL-SCNC: 107 MMOL/L (ref 98–107)
CREAT SERPL-MCNC: 0.67 MG/DL (ref 0.51–0.95)
DEPRECATED HCO3 PLAS-SCNC: 28 MMOL/L (ref 22–29)
EGFRCR SERPLBLD CKD-EPI 2021: >90 ML/MIN/1.73M2
GLUCOSE BLDC GLUCOMTR-MCNC: 105 MG/DL (ref 70–99)
GLUCOSE BLDC GLUCOMTR-MCNC: 113 MG/DL (ref 70–99)
GLUCOSE BLDC GLUCOMTR-MCNC: 120 MG/DL (ref 70–99)
GLUCOSE BLDC GLUCOMTR-MCNC: 130 MG/DL (ref 70–99)
GLUCOSE BLDC GLUCOMTR-MCNC: 88 MG/DL (ref 70–99)
GLUCOSE BLDC GLUCOMTR-MCNC: 99 MG/DL (ref 70–99)
GLUCOSE SERPL-MCNC: 103 MG/DL (ref 70–99)
HCO3 BLD-SCNC: 31 MMOL/L (ref 21–28)
MAGNESIUM SERPL-MCNC: 1.9 MG/DL (ref 1.7–2.3)
O2/TOTAL GAS SETTING VFR VENT: 40 %
PCO2 BLD: 42 MM HG (ref 35–45)
PH BLD: 7.47 [PH] (ref 7.35–7.45)
PHOSPHATE SERPL-MCNC: 3.1 MG/DL (ref 2.5–4.5)
PO2 BLD: 69 MM HG (ref 80–105)
POTASSIUM SERPL-SCNC: 3.6 MMOL/L (ref 3.4–5.3)
SODIUM SERPL-SCNC: 142 MMOL/L (ref 135–145)

## 2023-10-14 PROCEDURE — C9113 INJ PANTOPRAZOLE SODIUM, VIA: HCPCS | Mod: JZ | Performed by: FAMILY MEDICINE

## 2023-10-14 PROCEDURE — 99233 SBSQ HOSP IP/OBS HIGH 50: CPT | Performed by: INTERNAL MEDICINE

## 2023-10-14 PROCEDURE — 250N000013 HC RX MED GY IP 250 OP 250 PS 637: Performed by: INTERNAL MEDICINE

## 2023-10-14 PROCEDURE — 94003 VENT MGMT INPAT SUBQ DAY: CPT

## 2023-10-14 PROCEDURE — 258N000001 HC RX 258: Performed by: INTERNAL MEDICINE

## 2023-10-14 PROCEDURE — 82803 BLOOD GASES ANY COMBINATION: CPT | Performed by: INTERNAL MEDICINE

## 2023-10-14 PROCEDURE — 250N000011 HC RX IP 250 OP 636: Mod: JZ | Performed by: INTERNAL MEDICINE

## 2023-10-14 PROCEDURE — 999N000157 HC STATISTIC RCP TIME EA 10 MIN

## 2023-10-14 PROCEDURE — 250N000011 HC RX IP 250 OP 636: Mod: JZ | Performed by: FAMILY MEDICINE

## 2023-10-14 PROCEDURE — 200N000001 HC R&B ICU

## 2023-10-14 PROCEDURE — 250N000009 HC RX 250: Performed by: PEDIATRICS

## 2023-10-14 PROCEDURE — 84100 ASSAY OF PHOSPHORUS: CPT | Performed by: INTERNAL MEDICINE

## 2023-10-14 PROCEDURE — 82435 ASSAY OF BLOOD CHLORIDE: CPT | Performed by: INTERNAL MEDICINE

## 2023-10-14 PROCEDURE — 250N000011 HC RX IP 250 OP 636: Performed by: PEDIATRICS

## 2023-10-14 PROCEDURE — 250N000013 HC RX MED GY IP 250 OP 250 PS 637: Performed by: PEDIATRICS

## 2023-10-14 PROCEDURE — 83735 ASSAY OF MAGNESIUM: CPT | Performed by: INTERNAL MEDICINE

## 2023-10-14 RX ORDER — MAGNESIUM SULFATE HEPTAHYDRATE 40 MG/ML
2 INJECTION, SOLUTION INTRAVENOUS ONCE
Qty: 50 ML | Refills: 0 | Status: COMPLETED | OUTPATIENT
Start: 2023-10-14 | End: 2023-10-14

## 2023-10-14 RX ORDER — DEXTROSE MONOHYDRATE, SODIUM CHLORIDE, SODIUM LACTATE, POTASSIUM CHLORIDE, CALCIUM CHLORIDE 5; 600; 310; 179; 20 G/100ML; MG/100ML; MG/100ML; MG/100ML; MG/100ML
INJECTION, SOLUTION INTRAVENOUS CONTINUOUS
Status: DISCONTINUED | OUTPATIENT
Start: 2023-10-14 | End: 2023-10-19

## 2023-10-14 RX ORDER — ACETAMINOPHEN 325 MG/1
650 TABLET ORAL EVERY 4 HOURS PRN
Status: DISCONTINUED | OUTPATIENT
Start: 2023-10-14 | End: 2023-10-24 | Stop reason: HOSPADM

## 2023-10-14 RX ORDER — POTASSIUM CHLORIDE 7.45 MG/ML
10 INJECTION INTRAVENOUS ONCE
Status: COMPLETED | OUTPATIENT
Start: 2023-10-14 | End: 2023-10-14

## 2023-10-14 RX ORDER — ACETAMINOPHEN 650 MG/1
650 SUPPOSITORY RECTAL EVERY 4 HOURS PRN
Status: DISCONTINUED | OUTPATIENT
Start: 2023-10-14 | End: 2023-10-20

## 2023-10-14 RX ADMIN — Medication 15 ML: at 08:42

## 2023-10-14 RX ADMIN — THIAMINE HYDROCHLORIDE 100 MG: 100 INJECTION, SOLUTION INTRAMUSCULAR; INTRAVENOUS at 08:05

## 2023-10-14 RX ADMIN — MINERAL OIL, PETROLATUM: 425; 573 OINTMENT OPHTHALMIC at 17:59

## 2023-10-14 RX ADMIN — FUROSEMIDE 20 MG: 10 INJECTION, SOLUTION INTRAVENOUS at 16:10

## 2023-10-14 RX ADMIN — ENOXAPARIN SODIUM 40 MG: 40 INJECTION SUBCUTANEOUS at 20:45

## 2023-10-14 RX ADMIN — PROPOFOL 45 MCG/KG/MIN: 10 INJECTION, EMULSION INTRAVENOUS at 21:03

## 2023-10-14 RX ADMIN — Medication 1 PATCH: at 08:08

## 2023-10-14 RX ADMIN — METHYLPREDNISOLONE SODIUM SUCCINATE 30 MG: 40 INJECTION INTRAMUSCULAR; INTRAVENOUS at 07:47

## 2023-10-14 RX ADMIN — FUROSEMIDE 20 MG: 10 INJECTION, SOLUTION INTRAVENOUS at 04:23

## 2023-10-14 RX ADMIN — POTASSIUM CHLORIDE 10 MEQ: 10 INJECTION, SOLUTION INTRAVENOUS at 07:48

## 2023-10-14 RX ADMIN — PANTOPRAZOLE SODIUM 40 MG: 40 INJECTION, POWDER, FOR SOLUTION INTRAVENOUS at 08:01

## 2023-10-14 RX ADMIN — PROPOFOL 30 MCG/KG/MIN: 10 INJECTION, EMULSION INTRAVENOUS at 12:29

## 2023-10-14 RX ADMIN — MINERAL OIL, PETROLATUM: 425; 573 OINTMENT OPHTHALMIC at 00:32

## 2023-10-14 RX ADMIN — DEXTROSE MONOHYDRATE, SODIUM CHLORIDE, SODIUM LACTATE, POTASSIUM CHLORIDE, CALCIUM CHLORIDE: 5; 600; 310; 179; 20 INJECTION, SOLUTION INTRAVENOUS at 04:23

## 2023-10-14 RX ADMIN — MINERAL OIL, PETROLATUM: 425; 573 OINTMENT OPHTHALMIC at 08:42

## 2023-10-14 RX ADMIN — BISACODYL 10 MG: 10 SUPPOSITORY RECTAL at 14:49

## 2023-10-14 RX ADMIN — CEFTRIAXONE SODIUM 2 G: 2 INJECTION, POWDER, FOR SOLUTION INTRAMUSCULAR; INTRAVENOUS at 20:45

## 2023-10-14 RX ADMIN — NOREPINEPHRINE BITARTRATE 0.1 MCG/KG/MIN: 0.02 INJECTION, SOLUTION INTRAVENOUS at 05:58

## 2023-10-14 RX ADMIN — PROPOFOL 45 MCG/KG/MIN: 10 INJECTION, EMULSION INTRAVENOUS at 02:20

## 2023-10-14 RX ADMIN — MAGNESIUM SULFATE HEPTAHYDRATE 2 G: 40 INJECTION, SOLUTION INTRAVENOUS at 08:47

## 2023-10-14 RX ADMIN — NOREPINEPHRINE BITARTRATE 0.04 MCG/KG/MIN: 0.02 INJECTION, SOLUTION INTRAVENOUS at 23:27

## 2023-10-14 RX ADMIN — ACETAMINOPHEN 650 MG: 650 SUPPOSITORY RECTAL at 08:13

## 2023-10-14 ASSESSMENT — ACTIVITIES OF DAILY LIVING (ADL)
ADLS_ACUITY_SCORE: 52

## 2023-10-14 NOTE — PLAN OF CARE
Goal Outcome Evaluation:      Plan of Care Reviewed With: patient    Overall Patient Progress: no changeOverall Patient Progress: no change    Outcome Evaluation: Vent continued, 40% FiO2, 8 PEEP, 22 RR, . LS coarse until suctioned, then diminished. VSS. Levo decreased, propofol increased. RASS at -2 to -3. Dilaudid PCA continued at 0.3mg/hr. No boluses or changes overnight. Tele SR. Generalized trace edema. Tube feeding continued at 10mL/hr. IV rocephin for abx. Adequate mccabe output. All protocols to be rechecked this AM.

## 2023-10-14 NOTE — PROGRESS NOTES
Shift events:   Temp of 100.0-Tylenol suppository given.      Neuro: Pt remains sedated.   CMS: Edema 1-2+,Cap refill is less than 3 seconds.    Pulmonary: LS coarse. Suction provided inline and oral. Vent settings 40% FiO2, 22 RR, 350 TV, 6 PEEP.   CV: NSR and Sinus hank present, blood pressures stable.   GI: BS active. PRN suppository given which was effective with large BL. Tube feeding increased and infusing at 20mL/hr with 40ml flushes Q4. No residual noted.  : Mccabe catheter in place. 1700 output this shift.  Skin: Intact. Slight bruising on upper extremities  Pain: Pt is on continuous dilaudid infusion infusing at 0.3 mg per hour. Attempted to wean with decrease to 0.2mg without success as patient started to exhibit facial grimacing and restlessness. Nonverbal pain indicators are absent at this time.  Activity: Bedrest. Q2 turns.   Hygiene: Oral cares megan, mccabe catheter cares done and brianna wipes done.  Lines/Tubes/Drains: Vent, triple lumen Picc line, peripheral IV, ART line, NG tube feeding, mccabe catheter   Drips:   Levophed at 0.08 mcg  Dilaudid at 0.3 mg  Propofol at 40 mcg-attempted weaning this shift  D5LR 20K at 10ml/hr which was decreased from 50ml/hr.     Mag and K+ replaced this shift with rechecks for 10/15.

## 2023-10-14 NOTE — PROVIDER NOTIFICATION
Provider notified as pt's NG tube placement is reading 62cm at the nare but was previously charted at 65cm. Stat Xray ordered and placement is confirmed.

## 2023-10-14 NOTE — PROGRESS NOTES
[facility]    Medicine Progress Note - Hospitalist Service    Date of Admission:  10/7/2023    Assessment & Plan   Patient is a 68-year-old female with COPD, alcohol dependence recently consuming large volume alcohol daily, gastric bypass surgery, depression/PTSD, and ongoing tobacco abuse who presented with generalized weakness and fatigue and was admitted due to concerns for multifocal community-acquired pneumonia and sepsis.  She had rapid worsening over the first 12 hours of her hospitalization with persistent hypotension despite fluid resuscitation and was transferred to the ICU and started on vasopressor therapy with Levophed due to concern for septic shock.  After initial improvement in septic shock, patient developed worsening respiratory failure and worsening alcohol withdrawal with delirium requiring sedative therapy and subsequent intubation for mechanical ventilation in early a.m. 10/10.  She remains critically ill with potentially life-threatening respiratory failure, pneumonia, septic shock, and alcohol withdrawal.    Septic shock due to streptococcal pneumoniae bacteremia and multifocal pneumonia  Acute respiratory failure with hypoxia  Patient ventilator dyssynchrony  Presented with weakness and had bilateral infiltrates concerning for community-acquired pneumonia.  Blood culture from admission grew Streptococcus pneumoniae.  Sepsis rapidly progressed to septic shock requiring initiation of vasopressors.  Although hemodynamic status improved, she developed worsening delirium and respiratory failure necessitating intubation and initiation of mechanical ventilation in a.m. 10/10.  PaO2 to FiO2 ratio prior to intubation was as low as 120 and initially improved after intubation.  Over the last 24 hours, respiratory status has deteriorated with increased signs of patient ventilator dyssynchrony that likely contribute to worsening hypercapnia and hypoxia.  Leukocytosis has  worsened for unclear reasons but she remains afebrile with stable pulmonary radiographic findings.  Hypotension worsened probably due to sedation.  -Continue Rocephin    -will stop IV steroids today and monitor  -Titrate vasopressors with Levophed to keep MAP 65 or higher  -ABG in a.m. or as needed with vent changes  -Adjusting mechanical ventilation to attempt to optimize oxygenation (keep oxygen saturations 90 to 95%), may allow permissive hypercapnia.  Will try to decrease PEEP to 6 and see how she does.  If does well may need to consider weaning trial  -propofol infusion for sedation titrated to RASS score -1 to -2 as patient doing better and may be able to start weaning.  If she starts to have more dyssynchrony with the ventilator may need to sedate little bit more.  -Continue hydromorphone infusion for analgesia while on ventilator  -Continue bolus doses of Versed as needed for sedation  -Continue triple-lumen PICC line central venous catheter  -Continue arterial line catheter  -Continue indwelling urinary catheter    Patient status although improving is still very guarded.    Alcohol use disorder (severe dependence) acute alcohol withdrawal with delirium   History of seizure due to alcohol withdrawal  Per  patient had been drinking an average of 12 pack of beer, 1 bottle of wine, and 10 hard liquor drinks a day recently with most recent alcoholic drink approximately 30 minutes prior to hospital presentation.  Blood alcohol level was not tested at time of admission.  Patient developed overt signs of withdrawal on 10/9 including increased anxiety, tremor, confusion and possible hallucination.  Initial treatment of alcohol withdrawal per [facility] protocol was complicated by sedation that may have exacerbated respiratory failure.  Nursing reports that patient's mentation was normal early in her hospital stay prior to onset of alcohol withdrawal.  Patient has not had overt meningismus lowering suspicion for  infectious meningoencephalitis.  Signs of alcohol withdrawal are difficult to assess because of current sedation while on the ventilator but persistent or worsening alcohol withdrawal could contribute to increased patient ventilator dyssynchrony  -Continuing treatment for respiratory failure as outlined above and while she is intubated will use a combination of propofol infusion, hydromorphone infusion, and Versed as needed for sedation    Suspected malnutrition  Hypoalbuminemia   Status post gastric bypass for obesity  Has remote history of Tom-en-Y gastric bypass surgery for obesity in March 2008.  Reported 13 pound weight loss in recent weeks and appears underweight and probably cachectic on exam.  Alcoholism likely contributes to malnutrition as may previous gastric bypass status.  Serum albumin was low at admission at 2.8 and has decreased and she is now starting to show signs of peripheral edema.  Noncardiogenic pulmonary edema from hypoalbuminemia could exacerbate respiratory failure.  Her previous Tom-en-Y gastric bypass surgery may limit ability to advance feeding tube beyond the gastric remnant.  -Attempting to adjust feeding tube placement today to advance tip beyond the gastric remnant if possible but interventional radiology and/or fluoroscopy are not available at this hospital to assist with such placement  -Registered dietitian assisting with recommendations for initiation of enteral feeding, anticipate starting very low rate enteral feeding with tube feeds today depending on positioning of the feeding tube but would defer initiation of free water supplementation today (continuing IV fluids).  Tolerating 10 ml per hour, will increase to 20 mils per hour and see how she tolerates.  Add free water flushes at 40 mils every 4 hours.  Per nutrition goal is to titrate feeds up to 40 and free water at 120 every 4.  -Consider IV albumin administration with IV Lasix to support hemodynamic status and/or  respiratory status depending upon clinical course    Hypomagnesemia  Hypophosphatemia   Hypokalemia  Presented with low serum magnesium and potassium levels and subsequently developed low serum phosphorus level all likely secondary to nutritional deficiency.  These electrolytes have improved with supplementation.  -Continue with potassium, magnesium, and phosphorus supplementation per protocol in patient with critical illness  -Monitor magnesium, phosphorus, and potassium as indicated    Hyponatremia  Sodium 123 on admission with suspected hypovolemic hyponatremia.  After treatment with multiple normal saline boluses, sodium increased from 123 up to 128 in less than 12 hours after which IV fluids were switched to D5 half-normal saline.  Sodium then decreased to 126 and later stabilized at about 130 and today is 133.  Urine sodium was less than 20 consistent with hypovolemic hyponatremia.  Serum sodium now normal at 142  -Continue current IV fluid infusion and changed to TKO with increased tube feeds and free water flushes.  -on IV Lasix for the moment and will continue to assess.  Decreased to 20 mg IV every 12 yesterday.    Hyperglycemia  Patient has had mild to moderate hyperglycemia while in the ICU.  Hemoglobin A1c was 4.4 and she is not known to have underlying diabetes.  Insulin infusion was started for management of hyperglycemia in the ICU.  Anticipate hyperglycemia will worsen with initiation of enteral feeding.  -Continue to monitor blood sugars per protocol  -Continue insulin infusion per protocol for management of hyperglycemia  -Continuing IV fluids containing dextrose while receiving insulin infusion    Thrombocytopenia  Anemia, unspecified type  Coagulopathy  Presented with thrombocytopenia and anemia at admission both of which initially worsened but have subsequently stabilized.  She also is mildly coagulopathic with prolonged INR.  Thrombocytopenia, coagulopathy, and anemia are probably due to  septic shock.  Active bleeding has not been suspected so far.  -Ordered recheck of hemoglobin and platelet, plt now 200  -Ordered recheck of INR, 1.16    Emphysema/COPD  Tobacco abuse  Known chronic emphysema and COPD without overt acute exacerbation.  Patient reportedly has continued to smoke.  -Continuing IV steroids secondary to septic shock and pneumococcal pneumonia but also COPD. Stopping steroids today.  -Continue bronchodilators as needed  -Nicotine patch supplementation started during hospitalization.    Dehydration  Presented with dehydration which likely contributed to hyponatremia and initial low normal blood pressures, now resolved after IV fluid treatment.  -Continue IV fluids for now as outlined above    Weakness generalized  Suspect presenting complaint of generalized weakness was multifactorial including infection, severe alcohol dependence, and suspected malnutrition with nutrient including electrolyte deficiencies.  -Treat acute medical problems  -Anticipate PT and OT evaluations if she recovers from life-threatening acute illness once patient improves    Episode of recurrent major depressive disorder, unspecified depression episode severity (H24)  PTSD (post-traumatic stress disorder)  Carries previous diagnosis of depression and PTSD treated chronically with Lexapro 30 mg daily, clonazepam 0.5 mg 3 times daily as needed for anxiety, BuSpar 15 mg 3 times a day, Effexor 37.5 mg daily, and Xyzal 5 mg daily.  At admission it was unclear whether the patient has recently been taking these medications reliably.  She is now intubated and being sedated.  -Holding these chronic medications for now (no reliable enteral access yet) and abrupt discontinuation of these medications could conceivably contribute to increased anxiety/agitation          Diet: NPO for Medical/Clinical Reasons Except for: No Exceptions  Adult Formula Drip Feeding: Continuous Vital 1.5; Nasogastric tube; Goal Rate: 10; mL/hr; From:  "12:00 AM; To: 12:00 AM; Please hold at 20 mL/hr x 24 hrs. Cartons have a hang time of 8 hours MAX. Please fully replace carton every 8 hrs.; Do not adv...    DVT Prophylaxis: Enoxaparin (Lovenox) SQ  Barney Catheter: PRESENT, indication: Strict 1-2 Hour I&O  Lines: PRESENT      PICC 10/09/23 Triple Lumen Left Basilic OK TO USE per PICC STAT RN-Site Assessment: WDL  Arterial Line 10/11/23 Radial-Site Assessment: WDL      Cardiac Monitoring: ACTIVE order. Indication: ICU  Code Status: Full Code      Clinically Significant Risk Factors        # Hypokalemia: Lowest K = 1.8 mmol/L in last 2 days, will replace as needed     # Hypomagnesemia: Lowest Mg = 1 mg/dL in last 2 days, will replace as needed   # Hypoalbuminemia: Lowest albumin = 1.7 g/dL at 10/11/2023  6:12 AM, will monitor as appropriate                         Disposition Plan    unknown     CC time 50\"    Victoriano Lopez MD  Hospitalist Service  [facility]  Securely message with [employer] (more info)  Text page via [employer] Paging/Directory   ______________________________________________________________________    Interval History   Patient sedated and unable to obtain history or review of systems.  Now opening eyes to voice or stimulus.    Physical Exam   Vital Signs: Temp: 100  F (37.8  C) Temp src: Oral BP: 109/66 Pulse: 60   Resp: 22 SpO2: 96 % O2 Device: Mechanical Ventilator    Vent Mode: CMV/AC  (Continuous Mandatory Ventilation/ Assist Control)  FiO2 (%): 40 %  Resp Rate (Set): 22 breaths/min  Tidal Volume (Set, mL): 350 mL  PEEP (cm H2O): 6 cmH2O  Resp: 22        Patient Vitals for the past 24 hrs:   BP Temp Temp src Pulse Resp SpO2 Weight   10/14/23 1000 109/66 -- -- 60 22 -- --   10/14/23 0951 -- -- -- 63 22 96 % --   10/14/23 0930 101/69 -- -- 63 22 -- --   10/14/23 0900 93/56 -- -- 65 25 100 % --   10/14/23 0845 -- -- -- 68 22 98 % --   10/14/23 0830 95/60 -- -- 80 (!) 48 -- --   10/14/23 0815 96/61 -- -- 71 23 95 % -- "   10/14/23 0800 106/59 -- -- 66 22 98 % --   10/14/23 0745 -- -- -- 64 22 98 % --   10/14/23 0738 -- -- -- 64 22 98 % --   10/14/23 0730 123/65 100  F (37.8  C) -- 56 22 -- --   10/14/23 0715 -- -- -- 56 22 97 % --   10/14/23 0700 114/63 -- -- 57 22 -- 49.1 kg (108 lb 4.8 oz)   10/14/23 0630 109/59 -- -- 57 22 -- --   10/14/23 0600 96/56 -- -- 55 22 -- --   10/14/23 0530 100/62 -- -- 57 22 -- --   10/14/23 0430 102/66 -- -- 75 22 (!) 89 % --   10/14/23 0421 -- 100  F (37.8  C) Oral 57 22 95 % --   10/14/23 0400 112/75 -- -- 58 22 95 % --   10/14/23 0330 117/77 -- -- 57 22 95 % --   10/14/23 0300 113/79 -- -- 58 22 95 % --   10/14/23 0230 117/77 -- -- 57 22 95 % --   10/14/23 0200 114/79 -- -- 58 22 95 % --   10/13/23 2319 -- 99.6  F (37.6  C) Oral -- -- -- --   10/13/23 2300 122/82 -- -- 58 22 95 % --   10/13/23 2200 113/74 -- -- 52 22 95 % --   10/13/23 2150 112/73 -- -- 51 22 94 % --   10/13/23 2145 (!) 82/52 -- -- 57 22 95 % --   10/13/23 2130 -- -- -- 50 22 96 % --   10/13/23 2115 -- -- -- 50 22 96 % --   10/13/23 2100 130/73 -- -- 50 22 96 % --   10/13/23 2045 -- -- -- (!) 47 22 96 % --   10/13/23 2030 -- -- -- 51 22 94 % --   10/13/23 2015 -- -- -- 64 25 91 % --   10/13/23 2000 (!) 145/78 99.2  F (37.3  C) Oral (!) 49 22 97 % --   10/13/23 1945 -- -- -- (!) 46 22 97 % --   10/13/23 1930 -- -- -- (!) 49 22 97 % --   10/13/23 1915 -- -- -- (!) 49 22 96 % --   10/13/23 1900 116/76 -- -- (!) 47 22 96 % --   10/13/23 1845 -- -- -- (!) 46 22 95 % --   10/13/23 1830 -- -- -- (!) 49 22 95 % --   10/13/23 1818 -- -- -- (!) 49 22 -- --   10/13/23 1815 -- -- -- (!) 49 22 95 % --   10/13/23 1800 106/62 -- -- (!) 48 22 96 % --   10/13/23 1730 -- -- -- (!) 48 22 97 % --   10/13/23 1700 112/68 -- -- (!) 46 22 97 % --   10/13/23 1630 92/53 -- -- (!) 49 22 95 % --   10/13/23 1615 -- -- -- 51 22 95 % --   10/13/23 1600 101/59 -- -- 52 22 95 % --   10/13/23 1552 -- -- -- 57 22 96 % --   10/13/23 1545 -- -- -- 58 14 98 % --    10/13/23 1537 -- -- -- (!) 43 22 99 % --   10/13/23 1530 -- -- -- (!) 43 22 -- --   10/13/23 1524 -- 98.7  F (37.1  C) Oral -- -- 99 % --   10/13/23 1500 131/78 -- -- (!) 44 22 99 % --   10/13/23 1445 -- -- -- (!) 43 22 99 % --   10/13/23 1430 124/76 -- -- (!) 45 22 99 % --   10/13/23 1415 -- -- -- (!) 45 22 100 % --   10/13/23 1400 114/71 -- -- (!) 45 22 100 % --   10/13/23 1345 -- -- -- (!) 46 22 100 % --   10/13/23 1330 112/69 -- -- (!) 48 22 -- --   10/13/23 1310 -- -- -- -- -- 100 % --   10/13/23 1300 (!) 129/99 -- -- 64 18 -- --   10/13/23 1250 90/56 -- -- (!) 44 22 100 % --   10/13/23 1230 116/68 -- -- (!) 45 22 100 % --   10/13/23 1226 -- -- -- (!) 44 22 100 % --   10/13/23 1215 114/67 -- -- (!) 46 22 100 % --   10/13/23 1200 133/74 -- -- (!) 44 22 100 % --   10/13/23 1158 -- -- -- (!) 44 22 -- --   10/13/23 1140 -- -- -- (!) 47 22 -- --   10/13/23 1120 -- -- -- (!) 47 22 -- --   10/13/23 1100 127/76 -- -- 50 22 -- --   10/13/23 1054 -- -- -- 59 -- -- --     Weight: 108 lbs 4.8 oz  Vitals:    10/10/23 1200 10/11/23 0642 10/12/23 1123 10/13/23 0500   Weight: 46.4 kg (102 lb 4.8 oz) 45.8 kg (101 lb) 48.1 kg (106 lb) 48.7 kg (107 lb 4.8 oz)    10/14/23 0700   Weight: 49.1 kg (108 lb 4.8 oz)       Intake/Output Summary (Last 24 hours) at 10/11/2023 1005  Last data filed at 10/11/2023 0700  Gross per 24 hour   Intake 3593.58 ml   Output 909 ml   Net 2684.58 ml     General Appearance: Cachectic elderly woman, intubated and sedated  Respiratory: , not using accessory muscles, no stridor, no crepitus in the neck, symmetric breath sounds with scattered coarse rhonchi, no wheezes  Cardiovascular: Regular rate and rhythm to bradycardic, normal capillary refill in the hands and feet which are pink  GI: Hypoactive somewhat tympanitic bowel sounds, nondistended abdomen, soft  Skin: No rashes, left upper arm and right radial PICC and arterial line sites are without bleeding, drainage, or surrounding skin  erythema  Neuro: Unresponsive, intermittent spontaneous movements of the limbs    Medical Decision Making       Total critical care time 65 minutes so far today including time spent reassessing respiratory status and adjusting mechanical ventilation treatment of life-threatening respiratory failure       Data     I have personally reviewed the following data over the past 24 hrs:    N/A  \   N/A   / N/A     142 107 5.8 (L) /  88   3.6 28 0.67 \       Blood sugar   Magnesium 1.6, phosphorus 2.1  Blood cultures from October 7, eighth, and ninth are negative (except for initial positive blood culture on October 7)  Stool testing for enteric pathogens and clostridia difficile was negative    Recent Labs   Lab 10/14/23  0553 10/13/23  0615 10/12/23  1801 10/12/23  1222   PH 7.47* 7.48* 7.43 7.39   PCO2 42 39 39 41   PO2 69* 76* 120* 100   HCO3 31* 29* 26 25   O2PER 40 45 45 45     PaO2 to FiO2 ratio 92, worsened compared with 125 this morning and 163 yesterday morning    Imaging results reviewed over the past 24 hrs:   Recent Results (from the past 24 hour(s))   X-ray Abdomen 1 vw    Narrative    EXAM: XR ABDOMEN 1 VIEW  LOCATION: [facility]  DATE: 10/13/2023    INDICATION: NG tube placement  COMPARISON: Abdominal radiograph 10/11/2023 and CT abdomen/pelvis 10/17/2017.      Impression    IMPRESSION: Enteric tube is in place with tip along the inferior aspect of the gastrojejunal anastomotic suture material in the left upper quadrant abdomen, favored to be within the jejunum. High density contrast material is seen within the descending   colon. Unchanged diffuse reticulonodular opacities in the visualized lung bases.       Recent Labs   Lab 10/14/23  0743 10/14/23  0552 10/14/23  0420 10/13/23  1600 10/13/23  1536 10/13/23  0747 10/13/23  0546 10/12/23  0753 10/12/23  0553 10/11/23  0841 10/11/23  0612 10/10/23  1338 10/10/23  1223 10/10/23  0634 10/10/23  0533 10/09/23  0835  10/09/23  0535 10/07/23  1375 10/07/23  2031   WBC  --   --   --   --   --   --   --   --   --   --  28.2*  --   --   --  19.3*  --  16.2*   < > 8.7   HGB  --   --   --   --   --   --   --   --   --   --  10.4*  --   --   --  9.4*  --  10.3*   < > 12.6   MCV  --   --   --   --   --   --   --   --   --   --  102*  --   --   --  99  --  97   < > 96   PLT  --   --   --   --   --   --   --   --  201  --  157  --   --   --  148*  --  129*   < > 168   INR  --   --   --   --   --   --   --   --   --   --   --   --  1.16*  --   --   --   --   --   --    NA  --  142  --   --   --   --   --   --   --   --  133*  --  129*  --  132*   < > 130*  130*   < > 123*   POTASSIUM  --  3.6  --   --  4.3  --  1.8*   < > 2.6*  --  3.6   < > 3.4  --  3.4   < > 3.2*   < > 3.3*   CHLORIDE  --  107  --   --   --   --   --   --   --   --  108*  --  104  --  105  --  102   < > 87*   CO2  --  28  --   --   --   --   --   --   --   --  19*  --  18*  --  20*  --  18*   < > 19*   BUN  --  5.8*  --   --   --   --   --   --   --   --  6.3*  --   --   --  6.7*  --  10.9   < > 23.9*   CR  --  0.67  --   --   --   --   --   --  0.65  --  0.58  --   --   --  0.63   < > 0.73   < > 1.22*   ANIONGAP  --  7  --   --   --   --   --   --   --   --  6*  --  7  --  7  --  10   < > 17*   SHON  --  7.4*  --   --   --   --   --   --   --   --  7.1*  --   --   --  7.4*  --  7.7*   < > 8.3*   GLC 88 103* 105*   < >  --    < > 66*   < >  --    < > 96   < >  --    < > 88   < > 227*   < > 80   ALBUMIN  --   --   --   --   --   --   --   --   --   --  1.7*  --   --   --  1.8*  --  1.8*   < > 2.3*   PROTTOTAL  --   --   --   --   --   --   --   --   --   --  4.6*  --   --   --  4.5*  --  4.6*   < > 6.5   BILITOTAL  --   --   --   --   --   --   --   --   --   --  0.2  --   --   --  <0.2  --  0.3   < > 1.3*   ALKPHOS  --   --   --   --   --   --   --   --   --   --  79  --   --   --  69  --  68   < > 119*   ALT  --   --   --   --   --   --   --   --   --   --  15  --   --    --  18  --  14   < > 13   AST  --   --   --   --   --   --   --   --   --   --  37  --   --   --  40  --  32   < > 39   LIPASE  --   --   --   --   --   --   --   --   --   --   --   --   --   --   --   --   --   --  9*    < > = values in this interval not displayed.

## 2023-10-14 NOTE — PROGRESS NOTES
Ventilator settings:  Mode: AC/VC  VT: 350  Rate: 22  PEEP: 8  FiO2: 40  Plateau pressures during shift: 26  PEEPi during shift: 1, 0.8  Weaning attempted: N   ETT size/secured: 7.0/22cm    Breath sounds: coarse to clear  Secretions: large thick white  Respiratory meds given: N

## 2023-10-14 NOTE — PROGRESS NOTES
[facility]. Pt is on light sedation and has a strong cough when suctioned. No weaning trials attempted today. Pt has a large amount of oral and inline secretions. She is able to mobilize them at this time with her cough and the Q2 turns done by RN's.  Ventilator settings:  Mode: AC/VC  VT: 350  Rate: 22  PEEP: 6  FiO2: 40  Plateau pressures during shift: 18, 22  PEEPi during shift: 0.9, 1  Weaning attempted: No   ETT size/secured: 7.0/22cm    Breath sounds: coarse  Secretions: large thick white   Respiratory meds given: No  Rt will continue to follow. Please call with any questions or concerns after hours. Thank you   Jillian Castañeda, RT on 10/14/2023 at 6:16 PM

## 2023-10-15 LAB
ALLEN'S TEST: ABNORMAL
ANION GAP SERPL CALCULATED.3IONS-SCNC: 7 MMOL/L (ref 7–15)
BASE EXCESS BLDA CALC-SCNC: 9.5 MMOL/L (ref -9–1.8)
BUN SERPL-MCNC: 7 MG/DL (ref 8–23)
CALCIUM SERPL-MCNC: 7.5 MG/DL (ref 8.8–10.2)
CHLORIDE SERPL-SCNC: 105 MMOL/L (ref 98–107)
CREAT SERPL-MCNC: 0.62 MG/DL (ref 0.51–0.95)
DEPRECATED HCO3 PLAS-SCNC: 29 MMOL/L (ref 22–29)
EGFRCR SERPLBLD CKD-EPI 2021: >90 ML/MIN/1.73M2
GLUCOSE BLDC GLUCOMTR-MCNC: 108 MG/DL (ref 70–99)
GLUCOSE BLDC GLUCOMTR-MCNC: 114 MG/DL (ref 70–99)
GLUCOSE BLDC GLUCOMTR-MCNC: 125 MG/DL (ref 70–99)
GLUCOSE BLDC GLUCOMTR-MCNC: 86 MG/DL (ref 70–99)
GLUCOSE BLDC GLUCOMTR-MCNC: 94 MG/DL (ref 70–99)
GLUCOSE SERPL-MCNC: 97 MG/DL (ref 70–99)
HCO3 BLD-SCNC: 33 MMOL/L (ref 21–28)
MAGNESIUM SERPL-MCNC: 1.8 MG/DL (ref 1.7–2.3)
O2/TOTAL GAS SETTING VFR VENT: 40 %
PCO2 BLD: 41 MM HG (ref 35–45)
PH BLD: 7.52 [PH] (ref 7.35–7.45)
PHOSPHATE SERPL-MCNC: 2.9 MG/DL (ref 2.5–4.5)
PLATELET # BLD AUTO: 314 10E3/UL (ref 150–450)
PO2 BLD: 57 MM HG (ref 80–105)
POTASSIUM SERPL-SCNC: 3.4 MMOL/L (ref 3.4–5.3)
POTASSIUM SERPL-SCNC: 4.1 MMOL/L (ref 3.4–5.3)
SODIUM SERPL-SCNC: 141 MMOL/L (ref 135–145)

## 2023-10-15 PROCEDURE — 83735 ASSAY OF MAGNESIUM: CPT | Performed by: INTERNAL MEDICINE

## 2023-10-15 PROCEDURE — 250N000013 HC RX MED GY IP 250 OP 250 PS 637: Performed by: INTERNAL MEDICINE

## 2023-10-15 PROCEDURE — 250N000009 HC RX 250: Performed by: INTERNAL MEDICINE

## 2023-10-15 PROCEDURE — 250N000011 HC RX IP 250 OP 636: Performed by: FAMILY MEDICINE

## 2023-10-15 PROCEDURE — 84100 ASSAY OF PHOSPHORUS: CPT | Performed by: INTERNAL MEDICINE

## 2023-10-15 PROCEDURE — 80048 BASIC METABOLIC PNL TOTAL CA: CPT | Performed by: INTERNAL MEDICINE

## 2023-10-15 PROCEDURE — 250N000011 HC RX IP 250 OP 636: Performed by: PEDIATRICS

## 2023-10-15 PROCEDURE — 85049 AUTOMATED PLATELET COUNT: CPT | Performed by: FAMILY MEDICINE

## 2023-10-15 PROCEDURE — 94003 VENT MGMT INPAT SUBQ DAY: CPT

## 2023-10-15 PROCEDURE — 999N000157 HC STATISTIC RCP TIME EA 10 MIN

## 2023-10-15 PROCEDURE — 250N000009 HC RX 250: Performed by: PEDIATRICS

## 2023-10-15 PROCEDURE — 250N000011 HC RX IP 250 OP 636: Mod: JZ | Performed by: INTERNAL MEDICINE

## 2023-10-15 PROCEDURE — 999N000253 HC STATISTIC WEANING TRIALS

## 2023-10-15 PROCEDURE — C9113 INJ PANTOPRAZOLE SODIUM, VIA: HCPCS | Mod: JZ | Performed by: INTERNAL MEDICINE

## 2023-10-15 PROCEDURE — 84132 ASSAY OF SERUM POTASSIUM: CPT | Performed by: INTERNAL MEDICINE

## 2023-10-15 PROCEDURE — 82803 BLOOD GASES ANY COMBINATION: CPT | Performed by: INTERNAL MEDICINE

## 2023-10-15 PROCEDURE — 250N000013 HC RX MED GY IP 250 OP 250 PS 637: Performed by: PEDIATRICS

## 2023-10-15 PROCEDURE — 200N000001 HC R&B ICU

## 2023-10-15 RX ORDER — MAGNESIUM SULFATE HEPTAHYDRATE 40 MG/ML
2 INJECTION, SOLUTION INTRAVENOUS ONCE
Qty: 50 ML | Refills: 0 | Status: COMPLETED | OUTPATIENT
Start: 2023-10-15 | End: 2023-10-15

## 2023-10-15 RX ORDER — POTASSIUM CHLORIDE 7.45 MG/ML
10 INJECTION INTRAVENOUS
Status: COMPLETED | OUTPATIENT
Start: 2023-10-15 | End: 2023-10-15

## 2023-10-15 RX ORDER — SCOLOPAMINE TRANSDERMAL SYSTEM 1 MG/1
1 PATCH, EXTENDED RELEASE TRANSDERMAL
Status: DISCONTINUED | OUTPATIENT
Start: 2023-10-15 | End: 2023-10-24

## 2023-10-15 RX ORDER — ENOXAPARIN SODIUM 100 MG/ML
30 INJECTION SUBCUTANEOUS AT BEDTIME
Status: DISCONTINUED | OUTPATIENT
Start: 2023-10-15 | End: 2023-10-24 | Stop reason: HOSPADM

## 2023-10-15 RX ORDER — FUROSEMIDE 10 MG/ML
20 INJECTION INTRAMUSCULAR; INTRAVENOUS EVERY 24 HOURS
Status: DISCONTINUED | OUTPATIENT
Start: 2023-10-16 | End: 2023-10-16

## 2023-10-15 RX ADMIN — MAGNESIUM SULFATE HEPTAHYDRATE 2 G: 40 INJECTION, SOLUTION INTRAVENOUS at 08:40

## 2023-10-15 RX ADMIN — POTASSIUM CHLORIDE 10 MEQ: 10 INJECTION, SOLUTION INTRAVENOUS at 07:40

## 2023-10-15 RX ADMIN — POTASSIUM CHLORIDE 10 MEQ: 10 INJECTION, SOLUTION INTRAVENOUS at 08:43

## 2023-10-15 RX ADMIN — HYDROMORPHONE HYDROCHLORIDE 0.3 MG/HR: 10 INJECTION, SOLUTION INTRAMUSCULAR; INTRAVENOUS; SUBCUTANEOUS at 11:26

## 2023-10-15 RX ADMIN — MINERAL OIL, PETROLATUM: 425; 573 OINTMENT OPHTHALMIC at 07:46

## 2023-10-15 RX ADMIN — FUROSEMIDE 20 MG: 10 INJECTION, SOLUTION INTRAVENOUS at 04:55

## 2023-10-15 RX ADMIN — ENOXAPARIN SODIUM 30 MG: 30 INJECTION SUBCUTANEOUS at 21:27

## 2023-10-15 RX ADMIN — MINERAL OIL, PETROLATUM: 425; 573 OINTMENT OPHTHALMIC at 21:55

## 2023-10-15 RX ADMIN — Medication 15 ML: at 08:53

## 2023-10-15 RX ADMIN — SCOPALAMINE 1 PATCH: 1 PATCH, EXTENDED RELEASE TRANSDERMAL at 12:01

## 2023-10-15 RX ADMIN — METHYLPREDNISOLONE SODIUM SUCCINATE 30 MG: 40 INJECTION INTRAMUSCULAR; INTRAVENOUS at 08:45

## 2023-10-15 RX ADMIN — PANTOPRAZOLE SODIUM 40 MG: 40 INJECTION, POWDER, FOR SOLUTION INTRAVENOUS at 08:49

## 2023-10-15 RX ADMIN — MINERAL OIL, PETROLATUM: 425; 573 OINTMENT OPHTHALMIC at 00:06

## 2023-10-15 RX ADMIN — ACETAMINOPHEN 650 MG: 650 SUPPOSITORY RECTAL at 13:56

## 2023-10-15 RX ADMIN — CEFTRIAXONE SODIUM 2 G: 2 INJECTION, POWDER, FOR SOLUTION INTRAMUSCULAR; INTRAVENOUS at 21:27

## 2023-10-15 RX ADMIN — PROPOFOL 55 MCG/KG/MIN: 10 INJECTION, EMULSION INTRAVENOUS at 04:55

## 2023-10-15 RX ADMIN — PROPOFOL 45 MCG/KG/MIN: 10 INJECTION, EMULSION INTRAVENOUS at 22:03

## 2023-10-15 RX ADMIN — PROPOFOL 10 MCG/KG/MIN: 10 INJECTION, EMULSION INTRAVENOUS at 13:00

## 2023-10-15 RX ADMIN — Medication 1 PATCH: at 08:39

## 2023-10-15 RX ADMIN — MINERAL OIL, PETROLATUM: 425; 573 OINTMENT OPHTHALMIC at 13:59

## 2023-10-15 RX ADMIN — NOREPINEPHRINE BITARTRATE 0.05 MCG/KG/MIN: 0.02 INJECTION, SOLUTION INTRAVENOUS at 22:02

## 2023-10-15 RX ADMIN — THIAMINE HYDROCHLORIDE 100 MG: 100 INJECTION, SOLUTION INTRAMUSCULAR; INTRAVENOUS at 08:48

## 2023-10-15 ASSESSMENT — ACTIVITIES OF DAILY LIVING (ADL)
ADLS_ACUITY_SCORE: 52

## 2023-10-15 NOTE — PROGRESS NOTES
Right wrist and Left wrist restraints initiated on patient on 10/15/2023 at 2:11 PM    Clinical Justification: Pulling lines, pulling tubes, and pulling equipment  Less Restrictive Alternative: Repositioning, Re-evaluate equipment, Pain management, Alarm, Reorientation  Attending Physician Notified: MD ordered restraint, Attending Physician's Name: Dr. Lopez   Order received: Yes     Family Notification: Spouse/significant other   Criteria explained to Patient  Patient's Response: No evidence of learning  Restraint care Plan initiated: Yes    Sabine Ball RN

## 2023-10-15 NOTE — PLAN OF CARE
Goal Outcome Evaluation:      Plan of Care Reviewed With: patient    Overall Patient Progress: no changeOverall Patient Progress: no change    Outcome Evaluation: Vent continued, 40% FiO2, 6 PEEP, 22RR, Vt 350. LS coarse, diminished when suctioned. Levo titrated to 0.9 mcg/kg/min. Propofol titrated from 45 to 60, then to 50 mcg/kg/min as pt woke and began trying to cough and pull out ET tube, resting comfortably at this time. IVF continue at 10mL/hr. Total of 900 mL out of mccabe cath overnight, 20mg IV lasix still BID. Dilaudid PCA continuously running at 0.3mg/hr. No boluses needed. Tube feeding is continuous at 20mL/hr with 40mL flushes q4h. Loose BM x1. Provider notified of significant weight loss from yesterday, no adjustments at this time. Also requested scopolamine patch due to copious amount of secretions, instructed to defer to daytime hospitalist.

## 2023-10-15 NOTE — PROGRESS NOTES
Ventilator settings:  Mode: VC/AC  VT: 350  Rate: 22  PEEP: 5  FiO2: 40  Plateau pressures during shift: 18, 16  PEEPi during shift: 0.6, 0.6  Weaning attempted: Yes 190 mins accomplished   ETT size/secured: 7.0 /22cm    Breath sounds: Clear/Coarse  Secretions: moderate white, pt was able to mobile secretions during weaning trail   Respiratory meds given: No

## 2023-10-15 NOTE — PROGRESS NOTES
Shift events:   Temp of 101.1-Tylenol suppository given.  Weaning vent trial        Neuro: Weaning trial attempted of 190 minutes. Able to wiggle toes and lightly grasp hands.  Pt was placed back on vent and sedation increased.   CMS: Edema improved 1+ generalized,Cap refill is less than 3 seconds.    Pulmonary: LS coarse. Suction provided inline and oral. Vent settings 40% FiO2, 22 RR, 350 TV, 5 PEEP.   CV: NSR , blood pressures stable.   GI: BS active. Tube feeding increased and infusing at 30mL/hr with 80ml flushes Q4. No residual noted.  : Mccabe catheter in place. 1475 output this shift.  Skin: Intact. Slight bruising on upper extremities  Pain: Pt is on continuous dilaudid infusion infusing at 0.3 mg per hour. Attempted to wean with decrease to 0.1mg without success as patient started to exhibit facial grimacing and restlessness. Nonverbal pain indicators are absent at this time.  Activity: Bedrest. Q2 turns.   Hygiene: Oral cares megan, mccabe catheter cares done and brianna wipes done.  Lines/Tubes/Drains: Vent, triple lumen Picc line, peripheral IV, ART line, NG tube feeding, mccabe catheter   Drips:   Levophed at 0.05 mcg  Dilaudid at 0.3 mg  Propofol at 40 mcg  D5LR 20K at 10ml/hr     Mag and K+ replaced this shift with rechecks for 10/16.

## 2023-10-15 NOTE — PROGRESS NOTES
[facility]    Medicine Progress Note - Hospitalist Service    Date of Admission:  10/7/2023    Assessment & Plan   Patient is a 68-year-old female with COPD, alcohol dependence recently consuming large volume alcohol daily, gastric bypass surgery, depression/PTSD, and ongoing tobacco abuse who presented with generalized weakness and fatigue and was admitted due to concerns for multifocal community-acquired pneumonia and sepsis.  She had rapid worsening over the first 12 hours of her hospitalization with persistent hypotension despite fluid resuscitation and was transferred to the ICU and started on vasopressor therapy with Levophed due to concern for septic shock.  After initial improvement in septic shock, patient developed worsening respiratory failure and worsening alcohol withdrawal with delirium requiring sedative therapy and subsequent intubation for mechanical ventilation in early a.m. 10/10.  She remains critically ill with potentially life-threatening respiratory failure, pneumonia, septic shock, and alcohol withdrawal.    Septic shock due to streptococcal pneumoniae bacteremia and multifocal pneumonia  Acute respiratory failure with hypoxia  Presented with weakness and had bilateral infiltrates concerning for community-acquired pneumonia.  Blood culture from admission grew Streptococcus pneumoniae.  Sepsis rapidly progressed to septic shock requiring initiation of vasopressors.  Although hemodynamic status improved, she developed worsening delirium and respiratory failure necessitating intubation and initiation of mechanical ventilation in a.m. 10/10.  PaO2 to FiO2 ratio prior to intubation was as low as 120 and initially improved after intubation.    -Continue Rocephin, recommending 2-week course  -will keep IV steroids for now but consider stopping in the next 24 to 48 hours  -Titrate vasopressors with Levophed to keep MAP 65 or higher  -ABG in a.m. or as needed with vent  changes  -Adjusting mechanical ventilation to attempt to optimize oxygenation (keep oxygen saturations 90 to 95%), may allow permissive hypercapnia.  Now tolerating PEEP of 5 with FiO2 of 40%.  -propofol infusion for sedation titrated to RASS score -1 to -2 as patient doing better and may be able to start weaning.  If she starts to have more dyssynchrony with the ventilator may need to sedate little bit more.  -Continue hydromorphone infusion for analgesia while on ventilator  -Continue bolus doses of Versed as needed for sedation  -Continue triple-lumen PICC line central venous catheter  -Continue arterial line catheter  -Continue indwelling urinary catheter  -will continue to assess for weaning    Patient status although improving is still very guarded.    Alcohol use disorder (severe dependence) acute alcohol withdrawal with delirium   History of seizure due to alcohol withdrawal  Per  patient had been drinking an average of 12 pack of beer, 1 bottle of wine, and 10 hard liquor drinks a day recently with most recent alcoholic drink approximately 30 minutes prior to hospital presentation.  Blood alcohol level was not tested at time of admission.  Patient developed overt signs of withdrawal on 10/9 including increased anxiety, tremor, confusion and possible hallucination.  Initial treatment of alcohol withdrawal per CIWA protocol was complicated by sedation that may have exacerbated respiratory failure.  Nursing reports that patient's mentation was normal early in her hospital stay prior to onset of alcohol withdrawal.  Patient has not had overt meningismus lowering suspicion for infectious meningoencephalitis.  Signs of alcohol withdrawal are difficult to assess because of current sedation while on the ventilator but persistent or worsening alcohol withdrawal could contribute to increased patient ventilator dyssynchrony  -Continuing treatment for respiratory failure as outlined above and while she is  intubated will use a combination of propofol infusion, hydromorphone infusion, and Versed as needed for sedation    Suspected malnutrition  Hypoalbuminemia   Status post gastric bypass for obesity  Has remote history of Tom-en-Y gastric bypass surgery for obesity in March 2008.  Reported 13 pound weight loss in recent weeks and appears underweight and probably cachectic on exam.  Alcoholism likely contributes to malnutrition as may previous gastric bypass status.  Serum albumin was low at admission at 2.8 and has decreased and she is now starting to show signs of peripheral edema.  Noncardiogenic pulmonary edema from hypoalbuminemia could exacerbate respiratory failure.  Her previous Tom-en-Y gastric bypass surgery may limit ability to advance feeding tube beyond the gastric remnant.  -Attempting to adjust feeding tube placement today to advance tip beyond the gastric remnant if possible but interventional radiology and/or fluoroscopy are not available at this hospital to assist with such placement  -Registered dietitian assisting with recommendations for initiation of enteral feeding, anticipate starting very low rate enteral feeding with tube feeds today depending on positioning of the feeding tube but would defer initiation of free water supplementation today (continuing IV fluids).  Tolerating 20 ml per hour, will increase to 30 mils per hour and see how she tolerates.  Add free water flushes at 40 mils every 4 hours and will increase to 80 every 4 x24 hours then to goal at 120 every 4 hours.    -Patient is a net -2 L over the last 48 hours we will decrease Lasix to 20 mg IV daily and continue to monitor.  Do not believe today's bed weight, will remeasure.    Hypomagnesemia  Hypophosphatemia   Hypokalemia  Presented with low serum magnesium and potassium levels and subsequently developed low serum phosphorus level all likely secondary to nutritional deficiency.  These electrolytes have improved with  supplementation.  Levels currently normal  -Continue with potassium, magnesium, and phosphorus supplementation per protocol in patient with critical illness  -Monitor magnesium, phosphorus, and potassium as indicated    Hyponatremia  Sodium 123 on admission with suspected hypovolemic hyponatremia.  After treatment with multiple normal saline boluses, sodium increased from 123 up to 128 in less than 12 hours after which IV fluids were switched to D5 half-normal saline.  Sodium then decreased to 126 and later stabilized at about 130 and today is 133.  Urine sodium was less than 20 consistent with hypovolemic hyponatremia.  Serum sodium now normal at 142  -Continue current IV fluid infusion and changed to TKO with increased tube feeds and free water flushes.      Hyperglycemia  Patient has had mild to moderate hyperglycemia while in the ICU.  Hemoglobin A1c was 4.4 and she is not known to have underlying diabetes.  Insulin infusion was started for management of hyperglycemia in the ICU.  Anticipate hyperglycemia will worsen with initiation of enteral feeding.  -Continue to monitor blood sugars per protocol  -Continue insulin infusion per protocol for management of hyperglycemia  -Continuing IV fluids containing dextrose while receiving insulin infusion    Thrombocytopenia  Anemia, unspecified type  Coagulopathy  Presented with thrombocytopenia and anemia at admission both of which initially worsened but have subsequently stabilized.  She also is mildly coagulopathic with prolonged INR.  Thrombocytopenia, coagulopathy, and anemia are probably due to septic shock.  Active bleeding has not been suspected so far.  -Ordered recheck of hemoglobin and platelet, plt now 200  -Ordered recheck of INR, 1.16    Emphysema/COPD  Tobacco abuse  Known chronic emphysema and COPD without overt acute exacerbation.  Patient reportedly has continued to smoke.  -Continuing IV steroids secondary to septic shock and pneumococcal pneumonia but  also COPD. Stopping steroids today.  -Continue bronchodilators as needed  -Nicotine patch supplementation started during hospitalization.    Dehydration  Presented with dehydration which likely contributed to hyponatremia and initial low normal blood pressures, now resolved after IV fluid treatment.  -Continue IV fluids for now as outlined above.  If output continues to be greater than input would then consider discontinuing IV L Lasix and monitoring closely.    Weakness generalized  Suspect presenting complaint of generalized weakness was multifactorial including infection, severe alcohol dependence, and suspected malnutrition with nutrient including electrolyte deficiencies.  -Treat acute medical problems  -Anticipate PT and OT evaluations if she recovers from life-threatening acute illness once patient improves    Episode of recurrent major depressive disorder, unspecified depression episode severity (H24)  PTSD (post-traumatic stress disorder)  Carries previous diagnosis of depression and PTSD treated chronically with Lexapro 30 mg daily, clonazepam 0.5 mg 3 times daily as needed for anxiety, BuSpar 15 mg 3 times a day, Effexor 37.5 mg daily, and Xyzal 5 mg daily.  At admission it was unclear whether the patient has recently been taking these medications reliably.  She is now intubated and being sedated.  -Holding these chronic medications for now (no reliable enteral access yet) and abrupt discontinuation of these medications could conceivably contribute to increased anxiety/agitation          Diet: NPO for Medical/Clinical Reasons Except for: No Exceptions  Adult Formula Drip Feeding: Continuous Vital 1.5; Nasogastric tube; Goal Rate: 10; mL/hr; From: 12:00 AM; To: 12:00 AM; please increase rate to 30 ml/hr for 24 hours.  If K, Mg, PO4 nl and tolerating feeds, increase to goal of 40 ml per hour. Cart...    DVT Prophylaxis: Enoxaparin (Lovenox) SQ  Barney Catheter: PRESENT, indication: Strict 1-2 Hour  "I&O  Lines: PRESENT      PICC 10/09/23 Triple Lumen Left Basilic OK TO USE per PICC STAT RN-Site Assessment: WDL  Arterial Line 10/11/23 Radial-Site Assessment: WDL      Cardiac Monitoring: ACTIVE order. Indication: ICU  Code Status: Full Code      Clinically Significant Risk Factors              # Hypoalbuminemia: Lowest albumin = 1.7 g/dL at 10/11/2023  6:12 AM, will monitor as appropriate              # Cachexia: Estimated body mass index is 15.84 kg/m  as calculated from the following:    Height as of this encounter: 1.626 m (5' 4\").    Weight as of this encounter: 41.9 kg (92 lb 4.8 oz).             Disposition Plan    unknown         Victoriano Lopez MD  Hospitalist Service  [facility]  Securely message with [employer] (more info)  Text page via [employer] Paging/Directory   ______________________________________________________________________    Interval History   Patient sedated and unable to obtain history or review of systems.      Physical Exam   Vital Signs: Temp: 100.3  F (37.9  C) Temp src: Oral BP: 109/74 Pulse: 64   Resp: 22 SpO2: 93 % O2 Device: Mechanical Ventilator Oxygen Delivery: 40 LPM  Vent Mode: CMV/AC  (Continuous Mandatory Ventilation/ Assist Control)  FiO2 (%): 40 %  Resp Rate (Set): 22 breaths/min  Tidal Volume (Set, mL): 350 mL  PEEP (cm H2O): 5 cmH2O  Resp: 22        Patient Vitals for the past 24 hrs:   BP Temp Temp src Pulse Resp SpO2 Weight   10/15/23 1100 109/74 -- -- 64 22 93 % --   10/15/23 1000 107/69 -- -- 63 22 91 % --   10/15/23 0954 118/70 -- -- 66 22 93 % --   10/15/23 0930 103/67 -- -- 69 22 -- --   10/15/23 0902 94/59 -- -- 76 22 93 % --   10/15/23 0901 90/60 -- -- 76 22 93 % --   10/15/23 0900 90/60 -- -- 77 22 93 % --   10/15/23 0800 98/65 -- -- 75 22 95 % --   10/15/23 0736 -- 100.3  F (37.9  C) Oral -- -- 96 % --   10/15/23 0700 107/73 -- -- 65 22 -- --   10/15/23 0600 94/63 -- -- 64 22 -- --   10/15/23 0545 -- -- -- 68 22 94 % --   10/15/23 " 0530 -- -- -- 72 22 93 % --   10/15/23 0515 -- -- -- 75 22 93 % --   10/15/23 0501 137/82 99.6  F (37.6  C) Rectal 80 25 95 % 41.9 kg (92 lb 4.8 oz)   10/15/23 0500 137/82 -- -- 80 24 -- --   10/15/23 0445 -- -- -- 78 14 94 % --   10/15/23 0430 -- -- -- 60 22 94 % --   10/15/23 0415 -- -- -- 65 22 94 % --   10/15/23 0400 115/69 -- -- 61 22 94 % --   10/15/23 0345 -- -- -- 61 22 94 % --   10/15/23 0330 -- -- -- 60 22 94 % --   10/15/23 0315 -- -- -- 59 22 95 % --   10/15/23 0306 102/69 -- -- 60 22 95 % --   10/15/23 0300 -- -- -- 61 22 95 % --   10/15/23 0245 -- -- -- 69 23 95 % --   10/15/23 0230 -- -- -- 79 22 96 % --   10/15/23 0215 -- -- -- 75 22 99 % --   10/15/23 0200 117/70 -- -- 71 22 94 % --   10/15/23 0045 -- -- -- 64 22 95 % --   10/15/23 0030 -- -- -- 70 22 94 % --   10/15/23 0015 -- -- -- 73 23 91 % --   10/15/23 0000 109/72 -- -- 67 22 95 % --   10/14/23 2345 -- -- -- 70 22 95 % --   10/14/23 2330 -- -- -- 80 22 95 % --   10/14/23 2315 -- -- -- 73 19 95 % --   10/14/23 2300 136/83 100  F (37.8  C) Oral 56 22 95 % --   10/14/23 2245 -- -- -- 57 22 95 % --   10/14/23 2230 -- -- -- 67 22 95 % --   10/14/23 2215 -- -- -- 69 22 95 % --   10/14/23 2200 111/75 -- -- 64 22 95 % --   10/14/23 2145 -- -- -- 67 22 95 % --   10/14/23 2130 -- -- -- 62 22 95 % --   10/14/23 2115 -- -- -- 71 22 94 % --   10/14/23 2107 -- -- -- 71 22 96 % --   10/14/23 2100 118/69 -- -- 79 22 99 % --   10/14/23 2043 -- 99.6  F (37.6  C) Oral 69 22 94 % --   10/14/23 2000 129/70 -- -- 64 22 93 % --   10/14/23 1900 121/70 -- -- 55 22 96 % --   10/14/23 1800 118/65 -- -- 65 22 -- --   10/14/23 1754 -- -- -- 68 18 (!) 89 % --   10/14/23 1700 113/76 -- -- 59 22 98 % --   10/14/23 1630 -- -- -- 53 22 98 % --   10/14/23 1600 122/74 -- -- 55 22 98 % --   10/14/23 1545 -- -- -- 60 22 98 % --   10/14/23 1530 -- -- -- 70 22 97 % --   10/14/23 1515 -- -- -- 71 22 98 % --   10/14/23 1514 -- -- -- 72 22 98 % --   10/14/23 1502 113/73 -- -- 74 12 95  % --   10/14/23 1501 113/73 99  F (37.2  C) Rectal 81 13 97 % --   10/14/23 1400 127/73 -- -- 63 22 -- --   10/14/23 1315 -- -- -- 56 22 97 % --   10/14/23 1300 108/61 -- -- 60 22 96 % --   10/14/23 1245 -- -- -- 61 22 95 % --   10/14/23 1230 92/55 -- -- 71 22 -- --   10/14/23 1225 92/55 -- -- 76 22 -- --   10/14/23 1200 107/70 -- -- 77 22 -- --     Weight: 92 lbs 4.8 oz  Vitals:    10/11/23 0642 10/12/23 1123 10/13/23 0500 10/14/23 0700   Weight: 45.8 kg (101 lb) 48.1 kg (106 lb) 48.7 kg (107 lb 4.8 oz) 49.1 kg (108 lb 4.8 oz)    10/15/23 0501   Weight: 41.9 kg (92 lb 4.8 oz)       Intake/Output Summary (Last 24 hours) at 10/11/2023 1005  Last data filed at 10/11/2023 0700  Gross per 24 hour   Intake 3593.58 ml   Output 909 ml   Net 2684.58 ml     General Appearance: Cachectic elderly woman, intubated and sedated  Respiratory: not using accessory muscles, no stridor, no crepitus in the neck, symmetric breath sounds with occasional scattered coarse rhonchi, no wheezes  Cardiovascular: Regular rate and rhythm, normal capillary refill in the hands and feet which are pink  GI: Hypoactive s bowel sounds, nondistended abdomen, soft  Skin: No rashes, left upper arm and right radial PICC and arterial line sites are without bleeding, drainage, or surrounding skin erythema  Neuro: Patient with eyes open does gaze toward voice.  Able to squeeze fingers upon verbal request both left and right hand.  Some movement of lower extremities as well    Medical Decision Making       Total critical care time 35 minutes so far today including time spent reassessing respiratory status and adjusting mechanical ventilation treatment of life-threatening respiratory failure       Data     I have personally reviewed the following data over the past 24 hrs:    N/A  \   N/A   / 314     141 105 7.0 (L) /  94   3.4 29 0.62 \         Blood cultures from October 7, eighth, and ninth are negative (except for initial positive blood culture on October  7)  Stool testing for enteric pathogens and clostridia difficile was negative    Recent Labs   Lab 10/15/23  0515 10/14/23  0553 10/13/23  0615 10/12/23  1801   PH 7.52* 7.47* 7.48* 7.43   PCO2 41 42 39 39   PO2 57* 69* 76* 120*   HCO3 33* 31* 29* 26   O2PER 40 40 45 45         Imaging results reviewed over the past 24 hrs:   No results found for this or any previous visit (from the past 24 hour(s)).      Recent Labs   Lab 10/15/23  0814 10/15/23  0513 10/15/23  0003 10/14/23  0743 10/14/23  0552 10/13/23  1600 10/13/23  1536 10/12/23  0753 10/12/23  0553 10/11/23  0841 10/11/23  0612 10/10/23  1338 10/10/23  1223 10/10/23  0634 10/10/23  0533 10/09/23  0835 10/09/23  0535   WBC  --   --   --   --   --   --   --   --   --   --  28.2*  --   --   --  19.3*  --  16.2*   HGB  --   --   --   --   --   --   --   --   --   --  10.4*  --   --   --  9.4*  --  10.3*   MCV  --   --   --   --   --   --   --   --   --   --  102*  --   --   --  99  --  97   PLT  --  314  --   --   --   --   --   --  201  --  157  --   --   --  148*  --  129*   INR  --   --   --   --   --   --   --   --   --   --   --   --  1.16*  --   --   --   --    NA  --  141  --   --  142  --   --   --   --   --  133*  --  129*  --  132*   < > 130*  130*   POTASSIUM  --  3.4  --   --  3.6  --  4.3   < > 2.6*  --  3.6   < > 3.4  --  3.4   < > 3.2*   CHLORIDE  --  105  --   --  107  --   --   --   --   --  108*  --  104  --  105  --  102   CO2  --  29  --   --  28  --   --   --   --   --  19*  --  18*  --  20*  --  18*   BUN  --  7.0*  --   --  5.8*  --   --   --   --   --  6.3*  --   --   --  6.7*  --  10.9   CR  --  0.62  --   --  0.67  --   --   --  0.65  --  0.58  --   --   --  0.63   < > 0.73   ANIONGAP  --  7  --   --  7  --   --   --   --   --  6*  --  7  --  7  --  10   SHON  --  7.5*  --   --  7.4*  --   --   --   --   --  7.1*  --   --   --  7.4*  --  7.7*   [facility] 94 97 86   < > 103*   < >  --    < >  --    < > 96   < >  --    < > 88   < > 227*    ALBUMIN  --   --   --   --   --   --   --   --   --   --  1.7*  --   --   --  1.8*  --  1.8*   PROTTOTAL  --   --   --   --   --   --   --   --   --   --  4.6*  --   --   --  4.5*  --  4.6*   BILITOTAL  --   --   --   --   --   --   --   --   --   --  0.2  --   --   --  <0.2  --  0.3   ALKPHOS  --   --   --   --   --   --   --   --   --   --  79  --   --   --  69  --  68   ALT  --   --   --   --   --   --   --   --   --   --  15  --   --   --  18  --  14   AST  --   --   --   --   --   --   --   --   --   --  37  --   --   --  40  --  32    < > = values in this interval not displayed.

## 2023-10-16 ENCOUNTER — APPOINTMENT (OUTPATIENT)
Dept: GENERAL RADIOLOGY | Facility: CLINIC | Age: 68
DRG: 870 | End: 2023-10-16
Attending: ANESTHESIOLOGY
Payer: MEDICARE

## 2023-10-16 ENCOUNTER — APPOINTMENT (OUTPATIENT)
Dept: PHYSICAL THERAPY | Facility: CLINIC | Age: 68
DRG: 870 | End: 2023-10-16
Attending: INTERNAL MEDICINE
Payer: MEDICARE

## 2023-10-16 LAB
ALLEN'S TEST: ABNORMAL
ALLEN'S TEST: ABNORMAL
ANION GAP SERPL CALCULATED.3IONS-SCNC: 6 MMOL/L (ref 7–15)
BASE EXCESS BLDA CALC-SCNC: 8.1 MMOL/L (ref -9–1.8)
BASE EXCESS BLDA CALC-SCNC: 8.3 MMOL/L (ref -9–1.8)
BASO+EOS+MONOS # BLD AUTO: ABNORMAL 10*3/UL
BASO+EOS+MONOS NFR BLD AUTO: ABNORMAL %
BASOPHILS # BLD AUTO: 0 10E3/UL (ref 0–0.2)
BASOPHILS NFR BLD AUTO: 0 %
BUN SERPL-MCNC: 8.7 MG/DL (ref 8–23)
CALCIUM SERPL-MCNC: 7.4 MG/DL (ref 8.8–10.2)
CHLORIDE SERPL-SCNC: 104 MMOL/L (ref 98–107)
CREAT SERPL-MCNC: 0.6 MG/DL (ref 0.51–0.95)
DEPRECATED HCO3 PLAS-SCNC: 28 MMOL/L (ref 22–29)
EGFRCR SERPLBLD CKD-EPI 2021: >90 ML/MIN/1.73M2
EOSINOPHIL # BLD AUTO: 0.4 10E3/UL (ref 0–0.7)
EOSINOPHIL NFR BLD AUTO: 2 %
ERYTHROCYTE [DISTWIDTH] IN BLOOD BY AUTOMATED COUNT: 14.2 % (ref 10–15)
GLUCOSE BLDC GLUCOMTR-MCNC: 108 MG/DL (ref 70–99)
GLUCOSE BLDC GLUCOMTR-MCNC: 111 MG/DL (ref 70–99)
GLUCOSE BLDC GLUCOMTR-MCNC: 116 MG/DL (ref 70–99)
GLUCOSE BLDC GLUCOMTR-MCNC: 125 MG/DL (ref 70–99)
GLUCOSE BLDC GLUCOMTR-MCNC: 146 MG/DL (ref 70–99)
GLUCOSE BLDC GLUCOMTR-MCNC: 98 MG/DL (ref 70–99)
GLUCOSE SERPL-MCNC: 94 MG/DL (ref 70–99)
HCO3 BLD-SCNC: 32 MMOL/L (ref 21–28)
HCO3 BLD-SCNC: 32 MMOL/L (ref 21–28)
HCT VFR BLD AUTO: 29.9 % (ref 35–47)
HGB BLD-MCNC: 9.8 G/DL (ref 11.7–15.7)
IMM GRANULOCYTES # BLD: 0.4 10E3/UL
IMM GRANULOCYTES NFR BLD: 2 %
LYMPHOCYTES # BLD AUTO: 0.9 10E3/UL (ref 0.8–5.3)
LYMPHOCYTES NFR BLD AUTO: 6 %
MAGNESIUM SERPL-MCNC: 1.9 MG/DL (ref 1.7–2.3)
MCH RBC QN AUTO: 34 PG (ref 26.5–33)
MCHC RBC AUTO-ENTMCNC: 32.8 G/DL (ref 31.5–36.5)
MCV RBC AUTO: 104 FL (ref 78–100)
MONOCYTES # BLD AUTO: 0.5 10E3/UL (ref 0–1.3)
MONOCYTES NFR BLD AUTO: 3 %
NEUTROPHILS # BLD AUTO: 14 10E3/UL (ref 1.6–8.3)
NEUTROPHILS NFR BLD AUTO: 87 %
NRBC # BLD AUTO: 0 10E3/UL
NRBC BLD AUTO-RTO: 0 /100
O2/TOTAL GAS SETTING VFR VENT: 40 %
O2/TOTAL GAS SETTING VFR VENT: 40 %
PCO2 BLD: 41 MM HG (ref 35–45)
PCO2 BLD: 42 MM HG (ref 35–45)
PH BLD: 7.5 [PH] (ref 7.35–7.45)
PH BLD: 7.5 [PH] (ref 7.35–7.45)
PHOSPHATE SERPL-MCNC: 3.2 MG/DL (ref 2.5–4.5)
PLATELET # BLD AUTO: 293 10E3/UL (ref 150–450)
PO2 BLD: 65 MM HG (ref 80–105)
PO2 BLD: 70 MM HG (ref 80–105)
POTASSIUM SERPL-SCNC: 3.6 MMOL/L (ref 3.4–5.3)
PROCALCITONIN SERPL IA-MCNC: 0.25 NG/ML
RBC # BLD AUTO: 2.88 10E6/UL (ref 3.8–5.2)
SODIUM SERPL-SCNC: 138 MMOL/L (ref 135–145)
WBC # BLD AUTO: 16.2 10E3/UL (ref 4–11)

## 2023-10-16 PROCEDURE — 99233 SBSQ HOSP IP/OBS HIGH 50: CPT | Performed by: INTERNAL MEDICINE

## 2023-10-16 PROCEDURE — C9113 INJ PANTOPRAZOLE SODIUM, VIA: HCPCS | Mod: JZ | Performed by: INTERNAL MEDICINE

## 2023-10-16 PROCEDURE — 97163 PT EVAL HIGH COMPLEX 45 MIN: CPT | Mod: GP | Performed by: PHYSICAL THERAPIST

## 2023-10-16 PROCEDURE — 250N000013 HC RX MED GY IP 250 OP 250 PS 637: Performed by: INTERNAL MEDICINE

## 2023-10-16 PROCEDURE — 250N000011 HC RX IP 250 OP 636: Mod: JZ | Performed by: INTERNAL MEDICINE

## 2023-10-16 PROCEDURE — 94003 VENT MGMT INPAT SUBQ DAY: CPT

## 2023-10-16 PROCEDURE — 36415 COLL VENOUS BLD VENIPUNCTURE: CPT | Performed by: INTERNAL MEDICINE

## 2023-10-16 PROCEDURE — 82803 BLOOD GASES ANY COMBINATION: CPT | Performed by: ANESTHESIOLOGY

## 2023-10-16 PROCEDURE — 82803 BLOOD GASES ANY COMBINATION: CPT | Performed by: INTERNAL MEDICINE

## 2023-10-16 PROCEDURE — 87205 SMEAR GRAM STAIN: CPT | Performed by: INTERNAL MEDICINE

## 2023-10-16 PROCEDURE — 71045 X-RAY EXAM CHEST 1 VIEW: CPT

## 2023-10-16 PROCEDURE — 84100 ASSAY OF PHOSPHORUS: CPT | Performed by: INTERNAL MEDICINE

## 2023-10-16 PROCEDURE — 250N000013 HC RX MED GY IP 250 OP 250 PS 637: Performed by: ANESTHESIOLOGY

## 2023-10-16 PROCEDURE — 97530 THERAPEUTIC ACTIVITIES: CPT | Mod: GP | Performed by: PHYSICAL THERAPIST

## 2023-10-16 PROCEDURE — 87040 BLOOD CULTURE FOR BACTERIA: CPT | Performed by: INTERNAL MEDICINE

## 2023-10-16 PROCEDURE — 200N000001 HC R&B ICU

## 2023-10-16 PROCEDURE — 84145 PROCALCITONIN (PCT): CPT | Performed by: INTERNAL MEDICINE

## 2023-10-16 PROCEDURE — 999N000253 HC STATISTIC WEANING TRIALS

## 2023-10-16 PROCEDURE — 83735 ASSAY OF MAGNESIUM: CPT | Performed by: INTERNAL MEDICINE

## 2023-10-16 PROCEDURE — 250N000011 HC RX IP 250 OP 636: Performed by: PEDIATRICS

## 2023-10-16 PROCEDURE — 85004 AUTOMATED DIFF WBC COUNT: CPT | Performed by: INTERNAL MEDICINE

## 2023-10-16 PROCEDURE — 80048 BASIC METABOLIC PNL TOTAL CA: CPT | Performed by: INTERNAL MEDICINE

## 2023-10-16 PROCEDURE — 999N000157 HC STATISTIC RCP TIME EA 10 MIN

## 2023-10-16 PROCEDURE — 258N000001 HC RX 258: Performed by: INTERNAL MEDICINE

## 2023-10-16 PROCEDURE — 250N000011 HC RX IP 250 OP 636: Performed by: FAMILY MEDICINE

## 2023-10-16 PROCEDURE — 250N000013 HC RX MED GY IP 250 OP 250 PS 637: Performed by: PEDIATRICS

## 2023-10-16 RX ORDER — MAGNESIUM SULFATE HEPTAHYDRATE 40 MG/ML
2 INJECTION, SOLUTION INTRAVENOUS ONCE
Status: COMPLETED | OUTPATIENT
Start: 2023-10-16 | End: 2023-10-16

## 2023-10-16 RX ORDER — FUROSEMIDE 10 MG/ML
20 INJECTION INTRAMUSCULAR; INTRAVENOUS EVERY 12 HOURS
Status: DISCONTINUED | OUTPATIENT
Start: 2023-10-16 | End: 2023-10-20

## 2023-10-16 RX ORDER — CHLORHEXIDINE GLUCONATE ORAL RINSE 1.2 MG/ML
15 SOLUTION DENTAL EVERY 12 HOURS
Status: DISCONTINUED | OUTPATIENT
Start: 2023-10-16 | End: 2023-10-19

## 2023-10-16 RX ORDER — POTASSIUM CHLORIDE 7.45 MG/ML
10 INJECTION INTRAVENOUS ONCE
Status: COMPLETED | OUTPATIENT
Start: 2023-10-16 | End: 2023-10-16

## 2023-10-16 RX ORDER — PIPERACILLIN SODIUM, TAZOBACTAM SODIUM 3; .375 G/15ML; G/15ML
3.38 INJECTION, POWDER, LYOPHILIZED, FOR SOLUTION INTRAVENOUS EVERY 8 HOURS
Status: DISCONTINUED | OUTPATIENT
Start: 2023-10-16 | End: 2023-10-17

## 2023-10-16 RX ORDER — VANCOMYCIN HYDROCHLORIDE 1 G/200ML
1000 INJECTION, SOLUTION INTRAVENOUS
Status: DISCONTINUED | OUTPATIENT
Start: 2023-10-16 | End: 2023-10-18 | Stop reason: ALTCHOICE

## 2023-10-16 RX ADMIN — PROPOFOL 60 MCG/KG/MIN: 10 INJECTION, EMULSION INTRAVENOUS at 04:50

## 2023-10-16 RX ADMIN — METHYLPREDNISOLONE SODIUM SUCCINATE 30 MG: 40 INJECTION INTRAMUSCULAR; INTRAVENOUS at 09:40

## 2023-10-16 RX ADMIN — FUROSEMIDE 20 MG: 10 INJECTION, SOLUTION INTRAVENOUS at 09:40

## 2023-10-16 RX ADMIN — MINERAL OIL, PETROLATUM: 425; 573 OINTMENT OPHTHALMIC at 05:00

## 2023-10-16 RX ADMIN — Medication 15 ML: at 09:57

## 2023-10-16 RX ADMIN — Medication 1 PATCH: at 09:41

## 2023-10-16 RX ADMIN — VANCOMYCIN HYDROCHLORIDE 1000 MG: 1 INJECTION, SOLUTION INTRAVENOUS at 12:56

## 2023-10-16 RX ADMIN — THIAMINE HYDROCHLORIDE 100 MG: 100 INJECTION, SOLUTION INTRAMUSCULAR; INTRAVENOUS at 09:46

## 2023-10-16 RX ADMIN — ENOXAPARIN SODIUM 30 MG: 30 INJECTION SUBCUTANEOUS at 19:59

## 2023-10-16 RX ADMIN — CHLORHEXIDINE GLUCONATE 15 ML: 1.2 RINSE ORAL at 11:30

## 2023-10-16 RX ADMIN — PIPERACILLIN AND TAZOBACTAM 3.38 G: 3; .375 INJECTION, POWDER, FOR SOLUTION INTRAVENOUS at 12:55

## 2023-10-16 RX ADMIN — POTASSIUM CHLORIDE 10 MEQ: 10 INJECTION, SOLUTION INTRAVENOUS at 05:47

## 2023-10-16 RX ADMIN — PROPOFOL 15 MCG/KG/MIN: 10 INJECTION, EMULSION INTRAVENOUS at 17:28

## 2023-10-16 RX ADMIN — ACETAMINOPHEN 650 MG: 650 SUPPOSITORY RECTAL at 11:33

## 2023-10-16 RX ADMIN — ACETAMINOPHEN 650 MG: 650 SUPPOSITORY RECTAL at 19:59

## 2023-10-16 RX ADMIN — MINERAL OIL, PETROLATUM: 425; 573 OINTMENT OPHTHALMIC at 15:04

## 2023-10-16 RX ADMIN — FUROSEMIDE 20 MG: 10 INJECTION, SOLUTION INTRAVENOUS at 22:01

## 2023-10-16 RX ADMIN — Medication 10 MG: at 15:09

## 2023-10-16 RX ADMIN — DEXTROSE MONOHYDRATE, SODIUM CHLORIDE, SODIUM LACTATE, POTASSIUM CHLORIDE, CALCIUM CHLORIDE: 5; 600; 310; 179; 20 INJECTION, SOLUTION INTRAVENOUS at 04:50

## 2023-10-16 RX ADMIN — CHLORHEXIDINE GLUCONATE 15 ML: 1.2 RINSE ORAL at 19:59

## 2023-10-16 RX ADMIN — PANTOPRAZOLE SODIUM 40 MG: 40 INJECTION, POWDER, FOR SOLUTION INTRAVENOUS at 09:41

## 2023-10-16 RX ADMIN — MAGNESIUM SULFATE HEPTAHYDRATE 2 G: 40 INJECTION, SOLUTION INTRAVENOUS at 06:54

## 2023-10-16 RX ADMIN — PIPERACILLIN AND TAZOBACTAM 3.38 G: 3; .375 INJECTION, POWDER, FOR SOLUTION INTRAVENOUS at 19:59

## 2023-10-16 ASSESSMENT — ACTIVITIES OF DAILY LIVING (ADL)
ADLS_ACUITY_SCORE: 52

## 2023-10-16 NOTE — PROGRESS NOTES
10/16/23 1045 10/16/23 1100   Ventilator Settings    Vent Mode CPAP/PS  (Continuous positive airway pressure with Pressure Support) CMV/AC  (Continuous Mandatory Ventilation/ Assist Control)   Resp Rate (Set)  --  10 breaths/min   Tidal Volume (Set, mL)  --  300 mL   FiO2  --  40 %   PEEP (cm H2O) 5 cmH2O 5 cmH2O   Pressure Support (cm H2O) 10 cmH2O  --    Inspiratory Time (sec)  --  1 sec   Ventilator - Patient    Patient Resp Rate   --  20 breaths/min   Expiratory Vt (ml)  --  303   Minute Volume (ml)  --  5.6 L/min   Peak Inspiratory Pressure (cm H2O)  --  18 cmH2O   Mean Airway Pressure (cm H2O)  --  8.9 cmH2O   Plateau Pressure (cm H2O)  --  16 cmH2O   Dynamic Compliance (mL/cm H2O)  --  18.4 mL/cm H2O   Airway Resistance  --  8.4   Auto/ Intrinsic PEEP (cm H2O)  --  1.6 cmH2O   Weaning Assessment   RASS (Lyons Agitation-Sedation Scale) 0-->alert and calm 0-->alert and calm   Pulse 80  --    /79  --    Spontaneous Respiratory Rate (S)  42 breaths/min  --    Spontaneous Tidal Volume (mL) 202 mL  --    Spontaneous Minute Ventilation 8.4 mL  --    RSBI 207.92  --      Weaning trial 10/5 for 120min   Discontinued do to increased respiratory rate up to 42 ( over 35 for 5 min)

## 2023-10-16 NOTE — PROGRESS NOTES
10/16/23 1500   Appointment Info   Signing Clinician's Name / Credentials (PT) Miri Terry PT, DPT, ATC, LAT   Rehab Comments (PT) PT eval and treat ICU early mobility. Activity order: seizure precautions, ICU early mobility, up with assist       Present no   General Information   Onset of Illness/Injury or Date of Surgery 10/08/23   Referring Physician Dr. Hernandez   Pertinent History of Current Problem (include personal factors and/or comorbidities that impact the POC) Patient is a 68 year old female, admitted due to generalized weakness and fatigue, found to have pneumonia with septic shock. Patient also found to have dehydration, hyponatremia, hypomagnesemia. Patient with a previous medical history of COPD, emphysema, ETOH dependence, s/p gastric bypass for obesity, PTSD, smoker, depression, restless leg syndrome, sleep apnea.   Existing Precautions/Restrictions fall;oxygen therapy device and L/min   Weight-Bearing Status - LUE full weight-bearing   Weight-Bearing Status - RUE full weight-bearing   Weight-Bearing Status - LLE full weight-bearing   Weight-Bearing Status - RLE full weight-bearing   General Observations Patient intubated and sedated. RT managing ventilation equipment. RN managed lines and suction.   Cognition   Cognitive Status Comments patient sedated, able to cough, open eyes trace duration, unable to follow commands   Posture    Posture Comments cervical flexion and left side bend   Range of Motion (ROM)   ROM Comment tone in bilateral hands and ankles. limited trunk rotation   Strength (Manual Muscle Testing)   Strength Comments globally weak, no antigravity strength with inability to follow commands   Bed Mobility   Bed Mobility supine-sit;sit-supine   Supine-Sit Frontier (Bed Mobility) dependent (less than 25% patient effort);2 person assist   Sit-Supine Frontier (Bed Mobility) 2 person assist;dependent (less than 25% patient effort)   Balance   Balance  Comments Dependent sitting balance   Clinical Impression   Criteria for Skilled Therapeutic Intervention Yes, treatment indicated   PT Diagnosis (PT) deconditioned, global weakness   Influenced by the following impairments critical illness   Functional limitations due to impairments dependent for mobility and cares, mechanically ventilated   Clinical Presentation (PT Evaluation Complexity) unstable   Clinical Presentation Rationale medical status, clinical judgement   Clinical Decision Making (Complexity) high complexity   Planned Therapy Interventions (PT) balance training;bed mobility training;gait training;home exercise program;ROM (range of motion);strengthening;stair training;transfer training;progressive activity/exercise;home program guidelines;patient/family education   Risk & Benefits of therapy have been explained   (patient sedated)   Clinical Impression Comments Patient critically ill in the ICU and mechanically vetilated. Will asess DC needs closer to DC.   PT Total Evaluation Time   PT Eval, High Complexity Minutes (16701) 10   Physical Therapy Goals   PT Frequency 5x/week   PT Predicted Duration/Target Date for Goal Attainment 10/30/23   PT Goals Bed Mobility;Transfers;Gait   PT: Bed Mobility Supine to/from sit;Modified independent   PT: Transfers Modified independent;Sit to/from stand;Assistive device   PT: Gait Modified independent;Rolling walker;25 feet   Interventions   Interventions Quick Adds Therapeutic Activity   Therapeutic Activity   Therapeutic Activities: dynamic activities to improve functional performance Minutes (94075) 10   Symptoms Noted During/After Treatment Fatigue   Treatment Detail/Skilled Intervention With assist of RN and RT completed max assist bed mobility and sitting balance. While sitting 10 minutes completed UE WB, hand placement and finger range of motion, elbow extension facilitation and cues for upright sitting from the trunk. patient with dependent posture support and  dependent head support. No significant change in vitals, patient returned to supine and set up with pillows to support, off weight heels and quarter turn. All respiratory equipment managed by RT and IV lines managed by RN.   PT Discharge Planning   PT Plan 1/5 Mon, ICU early mobility   PT Discharge Recommendation (DC Rec)   (too early to determin in patient's course)   PT Rationale for DC Rec Patient critically ill in the ICU and mechanically vetilated. Will asess DC needs closer to DC.   PT Brief overview of current status Dependent bed mobility and sitting balance. assist x 3 for sitting.   PT Equipment Needed at Discharge   (TBD)   Total Session Time   Timed Code Treatment Minutes 10   Total Session Time (sum of timed and untimed services) 20     Thank you for your referral.  Miri Terry, PT, DPT, ATC, LAT    [facility]  O: [phone]  E: [email or web address]

## 2023-10-16 NOTE — PROGRESS NOTES
CLINICAL NUTRITION SERVICES - REASSESSMENT NOTE     Nutrition Prescription    RECOMMENDATIONS FOR MDs/PROVIDERS TO ORDER:  None at this time     Malnutrition Status:    Unable to assess due to lack of NFPE - pt is weaning from intubation, will meet with pt as able pending improvements to clinical status    Recommendations already ordered by Registered Dietitian (RD):  Enteral Nutrition - okay to increase to goal rate of 40 mL/hr x 24 hrs. Is tolerating 30 mL/hr well *please monitor closely for tolerance    FWFs - please provide 90 mL every 3 hrs for a total of 720 mL per day    Multivitamin with minerals - continue as ordered due to TF likely not meeting micronutrient needs    Future/Additional Recommendations:  Will follow to monitor TF tolerance and advancement to goal rate, weight changes, GI symptoms/BMs, and potential for diet advancement      EVALUATION OF THE PROGRESS TOWARD GOALS   Diet: Orders Placed This Encounter      NPO for Medical/Clinical Reasons Except for: No Exceptions    Nutrition Support: TF Vital 1.5 currently running at 30 mL/hr x 24 hrs. FWF at 80 mL q 4 hrs.  Intake: 0% PO due to NPO status with no exceptions       NEW FINDINGS   Pt remains intubated with possible weaning per IDT rounding this AM 10/16. Remains NPO with no exceptions - TF is currently running at 30 mL/hr x 24 hrs. No residuals noted. Pt appears to be tolerating well.     Is currently receiving dextrose IVF at 10 mL/hr x 24 hrs = 240 mL per day, also IV dilaudid, IV levophed, and IV propofol at 8.2 mL/hr = 197 mL, 216 kcal per day    Updated labs as of 10/16:  Sodium: 138   Potassium: 3.6  Magnesium: 1.9  Phosphorus: 3.2  Glucose: 94    GI is non-distended - bowel sounds are audible, normoactive in all quadrants. Last BM was today 10/16 - soft, brown smear.     Recent weights during admission:  10/15/23 0501 41.9 kg (92 lb 4.8 oz) Bed scale   10/14/23 0700 49.1 kg (108 lb 4.8 oz) Bed scale   10/13/23 0500 48.7 kg (107 lb 4.8  oz) Bed scale   10/12/23 1123 48.1 kg (106 lb) Bed scale   10/11/23 0642 45.8 kg (101 lb) Bed scale   10/10/23 1200 46.4 kg (102 lb 4.8 oz) Bed scale   Overall trending down - big jump so unsure of accuracy of bed scale weights.    Does have 1+ trace generalized edema this AM.    Dosing Weight: 41.9 kg using actual BW *updated but unsure of accuracy    ASSESSED NUTRITION NEEDS  Estimated Energy Needs: 1,257-1,467+ kcals/day (30 - 35+ kcals/kg)  Justification: Increased needs, Repletion, and Underweight  Estimated Protein Needs: 50-63+ grams protein/day (1.2 - 1.5+ grams of pro/kg)  Justification: Hypercatabolism with acute illness, Increased needs, and Repletion  Estimated Fluid Needs: 1,257 mL/day (30 mL/kg)   Justification: Maintenance    MALNUTRITION  % Intake: Unable to assess PTA due to intubation status but does not meet criteria for intake due to TF   % Weight Loss: Difficult to assess current weight due to fluctuations in bed scale  Subcutaneous Fat Loss: Unable to assess  Muscle Loss: Unable to assess  Fluid Accumulation/Edema: Does not meet criteria - trace  Malnutrition Diagnosis: Unable to determine due to lack of NFPE - will meet with pt as status improves    Previous Goals   Patient to consume % of nutritionally adequate meal trays TID, or the equivalent with supplements/snacks  Evaluation: ongoing, not progressing - remains intubated and NPO due to respiratory status    Pt weight will remain stable during admission  Evaluation: ongoing, not progressing/difficult to assess - drop in weight, unsure of accuracy due to bed scale    Pt will tolerate nutritional supplement   Evaluation: Not met - discontinued due to NPO status    Previous Nutrition Diagnosis  Inadequate oral intake related to alcohol use, poor appetite, acute on chronic illness as evidenced by intake of 10-25% during admission so far, underweight BMI of 18.37, weight loss of 6.1% in approx. 10 months, increased needs above baseline,  and alcohol use likely displacing other nutrients in diet  Evaluation: Improving slightly now with TF advancing, updated/revised    CURRENT NUTRITION DIAGNOSIS  Inadequate oral intake related to alcohol use, poor appetite, acute on chronic illness, intubation as evidenced by NPO status and reliance on TF to meet needs, underweight BMI of 15.84, weight loss of 6.1% in approx. 10 months, increased needs above baseline, and recent alcohol use likely displacing other nutrients in diet    INTERVENTIONS  Implementation  Collaboration with other providers  Enteral Nutrition - okay to advance to goal rate of 40 mL/hr x 24 hrs *please monitor closely for tolerance  Feeding tube flush - 90 mL every 3 hours    At goal rate, formula provides total volume of 960 mL, 1,440 kcal (34 kcal/kg, 100% needs), 65 gm Pro (1.6 gm/kg, 100% needs), 180 gm CHO, 55 gm fat, 6 gm fiber, and 733 mL free water per DW of 41.9 kg.     Goals  Total avg nutritional intake to meet a minimum of 30 kcal/kg and 1.2 g PRO/kg daily (per dosing wt 41.9 kg updated actual BW *unsure of accuracy)  Pt weight will remain stable during admission  Pt will tolerate TF advancement to goal rate  Pt will tolerate diet advancement as able    Monitoring/Evaluation  Progress toward goals will be monitored and evaluated per protocol.    Lori Angulo RD, LD  Clinical Dietitian  Office: [phone]  Weekend pager: [phone]

## 2023-10-16 NOTE — PROGRESS NOTES
10/16/23 1715 10/16/23 1728 10/16/23 1730   Ventilator   Ventilator  --   --   --    Ventilator Settings    Vent Mode  --   --   --    Resp Rate (Set)  --   --   --    Tidal Volume (Set, mL)  --   --   --    FiO2  --   --   --    PEEP (cm H2O)  --   --   --    Inspiratory Time (sec)  --   --   --    Sensitivity Flow Triggered (L/min)  --   --   --    Ventilator - Patient    Patient Resp Rate   --   --   --    Expiratory Vt (ml)  --   --   --    Minute Volume (ml)  --   --   --    Peak Inspiratory Pressure (cm H2O)  --   --   --    Mean Airway Pressure (cm H2O)  --   --   --    Plateau Pressure (cm H2O)  --   --   --    Ventilator Alarms   High Inspiratory Pressure Alarm (cmH2O)  --   --   --    High Respiratory Rate (breaths/min)  --   --   --    Minute Ventilation High (L)  --   --   --    Minute Ventilation Low (L)  --   --   --    Vt High (mL)  --   --   --    Weaning Assessment   Weaning Assessment Complete  --   --   --    RASS (Lyons Agitation-Sedation Scale)  --  0-->alert and calm 0-->alert and calm   Pulse 90 78 89   BP  --  (!) 89/59  --    Spontaneous Respiratory Rate  --  30 breaths/min  --    Spontaneous Tidal Volume (mL)  --  278 mL  --    Spontaneous Minute Ventilation  --  7.34 mL  --    RSBI  --  107.91  --    Weaning trial discontinued due to:  --   --   --    Oxygen Therapy   SpO2 98 %  --  98 %   O2 Device  --   --   --    FiO2 (%)  --   --   --    ETT Cuffed Single 7 mm   Placement Date/Time: 10/10/23 (c) 7498   Mask Ventilation: Not attempted  Induction Type: RSI  Ease of Intubation: Easy  Laryngoscopy Technique: Video laryngoscopy  Cuffed: Cuffed  ETT Type: Single  Single Lumen Tube Size: 7 mm  VL Blade Type: Jatin...   Secured at (cm)  --   --   --    Measured from  --   --   --    Position  --   --   --    Secured by  --   --   --    Bite Block  --   --   --    Site Appearance  --   --   --    Tube Care  --   --   --    Cuff Assessment  --   --   --    Cuff Pressure - cm H2O  --   --    --    Respiratory Secretions Assessment   Suction Device  --   --   --    Secretions Amount  --   --   --    Secretions Color  --   --   --    Secretions Consistency  --   --   --    Suction Tolerance  --   --   --    Suctioning Adverse Effects  --   --   --       10/16/23 1737 10/16/23 1740 10/16/23 1747   Ventilator   Ventilator  --   --  Adult   Ventilator Settings    Vent Mode  --   --   --    Resp Rate (Set)  --   --   --    Tidal Volume (Set, mL)  --   --   --    FiO2  --   --   --    PEEP (cm H2O)  --   --   --    Inspiratory Time (sec)  --   --   --    Sensitivity Flow Triggered (L/min)  --   --   --    Ventilator - Patient    Patient Resp Rate   --   --   --    Expiratory Vt (ml)  --   --   --    Minute Volume (ml)  --   --   --    Peak Inspiratory Pressure (cm H2O)  --   --   --    Mean Airway Pressure (cm H2O)  --   --   --    Plateau Pressure (cm H2O)  --   --   --    Ventilator Alarms   High Inspiratory Pressure Alarm (cmH2O)  --   --   --    High Respiratory Rate (breaths/min)  --   --   --    Minute Ventilation High (L)  --   --   --    Minute Ventilation Low (L)  --   --   --    Vt High (mL)  --   --   --    Weaning Assessment   Weaning Assessment Complete  --   --   --    RASS (Lyons Agitation-Sedation Scale) 0-->alert and calm -1-->drowsy  --    Pulse 86  --  78   BP (!) 89/59  --  93/57   Spontaneous Respiratory Rate 25 breaths/min  --  29 breaths/min   Spontaneous Tidal Volume (mL) 293 mL  --  290 mL   Spontaneous Minute Ventilation 7.5 mL  --  7.54 mL   RSBI 85.32  --  100   Weaning trial discontinued due to:  --   --   --    Oxygen Therapy   SpO2 98 %  --  98 %   O2 Device  --   --  Mechanical Ventilator   FiO2 (%)  --   --  40 %   ETT Cuffed Single 7 mm   Placement Date/Time: 10/10/23 c) 2602   Mask Ventilation: Not attempted  Induction Type: RSI  Ease of Intubation: Easy  Laryngoscopy Technique: Video laryngoscopy  Cuffed: Cuffed  ETT Type: Single  Single Lumen Tube Size: 7 mm  VL Blade  Type: Jatin...   Secured at (cm)  --   --   --    Measured from  --   --   --    Position  --   --   --    Secured by  --   --   --    Bite Block  --   --   --    Site Appearance  --   --   --    Tube Care  --   --   --    Cuff Assessment  --   --   --    Cuff Pressure - cm H2O  --   --   --    Respiratory Secretions Assessment   Suction Device  --   --   --    Secretions Amount  --   --   --    Secretions Color  --   --   --    Secretions Consistency  --   --   --    Suction Tolerance  --   --   --    Suctioning Adverse Effects  --   --   --       10/16/23 3727   Ventilator   Ventilator  --    Ventilator Settings    Vent Mode CMV/AC  (Continuous Mandatory Ventilation/ Assist Control)   Resp Rate (Set) 10 breaths/min   Tidal Volume (Set, mL) 400 mL   FiO2 40 %   PEEP (cm H2O) 7 cmH2O   Inspiratory Time (sec) 1 sec   Sensitivity Flow Triggered (L/min) 2 L/min   Ventilator - Patient    Patient Resp Rate  35 breaths/min   Expiratory Vt (ml) 403   Minute Volume (ml) 6.65 L/min   Peak Inspiratory Pressure (cm H2O) 25 cmH2O   Mean Airway Pressure (cm H2O) 13 cmH2O   Plateau Pressure (cm H2O) 22 cmH2O   Ventilator Alarms   High Inspiratory Pressure Alarm (cmH2O) 40 cm H2O   High Respiratory Rate (breaths/min) 40 breaths/min   Minute Ventilation High (L) 14 L/min   Minute Ventilation Low (L) 3 L/min   Vt High (mL) 800 mL   Weaning Assessment   Weaning Assessment Complete Y   RASS (Lyons Agitation-Sedation Scale)  --    Pulse 78   BP 93/57   Spontaneous Respiratory Rate 35 breaths/min   Spontaneous Tidal Volume (mL) 255 mL   Spontaneous Minute Ventilation 6.2 mL   RSBI 137.25   Weaning trial discontinued due to: RR>35 for >5min   Oxygen Therapy   SpO2 98 %   O2 Device Mechanical Ventilator   FiO2 (%) 40 %   ETT Cuffed Single 7 mm   Placement Date/Time: 10/10/23 (c) 1163   Mask Ventilation: Not attempted  Induction Type: RSI  Ease of Intubation: Easy  Laryngoscopy Technique: Video laryngoscopy  Cuffed: Cuffed  ETT Type:  Single  Single Lumen Tube Size: 7 mm  VL Blade Type: Perc[employer] (PVT)...   Secured at (cm) 22 cm   Measured from Teeth/Gums   Position Center   Secured by Commercial tube stiles   Bite Block None Present   Site Appearance Clean;Dry   Tube Care Site care done   Cuff Assessment Cuff Pressure   Cuff Pressure - cm H2O 32 cmH20   Respiratory Secretions Assessment   Suction Device ETT;Inline;Oral   Secretions Amount small   Secretions Color cloudy;clear   Secretions Consistency thin   Suction Tolerance Tolerated well   Suctioning Adverse Effects None     Two spontaneous breathing trails 120 min  with pressure support 10/5   and 60 min trial 10/7 both trials resulted in increase rate >35 for 5 minutes  Vent settings adjusted to new ordered settings  ET tube moved down trachea with CXR movement to 24 cm  Tube pulled back to 22 cm at lips post CXR 7.0 tube  Light sedation patient following comands  Early mobility completed with PT  Patient assisted to edge of bed and assisted to sit , monitored airway with movement and sitting with assistance

## 2023-10-16 NOTE — PHARMACY-VANCOMYCIN DOSING SERVICE
Pharmacy Vancomycin Initial Note  Date of Service 2023  Patient's  1955  68 year old, female    Indication: [facility] Pneumonia    Current estimated CrCl = Estimated Creatinine Clearance: 61 mL/min (based on SCr of 0.6 mg/dL).    Creatinine for last 3 days  10/14/2023:  5:52 AM Creatinine 0.67 mg/dL  10/15/2023:  5:13 AM Creatinine 0.62 mg/dL  10/16/2023:  4:55 AM Creatinine 0.60 mg/dL    Recent Vancomycin Level(s) for last 3 days  No results found for requested labs within last 3 days.      Vancomycin IV Administrations (past 72 hours)        No vancomycin orders with administrations in past 72 hours.                    Nephrotoxins and other renal medications (From now, onward)      Start     Dose/Rate Route Frequency Ordered Stop    10/16/23 2140  furosemide (LASIX) injection 20 mg         20 mg  over 1-3 Minutes Intravenous EVERY 12 HOURS 10/16/23 1125      10/16/23 1200  piperacillin-tazobactam (ZOSYN) 3.375 g vial to attach to  mL bag         3.375 g  over 30 Minutes Intravenous EVERY 8 HOURS 10/16/23 1123      10/16/23 1200  vancomycin (VANCOCIN) 1,000 mg in 200 mL dextrose intermittent infusion         1,000 mg  200 mL/hr over 1 Hours Intravenous EVERY 18 HOURS 10/16/23 1132      10/11/23 0500  norepinephrine (LEVOPHED) 4 mg in  mL infusion PREMIX         0.01-0.6 mcg/kg/min × 46.4 kg  1.7-104.4 mL/hr  Intravenous CONTINUOUS 10/11/23 0439              Contrast Orders - past 72 hours (72h ago, onward)      None            [employer] Prediction of Planned Initial Vancomycin Regimen     Loading dose: N/A  Regimen: 1000 mg IV every 18 hours.  Start time: 11:26 on 10/16/2023  Exposure target: AUC24 (range)400-600 mg/L.hr   AUC24,ss: 484 mg/L.hr  Probability of AUC24 > 400: 72 %  Ctrough,ss: 13.3 mg/L  Probability of Ctrough,ss > 20: 17 %  Probability of nephrotoxicity (Lodise CALVIN ): 8 %       Plan:  Start vancomycin  1000 mg IV q18h.   Vancomycin monitoring method:  AUC  Vancomycin therapeutic monitoring goal: 400-600 mg*h/L  Pharmacy will check vancomycin levels as appropriate in 1-3 Days.    Serum creatinine levels will be ordered daily for the first week of therapy and at least twice weekly for subsequent weeks.      Clement Rodriguez RPH

## 2023-10-16 NOTE — TELECONSULT
Reviewed chart and discussed with hospitalist over the phone    Failed pressure support trial. PEEP increased to 7 and VT to 400 ml which is around 7 ml/kg IBW    Agree with switching antibiotics and pan culture    ETT needs to be repositioned( withdrawn around 2 cm.) Around 1.5 cm above juan m on CXR from today am    Tele ICU will continue to monitor. Please call us with any questions    Tj Hester MD

## 2023-10-16 NOTE — PROGRESS NOTES
Titrated dilaudid and propofol drips for weaning trial and NEW goal RASS 0 to -1.  Patient slow to respond and is able to follow simple commands during end of weaning trial.

## 2023-10-16 NOTE — PROGRESS NOTES
10/16/23 1409   Ventilator Settings    Vent Mode CMV/AC  (Continuous Mandatory Ventilation/ Assist Control)   Resp Rate (Set) 10 breaths/min   Tidal Volume (Set, mL) (S)  400 mL   FiO2 40 %   PEEP (cm H2O) (S)  7 cmH2O   Inspiratory Time (sec) 1 sec   Sensitivity Flow Triggered (L/min) 2 L/min   Ventilator - Patient    Patient Resp Rate  28 breaths/min   Expiratory Vt (ml) 406   Minute Volume (ml) 7.66 L/min   Peak Inspiratory Pressure (cm H2O) (S)  28 cmH2O   Mean Airway Pressure (cm H2O) 14 cmH2O   Plateau Pressure (cm H2O) (S)  26 cmH2O   Dynamic Compliance (mL/cm H2O) 23.5 mL/cm H2O   Airway Resistance 13.8   Auto/ Intrinsic PEEP (cm H2O)   (Patient breathing above set rate no ipeep measurement)   Weaning Assessment   Pulse 86   Oxygen Therapy   SpO2 95 %     Vent changes made as per order, increased peak and plat pressures with tidal volume increase

## 2023-10-16 NOTE — PROGRESS NOTES
"[facility]    Medicine Progress Note - Hospitalist Service    Date of Admission:  10/7/2023    Assessment & Plan   Patient is a 68-year-old female with COPD, alcohol dependence recently consuming large volume alcohol daily, gastric bypass surgery, depression/PTSD, and ongoing tobacco abuse who presented with generalized weakness and fatigue and was admitted due to concerns for multifocal community-acquired pneumonia and sepsis.  She had rapid worsening over the first 12 hours of her hospitalization with persistent hypotension despite fluid resuscitation and was transferred to the ICU and started on vasopressor therapy with Levophed due to concern for septic shock.  After initial improvement in septic shock, patient developed worsening respiratory failure and worsening alcohol withdrawal with delirium requiring sedative therapy and subsequent intubation for mechanical ventilation in early a.m. 10/10.  She remains critically ill with potentially life-threatening respiratory failure, pneumonia, septic shock, and alcohol withdrawal.      10/16 :       Spiking fevers off and on  BP stable  Blood cultures : 10/7 - 1 bottle - strept pneumoniae  Blood cultures 10/8, 10/9 : NGTD  MRSA/MSSA screen : negative  C diff negative 10/8, enteric panel negative 10/8  On iv ceftriaxone 10/8 - will stop today, start vanco zosyn -for ongoing fevers  Repeat BC sent : 10/16  On lasix iv q 24, BNP trending up - increase dose of lasix to bid, echo : normal EF, mild TR  Continue Iv solumedrol  On propfol infusion, Off levophed since this am - was on very low dose, discussed with RN about weaning off dilaudid infusion.      Continues on On ventilatory support  Failed weaning trial - RR went up  Discussed with RT  NPO , on TF\"s, H/o gastric bypass, stomach remanant of 30 ml - discussed with dietary      Discussed with tele ICU team about plan of care             A/p :       Septic shock due to streptococcal " pneumoniae bacteremia and multifocal pneumonia  Acute respiratory failure with hypoxia  Presented with weakness and had bilateral infiltrates concerning for community-acquired pneumonia.  Blood culture from admission grew Streptococcus pneumoniae.  Sepsis rapidly progressed to septic shock requiring initiation of vasopressors.  Although hemodynamic status improved, she developed worsening delirium and respiratory failure necessitating intubation and initiation of mechanical ventilation in a.m. 10/10.  PaO2 to FiO2 ratio prior to intubation was as low as 120 and initially improved after intubation.    -Continue Rocephin, recommending 2-week course  -will keep IV steroids for now but consider stopping in the next 24 to 48 hours  -Titrate vasopressors with Levophed to keep MAP 65 or higher  -ABG in a.m. or as needed with vent changes  -Adjusting mechanical ventilation to attempt to optimize oxygenation (keep oxygen saturations 90 to 95%), may allow permissive hypercapnia.  Now tolerating PEEP of 5 with FiO2 of 40%.  -propofol infusion for sedation titrated to RASS score -1 to -2 as patient doing better and may be able to start weaning.  If she starts to have more dyssynchrony with the ventilator may need to sedate little bit more.  -Continue hydromorphone infusion for analgesia while on ventilator  -Continue bolus doses of Versed as needed for sedation  -Continue triple-lumen PICC line central venous catheter  -Continue arterial line catheter  -Continue indwelling urinary catheter  -will continue to assess for weaning    Patient status although improving is still very guarded.    Alcohol use disorder (severe dependence) acute alcohol withdrawal with delirium   History of seizure due to alcohol withdrawal  Per  patient had been drinking an average of 12 pack of beer, 1 bottle of wine, and 10 hard liquor drinks a day recently with most recent alcoholic drink approximately 30 minutes prior to hospital presentation.   Blood alcohol level was not tested at time of admission.  Patient developed overt signs of withdrawal on 10/9 including increased anxiety, tremor, confusion and possible hallucination.  Initial treatment of alcohol withdrawal per [facility] protocol was complicated by sedation that may have exacerbated respiratory failure.  Nursing reports that patient's mentation was normal early in her hospital stay prior to onset of alcohol withdrawal.  Patient has not had overt meningismus lowering suspicion for infectious meningoencephalitis.  Signs of alcohol withdrawal are difficult to assess because of current sedation while on the ventilator but persistent or worsening alcohol withdrawal could contribute to increased patient ventilator dyssynchrony  -Continuing treatment for respiratory failure as outlined above and while she is intubated will use a combination of propofol infusion, hydromorphone infusion, and Versed as needed for sedation    Suspected malnutrition  Hypoalbuminemia   Status post gastric bypass for obesity  Has remote history of Tom-en-Y gastric bypass surgery for obesity in March 2008.  Reported 13 pound weight loss in recent weeks and appears underweight and probably cachectic on exam.  Alcoholism likely contributes to malnutrition as may previous gastric bypass status.  Serum albumin was low at admission at 2.8 and has decreased and she is now starting to show signs of peripheral edema.  Noncardiogenic pulmonary edema from hypoalbuminemia could exacerbate respiratory failure.  Her previous Tom-en-Y gastric bypass surgery may limit ability to advance feeding tube beyond the gastric remnant.  -Attempting to adjust feeding tube placement today to advance tip beyond the gastric remnant if possible but interventional radiology and/or fluoroscopy are not available at this hospital to assist with such placement  -Registered dietitian assisting with recommendations for initiation of enteral feeding, anticipate  starting very low rate enteral feeding with tube feeds today depending on positioning of the feeding tube but would defer initiation of free water supplementation today (continuing IV fluids).  Tolerating 20 ml per hour, will increase to 30 mils per hour and see how she tolerates.  Add free water flushes at 40 mils every 4 hours and will increase to 80 every 4 x24 hours then to goal at 120 every 4 hours.    -Patient is a net -2 L over the last 48 hours we will decrease Lasix to 20 mg IV daily and continue to monitor.  Do not believe today's bed weight, will remeasure.    Hypomagnesemia  Hypophosphatemia   Hypokalemia  Presented with low serum magnesium and potassium levels and subsequently developed low serum phosphorus level all likely secondary to nutritional deficiency.  These electrolytes have improved with supplementation.  Levels currently normal  -Continue with potassium, magnesium, and phosphorus supplementation per protocol in patient with critical illness  -Monitor magnesium, phosphorus, and potassium as indicated    Hyponatremia  Sodium 123 on admission with suspected hypovolemic hyponatremia.  After treatment with multiple normal saline boluses, sodium increased from 123 up to 128 in less than 12 hours after which IV fluids were switched to D5 half-normal saline.  Sodium then decreased to 126 and later stabilized at about 130 and today is 133.  Urine sodium was less than 20 consistent with hypovolemic hyponatremia.  Serum sodium now normal at 142  -Continue current IV fluid infusion and changed to TKO with increased tube feeds and free water flushes.      Hyperglycemia  Patient has had mild to moderate hyperglycemia while in the ICU.  Hemoglobin A1c was 4.4 and she is not known to have underlying diabetes.  Insulin infusion was started for management of hyperglycemia in the ICU.  Anticipate hyperglycemia will worsen with initiation of enteral feeding.  -Continue to monitor blood sugars per  protocol  -Continue insulin infusion per protocol for management of hyperglycemia  -Continuing IV fluids containing dextrose while receiving insulin infusion    Thrombocytopenia  Anemia, unspecified type  Coagulopathy  Presented with thrombocytopenia and anemia at admission both of which initially worsened but have subsequently stabilized.  She also is mildly coagulopathic with prolonged INR.  Thrombocytopenia, coagulopathy, and anemia are probably due to septic shock.  Active bleeding has not been suspected so far.  -Ordered recheck of hemoglobin and platelet, plt now 200  -Ordered recheck of INR, 1.16    Emphysema/COPD  Tobacco abuse  Known chronic emphysema and COPD without overt acute exacerbation.  Patient reportedly has continued to smoke.  -Continuing IV steroids secondary to septic shock and pneumococcal pneumonia but also COPD. Stopping steroids today.  -Continue bronchodilators as needed  -Nicotine patch supplementation started during hospitalization.    Dehydration  Presented with dehydration which likely contributed to hyponatremia and initial low normal blood pressures, now resolved after IV fluid treatment.  -Continue IV fluids for now as outlined above.  If output continues to be greater than input would then consider discontinuing IV L Lasix and monitoring closely.    Weakness generalized  Suspect presenting complaint of generalized weakness was multifactorial including infection, severe alcohol dependence, and suspected malnutrition with nutrient including electrolyte deficiencies.  -Treat acute medical problems  -Anticipate PT and OT evaluations if she recovers from life-threatening acute illness once patient improves    Episode of recurrent major depressive disorder, unspecified depression episode severity (H24)  PTSD (post-traumatic stress disorder)  Carries previous diagnosis of depression and PTSD treated chronically with Lexapro 30 mg daily, clonazepam 0.5 mg 3 times daily as needed for  "anxiety, BuSpar 15 mg 3 times a day, Effexor 37.5 mg daily, and Xyzal 5 mg daily.  At admission it was unclear whether the patient has recently been taking these medications reliably.  She is now intubated and being sedated.  -Holding these chronic medications for now (no reliable enteral access yet) and abrupt discontinuation of these medications could conceivably contribute to increased anxiety/agitation          Diet: NPO for Medical/Clinical Reasons Except for: No Exceptions  Adult Formula Drip Feeding: Continuous Vital 1.5; Nasogastric tube; Goal Rate: 10; mL/hr; From: 12:00 AM; To: 12:00 AM; please increase rate to 30 ml/hr for 24 hours.  If K, Mg, PO4 nl and tolerating feeds, increase to goal of 40 ml per hour. Cart...    DVT Prophylaxis: Enoxaparin (Lovenox) SQ  Barney Catheter: PRESENT, indication: (P) Strict 1-2 Hour I&O  Lines: PRESENT      PICC 10/09/23 Triple Lumen Left Basilic OK TO USE per PICC STAT RN-Site Assessment: WDL  Arterial Line 10/11/23 Radial-Site Assessment: WDL      Cardiac Monitoring: ACTIVE order. Indication: ICU  Code Status: Full Code      Clinically Significant Risk Factors              # Hypoalbuminemia: Lowest albumin = 1.7 g/dL at 10/11/2023  6:12 AM, will monitor as appropriate              # Cachexia: Estimated body mass index is 15.84 kg/m  as calculated from the following:    Height as of this encounter: 1.626 m (5' 4\").    Weight as of this encounter: 41.9 kg (92 lb 4.8 oz).             Disposition Plan    unknown         Isaac Hernandez MD  Hospitalist Service  [facility]  Securely message with [employer] (more info)  Text page via [facility] Paging/Directory   ______________________________________________________________________      Physical Exam   Vital Signs: Temp: 100.3  F (37.9  C) Temp src: Oral BP: 100/62 Pulse: 78   Resp: (!) 31 SpO2: 95 % O2 Device: Mechanical Ventilator Oxygen Delivery: 40 LPM  Vent Mode: CPAP/PS  (Continuous positive airway " pressure with Pressure Support)  FiO2 (%): 40 %  Resp Rate (Set): 22 breaths/min  Tidal Volume (Set, mL): 350 mL  PEEP (cm H2O): 5 cmH2O  Pressure Support (cm H2O): 10 cmH2O  Resp: (!) 31        Patient Vitals for the past 24 hrs:   BP Temp Temp src Pulse Resp SpO2   10/16/23 1030 -- -- -- 78 (!) 31 95 %   10/16/23 1015 100/62 -- -- 79 (!) 32 95 %   10/16/23 1000 106/48 -- -- 80 (!) 37 95 %   10/16/23 0959 -- -- -- 81 -- 96 %   10/16/23 0945 -- -- -- 82 28 96 %   10/16/23 0930 92/63 -- -- 73 (!) 36 94 %   10/16/23 0929 92/64 -- -- 73 (!) 31 94 %   10/16/23 0915 91/64 -- -- 73 (!) 36 94 %   10/16/23 0909 -- -- -- 70 (!) 38 94 %   10/16/23 0900 99/67 -- -- 70 22 93 %   10/16/23 0845 99/69 -- -- 72 22 93 %   10/16/23 0830 109/72 -- -- 74 22 92 %   10/16/23 0815 -- -- -- 73 22 95 %   10/16/23 0800 102/62 -- -- 73 22 94 %   10/16/23 0745 -- 100.3  F (37.9  C) Oral 73 22 96 %   10/16/23 0730 111/68 -- -- 80 23 96 %   10/16/23 0715 -- -- -- 76 22 98 %   10/16/23 0700 121/74 -- -- 64 22 97 %   10/16/23 0545 -- -- -- 57 22 96 %   10/16/23 0530 95/60 -- -- 60 22 94 %   10/16/23 0515 -- -- -- 61 22 95 %   10/16/23 0507 109/66 -- -- 62 22 95 %   10/16/23 0500 -- -- -- 63 22 96 %   10/16/23 0445 -- -- -- 68 22 96 %   10/16/23 0430 122/74 -- -- 77 23 96 %   10/16/23 0415 -- -- -- 76 (!) 31 95 %   10/16/23 0400 109/69 -- -- 57 22 96 %   10/16/23 0345 -- -- -- 58 22 95 %   10/16/23 0330 123/68 -- -- 57 22 95 %   10/16/23 0315 -- -- -- 59 22 95 %   10/16/23 0300 101/62 -- -- 63 22 96 %   10/16/23 0245 -- -- -- 59 22 96 %   10/16/23 0230 116/74 -- -- 56 22 96 %   10/16/23 0215 -- -- -- 57 22 96 %   10/16/23 0200 103/68 -- -- 56 22 95 %   10/16/23 0145 -- -- -- 57 22 95 %   10/16/23 0130 100/63 -- -- 60 22 95 %   10/16/23 0115 -- -- -- 62 22 95 %   10/16/23 0107 104/58 -- -- 66 22 95 %   10/16/23 0100 -- -- -- 66 22 95 %   10/16/23 0045 -- -- -- 72 22 94 %   10/16/23 0036 134/86 98.8  F (37.1  C) Oral 73 (!) 34 95 %   10/16/23 0030 --  -- -- 66 22 95 %   10/16/23 0015 -- -- -- 62 22 94 %   10/16/23 0000 (!) 148/93 -- -- 65 22 93 %   10/15/23 2345 -- -- -- 65 22 93 %   10/15/23 2330 (!) 145/91 -- -- 70 22 94 %   10/15/23 2315 -- -- -- 70 22 93 %   10/15/23 2300 126/80 -- -- 70 22 94 %   10/15/23 2245 -- -- -- 68 22 94 %   10/15/23 2230 (!) 135/92 -- -- 66 22 95 %   10/15/23 2215 -- -- -- 74 22 94 %   10/15/23 2200 134/86 -- -- 61 22 93 %   10/15/23 2145 -- -- -- 63 22 93 %   10/15/23 2130 139/81 -- -- 64 22 94 %   10/15/23 2127 -- 98.9  F (37.2  C) Axillary 63 22 93 %   10/15/23 2115 -- -- -- 65 22 94 %   10/15/23 2100 111/61 -- -- 64 22 93 %   10/15/23 2045 -- -- -- 68 22 92 %   10/15/23 2030 107/69 -- -- 71 23 93 %   10/15/23 2015 -- -- -- 80 29 94 %   10/15/23 2000 109/61 -- -- 82 22 96 %   10/15/23 1945 -- -- -- 71 25 94 %   10/15/23 1930 110/63 -- -- 74 22 95 %   10/15/23 1915 -- -- -- 70 22 94 %   10/15/23 1900 111/67 -- -- 73 22 93 %   10/15/23 1845 -- -- -- 74 24 91 %   10/15/23 1830 124/76 -- -- 75 22 92 %   10/15/23 1815 -- -- -- 71 29 94 %   10/15/23 1800 118/78 -- -- 71 22 94 %   10/15/23 1743 118/73 -- -- 70 26 --   10/15/23 1700 128/79 -- -- 65 29 --   10/15/23 1648 114/71 -- -- 76 28 92 %   10/15/23 1630 (!) 131/93 -- -- 71 23 --   10/15/23 1600 120/76 -- -- 85 23 --   10/15/23 1505 120/77 -- -- 89 30 --   10/15/23 1457 -- 99.6  F (37.6  C) Rectal -- -- 96 %   10/15/23 1430 -- -- -- 98 (!) 32 95 %   10/15/23 1425 -- -- -- 98 (!) 32 95 %   10/15/23 1400 119/74 -- -- 105 27 93 %   10/15/23 1352 -- (!) 101.1  F (38.4  C) -- -- -- --   10/15/23 1349 -- -- -- 103 21 91 %   10/15/23 1330 -- -- -- 94 30 94 %   10/15/23 1300 111/70 -- -- 95 16 96 %   10/15/23 1230 -- -- -- 103 24 91 %   10/15/23 1219 -- -- -- 92 26 93 %   10/15/23 1200 116/74 -- -- 90 17 96 %   10/15/23 1100 109/74 -- -- 64 22 93 %     Weight: 92 lbs 4.8 oz  Vitals:    10/11/23 0642 10/12/23 1123 10/13/23 0500 10/14/23 0700   Weight: 45.8 kg (101 lb) 48.1 kg (106 lb) 48.7 kg  (107 lb 4.8 oz) 49.1 kg (108 lb 4.8 oz)    10/15/23 0501   Weight: 41.9 kg (92 lb 4.8 oz)       Intake/Output Summary (Last 24 hours) at 10/11/2023 1005  Last data filed at 10/11/2023 0700  Gross per 24 hour   Intake 3593.58 ml   Output 909 ml   Net 2684.58 ml     General Appearance: Cachectic elderly woman, intubated and sedated  Respiratory: not using accessory muscles, no stridor, no crepitus in the neck, symmetric breath sounds with occasional scattered coarse rhonchi, no wheezes  Cardiovascular: Regular rate and rhythm, normal capillary refill in the hands and feet which are pink  GI: Hypoactive s bowel sounds, nondistended abdomen, soft  Skin: No rashes, left upper arm and right radial PICC and arterial line sites are without bleeding, drainage, or surrounding skin erythema  Neuro: Patient with eyes open does gaze toward voice.  Able to squeeze fingers upon verbal request both left and right hand.  Some movement of lower extremities as well    Medical Decision Making       Total critical care time 35 minutes so far today including time spent reassessing respiratory status and adjusting mechanical ventilation treatment of life-threatening respiratory failure       Data     I have personally reviewed the following data over the past 24 hrs:    N/A  \   N/A   / N/A     138 104 8.7 /  98   3.6 28 0.60 \         Blood cultures from October 7, eighth, and ninth are negative (except for initial positive blood culture on October 7)  Stool testing for enteric pathogens and clostridia difficile was negative    Recent Labs   Lab 10/16/23  0943 10/16/23  0455 10/15/23  0515 10/14/23  0553   PH 7.50* 7.50* 7.52* 7.47*   PCO2 42 41 41 42   PO2 70* 65* 57* 69*   HCO3 32* 32* 33* 31*   O2PER 40 40 40 40         Imaging results reviewed over the past 24 hrs:   Recent Results (from the past 24 hour(s))   XR Chest Port 1 View    Narrative    XR CHEST PORT 1 VIEW 10/16/2023 9:50 AM    HISTORY: Endotracheal tube  positioning    COMPARISON: 10/10/2023      Impression    IMPRESSION: Endotracheal tube tip is 1.4 cm above the juan m. Consider  retracting the tube by 1.5 to 2 cm for better positioning. Feeding  tube tip in the stomach. Left arm PICC tip in the high right atrium.  Diffuse mixed interstitial and airspace opacities throughout the lungs  are stable, and may be due to edema, pneumonia or ARDS. Stable small  right and trace left pleural effusion and bibasilar atelectasis.  No  pneumothorax.    CARLOS KEARNS MD         SYSTEM ID:  [id number]         Recent Net 263   Lab 10/16/23  0755 10/16/23  0455 10/16/23  0029 10/15/23  1752 10/15/23  1158 10/15/23  0814 10/15/23  0513 10/14/23  0743 10/14/23  0552 10/12/23  0753 10/12/23  0553 10/11/23  0841 10/11/23  0612 10/10/23  1338 10/10/23  1223 10/10/23  0634 10/10/23  0533   WBC  --   --   --   --   --   --   --   --   --   --   --   --  28.2*  --   --   --  19.3*   HGB  --   --   --   --   --   --   --   --   --   --   --   --  10.4*  --   --   --  9.4*   MCV  --   --   --   --   --   --   --   --   --   --   --   --  102*  --   --   --  99   PLT  --   --   --   --   --   --  314  --   --   --  201  --  157  --   --   --  148*   INR  --   --   --   --   --   --   --   --   --   --   --   --   --   --  1.16*  --   --    NA  --  138  --   --   --   --  141  --  142  --   --   --  133*  --  129*  --  132*   POTASSIUM  --  3.6  --   --  4.1  --  3.4  --  3.6   < > 2.6*  --  3.6   < > 3.4  --  3.4   CHLORIDE  --  104  --   --   --   --  105  --  107  --   --   --  108*  --  104  --  105   CO2  --  28  --   --   --   --  29  --  28  --   --   --  19*  --  18*  --  20*   BUN  --  8.7  --   --   --   --  7.0*  --  5.8*  --   --   --  6.3*  --   --   --  6.7*   CR  --  0.60  --   --   --   --  0.62  --  0.67  --  0.65  --  0.58  --   --   --  0.63   ANIONGAP  --  6*  --   --   --   --  7  --  7  --   --   --  6*  --  7  --  7   SHON  --  7.4*  --   --   --   --  7.5*  --  7.4*  --   --    --  7.1*  --   --   --  7.4*   GLC 98 94 116*   < >  --    < > 97   < > 103*   < >  --    < > 96   < >  --    < > 88   ALBUMIN  --   --   --   --   --   --   --   --   --   --   --   --  1.7*  --   --   --  1.8*   PROTTOTAL  --   --   --   --   --   --   --   --   --   --   --   --  4.6*  --   --   --  4.5*   BILITOTAL  --   --   --   --   --   --   --   --   --   --   --   --  0.2  --   --   --  <0.2   ALKPHOS  --   --   --   --   --   --   --   --   --   --   --   --  79  --   --   --  69   ALT  --   --   --   --   --   --   --   --   --   --   --   --  15  --   --   --  18   AST  --   --   --   --   --   --   --   --   --   --   --   --  37  --   --   --  40    < > = values in this interval not displayed.

## 2023-10-16 NOTE — PROGRESS NOTES
10/16/23 0745 10/16/23 0800 10/16/23 0900   Ventilator Data   Vent Owner On Site Equipment  --   --    Vent Brand Drager  --   --    Vent ID [id number]  --   --    Ventilation Method Invasive  --   --    Ventilator Start - Date 10/10/23  --   --    Ventilation Day(s) 7  --   --    $Vent Subsequent Subsequent  --   --    Ventilator Settings    Vent Mode CMV/AC  (Continuous Mandatory Ventilation/ Assist Control)  --  CPAP/PS  (Continuous positive airway pressure with Pressure Support)   Resp Rate (Set) 22 breaths/min  --   --    Tidal Volume (Set, mL) 350 mL  --   --    FiO2 40 %  --   --    PEEP (cm H2O) 5 cmH2O  --  5 cmH2O   Pressure Support (cm H2O)  --   --  10 cmH2O   Inspiratory Time (sec) 1 sec  --   --    Vent Humidifier - Heater/HME Heater  --  Heater   Heater Temperature 37.4  --  37.8   Ventilator - Patient    Patient Resp Rate  22 breaths/min  --  26 breaths/min   Expiratory Vt (ml) 348  --  227   Minute Volume (ml) 6.7 L/min  --  7.32 L/min   Peak Inspiratory Pressure (cm H2O) 20 cmH2O  --  15 cmH2O   Mean Airway Pressure (cm H2O) 6.6 cmH2O  --  8.3 cmH2O   Plateau Pressure (cm H2O) 16 cmH2O  --  25.4 cmH2O   Dynamic Compliance (mL/cm H2O) 21.1 mL/cm H2O  --  10 mL/cm H2O   Airway Resistance 13.8  --  35.4   Auto/ Intrinsic PEEP (cm H2O) 0.5 cmH2O  --  10 cmH2O   Ventilator Alarms   High Inspiratory Pressure Alarm (cmH2O) 40 cm H2O  --   --    High Respiratory Rate (breaths/min) 40 breaths/min  --   --    Minute Ventilation High (L) 14 L/min  --   --    Minute Ventilation Low (L) 3 L/min  --   --    Vt High (mL) 800 mL  --   --    Apnea Delay (sec) 15 sec  --   --    Weaning Assessment   Weaning Assessment Complete Y  --  Y   RASS (Lyons Agitation-Sedation Scale)  --  -1-->drowsy -1-->drowsy   Pulse  --  73 70   BP  --  102/62 99/67   Spontaneous Respiratory Rate  --   --  22 breaths/min   Spontaneous Tidal Volume (mL)  --   --  287 mL   Spontaneous Minute Ventilation  --   --  7.1 mL   RSBI  --   --   76.66   NIF  --   --  20 cmH2O   $Weaning Trial Charge  --   --  Yes   Oxygen Therapy   SpO2 96 % 94 % 93 %   O2 Device  --  Mechanical Ventilator  --    FiO2 (%)  --  40 %  --    ETT Cuffed Single 7 mm   Placement Date/Time: 10/10/23 (c) 2509   Mask Ventilation: Not attempted  Induction Type: RSI  Ease of Intubation: Easy  Laryngoscopy Technique: Video laryngoscopy  Cuffed: Cuffed  ETT Type: Single  Single Lumen Tube Size: 7 mm  VL Blade Type: Steiner...   Secured at (cm)  --   --  22 cm   Measured from  --   --  Lips   Position  --   --  Center   Secured by  --   --  Commercial tube lynn   Bite Block  --   --  None Present   Site Appearance  --   --  Clean;Dry   Tube Care  --   --  Site care done   Cuff Assessment  --   --  Cuff Pressure   Cuff Pressure - cm H2O  --   --  28 cmH20   Safety Measures  --   --  Manual resuscitator at bedside   Respiratory Secretions Assessment   Suction Device  --   --  ETT;Inline;Oral   Secretions Amount  --   --  moderate   Secretions Color  --   --  creamy;cloudy   Secretions Consistency  --   --  thin;thick   Suction Tolerance  --   --  Tolerated fairly well   Suctioning Adverse Effects  --   --  Anxiety   RT Device Skin Assessment   Oxygen Delivery Device  --   --  ETT Lynn   Interface  --   --  Endotracheal tube   Ventilator Arm In Place  --   --  Yes   Site Appearance neck circumference  --   --  Clean and dry   Site Appearance lips  --   --  Clean and dry   Site Appearance bridge of nose  --   --  Clean and dry   Site Appearance occiput  --   --  Clean and dry   Strap Tightness  --   --  Finger Allowance between head and device strap   Device Skin Interventions Taken  --   --  No adjustments needed   Respiratory WDL   Respiratory WDL  --  X  --    Rhythm/Pattern, Respiratory  --  assisted mechanically  --    Cough Frequency  --  infrequent  --    Cough Type  --  loose;congested  --       10/16/23 0939   Ventilator Data   Vent Owner  --    Vent Brand  --    Vent ID  --     Ventilation Method  --    Ventilator Start - Date  --    Ventilation Day(s)  --    $Vent Subsequent  --    Ventilator Settings    Vent Mode  --    Resp Rate (Set)  --    Tidal Volume (Set, mL)  --    FiO2  --    PEEP (cm H2O)  --    Pressure Support (cm H2O)  --    Inspiratory Time (sec)  --    Vent Humidifier - Heater/HME  --    Heater Temperature  --    Ventilator - Patient    Patient Resp Rate   --    Expiratory Vt (ml)  --    Minute Volume (ml)  --    Peak Inspiratory Pressure (cm H2O)  --    Mean Airway Pressure (cm H2O)  --    Plateau Pressure (cm H2O)  --    Dynamic Compliance (mL/cm H2O)  --    Airway Resistance  --    Auto/ Intrinsic PEEP (cm H2O)  --    Ventilator Alarms   High Inspiratory Pressure Alarm (cmH2O)  --    High Respiratory Rate (breaths/min)  --    Minute Ventilation High (L)  --    Minute Ventilation Low (L)  --    Vt High (mL)  --    Apnea Delay (sec)  --    Weaning Assessment   Weaning Assessment Complete  --    RASS (Lyons Agitation-Sedation Scale)  --    Pulse 70   BP  --    Spontaneous Respiratory Rate 33 breaths/min   Spontaneous Tidal Volume (mL) 2251 mL   Spontaneous Minute Ventilation 7.41 mL   RSBI 14.66   NIF  --    $Weaning Trial Charge  --    Oxygen Therapy   SpO2 94 %   O2 Device Mechanical Ventilator   FiO2 (%) 40 %   ETT Cuffed Single 7 mm   Placement Date/Time: 10/10/23 (c 0340   Mask Ventilation: Not attempted  Induction Type: RSI  Ease of Intubation: Easy  Laryngoscopy Technique: Video laryngoscopy  Cuffed: Cuffed  ETT Type: Single  Single Lumen Tube Size: 7 mm  VL Blade Type: Jatin...   Secured at (cm)  --    Measured from  --    Position  --    Secured by  --    Bite Block  --    Site Appearance  --    Tube Care  --    Cuff Assessment  --    Cuff Pressure - cm H2O  --    Safety Measures  --    Respiratory Secretions Assessment   Suction Device  --    Secretions Amount  --    Secretions Color  --    Secretions Consistency  --    Suction Tolerance  --    Suctioning  Adverse Effects  --    RT Device Skin Assessment   Oxygen Delivery Device  --    Interface  --    Ventilator Arm In Place  --    Site Appearance neck circumference  --    Site Appearance lips  --    Site Appearance bridge of nose  --    Site Appearance occiput  --    Strap Tightness  --    Device Skin Interventions Taken  --    Respiratory WDL   Respiratory WDL  --    Rhythm/Pattern, Respiratory  --    Cough Frequency  --    Cough Type  --

## 2023-10-16 NOTE — PLAN OF CARE
Goal Outcome Evaluation:    S-(situation): End of Shift Note    B-(background): Dehydration [E86.0]  Hyponatremia [E87.1]  Weakness generalized [R53.1]  Alcohol use disorder, severe, dependence (H) [F10.20]  Multifocal pneumonia [J18.9]    A-(assessment):   Major shift events:   RASS goal changed from -3 to -4 TO 0 to -1 ( decreased dilaudid gtt and propofol gtt)  Tube feedings increase and water flushes - tolerating  2 weaning trials 120 and 60 minutes  Early mobilization - sat at edge of bed with PT for 10-15 minutes, required full body support from 3 staff  VENT settings changed per TELE ICU    Neuro: RASS 0 to -1, patient following simple direction and is slow to respond.   Alert and opens eye and is visually tracking this evening.  VS: levophed turned off this am and restarted this evening for MAPs less than 65  Pulmonary: LS clear. Ventilator per TELE ICU and RT. 40% Fio2  CV: SR no edema  GI: one small watery loose stool, +flatus  : Barney catheter 1200+ UO this 12 hour shift.  Lasix from 20 daily to 20 BID  Skin: intact  Pain: dilaudid gtt 0.3 down to 0.1 with PRN bolus' for CPOT 2-4 with CPOT 0 after.  Activity: dangled at edge of bed - total assist  Hygiene: Cath wipes and CHG bath done  Lines/Tubes/Drains: PICC line, 1 peripheral IV, ART line, ETT, TF with bridle.  Drips: propofol 15, IVF at 10/hr, Dilaudid 0.1, levophed 0.02    Plan: continue with RASS 0 to -1     R-(recommendations): Monitor

## 2023-10-16 NOTE — PLAN OF CARE
Goal Outcome Evaluation:    Improved Oral Intake: ongoing, not progressing - remains NPO due to intubation. Did not tolerate weaning trial today.    Feeding Tolerance: ongoing, progressing - currently running at 30 mL/hr x 24 hrs with pt tolerating well. [facility] on TF to meet all of estimated needs. Currently receiving 730 mL, 1,080 kcal (26 kcal/kg), and 48 g pro (1.2 g/kg). Current rate not yet meeting estimated calorie needs. BMI is 15.84 (but unsure of accuracy of most recent bed scale weight). Gastric remnant is 30 mL total due to hx of gastric bypass.    If pt is continues to tolerate 30 mL/hr rate, okay to increase to goal rate of 40 mL/hr x 24 hrs. Will provide 960 mL total daily. Changed FWF to 90 mL q 3 hrs to spread out total mL provided.     Lori Angulo RD, LD  Clinical Dietitian  Office: [phone]  Weekend pager: [phone]

## 2023-10-17 ENCOUNTER — APPOINTMENT (OUTPATIENT)
Dept: PHYSICAL THERAPY | Facility: CLINIC | Age: 68
DRG: 870 | End: 2023-10-17
Payer: MEDICARE

## 2023-10-17 LAB
ALLEN'S TEST: ABNORMAL
BASE EXCESS BLDA CALC-SCNC: 7.7 MMOL/L (ref -9–1.8)
CREAT SERPL-MCNC: 0.75 MG/DL (ref 0.51–0.95)
EGFRCR SERPLBLD CKD-EPI 2021: 87 ML/MIN/1.73M2
GLUCOSE BLDC GLUCOMTR-MCNC: 104 MG/DL (ref 70–99)
GLUCOSE BLDC GLUCOMTR-MCNC: 110 MG/DL (ref 70–99)
GLUCOSE BLDC GLUCOMTR-MCNC: 110 MG/DL (ref 70–99)
GLUCOSE BLDC GLUCOMTR-MCNC: 123 MG/DL (ref 70–99)
GLUCOSE BLDC GLUCOMTR-MCNC: 143 MG/DL (ref 70–99)
GLUCOSE BLDC GLUCOMTR-MCNC: 145 MG/DL (ref 70–99)
HCO3 BLD-SCNC: 31 MMOL/L (ref 21–28)
MAGNESIUM SERPL-MCNC: 1.9 MG/DL (ref 1.7–2.3)
O2/TOTAL GAS SETTING VFR VENT: 30 %
PCO2 BLD: 35 MM HG (ref 35–45)
PH BLD: 7.55 [PH] (ref 7.35–7.45)
PHOSPHATE SERPL-MCNC: 3.3 MG/DL (ref 2.5–4.5)
PO2 BLD: 65 MM HG (ref 80–105)
POTASSIUM SERPL-SCNC: 3.6 MMOL/L (ref 3.4–5.3)

## 2023-10-17 PROCEDURE — 84100 ASSAY OF PHOSPHORUS: CPT | Performed by: INTERNAL MEDICINE

## 2023-10-17 PROCEDURE — C9113 INJ PANTOPRAZOLE SODIUM, VIA: HCPCS | Mod: JZ | Performed by: INTERNAL MEDICINE

## 2023-10-17 PROCEDURE — 83735 ASSAY OF MAGNESIUM: CPT | Performed by: INTERNAL MEDICINE

## 2023-10-17 PROCEDURE — 250N000011 HC RX IP 250 OP 636: Performed by: FAMILY MEDICINE

## 2023-10-17 PROCEDURE — 999N000253 HC STATISTIC WEANING TRIALS

## 2023-10-17 PROCEDURE — 97530 THERAPEUTIC ACTIVITIES: CPT | Mod: GP | Performed by: PHYSICAL THERAPIST

## 2023-10-17 PROCEDURE — 200N000001 HC R&B ICU

## 2023-10-17 PROCEDURE — 999N000287 HC ICU ADULT ROUNDING, EACH 10 MINS

## 2023-10-17 PROCEDURE — 250N000013 HC RX MED GY IP 250 OP 250 PS 637: Performed by: ANESTHESIOLOGY

## 2023-10-17 PROCEDURE — 82803 BLOOD GASES ANY COMBINATION: CPT | Performed by: INTERNAL MEDICINE

## 2023-10-17 PROCEDURE — 84132 ASSAY OF SERUM POTASSIUM: CPT | Performed by: INTERNAL MEDICINE

## 2023-10-17 PROCEDURE — 94003 VENT MGMT INPAT SUBQ DAY: CPT

## 2023-10-17 PROCEDURE — 250N000013 HC RX MED GY IP 250 OP 250 PS 637: Performed by: INTERNAL MEDICINE

## 2023-10-17 PROCEDURE — 250N000013 HC RX MED GY IP 250 OP 250 PS 637: Performed by: PEDIATRICS

## 2023-10-17 PROCEDURE — 99232 SBSQ HOSP IP/OBS MODERATE 35: CPT | Performed by: INTERNAL MEDICINE

## 2023-10-17 PROCEDURE — 250N000011 HC RX IP 250 OP 636: Performed by: PEDIATRICS

## 2023-10-17 PROCEDURE — 999N000157 HC STATISTIC RCP TIME EA 10 MIN

## 2023-10-17 PROCEDURE — 82565 ASSAY OF CREATININE: CPT | Performed by: INTERNAL MEDICINE

## 2023-10-17 PROCEDURE — 250N000011 HC RX IP 250 OP 636: Mod: JZ | Performed by: INTERNAL MEDICINE

## 2023-10-17 RX ORDER — POTASSIUM CHLORIDE 7.45 MG/ML
10 INJECTION INTRAVENOUS ONCE
Status: COMPLETED | OUTPATIENT
Start: 2023-10-17 | End: 2023-10-17

## 2023-10-17 RX ORDER — MAGNESIUM SULFATE HEPTAHYDRATE 40 MG/ML
2 INJECTION, SOLUTION INTRAVENOUS ONCE
Status: COMPLETED | OUTPATIENT
Start: 2023-10-17 | End: 2023-10-17

## 2023-10-17 RX ORDER — PIPERACILLIN SODIUM, TAZOBACTAM SODIUM 4; .5 G/20ML; G/20ML
4.5 INJECTION, POWDER, LYOPHILIZED, FOR SOLUTION INTRAVENOUS EVERY 6 HOURS
Status: DISCONTINUED | OUTPATIENT
Start: 2023-10-17 | End: 2023-10-20

## 2023-10-17 RX ORDER — ESCITALOPRAM OXALATE 10 MG/1
30 TABLET ORAL DAILY
Status: DISCONTINUED | OUTPATIENT
Start: 2023-10-18 | End: 2023-10-24 | Stop reason: HOSPADM

## 2023-10-17 RX ORDER — FOLIC ACID 1 MG/1
1 TABLET ORAL DAILY
Status: DISCONTINUED | OUTPATIENT
Start: 2023-10-18 | End: 2023-10-24 | Stop reason: HOSPADM

## 2023-10-17 RX ADMIN — ENOXAPARIN SODIUM 30 MG: 30 INJECTION SUBCUTANEOUS at 21:17

## 2023-10-17 RX ADMIN — FUROSEMIDE 20 MG: 10 INJECTION, SOLUTION INTRAVENOUS at 21:17

## 2023-10-17 RX ADMIN — MAGNESIUM SULFATE HEPTAHYDRATE 2 G: 40 INJECTION, SOLUTION INTRAVENOUS at 06:37

## 2023-10-17 RX ADMIN — PROPOFOL 25 MCG/KG/MIN: 10 INJECTION, EMULSION INTRAVENOUS at 18:50

## 2023-10-17 RX ADMIN — METHYLPREDNISOLONE SODIUM SUCCINATE 30 MG: 40 INJECTION INTRAMUSCULAR; INTRAVENOUS at 09:17

## 2023-10-17 RX ADMIN — THIAMINE HYDROCHLORIDE 100 MG: 100 INJECTION, SOLUTION INTRAMUSCULAR; INTRAVENOUS at 09:05

## 2023-10-17 RX ADMIN — FUROSEMIDE 20 MG: 10 INJECTION, SOLUTION INTRAVENOUS at 09:17

## 2023-10-17 RX ADMIN — PIPERACILLIN AND TAZOBACTAM 4.5 G: 4; .5 INJECTION, POWDER, FOR SOLUTION INTRAVENOUS at 21:30

## 2023-10-17 RX ADMIN — PROPOFOL 25 MCG/KG/MIN: 10 INJECTION, EMULSION INTRAVENOUS at 06:36

## 2023-10-17 RX ADMIN — PIPERACILLIN AND TAZOBACTAM 4.5 G: 4; .5 INJECTION, POWDER, FOR SOLUTION INTRAVENOUS at 09:41

## 2023-10-17 RX ADMIN — Medication 1 PATCH: at 09:19

## 2023-10-17 RX ADMIN — CHLORHEXIDINE GLUCONATE 15 ML: 1.2 RINSE ORAL at 21:22

## 2023-10-17 RX ADMIN — POTASSIUM CHLORIDE 10 MEQ: 7.46 INJECTION, SOLUTION INTRAVENOUS at 05:40

## 2023-10-17 RX ADMIN — MINERAL OIL, PETROLATUM: 425; 573 OINTMENT OPHTHALMIC at 14:08

## 2023-10-17 RX ADMIN — VANCOMYCIN HYDROCHLORIDE 1000 MG: 1 INJECTION, SOLUTION INTRAVENOUS at 05:40

## 2023-10-17 RX ADMIN — PANTOPRAZOLE SODIUM 40 MG: 40 INJECTION, POWDER, FOR SOLUTION INTRAVENOUS at 09:17

## 2023-10-17 RX ADMIN — PIPERACILLIN AND TAZOBACTAM 3.38 G: 3; .375 INJECTION, POWDER, FOR SOLUTION INTRAVENOUS at 04:02

## 2023-10-17 RX ADMIN — PIPERACILLIN AND TAZOBACTAM 4.5 G: 4; .5 INJECTION, POWDER, FOR SOLUTION INTRAVENOUS at 16:02

## 2023-10-17 RX ADMIN — Medication 15 ML: at 09:41

## 2023-10-17 RX ADMIN — CHLORHEXIDINE GLUCONATE 15 ML: 1.2 RINSE ORAL at 09:41

## 2023-10-17 ASSESSMENT — ACTIVITIES OF DAILY LIVING (ADL)
ADLS_ACUITY_SCORE: 52

## 2023-10-17 NOTE — PLAN OF CARE
"Goal Outcome Evaluation:      Plan of Care Reviewed With: patient, friend, family    Overall Patient Progress: no changeOverall Patient Progress: no change    Outcome Evaluation: 4 Hour Shift Note: Patient has been alert and able to follow most commands. She will turn her head and follow writer across the room. Mitts removed while writer was in room. Pt is able to move upper extremities close to face but is weak. Propofol increased from 15/hr to 25/hr as pt had tears in her eyes a few times this evening but will shake head \"no\" when asked if she hurts. Temp 100.3. Tylenol given. Pt is very congested with frequent cough. She has required suctioning a few times this evening. Tube feedings running at goal of 40/hr. Levophed continues at 0.02mcg/hr. Maps high 60's-low 70's. 3 visitors here briefly this evening.      "

## 2023-10-17 NOTE — PROGRESS NOTES
Pt remains intubated and lightly sedated today. Weaning trials began this morning at 0820. Early mobility completed for 3 mins and 15 seconds before pt began to tire. Tele ICU rounds completed with a goal and plan in progress for going forward with this pt. Pt will wean as tolerated until this evening. Discussion will be made going forward that extubation would be high risk for possible re-intubation. Should this be the plan day shift would be the best time noted due to more staffing and appropriate staff within the building vs on-call.     Ventilator settings:  Mode: VC/AC   VT: 400  Rate: 10  PEEP: 7  FiO2: 30  Plateau pressures during shift: 18  PEEPi during shift: Not done. Pt in weaning all day  Weaning attempted: Yes PS/CPAP 10/7 30%   ETT size/secured: 7.0/23cm    Breath sounds: clear  Secretions: small to moderate   Respiratory meds given: No    Rt will continue to follow per protocol.   Ambu bag and mask ready at bedside.     Jillian Castañeda, RT on 10/17/2023 at 5:56 PM

## 2023-10-17 NOTE — PLAN OF CARE
Goal Outcome Evaluation:      Plan of Care Reviewed With: patient    Overall Patient Progress: no changeOverall Patient Progress: no change    Outcome Evaluation: Pt has had a RASS of 0 to -1. Pt is alert and able to follow commands. Pt able to nod head yes or no when asked questions. Shakes head no when asked if she is in pain. Propofol remained at 25 mcg/kg/min and dilaudid remained at 0.1mg/hr. Levophed continues at 0.02 mcg/kg/min with MAPs 60-80s through the night. Tube feeding continues at 40 cc/hr. Elevated temps 99.8-100.4. FiO2 decreased to 30% with O2 sats 95-69%. Required suctioning a couple of times overnight. Mag and K replaced this am. Good urine output via mccabe.

## 2023-10-17 NOTE — PROGRESS NOTES
"Formerly [facility]    Medicine Progress Note - Hospitalist Service    Date of Admission:  10/7/2023    Assessment & Plan   Patient is a 68-year-old female with COPD, alcohol dependence recently consuming large volume alcohol daily, gastric bypass surgery, depression/PTSD, and ongoing tobacco abuse who presented with generalized weakness and fatigue and was admitted due to concerns for multifocal community-acquired pneumonia and sepsis.  She had rapid worsening over the first 12 hours of her hospitalization with persistent hypotension despite fluid resuscitation and was transferred to the ICU and started on vasopressor therapy with Levophed due to concern for septic shock.  After initial improvement in septic shock, patient developed worsening respiratory failure and worsening alcohol withdrawal with delirium requiring sedative therapy and subsequent intubation for mechanical ventilation in early a.m. 10/10.  She remains critically ill with potentially life-threatening respiratory failure, pneumonia, septic shock, and alcohol withdrawal.            A/p :       Septic shock due to streptococcal pneumoniae bacteremia and multifocal pneumonia  Acute respiratory failure with hypoxia : on ventilatory support    10/16 :       Spiking fevers off and on  On iv ceftriaxone 10/8 - will stop today, start vanco zosyn -for ongoing fevers  On lasix iv q 24, BNP trending up - increase dose of lasix to bid  Continue Iv solumedrol      Continues on On ventilatory support  Failed weaning trial - RR went up  Discussed with RT  NPO , on TF\"s, H/o gastric bypass, stomach remanant of 30 ml - discussed with dietary           10/17 :       Still spiking fevers  Blood cultures : 10/7 - 1 bottle - strept pneumoniae  Blood cultures 10/8, 10/9 : NGTD  MRSA/MSSA screen : negative  C diff negative 10/8, enteric panel negative 10/8  On iv ceftriaxone 10/8 - 10/16, continue  vanco zosyn -for ongoing fevers - started " "10/16  Repeat BC sent : 10/16 NGTD ,sputum culture 10/16 : NGTD  Continue iv diuresis  Continue Iv solumedrol  On propfol and levophed infusion      Continues on On ventilatory support  Failed weaning trial - RR went up  Discussed with RT  NPO , on TF\"s, H/o gastric bypass, stomach remanant of 30 ml - discussed with dietary      Discussed with tele ICU team about plan of care          Alcohol use disorder (severe dependence) acute alcohol withdrawal with delirium   History of seizure due to alcohol withdrawal  Per  patient had been drinking an average of 12 pack of beer, 1 bottle of wine, and 10 hard liquor drinks a day recently with most recent alcoholic drink approximately 30 minutes prior to hospital presentation.  Blood alcohol level was not tested at time of admission.  Patient developed overt signs of withdrawal on 10/9 including increased anxiety, tremor, confusion and possible hallucination.  Initial treatment of alcohol withdrawal per [facility] protocol was complicated by sedation that may have exacerbated respiratory failure.  Nursing reports that patient's mentation was normal early in her hospital stay prior to onset of alcohol withdrawal.  Patient has not had overt meningismus lowering suspicion for infectious meningoencephalitis.  Signs of alcohol withdrawal are difficult to assess because of current sedation while on the ventilator but persistent or worsening alcohol withdrawal could contribute to increased patient ventilator dyssynchrony  -Continuing treatment for respiratory failure as outlined above and while she is intubated will use a combination of propofol infusion, hydromorphone infusion, and Versed as needed for sedation    Suspected malnutrition  Hypoalbuminemia   Status post gastric bypass for obesity  Has remote history of Tom-en-Y gastric bypass surgery for obesity in March 2008.  Reported 13 pound weight loss in recent weeks and appears underweight and probably cachectic on exam.  " Alcoholism likely contributes to malnutrition as may previous gastric bypass status.  Serum albumin was low at admission at 2.8 and has decreased and she is now starting to show signs of peripheral edema.  Noncardiogenic pulmonary edema from hypoalbuminemia could exacerbate respiratory failure.  Her previous Tom-en-Y gastric bypass surgery may limit ability to advance feeding tube beyond the gastric remnant.  -Attempting to adjust feeding tube placement today to advance tip beyond the gastric remnant if possible but interventional radiology and/or fluoroscopy are not available at this hospital to assist with such placement  -Registered dietitian assisting with recommendations for initiation of enteral feeding, anticipate starting very low rate enteral feeding with tube feeds today depending on positioning of the feeding tube but would defer initiation of free water supplementation today (continuing IV fluids).  Tolerating 20 ml per hour, will increase to 30 mils per hour and see how she tolerates.  Add free water flushes at 40 mils every 4 hours and will increase to 80 every 4 x24 hours then to goal at 120 every 4 hours.    -Patient is a net -2 L over the last 48 hours we will decrease Lasix to 20 mg IV daily and continue to monitor.  Do not believe today's bed weight, will remeasure.    Hypomagnesemia  Hypophosphatemia   Hypokalemia  Presented with low serum magnesium and potassium levels and subsequently developed low serum phosphorus level all likely secondary to nutritional deficiency.  These electrolytes have improved with supplementation.  Levels currently normal  -Continue with potassium, magnesium, and phosphorus supplementation per protocol in patient with critical illness  -Monitor magnesium, phosphorus, and potassium as indicated    Hyponatremia  Sodium 123 on admission with suspected hypovolemic hyponatremia.  After treatment with multiple normal saline boluses, sodium increased from 123 up to 128 in  less than 12 hours after which IV fluids were switched to D5 half-normal saline.  Sodium then decreased to 126 and later stabilized at about 130 and today is 133.  Urine sodium was less than 20 consistent with hypovolemic hyponatremia.  Serum sodium now normal at 142  -Continue current IV fluid infusion and changed to TKO with increased tube feeds and free water flushes.      Hyperglycemia  Patient has had mild to moderate hyperglycemia while in the ICU.  Hemoglobin A1c was 4.4 and she is not known to have underlying diabetes.  Insulin infusion was started for management of hyperglycemia in the ICU.  Anticipate hyperglycemia will worsen with initiation of enteral feeding.  -Continue to monitor blood sugars per protocol  -Continue insulin infusion per protocol for management of hyperglycemia  -Continuing IV fluids containing dextrose while receiving insulin infusion    Thrombocytopenia  Anemia, unspecified type  Coagulopathy  Presented with thrombocytopenia and anemia at admission both of which initially worsened but have subsequently stabilized.  She also is mildly coagulopathic with prolonged INR.  Thrombocytopenia, coagulopathy, and anemia are probably due to septic shock.  Active bleeding has not been suspected so far.  -Ordered recheck of hemoglobin and platelet, plt now 200  -Ordered recheck of INR, 1.16    Emphysema/COPD  Tobacco abuse  Known chronic emphysema and COPD without overt acute exacerbation.  Patient reportedly has continued to smoke.  -Continuing IV steroids secondary to septic shock and pneumococcal pneumonia but also COPD. Stopping steroids today.  -Continue bronchodilators as needed  -Nicotine patch supplementation started during hospitalization.    Dehydration  Presented with dehydration which likely contributed to hyponatremia and initial low normal blood pressures, now resolved after IV fluid treatment.  -Continue IV fluids for now as outlined above.  If output continues to be greater than  input would then consider discontinuing IV L Lasix and monitoring closely.    Weakness generalized  Suspect presenting complaint of generalized weakness was multifactorial including infection, severe alcohol dependence, and suspected malnutrition with nutrient including electrolyte deficiencies.  -Treat acute medical problems  -Anticipate PT and OT evaluations if she recovers from life-threatening acute illness once patient improves    Episode of recurrent major depressive disorder, unspecified depression episode severity (H24)  PTSD (post-traumatic stress disorder)  Carries previous diagnosis of depression and PTSD treated chronically with Lexapro 30 mg daily, clonazepam 0.5 mg 3 times daily as needed for anxiety, BuSpar 15 mg 3 times a day, Effexor 37.5 mg daily, and Xyzal 5 mg daily.  At admission it was unclear whether the patient has recently been taking these medications reliably.  She is now intubated and being sedated.  -Holding these chronic medications for now (no reliable enteral access yet) and abrupt discontinuation of these medications could conceivably contribute to increased anxiety/agitation          Diet: NPO for Medical/Clinical Reasons Except for: No Exceptions  Adult Formula Drip Feeding: Continuous Vital 1.5; Nasogastric tube; Goal Rate: 40; mL/hr; From: 12:00 AM; To: 12:00 AM; Please increase to goal rate of 40 mL/hr x 24 hrs. Cartons have a hang time of 8 hours MAX. Please fully replace carton every 8...    DVT Prophylaxis: Enoxaparin (Lovenox) SQ  Barney Catheter: PRESENT, indication: Strict 1-2 Hour I&O  Lines: PRESENT      PICC 10/09/23 Triple Lumen Left Basilic OK TO USE per PICC STAT RN-Site Assessment: WDL  Arterial Line 10/11/23 Radial-Site Assessment: WDL      Cardiac Monitoring: ACTIVE order. Indication: ICU  Code Status: Full Code      Clinically Significant Risk Factors              # Hypoalbuminemia: Lowest albumin = 1.7 g/dL at 10/11/2023  6:12 AM, will monitor as appropriate                          Disposition Plan    unknown         Isaac Hernandez MD  Hospitalist Service  [facility]  Securely message with [employer] (more info)  Text page via [employer] Paging/Directory   ______________________________________________________________________      Physical Exam   Vital Signs: Temp: 99.4  F (37.4  C) Temp src: Oral BP: 104/66 Pulse: 82   Resp: 27 SpO2: 95 % O2 Device: Mechanical Ventilator    Vent Mode: CPAP/PS  (Continuous positive airway pressure with Pressure Support)  FiO2 (%): 30 %  Resp Rate (Set): 10 breaths/min  Tidal Volume (Set, mL): 400 mL  PEEP (cm H2O): 7 cmH2O  Pressure Support (cm H2O): 10 cmH2O  Inspiratory Pressure (cm H2O) (Drager Martha): 10  Resp: 27        Patient Vitals for the past 24 hrs:   BP Temp Temp src Pulse Resp SpO2 Weight   10/17/23 1700 104/66 -- -- 82 27 95 % --   10/17/23 1600 107/78 -- -- 91 19 96 % --   10/17/23 1539 -- 99.4  F (37.4  C) Oral -- -- -- --   10/17/23 1500 100/69 -- -- 79 26 95 % --   10/17/23 1400 107/70 -- -- 78 29 94 % --   10/17/23 1300 119/74 -- -- 86 17 -- --   10/17/23 1200 115/77 100.4  F (38  C) Oral 78 28 95 % --   10/17/23 1100 107/66 -- -- 74 (!) 31 (!) 89 % --   10/17/23 1000 97/61 -- -- 78 (!) 32 94 % --   10/17/23 0900 100/60 -- -- 73 (!) 31 95 % --   10/17/23 0819 -- 99.5  F (37.5  C) Oral 74 19 94 % --   10/17/23 0800 112/74 -- -- 75 30 94 % --   10/17/23 0700 104/70 -- -- 77 27 95 % --   10/17/23 0600 113/69 -- -- 73 23 95 % --   10/17/23 0500 106/71 -- -- 75 23 92 % --   10/17/23 0400 109/67 -- -- 70 15 92 % 49.9 kg (110 lb)   10/17/23 0300 102/66 99.8  F (37.7  C) Oral 78 16 95 % --   10/17/23 0200 101/68 -- -- 79 25 95 % --   10/17/23 0100 105/71 -- -- 82 17 95 % --   10/17/23 0000 93/65 -- -- 81 20 93 % --   10/16/23 2339 -- 100.4  F (38  C) Oral -- -- -- --   10/16/23 2300 104/70 -- -- 70 13 94 % --   10/16/23 2200 93/57 -- -- 73 13 96 % --   10/16/23 2100 95/60 -- -- 73 20 92 % --   10/16/23 2000  96/64 -- -- 81 22 95 % --   10/16/23 1942 -- 100.3  F (37.9  C) Oral 84 18 95 % --   10/16/23 1900 99/60 -- -- 73 16 96 % --   10/16/23 1845 -- -- -- 75 20 97 % --   10/16/23 1830 95/57 -- -- 70 13 96 % --   10/16/23 1815 -- -- -- 71 15 96 % --     Weight: 110 lbs 0 oz  Vitals:    10/12/23 1123 10/13/23 0500 10/14/23 0700 10/15/23 0501   Weight: 48.1 kg (106 lb) 48.7 kg (107 lb 4.8 oz) 49.1 kg (108 lb 4.8 oz) 41.9 kg (92 lb 4.8 oz)    10/17/23 0400   Weight: 49.9 kg (110 lb)       Intake/Output Summary (Last 24 hours) at 10/11/2023 1005  Last data filed at 10/11/2023 0700  Gross per 24 hour   Intake 3593.58 ml   Output 909 ml   Net 2684.58 ml     General Appearance: Cachectic elderly woman, intubated and sedated  Respiratory: not using accessory muscles, no stridor, no crepitus in the neck, symmetric breath sounds with occasional scattered coarse rhonchi, no wheezes  Cardiovascular: Regular rate and rhythm, normal capillary refill in the hands and feet which are pink  GI: Hypoactive s bowel sounds, nondistended abdomen, soft  Skin: No rashes, left upper arm and right radial PICC and arterial line sites are without bleeding, drainage, or surrounding skin erythema  Neuro: Patient with eyes open does gaze toward voice.  Able to squeeze fingers upon verbal request both left and right hand.  Some movement of lower extremities as well    Medical Decision Making       Total critical care time 35 minutes so far today including time spent reassessing respiratory status and adjusting mechanical ventilation treatment of life-threatening respiratory failure       Data     I have personally reviewed the following data over the past 24 hrs:    N/A  \   N/A   / N/A     N/A N/A N/A /  143 (H)   3.6 N/A N/A \         Blood cultures from October 7, eighth, and ninth are negative (except for initial positive blood culture on October 7)  Stool testing for enteric pathogens and clostridia difficile was negative    Recent Labs   Lab  10/17/23  1452 10/16/23  0943 10/16/23  0455 10/15/23  0515   PH 7.55* 7.50* 7.50* 7.52*   PCO2 35 42 41 41   PO2 65* 70* 65* 57*   HCO3 31* 32* 32* 33*   O2PER 30 40 40 40         Imaging results reviewed over the past 24 hrs:   No results found for this or any previous visit (from the past 24 hour(s)).        Recent Labs   Lab 10/17/23  1540 10/17/23  1202 10/17/23  0752 10/17/23  0445 10/16/23  1116 10/16/23  1110 10/16/23  0755 10/16/23  0455 10/15/23  1752 10/15/23  1158 10/15/23  0814 10/15/23  0513 10/14/23  0743 10/14/23  0552 10/12/23  0753 10/12/23  0553 10/11/23  0841 10/11/23  0612   WBC  --   --   --   --   --  16.2*  --   --   --   --   --   --   --   --   --   --   --  28.2*   HGB  --   --   --   --   --  9.8*  --   --   --   --   --   --   --   --   --   --   --  10.4*   MCV  --   --   --   --   --  104*  --   --   --   --   --   --   --   --   --   --   --  102*   PLT  --   --   --   --   --  293  --   --   --   --   --  314  --   --   --  201  --  157   NA  --   --   --   --   --   --   --  138  --   --   --  141  --  142  --   --   --  133*   POTASSIUM  --   --   --  3.6  --   --   --  3.6  --  4.1  --  3.4  --  3.6   < > 2.6*  --  3.6   CHLORIDE  --   --   --   --   --   --   --  104  --   --   --  105  --  107  --   --   --  108*   CO2  --   --   --   --   --   --   --  28  --   --   --  29  --  28  --   --   --  19*   BUN  --   --   --   --   --   --   --  8.7  --   --   --  7.0*  --  5.8*  --   --   --  6.3*   CR  --   --   --   --   --   --   --  0.60  --   --   --  0.62  --  0.67  --  0.65  --  0.58   ANIONGAP  --   --   --   --   --   --   --  6*  --   --   --  7  --  7  --   --   --  6*   SHON  --   --   --   --   --   --   --  7.4*  --   --   --  7.5*  --  7.4*  --   --   --  7.1*   * 145* 110*  --    < >  --    < > 94   < >  --    < > 97   < > 103*   < >  --    < > 96   ALBUMIN  --   --   --   --   --   --   --   --   --   --   --   --   --   --   --   --   --  1.7*   PROTTOTAL  --    --   --   --   --   --   --   --   --   --   --   --   --   --   --   --   --  4.6*   BILITOTAL  --   --   --   --   --   --   --   --   --   --   --   --   --   --   --   --   --  0.2   ALKPHOS  --   --   --   --   --   --   --   --   --   --   --   --   --   --   --   --   --  79   ALT  --   --   --   --   --   --   --   --   --   --   --   --   --   --   --   --   --  15   AST  --   --   --   --   --   --   --   --   --   --   --   --   --   --   --   --   --  37    < > = values in this interval not displayed.

## 2023-10-17 NOTE — PROGRESS NOTES
Discussed with and beside nurse and RT at bedside on remote video. Also discussed with Dr. Hernandez over phone. Patient seems to be doing better after starting vanc/zosyn and is off pressors but still having fevers. NIF is only negative 9 and Vt 250-450 on PS 10/7 with RR 25-32. High chance of failing extubation. Given trajectory with ABX I would continue as is for 1-2 more days and then with group discussion consider a high risk extubation when a capable person for reintubation is near by. Ideally NIF would be closer to negative 20 and RSBI (RR/Vt in liters) closer to 60. Until then continue twice daily PS trials and/or maintain indefinitely on PS if blood gas is acceptable.

## 2023-10-17 NOTE — PLAN OF CARE
"Goal Outcome Evaluation:      Plan of Care Reviewed With: patient, friend    Overall Patient Progress: no changeOverall Patient Progress: no change    Outcome Evaluation: 4 Hour Shift Note: Patient is alert and able to follow commands. Vent continues on pressure support setting. No change in sedation rates this afternoon as she appears comfortable and shakes her head \"no\" when asked if she is having pain. Levophed remains off. Good urine output. Tube feedings running at goal of 40/hr. 5ml residual.      "

## 2023-10-17 NOTE — PLAN OF CARE
Goal Outcome Evaluation:      Plan of Care Reviewed With: patient    Overall Patient Progress: no changeOverall Patient Progress: no change    Outcome Evaluation: Pt vitally stable on CPAP vent settings, other than low grade temp of 100.4 this afternoon. Continues to be alert, and able to nod head yes or no. Levophed turned off, propofol at 25mcg/kg/min. Dilaudid PCA pump continued at 0.1mg/hr. Tele ICU provider assessed pt and does not feel pt should be extubated until tomorrow or the next day. Early mobility done with PT this afternoon. BM x2. Adequate urine output through mccabe cath.

## 2023-10-18 ENCOUNTER — APPOINTMENT (OUTPATIENT)
Dept: PHYSICAL THERAPY | Facility: CLINIC | Age: 68
DRG: 870 | End: 2023-10-18
Payer: MEDICARE

## 2023-10-18 LAB
ANION GAP SERPL CALCULATED.3IONS-SCNC: 7 MMOL/L (ref 7–15)
BACTERIA SPT CULT: NO GROWTH
BASO+EOS+MONOS # BLD AUTO: ABNORMAL 10*3/UL
BASO+EOS+MONOS NFR BLD AUTO: ABNORMAL %
BASOPHILS # BLD AUTO: 0.1 10E3/UL (ref 0–0.2)
BASOPHILS NFR BLD AUTO: 0 %
BUN SERPL-MCNC: 11 MG/DL (ref 8–23)
CALCIUM SERPL-MCNC: 7.4 MG/DL (ref 8.8–10.2)
CHLORIDE SERPL-SCNC: 105 MMOL/L (ref 98–107)
CREAT SERPL-MCNC: 0.7 MG/DL (ref 0.51–0.95)
DEPRECATED HCO3 PLAS-SCNC: 27 MMOL/L (ref 22–29)
EGFRCR SERPLBLD CKD-EPI 2021: >90 ML/MIN/1.73M2
EOSINOPHIL # BLD AUTO: 0.4 10E3/UL (ref 0–0.7)
EOSINOPHIL NFR BLD AUTO: 3 %
ERYTHROCYTE [DISTWIDTH] IN BLOOD BY AUTOMATED COUNT: 13.9 % (ref 10–15)
GLUCOSE BLDC GLUCOMTR-MCNC: 104 MG/DL (ref 70–99)
GLUCOSE BLDC GLUCOMTR-MCNC: 111 MG/DL (ref 70–99)
GLUCOSE BLDC GLUCOMTR-MCNC: 119 MG/DL (ref 70–99)
GLUCOSE BLDC GLUCOMTR-MCNC: 149 MG/DL (ref 70–99)
GLUCOSE SERPL-MCNC: 115 MG/DL (ref 70–99)
GRAM STAIN RESULT: NORMAL
GRAM STAIN RESULT: NORMAL
HCT VFR BLD AUTO: 22.7 % (ref 35–47)
HGB BLD-MCNC: 7.5 G/DL (ref 11.7–15.7)
IMM GRANULOCYTES # BLD: 0.2 10E3/UL
IMM GRANULOCYTES NFR BLD: 1 %
LYMPHOCYTES # BLD AUTO: 1.6 10E3/UL (ref 0.8–5.3)
LYMPHOCYTES NFR BLD AUTO: 12 %
MAGNESIUM SERPL-MCNC: 1.9 MG/DL (ref 1.7–2.3)
MCH RBC QN AUTO: 33.9 PG (ref 26.5–33)
MCHC RBC AUTO-ENTMCNC: 33 G/DL (ref 31.5–36.5)
MCV RBC AUTO: 103 FL (ref 78–100)
MONOCYTES # BLD AUTO: 1 10E3/UL (ref 0–1.3)
MONOCYTES NFR BLD AUTO: 7 %
MRSA DNA SPEC QL NAA+PROBE: NEGATIVE
NEUTROPHILS # BLD AUTO: 10.8 10E3/UL (ref 1.6–8.3)
NEUTROPHILS NFR BLD AUTO: 77 %
NRBC # BLD AUTO: 0 10E3/UL
NRBC BLD AUTO-RTO: 0 /100
PHOSPHATE SERPL-MCNC: 3.4 MG/DL (ref 2.5–4.5)
PLATELET # BLD AUTO: 360 10E3/UL (ref 150–450)
POTASSIUM SERPL-SCNC: 3.1 MMOL/L (ref 3.4–5.3)
POTASSIUM SERPL-SCNC: 3.7 MMOL/L (ref 3.4–5.3)
RBC # BLD AUTO: 2.21 10E6/UL (ref 3.8–5.2)
SA TARGET DNA: NEGATIVE
SODIUM SERPL-SCNC: 139 MMOL/L (ref 135–145)
WBC # BLD AUTO: 13.9 10E3/UL (ref 4–11)

## 2023-10-18 PROCEDURE — C9113 INJ PANTOPRAZOLE SODIUM, VIA: HCPCS | Mod: JZ | Performed by: INTERNAL MEDICINE

## 2023-10-18 PROCEDURE — 83735 ASSAY OF MAGNESIUM: CPT | Performed by: INTERNAL MEDICINE

## 2023-10-18 PROCEDURE — 999N000259 HC STATISTIC EXTUBATION

## 2023-10-18 PROCEDURE — 999N000253 HC STATISTIC WEANING TRIALS

## 2023-10-18 PROCEDURE — 94003 VENT MGMT INPAT SUBQ DAY: CPT

## 2023-10-18 PROCEDURE — 97530 THERAPEUTIC ACTIVITIES: CPT | Mod: GP | Performed by: PHYSICAL THERAPIST

## 2023-10-18 PROCEDURE — 99232 SBSQ HOSP IP/OBS MODERATE 35: CPT | Performed by: INTERNAL MEDICINE

## 2023-10-18 PROCEDURE — 250N000011 HC RX IP 250 OP 636: Mod: JZ | Performed by: INTERNAL MEDICINE

## 2023-10-18 PROCEDURE — 84100 ASSAY OF PHOSPHORUS: CPT | Performed by: INTERNAL MEDICINE

## 2023-10-18 PROCEDURE — 999N000157 HC STATISTIC RCP TIME EA 10 MIN

## 2023-10-18 PROCEDURE — 85025 COMPLETE CBC W/AUTO DIFF WBC: CPT | Performed by: INTERNAL MEDICINE

## 2023-10-18 PROCEDURE — 87641 MR-STAPH DNA AMP PROBE: CPT | Performed by: INTERNAL MEDICINE

## 2023-10-18 PROCEDURE — 80048 BASIC METABOLIC PNL TOTAL CA: CPT | Performed by: INTERNAL MEDICINE

## 2023-10-18 PROCEDURE — 200N000001 HC R&B ICU

## 2023-10-18 PROCEDURE — 250N000013 HC RX MED GY IP 250 OP 250 PS 637: Performed by: ANESTHESIOLOGY

## 2023-10-18 PROCEDURE — 250N000009 HC RX 250: Performed by: INTERNAL MEDICINE

## 2023-10-18 PROCEDURE — 250N000013 HC RX MED GY IP 250 OP 250 PS 637: Performed by: PEDIATRICS

## 2023-10-18 PROCEDURE — 250N000011 HC RX IP 250 OP 636: Performed by: FAMILY MEDICINE

## 2023-10-18 PROCEDURE — 250N000011 HC RX IP 250 OP 636: Performed by: PEDIATRICS

## 2023-10-18 PROCEDURE — 84132 ASSAY OF SERUM POTASSIUM: CPT | Performed by: INTERNAL MEDICINE

## 2023-10-18 PROCEDURE — 250N000013 HC RX MED GY IP 250 OP 250 PS 637: Performed by: INTERNAL MEDICINE

## 2023-10-18 RX ORDER — POTASSIUM CHLORIDE 7.45 MG/ML
10 INJECTION INTRAVENOUS
Status: DISPENSED | OUTPATIENT
Start: 2023-10-18 | End: 2023-10-18

## 2023-10-18 RX ORDER — POTASSIUM CHLORIDE 7.45 MG/ML
10 INJECTION INTRAVENOUS ONCE
Status: COMPLETED | OUTPATIENT
Start: 2023-10-18 | End: 2023-10-18

## 2023-10-18 RX ORDER — MAGNESIUM SULFATE HEPTAHYDRATE 40 MG/ML
2 INJECTION, SOLUTION INTRAVENOUS ONCE
Status: COMPLETED | OUTPATIENT
Start: 2023-10-18 | End: 2023-10-18

## 2023-10-18 RX ADMIN — VANCOMYCIN HYDROCHLORIDE 1000 MG: 1 INJECTION, SOLUTION INTRAVENOUS at 00:40

## 2023-10-18 RX ADMIN — FOLIC ACID 1 MG: 1 TABLET ORAL at 08:48

## 2023-10-18 RX ADMIN — PIPERACILLIN AND TAZOBACTAM 4.5 G: 4; .5 INJECTION, POWDER, FOR SOLUTION INTRAVENOUS at 16:51

## 2023-10-18 RX ADMIN — POTASSIUM CHLORIDE 10 MEQ: 7.46 INJECTION, SOLUTION INTRAVENOUS at 13:15

## 2023-10-18 RX ADMIN — POTASSIUM CHLORIDE 10 MEQ: 7.46 INJECTION, SOLUTION INTRAVENOUS at 09:01

## 2023-10-18 RX ADMIN — FUROSEMIDE 20 MG: 10 INJECTION, SOLUTION INTRAVENOUS at 10:41

## 2023-10-18 RX ADMIN — Medication 15 ML: at 08:48

## 2023-10-18 RX ADMIN — POTASSIUM CHLORIDE 10 MEQ: 7.46 INJECTION, SOLUTION INTRAVENOUS at 07:52

## 2023-10-18 RX ADMIN — ESCITALOPRAM OXALATE 30 MG: 10 TABLET ORAL at 08:48

## 2023-10-18 RX ADMIN — PIPERACILLIN AND TAZOBACTAM 4.5 G: 4; .5 INJECTION, POWDER, FOR SOLUTION INTRAVENOUS at 10:40

## 2023-10-18 RX ADMIN — THIAMINE HYDROCHLORIDE 100 MG: 100 INJECTION, SOLUTION INTRAMUSCULAR; INTRAVENOUS at 08:45

## 2023-10-18 RX ADMIN — METHYLPREDNISOLONE SODIUM SUCCINATE 30 MG: 40 INJECTION INTRAMUSCULAR; INTRAVENOUS at 08:44

## 2023-10-18 RX ADMIN — PIPERACILLIN AND TAZOBACTAM 4.5 G: 4; .5 INJECTION, POWDER, FOR SOLUTION INTRAVENOUS at 21:46

## 2023-10-18 RX ADMIN — PANTOPRAZOLE SODIUM 40 MG: 40 INJECTION, POWDER, FOR SOLUTION INTRAVENOUS at 08:20

## 2023-10-18 RX ADMIN — Medication 1 PATCH: at 08:19

## 2023-10-18 RX ADMIN — SCOPALAMINE 1 PATCH: 1 PATCH, EXTENDED RELEASE TRANSDERMAL at 10:47

## 2023-10-18 RX ADMIN — MINERAL OIL, PETROLATUM: 425; 573 OINTMENT OPHTHALMIC at 00:40

## 2023-10-18 RX ADMIN — MINERAL OIL, PETROLATUM: 425; 573 OINTMENT OPHTHALMIC at 05:44

## 2023-10-18 RX ADMIN — CHLORHEXIDINE GLUCONATE 15 ML: 1.2 RINSE ORAL at 07:47

## 2023-10-18 RX ADMIN — MAGNESIUM SULFATE HEPTAHYDRATE 2 G: 40 INJECTION, SOLUTION INTRAVENOUS at 07:41

## 2023-10-18 RX ADMIN — ENOXAPARIN SODIUM 30 MG: 30 INJECTION SUBCUTANEOUS at 21:46

## 2023-10-18 RX ADMIN — PIPERACILLIN AND TAZOBACTAM 4.5 G: 4; .5 INJECTION, POWDER, FOR SOLUTION INTRAVENOUS at 05:14

## 2023-10-18 RX ADMIN — FUROSEMIDE 20 MG: 10 INJECTION, SOLUTION INTRAVENOUS at 21:46

## 2023-10-18 RX ADMIN — MINERAL OIL, PETROLATUM: 425; 573 OINTMENT OPHTHALMIC at 13:38

## 2023-10-18 RX ADMIN — PROPOFOL 25 MCG/KG/MIN: 10 INJECTION, EMULSION INTRAVENOUS at 08:02

## 2023-10-18 ASSESSMENT — ACTIVITIES OF DAILY LIVING (ADL)
ADLS_ACUITY_SCORE: 52
ADLS_ACUITY_SCORE: 50
ADLS_ACUITY_SCORE: 52
ADLS_ACUITY_SCORE: 50
ADLS_ACUITY_SCORE: 52
ADLS_ACUITY_SCORE: 50
ADLS_ACUITY_SCORE: 50

## 2023-10-18 NOTE — PROGRESS NOTES
SPIRITUAL HEALTH SERVICES Progress Note    I visited Shanika again, in the ICU, on the Medical Surgical Unit at [facility].      Patient/Family Understanding of Illness and Goals of Care - Shanika has been hospitalized now for 11 days.  Today, she was awake when I visited.  She remains on a vent.    Distress and Loss - Shanika rolled her eyes, seemingly in disgust, when I recognized the issues she has been going through with her condition.    Meaning, Beliefs, and Spirituality - Shanika was open to my introduction and to emotional and spiritual support.  At admission, she did not report a [employer] preference.  I offered her a blessing.     Plan of Care - I will continue to follow patient and be available for any ongoing support needs until her discharge.     Frank Parekh, Ph.D,   Spiritual Health Services  [address] [place], [place] [address] [phone]  [email or web address]

## 2023-10-18 NOTE — PLAN OF CARE
Goal Outcome Evaluation:      Plan of Care Reviewed With: patient    Overall Patient Progress: improvingOverall Patient Progress: improving    Outcome Evaluation: Patient tolerated cpap setting on vent in morning. Weaned off sedation and pain medication. Patient denies pain with head nod. Attempting to talk with ET tube. Patient extubated at noon this shift. Upon extubation initially alert, confused with disorientation to place, time, situation. Orientation cleared throughout afternoon and is now alert, oriented and asking to leave. LS clear. Barney in place. Tele-SR. Tolerating TF at 40ml/hr with 90ml flushes. Dasia liang. MRSA swab collected and sent. Mag and Potassium replaced.

## 2023-10-18 NOTE — PROGRESS NOTES
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    PTSD (post-traumatic stress disorder)    Episode of recurrent major depressive disorder, unspecified depression episode severity (H24)    Dehydration    Hyponatremia    Weakness generalized    Alcohol use disorder, severe, dependence (H)    Septic shock (H)    Bacteremia due to Streptococcus pneumoniae    Hypomagnesemia    History of seizure due to alcohol withdrawal    Emphysema/COPD (H)    Tobacco abuse    Malnutrition - suspected    Hypoalbuminemia    Acute respiratory failure with hypoxia (H)    Alcohol withdrawal, with delirium (H)    Hypophosphatemia    Hypokalemia    Thrombocytopenia (H24)    Anemia, unspecified type    Patient ventilator dyssynchrony (H)    Coagulopathy (H24)    Pulmonary hypertension (H)    Pavithra Raines is a 68 year old female with h/o  COPD, alcohol dependence recently consuming large volume alcohol daily, gastric bypass surgery, depression/PTSD, and ongoing tobacco abuse who presented with generalized weakness and fatigue and was admitted due to concerns for multifocal community-acquired pneumonia and sepsis.  She had rapid worsening over the first 12 hours of her hospitalization with persistent hypotension despite fluid resuscitation and was transferred to the ICU and started on vasopressor therapy with Levophed due to concern for septic shock.        Septic shock due to streptococcal pneumoniae bacteremia and multifocal pneumonia  - Blood culture from admission grew Streptococcus pneumoniae.    - Sepsis rapidly progressed to septic shock requiring initiation of vasopressors but she is currently off.  - currently on Zosyn  -will keep IV steroids for now but consider stopping in the next 24 to 48 hours  - off Levophed   - still on propofol   --Continue hydromorphone  infusion for analgesia while on ventilator, but wean down as able   Blood cultures : 10/7 - 1 bottle - strept pneumoniae  Blood cultures 10/8, 10/9 : NGTD  MRSA/MSSA screen : negative  C diff negative 10/8, enteric panel negative 10/8    Acute respiratory failure After initial improvement in septic shock, patient developed worsening respiratory failure and worsening alcohol withdrawal with delirium requiring sedative therapy and subsequent intubation for mechanical ventilation in early a.m. 10/10.     -Continue triple-lumen PICC line central venous catheter  -Continue arterial line catheter  -Continue indwelling urinary catheter  - on weaning trial and stable plan to extubate today  - discussed with Tele-ICU doc.   - continue IV lasix and IV solumedrol for now  ADDENDUM: patient extubated and currently on 2 liters     Alcohol use disorder (severe dependence) acute alcohol withdrawal with delirium   History of seizure due to alcohol withdrawal  -  per [facility] previous discussion with  Per  patient had been drinking an average of 12 pack of beer, 1 bottle of wine, and 10 hard liquor drinks a day recently with most recent alcoholic drink approximately 30 minutes prior to hospital presentation.    - Blood alcohol level was not tested at time of admission.    - Patient developed overt signs of withdrawal on 10/9 including increased anxiety, tremor, confusion and possible hallucination.    - Initial treatment of alcohol withdrawal per CIWA protocol was complicated by sedation that may have exacerbated respiratory failure.    - no further signs of withdrawal      Suspected malnutrition  Hypoalbuminemia   Status post gastric bypass for obesity  Has remote history of Tom-en-Y gastric bypass surgery for obesity in March 2008.  Reported 13 pound weight loss in recent weeks and appears underweight and probably cachectic on exam.  Alcoholism likely contributes to malnutrition as may previous gastric bypass status.   Serum albumin was low at admission at 2.8 and has decreased and she is now starting to show signs of peripheral edema.  Noncardiogenic pulmonary edema from hypoalbuminemia could exacerbate respiratory failure.  Her previous Tom-en-Y gastric bypass surgery may limit ability to advance feeding tube beyond the gastric remnant.  - NG/NJ in place enteral  feeding, will have SLP assess her once extubated but continue TF for now  - follow lytes     Hypomagnesemia  Hypophosphatemia   Hypokalemia  Presented with low serum magnesium and potassium levels and subsequently developed low serum phosphorus level all likely secondary to nutritional deficiency.    - continue to monitor and replace per protocol     Hyponatremia resolved   Sodium 123 on admission with suspected hypovolemic hyponatremia.   -  After treatment with multiple normal saline boluses, sodium increased from 123 up to 128 in less than 12 hours after which IV fluids were switched to D5 half-normal saline.    - Sodium then decreased to 126 and later stabilized at about 130 and today is 133.    - Urine sodium was less than 20 consistent with hypovolemic hyponatremia.  Serum sodium now normal at 142  -Continue current IV fluid infusion and changed to TKO with increased tube feeds and free water flushes.     Hyperglycemia  - Patient has had mild to moderate hyperglycemia while in the ICU.  Hemoglobin A1c was 4.4 and she is not known to have underlying diabetes.  Insulin infusion was started for management of hyperglycemia in the ICU.   -  Anticipate hyperglycemia will worsen with initiation of enteral feeding.  -Continue to monitor blood sugars per protocol  -Continue insulin infusion per protocol for management of hyperglycemia  -Continuing IV fluids containing dextrose while receiving insulin infusion     Thrombocytopenia  Anemia, unspecified type  Coagulopathy  Presented with thrombocytopenia and anemia at admission both of which initially worsened but have  "subsequently stabilized.    - HGB chronically on the low side but usually runs around 11, no evidence of bleeding but monitor, currently 7.5  - Thrombocytopenia appears newer was stable on admission 168 and prior to that in the 200's now 360  - last INR 1.16    Emphysema/COPD  Tobacco abuse  Known chronic emphysema and COPD without overt acute exacerbation.  Patient reportedly has continued to smoke.  -Continuing IV steroids secondary to septic shock and pneumococcal pneumonia but also COPD.   -Continue bronchodilators as needed  -Nicotine patch supplementation started during hospitalization.     Dehydration  Presented with dehydration which likely contributed to hyponatremia and initial low normal blood pressures, now resolved after IV fluid treatment.  - IV lasix Q12     Weakness generalized  Suspect presenting complaint of generalized weakness was multifactorial including infection, severe alcohol dependence, and suspected malnutrition with nutrient including electrolyte deficiencies.  - PT/OT once extubated      Episode of recurrent major depressive disorder, unspecified depression episode severity (H24)  PTSD (post-traumatic stress disorder)  Carries previous diagnosis of depression and PTSD treated chronically with Lexapro 30 mg daily, clonazepam 0.5 mg 3 times daily as needed for anxiety, BuSpar 15 mg 3 times a day, Effexor 37.5 mg daily, and Xyzal 5 mg daily.  At admission it was unclear whether the patient has recently been taking these medications reliably.    - these have been held since admission reassess once extubate when to resume       Clinically Significant Risk Factors      # Overweight: Estimated body mass index is 28.31 kg/m  as calculated from the following:  Height as of this encounter: 1.626 m (5' 4\").  Weight as of this encounter: 74.8 kg (164 lb 14.4 oz)., PRESENT ON ADMISSION    COVID-19 PCR Results          10/7/2023    20:33   COVID-19 PCR Results   SARS CoV2 PCR Negative      COVID-19 " Antibody Results, Testing for Immunity           No data to display               Code Status: Full Code  VTE prophylaxis:  Enoxaparin (Lovenox) SQ  DIET: Orders Placed This Encounter      NPO for Medical/Clinical Reasons Except for: No Exceptions    Drains/Lines: PICC, art line, mccabe   Weight bearing status: bed rest        Subjective:  Patient on weaning trial this morning, discussed with Tele-ICU doc and plan for extubation     PHYSICAL EXAM  Temp:  [98  F (36.7  C)-100.4  F (38  C)] 98.8  F (37.1  C)  Pulse:  [60-91] 74  Resp:  [13-31] 21  BP: ()/(57-82) 107/73  MAP:  [22 mmHg-92 mmHg] 85 mmHg  Arterial Line BP: ()/(17-72) 120/61  FiO2 (%):  [30 %] 30 %  SpO2:  [89 %-98 %] 96 %  Wt Readings from Last 1 Encounters:   10/17/23 49.9 kg (110 lb)       Intake/Output Summary (Last 24 hours) at 10/18/2023 0802  Last data filed at 10/18/2023 0758  Gross per 24 hour   Intake 1979.73 ml   Output 2925 ml   Net -945.27 ml      Body mass index is 18.88 kg/m .    Physical Exam  Constitutional:       Comments: Chronically ill-appearing    HENT:      Head: Normocephalic.   Cardiovascular:      Rate and Rhythm: Normal rate and regular rhythm.      Pulses: Normal pulses.   Pulmonary:      Effort: Pulmonary effort is normal.      Comments: CTA b/l no W/R/R. Breathing comfortably with vent  Abdominal:      General: Bowel sounds are normal. There is no distension.      Palpations: Abdomen is soft.      Tenderness: There is no abdominal tenderness. There is no guarding.   Musculoskeletal:         General: Normal range of motion.      Right lower leg: No edema.      Left lower leg: No edema.   Skin:     General: Skin is warm and dry.      Capillary Refill: Capillary refill takes less than 2 seconds.   Neurological:      Mental Status: She is alert.      Comments: Follows commands nods head yes to having the ETT removed       PERTINENT LABS/IMAGING:  Results for orders placed or performed during the hospital encounter of  10/07/23   CT Chest Pulmonary Embolism w Contrast    Impression    IMPRESSION:  1.  Likely multifocal pneumonia, recommend radiographic follow-up to resolution.  2.  No pulmonary embolus.   XR Chest 1 View    Impression    IMPRESSION: Heart size within normal limits. Extensive airspace opacities throughout both lungs have progressed. No effusions. No pneumothorax. Left PICC line tip distal SVC.   CT Head w/o contrast*    Impression    IMPRESSION:  1.  No CT evidence for acute intracranial process.  2.  Brain atrophy and presumed chronic microvascular ischemic changes as above.   XR Chest Port 1 View    Impression    IMPRESSION: Interval placement of endotracheal tube, tip 3.6 cm above the jaun m. Left PICC tip at the cavoatrial junction, satisfactory positioning. Extensive bilateral infiltrates, more apparent on the right, with an associated small right pleural   effusion, slight further worsening. No appreciable effusion on the left. Normal cardiac size. Degenerative changes both shoulders and the spine. Monitoring leads overlying the chest.                XR Abdomen Port 1 View    Impression    IMPRESSION:   Enteric tube terminates at the level of the proximal stomach. Gastric bypass changes. There is gaseous distention of the small and large bowel. Patchy multifocal pulmonary opacities.   X-ray Abdomen 1 vw    Impression    IMPRESSION: Enteric tube in the stomach. Small amount of Gastrografin contrast in the stomach. Multiple dilated loops of small bowel again noted in the upper abdomen.   X-ray Abdomen 1 vw    Impression    IMPRESSION: Stable lung bases including diffuse reticulonodular opacities and left central venous catheter which terminates at the superior cavoatrial junction. Enteric tube contains some contrast however no significant contrast residual within the   stomach, or elsewhere within the bowel. Correlate with tube on suction. Gas dilated loops of small and large bowel overlie the upper abdomen.  Degenerative changes of the thoracolumbar spine.   XR Abdomen Port 1 View    Impression    IMPRESSION: Feeding tube tip projects in the region of the gastric  pouch with small amount of contrast within proximal jejunal loops,  likely the Tom limb. No convincing extraluminal contrast, however  evaluation is limited with single AP view.     Similar extensive bibasilar reticulonodular opacities. Persistent  dilated loops of small and large bowel within the visualized abdomen.    TOSHIA RAMEY MD         SYSTEM ID:  [id number]   X-ray Abdomen 1 vw    Impression    IMPRESSION: Enteric tube is in place with tip along the inferior aspect of the gastrojejunal anastomotic suture material in the left upper quadrant abdomen, favored to be within the jejunum. High density contrast material is seen within the descending   colon. Unchanged diffuse reticulonodular opacities in the visualized lung bases.   XR Chest Port 1 View    Impression    IMPRESSION: Endotracheal tube tip is 1.4 cm above the juan m. Consider  retracting the tube by 1.5 to 2 cm for better positioning. Feeding  tube tip in the stomach. Left arm PICC tip in the high right atrium.  Diffuse mixed interstitial and airspace opacities throughout the lungs  are stable, and may be due to edema, pneumonia or ARDS. Stable small  right and trace left pleural effusion and bibasilar atelectasis.  No  pneumothorax.    CARLOS KEARNS MD         SYSTEM ID:  [id number]   Echocardiogram Complete   Result Value Ref Range    LVEF  55-60%        Imaging results reviewed over the past 24 hrs:   No results found for this or any previous visit (from the past 24 hour(s)).  Recent Labs   Lab 10/18/23  0750 10/18/23  0526 10/17/23  2125 10/17/23  0752 10/17/23  0445 10/16/23  1116 10/16/23  1110 10/16/23  0755 10/16/23  0455 10/15/23  0814 10/15/23  0513   WBC  --  13.9*  --   --   --   --  16.2*  --   --   --   --    HGB  --  7.5*  --   --   --   --  9.8*  --   --   --   --    MCV   "--  103*  --   --   --   --  104*  --   --   --   --    PLT  --  360  --   --   --   --  293  --   --   --  314   NA  --  139  --   --   --   --   --   --  138  --  141   POTASSIUM  --  3.1*  --   --  3.6  --   --   --  3.6   < > 3.4   CHLORIDE  --  105  --   --   --   --   --   --  104  --  105   CO2  --  27  --   --   --   --   --   --  28  --  29   BUN  --  11.0  --   --   --   --   --   --  8.7  --  7.0*   CR  --  0.70  --   --  0.75  --   --   --  0.60  --  0.62   ANIONGAP  --  7  --   --   --   --   --   --  6*  --  7   SHON  --  7.4*  --   --   --   --   --   --  7.4*  --  7.5*   * 115* 123*   < >  --    < >  --    < > 94   < > 97    < > = values in this interval not displayed.         No results for input(s): \"TROPONINI\" in the last 79414 hours.  Recent Labs   Lab Test 10/11/23  0612 10/07/23  2031 04/27/20  2353   NTBNPI 6,788* 1,791* 182     Recent Labs   Lab Test 10/08/23  0724   TSH 0.64     Recent Labs   Lab Test 10/10/23  1223 09/01/21  0808 10/16/17  2218   INR 1.16* 1.00 1.09       40 MINUTES SPENT BY ME on the date of service doing chart review, history, exam, documentation, discussion with nursing staff and specialist, & further activities per the note.  Violetta Che MD  [facility]  [phone]   "

## 2023-10-18 NOTE — PROGRESS NOTES
"Tele ICU Note:  68F hx EtOH, gastric bypass, emphysema, hyponatremia (presumed \"beer potomania\"), and overall debilitation admitted with presumed pneumonia & septic shock.  Has been intubated since 10/10.    Leukocytosis and fever curve have improved since empirically broadening antibiotics to include MRSA & pseudomonal coverage, although nothing has grown on respiratory or blood cultures thus far (procalcitonin was 0.25). A MRSA swab was negative on 10/8, so I would be comfortable discontinuing vancomycin (see [facility], Antimicrobial Agents and Chemotherapy, 2014, and others:: MRSA pneumonia is highly unlikely in the absence of detectable MRSA in the nares, with a negative predictive value consistently >98% across multiple studies, particularly with PCR).     She diuresed well yesterday with scheduled lasix as per paradigm established in FACTT trial, [place] 2006, and currently has minimal vent requirements.  She continues on high dose steroids as per the [facility]-COD trial ([facility] 2023) or [place]pi, Lancet Respir Med 2020 to minimize fibrotic changes in ARDS    It is interesting that her Hgb dropped significantly despite good diuresis - no report of melena.  It is also notable that although her most recent ABGs suggest a decent metabolic alkalosis (whether post-hypercapneic or contraction/diuretic induced), her serum bicarbonate is measured at only 27, so I'm not sure there is a role for acetazolamide at this point.    Continue daily PS trials and working towards extubation.  She should be extubatable today.      Communicated with on-site hospitalist via phone.       HADLEY Lowery MD  Clinical   Anesthesia / Critical Care  *[phone]          "

## 2023-10-18 NOTE — PLAN OF CARE
"  Problem: Plan of Care - These are the overarching goals to be used throughout the patient stay.    Goal: Plan of Care Review  Description: The Plan of Care Review/Shift note should be completed every shift.  The Outcome Evaluation is a brief statement about your assessment that the patient is improving, declining, or no change.  This information will be displayed automatically on your shift note.  Outcome: Progressing  Goal: Patient-Specific Goal (Individualized)  Description: You can add care plan individualizations to a care plan. Examples of Individualization might be:  \"Parent requests to be called daily at 9am for status\", \"I have a hard time hearing out of my right ear\", or \"Do not touch me to wake me up as it startles me\".  Outcome: Progressing  Goal: Absence of Hospital-Acquired Illness or Injury  Outcome: Progressing  Intervention: Identify and Manage Fall Risk  Recent Flowsheet Documentation  Taken 10/18/2023 0400 by Cabrera Sanchez, RN  Safety Promotion/Fall Prevention:   activity supervised   clutter free environment maintained   increased rounding and observation   increase visualization of patient   room door open   room near nurse's station   room organization consistent   safety round/check completed   supervised activity  Taken 10/18/2023 0000 by Cabrera Sanchez, RN  Safety Promotion/Fall Prevention:   activity supervised   clutter free environment maintained   increased rounding and observation   increase visualization of patient   room door open   room near nurse's station   room organization consistent   safety round/check completed   supervised activity  Taken 10/17/2023 2000 by Cabrera Sanchez, RN  Safety Promotion/Fall Prevention:   activity supervised   clutter free environment maintained   increased rounding and observation   increase visualization of patient   room door open   room near nurse's station   room organization consistent   safety round/check completed   supervised " activity  Intervention: Prevent Skin Injury  Recent Flowsheet Documentation  Taken 10/18/2023 0400 by Cabrera Sanchez RN  Body Position: position changed independently  Taken 10/18/2023 0000 by Cabrera Sanchez RN  Body Position: position changed independently  Taken 10/17/2023 2000 by Cabrera Sanchez RN  Body Position: position changed independently  Goal: Optimal Comfort and Wellbeing  Outcome: Progressing  Intervention: Provide Person-Centered Care  Recent Flowsheet Documentation  Taken 10/18/2023 0400 by Cabrera Sanchez RN  Trust Relationship/Rapport: care explained  Taken 10/18/2023 0000 by Cabrera Sanchez RN  Trust Relationship/Rapport: care explained  Taken 10/17/2023 2000 by Cabrera Sanchez RN  Trust Relationship/Rapport: care explained  Goal: Readiness for Transition of Care  Outcome: Progressing     Problem: Pneumonia  Goal: Fluid Balance  Outcome: Progressing  Goal: Resolution of Infection Signs and Symptoms  Outcome: Progressing  Goal: Effective Oxygenation and Ventilation  Outcome: Progressing  Intervention: Promote Airway Secretion Clearance  Recent Flowsheet Documentation  Taken 10/18/2023 0400 by Cabrera Sanchez RN  Cough And Deep Breathing: done independently per patient  Taken 10/18/2023 0000 by Cabrera Sanchez RN  Cough And Deep Breathing: done independently per patient  Taken 10/17/2023 2000 by Cabrera Sanchez RN  Cough And Deep Breathing: done independently per patient  Intervention: Optimize Oxygenation and Ventilation  Recent Flowsheet Documentation  Taken 10/18/2023 0400 by Cabrera Sanchez RN  Head of Bed (HOB) Positioning: HOB at 30-45 degrees  Taken 10/18/2023 0000 by Cabrera Sanchez RN  Head of Bed (HOB) Positioning: HOB at 30-45 degrees  Taken 10/17/2023 2000 by Cabrera Sanchez RN  Head of Bed (HOB) Positioning: HOB at 30-45 degrees     Problem: Oral Intake Inadequate  Goal: Improved Oral Intake  Outcome: Progressing     Problem: Enteral Nutrition  Goal: Absence of Aspiration Signs and  Symptoms  Outcome: Progressing  Intervention: Minimize Aspiration Risk  Recent Flowsheet Documentation  Taken 10/18/2023 0400 by Cabrera Sanchez, RN  Oral Care: suction provided  Head of Bed (HOB) Positioning: HOB at 30-45 degrees  Taken 10/18/2023 0000 by Cabrera Sanchez, RN  Oral Care: suction provided  Head of Bed (HOB) Positioning: HOB at 30-45 degrees  Taken 10/17/2023 2000 by Cabrera Sanchez, RN  Oral Care:   swabbed with antiseptic solution   suction provided  Head of Bed (HOB) Positioning: HOB at 30-45 degrees  Goal: Safe, Effective Therapy Delivery  Outcome: Progressing  Goal: Feeding Tolerance  Outcome: Progressing      Pt remains alert and follows commands when awake.   Propofol was up to 30 mcg this shift but turned back down to 25 after lower blood pressures.  Levophed has been off all shift, blood pressures are stable.  Oxygen saturations remain stable all shift, vent settings remain unchanged.   Lung sounds are coarse.   Secretions have been minimal, both oral and inline.   Urine output has been adequate.   Will continue to monitor and follow plan of care.

## 2023-10-18 NOTE — PROGRESS NOTES
Pt was extubated per MD orders.   Pt has clear BBS, no stridor noted and was placed on 2L NC.   BiPAP is on stand by and ready for sure should the pt need.   RT will continue to round on this pt for the next 24 hours to check on pt status.  Thank you   Jillian Castañeda, RT on 10/18/2023 at 1:22 PM

## 2023-10-19 ENCOUNTER — APPOINTMENT (OUTPATIENT)
Dept: OCCUPATIONAL THERAPY | Facility: CLINIC | Age: 68
DRG: 870 | End: 2023-10-19
Attending: INTERNAL MEDICINE
Payer: MEDICARE

## 2023-10-19 ENCOUNTER — APPOINTMENT (OUTPATIENT)
Dept: SPEECH THERAPY | Facility: CLINIC | Age: 68
DRG: 870 | End: 2023-10-19
Attending: INTERNAL MEDICINE
Payer: MEDICARE

## 2023-10-19 ENCOUNTER — APPOINTMENT (OUTPATIENT)
Dept: PHYSICAL THERAPY | Facility: CLINIC | Age: 68
DRG: 870 | End: 2023-10-19
Payer: MEDICARE

## 2023-10-19 LAB
ANION GAP SERPL CALCULATED.3IONS-SCNC: 12 MMOL/L (ref 7–15)
BASO+EOS+MONOS # BLD AUTO: ABNORMAL 10*3/UL
BASO+EOS+MONOS NFR BLD AUTO: ABNORMAL %
BASOPHILS # BLD AUTO: 0 10E3/UL (ref 0–0.2)
BASOPHILS NFR BLD AUTO: 0 %
BUN SERPL-MCNC: 8.7 MG/DL (ref 8–23)
CALCIUM SERPL-MCNC: 7.7 MG/DL (ref 8.8–10.2)
CHLORIDE SERPL-SCNC: 106 MMOL/L (ref 98–107)
CREAT SERPL-MCNC: 0.69 MG/DL (ref 0.51–0.95)
DEPRECATED HCO3 PLAS-SCNC: 20 MMOL/L (ref 22–29)
EGFRCR SERPLBLD CKD-EPI 2021: >90 ML/MIN/1.73M2
EOSINOPHIL # BLD AUTO: 0.2 10E3/UL (ref 0–0.7)
EOSINOPHIL NFR BLD AUTO: 1 %
ERYTHROCYTE [DISTWIDTH] IN BLOOD BY AUTOMATED COUNT: 13.4 % (ref 10–15)
GLUCOSE BLDC GLUCOMTR-MCNC: 101 MG/DL (ref 70–99)
GLUCOSE BLDC GLUCOMTR-MCNC: 107 MG/DL (ref 70–99)
GLUCOSE BLDC GLUCOMTR-MCNC: 123 MG/DL (ref 70–99)
GLUCOSE BLDC GLUCOMTR-MCNC: 167 MG/DL (ref 70–99)
GLUCOSE SERPL-MCNC: 157 MG/DL (ref 70–99)
HCT VFR BLD AUTO: 27.1 % (ref 35–47)
HGB BLD-MCNC: 9 G/DL (ref 11.7–15.7)
IMM GRANULOCYTES # BLD: 0.1 10E3/UL
IMM GRANULOCYTES NFR BLD: 1 %
LYMPHOCYTES # BLD AUTO: 0.6 10E3/UL (ref 0.8–5.3)
LYMPHOCYTES NFR BLD AUTO: 3 %
MAGNESIUM SERPL-MCNC: 1.8 MG/DL (ref 1.7–2.3)
MCH RBC QN AUTO: 33.7 PG (ref 26.5–33)
MCHC RBC AUTO-ENTMCNC: 33.2 G/DL (ref 31.5–36.5)
MCV RBC AUTO: 102 FL (ref 78–100)
MONOCYTES # BLD AUTO: 0.6 10E3/UL (ref 0–1.3)
MONOCYTES NFR BLD AUTO: 3 %
NEUTROPHILS # BLD AUTO: 17.4 10E3/UL (ref 1.6–8.3)
NEUTROPHILS NFR BLD AUTO: 92 %
NRBC # BLD AUTO: 0 10E3/UL
NRBC BLD AUTO-RTO: 0 /100
PHOSPHATE SERPL-MCNC: 3.2 MG/DL (ref 2.5–4.5)
PLATELET # BLD AUTO: 471 10E3/UL (ref 150–450)
POTASSIUM SERPL-SCNC: 3.3 MMOL/L (ref 3.4–5.3)
POTASSIUM SERPL-SCNC: 3.6 MMOL/L (ref 3.4–5.3)
POTASSIUM SERPL-SCNC: 3.6 MMOL/L (ref 3.4–5.3)
PROCALCITONIN SERPL IA-MCNC: 0.11 NG/ML
RBC # BLD AUTO: 2.67 10E6/UL (ref 3.8–5.2)
SODIUM SERPL-SCNC: 138 MMOL/L (ref 135–145)
WBC # BLD AUTO: 18.9 10E3/UL (ref 4–11)

## 2023-10-19 PROCEDURE — 82310 ASSAY OF CALCIUM: CPT | Performed by: INTERNAL MEDICINE

## 2023-10-19 PROCEDURE — 250N000013 HC RX MED GY IP 250 OP 250 PS 637: Performed by: INTERNAL MEDICINE

## 2023-10-19 PROCEDURE — 84100 ASSAY OF PHOSPHORUS: CPT | Performed by: INTERNAL MEDICINE

## 2023-10-19 PROCEDURE — 85025 COMPLETE CBC W/AUTO DIFF WBC: CPT | Performed by: INTERNAL MEDICINE

## 2023-10-19 PROCEDURE — 250N000011 HC RX IP 250 OP 636: Mod: JZ | Performed by: INTERNAL MEDICINE

## 2023-10-19 PROCEDURE — 83735 ASSAY OF MAGNESIUM: CPT | Performed by: INTERNAL MEDICINE

## 2023-10-19 PROCEDURE — 250N000011 HC RX IP 250 OP 636: Performed by: INTERNAL MEDICINE

## 2023-10-19 PROCEDURE — 84145 PROCALCITONIN (PCT): CPT | Performed by: INTERNAL MEDICINE

## 2023-10-19 PROCEDURE — 250N000011 HC RX IP 250 OP 636: Performed by: FAMILY MEDICINE

## 2023-10-19 PROCEDURE — 80048 BASIC METABOLIC PNL TOTAL CA: CPT | Performed by: INTERNAL MEDICINE

## 2023-10-19 PROCEDURE — 92610 EVALUATE SWALLOWING FUNCTION: CPT | Mod: GN | Performed by: SPEECH-LANGUAGE PATHOLOGIST

## 2023-10-19 PROCEDURE — 200N000001 HC R&B ICU

## 2023-10-19 PROCEDURE — 99233 SBSQ HOSP IP/OBS HIGH 50: CPT | Performed by: INTERNAL MEDICINE

## 2023-10-19 PROCEDURE — 97530 THERAPEUTIC ACTIVITIES: CPT | Mod: GP | Performed by: PHYSICAL THERAPIST

## 2023-10-19 PROCEDURE — C9113 INJ PANTOPRAZOLE SODIUM, VIA: HCPCS | Mod: JZ | Performed by: INTERNAL MEDICINE

## 2023-10-19 PROCEDURE — 250N000011 HC RX IP 250 OP 636: Performed by: PEDIATRICS

## 2023-10-19 PROCEDURE — 250N000013 HC RX MED GY IP 250 OP 250 PS 637: Performed by: PEDIATRICS

## 2023-10-19 PROCEDURE — 97530 THERAPEUTIC ACTIVITIES: CPT | Mod: GO

## 2023-10-19 PROCEDURE — 250N000013 HC RX MED GY IP 250 OP 250 PS 637: Performed by: ANESTHESIOLOGY

## 2023-10-19 PROCEDURE — 97110 THERAPEUTIC EXERCISES: CPT | Mod: GP | Performed by: PHYSICAL THERAPIST

## 2023-10-19 PROCEDURE — 97166 OT EVAL MOD COMPLEX 45 MIN: CPT | Mod: GO

## 2023-10-19 RX ORDER — POTASSIUM CHLORIDE 7.45 MG/ML
10 INJECTION INTRAVENOUS ONCE
Status: DISCONTINUED | OUTPATIENT
Start: 2023-10-19 | End: 2023-10-19

## 2023-10-19 RX ORDER — POTASSIUM CHLORIDE 7.45 MG/ML
10 INJECTION INTRAVENOUS ONCE
Status: COMPLETED | OUTPATIENT
Start: 2023-10-19 | End: 2023-10-19

## 2023-10-19 RX ORDER — MAGNESIUM SULFATE HEPTAHYDRATE 40 MG/ML
2 INJECTION, SOLUTION INTRAVENOUS ONCE
Status: COMPLETED | OUTPATIENT
Start: 2023-10-19 | End: 2023-10-19

## 2023-10-19 RX ORDER — POTASSIUM CHLORIDE 20MEQ/15ML
20 LIQUID (ML) ORAL ONCE
Status: COMPLETED | OUTPATIENT
Start: 2023-10-19 | End: 2023-10-19

## 2023-10-19 RX ADMIN — BUSPIRONE HYDROCHLORIDE 15 MG: 5 TABLET ORAL at 21:02

## 2023-10-19 RX ADMIN — Medication 15 ML: at 08:18

## 2023-10-19 RX ADMIN — Medication 1 TABLET: at 21:02

## 2023-10-19 RX ADMIN — Medication 1 PATCH: at 08:09

## 2023-10-19 RX ADMIN — PIPERACILLIN AND TAZOBACTAM 4.5 G: 4; .5 INJECTION, POWDER, FOR SOLUTION INTRAVENOUS at 16:24

## 2023-10-19 RX ADMIN — PIPERACILLIN AND TAZOBACTAM 4.5 G: 4; .5 INJECTION, POWDER, FOR SOLUTION INTRAVENOUS at 23:00

## 2023-10-19 RX ADMIN — METHYLPREDNISOLONE SODIUM SUCCINATE 30 MG: 40 INJECTION INTRAMUSCULAR; INTRAVENOUS at 08:11

## 2023-10-19 RX ADMIN — FUROSEMIDE 20 MG: 10 INJECTION, SOLUTION INTRAVENOUS at 10:09

## 2023-10-19 RX ADMIN — POTASSIUM CHLORIDE 10 MEQ: 7.46 INJECTION, SOLUTION INTRAVENOUS at 10:48

## 2023-10-19 RX ADMIN — ENOXAPARIN SODIUM 30 MG: 30 INJECTION SUBCUTANEOUS at 21:02

## 2023-10-19 RX ADMIN — PANTOPRAZOLE SODIUM 40 MG: 40 INJECTION, POWDER, FOR SOLUTION INTRAVENOUS at 08:13

## 2023-10-19 RX ADMIN — CHLORHEXIDINE GLUCONATE 15 ML: 1.2 RINSE ORAL at 08:16

## 2023-10-19 RX ADMIN — PIPERACILLIN AND TAZOBACTAM 4.5 G: 4; .5 INJECTION, POWDER, FOR SOLUTION INTRAVENOUS at 04:33

## 2023-10-19 RX ADMIN — FUROSEMIDE 20 MG: 10 INJECTION, SOLUTION INTRAVENOUS at 23:00

## 2023-10-19 RX ADMIN — POTASSIUM CHLORIDE 20 MEQ: 1.5 SOLUTION ORAL at 06:32

## 2023-10-19 RX ADMIN — ESCITALOPRAM OXALATE 30 MG: 10 TABLET ORAL at 08:18

## 2023-10-19 RX ADMIN — MAGNESIUM SULFATE IN WATER 2 G: 40 INJECTION, SOLUTION INTRAVENOUS at 06:32

## 2023-10-19 RX ADMIN — THIAMINE HYDROCHLORIDE 100 MG: 100 INJECTION, SOLUTION INTRAMUSCULAR; INTRAVENOUS at 08:14

## 2023-10-19 RX ADMIN — PIPERACILLIN AND TAZOBACTAM 4.5 G: 4; .5 INJECTION, POWDER, FOR SOLUTION INTRAVENOUS at 10:07

## 2023-10-19 RX ADMIN — FOLIC ACID 1 MG: 1 TABLET ORAL at 08:18

## 2023-10-19 ASSESSMENT — ACTIVITIES OF DAILY LIVING (ADL)
ADLS_ACUITY_SCORE: 44
ADLS_ACUITY_SCORE: 50
ADLS_ACUITY_SCORE: 48
ADLS_ACUITY_SCORE: 44
ADLS_ACUITY_SCORE: 48
ADLS_ACUITY_SCORE: 44
ADLS_ACUITY_SCORE: 50
ADLS_ACUITY_SCORE: 48

## 2023-10-19 NOTE — PROGRESS NOTES
10/19/23 1300   Appointment Info   Signing Clinician's Name / Credentials (OT) Pat Nicholas, OTR/L       Present no   Living Environment   People in Home spouse   Self-Care   Usual Activity Tolerance good   Current Activity Tolerance fair   Regular Exercise No   Equipment Currently Used at Home cane, quad   Fall history within last six months yes   Number of times patient has fallen within last six months 5   Instrumental Activities of Daily Living (IADL)   IADL Comments PLOF IND,  assisting with IADL needs as appropriate   General Information   Onset of Illness/Injury or Date of Surgery 10/08/23   Referring Physician Violetta Che MD   Patient/Family Therapy Goal Statement (OT) Expresses desire to return home but understands need for rehab to promote strength   Existing Precautions/Restrictions oxygen therapy device and L/min   Left Upper Extremity (Weight-bearing Status) full weight-bearing (FWB)   Right Upper Extremity (Weight-bearing Status) full weight-bearing (FWB)   Left Lower Extremity (Weight-bearing Status) full weight-bearing (FWB)   Right Lower Extremity (Weight-bearing Status) full weight-bearing (FWB)   General Observations and Info Patient is a 68 year old female, admitted due to generalized weakness and fatigue, found to have pneumonia with septic shock. Patient also found to have dehydration, hyponatremia, hypomagnesemia. Patient with a previous medical history of COPD, emphysema, ETOH dependence, s/p gastric bypass for obesity, PTSD, smoker, depression, restless leg syndrome, sleep apnea.   Cognitive Status Examination   Orientation Status orientation to person, place and time   Cognitive Status Comments Following directions well, expressing needs in appropriate manner   Cognitive Screens/Assessments   Cognitive Assessments Completed [facility] Mental Status Exam ([facility]):  Total Score out of /30 25   [facility] Norms 21-26 equals mild  neurocognitive disorder   SLUMS Domains assessed: orientation, memory, attention, executive functions   SLUMS Interpretation Deficits in STM and mental manipulation components only, demonstrates improvements with critical thinking skills and attention to task   Sensory   Sensory Quick Adds sensation intact   Pain Assessment   Patient Currently in Pain No   Posture   Posture Comments Not addressed   Range of Motion Comprehensive   General Range of Motion bilateral upper extremity ROM WNL   Strength Comprehensive (MMT)   General Manual Muscle Testing (MMT) Assessment upper extremity strength deficits identified;hand strength deficits identified   Comment, General Manual Muscle Testing (MMT) Assessment Globally weak through BUE's given extended hospitalization, unclear strength at baseline   Upper Extremity (Manual Muscle Testing)   Comment, MMT: Upper Extremity Poor UE strength paired with poor activity tolerance noted   Muscle Tone Assessment   Muscle Tone Quick Adds No deficits were identified   Coordination   Upper Extremity Coordination No deficits were identified   Gross Motor Coordination No deficits were identified   Bed Mobility   Comment (Bed Mobility) AX2 per nursing   Balance   Balance Comments Not addressed   Activities of Daily Living   Comment, BADL Assessment/Training ADLs were not specifically addressed at time of evaluation as pt is requiring significant assistance for bed mobility at this time. Requires set-up for ADL engagement.   Additional Documentation Comment, BADL Assessment/Training (Row)   Clinical Impression   Criteria for Skilled Therapeutic Interventions Met (OT) Yes, treatment indicated   OT Diagnosis Impaired activity tolerance and strength impacting engagement in ADLs   OT Problem List-Impairments impacting ADL problems related to;activity tolerance impaired;cognition;strength   ADL comments/analysis Patient requires increased assistance compared to baseline for ADLs, transfer,  ambulation d/t weakness and impaired activity tolerance. 25/30 on SLUMs.   Assessment of Occupational Performance 3-5 Performance Deficits   Planned Therapy Interventions (OT) ADL retraining   Intervention Comments Would benefit from skilled IP OT services during hospital stay to promote strength/endurance required for engagement in ADLs   Clinical Decision Making Complexity (OT) detailed assessment/moderate complexity   Risk & Benefits of therapy have been explained care plan/treatment goals reviewed;evaluation/treatment results reviewed;risks/benefits reviewed;current/potential barriers reviewed;participants voiced agreement with care plan;participants included;patient   Clinical Impression Comments Patient admitted from home with generalized weakness and fatigue, found with septic shock. Remains critically ill in ICU, PLOF IND ADLs/IADLs and remains below baseline d/t extended hospitalization resulting in global weakness/poor activity tolerance. OT recommending TCU prior to return home to promote strengthening/activity tolerance and safe engagement in ADLs.   OT Total Evaluation Time   OT Eval, Moderate Complexity Minutes (41918) 30   OT Goals   Therapy Frequency (OT) 5 times/week   OT Predicted Duration/Target Date for Goal Attainment 10/26/23   OT Goals Hygiene/Grooming;Upper Body Dressing;Lower Body Dressing;Bed Mobility;Toilet Transfer/Toileting;Cognition   OT: Hygiene/Grooming modified independent   OT: Upper Body Dressing Modified independent   OT: Lower Body Dressing Modified independent   OT: Bed Mobility Modified independent   OT: Toilet Transfer/Toileting Modified independent   OT: Cognitive Patient/caregiver will verbalize understanding of cognitive assessment results/recommendations as needed for safe discharge planning   Interventions   Interventions Quick Adds Therapeutic Activity   Therapeutic Activities   Therapeutic Activity Minutes (00721) 20   Symptoms noted during/after treatment  fatigue;shortness of breath   Treatment Detail/Skilled Intervention Patient engaged in bed level UB exercises to promote activity tolerance/strength required for ADLs. Completeting x12 gross grasp squeezes with pink foam block x3 sets total bilaterally. Completing x12 reps x2 sets with AROM in supine position completing shoulder flexion/extension, horizontal adduction/abduction, elbow extension/flexion, and supine/pronation, and wrist extension/flexion. Tolerated functional reach x15 with foam block, 100% accuracy. Patient requires increased time for all activities d/t poor activity tolerance, SOB noted but stats remaining above 90% on 4L oxygen.   OT Discharge Planning   OT Plan 1/5- activity tolerance, strength, engagement in ADLs   OT Discharge Recommendation (DC Rec) Transitional Care Facility   OT Rationale for DC Rec Patient previously from home with , PLOF IND with ADLs/IADL needs. Remains critically ill and requires significant assistance compared to baseline for I/ADL tasks including daily cares and functionally mobility. Scored 25/30 on slums assessment which indicates mild cognitive impairments. Patient is also globally weak impacting overall activity tolerance/endurance. OT recommending TCU to assist with progression in overall strength/endurance, transfers, mobility and engagement in ADLs prior to returning home.   OT Brief overview of current status 25/30 on SLUMs, following directions well, motivated to progress activity tolerance, would continue to require 24/7 support given strength, mobility and ADL deficits, Ax2/kimberly transfers,  set-up ADLs   Total Session Time   Timed Code Treatment Minutes 20   Total Session Time (sum of timed and untimed services) 50     Thank you for the referral.   JULIUS Marie/L   [facility]    Email: [email or web address]  Phone: [phone]

## 2023-10-19 NOTE — PROGRESS NOTES
Nutrition Therapy Update: Brief Note    Writer reviewing chart to follow-up on NPO status - was evaluated by SLP this morning, recommendations for minced and moist (level 5) diet with mildly thick (level 2) liquids.     Recommendations  - RD put in order for mildly thick Ensure enlive (any flavor) TID with each meal to support PO intake    - Please REDUCE TF rate by half to new rate of 20 ml/hr x 24 hrs. Please hold here. Due to gastric remnant of 30 mL, this will hopefully prevent pt from getting full and encourage PO intake.      - Order for calorie count to assess intake     Will continue to monitor and meet with pt as able to determine supplement preference/tolerance.     Lori Angulo RD, LD  Clinical Dietitian  Office: [phone]  Weekend pager: [phone]

## 2023-10-19 NOTE — PROGRESS NOTES
10/19/23 0800   Appointment Info   Signing Clinician's Name / Credentials (SLP) Mel Marquis MA, CCC-SLP   General Information   Onset of Illness/Injury or Date of Surgery 10/07/23   Referring Physician Violetta Che MD   Patient/Family Therapy Goal Statement (SLP) To eat/drink and get NG tube removed   Pertinent History of Current Problem Per chart review,  Pavithra Raines is a 68 year old female with h/o  COPD, alcohol dependence recently consuming large volume alcohol daily, gastric bypass surgery, depression/PTSD, and ongoing tobacco abuse who presented with generalized weakness and fatigue and was admitted due to concerns for multifocal community-acquired pneumonia and sepsis.  She had rapid worsening over the first 12 hours of her hospitalization with persistent hypotension despite fluid resuscitation and was transferred to the ICU and started on vasopressor therapy with Levophed due to concern for septic shock. After initial improvement in septic shock, patient developed worsening respiratory failure and worsening alcohol withdrawal with delirium requiring sedative therapy and subsequent intubation for mechanical ventilation in early a.m. 10/10.  Patient extubated yesterday, 10/18/23 and orders placed for clinical bedside swallow evaluation.   General Observations Patient upright in bed for evaluation.       Present no   Language English   Type of Evaluation   Type of Evaluation Swallow Evaluation   Oral Motor   Oral Musculature generally intact   Structural Abnormalities present   Mucosal Quality dry   Dentition (Oral Motor)   Dentition (Oral Motor) edentulous   Facial Symmetry (Oral Motor)   Facial Symmetry (Oral Motor) WNL   Lip Function (Oral Motor)   Lip Range of Motion (Oral Motor) WNL   Lip Strength (Oral Motor) WNL   Tongue Function (Oral Motor)   Tongue Strength (Oral Motor) WNL   Tongue Coordination/Speed (Oral Motor) reduced rate   Tongue ROM (Oral Motor) WNL    Jaw Function (Oral Motor)   Jaw Function (Oral Motor) WNL   Cough/Swallow/Gag Reflex (Oral Motor)   Volitional Throat Clear/Cough (Oral Motor) reduced strength   Volitional Swallow (Oral Motor) mildly delayed   Vocal Quality/Secretion Management (Oral Motor)   Vocal Quality (Oral Motor) WNL   Secretion Management (Oral Motor) WNL   General Swallowing Observations   Past History of Dysphagia No known hx of dysphagia   Respiratory Support nasal cannula   Current Diet/Method of Nutritional Intake (General Swallowing Observations, NIS) NPO;nasogastric tube (NG)   Swallowing Evaluation Clinical swallow evaluation   Clinical Swallow Evaluation   Feeding Assistance frequent cues/help required   Clinical Swallow Evaluation Textures Trialed thin liquids;mildly thick liquids;pureed   Clinical Swallow Eval: Thin Liquid Texture Trial   Mode of Presentation, Thin Liquids cup;straw;fed by clinician   Volume of Liquid or Food Presented <1 ounce   Oral Phase of Swallow delayed AP movement   Pharyngeal Phase of Swallow impaired;coughing/choking   Diagnostic Statement Impaired oropharyngeal swallow with thin liquid trials. Overt s/sx of penetration/aspiration characterized by coughing.   Clinical Swallow Eval: Mildly Thick Liquids   Mode of Presentation straw;fed by clinician   Volume Presented 3 ounces   Oral Phase delayed AP movement   Pharyngeal Phase intact   Diagnostic Statement Functional swallow with mildly thick liquid trials. No overt s/sx of penetration/aspiration.   Clinical Swallow Evaluation: Puree Solid Texture Trial   Mode of Presentation, Puree spoon;fed by clinician   Volume of Puree Presented 2 ounces   Oral Phase, Puree delayed AP movement   Pharyngeal Phase, Puree intact   Diagnostic Statement Functional swallow with puree trials. No overt s/sx of penetration/aspiration.   Esophageal Phase of Swallow   Patient reports or presents with symptoms of esophageal dysphagia No   Swallowing Recommendations   Diet  Consistency Recommendations mildly thick liquids (level 2);minced & moist (level 5)   Supervision Level for Intake distant supervision needed   Monitoring/Assistance Required (Eating/Swallowing) optimize oral intake to minimize need for tube feeding   Recommended Feeding/Eating Techniques (Swallow Eval) maintain upright sitting position for eating;provide assist with feeding   Medication Administration Recommendations, Swallowing (SLP) Pills with mildly thick liquid or in food carrier.   Comment, Swallowing Recommendations Patient presents with moderate oropharyngeal dysphagia related to recent intubation and missing dentition. Suspect post extubation pharyngeal swelling/irritation contributing to current difficulties with thin liquids. Suspect pt will be able to advance liquids in the next few days. Recommend minced and moist diet with mildly thick liquids.   General Therapy Interventions   Planned Therapy Interventions Dysphagia Treatment   Dysphagia treatment Modified diet education;Instruction of safe swallow strategies   Clinical Impression   Criteria for Skilled Therapeutic Interventions Met (SLP Eval) Yes, treatment indicated   SLP Diagnosis moderate oropharyngeal dysphagia   Risks & Benefits of therapy have been explained evaluation/treatment results reviewed;care plan/treatment goals reviewed;risks/benefits reviewed;current/potential barriers reviewed;participants voiced agreement with care plan;participants included;patient   Clinical Impression Comments Patient presents with moderate oropharyngeal dysphagia related to recent intubation and missing dentition. Suspect post extubation pharyngeal swelling/irritation contributing to current difficulties with thin liquids. Suspect pt will be able to advance liquids in the next few days. Recommend minced and moist diet with mildly thick liquids.   SLP Total Evaluation Time   Eval: oral/pharyngeal swallow function, clinical swallow Minutes (72031) 25   SLP Goals    Therapy Frequency (SLP Eval) 3 times/week   SLP Predicted Duration/Target Date for Goal Attainment 10/24/23   SLP Goals Swallow   SLP: Safely tolerate diet without signs/symptoms of aspiration Soft & bite sized diet;Thin liquids   SLP Discharge Planning   SLP Plan SLP will continue to follow for diet tolerance followup and advance diet as warranted   SLP Discharge Recommendation home with assist   SLP Rationale for DC Rec To maximize safety with oral intake and reduce risk of choking, aspiration, and pneumonia   SLP Brief overview of current status  minced and moist diet with mildly thick liquids   Total Session Time   Total Session Time (sum of timed and untimed services) 25       Thank you for this referral!    Mel Marquis MA, CCC-SLP  [facility]  [phone]

## 2023-10-19 NOTE — PROGRESS NOTES
[facility]    Medicine Progress Note - Hospitalist Service    Date of Admission:  10/7/2023    Assessment & Plan   Pavithra Raines is a 68 year old female with h/o  COPD, alcohol dependence recently consuming large volume alcohol daily, gastric bypass surgery, depression/PTSD, and ongoing tobacco abuse who presented with generalized weakness and fatigue and was admitted due to concerns for multifocal community-acquired pneumonia and sepsis. Patient required transfer to the ICU due to concern of septic shock requiring pressors. Patient also had to be intubated due to hypoxia. She has since been extubated but continues to have persistent leukocytosis and increasing inflammatory markers.        #Septic shock due to streptococcal pneumoniae bacteremia and multifocal pneumonia  - Blood culture from admission grew Streptococcus pneumoniae.    - Sepsis rapidly progressed to septic shock requiring initiation of vasopressors but she is currently off.  - Patient is now off pressors and has been extubated on 10/18/23  Given persistent leukocytosis, continue Zosyn  Okay to discontinue steroids (on 10/19/23)  Trend CBC, fever curve  Will recommend plasencia-scan w IV contrast if she develops fever, or hemodynamic compromise. Other consideration includes obtaining thoracentesis to r/o parapneumonic process   - Patient was initially on IV Vancomycin and CTX but given perisstent leukocytosis, patient was re-broadened to Vanc (which has been discontinued) and currently remains on Zosyn  Blood cultures : 10/7 - 1 bottle - strept pneumoniae  Blood cultures 10/8, 10/9 : NGTD  MRSA/MSSA screen : negative  C diff negative 10/8, enteric panel negative 10/8     #Acute hypoxic respiratory failure in the setting of multifocal pneumonia   - extubated on 10/18/23  -resting comfortably on supplemental O2 with normal WOB  Mgt as above with abx  Continue diuresis with lasix  SpO2 to keep O2 sat > 94%    #Alcohol  "use disorder (severe dependence) acute alcohol withdrawal with delirium   #History of seizure due to alcohol withdrawal  - Per [facility] previous discussion with  \"patient had been drinking an average of 12 pack of beer, 1 bottle of wine, and 10 hard liquor drinks a day recently with most recent alcoholic drink approximately 30 minutes prior to hospital presentation.\"    - Blood alcohol level was not tested at time of admission.    - Patient developed overt signs of withdrawal on 10/9 including increased anxiety, tremor, confusion and possible hallucination.    - Initial treatment of alcohol withdrawal per [facility] protocol was complicated by sedation that may have exacerbated respiratory failure.    - no further signs of withdrawal      #Suspected malnutrition  #Hypoalbuminemia   #Status post gastric bypass for obesity  - Has remote history of Tom-en-Y gastric bypass surgery for obesity in March 2008.    - Reported 13 pound weight loss in recent weeks and appears underweight and probably cachectic on exam.    - Alcoholism likely contributes to malnutrition as may previous gastric bypass status.    - Serum albumin was low at admission at 2.8 and has decreased and she is now starting to show signs of peripheral edema.    - Noncardiogenic pulmonary edema from hypoalbuminemia could exacerbate respiratory failure.    - Her previous Tom-en-Y gastric bypass surgery may limit ability to advance feeding tube beyond the gastric remnant.  NG/NJ in place enteral feeding  Continue calorie count  Hopefully will discontinue tube feeds if meeting caloric intake      #Hypomagnesemia  #Hypophosphatemia   #Hypokalemia  Presented with low serum magnesium and potassium levels and subsequently developed low serum phosphorus level all likely secondary to nutritional deficiency.    - continue to monitor and replace per protocol     #Hyponatremia resolved   - cont to monitor     #Hyperglycemia  - Patient has had mild to moderate " hyperglycemia while in the ICU.    - Hemoglobin A1c was 4.4 and she is not known to have underlying diabetes.    - Insulin infusion was started for management of hyperglycemia in the ICU.   -  Anticipate hyperglycemia will worsen with initiation of enteral feeding.  Continue to monitor blood sugars per protocol  Continue insulin infusion per protocol for management of hyperglycemia  Continuing IV fluids containing dextrose while receiving insulin infusion     #Thrombocytopenia - resolved   #Anemia, unspecified type  #Coagulopathy  Presented with thrombocytopenia and anemia at admission both of which initially worsened but have subsequently stabilized.    - HGB chronically on the low side but usually runs around 11, no evidence of bleeding but monitor, currently 7.5  - Thrombocytopenia appears newer was stable on admission 168 and prior to that in the 200's now 360  - last INR 1.16     #Emphysema/COPD  #Tobacco abuse  Known chronic emphysema and COPD without overt acute exacerbation.  Patient reportedly has continued to smoke.  Continue bronchodilators as needed  Nicotine patch supplementation started during hospitalization.     #Dehydration  Presented with dehydration which likely contributed to hyponatremia and initial low normal blood pressures, now resolved after IV fluid treatment.  - IV lasix Q12     #Weakness generalized  Suspect presenting complaint of generalized weakness was multifactorial including infection, severe alcohol dependence, and suspected malnutrition with nutrient including electrolyte deficiencies.  - PT/OT evaluated, recommending TCU     #Episode of recurrent major depressive disorder, unspecified depression episode severity (H24)  #PTSD (post-traumatic stress disorder)  - Carries previous diagnosis of depression and PTSD treated chronically with Lexapro 30 mg daily, clonazepam 0.5 mg 3 times daily as needed for anxiety, BuSpar 15 mg 3 times a day, Effexor 37.5 mg daily, and Xyzal 5 mg daily.     - At admission it was unclear whether the patient has recently been taking these medications reliably.   -But able to confirm that patient is taking Effexor and BuSpar.  Resume PTA Effexor and BuSpar.          Diet: Minced & Moist Diet (level 5) Mildly Thick (level 2)  Snacks/Supplements Adult: Other; Mildly Thick Ensure Enlive; With Meals  Adult Formula Drip Feeding: Continuous Vital 1.5; Nasogastric tube; Goal Rate: 20 mL; mL/hr; From: 12:00 AM; To: 12:00 AM; Please REDUCE rate by half to new rate of 20 mL/hr x 24 hrs. Please hold here pending PO intake. Cartons have a hang time of...  Calorie Counts    DVT Prophylaxis: Enoxaparin (Lovenox) SQ  Mccabe Catheter: PRESENT, indication: Strict 1-2 Hour I&O  Lines: PRESENT      PICC 10/09/23 Triple Lumen Left Basilic OK TO USE per PICC STAT RN-Site Assessment: WDL  [REMOVED] Arterial Line 10/11/23 Radial-Site Assessment: WDL      Cardiac Monitoring: ACTIVE order. Indication: ICU  Code Status: Full Code      Clinically Significant Risk Factors        # Hypokalemia: Lowest K = 3.1 mmol/L in last 2 days, will replace as needed       # Hypoalbuminemia: Lowest albumin = 1.7 g/dL at 10/11/2023  6:12 AM, will monitor as appropriate                       Disposition Plan             GRUPO KENNEDY MD  Hospitalist Service  [facility]  Securely message with getupp (more info)  Text page via [employer] Paging/Directory   ______________________________________________________________________    Interval History   -extubated overnight to nasal canula  -did well with speech therapy today; also worked with PT  -denies any acute complaints this morning  -discontinuing mccabe, and art line today    Physical Exam   Vital Signs: Temp: 98.1  F (36.7  C) Temp src: Oral BP: 107/76 Pulse: 83   Resp: (!) 38 SpO2: 95 % O2 Device: Nasal cannula Oxygen Delivery: 4 LPM  Weight: 116 lbs 3.2 oz    General Appearance: Thin appearing lady, lying in bed on supplemental O2  in NAD  HEENT: PERRL: EOMI;mucous membrane w/o lesions  Neck: No JVD  Pulmonary: decreased breath sounds in the bases; coarse bilateral crackles on auscultation bilaterally   CVS: Regular rhythm, no murmurs, rubs or gallops  GI: BS (+), soft nontender, no rebound or guarding   Extremities: No LE edema   Skin: No rashes or lesions  Neurologic: A&O x3      Medical Decision Making       55 MINUTES SPENT BY ME on the date of service doing chart review, history, exam, documentation & further activities per the note.      Data   ------------------------- PAST 24 HR DATA REVIEWED -----------------------------------------------    I have personally reviewed the following data over the past 24 hrs:    18.9 (H)  \   9.0 (L)   / 471 (H)     138 106 8.7 /  167 (H)   3.6; 3.6 20 (L) 0.69 \     Procal: 0.11 (H) CRP: N/A Lactic Acid: N/A         Imaging results reviewed over the past 24 hrs:   No results found for this or any previous visit (from the past 24 hour(s)).

## 2023-10-19 NOTE — PROGRESS NOTES
Care Management Follow Up    Length of Stay (days): 12    Expected Discharge Date:  10/23/23     Concerns to be Addressed: discharge planning, substance/tobacco abuse/use  patient on a vent    Patient plan of care discussed at interdisciplinary rounds: Yes    Anticipated Discharge Disposition: Home     Anticipated Discharge Services: TCU vs Home Care   Anticipated Discharge DME:  TBD    Patient/family educated on Medicare website which has current facility and service quality ratings:  N/A    Education Provided on the Discharge Plan:  yes  Patient/Family in Agreement with the Plan:  yes    Referrals Placed by CM/SW: Internal Clinic Care Coordination, Scheduled Follow-up appointments    Private pay costs discussed: Not applicable    Additional Information:  Met with patient to discuss options for discharge. Patient now off vent and on O2. Patient alert and oriented to discuss plan of care. Discussed home with home care vs TCU, depending on how she is doing closer to discharge.    Patient verbalizes really wanting to discharge home. Discussed support at home and she states she has her . Questioned if her  is home with her 24/7. Patient will need 24/7 support if she returns home.    Patient plans to discuss options with her friend, Mishel, today when she visits.    If patient needs TCU, she would like to stay local at [facility] (Main Phone: [phone] Admissions Phone: [phone] Fax: [phone]). Referral sent.    Patient prefers to go home with home care, if able. Referral sent to [facility] (Phone: [phone])  for SN, HHA, PT/OT.   - ACCEPTED with [facility] (Phone: [phone]) for SN, HHA, PT/OT    Will re-address needs in a few more days as patient becomes more medically stable.    ANUP Juares  [facility] [phone]/ [facility] [phone]  Care Management

## 2023-10-19 NOTE — PLAN OF CARE
Goal Outcome Evaluation:      Plan of Care Reviewed With: patient, friend    Overall Patient Progress: improvingOverall Patient Progress: improving    Outcome Evaluation: Patient is alert, oriented. Occasionally forgetful and can be impulsive. Denies pain. Fluids stopped. TF changed to 20ml/hr with 90ml flushes Q3. Art line and mccabe removed at 1630. Patient is due to void. LS clear. Non productive cough present with occasional wheeze. Is able to clear with coughing. 2L NC to maintain sats. Nose is sore from TF. Advanced to minced and moist, thickened liquids diet. Has poor oral food intake but good oral liquid intake. Magnesium and Potassium replaced this shift. WBC did trend up-MD aware and following. Up in the chair for meals assist of 1, walker, belt.

## 2023-10-19 NOTE — PLAN OF CARE
"  Problem: Plan of Care - These are the overarching goals to be used throughout the patient stay.    Goal: Plan of Care Review  Description: The Plan of Care Review/Shift note should be completed every shift.  The Outcome Evaluation is a brief statement about your assessment that the patient is improving, declining, or no change.  This information will be displayed automatically on your shift note.  Outcome: Progressing  Goal: Patient-Specific Goal (Individualized)  Description: You can add care plan individualizations to a care plan. Examples of Individualization might be:  \"Parent requests to be called daily at 9am for status\", \"I have a hard time hearing out of my right ear\", or \"Do not touch me to wake me up as it startles me\".  Outcome: Progressing  Goal: Absence of Hospital-Acquired Illness or Injury  Outcome: Progressing  Intervention: Identify and Manage Fall Risk  Recent Flowsheet Documentation  Taken 10/19/2023 0000 by Cabrera Sanchez RN  Safety Promotion/Fall Prevention:   activity supervised   clutter free environment maintained   increased rounding and observation   increase visualization of patient   nonskid shoes/slippers when out of bed   room door open   room near nurse's station   room organization consistent   safety round/check completed   supervised activity  Taken 10/18/2023 2000 by Cabrera Sanchez RN  Safety Promotion/Fall Prevention:   activity supervised   clutter free environment maintained   increased rounding and observation   increase visualization of patient   nonskid shoes/slippers when out of bed   room door open   room near nurse's station   room organization consistent   safety round/check completed   supervised activity  Intervention: Prevent Skin Injury  Recent Flowsheet Documentation  Taken 10/19/2023 0000 by Cabrera Sanchez RN  Body Position: position changed independently  Taken 10/18/2023 2000 by Cabrera Sanchez RN  Body Position: position changed independently  Intervention: " Prevent and Manage VTE (Venous Thromboembolism) Risk  Recent Flowsheet Documentation  Taken 10/19/2023 0000 by Cabrera Sanchez RN  VTE Prevention/Management: other (see comments)  Taken 10/18/2023 2000 by Cabrera Sanchez RN  VTE Prevention/Management: other (see comments)  Goal: Optimal Comfort and Wellbeing  Outcome: Progressing  Intervention: Provide Person-Centered Care  Recent Flowsheet Documentation  Taken 10/19/2023 0000 by Cabrera Sanchez RN  Trust Relationship/Rapport:   care explained   choices provided   emotional support provided  Taken 10/18/2023 2000 by Cabrera Sanchez RN  Trust Relationship/Rapport:   care explained   choices provided   emotional support provided  Goal: Readiness for Transition of Care  Outcome: Progressing     Problem: Pneumonia  Goal: Fluid Balance  Outcome: Progressing  Goal: Resolution of Infection Signs and Symptoms  Outcome: Progressing  Goal: Effective Oxygenation and Ventilation  Outcome: Progressing  Intervention: Promote Airway Secretion Clearance  Recent Flowsheet Documentation  Taken 10/19/2023 0000 by Cabrera Sanchez RN  Cough And Deep Breathing: done independently per patient  Taken 10/18/2023 2000 by Cabrera Sanchez RN  Cough And Deep Breathing: done independently per patient  Intervention: Optimize Oxygenation and Ventilation  Recent Flowsheet Documentation  Taken 10/19/2023 0000 by Cabrera Sanchez RN  Head of Bed (\[facility]\"") Positioning: HOB at 30-45 degrees  Taken 10/18/2023 2000 by Cabrera Sanchez RN  Head of Bed (\[facility]\"") Positioning: HOB at 30-45 degrees     Problem: Oral Intake Inadequate  Goal: Improved Oral Intake  Outcome: Progressing     Problem: Enteral Nutrition  Goal: Absence of Aspiration Signs and Symptoms  Outcome: Progressing  Intervention: Minimize Aspiration Risk  Recent Flowsheet Documentation  Taken 10/19/2023 0000 by Cabrera Sanchez RN  Head of Bed (\[facility]\"") Positioning: HOB at 30-45 degrees  Taken 10/18/2023 2000 by Cabrera Sanchez RN  Head of Bed (\[facility]\"")  Positioning: HOB at 30-45 degrees  Goal: Safe, Effective Therapy Delivery  Outcome: Progressing  Goal: Feeding Tolerance  Outcome: Progressing       Oxygen saturations remain stable with nasal canula in place at 4 LPM, up from 2 LPM at shift change, will titrate down as able.   Pt has made no complaints of shortness of breath and overall appears very comfortable.   She does have an intermittent non productive cough.   She has been alert and oriented and appropriate with all interaction.   Urine output has been adequate.   2 loose stools so far this shift.   Telemetry shows sinus rhythm / sinus bradycardia.   Will continue to monitor and follow plan of care.

## 2023-10-20 ENCOUNTER — APPOINTMENT (OUTPATIENT)
Dept: PHYSICAL THERAPY | Facility: CLINIC | Age: 68
DRG: 870 | End: 2023-10-20
Payer: MEDICARE

## 2023-10-20 ENCOUNTER — APPOINTMENT (OUTPATIENT)
Dept: SPEECH THERAPY | Facility: CLINIC | Age: 68
DRG: 870 | End: 2023-10-20
Payer: MEDICARE

## 2023-10-20 LAB
ALBUMIN SERPL BCG-MCNC: 2.2 G/DL (ref 3.5–5.2)
ALP SERPL-CCNC: 65 U/L (ref 35–104)
ALT SERPL W P-5'-P-CCNC: 14 U/L (ref 0–50)
ANION GAP SERPL CALCULATED.3IONS-SCNC: 8 MMOL/L (ref 7–15)
AST SERPL W P-5'-P-CCNC: 20 U/L (ref 0–45)
BASO+EOS+MONOS # BLD AUTO: ABNORMAL 10*3/UL
BASO+EOS+MONOS NFR BLD AUTO: ABNORMAL %
BASOPHILS # BLD AUTO: 0.1 10E3/UL (ref 0–0.2)
BASOPHILS NFR BLD AUTO: 0 %
BILIRUB SERPL-MCNC: 0.3 MG/DL
BUN SERPL-MCNC: 7.9 MG/DL (ref 8–23)
CALCIUM SERPL-MCNC: 7.9 MG/DL (ref 8.8–10.2)
CHLORIDE SERPL-SCNC: 103 MMOL/L (ref 98–107)
CREAT SERPL-MCNC: 0.71 MG/DL (ref 0.51–0.95)
DEPRECATED HCO3 PLAS-SCNC: 25 MMOL/L (ref 22–29)
EGFRCR SERPLBLD CKD-EPI 2021: >90 ML/MIN/1.73M2
EOSINOPHIL # BLD AUTO: 0.2 10E3/UL (ref 0–0.7)
EOSINOPHIL NFR BLD AUTO: 2 %
ERYTHROCYTE [DISTWIDTH] IN BLOOD BY AUTOMATED COUNT: 13.6 % (ref 10–15)
GLUCOSE BLDC GLUCOMTR-MCNC: 113 MG/DL (ref 70–99)
GLUCOSE BLDC GLUCOMTR-MCNC: 97 MG/DL (ref 70–99)
GLUCOSE SERPL-MCNC: 93 MG/DL (ref 70–99)
HCT VFR BLD AUTO: 25.7 % (ref 35–47)
HGB BLD-MCNC: 8.5 G/DL (ref 11.7–15.7)
IMM GRANULOCYTES # BLD: 0.1 10E3/UL
IMM GRANULOCYTES NFR BLD: 1 %
LACTATE SERPL-SCNC: 0.9 MMOL/L (ref 0.7–2)
LYMPHOCYTES # BLD AUTO: 1.6 10E3/UL (ref 0.8–5.3)
LYMPHOCYTES NFR BLD AUTO: 10 %
MAGNESIUM SERPL-MCNC: 1.8 MG/DL (ref 1.7–2.3)
MCH RBC QN AUTO: 33.7 PG (ref 26.5–33)
MCHC RBC AUTO-ENTMCNC: 33.1 G/DL (ref 31.5–36.5)
MCV RBC AUTO: 102 FL (ref 78–100)
MONOCYTES # BLD AUTO: 1.2 10E3/UL (ref 0–1.3)
MONOCYTES NFR BLD AUTO: 8 %
NEUTROPHILS # BLD AUTO: 12.5 10E3/UL (ref 1.6–8.3)
NEUTROPHILS NFR BLD AUTO: 79 %
NRBC # BLD AUTO: 0 10E3/UL
NRBC BLD AUTO-RTO: 0 /100
PHOSPHATE SERPL-MCNC: 3 MG/DL (ref 2.5–4.5)
PLATELET # BLD AUTO: 497 10E3/UL (ref 150–450)
POTASSIUM SERPL-SCNC: 3.3 MMOL/L (ref 3.4–5.3)
POTASSIUM SERPL-SCNC: 3.7 MMOL/L (ref 3.4–5.3)
PROT SERPL-MCNC: 5.6 G/DL (ref 6.4–8.3)
RBC # BLD AUTO: 2.52 10E6/UL (ref 3.8–5.2)
SODIUM SERPL-SCNC: 136 MMOL/L (ref 135–145)
WBC # BLD AUTO: 15.8 10E3/UL (ref 4–11)

## 2023-10-20 PROCEDURE — 97110 THERAPEUTIC EXERCISES: CPT | Mod: GP | Performed by: PHYSICAL THERAPIST

## 2023-10-20 PROCEDURE — 99233 SBSQ HOSP IP/OBS HIGH 50: CPT | Performed by: PEDIATRICS

## 2023-10-20 PROCEDURE — 83605 ASSAY OF LACTIC ACID: CPT | Performed by: PEDIATRICS

## 2023-10-20 PROCEDURE — 250N000013 HC RX MED GY IP 250 OP 250 PS 637: Performed by: INTERNAL MEDICINE

## 2023-10-20 PROCEDURE — 80053 COMPREHEN METABOLIC PANEL: CPT | Performed by: INTERNAL MEDICINE

## 2023-10-20 PROCEDURE — 250N000011 HC RX IP 250 OP 636: Mod: JZ | Performed by: PEDIATRICS

## 2023-10-20 PROCEDURE — 250N000013 HC RX MED GY IP 250 OP 250 PS 637: Performed by: PEDIATRICS

## 2023-10-20 PROCEDURE — 84132 ASSAY OF SERUM POTASSIUM: CPT | Performed by: INTERNAL MEDICINE

## 2023-10-20 PROCEDURE — 250N000011 HC RX IP 250 OP 636: Performed by: FAMILY MEDICINE

## 2023-10-20 PROCEDURE — 250N000011 HC RX IP 250 OP 636: Mod: JZ | Performed by: INTERNAL MEDICINE

## 2023-10-20 PROCEDURE — 85025 COMPLETE CBC W/AUTO DIFF WBC: CPT | Performed by: INTERNAL MEDICINE

## 2023-10-20 PROCEDURE — 83735 ASSAY OF MAGNESIUM: CPT | Performed by: INTERNAL MEDICINE

## 2023-10-20 PROCEDURE — 120N000001 HC R&B MED SURG/OB

## 2023-10-20 PROCEDURE — 84100 ASSAY OF PHOSPHORUS: CPT | Performed by: INTERNAL MEDICINE

## 2023-10-20 PROCEDURE — 92526 ORAL FUNCTION THERAPY: CPT | Mod: GN | Performed by: SPEECH-LANGUAGE PATHOLOGIST

## 2023-10-20 RX ORDER — POTASSIUM CHLORIDE 20MEQ/15ML
40 LIQUID (ML) ORAL ONCE
Status: COMPLETED | OUTPATIENT
Start: 2023-10-20 | End: 2023-10-20

## 2023-10-20 RX ORDER — CEFTRIAXONE 2 G/1
2 INJECTION, POWDER, FOR SOLUTION INTRAMUSCULAR; INTRAVENOUS EVERY 24 HOURS
Status: DISCONTINUED | OUTPATIENT
Start: 2023-10-20 | End: 2023-10-21

## 2023-10-20 RX ORDER — MAGNESIUM OXIDE 400 MG/1
400 TABLET ORAL EVERY 4 HOURS
Status: COMPLETED | OUTPATIENT
Start: 2023-10-20 | End: 2023-10-20

## 2023-10-20 RX ORDER — PANTOPRAZOLE SODIUM 40 MG/1
40 TABLET, DELAYED RELEASE ORAL
Status: DISCONTINUED | OUTPATIENT
Start: 2023-10-20 | End: 2023-10-24 | Stop reason: HOSPADM

## 2023-10-20 RX ORDER — FUROSEMIDE 10 MG/ML
20 INJECTION INTRAMUSCULAR; INTRAVENOUS
Status: DISCONTINUED | OUTPATIENT
Start: 2023-10-20 | End: 2023-10-20

## 2023-10-20 RX ORDER — MULTIPLE VITAMINS W/ MINERALS TAB 9MG-400MCG
1 TAB ORAL DAILY
Status: DISCONTINUED | OUTPATIENT
Start: 2023-10-20 | End: 2023-10-24 | Stop reason: HOSPADM

## 2023-10-20 RX ORDER — POTASSIUM CHLORIDE 29.8 MG/ML
20 INJECTION INTRAVENOUS ONCE
Status: COMPLETED | OUTPATIENT
Start: 2023-10-20 | End: 2023-10-20

## 2023-10-20 RX ADMIN — Medication 1 TABLET: at 20:15

## 2023-10-20 RX ADMIN — FUROSEMIDE 20 MG: 10 INJECTION, SOLUTION INTRAVENOUS at 08:34

## 2023-10-20 RX ADMIN — BUSPIRONE HYDROCHLORIDE 15 MG: 5 TABLET ORAL at 20:15

## 2023-10-20 RX ADMIN — FERROUS GLUCONATE 324 MG: 324 TABLET ORAL at 08:34

## 2023-10-20 RX ADMIN — ENOXAPARIN SODIUM 30 MG: 30 INJECTION SUBCUTANEOUS at 20:16

## 2023-10-20 RX ADMIN — Medication 1 PATCH: at 08:35

## 2023-10-20 RX ADMIN — POTASSIUM CHLORIDE 20 MEQ: 29.8 INJECTION, SOLUTION INTRAVENOUS at 14:33

## 2023-10-20 RX ADMIN — PANTOPRAZOLE SODIUM 40 MG: 40 TABLET, DELAYED RELEASE ORAL at 08:34

## 2023-10-20 RX ADMIN — Medication 50 MCG: at 08:34

## 2023-10-20 RX ADMIN — BUSPIRONE HYDROCHLORIDE 15 MG: 5 TABLET ORAL at 08:34

## 2023-10-20 RX ADMIN — POTASSIUM CHLORIDE 40 MEQ: 1.5 SOLUTION ORAL at 06:32

## 2023-10-20 RX ADMIN — PIPERACILLIN AND TAZOBACTAM 4.5 G: 4; .5 INJECTION, POWDER, FOR SOLUTION INTRAVENOUS at 05:05

## 2023-10-20 RX ADMIN — THIAMINE HYDROCHLORIDE 100 MG: 100 INJECTION, SOLUTION INTRAMUSCULAR; INTRAVENOUS at 08:35

## 2023-10-20 RX ADMIN — Medication 1 TABLET: at 08:34

## 2023-10-20 RX ADMIN — Medication 400 MG: at 10:54

## 2023-10-20 RX ADMIN — PIPERACILLIN AND TAZOBACTAM 4.5 G: 4; .5 INJECTION, POWDER, FOR SOLUTION INTRAVENOUS at 10:54

## 2023-10-20 RX ADMIN — CEFTRIAXONE SODIUM 2 G: 2 INJECTION, POWDER, FOR SOLUTION INTRAMUSCULAR; INTRAVENOUS at 14:35

## 2023-10-20 RX ADMIN — BUSPIRONE HYDROCHLORIDE 15 MG: 5 TABLET ORAL at 14:31

## 2023-10-20 RX ADMIN — FOLIC ACID 1 MG: 1 TABLET ORAL at 08:34

## 2023-10-20 RX ADMIN — Medication 400 MG: at 06:32

## 2023-10-20 RX ADMIN — ESCITALOPRAM OXALATE 30 MG: 10 TABLET ORAL at 08:34

## 2023-10-20 RX ADMIN — VENLAFAXINE HYDROCHLORIDE 37.5 MG: 37.5 CAPSULE, EXTENDED RELEASE ORAL at 08:34

## 2023-10-20 ASSESSMENT — ACTIVITIES OF DAILY LIVING (ADL)
ADLS_ACUITY_SCORE: 44
ADLS_ACUITY_SCORE: 45
ADLS_ACUITY_SCORE: 44
ADLS_ACUITY_SCORE: 45
ADLS_ACUITY_SCORE: 45
ADLS_ACUITY_SCORE: 44
ADLS_ACUITY_SCORE: 45
ADLS_ACUITY_SCORE: 44
ADLS_ACUITY_SCORE: 45
ADLS_ACUITY_SCORE: 44
ADLS_ACUITY_SCORE: 45
ADLS_ACUITY_SCORE: 45

## 2023-10-20 NOTE — PROGRESS NOTES
Nutrition Therapy Update: Brief Note    Calorie count ordered by RD yesterday 10/19 - will last until Eduardo 10/22. TF currently running at half rate of 20 mL/hr x 24 hrs. Currently receiving minced and moist diet with thin liquids per SLP recommendations. Providing Ensure enlive (any flavor) TID with each meal.     RD will not be available in-person on Sunday to assess calorie count results. Will defer to Monday 10/23 and provide recommendations at that time. Pending PO intake improvements this weekend, would recommend keeping NG in until kobe count results are back (intake of at least 50% of estimated needs PO). NG/TF management per MD discretion.     Lori Angulo RD, LD  Clinical Dietitian  Office: [phone]  Weekend pager: [phone]

## 2023-10-20 NOTE — PROGRESS NOTES
[facility]    Medicine Progress Note - Hospitalist Service    Date of Admission:  10/7/2023    Assessment & Plan   Pavithra Raines is a 68 year old female with COPD, alcohol dependence recently consuming large volume alcohol daily, Tom-en-Y gastric bypass surgery, depression/PTSD, and tobacco abuse who presented with generalized weakness and fatigue and was admitted due to concerns for multifocal community-acquired pneumonia and sepsis.  She was transferred to the ICU due to septic shock requiring vasopressors and intubated to start mechanical ventilation due to severe progressive acute hypoxic respiratory failure. She has improved and was extubated 10/18 and no longer is requiring vasopressors.        Septic shock and acute hypoxic respiratory failure due to streptococcal pneumoniae bacteremia and multifocal pneumonia  Blood culture from admission grew Streptococcus pneumoniae.  Sepsis rapidly progressed to septic shock requiring initiation of vasopressors which were subsequently weaned off.  Respiratory failure rapidly progressed to severe hypoxia requiring intubation mechanical ventilation, but she was extubated on 10/18/23 with stable respiratory status on low-flow oxygen supplementation since then.  Previous treatments for infection included ceftriaxone, vancomycin, and corticosteroids and she subsequently was switched to Zosyn due to persistent leukocytosis.  However, repeat cultures have been negative, no new infection has been identified nor strongly suspected, leukocytosis is slowly improving, and she has not had fever.  Corticosteroids were discontinued 10/19.  -Switch Zosyn to ceftriaxone to complete treatment course for pneumococcal bacteremia, today is day 10 of 14  -Titrate and/or wean oxygen supplementation to keep saturations 90% and higher  -transfer to floor today off cardiac monitoring    Blood cultures : 10/7 - 1 bottle - strept pneumoniae  Blood cultures  "10/8, 10/9 : NGTD  MRSA/MSSA screen : negative  C diff negative 10/8, enteric panel negative 10/8    Alcohol use disorder (severe dependence) with acute alcohol withdrawal with delirium   History of seizure due to alcohol withdrawal  Per [facility] previous discussion with  \"patient had been drinking an average of 12 pack of beer, 1 bottle of wine, and 10 hard liquor drinks a day recently with most recent alcoholic drink approximately 30 minutes prior to hospital presentation.\"  Blood alcohol level was not tested at time of admission.  Patient developed overt signs of withdrawal on 10/9 including increased anxiety, tremor, confusion and possible hallucination.  Initial treatment of alcohol withdrawal per [facility] protocol was complicated by sedation that may have exacerbated respiratory failure.  Since extubation 1018, there have been no further signs of withdrawal.  -Anticipate assessing patient preferences for chemical dependency evaluation prior to discharge     Suspected malnutrition  Hypoalbuminemia   Status post Tom-en-Y gastric bypass for obesity  Has remote history of Tom-en-Y gastric bypass surgery for obesity in March 2008.  Reported 13 pound weight loss in recent weeks and appears underweight and probably cachectic on exam.  Alcoholism likely contributes to malnutrition as may previous gastric bypass status with limited oral intake at baseline.  Serum albumin was low at admission at 2.8 and has decreased and she has had signs of peripheral edema.  Additionally, noncardiogenic pulmonary edema from hypoalbuminemia may have exacerbated respiratory failure.  Her previous Tom-en-Y gastric bypass surgery limited ability to advance feeding tube beyond the gastric remnant.  She has been receiving enteral feeding at goal and since extubation has started to advance oral intake.  72-hour calorie count for oral intake was initiated on 10/19.  Per speech-language pathology evaluation on 10/20, may advance to minced and " moist solids with thin liquid diet.  -Continue indwelling feeding tube with enteral feeding to hold at 20 mL/h per recommendations of registered dietitian while assessing her caloric intake from oral intake  -Continue calorie count which will be completed on 10/22  -Anticipate assessing whether to discontinue tube feeds on 10/22 if meeting caloric intake goal   -Discontinue IV Lasix 10/20  -Continuing indwelling PICC line in the event that the patient requires initiation of parenteral nutrition if she cannot consume adequate nutritional oral intake and would not discharge with indwelling nasogastric feeding tube     Hypomagnesemia  Hypophosphatemia   Hypokalemia  Presented with low serum magnesium and potassium levels and subsequently developed low serum phosphorus level all likely secondary to nutritional deficiency.    - continue to monitor these electrolytes and replace per protocol as indicated     Hyponatremia resolved   Had hyponatremia that has resolved.   -Ongoing periodic monitoring of sodium     Hyperglycemia  Patient has had mild to moderate hyperglycemia while in the ICU.  Hemoglobin A1c was 4.4 and she is not known to have underlying diabetes.  Insulin infusion was started for management of hyperglycemia in the ICU while she was being treated with corticosteroids, IV fluids containing dextrose, and after starting enteral tube feeding.  Insulin infusion and IV fluids with dextrose have since been discontinued, corticosteroids have been stopped, and enteral tube feedings are being de-escalated with generally adequate control of blood sugars over the last 24 hours.  -Discontinue monitoring blood sugars  -Discontinue insulin     Thrombocytopenia - resolved   Anemia, unspecified type  Coagulopathy  Presented with thrombocytopenia and anemia at admission both of which initially worsened but have subsequently stabilized.  Suspect thrombocytopenia and anemia were due to sepsis.  She also was coagulopathic with  prolonged INR that has nearly normalized and was also probably due to sepsis.  -Periodic monitoring of hemoglobin, platelet, and INR     Emphysema/COPD  Tobacco abuse  Known chronic emphysema and COPD without overt acute exacerbation.  Patient reportedly has continued to smoke.  She was treated with corticosteroids for pneumococcal infection and septic shock and corticosteroids were discontinued on 10/19 without signs of COPD exacerbation.  -Continue bronchodilators as needed  -Continue nicotine patch supplementation started during hospitalization     Dehydration  Presented with dehydration which likely contributed to hyponatremia and initial low normal blood pressures, resolved after IV fluid treatment.  IV fluids have now been discontinued although she has continued to receive IV Lasix.  -Discontinue IV Lasix today     Weakness generalized  Suspect presenting complaint of generalized weakness was multifactorial including infection, severe alcohol dependence, and suspected malnutrition with nutrient including electrolyte deficiencies.  Anticipate discharge to TCU once medically stable, possibly 3 days.  -Continue PT/OT per protocol     Episode of recurrent major depressive disorder, unspecified depression episode severity (H24)  PTSD (post-traumatic stress disorder)  Carries previous diagnoses of depression and PTSD treated chronically with Lexapro 30 mg daily, clonazepam 0.5 mg 3 times daily as needed for anxiety, BuSpar 15 mg 3 times a day, Effexor 37.5 mg daily, and Xyzal 5 mg daily.    At admission it was unclear whether the patient has recently been taking these medications reliably.  However, it has now been confirmed that patient had been taking Effexor and BuSpar which have been restarted.  -Continue chronic doses of Effexor and BuSpar          Diet: Calorie Counts  Minced & Moist Diet (level 5) Thin Liquids (level 0)  Snacks/Supplements Adult: Ensure Enlive; With Meals  Adult Formula Drip Feeding:  Continuous Vital 1.5; Nasogastric tube; Goal Rate: 20 mL; mL/hr; From: 12:00 AM; To: 12:00 AM; continue 20 mL/hr and hold here pending calorie counts. Cartons have a hang time of 8 hours MAX. Please fully replace carton...    DVT Prophylaxis: Enoxaparin (Lovenox) SQ  Barney Catheter: Not present  Lines: PRESENT      PICC 10/09/23 Triple Lumen Left Basilic OK TO USE per PICC STAT RN-Site Assessment: WDL      Cardiac Monitoring: None  Code Status: Full Code      Clinically Significant Risk Factors        # Hypokalemia: Lowest K = 3.3 mmol/L in last 2 days, will replace as needed       # Hypoalbuminemia: Lowest albumin = 1.7 g/dL at 10/11/2023  6:12 AM, will monitor as appropriate                       Disposition Plan    anticipate discharge to TCU once medically stable, possibly 3 days         Elijah Johnson MD  Hospitalist Service  [facility]  Securely message with [employer] (more info)  Text page via [facility] Paging/Directory   ______________________________________________________________________    Interval History   Patient says she feels better overall.  She denies any shortness of breath.  She continues to have cough although she has chronic cough that she attributes to chronic smoking.  She remains afebrile and hemodynamically stable.  Oxygenation has been stable with stable oxygen requirement.  She is tolerating some oral intake but says she is having difficulty eating foods because the feeding tube in her nose makes it difficult to pass food into her mouth.  She also does not care for the hospital food.  She denies chest pain, regurgitation, abdominal pain, or nausea.  She offers no other complaints today.    Physical Exam   Vital Signs: Temp: 98.5  F (36.9  C) Temp src: Oral BP: 108/74 Pulse: 81   Resp: (!) 38 SpO2: 95 % O2 Device: Nasal cannula Oxygen Delivery: 2 LPM  Patient Vitals for the past 24 hrs:   BP Temp Temp src Pulse Resp SpO2 Weight   10/20/23 1006 -- -- -- 81 (!) 38  95 % --   10/20/23 0750 108/74 98.5  F (36.9  C) Oral 80 20 96 % --   10/20/23 0700 117/80 -- -- 74 (!) 5 92 % --   10/20/23 0600 103/59 -- -- 87 24 92 % --   10/20/23 0500 134/83 97.7  F (36.5  C) Oral 81 22 94 % --   10/20/23 0400 (!) 140/87 -- -- 72 13 96 % --   10/20/23 0300 115/77 -- -- 76 26 97 % --   10/20/23 0200 138/83 -- -- 78 22 98 % --   10/20/23 0100 (!) 141/84 -- -- 72 11 98 % --   10/20/23 0000 121/80 97.3  F (36.3  C) Oral 83 20 95 % --   10/19/23 2300 122/78 -- -- 74 22 99 % --   10/19/23 2200 129/80 -- -- 71 22 94 % --   10/19/23 2100 121/74 -- -- 73 20 96 % --   10/19/23 2000 123/82 -- -- 82 11 92 % --   10/19/23 1900 (!) 114/92 -- -- 87 -- 98 % --   10/19/23 1800 107/67 -- -- 68 -- 100 % --   10/19/23 1700 108/67 -- -- 85 24 97 % --   10/19/23 1600 124/75 -- -- 74 26 97 % --   10/19/23 1500 107/76 98.1  F (36.7  C) Oral 83 (!) 38 -- --   10/19/23 1400 95/71 -- -- 92 12 95 % --   10/19/23 1315 94/60 -- -- 86 (!) 38 -- --   10/19/23 1300 (!) 82/66 -- -- 69 (!) 40 -- --   10/19/23 1256 -- -- -- -- -- -- 52.7 kg (116 lb 3.2 oz)   10/19/23 1200 103/66 -- -- 89 29 92 % --   10/19/23 1155 107/75 98.4  F (36.9  C) Oral 87 30 91 % --     Weight: 116 lbs 3.2 oz  Vitals:    10/13/23 0500 10/14/23 0700 10/15/23 0501 10/17/23 0400   Weight: 48.7 kg (107 lb 4.8 oz) 49.1 kg (108 lb 4.8 oz) 41.9 kg (92 lb 4.8 oz) 49.9 kg (110 lb)    10/19/23 1256   Weight: 52.7 kg (116 lb 3.2 oz)       Intake/Output Summary (Last 24 hours) at 10/20/2023 1109  Last data filed at 10/20/2023 0804  Gross per 24 hour   Intake 711 ml   Output 2000 ml   Net -1289 ml       General Appearance: Chronically ill elderly woman without signs of acute distress sitting up in bed  Respiratory: Borderline tachypneic, not using accessory muscles and easily speaks full sentences, mildly diminished breath sounds, scattered rhonchi  Cardiovascular: Regular rate and rhythm, no peripheral edema, brisk capillary refill  GI: Nondistended abdomen, soft,  nontender     Medical Decision Making       60 MINUTES SPENT BY ME on the date of service doing chart review, history, exam, documentation & further activities per the note.      Data     I have personally reviewed the following data over the past 24 hrs:    15.8 (H)  \   8.5 (L)   / 497 (H)     136 103 7.9 (L) /  97   3.3 (L) 25 0.71 \     ALT: 14 AST: 20 AP: 65 TBILI: 0.3   ALB: 2.2 (L) TOT PROTEIN: 5.6 (L) LIPASE: N/A     Procal: N/A CRP: N/A Lactic Acid: N/A         Blood sugars  over the last day  Magnesium 1.8, phosphorus 3.0  Procalcitonin 0.11  Blood cultures and sputum culture from October 16 are negative    Cardiac monitoring results reviewed over the past 24 hrs: Normal sinus rhythm, no arrhythmias      Recent Labs   Lab 10/20/23  0808 10/20/23  0505 10/19/23  1630 10/19/23  1132 10/19/23  1017 10/19/23  0530 10/19/23  0525 10/18/23  0750 10/18/23  0526   WBC  --  15.8*  --   --  18.9*  --   --   --  13.9*   HGB  --  8.5*  --   --  9.0*  --   --   --  7.5*   MCV  --  102*  --   --  102*  --   --   --  103*   PLT  --  497*  --   --  471*  --   --   --  360   NA  --  136  --   --  138  --   --   --  139   POTASSIUM  --  3.3*  --   --  3.6  3.6  --  3.3*   < > 3.1*   CHLORIDE  --  103  --   --  106  --   --   --  105   CO2  --  25  --   --  20*  --   --   --  27   BUN  --  7.9*  --   --  8.7  --   --   --  11.0   CR  --  0.71  --   --  0.69  --   --   --  0.70   ANIONGAP  --  8  --   --  12  --   --   --  7   SHON  --  7.9*  --   --  7.7*  --   --   --  7.4*   GLC 97 93 123*   < > 157*   < >  --    < > 115*   ALBUMIN  --  2.2*  --   --   --   --   --   --   --    PROTTOTAL  --  5.6*  --   --   --   --   --   --   --    BILITOTAL  --  0.3  --   --   --   --   --   --   --    ALKPHOS  --  65  --   --   --   --   --   --   --    ALT  --  14  --   --   --   --   --   --   --    AST  --  20  --   --   --   --   --   --   --     < > = values in this interval not displayed.

## 2023-10-20 NOTE — PLAN OF CARE
Goal Outcome Evaluation:      Plan of Care Reviewed With: patient    Overall Patient Progress: improvingOverall Patient Progress: improving    Outcome Evaluation: Pt is A&Ox4 but can be foregetful. VSS on RA. Pt has denied pain. Pt transferred out of the ICU to med surg room today. Pt is urinating and stooling. Post pyloric feeding infusing at 20 ml/hr per order. Pt is on minced and moist diet with thin liquids. Pt is tolerating diet but states she does not like the food and appetite is decreased. Denies nausea or GI upset. Pt is up with 1A, gb and walker. Pt fatigues easily. Lung sounds are clear slightly diminished. K and Mag replaced all (k, mag, phos) to be rechecked in AM.

## 2023-10-20 NOTE — PLAN OF CARE
"  Problem: Plan of Care - These are the overarching goals to be used throughout the patient stay.    Goal: Plan of Care Review  Description: The Plan of Care Review/Shift note should be completed every shift.  The Outcome Evaluation is a brief statement about your assessment that the patient is improving, declining, or no change.  This information will be displayed automatically on your shift note.  Outcome: Progressing  Goal: Patient-Specific Goal (Individualized)  Description: You can add care plan individualizations to a care plan. Examples of Individualization might be:  \"Parent requests to be called daily at 9am for status\", \"I have a hard time hearing out of my right ear\", or \"Do not touch me to wake me up as it startles me\".  Outcome: Progressing  Goal: Absence of Hospital-Acquired Illness or Injury  Outcome: Progressing  Intervention: Identify and Manage Fall Risk  Recent Flowsheet Documentation  Taken 10/20/2023 0400 by Cabrera Sanchez RN  Safety Promotion/Fall Prevention:   activity supervised   increase visualization of patient   nonskid shoes/slippers when out of bed   room door open   room near nurse's station   room organization consistent   safety round/check completed   supervised activity  Taken 10/20/2023 0000 by Cabrera Sanchez RN  Safety Promotion/Fall Prevention:   activity supervised   increase visualization of patient   nonskid shoes/slippers when out of bed   room door open   room near nurse's station   room organization consistent   safety round/check completed   supervised activity  Taken 10/19/2023 2000 by Cabrera Sanchez RN  Safety Promotion/Fall Prevention:   activity supervised   increase visualization of patient   nonskid shoes/slippers when out of bed   room door open   room near nurse's station   room organization consistent   safety round/check completed   supervised activity  Intervention: Prevent Skin Injury  Recent Flowsheet Documentation  Taken 10/20/2023 0400 by Cabrera Sanchez, " RN  Body Position: position changed independently  Taken 10/20/2023 0000 by Cabrera Sanchez RN  Body Position: position changed independently  Taken 10/19/2023 2000 by Cabrera Sanchez RN  Body Position: position changed independently  Intervention: Prevent and Manage VTE (Venous Thromboembolism) Risk  Recent Flowsheet Documentation  Taken 10/20/2023 0400 by Cabrera Sanchez RN  VTE Prevention/Management: other (see comments)  Taken 10/20/2023 0000 by Cabrera Sanchez RN  VTE Prevention/Management: other (see comments)  Taken 10/19/2023 2000 by Cabrera Sanchez RN  VTE Prevention/Management: other (see comments)  Goal: Optimal Comfort and Wellbeing  Outcome: Progressing  Intervention: Provide Person-Centered Care  Recent Flowsheet Documentation  Taken 10/20/2023 0400 by Cabrera Sanchez RN  Trust Relationship/Rapport:   care explained   questions answered  Taken 10/20/2023 0000 by Cabrera Sanchez RN  Trust Relationship/Rapport:   care explained   questions answered  Taken 10/19/2023 2000 by Cabrera Sanchez RN  Trust Relationship/Rapport:   care explained   questions answered  Goal: Readiness for Transition of Care  Outcome: Progressing     Problem: Pneumonia  Goal: Fluid Balance  Outcome: Progressing  Goal: Resolution of Infection Signs and Symptoms  Outcome: Progressing  Goal: Effective Oxygenation and Ventilation  Outcome: Progressing  Intervention: Promote Airway Secretion Clearance  Recent Flowsheet Documentation  Taken 10/20/2023 0400 by Cabrera Sanchez RN  Cough And Deep Breathing: done independently per patient  Taken 10/20/2023 0000 by Cabrera Sanchez RN  Cough And Deep Breathing: done independently per patient  Taken 10/19/2023 2000 by Cabrera Sanchez RN  Cough And Deep Breathing: done independently per patient  Intervention: Optimize Oxygenation and Ventilation  Recent Flowsheet Documentation  Taken 10/20/2023 0400 by Cabrera Sanchez RN  Head of Bed (HOB) Positioning: HOB at 30-45 degrees  Taken 10/20/2023 0000  by Cabrera Sanchez RN  Head of Bed ([facility]) Positioning: HOB at 30-45 degrees     Problem: Oral Intake Inadequate  Goal: Improved Oral Intake  Outcome: Progressing     Problem: Enteral Nutrition  Goal: Absence of Aspiration Signs and Symptoms  Outcome: Progressing  Intervention: Minimize Aspiration Risk  Recent Flowsheet Documentation  Taken 10/20/2023 0400 by Cabrera Sanchez RN  Head of Bed ([facility]) Positioning: HOB at 30-45 degrees  Taken 10/20/2023 0000 by Cabrera Sanchez RN  Head of Bed ([facility]) Positioning: HOB at 30-45 degrees  Goal: Safe, Effective Therapy Delivery  Outcome: Progressing  Goal: Feeding Tolerance  Outcome: Progressing         Pt remains alert and oriented x4, uses the call light as advised, has been up to the bed side commode x3, she is somewhat weak but able to get on her feet with minimal assistance.   Oxygen saturations have been stable with 4 LPM via nasal canula throughout the night.   Lung sounds are clear and diminished in the bases.   Telemetry has shown sinus rhythm.   Ng tube in place with tube feeding infusing at goal rate of 20 ml an hour.   No complaints of pain.   Will continue to monitor and follow plan of care.

## 2023-10-20 NOTE — PROGRESS NOTES
10/20/23 1000   Appointment Info   Signing Clinician's Name / Credentials (SLP) Michelle Malin MA, CCC-SLP   General Information   Onset of Illness/Injury or Date of Surgery 10/07/23   Referring Physician Violetta Che MD   Patient/Family Therapy Goal Statement (SLP) To eat/drink and get NG tube removed   Pertinent History of Current Problem Dysphagia evaluation completed 10/19/2023 and patient placed on minced and moist diet with mildly thick liquids.  Nursing reporting that NG tube will hopefully be removed today.  Nursing reporting that patient is alert and cooperative with cares.   General Observations patient participated in dysphagia therapy session without difficulties   Pain Assessment   Patient Currently in Pain No   General Therapy Interventions   Planned Therapy Interventions Dysphagia Treatment   Dysphagia treatment Modified diet education;Instruction of safe swallow strategies   Intervention Comments patient tolerated trials of thin liquids with no vert s/s of aspiration.  Did cough 2x during trials and patient reported that this was due to sinus drainage. No change in vocal quality, changes in breathing, or other overt signs of aspiration.  Tolerated trials of softened dusty cracker in pudding with additional mastication time needed, otherwise  no difficulties with trials.   Interventions   Interventions Quick Adds Swallowing Dysfunction   Swallowing Dysfunction &/or Oral Function for Feeding   Treatment of Swallowing Dysfunction &/or Oral Function for Feeding Minutes (61656) 22   Symptoms Noted During/After Treatment None   Treatment Detail/Skilled Intervention completed trials of thin liquids and minced and moist foods.  patient tolerated trials of thin liquids with no vert s/s of aspiration. Did cough 2x during trials and patient reported that this was due to sinus drainage. No change in vocal quality, changes in breathing, or other overt signs of aspiration. Tolerated trials of softened  dusty cracker in pudding with additional mastication time needed, otherwise no difficulties with trials.  Recommend thin liquids, no straws, and minced and moist foods.  Patient to  be upright in bed for all oral intake and to remain upright for 30 minutes following intake to maximize oral and pharyngeal clearance.   SLP Discharge Planning   SLP Plan SLP will continue to follow for diet tolerance followup and advance diet as warranted   SLP Discharge Recommendation home with assist   SLP Rationale for DC Rec To maximize safety with oral intake and reduce risk of choking, aspiration, and pneumonia   SLP Brief overview of current status  thin liquids, minced and moist diet   Total Session Time   Total Session Time (sum of timed and untimed services) 22     Michelle (Mary Beth) Dario Malin MA, [facility]-SLP    [facility]  [email or web address]  Direct Line: [phone]

## 2023-10-20 NOTE — PROGRESS NOTES
Care Management Follow Up    Length of Stay (days): 13    Expected Discharge Date:  10/23/23     Concerns to be Addressed: discharge planning, substance/tobacco abuse/use  patient on a vent    Patient plan of care discussed at interdisciplinary rounds: Yes    Anticipated Discharge Disposition: TCU     Anticipated Discharge Services:  None    Anticipated Discharge DME:  None    Patient/family educated on Medicare website which has current facility and service quality ratings:  Yes    Education Provided on the Discharge Plan: yes      Patient/Family in Agreement with the Plan:  yes    Referrals Placed by CM/SW: Internal Clinic Care Coordination, Scheduled Follow-up appointments    Private pay costs discussed: Not applicable    Additional Information:  ARIELLA spoke with Bennie at [email or web address]. He states he has not had a chance to review referral yet, and will do so first thing Monday morning.     Patient in agreement with TCU and really wants [facility] to stay close to her support system.    Transport through [facility] bus if available if accepted there.     ANUP Juares  [facility] [phone]/ [facility] [phone]  Care Management

## 2023-10-21 ENCOUNTER — APPOINTMENT (OUTPATIENT)
Dept: PHYSICAL THERAPY | Facility: CLINIC | Age: 68
DRG: 870 | End: 2023-10-21
Payer: MEDICARE

## 2023-10-21 LAB
BACTERIA BLD CULT: NO GROWTH
BACTERIA BLD CULT: NO GROWTH
CREAT SERPL-MCNC: 0.59 MG/DL (ref 0.51–0.95)
EGFRCR SERPLBLD CKD-EPI 2021: >90 ML/MIN/1.73M2
MAGNESIUM SERPL-MCNC: 1.9 MG/DL (ref 1.7–2.3)
PHOSPHATE SERPL-MCNC: 3.3 MG/DL (ref 2.5–4.5)
PLATELET # BLD AUTO: 512 10E3/UL (ref 150–450)
POTASSIUM SERPL-SCNC: 3.8 MMOL/L (ref 3.4–5.3)
WBC # BLD AUTO: 13.6 10E3/UL (ref 4–11)

## 2023-10-21 PROCEDURE — 250N000011 HC RX IP 250 OP 636: Mod: JZ | Performed by: PEDIATRICS

## 2023-10-21 PROCEDURE — 99232 SBSQ HOSP IP/OBS MODERATE 35: CPT | Performed by: PEDIATRICS

## 2023-10-21 PROCEDURE — 120N000001 HC R&B MED SURG/OB

## 2023-10-21 PROCEDURE — 83735 ASSAY OF MAGNESIUM: CPT | Performed by: PEDIATRICS

## 2023-10-21 PROCEDURE — 84100 ASSAY OF PHOSPHORUS: CPT | Performed by: PEDIATRICS

## 2023-10-21 PROCEDURE — 82565 ASSAY OF CREATININE: CPT | Performed by: PEDIATRICS

## 2023-10-21 PROCEDURE — 250N000013 HC RX MED GY IP 250 OP 250 PS 637: Performed by: PEDIATRICS

## 2023-10-21 PROCEDURE — 85048 AUTOMATED LEUKOCYTE COUNT: CPT | Performed by: PEDIATRICS

## 2023-10-21 PROCEDURE — 97530 THERAPEUTIC ACTIVITIES: CPT | Mod: GP

## 2023-10-21 PROCEDURE — 84132 ASSAY OF SERUM POTASSIUM: CPT | Performed by: PEDIATRICS

## 2023-10-21 PROCEDURE — 85049 AUTOMATED PLATELET COUNT: CPT | Performed by: PEDIATRICS

## 2023-10-21 PROCEDURE — 250N000009 HC RX 250: Performed by: PEDIATRICS

## 2023-10-21 PROCEDURE — 97110 THERAPEUTIC EXERCISES: CPT | Mod: GP

## 2023-10-21 RX ORDER — MAGNESIUM SULFATE HEPTAHYDRATE 40 MG/ML
2 INJECTION, SOLUTION INTRAVENOUS ONCE
Status: COMPLETED | OUTPATIENT
Start: 2023-10-21 | End: 2023-10-21

## 2023-10-21 RX ORDER — POTASSIUM CHLORIDE 29.8 MG/ML
20 INJECTION INTRAVENOUS ONCE
Status: COMPLETED | OUTPATIENT
Start: 2023-10-21 | End: 2023-10-21

## 2023-10-21 RX ADMIN — MULTIPLE VITAMINS W/ MINERALS TAB 1 TABLET: TAB at 08:58

## 2023-10-21 RX ADMIN — SCOPALAMINE 1 PATCH: 1 PATCH, EXTENDED RELEASE TRANSDERMAL at 21:43

## 2023-10-21 RX ADMIN — Medication 50 MCG: at 08:58

## 2023-10-21 RX ADMIN — Medication 1 TABLET: at 21:23

## 2023-10-21 RX ADMIN — BUSPIRONE HYDROCHLORIDE 15 MG: 5 TABLET ORAL at 14:04

## 2023-10-21 RX ADMIN — PANTOPRAZOLE SODIUM 40 MG: 40 TABLET, DELAYED RELEASE ORAL at 06:56

## 2023-10-21 RX ADMIN — Medication 1 TABLET: at 08:58

## 2023-10-21 RX ADMIN — ENOXAPARIN SODIUM 30 MG: 30 INJECTION SUBCUTANEOUS at 21:23

## 2023-10-21 RX ADMIN — VENLAFAXINE HYDROCHLORIDE 37.5 MG: 37.5 CAPSULE, EXTENDED RELEASE ORAL at 08:58

## 2023-10-21 RX ADMIN — POTASSIUM CHLORIDE 20 MEQ: 29.8 INJECTION, SOLUTION INTRAVENOUS at 08:50

## 2023-10-21 RX ADMIN — FOLIC ACID 1 MG: 1 TABLET ORAL at 08:58

## 2023-10-21 RX ADMIN — MAGNESIUM SULFATE IN WATER 2 G: 40 INJECTION, SOLUTION INTRAVENOUS at 08:51

## 2023-10-21 RX ADMIN — Medication 1 PATCH: at 08:59

## 2023-10-21 RX ADMIN — FERROUS GLUCONATE 324 MG: 324 TABLET ORAL at 08:58

## 2023-10-21 RX ADMIN — SCOPALAMINE 1 PATCH: 1 PATCH, EXTENDED RELEASE TRANSDERMAL at 11:03

## 2023-10-21 RX ADMIN — Medication 100 MG: at 08:58

## 2023-10-21 RX ADMIN — ESCITALOPRAM OXALATE 30 MG: 10 TABLET ORAL at 08:58

## 2023-10-21 RX ADMIN — BUSPIRONE HYDROCHLORIDE 15 MG: 5 TABLET ORAL at 21:23

## 2023-10-21 RX ADMIN — CEFTRIAXONE SODIUM 2 G: 2 INJECTION, POWDER, FOR SOLUTION INTRAMUSCULAR; INTRAVENOUS at 14:05

## 2023-10-21 RX ADMIN — BUSPIRONE HYDROCHLORIDE 15 MG: 5 TABLET ORAL at 08:58

## 2023-10-21 ASSESSMENT — ACTIVITIES OF DAILY LIVING (ADL)
ADLS_ACUITY_SCORE: 45

## 2023-10-21 NOTE — PLAN OF CARE
Goal Outcome Evaluation:      Plan of Care Reviewed With: patient    Overall Patient Progress: improvingOverall Patient Progress: improving    Outcome Evaluation: patient A&O, vss, denies pain, using bedside commode, feeding tube 20 ml/hr, poor appetite, requesting family to bring her in food but still not eating more than 1-2 bites

## 2023-10-21 NOTE — PROGRESS NOTES
[facility]    Medicine Progress Note - Hospitalist Service    Date of Admission:  10/7/2023    Assessment & Plan   Pavithra Raines is a 68 year old female with COPD, alcohol dependence recently consuming large volume alcohol daily, Tom-en-Y gastric bypass surgery, depression/PTSD, and tobacco abuse who presented with generalized weakness and fatigue and was admitted due to concerns for multifocal community-acquired pneumonia and sepsis.  She was transferred to the ICU due to septic shock requiring vasopressors and intubated to start mechanical ventilation due to severe progressive acute hypoxic respiratory failure. She has improved and was extubated 10/18 and no longer is requiring vasopressors or oxygen supplementation.        Septic shock and acute hypoxic respiratory failure due to streptococcal pneumoniae bacteremia and multifocal pneumonia  Blood culture from admission grew Streptococcus pneumoniae.  Sepsis rapidly progressed to septic shock requiring initiation of vasopressors which were subsequently weaned off.  Respiratory failure rapidly progressed to severe hypoxia requiring intubation mechanical ventilation, but she was extubated on 10/18/23 with stable respiratory status on low-flow oxygen supplementation since then.  Previous treatments for infection included ceftriaxone, vancomycin, and corticosteroids and she subsequently was switched to Zosyn due to persistent leukocytosis.  However, repeat cultures have been negative, no new infection has been identified nor strongly suspected, leukocytosis is slowly improving, and she has not had fever.  Corticosteroids were discontinued 10/19.  -Discontinue antibiotics.  She has received 14 days of appropriate antibiotic treatment  -Titrate and/or wean oxygen supplementation to keep saturations 90% and higher    Alcohol use disorder (severe dependence) with acute alcohol withdrawal with delirium   History of seizure due to  "alcohol withdrawal  Per [facility] previous discussion with  \"patient had been drinking an average of 12 pack of beer, 1 bottle of wine, and 10 hard liquor drinks a day recently with most recent alcoholic drink approximately 30 minutes prior to hospital presentation.\"  Blood alcohol level was not tested at time of admission.  Patient developed overt signs of withdrawal on 10/9 including increased anxiety, tremor, confusion and hallucination.  Initial treatment of alcohol withdrawal per [facility] protocol was complicated by sedation that exacerbated respiratory failure.  Since extubation 1018, there have been no further signs of alcohol withdrawal.  -Advised abstinence from use of alcohol, discussed at length with the patient today.       Suspected malnutrition  Hypoalbuminemia   Status post Tom-en-Y gastric bypass for obesity  Has remote history of Tom-en-Y gastric bypass surgery for obesity in March 2008.  Reported 13 pound weight loss in recent weeks and appears underweight and probably cachectic on exam.  Alcoholism likely contributes to malnutrition as may previous gastric bypass status with limited oral intake at baseline.  Serum albumin was low at admission at 2.8 and has decreased and she has had signs of peripheral edema.  Additionally, noncardiogenic pulmonary edema from hypoalbuminemia may have exacerbated respiratory failure.  Her previous Tom-en-Y gastric bypass surgery limited ability to advance feeding tube beyond the gastric remnant.  She has been receiving enteral feeding at goal and since extubation has started to advance oral intake.  72-hour calorie count for oral intake was initiated on 10/19.  Per speech-language pathology evaluation on 10/20, may advance to minced and moist solids with thin liquid diet.  -Continue indwelling feeding tube with enteral feeding to hold at 20 mL/h per recommendations of registered dietitian while assessing her caloric intake from oral intake  -Continue calorie count " which will be completed on 10/22  -Anticipate assessing whether to discontinue tube feeds on 10/22 if meeting caloric intake goal   -Continuing indwelling PICC line in the event that the patient requires initiation of parenteral nutrition if she cannot consume adequate nutritional oral intake and would not discharge with indwelling nasogastric feeding tube.  If she does not require parenteral nutrition, anticipate discontinuing PICC line.     Hypomagnesemia  Hypophosphatemia   Hypokalemia  Presented with low serum magnesium and potassium levels and subsequently developed low serum phosphorus level all likely secondary to nutritional deficiency.    - continue to monitor these electrolytes and replace per protocol as indicated     Hyponatremia resolved   Had hyponatremia that has resolved.   -Ongoing periodic monitoring of sodium     Hyperglycemia  Patient has had mild to moderate hyperglycemia while in the ICU.  Hemoglobin A1c was 4.4 and she is not known to have underlying diabetes.  Insulin infusion was started for management of hyperglycemia in the ICU while she was being treated with corticosteroids, IV fluids containing dextrose, and after starting enteral tube feeding.  Insulin infusion and IV fluids with dextrose have since been discontinued, corticosteroids have been stopped, and enteral tube feedings are being de-escalated with generally adequate control of blood sugars as acute medical illness subsided and corticosteroid treatment was stopped.  -Clinical monitoring     Thrombocytopenia - resolved   Anemia, unspecified type  Coagulopathy  Presented with thrombocytopenia and anemia at admission both of which initially worsened but have subsequently stabilized.  Suspect thrombocytopenia and anemia were due to sepsis.  She also was coagulopathic with prolonged INR that has nearly normalized and was also probably due to sepsis.  -Periodic monitoring of hemoglobin, platelet, and INR     Emphysema/COPD  Tobacco  abuse  Known chronic emphysema and COPD without overt acute exacerbation.  Patient reportedly has continued to smoke.  She was treated with corticosteroids for pneumococcal infection and septic shock and corticosteroids were discontinued on 10/19 without signs of COPD exacerbation.  -Continue bronchodilators as needed  -Continue nicotine patch supplementation started during hospitalization     Dehydration  Presented with dehydration which likely contributed to hyponatremia and initial low normal blood pressures, resolved after IV fluid treatment.  IV fluids have now been discontinued although she has continued to receive IV Lasix until 10/20 when it was stopped.  -Clinical monitoring     Weakness generalized  Suspect presenting complaint of generalized weakness was multifactorial including infection, severe alcohol dependence, and suspected malnutrition with nutrient including electrolyte deficiencies.  Anticipate discharge to TCU once medically stable, possibly 3 days.  -Continue PT/OT per protocol     Episode of recurrent major depressive disorder, unspecified depression episode severity (H24)  PTSD (post-traumatic stress disorder)  Carries previous diagnoses of depression and PTSD treated chronically with Lexapro 30 mg daily, clonazepam 0.5 mg 3 times daily as needed for anxiety, BuSpar 15 mg 3 times a day, Effexor 37.5 mg daily, and Xyzal 5 mg daily.    At admission it was unclear whether the patient has recently been taking these medications reliably.  However, it has now been confirmed that patient had been taking Effexor and BuSpar which have been restarted.  -Continue chronic doses of Effexor and BuSpar          Diet: Calorie Counts  Minced & Moist Diet (level 5) Thin Liquids (level 0)  Snacks/Supplements Adult: Ensure Enlive; With Meals  Adult Formula Drip Feeding: Continuous Vital 1.5; Nasogastric tube; Goal Rate: 20 mL; mL/hr; From: 12:00 AM; To: 12:00 AM; continue 20 mL/hr and hold here pending calorie  counts. Cartons have a hang time of 8 hours MAX. Please fully replace carton...    DVT Prophylaxis: Pneumatic Compression Devices  Barney Catheter: Not present  Lines: PRESENT      PICC 10/09/23 Triple Lumen Left Basilic OK TO USE per PICC STAT RN-Site Assessment: WDL      Cardiac Monitoring: None  Code Status: Full Code      Clinically Significant Risk Factors        # Hypokalemia: Lowest K = 3.3 mmol/L in last 2 days, will replace as needed       # Hypoalbuminemia: Lowest albumin = 1.7 g/dL at 10/11/2023  6:12 AM, will monitor as appropriate                       Disposition Plan    anticipate discharge to TCU once medically stable, possibly 10/23         Elijah Johnson MD  Hospitalist Service  [facility]  Securely message with [employer] (more info)  Text page via [facility] Paging/Directory   ______________________________________________________________________    Interval History   Patient transferred from ICU to floor yesterday.  There were no significant overnight events.  She offers no complaints today.  She continues to generally feel weak.  She is tolerating oral intake with the foods that she prefers.  She chronically is only able to consume small amounts of fluid perhaps a half a cup at a time after her previous Tom-en-Y gastric bypass surgery many years ago.  She remains afebrile and hemodynamically stable.  Oxygenation has improved and she weaned off of oxygen supplementation over the last 24 hours.  She has had adequate urine output and is having regular bowel movements.  Strength is slowly improving but she remains deconditioned.     Physical Exam   Vital Signs: Temp: 98.2  F (36.8  C) Temp src: Oral BP: 105/71 Pulse: 86   Resp: 20 SpO2: 97 % O2 Device: None (Room air)    Patient Vitals for the past 24 hrs:   BP Temp Temp src Pulse Resp SpO2 Weight   10/21/23 1106 105/71 98.2  F (36.8  C) Oral 86 20 97 % --   10/21/23 1025 -- -- -- -- -- 100 % --   10/21/23 0744 131/79 99.7   F (37.6  C) Oral 82 16 93 % --   10/21/23 0500 -- -- -- -- -- -- 50.2 kg (110 lb 10.7 oz)   10/21/23 0338 121/71 98.5  F (36.9  C) Oral 94 16 -- --   10/20/23 2330 121/78 98.4  F (36.9  C) Oral 84 18 92 % --   10/20/23 1949 107/64 99.1  F (37.3  C) Oral 86 19 -- --   10/20/23 1451 102/60 98.2  F (36.8  C) Oral 85 20 95 % --     Weight: 110 lbs 10.73 oz  Vitals:    10/14/23 0700 10/15/23 0501 10/17/23 0400 10/19/23 1256   Weight: 49.1 kg (108 lb 4.8 oz) 41.9 kg (92 lb 4.8 oz) 49.9 kg (110 lb) 52.7 kg (116 lb 3.2 oz)    10/21/23 0500   Weight: 50.2 kg (110 lb 10.7 oz)       Intake/Output Summary (Last 24 hours) at 10/21/2023 1339  Last data filed at 10/21/2023 0643  Gross per 24 hour   Intake 1807 ml   Output 1350 ml   Net 457 ml       General Appearance: Cachectic appearing elderly woman without signs of acute distress resting in bed  Respiratory: Normal respiratory effort, clear lungs  Cardiovascular: Regular rate and rhythm, good radial pulse, normal capillary refill, no peripheral edema     Medical Decision Making             Data     I have personally reviewed the following data over the past 24 hrs:    N/A  \   N/A   / 512 (H)     N/A N/A N/A /  N/A   3.8 N/A 0.59 \     Procal: N/A CRP: N/A Lactic Acid: 0.9         Magnesium 1.9, phosphorus 3.3  Blood culture and sputum culture from October 16 are negative    Recent Labs   Lab 10/21/23  0637 10/20/23  1159 10/20/23  1146 10/20/23  0808 10/20/23  0505 10/19/23  1132 10/19/23  1017 10/18/23  0750 10/18/23  0526   WBC 13.6*  --   --   --  15.8*  --  18.9*  --  13.9*   HGB  --   --   --   --  8.5*  --  9.0*  --  7.5*   MCV  --   --   --   --  102*  --  102*  --  103*   *  --   --   --  497*  --  471*  --  360   NA  --   --   --   --  136  --  138  --  139   POTASSIUM 3.8  --  3.7  --  3.3*  --  3.6  3.6   < > 3.1*   CHLORIDE  --   --   --   --  103  --  106  --  105   CO2  --   --   --   --  25  --  20*  --  27   BUN  --   --   --   --  7.9*  --  8.7  --   11.0   CR 0.59  --   --   --  0.71  --  0.69  --  0.70   ANIONGAP  --   --   --   --  8  --  12  --  7   SHON  --   --   --   --  7.9*  --  7.7*  --  7.4*   GLC  --  113*  --  97 93   < > 157*   < > 115*   ALBUMIN  --   --   --   --  2.2*  --   --   --   --    PROTTOTAL  --   --   --   --  5.6*  --   --   --   --    BILITOTAL  --   --   --   --  0.3  --   --   --   --    ALKPHOS  --   --   --   --  65  --   --   --   --    ALT  --   --   --   --  14  --   --   --   --    AST  --   --   --   --  20  --   --   --   --     < > = values in this interval not displayed.

## 2023-10-21 NOTE — PLAN OF CARE
Goal Outcome Evaluation:      Plan of Care Reviewed With: patient    Overall Patient Progress: no changeOverall Patient Progress: no change    Outcome Evaluation: Pt is A&Ox4 can be forgetful at times. VSS on RA. Pt is up with 1A, gb and walker. Pt report feeling weak with activity and does become SOB. Appetite continues to be poor eating bites of meals. Pt has been having food brought in but even with foods she enjoys she is only able to eat bites. Post pyloric tube feeding infusing at 20 ml. Magnesium and potassium replaced per protocol both to be rechecked in AM. Pt denies nausea and pain this shift. Pt was slightly anxious this morning stating that she feels like she in never going to get better. Writer talked with pt about the plan and how much she has impoved already since admission. Pt did appear to be less anxious and reported that she was feeling better.

## 2023-10-21 NOTE — PLAN OF CARE
Goal Outcome Evaluation:         Patient overnight VSS, alert and oriented. Has feeding solution running at 20 ml/hour, tolerating well. Reports disliking foods here, but also only eating 1-2 bites of foods  brings her including mac and cheese and ice cream shake. Patient requesting goulash and cherry shake today from spouse. Refused standing weight.

## 2023-10-22 ENCOUNTER — APPOINTMENT (OUTPATIENT)
Dept: PHYSICAL THERAPY | Facility: CLINIC | Age: 68
DRG: 870 | End: 2023-10-22
Payer: MEDICARE

## 2023-10-22 LAB
ANION GAP SERPL CALCULATED.3IONS-SCNC: 10 MMOL/L (ref 7–15)
BUN SERPL-MCNC: 5 MG/DL (ref 8–23)
CALCIUM SERPL-MCNC: 8 MG/DL (ref 8.8–10.2)
CHLORIDE SERPL-SCNC: 106 MMOL/L (ref 98–107)
CREAT SERPL-MCNC: 0.58 MG/DL (ref 0.51–0.95)
DEPRECATED HCO3 PLAS-SCNC: 19 MMOL/L (ref 22–29)
EGFRCR SERPLBLD CKD-EPI 2021: >90 ML/MIN/1.73M2
ERYTHROCYTE [DISTWIDTH] IN BLOOD BY AUTOMATED COUNT: 13.5 % (ref 10–15)
GLUCOSE SERPL-MCNC: 89 MG/DL (ref 70–99)
HCT VFR BLD AUTO: 25.2 % (ref 35–47)
HGB BLD-MCNC: 8.5 G/DL (ref 11.7–15.7)
INR PPP: 1.13 (ref 0.85–1.15)
MAGNESIUM SERPL-MCNC: 1.9 MG/DL (ref 1.7–2.3)
MCH RBC QN AUTO: 34.4 PG (ref 26.5–33)
MCHC RBC AUTO-ENTMCNC: 33.7 G/DL (ref 31.5–36.5)
MCV RBC AUTO: 102 FL (ref 78–100)
PHOSPHATE SERPL-MCNC: 4.1 MG/DL (ref 2.5–4.5)
PLATELET # BLD AUTO: 555 10E3/UL (ref 150–450)
POTASSIUM SERPL-SCNC: 3.7 MMOL/L (ref 3.4–5.3)
RBC # BLD AUTO: 2.47 10E6/UL (ref 3.8–5.2)
SODIUM SERPL-SCNC: 135 MMOL/L (ref 135–145)
WBC # BLD AUTO: 13.5 10E3/UL (ref 4–11)

## 2023-10-22 PROCEDURE — 85610 PROTHROMBIN TIME: CPT | Performed by: PEDIATRICS

## 2023-10-22 PROCEDURE — 83735 ASSAY OF MAGNESIUM: CPT | Performed by: PEDIATRICS

## 2023-10-22 PROCEDURE — 85027 COMPLETE CBC AUTOMATED: CPT | Performed by: PEDIATRICS

## 2023-10-22 PROCEDURE — 250N000013 HC RX MED GY IP 250 OP 250 PS 637: Performed by: PEDIATRICS

## 2023-10-22 PROCEDURE — 84100 ASSAY OF PHOSPHORUS: CPT | Performed by: PEDIATRICS

## 2023-10-22 PROCEDURE — 97530 THERAPEUTIC ACTIVITIES: CPT | Mod: GP

## 2023-10-22 PROCEDURE — 99232 SBSQ HOSP IP/OBS MODERATE 35: CPT | Performed by: PEDIATRICS

## 2023-10-22 PROCEDURE — 80048 BASIC METABOLIC PNL TOTAL CA: CPT | Performed by: PEDIATRICS

## 2023-10-22 PROCEDURE — 97110 THERAPEUTIC EXERCISES: CPT | Mod: GP

## 2023-10-22 PROCEDURE — 250N000011 HC RX IP 250 OP 636: Mod: JZ | Performed by: PEDIATRICS

## 2023-10-22 PROCEDURE — 120N000001 HC R&B MED SURG/OB

## 2023-10-22 RX ORDER — POTASSIUM CHLORIDE 750 MG/1
10 TABLET, EXTENDED RELEASE ORAL ONCE
Status: COMPLETED | OUTPATIENT
Start: 2023-10-22 | End: 2023-10-22

## 2023-10-22 RX ORDER — MAGNESIUM OXIDE 400 MG/1
400 TABLET ORAL EVERY 4 HOURS
Status: COMPLETED | OUTPATIENT
Start: 2023-10-22 | End: 2023-10-22

## 2023-10-22 RX ADMIN — PANTOPRAZOLE SODIUM 40 MG: 40 TABLET, DELAYED RELEASE ORAL at 06:25

## 2023-10-22 RX ADMIN — Medication 50 MCG: at 08:49

## 2023-10-22 RX ADMIN — Medication 400 MG: at 12:58

## 2023-10-22 RX ADMIN — Medication 1 TABLET: at 08:49

## 2023-10-22 RX ADMIN — MULTIPLE VITAMINS W/ MINERALS TAB 1 TABLET: TAB at 08:49

## 2023-10-22 RX ADMIN — Medication 100 MG: at 08:49

## 2023-10-22 RX ADMIN — FOLIC ACID 1 MG: 1 TABLET ORAL at 08:49

## 2023-10-22 RX ADMIN — VENLAFAXINE HYDROCHLORIDE 37.5 MG: 37.5 CAPSULE, EXTENDED RELEASE ORAL at 08:49

## 2023-10-22 RX ADMIN — POTASSIUM CHLORIDE 10 MEQ: 750 TABLET, EXTENDED RELEASE ORAL at 08:49

## 2023-10-22 RX ADMIN — ACETAMINOPHEN 650 MG: 325 TABLET, FILM COATED ORAL at 13:17

## 2023-10-22 RX ADMIN — Medication 1 TABLET: at 21:43

## 2023-10-22 RX ADMIN — BUSPIRONE HYDROCHLORIDE 15 MG: 5 TABLET ORAL at 21:43

## 2023-10-22 RX ADMIN — ENOXAPARIN SODIUM 30 MG: 30 INJECTION SUBCUTANEOUS at 21:43

## 2023-10-22 RX ADMIN — RALOXIFENE HYDROCHLORIDE 60 MG: 60 TABLET, FILM COATED ORAL at 09:22

## 2023-10-22 RX ADMIN — Medication 400 MG: at 08:49

## 2023-10-22 RX ADMIN — ESCITALOPRAM OXALATE 30 MG: 10 TABLET ORAL at 08:49

## 2023-10-22 RX ADMIN — FERROUS GLUCONATE 324 MG: 324 TABLET ORAL at 08:48

## 2023-10-22 RX ADMIN — BUSPIRONE HYDROCHLORIDE 15 MG: 5 TABLET ORAL at 13:17

## 2023-10-22 RX ADMIN — BUSPIRONE HYDROCHLORIDE 15 MG: 5 TABLET ORAL at 08:49

## 2023-10-22 RX ADMIN — Medication 1 PATCH: at 08:48

## 2023-10-22 ASSESSMENT — ACTIVITIES OF DAILY LIVING (ADL)
ADLS_ACUITY_SCORE: 45

## 2023-10-22 NOTE — PLAN OF CARE
Goal Outcome Evaluation:           Overall Patient Progress: improvingOverall Patient Progress: improving    Outcome Evaluation: Pt alert and orientated and noted to be alittle anxious but given scheduled Buspar at  and has been resting in bed overnight. Pt did not eat any of her dinner but did drink about half of her large strawberry shake from [employer]. Continues with enterel feedings at 20ml/hr. Pt anticipated to have a nutrional consult for considering discontinuation. Pt weight 48.2 kg today standing scale previous weights have been bed weights. Hopeful to discharge to [facility] when ready.

## 2023-10-22 NOTE — PROGRESS NOTES
[id number] shift--    A/Ox4. VSS. No PRNs this shift. Pt had multiple visitors today. Continues to get external feedings at 20ml/hr. Continues to work on drinking ensure, and has another at bedside. Reports not liking our food as it minced meat. One of her friend brought her soup, but has only ate a couple bites. Did eat a couple bites of cheese cake at beginning of my shift, but overall very poor diet.

## 2023-10-22 NOTE — PLAN OF CARE
"Goal Outcome Evaluation:      Plan of Care Reviewed With: patient    Overall Patient Progress: improvingOverall Patient Progress: improving    Outcome Evaluation: PT A&O; VSS. K+ and Mag replaced w/ AM recheck. Phos is AM recheck. PRN Tylenol given X1 for headache rating 5/10. LS clear/diminished. Enteral feedings at 20 ml/hr. Pt has drank 2 clear ensures. Ate 100% of lunch. Currently snacking on cheese cake. Up to bathroom w/ SBA gb/walker. 3x loose bowel movements today.     /73 (BP Location: Right arm)   Pulse 82   Temp 99.3  F (37.4  C) (Oral)   Resp 26   Ht 1.626 m (5' 4\")   Wt 48.4 kg (106 lb 11.2 oz)   LMP  (LMP Unknown)   SpO2 97%   BMI 18.32 kg/m          "

## 2023-10-22 NOTE — PROGRESS NOTES
"[facility]    Medicine Progress Note - Hospitalist Service    Date of Admission:  10/7/2023    Assessment & Plan   Pavithra Raines is a 68 year old female with COPD, alcohol dependence recently consuming large volume alcohol daily, Tom-en-Y gastric bypass surgery, depression/PTSD, and tobacco abuse who presented with generalized weakness and fatigue and was admitted due to concerns for multifocal community-acquired pneumonia and sepsis.  She was transferred to the ICU due to septic shock requiring vasopressors and intubated to start mechanical ventilation due to severe progressive acute hypoxic respiratory failure. She has improved and was extubated 10/18 and no longer is requiring vasopressors or oxygen supplementation.        Septic shock and acute hypoxic respiratory failure due to streptococcal pneumoniae bacteremia and multifocal pneumonia  Blood culture from admission grew Streptococcus pneumoniae.  Sepsis rapidly progressed to septic shock requiring initiation of vasopressors which were subsequently weaned off.  Respiratory failure rapidly progressed to severe hypoxia requiring intubation mechanical ventilation, but she was extubated on 10/18/23 with stable respiratory status on low-flow oxygen supplementation since then.  Previous treatments for infection included ceftriaxone, vancomycin, and corticosteroids and she subsequently was switched to Zosyn due to persistent leukocytosis.  However, repeat cultures have been negative, no new infection has been identified nor strongly suspected, leukocytosis is slowly improving, and she has not had fever.  Corticosteroids were discontinued 10/19.  Antibiotics were discontinued 10/21.  -Monitor clinically    Alcohol use disorder (severe dependence) with acute alcohol withdrawal with delirium   History of seizure due to alcohol withdrawal  [place] [facility] [place]  \"patient had been drinking an average of 12 pack " "of beer, 1 bottle of wine, and 10 hard liquor drinks a day recently with most recent alcoholic drink approximately 30 minutes prior to hospital presentation.\"  Blood alcohol level was not tested at time of admission.  Patient developed overt signs of withdrawal on 10/9 including increased anxiety, tremor, confusion and hallucination.  Initial treatment of alcohol withdrawal per [facility] protocol was complicated by sedation that exacerbated respiratory failure.  Since extubation 1018, there have been no further signs of alcohol withdrawal.  -Advised abstinence from use of alcohol, discussed at length with the patient on 10/21     Suspected malnutrition  Hypoalbuminemia   Status post Tom-en-Y gastric bypass for obesity  Has remote history of Tom-en-Y gastric bypass surgery for obesity in March 2008.  Reported 13 pound weight loss in recent weeks and appears underweight and probably cachectic on exam.  Alcoholism likely contributes to malnutrition as may previous gastric bypass status with limited oral intake at baseline.  Serum albumin was low at admission at 2.8 and has decreased and she has had signs of peripheral edema.  Additionally, noncardiogenic pulmonary edema from hypoalbuminemia may have exacerbated respiratory failure.  Her previous Tom-en-Y gastric bypass surgery limited ability to advance feeding tube beyond the gastric remnant.  She has been receiving enteral feeding at goal and since extubation has started to advance oral intake.   Per speech-language pathology evaluation on 10/20, may advance to minced and moist solids with thin liquid diet.  72-hour calorie count for oral intake was initiated on 10/19. Upon initial review on 10/22 of her oral intake since 10/19, it appears questionable as to whether she has been receiving adequate nutritional intake by mouth although oral intake on 10/22 does appear significantly improved compared with recent days.  -Strongly encouraged the patient to try to achieve " adequate oral nutritional intake emphasizing the complexity of alternative methods for ongoing nutritional support including parenteral nutrition versus enteral tube feeding after placement of jejunostomy tube  -Continue indwelling feeding tube with enteral feeding to hold at 20 mL/h per recommendations of registered dietitian while assessing her caloric intake from oral intake  -Continue calorie count today and anticipate registered dietitian will review results on 10/23 after which time options for supporting her nutritional status can be reviewed  -Continuing indwelling PICC line in the event that the patient requires initiation of parenteral nutrition if she cannot consume adequate nutritional oral intake and would not discharge with indwelling nasogastric feeding tube.  If she were to require ongoing enteral feeding tube, she would need surgical placement of percutaneous jejunostomy tube that cannot be performed at this hospital, so that would either need to be deferred to outpatient follow-up or she would require hospital transfer for same.  If she does not require parenteral nutrition, anticipate discontinuing PICC line.     Hypomagnesemia  Hypophosphatemia   Hypokalemia  Presented with low serum magnesium and potassium levels and subsequently developed low serum phosphorus level all likely secondary to nutritional deficiency.  Electrolytes have improved with supplementation and enteral feeding.  - continue to monitor these electrolytes and replace per protocol as indicated     Hyponatremia resolved   Had hyponatremia that has resolved.   -Ongoing periodic monitoring of sodium     Hyperglycemia  Patient has had mild to moderate hyperglycemia while in the ICU.  Hemoglobin A1c was 4.4 and she is not known to have underlying diabetes.  Insulin infusion was started for management of hyperglycemia in the ICU while she was being treated with corticosteroids, IV fluids containing dextrose, and after starting enteral  tube feeding.  Insulin infusion and IV fluids with dextrose have since been discontinued, corticosteroids have been stopped, and enteral tube feedings are being de-escalated with generally adequate control of blood sugars as acute medical illness subsided and corticosteroid treatment was stopped.  Blood sugars have since been normal.  -Clinical monitoring     Thrombocytopenia - resolved   Anemia, unspecified type  Coagulopathy  Presented with thrombocytopenia and anemia at admission both of which initially worsened.  Thrombocytopenia subsequently resolved.  Anemia has persisted and stabilized with hemoglobin 8-9.  Suspect thrombocytopenia and anemia were due to sepsis.  She also was coagulopathic with prolonged INR that has now normalized and was also probably due to sepsis.  -Recommend ongoing periodic monitoring of hemoglobin     Emphysema/COPD  Tobacco abuse  Known chronic emphysema and COPD without overt acute exacerbation.  Patient reportedly has continued to smoke.  She was treated with corticosteroids for pneumococcal infection and septic shock and corticosteroids were discontinued on 10/19 without signs of COPD exacerbation.  -Continue bronchodilators as needed  -Continue nicotine patch supplementation started during hospitalization     Dehydration  Presented with dehydration which likely contributed to hyponatremia and initial low normal blood pressures, resolved after IV fluid treatment.  IV fluids have now been discontinued although she has continued to receive IV Lasix until 10/20 when it was stopped.  -Clinical monitoring     Weakness generalized  Suspect presenting complaint of generalized weakness was multifactorial including infection, severe alcohol dependence, and suspected malnutrition with nutrient including electrolyte deficiencies.  Anticipate discharge to TCU once medically stable, possibly 3 days.  -Continue PT/OT per protocol     Episode of recurrent major depressive disorder, unspecified  depression episode severity (H24)  PTSD (post-traumatic stress disorder)  Carries previous diagnoses of depression and PTSD treated chronically with Lexapro 30 mg daily, clonazepam 0.5 mg 3 times daily as needed for anxiety, BuSpar 15 mg 3 times a day, Effexor 37.5 mg daily, and Xyzal 5 mg daily.    At admission it was unclear whether the patient has recently been taking these medications reliably.  However, it has now been confirmed that patient had been taking Effexor and BuSpar which have been restarted.  -Continue chronic doses of Effexor and BuSpar        Diet: Minced & Moist Diet (level 5) Thin Liquids (level 0)  Snacks/Supplements Adult: Ensure Enlive; With Meals  Adult Formula Drip Feeding: Continuous Vital 1.5; Nasogastric tube; Goal Rate: 20 mL; mL/hr; From: 12:00 AM; To: 12:00 AM; continue 20 mL/hr and hold here pending calorie counts. Cartons have a hang time of 8 hours MAX. Please fully replace carton...    DVT Prophylaxis: Pneumatic Compression Devices  Barney Catheter: Not present  Lines: PRESENT      PICC 10/09/23 Triple Lumen Left Basilic OK TO USE per PICC STAT RN-Site Assessment: WDL      Cardiac Monitoring: None  Code Status: Full Code      Clinically Significant Risk Factors              # Hypoalbuminemia: Lowest albumin = 1.7 g/dL at 10/11/2023  6:12 AM, will monitor as appropriate                       Disposition Plan    anticipate discharge to rehab facility once medically stable and determination of how to support her nutritional needs after discharge has been made         Elijah Johnson MD  Hospitalist Service  [facility]  Securely message with [employer] (more info)  Text page via [facility] Paging/Directory   ______________________________________________________________________    Interval History   Patient says she has a headache today.  She denies neck pain.  She otherwise is feeling okay.  She denies worsening cough or shortness of breath.  Her nasal feeding  tube remains bothersome to her although she is not having any nasal drainage or bleeding.  She is trying to drink and eat nutrients.  Her sense of smell and taste is abnormal for her which has made eating challenging.  Patient and nursing report that she is currently drinking her third carton of Ensure clear today which is more than she has the previous days.  She is eating bites of food, particularly foods that her spouse has brought to her from outside the hospital.  She remains afebrile and hemodynamically stable.  Oxygenation continues to be normal.  She is voiding spontaneously and is having bowel movements.    She expresses concern about financial matters today.  She says she has Medicare part A but not B, C, or D.  She has paperwork at home that she had planned to complete to enroll in Medicare part B but has not yet completed it.  She wonders how costs would be covered at a TCU.    Physical Exam   Vital Signs: Temp: 99.3  F (37.4  C) Temp src: Oral BP: 121/73 Pulse: 82   Resp: 26 SpO2: 97 % O2 Device: None (Room air)    Patient Vitals for the past 24 hrs:   BP Temp Temp src Pulse Resp SpO2 Weight   10/22/23 1102 -- -- -- -- -- 97 % --   10/22/23 0729 121/73 99.3  F (37.4  C) Oral 82 26 94 % --   10/22/23 0221 -- -- -- -- -- -- 48.4 kg (106 lb 11.2 oz)   10/21/23 2339 128/76 99.3  F (37.4  C) Oral 85 28 93 % --   10/21/23 2009 112/64 98.4  F (36.9  C) Oral 89 19 95 % --   10/21/23 1433 125/72 99.5  F (37.5  C) Oral 86 18 96 % --     Weight: 106 lbs 11.24 oz  Vitals:    10/15/23 0501 10/17/23 0400 10/19/23 1256 10/21/23 0500   Weight: 41.9 kg (92 lb 4.8 oz) 49.9 kg (110 lb) 52.7 kg (116 lb 3.2 oz) 50.2 kg (110 lb 10.7 oz)    10/22/23 0221   Weight: 48.4 kg (106 lb 11.2 oz)     General Appearance: Cachectic appearing elderly woman without signs of acute distress resting in bed  Respiratory: Borderline tachypneic, easily speaks full sentences, not using accessory muscles, clear lungs  Cardiovascular: Borderline  tachycardic with regular rhythm, palpable radial pulse, normal capillary refill  GI: Normal bowel sounds, soft abdomen  HEENT: Feeding tube present in right naris without bleeding or drainage    Medical Decision Making         Data     I have personally reviewed the following data over the past 24 hrs:    13.5 (H)  \   8.5 (L)   / 555 (H)     135 106 5.0 (L) /  89   3.7 19 (L) 0.58 \     INR:  1.13 PTT:  N/A   D-dimer:  N/A Fibrinogen:  N/A       Magnesium 1.9, phosphorus 4.1    Recent Labs   Lab 10/22/23  0627 10/21/23  0637 10/20/23  1159 10/20/23  1146 10/20/23  0808 10/20/23  0505 10/19/23  1132 10/19/23  1017   WBC 13.5* 13.6*  --   --   --  15.8*  --  18.9*   HGB 8.5*  --   --   --   --  8.5*  --  9.0*   *  --   --   --   --  102*  --  102*   * 512*  --   --   --  497*  --  471*   INR 1.13  --   --   --   --   --   --   --      --   --   --   --  136  --  138   POTASSIUM 3.7 3.8  --  3.7  --  3.3*  --  3.6  3.6   CHLORIDE 106  --   --   --   --  103  --  106   CO2 19*  --   --   --   --  25  --  20*   BUN 5.0*  --   --   --   --  7.9*  --  8.7   CR 0.58 0.59  --   --   --  0.71  --  0.69   ANIONGAP 10  --   --   --   --  8  --  12   SHON 8.0*  --   --   --   --  7.9*  --  7.7*   GLC 89  --  113*  --  97 93   < > 157*   ALBUMIN  --   --   --   --   --  2.2*  --   --    PROTTOTAL  --   --   --   --   --  5.6*  --   --    BILITOTAL  --   --   --   --   --  0.3  --   --    ALKPHOS  --   --   --   --   --  65  --   --    ALT  --   --   --   --   --  14  --   --    AST  --   --   --   --   --  20  --   --     < > = values in this interval not displayed.

## 2023-10-23 ENCOUNTER — APPOINTMENT (OUTPATIENT)
Dept: PHYSICAL THERAPY | Facility: CLINIC | Age: 68
DRG: 870 | End: 2023-10-23
Payer: MEDICARE

## 2023-10-23 ENCOUNTER — APPOINTMENT (OUTPATIENT)
Dept: SPEECH THERAPY | Facility: CLINIC | Age: 68
DRG: 870 | End: 2023-10-23
Payer: MEDICARE

## 2023-10-23 LAB
ANION GAP SERPL CALCULATED.3IONS-SCNC: 11 MMOL/L (ref 7–15)
BASE EXCESS BLDV CALC-SCNC: -2.5 MMOL/L (ref -7.7–1.9)
BUN SERPL-MCNC: 5.1 MG/DL (ref 8–23)
CALCIUM SERPL-MCNC: 7.9 MG/DL (ref 8.8–10.2)
CHLORIDE SERPL-SCNC: 106 MMOL/L (ref 98–107)
CREAT SERPL-MCNC: 0.55 MG/DL (ref 0.51–0.95)
DEPRECATED HCO3 PLAS-SCNC: 18 MMOL/L (ref 22–29)
EGFRCR SERPLBLD CKD-EPI 2021: >90 ML/MIN/1.73M2
GLUCOSE SERPL-MCNC: 93 MG/DL (ref 70–99)
HCO3 BLDV-SCNC: 22 MMOL/L (ref 21–28)
HOLD SPECIMEN: NORMAL
MAGNESIUM SERPL-MCNC: 1.8 MG/DL (ref 1.7–2.3)
O2/TOTAL GAS SETTING VFR VENT: 21 %
PCO2 BLDV: 32 MM HG (ref 40–50)
PH BLDV: 7.44 [PH] (ref 7.32–7.43)
PHOSPHATE SERPL-MCNC: 3.9 MG/DL (ref 2.5–4.5)
PO2 BLDV: 33 MM HG (ref 25–47)
POTASSIUM SERPL-SCNC: 4.1 MMOL/L (ref 3.4–5.3)
SODIUM SERPL-SCNC: 135 MMOL/L (ref 135–145)

## 2023-10-23 PROCEDURE — 250N000013 HC RX MED GY IP 250 OP 250 PS 637: Performed by: PEDIATRICS

## 2023-10-23 PROCEDURE — 99231 SBSQ HOSP IP/OBS SF/LOW 25: CPT | Performed by: PEDIATRICS

## 2023-10-23 PROCEDURE — G0008 ADMIN INFLUENZA VIRUS VAC: HCPCS | Performed by: PEDIATRICS

## 2023-10-23 PROCEDURE — 250N000011 HC RX IP 250 OP 636: Performed by: PEDIATRICS

## 2023-10-23 PROCEDURE — 92526 ORAL FUNCTION THERAPY: CPT | Mod: GN | Performed by: SPEECH-LANGUAGE PATHOLOGIST

## 2023-10-23 PROCEDURE — 82803 BLOOD GASES ANY COMBINATION: CPT | Performed by: PEDIATRICS

## 2023-10-23 PROCEDURE — 84100 ASSAY OF PHOSPHORUS: CPT | Performed by: PEDIATRICS

## 2023-10-23 PROCEDURE — 97530 THERAPEUTIC ACTIVITIES: CPT | Mod: GP | Performed by: PHYSICAL THERAPIST

## 2023-10-23 PROCEDURE — 120N000001 HC R&B MED SURG/OB

## 2023-10-23 PROCEDURE — 80048 BASIC METABOLIC PNL TOTAL CA: CPT | Performed by: PEDIATRICS

## 2023-10-23 PROCEDURE — 83735 ASSAY OF MAGNESIUM: CPT | Performed by: PEDIATRICS

## 2023-10-23 PROCEDURE — 90662 IIV NO PRSV INCREASED AG IM: CPT | Performed by: PEDIATRICS

## 2023-10-23 RX ORDER — MAGNESIUM SULFATE HEPTAHYDRATE 40 MG/ML
2 INJECTION, SOLUTION INTRAVENOUS ONCE
Status: COMPLETED | OUTPATIENT
Start: 2023-10-23 | End: 2023-10-23

## 2023-10-23 RX ADMIN — ACETAMINOPHEN 650 MG: 325 TABLET, FILM COATED ORAL at 01:12

## 2023-10-23 RX ADMIN — FOLIC ACID 1 MG: 1 TABLET ORAL at 08:34

## 2023-10-23 RX ADMIN — INFLUENZA A VIRUS A/VICTORIA/4897/2022 IVR-238 (H1N1) ANTIGEN (FORMALDEHYDE INACTIVATED), INFLUENZA A VIRUS A/DARWIN/9/2021 SAN-010 (H3N2) ANTIGEN (FORMALDEHYDE INACTIVATED), INFLUENZA B VIRUS B/PHUKET/3073/2013 ANTIGEN (FORMALDEHYDE INACTIVATED), AND INFLUENZA B VIRUS B/MICHIGAN/01/2021 ANTIGEN (FORMALDEHYDE INACTIVATED) 0.7 ML: 60; 60; 60; 60 INJECTION, SUSPENSION INTRAMUSCULAR at 11:24

## 2023-10-23 RX ADMIN — MULTIPLE VITAMINS W/ MINERALS TAB 1 TABLET: TAB at 08:34

## 2023-10-23 RX ADMIN — BUSPIRONE HYDROCHLORIDE 15 MG: 5 TABLET ORAL at 21:15

## 2023-10-23 RX ADMIN — ENOXAPARIN SODIUM 30 MG: 30 INJECTION SUBCUTANEOUS at 21:13

## 2023-10-23 RX ADMIN — Medication 1 TABLET: at 21:14

## 2023-10-23 RX ADMIN — RALOXIFENE HYDROCHLORIDE 60 MG: 60 TABLET, FILM COATED ORAL at 08:34

## 2023-10-23 RX ADMIN — MAGNESIUM SULFATE IN WATER 2 G: 40 INJECTION, SOLUTION INTRAVENOUS at 08:34

## 2023-10-23 RX ADMIN — Medication 100 MG: at 08:34

## 2023-10-23 RX ADMIN — BUSPIRONE HYDROCHLORIDE 15 MG: 5 TABLET ORAL at 14:07

## 2023-10-23 RX ADMIN — ESCITALOPRAM OXALATE 30 MG: 10 TABLET ORAL at 08:34

## 2023-10-23 RX ADMIN — VENLAFAXINE HYDROCHLORIDE 37.5 MG: 37.5 CAPSULE, EXTENDED RELEASE ORAL at 08:34

## 2023-10-23 RX ADMIN — PANTOPRAZOLE SODIUM 40 MG: 40 TABLET, DELAYED RELEASE ORAL at 05:52

## 2023-10-23 RX ADMIN — Medication 1 PATCH: at 08:35

## 2023-10-23 RX ADMIN — Medication 50 MCG: at 08:34

## 2023-10-23 RX ADMIN — Medication 1 TABLET: at 08:34

## 2023-10-23 RX ADMIN — FERROUS GLUCONATE 324 MG: 324 TABLET ORAL at 08:33

## 2023-10-23 RX ADMIN — BUSPIRONE HYDROCHLORIDE 15 MG: 5 TABLET ORAL at 08:34

## 2023-10-23 RX ADMIN — ACETAMINOPHEN 650 MG: 325 TABLET, FILM COATED ORAL at 14:40

## 2023-10-23 ASSESSMENT — ACTIVITIES OF DAILY LIVING (ADL)
ADLS_ACUITY_SCORE: 45
ADLS_ACUITY_SCORE: 41
ADLS_ACUITY_SCORE: 45
ADLS_ACUITY_SCORE: 41
ADLS_ACUITY_SCORE: 45
ADLS_ACUITY_SCORE: 41
ADLS_ACUITY_SCORE: 41
ADLS_ACUITY_SCORE: 45

## 2023-10-23 NOTE — PROGRESS NOTES
Calorie Count Note    Date of Calorie Count: 10/19 to 10/22  Estimated calorie needs: 1,257 kcal min  Estimated protein needs: 50 g min    Meals Recorded:     10/19  Lunch - Ensure enlive vanilla mildly thick (100%) - 350 kcal, 20 g protein , lemonade (100%) - 90 kcal, 0 g protein, vanilla pudding (50%) - 60 kcal, 2 g pro  *no dinner ticket   Calories: 500 kcal (40%)  Protein: 22 g protein (44%)    10/20  Breakfast - Ensure enlive vanilla mildly thick (100%) - 350 kcal, 20 g protein, mildly thick OJ (75%) - 60 ciro, 0 g protein  *no lunch ticket  Dinner - food brought in - 2 bites of microwaveable Velveeta mac and cheese, 2 sips of f'real, 1 bite of chocolate pudding (assuming very minimal protein and calories)  Calories: 410 kcal (33%)  Protein: 20 g protein (40%)    10/21  Breakfast: applesauce (50%) - 25 kcal, less than 1 g protein, Ensure enlive strawberry (25%) - 88 kcal, 5 g protein  *no lunch or dinner tickets -  brought shake per flow sheet for lunch, unsure of dinner but 240 mL listed (unsure of protein and calorie amounts)  Calories: 113 kcal (9%) - likely more, limited info available  Protein: 5 g protein (10%) - likely more, limited info available    10/22  Breakfast - 0%  Lunch: tomato soup - 100%, vanilla ice cream - 100%, Ensure enlive vanilla (100%) - 193 kcal, 3 g protein from meal, 350 kcal and 20 g protein from Ensure  Dinner - five bites of cheesecake, 5 bites of soup (difficult to assess calories and protein, likely minimal) Ensure clear - 240 kcal, 8 g protein  Calories: 783 kcal (62%)  Protein: 31 g protein (62%)    *calorie and protein amounts are estimates    Pt intake has improved overall - is much better with food preferences provided. Most of calories and protein coming from Ensure enlive and Ensure clear.     Highly recommend continuing with TF until intake is consistently 50% of estimated needs - pt wanting tube out and is likely willing to keep up with intakes.  Intake mostly  less than 50% but improved overall with meals yesterday.   Discussed with MD, pt intake has been improving overall, okay to discontinue if pt motivated to keep up with intake.     Switched Ensure enlive to Ensure clear TID with meals per pt preference, please continue to provide food preferences as able    Lori Angulo RD, LD  Clinical Dietitian  Office: [phone]  Weekend pager: [phone]

## 2023-10-23 NOTE — PLAN OF CARE
Goal Outcome Evaluation:    Enteral Nutrition - met, has been discontinued today 10/23 per discussion with MD    Oral Intake Inadequate: ongoing, progressing - please see calorie count note for additional details. Intake has been slowly progressing overall, most latest intake yesterday 10/22 was about 60% of estimated needs. Pt is doing well with liquidy foods, beverage intake. Has been tolerating Ensures well - prefers Ensure clear mixed berry.     GI is abnormal due to diarrhea. Last BM was today 10/23.    Weights have been inconsistent due to bed scales but now stable per standing scale for the past 2 days.     Switched from Ensure enlive to Ensure clear TID per pt preference. Please continue to provide food preferences and encourage PO intake as able.     Lori Angulo RD, LD  Clinical Dietitian  Office: [phone]  Weekend pager: [phone]

## 2023-10-23 NOTE — PROGRESS NOTES
CLINICAL NUTRITION SERVICES - REASSESSMENT NOTE     Nutrition Prescription    RECOMMENDATIONS FOR MDs/PROVIDERS TO ORDER:  Please consider potential appetite stimulant medication as appropriate    Malnutrition Status:    Difficult to assess - TF provided recently, unsure of bed scale accuracy, lack of NFPE     Recommendations already ordered by Registered Dietitian (RD):  Will continue to provide Ensure clear (any flavor) TID with each meal     Multivitamin with minerals as ordered    Future/Additional Recommendations:  Will continue to follow to monitor oral intake, weight changes, GI symptom/BMs, and nutritional supplement tolerance     EVALUATION OF THE PROGRESS TOWARD GOALS   Diet: Orders Placed This Encounter      Easy to Chew Diet (level 7) Thin Liquids (level 0)    Nutrition Support: Vital 1.5 at 20 ml/hr x 24 hrs continuously  Intake: please see kobe count note for additional details. Overall has improved - eating a few bites to 25% of meals at most per flow sheet. Calorie count estimates intakes at 62% yesterday 10/22. Mostly has been tolerating fluids and liquidy foods, likes Ensure clear berry.     NEW FINDINGS   Pt was successfully extubated and transferred out of the ICU - TF was decreased to half rate (20 mL/hr x 24 hrs) while pt was starting PO intake. Has been evaluated by SLP - was on minced and moist diet with mildly thickened liquids but has since been advanced to easy to chew (level 7) with thin liquids.     GI is abnormal - experiencing diarrhea but otherwise WDL. Bowel sounds are normoactive in all quadrants,abdomen is non-distended. NG tube was present for tube feedings but is now discontinued. Last BM was today 10/23 - BMs are loose, brown with red liquid (?).     Recent weights during admission:  10/23/23 0112 48.2 kg (106 lb 4.8 oz) Standing scale   10/22/23 0221 48.4 kg (106 lb 11.2 oz) Standing scale   10/21/23 0500 50.2 kg (110 lb 10.7 oz) Bed scale   10/19/23 1256 52.7 kg (116 lb 3.2 oz)  Bed scale   10/17/23 0400 49.9 kg (110 lb) Bed scale   Overall stable via standing scale for the past few days - bed scale weights varied, unsure of pt's true weight upon admission so difficult to assess previous trends. No edema recently noted.     MALNUTRITION  % Intake: Decreased intake does not meet criteria - had been on TF (day 5 poor PO with half rate but hard to assess with Ensures) - most recently 62% with 50% from TF so does not meet  % Weight Loss: Unable to assess due to fluctuating bed scale weights - otherwise does not meet criteria  Subcutaneous Fat Loss: Unable to assess - likely per brief obs, will meet with pt as able for NFPE  Muscle Loss: Unable to assess - likely per brief obs, will meet with pt as able for NFPE  Fluid Accumulation/Edema: None noted  Malnutrition Diagnosis: Difficult to assess - TF provided recently, unsure of bed scale accuracy, lack of NFPE     Previous Goals   Total avg nutritional intake to meet a minimum of 30 kcal/kg and 1.2 g PRO/kg daily (per dosing wt 41.9 kg updated actual BW *unsure of accuracy)  Evaluation: Met - with TF    Pt weight will remain stable during admission  Evaluation: ongoing, progressing - has been inconsistent due to necessary bed scale weights but now standing scale weights (are stable the past two days)    Pt will tolerate TF advancement to goal rate  Evaluation: Met    Pt will tolerate diet advancement as able  Evaluation: Met    Previous Nutrition Diagnosis  Inadequate oral intake related to alcohol use, poor appetite, acute on chronic illness, intubation as evidenced by NPO status and reliance on TF to meet needs, underweight BMI of 15.84, weight loss of 6.1% in approx. 10 months, increased needs above baseline, and recent alcohol use likely displacing other nutrients in diet  Evaluation: Improving slowly, updated/revised    CURRENT NUTRITION DIAGNOSIS  Inadequate oral intake related to alcohol use, poor appetite, acute on chronic illness,  intubation as evidenced by intake of a few bites-25% mostly, underweight BMI of 18.25, weight loss of 6.1% in approx. 10 months, increased needs above baseline, and recent alcohol use likely displacing other nutrients in diet    INTERVENTIONS  Implementation  Collaboration with other providers  Enteral Nutrition - discontinued today per discussion with MD    Medical food supplement therapy - Ensure clear (pt likes mixed berry) TID with each meal to support intake  Multivitamin/mineral supplement therapy - please continue as ordered    Goals  Patient to consume % of nutritionally adequate meal trays TID, or the equivalent with supplements/snacks  Pt weight will remain stable during admission  Pt will tolerate nutritional supplement    Monitoring/Evaluation  Progress toward goals will be monitored and evaluated per protocol.    Lori Angulo RD, LD  Clinical Dietitian  Office: [phone]  [facility] pager: [phone]

## 2023-10-23 NOTE — PROGRESS NOTES
"Formerly [facility]    Medicine Progress Note - Hospitalist Service    Date of Admission:  10/7/2023    Assessment & Plan   Pavithra Raines is a 68 year old female with COPD, alcohol dependence recently consuming large volume alcohol daily, Tom-en-Y gastric bypass surgery, depression/PTSD, and tobacco abuse who presented with generalized weakness and fatigue and was admitted due to concerns for multifocal community-acquired pneumonia and sepsis.  She was transferred to the ICU due to septic shock requiring vasopressors and intubated to start mechanical ventilation due to severe progressive acute hypoxic respiratory failure. She has improved and was extubated 10/18 and no longer is requiring vasopressors or oxygen supplementation.        Septic shock and acute hypoxic respiratory failure due to streptococcal pneumoniae bacteremia and multifocal pneumonia  Blood culture from admission grew Streptococcus pneumoniae.  Sepsis rapidly progressed to septic shock requiring initiation of vasopressors which were subsequently weaned off.  Respiratory failure rapidly progressed to severe hypoxia requiring intubation mechanical ventilation, but she was extubated on 10/18/23 with stable respiratory status on low-flow oxygen supplementation since then.  Previous treatments for infection included ceftriaxone, vancomycin, and corticosteroids and she subsequently was switched to Zosyn due to persistent leukocytosis.  However, repeat cultures have been negative, no new infection has been identified nor strongly suspected, leukocytosis is slowly improving, and she has not had fever.  Corticosteroids were discontinued 10/19.  Antibiotics were discontinued 10/21.  -Monitor clinically    Alcohol use disorder (severe dependence) with acute alcohol withdrawal with delirium   History of seizure due to alcohol withdrawal  [place] [facility] [place]  \"patient had been drinking an average of 12 pack " "of beer, 1 bottle of wine, and 10 hard liquor drinks a day recently with most recent alcoholic drink approximately 30 minutes prior to hospital presentation.\"  Blood alcohol level was not tested at time of admission.  Patient developed overt signs of withdrawal on 10/9 including increased anxiety, tremor, confusion and hallucination.  Initial treatment of alcohol withdrawal per [facility] protocol was complicated by sedation that exacerbated respiratory failure.  Since extubation 1018, there have been no further signs of alcohol withdrawal.  -Advised abstinence from use of alcohol, discussed at length with the patient on 10/21     Suspected malnutrition  Hypoalbuminemia   Status post Tom-en-Y gastric bypass for obesity  Has remote history of Tom-en-Y gastric bypass surgery for obesity in March 2008.  Reported 13 pound weight loss in recent weeks and appears underweight and probably cachectic on exam.  Alcoholism likely contributes to malnutrition as may previous gastric bypass status with limited oral intake at baseline.  Serum albumin was low at admission at 2.8 and has decreased and she has had signs of peripheral edema.  Additionally, noncardiogenic pulmonary edema from hypoalbuminemia may have exacerbated respiratory failure.  Her previous Tom-en-Y gastric bypass surgery limited ability to advance feeding tube beyond the gastric remnant.  She has been receiving enteral feeding at goal and since extubation has started to advance oral intake.   Per speech-language pathology evaluation on 10/20, may advance to minced and moist solids with thin liquid diet.  72-hour calorie count for oral intake was initiated on 10/19 and overall she appears to be taking only about 25% of her nutritional requirements by mouth although oral intake has steadily improved in the last 1 to 2 days.  Case reviewed with registered dietitian today and although there continue to be lingering concerns about the adequacy of her spontaneous oral " intake, potential benefit of removing her nasal enteral feeding tube appears to outweigh ongoing benefit at this time.  -Strongly encouraged the patient to try to achieve adequate oral nutritional intake   -discontinue indwelling feeding tube and enteral feeding today   -Continuing indwelling PICC line in the event that the patient requires initiation of parenteral nutrition if she cannot consume adequate nutritional oral intake after removal of indwelling nasogastric feeding tube.  If she were to require ongoing enteral feeding tube, she would need surgical placement of percutaneous jejunostomy tube that cannot be performed at this hospital, so that would be deferred to outpatient follow-up.  If she does not require parenteral nutrition, anticipate discontinuing PICC line prior to or at hospital discharge.     Hypomagnesemia  Hypophosphatemia   Hypokalemia  Presented with low serum magnesium and potassium levels and subsequently developed low serum phosphorus level all likely secondary to nutritional deficiency.  Electrolytes have improved with supplementation and enteral feeding.  - continue to monitor these electrolytes and replace per protocol as indicated     Hyponatremia resolved   Had hyponatremia that has resolved.   -Ongoing periodic monitoring of sodium     Hyperglycemia  Patient has had mild to moderate hyperglycemia while in the ICU.  Hemoglobin A1c was 4.4 and she is not known to have underlying diabetes.  Insulin infusion was started for management of hyperglycemia in the ICU while she was being treated with corticosteroids, IV fluids containing dextrose, and after starting enteral tube feeding.  Insulin infusion and IV fluids with dextrose have since been discontinued, corticosteroids have been stopped, and enteral tube feedings are being de-escalated with generally adequate control of blood sugars as acute medical illness subsided and corticosteroid treatment was stopped.  Blood sugars have since  been normal.  -Clinical monitoring     Thrombocytopenia - resolved   Anemia, unspecified type  Coagulopathy  Presented with thrombocytopenia and anemia at admission both of which initially worsened.  Thrombocytopenia subsequently resolved.  Anemia has persisted and stabilized with hemoglobin 8-9.  Suspect thrombocytopenia and anemia were due to sepsis.  She also was coagulopathic with prolonged INR that has now normalized and was also probably due to sepsis.  -Recommend ongoing periodic monitoring of hemoglobin     Emphysema/COPD  Tobacco abuse  Known chronic emphysema and COPD without overt acute exacerbation.  Patient reportedly has continued to smoke.  She was treated with corticosteroids for pneumococcal infection and septic shock and corticosteroids were discontinued on 10/19 without signs of COPD exacerbation.  -Continue bronchodilators as needed  -Continue nicotine patch supplementation started during hospitalization     Dehydration  Presented with dehydration which likely contributed to hyponatremia and initial low normal blood pressures, resolved after IV fluid treatment.  IV fluids have now been discontinued although she has continued to receive IV Lasix until 10/20 when it was stopped.  -Clinical monitoring     Weakness generalized  Suspect presenting complaint of generalized weakness was multifactorial including infection, severe alcohol dependence, and suspected malnutrition with nutrient including electrolyte deficiencies.  Anticipate discharge to TCU once medically stable, possibly 3 days.  -Continue PT/OT per protocol     Episode of recurrent major depressive disorder, unspecified depression episode severity (H24)  PTSD (post-traumatic stress disorder)  Carries previous diagnoses of depression and PTSD treated chronically with Lexapro 30 mg daily, clonazepam 0.5 mg 3 times daily as needed for anxiety, BuSpar 15 mg 3 times a day, Effexor 37.5 mg daily, and Xyzal 5 mg daily.    At admission it was  unclear whether the patient has recently been taking these medications reliably.  However, it has now been confirmed that patient had been taking Effexor and BuSpar which have been restarted.  -Continue chronic doses of Effexor and BuSpar          Diet: Adult Formula Drip Feeding: Continuous Vital 1.5; Nasogastric tube; Goal Rate: 20 mL; mL/hr; From: 12:00 AM; To: 12:00 AM; continue 20 mL/hr and hold here pending calorie counts. Cartons have a hang time of 8 hours MAX. Please fully replace carton...  Snacks/Supplements Adult: Ensure Clear; With Meals  Easy to Chew Diet (level 7) Thin Liquids (level 0)    DVT Prophylaxis: Pneumatic Compression Devices  Barney Catheter: Not present  Lines: PRESENT      PICC 10/09/23 Triple Lumen Left Basilic OK TO USE per PICC STAT RN-Site Assessment: WDL      Cardiac Monitoring: None  Code Status: Full Code      Clinically Significant Risk Factors              # Hypoalbuminemia: Lowest albumin = 1.7 g/dL at 10/11/2023  6:12 AM, will monitor as appropriate                       Disposition Plan     Expected Discharge Date: 10/24/2023      Destination: other (comment) (TBD)  Discharge Comments: Need to assess nutrition status and plan for management            Elijah Johnson MD  Hospitalist Service  [facility]  Securely message with [employer] (more info)  Text page via [facility] Paging/Directory   ______________________________________________________________________    Interval History   There were no significant overnight events.  She feels about the same overall today.  She says her oral intake improved yesterday.  She says she drank about 4 of the Ensure clear supplements yesterday.  She has had some bites of food and has been also eating soups and other liquids.  She remains afebrile and hemodynamically stable.  Oxygenation remains normal.  She is voiding spontaneously.  She had 4 bowel movements in the last day.  She continues to have lingering headache.   She offers no other new complaints.    Physical Exam   Vital Signs: Temp: 98.8  F (37.1  C) Temp src: Oral BP: 114/75 Pulse: 89   Resp: 20 SpO2: 95 % O2 Device: None (Room air)    Patient Vitals for the past 24 hrs:   BP Temp Temp src Pulse Resp SpO2 Weight   10/23/23 1121 114/75 98.8  F (37.1  C) Oral 89 20 95 % --   10/23/23 0802 129/82 99.7  F (37.6  C) Oral 97 24 91 % --   10/23/23 0112 -- -- -- -- -- -- 48.2 kg (106 lb 4.8 oz)   10/23/23 0019 124/81 98.1  F (36.7  C) Oral 93 24 93 % --   10/22/23 1928 121/75 98.3  F (36.8  C) Oral 83 21 95 % --   10/22/23 1600 110/64 98.6  F (37  C) Oral 81 24 96 % --     Weight: 106 lbs 4.8 oz  Vitals:    10/17/23 0400 10/19/23 1256 10/21/23 0500 10/22/23 0221   Weight: 49.9 kg (110 lb) 52.7 kg (116 lb 3.2 oz) 50.2 kg (110 lb 10.7 oz) 48.4 kg (106 lb 11.2 oz)    10/23/23 0112   Weight: 48.2 kg (106 lb 4.8 oz)       General Appearance: Chronically ill-appearing and cachectic elderly woman without signs of acute distress while sitting up in bed  Respiratory: Normal respiratory effort, diminished breath sounds throughout, clear lungs  Cardiovascular: Regular rate and rhythm  Other: Nasogastric feeding tube present in the right naris without bleeding or drainage    Medical Decision Making             Data     I have personally reviewed the following data over the past 24 hrs:    N/A  \   N/A   / N/A     135 106 5.1 (L) /  93   4.1 18 (L) 0.55 \       Magnesium 1.8, phosphorus 3.9    Venous Blood Gas  Recent Labs   Lab 10/23/23  0558 10/17/23  1452   PHV 7.44*  --    PCO2V 32*  --    PO2V 33  --    HCO3V 22  --    OMID -2.5  --    O2PER 21 30       Recent Labs   Lab 10/23/23  0558 10/22/23  0627 10/21/23  0637 10/20/23  1159 10/20/23  0808 10/20/23  0505 10/19/23  1132 10/19/23  1017   WBC  --  13.5* 13.6*  --   --  15.8*  --  18.9*   HGB  --  8.5*  --   --   --  8.5*  --  9.0*   MCV  --  102*  --   --   --  102*  --  102*   PLT  --  555* 512*  --   --  497*  --  471*   INR  --   1.13  --   --   --   --   --   --     135  --   --   --  136  --  138   POTASSIUM 4.1 3.7 3.8  --    < > 3.3*  --  3.6  3.6   CHLORIDE 106 106  --   --   --  103  --  106   CO2 18* 19*  --   --   --  25  --  20*   BUN 5.1* 5.0*  --   --   --  7.9*  --  8.7   CR 0.55 0.58 0.59  --   --  0.71  --  0.69   ANIONGAP 11 10  --   --   --  8  --  12   SHON 7.9* 8.0*  --   --   --  7.9*  --  7.7*   GLC 93 89  --  113*   < > 93   < > 157*   ALBUMIN  --   --   --   --   --  2.2*  --   --    PROTTOTAL  --   --   --   --   --  5.6*  --   --    BILITOTAL  --   --   --   --   --  0.3  --   --    ALKPHOS  --   --   --   --   --  65  --   --    ALT  --   --   --   --   --  14  --   --    AST  --   --   --   --   --  20  --   --     < > = values in this interval not displayed.

## 2023-10-23 NOTE — PLAN OF CARE
"Goal Outcome Evaluation:      Plan of Care Reviewed With: patient    Overall Patient Progress: improvingOverall Patient Progress: improving    Outcome Evaluation: Pt is A&Ox4. VSS on RA. Pt does become SOB with activity. Breathing exercies reviewed with pt. Pt's appetite remains poor. Eating only bites of breakfast and lunch but is drinking ensure clears mixed berry which pt reports she enjoys. Pt has been up to chair and ambulated in room. Pt post pyloric feedings were stopped today per order and the tube is to removed this afternoon. Pt reports feeling \"happy\" that she is getting the tube out. Pt has active bowel sounds, passing flatus and has had a bowel movement today-loose. Plan is for pt to dischage to a TCU when able.      "

## 2023-10-23 NOTE — PROGRESS NOTES
Care Management Follow Up    Length of Stay (days): 16    Expected Discharge Date: 10/23/23      Concerns to be Addressed: discharge planning, substance/tobacco abuse/use  patient on a vent    Patient plan of care discussed at interdisciplinary rounds: Yes    Anticipated Discharge Disposition: Home     Anticipated Discharge Services:  None  Anticipated Discharge DME:  None    Patient/family educated on Medicare website which has current facility and service quality ratings:  No - pt requested facility  Education Provided on the Discharge Plan: yes   Patient/Family in Agreement with the Plan:  yes    Referrals Placed by CM/SW: Internal Clinic Care Coordination, Scheduled Follow-up appointments    Private pay costs discussed: Not applicable    Additional Information:  Patient not medically ready for discharge. Referral pending at [facility] (Main Phone: [phone] Admissions Phone: [phone] Fax: [phone]). Spoke with Bennie in admissions, at [facility] to update on discharge date.    ANUP Juares  [facility] [phone]/ [facility] of [phone]  Care Management

## 2023-10-23 NOTE — PLAN OF CARE
Goal Outcome Evaluation:    Pt A&O and VSS. Pt had multiple bites of soup and sipped on some ensures during early evening but nothing additionally overnight. Pts Enteral feeding currently running at 20ml/hr. Pt daily weight is currently 48.2kg which is down 0.2kg since 10/22. Pt complained of a headaches and warm packs were applied to forehead per pt request. Addtionally for pain PRN [id number] was given x1. Ptalso noted to have blood tinged urine/stool void this shift. MD was notified, RN was requesting Hgb check with Am labs but no order obtained. Will continue to monitor. Pts plan to discharge to TCU when ready.

## 2023-10-23 NOTE — PLAN OF CARE
Speech Language Therapy Discharge Summary    Reason for therapy discharge:    All goals and outcomes met, no further needs identified.    Progress towards therapy goal(s). See goals on Care Plan in Epic electronic health record for goal details.  Goals met    Therapy recommendation(s):    No further therapy is recommended.    Thank you for this referral!    Mel Marquis MA, CCC-SLP  [facility]  [phone]

## 2023-10-23 NOTE — PROVIDER NOTIFICATION
"RN notified of suspected blood in urine/stool void. Patient also noted to have headache this shift, utilized hot packs to forehead in evening and tylenol x1 overnight. Notified Dr. Madera, requesting add on of HGB check with morning labs.     Dr. Madera replied, \"No need\".     No new orders obtained. Will continue to monitor.   "

## 2023-10-24 ENCOUNTER — LAB REQUISITION (OUTPATIENT)
Dept: LAB | Facility: CLINIC | Age: 68
End: 2023-10-24

## 2023-10-24 ENCOUNTER — APPOINTMENT (OUTPATIENT)
Dept: OCCUPATIONAL THERAPY | Facility: CLINIC | Age: 68
DRG: 870 | End: 2023-10-24
Payer: MEDICARE

## 2023-10-24 ENCOUNTER — APPOINTMENT (OUTPATIENT)
Dept: PHYSICAL THERAPY | Facility: CLINIC | Age: 68
DRG: 870 | End: 2023-10-24
Payer: MEDICARE

## 2023-10-24 VITALS
HEART RATE: 88 BPM | HEIGHT: 64 IN | BODY MASS INDEX: 18.1 KG/M2 | OXYGEN SATURATION: 94 % | RESPIRATION RATE: 20 BRPM | SYSTOLIC BLOOD PRESSURE: 121 MMHG | TEMPERATURE: 98.1 F | DIASTOLIC BLOOD PRESSURE: 78 MMHG | WEIGHT: 106.04 LBS

## 2023-10-24 LAB
ANION GAP SERPL CALCULATED.3IONS-SCNC: 9 MMOL/L (ref 7–15)
BASE EXCESS BLDV CALC-SCNC: -1.3 MMOL/L (ref -7.7–1.9)
BUN SERPL-MCNC: 4.2 MG/DL (ref 8–23)
CA-I BLD-MCNC: 4.7 MG/DL (ref 4.4–5.2)
CALCIUM SERPL-MCNC: 8.1 MG/DL (ref 8.8–10.2)
CHLORIDE SERPL-SCNC: 107 MMOL/L (ref 98–107)
CREAT SERPL-MCNC: 0.56 MG/DL (ref 0.51–0.95)
DEPRECATED HCO3 PLAS-SCNC: 19 MMOL/L (ref 22–29)
EGFRCR SERPLBLD CKD-EPI 2021: >90 ML/MIN/1.73M2
GLUCOSE SERPL-MCNC: 89 MG/DL (ref 70–99)
HCO3 BLDV-SCNC: 22 MMOL/L (ref 21–28)
MAGNESIUM SERPL-MCNC: 1.9 MG/DL (ref 1.7–2.3)
O2/TOTAL GAS SETTING VFR VENT: 21 %
PCO2 BLDV: 34 MM HG (ref 40–50)
PH BLDV: 7.43 [PH] (ref 7.32–7.43)
PHOSPHATE SERPL-MCNC: 4.3 MG/DL (ref 2.5–4.5)
PLATELET # BLD AUTO: 570 10E3/UL (ref 150–450)
PO2 BLDV: 36 MM HG (ref 25–47)
POTASSIUM SERPL-SCNC: 3.5 MMOL/L (ref 3.4–5.3)
SODIUM SERPL-SCNC: 135 MMOL/L (ref 135–145)

## 2023-10-24 PROCEDURE — 82330 ASSAY OF CALCIUM: CPT | Performed by: PEDIATRICS

## 2023-10-24 PROCEDURE — 250N000013 HC RX MED GY IP 250 OP 250 PS 637: Performed by: PEDIATRICS

## 2023-10-24 PROCEDURE — 97530 THERAPEUTIC ACTIVITIES: CPT | Mod: GP | Performed by: PHYSICAL THERAPIST

## 2023-10-24 PROCEDURE — 85049 AUTOMATED PLATELET COUNT: CPT | Performed by: PEDIATRICS

## 2023-10-24 PROCEDURE — 82803 BLOOD GASES ANY COMBINATION: CPT | Performed by: PEDIATRICS

## 2023-10-24 PROCEDURE — 80048 BASIC METABOLIC PNL TOTAL CA: CPT | Performed by: PEDIATRICS

## 2023-10-24 PROCEDURE — 84100 ASSAY OF PHOSPHORUS: CPT | Performed by: PEDIATRICS

## 2023-10-24 PROCEDURE — 83735 ASSAY OF MAGNESIUM: CPT | Performed by: PEDIATRICS

## 2023-10-24 PROCEDURE — 99239 HOSP IP/OBS DSCHRG MGMT >30: CPT | Performed by: PEDIATRICS

## 2023-10-24 PROCEDURE — 97530 THERAPEUTIC ACTIVITIES: CPT | Mod: GO | Performed by: SPECIALIST/TECHNOLOGIST

## 2023-10-24 RX ORDER — LANOLIN ALCOHOL/MO/W.PET/CERES
100 CREAM (GRAM) TOPICAL DAILY
DISCHARGE
Start: 2023-10-25 | End: 2024-09-30

## 2023-10-24 RX ORDER — NICOTINE 21 MG/24HR
1 PATCH, TRANSDERMAL 24 HOURS TRANSDERMAL EVERY 24 HOURS
Status: ON HOLD | DISCHARGE
Start: 2023-10-24 | End: 2023-12-11

## 2023-10-24 RX ADMIN — BUSPIRONE HYDROCHLORIDE 15 MG: 5 TABLET ORAL at 14:14

## 2023-10-24 RX ADMIN — ESCITALOPRAM OXALATE 30 MG: 10 TABLET ORAL at 08:32

## 2023-10-24 RX ADMIN — Medication 50 MCG: at 08:32

## 2023-10-24 RX ADMIN — RALOXIFENE HYDROCHLORIDE 60 MG: 60 TABLET, FILM COATED ORAL at 10:01

## 2023-10-24 RX ADMIN — FOLIC ACID 1 MG: 1 TABLET ORAL at 08:32

## 2023-10-24 RX ADMIN — VENLAFAXINE HYDROCHLORIDE 37.5 MG: 37.5 CAPSULE, EXTENDED RELEASE ORAL at 08:32

## 2023-10-24 RX ADMIN — FERROUS GLUCONATE 324 MG: 324 TABLET ORAL at 08:31

## 2023-10-24 RX ADMIN — BUSPIRONE HYDROCHLORIDE 15 MG: 5 TABLET ORAL at 08:31

## 2023-10-24 RX ADMIN — Medication 1 PATCH: at 08:32

## 2023-10-24 RX ADMIN — ACETAMINOPHEN 650 MG: 325 TABLET, FILM COATED ORAL at 00:20

## 2023-10-24 RX ADMIN — Medication 1 TABLET: at 08:31

## 2023-10-24 RX ADMIN — PANTOPRAZOLE SODIUM 40 MG: 40 TABLET, DELAYED RELEASE ORAL at 06:11

## 2023-10-24 RX ADMIN — MULTIPLE VITAMINS W/ MINERALS TAB 1 TABLET: TAB at 08:31

## 2023-10-24 RX ADMIN — Medication 100 MG: at 08:31

## 2023-10-24 ASSESSMENT — ACTIVITIES OF DAILY LIVING (ADL)
ADLS_ACUITY_SCORE: 41

## 2023-10-24 NOTE — PLAN OF CARE
Goal Outcome Evaluation:      Plan of Care Reviewed With: patient    Overall Patient Progress: improvingOverall Patient Progress: improving    Outcome Evaluation: Pt is A&Ox4. VSS on RA. Ambulating SBA, dyspnea on exertion. LS clear. Discharging to [facility]. Family transporting pt. Nurse to nurse report given to DIAMOND Rodriguez.    S-(situation): Patient discharged to [facility] via wheelchair with friend, Shantanu.     B-(background): Pneumonia     A-(assessment): See above.     R-(recommendations): Discharge instructions reviewed with pt. Listed belongings gathered and returned to patient.          Discharge Nursing Criteria:     Care Plan and Patient education resolved: Yes    New Medications- pt has been educated about purpose and side effects: Yes    Vaccines  Influenza status verified at discharge:  Yes    MISC  Prescriptions if needed, hard copies sent with patient  NA  Home medications returned to patient: Yes  Medication Bin checked and emptied on discharge Yes  Patient reports post-discharge pain management plan is effective: Yes

## 2023-10-24 NOTE — DISCHARGE SUMMARY
[facility]  Hospitalist Discharge Summary      Date of Admission:  10/7/2023  Date of Discharge:  10/24/2023  Discharging Provider: Elijah Johnson MD  Discharge Service: Hospitalist Service    Discharge Diagnoses   Principal Problem:    Multifocal pneumonia  Active Problems:    Septic shock (H)    Bacteremia due to Streptococcus pneumoniae    Acute respiratory failure with hypoxia (H)    Patient ventilator dyssynchrony (H)    Hyponatremia    Weakness generalized    Alcohol use disorder, severe, dependence (H)    Hypomagnesemia    Emphysema/COPD (H)    Malnutrition - suspected    Hypoalbuminemia    Alcohol withdrawal, with delirium (H)    Hypophosphatemia    Hypokalemia    Thrombocytopenia (H24)    Anemia, unspecified type    Coagulopathy (H24)    Pulmonary hypertension (H)    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  The following labs/tests are   recommended: basic metabolic panel, magnesium and phosphorus within 1   week.            Discharge Disposition   Discharged to TCU for rehab  Condition at discharge: Stable    Hospital Course   Pavithra Raines is a 68 year old female with COPD, alcohol dependence recently consuming large volume alcohol daily, Tom-en-Y gastric bypass surgery, depression/PTSD, and tobacco abuse who presented with generalized weakness and fatigue and was admitted due to concerns for multifocal community-acquired pneumonia and sepsis.  She was transferred to the ICU due to septic shock requiring vasopressors and intubated to start  "mechanical ventilation due to severe progressive acute hypoxic respiratory failure. She has improved and was extubated 10/18 and no longer is requiring vasopressors or oxygen supplementation.        Septic shock and acute hypoxic respiratory failure due to streptococcal pneumoniae bacteremia and multifocal pneumonia  Blood culture from admission grew Streptococcus pneumoniae.  Sepsis rapidly progressed to septic shock requiring initiation of vasopressors which were subsequently weaned off.  Respiratory failure rapidly progressed to severe hypoxia requiring intubation mechanical ventilation, but she was extubated on 10/18/23 and subsequently weaned off of oxygen supplementation by discharge.  Treatments for this infection included ceftriaxone, vancomycin, and corticosteroids, and she completed treatment course with antibiotics during hospitalization.  Repeat cultures were negative.  No new infection was identified.  She had lingering mild leukocytosis which was slowly improving.  Corticosteroids were discontinued 10/19.  Antibiotics were discontinued 10/21.  After investigation, severe Streptococcus pneumoniae pneumonia with bacteremia and septic shock was suspected.    Alcohol use disorder (severe dependence) with acute alcohol withdrawal with delirium   History of seizure due to alcohol withdrawal  Per discussion with  \"patient had been drinking an average of 12 pack of beer, 1 bottle of wine, and 10 hard liquor drinks a day recently with most recent alcoholic drink approximately 30 minutes prior to hospital presentation.\"  Blood alcohol level was not tested at time of admission.  Patient developed overt signs of withdrawal including increased anxiety, tremor, confusion and hallucination.  Initial treatment of alcohol withdrawal per [facility] protocol was complicated by sedation that exacerbated respiratory failure.  After intubation she required ongoing sedation for ventilator support.  After extubation 10/18, " there were no further signs of alcohol withdrawal.  Advised abstinence from use of alcohol.  The patient declined resources for chemical dependency evaluation and treatment but indicated that she would follow-up with her mental health provider.     Suspected malnutrition  Hypoalbuminemia   Status post Tom-en-Y gastric bypass for obesity  Has remote history of Tom-en-Y gastric bypass surgery for obesity in March 2008.  Reported 13 pound weight loss in recent weeks and appears underweight and probably cachectic on exam concerning for malnutrition.  Alcoholism likely contributes to malnutrition as may previous gastric bypass status with limited oral intake at baseline.  Serum albumin was low at admission at 2.8 and has decreased and she has had signs of peripheral edema.  Additionally, noncardiogenic pulmonary edema from hypoalbuminemia may have exacerbated respiratory failure.  Her previous Tom-en-Y gastric bypass surgery limited ability to advance feeding tube beyond the gastric remnant.  She was treated with enteral feeding while intubated and after extubation tolerated advancing oral intake which was continuing to improve at the time of discharge.   Per speech-language pathology evaluation, advancing to minced and moist solids with thin liquid diet was advised.  Case was reviewed with registered dietitian during hospitalization and ongoing nutritional supplementation after discharge was recommended.     Hypomagnesemia  Hypophosphatemia   Hypokalemia  Presented with low serum magnesium and potassium levels and subsequently developed low serum phosphorus level all likely secondary to nutritional deficiency.  Electrolytes improved with supplementation and enteral feeding.     Hyponatremia resolved   Had hyponatremia at admission that resolved.      Hyperglycemia  Patient had mild to moderate hyperglycemia while in the ICU.  Hemoglobin A1c was 4.4 and she was not known to have underlying diabetes.  Insulin infusion  was started for management of hyperglycemia in the ICU while she was being treated with corticosteroids, IV fluids containing dextrose, and after starting enteral tube feeding.  Insulin infusion and IV fluids with dextrose were subsequently discontinued, corticosteroids were stopped, and enteral tube feedings were being de-escalated and then discontinued with generally normal blood sugars subsequently.     Thrombocytopenia - resolved   Anemia, unspecified type  Coagulopathy  Presented with thrombocytopenia and anemia at admission both of which initially worsened.  Thrombocytopenia subsequently resolved.  Anemia has persisted and stabilized with hemoglobin 8-9.  Suspect thrombocytopenia and anemia were due to sepsis.  She also was coagulopathic with prolonged INR that normalized and was also probably due to sepsis.     Emphysema/COPD  Tobacco abuse  Known chronic emphysema and COPD without overt acute exacerbation.  Patient reportedly had continued to smoke.  She was treated with corticosteroids for pneumococcal infection and septic shock and corticosteroids were discontinued on 10/19 without signs of COPD exacerbation.  Smoking cessation was advised and patient expressed intent to quit smoking.  She was treated with nicotine patch during hospitalization, and decreasing dose of nicotine patch was prescribed at discharge.     Dehydration  Presented with dehydration which likely contributed to hyponatremia and initial low normal blood pressures, resolved after IV fluid treatment.  IV fluids were discontinued and she improved.     Weakness generalized  Suspect presenting complaint of generalized weakness was multifactorial including infection, severe alcohol dependence, and suspected malnutrition with nutrient including electrolyte deficiencies.  She was evaluated and treated by PT and OT during hospitalization who advised discharge to TCU for short-term rehabilitation with which she was agreeable.     Episode of  recurrent major depressive disorder, unspecified depression episode severity (H24)  PTSD (post-traumatic stress disorder)  Carried previous diagnoses of depression and PTSD treated chronically with Lexapro 30 mg daily, clonazepam 0.5 mg 3 times daily as needed for anxiety, BuSpar 15 mg 3 times a day, Effexor 37.5 mg daily, and Xyzal 5 mg daily.    At admission it was unclear whether the patient had recently been taking these medications reliably.  As she recovered from severe acute illness and her use of chronic medications was subsequently confirmed, chronic doses of Effexor and BuSpar were restarted.    Consultations This Hospital Stay   CARE MANAGEMENT / SOCIAL WORK IP CONSULT  NUTRITION SERVICES ADULT IP CONSULT  SMOKING CESSATION PROGRAM IP CONSULT  PHYSICAL THERAPY ADULT IP CONSULT  OCCUPATIONAL THERAPY ADULT IP CONSULT  SPEECH LANGUAGE PATH ADULT IP CONSULT  SPIRITUAL HEALTH SERVICES IP CONSULT  NUTRITION SERVICES ADULT IP CONSULT  PHYSICAL THERAPY ADULT IP CONSULT  OCCUPATIONAL THERAPY ADULT IP CONSULT  SPEECH LANGUAGE PATH ADULT IP CONSULT  PHARMACY TO DOSE VANCO  VASCULAR ACCESS ADULT IP CONSULT  PHARMACY IP CONSULT  NUTRITION SERVICES ADULT IP CONSULT  PHARMACY IP CONSULT  NUTRITION SERVICES ADULT IP CONSULT  CARE MANAGEMENT / SOCIAL WORK IP CONSULT  PHYSICAL THERAPY ADULT IP CONSULT  PHARMACY TO DOSE VANCO  SPEECH LANGUAGE PATH ADULT IP CONSULT  OCCUPATIONAL THERAPY ADULT IP CONSULT  PHYSICAL THERAPY ADULT IP CONSULT  PHYSICAL THERAPY ADULT IP CONSULT  OCCUPATIONAL THERAPY ADULT IP CONSULT  PHARMACY IP CONSULT    Code Status   Full Code    Time Spent on this Encounter   I, Elijah Johnson MD, personally saw the patient today and spent greater than 30 minutes discharging this patient.       Elijah Johnson MD  [address] MEDICAL SURGICAL  [address] [place] [address]  Phone: [phone]  ______________________________________________________________________    Physical  Exam   Vital Signs: Temp: 98.1  F (36.7  C) Temp src: Oral BP: 121/78 Pulse: 88   Resp: 20 SpO2: 94 % O2 Device: None (Room air)    Weight: 106 lbs .66 oz  General Appearance: Cachectic elderly woman without signs of acute distress  Respiratory: Normal respiratory effort, symmetric breath sounds, clear lungs  Cardiovascular: Regular rate and rhythm, good radial pulse, normal capillary refill        Primary Care Physician   Soha Boggs    Discharge Orders      Medication Therapy Management Referral      General info for SNF    Length of Stay Estimate: Short Term Care: Estimated # of Days <30  Condition at Discharge: Improving  Level of care:skilled   Rehabilitation Potential: Good  Admission H&P remains valid and up-to-date: Yes  Recent Chemotherapy: N/A  Use Nursing Home Standing Orders: Yes     Mantoux instructions    Give two-step Mantoux (PPD) Per Facility Policy Yes     Follow Up and recommended labs and tests    Follow up with care home physician.  The following labs/tests are recommended: basic metabolic panel, magnesium and phosphorus within 1 week.     Reason for your hospital stay    Hospitalized due to severe infection (septic shock) and improved     Activity - Up with nursing assistance     Full Code     Physical Therapy Adult Consult    Evaluate and treat as clinically indicated.    Reason:  septic shock     Occupational Therapy Adult Consult    Evaluate and treat as clinically indicated.    Reason:  septic shock     Fall precautions     Diet    Follow this diet upon discharge: Orders Placed This Encounter      Calorie Counts      Snacks/Supplements Adult: Ensure Clear; With Meals      Easy to Chew Diet (level 7) Thin Liquids (level 0)       Significant Results and Procedures   Most Recent 3 CBC's:  Recent Labs   Lab Test 10/24/23  0555 10/22/23  0627 10/21/23  0637 10/20/23  0505 10/19/23  1017   WBC  --  13.5* 13.6* 15.8* 18.9*   HGB  --  8.5*  --  8.5* 9.0*   MCV  --  102*  --  102*  102*   * 555* 512* 497* 471*     Most Recent 3 BMP's:  Recent Labs   Lab Test 10/24/23  0555 10/23/23  0558 10/22/23  0627    135 135   POTASSIUM 3.5 4.1 3.7   CHLORIDE 107 106 106   CO2 19* 18* 19*   BUN 4.2* 5.1* 5.0*   CR 0.56 0.55 0.58   ANIONGAP 9 11 10   SHON 8.1* 7.9* 8.0*   GLC 89 93 89     Most Recent 2 LFT's:  Recent Labs   Lab Test 10/20/23  0505 10/11/23  0612   AST 20 37   ALT 14 15   ALKPHOS 65 79   BILITOTAL 0.3 0.2     Most Recent 3 INR's:  Recent Labs   Lab Test 10/22/23  0627 10/10/23  1223 09/01/21  0808   INR 1.13 1.16* 1.00     7-Day Micro Results       Collected Updated Procedure Result Status      10/18/2023 0947 10/18/2023 1435 MRSA MSSA PCR, Nasal Swab [[id number]]    Swab from Nare, Left    Final result Component Value   MRSA Target DNA Negative   SA Target DNA Negative                  Most Recent ABG:  Recent Labs   Lab Test 10/17/23  1452   PH 7.55*   PO2 65*   PCO2 35   HCO3 31*   TOMMY 7.7*     Venous Blood Gas  Recent Labs   Lab 10/24/23  0555 10/23/23  0558 10/17/23  1452   PHV 7.43 7.44*  --    PCO2V 34* 32*  --    PO2V 36 33  --    HCO3V 22 22  --    OMID -1.3 -2.5  --    O2PER 21 21 30     Results for orders placed or performed during the hospital encounter of 10/07/23   CT Chest Pulmonary Embolism w Contrast    Narrative    EXAM: CT CHEST PULMONARY EMBOLISM W CONTRAST  LOCATION: [facility]  DATE: 10/7/2023    INDICATION: Shortness of breath, nonproductive cough, atypical chest pains, high D dimer  COMPARISON: Chest CT 09/07/2020  TECHNIQUE: CT chest pulmonary angiogram during arterial phase injection of IV contrast. Multiplanar reformats and MIP reconstructions were performed. Dose reduction techniques were used.   CONTRAST: 50 mL Isovue 370    FINDINGS:  ANGIOGRAM CHEST: Pulmonary arteries are normal caliber and negative for pulmonary emboli. Thoracic aorta is negative for dissection. No CT evidence of right heart strain.    LUNGS AND  PLEURA: Moderate upper lobe predominant centrilobular and paraseptal emphysema. Dense airspace consolidation in the right lower and middle lobes with scattered additional areas of consolidation in the left lung, most consistent with multifocal   pneumonia.    MEDIASTINUM/AXILLAE: Mildly enlarged mediastinal lymph nodes, likely reactive to adjacent inflammation, no specific follow-up recommended.    CORONARY ARTERY CALCIFICATION: Mild.    UPPER ABDOMEN: Diffuse hepatic steatosis. Cholecystectomy. Prior gastric bypass with jejunojejunostomy in expected position of the left midabdomen.    MUSCULOSKELETAL: No acute bony abnormality. Multiple left rib fractures, new since 2020, most likely subacute or chronic.        Impression    IMPRESSION:  1.  Likely multifocal pneumonia, recommend radiographic follow-up to resolution.  2.  No pulmonary embolus.   XR Chest Port 1 View    Narrative    XR CHEST PORT 1 VIEW 10/9/2023 9:52 AM    HISTORY: strep pneumoniae pneumonia with septic shock, worsening  respiratory needs, re-evaluate pneumonia severity    COMPARISON: 4/28/2020 chest x-ray. Chest CT 10/7/2023    FINDINGS: There are increased interstitial lung markings. Patchy and  consolidative airspace disease is better seen on prior chest CT,  previously in the right lung. Continued plain radiographic  surveillance until clearance is recommended. No large effusions or  pneumothorax. Normal cardiac size.    ИРИНА WILSON MD         SYSTEM ID:  [id number]   XR Chest 1 View    Narrative    EXAM: XR CHEST 1 VIEW  LOCATION: [facility]  DATE: 10/9/2023    INDICATION: RN placed PICC   verify tip placement  COMPARISON: CT chest 10/07/2023      Impression    IMPRESSION: Heart size within normal limits. Extensive airspace opacities throughout both lungs have progressed. No effusions. No pneumothorax. Left PICC line tip distal SVC.   CT Head w/o contrast*    Narrative    EXAM: CT HEAD W/O  CONTRAST  LOCATION: [facility]  DATE: 10/10/2023    INDICATION: unequal pupils, AMS  COMPARISON: 02/12/2020  TECHNIQUE: Routine CT Head without IV contrast. Multiplanar reformats. Dose reduction techniques were used.    FINDINGS: Mildly motion degraded exam.  INTRACRANIAL CONTENTS: No intracranial hemorrhage, extraaxial collection, or mass effect.  No CT evidence of acute infarct. Mild presumed chronic small vessel ischemic changes. Mild to moderate generalized volume loss. No hydrocephalus.     VISUALIZED ORBITS/SINUSES/MASTOIDS: No intraorbital abnormality. Nonspecific fluid in the sphenoid sinuses. No middle ear or mastoid effusion.    BONES/SOFT TISSUES: No acute abnormality.      Impression    IMPRESSION:  1.  No CT evidence for acute intracranial process.  2.  Brain atrophy and presumed chronic microvascular ischemic changes as above.   XR Chest Port 1 View    Narrative    EXAM: XR CHEST PORT 1 VIEW  LOCATION: [facility]  DATE: 10/10/2023    INDICATION: ETT placement. Ventilator support.  COMPARISON: Portable chest single view 10/09/2023 at 1805 hours.      Impression    IMPRESSION: Interval placement of endotracheal tube, tip 3.6 cm above the juan m. Left PICC tip at the cavoatrial junction, satisfactory positioning. Extensive bilateral infiltrates, more apparent on the right, with an associated small right pleural   effusion, slight further worsening. No appreciable effusion on the left. Normal cardiac size. Degenerative changes both shoulders and the spine. Monitoring leads overlying the chest.                XR Abdomen Port 1 View    Narrative    EXAM: XR ABDOMEN PORT 1 VIEW  LOCATION: [facility]  DATE/TIME: 10/10/2023 4:04 PM CDT    INDICATION: Enteric tube placement  COMPARISON: Chest CT from 10/07/2023      Impression    IMPRESSION:   Enteric tube terminates at the level of the proximal stomach. Gastric  bypass changes. There is gaseous distention of the small and large bowel. Patchy multifocal pulmonary opacities.   X-ray Abdomen 1 vw    Narrative    EXAM: XR ABDOMEN 1 VIEW  LOCATION: [facility]  DATE: 10/10/2023    INDICATION: Please portalbe obtain upright abdominal x ray after administration of gastrograffin.  COMPARISON: 10/10/2023 at 1600 hours      Impression    IMPRESSION: Enteric tube in the stomach. Small amount of Gastrografin contrast in the stomach. Multiple dilated loops of small bowel again noted in the upper abdomen.   X-ray Abdomen 1 vw    Addendum: 10/11/2023    ADDENDUM:    Addendum for clarification on enteric tube position. The enteric tube tip is below the diaphragmatic hiatus in the gastric remnant, near the gastrojejunal anastomotic staple line related to prior gastric bypass surgery.    END OF ADDENDUM      Narrative    EXAM: XR ABDOMEN 1 VIEW  LOCATION: [facility]  DATE: 10/11/2023    INDICATION: Gastrograffin study for feeding tube placement.  COMPARISON: Prior day abdominal radiograph.      Impression    IMPRESSION: Stable lung bases including diffuse reticulonodular opacities and left central venous catheter which terminates at the superior cavoatrial junction. Enteric tube contains some contrast however no significant contrast residual within the   stomach, or elsewhere within the bowel. Correlate with tube on suction. Gas dilated loops of small and large bowel overlie the upper abdomen. Degenerative changes of the thoracolumbar spine.   XR Abdomen Port 1 View    Narrative    ABDOMEN ONE VIEW PORTABLE  10/11/2023 11:07 AM     HISTORY: History of Tom-en-y gastric bypass, now intubated, check  feeding tube placement.    COMPARISON: Abdominal radiograph 10/11/2023 and prior.       Impression    IMPRESSION: Feeding tube tip projects in the region of the gastric  pouch with small amount of contrast within proximal jejunal  loops,  likely the Tom limb. No convincing extraluminal contrast, however  evaluation is limited with single AP view.     Similar extensive bibasilar reticulonodular opacities. Persistent  dilated loops of small and large bowel within the visualized abdomen.    TOSHIA RAMEY MD         SYSTEM ID:  [id number]   X-ray Abdomen 1 vw    Narrative    EXAM: XR ABDOMEN 1 VIEW  LOCATION: [facility]  DATE: 10/13/2023    INDICATION: NG tube placement  COMPARISON: Abdominal radiograph 10/11/2023 and CT abdomen/pelvis 10/17/2017.      Impression    IMPRESSION: Enteric tube is in place with tip along the inferior aspect of the gastrojejunal anastomotic suture material in the left upper quadrant abdomen, favored to be within the jejunum. High density contrast material is seen within the descending   colon. Unchanged diffuse reticulonodular opacities in the visualized lung bases.   XR Chest Port 1 View    Narrative    XR CHEST PORT 1 VIEW 10/16/2023 9:50 AM    HISTORY: Endotracheal tube positioning    COMPARISON: 10/10/2023      Impression    IMPRESSION: Endotracheal tube tip is 1.4 cm above the juan m. Consider  retracting the tube by 1.5 to 2 cm for better positioning. Feeding  tube tip in the stomach. Left arm PICC tip in the high right atrium.  Diffuse mixed interstitial and airspace opacities throughout the lungs  are stable, and may be due to edema, pneumonia or ARDS. Stable small  right and trace left pleural effusion and bibasilar atelectasis.  No  pneumothorax.    CARLOS KEARNS MD         SYSTEM ID:  [id number]   Echocardiogram Complete     Value    LVEF  55-60%    Narrative    [id number]  [id number]  [id number]  [id number]^SISTER^IAN     [facility]  Echocardiography Laboratory  [address] [place] [address]     Name: LUIS ALBERTO OAKLEY  MRN: [id number]  : 1955  Study Date: 10/09/2023 07:47 AM  Age: 68 yrs  Gender: Female  Patient Location:  [facility]ICU  Reason For Study: Syncope  Ordering Physician: IAN CHRISTIANSEN  Referring Physician: MIMI GARZA  Performed By: Marbella Dobbs RDCS     BSA: 1.5 m2  Height: 64 in  Weight: 107 lb  ______________________________________________________________________________  Procedure  Complete Portable Echo Adult.  ______________________________________________________________________________  Interpretation Summary     The left ventricle is normal in size.  Left ventricular systolic function is normal. The visual ejection fraction is  55-60%.  No regional wall motion abnormalities noted.  The right ventricle is normal in structure, function and size.  There is mild (1+) tricuspid regurgitation.  Right ventricular systolic pressure is elevated, consistent with mild  pulmonary hypertension.  There is no comparison study available.  ______________________________________________________________________________  Left Ventricle  The left ventricle is normal in size. There is normal left ventricular wall  thickness. Left ventricular systolic function is normal. Diastolic Doppler  findings (E/E' ratio and/or other parameters) suggest left ventricular filling  pressures are indeterminate. The visual ejection fraction is 55-60%. No  regional wall motion abnormalities noted.     Right Ventricle  The right ventricle is normal in structure, function and size.     Atria  Normal left atrial size. The right atrium is mildly dilated. There is no  atrial shunt seen.     Mitral Valve  The mitral valve is normal in structure and function. There is trace mitral  regurgitation.     Tricuspid Valve  There is mild (1+) tricuspid regurgitation. The right ventricular systolic  pressure is approximated at 34.4 mmHg plus the right atrial pressure. Right  ventricular systolic pressure is elevated, consistent with mild pulmonary  hypertension. IVC diameter <2.1 cm collapsing >50% with sniff suggests a  normal RA pressure of 3 mmHg.     Aortic  Valve  The aortic valve is trileaflet with aortic valve sclerosis. No aortic  regurgitation is present. No aortic stenosis is present.     Pulmonic Valve  The pulmonic valve is not well seen, but is grossly normal. There is trace  pulmonic valvular regurgitation.     Vessels  Normal size aorta. The inferior vena cava was normal in size with preserved  respiratory variability.     Pericardium  There is no pericardial effusion.     Rhythm  Sinus rhythm was noted.  ______________________________________________________________________________  MMode/2D Measurements & Calculations  IVSd: 0.80 cm  LVIDd: 4.1 cm  LVIDs: 2.8 cm  LVPWd: 0.90 cm  FS: 31.4 %  LV mass(C)d: 105.6 grams  LV mass(C)dI: 70.4 grams/m2  Ao root diam: 3.0 cm  LA dimension: 3.3 cm  asc Aorta Diam: 3.4 cm  LA/Ao: 1.1  Ao root diam index Ht(cm/m): 1.9  Ao root diam index BSA (cm/m2): 2.0  Asc Ao diam index BSA (cm/m2): 2.3  Asc Ao diam index Ht(cm/m): 2.1  RV Base: 3.5 cm  RWT: 0.44  TAPSE: 3.0 cm     Doppler Measurements & Calculations  MV E max vadim: 78.8 cm/sec  MV A max vadim: 87.4 cm/sec  MV E/A: 0.90  MV dec time: 0.19 sec  TR max vadim: 293.2 cm/sec  TR max P.4 mmHg  E/E' av.4  Lateral E/e': 7.3  Medial E/e': 9.5  RV S Vadim: 15.3 cm/sec     ______________________________________________________________________________  Report approved by: Lupe Hope 10/09/2023 10:06 AM             Discharge Medications   Current Discharge Medication List        START taking these medications    Details   nicotine (NICODERM CQ) 14 MG/24HR 24 hr patch Place 1 patch onto the skin every 24 hours    Associated Diagnoses: Tobacco abuse      thiamine (B-1) 100 MG tablet Take 1 tablet (100 mg) by mouth daily    Associated Diagnoses: Alcohol use disorder, severe, dependence (H)           CONTINUE these medications which have NOT CHANGED    Details   busPIRone (BUSPAR) 15 MG tablet Take 1 tablet (15 mg) by mouth 3 times daily  Qty: 90 tablet, Refills: 3     "Associated Diagnoses: Anxiety      calcium carbonate (OS-SHON 500 MG [place]. [place]) 1250 MG tablet Take 1 tablet by mouth 2 times daily      escitalopram (LEXAPRO) 20 MG tablet Take 1.5 tablets (30 mg) by mouth daily  Qty: 135 tablet, Refills: 3    Associated Diagnoses: Anxiety      ferrous gluconate (FERGON) 324 (38 Fe) MG tablet TAKE 1 TABLET (324 MG) BY MOUTH DAILY (WITH BREAKFAST)  Qty: 90 tablet, Refills: 1    Associated Diagnoses: Other iron deficiency anemia      folic acid (FOLVITE) 1 MG tablet Take 1 tablet (1,000 mcg) by mouth daily  Qty: 90 tablet, Refills: 3    Associated Diagnoses: Macrocytosis without anemia      Insulin Syringe-Needle U-100 (B-D INSULIN SYRINGE) 25G X 1\" 1 ML MISC Use once every 30 days for B12 injection  Qty: 3 each, Refills: 3    Associated Diagnoses: Anemia due to vitamin B12 deficiency, unspecified B12 deficiency type      multivitamin w/minerals (THERA-VIT-M) tablet Take 1 tablet by mouth daily      omeprazole (PRILOSEC) 40 MG DR capsule TAKE ONE CAPSULE BY MOUTH TWICE A DAY  Qty: 180 capsule, Refills: 0    Associated Diagnoses: Gastrojejunal ulcer      raloxifene (EVISTA) 60 MG tablet TAKE ONE TABLET BY MOUTH ONCE DAILY  Qty: 90 tablet, Refills: 0    Associated Diagnoses: Age-related osteoporosis without current pathological fracture      traZODone (DESYREL) 50 MG tablet Take 0.5 tablets (25 mg) by mouth At Bedtime  Qty: 30 tablet, Refills: 5    Associated Diagnoses: Insomnia, unspecified type      triamcinolone (KENALOG) 0.1 % external cream APPLY SPARINGLY TO ITCHY RASH AREAS UP TO THREE TIMES A DAY AS NEEDED  Qty: 80 g, Refills: 0    Associated Diagnoses: Neurodermatitis      venlafaxine (EFFEXOR XR) 37.5 MG 24 hr capsule Take 1 capsule (37.5 mg) by mouth daily  Qty: 90 capsule, Refills: 1    Associated Diagnoses: Anxiety; Episode of recurrent major depressive disorder, unspecified depression episode severity (H24)      vitamin D3 (VITAMIN D3) 50 mcg (2000 units) tablet Take 1 " tablet (50 mcg) by mouth daily  Qty: 90 tablet, Refills: 3    Associated Diagnoses: Vitamin D deficiency           STOP taking these medications       clonazePAM (KLONOPIN) 0.5 MG tablet Comments:   Reason for Stopping:         cyanocobalamin (CYANOCOBALAMIN) 1000 MCG/ML injection Comments:   Reason for Stopping:         levocetirizine (XYZAL) 5 MG tablet Comments:   Reason for Stopping:         LORazepam (ATIVAN) 1 MG tablet Comments:   Reason for Stopping:             Allergies   Allergies   Allergen Reactions    Codeine Itching    Vicodin [Hydrocodone-Acetaminophen] Itching

## 2023-10-24 NOTE — PLAN OF CARE
Goal Outcome Evaluation:      Plan of Care Reviewed With: patient    Overall Patient Progress: improvingOverall Patient Progress: improving    Outcome Evaluation: Pt A&Ox4. VSS. PRN tylenol given x1 for headache. Pt SOB with exertion and activity. Up with SBA to BR with walker.

## 2023-10-24 NOTE — PROGRESS NOTES
Care Management Discharge Note    Discharge Date: 10/24/2023    Discharge Disposition: Transitional Care - [facility] (Main Phone: [phone] Admissions Phone: [phone] Fax: [phone])     Discharge Services:  None    Discharge DME:  None    Discharge Transportation:  Family/Friend    Private pay costs discussed: Not applicable    Does the patient's insurance plan have a 3 day qualifying hospital stay waiver?  No    PAS Confirmation Code: [id number]  Patient/family educated on Medicare website which has current facility and service quality ratings:  N/A - requested facility    Education Provided on the Discharge Plan: Yes  Persons Notified of Discharge Plans: Patient  Patient/Family in Agreement with the Plan:  yes    Handoff Referral Completed: Yes    Additional Information:  Patient medically ready for discharge. Plan is to discharge to [facility] (Main Phone: [phone] Admissions Phone: [phone] Fax: [phone])  TCU. Updated patient about 20 days of coverage. On day 21, patient would start being charged a $200/day co-pay. Patient verbalized understanding.    Discussed transportation. Patient states she will arrange a ride with family/friend between [phone].    Updated provider, bedside nurse, and Bennie, at [facility].    ANUP Juares  [facility] [phone]/ [place] [phone]  Care Management

## 2023-10-24 NOTE — PLAN OF CARE
Occupational Therapy Discharge Summary    Reason for therapy discharge:    Discharged to transitional care facility.    Progress towards therapy goal(s). See goals on Care Plan in [facility] electronic health record for goal details.  Goals partially met.  Barriers to achieving goals:   discharge from facility.    Therapy recommendation(s):    Continued therapy is recommended.  Rationale/Recommendations:  Progression of BADL tolerance/engagement and related mobility for safety and independence prior to returning to home environment.      Thank you for your referral.  Gerri Koenig, OTR/KILEY     [facility]  O: [phone]  E: [email or web address]

## 2023-10-24 NOTE — PLAN OF CARE
Goal Outcome Evaluation:      Plan of Care Reviewed With: patient    Overall Patient Progress: improvingOverall Patient Progress: improving    Outcome Evaluation: patient A&O, room air, dyspnea upon exertion, poor appetite, complaints of a headache refused meds just wants to sleep

## 2023-10-25 ENCOUNTER — PATIENT OUTREACH (OUTPATIENT)
Dept: CARE COORDINATION | Facility: CLINIC | Age: 68
End: 2023-10-25

## 2023-10-25 VITALS
OXYGEN SATURATION: 93 % | BODY MASS INDEX: 17.42 KG/M2 | DIASTOLIC BLOOD PRESSURE: 72 MMHG | HEART RATE: 83 BPM | SYSTOLIC BLOOD PRESSURE: 114 MMHG | RESPIRATION RATE: 17 BRPM | HEIGHT: 64 IN | TEMPERATURE: 97.8 F | WEIGHT: 102 LBS

## 2023-10-25 PROCEDURE — 86481 TB AG RESPONSE T-CELL SUSP: CPT | Performed by: FAMILY MEDICINE

## 2023-10-25 PROCEDURE — P9604 ONE-WAY ALLOW PRORATED TRIP: HCPCS | Performed by: FAMILY MEDICINE

## 2023-10-25 PROCEDURE — 36415 COLL VENOUS BLD VENIPUNCTURE: CPT | Performed by: FAMILY MEDICINE

## 2023-10-25 NOTE — PROGRESS NOTES
Clinic Care Coordination Contact  Care Coordination Transition Communication    Clinical Data: Patient was hospitalized at [facility] from 10/7/23 to 10/24/23 with diagnosis of multifocal Pneumonia.     Assessment: Patient has transitioned to TCU/ARU for short term rehabilitation:    [facility]  Transition Communication:  Notified facility of Primary Care- Care Coordination support via Epic fax.    Plan: Care Coordinator will await notification from facility staff informing of patient's discharge plans/needs. Care Coordinator will review chart and outreach to facility staff every 3-4 weeks and as needed.     ANUP Walker   [facility] - Care Coordination  [facility]   [phone]

## 2023-10-25 NOTE — LETTER
Hand-off  for Care Coordination  What is Care Coordination?  [facility] Care Coordination Services are available to people in complex situations,   for example medical, social or financial. The Care Coordinator, a SW or an RN, works with the   patient and their doctor to determine health goals, obtain resources, achieve outcomes,   and develop plans to coordinate care across settings.      Patient Name:   Pavithra Raines  Patient :     1955  Patient PCP:     Soha Boggs MD    Patient Primary Clinic:   [address] [place] [id number]  D/C Facility: [facility] Contact Info for questions: ___________________________  D/C Date:  ______________________________________________  Follow-up Apt with PCP after TCU D/C:   ____________________  Other Follow-Up Apt s: ____________________________________  Additional information (concerns, and Home Care, ect ):   __________________________________________________________________  __________________________________________________________________        __________________________________________________________________    Care Coordinator to Contact  **Please fax this sheet back to me when pt is ready to discharge**  [facility]  Clinic Care Coordinator  Fax: [phone]  Phone: [phone]

## 2023-10-26 ENCOUNTER — TRANSITIONAL CARE UNIT VISIT (OUTPATIENT)
Dept: GERIATRICS | Facility: CLINIC | Age: 68
End: 2023-10-26

## 2023-10-26 DIAGNOSIS — J43.9 PULMONARY EMPHYSEMA, UNSPECIFIED EMPHYSEMA TYPE (H): Primary | ICD-10-CM

## 2023-10-26 DIAGNOSIS — I27.20 PULMONARY HYPERTENSION (H): ICD-10-CM

## 2023-10-26 DIAGNOSIS — K21.00 GASTROESOPHAGEAL REFLUX DISEASE WITH ESOPHAGITIS, UNSPECIFIED WHETHER HEMORRHAGE: ICD-10-CM

## 2023-10-26 DIAGNOSIS — J18.9 MULTIFOCAL PNEUMONIA: ICD-10-CM

## 2023-10-26 PROCEDURE — 99310 SBSQ NF CARE HIGH MDM 45: CPT | Performed by: FAMILY MEDICINE

## 2023-10-26 NOTE — PROGRESS NOTES
[facility] GERIATRIC SERVICES    Facility:   [facility]) [[id number]]   Code Status: FULL CODE      HPI:   Pavithra is a 68 year old female who was hospitalized October 7, 2023 through October 24, 2023 secondary to presenting with generalized weakness and fatigue eventually was transferred to the intensive care unit due to septic shock requiring vasopressors and was intubated due to acute hypoxic respiratory failure and then extubated on October 18 and the blood culture did grow out Streptococcus pneumonia and that was treated with ceftriaxone, vancomycin and corticosteroids and the repeat cultures were negative and the corticosteroids were discontinued on October 19 antibiotics were discontinued on October 21 and was diagnosed with multifocal pneumonia.  Her blood work last on October 24 did show a sodium 135, potassium 3.5, BUN 4.2 and creatinine 0.56 unremarkable liver function tests.  Her white cell count on October 19 was 18 and then again in comparison to October 22 at 13.5 and hemoglobin 8.5.  The chest CT did show multifocal pneumonia and the chest x-ray did show increased interstitial lung markings patchy and consolidative airspace disease.  CT of the head did not show any evidence of acute intracranial process and a x-ray of the abdomen did show that she did have a feeding tube.  Her ejection fraction 55-60%.  Other issues addressed in the hospital was her history of alcohol use disorder with severe dependence and the blood alcohol level was not tested at the time of admission she did have initial treatment of alcohol withdrawal.  Also does have hypoalbuminemia with a history of Tom-en-Y bypass surgery also did have hypomagnesemia, hypophosphatemia and hypokalemia.  Hyponatremia did resolve.  She did initially have some hyperglycemia the A1c was 4.4.  Hemoglobin stabilized between 8 and 9.  History of COPD and tobacco abuse.  For the generalized weakness recommended transitional  care unit also has a history of depression and currently being treated with BuSpar Effexor and Lexapro.  She is now currently in the transitional care unit to which we talked about the roles and the goals of the transitional care unit.  She does live in [place] and grew up in [place].  She does groom dogs.  Her systolic blood pressures ranging  with a weight of 101 pounds.  According to nursing notes she did sleep through the night.  For sleep she is on trazodone 25 mg but she does not want that medication any longer so that we will be discontinued.  For heartburn and reflux is on omeprazole 40 mg twice daily is also on a number of other multivitamins and minerals as well as a juice supplement 3 times daily for nutritional needs.    Past Medical History:  Past Medical History:   Diagnosis Date    Abnormal Papanicolaou smear of cervix and cervical HPV 4/07    Colposcopy 2007= HSIL    Genital herpes     History of colposcopy with cervical biopsy 06/2007    HSIL- LEEP recommended    Hypersomnia with sleep apnea, unspecified     CPAP started 2007    Other and unspecified ovarian cyst 2002 or so    s/p resection of ovary and tubes, d&c           Surgical History:  Past Surgical History:   Procedure Laterality Date    CHOLECYSTECTOMY, OPEN  age 22s    COLONOSCOPY  03/21/2011    COMBINED COLONOSCOPY, REMOVE TUMOR/POLYP/LESION BY SNARE performed by JUAN FRANCISCO HERNANDEZ at  GI    COLONOSCOPY N/A 10/09/2017    polyps, repeat 3 years    COLPOSCOPY CERVIX, LOOP ELECTRODE BIOPSY, COMBINED  06/2007    CAN 2/3-patient requires yearly pap smears    dental pegs      ESOPHAGOSCOPY, GASTROSCOPY, DUODENOSCOPY (EGD), COMBINED N/A 02/09/2018    Procedure: COMBINED ESOPHAGOSCOPY, GASTROSCOPY, DUODENOSCOPY (EGD);  EGD;  Surgeon: Oneal Sanchez MD;  Location:  GI    GASTRIC BYPASS  03/25/2008    At [facility]    HC DILATION/CURETTAGE DIAG/THER NON OB  2002    HC ENLARGE BREAST WITH IMPLANT      HC LAPAROSCOPIC  MYOMECTOMY, 1 - 4 INTRAMURAL MYOMAS =<250 GM  2002    HC REMOVAL OF BREAST IMPLANT      SALPINGO OOPHORECTOMY,R/L/MATTHEW  2002    Bilateral salpingectomy and unilateral oophorectomy       Family History:   Family History   Problem Relation Age of Onset    Chronic Obstructive Pulmonary Disease Mother     Unknown/Adopted Father         doesn't know birth father    Lung Cancer Brother     Family History Negative Other        Social History:    Social History     Socioeconomic History    Marital status:      Spouse name: Bran    Number of children: 0   Occupational History    Occupation: care director     Employer: [employer]    Occupation: [employer]     Employer: SELF     Employer: [employer]   Tobacco Use    Smoking status: Every Day     Packs/day: 2.00     Years: 10.00     Additional pack years: 0.00     Total pack years: 20.00     Types: Cigarettes    Smokeless tobacco: Never    Tobacco comments:     2 ppd at this time (chain smoking) 10/2012   Vaping Use    Vaping Use: Every day    Substances: Nicotine, THC    Devices: Pre-filled or refillable cartridge   Substance and Sexual Activity    Alcohol use: Yes     Comment: daily, drinks a box    Drug use: Yes     Types: Marijuana     Comment: medical    Sexual activity: Yes     Partners: Male   Other Topics Concern     Service No    Blood Transfusions No     Social Determinants of Health     Financial Resource Strain: High Risk (4/24/2023)    Overall Financial Resource Strain (CARDIA)     Difficulty of Paying Living Expenses: Hard   Food Insecurity: Food Insecurity Present (4/24/2023)    Hunger Vital Sign     Worried About Running Out of Food in the Last Year: Sometimes true     Ran Out of Food in the Last Year: Sometimes true   Transportation Needs: Unmet Transportation Needs (4/24/2023)    PRAPARE - Transportation     Lack of Transportation (Medical): Yes     Lack of Transportation (Non-Medical): Yes   Physical Activity: Inactive  "(4/24/2023)    Exercise Vital Sign     Days of Exercise per Week: 0 days     Minutes of Exercise per Session: 0 min   Stress: Stress Concern Present (4/24/2023)    [place] [employer] - Occupational Stress Questionnaire     Feeling of Stress : Very much   Social Connections: Unknown (4/24/2023)    Social Connection and Isolation Panel [NHANES]     Frequency of Communication with Friends and Family: More than three times a week     Frequency of Social Gatherings with Friends and Family: Once a week     Attends Islam Services: Patient refused     Active Member of Clubs or Organizations: No     Attends Club or Organization Meetings: 1 to 4 times per year     Marital Status:    Housing Stability: Low Risk  (4/24/2023)    Housing Stability Vital Sign     Unable to Pay for Housing in the Last Year: No     Number of Places Lived in the Last Year: 1     Unstable Housing in the Last Year: No        REVIEW OF SYSTEM:  She currently denies any new symptoms of fever cough or cold sore throat postnasal drip wheezing shortness of breath dyspnea chest pain dizziness or vertigo vomiting diarrhea dysuria frequency urgency.  Does have a history of alcohol use, malnutrition, emphysema, anemia, pulmonary hypertension, Tom-en-Y gastric bypass surgery in 2008, PTSD, tobacco use disorder    PHYSICAL EXAM:   Pleasant female in no acute distress.  Head is normocephalic.  Conjunctiva is pink and sclerae is clear.  Neck is supple without adenopathy.  Lung sounds are clear throughout.  Cardiovascular S1-S2 regular rate and rhythm and no lower extremity edema.  Gastrointestinal thin build nontender nondistended.  Musculoskeletal does admit to generalized weakness but denies discomfort.  Psychiatric: Pleasant affect    Vitals:    10/25/23 1804   BP: 114/72   Pulse: 83   Resp: 17   Temp: 97.8  F (36.6  C)   SpO2: 93%   Weight: 46.3 kg (102 lb)   Height: 1.626 m (5' 4\")         Medication List:  Current Outpatient " "Medications   Medication Sig    busPIRone (BUSPAR) 15 MG tablet Take 1 tablet (15 mg) by mouth 3 times daily    calcium carbonate (OS-SHON 500 MG [place]. [place]) 1250 MG tablet Take 1 tablet by mouth 2 times daily    escitalopram (LEXAPRO) 20 MG tablet Take 1.5 tablets (30 mg) by mouth daily    ferrous gluconate (FERGON) 324 (38 Fe) MG tablet TAKE 1 TABLET (324 MG) BY MOUTH DAILY (WITH BREAKFAST)    folic acid (FOLVITE) 1 MG tablet Take 1 tablet (1,000 mcg) by mouth daily    Insulin Syringe-Needle U-100 (B-D INSULIN SYRINGE) 25G X 1\" 1 ML MISC Use once every 30 days for B12 injection    multivitamin w/minerals (THERA-VIT-M) tablet Take 1 tablet by mouth daily    nicotine (NICODERM CQ) 14 MG/24HR 24 hr patch Place 1 patch onto the skin every 24 hours    omeprazole (PRILOSEC) 40 MG DR capsule TAKE ONE CAPSULE BY MOUTH TWICE A DAY    raloxifene (EVISTA) 60 MG tablet TAKE ONE TABLET BY MOUTH ONCE DAILY    thiamine (B-1) 100 MG tablet Take 1 tablet (100 mg) by mouth daily    triamcinolone (KENALOG) 0.1 % external cream APPLY SPARINGLY TO ITCHY RASH AREAS UP TO THREE TIMES A DAY AS NEEDED    venlafaxine (EFFEXOR XR) 37.5 MG 24 hr capsule Take 1 capsule (37.5 mg) by mouth daily    vitamin D3 (VITAMIN D3) 50 mcg (2000 units) tablet Take 1 tablet (50 mcg) by mouth daily     Current Facility-Administered Medications   Medication    cyanocobalamin injection 1,000 mcg        MED REC REQUIRED  Post Medication Reconciliation Status: discharge medications reconciled and changed, per note/orders       Labs:  Last Comprehensive Metabolic Panel:  Lab Results   Component Value Date     10/24/2023    POTASSIUM 3.5 10/24/2023    CHLORIDE 107 10/24/2023    CO2 19 (L) 10/24/2023    ANIONGAP 9 10/24/2023    GLC 89 10/24/2023    BUN 4.2 (L) 10/24/2023    CR 0.56 10/24/2023    GFRESTIMATED >90 10/24/2023    SHON 8.1 (L) 10/24/2023       CBC RESULTS:   Recent Labs   Lab Test 10/24/23  0555 10/22/23  0627   WBC  --  13.5*   RBC  --  2.47*   HGB  " --  8.5*   HCT  --  25.2*   MCV  --  102*   MCH  --  34.4*   MCHC  --  33.7   RDW  --  13.5   * 555*     Last Comprehensive Metabolic Panel:  Sodium   Date Value Ref Range Status   10/24/2023 135 135 - 145 mmol/L Final     Comment:     Reference intervals for this test were updated on 09/26/2023 to more accurately reflect our healthy population. There may be differences in the flagging of prior results with similar values performed with this method. Interpretation of those prior results can be made in the context of the updated reference intervals.    02/15/2021 139 133 - 144 mmol/L Final     Potassium   Date Value Ref Range Status   10/24/2023 3.5 3.4 - 5.3 mmol/L Final   06/14/2022 4.0 3.4 - 5.3 mmol/L Final   02/15/2021 3.8 3.4 - 5.3 mmol/L Final     Chloride   Date Value Ref Range Status   10/24/2023 107 98 - 107 mmol/L Final   06/14/2022 111 (H) 94 - 109 mmol/L Final   02/15/2021 107 94 - 109 mmol/L Final     Carbon Dioxide   Date Value Ref Range Status   02/15/2021 28 20 - 32 mmol/L Final     Carbon Dioxide (CO2)   Date Value Ref Range Status   10/24/2023 19 (L) 22 - 29 mmol/L Final   06/14/2022 27 20 - 32 mmol/L Final     Anion Gap   Date Value Ref Range Status   10/24/2023 9 7 - 15 mmol/L Final   06/14/2022 4 3 - 14 mmol/L Final   02/15/2021 4 3 - 14 mmol/L Final     Glucose   Date Value Ref Range Status   10/24/2023 89 70 - 99 mg/dL Final   06/14/2022 106 (H) 70 - 99 mg/dL Final   02/15/2021 98 70 - 99 mg/dL Final     GLUCOSE BY METER POCT   Date Value Ref Range Status   10/20/2023 113 (H) 70 - 99 mg/dL Final     Urea Nitrogen   Date Value Ref Range Status   10/24/2023 4.2 (L) 8.0 - 23.0 mg/dL Final   06/14/2022 7 7 - 30 mg/dL Final   02/15/2021 12 7 - 30 mg/dL Final     Creatinine   Date Value Ref Range Status   10/24/2023 0.56 0.51 - 0.95 mg/dL Final   02/15/2021 0.72 0.52 - 1.04 mg/dL Final     GFR Estimate   Date Value Ref Range Status   10/24/2023 >90 >60 mL/min/1.73m2 Final   02/15/2021 89 >60  mL/min/[1.73_m2] Final     Comment:     Non  GFR Calc  Starting 12/18/2018, serum creatinine based estimated GFR (eGFR) will be   calculated using the Chronic Kidney Disease Epidemiology Collaboration   (CKD-EPI) equation.       Calcium   Date Value Ref Range Status   10/24/2023 8.1 (L) 8.8 - 10.2 mg/dL Final   02/15/2021 8.6 8.5 - 10.1 mg/dL Final     Bilirubin Total   Date Value Ref Range Status   10/20/2023 0.3 <=1.2 mg/dL Final   02/15/2021 0.7 0.2 - 1.3 mg/dL Final     Alkaline Phosphatase   Date Value Ref Range Status   10/20/2023 65 35 - 104 U/L Final   02/15/2021 115 40 - 150 U/L Final     ALT   Date Value Ref Range Status   10/20/2023 14 0 - 50 U/L Final     Comment:     Reference intervals for this test were updated on 6/12/2023 to more accurately reflect our healthy population. There may be differences in the flagging of prior results with similar values performed with this method. Interpretation of those prior results can be made in the context of the updated reference intervals.     02/15/2021 17 0 - 50 U/L Final     AST   Date Value Ref Range Status   10/20/2023 20 0 - 45 U/L Final     Comment:     Reference intervals for this test were updated on 6/12/2023 to more accurately reflect our healthy population. There may be differences in the flagging of prior results with similar values performed with this method. Interpretation of those prior results can be made in the context of the updated reference intervals.   02/15/2021 16 0 - 45 U/L Final                   Assessment:   (J43.9) Pulmonary emphysema, unspecified emphysema type (H)  (primary encounter diagnosis)  Comment: Doing well  Plan: No current exacerbation    (J18.9) Multifocal pneumonia  Comment: No longer antibiotics  Plan: Denies any respiratory issues    (K21.00) Gastroesophageal reflux disease with esophagitis, unspecified whether hemorrhage  Comment: On omeprazole 40 mg twice daily  Plan: Continue to monitor    (I27.20)  Pulmonary hypertension (H)  Comment: Stable  Plan: Currently not being treated      PLAN:    We did talk about her hospitalization as well as her chronic medical conditions including her laboratory studies.  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  We went over her medications and we will discontinue trazodone per her request.  Continue with the other multivitamins and minerals.  She is working with therapy so that she can improve her swallow to get on a regular diet.  She also does like to read books and currently reading 1 on [employer].    For documentation purposes total visit 45 minutes which over 50% was spent with the patient going over her chronic medical conditions including her recent hospitalization, laboratory studies, reading volumes of hospital records, going over nutrition malnutrition and the rehabilitation process      Electronically signed by: Otoniel Anna NP

## 2023-10-26 NOTE — LETTER
10/26/2023        RE: Pavithra Raines  [address]  [facility] [address]        [facility]    Facility:   [facility] ([facility]) [[id number]]   Code Status: FULL CODE      HPI:   Pavithra is a 68 year old female who was hospitalized October 7, 2023 through October 24, 2023 secondary to presenting with generalized weakness and fatigue eventually was transferred to the intensive care unit due to septic shock requiring vasopressors and was intubated due to acute hypoxic respiratory failure and then extubated on October 18 and the blood culture did grow out Streptococcus pneumonia and that was treated with ceftriaxone, vancomycin and corticosteroids and the repeat cultures were negative and the corticosteroids were discontinued on October 19 antibiotics were discontinued on October 21 and was diagnosed with multifocal pneumonia.  Her blood work last on October 24 did show a sodium 135, potassium 3.5, BUN 4.2 and creatinine 0.56 unremarkable liver function tests.  Her white cell count on October 19 was 18 and then again in comparison to October 22 at 13.5 and hemoglobin 8.5.  The chest CT did show multifocal pneumonia and the chest x-ray did show increased interstitial lung markings patchy and consolidative airspace disease.  CT of the head did not show any evidence of acute intracranial process and a x-ray of the abdomen did show that she did have a feeding tube.  Her ejection fraction 55-60%.  Other issues addressed in the hospital was her history of alcohol use disorder with severe dependence and the blood alcohol level was not tested at the time of admission she did have initial treatment of alcohol withdrawal.  Also does have hypoalbuminemia with a history of Tom-en-Y bypass surgery also did have hypomagnesemia, hypophosphatemia and hypokalemia.  Hyponatremia did resolve.  She did initially have some hyperglycemia the A1c was 4.4.  Hemoglobin stabilized between 8  and 9.  History of COPD and tobacco abuse.  For the generalized weakness recommended transitional care unit also has a history of depression and currently being treated with BuSpar Effexor and Lexapro.  She is now currently in the transitional care unit to which we talked about the roles and the goals of the transitional care unit.  She does live in [place] and grew up in [place].  She does groom dogs.  Her systolic blood pressures ranging  with a weight of 101 pounds.  According to nursing notes she did sleep through the night.  For sleep she is on trazodone 25 mg but she does not want that medication any longer so that we will be discontinued.  For heartburn and reflux is on omeprazole 40 mg twice daily is also on a number of other multivitamins and minerals as well as a juice supplement 3 times daily for nutritional needs.    Past Medical History:  Past Medical History:   Diagnosis Date     Abnormal Papanicolaou smear of cervix and cervical HPV 4/07    Colposcopy 2007= HSIL     Genital herpes      History of colposcopy with cervical biopsy 06/2007    HSIL- LEEP recommended     Hypersomnia with sleep apnea, unspecified     CPAP started 2007     Other and unspecified ovarian cyst 2002 or so    s/p resection of ovary and tubes, d&c           Surgical History:  Past Surgical History:   Procedure Laterality Date     CHOLECYSTECTOMY, OPEN  age 20s     COLONOSCOPY  03/21/2011    COMBINED COLONOSCOPY, REMOVE TUMOR/POLYP/LESION BY SNARE performed by JUAN FRANCISCO HERNANDEZ at  GI     COLONOSCOPY N/A 10/09/2017    polyps, repeat 3 years     COLPOSCOPY CERVIX, LOOP ELECTRODE BIOPSY, COMBINED  06/2007    CAN 2/3-patient requires yearly pap smears     dental pegs       ESOPHAGOSCOPY, GASTROSCOPY, DUODENOSCOPY (EGD), COMBINED N/A 02/09/2018    Procedure: COMBINED ESOPHAGOSCOPY, GASTROSCOPY, DUODENOSCOPY (EGD);  EGD;  Surgeon: Oneal Sanchez MD;  Location:  GI     GASTRIC BYPASS  03/25/2008    At  [facility]     HC DILATION/CURETTAGE DIAG/THER NON OB  2002     HC ENLARGE BREAST WITH IMPLANT       HC LAPAROSCOPIC MYOMECTOMY, 1 - 4 INTRAMURAL MYOMAS =<250 GM  2002     HC REMOVAL OF BREAST IMPLANT       SALPINGO OOPHORECTOMY,R/L/MATTHEW  2002    Bilateral salpingectomy and unilateral oophorectomy       Family History:   Family History   Problem Relation Age of Onset     Chronic Obstructive Pulmonary Disease Mother      Unknown/Adopted Father         doesn't know birth father     Lung Cancer Brother      Family History Negative Other        Social History:    Social History     Socioeconomic History     Marital status:      Spouse name: Bran     Number of children: 0   Occupational History     Occupation: care director     Employer: [employer]     Occupation: [employer]     Employer: SELF     Employer: [employer]   Tobacco Use     Smoking status: Every Day     Packs/day: 2.00     Years: 10.00     Additional pack years: 0.00     Total pack years: 20.00     Types: Cigarettes     Smokeless tobacco: Never     Tobacco comments:     2 ppd at this time (chain smoking) 10/2012   Vaping Use     Vaping Use: Every day     Substances: Nicotine, THC     Devices: Pre-filled or refillable cartridge   Substance and Sexual Activity     Alcohol use: Yes     Comment: daily, drinks a box     Drug use: Yes     Types: Marijuana     Comment: medical     Sexual activity: Yes     Partners: Male   Other Topics Concern      Service No     Blood Transfusions No     Social Determinants of Health     Financial Resource Strain: High Risk (4/24/2023)    Overall Financial Resource Strain (CARDIA)      Difficulty of Paying Living Expenses: Hard   Food Insecurity: Food Insecurity Present (4/24/2023)    Hunger Vital Sign      Worried About Running Out of Food in the Last Year: Sometimes true      Ran Out of Food in the Last Year: Sometimes true   Transportation Needs: Unmet Transportation Needs (4/24/2023)     PRAPARE - Transportation      Lack of Transportation (Medical): Yes      Lack of Transportation (Non-Medical): Yes   Physical Activity: Inactive (4/24/2023)    Exercise Vital Sign      Days of Exercise per Week: 0 days      Minutes of Exercise per Session: 0 min   Stress: Stress Concern Present (4/24/2023)    [employer] [place] [employer] - Occupational Stress Questionnaire      Feeling of Stress : Very much   Social Connections: Unknown (4/24/2023)    Social Connection and Isolation Panel [NHANES]      Frequency of Communication with Friends and Family: More than three times a week      Frequency of Social Gatherings with Friends and Family: Once a week      Attends Mosque Services: Patient refused      Active Member of Clubs or Organizations: No      Attends Club or Organization Meetings: 1 to 4 times per year      Marital Status:    Housing Stability: Low Risk  (4/24/2023)    Housing Stability Vital Sign      Unable to Pay for Housing in the Last Year: No      Number of Places Lived in the Last Year: 1      Unstable Housing in the Last Year: No        REVIEW OF SYSTEM:  She currently denies any new symptoms of fever cough or cold sore throat postnasal drip wheezing shortness of breath dyspnea chest pain dizziness or vertigo vomiting diarrhea dysuria frequency urgency.  Does have a history of alcohol use, malnutrition, emphysema, anemia, pulmonary hypertension, Tom-en-Y gastric bypass surgery in 2008, PTSD, tobacco use disorder    PHYSICAL EXAM:   Pleasant female in no acute distress.  Head is normocephalic.  Conjunctiva is pink and sclerae is clear.  Neck is supple without adenopathy.  Lung sounds are clear throughout.  Cardiovascular S1-S2 regular rate and rhythm and no lower extremity edema.  Gastrointestinal thin build nontender nondistended.  Musculoskeletal does admit to generalized weakness but denies discomfort.  Psychiatric: Pleasant affect    Vitals:    10/25/23 1804   BP: 114/72  "  Pulse: 83   Resp: 17   Temp: 97.8  F (36.6  C)   SpO2: 93%   Weight: 46.3 kg (102 lb)   Height: 1.626 m (5' 4\")         Medication List:  Current Outpatient Medications   Medication Sig     busPIRone (BUSPAR) 15 MG tablet Take 1 tablet (15 mg) by mouth 3 times daily     calcium carbonate (OS-SHON 500 MG [place]. [place]) 1250 MG tablet Take 1 tablet by mouth 2 times daily     escitalopram (LEXAPRO) 20 MG tablet Take 1.5 tablets (30 mg) by mouth daily     ferrous gluconate (FERGON) 324 (38 Fe) MG tablet TAKE 1 TABLET (324 MG) BY MOUTH DAILY (WITH BREAKFAST)     folic acid (FOLVITE) 1 MG tablet Take 1 tablet (1,000 mcg) by mouth daily     Insulin Syringe-Needle U-100 (B-D INSULIN SYRINGE) 25G X 1\" 1 ML MISC Use once every 30 days for B12 injection     multivitamin w/minerals (THERA-VIT-M) tablet Take 1 tablet by mouth daily     nicotine (NICODERM CQ) 14 MG/24HR 24 hr patch Place 1 patch onto the skin every 24 hours     omeprazole (PRILOSEC) 40 MG DR capsule TAKE ONE CAPSULE BY MOUTH TWICE A DAY     raloxifene (EVISTA) 60 MG tablet TAKE ONE TABLET BY MOUTH ONCE DAILY     thiamine (B-1) 100 MG tablet Take 1 tablet (100 mg) by mouth daily     triamcinolone (KENALOG) 0.1 % external cream APPLY SPARINGLY TO ITCHY RASH AREAS UP TO THREE TIMES A DAY AS NEEDED     venlafaxine (EFFEXOR XR) 37.5 MG 24 hr capsule Take 1 capsule (37.5 mg) by mouth daily     vitamin D3 (VITAMIN D3) 50 mcg (2000 units) tablet Take 1 tablet (50 mcg) by mouth daily     Current Facility-Administered Medications   Medication     cyanocobalamin injection 1,000 mcg        MED REC REQUIRED  Post Medication Reconciliation Status: discharge medications reconciled and changed, per note/orders       Labs:  Last Comprehensive Metabolic Panel:  Lab Results   Component Value Date     10/24/2023    POTASSIUM 3.5 10/24/2023    CHLORIDE 107 10/24/2023    CO2 19 (L) 10/24/2023    ANIONGAP 9 10/24/2023    GLC 89 10/24/2023    BUN 4.2 (L) 10/24/2023    CR 0.56 " 10/24/2023    GFRESTIMATED >90 10/24/2023    SHON 8.1 (L) 10/24/2023       CBC RESULTS:   Recent Labs   Lab Test 10/24/23  0555 10/22/23  0627   WBC  --  13.5*   RBC  --  2.47*   HGB  --  8.5*   HCT  --  25.2*   MCV  --  102*   MCH  --  34.4*   MCHC  --  33.7   RDW  --  13.5   * 555*     Last Comprehensive Metabolic Panel:  Sodium   Date Value Ref Range Status   10/24/2023 135 135 - 145 mmol/L Final     Comment:     Reference intervals for this test were updated on 09/26/2023 to more accurately reflect our healthy population. There may be differences in the flagging of prior results with similar values performed with this method. Interpretation of those prior results can be made in the context of the updated reference intervals.    02/15/2021 139 133 - 144 mmol/L Final     Potassium   Date Value Ref Range Status   10/24/2023 3.5 3.4 - 5.3 mmol/L Final   06/14/2022 4.0 3.4 - 5.3 mmol/L Final   02/15/2021 3.8 3.4 - 5.3 mmol/L Final     Chloride   Date Value Ref Range Status   10/24/2023 107 98 - 107 mmol/L Final   06/14/2022 111 (H) 94 - 109 mmol/L Final   02/15/2021 107 94 - 109 mmol/L Final     Carbon Dioxide   Date Value Ref Range Status   02/15/2021 28 20 - 32 mmol/L Final     Carbon Dioxide (CO2)   Date Value Ref Range Status   10/24/2023 19 (L) 22 - 29 mmol/L Final   06/14/2022 27 20 - 32 mmol/L Final     Anion Gap   Date Value Ref Range Status   10/24/2023 9 7 - 15 mmol/L Final   06/14/2022 4 3 - 14 mmol/L Final   02/15/2021 4 3 - 14 mmol/L Final     Glucose   Date Value Ref Range Status   10/24/2023 89 70 - 99 mg/dL Final   06/14/2022 106 (H) 70 - 99 mg/dL Final   02/15/2021 98 70 - 99 mg/dL Final     GLUCOSE BY METER POCT   Date Value Ref Range Status   10/20/2023 113 (H) 70 - 99 mg/dL Final     Urea Nitrogen   Date Value Ref Range Status   10/24/2023 4.2 (L) 8.0 - 23.0 mg/dL Final   06/14/2022 7 7 - 30 mg/dL Final   02/15/2021 12 7 - 30 mg/dL Final     Creatinine   Date Value Ref Range Status    10/24/2023 0.56 0.51 - 0.95 mg/dL Final   02/15/2021 0.72 0.52 - 1.04 mg/dL Final     GFR Estimate   Date Value Ref Range Status   10/24/2023 >90 >60 mL/min/1.73m2 Final   02/15/2021 89 >60 mL/min/[1.73_m2] Final     Comment:     Non  GFR Calc  Starting 12/18/2018, serum creatinine based estimated GFR (eGFR) will be   calculated using the Chronic Kidney Disease Epidemiology Collaboration   (CKD-EPI) equation.       Calcium   Date Value Ref Range Status   10/24/2023 8.1 (L) 8.8 - 10.2 mg/dL Final   02/15/2021 8.6 8.5 - 10.1 mg/dL Final     Bilirubin Total   Date Value Ref Range Status   10/20/2023 0.3 <=1.2 mg/dL Final   02/15/2021 0.7 0.2 - 1.3 mg/dL Final     Alkaline Phosphatase   Date Value Ref Range Status   10/20/2023 65 35 - 104 U/L Final   02/15/2021 115 40 - 150 U/L Final     ALT   Date Value Ref Range Status   10/20/2023 14 0 - 50 U/L Final     Comment:     Reference intervals for this test were updated on 6/12/2023 to more accurately reflect our healthy population. There may be differences in the flagging of prior results with similar values performed with this method. Interpretation of those prior results can be made in the context of the updated reference intervals.     02/15/2021 17 0 - 50 U/L Final     AST   Date Value Ref Range Status   10/20/2023 20 0 - 45 U/L Final     Comment:     Reference intervals for this test were updated on 6/12/2023 to more accurately reflect our healthy population. There may be differences in the flagging of prior results with similar values performed with this method. Interpretation of those prior results can be made in the context of the updated reference intervals.   02/15/2021 16 0 - 45 U/L Final                   Assessment:   (J43.9) Pulmonary emphysema, unspecified emphysema type (H)  (primary encounter diagnosis)  Comment: Doing well  Plan: No current exacerbation    (J18.9) Multifocal pneumonia  Comment: No longer antibiotics  Plan: Denies any  respiratory issues    (K21.00) Gastroesophageal reflux disease with esophagitis, unspecified whether hemorrhage  Comment: On omeprazole 40 mg twice daily  Plan: Continue to monitor    (I27.20) Pulmonary hypertension (H)  Comment: Stable  Plan: Currently not being treated      PLAN:    We did talk about her hospitalization as well as her chronic medical conditions including her laboratory studies.  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  We went over her medications and we will discontinue trazodone per her request.  Continue with the other multivitamins and minerals.  She is working with therapy so that she can improve her swallow to get on a regular diet.  She also does like to read books and currently reading 1 on [place].    For documentation purposes total visit 45 minutes which over 50% was spent with the patient going over her chronic medical conditions including her recent hospitalization, laboratory studies, reading volumes of hospital records, going over nutrition malnutrition and the rehabilitation process      Electronically signed by: Otoniel Anna NP      Sincerely,        Otoniel Anna NP

## 2023-10-27 LAB
BACTERIA BLD CULT: ABNORMAL
BACTERIA BLD CULT: ABNORMAL
GAMMA INTERFERON BACKGROUND BLD IA-ACNC: 0.05 IU/ML
M TB IFN-G BLD-IMP: NEGATIVE
M TB IFN-G CD4+ BCKGRND COR BLD-ACNC: 7.92 IU/ML
MITOGEN IGNF BCKGRD COR BLD-ACNC: 0.01 IU/ML
MITOGEN IGNF BCKGRD COR BLD-ACNC: 0.01 IU/ML
QUANTIFERON MITOGEN: 7.97 IU/ML
QUANTIFERON NIL TUBE: 0.05 IU/ML
QUANTIFERON TB1 TUBE: 0.06 IU/ML
QUANTIFERON TB2 TUBE: 0.06

## 2023-10-30 VITALS
TEMPERATURE: 97.8 F | DIASTOLIC BLOOD PRESSURE: 76 MMHG | WEIGHT: 101 LBS | RESPIRATION RATE: 15 BRPM | SYSTOLIC BLOOD PRESSURE: 138 MMHG | OXYGEN SATURATION: 97 % | HEIGHT: 64 IN | BODY MASS INDEX: 17.24 KG/M2 | HEART RATE: 71 BPM

## 2023-10-31 ENCOUNTER — TRANSITIONAL CARE UNIT VISIT (OUTPATIENT)
Dept: GERIATRICS | Facility: CLINIC | Age: 68
End: 2023-10-31
Payer: MEDICARE

## 2023-10-31 ENCOUNTER — LAB REQUISITION (OUTPATIENT)
Dept: LAB | Facility: CLINIC | Age: 68
End: 2023-10-31

## 2023-10-31 DIAGNOSIS — R53.1 WEAKNESS GENERALIZED: ICD-10-CM

## 2023-10-31 DIAGNOSIS — F33.9 EPISODE OF RECURRENT MAJOR DEPRESSIVE DISORDER, UNSPECIFIED DEPRESSION EPISODE SEVERITY (H): ICD-10-CM

## 2023-10-31 DIAGNOSIS — J43.9 PULMONARY EMPHYSEMA, UNSPECIFIED EMPHYSEMA TYPE (H): ICD-10-CM

## 2023-10-31 DIAGNOSIS — E46 MALNUTRITION, UNSPECIFIED TYPE (H): Primary | ICD-10-CM

## 2023-10-31 DIAGNOSIS — J18.9 PNEUMONIA, UNSPECIFIED ORGANISM: ICD-10-CM

## 2023-10-31 PROCEDURE — 99309 SBSQ NF CARE MODERATE MDM 30: CPT | Performed by: FAMILY MEDICINE

## 2023-10-31 NOTE — PROGRESS NOTES
[facility] GERIATRIC SERVICES    Facility[facility]   [facility] ([facility]) [[id number]]   Code Status: FULL CODE      CHIEF COMPLAINT/REASON FOR VISIT:  Chief Complaint   Patient presents with    RECHECK       HISTORY:      HPI: Pavithra is a 68 year old female who I had the pleasure to revisit with once again today not only secondary to her hospitalization October 7, 2023 through October 24, 2023 secondary to presenting with generalized weakness and fatigue along with acute hypoxic respiratory failure he was intubated and extubated who was given IV antibiotics was also shown to have multifocal pneumonia as well as today's discussion of her nutrition medications and rehabilitation.  Her systolic blood pressures over the past week ranging 111-138 her weight on October 26 101 pounds.  According to nursing notes she is gaining her independence her lungs are clear she is on room air and no shortness of breath or dyspnea.  Last week we did discontinue the trazodone per her request.  She is on BuSpar 15 mg 3 times a day for anxiety along with Lexapro 30 mg and also venlafaxine 37.5 mg.  For nutrition she is on a regular diet and getting house nutrient supplements 3 times daily.  She does like to read books.  She is getting her home ready for potential discharge in a week or so.  She does feel pretty good overall about her activities and her strength but still working on her balance and stamina.    Past Medical History:   Diagnosis Date    Abnormal Papanicolaou smear of cervix and cervical HPV 4/07    Colposcopy 2007= HSIL    Genital herpes     History of colposcopy with cervical biopsy 06/2007    HSIL- LEEP recommended    Hypersomnia with sleep apnea, unspecified     CPAP started 2007    Other and unspecified ovarian cyst 2002 or so    s/p resection of ovary and tubes, d&c            Family History   Problem Relation Age of Onset    Chronic Obstructive Pulmonary Disease Mother     Unknown/Adopted Father          doesn't know birth father    Lung Cancer Brother     Family History Negative Other       Social History     Socioeconomic History    Marital status:      Spouse name: Bran    Number of children: 0   Occupational History    Occupation: care director     Employer: [employer]    Occupation: [employer]     Employer: SELF     Employer: [employer]   Tobacco Use    Smoking status: Every Day     Packs/day: 2.00     Years: 10.00     Additional pack years: 0.00     Total pack years: 20.00     Types: Cigarettes    Smokeless tobacco: Never    Tobacco comments:     2 ppd at this time (chain smoking) 10/2012   Vaping Use    Vaping Use: Every day    Substances: Nicotine, THC    Devices: Pre-filled or refillable cartridge   Substance and Sexual Activity    Alcohol use: Yes     Comment: daily, drinks a box    Drug use: Yes     Types: Marijuana     Comment: medical    Sexual activity: Yes     Partners: Male   Other Topics Concern     Service No    Blood Transfusions No     Social Determinants of Health     Financial Resource Strain: High Risk (4/24/2023)    Overall Financial Resource Strain (CARDIA)     Difficulty of Paying Living Expenses: Hard   Food Insecurity: Food Insecurity Present (4/24/2023)    Hunger Vital Sign     Worried About Running Out of Food in the Last Year: Sometimes true     Ran Out of Food in the Last Year: Sometimes true   Transportation Needs: Unmet Transportation Needs (4/24/2023)    PRAPARE - Transportation     Lack of Transportation (Medical): Yes     Lack of Transportation (Non-Medical): Yes   Physical Activity: Inactive (4/24/2023)    Exercise Vital Sign     Days of Exercise per Week: 0 days     Minutes of Exercise per Session: 0 min   Stress: Stress Concern Present (4/24/2023)    [place] of [employer]ational [employer] - Occupational Stress Questionnaire     Feeling of Stress : Very much   Social Connections: Unknown (4/24/2023)    Social Connection and Isolation Panel  [NHANES]     Frequency of Communication with Friends and Family: More than three times a week     Frequency of Social Gatherings with Friends and Family: Once a week     Attends Nondenominational Services: Patient refused     Active Member of Clubs or Organizations: No     Attends Club or Organization Meetings: 1 to 4 times per year     Marital Status:    Housing Stability: Low Risk  (4/24/2023)    Housing Stability Vital Sign     Unable to Pay for Housing in the Last Year: No     Number of Places Lived in the Last Year: 1     Unstable Housing in the Last Year: No       no new changes to the review of systems or the physical exam since her last visit on October 26  REVIEW OF SYSTEM:  She currently denies any new symptoms of fever cough or cold sore throat postnasal drip wheezing shortness of breath dyspnea chest pain dizziness or vertigo vomiting diarrhea dysuria frequency urgency.  Does have a history of alcohol use, malnutrition, emphysema, anemia, pulmonary hypertension, Tom-en-Y gastric bypass surgery in 2008, PTSD, tobacco use disorder     PHYSICAL EXAM:   Pleasant female in no acute distress.  Head is normocephalic.  .  Neck is supple without adenopathy.  Lung sounds are clear throughout.  Cardiovascular S1-S2 regular rate and rhythm and no lower extremity edema.  Gastrointestinal thin build nontender nondistended.  Musculoskeletal -gaining independence.  Denies discomfort.  Psychiatric: Pleasant affect      Current Outpatient Medications:     busPIRone (BUSPAR) 15 MG tablet, Take 1 tablet (15 mg) by mouth 3 times daily, Disp: 90 tablet, Rfl: 3    calcium carbonate (OS-SHON 500 MG [place]. [place]) 1250 MG tablet, Take 1 tablet by mouth 2 times daily, Disp: , Rfl:     escitalopram (LEXAPRO) 20 MG tablet, Take 1.5 tablets (30 mg) by mouth daily, Disp: 135 tablet, Rfl: 3    ferrous gluconate (FERGON) 324 (38 Fe) MG tablet, TAKE 1 TABLET (324 MG) BY MOUTH DAILY (WITH BREAKFAST), Disp: 90 tablet, Rfl: 1    folic acid  "(FOLVITE) 1 MG tablet, Take 1 tablet (1,000 mcg) by mouth daily, Disp: 90 tablet, Rfl: 3    Insulin Syringe-Needle U-100 (B-D INSULIN SYRINGE) 25G X 1\" 1 ML MISC, Use once every 30 days for B12 injection, Disp: 3 each, Rfl: 3    multivitamin w/minerals (THERA-VIT-M) tablet, Take 1 tablet by mouth daily, Disp: , Rfl:     nicotine (NICODERM CQ) 14 MG/24HR 24 hr patch, Place 1 patch onto the skin every 24 hours, Disp: , Rfl:     omeprazole (PRILOSEC) 40 MG DR capsule, TAKE ONE CAPSULE BY MOUTH TWICE A DAY, Disp: 180 capsule, Rfl: 0    raloxifene (EVISTA) 60 MG tablet, TAKE ONE TABLET BY MOUTH ONCE DAILY, Disp: 90 tablet, Rfl: 0    thiamine (B-1) 100 MG tablet, Take 1 tablet (100 mg) by mouth daily, Disp: , Rfl:     triamcinolone (KENALOG) 0.1 % external cream, APPLY SPARINGLY TO ITCHY RASH AREAS UP TO THREE TIMES A DAY AS NEEDED, Disp: 80 g, Rfl: 0    venlafaxine (EFFEXOR XR) 37.5 MG 24 hr capsule, Take 1 capsule (37.5 mg) by mouth daily, Disp: 90 capsule, Rfl: 1    vitamin D3 (VITAMIN D3) 50 mcg (2000 units) tablet, Take 1 tablet (50 mcg) by mouth daily, Disp: 90 tablet, Rfl: 3    Current Facility-Administered Medications:     cyanocobalamin injection 1,000 mcg, 1,000 mcg, Intramuscular, Q30 Days, Sam Chang MD, 1,000 mcg at 12/30/19 1449    /76   Pulse 71   Temp 97.8  F (36.6  C)   Resp 15   Ht 1.626 m (5' 4\")   Wt 45.8 kg (101 lb)   LMP  (LMP Unknown)   SpO2 97%   BMI 17.34 kg/m      LABS:   Last Comprehensive Metabolic Panel:  Lab Results   Component Value Date     10/24/2023    POTASSIUM 3.5 10/24/2023    CHLORIDE 107 10/24/2023    CO2 19 (L) 10/24/2023    ANIONGAP 9 10/24/2023    GLC 89 10/24/2023    BUN 4.2 (L) 10/24/2023    CR 0.56 10/24/2023    GFRESTIMATED >90 10/24/2023    SHON 8.1 (L) 10/24/2023       CBC RESULTS:   Recent Labs   Lab Test 10/24/23  0555 10/22/23  0627   WBC  --  13.5*   RBC  --  2.47*   HGB  --  8.5*   HCT  --  25.2*   MCV  --  102*   MCH  --  34.4*   MCHC "  --  33.7   RDW  --  13.5   * 555*           ASSESSMENT:    Encounter Diagnoses   Name Primary?    Malnutrition, unspecified type (H24) Yes    Episode of recurrent major depressive disorder, unspecified depression episode severity (H24)     Weakness generalized     Pulmonary emphysema, unspecified emphysema type (H)        PLAN:    No new changes.  Continue current medication treatment modalities.  She does like to use a 2 wheeled walker.  Sometimes at home she will utilize the quad cane.  Otherwise at this point she will continue to work with therapy continue with good nutrition and intake and continue to monitor and follow.        Electronically signed by: Otoniel Anna NP

## 2023-10-31 NOTE — LETTER
10/31/2023        RE: Pavithra Raines  [address]  [facility] [address]        [facility]    Facility:   [facility] AND [facility] ([facility]) [[id number]]   Code Status: FULL CODE      CHIEF COMPLAINT/REASON FOR VISIT:  Chief Complaint   Patient presents with     RECHECK       HISTORY:      HPI: Pavithra is a 68 year old female who I had the pleasure to revisit with once again today not only secondary to her hospitalization October 7, 2023 through October 24, 2023 secondary to presenting with generalized weakness and fatigue along with acute hypoxic respiratory failure he was intubated and extubated who was given IV antibiotics was also shown to have multifocal pneumonia as well as today's discussion of her nutrition medications and rehabilitation.  Her systolic blood pressures over the past week ranging 111-138 her weight on October 26 101 pounds.  According to nursing notes she is gaining her independence her lungs are clear she is on room air and no shortness of breath or dyspnea.  Last week we did discontinue the trazodone per her request.  She is on BuSpar 15 mg 3 times a day for anxiety along with Lexapro 30 mg and also venlafaxine 37.5 mg.  For nutrition she is on a regular diet and getting house nutrient supplements 3 times daily.  She does like to read books.  She is getting her home ready for potential discharge in a week or so.  She does feel pretty good overall about her activities and her strength but still working on her balance and stamina.    Past Medical History:   Diagnosis Date     Abnormal Papanicolaou smear of cervix and cervical HPV 4/07    Colposcopy 2007= HSIL     Genital herpes      History of colposcopy with cervical biopsy 06/2007    HSIL- LEEP recommended     Hypersomnia with sleep apnea, unspecified     CPAP started 2007     Other and unspecified ovarian cyst 2002 or so    s/p resection of ovary and tubes, d&c            Family History   Problem  Relation Age of Onset     Chronic Obstructive Pulmonary Disease Mother      Unknown/Adopted Father         doesn't know birth father     Lung Cancer Brother      Family History Negative Other       Social History     Socioeconomic History     Marital status:      Spouse name: Bran     Number of children: 0   Occupational History     Occupation: care director     Employer: [employer]     Occupation: [employer]     Employer: SELF     Employer: [employer]   Tobacco Use     Smoking status: Every Day     Packs/day: 2.00     Years: 10.00     Additional pack years: 0.00     Total pack years: 20.00     Types: Cigarettes     Smokeless tobacco: Never     Tobacco comments:     2 ppd at this time (chain smoking) 10/2012   Vaping Use     Vaping Use: Every day     Substances: Nicotine, THC     Devices: Pre-filled or refillable cartridge   Substance and Sexual Activity     Alcohol use: Yes     Comment: daily, drinks a box     Drug use: Yes     Types: Marijuana     Comment: medical     Sexual activity: Yes     Partners: Male   Other Topics Concern      Service No     Blood Transfusions No     Social Determinants of Health     Financial Resource Strain: High Risk (4/24/2023)    Overall Financial Resource Strain (CARDIA)      Difficulty of Paying Living Expenses: Hard   Food Insecurity: Food Insecurity Present (4/24/2023)    Hunger Vital Sign      Worried About Running Out of Food in the Last Year: Sometimes true      Ran Out of Food in the Last Year: Sometimes true   Transportation Needs: Unmet Transportation Needs (4/24/2023)    PRAPARE - Transportation      Lack of Transportation (Medical): Yes      Lack of Transportation (Non-Medical): Yes   Physical Activity: Inactive (4/24/2023)    Exercise Vital Sign      Days of Exercise per Week: 0 days      Minutes of Exercise per Session: 0 min   Stress: Stress Concern Present (4/24/2023)    [employer] [place] [employer] - Occupational Stress  Questionnaire      Feeling of Stress : Very much   Social Connections: Unknown (4/24/2023)    Social Connection and Isolation Panel [NHANES]      Frequency of Communication with Friends and Family: More than three times a week      Frequency of Social Gatherings with Friends and Family: Once a week      Attends Temple Services: Patient refused      Active Member of Clubs or Organizations: No      Attends Club or Organization Meetings: 1 to 4 times per year      Marital Status:    Housing Stability: Low Risk  (4/24/2023)    Housing Stability Vital Sign      Unable to Pay for Housing in the Last Year: No      Number of Places Lived in the Last Year: 1      Unstable Housing in the Last Year: No       no new changes to the review of systems or the physical exam since her last visit on October 26  REVIEW OF SYSTEM:  She currently denies any new symptoms of fever cough or cold sore throat postnasal drip wheezing shortness of breath dyspnea chest pain dizziness or vertigo vomiting diarrhea dysuria frequency urgency.  Does have a history of alcohol use, malnutrition, emphysema, anemia, pulmonary hypertension, Tom-en-Y gastric bypass surgery in 2008, PTSD, tobacco use disorder     PHYSICAL EXAM:   Pleasant female in no acute distress.  Head is normocephalic.  .  Neck is supple without adenopathy.  Lung sounds are clear throughout.  Cardiovascular S1-S2 regular rate and rhythm and no lower extremity edema.  Gastrointestinal thin build nontender nondistended.  Musculoskeletal -gaining independence.  Denies discomfort.  Psychiatric: Pleasant affect      Current Outpatient Medications:      busPIRone (BUSPAR) 15 MG tablet, Take 1 tablet (15 mg) by mouth 3 times daily, Disp: 90 tablet, Rfl: 3     calcium carbonate (OS-SHON 500 MG [place]. [place]) 1250 MG tablet, Take 1 tablet by mouth 2 times daily, Disp: , Rfl:      escitalopram (LEXAPRO) 20 MG tablet, Take 1.5 tablets (30 mg) by mouth daily, Disp: 135 tablet, Rfl: 3      "ferrous gluconate (FERGON) 324 (38 Fe) MG tablet, TAKE 1 TABLET (324 MG) BY MOUTH DAILY (WITH BREAKFAST), Disp: 90 tablet, Rfl: 1     folic acid (FOLVITE) 1 MG tablet, Take 1 tablet (1,000 mcg) by mouth daily, Disp: 90 tablet, Rfl: 3     Insulin Syringe-Needle U-100 (B-D INSULIN SYRINGE) 25G X 1\" 1 ML MISC, Use once every 30 days for B12 injection, Disp: 3 each, Rfl: 3     multivitamin w/minerals (THERA-VIT-M) tablet, Take 1 tablet by mouth daily, Disp: , Rfl:      nicotine (NICODERM CQ) 14 MG/24HR 24 hr patch, Place 1 patch onto the skin every 24 hours, Disp: , Rfl:      omeprazole (PRILOSEC) 40 MG DR capsule, TAKE ONE CAPSULE BY MOUTH TWICE A DAY, Disp: 180 capsule, Rfl: 0     raloxifene (EVISTA) 60 MG tablet, TAKE ONE TABLET BY MOUTH ONCE DAILY, Disp: 90 tablet, Rfl: 0     thiamine (B-1) 100 MG tablet, Take 1 tablet (100 mg) by mouth daily, Disp: , Rfl:      triamcinolone (KENALOG) 0.1 % external cream, APPLY SPARINGLY TO ITCHY RASH AREAS UP TO THREE TIMES A DAY AS NEEDED, Disp: 80 g, Rfl: 0     venlafaxine (EFFEXOR XR) 37.5 MG 24 hr capsule, Take 1 capsule (37.5 mg) by mouth daily, Disp: 90 capsule, Rfl: 1     vitamin D3 (VITAMIN D3) 50 mcg (2000 units) tablet, Take 1 tablet (50 mcg) by mouth daily, Disp: 90 tablet, Rfl: 3    Current Facility-Administered Medications:      cyanocobalamin injection 1,000 mcg, 1,000 mcg, Intramuscular, Q30 Days, Sam Chang MD, 1,000 mcg at 12/30/19 1449    /76   Pulse 71   Temp 97.8  F (36.6  C)   Resp 15   Ht 1.626 m (5' 4\")   Wt 45.8 kg (101 lb)   LMP  (LMP Unknown)   SpO2 97%   BMI 17.34 kg/m      LABS:   Last Comprehensive Metabolic Panel:  Lab Results   Component Value Date     10/24/2023    POTASSIUM 3.5 10/24/2023    CHLORIDE 107 10/24/2023    CO2 19 (L) 10/24/2023    ANIONGAP 9 10/24/2023    GLC 89 10/24/2023    BUN 4.2 (L) 10/24/2023    CR 0.56 10/24/2023    GFRESTIMATED >90 10/24/2023    SHON 8.1 (L) 10/24/2023       CBC RESULTS: "   Recent Labs   Lab Test 10/24/23  0555 10/22/23  0627   WBC  --  13.5*   RBC  --  2.47*   HGB  --  8.5*   HCT  --  25.2*   MCV  --  102*   MCH  --  34.4*   MCHC  --  33.7   RDW  --  13.5   * 555*           ASSESSMENT:    Encounter Diagnoses   Name Primary?     Malnutrition, unspecified type (H24) Yes     Episode of recurrent major depressive disorder, unspecified depression episode severity (H24)      Weakness generalized      Pulmonary emphysema, unspecified emphysema type (H)        PLAN:    No new changes.  Continue current medication treatment modalities.  She does like to use a 2 wheeled walker.  Sometimes at home she will utilize the quad cane.  Otherwise at this point she will continue to work with therapy continue with good nutrition and intake and continue to monitor and follow.        Electronically signed by: Otoniel Anna NP      Sincerely,        Otoniel Anna NP

## 2023-11-01 VITALS
SYSTOLIC BLOOD PRESSURE: 132 MMHG | BODY MASS INDEX: 17.07 KG/M2 | HEART RATE: 68 BPM | OXYGEN SATURATION: 99 % | DIASTOLIC BLOOD PRESSURE: 75 MMHG | HEIGHT: 64 IN | RESPIRATION RATE: 18 BRPM | TEMPERATURE: 97.8 F | WEIGHT: 100 LBS

## 2023-11-01 LAB
ANION GAP SERPL CALCULATED.3IONS-SCNC: 10 MMOL/L (ref 7–15)
BUN SERPL-MCNC: 10.6 MG/DL (ref 8–23)
CALCIUM SERPL-MCNC: 8.3 MG/DL (ref 8.8–10.2)
CHLORIDE SERPL-SCNC: 111 MMOL/L (ref 98–107)
CREAT SERPL-MCNC: 0.65 MG/DL (ref 0.51–0.95)
DEPRECATED HCO3 PLAS-SCNC: 19 MMOL/L (ref 22–29)
EGFRCR SERPLBLD CKD-EPI 2021: >90 ML/MIN/1.73M2
GLUCOSE SERPL-MCNC: 82 MG/DL (ref 70–99)
MAGNESIUM SERPL-MCNC: 1.6 MG/DL (ref 1.7–2.3)
PHOSPHATE SERPL-MCNC: 2.8 MG/DL (ref 2.5–4.5)
POTASSIUM SERPL-SCNC: 3.5 MMOL/L (ref 3.4–5.3)
SODIUM SERPL-SCNC: 140 MMOL/L (ref 135–145)

## 2023-11-01 PROCEDURE — 36415 COLL VENOUS BLD VENIPUNCTURE: CPT | Performed by: FAMILY MEDICINE

## 2023-11-01 PROCEDURE — P9604 ONE-WAY ALLOW PRORATED TRIP: HCPCS | Performed by: FAMILY MEDICINE

## 2023-11-01 PROCEDURE — 83735 ASSAY OF MAGNESIUM: CPT | Performed by: FAMILY MEDICINE

## 2023-11-01 PROCEDURE — 80048 BASIC METABOLIC PNL TOTAL CA: CPT | Performed by: FAMILY MEDICINE

## 2023-11-01 PROCEDURE — 84100 ASSAY OF PHOSPHORUS: CPT | Performed by: FAMILY MEDICINE

## 2023-11-02 ENCOUNTER — TRANSITIONAL CARE UNIT VISIT (OUTPATIENT)
Dept: GERIATRICS | Facility: CLINIC | Age: 68
End: 2023-11-02
Payer: MEDICARE

## 2023-11-02 DIAGNOSIS — R53.81 PHYSICAL DEBILITY: ICD-10-CM

## 2023-11-02 DIAGNOSIS — F33.9 EPISODE OF RECURRENT MAJOR DEPRESSIVE DISORDER, UNSPECIFIED DEPRESSION EPISODE SEVERITY (H): ICD-10-CM

## 2023-11-02 DIAGNOSIS — E46 MALNUTRITION, UNSPECIFIED TYPE (H): ICD-10-CM

## 2023-11-02 DIAGNOSIS — J18.9 MULTIFOCAL PNEUMONIA: Primary | ICD-10-CM

## 2023-11-02 PROCEDURE — 99309 SBSQ NF CARE MODERATE MDM 30: CPT | Performed by: FAMILY MEDICINE

## 2023-11-02 NOTE — LETTER
11/2/2023        RE: Pavithra Raines  [address]  [facility] [address]        [facility]    Facility:   [facility] [[id number]]   Code Status: FULL CODE      CHIEF COMPLAINT/REASON FOR VISIT:  Chief Complaint   Patient presents with     RECHECK       HISTORY:      HPI: Pavithra is a 68 year old female who does remain in the transitional care unit room 305 and who I had the opportunity to revisit with once again today not only secondary to her hospitalization October 7, 2023 through October 24, 2023 secondary generalized weakness fatigue hypoxic respiratory failure and multifocal pneumonia but also today's discussion of her laboratory studies and chronic medical conditions.  Her last recorded weight on November 1 was 100 pounds back on October 25 102 pounds she is on a regular diet and getting extra nutrient supplements 3 times daily.  Her systolic blood pressures ranging 110-130s.  According to nursing notes she has been sleeping through the night.  Is independent no shortness of breath and has been participating with the rehabilitation services.  Does have a history of depression and anxiety is currently on BuSpar Lexapro and Effexor.  Denies any discomfort.  No discharge date yet set.  She is now independent ambulating with a front wheel walker up and down the hallway.  She is in good spirits overall.  She is thinking she will probably have a discharge to home in next week    Past Medical History:   Diagnosis Date     Abnormal Papanicolaou smear of cervix and cervical HPV 4/07    Colposcopy 2007= HSIL     Genital herpes      History of colposcopy with cervical biopsy 06/2007    HSIL- LEEP recommended     Hypersomnia with sleep apnea, unspecified     CPAP started 2007     Other and unspecified ovarian cyst 2002 or so    s/p resection of ovary and tubes, d&c            Family History   Problem Relation Age of Onset     Chronic Obstructive Pulmonary  Disease Mother      Unknown/Adopted Father         doesn't know birth father     Lung Cancer Brother      Family History Negative Other       Social History     Socioeconomic History     Marital status:      Spouse name: Bran     Number of children: 0   Occupational History     Occupation: care director     Employer: [employer]     Occupation: [employer]     Employer: SELF     Employer: [employer]   Tobacco Use     Smoking status: Every Day     Packs/day: 2.00     Years: 10.00     Additional pack years: 0.00     Total pack years: 20.00     Types: Cigarettes     Smokeless tobacco: Never     Tobacco comments:     2 ppd at this time (chain smoking) 10/2012   Vaping Use     Vaping Use: Every day     Substances: Nicotine, THC     Devices: Pre-filled or refillable cartridge   Substance and Sexual Activity     Alcohol use: Yes     Comment: daily, drinks a box     Drug use: Yes     Types: Marijuana     Comment: medical     Sexual activity: Yes     Partners: Male   Other Topics Concern      Service No     Blood Transfusions No     Social Determinants of Health     Financial Resource Strain: High Risk (4/24/2023)    Overall Financial Resource Strain (CARDIA)      Difficulty of Paying Living Expenses: Hard   Food Insecurity: Food Insecurity Present (4/24/2023)    Hunger Vital Sign      Worried About Running Out of Food in the Last Year: Sometimes true      Ran Out of Food in the Last Year: Sometimes true   Transportation Needs: Unmet Transportation Needs (4/24/2023)    PRAPARE - Transportation      Lack of Transportation (Medical): Yes      Lack of Transportation (Non-Medical): Yes   Physical Activity: Inactive (4/24/2023)    Exercise Vital Sign      Days of Exercise per Week: 0 days      Minutes of Exercise per Session: 0 min   Stress: Stress Concern Present (4/24/2023)    [place] [employer] [employer]ational [employer] - Occupational Stress Questionnaire      Feeling of Stress : Very much    Social Connections: Unknown (4/24/2023)    Social Connection and Isolation Panel [NHANES]      Frequency of Communication with Friends and Family: More than three times a week      Frequency of Social Gatherings with Friends and Family: Once a week      Attends Confucianist Services: Patient refused      Active Member of Clubs or Organizations: No      Attends Club or Organization Meetings: 1 to 4 times per year      Marital Status:    Housing Stability: Low Risk  (4/24/2023)    Housing Stability Vital Sign      Unable to Pay for Housing in the Last Year: No      Number of Places Lived in the Last Year: 1      Unstable Housing in the Last Year: No      No new changes to the review of systems or the physical exam since her last visit on October 31  REVIEW OF SYSTEM:  She currently denies any new symptoms of fever cough or cold sore throat postnasal drip wheezing shortness of breath dyspnea chest pain dizziness or vertigo vomiting diarrhea dysuria frequency urgency.  Does have a history of alcohol use, malnutrition, emphysema, anemia, pulmonary hypertension, Tom-en-Y gastric bypass surgery in 2008, PTSD, tobacco use disorder        PHYSICAL EXAM:   Pleasant female in no acute distress.  Head is normocephalic.  .  Neck is supple without adenopathy.  Lung sounds are clear throughout.  Cardiovascular S1-S2 regular rate and rhythm and no lower extremity edema.  Gastrointestinal thin build nontender nondistended.  Musculoskeletal -gaining independence.  Denies discomfort.  Psychiatric: Pleasant affect      Current Outpatient Medications:      busPIRone (BUSPAR) 15 MG tablet, Take 1 tablet (15 mg) by mouth 3 times daily, Disp: 90 tablet, Rfl: 3     calcium carbonate (OS-SHON 500 MG [place]. [place]) 1250 MG tablet, Take 1 tablet by mouth 2 times daily, Disp: , Rfl:      escitalopram (LEXAPRO) 20 MG tablet, Take 1.5 tablets (30 mg) by mouth daily, Disp: 135 tablet, Rfl: 3     ferrous gluconate (FERGON) 324 (38 Fe) MG tablet,  "TAKE 1 TABLET (324 MG) BY MOUTH DAILY (WITH BREAKFAST), Disp: 90 tablet, Rfl: 1     folic acid (FOLVITE) 1 MG tablet, Take 1 tablet (1,000 mcg) by mouth daily, Disp: 90 tablet, Rfl: 3     Insulin Syringe-Needle U-100 (B-D INSULIN SYRINGE) 25G X 1\" 1 ML MISC, Use once every 30 days for B12 injection, Disp: 3 each, Rfl: 3     multivitamin w/minerals (THERA-VIT-M) tablet, Take 1 tablet by mouth daily, Disp: , Rfl:      nicotine (NICODERM CQ) 14 MG/24HR 24 hr patch, Place 1 patch onto the skin every 24 hours, Disp: , Rfl:      omeprazole (PRILOSEC) 40 MG DR capsule, TAKE ONE CAPSULE BY MOUTH TWICE A DAY, Disp: 180 capsule, Rfl: 0     raloxifene (EVISTA) 60 MG tablet, TAKE ONE TABLET BY MOUTH ONCE DAILY, Disp: 90 tablet, Rfl: 0     thiamine (B-1) 100 MG tablet, Take 1 tablet (100 mg) by mouth daily, Disp: , Rfl:      triamcinolone (KENALOG) 0.1 % external cream, APPLY SPARINGLY TO ITCHY RASH AREAS UP TO THREE TIMES A DAY AS NEEDED, Disp: 80 g, Rfl: 0     venlafaxine (EFFEXOR XR) 37.5 MG 24 hr capsule, Take 1 capsule (37.5 mg) by mouth daily, Disp: 90 capsule, Rfl: 1     vitamin D3 (VITAMIN D3) 50 mcg (2000 units) tablet, Take 1 tablet (50 mcg) by mouth daily, Disp: 90 tablet, Rfl: 3    Current Facility-Administered Medications:      cyanocobalamin injection 1,000 mcg, 1,000 mcg, Intramuscular, Q30 Days, Sam Chang MD, 1,000 mcg at 12/30/19 1449       /75   Pulse 68   Temp 97.8  F (36.6  C)   Resp 18   Ht 1.626 m (5' 4\")   Wt 45.4 kg (100 lb)   LMP  (LMP Unknown)   SpO2 99%   BMI 17.16 kg/m      LABS:   Last Comprehensive Metabolic Panel:  Lab Results   Component Value Date     11/01/2023    POTASSIUM 3.5 11/01/2023    CHLORIDE 111 (H) 11/01/2023    CO2 19 (L) 11/01/2023    ANIONGAP 10 11/01/2023    GLC 82 11/01/2023    BUN 10.6 11/01/2023    CR 0.65 11/01/2023    GFRESTIMATED >90 11/01/2023    SHON 8.3 (L) 11/01/2023       Last Comprehensive Metabolic Panel:  Sodium   Date Value Ref " Range Status   11/01/2023 140 135 - 145 mmol/L Final     Comment:     Reference intervals for this test were updated on 09/26/2023 to more accurately reflect our healthy population. There may be differences in the flagging of prior results with similar values performed with this method. Interpretation of those prior results can be made in the context of the updated reference intervals.    02/15/2021 139 133 - 144 mmol/L Final     Potassium   Date Value Ref Range Status   11/01/2023 3.5 3.4 - 5.3 mmol/L Final   06/14/2022 4.0 3.4 - 5.3 mmol/L Final   02/15/2021 3.8 3.4 - 5.3 mmol/L Final     Chloride   Date Value Ref Range Status   11/01/2023 111 (H) 98 - 107 mmol/L Final   06/14/2022 111 (H) 94 - 109 mmol/L Final   02/15/2021 107 94 - 109 mmol/L Final     Carbon Dioxide   Date Value Ref Range Status   02/15/2021 28 20 - 32 mmol/L Final     Carbon Dioxide (CO2)   Date Value Ref Range Status   11/01/2023 19 (L) 22 - 29 mmol/L Final   06/14/2022 27 20 - 32 mmol/L Final     Anion Gap   Date Value Ref Range Status   11/01/2023 10 7 - 15 mmol/L Final   06/14/2022 4 3 - 14 mmol/L Final   02/15/2021 4 3 - 14 mmol/L Final     Glucose   Date Value Ref Range Status   11/01/2023 82 70 - 99 mg/dL Final   06/14/2022 106 (H) 70 - 99 mg/dL Final   02/15/2021 98 70 - 99 mg/dL Final     GLUCOSE BY METER POCT   Date Value Ref Range Status   10/20/2023 113 (H) 70 - 99 mg/dL Final     Urea Nitrogen   Date Value Ref Range Status   11/01/2023 10.6 8.0 - 23.0 mg/dL Final   06/14/2022 7 7 - 30 mg/dL Final   02/15/2021 12 7 - 30 mg/dL Final     Creatinine   Date Value Ref Range Status   11/01/2023 0.65 0.51 - 0.95 mg/dL Final   02/15/2021 0.72 0.52 - 1.04 mg/dL Final     GFR Estimate   Date Value Ref Range Status   11/01/2023 >90 >60 mL/min/1.73m2 Final   02/15/2021 89 >60 mL/min/[1.73_m2] Final     Comment:     Non  GFR Calc  Starting 12/18/2018, serum creatinine based estimated GFR (eGFR) will be   calculated using the  Chronic Kidney Disease Epidemiology Collaboration   (CKD-EPI) equation.       Calcium   Date Value Ref Range Status   11/01/2023 8.3 (L) 8.8 - 10.2 mg/dL Final   02/15/2021 8.6 8.5 - 10.1 mg/dL Final     Bilirubin Total   Date Value Ref Range Status   10/20/2023 0.3 <=1.2 mg/dL Final   02/15/2021 0.7 0.2 - 1.3 mg/dL Final     Alkaline Phosphatase   Date Value Ref Range Status   10/20/2023 65 35 - 104 U/L Final   02/15/2021 115 40 - 150 U/L Final     ALT   Date Value Ref Range Status   10/20/2023 14 0 - 50 U/L Final     Comment:     Reference intervals for this test were updated on 6/12/2023 to more accurately reflect our healthy population. There may be differences in the flagging of prior results with similar values performed with this method. Interpretation of those prior results can be made in the context of the updated reference intervals.     02/15/2021 17 0 - 50 U/L Final     AST   Date Value Ref Range Status   10/20/2023 20 0 - 45 U/L Final     Comment:     Reference intervals for this test were updated on 6/12/2023 to more accurately reflect our healthy population. There may be differences in the flagging of prior results with similar values performed with this method. Interpretation of those prior results can be made in the context of the updated reference intervals.   02/15/2021 16 0 - 45 U/L Final                     ASSESSMENT:    Encounter Diagnoses   Name Primary?     Multifocal pneumonia Yes     Malnutrition, unspecified type (H24)      Episode of recurrent major depressive disorder, unspecified depression episode severity (H24)      Physical debility        PLAN:    Continue to manage and follow her chronic medical conditions.  She does feel like she is making pretty good progress.  She is now independent with a walker.  Denies any discomfort.  He is hoping to go home next week.        Electronically signed by: Otoniel Anna NP      Sincerely,        Otoniel Anna NP

## 2023-11-02 NOTE — PROGRESS NOTES
[facility] GERIATRIC SERVICES    Facility:   [facility] ([facility]) [[id number]]   Code Status: FULL CODE      CHIEF COMPLAINT/REASON FOR VISIT:  Chief Complaint   Patient presents with    RECHECK       HISTORY:      HPI: Pavithra is a 68 year old female who does remain in the transitional care unit room 305 and who I had the opportunity to revisit with once again today not only secondary to her hospitalization October 7, 2023 through October 24, 2023 secondary generalized weakness fatigue hypoxic respiratory failure and multifocal pneumonia but also today's discussion of her laboratory studies and chronic medical conditions.  Her last recorded weight on November 1 was 100 pounds back on October 25 102 pounds she is on a regular diet and getting extra nutrient supplements 3 times daily.  Her systolic blood pressures ranging 110-130s.  According to nursing notes she has been sleeping through the night.  Is independent no shortness of breath and has been participating with the rehabilitation services.  Does have a history of depression and anxiety is currently on BuSpar Lexapro and Effexor.  Denies any discomfort.  No discharge date yet set.  She is now independent ambulating with a front wheel walker up and down the hallway.  She is in good spirits overall.  She is thinking she will probably have a discharge to home in next week    Past Medical History:   Diagnosis Date    Abnormal Papanicolaou smear of cervix and cervical HPV 4/07    Colposcopy 2007= HSIL    Genital herpes     History of colposcopy with cervical biopsy 06/2007    HSIL- LEEP recommended    Hypersomnia with sleep apnea, unspecified     CPAP started 2007    Other and unspecified ovarian cyst 2002 or so    s/p resection of ovary and tubes, d&c            Family History   Problem Relation Age of Onset    Chronic Obstructive Pulmonary Disease Mother     Unknown/Adopted Father         doesn't know birth father    Lung Cancer Brother      Family History Negative Other       Social History     Socioeconomic History    Marital status:      Spouse name: Bran    Number of children: 0   Occupational History    Occupation: care director     Employer: [employer]    Occupation: Wendy mckeon and marilyn     Employer: SELF     Employer: [employer]   Tobacco Use    Smoking status: Every Day     Packs/day: 2.00     Years: 10.00     Additional pack years: 0.00     Total pack years: 20.00     Types: Cigarettes    Smokeless tobacco: Never    Tobacco comments:     2 ppd at this time (chain smoking) 10/2012   Vaping Use    Vaping Use: Every day    Substances: Nicotine, THC    Devices: Pre-filled or refillable cartridge   Substance and Sexual Activity    Alcohol use: Yes     Comment: daily, drinks a box    Drug use: Yes     Types: Marijuana     Comment: medical    Sexual activity: Yes     Partners: Male   Other Topics Concern     Service No    Blood Transfusions No     Social Determinants of Health     Financial Resource Strain: High Risk (4/24/2023)    Overall Financial Resource Strain (CARDIA)     Difficulty of Paying Living Expenses: Hard   Food Insecurity: Food Insecurity Present (4/24/2023)    Hunger Vital Sign     Worried About Running Out of Food in the Last Year: Sometimes true     Ran Out of Food in the Last Year: Sometimes true   Transportation Needs: Unmet Transportation Needs (4/24/2023)    PRAPARE - Transportation     Lack of Transportation (Medical): Yes     Lack of Transportation (Non-Medical): Yes   Physical Activity: Inactive (4/24/2023)    Exercise Vital Sign     Days of Exercise per Week: 0 days     Minutes of Exercise per Session: 0 min   Stress: Stress Concern Present (4/24/2023)    [place] [employer] - Occupational Stress Questionnaire     Feeling of Stress : Very much   Social Connections: Unknown (4/24/2023)    Social Connection and Isolation Panel [NHANES]     Frequency of Communication with Friends  and Family: More than three times a week     Frequency of Social Gatherings with Friends and Family: Once a week     Attends Sikhism Services: Patient refused     Active Member of Clubs or Organizations: No     Attends Club or Organization Meetings: 1 to 4 times per year     Marital Status:    Housing Stability: Low Risk  (4/24/2023)    Housing Stability Vital Sign     Unable to Pay for Housing in the Last Year: No     Number of Places Lived in the Last Year: 1     Unstable Housing in the Last Year: No      No new changes to the review of systems or the physical exam since her last visit on October 31  REVIEW OF SYSTEM:  She currently denies any new symptoms of fever cough or cold sore throat postnasal drip wheezing shortness of breath dyspnea chest pain dizziness or vertigo vomiting diarrhea dysuria frequency urgency.  Does have a history of alcohol use, malnutrition, emphysema, anemia, pulmonary hypertension, Tom-en-Y gastric bypass surgery in 2008, PTSD, tobacco use disorder        PHYSICAL EXAM:   Pleasant female in no acute distress.  Head is normocephalic.  .  Neck is supple without adenopathy.  Lung sounds are clear throughout.  Cardiovascular S1-S2 regular rate and rhythm and no lower extremity edema.  Gastrointestinal thin build nontender nondistended.  Musculoskeletal -gaining independence.  Denies discomfort.  Psychiatric: Pleasant affect      Current Outpatient Medications:     busPIRone (BUSPAR) 15 MG tablet, Take 1 tablet (15 mg) by mouth 3 times daily, Disp: 90 tablet, Rfl: 3    calcium carbonate (OS-SHON 500 MG Lummi. CA) 1250 MG tablet, Take 1 tablet by mouth 2 times daily, Disp: , Rfl:     escitalopram (LEXAPRO) 20 MG tablet, Take 1.5 tablets (30 mg) by mouth daily, Disp: 135 tablet, Rfl: 3    ferrous gluconate (FERGON) 324 (38 Fe) MG tablet, TAKE 1 TABLET (324 MG) BY MOUTH DAILY (WITH BREAKFAST), Disp: 90 tablet, Rfl: 1    folic acid (FOLVITE) 1 MG tablet, Take 1 tablet (1,000 mcg) by  "mouth daily, Disp: 90 tablet, Rfl: 3    Insulin Syringe-Needle U-100 (B-D INSULIN SYRINGE) 25G X 1\" 1 ML MISC, Use once every 30 days for B12 injection, Disp: 3 each, Rfl: 3    multivitamin w/minerals (THERA-VIT-M) tablet, Take 1 tablet by mouth daily, Disp: , Rfl:     nicotine (NICODERM CQ) 14 MG/24HR 24 hr patch, Place 1 patch onto the skin every 24 hours, Disp: , Rfl:     omeprazole (PRILOSEC) 40 MG DR capsule, TAKE ONE CAPSULE BY MOUTH TWICE A DAY, Disp: 180 capsule, Rfl: 0    raloxifene (EVISTA) 60 MG tablet, TAKE ONE TABLET BY MOUTH ONCE DAILY, Disp: 90 tablet, Rfl: 0    thiamine (B-1) 100 MG tablet, Take 1 tablet (100 mg) by mouth daily, Disp: , Rfl:     triamcinolone (KENALOG) 0.1 % external cream, APPLY SPARINGLY TO ITCHY RASH AREAS UP TO THREE TIMES A DAY AS NEEDED, Disp: 80 g, Rfl: 0    venlafaxine (EFFEXOR XR) 37.5 MG 24 hr capsule, Take 1 capsule (37.5 mg) by mouth daily, Disp: 90 capsule, Rfl: 1    vitamin D3 (VITAMIN D3) 50 mcg (2000 units) tablet, Take 1 tablet (50 mcg) by mouth daily, Disp: 90 tablet, Rfl: 3    Current Facility-Administered Medications:     cyanocobalamin injection 1,000 mcg, 1,000 mcg, Intramuscular, Q30 Days, Sam Chang MD, 1,000 mcg at 12/30/19 1449       /75   Pulse 68   Temp 97.8  F (36.6  C)   Resp 18   Ht 1.626 m (5' 4\")   Wt 45.4 kg (100 lb)   LMP  (LMP Unknown)   SpO2 99%   BMI 17.16 kg/m      LABS:   Last Comprehensive Metabolic Panel:  Lab Results   Component Value Date     11/01/2023    POTASSIUM 3.5 11/01/2023    CHLORIDE 111 (H) 11/01/2023    CO2 19 (L) 11/01/2023    ANIONGAP 10 11/01/2023    GLC 82 11/01/2023    BUN 10.6 11/01/2023    CR 0.65 11/01/2023    GFRESTIMATED >90 11/01/2023    SHON 8.3 (L) 11/01/2023       Last Comprehensive Metabolic Panel:  Sodium   Date Value Ref Range Status   11/01/2023 140 135 - 145 mmol/L Final     Comment:     Reference intervals for this test were updated on 09/26/2023 to more accurately reflect " our healthy population. There may be differences in the flagging of prior results with similar values performed with this method. Interpretation of those prior results can be made in the context of the updated reference intervals.    02/15/2021 139 133 - 144 mmol/L Final     Potassium   Date Value Ref Range Status   11/01/2023 3.5 3.4 - 5.3 mmol/L Final   06/14/2022 4.0 3.4 - 5.3 mmol/L Final   02/15/2021 3.8 3.4 - 5.3 mmol/L Final     Chloride   Date Value Ref Range Status   11/01/2023 111 (H) 98 - 107 mmol/L Final   06/14/2022 111 (H) 94 - 109 mmol/L Final   02/15/2021 107 94 - 109 mmol/L Final     Carbon Dioxide   Date Value Ref Range Status   02/15/2021 28 20 - 32 mmol/L Final     Carbon Dioxide (CO2)   Date Value Ref Range Status   11/01/2023 19 (L) 22 - 29 mmol/L Final   06/14/2022 27 20 - 32 mmol/L Final     Anion Gap   Date Value Ref Range Status   11/01/2023 10 7 - 15 mmol/L Final   06/14/2022 4 3 - 14 mmol/L Final   02/15/2021 4 3 - 14 mmol/L Final     Glucose   Date Value Ref Range Status   11/01/2023 82 70 - 99 mg/dL Final   06/14/2022 106 (H) 70 - 99 mg/dL Final   02/15/2021 98 70 - 99 mg/dL Final     GLUCOSE BY METER POCT   Date Value Ref Range Status   10/20/2023 113 (H) 70 - 99 mg/dL Final     Urea Nitrogen   Date Value Ref Range Status   11/01/2023 10.6 8.0 - 23.0 mg/dL Final   06/14/2022 7 7 - 30 mg/dL Final   02/15/2021 12 7 - 30 mg/dL Final     Creatinine   Date Value Ref Range Status   11/01/2023 0.65 0.51 - 0.95 mg/dL Final   02/15/2021 0.72 0.52 - 1.04 mg/dL Final     GFR Estimate   Date Value Ref Range Status   11/01/2023 >90 >60 mL/min/1.73m2 Final   02/15/2021 89 >60 mL/min/[1.73_m2] Final     Comment:     Non  GFR Calc  Starting 12/18/2018, serum creatinine based estimated GFR (eGFR) will be   calculated using the Chronic Kidney Disease Epidemiology Collaboration   (CKD-EPI) equation.       Calcium   Date Value Ref Range Status   11/01/2023 8.3 (L) 8.8 - 10.2 mg/dL Final    02/15/2021 8.6 8.5 - 10.1 mg/dL Final     Bilirubin Total   Date Value Ref Range Status   10/20/2023 0.3 <=1.2 mg/dL Final   02/15/2021 0.7 0.2 - 1.3 mg/dL Final     Alkaline Phosphatase   Date Value Ref Range Status   10/20/2023 65 35 - 104 U/L Final   02/15/2021 115 40 - 150 U/L Final     ALT   Date Value Ref Range Status   10/20/2023 14 0 - 50 U/L Final     Comment:     Reference intervals for this test were updated on 6/12/2023 to more accurately reflect our healthy population. There may be differences in the flagging of prior results with similar values performed with this method. Interpretation of those prior results can be made in the context of the updated reference intervals.     02/15/2021 17 0 - 50 U/L Final     AST   Date Value Ref Range Status   10/20/2023 20 0 - 45 U/L Final     Comment:     Reference intervals for this test were updated on 6/12/2023 to more accurately reflect our healthy population. There may be differences in the flagging of prior results with similar values performed with this method. Interpretation of those prior results can be made in the context of the updated reference intervals.   02/15/2021 16 0 - 45 U/L Final                     ASSESSMENT:    Encounter Diagnoses   Name Primary?    Multifocal pneumonia Yes    Malnutrition, unspecified type (H24)     Episode of recurrent major depressive disorder, unspecified depression episode severity (H24)     Physical debility        PLAN:    Continue to manage and follow her chronic medical conditions.  She does feel like she is making pretty good progress.  She is now independent with a walker.  Denies any discomfort.  He is hoping to go home next week.        Electronically signed by: Otoniel Anna NP

## 2023-11-10 ENCOUNTER — TELEPHONE (OUTPATIENT)
Dept: FAMILY MEDICINE | Facility: CLINIC | Age: 68
End: 2023-11-10

## 2023-11-10 NOTE — TELEPHONE ENCOUNTER
Home Care is calling regarding an established patient with [facility].       Requesting orders from: Soha Boggs  Provider is following patient: Yes  Is this a 60-day recertification request?  No    Orders Requested    Skilled Nursing  Request for initial certification (first set of orders)   Frequency:  1x/wk for 3 wks      Information was gathered and will be sent to provider for review.  RN will contact Home Care with information after provider review.  Confirmed ok to leave a detailed message with call back.  Contact information confirmed and updated as needed.    Isabela Donovan RN

## 2023-11-13 NOTE — TELEPHONE ENCOUNTER
Message handled by Nurse Triage with [facility] - provider name: Flakita .      Verbal approval given for home care orders    Home care notified.    Liz Soto RN on 11/13/2023 at 4:13 PM

## 2023-11-13 NOTE — TELEPHONE ENCOUNTER
Danie RN with [facility] following up on the requested home care orders. Informed message has been sent to the provider to follow up on.  Message printed and placed on providers desk.    Danie can be called back at [phone], ok to leave a message with name and title. Catrina Payne LPN

## 2023-11-14 ENCOUNTER — PATIENT OUTREACH (OUTPATIENT)
Dept: CARE COORDINATION | Facility: CLINIC | Age: 68
End: 2023-11-14

## 2023-11-14 NOTE — LETTER
It was a pleasure to speak with you.  I would like to provide you with the enclosed information for your records.  As part of care coordination, we are developing care plans to assist in accomplishing your health care goals.  When we speak next, please feel free to let me know if you want to add or change any information on your care plans.    As always, feel free to contact me if you have any questions or concerns.  I look forward to working with you in the effort to achieve your health care and wellness goals .        Sincerely,      Jenny Hewitt, \[facility] [place] [facility]\""  Clinic Care Coordination  [phone]    [facility]  Patient Centered Plan of Care  About Me:        Patient Name:  Pavithra Raines    YOB: 1955  Age:         68 year old   [facility] MRN:    [id number] Telephone Information:  Home Phone [phone]   Mobile [phone]       Address:  [address] [address] Email address:  [email or web address]      Emergency Contact(s)    Name Relationship Lgl Grd Work Phone Home Phone Mobile Phone   1. RIANFRAN Friend   [phone]   2. SAMANTA GENAO Spouse   [phone]   3. KANIKACARLEE LOPEZ Friend   [phone]            Primary language:  English     needed? No   [facility] [employer]:  [phone] op. 1  Other communication barriers:Other (does not do well if addressed with a negative tone or other person coming across accusatory/lieing, any triggers similar to  past experiences can cause lack of communication)    Preferred Method of Communication:  Mail  Current living arrangement: I live in a private home with spouse    Mobility Status/ Medical Equipment: Independent        Health Maintenance  Health Maintenance Reviewed: Due/Overdue   Health Maintenance Due   Topic Date Due    SPIROMETRY  Never done    COPD ACTION PLAN  Never done    RSV VACCINE (Pregnancy & 60+) (1 - 1-dose 60+ series) Never done    COLORECTAL CANCER  SCREENING  10/09/2020    MEDICARE ANNUAL WELLNESS VISIT  09/17/2022    COVID-19 Vaccine (5 - 2023-24 season) 09/01/2023    PHQ-9  11/12/2023           My Access Plan  Medical Emergency 911   Primary Clinic Line [facility] - [phone]   24 Hour Appointment Line [phone] or  [phone]) (toll-free)   24 Hour Nurse Line [phone] (toll-free)   Preferred Urgent Care [facility], [phone]     Preferred Hospital [facility] [place]  [phone]     Preferred Pharmacy [facility], [place] - [address]     Behavioral Health Crisis Line The National Suicide Prevention Lifeline at 1-461.801.1963 or Text/Call 338           My Care Team Members  Patient Care Team         Relationship Specialty Notifications Start End    Soha Boggs MD PCP - General Family Medicine  6/1/23     Phone: [phone] Fax: [phone] [address] [address]    Soha Boggs MD Assigned PCP   12/31/17     Phone: [phone] Fax: [phone] [address] [place] [address]    Jenny Hewitt LSW Lead Care Coordinator Primary Care - [facility] Admissions 4/11/23     Phone: [phone] Fax: [phone]        Selina Lester APRN CNP Nurse Practitioner Gastroenterology  5/18/23     Phone: [phone] Fax: [phone]         [address] [place] [address]    Soha Boggs MD Assigned Pain Medication Provider   10/14/23     Phone: [phone] Fax: [phone] [address] [place] [address]                My Care Plans  Self Management and Treatment Plan    Care Plan  Care Plan: Financial Wellbeing       Problem: Patient expresses financial resource strain       Long-Range Goal: Create an action plan to increase financial stability       Start Date: 5/25/2023 Expected End Date: 5/14/2024    This Visit's Progress: 20% Recent  Progress: 20%    Note:     Barriers: limited income, miss deadlines  Strengths: understanding the need  Patient expressed understanding of goal: yes  Action steps to achieve this goal:  1. I will answer the Financial resource worker call and work with them to apply for insurance  2. I will reach out to resources sent  3. I will reach out to Jenny as needs arise between scheduled calls.                                 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    Genital herpes    CARDIOVASCULAR SCREENING; LDL GOAL LESS THAN 160    MDD (major depressive disorder)    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 grade 3    Anemia due to vitamin B12 deficiency, unspecified B12 deficiency type    Insomnia, unspecified type    Other iron deficiency anemia    Age-related osteoporosis without current pathological fracture    Fatty liver    PTSD (post-traumatic stress disorder)    Underweight    Macrocytosis without anemia    Alcohol dependence in remission (H)    Episode of recurrent major depressive disorder, unspecified depression episode severity (H24)    Seizure disorder (H)    Dehydration    Hyponatremia    Weakness generalized    Alcohol use disorder, severe, dependence (H)    Multifocal pneumonia    Septic shock (H)    Bacteremia due to Streptococcus pneumoniae    Hypomagnesemia    History of seizure due to alcohol withdrawal    Emphysema/COPD (H)    Tobacco abuse    Malnutrition - suspected    Hypoalbuminemia    Acute respiratory failure with hypoxia (H)    Alcohol withdrawal, with delirium (H)    Hypophosphatemia    Hypokalemia     "Thrombocytopenia (H24)    Anemia, unspecified type    Patient ventilator dyssynchrony (H)    Coagulopathy (H24)    Pulmonary hypertension (H)      Current Medications and Allergies:     Allergies   Allergen Reactions    Codeine Itching    Vicodin [Hydrocodone-Acetaminophen] Itching         Current Outpatient Medications:     busPIRone (BUSPAR) 15 MG tablet, Take 1 tablet (15 mg) by mouth 3 times daily, Disp: 90 tablet, Rfl: 3    calcium carbonate (OS-SHON 500 MG [place]. CA) 1250 MG tablet, Take 1 tablet by mouth 2 times daily, Disp: , Rfl:     escitalopram (LEXAPRO) 20 MG tablet, Take 1.5 tablets (30 mg) by mouth daily, Disp: 135 tablet, Rfl: 3    ferrous gluconate (FERGON) 324 (38 Fe) MG tablet, TAKE 1 TABLET (324 MG) BY MOUTH DAILY (WITH BREAKFAST), Disp: 90 tablet, Rfl: 1    folic acid (FOLVITE) 1 MG tablet, Take 1 tablet (1,000 mcg) by mouth daily, Disp: 90 tablet, Rfl: 3    Insulin Syringe-Needle U-100 (B-D INSULIN SYRINGE) 25G X 1\" 1 ML MISC, Use once every 30 days for B12 injection, Disp: 3 each, Rfl: 3    multivitamin w/minerals (THERA-VIT-M) tablet, Take 1 tablet by mouth daily, Disp: , Rfl:     nicotine (NICODERM CQ) 14 MG/24HR 24 hr patch, Place 1 patch onto the skin every 24 hours, Disp: , Rfl:     omeprazole (PRILOSEC) 40 MG DR capsule, TAKE ONE CAPSULE BY MOUTH TWICE A DAY, Disp: 180 capsule, Rfl: 0    raloxifene (EVISTA) 60 MG tablet, TAKE ONE TABLET BY MOUTH ONCE DAILY, Disp: 90 tablet, Rfl: 0    thiamine (B-1) 100 MG tablet, Take 1 tablet (100 mg) by mouth daily, Disp: , Rfl:     triamcinolone (KENALOG) 0.1 % external cream, APPLY SPARINGLY TO ITCHY RASH AREAS UP TO THREE TIMES A DAY AS NEEDED, Disp: 80 g, Rfl: 0    venlafaxine (EFFEXOR XR) 37.5 MG 24 hr capsule, Take 1 capsule (37.5 mg) by mouth daily, Disp: 90 capsule, Rfl: 1    vitamin D3 (VITAMIN D3) 50 mcg (2000 units) tablet, Take 1 tablet (50 mcg) by mouth daily, Disp: 90 tablet, Rfl: 3    Current Facility-Administered Medications:     " cyanocobalamin injection 1,000 mcg, 1,000 mcg, Intramuscular, Q30 Days, Sam Chang MD, 1,000 mcg at 12/30/19 1449      Care Coordination Start Date: 4/10/2023   Frequency of Care Coordination: monthly, more frequently as needed     Form Last Updated: 11/14/2023

## 2023-11-14 NOTE — PROGRESS NOTES
Clinic Care Coordination Contact  Clinic Care Coordination Contact  OUTREACH with Post Discharge Assessment    Referral Information:  Referral Source: PCP    Primary Diagnosis: Behavioral Health    Chief Complaint   Patient presents with    Clinic Care Coordination - Homecare/TCU     SW CC        [place] Utilization: under utilization due to lack of insurance coverage  Clinic Utilization  Difficulty keeping appointments:: No  Compliance Concerns: No  No-Show Concerns: No  No PCP office visit in Past Year: No  Utilization      No Show Count (past year)  0             ED Visits  3             Hospital Admissions  1                    Current as of: 11/13/2023  9:59 PM                Clinical Concerns:  Current Medical Concerns:  pt noted no concerns.  She is working to get insurance so she can get her medications.  She is doing home care therapy.    Current Behavioral Concerns: not fully discussed as she was out in the community shopping.    Education Provided to patient: encouraged to continue to work with therapies.    Pain  Pain (GOAL):: No  Health Maintenance Reviewed: Due/Overdue   Health Maintenance Due   Topic Date Due    SPIROMETRY  Never done    COPD ACTION PLAN  Never done    RSV VACCINE (Pregnancy & 60+) (1 - 1-dose 60+ series) Never done    COLORECTAL CANCER SCREENING  10/09/2020    MEDICARE ANNUAL WELLNESS VISIT  09/17/2022    COVID-19 Vaccine (5 - 2023-24 season) 09/01/2023    PHQ-9  11/12/2023       Clinical Pathway: None    Admission:    Admission Date: 10/07/23   Reason for Admission: SOB  Discharge:   Discharge Date: 10/24/23  Discharge Diagnosis: Multifocal pneumonia    Enrollment  Outreach Frequency: monthly, more frequently as needed    Discharge Assessment  How are you doing now that you are home?: doing ok  How are your symptoms? (Red Flag symptoms escalate to triage hotline per guidelines): Improved  Do you feel your condition is stable enough to be safe at home until your provider visit?:  Yes  Does the patient have their discharge instructions? : Yes  Does the patient have questions regarding their discharge instructions? : No  Were you started on any new medications or were there changes to any of your previous medications? : Yes  Does the patient have all of their medications?: No (see comment) (can not afford)  Do you have questions regarding any of your medications? : No  Do you have all of your needed medical supplies or equipment (DME)?  (i.e. oxygen tank, CPAP, cane, etc.): Yes  Discharge follow-up appointment scheduled within 14 calendar days? : No  Is patient agreeable to assistance with scheduling? : No         Post-op (Clinicians Only)  Fever: No  Chills: No  Eating & Drinking: eating and drinking without complaints/concerns  PO Intake: regular diet  Bowel Function: normal  Urinary Status: voiding without complaint/concerns    Medication Management:  Medication review status:   Pt has not been able to  any medications due to no coverage and not working for a while.      Functional Status:  Dependent ADLs:: Eating, Incontinence  Dependent IADLs:: Money Management, Transportation, Incontinence  Bed or wheelchair confined:: No  Mobility Status: Independent  Fallen 2 or more times in the past year?: Yes  Any fall with injury in the past year?: Yes    Living Situation:  Current living arrangement:: I live in a private home with spouse  Type of residence:: Private home - [facility]    Lifestyle & Psychosocial Needs:    Social Determinants of Health     Food Insecurity: Food Insecurity Present (4/24/2023)    Hunger Vital Sign     Worried About Running Out of Food in the Last Year: Sometimes true     Ran Out of Food in the Last Year: Sometimes true   Depression: At risk (5/12/2023)    PHQ-2     PHQ-2 Score: 3   Housing Stability: Low Risk  (4/24/2023)    Housing Stability Vital Sign     Unable to Pay for Housing in the Last Year: No     Number of Places Lived in the Last Year: 1     Unstable  Housing in the Last Year: No   Tobacco Use: High Risk (5/12/2023)    Patient History     Smoking Tobacco Use: Every Day     Smokeless Tobacco Use: Never     Passive Exposure: Not on file   Financial Resource Strain: High Risk (4/24/2023)    Overall Financial Resource Strain (CARDIA)     Difficulty of Paying Living Expenses: Hard   Alcohol Use: Alcohol Misuse (4/24/2023)    AUDIT-C     Frequency of Alcohol Consumption: 2-4 times a month     Average Number of Drinks: 5 or 6     Frequency of Binge Drinking: Monthly   Transportation Needs: Unmet Transportation Needs (4/24/2023)    PRAPARE - Transportation     Lack of Transportation (Medical): Yes     Lack of Transportation (Non-Medical): Yes   Physical Activity: Inactive (4/24/2023)    Exercise Vital Sign     Days of Exercise per Week: 0 days     Minutes of Exercise per Session: 0 min   Interpersonal Safety: Not on file   Stress: Stress Concern Present (4/24/2023)    [employer] - Occupational Stress Questionnaire     Feeling of Stress : Very much   Social Connections: Unknown (4/24/2023)    Social Connection and Isolation Panel [NHANES]     Frequency of Communication with Friends and Family: More than three times a week     Frequency of Social Gatherings with Friends and Family: Once a week     Attends Taoism Services: Patient refused     Active Member of Clubs or Organizations: No     Attends Club or Organization Meetings: 1 to 4 times per year     Marital Status:      Diet:: Regular  Inadequate nutrition (GOAL):: Yes (related to the bariatric surgery and can only eat a little at a time and no teeth)  Tube Feeding: No  Inadequate activity/exercise (GOAL):: No  Significant changes in sleep pattern (GOAL): Yes (nightmares - story - don't quit)  Transportation means:: Family, Friend     Taoism or spiritual beliefs that impact treatment:: No  Mental health DX:: Yes  Mental health DX how managed:: Medication, Outpatient Counseling,  Other  Mental health management concern (GOAL):: No  Chemical Dependency Status: Current Concern  Chemical Dependency Management: Previous treatment  Informal Support system:: Family, Friends, Spouse, Other        Pt is working to get medicare part B so she can see a provider.  She has not wanted to apply for MA due to not having taxes completed.  She has not been able to  her prescriptions due to no income.  She declined any needs at this time.      Resources and Interventions:  Current Resources:   Skilled Home Care Services: Skilled Nursing, Physical Therapy  Community Resources: Domestic Violence, OP Mental Health,   Supplies Currently Used at Home: Incontinence Supplies, Nutritional Supplements, Enteral Nutrition & Supplies, Wound Care Supplies, Wipes  Equipment Currently Used at Home: cane, quad  Employment Status: self-employed         Advance Care Plan/Directive  Advanced Care Plans/Directives on file:: No    Referrals Placed: None     Care Plan:   Care Plan: Financial Wellbeing       Problem: Patient expresses financial resource strain       Long-Range Goal: Create an action plan to increase financial stability       Start Date: 5/25/2023 Expected End Date: 5/14/2024    This Visit's Progress: 20% Recent Progress: 20%    Note:     Barriers: limited income, miss deadlines  Strengths: understanding the need  Patient expressed understanding of goal: yes  Action steps to achieve this goal:  1. I will answer the Financial resource worker call and work with them to apply for insurance  2. I will reach out to resources sent  3. I will reach out to Jenny as needs arise between scheduled calls.                                 Patient/Caregiver understanding: pt to work on getting insurance.    Outreach Frequency: monthly, more frequently as needed      Plan: pt to continue to work with social security on her medicare part B.  Pt to call if she needs help with any other needs before next Sw call  in about one month.     ANUP Walker  [facility] - Care Coordinator   11/14/2023   10:50 AM  [phone]

## 2023-11-17 ENCOUNTER — TELEPHONE (OUTPATIENT)
Dept: FAMILY MEDICINE | Facility: CLINIC | Age: 68
End: 2023-11-17

## 2023-11-17 NOTE — TELEPHONE ENCOUNTER
Patient is calling to states she only has Part A Medicare and she does not have money for medications. She was just recently discharged from [facility])    She is wondering which medications are critical.    She feels that the Klonopin is important.    She is applying for medical assistance.    Should patient have a referral to patient assistance program ( [facility]) ?    Liz Soto RN on 11/17/2023 at 3:04 PM

## 2023-11-17 NOTE — TELEPHONE ENCOUNTER
FYI - Status Update    Who is Calling: nurse, Fatou, from [facility]    Update: Patient is refusing to start OT until after Thanksgiving. She agreed to start on 11/27/23    LATRICE GoyalN, RN

## 2023-11-24 ENCOUNTER — HOSPITAL ENCOUNTER (EMERGENCY)
Facility: CLINIC | Age: 68
Discharge: HOME | End: 2023-11-24
Attending: NURSE PRACTITIONER | Admitting: NURSE PRACTITIONER
Payer: MEDICARE

## 2023-11-24 ENCOUNTER — NURSE TRIAGE (OUTPATIENT)
Dept: FAMILY MEDICINE | Facility: CLINIC | Age: 68
End: 2023-11-24

## 2023-11-24 VITALS
OXYGEN SATURATION: 96 % | WEIGHT: 100 LBS | HEART RATE: 94 BPM | RESPIRATION RATE: 18 BRPM | TEMPERATURE: 98.4 F | BODY MASS INDEX: 17.16 KG/M2 | SYSTOLIC BLOOD PRESSURE: 125 MMHG | DIASTOLIC BLOOD PRESSURE: 81 MMHG

## 2023-11-24 DIAGNOSIS — L08.9 INFECTED DOG BITE OF FINGER, INITIAL ENCOUNTER: ICD-10-CM

## 2023-11-24 DIAGNOSIS — S61.259A INFECTED DOG BITE OF FINGER, INITIAL ENCOUNTER: ICD-10-CM

## 2023-11-24 DIAGNOSIS — L03.011 CELLULITIS OF FINGER OF RIGHT HAND: ICD-10-CM
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
## 2023-11-24 LAB
ANION GAP SERPL CALCULATED.3IONS-SCNC: 15 MMOL/L (ref 7–15)
BASOPHILS # BLD AUTO: 0.1 10E3/UL (ref 0–0.2)
BASOPHILS NFR BLD AUTO: 1 %
BUN SERPL-MCNC: 5.6 MG/DL (ref 8–23)
CALCIUM SERPL-MCNC: 8.4 MG/DL (ref 8.8–10.2)
CHLORIDE SERPL-SCNC: 103 MMOL/L (ref 98–107)
CREAT SERPL-MCNC: 0.69 MG/DL (ref 0.51–0.95)
CRP SERPL-MCNC: 5.46 MG/L
DEPRECATED HCO3 PLAS-SCNC: 22 MMOL/L (ref 22–29)
EGFRCR SERPLBLD CKD-EPI 2021: >90 ML/MIN/1.73M2
EOSINOPHIL # BLD AUTO: 0.1 10E3/UL (ref 0–0.7)
EOSINOPHIL NFR BLD AUTO: 1 %
ERYTHROCYTE [DISTWIDTH] IN BLOOD BY AUTOMATED COUNT: 14.1 % (ref 10–15)
FLUAV RNA SPEC QL NAA+PROBE: NEGATIVE
FLUBV RNA RESP QL NAA+PROBE: NEGATIVE
GLUCOSE SERPL-MCNC: 69 MG/DL (ref 70–99)
HCT VFR BLD AUTO: 31.3 % (ref 35–47)
HGB BLD-MCNC: 10.4 G/DL (ref 11.7–15.7)
IMM GRANULOCYTES # BLD: 0 10E3/UL
IMM GRANULOCYTES NFR BLD: 0 %
LACTATE SERPL-SCNC: 2.6 MMOL/L (ref 0.7–2)
LACTATE SERPL-SCNC: 3 MMOL/L (ref 0.7–2)
LYMPHOCYTES # BLD AUTO: 4.3 10E3/UL (ref 0.8–5.3)
LYMPHOCYTES NFR BLD AUTO: 45 %
MCH RBC QN AUTO: 31.3 PG (ref 26.5–33)
MCHC RBC AUTO-ENTMCNC: 33.2 G/DL (ref 31.5–36.5)
MCV RBC AUTO: 94 FL (ref 78–100)
MONOCYTES # BLD AUTO: 0.7 10E3/UL (ref 0–1.3)
MONOCYTES NFR BLD AUTO: 7 %
NEUTROPHILS # BLD AUTO: 4.4 10E3/UL (ref 1.6–8.3)
NEUTROPHILS NFR BLD AUTO: 46 %
NRBC # BLD AUTO: 0 10E3/UL
NRBC BLD AUTO-RTO: 0 /100
PLATELET # BLD AUTO: 277 10E3/UL (ref 150–450)
POTASSIUM SERPL-SCNC: 3.4 MMOL/L (ref 3.4–5.3)
PROCALCITONIN SERPL IA-MCNC: 0.06 NG/ML
RBC # BLD AUTO: 3.32 10E6/UL (ref 3.8–5.2)
RSV RNA SPEC NAA+PROBE: NEGATIVE
SARS-COV-2 RNA RESP QL NAA+PROBE: NEGATIVE
SODIUM SERPL-SCNC: 140 MMOL/L (ref 135–145)
WBC # BLD AUTO: 9.5 10E3/UL (ref 4–11)

## 2023-11-24 PROCEDURE — 86140 C-REACTIVE PROTEIN: CPT | Performed by: NURSE PRACTITIONER

## 2023-11-24 PROCEDURE — 83605 ASSAY OF LACTIC ACID: CPT | Performed by: NURSE PRACTITIONER

## 2023-11-24 PROCEDURE — 85025 COMPLETE CBC W/AUTO DIFF WBC: CPT | Performed by: NURSE PRACTITIONER

## 2023-11-24 PROCEDURE — 99284 EMERGENCY DEPT VISIT MOD MDM: CPT | Performed by: NURSE PRACTITIONER

## 2023-11-24 PROCEDURE — 258N000003 HC RX IP 258 OP 636: Performed by: NURSE PRACTITIONER

## 2023-11-24 PROCEDURE — 250N000011 HC RX IP 250 OP 636: Performed by: NURSE PRACTITIONER

## 2023-11-24 PROCEDURE — 80048 BASIC METABOLIC PNL TOTAL CA: CPT | Performed by: NURSE PRACTITIONER

## 2023-11-24 PROCEDURE — 36415 COLL VENOUS BLD VENIPUNCTURE: CPT | Performed by: NURSE PRACTITIONER

## 2023-11-24 PROCEDURE — 87637 SARSCOV2&INF A&B&RSV AMP PRB: CPT | Performed by: NURSE PRACTITIONER

## 2023-11-24 PROCEDURE — 84145 PROCALCITONIN (PCT): CPT | Performed by: NURSE PRACTITIONER

## 2023-11-24 PROCEDURE — 99284 EMERGENCY DEPT VISIT MOD MDM: CPT | Mod: 25 | Performed by: NURSE PRACTITIONER

## 2023-11-24 PROCEDURE — 96365 THER/PROPH/DIAG IV INF INIT: CPT | Performed by: NURSE PRACTITIONER

## 2023-11-24 RX ORDER — AMOXICILLIN AND CLAVULANATE POTASSIUM 600; 42.9 MG/5ML; MG/5ML
875 POWDER, FOR SUSPENSION ORAL 2 TIMES DAILY
Qty: 145.8 ML | Refills: 0 | Status: SHIPPED | OUTPATIENT
Start: 2023-11-24 | End: 2023-12-04

## 2023-11-24 RX ORDER — AMPICILLIN AND SULBACTAM 2; 1 G/1; G/1
3 INJECTION, POWDER, FOR SOLUTION INTRAMUSCULAR; INTRAVENOUS ONCE
Status: COMPLETED | OUTPATIENT
Start: 2023-11-24 | End: 2023-11-24

## 2023-11-24 RX ADMIN — AMPICILLIN SODIUM AND SULBACTAM SODIUM 3 G: 2; 1 INJECTION, POWDER, FOR SOLUTION INTRAMUSCULAR; INTRAVENOUS at 16:49

## 2023-11-24 RX ADMIN — SODIUM CHLORIDE 1000 ML: 9 INJECTION, SOLUTION INTRAVENOUS at 16:47

## 2023-11-24 ASSESSMENT — ACTIVITIES OF DAILY LIVING (ADL)
ADLS_ACUITY_SCORE: 35
ADLS_ACUITY_SCORE: 33

## 2023-11-24 NOTE — TELEPHONE ENCOUNTER
"Reason for Disposition   Looks infected (spreading redness, red streak, or pus)    Additional Information   Negative: Any break in skin from BITE (e.g., cut, puncture, or scratch) and WILD animal at risk for RABIES (e.g., bat, raccoon, rivera, skunk, coyote, other carnivores; see Background for list)   Negative: Any break in skin from BITE (e.g., cut, puncture, or scratch) and PET animal (e.g., dog, cat, or ferret) at risk for RABIES (e.g., sick, stray, unprovoked bite, developing country)   Negative: Any break in skin from BITE (e.g., cut, puncture, or scratch) and monkey   Negative: Major bleeding (actively dripping or spurting) that can't be stopped   Negative: Sounds like a life-threatening emergency to the triager   Negative: Cut (length > 1/8 inch or 3 mm) or skin tear and any animal  (Exception: Superficial scratch that doesn't go through dermis.)   Negative: Bleeding won't stop after 10 minutes of direct pressure (using correct technique)   Negative: EXPOSURE of non-intact skin (e.g., exposed person has dermatitis, abrasion, wound)  with animal BODY FLUID (e.g., saliva such as licking, blood, brain) and animal at high-risk for RABIES (e.g., bat, raccoon, rivera, skunk, coyote, other carnivores)   Negative: Sounds like a serious bite injury to the triager   Negative: SEVERE pain (e.g., excruciating)   Negative: Puncture wound (hole through the skin) from cat (teeth or claws)   Negative: Puncture wound or small cut on face  (Exception: Puncture from small pet, such as a gerbil, mouse, hamster, puppy; or shallow scratches.)   Negative: Puncture wound or small cut on hands or genitals  (Exception: Puncture from small pet, such as a gerbil, mouse, hamster, puppy; or shallow scratches.)   Negative: Puncture wound and weak immune system (e.g., HIV positive, cancer chemo, splenectomy, organ transplant, chronic steroids)    Answer Assessment - Initial Assessment Questions  1. ANIMAL: \"What type of animal caused the bite?\" " "\"Is the injury from a bite or a claw?\" If the animal is a dog or a cat, ask: \"Was it a pet or a stray?\" \"Was it acting ill or behaving strangely?\"      Dog. pet  2. LOCATION: \"Where is the bite located?\"       R hand, last two fingers  3. SIZE: \"How big is the bite?\" \"What does it look like?\"       Small, red/swollen. No drainage, no red lines coming from wounds  4. ONSET: \"When did the bite happen?\" (Minutes or hours ago)       3 days ago  5. CIRCUMSTANCES: \"Tell me how this happened.\"       Dog bit her while she was grooming.   6. TETANUS: \"When was your last tetanus booster?\"      2020  7. RABIES VACCINE: For dog or cat bites, ask: \"Do you know if the pet is vaccinated against rabies?\"  (e.g., yes, no, overdue for rabies shot, unknown)      yes  8. PREGNANCY: \"Is there any chance you are pregnant?\" \"When was your last menstrual period?\"      no    Protocols used: Animal Bite-A-[place]    "

## 2023-11-24 NOTE — ED NOTES
PT here with reports of dog bite, states she got bitten 4 days ago on the 4th and 5th digit if right hand, denies any pain over the site. PT also states that she feels as if her throat is swollen, denies any cough or other flu-like symptoms. Abdominal pain that started 4 days ago.

## 2023-11-24 NOTE — ED PROVIDER NOTES
History     Chief Complaint   Patient presents with    Dog Bite     HPI  Pavithra Raines is a 68 year old female who presents for evaluation of a dog bite that occurred 4 days ago to her fourth and fifth phalanx of her right hand.  Over the last 24 hours she has noted increased redness and swelling.  She feels chills, body aches, and sore throat. Feels flu-like.  She tells me she not had anything to eat or drink today.  She thinks she might be dehydrated.  She is also very concerned that she has sepsis because she was in the hospital earlier last month with a multifocal pneumonia with sepsis.    Allergies:  Allergies   Allergen Reactions    Codeine Itching    Vicodin [Hydrocodone-Acetaminophen] Itching       Problem List:    Patient Active Problem List    Diagnosis Date Noted    Patient ventilator dyssynchrony (H) 10/11/2023     Priority: Medium    Coagulopathy (H24) 10/11/2023     Priority: Medium    Pulmonary hypertension (H) 10/11/2023     Priority: Medium    Acute respiratory failure with hypoxia (H) 10/10/2023     Priority: Medium    Alcohol withdrawal, with delirium (H) 10/10/2023     Priority: Medium    Hypophosphatemia 10/10/2023     Priority: Medium    Hypokalemia 10/10/2023     Priority: Medium    Thrombocytopenia (H24) 10/10/2023     Priority: Medium    Anemia, unspecified type 10/10/2023     Priority: Medium    Hypoalbuminemia 10/09/2023     Priority: Medium    Septic shock (H) 10/08/2023     Priority: Medium    Bacteremia due to Streptococcus pneumoniae 10/08/2023     Priority: Medium    Hypomagnesemia 10/08/2023     Priority: Medium    History of seizure due to alcohol withdrawal 10/08/2023     Priority: Medium    Emphysema/COPD (H) 10/08/2023     Priority: Medium    Tobacco abuse 10/08/2023     Priority: Medium    Malnutrition - suspected 10/08/2023     Priority: Medium    Dehydration 10/07/2023     Priority: Medium    Hyponatremia 10/07/2023     Priority: Medium    Weakness generalized  10/07/2023     Priority: Medium    Alcohol use disorder, severe, dependence (H) 10/07/2023     Priority: Medium    Multifocal pneumonia 10/07/2023     Priority: Medium    Alcohol dependence in remission (H) 01/08/2022     Priority: Medium    Episode of recurrent major depressive disorder, unspecified depression episode severity (H24) 01/08/2022     Priority: Medium    Seizure disorder (H) 01/08/2022     Priority: Medium    Fatty liver 12/13/2020     Priority: Medium    PTSD (post-traumatic stress disorder) 12/13/2020     Priority: Medium    Underweight 12/13/2020     Priority: Medium    Macrocytosis without anemia 12/13/2020     Priority: Medium    Age-related osteoporosis without current pathological fracture 10/15/2020     Priority: Medium    Anemia due to vitamin B12 deficiency, unspecified B12 deficiency type 12/04/2017     Priority: Medium    Insomnia, unspecified type 12/04/2017     Priority: Medium    Other iron deficiency anemia 12/04/2017     Priority: Medium    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 grade 3 04/07/2011     Priority: Medium     12/15/06 ASCUS + high risk HPV  colpo in 2007 showed high grade KAITLYN and LEEP was recommended  LEEP was done in June,2007 with CAN 2/3 confirmed  10/15/07 NIL  9/30/08 NIL/neg HPV  10/21/09 NIL/neg HPV  4/5/11 NIL- repeat in one year (4/2012 12/1/17 NIL pap/neg HR HPV. Will need pap screening until 2027, regardless of age.  Plan: cotest due 3 yr.      MDD (major depressive disorder) 04/05/2011     Priority: Medium     Needs to est primary care.      CARDIOVASCULAR SCREENING; LDL GOAL LESS THAN 160 10/31/2010     Priority: Medium    Genital herpes      Priority: Medium     IMO update changed this record. Please review for accuracy      Anxiety 08/27/2008     Priority: Medium     Patient is followed by MIMI GARZA for ongoing prescription of benzodiazepines.  All refills should be approved by this provider, or covering partner.    Medication(s):  Clonazepam.   Maximum quantity per month: 30  Clinic visit frequency required:      Controlled substance agreement on file: No  Benzodiazepine use reviewed by psychiatry:  No    Last [facility] website verification:  done on 4/5/19  [email or web address]        History of Tom-en-Y gastric bypass March 2008 03/25/2008     Priority: Medium     Performed at [facility].      Restless legs syndrome (RLS) 05/15/2007     Priority: Medium    Hypersomnia with sleep apnea 05/15/2007     Priority: Medium     Problem list name updated by automated process. Provider to review      Esophageal reflux 04/18/2007     Priority: Medium        Past Medical History:    Past Medical History:   Diagnosis Date    Abnormal Papanicolaou smear of cervix and cervical HPV 4/07    Genital herpes     History of colposcopy with cervical biopsy 06/2007    Hypersomnia with sleep apnea, unspecified     Other and unspecified ovarian cyst 2002 or so       Past Surgical History:    Past Surgical History:   Procedure Laterality Date    CHOLECYSTECTOMY, OPEN  age 22s    COLONOSCOPY  03/21/2011    COMBINED COLONOSCOPY, REMOVE TUMOR/POLYP/LESION BY SNARE performed by JUAN FRANCISCO HERNANDEZ at  GI    COLONOSCOPY N/A 10/09/2017    polyps, repeat 3 years    COLPOSCOPY CERVIX, LOOP ELECTRODE BIOPSY, COMBINED  06/2007    CAN 2/3-patient requires yearly pap smears    dental pegs      ESOPHAGOSCOPY, GASTROSCOPY, DUODENOSCOPY (EGD), COMBINED N/A 02/09/2018    Procedure: COMBINED ESOPHAGOSCOPY, GASTROSCOPY, DUODENOSCOPY (EGD);  EGD;  Surgeon: Oneal Sanchez MD;  Location:  GI    GASTRIC BYPASS  03/25/2008    At [facility]    HC DILATION/CURETTAGE DIAG/THER NON OB  2002    HC ENLARGE BREAST WITH IMPLANT      HC LAPAROSCOPIC MYOMECTOMY, 1 - 4 INTRAMURAL MYOMAS =<250 GM  2002    HC REMOVAL OF BREAST IMPLANT      SALPINGO OOPHORECTOMY,R/L/MATTHEW  2002    Bilateral salpingectomy and unilateral oophorectomy       Family History:    Family History  "  Problem Relation Age of Onset    Chronic Obstructive Pulmonary Disease Mother     Unknown/Adopted Father         doesn't know birth father    Lung Cancer Brother     Family History Negative Other        Social History:  Marital Status:   [2]  Social History     Tobacco Use    Smoking status: Every Day     Packs/day: 2.00     Years: 10.00     Additional pack years: 0.00     Total pack years: 20.00     Types: Cigarettes    Smokeless tobacco: Never    Tobacco comments:     2 ppd at this time (chain smoking) 10/2012   Vaping Use    Vaping Use: Every day    Substances: Nicotine, THC    Devices: Pre-filled or refillable cartridge   Substance Use Topics    Alcohol use: Yes     Comment: daily, drinks a box    Drug use: Yes     Types: Marijuana     Comment: medical        Medications:    amoxicillin-clavulanate (AUGMENTIN ES-600) 600-42.9 MG/5ML suspension  busPIRone (BUSPAR) 15 MG tablet  calcium carbonate (OS-SHON 500 MG [place]. [place]) 1250 MG tablet  escitalopram (LEXAPRO) 20 MG tablet  ferrous gluconate (FERGON) 324 (38 Fe) MG tablet  folic acid (FOLVITE) 1 MG tablet  Insulin Syringe-Needle U-100 (B-D INSULIN SYRINGE) 25G X 1\" 1 ML MISC  multivitamin w/minerals (THERA-VIT-M) tablet  nicotine (NICODERM CQ) 14 MG/24HR 24 hr patch  omeprazole (PRILOSEC) 40 MG DR capsule  raloxifene (EVISTA) 60 MG tablet  thiamine (B-1) 100 MG tablet  triamcinolone (KENALOG) 0.1 % external cream  venlafaxine (EFFEXOR XR) 37.5 MG 24 hr capsule  vitamin D3 (VITAMIN D3) 50 mcg (2000 units) tablet          Review of Systems  As mentioned above in the history present illness. All other systems were reviewed and are negative.    Physical Exam   BP: 130/86  Pulse: 95  Temp: 98.4  F (36.9  C)  Resp: 18  Weight: 45.4 kg (100 lb)  SpO2: 98 %      Physical Exam  Constitutional:       General: She is not in acute distress.     Appearance: Normal appearance. She is well-developed. She is not ill-appearing.   HENT:      Head: Normocephalic and " atraumatic.      Right Ear: External ear normal.      Left Ear: External ear normal.      Nose: Nose normal.      Mouth/Throat:      Mouth: Mucous membranes are moist.   Eyes:      Conjunctiva/sclera: Conjunctivae normal.   Cardiovascular:      Rate and Rhythm: Normal rate and regular rhythm.      Heart sounds: Normal heart sounds. No murmur heard.  Pulmonary:      Effort: Pulmonary effort is normal. No respiratory distress.      Breath sounds: Normal breath sounds.   Musculoskeletal:         General: Normal range of motion.   Skin:     General: Skin is warm and dry.      Findings: No rash.   Neurological:      General: No focal deficit present.      Mental Status: She is alert and oriented to person, place, and time.               ED Course              ED Course as of 11/24/23 1742   Fri Nov 24, 2023   1654 Lactic Acid(!): 3.0  Patient has an elevated lactic acid. However, no leukocytosis, no hypotension, no tachycardia, and no fever. She does have signs of infection in the 4th right finger from dog bite. I am giving her IV fluids and IV Unasyn.     Procedures             Results for orders placed or performed during the hospital encounter of 11/24/23 (from the past 24 hour(s))   CBC with platelets differential    Narrative    The following orders were created for panel order CBC with platelets differential.  Procedure                               Abnormality         Status                     ---------                               -----------         ------                     CBC with platelets and d...[[id number]]  Abnormal            Final result                 Please view results for these tests on the individual orders.   Basic metabolic panel   Result Value Ref Range    Sodium 140 135 - 145 mmol/L    Potassium 3.4 3.4 - 5.3 mmol/L    Chloride 103 98 - 107 mmol/L    Carbon Dioxide (CO2) 22 22 - 29 mmol/L    Anion Gap 15 7 - 15 mmol/L    Urea Nitrogen 5.6 (L) 8.0 - 23.0 mg/dL    Creatinine 0.69 0.51 - 0.95  mg/dL    GFR Estimate >90 >60 mL/min/1.73m2    Calcium 8.4 (L) 8.8 - 10.2 mg/dL    Glucose 69 (L) 70 - 99 mg/dL   Lactic acid whole blood   Result Value Ref Range    Lactic Acid 3.0 (H) 0.7 - 2.0 mmol/L   CBC with platelets and differential   Result Value Ref Range    WBC Count 9.5 4.0 - 11.0 10e3/uL    RBC Count 3.32 (L) 3.80 - 5.20 10e6/uL    Hemoglobin 10.4 (L) 11.7 - 15.7 g/dL    Hematocrit 31.3 (L) 35.0 - 47.0 %    MCV 94 78 - 100 fL    MCH 31.3 26.5 - 33.0 pg    MCHC 33.2 31.5 - 36.5 g/dL    RDW 14.1 10.0 - 15.0 %    Platelet Count 277 150 - 450 10e3/uL    % Neutrophils 46 %    % Lymphocytes 45 %    % Monocytes 7 %    % Eosinophils 1 %    % Basophils 1 %    % Immature Granulocytes 0 %    NRBCs per 100 WBC 0 <1 /100    Absolute Neutrophils 4.4 1.6 - 8.3 10e3/uL    Absolute Lymphocytes 4.3 0.8 - 5.3 10e3/uL    Absolute Monocytes 0.7 0.0 - 1.3 10e3/uL    Absolute Eosinophils 0.1 0.0 - 0.7 10e3/uL    Absolute Basophils 0.1 0.0 - 0.2 10e3/uL    Absolute Immature Granulocytes 0.0 <=0.4 10e3/uL    Absolute NRBCs 0.0 10e3/uL   CRP inflammation   Result Value Ref Range    CRP Inflammation 5.46 (H) <5.00 mg/L   Procalcitonin   Result Value Ref Range    Procalcitonin 0.06 <0.50 ng/mL   Symptomatic Influenza A/B, RSV, & SARS-CoV2 PCR (COVID-19) Nasopharyngeal    Specimen: Nasopharyngeal; Swab   Result Value Ref Range    Influenza A PCR Negative Negative    Influenza B PCR Negative Negative    RSV PCR Negative Negative    SARS CoV2 PCR Negative Negative    Narrative    Testing was performed using the Xpert Xpress CoV2/Flu/RSV Assay on the Cepheid GeneXpert Instrument. This test should be ordered for the detection of SARS-CoV-2, influenza, and RSV viruses in individuals who meet clinical and/or epidemiological criteria. Test performance is unknown in asymptomatic patients. This test is for in vitro diagnostic use under the FDA EUA for laboratories certified under CLIA to perform high or moderate complexity testing. This  test has not been FDA cleared or approved. A negative result does not rule out the presence of PCR inhibitors in the specimen or target RNA in concentration below the limit of detection for the assay. If only one viral target is positive but coinfection with multiple targets is suspected, the sample should be re-tested with another FDA cleared, approved, or authorized test, if coinfection would change clinical management. This test was validated by the [facility]. These laboratories are certified under the Clinical Laboratory Improvement Amendments of 1988 (CLIA-88) as qualified to perform high complexity laboratory testing.   Lactic acid whole blood   Result Value Ref Range    Lactic Acid 2.6 (H) 0.7 - 2.0 mmol/L       Medications   sodium chloride 0.9% BOLUS 1,000 mL (0 mLs Intravenous Stopped 11/24/23 1712)   ampicillin-sulbactam (UNASYN) 3 g vial to attach to  mL bag (0 g Intravenous Stopped 11/24/23 1720)       Assessments & Plan (with Medical Decision Making)     Right 4th and 5th finger dog bite puncture wounds occurring 3 days ago and now showing signs of infection.  No evidence of deep pulp space infection. FROM of the fingers, no evidence of a tenosynovitis on exam. She is afebrile. Normal vital signs.    Elevated lactic acid but no leukocytosis. CRP 5.46. Procal <0.06.  I low suspicion for SIRS/sepsis.  She does have exam findings showing signs of early infection from the dog bite wounds.  She was given a dose of IV antibiotics (Unasyn) and 1 L of IV fluids.  I have some suspicion that her elevated lactic acid is related to her hydration status as she reports she had not had anything to drink today.    Patient be started on a 10-day course of Augmentin.  She has a hard time with large pills due to prior history of gastric bypass so she requested liquid medication and this has been prescribed.  Patient instructed to have a low threshold for returning for any worsening.      New  Prescriptions    AMOXICILLIN-CLAVULANATE (AUGMENTIN ES-600) 600-42.9 MG/5ML SUSPENSION    Take 7.29 mLs (875 mg) by mouth 2 times daily for 10 days       Final diagnoses:   Infected dog bite of finger, initial encounter - RIGHT 4TH AND 5TH PHALANX.   Cellulitis of finger of right hand       11/24/2023   [facility] EMERGENCY DEPT       Pelon, RICARDO Baltazar CNP  11/24/23 [id number]

## 2023-11-24 NOTE — ED TRIAGE NOTES
Pt here with dog bite to right hand 3 days ago.  Concerns of sepsis.  Was recently hospitalized for sepsis         Triage Assessment (Adult)       Row Name 11/24/23 1419          Triage Assessment    Airway WDL WDL        Respiratory WDL    Respiratory WDL WDL

## 2023-11-26 ENCOUNTER — APPOINTMENT (OUTPATIENT)
Dept: GENERAL RADIOLOGY | Facility: CLINIC | Age: 68
End: 2023-11-26
Attending: FAMILY MEDICINE

## 2023-11-26 ENCOUNTER — HOSPITAL ENCOUNTER (EMERGENCY)
Facility: CLINIC | Age: 68
Discharge: HOME | End: 2023-11-26
Attending: FAMILY MEDICINE | Admitting: FAMILY MEDICINE
Payer: MEDICARE

## 2023-11-26 VITALS
BODY MASS INDEX: 17.16 KG/M2 | WEIGHT: 100 LBS | HEART RATE: 74 BPM | OXYGEN SATURATION: 96 % | RESPIRATION RATE: 28 BRPM | TEMPERATURE: 98.4 F | SYSTOLIC BLOOD PRESSURE: 119 MMHG | DIASTOLIC BLOOD PRESSURE: 88 MMHG

## 2023-11-26 DIAGNOSIS — F41.0 ANXIETY ATTACK: ICD-10-CM

## 2023-11-26 DIAGNOSIS — F10.920 ALCOHOLIC INTOXICATION WITHOUT COMPLICATION (H): ICD-10-CM

## 2023-11-26 DIAGNOSIS — F10.20 ALCOHOL USE DISORDER, SEVERE, DEPENDENCE (H): ICD-10-CM

## 2023-11-26 DIAGNOSIS — R06.02 SHORTNESS OF BREATH: ICD-10-CM

## 2023-11-26 DIAGNOSIS — J44.9 CHRONIC OBSTRUCTIVE PULMONARY DISEASE, UNSPECIFIED COPD TYPE (H): ICD-10-CM

## 2023-11-26 LAB
ANION GAP SERPL CALCULATED.3IONS-SCNC: 17 MMOL/L (ref 7–15)
BASE EXCESS BLDV CALC-SCNC: 3.5 MMOL/L (ref -7.7–1.9)
BASOPHILS # BLD AUTO: 0.1 10E3/UL (ref 0–0.2)
BASOPHILS NFR BLD AUTO: 1 %
BUN SERPL-MCNC: 3.4 MG/DL (ref 8–23)
CALCIUM SERPL-MCNC: 8.5 MG/DL (ref 8.8–10.2)
CHLORIDE SERPL-SCNC: 103 MMOL/L (ref 98–107)
CREAT SERPL-MCNC: 0.63 MG/DL (ref 0.51–0.95)
D DIMER PPP FEU-MCNC: 1.83 UG/ML FEU (ref 0–0.5)
DEPRECATED HCO3 PLAS-SCNC: 21 MMOL/L (ref 22–29)
EGFRCR SERPLBLD CKD-EPI 2021: >90 ML/MIN/1.73M2
EOSINOPHIL # BLD AUTO: 0.1 10E3/UL (ref 0–0.7)
EOSINOPHIL NFR BLD AUTO: 2 %
ERYTHROCYTE [DISTWIDTH] IN BLOOD BY AUTOMATED COUNT: 14.2 % (ref 10–15)
ETHANOL SERPL-MCNC: 0.19 G/DL
GLUCOSE SERPL-MCNC: 89 MG/DL (ref 70–99)
HCO3 BLDV-SCNC: 27 MMOL/L (ref 21–28)
HCT VFR BLD AUTO: 32.3 % (ref 35–47)
HGB BLD-MCNC: 10.7 G/DL (ref 11.7–15.7)
IMM GRANULOCYTES # BLD: 0 10E3/UL
IMM GRANULOCYTES NFR BLD: 0 %
LACTATE SERPL-SCNC: 3.4 MMOL/L (ref 0.7–2)
LYMPHOCYTES # BLD AUTO: 4 10E3/UL (ref 0.8–5.3)
LYMPHOCYTES NFR BLD AUTO: 48 %
MAGNESIUM SERPL-MCNC: 1.7 MG/DL (ref 1.7–2.3)
MCH RBC QN AUTO: 31 PG (ref 26.5–33)
MCHC RBC AUTO-ENTMCNC: 33.1 G/DL (ref 31.5–36.5)
MCV RBC AUTO: 94 FL (ref 78–100)
MONOCYTES # BLD AUTO: 0.7 10E3/UL (ref 0–1.3)
MONOCYTES NFR BLD AUTO: 8 %
NEUTROPHILS # BLD AUTO: 3.4 10E3/UL (ref 1.6–8.3)
NEUTROPHILS NFR BLD AUTO: 41 %
NRBC # BLD AUTO: 0 10E3/UL
NRBC BLD AUTO-RTO: 0 /100
NT-PROBNP SERPL-MCNC: 428 PG/ML (ref 0–900)
O2/TOTAL GAS SETTING VFR VENT: 21 %
PCO2 BLDV: 34 MM HG (ref 40–50)
PH BLDV: 7.5 [PH] (ref 7.32–7.43)
PLATELET # BLD AUTO: 310 10E3/UL (ref 150–450)
PO2 BLDV: 20 MM HG (ref 25–47)
POTASSIUM SERPL-SCNC: 3.6 MMOL/L (ref 3.4–5.3)
RBC # BLD AUTO: 3.45 10E6/UL (ref 3.8–5.2)
SODIUM SERPL-SCNC: 141 MMOL/L (ref 135–145)
TROPONIN T SERPL HS-MCNC: 11 NG/L
WBC # BLD AUTO: 8.4 10E3/UL (ref 4–11)

## 2023-11-26 PROCEDURE — 99284 EMERGENCY DEPT VISIT MOD MDM: CPT | Mod: 25 | Performed by: FAMILY MEDICINE

## 2023-11-26 PROCEDURE — 71045 X-RAY EXAM CHEST 1 VIEW: CPT

## 2023-11-26 PROCEDURE — 82077 ASSAY SPEC XCP UR&BREATH IA: CPT | Performed by: FAMILY MEDICINE

## 2023-11-26 PROCEDURE — 250N000011 HC RX IP 250 OP 636: Performed by: FAMILY MEDICINE

## 2023-11-26 PROCEDURE — 96374 THER/PROPH/DIAG INJ IV PUSH: CPT | Performed by: FAMILY MEDICINE

## 2023-11-26 PROCEDURE — 85025 COMPLETE CBC W/AUTO DIFF WBC: CPT | Performed by: FAMILY MEDICINE

## 2023-11-26 PROCEDURE — 96361 HYDRATE IV INFUSION ADD-ON: CPT | Performed by: FAMILY MEDICINE

## 2023-11-26 PROCEDURE — 84484 ASSAY OF TROPONIN QUANT: CPT | Performed by: FAMILY MEDICINE

## 2023-11-26 PROCEDURE — 83605 ASSAY OF LACTIC ACID: CPT | Performed by: FAMILY MEDICINE

## 2023-11-26 PROCEDURE — 96375 TX/PRO/DX INJ NEW DRUG ADDON: CPT | Performed by: FAMILY MEDICINE

## 2023-11-26 PROCEDURE — 85379 FIBRIN DEGRADATION QUANT: CPT | Performed by: FAMILY MEDICINE

## 2023-11-26 PROCEDURE — 36415 COLL VENOUS BLD VENIPUNCTURE: CPT | Performed by: FAMILY MEDICINE

## 2023-11-26 PROCEDURE — 83735 ASSAY OF MAGNESIUM: CPT | Performed by: FAMILY MEDICINE

## 2023-11-26 PROCEDURE — 83880 ASSAY OF NATRIURETIC PEPTIDE: CPT | Performed by: FAMILY MEDICINE

## 2023-11-26 PROCEDURE — 258N000003 HC RX IP 258 OP 636: Performed by: FAMILY MEDICINE

## 2023-11-26 PROCEDURE — 82803 BLOOD GASES ANY COMBINATION: CPT | Performed by: FAMILY MEDICINE

## 2023-11-26 PROCEDURE — 80048 BASIC METABOLIC PNL TOTAL CA: CPT | Performed by: FAMILY MEDICINE

## 2023-11-26 PROCEDURE — 99285 EMERGENCY DEPT VISIT HI MDM: CPT | Performed by: FAMILY MEDICINE

## 2023-11-26 RX ORDER — LORAZEPAM 1 MG/1
0.5 TABLET ORAL EVERY 8 HOURS PRN
Qty: 5 TABLET | Refills: 0 | Status: SHIPPED | OUTPATIENT
Start: 2023-11-26 | End: 2023-11-29

## 2023-11-26 RX ORDER — LORAZEPAM 2 MG/ML
0.5 INJECTION INTRAMUSCULAR
Status: DISCONTINUED | OUTPATIENT
Start: 2023-11-26 | End: 2023-11-26 | Stop reason: HOSPADM

## 2023-11-26 RX ADMIN — LORAZEPAM 0.5 MG: 2 INJECTION INTRAMUSCULAR; INTRAVENOUS at 19:59

## 2023-11-26 RX ADMIN — FAMOTIDINE 20 MG: 10 INJECTION, SOLUTION INTRAVENOUS at 20:42

## 2023-11-26 RX ADMIN — SODIUM CHLORIDE 500 ML: 9 INJECTION, SOLUTION INTRAVENOUS at 20:42

## 2023-11-26 ASSESSMENT — ACTIVITIES OF DAILY LIVING (ADL): ADLS_ACUITY_SCORE: 35

## 2023-11-27 ENCOUNTER — PATIENT OUTREACH (OUTPATIENT)
Dept: CARE COORDINATION | Facility: CLINIC | Age: 68
End: 2023-11-27

## 2023-11-27 NOTE — ED PROVIDER NOTES
[facility] ED Provider Note   Patient: Pavithra Raines  MRN #:  [id number]  Date of Visit: November 26, 2023    CC:     Chief Complaint   Patient presents with    Shortness of Breath     HPI:  Pavithra Raines is a 68 year old female who presented to the emergency department with anxiety, with shortness of breath.  Patient was seen emergency department 2 days ago with a dog bite, and at that time had expressed some concerns regarding sepsis.  Patient developed multifocal pneumonia, streptococcal pneumonia bacteremia in early October, septic shock and was hospitalized for 17 days.  She was subsequently discharged to transitional care unit, and is now back at home.  She states that she has not been taking her anxiety medications as she cannot afford it.  She is concerned about shortness of breath and recurrence of sepsis.  She does not have a cough, fever, or acute chest pain.  Patient is afraid that she is dying.  Patient states that she arrived by yelling and flagging down a car, and the stranger drove her to the emergency department.  Her  called while she was in the emergency department and he apparently is in the [facility].  Patient has complex past medical history as noted below including COPD, alcohol dependence, PTSD, major depression, and severe anxiety.  Recent echocardiogram from October 2023:  Interpretation Summary  The left ventricle is normal in size.  Left ventricular systolic function is normal. The visual ejection fraction is 55-60%.  No regional wall motion abnormalities noted.  The right ventricle is normal in structure, function and size.  There is mild (1+) tricuspid regurgitation.  Right ventricular systolic pressure is elevated, consistent with mild pulmonary hypertension.  There is no comparison study available.    Problem List:  Patient Active Problem List    Diagnosis Date Noted    Patient  ventilator dyssynchrony (H) 10/11/2023     Priority: Medium    Coagulopathy (H24) 10/11/2023     Priority: Medium    Pulmonary hypertension (H) 10/11/2023     Priority: Medium    Acute respiratory failure with hypoxia (H) 10/10/2023     Priority: Medium    Alcohol withdrawal, with delirium (H) 10/10/2023     Priority: Medium    Hypophosphatemia 10/10/2023     Priority: Medium    Hypokalemia 10/10/2023     Priority: Medium    Thrombocytopenia (H24) 10/10/2023     Priority: Medium    Anemia, unspecified type 10/10/2023     Priority: Medium    Hypoalbuminemia 10/09/2023     Priority: Medium    Septic shock (H) 10/08/2023     Priority: Medium    Bacteremia due to Streptococcus pneumoniae 10/08/2023     Priority: Medium    Hypomagnesemia 10/08/2023     Priority: Medium    History of seizure due to alcohol withdrawal 10/08/2023     Priority: Medium    Emphysema/COPD (H) 10/08/2023     Priority: Medium    Tobacco abuse 10/08/2023     Priority: Medium    Malnutrition - suspected 10/08/2023     Priority: Medium    Dehydration 10/07/2023     Priority: Medium    Hyponatremia 10/07/2023     Priority: Medium    Weakness generalized 10/07/2023     Priority: Medium    Alcohol use disorder, severe, dependence (H) 10/07/2023     Priority: Medium    Multifocal pneumonia 10/07/2023     Priority: Medium    Alcohol dependence in remission (H) 01/08/2022     Priority: Medium    Episode of recurrent major depressive disorder, unspecified depression episode severity (H24) 01/08/2022     Priority: Medium    Seizure disorder (H) 01/08/2022     Priority: Medium    Fatty liver 12/13/2020     Priority: Medium    PTSD (post-traumatic stress disorder) 12/13/2020     Priority: Medium    Underweight 12/13/2020     Priority: Medium    Macrocytosis without anemia 12/13/2020     Priority: Medium    Age-related osteoporosis without current pathological fracture 10/15/2020     Priority: Medium    Anemia due to vitamin B12 deficiency, unspecified B12  deficiency type 12/04/2017     Priority: Medium    Insomnia, unspecified type 12/04/2017     Priority: Medium    Other iron deficiency anemia 12/04/2017     Priority: Medium    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 grade 3 04/07/2011     Priority: Medium     12/15/06 ASCUS + high risk HPV  colpo in 2007 showed high grade KAITLYN and LEEP was recommended  LEEP was done in June,2007 with CAN 2/3 confirmed  10/15/07 NIL  9/30/08 NIL/neg HPV  10/21/09 NIL/neg HPV  4/5/11 NIL- repeat in one year (4/2012 12/1/17 NIL pap/neg HR HPV. Will need pap screening until 2027, regardless of age.  Plan: cotest due 3 yr.      MDD (major depressive disorder) 04/05/2011     Priority: Medium     Needs to est primary care.      CARDIOVASCULAR SCREENING; LDL GOAL LESS THAN 160 10/31/2010     Priority: Medium    Genital herpes      Priority: Medium     IMO update changed this record. Please review for accuracy      Anxiety 08/27/2008     Priority: Medium     Patient is followed by MIMI GARZA for ongoing prescription of benzodiazepines.  All refills should be approved by this provider, or covering partner.    Medication(s): Clonazepam.   Maximum quantity per month: 30  Clinic visit frequency required:      Controlled substance agreement on file: No  Benzodiazepine use reviewed by psychiatry:  No    Last [facility] website verification:  done on 4/5/19  [email or web address]        History of Tom-en-Y gastric bypass March 2008 03/25/2008     Priority: Medium     Performed at [facility].      Restless legs syndrome (RLS) 05/15/2007     Priority: Medium    Hypersomnia with sleep apnea 05/15/2007     Priority: Medium     Problem list name updated by automated process. Provider to review      Esophageal reflux 04/18/2007     Priority: Medium       Past Medical History:   Diagnosis Date    Abnormal Papanicolaou smear of cervix and cervical HPV 4/07    Genital herpes     History of colposcopy with cervical biopsy  "06/2007    Hypersomnia with sleep apnea, unspecified     Other and unspecified ovarian cyst 2002 or so       MEDS: LORazepam (ATIVAN) 1 MG tablet  amoxicillin-clavulanate (AUGMENTIN ES-600) 600-42.9 MG/5ML suspension  busPIRone (BUSPAR) 15 MG tablet  calcium carbonate (OS-SHON 500 MG [place]. [place]) 1250 MG tablet  escitalopram (LEXAPRO) 20 MG tablet  ferrous gluconate (FERGON) 324 (38 Fe) MG tablet  folic acid (FOLVITE) 1 MG tablet  Insulin Syringe-Needle U-100 (B-D INSULIN SYRINGE) 25G X 1\" 1 ML MISC  multivitamin w/minerals (THERA-VIT-M) tablet  nicotine (NICODERM CQ) 14 MG/24HR 24 hr patch  omeprazole (PRILOSEC) 40 MG DR capsule  raloxifene (EVISTA) 60 MG tablet  thiamine (B-1) 100 MG tablet  triamcinolone (KENALOG) 0.1 % external cream  venlafaxine (EFFEXOR XR) 37.5 MG 24 hr capsule  vitamin D3 (VITAMIN D3) 50 mcg (2000 units) tablet        ALLERGIES:    Allergies   Allergen Reactions    Codeine Itching    Vicodin [Hydrocodone-Acetaminophen] Itching       Past Surgical History:   Procedure Laterality Date    CHOLECYSTECTOMY, OPEN  age 20s    COLONOSCOPY  03/21/2011    COMBINED COLONOSCOPY, REMOVE TUMOR/POLYP/LESION BY SNARE performed by JUAN FRANCISCO HERNANDEZ at  GI    COLONOSCOPY N/A 10/09/2017    polyps, repeat 3 years    COLPOSCOPY CERVIX, LOOP ELECTRODE BIOPSY, COMBINED  06/2007    CAN 2/3-patient requires yearly pap smears    dental pegs      ESOPHAGOSCOPY, GASTROSCOPY, DUODENOSCOPY (EGD), COMBINED N/A 02/09/2018    Procedure: COMBINED ESOPHAGOSCOPY, GASTROSCOPY, DUODENOSCOPY (EGD);  EGD;  Surgeon: Oneal Sanchez MD;  Location:  GI    GASTRIC BYPASS  03/25/2008    At [facility]    HC DILATION/CURETTAGE DIAG/THER NON OB  2002    HC ENLARGE BREAST WITH IMPLANT      HC LAPAROSCOPIC MYOMECTOMY, 1 - 4 INTRAMURAL MYOMAS =<250 GM  2002    HC REMOVAL OF BREAST IMPLANT      SALPINGO OOPHORECTOMY,R/L/MATTHEW  2002    Bilateral salpingectomy and unilateral oophorectomy       Social History     Tobacco Use    Smoking " status: Every Day     Packs/day: 2.00     Years: 10.00     Additional pack years: 0.00     Total pack years: 20.00     Types: Cigarettes    Smokeless tobacco: Never    Tobacco comments:     2 ppd at this time (chain smoking) 10/2012   Vaping Use    Vaping Use: Every day    Substances: Nicotine, THC    Devices: Pre-filled or refillable cartridge   Substance Use Topics    Alcohol use: Yes     Comment: daily, drinks a box    Drug use: Yes     Types: Marijuana     Comment: medical         Review of Systems   Except as noted in HPI, all other systems were reviewed and are negative    Physical Exam   Vitals were reviewed  Patient Vitals for the past 12 hrs:   BP Temp Temp src Pulse Resp SpO2 Weight   11/26/23 1932 119/88 98.4  F (36.9  C) Temporal 74 28 96 % 45.4 kg (100 lb)     GENERAL APPEARANCE: Alert, anxious, crying; cachectic appearing  FACE: normal facies  EYES: Pupils are equal; sclera is nonicteric  HENT: normal external exam  NECK: no adenopathy or asymmetry  RESP: normal respiratory effort; clear breath sounds bilaterally  CV: regular rate and rhythm; no significant murmurs, gallops or rubs  ABD: soft, no tenderness; no rebound or guarding; bowel sounds are normal  MS: Decreased muscle mass  EXT: No calf tenderness or pitting edema  SKIN: no worrisome rash  NEURO: no facial droop; no focal deficits, speech is normal  PSYCH: Patient is severely anxious, crying      Available Lab/Imaging Results     Results for orders placed or performed during the hospital encounter of 11/26/23 (from the past 24 hour(s))   CBC with platelets differential    Narrative    The following orders were created for panel order CBC with platelets differential.  Procedure                               Abnormality         Status                     ---------                               -----------         ------                     CBC with platelets and d...[[id number]]  Abnormal            Final result                 Please view  results for these tests on the individual orders.   Blood gas venous   Result Value Ref Range    pH Venous 7.50 (H) 7.32 - 7.43    pCO2 Venous 34 (L) 40 - 50 mm Hg    pO2 Venous 20 (L) 25 - 47 mm Hg    Bicarbonate Venous 27 21 - 28 mmol/L    Base Excess/Deficit 3.5 (H) -7.7 - 1.9 mmol/L    FIO2 21    Lactic acid whole blood   Result Value Ref Range    Lactic Acid 3.4 (H) 0.7 - 2.0 mmol/L   D dimer quantitative   Result Value Ref Range    D-Dimer Quantitative 1.83 (H) 0.00 - 0.50 ug/mL FEU    Narrative    This D-dimer assay is intended for use in conjunction with a clinical pretest probability assessment model to exclude pulmonary embolism (PE) and deep venous thrombosis (DVT) in outpatients suspected of PE or DVT. The cut-off value is 0.50 ug/mL FEU.    For patients 50 years of age or older, the application of age-adjusted cut-off values for D-Dimer may increase the specificity without significant effect on sensitivity. The literature suggested calculation age adjusted cut-off in ug/L = age in years x 10 ug/L. The results in this laboratory are reported as ug/mL rather than ug/L. The calculation for age adjusted cut off in ug/mL= age in years x 0.01 ug/mL. For example, the cut off for a 76 year old male is 76 x 0.01 ug/mL = 0.76 ug/mL (760 ug/L).    M Randy et al. Age adjusted D-dimer cut-off levels to rule out pulmonary embolism: The ADJUST-PE Study. VALERIE 2014;311:7869-9454.; HJ Ritu et al. Diagnostic accuracy of conventional or age adjusted D-dimer cutoff values in older patients with suspected venous thromboembolism. Systemic review and meta-analysis. BMJ 2013:346:f2492.   Troponin T, High Sensitivity   Result Value Ref Range    Troponin T, High Sensitivity 11 <=14 ng/L   Nt probnp inpatient (BNP)   Result Value Ref Range    N terminal Pro BNP Inpatient 428 0 - 900 pg/mL   Basic metabolic panel   Result Value Ref Range    Sodium 141 135 - 145 mmol/L    Potassium 3.6 3.4 - 5.3 mmol/L    Chloride 103 98 - 107  mmol/L    Carbon Dioxide (CO2) 21 (L) 22 - 29 mmol/L    Anion Gap 17 (H) 7 - 15 mmol/L    Urea Nitrogen 3.4 (L) 8.0 - 23.0 mg/dL    Creatinine 0.63 0.51 - 0.95 mg/dL    GFR Estimate >90 >60 mL/min/1.73m2    Calcium 8.5 (L) 8.8 - 10.2 mg/dL    Glucose 89 70 - 99 mg/dL   Alcohol level blood   Result Value Ref Range    Alcohol ethyl 0.19 (H) <=0.01 g/dL   Magnesium   Result Value Ref Range    Magnesium 1.7 1.7 - 2.3 mg/dL   CBC with platelets and differential   Result Value Ref Range    WBC Count 8.4 4.0 - 11.0 10e3/uL    RBC Count 3.45 (L) 3.80 - 5.20 10e6/uL    Hemoglobin 10.7 (L) 11.7 - 15.7 g/dL    Hematocrit 32.3 (L) 35.0 - 47.0 %    MCV 94 78 - 100 fL    MCH 31.0 26.5 - 33.0 pg    MCHC 33.1 31.5 - 36.5 g/dL    RDW 14.2 10.0 - 15.0 %    Platelet Count 310 150 - 450 10e3/uL    % Neutrophils 41 %    % Lymphocytes 48 %    % Monocytes 8 %    % Eosinophils 2 %    % Basophils 1 %    % Immature Granulocytes 0 %    NRBCs per 100 WBC 0 <1 /100    Absolute Neutrophils 3.4 1.6 - 8.3 10e3/uL    Absolute Lymphocytes 4.0 0.8 - 5.3 10e3/uL    Absolute Monocytes 0.7 0.0 - 1.3 10e3/uL    Absolute Eosinophils 0.1 0.0 - 0.7 10e3/uL    Absolute Basophils 0.1 0.0 - 0.2 10e3/uL    Absolute Immature Granulocytes 0.0 <=0.4 10e3/uL    Absolute NRBCs 0.0 10e3/uL   XR Chest Port 1 View    Narrative    EXAM: XR CHEST PORT 1 VIEW  LOCATION: [facility]  DATE: 11/26/2023    INDICATION: SOA  COMPARISON: 10/10/2023      Impression    IMPRESSION: There is mild hyperinflation of both lungs. Mild prominence of the lung markings worrisome for mild active CHF/volume overload versus interstitial infiltrates. Post diuresis follow-up radiograph would be of benefit. Partially calcified   thoracic aorta. Mild thoracolumbar spinal curvature.              Impression     Final diagnoses:   Shortness of breath   Anxiety attack   Alcohol use disorder, severe, dependence (H)   Chronic obstructive pulmonary disease, unspecified  COPD type (H)   Alcoholic intoxication         ED Course & Medical Decision Making   Pavithra Raines is a 68 year old female with recent septic shock, multifocal pneumonia, anxiety, alcohol dependence who presented to the emergency department with shortness of breath, and severe anxiety.  Patient was by herself this evening, and when she started to get short of breath, this ramped up her anxiety.  She was afraid that she was going to die alone.  Patient was able to get to the emergency department by stopping a car that was passing by and yelling to take her to the emergency department.  Upon arrival, patient was extremely emotional, tearful, and initially upset at triage because of the questions that she was asked.  Patient apologized, and endorsed feeling anxious.  Patient has not been able to get any of her medications because of lack of money.  She had a prolonged hospitalization due to septic shock from multifocal pneumonia and cryptococcal bacteremia.  She spent 17 days in the hospital most of which were in the ICU and intubated.  She was discharged to transitional care unit and now is back at home.  She works as a  but has not been able to work for the past 5 weeks and has no money available.  She does not feel that she is cognitively capable of working at this time.  She has an occupational therapy visit tomorrow, and anticipates following up with the [facility]  to see if she can qualify for some assistance.  She continues to drink each day to avoid withdrawal as she has had a history of seizures.    Vital signs reveal a temp of 98.4, blood pressure 119/98, heart rate of 74, respiratory rate of 28 with 96% oxygen saturation.  Lungs are clear, heart sounds are normal.  Patient is cachectic appearing and has suspected malnutrition.    Laboratory workup reveals white blood count of 8.4, hemoglobin of 10.7, platelet count of 310,000.  Lactic acid level is 3.4, similar to her levels  from 2 days ago.  D-dimer is 1.83, down from previous level of 4.0.  Venous blood gas reveals a pH of 7.50, pCO2 of 34, pO2 of 20.  Chest x-ray reveals mild hyperinflation of both lungs.  Mild prominence of the lung markings worrisome for mild active CHF/volume overload versus interstitial infiltrates.  Clinically, patient appears to be dry, and IV fluids were administered.  Patient's oxygen saturations were maintained at % on room air.      Medications   LORazepam (ATIVAN) injection 0.5 mg (0.5 mg Intravenous $Given 11/26/23 1959)   sodium chloride 0.9% BOLUS 500 mL (500 mLs Intravenous $New Bag 11/26/23 2042)   famotidine (PEPCID) injection 20 mg (20 mg Intravenous $Given 11/26/23 2042)        Patient was given Ativan with significant improvement in her anxiety symptoms.  She has been experiencing some upper abdominal discomfort, and most likely due to gastritis versus GERD.  She received Pepcid as well as IV fluids.    Will make a referral to  to try to contact the patient to see if she can benefit from some assistance.  Hopefully she can make connections with the [facility] .  Patient's  arrived a short time later.  He is in the room, and this may also help with her anxiety symptoms now that she is not alone.        Written after-visit summary and instructions were given at the time of discharge.    Follow up Plan:   [facility] Emergency Dept  [address] [place] [id number] [phone]    If symptoms worsen      Discharge Instructions:   There are no signs that you are having any impending respiratory problems.  You have underlying COPD, and recovering from recent pneumonia.  Spot check your oxygen saturation with your oximeter if you are feeling short of breath.  Your oxygen level should be greater than 92%.  Your symptoms triggered a severe anxiety attack and alcohol use disorder.  Take Ativan 1/2 tablet up to every 8 hours as needed  for severe anxiety.  Follow-up with [facility] .  I asked our hospital  to try to contact you to help with any assistance.  Follow-up with your primary care or mental health provider in the next several days.  Return to the emergency department at any time if you develop new or worsening symptoms.       Disclaimer: This note consists of words and symbols derived from keyboarding and dictation using voice recognition software.  As a result, there may be errors that have gone undetected.  Please consider this when interpreting information found in this note.       Ashley Maria MD  11/26/23 [id number]

## 2023-11-27 NOTE — DISCHARGE INSTRUCTIONS
There are no signs that you are having any impending respiratory problems.  You have underlying COPD, and recovering from recent pneumonia.  Spot check your oxygen saturation with your oximeter if you are feeling short of breath.  Your oxygen level should be greater than 92%.  Your symptoms triggered a severe anxiety attack and alcohol use disorder.  Take Ativan 1/2 tablet up to every 8 hours as needed for severe anxiety.  Follow-up with [facility] .  I asked our hospital  to try to contact you to help with any assistance.  Follow-up with your primary care or mental health provider in the next several days.  Return to the emergency department at any time if you develop new or worsening symptoms.

## 2023-11-27 NOTE — PROGRESS NOTES
Clinic Care Coordination Contact  Follow Up Progress Note      Assessment: patient reported that she was doing ok yet still can not afford her medications and feels she can not work any longer.  She talked about Toya from OT coming out today and that she has been in contact with the senior [facility] line .  She appeared confused about who does what and who is all involved.  She needed redirection often about what Toya did and continued comments about Toya helping her with paperwork and organizing tasks.  She also thought Toya was the one helping her apply for disability and then later stated she was given the social security number in [facility] with an appointment and needing to get transportation.      Discussed the FRW referral being placed again and she was in agreement.  Reviewed that they can help apply for county benefits and MA.      Discussed alcohol use and how this is a depressant and not good to use with the Ativan she received at the ED. She insists she only had one beer the day before the last ED and it took her all day to drink it.  Reviewed her blood alcohol level was 0.19 indicating she had more then one beer.  She noted that the ED provider stated this as well.  She is not interested in pursuing alcohol treatment until she has insurance and feels she needs inpatient program.    Pt denied any questions from the ED visit. Through out the conversation it was hard to tell if pt was confused or the affects of the Ativan she took this am.  She said she had one last night and one this am.  Reviewed the PRN nature and dosage.  Pt said she took an ativan last night and one this am.  Uncertain if her one in the ED was her last night dose or if she took one of the 5 prescribed and was unable to ask as she changed the subject.     Care Gaps:    Health Maintenance Due   Topic Date Due    SPIROMETRY  Never done    COPD ACTION PLAN  Never done    RSV VACCINE (Pregnancy & 60+) (1 -  1-dose 60+ series) Never done    COLORECTAL CANCER SCREENING  10/09/2020    MEDICARE ANNUAL WELLNESS VISIT  09/17/2022    COVID-19 Vaccine (5 - 2023-24 season) 09/01/2023    PHQ-9  11/12/2023       Postponed to after she has insurance     Care Plans  Care Plan: Financial Wellbeing       Problem: Patient expresses financial resource strain       Long-Range Goal: Create an action plan to increase financial stability       Start Date: 5/25/2023 Expected End Date: 5/14/2024    This Visit's Progress: 20% Recent Progress: 20%    Note:     Barriers: limited income, miss deadlines  Strengths: understanding the need  Patient expressed understanding of goal: yes  Action steps to achieve this goal:  1. I will answer the Financial resource worker call and work with them to apply for insurance  2. I will reach out to resources sent  3. I will reach out to Jenny as needs arise between scheduled calls.                                 Intervention/Education provided during outreach: attempted to discuss alcohol use and ativan use.  Pt said she understood and has not had any alcohol since ED visit yesterday.  She still denied any additional use other then the one beer even though blood alcohol was high. Reviewed that she had not had a drink while in the hospital or TCU and returned upon discharge. She Is interested in treatment after she gets insurance.  Encouraged her to not drink and use ativan at the same time.  She has used 2 of the 5 tablets already.     Educated on social security vs social security disability and that since she is 68 they may have her apply for social security and not disability.  She noted that her spouse gets $1900 from [employer] and that it shouldn't affect her income amount.  Reviewed the FRW and that they can help her apply for [place] and other county benefits.      Provided the number for the prescription assistance program to call and check in supports with obtaining support to get prescriptions.       Outreach Frequency: monthly, more frequently as needed      Plan:   Pt to accept call from FRW.  Pt to call Carmita from [facility] line for support.  Pt to call prescription assistance program.    Care Coordinator will follow up in about 2 weeks    Clinic Care Coordination Contact  Financial Resource Worker Screening    County Benefits  Is patient requesting help applying for county benefits?: Yes  Have you recently applied for any county benefits?: No  How many people in your household?: 2  Do you buy/eat food together?: Yes  What is the monthly gross income for the household (wages, social security, workers comp, and pension)? : 1900    [place]:  [place] No  Is this an insurance renewal?: No  Is this a new insurance application request?: Yes  Have you recently applied for insurance?: No  How many people in your household? : 2  Do you file taxes?: No  What is the monthly gross income for the household (wages, social security, workers comp, and pension)? : 1900    Any other information for the FRW?: to apply for SNAP and any programs she qualifies for including MA, utilities    Care Coordination team will tell patient:   Thank you for answering all the questions, based on screening questions, our Financial Resource Worker will reach out to you with additional questions and next steps.    Jenny Hewitt, RUFUS   [facility] - Care Coordination  [facility]   [phone]

## 2023-11-27 NOTE — ED TRIAGE NOTES
Pt gets short of breath and gets increased anxiety       Triage Assessment (Adult)       Row Name 11/26/23 1936          Triage Assessment    Airway WDL WDL        Respiratory WDL    Respiratory WDL WDL

## 2023-11-28 ENCOUNTER — TELEPHONE (OUTPATIENT)
Dept: FAMILY MEDICINE | Facility: CLINIC | Age: 68
End: 2023-11-28

## 2023-11-28 ENCOUNTER — PATIENT OUTREACH (OUTPATIENT)
Dept: CARE COORDINATION | Facility: CLINIC | Age: 68
End: 2023-11-28

## 2023-11-28 NOTE — TELEPHONE ENCOUNTER
Home Care is calling regarding an established patient with [facility].     Requesting orders from: Soha Boggs  Provider is following patient: Yes  Is this a 60-day recertification request?  No    Orders Requested    Occupational Therapy  Request for initial certification (first set of orders)   Frequency:  1x/wk for 4 wks for self-care training      Information was gathered and will be sent to provider for review.  RN will contact Home Care with information after provider review.  Confirmed ok to leave a detailed message with call back.  Contact information confirmed and updated as needed.    Isabela Donovan RN

## 2023-11-28 NOTE — TELEPHONE ENCOUNTER
Clinic Care Coordination Contact  Program: [facility] Benefit/yasemin care/ Certain population/ Energy assistance   County: [facility] Case #:  [facility] Worker:   Kevin #:   Subscriber #:   Renewal:  Date Applied:     FRW Outreach:   11/28/23- Frw established contact with pt, she state that she had been hospitalized and currently doesn't income. Frw ask pt gather income infor from prior to her hospitalization as she is self-employed. Frw schedule an appt on 12/6 at 11 am to screen and apply for whatever she is eligible.   Leona Negrete  Financial Resource Worker  [facility]  Clinic Care Coordination  [phone]         Health Insurance:      Referral/Screening:  FRW Screening    Row Name 11/27/23 0946       [facility] Benefits   Is patient requesting help applying for [facility] benefits? Yes       Have you recently applied for any [facility] benefits? No       How many people in your household? 2       Do you buy/eat food together? Yes       What is the monthly gross income for the household (wages, social security, workers comp, and pension)? 1900       [place]:   [place]? No       Is this an insurance renewal? No       Is this a new insurance application request? Yes       Have you recently applied for insurance? No       How many people in your household? 2       Do you file taxes? No       What is the monthly gross income for the household (wages, social security, workers comp, and pension)? 1900       OTHER   Is this a yasemin care application? Yes       Any other information for the FRW? to apply for SNAP and any programs she qualifies for including MA, utilities

## 2023-11-28 NOTE — TELEPHONE ENCOUNTER
Patient has some vitals out of range this morning.    Heart rate 128 at rest.  Bp 133/92    Per Home Care OT.    Please advise patient on follow up if needed.    Liz Soto RN on 11/28/2023 at 10:23 AM

## 2023-11-29 ENCOUNTER — TELEPHONE (OUTPATIENT)
Dept: FAMILY MEDICINE | Facility: CLINIC | Age: 68
End: 2023-11-29

## 2023-11-29 NOTE — TELEPHONE ENCOUNTER
Reason for Call:  Form, our goal is to have forms completed with 72 hours, however, some forms may require a visit or additional information.    Type of letter, form or note:  Home Health Certification    Who is the form from?: Home care    Where did the form come from: form was faxed in    What clinic location was the form placed at?: [facility]     Where the form was placed: Given to MA/RN, NAN'S RN    What number is listed as a contact on the form?: [phone]       Additional comments: Certification Period 11/8/2023-1/6/2024    Call taken on 11/29/2023 at 4:53 PM by Catrina Payne LPN

## 2023-12-04 NOTE — TELEPHONE ENCOUNTER
Medication reconciliation completed by RN. Form and chart forwarded to PCP for signatures    Liz Soto RN on 12/4/2023 at 3:37 PM

## 2023-12-05 NOTE — TELEPHONE ENCOUNTER
Please get update today on patient condition. Call home care nurse and patient please and see how she is doing. Please also ask if she is taking all of her meds, she called a few weeks ago stating she couldn't afford some and wondering which ones were priority. Now she is on home care, want to see if she got medical assistance.   Soha Boggs MD

## 2023-12-05 NOTE — TELEPHONE ENCOUNTER
Phoned Toya and she stated she was just receiving approval to see patient today per requested home care orders.  She stated she will be visiting patient on Friday 12/8/2023 for start of treatment plan and will call the clinic back to update on questions asked by PCP stated below.  Toya stated she was unsure if patient has been taking medications.  Toya stated she is unsure if patient received medical assistance and will update clinic with information from visit this week.    Patient outreach on 11/28/2023.        Amanda Pickering RN

## 2023-12-05 NOTE — TELEPHONE ENCOUNTER
Phoned Toya and gave information per Dr. Flakita MD as stated below.  Toya stated understanding.    Amanda Pickering RN

## 2023-12-05 NOTE — TELEPHONE ENCOUNTER
Patient has since been in hospital and now in home care. Addressed in separate encounter.   Soha Boggs MD

## 2023-12-06 ENCOUNTER — PATIENT OUTREACH (OUTPATIENT)
Dept: CARE COORDINATION | Facility: CLINIC | Age: 68
End: 2023-12-06

## 2023-12-06 RX ORDER — CYANOCOBALAMIN 1000 UG/ML
1 INJECTION, SOLUTION INTRAMUSCULAR; SUBCUTANEOUS
COMMUNITY
End: 2024-05-06

## 2023-12-06 RX ORDER — CLONAZEPAM 0.5 MG/1
0.5 TABLET ORAL DAILY PRN
Status: ON HOLD | COMMUNITY
End: 2023-12-21

## 2023-12-06 NOTE — TELEPHONE ENCOUNTER
Signed [facility]HC faxed back to [facility] and placed up for scanning. Catrina Payne LPN

## 2023-12-06 NOTE — TELEPHONE ENCOUNTER
Clinic Care Coordination Contact  Program: [facility] Benefit/yasemin care/ Certain population/ Energy assistance   County: [facility] Case #:  [facility] Worker:   Kevin #:   Subscriber #:   Renewal:  Date Applied:     FRW Outreach:   12/6/23- Frw called pt at appt time she state that she has no income. Frw assisted pt in applying for [facility] benefit- snap/cash, Energy assistance and yasemin care. Pt state that someone from [facility] had helped her with Cert pop and mailed the AVS form. Frw informed pt that she needs to send the AVS form asap. Frw will mail yasemin care and energy assistance applications to the pt so she can sign. Frw provided next instruction regarding all 3 applications. Frw will follow up with pt within 30 days.  Leona Negrete  Financial Resource Worker  [facility] Care Coordination  [phone]     11/28/23- Frw established contact with pt, she state that she had been hospitalized and currently doesn't income. Frw ask pt gather income infor from prior to her hospitalization as she is self-employed. Frw schedule an appt on 12/6 at 11 am to screen and apply for whatever she is eligible.   Leona Negrete  Financial Resource Worker  [facility] Care Coordination  [phone]         Health Insurance:      Referral/Screening:  [facility] Screening    Row Name 11/27/23 0946       [facility] Benefits   Is patient requesting help applying for [facility] benefits? Yes       Have you recently applied for any [facility] benefits? No       How many people in your household? 2       Do you buy/eat food together? Yes       What is the monthly gross income for the household (wages, social security, workers comp, and pension)? 1900       [place]:   [place]? No       Is this an insurance renewal? No       Is this a new insurance application request? Yes       Have you recently applied for insurance? No       How many people in your household? 2        Do you file taxes? No       What is the monthly gross income for the household (wages, social security, workers comp, and pension)? 1900       OTHER   Is this a yasemin care application? Yes       Any other information for the FRW? to apply for SNAP and any programs she qualifies for including [place], utilities

## 2023-12-09 ENCOUNTER — HOSPITAL ENCOUNTER (EMERGENCY)
Facility: CLINIC | Age: 68
Discharge: HOME | End: 2023-12-09
Attending: FAMILY MEDICINE | Admitting: FAMILY MEDICINE
Payer: MEDICARE

## 2023-12-09 VITALS
RESPIRATION RATE: 20 BRPM | OXYGEN SATURATION: 98 % | HEART RATE: 81 BPM | DIASTOLIC BLOOD PRESSURE: 91 MMHG | SYSTOLIC BLOOD PRESSURE: 110 MMHG | TEMPERATURE: 98.5 F

## 2023-12-09 DIAGNOSIS — F10.929 ACUTE ALCOHOLIC INTOXICATION WITH COMPLICATION (H): ICD-10-CM

## 2023-12-09 LAB
ALCOHOL BREATH TEST: 0.1 (ref 0–0.01)
ANION GAP SERPL CALCULATED.3IONS-SCNC: 11 MMOL/L (ref 7–15)
BASOPHILS # BLD AUTO: 0.1 10E3/UL (ref 0–0.2)
BASOPHILS NFR BLD AUTO: 1 %
BUN SERPL-MCNC: 7.1 MG/DL (ref 8–23)
CALCIUM SERPL-MCNC: 8.2 MG/DL (ref 8.8–10.2)
CHLORIDE SERPL-SCNC: 103 MMOL/L (ref 98–107)
CREAT SERPL-MCNC: 0.63 MG/DL (ref 0.51–0.95)
DEPRECATED HCO3 PLAS-SCNC: 23 MMOL/L (ref 22–29)
EGFRCR SERPLBLD CKD-EPI 2021: >90 ML/MIN/1.73M2
EOSINOPHIL # BLD AUTO: 0.2 10E3/UL (ref 0–0.7)
EOSINOPHIL NFR BLD AUTO: 2 %
ERYTHROCYTE [DISTWIDTH] IN BLOOD BY AUTOMATED COUNT: 14.5 % (ref 10–15)
ETHANOL SERPL-MCNC: 0.31 G/DL
GLUCOSE SERPL-MCNC: 87 MG/DL (ref 70–99)
HCT VFR BLD AUTO: 33.9 % (ref 35–47)
HGB BLD-MCNC: 10.8 G/DL (ref 11.7–15.7)
IMM GRANULOCYTES # BLD: 0 10E3/UL
IMM GRANULOCYTES NFR BLD: 0 %
LACTATE SERPL-SCNC: 0.7 MMOL/L (ref 0.7–2)
LYMPHOCYTES # BLD AUTO: 3.7 10E3/UL (ref 0.8–5.3)
LYMPHOCYTES NFR BLD AUTO: 44 %
MCH RBC QN AUTO: 30.6 PG (ref 26.5–33)
MCHC RBC AUTO-ENTMCNC: 31.9 G/DL (ref 31.5–36.5)
MCV RBC AUTO: 96 FL (ref 78–100)
MONOCYTES # BLD AUTO: 0.8 10E3/UL (ref 0–1.3)
MONOCYTES NFR BLD AUTO: 9 %
NEUTROPHILS # BLD AUTO: 3.7 10E3/UL (ref 1.6–8.3)
NEUTROPHILS NFR BLD AUTO: 44 %
NRBC # BLD AUTO: 0 10E3/UL
NRBC BLD AUTO-RTO: 0 /100
PLATELET # BLD AUTO: 394 10E3/UL (ref 150–450)
POTASSIUM SERPL-SCNC: 4.4 MMOL/L (ref 3.4–5.3)
RBC # BLD AUTO: 3.53 10E6/UL (ref 3.8–5.2)
SODIUM SERPL-SCNC: 137 MMOL/L (ref 135–145)
WBC # BLD AUTO: 8.3 10E3/UL (ref 4–11)

## 2023-12-09 PROCEDURE — 99284 EMERGENCY DEPT VISIT MOD MDM: CPT | Performed by: FAMILY MEDICINE

## 2023-12-09 PROCEDURE — 99283 EMERGENCY DEPT VISIT LOW MDM: CPT | Performed by: FAMILY MEDICINE

## 2023-12-09 PROCEDURE — 80048 BASIC METABOLIC PNL TOTAL CA: CPT | Performed by: FAMILY MEDICINE

## 2023-12-09 PROCEDURE — 36415 COLL VENOUS BLD VENIPUNCTURE: CPT | Performed by: FAMILY MEDICINE

## 2023-12-09 PROCEDURE — 82077 ASSAY SPEC XCP UR&BREATH IA: CPT | Performed by: FAMILY MEDICINE

## 2023-12-09 PROCEDURE — 83605 ASSAY OF LACTIC ACID: CPT | Performed by: FAMILY MEDICINE

## 2023-12-09 PROCEDURE — 85025 COMPLETE CBC W/AUTO DIFF WBC: CPT | Performed by: FAMILY MEDICINE

## 2023-12-09 PROCEDURE — 82075 ASSAY OF BREATH ETHANOL: CPT | Performed by: FAMILY MEDICINE

## 2023-12-09 RX ORDER — LORAZEPAM 0.5 MG/1
0.5 TABLET ORAL ONCE
Status: DISCONTINUED | OUTPATIENT
Start: 2023-12-09 | End: 2023-12-09

## 2023-12-09 ASSESSMENT — ACTIVITIES OF DAILY LIVING (ADL)
ADLS_ACUITY_SCORE: 35

## 2023-12-09 ASSESSMENT — ENCOUNTER SYMPTOMS: CONFUSION: 1
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
## 2023-12-09 NOTE — ED PROVIDER NOTES
Patient was signed out to me by my colleague at shift change.  Please see original note for details on presentation.  In brief, this is a 68-year-old female with known alcohol abuse who presented overnight for evaluation of intoxication.  Initial blood alcohol level was 0.31.  Labs were otherwise reassuring.  Although she was found on the ground she had no signs of trauma.  She rested comfortably in the emergency department overnight and several hours into my shift as well.  Toward the morning she awoke and ambulated to the restroom on multiple occasions.  She felt significantly improved and asked expressed some concern about going into early alcohol withdrawal.  Breathalyzer around that time was 0.10, but she was ambulating out difficulty, fully alert/oriented, and clinic sober.  Patient endorsed some ongoing depression/anxiety issues, but and is any harm herself or others.  She tells me that she has reliable psychiatry follow-up and a visit next week.  They are reviewing her medications/prescriptions.  I do not believe suicidal, threat or self psychiatric perspective, and she will require formal psychiatric assessment today.  We were able contact  morning but ultimately he does not drive and therefore unable to pick her up.  Instead, we were able to contact a family friend who also drives cabs for living and he was able to come pick her up from the emergency department to give her a ride home.  Patient was agreeable with this plan.  She had some mild tremors around that time but was hemodynamically stable and showed no other signs of significant withdrawal.  Patient had no interest in pursuing detoxification or formal rehabilitation program, as she is waiting for an insurance policy to kick in later this month.  Advised her to decrease daily alcohol use as possible, follow-up with her primary care doctor and therapist as soon as possible for reevaluation.  Patient expressed adequate understanding of these  instructions and also agreed to return to the emergency department sooner for any new or worsening symptoms.  She was able to ambulate out of the emergency department to the vehicle without difficulty.    ED Course as of 12/09/23 1544   Sat Dec 09, 2023   0231 Patient observed being able to ambulate to the bathroom with a slightly unsteady gait as accompanied by the nursing staff but had no suggestion of pain with ambulation.   0914 Patient appears clinically sober.  She is ambulating to the restroom without difficulty.  Fully alert and oriented.  She is requesting to go home as she wants to care for her  and make breakfast.  Patient was agreeable to search for a sober ride.   0945 I ambulated around the department with the patient for 5 minutes.  She does have a breathalyzer of 0.1, but she also drinks alcohol daily.  Patient is fully alert and oriented, ambulating without any evidence of instability or concerns for falls.  She has an existing appointment with her psychiatrist next week where she will review medication prescriptions.  Although patient endorses chronic depression she adamantly denies any thoughts of harming herself or others, feels safe to go home.  Patient was able to contact a cab ( is also a personal friend) for a ride home.  Her  is also at home waiting for her.  I do believe she is safe for discharge with a sober ride at this time.     MD Zehra Matthews Alex Michael, MD  12/09/23 [id number]

## 2023-12-09 NOTE — ED TRIAGE NOTES
Left the bar was [place] parking lot, trying to kick the door down asking for help.

## 2023-12-09 NOTE — DISCHARGE INSTRUCTIONS
I do believe your symptoms were due to alcohol intoxication last night.  You are now clinically sober and I think you are safe to go home with a sober .  I advised that you gradually decrease alcohol levels as much as possible over the next several days.  Follow-up with your primary care doctor and psychology team as soon as possible to readdress medication prescriptions and hopefully facilitate outpatient alcohol rehabilitation.  Return to the emergency department immediately in the meantime for any new or worsening symptoms.

## 2023-12-11 ENCOUNTER — ANESTHESIA (OUTPATIENT)
Dept: SURGERY | Facility: CLINIC | Age: 68
DRG: 326 | End: 2023-12-11
Payer: MEDICARE

## 2023-12-11 ENCOUNTER — HOSPITAL ENCOUNTER (EMERGENCY)
Facility: CLINIC | Age: 68
Discharge: ANOTHER HOSPITAL | End: 2023-12-11
Attending: FAMILY MEDICINE | Admitting: FAMILY MEDICINE
Payer: MEDICARE

## 2023-12-11 ENCOUNTER — ANESTHESIA EVENT (OUTPATIENT)
Dept: SURGERY | Facility: CLINIC | Age: 68
DRG: 326 | End: 2023-12-11
Payer: MEDICARE

## 2023-12-11 ENCOUNTER — APPOINTMENT (OUTPATIENT)
Dept: CT IMAGING | Facility: CLINIC | Age: 68
End: 2023-12-11
Attending: FAMILY MEDICINE

## 2023-12-11 ENCOUNTER — APPOINTMENT (OUTPATIENT)
Dept: GENERAL RADIOLOGY | Facility: CLINIC | Age: 68
DRG: 326 | End: 2023-12-11
Attending: SURGERY
Payer: MEDICARE

## 2023-12-11 ENCOUNTER — HOSPITAL ENCOUNTER (INPATIENT)
Facility: CLINIC | Age: 68
LOS: 10 days | Discharge: HOME | DRG: 326 | End: 2023-12-21
Attending: SURGERY | Admitting: SURGERY
Payer: MEDICARE

## 2023-12-11 VITALS
SYSTOLIC BLOOD PRESSURE: 110 MMHG | BODY MASS INDEX: 17.16 KG/M2 | HEART RATE: 102 BPM | DIASTOLIC BLOOD PRESSURE: 75 MMHG | TEMPERATURE: 100 F | OXYGEN SATURATION: 97 % | WEIGHT: 100 LBS | RESPIRATION RATE: 20 BRPM

## 2023-12-11 DIAGNOSIS — K28.9 GASTROJEJUNAL ULCER: ICD-10-CM

## 2023-12-11 DIAGNOSIS — D50.8 OTHER IRON DEFICIENCY ANEMIA: ICD-10-CM

## 2023-12-11 DIAGNOSIS — R10.84 ABDOMINAL PAIN, GENERALIZED: ICD-10-CM

## 2023-12-11 DIAGNOSIS — F10.20 ALCOHOL USE DISORDER, SEVERE, DEPENDENCE (H): ICD-10-CM

## 2023-12-11 DIAGNOSIS — F41.9 ANXIETY: ICD-10-CM

## 2023-12-11 DIAGNOSIS — F10.939 ALCOHOL WITHDRAWAL SYNDROME WITH COMPLICATION (H): ICD-10-CM

## 2023-12-11 DIAGNOSIS — M81.0 AGE-RELATED OSTEOPOROSIS WITHOUT CURRENT PATHOLOGICAL FRACTURE: ICD-10-CM

## 2023-12-11 DIAGNOSIS — G89.18 POSTOPERATIVE PAIN: ICD-10-CM

## 2023-12-11 DIAGNOSIS — F33.9 EPISODE OF RECURRENT MAJOR DEPRESSIVE DISORDER, UNSPECIFIED DEPRESSION EPISODE SEVERITY (H): ICD-10-CM

## 2023-12-11 DIAGNOSIS — D75.89 MACROCYTOSIS WITHOUT ANEMIA: ICD-10-CM

## 2023-12-11 DIAGNOSIS — K28.9 MARGINAL ULCER: Primary | ICD-10-CM

## 2023-12-11 DIAGNOSIS — K63.1 PERFORATION OF INTESTINE (H): ICD-10-CM

## 2023-12-11 LAB
ABO/RH(D): NORMAL
ALBUMIN SERPL BCG-MCNC: 3.1 G/DL (ref 3.5–5.2)
ALBUMIN UR-MCNC: NEGATIVE MG/DL
ALP SERPL-CCNC: 175 U/L (ref 40–150)
ALT SERPL W P-5'-P-CCNC: 6 U/L (ref 0–50)
ANION GAP SERPL CALCULATED.3IONS-SCNC: 15 MMOL/L (ref 7–15)
ANTIBODY SCREEN: NEGATIVE
APPEARANCE UR: CLEAR
APTT PPP: 28 SECONDS (ref 22–38)
AST SERPL W P-5'-P-CCNC: 16 U/L (ref 0–45)
BASOPHILS # BLD AUTO: 0.1 10E3/UL (ref 0–0.2)
BASOPHILS NFR BLD AUTO: 1 %
BILIRUB SERPL-MCNC: 1.6 MG/DL
BILIRUB UR QL STRIP: NEGATIVE
BUN SERPL-MCNC: 8.7 MG/DL (ref 8–23)
CALCIUM SERPL-MCNC: 8.6 MG/DL (ref 8.8–10.2)
CHLORIDE SERPL-SCNC: 102 MMOL/L (ref 98–107)
COLOR UR AUTO: YELLOW
CREAT SERPL-MCNC: 0.69 MG/DL (ref 0.51–0.95)
CRP SERPL-MCNC: 8.32 MG/L
DEPRECATED HCO3 PLAS-SCNC: 18 MMOL/L (ref 22–29)
EGFRCR SERPLBLD CKD-EPI 2021: >90 ML/MIN/1.73M2
EOSINOPHIL # BLD AUTO: 0 10E3/UL (ref 0–0.7)
EOSINOPHIL NFR BLD AUTO: 0 %
ERYTHROCYTE [DISTWIDTH] IN BLOOD BY AUTOMATED COUNT: 14.2 % (ref 10–15)
ETHANOL SERPL-MCNC: <0.01 G/DL
GLUCOSE SERPL-MCNC: 115 MG/DL (ref 70–99)
GLUCOSE UR STRIP-MCNC: NEGATIVE MG/DL
HCT VFR BLD AUTO: 35 % (ref 35–47)
HGB BLD-MCNC: 11.6 G/DL (ref 11.7–15.7)
HGB UR QL STRIP: NEGATIVE
HYALINE CASTS: 3 /LPF
IMM GRANULOCYTES # BLD: 0 10E3/UL
IMM GRANULOCYTES NFR BLD: 0 %
INR PPP: 1.11 (ref 0.85–1.15)
KETONES UR STRIP-MCNC: NEGATIVE MG/DL
LACTATE SERPL-SCNC: 1.8 MMOL/L (ref 0.7–2)
LEUKOCYTE ESTERASE UR QL STRIP: ABNORMAL
LIPASE SERPL-CCNC: 65 U/L (ref 13–60)
LYMPHOCYTES # BLD AUTO: 2.7 10E3/UL (ref 0.8–5.3)
LYMPHOCYTES NFR BLD AUTO: 30 %
MAGNESIUM SERPL-MCNC: 1.4 MG/DL (ref 1.7–2.3)
MAGNESIUM SERPL-MCNC: 1.5 MG/DL (ref 1.7–2.3)
MCH RBC QN AUTO: 30.4 PG (ref 26.5–33)
MCHC RBC AUTO-ENTMCNC: 33.1 G/DL (ref 31.5–36.5)
MCV RBC AUTO: 92 FL (ref 78–100)
MONOCYTES # BLD AUTO: 0.8 10E3/UL (ref 0–1.3)
MONOCYTES NFR BLD AUTO: 9 %
MUCOUS THREADS #/AREA URNS LPF: PRESENT /LPF
NEUTROPHILS # BLD AUTO: 5.4 10E3/UL (ref 1.6–8.3)
NEUTROPHILS NFR BLD AUTO: 60 %
NITRATE UR QL: NEGATIVE
NRBC # BLD AUTO: 0 10E3/UL
NRBC BLD AUTO-RTO: 0 /100
PH UR STRIP: 5 [PH] (ref 5–7)
PHOSPHATE SERPL-MCNC: 3.2 MG/DL (ref 2.5–4.5)
PHOSPHATE SERPL-MCNC: 3.5 MG/DL (ref 2.5–4.5)
PLATELET # BLD AUTO: 460 10E3/UL (ref 150–450)
POTASSIUM SERPL-SCNC: 4 MMOL/L (ref 3.4–5.3)
PROT SERPL-MCNC: 7 G/DL (ref 6.4–8.3)
RBC # BLD AUTO: 3.82 10E6/UL (ref 3.8–5.2)
RBC URINE: 3 /HPF
SODIUM SERPL-SCNC: 135 MMOL/L (ref 135–145)
SP GR UR STRIP: 1.02 (ref 1–1.03)
SPECIMEN EXPIRATION DATE: NORMAL
SQUAMOUS EPITHELIAL: 2 /HPF
UROBILINOGEN UR STRIP-MCNC: NORMAL MG/DL
WBC # BLD AUTO: 9 10E3/UL (ref 4–11)
WBC URINE: 4 /HPF

## 2023-12-11 PROCEDURE — 82077 ASSAY SPEC XCP UR&BREATH IA: CPT | Performed by: FAMILY MEDICINE

## 2023-12-11 PROCEDURE — 85025 COMPLETE CBC W/AUTO DIFF WBC: CPT | Performed by: FAMILY MEDICINE

## 2023-12-11 PROCEDURE — 250N000009 HC RX 250: Performed by: NURSE ANESTHETIST, CERTIFIED REGISTERED

## 2023-12-11 PROCEDURE — 96365 THER/PROPH/DIAG IV INF INIT: CPT | Mod: 59

## 2023-12-11 PROCEDURE — 120N000001 HC R&B MED SURG/OB

## 2023-12-11 PROCEDURE — 258N000003 HC RX IP 258 OP 636: Performed by: PHYSICIAN ASSISTANT

## 2023-12-11 PROCEDURE — 80053 COMPREHEN METABOLIC PANEL: CPT | Performed by: FAMILY MEDICINE

## 2023-12-11 PROCEDURE — 258N000003 HC RX IP 258 OP 636: Performed by: STUDENT IN AN ORGANIZED HEALTH CARE EDUCATION/TRAINING PROGRAM

## 2023-12-11 PROCEDURE — 86901 BLOOD TYPING SEROLOGIC RH(D): CPT | Performed by: STUDENT IN AN ORGANIZED HEALTH CARE EDUCATION/TRAINING PROGRAM

## 2023-12-11 PROCEDURE — 250N000011 HC RX IP 250 OP 636: Performed by: SURGERY

## 2023-12-11 PROCEDURE — 0DUU07Z SUPPLEMENT OMENTUM WITH AUTOLOGOUS TISSUE SUBSTITUTE, OPEN APPROACH: ICD-10-PCS | Performed by: SURGERY

## 2023-12-11 PROCEDURE — 96367 TX/PROPH/DG ADDL SEQ IV INF: CPT

## 2023-12-11 PROCEDURE — 258N000003 HC RX IP 258 OP 636: Performed by: NURSE ANESTHETIST, CERTIFIED REGISTERED

## 2023-12-11 PROCEDURE — 250N000011 HC RX IP 250 OP 636: Performed by: NURSE ANESTHETIST, CERTIFIED REGISTERED

## 2023-12-11 PROCEDURE — 96366 THER/PROPH/DIAG IV INF ADDON: CPT

## 2023-12-11 PROCEDURE — 250N000011 HC RX IP 250 OP 636: Performed by: STUDENT IN AN ORGANIZED HEALTH CARE EDUCATION/TRAINING PROGRAM

## 2023-12-11 PROCEDURE — 81001 URINALYSIS AUTO W/SCOPE: CPT | Performed by: FAMILY MEDICINE

## 2023-12-11 PROCEDURE — 86140 C-REACTIVE PROTEIN: CPT | Performed by: FAMILY MEDICINE

## 2023-12-11 PROCEDURE — 84100 ASSAY OF PHOSPHORUS: CPT | Performed by: PHYSICIAN ASSISTANT

## 2023-12-11 PROCEDURE — P9045 ALBUMIN (HUMAN), 5%, 250 ML: HCPCS | Mod: JZ | Performed by: NURSE ANESTHETIST, CERTIFIED REGISTERED

## 2023-12-11 PROCEDURE — 83735 ASSAY OF MAGNESIUM: CPT | Performed by: PHYSICIAN ASSISTANT

## 2023-12-11 PROCEDURE — 258N000001 HC RX 258: Performed by: SURGERY

## 2023-12-11 PROCEDURE — 710N000009 HC RECOVERY PHASE 1, LEVEL 1, PER MIN: Performed by: SURGERY

## 2023-12-11 PROCEDURE — 250N000011 HC RX IP 250 OP 636: Performed by: PHYSICIAN ASSISTANT

## 2023-12-11 PROCEDURE — 96375 TX/PRO/DX INJ NEW DRUG ADDON: CPT

## 2023-12-11 PROCEDURE — 83690 ASSAY OF LIPASE: CPT | Performed by: FAMILY MEDICINE

## 2023-12-11 PROCEDURE — 360N000076 HC SURGERY LEVEL 3, PER MIN: Performed by: SURGERY

## 2023-12-11 PROCEDURE — 250N000009 HC RX 250: Performed by: SURGERY

## 2023-12-11 PROCEDURE — 272N000001 HC OR GENERAL SUPPLY STERILE: Performed by: SURGERY

## 2023-12-11 PROCEDURE — 99291 CRITICAL CARE FIRST HOUR: CPT | Performed by: FAMILY MEDICINE

## 2023-12-11 PROCEDURE — 370N000017 HC ANESTHESIA TECHNICAL FEE, PER MIN: Performed by: SURGERY

## 2023-12-11 PROCEDURE — 250N000009 HC RX 250: Performed by: FAMILY MEDICINE

## 2023-12-11 PROCEDURE — 36415 COLL VENOUS BLD VENIPUNCTURE: CPT

## 2023-12-11 PROCEDURE — 99254 IP/OBS CNSLTJ NEW/EST MOD 60: CPT | Mod: 57 | Performed by: SURGERY

## 2023-12-11 PROCEDURE — 250N000011 HC RX IP 250 OP 636: Mod: JZ | Performed by: NURSE ANESTHETIST, CERTIFIED REGISTERED

## 2023-12-11 PROCEDURE — 36415 COLL VENOUS BLD VENIPUNCTURE: CPT | Performed by: PHYSICIAN ASSISTANT

## 2023-12-11 PROCEDURE — 258N000003 HC RX IP 258 OP 636: Performed by: FAMILY MEDICINE

## 2023-12-11 PROCEDURE — 250N000025 HC SEVOFLURANE, PER MIN: Performed by: SURGERY

## 2023-12-11 PROCEDURE — 85730 THROMBOPLASTIN TIME PARTIAL: CPT | Performed by: PHYSICIAN ASSISTANT

## 2023-12-11 PROCEDURE — 96376 TX/PRO/DX INJ SAME DRUG ADON: CPT

## 2023-12-11 PROCEDURE — 999N000141 HC STATISTIC PRE-PROCEDURE NURSING ASSESSMENT: Performed by: SURGERY

## 2023-12-11 PROCEDURE — 250N000013 HC RX MED GY IP 250 OP 250 PS 637: Performed by: FAMILY MEDICINE

## 2023-12-11 PROCEDURE — 250N000011 HC RX IP 250 OP 636: Mod: JZ | Performed by: STUDENT IN AN ORGANIZED HEALTH CARE EDUCATION/TRAINING PROGRAM

## 2023-12-11 PROCEDURE — 43659 UNLISTED LAPS PX STOMACH: CPT | Mod: GC | Performed by: SURGERY

## 2023-12-11 PROCEDURE — 0DQA0ZZ REPAIR JEJUNUM, OPEN APPROACH: ICD-10-PCS | Performed by: SURGERY

## 2023-12-11 PROCEDURE — 74177 CT ABD & PELVIS W/CONTRAST: CPT | Mod: MA

## 2023-12-11 PROCEDURE — 86850 RBC ANTIBODY SCREEN: CPT | Performed by: STUDENT IN AN ORGANIZED HEALTH CARE EDUCATION/TRAINING PROGRAM

## 2023-12-11 PROCEDURE — 83735 ASSAY OF MAGNESIUM: CPT | Performed by: FAMILY MEDICINE

## 2023-12-11 PROCEDURE — 96361 HYDRATE IV INFUSION ADD-ON: CPT

## 2023-12-11 PROCEDURE — 85610 PROTHROMBIN TIME: CPT | Performed by: PHYSICIAN ASSISTANT

## 2023-12-11 PROCEDURE — 250N000011 HC RX IP 250 OP 636: Performed by: FAMILY MEDICINE

## 2023-12-11 PROCEDURE — 99285 EMERGENCY DEPT VISIT HI MDM: CPT | Mod: 25

## 2023-12-11 PROCEDURE — 99223 1ST HOSP IP/OBS HIGH 75: CPT | Performed by: PHYSICIAN ASSISTANT

## 2023-12-11 PROCEDURE — 83605 ASSAY OF LACTIC ACID: CPT | Performed by: FAMILY MEDICINE

## 2023-12-11 PROCEDURE — 84100 ASSAY OF PHOSPHORUS: CPT

## 2023-12-11 PROCEDURE — 36415 COLL VENOUS BLD VENIPUNCTURE: CPT | Performed by: FAMILY MEDICINE

## 2023-12-11 PROCEDURE — 999N000065 XR ABDOMEN 1 VIEW

## 2023-12-11 PROCEDURE — 250N000011 HC RX IP 250 OP 636: Performed by: EMERGENCY MEDICINE

## 2023-12-11 RX ORDER — CALCIUM CARBONATE 500 MG/1
1000 TABLET, CHEWABLE ORAL 4 TIMES DAILY PRN
Status: DISCONTINUED | OUTPATIENT
Start: 2023-12-11 | End: 2023-12-21 | Stop reason: HOSPADM

## 2023-12-11 RX ORDER — LIDOCAINE 40 MG/G
CREAM TOPICAL
Status: DISCONTINUED | OUTPATIENT
Start: 2023-12-11 | End: 2023-12-21 | Stop reason: HOSPADM

## 2023-12-11 RX ORDER — HYDROMORPHONE HYDROCHLORIDE 1 MG/ML
0.5 INJECTION, SOLUTION INTRAMUSCULAR; INTRAVENOUS; SUBCUTANEOUS
Status: COMPLETED | OUTPATIENT
Start: 2023-12-11 | End: 2023-12-11

## 2023-12-11 RX ORDER — HALOPERIDOL 5 MG/ML
2.5-5 INJECTION INTRAMUSCULAR EVERY 6 HOURS PRN
Status: DISCONTINUED | OUTPATIENT
Start: 2023-12-11 | End: 2023-12-11 | Stop reason: HOSPADM

## 2023-12-11 RX ORDER — SODIUM CHLORIDE, SODIUM LACTATE, POTASSIUM CHLORIDE, CALCIUM CHLORIDE 600; 310; 30; 20 MG/100ML; MG/100ML; MG/100ML; MG/100ML
INJECTION, SOLUTION INTRAVENOUS CONTINUOUS PRN
Status: DISCONTINUED | OUTPATIENT
Start: 2023-12-11 | End: 2023-12-11

## 2023-12-11 RX ORDER — AMOXICILLIN 250 MG
2 CAPSULE ORAL 2 TIMES DAILY
Status: DISCONTINUED | OUTPATIENT
Start: 2023-12-11 | End: 2023-12-12

## 2023-12-11 RX ORDER — PROPOFOL 10 MG/ML
INJECTION, EMULSION INTRAVENOUS PRN
Status: DISCONTINUED | OUTPATIENT
Start: 2023-12-11 | End: 2023-12-11

## 2023-12-11 RX ORDER — MULTIPLE VITAMINS W/ MINERALS TAB 9MG-400MCG
1 TAB ORAL DAILY
Status: DISCONTINUED | OUTPATIENT
Start: 2023-12-12 | End: 2023-12-11 | Stop reason: HOSPADM

## 2023-12-11 RX ORDER — LORAZEPAM 1 MG/1
1-2 TABLET ORAL EVERY 30 MIN PRN
Status: DISCONTINUED | OUTPATIENT
Start: 2023-12-11 | End: 2023-12-15

## 2023-12-11 RX ORDER — HYDROMORPHONE HCL IN WATER/PF 6 MG/30 ML
0.2 PATIENT CONTROLLED ANALGESIA SYRINGE INTRAVENOUS
Status: DISCONTINUED | OUTPATIENT
Start: 2023-12-11 | End: 2023-12-21 | Stop reason: HOSPADM

## 2023-12-11 RX ORDER — HALOPERIDOL 5 MG/ML
2 INJECTION INTRAMUSCULAR ONCE
Status: COMPLETED | OUTPATIENT
Start: 2023-12-11 | End: 2023-12-11

## 2023-12-11 RX ORDER — NALOXONE HYDROCHLORIDE 0.4 MG/ML
0.4 INJECTION, SOLUTION INTRAMUSCULAR; INTRAVENOUS; SUBCUTANEOUS
Status: DISCONTINUED | OUTPATIENT
Start: 2023-12-11 | End: 2023-12-21 | Stop reason: HOSPADM

## 2023-12-11 RX ORDER — OXYCODONE HYDROCHLORIDE 5 MG/1
5 TABLET ORAL EVERY 4 HOURS PRN
Status: DISCONTINUED | OUTPATIENT
Start: 2023-12-11 | End: 2023-12-11

## 2023-12-11 RX ORDER — ONDANSETRON 2 MG/ML
INJECTION INTRAMUSCULAR; INTRAVENOUS PRN
Status: DISCONTINUED | OUTPATIENT
Start: 2023-12-11 | End: 2023-12-11

## 2023-12-11 RX ORDER — HYDROMORPHONE HCL IN WATER/PF 6 MG/30 ML
0.2 PATIENT CONTROLLED ANALGESIA SYRINGE INTRAVENOUS EVERY 5 MIN PRN
Status: DISCONTINUED | OUTPATIENT
Start: 2023-12-11 | End: 2023-12-11 | Stop reason: HOSPADM

## 2023-12-11 RX ORDER — BUPIVACAINE HYDROCHLORIDE AND EPINEPHRINE 2.5; 5 MG/ML; UG/ML
INJECTION, SOLUTION INFILTRATION; PERINEURAL PRN
Status: DISCONTINUED | OUTPATIENT
Start: 2023-12-11 | End: 2023-12-11 | Stop reason: HOSPADM

## 2023-12-11 RX ORDER — FLUCONAZOLE 2 MG/ML
400 INJECTION, SOLUTION INTRAVENOUS EVERY 24 HOURS
Status: DISCONTINUED | OUTPATIENT
Start: 2023-12-11 | End: 2023-12-14

## 2023-12-11 RX ORDER — LORAZEPAM 1 MG/1
1-2 TABLET ORAL EVERY 30 MIN PRN
Status: DISCONTINUED | OUTPATIENT
Start: 2023-12-11 | End: 2023-12-11 | Stop reason: HOSPADM

## 2023-12-11 RX ORDER — FLUMAZENIL 0.1 MG/ML
0.2 INJECTION, SOLUTION INTRAVENOUS
Status: DISCONTINUED | OUTPATIENT
Start: 2023-12-11 | End: 2023-12-15

## 2023-12-11 RX ORDER — PROCHLORPERAZINE MALEATE 5 MG
5 TABLET ORAL EVERY 6 HOURS PRN
Status: DISCONTINUED | OUTPATIENT
Start: 2023-12-11 | End: 2023-12-21 | Stop reason: HOSPADM

## 2023-12-11 RX ORDER — CEFAZOLIN SODIUM/WATER 2 G/20 ML
2 SYRINGE (ML) INTRAVENOUS SEE ADMIN INSTRUCTIONS
Status: DISCONTINUED | OUTPATIENT
Start: 2023-12-11 | End: 2023-12-11 | Stop reason: HOSPADM

## 2023-12-11 RX ORDER — AMOXICILLIN 250 MG
1 CAPSULE ORAL 2 TIMES DAILY
Status: DISCONTINUED | OUTPATIENT
Start: 2023-12-11 | End: 2023-12-12

## 2023-12-11 RX ORDER — FLUMAZENIL 0.1 MG/ML
0.2 INJECTION, SOLUTION INTRAVENOUS
Status: DISCONTINUED | OUTPATIENT
Start: 2023-12-11 | End: 2023-12-11 | Stop reason: HOSPADM

## 2023-12-11 RX ORDER — HALOPERIDOL 5 MG/ML
2.5-5 INJECTION INTRAMUSCULAR EVERY 6 HOURS PRN
Status: DISCONTINUED | OUTPATIENT
Start: 2023-12-11 | End: 2023-12-21 | Stop reason: HOSPADM

## 2023-12-11 RX ORDER — HEPARIN SODIUM 5000 [USP'U]/.5ML
5000 INJECTION, SOLUTION INTRAVENOUS; SUBCUTANEOUS
Status: COMPLETED | OUTPATIENT
Start: 2023-12-11 | End: 2023-12-11

## 2023-12-11 RX ORDER — ONDANSETRON 2 MG/ML
4 INJECTION INTRAMUSCULAR; INTRAVENOUS EVERY 6 HOURS PRN
Status: DISCONTINUED | OUTPATIENT
Start: 2023-12-11 | End: 2023-12-21 | Stop reason: HOSPADM

## 2023-12-11 RX ORDER — FOLIC ACID 1 MG/1
1 TABLET ORAL DAILY
Status: DISCONTINUED | OUTPATIENT
Start: 2023-12-12 | End: 2023-12-11 | Stop reason: HOSPADM

## 2023-12-11 RX ORDER — MAGNESIUM HYDROXIDE 1200 MG/15ML
LIQUID ORAL PRN
Status: DISCONTINUED | OUTPATIENT
Start: 2023-12-11 | End: 2023-12-11 | Stop reason: HOSPADM

## 2023-12-11 RX ORDER — CEFAZOLIN SODIUM/WATER 2 G/20 ML
2 SYRINGE (ML) INTRAVENOUS
Status: COMPLETED | OUTPATIENT
Start: 2023-12-11 | End: 2023-12-11

## 2023-12-11 RX ORDER — FENTANYL CITRATE 50 UG/ML
INJECTION, SOLUTION INTRAMUSCULAR; INTRAVENOUS PRN
Status: DISCONTINUED | OUTPATIENT
Start: 2023-12-11 | End: 2023-12-11

## 2023-12-11 RX ORDER — FENTANYL CITRATE 0.05 MG/ML
25 INJECTION, SOLUTION INTRAMUSCULAR; INTRAVENOUS EVERY 5 MIN PRN
Status: DISCONTINUED | OUTPATIENT
Start: 2023-12-11 | End: 2023-12-11 | Stop reason: HOSPADM

## 2023-12-11 RX ORDER — PIPERACILLIN SODIUM, TAZOBACTAM SODIUM 3; .375 G/15ML; G/15ML
3.38 INJECTION, POWDER, LYOPHILIZED, FOR SOLUTION INTRAVENOUS EVERY 6 HOURS
Status: DISCONTINUED | OUTPATIENT
Start: 2023-12-11 | End: 2023-12-11 | Stop reason: HOSPADM

## 2023-12-11 RX ORDER — PIPERACILLIN SODIUM, TAZOBACTAM SODIUM 3; .375 G/15ML; G/15ML
3.38 INJECTION, POWDER, LYOPHILIZED, FOR SOLUTION INTRAVENOUS EVERY 6 HOURS
Status: DISCONTINUED | OUTPATIENT
Start: 2023-12-11 | End: 2023-12-17

## 2023-12-11 RX ORDER — DEXMEDETOMIDINE HYDROCHLORIDE 4 UG/ML
INJECTION, SOLUTION INTRAVENOUS PRN
Status: DISCONTINUED | OUTPATIENT
Start: 2023-12-11 | End: 2023-12-11

## 2023-12-11 RX ORDER — MAGNESIUM SULFATE HEPTAHYDRATE 40 MG/ML
4 INJECTION, SOLUTION INTRAVENOUS ONCE
Status: COMPLETED | OUTPATIENT
Start: 2023-12-11 | End: 2023-12-11

## 2023-12-11 RX ORDER — LIDOCAINE HYDROCHLORIDE 20 MG/ML
INJECTION, SOLUTION INFILTRATION; PERINEURAL PRN
Status: DISCONTINUED | OUTPATIENT
Start: 2023-12-11 | End: 2023-12-11

## 2023-12-11 RX ORDER — HYDROMORPHONE HCL IN WATER/PF 6 MG/30 ML
0.4 PATIENT CONTROLLED ANALGESIA SYRINGE INTRAVENOUS
Status: DISCONTINUED | OUTPATIENT
Start: 2023-12-11 | End: 2023-12-21 | Stop reason: HOSPADM

## 2023-12-11 RX ORDER — NALOXONE HYDROCHLORIDE 0.4 MG/ML
0.2 INJECTION, SOLUTION INTRAMUSCULAR; INTRAVENOUS; SUBCUTANEOUS
Status: DISCONTINUED | OUTPATIENT
Start: 2023-12-11 | End: 2023-12-21 | Stop reason: HOSPADM

## 2023-12-11 RX ORDER — LORAZEPAM 2 MG/ML
1-2 INJECTION INTRAMUSCULAR EVERY 30 MIN PRN
Status: DISCONTINUED | OUTPATIENT
Start: 2023-12-11 | End: 2023-12-15

## 2023-12-11 RX ORDER — SODIUM CHLORIDE, SODIUM LACTATE, POTASSIUM CHLORIDE, CALCIUM CHLORIDE 600; 310; 30; 20 MG/100ML; MG/100ML; MG/100ML; MG/100ML
INJECTION, SOLUTION INTRAVENOUS CONTINUOUS
Status: DISCONTINUED | OUTPATIENT
Start: 2023-12-11 | End: 2023-12-11 | Stop reason: HOSPADM

## 2023-12-11 RX ORDER — OLANZAPINE 5 MG/1
5-10 TABLET, ORALLY DISINTEGRATING ORAL EVERY 6 HOURS PRN
Status: DISCONTINUED | OUTPATIENT
Start: 2023-12-11 | End: 2023-12-11 | Stop reason: HOSPADM

## 2023-12-11 RX ORDER — HYDROMORPHONE HYDROCHLORIDE 1 MG/ML
0.5 INJECTION, SOLUTION INTRAMUSCULAR; INTRAVENOUS; SUBCUTANEOUS EVERY 30 MIN PRN
Status: DISCONTINUED | OUTPATIENT
Start: 2023-12-11 | End: 2023-12-11 | Stop reason: HOSPADM

## 2023-12-11 RX ORDER — ONDANSETRON 4 MG/1
4 TABLET, ORALLY DISINTEGRATING ORAL EVERY 6 HOURS PRN
Status: DISCONTINUED | OUTPATIENT
Start: 2023-12-11 | End: 2023-12-21 | Stop reason: HOSPADM

## 2023-12-11 RX ORDER — ONDANSETRON 4 MG/1
4 TABLET, ORALLY DISINTEGRATING ORAL EVERY 30 MIN PRN
Status: DISCONTINUED | OUTPATIENT
Start: 2023-12-11 | End: 2023-12-11 | Stop reason: HOSPADM

## 2023-12-11 RX ORDER — HYDROMORPHONE HCL IN WATER/PF 6 MG/30 ML
0.4 PATIENT CONTROLLED ANALGESIA SYRINGE INTRAVENOUS EVERY 5 MIN PRN
Status: DISCONTINUED | OUTPATIENT
Start: 2023-12-11 | End: 2023-12-11 | Stop reason: HOSPADM

## 2023-12-11 RX ORDER — PROCHLORPERAZINE 25 MG
12.5 SUPPOSITORY, RECTAL RECTAL EVERY 12 HOURS PRN
Status: DISCONTINUED | OUTPATIENT
Start: 2023-12-11 | End: 2023-12-21 | Stop reason: HOSPADM

## 2023-12-11 RX ORDER — ONDANSETRON 2 MG/ML
4 INJECTION INTRAMUSCULAR; INTRAVENOUS EVERY 30 MIN PRN
Status: DISCONTINUED | OUTPATIENT
Start: 2023-12-11 | End: 2023-12-11 | Stop reason: HOSPADM

## 2023-12-11 RX ORDER — LORAZEPAM 2 MG/ML
1-2 INJECTION INTRAMUSCULAR EVERY 30 MIN PRN
Status: DISCONTINUED | OUTPATIENT
Start: 2023-12-11 | End: 2023-12-11 | Stop reason: HOSPADM

## 2023-12-11 RX ORDER — OLANZAPINE 5 MG/1
5-10 TABLET, ORALLY DISINTEGRATING ORAL EVERY 6 HOURS PRN
Status: DISCONTINUED | OUTPATIENT
Start: 2023-12-11 | End: 2023-12-21 | Stop reason: HOSPADM

## 2023-12-11 RX ORDER — SODIUM CHLORIDE, SODIUM LACTATE, POTASSIUM CHLORIDE, CALCIUM CHLORIDE 600; 310; 30; 20 MG/100ML; MG/100ML; MG/100ML; MG/100ML
INJECTION, SOLUTION INTRAVENOUS CONTINUOUS
Status: DISCONTINUED | OUTPATIENT
Start: 2023-12-11 | End: 2023-12-15

## 2023-12-11 RX ORDER — FENTANYL CITRATE 0.05 MG/ML
50 INJECTION, SOLUTION INTRAMUSCULAR; INTRAVENOUS EVERY 5 MIN PRN
Status: DISCONTINUED | OUTPATIENT
Start: 2023-12-11 | End: 2023-12-11 | Stop reason: HOSPADM

## 2023-12-11 RX ORDER — PIPERACILLIN SODIUM, TAZOBACTAM SODIUM 4; .5 G/20ML; G/20ML
4.5 INJECTION, POWDER, LYOPHILIZED, FOR SOLUTION INTRAVENOUS ONCE
Status: DISCONTINUED | OUTPATIENT
Start: 2023-12-11 | End: 2023-12-11

## 2023-12-11 RX ORDER — DEXAMETHASONE SODIUM PHOSPHATE 4 MG/ML
INJECTION, SOLUTION INTRA-ARTICULAR; INTRALESIONAL; INTRAMUSCULAR; INTRAVENOUS; SOFT TISSUE PRN
Status: DISCONTINUED | OUTPATIENT
Start: 2023-12-11 | End: 2023-12-11

## 2023-12-11 RX ORDER — IOPAMIDOL 755 MG/ML
500 INJECTION, SOLUTION INTRAVASCULAR ONCE
Status: COMPLETED | OUTPATIENT
Start: 2023-12-11 | End: 2023-12-11

## 2023-12-11 RX ORDER — CLONIDINE HYDROCHLORIDE 0.1 MG/1
0.1 TABLET ORAL EVERY 8 HOURS
Status: DISCONTINUED | OUTPATIENT
Start: 2023-12-11 | End: 2023-12-11 | Stop reason: HOSPADM

## 2023-12-11 RX ADMIN — FLUCONAZOLE 400 MG: 400 INJECTION, SOLUTION INTRAVENOUS at 23:11

## 2023-12-11 RX ADMIN — HEPARIN SODIUM 5000 UNITS: 5000 INJECTION, SOLUTION INTRAVENOUS; SUBCUTANEOUS at 15:30

## 2023-12-11 RX ADMIN — PHENYLEPHRINE HYDROCHLORIDE 0.4 MCG/KG/MIN: 10 INJECTION INTRAVENOUS at 16:06

## 2023-12-11 RX ADMIN — MAGNESIUM SULFATE IN WATER 4 G: 40 INJECTION, SOLUTION INTRAVENOUS at 15:11

## 2023-12-11 RX ADMIN — HYDROMORPHONE HYDROCHLORIDE 0.5 MG: 1 INJECTION, SOLUTION INTRAMUSCULAR; INTRAVENOUS; SUBCUTANEOUS at 10:37

## 2023-12-11 RX ADMIN — HYDROMORPHONE HYDROCHLORIDE 0.5 MG: 1 INJECTION, SOLUTION INTRAMUSCULAR; INTRAVENOUS; SUBCUTANEOUS at 05:30

## 2023-12-11 RX ADMIN — SODIUM CHLORIDE 60 ML: 9 INJECTION, SOLUTION INTRAVENOUS at 06:29

## 2023-12-11 RX ADMIN — LIDOCAINE HYDROCHLORIDE 40 MG: 20 INJECTION, SOLUTION INFILTRATION; PERINEURAL at 15:54

## 2023-12-11 RX ADMIN — DEXAMETHASONE SODIUM PHOSPHATE 4 MG: 4 INJECTION, SOLUTION INTRA-ARTICULAR; INTRALESIONAL; INTRAMUSCULAR; INTRAVENOUS; SOFT TISSUE at 16:10

## 2023-12-11 RX ADMIN — SODIUM CHLORIDE, POTASSIUM CHLORIDE, SODIUM LACTATE AND CALCIUM CHLORIDE: 600; 310; 30; 20 INJECTION, SOLUTION INTRAVENOUS at 15:48

## 2023-12-11 RX ADMIN — HALOPERIDOL LACTATE 2 MG: 5 INJECTION, SOLUTION INTRAMUSCULAR at 05:00

## 2023-12-11 RX ADMIN — HYDROMORPHONE HYDROCHLORIDE 1 MG: 1 INJECTION, SOLUTION INTRAMUSCULAR; INTRAVENOUS; SUBCUTANEOUS at 07:36

## 2023-12-11 RX ADMIN — CLONIDINE HYDROCHLORIDE 0.1 MG: 0.1 TABLET ORAL at 05:52

## 2023-12-11 RX ADMIN — PROPOFOL 120 MG: 10 INJECTION, EMULSION INTRAVENOUS at 15:54

## 2023-12-11 RX ADMIN — ALBUMIN HUMAN: 0.05 INJECTION, SOLUTION INTRAVENOUS at 16:05

## 2023-12-11 RX ADMIN — ALBUMIN HUMAN: 0.05 INJECTION, SOLUTION INTRAVENOUS at 16:15

## 2023-12-11 RX ADMIN — FENTANYL CITRATE 50 MCG: 50 INJECTION INTRAMUSCULAR; INTRAVENOUS at 16:11

## 2023-12-11 RX ADMIN — PIPERACILLIN AND TAZOBACTAM 3.38 G: 3; .375 INJECTION, POWDER, FOR SOLUTION INTRAVENOUS at 07:42

## 2023-12-11 RX ADMIN — MIDAZOLAM 2 MG: 1 INJECTION INTRAMUSCULAR; INTRAVENOUS at 15:48

## 2023-12-11 RX ADMIN — PIPERACILLIN AND TAZOBACTAM 3.38 G: 3; .375 INJECTION, POWDER, FOR SOLUTION INTRAVENOUS at 15:10

## 2023-12-11 RX ADMIN — PIPERACILLIN AND TAZOBACTAM 3.38 G: 3; .375 INJECTION, POWDER, FOR SOLUTION INTRAVENOUS at 21:29

## 2023-12-11 RX ADMIN — ROCURONIUM BROMIDE 50 MG: 50 INJECTION, SOLUTION INTRAVENOUS at 15:54

## 2023-12-11 RX ADMIN — IOPAMIDOL 50 ML: 755 INJECTION, SOLUTION INTRAVENOUS at 06:29

## 2023-12-11 RX ADMIN — THIAMINE HYDROCHLORIDE: 100 INJECTION, SOLUTION INTRAMUSCULAR; INTRAVENOUS at 05:54

## 2023-12-11 RX ADMIN — HYDROMORPHONE HYDROCHLORIDE 0.2 MG: 0.2 INJECTION, SOLUTION INTRAMUSCULAR; INTRAVENOUS; SUBCUTANEOUS at 22:18

## 2023-12-11 RX ADMIN — SUGAMMADEX 100 MG: 100 INJECTION, SOLUTION INTRAVENOUS at 17:16

## 2023-12-11 RX ADMIN — SODIUM CHLORIDE, POTASSIUM CHLORIDE, SODIUM LACTATE AND CALCIUM CHLORIDE: 600; 310; 30; 20 INJECTION, SOLUTION INTRAVENOUS at 22:19

## 2023-12-11 RX ADMIN — DEXMEDETOMIDINE HYDROCHLORIDE 8 MCG: 200 INJECTION INTRAVENOUS at 17:01

## 2023-12-11 RX ADMIN — SODIUM CHLORIDE 1000 ML: 9 INJECTION, SOLUTION INTRAVENOUS at 04:59

## 2023-12-11 RX ADMIN — Medication 2 G: at 15:48

## 2023-12-11 RX ADMIN — PHENYLEPHRINE HYDROCHLORIDE 200 MCG: 10 INJECTION INTRAVENOUS at 16:06

## 2023-12-11 RX ADMIN — SODIUM CHLORIDE, POTASSIUM CHLORIDE, SODIUM LACTATE AND CALCIUM CHLORIDE: 600; 310; 30; 20 INJECTION, SOLUTION INTRAVENOUS at 20:06

## 2023-12-11 RX ADMIN — FENTANYL CITRATE 50 MCG: 50 INJECTION INTRAMUSCULAR; INTRAVENOUS at 15:54

## 2023-12-11 RX ADMIN — SODIUM CHLORIDE, POTASSIUM CHLORIDE, SODIUM LACTATE AND CALCIUM CHLORIDE: 600; 310; 30; 20 INJECTION, SOLUTION INTRAVENOUS at 17:24

## 2023-12-11 RX ADMIN — PHENYLEPHRINE HYDROCHLORIDE 100 MCG: 10 INJECTION INTRAVENOUS at 17:12

## 2023-12-11 RX ADMIN — DEXMEDETOMIDINE HYDROCHLORIDE 12 MCG: 200 INJECTION INTRAVENOUS at 16:38

## 2023-12-11 RX ADMIN — ONDANSETRON 4 MG: 2 INJECTION INTRAMUSCULAR; INTRAVENOUS at 17:03

## 2023-12-11 ASSESSMENT — ENCOUNTER SYMPTOMS
DYSURIA: 0
CHILLS: 1
HEMATURIA: 0
VOMITING: 1
HEADACHES: 0
MYALGIAS: 1
APPETITE CHANGE: 1
NERVOUS/ANXIOUS: 1
EYES NEGATIVE: 1
FATIGUE: 1
SEIZURES: 0
ABDOMINAL PAIN: 1
DIARRHEA: 1
BLOOD IN STOOL: 0
FEVER: 0
RESPIRATORY NEGATIVE: 1
ACTIVITY CHANGE: 1
TREMORS: 1
ENDOCRINE NEGATIVE: 1
NAUSEA: 1

## 2023-12-11 ASSESSMENT — LIFESTYLE VARIABLES
NAUSEA AND VOMITING: NO NAUSEA AND NO VOMITING
TOTAL SCORE: 4
PAROXYSMAL SWEATS: NO SWEAT VISIBLE
PAROXYSMAL SWEATS: NO SWEAT VISIBLE
TACTILE DISTURBANCES: VERY MILD ITCHING, PINS AND NEEDLES, BURNING OR NUMBNESS
AGITATION: NORMAL ACTIVITY
TREMOR: NOT VISIBLE, BUT CAN BE FELT FINGERTIP TO FINGERTIP
TOTAL SCORE: 7
TREMOR: NO TREMOR
ORIENTATION AND CLOUDING OF SENSORIUM: ORIENTED AND CAN DO SERIAL ADDITIONS
TOTAL SCORE: 1
AUDITORY DISTURBANCES: NOT PRESENT
PAROXYSMAL SWEATS: BARELY PERCEPTIBLE SWEATING, PALMS MOIST
NAUSEA AND VOMITING: NO NAUSEA AND NO VOMITING
ORIENTATION AND CLOUDING OF SENSORIUM: ORIENTED AND CAN DO SERIAL ADDITIONS
ANXIETY: NO ANXIETY, AT EASE
TREMOR: 2
VISUAL DISTURBANCES: NOT PRESENT
TREMOR: NOT VISIBLE, BUT CAN BE FELT FINGERTIP TO FINGERTIP
VISUAL DISTURBANCES: NOT PRESENT
ORIENTATION AND CLOUDING OF SENSORIUM: ORIENTED AND CAN DO SERIAL ADDITIONS
HEADACHE, FULLNESS IN HEAD: VERY MILD
AUDITORY DISTURBANCES: NOT PRESENT
NAUSEA AND VOMITING: NO NAUSEA AND NO VOMITING
NAUSEA AND VOMITING: NO NAUSEA AND NO VOMITING
HEADACHE, FULLNESS IN HEAD: NOT PRESENT
HEADACHE, FULLNESS IN HEAD: NOT PRESENT
AGITATION: NORMAL ACTIVITY
VISUAL DISTURBANCES: NOT PRESENT
ANXIETY: NO ANXIETY, AT EASE
AGITATION: 2
TOTAL SCORE: 1
AUDITORY DISTURBANCES: NOT PRESENT
AUDITORY DISTURBANCES: NOT PRESENT
ANXIETY: 2
VISUAL DISTURBANCES: NOT PRESENT
ORIENTATION AND CLOUDING OF SENSORIUM: ORIENTED AND CAN DO SERIAL ADDITIONS
AGITATION: NORMAL ACTIVITY
PAROXYSMAL SWEATS: NO SWEAT VISIBLE
ANXIETY: 2
HEADACHE, FULLNESS IN HEAD: NOT PRESENT

## 2023-12-11 ASSESSMENT — ACTIVITIES OF DAILY LIVING (ADL)
ADLS_ACUITY_SCORE: 22
ADLS_ACUITY_SCORE: 35
ADLS_ACUITY_SCORE: 22
ADLS_ACUITY_SCORE: 35
ADLS_ACUITY_SCORE: 22
ADLS_ACUITY_SCORE: 35
ADLS_ACUITY_SCORE: 26
ADLS_ACUITY_SCORE: 35
ADLS_ACUITY_SCORE: 35
ADLS_ACUITY_SCORE: 37

## 2023-12-11 ASSESSMENT — COPD QUESTIONNAIRES: COPD: 1

## 2023-12-11 NOTE — ED PROVIDER NOTES
"  History     Chief Complaint   Patient presents with    Chest Pain    Abdominal Pain     HPI  Pavithra Raines is a 68 year old female who [facility] to the emergency room via ambulance from her home secondary to concerns of severe abdominal pain.  Patient states that she has not felt well starting yesterday morning when she started developing watery diarrhea.  She states that she has not been able to drink any alcohol through the day because she just does not feel well and has had nausea symptoms.  She wonders if she might be withdrawing from alcohol.  Patient states that she is having severe abdominal pain that started at 3:00 yesterday afternoon to the point where she cannot tolerate it anymore.  Patient is concerned about possible recurrence of sepsis since the reason for her diarrhea and abdominal pain issues.  She denies any bloody diarrhea or bloody stools or bloody emesis.    I reviewed the following nurse triage note:  Patient presents via EMS for abdominal pain and some chest pain. Reports she was watching television when she noted the pain was getting worse, had her  call 911. Does endorse daily alcohol use, is unsure when exactly her last drink was. When asked about how much drinking she does she states \"all day everyday\".     Allergies:  Allergies   Allergen Reactions    Codeine Itching    Vicodin [Hydrocodone-Acetaminophen] Itching       Problem List:    Patient Active Problem List    Diagnosis Date Noted    Patient ventilator dyssynchrony (H) 10/11/2023     Priority: Medium    Coagulopathy (H24) 10/11/2023     Priority: Medium    Pulmonary hypertension (H) 10/11/2023     Priority: Medium    Acute respiratory failure with hypoxia (H) 10/10/2023     Priority: Medium    Alcohol withdrawal, with delirium (H) 10/10/2023     Priority: Medium    Hypophosphatemia 10/10/2023     Priority: Medium    Hypokalemia 10/10/2023     Priority: Medium    Thrombocytopenia (H24) 10/10/2023     Priority: Medium "    Anemia, unspecified type 10/10/2023     Priority: Medium    Hypoalbuminemia 10/09/2023     Priority: Medium    Septic shock (H) 10/08/2023     Priority: Medium    Bacteremia due to Streptococcus pneumoniae 10/08/2023     Priority: Medium    Hypomagnesemia 10/08/2023     Priority: Medium    History of seizure due to alcohol withdrawal 10/08/2023     Priority: Medium    Emphysema/COPD (H) 10/08/2023     Priority: Medium    Tobacco abuse 10/08/2023     Priority: Medium    Malnutrition - suspected 10/08/2023     Priority: Medium    Dehydration 10/07/2023     Priority: Medium    Hyponatremia 10/07/2023     Priority: Medium    Weakness generalized 10/07/2023     Priority: Medium    Alcohol use disorder, severe, dependence (H) 10/07/2023     Priority: Medium    Multifocal pneumonia 10/07/2023     Priority: Medium    Alcohol dependence in remission (H) 01/08/2022     Priority: Medium    Episode of recurrent major depressive disorder, unspecified depression episode severity (H24) 01/08/2022     Priority: Medium    Seizure disorder (H) 01/08/2022     Priority: Medium    Fatty liver 12/13/2020     Priority: Medium    PTSD (post-traumatic stress disorder) 12/13/2020     Priority: Medium    Underweight 12/13/2020     Priority: Medium    Macrocytosis without anemia 12/13/2020     Priority: Medium    Age-related osteoporosis without current pathological fracture 10/15/2020     Priority: Medium    Anemia due to vitamin B12 deficiency, unspecified B12 deficiency type 12/04/2017     Priority: Medium    Insomnia, unspecified type 12/04/2017     Priority: Medium    Other iron deficiency anemia 12/04/2017     Priority: Medium    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 grade 3 04/07/2011     Priority: Medium     12/15/06 ASCUS + high risk HPV  colpo in 2007 showed high grade KAITLYN and LEEP was recommended  LEEP was done in June,2007 with CAN 2/3 confirmed  10/15/07 NIL  9/30/08 NIL/neg HPV  10/21/09 NIL/neg HPV  4/5/11 NIL- repeat in  one year (4/2012 12/1/17 NIL pap/neg HR HPV. Will need pap screening until 2027, regardless of age.  Plan: cotest due 3 yr.      MDD (major depressive disorder) 04/05/2011     Priority: Medium     Needs to est primary care.      CARDIOVASCULAR SCREENING; LDL GOAL LESS THAN 160 10/31/2010     Priority: Medium    Genital herpes      Priority: Medium     IMO update changed this record. Please review for accuracy      Anxiety 08/27/2008     Priority: Medium     Patient is followed by MIMI GARZA for ongoing prescription of benzodiazepines.  All refills should be approved by this provider, or covering partner.    Medication(s): Clonazepam.   Maximum quantity per month: 30  Clinic visit frequency required:      Controlled substance agreement on file: No  Benzodiazepine use reviewed by psychiatry:  No    Last [facility] website verification:  done on 4/5/19  [email or web address]        History of Tom-en-Y gastric bypass March 2008 03/25/2008     Priority: Medium     Performed at [facility]/Take the Interview.      Restless legs syndrome (RLS) 05/15/2007     Priority: Medium    Hypersomnia with sleep apnea 05/15/2007     Priority: Medium     Problem list name updated by automated process. Provider to review      Esophageal reflux 04/18/2007     Priority: Medium        Past Medical History:    Past Medical History:   Diagnosis Date    Abnormal Papanicolaou smear of cervix and cervical HPV 4/07    Genital herpes     History of colposcopy with cervical biopsy 06/2007    Hypersomnia with sleep apnea, unspecified     Other and unspecified ovarian cyst 2002 or so       Past Surgical History:    Past Surgical History:   Procedure Laterality Date    CHOLECYSTECTOMY, OPEN  age 20s    COLONOSCOPY  03/21/2011    COMBINED COLONOSCOPY, REMOVE TUMOR/POLYP/LESION BY SNARE performed by JUAN FRANCISCO HERNANDEZ at  GI    COLONOSCOPY N/A 10/09/2017    polyps, repeat 3 years    COLPOSCOPY CERVIX, LOOP ELECTRODE BIOPSY, COMBINED   "06/2007    CAN 2/3-patient requires yearly pap smears    dental pegs      ESOPHAGOSCOPY, GASTROSCOPY, DUODENOSCOPY (EGD), COMBINED N/A 02/09/2018    Procedure: COMBINED ESOPHAGOSCOPY, GASTROSCOPY, DUODENOSCOPY (EGD);  EGD;  Surgeon: Oneal Sanchez MD;  Location:  GI    GASTRIC BYPASS  03/25/2008    At [facility]    HC DILATION/CURETTAGE DIAG/THER NON OB  2002    HC ENLARGE BREAST WITH IMPLANT      HC LAPAROSCOPIC MYOMECTOMY, 1 - 4 INTRAMURAL MYOMAS =<250 GM  2002    HC REMOVAL OF BREAST IMPLANT      SALPINGO OOPHORECTOMY,R/L/MATTHEW  2002    Bilateral salpingectomy and unilateral oophorectomy       Family History:    Family History   Problem Relation Age of Onset    Chronic Obstructive Pulmonary Disease Mother     Unknown/Adopted Father         doesn't know birth father    Lung Cancer Brother     Family History Negative Other        Social History:  Marital Status:   [2]  Social History     Tobacco Use    Smoking status: Every Day     Packs/day: 2.00     Years: 10.00     Additional pack years: 0.00     Total pack years: 20.00     Types: Cigarettes    Smokeless tobacco: Never    Tobacco comments:     2 ppd at this time (chain smoking) 10/2012   Vaping Use    Vaping Use: Every day    Substances: Nicotine, THC    Devices: Pre-filled or refillable cartridge   Substance Use Topics    Alcohol use: Yes     Comment: daily, drinks a box    Drug use: Yes     Types: Marijuana     Comment: medical        Medications:    busPIRone (BUSPAR) 15 MG tablet  calcium carbonate (OS-SHON 500 MG [place]. [place]) 1250 MG tablet  clonazePAM (KLONOPIN) 0.5 MG tablet  cyanocobalamin (CYANOCOBALAMIN) 1000 MCG/ML injection  escitalopram (LEXAPRO) 20 MG tablet  ferrous gluconate (FERGON) 324 (38 Fe) MG tablet  folic acid (FOLVITE) 1 MG tablet  Insulin Syringe-Needle U-100 (B-D INSULIN SYRINGE) 25G X 1\" 1 ML MISC  multivitamin w/minerals (THERA-VIT-M) tablet  nicotine (NICODERM CQ) 14 MG/24HR 24 hr patch  omeprazole (PRILOSEC) 40 MG DR" capsule  raloxifene (EVISTA) 60 MG tablet  thiamine (B-1) 100 MG tablet  triamcinolone (KENALOG) 0.1 % external cream  venlafaxine (EFFEXOR XR) 37.5 MG 24 hr capsule  vitamin D3 (VITAMIN D3) 50 mcg (2000 units) tablet          Review of Systems   Constitutional:  Positive for activity change, appetite change, chills and fatigue. Negative for fever.   HENT: Negative.     Eyes: Negative.    Respiratory: Negative.     Gastrointestinal:  Positive for abdominal pain (Patient states the pain is all over her abdomen.  Stating it is severe and she does allow me to touch her abdomen.), diarrhea, nausea and vomiting. Negative for blood in stool.   Endocrine: Negative.    Genitourinary:  Negative for dysuria and hematuria.   Musculoskeletal:  Positive for myalgias.   Skin:  Negative for rash.   Neurological:  Positive for tremors. Negative for seizures and headaches.   Psychiatric/Behavioral:  The patient is nervous/anxious.    All other systems reviewed and are negative.      Physical Exam   BP: (!) 123/96  Pulse: 76  Temp: 97.8  F (36.6  C)  Resp: 24  Weight: 45.4 kg (100 lb)  SpO2: 99 %      Physical Exam  Vitals and nursing note reviewed.   Constitutional:       Comments: Cachectic appearing female who is sleeping on entering the room.  She was awakened and complains of severe abdominal pain and has 2 heating packs on her abdomen.  She also has some heat packs on her upper shoulder region stating that her muscles are also sore.   HENT:      Head: Atraumatic.   Eyes:      Extraocular Movements: Extraocular movements intact.      Pupils: Pupils are equal, round, and reactive to light.   Neck:      Vascular: No JVD.   Cardiovascular:      Rate and Rhythm: Bradycardia present.      Heart sounds: No murmur heard.  Pulmonary:      Effort: Tachypnea present.      Breath sounds: Examination of the right-lower field reveals rhonchi. Examination of the left-lower field reveals rhonchi. Rhonchi present. No wheezing or rales.    Abdominal:      General: Abdomen is flat. Bowel sounds are increased.      Palpations: Abdomen is rigid. There is no pulsatile mass.      Tenderness: There is generalized abdominal tenderness.   Musculoskeletal:      Cervical back: Neck supple.      Right lower leg: No edema.      Left lower leg: No edema.   Skin:     Capillary Refill: Capillary refill takes less than 2 seconds.      Findings: No erythema or rash.   Neurological:      Mental Status: She is alert and oriented to person, place, and time.   Psychiatric:         Mood and Affect: Mood is anxious.         ED Course                 Procedures              Critical Care time:  was 35 minutes for this patient excluding procedures.               Results for orders placed or performed during the hospital encounter of 12/11/23 (from the past 24 hour(s))   CBC with platelets differential    Narrative    The following orders were created for panel order CBC with platelets differential.  Procedure                               Abnormality         Status                     ---------                               -----------         ------                     CBC with platelets and d...[[id number]]  Abnormal            Final result                 Please view results for these tests on the individual orders.   Comprehensive metabolic panel   Result Value Ref Range    Sodium 135 135 - 145 mmol/L    Potassium 4.0 3.4 - 5.3 mmol/L    Carbon Dioxide (CO2) 18 (L) 22 - 29 mmol/L    Anion Gap 15 7 - 15 mmol/L    Urea Nitrogen 8.7 8.0 - 23.0 mg/dL    Creatinine 0.69 0.51 - 0.95 mg/dL    GFR Estimate >90 >60 mL/min/1.73m2    Calcium 8.6 (L) 8.8 - 10.2 mg/dL    Chloride 102 98 - 107 mmol/L    Glucose 115 (H) 70 - 99 mg/dL    Alkaline Phosphatase 175 (H) 40 - 150 U/L    AST 16 0 - 45 U/L    ALT 6 0 - 50 U/L    Protein Total 7.0 6.4 - 8.3 g/dL    Albumin 3.1 (L) 3.5 - 5.2 g/dL    Bilirubin Total 1.6 (H) <=1.2 mg/dL   Lipase   Result Value Ref Range    Lipase 65 (H) 13 - 60  U/L   CRP inflammation   Result Value Ref Range    CRP Inflammation 8.32 (H) <5.00 mg/L   Ethyl Alcohol Level   Result Value Ref Range    Alcohol ethyl <0.01 <=0.01 g/dL   Lactic acid whole blood   Result Value Ref Range    Lactic Acid 1.8 0.7 - 2.0 mmol/L   CBC with platelets and differential   Result Value Ref Range    WBC Count 9.0 4.0 - 11.0 10e3/uL    RBC Count 3.82 3.80 - 5.20 10e6/uL    Hemoglobin 11.6 (L) 11.7 - 15.7 g/dL    Hematocrit 35.0 35.0 - 47.0 %    MCV 92 78 - 100 fL    MCH 30.4 26.5 - 33.0 pg    MCHC 33.1 31.5 - 36.5 g/dL    RDW 14.2 10.0 - 15.0 %    Platelet Count 460 (H) 150 - 450 10e3/uL    % Neutrophils 60 %    % Lymphocytes 30 %    % Monocytes 9 %    % Eosinophils 0 %    % Basophils 1 %    % Immature Granulocytes 0 %    NRBCs per 100 WBC 0 <1 /100    Absolute Neutrophils 5.4 1.6 - 8.3 10e3/uL    Absolute Lymphocytes 2.7 0.8 - 5.3 10e3/uL    Absolute Monocytes 0.8 0.0 - 1.3 10e3/uL    Absolute Eosinophils 0.0 0.0 - 0.7 10e3/uL    Absolute Basophils 0.1 0.0 - 0.2 10e3/uL    Absolute Immature Granulocytes 0.0 <=0.4 10e3/uL    Absolute NRBCs 0.0 10e3/uL   Magnesium   Result Value Ref Range    Magnesium 1.4 (L) 1.7 - 2.3 mg/dL   CT Abdomen Pelvis w Contrast    Narrative    EXAM: CT ABDOMEN PELVIS W CONTRAST  LOCATION: [facility]  DATE: 12/11/2023    INDICATION: Nausea vomiting, severe diffuse abdominal pain,  COMPARISON: 10/17/2017.  TECHNIQUE: CT scan of the abdomen and pelvis was performed following injection of IV contrast. Multiplanar reformats were obtained. Dose reduction techniques were used.  CONTRAST: 50mL, Isovue 370    FINDINGS:   LOWER CHEST: Mild emphysematous disease. Dependent atelectasis. Small hiatal hernia.    HEPATOBILIARY: The gallbladder is absent    PANCREAS: Normal.    SPLEEN: Normal.    ADRENAL GLANDS: Normal.    KIDNEYS/BLADDER: There is no hydronephrosis. Small right renal cyst.    BOWEL: There is free intraperitoneal gas and fluid  consistent with perforated viscus. The majority of free air is in the upper abdomen and left upper quadrant and may be arising from the stomach/gastric bypass. There are several fluid-filled mildly   dilated small bowel loops suggesting an ileus. There is fluid in nondilated colon.    LYMPH NODES: Normal.    VASCULATURE: Atherosclerotic calcification of the aorta and its branches. No aneurysm.    PELVIC ORGANS: Normal.    MUSCULOSKELETAL: Degenerative disease in the spine.      Impression    IMPRESSION:   1.  Free intraperitoneal gas and fluid consistent with perforated viscus. The site of perforation is not certain though may be from the stomach/gastric bypass.    Critical Result: Free intraperitoneal gas    Finding was identified on 12/11/2023 6:57 AM CST.    Dr. Palacios was contacted by me on 12/11/2023 6:57 AM CST and verbalized understanding of the critical result.          Medications   cloNIDine (CATAPRES) tablet 0.1 mg (0.1 mg Oral $Given 12/11/23 9521)   OLANZapine zydis (zyPREXA) ODT tab 5-10 mg (has no administration in time range)     Or   haloperidol lactate (HALDOL) injection 2.5-5 mg (has no administration in time range)   flumazenil (ROMAZICON) injection 0.2 mg (has no administration in time range)   LORazepam (ATIVAN) tablet 1-2 mg (has no administration in time range)     Or   LORazepam (ATIVAN) injection 1-2 mg (has no administration in time range)   thiamine (B-1) tablet 100 mg (has no administration in time range)   folic acid (FOLVITE) tablet 1 mg (has no administration in time range)   multivitamin w/minerals (THERA-VIT-M) tablet 1 tablet (has no administration in time range)   piperacillin-tazobactam (ZOSYN) 3.375 g vial to attach to  mL bag (has no administration in time range)   HYDROmorphone (DILAUDID) injection 1 mg (has no administration in time range)   sodium chloride 0.9% BOLUS 1,000 mL (0 mLs Intravenous Stopped 12/11/23 0620)   haloperidol lactate (HALDOL) injection 2 mg (2  mg Intravenous $Given 12/11/23 0500)   sodium chloride 0.9 % 1,000 mL with Infuvite Adult 10 mL, thiamine 100 mg, folic acid 1 mg infusion ( Intravenous $New Bag 12/11/23 0554)   HYDROmorphone (PF) (DILAUDID) injection 0.5 mg (0.5 mg Intravenous $Given 12/11/23 0530)   iopamidol (ISOVUE-370) solution 500 mL (50 mLs Intravenous $Given 12/11/23 0629)   sodium chloride 0.9 % bag 100mL for CT scan flush use (60 mLs Intravenous $Given 12/11/23 0629)       Assessments & Plan (with Medical Decision Making)  68-year-old female to the ER via ambulance from her home secondary to severe abdominal pain that started yesterday morning.  Patient states that it so bad that she has not been able to drink any alcohol since yesterday morning which she has not done for years.  She states that she is unable to tolerate anything oral without severe nausea and has had vomiting as well.  The pain is generalized throughout her whole abdomen.  She also is concerned about alcohol withdrawal symptoms that she is starting to get tremors and is feeling extremely anxious.  Patient with exam findings consistent with acute alcohol withdrawal.  Her abdomen was concerning as it was rigid to palpation and extreme tender.  CT scan showed evidence for free air in the abdomen but the radiologist is suggesting that he thinks that the area of perforation is near the anastomosis of her gastric bypass.  IV antibiotics were initiated.  Alcohol withdrawal orders also initiated.  I spoke with her surgeon, Dr. Guaman, who felt that the patient's case is too complicated to perform at this hospital and recommended we transfer the patient to a tertiary care center.  Patient was updated on her condition and states that she would prefer to be transferred to the [facility].  I placed a referral order in the epic EMR and signed out the patient to Dr. Brooks at shift change.     I have reviewed the nursing notes.    I have reviewed the findings,  diagnosis, plan and need for follow up with the patient.       Critical Care Addendum  My initial assessment, based on my review of prehospital provider report, review of nursing observations, review of vital signs, focused history, and physical exam, established a high suspicion that Pavithra Raines has peritonitis and and severe alcohol withdrawal , which requires immediate intervention, and therefore she is critically ill.     After the initial assessment, the care team initiated multiple lab tests, initiated IV fluid administration, and initiated medication therapy with medications as listed above  to provide stabilization care. Due to the critical nature of this patient, I reassessed nursing observations, vital signs, physical exam, and review of cardiac rhythm monitor multiple times prior to her disposition.     Time also spent performing documentation, discussion with family to obtain medical information for decision making, reviewing test results, discussion with consultants, and coordination of care.     Critical care time (excluding teaching time and procedures): 35 minutes.         Final diagnoses:   Perforation of intestine (H)   Alcohol withdrawal syndrome with complication (H)   Abdominal pain, generalized       12/11/2023   [facility] EMERGENCY DEPT       Kimani Palacios,   12/11/23 0732

## 2023-12-11 NOTE — PHARMACY-ADMISSION MEDICATION HISTORY
Pharmacist Admission Medication History    Admission medication history is complete. The information provided in this note is only as accurate as the sources available at the time of the update.    Information Source(s): Patient and CareEverywhere/SureScripts via in-person    Pertinent Information:   Patient states not taking prescription medications [facility].   States clonazepam was scheduled medication - last filled 8/21/23 #30ds #30, did not adjust on PTA medication list at this time. Will update PTA medication list as applicable; attempted to call staff at [facility] to confirm frequency of use at facility, unavailable at time of note.   Patient states still having home supply of B12 injections; has not filled via [employer] in the last 12 months.     Changes made to PTA medication list:  Added: None  Deleted: nicotine patches (per patient, no longer taking/needing), vitamin D3 (per patient, takes as part of multivitamin)   Changed: None    Medication Affordability:  Not including over the counter (OTC) medications, was there a time in the past 3 months when you did not take your medications as prescribed because of cost?: Yes - states not being able to afford any medications since [place] discharge     Allergies reviewed with patient and updates made in EHR: yes    Medication History Completed By: Violet Zheng [facility] 12/11/2023 2:01 PM    PTA Med List   Medication Sig Note Last Dose    busPIRone (BUSPAR) 15 MG tablet Take 1 tablet (15 mg) by mouth 3 times daily  Past Month at Unknown time    calcium carbonate (OS-SHON 500 MG [place]. [place]) 1250 MG tablet Take 1 tablet by mouth 2 times daily  Past Week at Unknown time    clonazePAM (KLONOPIN) 0.5 MG tablet Take 0.5 mg by mouth daily as needed for anxiety 12/11/2023: Patient states taking daily [facility] (discharged within last month PTA)  Past Month at Unknown time    cyanocobalamin (CYANOCOBALAMIN) 1000 MCG/ML injection Inject  1 mL into the muscle every 30 days  Past Month at takes at beginning of each month, took for 12/2023    escitalopram (LEXAPRO) 20 MG tablet Take 1.5 tablets (30 mg) by mouth daily  Past Month at Unknown time    ferrous gluconate (FERGON) 324 (38 Fe) MG tablet TAKE 1 TABLET (324 MG) BY MOUTH DAILY (WITH BREAKFAST)  Past Month at 2 weeks ago    folic acid (FOLVITE) 1 MG tablet Take 1 tablet (1,000 mcg) by mouth daily  Past Month at 2 weeks ago    multivitamin w/minerals (THERA-VIT-M) tablet Take 1 tablet by mouth daily  Past Week at Unknown time    omeprazole (PRILOSEC) 40 MG DR capsule TAKE ONE CAPSULE BY MOUTH TWICE A DAY  Past Month at Unknown time    raloxifene (EVISTA) 60 MG tablet TAKE ONE TABLET BY MOUTH ONCE DAILY  Past Month at Unknown time    thiamine (B-1) 100 MG tablet Take 1 tablet (100 mg) by mouth daily  Unknown at Unknown time    triamcinolone (KENALOG) 0.1 % external cream APPLY SPARINGLY TO ITCHY RASH AREAS UP TO THREE TIMES A DAY AS NEEDED  Past Month at 2 weeks ago, PRN    venlafaxine (EFFEXOR XR) 37.5 MG 24 hr capsule Take 1 capsule (37.5 mg) by mouth daily  Past Month at Unknown time

## 2023-12-11 NOTE — ANESTHESIA PREPROCEDURE EVALUATION
Anesthesia Pre-Procedure Evaluation    Patient: Pavithra Raines   MRN: [id number] : 1955        Procedure : Procedure(s):  Diagnostic laparoscopy, likely oversew marginal ulcer          Past Medical History:   Diagnosis Date    Abnormal Papanicolaou smear of cervix and cervical HPV 2007    Colposcopy = HSIL    COPD (chronic obstructive pulmonary disease) (H)     Genital herpes     History of colposcopy with cervical biopsy 2007    HSIL- LEEP recommended    Hypersomnia with sleep apnea, unspecified     CPAP started     Other and unspecified ovarian cyst  or so    s/p resection of ovary and tubes, d&c    Pneumonia 10/23/2023    Sleep apnea     Uncomplicated asthma       Past Surgical History:   Procedure Laterality Date    CHOLECYSTECTOMY, OPEN  age 22s    COLONOSCOPY  2011    COMBINED COLONOSCOPY, REMOVE TUMOR/POLYP/LESION BY SNARE performed by JUAN FRANCISCO HERNANDEZ at  GI    COLONOSCOPY N/A 10/09/2017    polyps, repeat 3 years    COLPOSCOPY CERVIX, LOOP ELECTRODE BIOPSY, COMBINED  2007    CAN 2/3-patient requires yearly pap smears    dental pegs      ESOPHAGOSCOPY, GASTROSCOPY, DUODENOSCOPY (EGD), COMBINED N/A 2018    Procedure: COMBINED ESOPHAGOSCOPY, GASTROSCOPY, DUODENOSCOPY (EGD);  EGD;  Surgeon: Oneal Sanchez MD;  Location:  GI    GASTRIC BYPASS  2008    At [facility]/[place]    HC DILATION/CURETTAGE DIAG/THER NON OB      HC ENLARGE BREAST WITH IMPLANT      HC LAPAROSCOPIC MYOMECTOMY, 1 - 4 INTRAMURAL MYOMAS =<250 GM      HC REMOVAL OF BREAST IMPLANT      SALPINGO OOPHORECTOMY,R/L/MATTHEW      Bilateral salpingectomy and unilateral oophorectomy      Allergies   Allergen Reactions    Codeine Itching    Vicodin [Hydrocodone-Acetaminophen] Itching      Social History     Tobacco Use    Smoking status: Every Day     Packs/day: 2.00     Years: 10.00     Additional pack years: 0.00     Total pack years: 20.00     Types: Cigarettes    Smokeless  tobacco: Never    Tobacco comments:     2 ppd at this time (chain smoking) 10/2012   Substance Use Topics    Alcohol use: Yes     Comment: daily, drinks a box      Wt Readings from Last 1 Encounters:   12/11/23 45.4 kg (100 lb)        Anesthesia Evaluation            ROS/MED HX  ENT/Pulmonary:     (+) sleep apnea, doesn't use CPAP,                       COPD,    recent URI, resolved,         Neurologic:       Cardiovascular:     (+)  - -   -  - -                                pulmonary hypertension, Previous cardiac testing   Echo: Date: Results:  The left ventricle is normal in size.  Left ventricular systolic function is normal. The visual ejection fraction is  55-60%.  No regional wall motion abnormalities noted.  The right ventricle is normal in structure, function and size.  There is mild (1+) tricuspid regurgitation.  Right ventricular systolic pressure is elevated, consistent with mild  pulmonary hypertension.  There is no comparison study available.    Stress Test:  Date: Results:    ECG Reviewed:  Date: Results:    Cath:  Date: Results:   (-) pacemaker, stent, pacemaker and ICD   METS/Exercise Tolerance: 1 - Eating, dressing    Hematologic: Comments: Electrolyte abnormalities likely in the setting of malnutrition and ongoing alcoholism    (+)      anemia,          Musculoskeletal:  - neg musculoskeletal ROS     GI/Hepatic:     (+) GERD,            liver disease,       Renal/Genitourinary:       Endo:    (-) Type II DM and obesity   Psychiatric/Substance Use: Comment: Last drink two days ago     (+)   alcohol abuse      Infectious Disease:  - neg infectious disease ROS     Malignancy:  - neg malignancy ROS     Other:  - neg other ROS          Physical Exam    Airway        Mallampati: III   TM distance: > 3 FB   Neck ROM: full   Mouth opening: < 3 cm    Respiratory Devices and Support         Dental       (+) Edentulous      Cardiovascular          Rhythm and rate: regular and normal     Pulmonary            (+) decreased breath sounds           OUTSIDE LABS:  CBC:   Lab Results   Component Value Date    WBC 9.0 12/11/2023    WBC 8.3 12/09/2023    HGB 11.6 (L) 12/11/2023    HGB 10.8 (L) 12/09/2023    HCT 35.0 12/11/2023    HCT 33.9 (L) 12/09/2023     (H) 12/11/2023     12/09/2023     BMP:   Lab Results   Component Value Date     12/11/2023     12/09/2023    POTASSIUM 4.0 12/11/2023    POTASSIUM 4.4 12/09/2023    CHLORIDE 102 12/11/2023    CHLORIDE 103 12/09/2023    CO2 18 (L) 12/11/2023    CO2 23 12/09/2023    BUN 8.7 12/11/2023    BUN 7.1 (L) 12/09/2023    CR 0.69 12/11/2023    CR 0.63 12/09/2023     (H) 12/11/2023    GLC 87 12/09/2023     COAGS:   Lab Results   Component Value Date    PTT 28 12/11/2023    INR 1.11 12/11/2023     POC:   Lab Results   Component Value Date    BGM 91 10/16/2017    HCG Negative 04/17/2013     HEPATIC:   Lab Results   Component Value Date    ALBUMIN 3.1 (L) 12/11/2023    PROTTOTAL 7.0 12/11/2023    ALT 6 12/11/2023    AST 16 12/11/2023    ALKPHOS 175 (H) 12/11/2023    BILITOTAL 1.6 (H) 12/11/2023     OTHER:   Lab Results   Component Value Date    PH 7.55 (H) 10/17/2023    LACT 1.8 12/11/2023    A1C 4.4 10/09/2023    SHON 8.6 (L) 12/11/2023    PHOS 3.5 12/11/2023    MAG 1.5 (L) 12/11/2023    LIPASE 65 (H) 12/11/2023    AMYLASE 60 09/01/2021    TSH 0.64 10/08/2023    CRP <2.9 03/01/2020    SED 45 (H) 12/18/2017       Anesthesia Plan    ASA Status:  3       Anesthesia Type: General.     - Airway: ETT   Induction: RSI, Propofol.   Maintenance: Balanced.   Techniques and Equipment:     - Airway: Video-Laryngoscope       Consents    Anesthesia Plan(s) and associated risks, benefits, and realistic alternatives discussed. Questions answered and patient/representative(s) expressed understanding.     - Discussed:     - Discussed with:  Patient      - Extended Intubation/Ventilatory Support Discussed: Yes.      - Patient is DNR/DNI Status: No     Use of blood products  discussed: Yes.     - Discussed with: Patient.     - Consented: consented to blood products     Postoperative Care    Pain management: IV analgesics.   PONV prophylaxis: Ondansetron (or other 5HT-3), Dexamethasone or Solumedrol     Comments:               Dean Arroyo MD    I have reviewed the pertinent notes and labs in the chart from the past 30 days and (re)examined the patient.  Any updates or changes from those notes are reflected in this note.     # Hyponatremia: Lowest Na = 135 mmol/L in last 30 days, will monitor as appropriate    # Hypomagnesemia: Lowest Mg = 1.4 mg/dL in last 2 days, will replace as needed   # Hypoalbuminemia: Lowest albumin = 3.1 g/dL at 12/11/2023  4:59 AM, will monitor as appropriate

## 2023-12-11 NOTE — BRIEF OP NOTE
[facility]    Brief Operative Note    Pre-operative diagnosis: Marginal ulcer [K28.9]  Post-operative diagnosis Same as pre-operative diagnosis    Procedure: Diagnostic laparoscopy, laparoscopic patching of marginal ulcer, N/A - Abdomen    Surgeon: Surgeon(s) and Role:     * Antoni Gonzalez MD - Primary     * Sangeetha Taylor MD - Assisting  Anesthesia: General   Estimated Blood Loss: Minimal    Drains: Armando-Norman  Specimens: * No specimens in log *  Findings:   2 cm perforation at the GJ anastomosis, repaired with omental patch .  Complications: None.  Implants: * No implants in log *

## 2023-12-11 NOTE — ED PROVIDER NOTES
68-year-old female signed over to me at change of shift.  Please Dr. Palacios's note for further details.  Briefly she is a 68-year-old alcoholic who presented to the emergency department secondary to severe abdominal pain and was found to have a perforated viscus.  She was started on IV antibiotics including Zosyn and vancomycin.  General surgery here was contacted who was not comfortable taking the patient given her history of gastric bypass with perforation likely at the anastomosis site.  She wanted to go to [facility] where she had her gastric bypass but they did not have any beds available.  I ended up speaking with general surgery.  Zunilda, Dr. Gonzalez who would be happy to take care of the patient. He has graciously accepted the patient in transfer.      Oneal Brooks MD  12/11/23 [id number]

## 2023-12-11 NOTE — CONSULTS
[facility] General Surgery Consultation    Pavithra Raines MRN# [id number]   YOB: 1955 Age: 68 year old      Date of Admission:  12/11/2023  Date of Consult: 12/11/2023         Assessment and Plan:   Patient is a 68 year old female with alcohol abuse disorder, chronic smoker, COPD, Tom-en-Y gastric bypass 2008, cholecystectomy who presented with acute abdominal pain with CT demonstrating gastric perforation, likely ulcer perforation. Reportedly had not been taking her PTA meds including omeprazole for the past month because she can't afford them. Discussed with the patient that she will need surgical intervention urgently. Risks and benefits were discussed and we will plan to proceed with diagnostic laparoscopy and gastric perforation repair, possible open.    PLAN:  --Keep NPO  --OR this afternoon  --Agree with Zosyn  --Ordered IV Protonix  --Patient requested to call her  or friend Mishel after the surgery    Sangeetha Taylor MD   PGY4 General Surgery Resident  [facility]          Requesting Physician:      Prasanth Balderrama PA-C          Chief Complaint:   Abdominal pain         History of Present Illness:   Pavithra Raines is a 68 year old female who was seen in consultation at the request of Dr. Palacios who presented with acute abdominal pain.  CT in the [facility] ED demonstrated gastric perforation. The patient was transferred here for further management.      Reported she had not been taking her prescription medications for the past month. She was recently hospitalized for sepsis pneumonia and was in ICU for a week. She was discharged from TCU about a month ago.      Reported she had been having discomfort and foods intolerance a few day ago. Stated she was unable to eat well due to nausea. Also reported having watery diarrhea. This morning she woke up with severe abdominal pain which prompted her to ED. After pain medication, her pain improved.            Physical Exam:   Blood pressure 120/77, pulse 99, temperature 99.3  F (37.4  C), temperature source Oral, resp. rate 18, SpO2 96%, not currently breastfeeding.  0 lbs 0 oz  General: not in acute distress  Psych: Alert and Oriented.  Normal affect  Neurological: grossly intact  Eyes: Sclera clear  Respiratory:  nonlabored breathing  Cardiovascular:  Regular Rate and Rhythm   GI: firm, distended, tender to left sided abdomen, non-guarding.    Lymphatic/Hematologic/Immune:  No femoral or cervical lymphadenopathy.  Integumentary:  No rashes         Past Medical History:     Past Medical History:   Diagnosis Date    Abnormal Papanicolaou smear of cervix and cervical HPV 4/07    Colposcopy 2007= HSIL    Genital herpes     History of colposcopy with cervical biopsy 06/2007    HSIL- LEEP recommended    Hypersomnia with sleep apnea, unspecified     CPAP started 2007    Other and unspecified ovarian cyst 2002 or so    s/p resection of ovary and tubes, d&c            Past Surgical History:     Past Surgical History:   Procedure Laterality Date    CHOLECYSTECTOMY, OPEN  age 22s    COLONOSCOPY  03/21/2011    COMBINED COLONOSCOPY, REMOVE TUMOR/POLYP/LESION BY SNARE performed by JUAN FRANCISCO HERNANDEZ at  GI    COLONOSCOPY N/A 10/09/2017    polyps, repeat 3 years    COLPOSCOPY CERVIX, LOOP ELECTRODE BIOPSY, COMBINED  06/2007    CAN 2/3-patient requires yearly pap smears    dental pegs      ESOPHAGOSCOPY, GASTROSCOPY, DUODENOSCOPY (EGD), COMBINED N/A 02/09/2018    Procedure: COMBINED ESOPHAGOSCOPY, GASTROSCOPY, DUODENOSCOPY (EGD);  EGD;  Surgeon: Oneal Sanchez MD;  Location:  GI    GASTRIC BYPASS  03/25/2008    At [facility]/[place]    HC DILATION/CURETTAGE DIAG/THER NON OB  2002    HC ENLARGE BREAST WITH IMPLANT      HC LAPAROSCOPIC MYOMECTOMY, 1 - 4 INTRAMURAL MYOMAS =<250 GM  2002    HC REMOVAL OF BREAST IMPLANT      SALPINGO OOPHORECTOMY,R/L/MATTHEW  2002    Bilateral salpingectomy and unilateral oophorectomy             Current Medications:          ceFAZolin  2 g Intravenous Pre-Op/Pre-procedure x 1 dose    ceFAZolin  2 g Intravenous See Admin Instructions    heparin ANTICOAGULANT  5,000 Units Subcutaneous Pre-Op/Pre-procedure x 1 dose    magnesium sulfate  4 g Intravenous Once    pantoprazole  40 mg Intravenous Daily with breakfast    senna-docusate  1 tablet Oral BID    Or    senna-docusate  2 tablet Oral BID    sodium chloride (PF)  3 mL Intracatheter Q8H    [START ON 12/12/2023] sodium chloride 0.9 % 1,000 mL with Infuvite Adult 10 mL, thiamine 100 mg, folic acid 1 mg infusion   Intravenous Q24H       calcium carbonate, flumazenil, OLANZapine zydis **OR** haloperidol lactate, HYDROmorphone, HYDROmorphone, lidocaine 4%, lidocaine (buffered or not buffered), LORazepam **OR** LORazepam, melatonin, naloxone **OR** naloxone **OR** naloxone **OR** naloxone, ondansetron **OR** ondansetron, oxyCODONE, oxyCODONE IR, prochlorperazine **OR** prochlorperazine **OR** prochlorperazine, sodium chloride (PF)         Home Medications:     Prior to Admission medications    Medication Sig Last Dose Taking? Auth Provider Long Term End Date   busPIRone (BUSPAR) 15 MG tablet Take 1 tablet (15 mg) by mouth 3 times daily Past Month at Unknown time Yes Soha Boggs MD Yes    calcium carbonate (OS-SHON 500 MG [place]. [place]) 1250 MG tablet Take 1 tablet by mouth 2 times daily Past Week at Unknown time Yes Reported, Patient     clonazePAM (KLONOPIN) 0.5 MG tablet Take 0.5 mg by mouth daily as needed for anxiety Past Month at Unknown time Yes Reported, Patient No    cyanocobalamin (CYANOCOBALAMIN) 1000 MCG/ML injection Inject 1 mL into the muscle every 30 days Past Month at takes at beginning of each month, took for 12/2023 Yes Reported, Patient No    escitalopram (LEXAPRO) 20 MG tablet Take 1.5 tablets (30 mg) by mouth daily Past Month at Unknown time Yes Soha Boggs MD Yes    ferrous gluconate (FERGON) 324 (38 Fe) MG tablet  "TAKE 1 TABLET (324 MG) BY MOUTH DAILY (WITH BREAKFAST) Past Month at 2 weeks ago Yes Soha Boggs MD     folic acid (FOLVITE) 1 MG tablet Take 1 tablet (1,000 mcg) by mouth daily Past Month at 2 weeks ago Yes Soha Boggs MD No    multivitamin w/minerals (THERA-VIT-M) tablet Take 1 tablet by mouth daily Past Week at Unknown time Yes Reported, Patient No    omeprazole (PRILOSEC) 40 MG DR capsule TAKE ONE CAPSULE BY MOUTH TWICE A DAY Past Month at Unknown time Yes Soha Boggs MD     raloxifene (EVISTA) 60 MG tablet TAKE ONE TABLET BY MOUTH ONCE DAILY Past Month at Unknown time Yes Miladis Dalal PA-C Yes    thiamine (B-1) 100 MG tablet Take 1 tablet (100 mg) by mouth daily Unknown at Unknown time Yes Elijah Johnson MD No    triamcinolone (KENALOG) 0.1 % external cream APPLY SPARINGLY TO ITCHY RASH AREAS UP TO THREE TIMES A DAY AS NEEDED Past Month at 2 weeks ago, PRN Yes Tristan Connolly MD     venlafaxine (EFFEXOR XR) 37.5 MG 24 hr capsule Take 1 capsule (37.5 mg) by mouth daily Past Month at Unknown time Yes Soha Boggs MD Yes    Insulin Syringe-Needle U-100 (B-D INSULIN SYRINGE) 25G X 1\" 1 ML MISC Use once every 30 days for B12 injection   Soha Boggs MD              Allergies:     Allergies   Allergen Reactions    Codeine Itching    Vicodin [Hydrocodone-Acetaminophen] Itching            Family History:     Family History   Problem Relation Age of Onset    Chronic Obstructive Pulmonary Disease Mother     Unknown/Adopted Father         doesn't know birth father    Lung Cancer Brother     Family History Negative Other            Social History:   Pavithra Méndezraymondrosa elena  reports that she has been smoking cigarettes. She has a 20.00 pack-year smoking history. She has never used smokeless tobacco. She reports current alcohol use. She reports current drug use. Drug: Marijuana.          Review of Systems:   The 10 point " Review of Systems is negative other than noted in the HPI.         Labs/Imaging   All new lab and imaging data was reviewed.   I have personally reviewed the imaging studies     Sangeetha Taylor MD     FACULTY NOTE  I saw and evaluated the patient today December 11, 2023.  I discussed with the resident and agree with the resident s findings and plan documented in the resident s note from above. Any revisions by me are documented.    Likely marginal ulcer.  Will plan to go to the operating room for an exploratory laparoscopy.      Antoni Gonzalez M.D., F.A.C.S.  Department of Surgery  [facility]

## 2023-12-11 NOTE — PLAN OF CARE
Orientation/Cognitive: A/Ox4,   Mobility Level/Assist Equipment: SBA  Fall Risk (Y/N): yes  Behavior Concerns: CIWA protocols, last CIWA was 2  Pain Management: LUQ tender  Tele/VS/O2: VSS on RA  ABNL Lab/BG: Mg 1.4  Diet: NPO  Bowel/Bladder: continent  Skin Concerns: pale, scattered bruising   Drains/Devices: Left and R PIV SL for procedure  Tests/Procedures for next shift: diagnostic laparoscopy today  Anticipated DC date & active delays: tbd

## 2023-12-11 NOTE — CONSULTS
[facility]  Consult Note - Hospitalist Service     Date of Admission:  12/11/2023  Consult Requested by: Dr. Gonzalez  Reason for Consult: Verbally requested Hospitalist consult when discussed with Hospitalist captain earlier  PRIMARY CARE PROVIDER:    Soha Boggs    Assessment & Plan   Pavithra Raines is a 68 year old female admitted on 12/11/2023.    Past medical history significant for Alcohol use D/O with history of withdrawal with seizures, Coagulopathy thought to be secondary to Alcohol dependence, Fatty liver disease, GERD, Chronic anemia (B12, Iron), Emphysema/COPD, Cannabis use D/O, Seizure D/O, MDD with anxiety, PTSD, Insomnia, Hypersomnia with sleep apnea, RLS, Genital herpes, Malnutrition who was admitted under General Surgery due to perforated bowel possibly at the stomach or previous gastric bypass site and alcohol withdrawal.      Patient presented to [facility] emergency department due to abdominal pain and chest pain.  Patient reported that she began to feel unwell the morning of 12/10/2023 when she started to develop watery diarrhea.  Patient was unable to consume any alcohol throughout the day of 12/10 because she continued to feel unwell and developed nausea symptoms as well as some vomiting.  Around 3 PM and 12/10 she developed severe abdominal pain.  This pain became so intolerable that she called 911 and was brought into the ED.  Patient endorsed concerns of going into alcohol withdrawal and was noted to be tremulous in the ED.    Workup in the ED included a CMP that revealed a CO2 of 18, calcium of 8.6, magnesium of 1.4, albumin of 3.1, alk phos of 175, total bili of 1.6, glucose of 115 otherwise within normal limits.  Lactic acid level was within normal limits at 1.8.  Lipase level was mildly elevated at 65.  Inflammatory CRP was elevated at 8.32.  CBC with differential revealed a hemoglobin of 11.6 and platelet count of 460 otherwise within  normal limits.  UA revealed trace leukocyte Estrace with 3 RBCs and 2 squamous epithelial cells with mucus present and hyaline casts of 3.  Abdomen/pelvis CT with contrast revealed free intraperitoneal gas and fluid consistent with perforated viscus (the site of perforation is not certain though may be from the stomach/gastric bypass).    The ED was in contact with General Surgery (Dr. Guaman) who felt patient's case was too complicated and recommended transfer to a tertiary care center.  Attempts to transfer to the [facility] were performed but due to bed availability patient was ultimately transferred to [facility].  Discussions occurred between the ED (Dr. Brooks), General Surgery (Dr. Gonzalez) and Hospitalist (Dr. Monaco) at [facility] and patient was admitted under General Surgery with verbal request for Hospitalist consult upon arrival.      Perforated Viscus without clear site of perforation  Elevated inflammatory CRP  Abdominal pain  *Per CT scan could be from stomach/gastric bypass site.    - General surgery is managing.   --Defer analgesic/pain management, DVT prophylaxis, PT/OT.    - HGB check ordered for the morning  - Encourage utilization of incentive spirometer.   - IV Zosyn ordered.    Alcohol withdrawal  Alcohol use D/O with history of withdrawal with seizures  - CIWA ordered with PRN Ativan ordered.    - Multivitamins ordered through IV as patient is NPO due to perforated viscus.    - Informed House TIFFANY.      Hypomagnesemia  - Monitor and replace per protocol.      Heart murmur  *Noted slight heart murmur on examination.  Patient denied every being told about a murmur in the past.    *Previous ECHO completed 10/2023 and without aortic stenosis.    - Monitor.      Coagulopathy thought to be secondary to Alcohol dependence  *INR and PTT checked after assessing the patient and within normal limits.    - Monitor PRN.      Fatty liver disease  - Follow up with PCP as an outpatient.       GERD  *Has not taken any medications in the last month as she ran out.    - IV Protonix 40 mg/d.      Chronic anemia (B12 and Iron)  - IV multivitamin (banana bag) ordered.    - Hold previously prescribed Iron supplement, B-1 supplement, folic acid supplement as patient has not been taking these.    - HGB/CBC ordered for the morning.      Emphysema/COPD   *Recently quit smoking in the last 2 months and was previously smoking 2 packs per day.    *Not currently taking any medications.  No respiratory distress or noted wheeze on physical exam.    - Monitor.      Cannabis use D/O  - Will require counseling regarding cessation    Seizure D/O  *Likely due to alcohol withdrawal.  Patient not prescribed antiepileptics per chart review.      MDD with anxiety  PTSD  *Has not taken any medications in the last month or two as she ran out.    - Hold on resuming previously prescribed Buspar 15 mg TID, Klonopin 0.5 mg daily PRN (anxiety),     Insomnia  Hypersomnia with sleep apnea  *Noted on chart review.  Does not appear to have been prescribed any medications.      RLS  *Noted on chart review.  Does not appear to have been prescribed any medications.      Genital herpes  *Noted on chart review.  Does not appear to have been prescribed any medications.      Malnutrition  - Recommend Nutrition consult once diet can be advanced.      Osteoporosis  - Hold TPA Evista as patient has not been taking this medication.     Clinically Significant Risk Factors Present on Admission            # Hypomagnesemia: Lowest Mg = 1.4 mg/dL in last 2 days, will replace as needed   # Hypoalbuminemia: Lowest albumin = 3.1 g/dL at 12/11/2023  4:59 AM, will monitor as appropriate                       Diet: NPO  DVT Prophylaxis: Defer to primary service   Barney Catheter: Not present  Lines: None     Cardiac Monitoring: Recommend monitoring 24 hours after surgery   Code Status: FULL CODE; confirmed with the patient.      Disposition Plan    Per  General Surgery    The patient's care was discussed with the Bedside Nurse, Patient, and Floor charge nurse, PACU Nurse, PACU Charge nurse, Pharmacy, Dr. Monaco, Srinath ALLEN and General Surgery .    The patient has been discussed with Dr. Alcantar, who agrees with the assessment and plan at this time.      Claus Stack PA-C  [facility]  Securely message with the Vocera Web Console (learn more here)  Text page via [employer] Paging/Directory    ______________________________________________________________________    Chief Complaint   Direct admit due to perforated viscus needing urgent/emergent surgery    History is obtained from the patient and EMR.      History of Present Illness   Pavithra Raines is a 68 year old female with a past medical history significant for Alcohol use D/O with history of withdrawal with seizures, Coagulopathy thought to be secondary to Alcohol dependence, Fatty liver disease, GERD, Chronic anemia (B12, Iron), Emphysema/COPD, Cannabis use D/O, Seizure D/O, MDD with anxiety, PTSD, Insomnia, Hypersomnia with sleep apnea, RLS, Genital herpes, Malnutrition who was admitted under General Surgery due to perforated bowel possibly at the stomach or previous gastric bypass site and alcohol withdrawal.      Patient presented to [facility] emergency department due to abdominal pain and chest pain.  Patient reported that she began to feel unwell the morning of 12/10/2023 when she started to develop watery diarrhea.  Patient was unable to consume any alcohol throughout the day of 12/10 because she continued to feel unwell and developed nausea symptoms as well as some vomiting.  Around 3 PM and 12/10 she developed severe abdominal pain.  This pain became so intolerable that she called 911 and was brought into the ED.  Patient endorsed concerns of going into alcohol withdrawal and was noted to be tremulous in the ED.    Workup in the ED included a CMP that revealed  a CO2 of 18, calcium of 8.6, magnesium of 1.4, albumin of 3.1, alk phos of 175, total bili of 1.6, glucose of 115 otherwise within normal limits.  Lactic acid level was within normal limits at 1.8.  Lipase level was mildly elevated at 65.  Inflammatory CRP was elevated at 8.32.  CBC with differential revealed a hemoglobin of 11.6 and platelet count of 460 otherwise within normal limits.  UA revealed trace leukocyte Estrace with 3 RBCs and 2 squamous epithelial cells with mucus present and hyaline casts of 3.  Abdomen/pelvis CT with contrast revealed free intraperitoneal gas and fluid consistent with perforated viscus (the site of perforation is not certain though may be from the stomach/gastric bypass).    The ED was in contact with General Surgery (Dr. Guaman) who felt patient's case was too complicated and recommended transfer to a tertiary care center.  Attempts to transfer to the [facility] were performed but due to bed availability patient was ultimately transferred to [facility].  Discussions occurred between the ED (Dr. Brooks), General Surgery (Dr. Gonzalez) and Hospitalist (Dr. Monaco) at [facility] and patient was admitted under General Surgery with verbal request for Hospitalist consult upon arrival.      Patient was seen in her hospital room where she was resting comfortably in bed upon arrival.  We briefly reviewed some of her medical history and when general surgery (Dr. Taylor) arrived.  Deferred further discussion to Dr. Taylor regarding urgent/emergent surgery.    Patient was about to be transported down to the OR but I was able to ask several questions and complete a physical exam.  Patient indicated she has not been feeling well since 12/10 and subsequently developed abdominal pain in the late afternoon.  She has been unable to consume alcohol since then and has had continued pain with nausea vomiting and diarrhea.  She also stated that she was concerned about being in withdrawal but this  "seems to have improved following IV Ativan administration up in the ED.    Upon further questioning patient indicated that she has not been taking any medications for the last month that she has been out.  She has noted to have had spiked temperature/fever and route to the hospital.  Patient was started on IV Zosyn in the ED.  Patient indicated that she has had decreased urine production until today.  Her last reported seizure was approximately 4 years ago.    Patient resides in a house with her  in [place], [place].  She indicated that she quit smoking cigarettes a few months ago.  When asked about her alcohol use she stated that she drinks wine all day every day.  She does use marijuana on occasion.  She does not utilize a cane or a walker.  She does not utilize a CPAP or supplemental oxygen.    Discussed and reviewed CODE STATUS with the patient she like to be full code.    Past Medical History    I have reviewed this patient's medical history and updated it with pertinent information if needed.   Past Medical History:   Diagnosis Date    Abnormal Papanicolaou smear of cervix and cervical HPV 4/07    Colposcopy 2007= HSIL    Genital herpes     History of colposcopy with cervical biopsy 06/2007    HSIL- LEEP recommended    Hypersomnia with sleep apnea, unspecified     CPAP started 2007    Other and unspecified ovarian cyst 2002 or so    s/p resection of ovary and tubes, d&c   Alcohol use D/O with history of withdrawal with seizures, Coagulopathy thought to be secondary to Alcohol dependence, Fatty liver disease, GERD, Chronic anemia (B12, Iron), Emphysema/COPD, Cannabis use D/O, Seizure D/O, MDD with anxiety, PTSD, Insomnia, Hypersomnia with sleep apnea, RLS, Genital herpes, Malnutrition    Medications   Prior to Admission Medications   Prescriptions Last Dose Informant Patient Reported? Taking?   Insulin Syringe-Needle U-100 (B-D INSULIN SYRINGE) 25G X 1\" 1 ML MISC   No No   Sig: Use once every 30 days " for B12 injection   busPIRone (BUSPAR) 15 MG tablet Past Month at Unknown time  No Yes   Sig: Take 1 tablet (15 mg) by mouth 3 times daily   calcium carbonate (OS-SHON 500 MG [place]. [place]) 1250 MG tablet Past Week at Unknown time Self Yes Yes   Sig: Take 1 tablet by mouth 2 times daily   clonazePAM (KLONOPIN) 0.5 MG tablet Past Month at Unknown time  Yes Yes   Sig: Take 0.5 mg by mouth daily as needed for anxiety   cyanocobalamin (CYANOCOBALAMIN) 1000 MCG/ML injection Past Month at takes at beginning of each month, took for 12/2023  Yes Yes   Sig: Inject 1 mL into the muscle every 30 days   escitalopram (LEXAPRO) 20 MG tablet Past Month at Unknown time  No Yes   Sig: Take 1.5 tablets (30 mg) by mouth daily   ferrous gluconate (FERGON) 324 (38 Fe) MG tablet Past Month at 2 weeks ago  No Yes   Sig: TAKE 1 TABLET (324 MG) BY MOUTH DAILY (WITH BREAKFAST)   folic acid (FOLVITE) 1 MG tablet Past Month at 2 weeks ago  No Yes   Sig: Take 1 tablet (1,000 mcg) by mouth daily   multivitamin w/minerals (THERA-VIT-M) tablet Past Week at Unknown time Self Yes Yes   Sig: Take 1 tablet by mouth daily   omeprazole (PRILOSEC) 40 MG DR capsule Past Month at Unknown time  No Yes   Sig: TAKE ONE CAPSULE BY MOUTH TWICE A DAY   raloxifene (EVISTA) 60 MG tablet Past Month at Unknown time  No Yes   Sig: TAKE ONE TABLET BY MOUTH ONCE DAILY   thiamine (B-1) 100 MG tablet Unknown at Unknown time  No Yes   Sig: Take 1 tablet (100 mg) by mouth daily   triamcinolone (KENALOG) 0.1 % external cream Past Month at 2 weeks ago, PRN  No Yes   Sig: APPLY SPARINGLY TO ITCHY RASH AREAS UP TO THREE TIMES A DAY AS NEEDED   venlafaxine (EFFEXOR XR) 37.5 MG 24 hr capsule Past Month at Unknown time  No Yes   Sig: Take 1 capsule (37.5 mg) by mouth daily      Facility-Administered Medications: None     Allergies   Allergies   Allergen Reactions    Codeine Itching    Vicodin [Hydrocodone-Acetaminophen] Itching       Physical Exam   Vital Signs: Temp: 99.3  F (37.4  C)  Temp src: Oral BP: 120/77 Pulse: 99   Resp: 18 SpO2: 96 % O2 Device: None (Room air)    Weight: 0 lbs 0 oz    Constitutional: Awake, alert, cooperative, no apparent distress.  Cachectic.    ENT: Normocephalic, without obvious abnormality, atraumatic, oral pharynx with dry mucus membranes.  Eyes extra occular movements intact.  Normal sclera.    Neck: Supple, symmetrical, trachea midline, no adenopathy.  Pulmonary: No increased work of breathing, good air exchange, clear to auscultation bilaterally, no crackles or wheezing.  Cardiovascular: Regular rate and rhythm, normal S1 and S2, no S3 or S4, and no murmur noted.  GI: Firm, distended and tender to the epigastric region.    Skin/Integumen: Visualized skin appeared clear.  Neuro: CN II-XII grossly intact.  Upper and lower extremities strength, coordination and sensation intact bilaterally.    Psych:  Alert and oriented x 3. Normal affect.  Extremities: No lower extremity edema noted, and calves are non-tender to palpation bilaterally.     Medical Decision Making       Please see A&P for additional details of medical decision making.  Greater than 80 MINUTES SPENT BY ME on the date of service doing chart review, history, exam, documentation & further activities per the note.         Data   Results for orders placed or performed during the hospital encounter of 12/11/23 (from the past 24 hour(s))   Magnesium   Result Value Ref Range    Magnesium 1.5 (L) 1.7 - 2.3 mg/dL   Phosphorus   Result Value Ref Range    Phosphorus 3.5 2.5 - 4.5 mg/dL   INR   Result Value Ref Range    INR 1.11 0.85 - 1.15   Partial thromboplastin time   Result Value Ref Range    aPTT 28 22 - 38 Seconds   Phosphorus   Result Value Ref Range    Phosphorus 3.2 2.5 - 4.5 mg/dL   ABO/Rh type and screen    Narrative    The following orders were created for panel order ABO/Rh type and screen.  Procedure                               Abnormality         Status                     ---------                                -----------         ------                     Adult Type and Screen[[id number]]                            Final result                 Please view results for these tests on the individual orders.   Adult Type and Screen   Result Value Ref Range    ABO/RH(D) O POS     Antibody Screen Negative Negative    SPECIMEN EXPIRATION DATE [id number]

## 2023-12-11 NOTE — PROGRESS NOTES
UPDATE:    Spoke with Dr. Taylor from General Surgery following surgery.  Patient with noted 2 cm perforation at the GJ anastomosis that was repaired with omental patch.    --Patient to be strict NPO and plan for GI studies on POD #3 (Thursday).    --If patient begins to worsen she will likely need to return to the OR.    --Added IV fluconazole 400 mg/d.      Claus Stack PA-C  [facility]  Securely message with the Vocera Web Console (learn more here)  Text page via [email or web address] Paging/Directory

## 2023-12-11 NOTE — ANESTHESIA PROCEDURE NOTES
Airway       Patient location during procedure: OR       Procedure Start/Stop Times: 12/11/2023 3:56 PM  Staff -        Anesthesiologist:  Dean Arroyo MD       CRNA: Pavithra Locke APRN CRNA       Performed By: CRNA  Consent for Airway        Urgency: elective  Indications and Patient Condition       Indications for airway management: bhavesh-procedural       Induction type:RSI       Mask difficulty assessment: 0 - not attempted    Final Airway Details       Final airway type: endotracheal airway       Successful airway: ETT - single  Endotracheal Airway Details        ETT size (mm): 7.0       Cuffed: yes       Successful intubation technique: video laryngoscopy       VL Blade Size: Steiner 3       Grade View of Cords: 1       Adjucts: stylet       Position: Right       Measured from: gums/teeth       Secured at (cm): 21       Bite block used: None    Post intubation assessment        Placement verified by: capnometry and chest rise        Number of attempts at approach: 1       Secured with: tape       Ease of procedure: easy       Dentition: Unchanged    Medication(s) Administered   Medication Administration Time: 12/11/2023 3:56 PM

## 2023-12-11 NOTE — PROGRESS NOTES
Brief general surgery note:    S/p diagnostic lap and omental patch repair of gastric perforation. Drain in place.     --strict NPO and mIVF  --will plan for upper GI study on POD3  --continue Zosyn and Fluconazole   --daily PPI  --drain in place, monitor output  --NGT to LIS  --Abdomen XR to evaluate NG position     Sangeetha Taylor MD   PGY4 General Surgery Resident  [facility]

## 2023-12-11 NOTE — ED NOTES
Bed: ED03  Expected date: 12/11/23  Expected time: 4:10 AM  Means of arrival: Ambulance  Comments:  EMS

## 2023-12-11 NOTE — ED TRIAGE NOTES
"Patient presents via EMS for abdominal pain and some chest pain. Reports she was watching television when she noted the pain was getting worse, had her  call 911. Does endorse daily alcohol use, is unsure when exactly her last drink was. When asked about how much drinking she does she states \"all day everyday\".     Triage Assessment (Adult)       Row Name 12/11/23 0415          Triage Assessment    Airway WDL WDL        Respiratory WDL    Respiratory WDL X;rhythm/pattern     Rhythm/Pattern, Respiratory tachypneic        Skin Circulation/Temperature WDL    Skin Circulation/Temperature WDL WDL                     "

## 2023-12-11 NOTE — ANESTHESIA CARE TRANSFER NOTE
Patient: Pavithra Raines    Procedure: Procedure(s):  Diagnostic laparoscopy, laparoscopic patching of marginal ulcer       Diagnosis: Marginal ulcer [K28.9]  Diagnosis Additional Information: No value filed.    Anesthesia Type:   General     Note:    Oropharynx: oropharynx clear of all foreign objects  Level of Consciousness: awake and drowsy  Oxygen Supplementation: face mask    Independent Airway: airway patency satisfactory and stable  Dentition: dentition unchanged  Vital Signs Stable: post-procedure vital signs reviewed and stable  Report to RN Given: handoff report given  Patient transferred to: PACU    Handoff Report: Identifed the Patient, Identified the Reponsible Provider, Reviewed the pertinent medical history, Discussed the surgical course, Reviewed Intra-OP anesthesia mangement and issues during anesthesia, Set expectations for post-procedure period and Allowed opportunity for questions and acknowledgement of understanding    Vitals:  Vitals Value Taken Time   BP     Temp     Pulse     Resp     SpO2         Electronically Signed By: RICARDO Rodriguez CRNA  December 11, 2023  5:34 PM

## 2023-12-12 ENCOUNTER — PATIENT OUTREACH (OUTPATIENT)
Dept: CARE COORDINATION | Facility: CLINIC | Age: 68
End: 2023-12-12

## 2023-12-12 LAB
ALBUMIN SERPL BCG-MCNC: 2.6 G/DL (ref 3.5–5.2)
ALP SERPL-CCNC: 97 U/L (ref 40–150)
ALT SERPL W P-5'-P-CCNC: 5 U/L (ref 0–50)
ANION GAP SERPL CALCULATED.3IONS-SCNC: 7 MMOL/L (ref 7–15)
AST SERPL W P-5'-P-CCNC: 21 U/L (ref 0–45)
BILIRUB SERPL-MCNC: 0.7 MG/DL
BUN SERPL-MCNC: 10.1 MG/DL (ref 8–23)
CALCIUM SERPL-MCNC: 7.8 MG/DL (ref 8.8–10.2)
CHLORIDE SERPL-SCNC: 110 MMOL/L (ref 98–107)
CREAT SERPL-MCNC: 0.73 MG/DL (ref 0.51–0.95)
DEPRECATED HCO3 PLAS-SCNC: 24 MMOL/L (ref 22–29)
EGFRCR SERPLBLD CKD-EPI 2021: 90 ML/MIN/1.73M2
ERYTHROCYTE [DISTWIDTH] IN BLOOD BY AUTOMATED COUNT: 14.6 % (ref 10–15)
GLUCOSE SERPL-MCNC: 120 MG/DL (ref 70–99)
HCT VFR BLD AUTO: 30.1 % (ref 35–47)
HGB BLD-MCNC: 9.5 G/DL (ref 11.7–15.7)
MAGNESIUM SERPL-MCNC: 2.2 MG/DL (ref 1.7–2.3)
MCH RBC QN AUTO: 30.8 PG (ref 26.5–33)
MCHC RBC AUTO-ENTMCNC: 31.6 G/DL (ref 31.5–36.5)
MCV RBC AUTO: 98 FL (ref 78–100)
PLATELET # BLD AUTO: 358 10E3/UL (ref 150–450)
POTASSIUM SERPL-SCNC: 2.9 MMOL/L (ref 3.4–5.3)
POTASSIUM SERPL-SCNC: 3.5 MMOL/L (ref 3.4–5.3)
PROT SERPL-MCNC: 5.3 G/DL (ref 6.4–8.3)
RBC # BLD AUTO: 3.08 10E6/UL (ref 3.8–5.2)
SODIUM SERPL-SCNC: 141 MMOL/L (ref 135–145)
WBC # BLD AUTO: 17.5 10E3/UL (ref 4–11)

## 2023-12-12 PROCEDURE — 84132 ASSAY OF SERUM POTASSIUM: CPT | Performed by: STUDENT IN AN ORGANIZED HEALTH CARE EDUCATION/TRAINING PROGRAM

## 2023-12-12 PROCEDURE — 250N000011 HC RX IP 250 OP 636: Mod: JZ | Performed by: STUDENT IN AN ORGANIZED HEALTH CARE EDUCATION/TRAINING PROGRAM

## 2023-12-12 PROCEDURE — 258N000003 HC RX IP 258 OP 636: Performed by: STUDENT IN AN ORGANIZED HEALTH CARE EDUCATION/TRAINING PROGRAM

## 2023-12-12 PROCEDURE — 80053 COMPREHEN METABOLIC PANEL: CPT | Performed by: STUDENT IN AN ORGANIZED HEALTH CARE EDUCATION/TRAINING PROGRAM

## 2023-12-12 PROCEDURE — 250N000009 HC RX 250: Performed by: STUDENT IN AN ORGANIZED HEALTH CARE EDUCATION/TRAINING PROGRAM

## 2023-12-12 PROCEDURE — 85027 COMPLETE CBC AUTOMATED: CPT | Performed by: STUDENT IN AN ORGANIZED HEALTH CARE EDUCATION/TRAINING PROGRAM

## 2023-12-12 PROCEDURE — 250N000011 HC RX IP 250 OP 636: Performed by: STUDENT IN AN ORGANIZED HEALTH CARE EDUCATION/TRAINING PROGRAM

## 2023-12-12 PROCEDURE — 83735 ASSAY OF MAGNESIUM: CPT | Performed by: STUDENT IN AN ORGANIZED HEALTH CARE EDUCATION/TRAINING PROGRAM

## 2023-12-12 PROCEDURE — 99233 SBSQ HOSP IP/OBS HIGH 50: CPT | Performed by: STUDENT IN AN ORGANIZED HEALTH CARE EDUCATION/TRAINING PROGRAM

## 2023-12-12 PROCEDURE — 36415 COLL VENOUS BLD VENIPUNCTURE: CPT | Performed by: STUDENT IN AN ORGANIZED HEALTH CARE EDUCATION/TRAINING PROGRAM

## 2023-12-12 PROCEDURE — 250N000011 HC RX IP 250 OP 636: Mod: JZ | Performed by: PHYSICIAN ASSISTANT

## 2023-12-12 PROCEDURE — 120N000001 HC R&B MED SURG/OB

## 2023-12-12 PROCEDURE — C9113 INJ PANTOPRAZOLE SODIUM, VIA: HCPCS | Performed by: STUDENT IN AN ORGANIZED HEALTH CARE EDUCATION/TRAINING PROGRAM

## 2023-12-12 RX ORDER — POTASSIUM CHLORIDE 7.45 MG/ML
10 INJECTION INTRAVENOUS
Status: COMPLETED | OUTPATIENT
Start: 2023-12-12 | End: 2023-12-12

## 2023-12-12 RX ORDER — HEPARIN SODIUM 5000 [USP'U]/.5ML
5000 INJECTION, SOLUTION INTRAVENOUS; SUBCUTANEOUS EVERY 12 HOURS
Status: COMPLETED | OUTPATIENT
Start: 2023-12-12 | End: 2023-12-12

## 2023-12-12 RX ORDER — MULTIVITAMIN,THERAPEUTIC
1 TABLET ORAL DAILY
Status: DISCONTINUED | OUTPATIENT
Start: 2023-12-12 | End: 2023-12-14

## 2023-12-12 RX ORDER — CALCIUM CARBONATE 500 MG/1
500 TABLET, CHEWABLE ORAL 3 TIMES DAILY
Status: DISCONTINUED | OUTPATIENT
Start: 2023-12-12 | End: 2023-12-14

## 2023-12-12 RX ORDER — MULTIVITAMIN,THERAPEUTIC
1 TABLET ORAL 2 TIMES DAILY
Status: DISCONTINUED | OUTPATIENT
Start: 2023-12-12 | End: 2023-12-12

## 2023-12-12 RX ADMIN — HYDROMORPHONE HYDROCHLORIDE 0.2 MG: 0.2 INJECTION, SOLUTION INTRAMUSCULAR; INTRAVENOUS; SUBCUTANEOUS at 18:07

## 2023-12-12 RX ADMIN — FLUCONAZOLE 400 MG: 400 INJECTION, SOLUTION INTRAVENOUS at 23:13

## 2023-12-12 RX ADMIN — HYDROMORPHONE HYDROCHLORIDE 0.2 MG: 0.2 INJECTION, SOLUTION INTRAMUSCULAR; INTRAVENOUS; SUBCUTANEOUS at 10:25

## 2023-12-12 RX ADMIN — HYDROMORPHONE HYDROCHLORIDE 0.2 MG: 0.2 INJECTION, SOLUTION INTRAMUSCULAR; INTRAVENOUS; SUBCUTANEOUS at 22:57

## 2023-12-12 RX ADMIN — PIPERACILLIN AND TAZOBACTAM 3.38 G: 3; .375 INJECTION, POWDER, FOR SOLUTION INTRAVENOUS at 03:32

## 2023-12-12 RX ADMIN — POTASSIUM CHLORIDE 10 MEQ: 7.46 INJECTION, SOLUTION INTRAVENOUS at 15:43

## 2023-12-12 RX ADMIN — PANTOPRAZOLE SODIUM 40 MG: 40 INJECTION, POWDER, FOR SOLUTION INTRAVENOUS at 08:21

## 2023-12-12 RX ADMIN — PIPERACILLIN AND TAZOBACTAM 3.38 G: 3; .375 INJECTION, POWDER, FOR SOLUTION INTRAVENOUS at 16:04

## 2023-12-12 RX ADMIN — POTASSIUM CHLORIDE 10 MEQ: 7.46 INJECTION, SOLUTION INTRAVENOUS at 12:00

## 2023-12-12 RX ADMIN — POTASSIUM CHLORIDE 10 MEQ: 7.46 INJECTION, SOLUTION INTRAVENOUS at 13:59

## 2023-12-12 RX ADMIN — HYDROMORPHONE HYDROCHLORIDE 0.2 MG: 0.2 INJECTION, SOLUTION INTRAMUSCULAR; INTRAVENOUS; SUBCUTANEOUS at 13:56

## 2023-12-12 RX ADMIN — HEPARIN SODIUM 5000 UNITS: 5000 INJECTION, SOLUTION INTRAVENOUS; SUBCUTANEOUS at 21:30

## 2023-12-12 RX ADMIN — HYDROMORPHONE HYDROCHLORIDE 0.2 MG: 0.2 INJECTION, SOLUTION INTRAMUSCULAR; INTRAVENOUS; SUBCUTANEOUS at 00:52

## 2023-12-12 RX ADMIN — PIPERACILLIN AND TAZOBACTAM 3.38 G: 3; .375 INJECTION, POWDER, FOR SOLUTION INTRAVENOUS at 21:30

## 2023-12-12 RX ADMIN — THIAMINE HYDROCHLORIDE: 100 INJECTION, SOLUTION INTRAMUSCULAR; INTRAVENOUS at 08:21

## 2023-12-12 RX ADMIN — HEPARIN SODIUM 5000 UNITS: 5000 INJECTION, SOLUTION INTRAVENOUS; SUBCUTANEOUS at 08:21

## 2023-12-12 RX ADMIN — PIPERACILLIN AND TAZOBACTAM 3.38 G: 3; .375 INJECTION, POWDER, FOR SOLUTION INTRAVENOUS at 08:21

## 2023-12-12 RX ADMIN — POTASSIUM CHLORIDE 10 MEQ: 7.46 INJECTION, SOLUTION INTRAVENOUS at 10:25

## 2023-12-12 ASSESSMENT — ACTIVITIES OF DAILY LIVING (ADL)
ADLS_ACUITY_SCORE: 22
ADLS_ACUITY_SCORE: 22
ADLS_ACUITY_SCORE: 26
ADLS_ACUITY_SCORE: 26
ADLS_ACUITY_SCORE: 22
ADLS_ACUITY_SCORE: 26
ADLS_ACUITY_SCORE: 22
ADLS_ACUITY_SCORE: 26
DEPENDENT_IADLS:: INDEPENDENT
ADLS_ACUITY_SCORE: 22
ADLS_ACUITY_SCORE: 22
ADLS_ACUITY_SCORE: 26
ADLS_ACUITY_SCORE: 22

## 2023-12-12 NOTE — CONSULTS
Care Management Initial Consult    General Information  Assessment completed with: Patient,  (Shanika)  Type of CM/SW Visit: Initial Assessment    Primary Care Provider verified and updated as needed: Yes   Readmission within the last 30 days: unable to assess      Reason for Consult: discharge planning  Advance Care Planning: Advance Care Planning Reviewed: no concerns identified          Communication Assessment  Patient's communication style: spoken language (English or Bilingual)    Hearing Difficulty or Deaf: no   Wear Glasses or Blind: yes    Cognitive  Cognitive/Neuro/Behavioral: .WDL except  Level of Consciousness: alert  Arousal Level: arouses to voice, arouses to touch/gentle shaking, opens eyes spontaneously  Orientation: disoriented to, time  Mood/Behavior: calm, cooperative  Best Language: 0 - No aphasia  Speech: clear, logical, spontaneous    Living Environment:   People in home: spouse     Current living Arrangements: house      Able to return to prior arrangements: other (see comments)  Living Arrangement Comments:  (TBD, may need TCU)    Family/Social Support:  Care provided by: self, spouse/significant other  Provides care for:    Marital Status:     Bran       Description of Support System: Supportive, Involved    Support Assessment: Adequate family and caregiver support    Current Resources:   Patient receiving home care services:       Community Resources:    Equipment currently used at home: grab bar, tub/shower  Supplies currently used at home:      Employment/Financial:  Employment Status: employed full-time     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 Needs to get SS because of the inability to work)   Referral to Financial Worker: Yes       Does the patient's insurance plan have a 3 day qualifying hospital stay waiver?  No    Lifestyle &  Psychosocial Needs:  Social Determinants of Health     Food Insecurity: Food Insecurity Present (4/24/2023)    Hunger Vital Sign     Worried About Running Out of Food in the Last Year: Sometimes true     Ran Out of Food in the Last Year: Sometimes true   Depression: At risk (5/12/2023)    PHQ-2     PHQ-2 Score: 3   Housing Stability: Low Risk  (4/24/2023)    Housing Stability Vital Sign     Unable to Pay for Housing in the Last Year: No     Number of Places Lived in the Last Year: 1     Unstable Housing in the Last Year: No   Tobacco Use: High Risk (12/12/2023)    Patient History     Smoking Tobacco Use: Every Day     Smokeless Tobacco Use: Never     Passive Exposure: Not on file   Financial Resource Strain: High Risk (4/24/2023)    Overall Financial Resource Strain (CARDIA)     Difficulty of Paying Living Expenses: Hard   Alcohol Use: Alcohol Misuse (4/24/2023)    AUDIT-C     Frequency of Alcohol Consumption: 2-4 times a month     Average Number of Drinks: 5 or 6     Frequency of Binge Drinking: Monthly   Transportation Needs: Unmet Transportation Needs (4/24/2023)    PRAPARE - Transportation     Lack of Transportation (Medical): Yes     Lack of Transportation (Non-Medical): Yes   Physical Activity: Inactive (4/24/2023)    Exercise Vital Sign     Days of Exercise per Week: 0 days     Minutes of Exercise per Session: 0 min   Interpersonal Safety: Not on file   Stress: Stress Concern Present (4/24/2023)    [employer] [place] [employer] - Occupational Stress Questionnaire     Feeling of Stress : Very much   Social Connections: Unknown (4/24/2023)    Social Connection and Isolation Panel [NHANES]     Frequency of Communication with Friends and Family: More than three times a week     Frequency of Social Gatherings with Friends and Family: Once a week     Attends Islam Services: Patient refused     Active Member of Clubs or Organizations: No     Attends Club or Organization Meetings: 1 to 4 times per year      Marital Status:        Functional Status:  Prior to admission patient needed assistance:   Dependent ADLs:: Independent  Dependent IADLs:: Independent       Mental Health Status:  Mental Health Status: Current Concern  Mental Health Management: Medication, Other (see comment) (would like CD/MI treatment)    Chemical Dependency Status:  Chemical Dependency Status: Current Concern  Chemical Dependency Management: Previous treatment          Values/Beliefs:  Spiritual, Cultural Beliefs, [employer] Practices, Values that affect care:                 Additional Information:  Consult for discharge planning. 68 year old female patient   alcohol abuse disorder, chronic smoker, COPD, Tom-en-Y gastric bypass 2008, cholecystectomy who presented with acute abdominal pain with CT demonstrating gastric perforation, likely ulcer perforation.   Met with patient at bedside and introduced self and role. Patient lives in a home with her spouse. 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 Patient experienced twenty years of abstinence prior to drinking again. Expressed interest in dual diagnosis treatment. Patient needs medicare part D, writer will send a referral to [facility]. Patient also needs social security benefits but is aware of what she needs to do to obtain. Patient open to whatever is recommended, unsure if she will need TCU or might be able to go to a inpatient treatment facility. Patient went to [facility] recently and would prefer that facility.     SW following for safe discharge planning.       ANUP Hurd

## 2023-12-12 NOTE — ANESTHESIA POSTPROCEDURE EVALUATION
Patient: Pavithra Raines    Procedure: Procedure(s):  Diagnostic laparoscopy, laparoscopic patching of marginal ulcer       Anesthesia Type:  General    Note:  Disposition: Inpatient   Postop Pain Control: Uneventful            Sign Out: Well controlled pain   PONV: No   Neuro/Psych: Uneventful            Sign Out: Acceptable/Baseline neuro status   Airway/Respiratory: Uneventful            Sign Out: Acceptable/Baseline resp. status   CV/Hemodynamics: Uneventful            Sign Out: Acceptable CV status; No obvious hypovolemia; No obvious fluid overload   Other NRE: NONE   DID A NON-ROUTINE EVENT OCCUR?            Last vitals:  Vitals Value Taken Time   /71 12/11/23 1935   Temp 37.7  C (99.9  F) 12/11/23 1935   Pulse 87 12/11/23 1938   Resp 13 12/11/23 1938   SpO2 97 % 12/11/23 1939   Vitals shown include unfiled device data.    Electronically Signed By: Mey Childers  December 11, 2023  9:28 PM

## 2023-12-12 NOTE — PLAN OF CARE
Goal Outcome Evaluation:       Patient A&OX4. VSS on RA. Hypoactive BS. Port site CDI. ARSENIO with dried leakage. Negative for flatus. No BM this shift. Strict NPO. K+ replaced- pending recheck at 2000. Up walking with SBA. Plan to have G-tube place tomorrow.  Continue to monitor.

## 2023-12-12 NOTE — PROGRESS NOTES
"CLINICAL NUTRITION SERVICES  -  ASSESSMENT NOTE    RECOMMENDATIONS FOR MD/PROVIDER TO ORDER:   Please consult nutrition if/when TF to be initiated   Future/Additional Recommendations:   Will order supplements for previous gastric bypass surgery:  - MVI/M  - Calcium carbonate 500 mg TID  - Vitamin D3 5,000 international unit(s)  - Vitron C 1 tablet   Malnutrition:   % Weight Loss:  Weight loss does not meet criteria for malnutrition  % Intake:  </= 50% for >/= 1 month (severe malnutrition)  Subcutaneous Fat Loss:  global severe  Muscle Loss:  global severe  Fluid Retention:  None noted    Malnutrition Diagnosis: Severe malnutrition  In Context of:  Acute illness or injury  Chronic illness or disease  Environmental or social circumstances     REASON FOR ASSESSMENT  Pavithra Raines is a 68 year old female seen by Registered Dietitian for Admission Nutrition Risk Screen for answering \"yes\" to recently losing weight without trying (14-23#) and \"yes\" to recently eating poorly d/t a decrease in appetite.    PMH of sudha-en-y gastric bypass, alcohol use disorder, fatty liver disease, GERD, COPD. Presented with acute abdominal pain, perforated viscus. Had immediate laparoscopy patching of marginal ulcer yesterday 12/11.    NUTRITION HISTORY    - Per H&P, patient began to develop watery diarrhea, nausea, vomiting morning of 12/10  - Per PA surgery note today, \"Tentatively plan for return to OR tomorrow for laparoscopic placement of feeding tube to allow for feeding.\"  - reviewed previous RD notes in October 2023, \"Per , pt drinks daily - usual consumption is 12 beers, 1 bottle of wine, and 10 shooters of fireball.\" Patient was started on TF this admit (vital 1.5 @ goal 40 mL/hr). Was also taking Ensure TID at this time.    - Spoke with patient at bedside. Per patient, it has been hard for her to eat much in the past two weeks due to nausea and vomiting. She said at baseline d/t her gastric bypass she is a " "grazer, doesn't eat much in one sitting. However the past two weeks have been much less than normal. She said tomorrow she is getting a feeding tube placed and she is on board with starting tube feeds. Per patient, she takes iron, folic acid, MVI/M, vitamin B12 monthly injects, and vitamin D supplements d/t her gastric bypass. She said she's not always perfect with taking them.     CURRENT NUTRITION ORDERS  Diet Order: NPO     Current Intake/Tolerance:  - N/A    NUTRITION FOCUSED PHYSICAL ASSESSMENT FOR DIAGNOSING MALNUTRITION)  Yes             Observed:    Muscle wasting (refer to documentation in Malnutrition section) and Subcutaneous fat loss (refer to documentation in Malnutrition section)    ANTHROPOMETRICS  Height: 5' 4\"  Weight: 45.4 kg, 100 lbs 0 oz  Body mass index is 17.16 kg/m .  Weight Status:  Underweight BMI <18.5  IBW: 54.5 kg  % IBW: 83%  Weight History: per patient she has lost a lot of weight in the past 6-12 months (did not know how much). 5.6% wt loss in 6 months.  Wt Readings from Last 10 Encounters:   12/11/23 45.4 kg (100 lb)   12/11/23 45.4 kg (100 lb)   11/26/23 45.4 kg (100 lb)   11/24/23 45.4 kg (100 lb)   11/01/23 45.4 kg (100 lb)   10/30/23 45.8 kg (101 lb)   10/25/23 46.3 kg (102 lb)   10/24/23 48.1 kg (106 lb 0.7 oz)   06/01/23 49.9 kg (110 lb)   12/10/22 51.9 kg (114 lb 8 oz)     LABS  K 2.9 (L), Mg 1.5 (L)    MEDICATIONS  Medications reviewed    ASSESSED NUTRITION NEEDS PER APPROVED PRACTICE GUIDELINES:  Dosing Weight 45.4 kg  Estimated Energy Needs: 4328-0682 kcals (30-35 Kcal/Kg)  Justification: repletion and underweight  Estimated Protein Needs: 55-68 grams protein (1.2-1.5 g pro/Kg)  Justification: Repletion  Estimated Fluid Needs: 1 mL/kcal or per provider pending fluid status    NUTRITION DIAGNOSIS:  Inadequate oral intake related to poor appetite as evidenced by pt report of poor appetite d/t nausea and vomiting, severe subcutaneous fat/muscle loss on exam, current NPO " status    NUTRITION INTERVENTIONS  Recommendations / Nutrition Prescription  See above    Implementation  Nutrition education: Per Provider order if indicated   Multivitamin/Mineral - ordered    Nutrition Goals  Diet advancement vs nutrition support within 2-3 days    MONITORING AND EVALUATION:  Progress towards goals will be monitored and evaluated per protocol and Practice Guidelines    Asia Hardin RD, LD  Clinical Dietitian - [facility]

## 2023-12-12 NOTE — PROGRESS NOTES
[facility]  Hospitalist Progress Note    Assessment & Plan   Pavithra Raines is a 68 year old female admitted on 12/11/2023.     Past medical history significant for Alcohol use D/O with history of withdrawal with seizures, Coagulopathy thought to be secondary to Alcohol dependence, Fatty liver disease, GERD, Chronic anemia (B12, Iron), Emphysema/COPD, Cannabis use D/O, Seizure D/O, MDD with anxiety, PTSD, Insomnia, Hypersomnia with sleep apnea, RLS, Genital herpes, Malnutrition who was admitted under General Surgery due to perforated bowel possibly at the stomach or previous gastric bypass site and alcohol withdrawal.       Patient presented to [facility] emergency department due to abdominal pain and chest pain.  Patient reported that she began to feel unwell the morning of 12/10/2023 when she started to develop watery diarrhea.  Patient was unable to consume any alcohol throughout the day of 12/10 because she continued to feel unwell and developed nausea symptoms as well as some vomiting.  Around 3 PM and 12/10 she developed severe abdominal pain.  This pain became so intolerable that she called 911 and was brought into the ED.  Patient endorsed concerns of going into alcohol withdrawal and was noted to be tremulous in the ED.     Workup in the ED included a CMP that revealed a CO2 of 18, calcium of 8.6, magnesium of 1.4, albumin of 3.1, alk phos of 175, total bili of 1.6, glucose of 115 otherwise within normal limits.  Lactic acid level was within normal limits at 1.8.  Lipase level was mildly elevated at 65.  Inflammatory CRP was elevated at 8.32.  CBC with differential revealed a hemoglobin of 11.6 and platelet count of 460 otherwise within normal limits.  UA revealed trace leukocyte Estrace with 3 RBCs and 2 squamous epithelial cells with mucus present and hyaline casts of 3.  Abdomen/pelvis CT with contrast revealed free intraperitoneal gas and fluid consistent with  perforated viscus (the site of perforation is not certain though may be from the stomach/gastric bypass).     The ED was in contact with General Surgery (Dr. Guaman) who felt patient's case was too complicated and recommended transfer to a tertiary care center.  Attempts to transfer to the [facility] were performed but due to bed availability patient was ultimately transferred to [facility].  Discussions occurred between the ED (Dr. Brooks), General Surgery (Dr. Gonzalez) and Hospitalist (Dr. Monaco) at [facility] and patient was admitted under General Surgery with verbal request for Hospitalist consult upon arrival on 23. Patient was taken to the OR on 23 where they found a perforated marginal ulcer which they fixed with an omental patch repair.      Perforated Marginal Ulcer s/p Omental Patch Repair 23   Elevated inflammatory CRP  Abdominal pain    - General surgery is managing.                -Defer analgesic/pain management, DVT prophylaxis, PT/OT.       - Patient may be going to the OR tomorrow for G tube placement      - Hgb: 11.6 > 6.5     - Continue to monitor   - Encourage utilization of incentive spirometer.     - Continue IV Zosyn and Fluconazole      Leukocytosis   Likely related to recent surgery    - Continue to monitor     Alcohol withdrawal  Alcohol use D/O with history of withdrawal with seizures  - CIWA ordered with PRN Ativan ordered.   - Has not needed PRNs   - Continue IV Multivitamins as patient is NPO      Hypomagnesemia: Resolved   Hypokalemia    - M.4 > 2.2    - K: 2.9   - Monitor and replace per protocol.       Chronic Medical Problems:     Heart murmur  Noted slight heart murmur on examination.  Patient denied every being told about a murmur in the past. Previous ECHO completed 10/2023 and without aortic stenosis.    - Monitor     Coagulopathy thought to be secondary to Alcohol dependence  INR and PTT checked after assessing the patient and within  "normal limits.    - Monitor PRN.       Fatty liver disease  - Follow up with PCP as an outpatient.       GERD  Has not taken any medications in the last month as she ran out.    - Continue IV Protonix 40 mg/d.       Chronic anemia (B12 and Iron)  - Continue IV multivitamin (banana bag) ordered.     - Hold PO vitamins as patient is strict NPO      Emphysema/COPD   Recently quit smoking in the last 2 months and was previously smoking 2 packs per day.  Not currently taking any medications.   - Monitor.       Cannabis use D/O  - Will require counseling regarding cessation     Seizure D/O  Likely due to alcohol withdrawal.  Patient not prescribed antiepileptics per chart review.       MDD with anxiety  PTSD  Has not taken any medications in the last month or two as she ran out.    - Hold on resuming previously prescribed Buspar 15 mg TID, Klonopin 0.5 mg daily PRN (anxiety),      Insomnia  Hypersomnia with sleep apnea  Noted on chart review.  Does not appear to have been prescribed any medications.       RLS  Noted on chart review.  Does not appear to have been prescribed any medications.       Genital herpes  Noted on chart review.  Does not appear to have been prescribed any medications.       Severe Malnutrition  - Recommend Nutrition consult once diet can be advanced.    - Hold PO vitamins as patient is strict NPO      Osteoporosis  - Hold TPA Evista as patient has not been taking this medication.     Clinically Significant Risk Factors Present on Admission        # Hypokalemia: Lowest K = 2.9 mmol/L in last 2 days, will replace as needed     # Hypomagnesemia: Lowest Mg = 1.4 mg/dL in last 2 days, will replace as needed   # Hypoalbuminemia: Lowest albumin = 2.6 g/dL at 12/12/2023  7:08 AM, will monitor as appropriate          # Cachexia: Estimated body mass index is 17.16 kg/m  as calculated from the following:    Height as of this encounter: 1.626 m (5' 4\").    Weight as of this encounter: 45.4 kg (100 lb).    # " Severe Malnutrition: based on nutrition assessment              Diet: NPO for Medical/Clinical Reasons Except for: No Exceptions     DVT Prophylaxis: Defer to primary service   Barney Catheter: PRESENT, indication: Anesthesia  Lines: None     Cardiac Monitoring: None  Code Status: Full Code      Disposition Plan      Expected Discharge Date: 12/13/2023              Entered: Torri Romero MD 12/12/2023, 12:04 PM     Notes Reviewed: Surgery     Care Team Updated: yes     Disposition: Timing per Surgery     Torri Romero MD  Hospitalist Service   [facility]  Securely message with the Vocera Web Console (learn more here)  Text page via [employer] Paging/Directory         Medical Decision Making       60 MINUTES SPENT BY ME on the date of service doing chart review, history, exam, documentation & further activities per the note.           Interval History     Assumed care this morning.     This morning the patient was laying in bed. Had just finished speaking general surgery when I came into the room. The patient states that she is doing okay. Having some pain in her abdomen but the pain medications have been helping.     Denies headache, chest pain, SOB, nausea, vomiting.     -Data reviewed today: I reviewed all new labs and imaging results over the last 24 hours.   Physical Exam   Temp: 99.8  F (37.7  C) Temp src: Oral BP: 99/62 Pulse: 86   Resp: 16 SpO2: 95 % O2 Device: None (Room air) Oxygen Delivery: 1 LPM  Vitals:    12/11/23 1546   Weight: 45.4 kg (100 lb)     Vital Signs with Ranges  Temp:  [98.9  F (37.2  C)-101.4  F (38.6  C)] 99.8  F (37.7  C)  Pulse:  [] 86  Resp:  [12-31] 16  BP: ()/(46-79) 99/62  SpO2:  [93 %-100 %] 95 %  I/O last 3 completed shifts:  In: 2250 [I.V.:1750]  Out: 730 [Urine:375; Drains:335; Blood:20]      Constitutional: Awake, alert, cooperative, no apparent distress  HEENT: PERRL, Normocephalic, without obvious abnormality, atraumatic, oral  pharynx with moist mucus membranes  Pulmonary: Clear breath sounds bilaterally   Cardiovascular: Regular rate and rhythm, normal S1 and S2  GI: soft, multiple surgical incisions in place and covered with bandage, ARSENIO drain noted on left abdomen, some tenderness with palpation, BS+  Skin/Integumen: Visualized skin appeared clear.  Neuro: Moves all 4 extremities, no tremor noted   Psych:  Alert and oriented x 3. Normal affect.  Extremities: No lower extremity edema noted      Medications    lactated ringers 100 mL/hr at 12/11/23 2219      [Held by provider] calcium carbonate  500 mg Oral TID    [Held by provider] ferrous fumarate 65 mg (Hoopa. FE)-Vitamin C 125 mg  1 tablet Oral Daily    fluconazole  400 mg Intravenous Q24H    heparin ANTICOAGULANT  5,000 Units Subcutaneous Q12H    [Held by provider] multivitamin, therapeutic  1 tablet Oral Daily    pantoprazole  40 mg Intravenous Daily with breakfast    piperacillin-tazobactam  3.375 g Intravenous Q6H    potassium chloride  10 mEq Intravenous Q1H    sodium chloride (PF)  3 mL Intracatheter Q8H    sodium chloride 0.9 % 1,000 mL with Infuvite Adult 10 mL, thiamine 100 mg, folic acid 1 mg infusion   Intravenous Q24H    [Held by provider] cholecalciferol  125 mcg Oral Daily       Data   Recent Labs   Lab 12/12/23  0708 12/11/23  1419 12/11/23  0459 12/09/23  0221   WBC 17.5*  --  9.0 8.3   HGB 9.5*  --  11.6* 10.8*   MCV 98  --  92 96     --  460* 394   INR  --  1.11  --   --      --  135 137   POTASSIUM 2.9*  --  4.0 4.4   CHLORIDE 110*  --  102 103   CO2 24  --  18* 23   BUN 10.1  --  8.7 7.1*   CR 0.73  --  0.69 0.63   ANIONGAP 7  --  15 11   SHON 7.8*  --  8.6* 8.2*   *  --  115* 87   ALBUMIN 2.6*  --  3.1*  --    PROTTOTAL 5.3*  --  7.0  --    BILITOTAL 0.7  --  1.6*  --    ALKPHOS 97  --  175*  --    ALT 5  --  6  --    AST 21  --  16  --    LIPASE  --   --  65*  --        Recent Results (from the past 24 hour(s))   XR Abdomen 1 View    Narrative     EXAM: XR ABDOMEN 1 VIEW  LOCATION: [facility]  DATE: 12/11/2023    INDICATION: s p omental patch repair of gastric perf. evaluate NG position  COMPARISON: None.      Impression    IMPRESSION: Nasogastric tube terminates over the distal esophagus. Multiple clips in the epigastrium and left lower quadrant. Surgical drain also noted in the midline.

## 2023-12-12 NOTE — PROGRESS NOTES
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 Will follow-up up on results  - Hospitalist following for management of multiple medical issues, greatly appreciate input  - Surgical drain in place to bulb suction, strip every shift and record output  - Work on out of bed, ambulate at least QID, wear PCDs while resting. Encourage IS use every hour. Will add subq heparin today for DVT ppx, hold at midnight    Tentatively plan for return to OR tomorrow for laparoscopic placement of feeding tube to allow for feeding. Patient's healing potential is poor given her numerous medical issues, would likely benefit from tube feeds for some time to provide adequate nutrition. Dr. Gonzalez to see patient today and discuss further.        Interval History:   Tmax 100.6 since surgery, afebrile overnight and this morning. Shanika reports overall feeling fairly well. She is asking appropriate questions. She did have one small BM since surgery. No nausea, minimal abdominal pain. She has been up walking to the bathroom. Discussed possible feeding tube placement, she is agreeable to this.           Physical Exam:   /65 (BP Location: Right arm, Patient Position: Semi-Hassan's)   Pulse 91   Temp 98.9  F (37.2  C) (Oral)   Resp 16   Ht 1.626 m (5' 4\")   Wt 45.4 kg (100 lb)   LMP  (LMP Unknown)   SpO2 93%   BMI 17.16 kg/m    I/O last 3 completed shifts:  In: 2250 [I.V.:1750]  Out: 730 [Urine:375; Drains:335; " Blood:20]  General: NAD, pleasant, alert and awake  Respiratory: non-labored breathing  Abdomen: soft, nondistended, mild appropriate tenderness around incisions. ARSENIO drain with serosanguinous fluid. Incisions clean and intact with steri strips and bandaids  Extremities: no lower extremity edema, no calf tenderness    LABS:  Recent Labs   Lab Test 12/11/23 0459 12/09/23 0221 11/26/23 1954   WBC 9.0 8.3 8.4   HGB 11.6* 10.8* 10.7*   MCV 92 96 94   * 394 310      Recent Labs   Lab Test 12/11/23 0459 12/09/23 0221 11/26/23 1954   POTASSIUM 4.0 4.4 3.6   CHLORIDE 102 103 103   CO2 18* 23 21*   BUN 8.7 7.1* 3.4*   CR 0.69 0.63 0.63   ANIONGAP 15 11 17*      Recent Labs   Lab Test 12/11/23  0459 10/20/23  0505 10/11/23  0612 10/22/21  0930 09/01/21  0808 12/11/20  1215 10/06/20  1829   ALBUMIN 3.1* 2.2* 1.7*   < > 2.9*   < > 2.7*   BILITOTAL 1.6* 0.3 0.2   < > 1.0   < > 0.5   ALT 6 14 15   < > 17   < > 25   AST 16 20 37   < > 15   < > 19   ALKPHOS 175* 65 79   < > 167*   < > 101   AMYLASE  --   --   --   --  60  --  84    < > = values in this interval not displayed.            -------------------------------    Brooke Garcia PA-C  [facility]  [phone]

## 2023-12-12 NOTE — PROGRESS NOTES
Clinic Care Coordination Contact  Ambulatory Care Coordination to Inpatient Care Management   Hand-In Communication    Date:  December 12, 2023  Name: Pavithra Raines is enrolled in Ambulatory Care Coordination program and I am the Lead Care Coordinator.  CC Contact Information: Epic IntwiDAQsket + phone  Payor Source: Payor: MEDICARE / Plan: MEDICARE PT A ONLY / Product Type: Medicare /   Current services in place:     Please see the CC Snaphot and Care Management Flowsheets for specific  details of this Pavithar Raines care plan.   Additional details/specific concerns r/t this admission:    Psychosocial financial, insurance, food and Substance Abuse alcohol abuse    I will follow this admission in Epic. Please feel free to contact me with questions or for further collaboration in discharge planning.    Jenny Hewitt, ANUP   [facility] - Care Coordination  [facility]   [phone]

## 2023-12-12 NOTE — PROGRESS NOTES
Notified provider about indwelling mccabe catheter discussed removal or continued need.    Did provider choose to remove indwelling mccabe catheter? No    Provider's mccabe indication for keeping indwelling mccabe catheter: Anesthesia.    Is there an order for indwelling mccabe catheter? Yes    *If there is a plan to keep mccabe catheter in place at discharge daily notification with provider is not necessary, but please add a notation in the treatment team sticky note that the patient will be discharging with the catheter.

## 2023-12-12 NOTE — PLAN OF CARE
Goal Outcome Evaluation:      Plan of Care Reviewed With: patient    Date & Time: 12/11/2023-12/12/2023 2000-0730  Surgery/POD#: pod1 Lap  Behavior & Aggression: Diagnostic lap and omental patch repair of gastric perforation   Fall Risk: yes  Orientation:A&Ox4. CIWA scoring 0 most of the night, however mild tremors noted with some anxiety relieved with PRN pain management.   ABNL VS/O2:VSS on RA, BP soft at times. Elevated temp, M-temp 99.1  ABNL Labs: see results.   Pain Management: PRN iv dilaudid 0.2 x2 with good relief  Bowel/Bladder: mccabe, 2 small loose BM.   Drains: ARSENIO with serosang output, dressing with drainage noted. Mccabe catheter.   Diet:strict NPO  Activity Level: up with a1GB iv pole. Ambulated to bathroom and back to bed.  Tests/Procedures: na  Anticipated  DC Date: TBD, plans for upper GI study in few days.   Significant Information: pt did have one episode of incontinence of BM. 3 port sites CDI. Denies nausea. PIV with LR infusing and intermittent abx.

## 2023-12-13 ENCOUNTER — ANESTHESIA (OUTPATIENT)
Dept: SURGERY | Facility: CLINIC | Age: 68
DRG: 326 | End: 2023-12-13
Payer: MEDICARE

## 2023-12-13 ENCOUNTER — APPOINTMENT (OUTPATIENT)
Dept: SURGERY | Facility: PHYSICIAN GROUP | Age: 68
End: 2023-12-13

## 2023-12-13 ENCOUNTER — ANESTHESIA EVENT (OUTPATIENT)
Dept: SURGERY | Facility: CLINIC | Age: 68
DRG: 326 | End: 2023-12-13
Payer: MEDICARE

## 2023-12-13 LAB
ANION GAP SERPL CALCULATED.3IONS-SCNC: 10 MMOL/L (ref 7–15)
BUN SERPL-MCNC: 14.4 MG/DL (ref 8–23)
CALCIUM SERPL-MCNC: 7.8 MG/DL (ref 8.8–10.2)
CHLORIDE SERPL-SCNC: 111 MMOL/L (ref 98–107)
CREAT SERPL-MCNC: 0.84 MG/DL (ref 0.51–0.95)
DEPRECATED HCO3 PLAS-SCNC: 19 MMOL/L (ref 22–29)
EGFRCR SERPLBLD CKD-EPI 2021: 76 ML/MIN/1.73M2
ERYTHROCYTE [DISTWIDTH] IN BLOOD BY AUTOMATED COUNT: 14.9 % (ref 10–15)
GLUCOSE BLDC GLUCOMTR-MCNC: 106 MG/DL (ref 70–99)
GLUCOSE BLDC GLUCOMTR-MCNC: 124 MG/DL (ref 70–99)
GLUCOSE BLDC GLUCOMTR-MCNC: 130 MG/DL (ref 70–99)
GLUCOSE BLDC GLUCOMTR-MCNC: 54 MG/DL (ref 70–99)
GLUCOSE BLDC GLUCOMTR-MCNC: 99 MG/DL (ref 70–99)
GLUCOSE SERPL-MCNC: 63 MG/DL (ref 70–99)
HCT VFR BLD AUTO: 29.4 % (ref 35–47)
HGB BLD-MCNC: 9 G/DL (ref 11.7–15.7)
LACTATE SERPL-SCNC: 0.6 MMOL/L (ref 0.7–2)
MCH RBC QN AUTO: 30.8 PG (ref 26.5–33)
MCHC RBC AUTO-ENTMCNC: 30.6 G/DL (ref 31.5–36.5)
MCV RBC AUTO: 101 FL (ref 78–100)
PLATELET # BLD AUTO: 351 10E3/UL (ref 150–450)
POTASSIUM SERPL-SCNC: 3.6 MMOL/L (ref 3.4–5.3)
RBC # BLD AUTO: 2.92 10E6/UL (ref 3.8–5.2)
SODIUM SERPL-SCNC: 140 MMOL/L (ref 135–145)
WBC # BLD AUTO: 14.4 10E3/UL (ref 4–11)

## 2023-12-13 PROCEDURE — 36415 COLL VENOUS BLD VENIPUNCTURE: CPT | Performed by: STUDENT IN AN ORGANIZED HEALTH CARE EDUCATION/TRAINING PROGRAM

## 2023-12-13 PROCEDURE — 250N000011 HC RX IP 250 OP 636: Performed by: REGISTERED NURSE

## 2023-12-13 PROCEDURE — C9113 INJ PANTOPRAZOLE SODIUM, VIA: HCPCS | Performed by: STUDENT IN AN ORGANIZED HEALTH CARE EDUCATION/TRAINING PROGRAM

## 2023-12-13 PROCEDURE — 250N000009 HC RX 250: Performed by: SURGERY

## 2023-12-13 PROCEDURE — 85014 HEMATOCRIT: CPT | Performed by: STUDENT IN AN ORGANIZED HEALTH CARE EDUCATION/TRAINING PROGRAM

## 2023-12-13 PROCEDURE — 360N000076 HC SURGERY LEVEL 3, PER MIN: Performed by: SURGERY

## 2023-12-13 PROCEDURE — 250N000025 HC SEVOFLURANE, PER MIN: Performed by: SURGERY

## 2023-12-13 PROCEDURE — 83605 ASSAY OF LACTIC ACID: CPT | Performed by: STUDENT IN AN ORGANIZED HEALTH CARE EDUCATION/TRAINING PROGRAM

## 2023-12-13 PROCEDURE — 999N000216 HC STATISTIC ADULT CD FACE TO FACE-NO CHRG

## 2023-12-13 PROCEDURE — 272N000001 HC OR GENERAL SUPPLY STERILE: Performed by: SURGERY

## 2023-12-13 PROCEDURE — 258N000003 HC RX IP 258 OP 636: Performed by: STUDENT IN AN ORGANIZED HEALTH CARE EDUCATION/TRAINING PROGRAM

## 2023-12-13 PROCEDURE — 710N000009 HC RECOVERY PHASE 1, LEVEL 1, PER MIN: Performed by: SURGERY

## 2023-12-13 PROCEDURE — 250N000011 HC RX IP 250 OP 636: Performed by: STUDENT IN AN ORGANIZED HEALTH CARE EDUCATION/TRAINING PROGRAM

## 2023-12-13 PROCEDURE — 0DQ60ZZ REPAIR STOMACH, OPEN APPROACH: ICD-10-PCS | Performed by: SURGERY

## 2023-12-13 PROCEDURE — 999N000141 HC STATISTIC PRE-PROCEDURE NURSING ASSESSMENT: Performed by: SURGERY

## 2023-12-13 PROCEDURE — 370N000017 HC ANESTHESIA TECHNICAL FEE, PER MIN: Performed by: SURGERY

## 2023-12-13 PROCEDURE — 258N000001 HC RX 258: Performed by: STUDENT IN AN ORGANIZED HEALTH CARE EDUCATION/TRAINING PROGRAM

## 2023-12-13 PROCEDURE — 80048 BASIC METABOLIC PNL TOTAL CA: CPT | Performed by: STUDENT IN AN ORGANIZED HEALTH CARE EDUCATION/TRAINING PROGRAM

## 2023-12-13 PROCEDURE — 250N000009 HC RX 250: Performed by: STUDENT IN AN ORGANIZED HEALTH CARE EDUCATION/TRAINING PROGRAM

## 2023-12-13 PROCEDURE — 250N000009 HC RX 250

## 2023-12-13 PROCEDURE — 258N000001 HC RX 258: Performed by: SURGERY

## 2023-12-13 PROCEDURE — 250N000009 HC RX 250: Performed by: REGISTERED NURSE

## 2023-12-13 PROCEDURE — 250N000011 HC RX IP 250 OP 636

## 2023-12-13 PROCEDURE — 250N000011 HC RX IP 250 OP 636: Performed by: PHYSICIAN ASSISTANT

## 2023-12-13 PROCEDURE — 99232 SBSQ HOSP IP/OBS MODERATE 35: CPT | Performed by: STUDENT IN AN ORGANIZED HEALTH CARE EDUCATION/TRAINING PROGRAM

## 2023-12-13 PROCEDURE — 43653 LAPAROSCOPY GASTROSTOMY: CPT | Mod: 78 | Performed by: SURGERY

## 2023-12-13 PROCEDURE — P9045 ALBUMIN (HUMAN), 5%, 250 ML: HCPCS | Mod: JZ

## 2023-12-13 PROCEDURE — 120N000001 HC R&B MED SURG/OB

## 2023-12-13 RX ORDER — SODIUM CHLORIDE, SODIUM LACTATE, POTASSIUM CHLORIDE, CALCIUM CHLORIDE 600; 310; 30; 20 MG/100ML; MG/100ML; MG/100ML; MG/100ML
INJECTION, SOLUTION INTRAVENOUS CONTINUOUS
Status: CANCELLED | OUTPATIENT
Start: 2023-12-13

## 2023-12-13 RX ORDER — DEXAMETHASONE SODIUM PHOSPHATE 4 MG/ML
INJECTION, SOLUTION INTRA-ARTICULAR; INTRALESIONAL; INTRAMUSCULAR; INTRAVENOUS; SOFT TISSUE PRN
Status: DISCONTINUED | OUTPATIENT
Start: 2023-12-13 | End: 2023-12-13

## 2023-12-13 RX ORDER — FENTANYL CITRATE 0.05 MG/ML
25 INJECTION, SOLUTION INTRAMUSCULAR; INTRAVENOUS EVERY 5 MIN PRN
Status: CANCELLED | OUTPATIENT
Start: 2023-12-13

## 2023-12-13 RX ORDER — DEXTROSE MONOHYDRATE 25 G/50ML
INJECTION, SOLUTION INTRAVENOUS ONCE
Status: CANCELLED | OUTPATIENT
Start: 2023-12-13 | End: 2023-12-13

## 2023-12-13 RX ORDER — ONDANSETRON 2 MG/ML
INJECTION INTRAMUSCULAR; INTRAVENOUS PRN
Status: DISCONTINUED | OUTPATIENT
Start: 2023-12-13 | End: 2023-12-13

## 2023-12-13 RX ORDER — ONDANSETRON 4 MG/1
4 TABLET, ORALLY DISINTEGRATING ORAL EVERY 30 MIN PRN
Status: CANCELLED | OUTPATIENT
Start: 2023-12-13

## 2023-12-13 RX ORDER — CEFAZOLIN SODIUM/WATER 2 G/20 ML
2 SYRINGE (ML) INTRAVENOUS SEE ADMIN INSTRUCTIONS
Status: DISCONTINUED | OUTPATIENT
Start: 2023-12-13 | End: 2023-12-13

## 2023-12-13 RX ORDER — ONDANSETRON 2 MG/ML
4 INJECTION INTRAMUSCULAR; INTRAVENOUS EVERY 30 MIN PRN
Status: CANCELLED | OUTPATIENT
Start: 2023-12-13

## 2023-12-13 RX ORDER — CEFAZOLIN SODIUM/WATER 2 G/20 ML
2 SYRINGE (ML) INTRAVENOUS
Status: COMPLETED | OUTPATIENT
Start: 2023-12-13 | End: 2023-12-13

## 2023-12-13 RX ORDER — LIDOCAINE HYDROCHLORIDE 20 MG/ML
INJECTION, SOLUTION INFILTRATION; PERINEURAL PRN
Status: DISCONTINUED | OUTPATIENT
Start: 2023-12-13 | End: 2023-12-13

## 2023-12-13 RX ORDER — FENTANYL CITRATE 0.05 MG/ML
50 INJECTION, SOLUTION INTRAMUSCULAR; INTRAVENOUS EVERY 5 MIN PRN
Status: CANCELLED | OUTPATIENT
Start: 2023-12-13

## 2023-12-13 RX ORDER — MAGNESIUM HYDROXIDE 1200 MG/15ML
LIQUID ORAL PRN
Status: DISCONTINUED | OUTPATIENT
Start: 2023-12-13 | End: 2023-12-13 | Stop reason: HOSPADM

## 2023-12-13 RX ORDER — BUPIVACAINE HYDROCHLORIDE AND EPINEPHRINE 5; 5 MG/ML; UG/ML
INJECTION, SOLUTION PERINEURAL PRN
Status: DISCONTINUED | OUTPATIENT
Start: 2023-12-13 | End: 2023-12-13 | Stop reason: HOSPADM

## 2023-12-13 RX ORDER — DEXTROSE MONOHYDRATE 25 G/50ML
25 INJECTION, SOLUTION INTRAVENOUS ONCE
Status: COMPLETED | OUTPATIENT
Start: 2023-12-13 | End: 2023-12-13

## 2023-12-13 RX ORDER — PROPOFOL 10 MG/ML
INJECTION, EMULSION INTRAVENOUS PRN
Status: DISCONTINUED | OUTPATIENT
Start: 2023-12-13 | End: 2023-12-13

## 2023-12-13 RX ORDER — HYDRALAZINE HYDROCHLORIDE 20 MG/ML
2.5-5 INJECTION INTRAMUSCULAR; INTRAVENOUS EVERY 10 MIN PRN
Status: CANCELLED | OUTPATIENT
Start: 2023-12-13

## 2023-12-13 RX ORDER — SODIUM CHLORIDE, SODIUM LACTATE, POTASSIUM CHLORIDE, CALCIUM CHLORIDE 600; 310; 30; 20 MG/100ML; MG/100ML; MG/100ML; MG/100ML
INJECTION, SOLUTION INTRAVENOUS CONTINUOUS
Status: DISCONTINUED | OUTPATIENT
Start: 2023-12-13 | End: 2023-12-13

## 2023-12-13 RX ORDER — FENTANYL CITRATE 50 UG/ML
INJECTION, SOLUTION INTRAMUSCULAR; INTRAVENOUS PRN
Status: DISCONTINUED | OUTPATIENT
Start: 2023-12-13 | End: 2023-12-13

## 2023-12-13 RX ORDER — PROPOFOL 10 MG/ML
INJECTION, EMULSION INTRAVENOUS CONTINUOUS PRN
Status: DISCONTINUED | OUTPATIENT
Start: 2023-12-13 | End: 2023-12-13

## 2023-12-13 RX ADMIN — HYDROMORPHONE HYDROCHLORIDE 0.2 MG: 0.2 INJECTION, SOLUTION INTRAMUSCULAR; INTRAVENOUS; SUBCUTANEOUS at 09:06

## 2023-12-13 RX ADMIN — SUGAMMADEX 100 MG: 100 INJECTION, SOLUTION INTRAVENOUS at 17:49

## 2023-12-13 RX ADMIN — FENTANYL CITRATE 50 MCG: 50 INJECTION INTRAMUSCULAR; INTRAVENOUS at 16:43

## 2023-12-13 RX ADMIN — PIPERACILLIN AND TAZOBACTAM 3.38 G: 3; .375 INJECTION, POWDER, FOR SOLUTION INTRAVENOUS at 03:10

## 2023-12-13 RX ADMIN — PIPERACILLIN AND TAZOBACTAM 3.38 G: 3; .375 INJECTION, POWDER, FOR SOLUTION INTRAVENOUS at 08:37

## 2023-12-13 RX ADMIN — THIAMINE HYDROCHLORIDE: 100 INJECTION, SOLUTION INTRAMUSCULAR; INTRAVENOUS at 08:37

## 2023-12-13 RX ADMIN — LIDOCAINE HYDROCHLORIDE 100 MG: 20 INJECTION, SOLUTION INFILTRATION; PERINEURAL at 16:36

## 2023-12-13 RX ADMIN — DEXAMETHASONE SODIUM PHOSPHATE 4 MG: 4 INJECTION, SOLUTION INTRA-ARTICULAR; INTRALESIONAL; INTRAMUSCULAR; INTRAVENOUS; SOFT TISSUE at 16:42

## 2023-12-13 RX ADMIN — MIDAZOLAM 2 MG: 1 INJECTION INTRAMUSCULAR; INTRAVENOUS at 16:31

## 2023-12-13 RX ADMIN — HYDROMORPHONE HYDROCHLORIDE 0.2 MG: 0.2 INJECTION, SOLUTION INTRAMUSCULAR; INTRAVENOUS; SUBCUTANEOUS at 05:35

## 2023-12-13 RX ADMIN — FLUCONAZOLE 400 MG: 400 INJECTION, SOLUTION INTRAVENOUS at 23:08

## 2023-12-13 RX ADMIN — PROPOFOL 150 MG: 10 INJECTION, EMULSION INTRAVENOUS at 16:36

## 2023-12-13 RX ADMIN — PANTOPRAZOLE SODIUM 40 MG: 40 INJECTION, POWDER, FOR SOLUTION INTRAVENOUS at 08:37

## 2023-12-13 RX ADMIN — SODIUM CHLORIDE, POTASSIUM CHLORIDE, SODIUM LACTATE AND CALCIUM CHLORIDE: 600; 310; 30; 20 INJECTION, SOLUTION INTRAVENOUS at 05:10

## 2023-12-13 RX ADMIN — PROPOFOL 20 MCG/KG/MIN: 10 INJECTION, EMULSION INTRAVENOUS at 16:44

## 2023-12-13 RX ADMIN — Medication 2 G: at 16:31

## 2023-12-13 RX ADMIN — HYDROMORPHONE HYDROCHLORIDE 0.5 MG: 1 INJECTION, SOLUTION INTRAMUSCULAR; INTRAVENOUS; SUBCUTANEOUS at 17:48

## 2023-12-13 RX ADMIN — ALBUMIN HUMAN: 0.05 INJECTION, SOLUTION INTRAVENOUS at 16:53

## 2023-12-13 RX ADMIN — FENTANYL CITRATE 50 MCG: 50 INJECTION INTRAMUSCULAR; INTRAVENOUS at 16:36

## 2023-12-13 RX ADMIN — HYDROMORPHONE HYDROCHLORIDE 0.2 MG: 0.2 INJECTION, SOLUTION INTRAMUSCULAR; INTRAVENOUS; SUBCUTANEOUS at 21:00

## 2023-12-13 RX ADMIN — ROCURONIUM BROMIDE 50 MG: 50 INJECTION, SOLUTION INTRAVENOUS at 16:36

## 2023-12-13 RX ADMIN — DEXTROSE MONOHYDRATE 25 ML: 25 INJECTION, SOLUTION INTRAVENOUS at 13:24

## 2023-12-13 RX ADMIN — ONDANSETRON 4 MG: 2 INJECTION INTRAMUSCULAR; INTRAVENOUS at 17:22

## 2023-12-13 RX ADMIN — PIPERACILLIN AND TAZOBACTAM 3.38 G: 3; .375 INJECTION, POWDER, FOR SOLUTION INTRAVENOUS at 21:00

## 2023-12-13 RX ADMIN — PIPERACILLIN AND TAZOBACTAM 3.38 G: 3; .375 INJECTION, POWDER, FOR SOLUTION INTRAVENOUS at 16:45

## 2023-12-13 ASSESSMENT — ACTIVITIES OF DAILY LIVING (ADL)
ADLS_ACUITY_SCORE: 22
ADLS_ACUITY_SCORE: 25
ADLS_ACUITY_SCORE: 22

## 2023-12-13 ASSESSMENT — LIFESTYLE VARIABLES: TOBACCO_USE: 1

## 2023-12-13 ASSESSMENT — ENCOUNTER SYMPTOMS: SEIZURES: 1

## 2023-12-13 ASSESSMENT — COPD QUESTIONNAIRES: COPD: 1

## 2023-12-13 NOTE — ANESTHESIA PROCEDURE NOTES
Airway       Patient location during procedure: OR       Procedure Start/Stop Times: 12/13/2023 4:39 PM  Staff -        Anesthesiologist:  Camden Byrne MD       CRNA: Sylvia Rod APRN CRNA       Performed By: CRNA  Consent for Airway        Urgency: elective  Indications and Patient Condition       Indications for airway management: bhavesh-procedural       Induction type:intravenous       Mask difficulty assessment: 2 - vent by mask + OA or adjuvant +/- NMBA    Final Airway Details       Final airway type: endotracheal airway       Successful airway: ETT - single  Endotracheal Airway Details        ETT size (mm): 7.0       Successful intubation technique: direct laryngoscopy       DL Blade Type: Bosch 2       Grade View of Cords: 1       Adjucts: stylet       Position: Right       Measured from: lips       Secured at (cm): 21       Bite block used: None    Post intubation assessment        Placement verified by: capnometry, equal breath sounds and chest rise        Number of attempts at approach: 1       Secured with: tape       Ease of procedure: easy       Dentition: Intact and Unchanged    Medication(s) Administered   Medication Administration Time: 12/13/2023 4:39 PM

## 2023-12-13 NOTE — PROGRESS NOTES
[facility]  Hospitalist Progress Note    Assessment & Plan   Pavithra Raines is a 68 year old female admitted on 12/11/2023.     Past medical history significant for Alcohol use D/O with history of withdrawal with seizures, Coagulopathy thought to be secondary to Alcohol dependence, Fatty liver disease, GERD, Chronic anemia (B12, Iron), Emphysema/COPD, Cannabis use D/O, Seizure D/O, MDD with anxiety, PTSD, Insomnia, Hypersomnia with sleep apnea, RLS, Genital herpes, Malnutrition who was admitted under General Surgery due to perforated bowel possibly at the stomach or previous gastric bypass site and alcohol withdrawal.       Patient presented to [facility] emergency department due to abdominal pain and chest pain.  Patient reported that she began to feel unwell the morning of 12/10/2023 when she started to develop watery diarrhea.  Patient was unable to consume any alcohol throughout the day of 12/10 because she continued to feel unwell and developed nausea symptoms as well as some vomiting.  Around 3 PM and 12/10 she developed severe abdominal pain.  This pain became so intolerable that she called 911 and was brought into the ED.  Patient endorsed concerns of going into alcohol withdrawal and was noted to be tremulous in the ED.     Workup in the ED included a CMP that revealed a CO2 of 18, calcium of 8.6, magnesium of 1.4, albumin of 3.1, alk phos of 175, total bili of 1.6, glucose of 115 otherwise within normal limits.  Lactic acid level was within normal limits at 1.8.  Lipase level was mildly elevated at 65.  Inflammatory CRP was elevated at 8.32.  CBC with differential revealed a hemoglobin of 11.6 and platelet count of 460 otherwise within normal limits.  UA revealed trace leukocyte Estrace with 3 RBCs and 2 squamous epithelial cells with mucus present and hyaline casts of 3.  Abdomen/pelvis CT with contrast revealed free intraperitoneal gas and fluid consistent with  perforated viscus (the site of perforation is not certain though may be from the stomach/gastric bypass).     The ED was in contact with General Surgery (Dr. Guaman) who felt patient's case was too complicated and recommended transfer to a tertiary care center.  Attempts to transfer to the [facility] were performed but due to bed availability patient was ultimately transferred to [facility].  Discussions occurred between the ED (Dr. Brooks), General Surgery (Dr. Gonzalez) and Hospitalist (Dr. Monaco) at [facility] and patient was admitted under General Surgery with verbal request for Hospitalist consult upon arrival on 12/11/23. Patient was taken to the OR on 12/11/23 where they found a perforated marginal ulcer which they fixed with an omental patch repair.      Perforated Marginal Ulcer s/p Omental Patch Repair 12/11/23   Elevated inflammatory CRP  Abdominal pain    - General surgery is managing.                -Defer analgesic/pain management, DVT prophylaxis, PT/OT.       - Plan to get G tube today with surgery    - Tube feeds per surgery      - Hgb: 11.6 > 9.5 > 9     - Continue to monitor   - Encourage utilization of incentive spirometer.     - Continue IV Zosyn and Fluconazole      - Surgery to dictate when these meds can be stopped     Leukocytosis: Resolving   Likely related to recent surgery    - Continue to monitor     Alcohol withdrawal  Alcohol use D/O with history of withdrawal with seizures  Patient states that she drink a box of wine over 2-3 days and roughly drinks 10 -15 glasses of wine a day. On 12/13 was notified by nursing that they suspect patient is drinking the mouthwash. Advised nursing to use mouthwash with supervision only and not to leave in room.     - CIWA ordered with PRN Ativan ordered.   - Has not needed PRNs     - Continue IV Multivitamins as patient is NPO     - Will start PO vitamins when patient is off NPO     - Chem dep saw patient and have provided resources at  discharge      Hypomagnesemia: Resolved   Hypokalemia: Resolved    - M.4 > 2.2    - K: 2.9 > 3.5 > 3.6    - Monitor and replace per protocol.       Chronic Medical Problems:     Heart murmur  Noted slight heart murmur on examination.  Patient denied every being told about a murmur in the past. Previous ECHO completed 10/2023 and without aortic stenosis.    - Monitor     Coagulopathy thought to be secondary to Alcohol dependence  INR and PTT checked after assessing the patient and within normal limits.    - Monitor PRN.       Fatty liver disease  - Follow up with PCP as an outpatient.       GERD  Has not taken any medications in the last month as she ran out.    - Continue IV Protonix 40 mg/d.   - Plan to restart PO when able       Chronic anemia (B12 and Iron)  - Continue IV multivitamin (banana bag) ordered.     - Hold PO vitamins as patient is strict NPO      Emphysema/COPD   Recently quit smoking in the last 2 months and was previously smoking 2 packs per day.  Not currently taking any medications.   - Monitor.       Cannabis use D/O  - Counseled regarding cessation  - Chem dep consulted      Seizure D/O  Likely due to alcohol withdrawal.  Patient not prescribed antiepileptics per chart review.       MDD with anxiety  PTSD  Has not taken any medications in the last month or two as she ran out.    - Hold on resuming previously prescribed Buspar 15 mg TID, Klonopin 0.5 mg daily PRN (anxiety)  - Can consider restarting Buspar, Effexor and Lexapro when patient is off NPO status. Would like need slow start of these meds. Would not restart Klonipin.        Insomnia  Hypersomnia with sleep apnea  Noted on chart review.  Does not appear to have been prescribed any medications.       RLS  Noted on chart review.  Does not appear to have been prescribed any medications.       Genital herpes  Noted on chart review.  Does not appear to have been prescribed any medications.       Severe Malnutrition  - Recommend Nutrition  "consult once diet can be advanced.    - Hold PO vitamins as patient is strict NPO      Osteoporosis  - Hold TPA Evista as patient has not been taking this medication.     Clinically Significant Risk Factors        # Hypokalemia: Lowest K = 2.9 mmol/L in last 2 days, will replace as needed     # Hypomagnesemia: Lowest Mg = 1.5 mg/dL in last 2 days, will replace as needed   # Hypoalbuminemia: Lowest albumin = 2.6 g/dL at 12/12/2023  7:08 AM, will monitor as appropriate            # Cachexia: Estimated body mass index is 17.16 kg/m  as calculated from the following:    Height as of this encounter: 1.626 m (5' 4\").    Weight as of this encounter: 45.4 kg (100 lb)., PRESENT ON ADMISSION  # Severe Malnutrition: based on nutrition assessment, PRESENT ON ADMISSION   # Financial/Environmental Concerns: unable to afford medication(s);other (see comments) (Spouse makes too much to qualify for food support etc. Needs to get SS because of the inability to work)           Diet: NPO for Medical/Clinical Reasons Except for: No Exceptions     DVT Prophylaxis: Defer to primary service   Barney Catheter: PRESENT, indication: Anesthesia  Lines: None     Cardiac Monitoring: None  Code Status: Full Code      Disposition Plan       Expected Discharge Date: 12/17/2023      Destination: inpatient rehabilitation facility;nursing home        Entered: Torri Romero MD 12/13/2023, 7:20 AM     Care Team Updated: Yes     Disposition: Timing per Surgery     Torri Romero MD  Hospitalist Service   [facility]  Securely message with the Vocera Web Console (learn more here)  Text page via [employer] Paging/Directory         Medical Decision Making       45 MINUTES SPENT BY ME on the date of service doing chart review, history, exam, documentation & further activities per the note.           Interval History     No acute overnight events.     This morning the patient was laying in bed. She states that she is feeling " well. States that she is going to get a g tube today. Not as nervous for this procedure. Feels that she is out of her major withdrawals.     Denies headache, chest pain, SOB, nausea, vomiting.     -Data reviewed today: I reviewed all new labs and imaging results over the last 24 hours.   Physical Exam   Temp: 99.8  F (37.7  C) Temp src: Oral BP: 110/70 Pulse: 86   Resp: 16 SpO2: 96 % O2 Device: None (Room air)    Vitals:    12/11/23 1546   Weight: 45.4 kg (100 lb)     Vital Signs with Ranges  Temp:  [99.8  F (37.7  C)-99.9  F (37.7  C)] 99.8  F (37.7  C)  Pulse:  [86-90] 86  Resp:  [16] 16  BP: ()/(62-71) 110/70  SpO2:  [95 %-97 %] 96 %  I/O last 3 completed shifts:  In: 1946.67 [I.V.:1946.67]  Out: 345 [Urine:275; Drains:70]      Constitutional: Awake, alert, cooperative, no apparent distress  HEENT: PERRL, Normocephalic, without obvious abnormality, atraumatic, oral pharynx with moist mucus membranes  Pulmonary: Clear breath sounds bilaterally   Cardiovascular: Regular rate and rhythm, normal S1 and S2  GI: soft, multiple surgical incisions in place and covered with bandage, ARSENIO drain noted on left abdomen, some tenderness with palpation, sluggish BS  Skin/Integumen: Visualized skin appeared clear.  Neuro: Moves all 4 extremities, no tremor noted   Psych:  Alert and oriented x 3. Normal affect.  Extremities: No lower extremity edema noted      Medications    lactated ringers 100 mL/hr at 12/13/23 0510      [Held by provider] calcium carbonate  500 mg Oral TID    [Held by provider] ferrous fumarate 65 mg (Elim IRA. FE)-Vitamin C 125 mg  1 tablet Oral Daily    fluconazole  400 mg Intravenous Q24H    [Held by provider] multivitamin, therapeutic  1 tablet Oral Daily    pantoprazole  40 mg Intravenous Daily with breakfast    piperacillin-tazobactam  3.375 g Intravenous Q6H    sodium chloride (PF)  3 mL Intracatheter Q8H    sodium chloride 0.9 % 1,000 mL with Infuvite Adult 10 mL, thiamine 100 mg, folic acid 1 mg  infusion   Intravenous Q24H    [Held by provider] cholecalciferol  125 mcg Oral Daily       Data   Recent Labs   Lab 12/12/23 2035 12/12/23  0708 12/11/23  1419 12/11/23  0459 12/09/23  0221   WBC  --  17.5*  --  9.0 8.3   HGB  --  9.5*  --  11.6* 10.8*   MCV  --  98  --  92 96   PLT  --  358  --  460* 394   INR  --   --  1.11  --   --    NA  --  141  --  135 137   POTASSIUM 3.5 2.9*  --  4.0 4.4   CHLORIDE  --  110*  --  102 103   CO2  --  24  --  18* 23   BUN  --  10.1  --  8.7 7.1*   CR  --  0.73  --  0.69 0.63   ANIONGAP  --  7  --  15 11   SHON  --  7.8*  --  8.6* 8.2*   GLC  --  120*  --  115* 87   ALBUMIN  --  2.6*  --  3.1*  --    PROTTOTAL  --  5.3*  --  7.0  --    BILITOTAL  --  0.7  --  1.6*  --    ALKPHOS  --  97  --  175*  --    ALT  --  5  --  6  --    AST  --  21  --  16  --    LIPASE  --   --   --  65*  --        No results found for this or any previous visit (from the past 24 hour(s)).

## 2023-12-13 NOTE — DISCHARGE INSTRUCTIONS
[facility]  [phone] : Several Locations PLUS Telephone or Video Visits Offered    Mental Health: All Ages  Assessment and Referrals, Individual and Group Therapy, Psychiatry,   Intensive Outpatient Program, Partial Hospitalization Program,   Dual Diagnosis Program, and 55+ Seniors Program.  Substance Use Disorder: Adult & Adolescent  Assessment and Referrals, Outpatient VINNY Programs (Day & Evening Groups),  Dual Diagnosis, Intensive Outpatient Programs with [facility], Residential Treatment,   Addiction Medicine, Detox, and Medication Assisted Treatment (MAT) Services.  Problem Gambling Treatment  Assessment and Referrals, Individual & Group Therapy, Rule 82, and Court Ordered    [employer]  Call to schedule: [phone]    Virtual Addiction Care  Providing integrated mental health services for patients with co-occurring disorders on a   flexible, patient-centered, virtual platform.   [facility] offers a unique blend of virtual, outpatient Addiction Therapy   and Peer Recovery Support for patients experiencing substance use and mental health concerns.  Addiction Therapy  - Ongoing weekly individual and/or group sessions with a therapist dually licensed in mental   health and addiction.  - Individual sessions are 45 minutes in length, offered up to three times per week, virtually with a   smartphone or computer.  - Optional 90-minute group sessions available up to two days a week.  - Accepts all major health plans, including Medicare in both [place] and [place].   - Patients must have a mental health diagnosis and a co-occurring substance use disorder, or   substance use concern, to qualify for services.   Peer Recovery Support*  - Certified Peer Recovery Specialists (CPRS) provide education, social support, advocacy, and   mentoring.  - Peer Recovery Specialists use a strengths-based approach to ask questions, offer insight,  develop a  personalized recovery plan, and help patients explore their options for recovery or   treatment.  - The role of Peer Recovery Specialists falls between the role of recovery support individuals   (such as a recovery  sponsor ) and substance use and/or mental health counselors  * Available to patients participating in addiction therapy only at this time    CD Treatment-Medicare    [facility] CD Admissions  Liz Mascorro - [phone] [email or web address]    [facility]  Schedule: [phone]    [facility]   doesn t take insurance or referrals/pay out of pocket only  Phone: [phone]  Fax: [phone]    CD Treatment Inpatient    [facility] IP part of [facility] (men and women)  Phone: [phone]  Fax: [phone] for [facility] (inpatient)  Fax: [phone] for [facility] (inpatient)    [facility] (accepts medicare)  Phone: [phone]  Fax: [phone]  [address] [place] [address]    [facility] (will accept Medicare Insurance)  [address] [place] [address]  Phone: [phone] Substance Use Disorder  [facility], [place]    Support Services    [employer]   https://www.[employer].Bday    Online Therapy for Seniors   https://Whi.Bday    [facility] - in [place]  Phone: [phone]  Fax: [phone]  [facility].org    Sober Support Groups    SMART Recovery   https://www.smartrecovery.org    Alcoholics Anonymous   http://www.aa.org    Community Drug & Alcohol Support Resources    Alcoholics Anonymous  24/7 Phone Line: [phone]  https://[place]am[place].org/ For additional list of online meetings: http://aa-intergroup.org    [place] [facility]  [address]  [place] [address]  Phone: [phone] http://[employer].org     [facility] - [facility]  Discharge Instructions: Post-Operative Laparoscopic Surgery    ACTIVITY  Expect to feel tired after  your surgery.  This will gradually resolve.    Take frequent, short walks and increase your activity gradually.    Avoid strenuous physical activity or heavy lifting greater than 15 lbs. for 2-3 weeks.  You may climb stairs.    You may drive without restrictions when you are not using any prescription pain medication and feel comfortable in a car.  You may return to work/school when you are comfortable without any prescription pain medication.  You may wear an abdominal binder for comfort for 2-3 weeks from your surgery.  You can wash the abdominal binder and dry it on low heat in the dryer.    WOUND CARE  After your drain is removed, you are left with a small wound that you should allow to heal on its own.  It is normal to have clear-yellowish drainage over the next 3-5 days before it scabs over.  Cover this wound with gauze or band-aid as needed for drainage.  Surrounding redness from the suture/drain irritation should improve over the next week, if it gets worse you should call our clinic.  You may shower, but do not soak your incision(s) in a tub or pool for 2 weeks.  Pat your incisions dry after your shower and leave them open to air.  Re-apply dressing (Band-Aids or gauze/tape) as needed for comfort or drainage.  You have steri-strips (looks like white tape) at your incisions.  You may peel off the steri-strips 2 weeks after your surgery.  Do not apply any lotions, creams, or ointments to your incisions.  A ridge under your incisions is normal and will gradually resolve.    DIET  Stay on a very soft pureed diet until your follow up appointment.  You may find your appetite to be diminished briefly after surgery.  You may take nutritional supplement shakes if you are able.   Drink plenty of fluids to stay hydrated.    PAIN  Expect some tenderness and discomfort at the incision site(s).  Use the prescribed pain medication at your discretion.  Expect gradual resolution of your pain over several days.  Do not drink  alcohol or drive while you are taking pain medications.  You may apply ice to your incisions in 20 minute intervals as needed for the next 24-48 hours.  After that time, consider switching to heat if you prefer.    EXPECTATIONS  Pain medications can cause constipation.  Limit use when possible.  Take an over the counter or prescribed stool softener/stimulant, such as Colace or Senna, 1-2 times a day with plenty of water.  You may take a mild over the counter laxative, such as Miralax or a suppository, as needed.  You may discontinue these medications once you are having regular bowel movements and/or are no longer taking your narcotic pain medication.  If you had laparoscopic surgery, you may have shoulder or upper back discomfort due to the gas used in surgery.  This is temporary and should resolve in 48-72 hours after the surgery.  Short, frequent walks may help with this.    RETURN APPOINTMENT  [place] [facility] [place] [facility]  He is a bariatric physician at [facility].  Call [phone] to schedule an appointment for 2-3 weeks if one is not already arranged for you.    CALL OUR OFFICE IF YOU HAVE:   Chills or fever above 101  F.  Increased redness, warmth, or drainage at your incisions.  Significant bleeding.  Pain not relieved by your pain medication or rest.  Increasing pain after the first 48 hours.  Any other concerns or questions.

## 2023-12-13 NOTE — PROGRESS NOTES
"Acute Care Surgery Progress Note    Subjective: doing well. Having mild tremors. Reported having ongoing abdominal pain, it is manageable on current pain medication. Continue to have watery stools though it is slowing down. Ambulated in the hallway today without issues.     Objective: /81   Pulse 84   Temp 99.1  F (37.3  C) (Oral)   Resp 16   Ht 1.626 m (5' 4\")   Wt 45.4 kg (100 lb)   LMP  (LMP Unknown)   SpO2 98%   BMI 17.16 kg/m    Gen: not in acute distress  CV: normal sinus  Pulm: nonlabored breathing on room air  Abd: soft, appropriately tender, non-distended. 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 POD2 continue to have bengin abdominal exam and ARSENIO drain without evidence of patch leak.    --OR today for laparoscopic G-tube placement in the remnant stomach   --will plan to start tube feed after today  --continue Zosyn and Floconazole  --will plan to resume DVT ppx after OR  --appreciate Hospitalist following for medical co-management     Sangeetha Taylor MD   PGY4, General Surgery           "

## 2023-12-13 NOTE — CONSULTS
12/13/2023    Met with pt via telephone to offer CD services. Pt reports she's thinking about attending CD treatment but has Medicare part A insurance and is having a procedure today. Pt reports she will need to do some healing at home before she can attend CD treatment. Pt reports she might be interested in attending virtual OP treatment that accepts medicare. Pt reports she would like to receive CD resources so she can review the information when she gets home. CD resources have been added to pt's AVS.  CD Treatment-Medicare    [facility] CD Admissions  Liz Mascorro - [phone]  [email or web address]    [facility]  Schedule: [phone]    [facility] doesn't take insurance or referrals/pay out of pocket only  Phone: [phone]  Fax: [phone]    CD Treatment Inpatient    [facility] IP [facility] (men and women)  Phone: [phone]  Fax: [phone] for [facility] (inpatient)  Fax: [phone] for [facility] (inpatient)      [facility] (accepts medicare)  Phone: [phone]  Fax: [phone]  [address] [place] [address]    [facility] (will accept Medicare Insurance)    [address] [place] [address]   Phone: [phone]   Substance Use Disorder  [facility], [place]     Therapy Services    Home  [employer]  https://www.[employer]    Online Therapy for Seniors  https://[employer]eeFighters.[employer]    [facility] - in [place]  Phone: [phone]  Fax: [phone]  [facility].org    Sober Support Groups    SM[facility] Recovery  https://www.smartrecovery.org      Alcoholics Anonymous  http://www.aa.org  Community Drug & Alcohol Support Resources   Alcoholics Anonymous   24/7 Phone Line: [phone]   https://aam[place].org/   For additional list of online meetings: http://aa-intergroup.org        [place] [facility]   [address] [place] [address] [address] [place] [address]   Phone:  [phone]  http://[email or web address].org        JO[facility] CARL[facility]   Phone: [phone]  Email: [email or web address]

## 2023-12-13 NOTE — PLAN OF CARE
Goal Outcome Evaluation:     Summary:  Diagnostic lap and omental patch repair of gastric perforation     Orientation:AOx4    Vitals/Tele: VSS on RA    IV Access/drains: 2 PIV, RT infusing  ml/hr, LT PIV SL, LLQ ARSENIO drain    Diet: NPO    Mobility: SBA    GI/: Barney    Wound/Skin: Rt and Lt bruising on upper arms, 3 port sites on abdomen w/ steri strips    Discharge Plan: TBD    Other info: CIWA  w/ mild tremors scoring 1, 1, 0 , 0 . G-tube getting placed today at 12:20 pm, potassium protocol, recheck was 3.5 will have another am recheck.      See Flow sheets for assessment

## 2023-12-13 NOTE — ANESTHESIA PREPROCEDURE EVALUATION
Anesthesia Pre-Procedure Evaluation    Patient: Pavithra Raines   MRN: [id number] : 1955        Procedure : Procedure(s):  Laparoscopic-assisted placement of gastrostomy tube          Past Medical History:   Diagnosis Date    Abnormal Papanicolaou smear of cervix and cervical HPV 2007    Colposcopy = HSIL    COPD (chronic obstructive pulmonary disease) (H)     Genital herpes     History of colposcopy with cervical biopsy 2007    [facility]- LEEP recommended    Hypersomnia with sleep apnea, unspecified     CPAP started     Other and unspecified ovarian cyst  or so    s/p resection of ovary and tubes, d&c    Pneumonia 10/23/2023    Sleep apnea     Uncomplicated asthma       Past Surgical History:   Procedure Laterality Date    CHOLECYSTECTOMY, OPEN  age 20s    COLONOSCOPY  2011    COMBINED COLONOSCOPY, REMOVE TUMOR/POLYP/LESION BY SNARE performed by JUAN FRANCISCO HERNANDEZ at  GI    COLONOSCOPY N/A 10/09/2017    polyps, repeat 3 years    COLPOSCOPY CERVIX, LOOP ELECTRODE BIOPSY, COMBINED  2007    CAN 2/3-patient requires yearly pap smears    dental pegs      ESOPHAGOSCOPY, GASTROSCOPY, DUODENOSCOPY (EGD), COMBINED N/A 2018    Procedure: COMBINED ESOPHAGOSCOPY, GASTROSCOPY, DUODENOSCOPY (EGD);  EGD;  Surgeon: Oneal Sanchez MD;  Location:  GI    GASTRIC BYPASS  2008    At [facility]    HC DILATION/CURETTAGE DIAG/THER NON OB      HC ENLARGE BREAST WITH IMPLANT      HC LAPAROSCOPIC MYOMECTOMY, 1 - 4 INTRAMURAL MYOMAS =<250 GM      HC REMOVAL OF BREAST IMPLANT      LAPAROSCOPY DIAGNOSTIC (GENERAL) N/A 2023    Procedure: Diagnostic laparoscopy, laparoscopic patching of marginal ulcer;  Surgeon: Antoni Gonzalez MD;  Location: [facility] OR    SALPINGO OOPHORECTOMY,R/L/MATTHEW      Bilateral salpingectomy and unilateral oophorectomy      Allergies   Allergen Reactions    Codeine Itching    Vicodin [Hydrocodone-Acetaminophen] Itching      Social History      Tobacco Use    Smoking status: Every Day     Packs/day: 2.00     Years: 10.00     Additional pack years: 0.00     Total pack years: 20.00     Types: Cigarettes    Smokeless tobacco: Never    Tobacco comments:     2 ppd at this time (chain smoking) 10/2012   Substance Use Topics    Alcohol use: Yes     Comment: daily, drinks a box      Wt Readings from Last 1 Encounters:   12/11/23 45.4 kg (100 lb)        Anesthesia Evaluation   Pt has had prior anesthetic.     No history of anesthetic complications       ROS/MED HX  ENT/Pulmonary:     (+) sleep apnea, doesn't use CPAP,              tobacco use, Past use,        COPD,    recent URI, resolved,         Neurologic:     (+)       seizures,  features: WITHDRAWAL SEIZURE,                       Cardiovascular:     (+)  - -   -  - -                                pulmonary hypertension, Previous cardiac testing   Echo: Date: Results:  The left ventricle is normal in size.  Left ventricular systolic function is normal. The visual ejection fraction is  55-60%.  No regional wall motion abnormalities noted.  The right ventricle is normal in structure, function and size.  There is mild (1+) tricuspid regurgitation.  Right ventricular systolic pressure is elevated, consistent with mild  pulmonary hypertension.  There is no comparison study available.    Stress Test:  Date: Results:    ECG Reviewed:  Date: Results:    Cath:  Date: Results:   (-) pacemaker, stent, pacemaker and ICD   METS/Exercise Tolerance: 1 - Eating, dressing    Hematologic: Comments: Electrolyte abnormalities likely in the setting of malnutrition and ongoing alcoholism    (+)      anemia,          Musculoskeletal:  - neg musculoskeletal ROS     GI/Hepatic: Comment: S/p gastric bypass    MARGINAL ULCER STOMACH S/P OMENTAL PATCH    (+) GERD,            liver disease,       Renal/Genitourinary:       Endo: Comment: HYPOGLYCEMIC ON THE FLOOR , TREATED WITH D50   (-) Type II DM and obesity   Psychiatric/Substance  Use:     (+)   alcohol abuse  Recreational drug usage: Cannabis.    Infectious Disease:  - neg infectious disease ROS     Malignancy:  - neg malignancy ROS     Other:  - neg other ROS          Physical Exam    Airway        Mallampati: II   TM distance: > 3 FB   Neck ROM: full   Mouth opening: > 3 cm    Respiratory Devices and Support         Dental       (+) Edentulous      Cardiovascular   cardiovascular exam normal          Pulmonary   pulmonary exam normal                OUTSIDE LABS:  CBC:   Lab Results   Component Value Date    WBC 14.4 (H) 12/13/2023    WBC 17.5 (H) 12/12/2023    HGB 9.0 (L) 12/13/2023    HGB 9.5 (L) 12/12/2023    HCT 29.4 (L) 12/13/2023    HCT 30.1 (L) 12/12/2023     12/13/2023     12/12/2023     BMP:   Lab Results   Component Value Date     12/13/2023     12/12/2023    POTASSIUM 3.6 12/13/2023    POTASSIUM 3.5 12/12/2023    CHLORIDE 111 (H) 12/13/2023    CHLORIDE 110 (H) 12/12/2023    CO2 19 (L) 12/13/2023    CO2 24 12/12/2023    BUN 14.4 12/13/2023    BUN 10.1 12/12/2023    CR 0.84 12/13/2023    CR 0.73 12/12/2023     (H) 12/13/2023     (H) 12/13/2023     COAGS:   Lab Results   Component Value Date    PTT 28 12/11/2023    INR 1.11 12/11/2023     POC:   Lab Results   Component Value Date    BGM 91 10/16/2017    HCG Negative 04/17/2013     HEPATIC:   Lab Results   Component Value Date    ALBUMIN 2.6 (L) 12/12/2023    PROTTOTAL 5.3 (L) 12/12/2023    ALT 5 12/12/2023    AST 21 12/12/2023    ALKPHOS 97 12/12/2023    BILITOTAL 0.7 12/12/2023     OTHER:   Lab Results   Component Value Date    PH 7.55 (H) 10/17/2023    LACT 1.8 12/11/2023    A1C 4.4 10/09/2023    SHON 7.8 (L) 12/13/2023    PHOS 3.2 12/11/2023    MAG 2.2 12/12/2023    LIPASE 65 (H) 12/11/2023    AMYLASE 60 09/01/2021    TSH 0.64 10/08/2023    CRP <2.9 03/01/2020    SED 45 (H) 12/18/2017       Anesthesia Plan    ASA Status:  3    NPO Status:  NPO Appropriate    Anesthesia Type: General.     -  Airway: ETT   Induction: Intravenous, Propofol.   Maintenance: Balanced.        Consents    Anesthesia Plan(s) and associated risks, benefits, and realistic alternatives discussed. Questions answered and patient/representative(s) expressed understanding.     - Discussed:     - Discussed with:  Patient            Postoperative Care    Pain management: IV analgesics.   PONV prophylaxis: Ondansetron (or other 5HT-3)     Comments:               Lucius Diamond MD    I have reviewed the pertinent notes and labs in the chart from the past 30 days and (re)examined the patient.  Any updates or changes from those notes are reflected in this note.

## 2023-12-14 ENCOUNTER — APPOINTMENT (OUTPATIENT)
Dept: GENERAL RADIOLOGY | Facility: CLINIC | Age: 68
DRG: 326 | End: 2023-12-14
Attending: SURGERY
Payer: MEDICARE

## 2023-12-14 LAB
ANION GAP SERPL CALCULATED.3IONS-SCNC: 12 MMOL/L (ref 7–15)
BUN SERPL-MCNC: 12.8 MG/DL (ref 8–23)
CALCIUM SERPL-MCNC: 8.2 MG/DL (ref 8.8–10.2)
CHLORIDE SERPL-SCNC: 113 MMOL/L (ref 98–107)
CREAT SERPL-MCNC: 0.75 MG/DL (ref 0.51–0.95)
DEPRECATED HCO3 PLAS-SCNC: 21 MMOL/L (ref 22–29)
EGFRCR SERPLBLD CKD-EPI 2021: 87 ML/MIN/1.73M2
ERYTHROCYTE [DISTWIDTH] IN BLOOD BY AUTOMATED COUNT: 14.8 % (ref 10–15)
GLUCOSE BLDC GLUCOMTR-MCNC: 105 MG/DL (ref 70–99)
GLUCOSE BLDC GLUCOMTR-MCNC: 108 MG/DL (ref 70–99)
GLUCOSE BLDC GLUCOMTR-MCNC: 112 MG/DL (ref 70–99)
GLUCOSE BLDC GLUCOMTR-MCNC: 121 MG/DL (ref 70–99)
GLUCOSE BLDC GLUCOMTR-MCNC: 93 MG/DL (ref 70–99)
GLUCOSE SERPL-MCNC: 128 MG/DL (ref 70–99)
HCT VFR BLD AUTO: 34.3 % (ref 35–47)
HGB BLD-MCNC: 10.6 G/DL (ref 11.7–15.7)
MAGNESIUM SERPL-MCNC: 1.7 MG/DL (ref 1.7–2.3)
MCH RBC QN AUTO: 30.8 PG (ref 26.5–33)
MCHC RBC AUTO-ENTMCNC: 30.9 G/DL (ref 31.5–36.5)
MCV RBC AUTO: 100 FL (ref 78–100)
PHOSPHATE SERPL-MCNC: 4 MG/DL (ref 2.5–4.5)
PLATELET # BLD AUTO: 403 10E3/UL (ref 150–450)
POTASSIUM SERPL-SCNC: 3.7 MMOL/L (ref 3.4–5.3)
POTASSIUM SERPL-SCNC: 3.9 MMOL/L (ref 3.4–5.3)
RBC # BLD AUTO: 3.44 10E6/UL (ref 3.8–5.2)
SODIUM SERPL-SCNC: 146 MMOL/L (ref 135–145)
WBC # BLD AUTO: 14.3 10E3/UL (ref 4–11)

## 2023-12-14 PROCEDURE — C1769 GUIDE WIRE: HCPCS

## 2023-12-14 PROCEDURE — 250N000013 HC RX MED GY IP 250 OP 250 PS 637: Performed by: STUDENT IN AN ORGANIZED HEALTH CARE EDUCATION/TRAINING PROGRAM

## 2023-12-14 PROCEDURE — 44500 INTRO GASTROINTESTINAL TUBE: CPT

## 2023-12-14 PROCEDURE — 36415 COLL VENOUS BLD VENIPUNCTURE: CPT | Performed by: STUDENT IN AN ORGANIZED HEALTH CARE EDUCATION/TRAINING PROGRAM

## 2023-12-14 PROCEDURE — 250N000011 HC RX IP 250 OP 636: Performed by: STUDENT IN AN ORGANIZED HEALTH CARE EDUCATION/TRAINING PROGRAM

## 2023-12-14 PROCEDURE — 85027 COMPLETE CBC AUTOMATED: CPT | Performed by: STUDENT IN AN ORGANIZED HEALTH CARE EDUCATION/TRAINING PROGRAM

## 2023-12-14 PROCEDURE — 250N000009 HC RX 250: Performed by: STUDENT IN AN ORGANIZED HEALTH CARE EDUCATION/TRAINING PROGRAM

## 2023-12-14 PROCEDURE — 83735 ASSAY OF MAGNESIUM: CPT | Performed by: STUDENT IN AN ORGANIZED HEALTH CARE EDUCATION/TRAINING PROGRAM

## 2023-12-14 PROCEDURE — 258N000003 HC RX IP 258 OP 636: Performed by: STUDENT IN AN ORGANIZED HEALTH CARE EDUCATION/TRAINING PROGRAM

## 2023-12-14 PROCEDURE — C9113 INJ PANTOPRAZOLE SODIUM, VIA: HCPCS | Performed by: STUDENT IN AN ORGANIZED HEALTH CARE EDUCATION/TRAINING PROGRAM

## 2023-12-14 PROCEDURE — 84132 ASSAY OF SERUM POTASSIUM: CPT | Performed by: STUDENT IN AN ORGANIZED HEALTH CARE EDUCATION/TRAINING PROGRAM

## 2023-12-14 PROCEDURE — 250N000009 HC RX 250: Performed by: SURGERY

## 2023-12-14 PROCEDURE — 120N000001 HC R&B MED SURG/OB

## 2023-12-14 PROCEDURE — 250N000011 HC RX IP 250 OP 636: Mod: JZ | Performed by: SURGERY

## 2023-12-14 PROCEDURE — 99232 SBSQ HOSP IP/OBS MODERATE 35: CPT | Performed by: STUDENT IN AN ORGANIZED HEALTH CARE EDUCATION/TRAINING PROGRAM

## 2023-12-14 PROCEDURE — 84100 ASSAY OF PHOSPHORUS: CPT | Performed by: STUDENT IN AN ORGANIZED HEALTH CARE EDUCATION/TRAINING PROGRAM

## 2023-12-14 RX ORDER — DEXTROSE MONOHYDRATE 100 MG/ML
INJECTION, SOLUTION INTRAVENOUS CONTINUOUS PRN
Status: DISCONTINUED | OUTPATIENT
Start: 2023-12-14 | End: 2023-12-15

## 2023-12-14 RX ORDER — CALCIUM CARBONATE 500 MG/1
500 TABLET, CHEWABLE ORAL 3 TIMES DAILY
Status: DISCONTINUED | OUTPATIENT
Start: 2023-12-14 | End: 2023-12-21 | Stop reason: HOSPADM

## 2023-12-14 RX ORDER — FOLIC ACID 1 MG/1
1 TABLET ORAL DAILY
Status: DISCONTINUED | OUTPATIENT
Start: 2023-12-14 | End: 2023-12-21 | Stop reason: HOSPADM

## 2023-12-14 RX ORDER — ENOXAPARIN SODIUM 100 MG/ML
30 INJECTION SUBCUTANEOUS EVERY 24 HOURS
Status: DISCONTINUED | OUTPATIENT
Start: 2023-12-14 | End: 2023-12-15

## 2023-12-14 RX ORDER — ENOXAPARIN SODIUM 100 MG/ML
40 INJECTION SUBCUTANEOUS EVERY 24 HOURS
Status: DISCONTINUED | OUTPATIENT
Start: 2023-12-14 | End: 2023-12-14

## 2023-12-14 RX ORDER — MULTIVITAMIN,THERAPEUTIC
1 TABLET ORAL DAILY
Status: DISCONTINUED | OUTPATIENT
Start: 2023-12-15 | End: 2023-12-14 | Stop reason: ALTCHOICE

## 2023-12-14 RX ORDER — GUAIFENESIN 600 MG/1
15 TABLET, EXTENDED RELEASE ORAL DAILY
Status: DISCONTINUED | OUTPATIENT
Start: 2023-12-14 | End: 2023-12-15

## 2023-12-14 RX ORDER — BUSPIRONE HYDROCHLORIDE 15 MG/1
15 TABLET ORAL 3 TIMES DAILY
Status: DISCONTINUED | OUTPATIENT
Start: 2023-12-14 | End: 2023-12-21 | Stop reason: HOSPADM

## 2023-12-14 RX ORDER — LIDOCAINE HYDROCHLORIDE 20 MG/ML
JELLY TOPICAL ONCE
Status: COMPLETED | OUTPATIENT
Start: 2023-12-14 | End: 2023-12-14

## 2023-12-14 RX ADMIN — LIDOCAINE HYDROCHLORIDE 6 ML: 20 JELLY TOPICAL at 11:52

## 2023-12-14 RX ADMIN — BUSPIRONE HYDROCHLORIDE 15 MG: 15 TABLET ORAL at 21:48

## 2023-12-14 RX ADMIN — HYDROMORPHONE HYDROCHLORIDE 0.2 MG: 0.2 INJECTION, SOLUTION INTRAMUSCULAR; INTRAVENOUS; SUBCUTANEOUS at 18:47

## 2023-12-14 RX ADMIN — CALCIUM CARBONATE (ANTACID) CHEW TAB 500 MG 1000 MG: 500 CHEW TAB at 15:11

## 2023-12-14 RX ADMIN — ENOXAPARIN SODIUM 30 MG: 30 INJECTION SUBCUTANEOUS at 15:11

## 2023-12-14 RX ADMIN — FOLIC ACID 1 MG: 1 TABLET ORAL at 15:11

## 2023-12-14 RX ADMIN — SODIUM CHLORIDE, POTASSIUM CHLORIDE, SODIUM LACTATE AND CALCIUM CHLORIDE: 600; 310; 30; 20 INJECTION, SOLUTION INTRAVENOUS at 01:54

## 2023-12-14 RX ADMIN — BUSPIRONE HYDROCHLORIDE 15 MG: 15 TABLET ORAL at 15:11

## 2023-12-14 RX ADMIN — HYDROMORPHONE HYDROCHLORIDE 0.2 MG: 0.2 INJECTION, SOLUTION INTRAMUSCULAR; INTRAVENOUS; SUBCUTANEOUS at 12:05

## 2023-12-14 RX ADMIN — HYDROMORPHONE HYDROCHLORIDE 0.2 MG: 0.2 INJECTION, SOLUTION INTRAMUSCULAR; INTRAVENOUS; SUBCUTANEOUS at 00:09

## 2023-12-14 RX ADMIN — HYDROMORPHONE HYDROCHLORIDE 0.2 MG: 0.2 INJECTION, SOLUTION INTRAMUSCULAR; INTRAVENOUS; SUBCUTANEOUS at 21:33

## 2023-12-14 RX ADMIN — CALCIUM CARBONATE (ANTACID) CHEW TAB 500 MG 500 MG: 500 CHEW TAB at 21:48

## 2023-12-14 RX ADMIN — PIPERACILLIN AND TAZOBACTAM 3.38 G: 3; .375 INJECTION, POWDER, FOR SOLUTION INTRAVENOUS at 03:09

## 2023-12-14 RX ADMIN — HYDROMORPHONE HYDROCHLORIDE 0.2 MG: 0.2 INJECTION, SOLUTION INTRAMUSCULAR; INTRAVENOUS; SUBCUTANEOUS at 08:26

## 2023-12-14 RX ADMIN — THIAMINE HYDROCHLORIDE: 100 INJECTION, SOLUTION INTRAMUSCULAR; INTRAVENOUS at 08:53

## 2023-12-14 RX ADMIN — HYDROMORPHONE HYDROCHLORIDE 0.2 MG: 0.2 INJECTION, SOLUTION INTRAMUSCULAR; INTRAVENOUS; SUBCUTANEOUS at 16:22

## 2023-12-14 RX ADMIN — PIPERACILLIN AND TAZOBACTAM 3.38 G: 3; .375 INJECTION, POWDER, FOR SOLUTION INTRAVENOUS at 15:11

## 2023-12-14 RX ADMIN — PIPERACILLIN AND TAZOBACTAM 3.38 G: 3; .375 INJECTION, POWDER, FOR SOLUTION INTRAVENOUS at 21:48

## 2023-12-14 RX ADMIN — PANTOPRAZOLE SODIUM 40 MG: 40 INJECTION, POWDER, FOR SOLUTION INTRAVENOUS at 08:26

## 2023-12-14 ASSESSMENT — ACTIVITIES OF DAILY LIVING (ADL)
ADLS_ACUITY_SCORE: 24
ADLS_ACUITY_SCORE: 28
ADLS_ACUITY_SCORE: 24
ADLS_ACUITY_SCORE: 24
ADLS_ACUITY_SCORE: 28
ADLS_ACUITY_SCORE: 24
ADLS_ACUITY_SCORE: 24
ADLS_ACUITY_SCORE: 28

## 2023-12-14 NOTE — PROGRESS NOTES
Date & Time: 0700-1930  Surgery/POD#: PO D 0 Exp Lap with repeat Gastrorrhaphy   Behavior & Aggression: Green  Fall Risk: Yes  Orientation:x4  ABNL VS/O2:Room air  ABNL Labs: See Chart  Pain Management:IV Dilaudid   Bowel/Bladder: Continent   Drains: ARSENIO, Barney, PIV  Diet:NPO  Activity Level: Not OOB, Pre-procedure she was SBA  Tests/Procedures: [place] Placement tmrw with IR  Anticipated  DC Date: Pending   Significant Information: Planned for a G-Tube placement was unsuccessful by surgery team. Plan to have a  [place] place instead tmrw with IR.CIWA scores has been 0 this shift

## 2023-12-14 NOTE — ANESTHESIA CARE TRANSFER NOTE
Patient: Pavithra Raines    Procedure: Procedure(s):  EXPLORATORY LAPAROSCOPY WITH REPEAT GASTRORRHAPHY       Diagnosis: Marginal ulcer [K28.9]  Diagnosis Additional Information: No value filed.    Anesthesia Type:   General     Note:    Oropharynx: oropharynx clear of all foreign objects and spontaneously breathing  Level of Consciousness: drowsy  Oxygen Supplementation: face mask  Level of Supplemental Oxygen (L/min / FiO2): 6  Independent Airway: airway patency satisfactory and stable  Dentition: dentition unchanged  Vital Signs Stable: post-procedure vital signs reviewed and stable  Report to RN Given: handoff report given  Patient transferred to: PACU  Comments: Patient comfortable  Handoff Report: Identifed the Patient, Identified the Reponsible Provider, Reviewed the pertinent medical history, Discussed the surgical course, Reviewed Intra-OP anesthesia mangement and issues during anesthesia, Set expectations for post-procedure period and Allowed opportunity for questions and acknowledgement of understanding      Vitals:  Vitals Value Taken Time   /80 12/13/23 1806   Temp     Pulse 105 12/13/23 1814   Resp 12 12/13/23 1814   SpO2 100 % 12/13/23 1814   Vitals shown include unfiled device data.    Electronically Signed By: RICARDO Cruz CRNA  December 13, 2023  6:15 PM

## 2023-12-14 NOTE — PROGRESS NOTES
[facility]  Hospitalist Progress Note    Assessment & Plan   Pavithra Raines is a 68 year old female admitted on 12/11/2023.     Past medical history significant for Alcohol use D/O with history of withdrawal with seizures, Coagulopathy thought to be secondary to Alcohol dependence, Fatty liver disease, GERD, Chronic anemia (B12, Iron), Emphysema/COPD, Cannabis use D/O, Seizure D/O, MDD with anxiety, PTSD, Insomnia, Hypersomnia with sleep apnea, RLS, Genital herpes, Malnutrition who was admitted under General Surgery due to perforated bowel possibly at the stomach or previous gastric bypass site and alcohol withdrawal.       Patient presented to [facility] emergency department due to abdominal pain and chest pain.  Patient reported that she began to feel unwell the morning of 12/10/2023 when she started to develop watery diarrhea.  Patient was unable to consume any alcohol throughout the day of 12/10 because she continued to feel unwell and developed nausea symptoms as well as some vomiting.  Around 3 PM and 12/10 she developed severe abdominal pain.  This pain became so intolerable that she called 911 and was brought into the ED.  Patient endorsed concerns of going into alcohol withdrawal and was noted to be tremulous in the ED.     Workup in the ED included a CMP that revealed a CO2 of 18, calcium of 8.6, magnesium of 1.4, albumin of 3.1, alk phos of 175, total bili of 1.6, glucose of 115 otherwise within normal limits.  Lactic acid level was within normal limits at 1.8.  Lipase level was mildly elevated at 65.  Inflammatory CRP was elevated at 8.32.  CBC with differential revealed a hemoglobin of 11.6 and platelet count of 460 otherwise within normal limits.  UA revealed trace leukocyte Estrace with 3 RBCs and 2 squamous epithelial cells with mucus present and hyaline casts of 3.  Abdomen/pelvis CT with contrast revealed free intraperitoneal gas and fluid consistent with  perforated viscus (the site of perforation is not certain though may be from the stomach/gastric bypass).     The ED was in contact with General Surgery (Dr. Guaman) who felt patient's case was too complicated and recommended transfer to a tertiary care center.  Attempts to transfer to the [facility] were performed but due to bed availability patient was ultimately transferred to [facility].  Discussions occurred between the ED (Dr. Brooks), General Surgery (Dr. Gonzalez) and Hospitalist (Dr. Monaco) at [facility] and patient was admitted under General Surgery with verbal request for Hospitalist consult upon arrival on 12/11/23. Patient was taken to the OR on 12/11/23 where they found a perforated marginal ulcer which they fixed with an omental patch repair.      Hospitalist team consulting. General surgery is the primary team.     Perforated Marginal Ulcer s/p Omental Patch Repair 12/11/23   Elevated inflammatory CRP  Abdominal pain    - General surgery is managing.                -Defer analgesic/pain management, DVT prophylaxis, PT/OT.       - Unable to get G tube placed on 12/13/23. Patient getting NJ tube today   - Tube feeds per surgery       - Hgb: 11.6 > 9.5 > 9 > 10.6     - Continue to monitor     - Encourage utilization of incentive spirometer.     - Continue IV Zosyn for 3 more days per Surgery    - Patient refused this morning due to not wanting to be hooked up to IV. Asked nursing to explain why she needs this and continue to try giving med.     - Surgery discontinued IV Fluconazole     Leukocytosis: Resolving   Likely related to recent surgery    - Continue to monitor     Hypernatremia  Likely related to NPO status. Hopefully with the start of tube feeds and free water flushes this will improve.    - Continue to monitor     Alcohol withdrawal  Alcohol use D/O with history of withdrawal with seizures  Patient states that she drink a box of wine over 2-3 days and roughly drinks 10 -15  glasses of wine a day. On  was notified by nursing that they suspect patient is drinking the mouthwash. Advised nursing to use mouthwash with supervision only and not to leave in room.     - CIWA ordered with PRN Ativan ordered.   - Has not needed PRNs     - Stop banana bag     - Start PO Vitamins and calcium carbonate per Nutrition via feeding tube    - Chem dep saw patient and have provided resources at discharge      Hypomagnesemia: Resolved   Hypokalemia: Resolved    - M.4 > 2.2 > 1.7   - K: 2.9 > 3.5 > 3.6 > 3.9  - Monitor and replace per protocol.       Severe Malnutrition  - Tube feeds per nutrition and surgery  - Start PO Vitamins and calcium carbonate per Nutrition via feeding tube    Chronic Medical Problems:     Heart murmur  Noted slight heart murmur on examination.  Patient denied every being told about a murmur in the past. Previous ECHO completed 10/2023 and without aortic stenosis.    - Monitor     Coagulopathy thought to be secondary to Alcohol dependence  INR and PTT checked after assessing the patient and within normal limits.    - Monitor PRN.       Fatty liver disease  - Follow up with PCP as an outpatient.       GERD  Has not taken any medications in the last month as she ran out.    - Continue IV Protonix 40 mg/d for now      Chronic anemia (B12 and Iron)  - Will discontinue banana bag  - Start PO Vitamins and calcium carbonate per Nutrition via feeding tube      Emphysema/COPD   Recently quit smoking in the last 2 months and was previously smoking 2 packs per day.  Not currently taking any medications.   - Monitor.       Cannabis use D/O  - Counseled regarding cessation  - Chem dep consulted      Seizure D/O  Likely due to alcohol withdrawal.  Patient not prescribed antiepileptics per chart review.       MDD with anxiety  PTSD  Has not taken any medications in the last month or two as she ran out.      - Will restart Buspar 15mg TID via feeding tube today     - Can consider starting  "Lexapro or Effexor in the coming days     - Would not restart Klonipin     Insomnia  Hypersomnia with sleep apnea  Noted on chart review.  Does not appear to have been prescribed any medications.       RLS  Noted on chart review.  Does not appear to have been prescribed any medications.       Genital herpes  Noted on chart review.  Does not appear to have been prescribed any medications.        Osteoporosis  - Hold TPA Evista as patient has not been taking this medication.     Clinically Significant Risk Factors         # Hypernatremia: Highest Na = 146 mmol/L in last 2 days, will monitor as appropriate      # Hypoalbuminemia: Lowest albumin = 2.6 g/dL at 12/12/2023  7:08 AM, will monitor as appropriate            # Cachexia: Estimated body mass index is 17.16 kg/m  as calculated from the following:    Height as of this encounter: 1.626 m (5' 4\").    Weight as of this encounter: 45.4 kg (100 lb)., PRESENT ON ADMISSION  # Severe Malnutrition: based on nutrition assessment, PRESENT ON ADMISSION   # Financial/Environmental Concerns: unable to afford medication(s);other (see comments) (Spouse makes too much to qualify for food support etc. Needs to get SS because of the inability to work)           Diet: NPO per Anesthesia Guidelines for Procedure/Surgery Except for: Meds     DVT Prophylaxis: Defer to primary service   Barney Catheter: PRESENT, indication: /GI/GYN Pelvic Procedure  Lines: None     Cardiac Monitoring: None  Code Status: Full Code      Disposition Plan       Expected Discharge Date: 12/17/2023      Destination: inpatient rehabilitation facility;nursing home        Entered: Torri Romero MD 12/14/2023, 12:10 PM     Care Team Updated: Yes     Disposition: Hospitalist team will continue to follow along while patient is admitted. Discharge timing per Surgery     Torri Romero MD  Hospitalist Service   [facility]  Securely message with the Vocera Web Console (learn more " here)  Text page via [facility] Paging/Directory         Medical Decision Making       45 MINUTES SPENT BY ME on the date of service doing chart review, history, exam, documentation & further activities per the note.           Interval History     No acute overnight events.     This morning the patient was seen sitting up in the chair. She was visiting with the sirisha. She states that she is feeling sad. Upset that the G tube could not  be place and has to get an NJ tube. States that she is ready to get back on her home depression medications soon.     Denies headache, chest pain, SOB, nausea, vomiting.     -Data reviewed today: I reviewed all new labs and imaging results over the last 24 hours.   Physical Exam   Temp: 98.8  F (37.1  C) Temp src: Oral BP: 127/74 Pulse: 61   Resp: 16 SpO2: 100 % O2 Device: None (Room air) Oxygen Delivery: 1 LPM  Vitals:    12/11/23 1546   Weight: 45.4 kg (100 lb)     Vital Signs with Ranges  Temp:  [98  F (36.7  C)-98.8  F (37.1  C)] 98.8  F (37.1  C)  Pulse:  [] 61  Resp:  [10-24] 16  BP: (101-131)/(64-81) 127/74  Cuff Mean (mmHg):  [107] 107  SpO2:  [95 %-100 %] 100 %  I/O last 3 completed shifts:  In: 1150 [I.V.:900]  Out: 705 [Urine:550; Drains:155]      Constitutional: Awake, alert, cooperative, no apparent distress  HEENT: PERRL, Normocephalic, without obvious abnormality, atraumatic, oral pharynx with moist mucus membranes  Pulmonary: Clear breath sounds bilaterally   Cardiovascular: Regular rate and rhythm, normal S1 and S2  GI: soft, multiple surgical incisions in place and covered with bandage, ARSENIO drain noted on left abdomen with bloody drainage, some tenderness with palpation, sluggish BS  Skin/Integumen: Visualized skin appeared clear.  Neuro: Moves all 4 extremities, no tremor noted   Psych:  Alert and oriented x 3. Normal affect.  Extremities: No lower extremity edema noted      Medications    lactated ringers 100 mL/hr at 12/14/23 0154      busPIRone  15 mg Per  Feeding Tube TID    calcium carbonate  500 mg Per Feeding Tube TID    enoxaparin ANTICOAGULANT  30 mg Subcutaneous Q24H    [Held by provider] ferrous fumarate 65 mg ([place]. FE)-Vitamin C 125 mg  1 tablet Oral Daily    folic acid  1 mg Per Feeding Tube Daily    [START ON 12/15/2023] multivitamin, therapeutic  1 tablet Per Feeding Tube Daily    pantoprazole  40 mg Intravenous Daily with breakfast    piperacillin-tazobactam  3.375 g Intravenous Q6H    sodium chloride (PF)  3 mL Intracatheter Q8H    [START ON 12/15/2023] cholecalciferol  125 mcg Per Feeding Tube Daily       Data   Recent Labs   Lab 12/14/23  0920 12/14/23  0903 12/14/23  0758 12/14/23  0607 12/13/23  1303 12/13/23  0724 12/12/23  2035 12/12/23  0708 12/11/23  1419 12/11/23  0459   WBC 14.3*  --   --   --   --  14.4*  --  17.5*  --  9.0   HGB 10.6*  --   --   --   --  9.0*  --  9.5*  --  11.6*     --   --   --   --  101*  --  98  --  92     --   --   --   --  351  --  358  --  460*   INR  --   --   --   --   --   --   --   --  1.11  --    *  --   --   --   --  140  --  141  --  135   POTASSIUM 3.9  --  3.7  --   --  3.6   < > 2.9*  --  4.0   CHLORIDE 113*  --   --   --   --  111*  --  110*  --  102   CO2 21*  --   --   --   --  19*  --  24  --  18*   BUN 12.8  --   --   --   --  14.4  --  10.1  --  8.7   CR 0.75  --   --   --   --  0.84  --  0.73  --  0.69   ANIONGAP 12  --   --   --   --  10  --  7  --  15   SHON 8.2*  --   --   --   --  7.8*  --  7.8*  --  8.6*   * 108*  --  121*   < > 63*  --  120*  --  115*   ALBUMIN  --   --   --   --   --   --   --  2.6*  --  3.1*   PROTTOTAL  --   --   --   --   --   --   --  5.3*  --  7.0   BILITOTAL  --   --   --   --   --   --   --  0.7  --  1.6*   ALKPHOS  --   --   --   --   --   --   --  97  --  175*   ALT  --   --   --   --   --   --   --  5  --  6   AST  --   --   --   --   --   --   --  21  --  16   LIPASE  --   --   --   --   --   --   --   --   --  65*    < > = values in this  interval not displayed.       No results found for this or any previous visit (from the past 24 hour(s)).

## 2023-12-14 NOTE — PROVIDER NOTIFICATION
Paged provider patient is on q2hr blood sugar checks do we need these or can we discontinue?     Order updated to every 8 hour check

## 2023-12-14 NOTE — PROGRESS NOTES
"Acute Care Surgery Progress Note    Subjective: no acute issues overnight. pain had been manageable. No nausea or emesis.    Objective: /74   Pulse 61   Temp 98.8  F (37.1  C) (Oral)   Resp 16   Ht 1.626 m (5' 4\")   Wt 45.4 kg (100 lb)   LMP  (LMP Unknown)   SpO2 100%   BMI 17.16 kg/m    Gen: not in acute distress  CV: normal sinus  Pulm: nonlabored breathing on room air  Abd: soft, appropriately tender, non-distended. ARSENIO drain with serosanguinous output   Ext: moving all extremities, strength grossly normal    Drain output 95(90)    Assessment/Plan:   Pavithra Raines is a 68 year old female with a history of sudha-en-y gastric bypass, alcohol use disorder, coagulopathy secondary to alcohol dependence, COPD, and multiple other medical issues who presented with perforated marginal ulcer s/p diagnostic laparoscopy with omental patch repair on 12/11 and s/p diagnostic laparoscopy and aborted G-tube placement on 12/13.    POD3 from omental patch repair and POD1 from aborted G-tube placement    --continue NPO  --LR IVF  --planning for NJ tube placement under fluoro with IR today   -will start tube feed afterward. If unable to obtain enteral access, will plan for TPN  --discontinue Fluconazole today and continue IV Zosyn for three more days  --daily Protonix  --daily Lovenox for DVT ppx  --daily CBC, BMP, Mg and Thomas  --remove mccabe today  --appreciate Hospitalist assistance in medical management      Sangeetha Taylor MD   PGY4 General Surgery Resident  [facility]          "

## 2023-12-14 NOTE — CONSULTS
IR CONSULT    IR consult order is not the correct or appropriate order for Xray guided NJ feeding tube placement.  Just an order for the fluoro/xray guided feeding tube is needed.     Please place   XR feeding tube order.    This will be relayed to the patients nurse by the xray department.     Radha Lennon, DO  [facility]

## 2023-12-14 NOTE — ANESTHESIA POSTPROCEDURE EVALUATION
Patient: Pavithra Raines    Procedure: Procedure(s):  EXPLORATORY LAPAROSCOPY WITH REPEAT GASTRORRHAPHY       Anesthesia Type:  General    Note:  Disposition: Inpatient   Postop Pain Control: Uneventful            Sign Out: Well controlled pain   PONV: No   Neuro/Psych: Uneventful            Sign Out: Acceptable/Baseline neuro status   Airway/Respiratory: Uneventful            Sign Out: Acceptable/Baseline resp. status   CV/Hemodynamics: Uneventful            Sign Out: Acceptable CV status   Other NRE: NONE   DID A NON-ROUTINE EVENT OCCUR? No           Last vitals:  Vitals Value Taken Time   /80 12/13/23 1830   Temp 36.9  C (98.5  F) 12/13/23 1830   Pulse 104 12/13/23 1831   Resp 21 12/13/23 1831   SpO2 95 % 12/13/23 1834   Vitals shown include unfiled device data.    Electronically Signed By: Camden Byrne MD  December 13, 2023  9:07 PM

## 2023-12-14 NOTE — PROGRESS NOTES
Brief general surgery note:    S/p diagnostic lap, aborted G tube placement as the stomach remnant was quite stuck down under the Tom limb and attempting to free up the remnant may risk disrupting the recent omental patch repair.      --keep NPO  --ok to resume DVT ppx  --continue to monitor ARSENIO drain output  --will consult IR tomorrow to have them place NJ feeding tube under fluoro   --if NJ feeding tube placement is unsuccessful, will plan to start TPN then  --will determine timing of upper GI study    Sangeetha Taylor MD   PGY4 General Surgery Resident  [facility]    - - -

## 2023-12-14 NOTE — OP NOTE
General Surgery Operative Note    PREOPERATIVE DIAGNOSIS:  Marginal ulcer [K28.9]    POSTOPERATIVE DIAGNOSIS:  Same    PROCEDURE:   Procedure(s):  Exploratory laparoscopy with repeat gastrorrhaphy, aborted G-tube placement    ANESTHESIA:  General.    PREOPERATIVE MEDICATIONS:  See MAR    Surgeon:         Surgeon(s) and Role:     * Antoni Gonzalez MD - Primary     * Sangeetha Taylor MD - Resident - Assisting    INDICATIONS:  Pavithra Raines is a 68 year old female with a history of sudha-en-y gastric bypass, alcohol use disorder, coagulopathy secondary to alcohol dependence, COPD, and multiple other medical issues who presented with perforated marginal ulcer s/p diagnostic laparoscopy with omental patch repair on 12/11. Given moderate size of gastric perforation, the patient will need prolong course of NPO. Laparoscopic G tube placement into the remnant stomach was recommended as a way to establish enteral access.      PROCEDURE:  Patient was taken to the operating suite and uneventfully endotracheally intubated.  Abdomen was prepped and draped in a sterile fashion.  Surgeon initiated timeout was acknowledged.      Recent port site incisions were reopen. The abdominal cavity entered through the 5 mm port site at the Cai's point using the Visiport technique.  3 working ports were inserted into the peritoneal cavity under direct visualization with 5 mm ports at the subxiphoid, 12 mm port at the umbilicus, and another 12 mm port at the right mid abdomen.  Abdominal survey was completed and noted extensive inflammation and adhesive band surrounding the recent omental patch repair.  At this point we proceeded to mobilize the remnant stomach and transected the gastrocolic ligament. We were able to identify the remnant stomach.  During the process of mobilization we inadvertently disrupted the recent omental patch repair and noted succus leakage from this area. We also found that the antecolic Sudha limb was quite  stuck down to the remnant stomach.  At this point we determined that attempting to identify a window for gastrostomy tube placement could further risking disrupting the repair. We decided to abort gastrostomy tube placement. We proceeded to oversew the omentum back to the gastric pouch to re-secure the previous repair with interrupted Vicryl sutures.We then desufflated the abdomen and reapproximated the fascia at the 12 mm port sites with a figure-of-eight 0 Vicryl suture. The skin edges were reapproximated using 4-0 Vicryl and Steri-Strips.  Band-Aids were placed and the patient was awakened.  The patient was uneventfully extubated and taken to PACU in stable condition.  At the conclusion of the case, all lap and needle counts were correct.      ESTIMATED BLOOD LOSS: minimal     INTRAOPERATIVE FINDINGS:  aborted gastrostomy tube placement.     Sangeetha Taylor MD   PGY4 General Surgery Resident  [facility]     General Surgery staff    I was present and participated throughout the key portions of this case.  I either supervised the resident throughout the key portions of the case or performed them myself.      Antoni Gonzalez M.D., F.A.C.SSaint [facility]  [facility]  VANI Romero  12/15/2023

## 2023-12-14 NOTE — PROGRESS NOTES
RADIOLOGY PROCEDURE NOTE  Patient name: Pavithra Raines  MRN: [id number]  : 1955    Pre-procedure diagnosis: NJ tube for feeding  Post-procedure diagnosis: Same    Procedure Date/Time: 2023  11:44 AM  Procedure: NJ tube placement.  History of Tom and Y with previous perf.  Recently patch placed.  Myself and Dr. Stephens advanced the feeding tube to an appropriate position.  Ready for use. Bridle also placed.   Estimated blood loss: None  Specimen(s) collected with description: none  The patient tolerated the procedure well with no immediate complications.  Significant findings:Ready for use.    See imaging dictation for procedural details.    Provider name: John Lozano PA-C  Assistant(s):None

## 2023-12-14 NOTE — PLAN OF CARE
Goal Outcome Evaluation:  Summary:  Diagnostic lap and omental patch repair of gastric perforation.  POD 0 Exp Lap with repeat Gastrorrhaphy     Orientation:AOx4    Vitals/Tele: VSS on 1 L NC    IV Access/drains: PIV infusing  ml/hr, ARSENIO drain, Barney    Diet: NPO    Mobility: SBA, not oob since surgery    Wound/Skin: Rt and Lt bruising on upper arms, 3 lap sites    Pain management: IV dilaudid     Discharge Plan: TBD    Other info: CIWA scoring 0, Planned for a G-Tube placement was unsuccessful by surgery team. Plan to have a  NJ place instead tmrw with IR.     See Flow sheets for assessment

## 2023-12-14 NOTE — CONSULTS
"SPIRITUAL HEALTH SERVICES - Consult Note    SH  Gen Surg    Referral Source/Reason for Visit: Consult - patient request     Summary and Recommendations -    Shanika describes a lot of physical/medical conditions that have emerged recently and a sense of being \"overwhelmed\" by it all. She \"trusts\" her team to help her find the right path.  While not overtly Jain, Shanika has a deep sense of spirituality and is exploring her sense of meaning and purpose in light of her health concerns.  We discussed the possibility of creating an Ethical Will and I encouraged her to continue thinking about that possibility.    Plan: Spiritual Health will continue to follow this patient during her time on the unit.    Adriana Montoya M.Div.    Chaplain Resident    SHS available 24/7 for emergent requests/referrals, either by paging the on-call  or by entering an ASAP/STAT consult in [employer] which will also page the on-call .    Assessment    Saw pt \"Shanika\" Olu per consult.      Patient/Family Understanding of Illness and Goals of Care -   Shanika stated she had been hospitalized recently including \"10 days in a coma\" and was still recovering when this health event occurred. It is \"overwhelming.\"  Shanika had surgery yesterday and \"it didn't go exactly as planned.\" She \"trusts\" her team to keep helping her find the right solutions.  Shanika describes herself as an \"alcoholic\" who was sober \"for about 20 years\" in the past.      Distress and Loss -   In addition to her physical/medical needs, Shanika has financial concerns and is applying for more assistance.  Shankia has some \"negative relationships\" in her life that she is figuring out how to handle best, with the assistance of professionals.  Shanika has a history of \"trauma\" that she continues to process.  Shanika is \"facing my mortality\" and considering questions of purpose and meaning.      Strengths, Coping, and Resources -   Shanika has a long history of being \"resilient\" and is drawing on some " "of those skills during her illness.  Shanika describes friendships that are meaningful and supportive.  Shanika expressed a desire to \"leave a legacy\" and an interest in exploring what that might look like for her.      Meaning, Beliefs, and Spirituality -   Shanika grew up in the [employer] tradition but no longer considers herself part of any one [employer]. She does have a sense of spirituality however and requested prayer for health and connection.  "

## 2023-12-14 NOTE — BRIEF OP NOTE
[facility]    Brief Operative Note    Pre-operative diagnosis: Marginal ulcer [K28.9]  Post-operative diagnosis Same as pre-operative diagnosis    Procedure: EXPLORATORY LAPAROSCOPY WITH REPEAT GASTRORRHAPHY, N/A - Abdomen    Surgeon: Surgeon(s) and Role:     * Anotni Gonzalez MD - Primary     * Sangeetha Taylor MD - Resident - Assisting  Anesthesia: General   Estimated Blood Loss: Minimal    Drains: Armando-Norman  Specimens: * No specimens in log *  Findings:   Aborted G tube place.  .  Complications: None.  Implants: * No implants in log *

## 2023-12-14 NOTE — PROVIDER NOTIFICATION
Paged MD about patient refusing morning dose of zosyn and requesting to have the banana bag discontinued.

## 2023-12-15 DIAGNOSIS — Z53.9 DIAGNOSIS NOT YET DEFINED: Primary | ICD-10-CM

## 2023-12-15 LAB
ANION GAP SERPL CALCULATED.3IONS-SCNC: 7 MMOL/L (ref 7–15)
ANION GAP SERPL CALCULATED.3IONS-SCNC: 8 MMOL/L (ref 7–15)
BUN SERPL-MCNC: 12.7 MG/DL (ref 8–23)
BUN SERPL-MCNC: 8.6 MG/DL (ref 8–23)
CALCIUM SERPL-MCNC: 7.2 MG/DL (ref 8.8–10.2)
CALCIUM SERPL-MCNC: 7.8 MG/DL (ref 8.8–10.2)
CHLORIDE SERPL-SCNC: 114 MMOL/L (ref 98–107)
CHLORIDE SERPL-SCNC: 115 MMOL/L (ref 98–107)
CREAT SERPL-MCNC: 0.63 MG/DL (ref 0.51–0.95)
CREAT SERPL-MCNC: 0.74 MG/DL (ref 0.51–0.95)
DEPRECATED HCO3 PLAS-SCNC: 22 MMOL/L (ref 22–29)
DEPRECATED HCO3 PLAS-SCNC: 24 MMOL/L (ref 22–29)
EGFRCR SERPLBLD CKD-EPI 2021: 89 ML/MIN/1.73M2
EGFRCR SERPLBLD CKD-EPI 2021: >90 ML/MIN/1.73M2
ERYTHROCYTE [DISTWIDTH] IN BLOOD BY AUTOMATED COUNT: 14.9 % (ref 10–15)
GLUCOSE BLDC GLUCOMTR-MCNC: 115 MG/DL (ref 70–99)
GLUCOSE BLDC GLUCOMTR-MCNC: 98 MG/DL (ref 70–99)
GLUCOSE SERPL-MCNC: 109 MG/DL (ref 70–99)
GLUCOSE SERPL-MCNC: 126 MG/DL (ref 70–99)
HCT VFR BLD AUTO: 27.7 % (ref 35–47)
HGB BLD-MCNC: 8.8 G/DL (ref 11.7–15.7)
MAGNESIUM SERPL-MCNC: 1.6 MG/DL (ref 1.7–2.3)
MAGNESIUM SERPL-MCNC: 2.1 MG/DL (ref 1.7–2.3)
MCH RBC QN AUTO: 30.9 PG (ref 26.5–33)
MCHC RBC AUTO-ENTMCNC: 31.8 G/DL (ref 31.5–36.5)
MCV RBC AUTO: 97 FL (ref 78–100)
PHOSPHATE SERPL-MCNC: 1.8 MG/DL (ref 2.5–4.5)
PHOSPHATE SERPL-MCNC: 2.2 MG/DL (ref 2.5–4.5)
PHOSPHATE SERPL-MCNC: 2.6 MG/DL (ref 2.5–4.5)
PLATELET # BLD AUTO: 333 10E3/UL (ref 150–450)
POTASSIUM SERPL-SCNC: 2.7 MMOL/L (ref 3.4–5.3)
POTASSIUM SERPL-SCNC: 3.7 MMOL/L (ref 3.4–5.3)
RBC # BLD AUTO: 2.85 10E6/UL (ref 3.8–5.2)
SODIUM SERPL-SCNC: 144 MMOL/L (ref 135–145)
SODIUM SERPL-SCNC: 146 MMOL/L (ref 135–145)
WBC # BLD AUTO: 10.1 10E3/UL (ref 4–11)

## 2023-12-15 PROCEDURE — G0180 MD CERTIFICATION HHA PATIENT: HCPCS | Performed by: FAMILY MEDICINE

## 2023-12-15 PROCEDURE — 250N000013 HC RX MED GY IP 250 OP 250 PS 637: Performed by: HOSPITALIST

## 2023-12-15 PROCEDURE — 250N000009 HC RX 250: Performed by: STUDENT IN AN ORGANIZED HEALTH CARE EDUCATION/TRAINING PROGRAM

## 2023-12-15 PROCEDURE — 84100 ASSAY OF PHOSPHORUS: CPT | Performed by: HOSPITALIST

## 2023-12-15 PROCEDURE — 250N000013 HC RX MED GY IP 250 OP 250 PS 637: Performed by: SURGERY

## 2023-12-15 PROCEDURE — 82310 ASSAY OF CALCIUM: CPT | Performed by: STUDENT IN AN ORGANIZED HEALTH CARE EDUCATION/TRAINING PROGRAM

## 2023-12-15 PROCEDURE — 84100 ASSAY OF PHOSPHORUS: CPT | Performed by: STUDENT IN AN ORGANIZED HEALTH CARE EDUCATION/TRAINING PROGRAM

## 2023-12-15 PROCEDURE — 85027 COMPLETE CBC AUTOMATED: CPT | Performed by: STUDENT IN AN ORGANIZED HEALTH CARE EDUCATION/TRAINING PROGRAM

## 2023-12-15 PROCEDURE — 250N000013 HC RX MED GY IP 250 OP 250 PS 637: Performed by: STUDENT IN AN ORGANIZED HEALTH CARE EDUCATION/TRAINING PROGRAM

## 2023-12-15 PROCEDURE — 258N000003 HC RX IP 258 OP 636: Performed by: STUDENT IN AN ORGANIZED HEALTH CARE EDUCATION/TRAINING PROGRAM

## 2023-12-15 PROCEDURE — 36415 COLL VENOUS BLD VENIPUNCTURE: CPT | Performed by: STUDENT IN AN ORGANIZED HEALTH CARE EDUCATION/TRAINING PROGRAM

## 2023-12-15 PROCEDURE — 250N000011 HC RX IP 250 OP 636: Performed by: STUDENT IN AN ORGANIZED HEALTH CARE EDUCATION/TRAINING PROGRAM

## 2023-12-15 PROCEDURE — 250N000011 HC RX IP 250 OP 636: Mod: JZ | Performed by: STUDENT IN AN ORGANIZED HEALTH CARE EDUCATION/TRAINING PROGRAM

## 2023-12-15 PROCEDURE — 36415 COLL VENOUS BLD VENIPUNCTURE: CPT | Performed by: HOSPITALIST

## 2023-12-15 PROCEDURE — 83735 ASSAY OF MAGNESIUM: CPT | Performed by: STUDENT IN AN ORGANIZED HEALTH CARE EDUCATION/TRAINING PROGRAM

## 2023-12-15 PROCEDURE — 250N000011 HC RX IP 250 OP 636: Mod: JZ | Performed by: SURGERY

## 2023-12-15 PROCEDURE — 99232 SBSQ HOSP IP/OBS MODERATE 35: CPT | Performed by: HOSPITALIST

## 2023-12-15 PROCEDURE — C9113 INJ PANTOPRAZOLE SODIUM, VIA: HCPCS | Performed by: STUDENT IN AN ORGANIZED HEALTH CARE EDUCATION/TRAINING PROGRAM

## 2023-12-15 PROCEDURE — 120N000001 HC R&B MED SURG/OB

## 2023-12-15 RX ORDER — ENOXAPARIN SODIUM 100 MG/ML
40 INJECTION SUBCUTANEOUS EVERY 24 HOURS
Status: DISCONTINUED | OUTPATIENT
Start: 2023-12-15 | End: 2023-12-21 | Stop reason: HOSPADM

## 2023-12-15 RX ORDER — CLONAZEPAM 0.5 MG/1
0.5 TABLET ORAL DAILY PRN
Status: DISCONTINUED | OUTPATIENT
Start: 2023-12-15 | End: 2023-12-21 | Stop reason: HOSPADM

## 2023-12-15 RX ORDER — POTASSIUM CHLORIDE 20MEQ/15ML
20 LIQUID (ML) ORAL ONCE
Status: DISCONTINUED | OUTPATIENT
Start: 2023-12-15 | End: 2023-12-15

## 2023-12-15 RX ORDER — CLONAZEPAM 0.5 MG/1
0.5 TABLET ORAL DAILY PRN
Status: DISCONTINUED | OUTPATIENT
Start: 2023-12-15 | End: 2023-12-15

## 2023-12-15 RX ORDER — ESCITALOPRAM OXALATE 5 MG/5ML
30 SOLUTION ORAL DAILY
Status: DISCONTINUED | OUTPATIENT
Start: 2023-12-15 | End: 2023-12-21 | Stop reason: HOSPADM

## 2023-12-15 RX ORDER — POTASSIUM CHLORIDE 20MEQ/15ML
40 LIQUID (ML) ORAL ONCE
Status: COMPLETED | OUTPATIENT
Start: 2023-12-15 | End: 2023-12-15

## 2023-12-15 RX ORDER — VENLAFAXINE HYDROCHLORIDE 75 MG/1
37.5 CAPSULE, EXTENDED RELEASE ORAL
Status: DISCONTINUED | OUTPATIENT
Start: 2023-12-15 | End: 2023-12-15

## 2023-12-15 RX ORDER — VENLAFAXINE 37.5 MG/1
37.5 TABLET ORAL DAILY
Status: DISCONTINUED | OUTPATIENT
Start: 2023-12-15 | End: 2023-12-21 | Stop reason: HOSPADM

## 2023-12-15 RX ORDER — MAGNESIUM SULFATE HEPTAHYDRATE 40 MG/ML
2 INJECTION, SOLUTION INTRAVENOUS EVERY 6 HOURS
Status: COMPLETED | OUTPATIENT
Start: 2023-12-15 | End: 2023-12-15

## 2023-12-15 RX ORDER — ENOXAPARIN SODIUM 100 MG/ML
40 INJECTION SUBCUTANEOUS EVERY 24 HOURS
Status: DISCONTINUED | OUTPATIENT
Start: 2023-12-16 | End: 2023-12-15

## 2023-12-15 RX ORDER — GUAIFENESIN 600 MG/1
15 TABLET, EXTENDED RELEASE ORAL 2 TIMES DAILY
Status: DISCONTINUED | OUTPATIENT
Start: 2023-12-15 | End: 2023-12-21 | Stop reason: HOSPADM

## 2023-12-15 RX ORDER — MAGNESIUM OXIDE 400 MG/1
400 TABLET ORAL DAILY
Qty: 2 TABLET | Refills: 0 | Status: DISCONTINUED | OUTPATIENT
Start: 2023-12-15 | End: 2023-12-15

## 2023-12-15 RX ORDER — DEXTROSE MONOHYDRATE 100 MG/ML
INJECTION, SOLUTION INTRAVENOUS CONTINUOUS PRN
Status: DISCONTINUED | OUTPATIENT
Start: 2023-12-15 | End: 2023-12-21 | Stop reason: HOSPADM

## 2023-12-15 RX ADMIN — HYDROMORPHONE HYDROCHLORIDE 0.2 MG: 0.2 INJECTION, SOLUTION INTRAMUSCULAR; INTRAVENOUS; SUBCUTANEOUS at 23:58

## 2023-12-15 RX ADMIN — BUSPIRONE HYDROCHLORIDE 15 MG: 15 TABLET ORAL at 21:15

## 2023-12-15 RX ADMIN — CALCIUM CARBONATE (ANTACID) CHEW TAB 500 MG 500 MG: 500 CHEW TAB at 08:13

## 2023-12-15 RX ADMIN — CALCIUM CARBONATE (ANTACID) CHEW TAB 500 MG 500 MG: 500 CHEW TAB at 15:52

## 2023-12-15 RX ADMIN — HYDROMORPHONE HYDROCHLORIDE 0.2 MG: 0.2 INJECTION, SOLUTION INTRAMUSCULAR; INTRAVENOUS; SUBCUTANEOUS at 13:07

## 2023-12-15 RX ADMIN — PIPERACILLIN AND TAZOBACTAM 3.38 G: 3; .375 INJECTION, POWDER, FOR SOLUTION INTRAVENOUS at 21:06

## 2023-12-15 RX ADMIN — HYDROMORPHONE HYDROCHLORIDE 0.2 MG: 0.2 INJECTION, SOLUTION INTRAMUSCULAR; INTRAVENOUS; SUBCUTANEOUS at 18:20

## 2023-12-15 RX ADMIN — Medication 125 MCG: at 08:13

## 2023-12-15 RX ADMIN — MAGNESIUM SULFATE HEPTAHYDRATE 2 G: 40 INJECTION, SOLUTION INTRAVENOUS at 18:20

## 2023-12-15 RX ADMIN — PIPERACILLIN AND TAZOBACTAM 3.38 G: 3; .375 INJECTION, POWDER, FOR SOLUTION INTRAVENOUS at 15:52

## 2023-12-15 RX ADMIN — CLONAZEPAM 0.5 MG: 0.5 TABLET ORAL at 16:00

## 2023-12-15 RX ADMIN — HYDROMORPHONE HYDROCHLORIDE 0.2 MG: 0.2 INJECTION, SOLUTION INTRAMUSCULAR; INTRAVENOUS; SUBCUTANEOUS at 08:12

## 2023-12-15 RX ADMIN — Medication 15 ML: at 13:07

## 2023-12-15 RX ADMIN — POTASSIUM PHOSPHATE, MONOBASIC AND POTASSIUM PHOSPHATE, DIBASIC 30 MMOL: 224; 236 INJECTION, SOLUTION, CONCENTRATE INTRAVENOUS at 22:29

## 2023-12-15 RX ADMIN — PANTOPRAZOLE SODIUM 40 MG: 40 INJECTION, POWDER, FOR SOLUTION INTRAVENOUS at 08:12

## 2023-12-15 RX ADMIN — POTASSIUM PHOSPHATE, MONOBASIC AND POTASSIUM PHOSPHATE, DIBASIC 30 MMOL: 224; 236 INJECTION, SOLUTION, CONCENTRATE INTRAVENOUS at 13:06

## 2023-12-15 RX ADMIN — POTASSIUM CHLORIDE 40 MEQ: 20 SOLUTION ORAL at 10:47

## 2023-12-15 RX ADMIN — HYDROMORPHONE HYDROCHLORIDE 0.2 MG: 0.2 INJECTION, SOLUTION INTRAMUSCULAR; INTRAVENOUS; SUBCUTANEOUS at 10:46

## 2023-12-15 RX ADMIN — BUSPIRONE HYDROCHLORIDE 15 MG: 15 TABLET ORAL at 08:13

## 2023-12-15 RX ADMIN — HYDROMORPHONE HYDROCHLORIDE 0.2 MG: 0.2 INJECTION, SOLUTION INTRAMUSCULAR; INTRAVENOUS; SUBCUTANEOUS at 21:25

## 2023-12-15 RX ADMIN — HYDROMORPHONE HYDROCHLORIDE 0.2 MG: 0.2 INJECTION, SOLUTION INTRAMUSCULAR; INTRAVENOUS; SUBCUTANEOUS at 00:23

## 2023-12-15 RX ADMIN — VENLAFAXINE 37.5 MG: 37.5 TABLET ORAL at 13:07

## 2023-12-15 RX ADMIN — ESCITALOPRAM 30 MG: 5 SOLUTION ORAL at 12:03

## 2023-12-15 RX ADMIN — HYDROMORPHONE HYDROCHLORIDE 0.2 MG: 0.2 INJECTION, SOLUTION INTRAMUSCULAR; INTRAVENOUS; SUBCUTANEOUS at 15:51

## 2023-12-15 RX ADMIN — ENOXAPARIN SODIUM 40 MG: 40 INJECTION SUBCUTANEOUS at 15:52

## 2023-12-15 RX ADMIN — CALCIUM CARBONATE (ANTACID) CHEW TAB 500 MG 500 MG: 500 CHEW TAB at 21:15

## 2023-12-15 RX ADMIN — MAGNESIUM SULFATE HEPTAHYDRATE 2 G: 40 INJECTION, SOLUTION INTRAVENOUS at 11:59

## 2023-12-15 RX ADMIN — PIPERACILLIN AND TAZOBACTAM 3.38 G: 3; .375 INJECTION, POWDER, FOR SOLUTION INTRAVENOUS at 03:46

## 2023-12-15 RX ADMIN — PIPERACILLIN AND TAZOBACTAM 3.38 G: 3; .375 INJECTION, POWDER, FOR SOLUTION INTRAVENOUS at 08:12

## 2023-12-15 RX ADMIN — BUSPIRONE HYDROCHLORIDE 15 MG: 15 TABLET ORAL at 15:52

## 2023-12-15 RX ADMIN — FOLIC ACID 1 MG: 1 TABLET ORAL at 13:07

## 2023-12-15 RX ADMIN — POTASSIUM CHLORIDE 40 MEQ: 20 SOLUTION ORAL at 12:04

## 2023-12-15 ASSESSMENT — ACTIVITIES OF DAILY LIVING (ADL)
ADLS_ACUITY_SCORE: 24

## 2023-12-15 NOTE — PLAN OF CARE
Goal Outcome Evaluation:      Plan of Care Reviewed With: patient    Overall Patient Progress: improvingOverall Patient Progress: improving    Outcome Evaluation: remains NPO. tolerating TF per pt. RD placed advancement/goal orders. increased FWF    Maggi Bhat RD, LD

## 2023-12-15 NOTE — PLAN OF CARE
Date & Time: 1900-0730.  Surgery/POD#: POD 1 from Diagnostic lap and omental patch repair of gastric perforation w/ repeat gastrorrhaphy.  Behavior & Aggression: Green - no concerns - CIWA scores were all zeros overnight.  Fall Risk: Yes.  Orientation:A&Ox4.  ABNL VS/O2:VSS on RA.  ABNL Labs: see chart. On K+ & Mag protocols, recheck in am labs.  Pain Management: PRN Dilaudid, ice packs, and repositioning.   Bowel/Bladder: Continent of B/B, active bowel sounds, no BM, passing gas per pt.  IV/Drains/Incisions: LR @ 75ml/hr. NG tube feedings @ 15ml/hr w/ 30ml flush Q 4hrs. ARSENIO drain in place w/ serosanguinous OP - new dressing applied now C/D/I. Abdominal incisions are C/D/I.  Diet: NPO - NG tube feedings.  Activity Level: SBA.  Tests/Procedures: N/A.  Anticipated  DC Date: TBD.

## 2023-12-15 NOTE — CONSULTS
"CLINICAL NUTRITION SERVICES - REASSESSMENT NOTE      Recommendations Ordered by Registered Dietitian (RD):   Visited w/ pt this AM. Discussed nutrition POC-- TF formula, slow advancement    Ordered TF and FWF, labs  Continue w/ ordered multivitamin/minerals, admin via FT  Increased Certavite to BID w/ hx of RNYGB    Osmolite 1.5 @ 40 mL/hr = 960 mL, 1440 kcal (27 kcal/kg), 60 g protein (1.11 g/kg; 94% min needs), 731 mL free water, 195 g CHO, and 0 g fiber daily  --> Remain @ 15 mL/hr for 24 hours. Then (pending tolerance/labs) start advancement-- advance to 25 mL/hr. After 24 hours, advance to goal rate.   FWF of 120 mL q 4 hours (1451 mL)     Malnutrition:   % Weight Loss:  Weight loss does not meet criteria for malnutrition (12/12)  % Intake:  </= 50% for >/= 1 month (severe malnutrition) (12/12)  Subcutaneous Fat Loss:  global severe (12/12)  Muscle Loss:  global severe (12/12)  Fluid Retention:  Trace to mild bilateral ankle, feet edema     Malnutrition Diagnosis: Severe malnutrition in the context of --  Acute illness or injury  Chronic illness or disease       Consult received for:   1. Provider Order - \"tube feed\"  2. Provider Order - Registered Dietitian to Assess and Order TF per Medical Nutrition Therapy Protocol     EVALUATION OF PROGRESS TOWARD GOALS   Diet: NPO per Anesthesia Guidelines for Procedure/Surgery Except for: Meds    Intake/Tolerance:  Remains NPO since admit  12/13: surgical note = \"Given moderate size of gastric perforation, the patient will need prolong course of NPO\"    Nutrition Support: Enteral  Type of Feeding Tube: Nasojejunal   Enteral Frequency: Continuous   Enteral Regimen: Osmolite 1.5 @ 15 mL/hr    Total Enteral Provisions: 360 mL, 540 kcal, 23 g protein  Free Water Flush: 30 mL q 4 hours (12/14-- Dr. Taylor)    12/11: abd XR = Nasogastric tube terminates over the distal esophagus   12/13: Exploratory laparoscopy with repeat gastrorrhaphy, aborted G-tube placement --> IR consulted " for NJ placement  12/14: IR = Procedure: NJ tube placement.   12/14: FT placement XR = still pending  12/14: TF started by MD yesterday   Per chart review, pt was previously receiving TF in 10/2023-- Vital 1.5 which was chosen presumably d/t hx of RNYGB  D/w pt this AM-- pt reported she is currently tolerating Osmolite 1.5 well. Reported she did not tolerate Vital 1.5 well, had a terrible taste in her mouth. Pt prefers to continue w/ current standard formula.       ASSESSED NUTRITION NEEDS: (12/15)  Dosing Weight: 53.7 kg (12/15)  Estimated Energy Needs: 8866-0817+ kcals (25-30+ Kcal/Kg)  Justification: maintenance   Estimated Protein Needs: 64-81+ grams protein (1.2-1.5+ g pro/Kg)  Justification: repletion  Estimated Fluid Needs: 1 mL/kcal   Justification: maintenance      NEW FINDINGS:   Weight: suspect admit wt was ?self-reported  Date/Time Weight Weight Method   12/15/23 0658 53.7 kg (118 lb 6.2 oz) Bed scale   12/11/23 1546 45.4 kg (100 lb)      I/O: Net IO Since Admission: 2,986.67 mL [12/15/23 0857].  Last BM x2 on 12/13, 2x on 12/12  140 mL drain output yesterday, 95 mL on 12/13, 100 mL on 12/12    Labs: reviewed, Mg/Phos low. Will go w/ slow TF advancement, concern for refeeding syndrome  Component Value Date    (H) 12/15/2023   POTASSIUM 2.7 (L) 12/15/2023   MAG 1.6 (L) 12/15/2023   PHOS 1.8 (L) 12/15/2023     Lab 12/15/23  0730 12/15/23  0658 12/14/23  2121 12/14/23  0920 12/14/23  0903 12/14/23  0607   * 98 93 128* 108* 121*       Medications: reviewed    calcium carbonate  500 mg Per Feeding Tube TID    [Held by provider] ferrous fumarate 65 mg ([place]. FE)-Vitamin C 125 mg  1 tablet Oral Daily    folic acid  1 mg Per Feeding Tube Daily    multivitamins w/minerals  15 mL Per Feeding Tube Daily    pantoprazole  40 mg Intravenous Daily with breakfast    cholecalciferol  125 mcg Per Feeding Tube Daily         Previous Goals:   Diet advancement vs nutrition support within 2-3 days   Evaluation:  Met-- TF start    Previous Nutrition Diagnosis:   Inadequate oral intake related to poor appetite as evidenced by pt report of poor appetite d/t nausea and vomiting, severe subcutaneous fat/muscle loss on exam, current NPO status   Evaluation: No change-- updated below        MALNUTRITION  % Weight Loss:  Weight loss does not meet criteria for malnutrition (12/12)  % Intake:  </= 50% for >/= 1 month (severe malnutrition) (12/12)  Subcutaneous Fat Loss:  global severe (12/12)  Muscle Loss:  global severe (12/12)  Fluid Retention:  Trace to mild bilateral ankle, feet edema     Malnutrition Diagnosis: Severe malnutrition in the context of --  Acute illness or injury  Chronic illness or disease        CURRENT NUTRITION DIAGNOSIS  Altered GI function related to gastric perforation as evidenced by need for prolonged NPO status per surgery and TF to meet estimated nutrition needs        INTERVENTIONS  Recommendations / Nutrition Prescription  Visited w/ pt this AM. Discussed nutrition POC-- TF formula, slow advancement  Ordered TF and FWF, labs  Continue w/ ordered multivitamin/minerals, admin via FT  Increased Certavite to BID w/ hx of RNYGB      Implementation  Collaboration with other providers  Enteral Nutrition   Feeding tube flush  Nutrition-related medication management    Goals  TF to advance to goal rate w/in 3-4 days  At goal, TF to provide % of estimated nutrition need s        MONITORING AND EVALUATION:  Progress towards goals will be monitored and evaluated per protocol and Practice Guidelines      Maggi Bhat RD, LD  Pager: [phone]

## 2023-12-15 NOTE — PROGRESS NOTES
"Acute Care Surgery Progress Note    Subjective: no acute issues overnight. Had been tolerating TF.  No nausea or emesis. Having bowel functions.     Objective: /69 (BP Location: Right arm)   Pulse 51   Temp 98.6  F (37  C) (Oral)   Resp 16   Ht 1.626 m (5' 4\")   Wt 53.7 kg (118 lb 6.2 oz)   LMP  (LMP Unknown)   SpO2 99%   BMI 20.32 kg/m    Gen: not in acute distress  CV: normal sinus  Pulm: nonlabored breathing on room air  Abd: soft, appropriately tender, non-distended. 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    POD4 from omental patch repair and POD2 from aborted G-tube placement    --continue NPO  --TF per NJ feeding tube, currently at 15 ml/hr  --RD consulted  --high risk for refeeding syndrome, BID BMP, Mg and Thomas lab  --will plan for upper GI study on Monday 12/18  --continue IV Zosyn for two more days  --daily Protonix  --daily Lovenox for DVT ppx  --appreciate Hospitalist assistance in medical management      Sangeetha Taylor MD   PGY4 General Surgery Resident  [facility]     FACULTY NOTE  I saw and evaluated the patient today December 15, 2023.  I discussed with the resident and agree with the resident s findings and plan documented in the resident s note from above. Any revisions by me are documented.    NJ tube successfully placed.  Will feed through that over the weekend.  UGI on Monday.  If no leak will start oral feeds.  Having refeeding syndrome a bit, will bear close watching.    Severe protein calorie " malnutrition.    Antoni Gonzalez M.D., CLARIBEL.  Department of Surgery  [facility]

## 2023-12-15 NOTE — PROGRESS NOTES
Pt is SBA, NPO with NG tube placement. LR running at 75 ml/hr. Tube feedings to start when comes up from nutrition. Potassium and Magnesium protocols both WNL redraws scheduled for morning. ARSENIO drain in place good output. IV dilaudid for pain. Discharge pending.

## 2023-12-16 LAB
ANION GAP SERPL CALCULATED.3IONS-SCNC: 6 MMOL/L (ref 7–15)
ANION GAP SERPL CALCULATED.3IONS-SCNC: 8 MMOL/L (ref 7–15)
BUN SERPL-MCNC: 3.8 MG/DL (ref 8–23)
BUN SERPL-MCNC: 4.9 MG/DL (ref 8–23)
CALCIUM SERPL-MCNC: 7.4 MG/DL (ref 8.8–10.2)
CALCIUM SERPL-MCNC: 7.4 MG/DL (ref 8.8–10.2)
CHLORIDE SERPL-SCNC: 107 MMOL/L (ref 98–107)
CHLORIDE SERPL-SCNC: 112 MMOL/L (ref 98–107)
CREAT SERPL-MCNC: 0.58 MG/DL (ref 0.51–0.95)
CREAT SERPL-MCNC: 0.63 MG/DL (ref 0.51–0.95)
DEPRECATED HCO3 PLAS-SCNC: 24 MMOL/L (ref 22–29)
DEPRECATED HCO3 PLAS-SCNC: 25 MMOL/L (ref 22–29)
EGFRCR SERPLBLD CKD-EPI 2021: >90 ML/MIN/1.73M2
EGFRCR SERPLBLD CKD-EPI 2021: >90 ML/MIN/1.73M2
ERYTHROCYTE [DISTWIDTH] IN BLOOD BY AUTOMATED COUNT: 15.2 % (ref 10–15)
GLUCOSE BLDC GLUCOMTR-MCNC: 100 MG/DL (ref 70–99)
GLUCOSE BLDC GLUCOMTR-MCNC: 108 MG/DL (ref 70–99)
GLUCOSE SERPL-MCNC: 103 MG/DL (ref 70–99)
GLUCOSE SERPL-MCNC: 105 MG/DL (ref 70–99)
HCT VFR BLD AUTO: 27.3 % (ref 35–47)
HGB BLD-MCNC: 8.6 G/DL (ref 11.7–15.7)
MAGNESIUM SERPL-MCNC: 1.7 MG/DL (ref 1.7–2.3)
MAGNESIUM SERPL-MCNC: 1.9 MG/DL (ref 1.7–2.3)
MCH RBC QN AUTO: 30.1 PG (ref 26.5–33)
MCHC RBC AUTO-ENTMCNC: 31.5 G/DL (ref 31.5–36.5)
MCV RBC AUTO: 96 FL (ref 78–100)
PHOSPHATE SERPL-MCNC: 2.5 MG/DL (ref 2.5–4.5)
PHOSPHATE SERPL-MCNC: 3.3 MG/DL (ref 2.5–4.5)
PLATELET # BLD AUTO: 290 10E3/UL (ref 150–450)
POTASSIUM SERPL-SCNC: 3.6 MMOL/L (ref 3.4–5.3)
POTASSIUM SERPL-SCNC: 3.9 MMOL/L (ref 3.4–5.3)
RBC # BLD AUTO: 2.86 10E6/UL (ref 3.8–5.2)
SODIUM SERPL-SCNC: 139 MMOL/L (ref 135–145)
SODIUM SERPL-SCNC: 143 MMOL/L (ref 135–145)
WBC # BLD AUTO: 7.9 10E3/UL (ref 4–11)

## 2023-12-16 PROCEDURE — 99232 SBSQ HOSP IP/OBS MODERATE 35: CPT | Performed by: HOSPITALIST

## 2023-12-16 PROCEDURE — 120N000001 HC R&B MED SURG/OB

## 2023-12-16 PROCEDURE — 250N000013 HC RX MED GY IP 250 OP 250 PS 637: Performed by: SURGERY

## 2023-12-16 PROCEDURE — 83735 ASSAY OF MAGNESIUM: CPT | Performed by: STUDENT IN AN ORGANIZED HEALTH CARE EDUCATION/TRAINING PROGRAM

## 2023-12-16 PROCEDURE — 250N000013 HC RX MED GY IP 250 OP 250 PS 637: Performed by: STUDENT IN AN ORGANIZED HEALTH CARE EDUCATION/TRAINING PROGRAM

## 2023-12-16 PROCEDURE — 80048 BASIC METABOLIC PNL TOTAL CA: CPT | Performed by: STUDENT IN AN ORGANIZED HEALTH CARE EDUCATION/TRAINING PROGRAM

## 2023-12-16 PROCEDURE — C9113 INJ PANTOPRAZOLE SODIUM, VIA: HCPCS | Performed by: STUDENT IN AN ORGANIZED HEALTH CARE EDUCATION/TRAINING PROGRAM

## 2023-12-16 PROCEDURE — 250N000013 HC RX MED GY IP 250 OP 250 PS 637: Performed by: HOSPITALIST

## 2023-12-16 PROCEDURE — 250N000011 HC RX IP 250 OP 636: Mod: JZ | Performed by: STUDENT IN AN ORGANIZED HEALTH CARE EDUCATION/TRAINING PROGRAM

## 2023-12-16 PROCEDURE — 250N000011 HC RX IP 250 OP 636: Mod: JZ | Performed by: SURGERY

## 2023-12-16 PROCEDURE — 85027 COMPLETE CBC AUTOMATED: CPT | Performed by: STUDENT IN AN ORGANIZED HEALTH CARE EDUCATION/TRAINING PROGRAM

## 2023-12-16 PROCEDURE — 250N000011 HC RX IP 250 OP 636: Performed by: STUDENT IN AN ORGANIZED HEALTH CARE EDUCATION/TRAINING PROGRAM

## 2023-12-16 PROCEDURE — 36415 COLL VENOUS BLD VENIPUNCTURE: CPT | Performed by: STUDENT IN AN ORGANIZED HEALTH CARE EDUCATION/TRAINING PROGRAM

## 2023-12-16 PROCEDURE — 84100 ASSAY OF PHOSPHORUS: CPT | Performed by: STUDENT IN AN ORGANIZED HEALTH CARE EDUCATION/TRAINING PROGRAM

## 2023-12-16 RX ADMIN — BUSPIRONE HYDROCHLORIDE 15 MG: 15 TABLET ORAL at 16:24

## 2023-12-16 RX ADMIN — CALCIUM CARBONATE (ANTACID) CHEW TAB 500 MG 500 MG: 500 CHEW TAB at 16:24

## 2023-12-16 RX ADMIN — HYDROMORPHONE HYDROCHLORIDE 0.2 MG: 0.2 INJECTION, SOLUTION INTRAMUSCULAR; INTRAVENOUS; SUBCUTANEOUS at 18:28

## 2023-12-16 RX ADMIN — CLONAZEPAM 0.5 MG: 0.5 TABLET ORAL at 08:38

## 2023-12-16 RX ADMIN — Medication 125 MCG: at 08:37

## 2023-12-16 RX ADMIN — VENLAFAXINE 37.5 MG: 37.5 TABLET ORAL at 08:37

## 2023-12-16 RX ADMIN — HYDROMORPHONE HYDROCHLORIDE 0.2 MG: 0.2 INJECTION, SOLUTION INTRAMUSCULAR; INTRAVENOUS; SUBCUTANEOUS at 16:24

## 2023-12-16 RX ADMIN — PIPERACILLIN AND TAZOBACTAM 3.38 G: 3; .375 INJECTION, POWDER, FOR SOLUTION INTRAVENOUS at 10:37

## 2023-12-16 RX ADMIN — ENOXAPARIN SODIUM 40 MG: 40 INJECTION SUBCUTANEOUS at 16:24

## 2023-12-16 RX ADMIN — BUSPIRONE HYDROCHLORIDE 15 MG: 15 TABLET ORAL at 08:37

## 2023-12-16 RX ADMIN — CALCIUM CARBONATE (ANTACID) CHEW TAB 500 MG 500 MG: 500 CHEW TAB at 21:16

## 2023-12-16 RX ADMIN — HYDROMORPHONE HYDROCHLORIDE 0.2 MG: 0.2 INJECTION, SOLUTION INTRAMUSCULAR; INTRAVENOUS; SUBCUTANEOUS at 03:02

## 2023-12-16 RX ADMIN — PIPERACILLIN AND TAZOBACTAM 3.38 G: 3; .375 INJECTION, POWDER, FOR SOLUTION INTRAVENOUS at 03:03

## 2023-12-16 RX ADMIN — PIPERACILLIN AND TAZOBACTAM 3.38 G: 3; .375 INJECTION, POWDER, FOR SOLUTION INTRAVENOUS at 21:13

## 2023-12-16 RX ADMIN — Medication 15 ML: at 08:37

## 2023-12-16 RX ADMIN — BUSPIRONE HYDROCHLORIDE 15 MG: 15 TABLET ORAL at 21:16

## 2023-12-16 RX ADMIN — HYDROMORPHONE HYDROCHLORIDE 0.2 MG: 0.2 INJECTION, SOLUTION INTRAMUSCULAR; INTRAVENOUS; SUBCUTANEOUS at 10:37

## 2023-12-16 RX ADMIN — HYDROMORPHONE HYDROCHLORIDE 0.2 MG: 0.2 INJECTION, SOLUTION INTRAMUSCULAR; INTRAVENOUS; SUBCUTANEOUS at 12:57

## 2023-12-16 RX ADMIN — CALCIUM CARBONATE (ANTACID) CHEW TAB 500 MG 500 MG: 500 CHEW TAB at 08:37

## 2023-12-16 RX ADMIN — HYDROMORPHONE HYDROCHLORIDE 0.2 MG: 0.2 INJECTION, SOLUTION INTRAMUSCULAR; INTRAVENOUS; SUBCUTANEOUS at 08:37

## 2023-12-16 RX ADMIN — PIPERACILLIN AND TAZOBACTAM 3.38 G: 3; .375 INJECTION, POWDER, FOR SOLUTION INTRAVENOUS at 16:24

## 2023-12-16 RX ADMIN — HYDROMORPHONE HYDROCHLORIDE 0.2 MG: 0.2 INJECTION, SOLUTION INTRAMUSCULAR; INTRAVENOUS; SUBCUTANEOUS at 20:29

## 2023-12-16 RX ADMIN — PANTOPRAZOLE SODIUM 40 MG: 40 INJECTION, POWDER, FOR SOLUTION INTRAVENOUS at 08:37

## 2023-12-16 RX ADMIN — Medication 15 ML: at 12:57

## 2023-12-16 RX ADMIN — FOLIC ACID 1 MG: 1 TABLET ORAL at 13:00

## 2023-12-16 RX ADMIN — ESCITALOPRAM 30 MG: 5 SOLUTION ORAL at 08:40

## 2023-12-16 ASSESSMENT — ACTIVITIES OF DAILY LIVING (ADL)
ADLS_ACUITY_SCORE: 24

## 2023-12-16 NOTE — PLAN OF CARE
"Date & Time: 0700-1990  Surgery/POD#: POD4 from omental patch repair and POD2 from aborted G-tube placement  Behavior & Aggression: Green  Fall Risk: no  Orientation: AOx4  ABNL VS/O2:VSS on RA   ABNL Labs: K+: 2.7, Mg\" 1.6, Phos: 1.8  Pain Management: IV dilaudid x3, Heat packs   Bowel/Bladder: Up to bathroom, BMx1  IV/Drains/Incisions: LR @ 75ml/hr. NG tube feedings. ARSENIO drain in place w/ serosanguinous OP - new dressing x3   Diet: NPO - NG tube feedings  Activity Level: IND  Tests/Procedures: Upper GI on 12/18  Anticipated  DC Date: Next Week  Significant Information: All electrolytes replaced. Rechecks pending. Tolerating tube feeds at 15/hr. Will stay at this rate for another 24hrs. ARSENIO with lots of drainage, dressing changed x3, MD aware. Antidepressants re-prescribe dper hospitalist.   "

## 2023-12-16 NOTE — PLAN OF CARE
Date & Time: 3382-9194  Surgery/POD#: POD 5 from omental patch repair and POD 3 from aborted G-tube placement   Behavior & Aggression: Green   Fall Risk: No  Orientation: A&Ox4  ABNL VS/O2:VSS on RA  ABNL Labs: Rechecks in AM  Pain Management: PRN IV dilaudid and heat packs   Bowel/Bladder: Continent. Voiding adequately. Passing gas, 2 smear loose BM.   Drains: PIV SL. NG tube feedings. ARSENIO drain with serosanguinous output. Changed dressing x3  Diet: NPO- NG tube feedings   Activity Level: IND  Tests/Procedures: Upper GI on 12/18  Anticipated  DC Date: Pending  Significant Information: Tube feeds at 15ml/hr. CIWA scoring 0.

## 2023-12-16 NOTE — PROGRESS NOTES
"[facility]  GENERAL SURGERY Progress Note    Admission Date: 2023         Assessment and Plan:     Pavithra Raines is a 68 year old female with a h/o RNY Gastric Bypass, presented with a perforated marginal ulcer, S/P diagnostic laparoscopy with omental patch repair  and diagnostic laparoscopy and aborted G tube placement .      - NPO, continue tube feeds  - Pain control- Dilaudid PRN  - WBC 7.9, continue Zosyn day 5, plan to discontinue tomorrow  - Continue ARSENIO, reinforce dressing as needed  - Plan for UGI Monday  - Continue Lovenox for DVT ppx  - Continue Protonix  - Ambulate with assist 4x day and encourage IS  - Med management per hospitalist, much appreciate your assistance             Interval History:     Pain controlled, tolerating tube feeds, +BM loose, UO adequate                     Physical Exam:   Blood pressure 130/83, pulse 81, temperature 98.8  F (37.1  C), temperature source Oral, resp. rate 16, height 1.626 m (5' 4\"), weight 55.5 kg (122 lb 4.8 oz), SpO2 94%, not currently breastfeeding.  Temperature Temp  Av  F (37.2  C)  Min: 98.6  F (37  C)  Max: 99.4  F (37.4  C)   I/O last 3 completed shifts:  In: 1881 [I.V.:685; NG/GT:1196]  Out: 1030 [Urine:1000; Drains:30]  Constitutional:  Awake and in no apparent distress.   Abdomen: Soft, non-distended, appropriately tender at incision(s). ARSENIO drain intact, serous, leaking.   Wound(s): Clean, dry, and intact. No erythema or drainage.    Extremities: No edema or calf tenderness. +SCDs          Data:     Recent Labs   Lab Test 23  0719 12/15/23  0730 23  0920   WBC 7.9 10.1 14.3*   HGB 8.6* 8.8* 10.6*   HCT 27.3* 27.7* 34.3*    333 403      LESTER LoyolaC  [facility]  [phone]   "

## 2023-12-16 NOTE — PROGRESS NOTES
[facility]  Hospitalist Progress Note    Assessment & Plan   Pavithra Raines is a 68 year old female admitted on 12/11/2023.     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       Patient presented to [facility] emergency department due to abdominal pain and chest pain.  Patient reported that she began to feel unwell the morning of 12/10/2023 when she started to develop watery diarrhea.  Patient was unable to consume any alcohol throughout the day of 12/10 because she continued to feel unwell and developed nausea symptoms as well as some vomiting.  Around 3 PM and 12/10 she developed severe abdominal pain.  This pain became so intolerable that she called 911 and was brought into the ED.  Patient endorsed concerns of going into alcohol withdrawal and was noted to be tremulous in the ED.     Workup in the ED included a CMP that revealed a CO2 of 18, calcium of 8.6, magnesium of 1.4, albumin of 3.1, alk phos of 175, total bili of 1.6, glucose of 115 otherwise within normal limits.  Lactic acid level was within normal limits at 1.8.  Lipase level was mildly elevated at 65.  Inflammatory CRP was elevated at 8.32.  CBC with differential revealed a hemoglobin of 11.6 and platelet count of 460 otherwise within normal limits.  UA revealed trace leukocyte Estrace with 3 RBCs and 2 squamous epithelial cells with mucus present and hyaline casts of 3.  Abdomen/pelvis CT with contrast revealed free intraperitoneal gas and fluid consistent with  perforated viscus (the site of perforation is not certain though may be from the stomach/gastric bypass).     The ED was in contact with General Surgery (Dr. Guaman) who felt patient's case was too complicated and recommended transfer to a tertiary care center.  Attempts to transfer to the [facility] were performed but due to bed availability patient was ultimately transferred to [facility].  Discussions occurred between the ED (Dr. Brooks), General Surgery (Dr. Gonzalez) and Hospitalist (Dr. Monaco) at [facility] and patient was admitted under General Surgery with verbal request for Hospitalist consult upon arrival on 12/11/23. Patient was taken to the OR on 12/11/23 where they found a perforated marginal ulcer which they fixed with an omental patch repair.      Hospitalist team consulting. General surgery is the primary team.     Perforated Marginal Ulcer s/p Omental Patch Repair 12/11/23   Elevated inflammatory CRP  Abdominal pain    - General surgery is managing.                -Defer analgesic/pain management, DVT prophylaxis, PT/OT.       - Unable to get G tube placed on 12/13/23. Patient had NG tube placed on 12/14  - Tube feeds per surgery.  Plan for upper GI series on 12/18.     - Hgb: 11.6 > 9.5 > 9 > 10.6     - Continue to monitor     - Encourage utilization of incentive spirometer.     - Continue IV Zosyn  per Surgery, and on 12/17.    - Surgery discontinued IV Fluconazole     Leukocytosis: Resolving   Likely related to recent surgery    - Continue to monitor     Hypernatremia  Likely related to NPO status. Hopefully with the start of tube feeds and free water flushes this will improve.    - Continue to monitor.  IVF discontinued.    Alcohol withdrawal  Alcohol use D/O with history of withdrawal with seizures  Patient states that she drink a box of wine over 2-3 days and roughly drinks 10 -15 glasses of wine a day. On 12/13 was notified by nursing that they suspect patient is drinking  the mouthwash. Advised nursing to use mouthwash with supervision only and not to leave in room.     - CIWA ordered with PRN Ativan ordered.   - Has not needed PRNs.  Ativan discontinued.  Resumed patient's PTA clonazepam.    - Stopped banana bag     - Start PO Vitamins and calcium carbonate per Nutrition via feeding tube    - Chem dep saw patient and have provided resources at discharge      Hypomagnesemia  Hypokalemia  Hypophosphatemia  ?  Mild refeeding syndrome   -Replace electrolytes per protocol.     Severe Malnutrition  - Tube feeds per nutrition and surgery  - Start PO Vitamins and calcium carbonate per Nutrition via feeding tube    Chronic Medical Problems:     Heart murmur  Noted slight heart murmur on examination.  Patient denied every being told about a murmur in the past. Previous ECHO completed 10/2023 and without aortic stenosis.    - Monitor     Coagulopathy thought to be secondary to Alcohol dependence  INR and PTT checked after assessing the patient and within normal limits.    - Monitor PRN.       Fatty liver disease  - Follow up with PCP as an outpatient.       GERD  Has not taken any medications in the last month as she ran out.    - Continue IV Protonix 40 mg/d for now      Chronic anemia (B12 and Iron)  Acute anemia secondary to blood loss  - Start PO Vitamins and calcium carbonate per Nutrition via feeding tube.  Monitor hemoglobin.  Has been in the 8/10 range postoperatively.     Emphysema/COPD   Recently quit smoking in the last 2 months and was previously smoking 2 packs per day.  Not currently taking any medications.   - Monitor.       Cannabis use D/O  - Counseled regarding cessation  - Chem dep consulted      Seizure D/O  Likely due to alcohol withdrawal.  Patient not prescribed antiepileptics per chart review.       MDD with anxiety  PTSD  Has not taken any medications in the last month or two as she ran out.      -Resumed patient's PTA BuSpar, Lexapro, Effexor and  Klonopin.    Insomnia  Hypersomnia with sleep apnea  Noted on chart review.  Does not appear to have been prescribed any medications.       RLS  Noted on chart review.  Does not appear to have been prescribed any medications.       Genital herpes  Noted on chart review.  Does not appear to have been prescribed any medications.        Osteoporosis  - Hold TPA Evista as patient has not been taking this medication.     Clinically Significant Risk Factors        # Hypokalemia: Lowest K = 2.7 mmol/L in last 2 days, will replace as needed  # Hypernatremia: Highest Na = 146 mmol/L in last 2 days, will monitor as appropriate    # Hypomagnesemia: Lowest Mg = 1.6 mg/dL in last 2 days, will replace as needed   # Hypoalbuminemia: Lowest albumin = 2.6 g/dL at 12/12/2023  7:08 AM, will monitor as appropriate             # Severe Malnutrition: based on nutrition assessment    # Financial/Environmental Concerns: unable to afford medication(s);other (see comments) (Spouse makes too much to qualify for food support etc. Needs to get SS because of the inability to work)           Diet: NPO per Anesthesia Guidelines for Procedure/Surgery Except for: Meds  Adult Formula Drip Feeding: Continuous Osmolite 1.5; Nasojejunal; Goal Rate: 40; mL/hr; remain @ 15 mL/hr for 24 hours. Then (pending tolerance/labs) start advancement-- advance to 25 mL/hr. After 24 hours, advance to goal rate.; Do not advance tu...     DVT Prophylaxis: Defer to primary service   Barney Catheter: Not present  Lines: None     Cardiac Monitoring: None  Code Status: Full Code      Disposition Plan       Expected Discharge Date: 12/18/2023      Destination: inpatient rehabilitation facility;nursing home        Entered: Claudia Clements MD 12/16/2023, 4:33 PM       Disposition: Hospitalist team will continue to follow along while patient is admitted. Discharge timing per Surgery     Claudia Clements MD  Hospitalist Service   [facility]  Securely  message with the Vocera Web Console (learn more here)  Text page via [email or web address] [email or web address]         Medical Decision Making       45 MINUTES SPENT BY ME on the date of service doing chart review, history, exam, documentation & further activities per the note.           Interval History     No acute overnight events.  Pain is being controlled.  No significant nausea or vomiting.  Passing a lot of gas today.  She is trying to move around more.  Has mild lower extremity edema.  Continues on tube feeds.    -Data reviewed today: I reviewed all new labs and imaging results over the last 24 hours.   Physical Exam   Temp: 99.3  F (37.4  C) Temp src: Oral BP: 124/89 Pulse: 87   Resp: 16 SpO2: 97 % O2 Device: None (Room air)    Vitals:    12/15/23 0658 12/15/23 1306 12/16/23 0448   Weight: 53.7 kg (118 lb 6.2 oz) 55.3 kg (122 lb) 55.5 kg (122 lb 4.8 oz)     Vital Signs with Ranges  Temp:  [98.8  F (37.1  C)-99.4  F (37.4  C)] 99.3  F (37.4  C)  Pulse:  [74-87] 87  Resp:  [16] 16  BP: (117-130)/(82-89) 124/89  SpO2:  [91 %-97 %] 97 %  I/O last 3 completed shifts:  In: 1409 [I.V.:685; NG/GT:724]  Out: 1630 [Urine:1600; Drains:30]      Constitutional: Awake, alert, cooperative, no apparent distress  Pulmonary: Clear breath sounds bilaterally, nonlabored breathing  Cardiovascular: Regular rate and rhythm, normal S1 and S2  GI: soft, multiple surgical incisions in place and covered with bandage, ARSENIO drain noted on left abdomen with bloody drainage, some tenderness with palpation, bowel sounds heard  Neuro: Moves all 4 extremities, no tremor noted   Psych:  Alert and oriented x 3. Normal affect.  Extremities: Trace ankle edema noted      Medications    dextrose        busPIRone  15 mg Per Feeding Tube TID    calcium carbonate  500 mg Per Feeding Tube TID    enoxaparin ANTICOAGULANT  40 mg Subcutaneous Q24H    escitalopram  30 mg Oral or Feeding Tube Daily    [Held by provider] ferrous fumarate 65 mg ([place]. FE)-Vitamin C 125 mg  1  tablet Oral Daily    folic acid  1 mg Per Feeding Tube Daily    multivitamins w/minerals  15 mL Per Feeding Tube BID 09 12    pantoprazole  40 mg Intravenous Daily with breakfast    piperacillin-tazobactam  3.375 g Intravenous Q6H    sodium chloride (PF)  3 mL Intracatheter Q8H    venlafaxine  37.5 mg Oral or Feeding Tube Daily    cholecalciferol  125 mcg Per Feeding Tube Daily       Data   Recent Labs   Lab 12/16/23  0719 12/16/23  0608 12/15/23  2128 12/15/23  1842 12/15/23  0730 12/14/23 2121 12/14/23  0920 12/12/23  2035 12/12/23  0708 12/11/23  1419 12/11/23  0459   WBC 7.9  --   --   --  10.1  --  14.3*   < > 17.5*  --  9.0   HGB 8.6*  --   --   --  8.8*  --  10.6*   < > 9.5*  --  11.6*   MCV 96  --   --   --  97  --  100   < > 98  --  92     --   --   --  333  --  403   < > 358  --  460*   INR  --   --   --   --   --   --   --   --   --  1.11  --      --   --  144 146*  --  146*   < > 141  --  135   POTASSIUM 3.9  --   --  3.7 2.7*  --  3.9   < > 2.9*  --  4.0   CHLORIDE 112*  --   --  114* 115*  --  113*   < > 110*  --  102   CO2 25  --   --  22 24  --  21*   < > 24  --  18*   BUN 4.9*  --   --  8.6 12.7  --  12.8   < > 10.1  --  8.7   CR 0.63  --   --  0.63 0.74  --  0.75   < > 0.73  --  0.69   ANIONGAP 6*  --   --  8 7  --  12   < > 7  --  15   SHON 7.4*  --   --  7.2* 7.8*  --  8.2*   < > 7.8*  --  8.6*   * 108* 115* 126* 109*   < > 128*   < > 120*  --  115*   ALBUMIN  --   --   --   --   --   --   --   --  2.6*  --  3.1*   PROTTOTAL  --   --   --   --   --   --   --   --  5.3*  --  7.0   BILITOTAL  --   --   --   --   --   --   --   --  0.7  --  1.6*   ALKPHOS  --   --   --   --   --   --   --   --  97  --  175*   ALT  --   --   --   --   --   --   --   --  5  --  6   AST  --   --   --   --   --   --   --   --  21  --  16   LIPASE  --   --   --   --   --   --   --   --   --   --  65*    < > = values in this interval not displayed.       No results found for this or any previous visit  (from the past 24 hour(s)).

## 2023-12-16 NOTE — PROGRESS NOTES
[facility]  Hospitalist Progress Note    Assessment & Plan   Pavithra Raines is a 68 year old female admitted on 12/11/2023.     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       Patient presented to [facility] emergency department due to abdominal pain and chest pain.  Patient reported that she began to feel unwell the morning of 12/10/2023 when she started to develop watery diarrhea.  Patient was unable to consume any alcohol throughout the day of 12/10 because she continued to feel unwell and developed nausea symptoms as well as some vomiting.  Around 3 PM and 12/10 she developed severe abdominal pain.  This pain became so intolerable that she called 911 and was brought into the ED.  Patient endorsed concerns of going into alcohol withdrawal and was noted to be tremulous in the ED.     Workup in the ED included a CMP that revealed a CO2 of 18, calcium of 8.6, magnesium of 1.4, albumin of 3.1, alk phos of 175, total bili of 1.6, glucose of 115 otherwise within normal limits.  Lactic acid level was within normal limits at 1.8.  Lipase level was mildly elevated at 65.  Inflammatory CRP was elevated at 8.32.  CBC with differential revealed a hemoglobin of 11.6 and platelet count of 460 otherwise within normal limits.  UA revealed trace leukocyte Estrace with 3 RBCs and 2 squamous epithelial cells with mucus present and hyaline casts of 3.  Abdomen/pelvis CT with contrast revealed free intraperitoneal gas and fluid consistent with  perforated viscus (the site of perforation is not certain though may be from the stomach/gastric bypass).     The ED was in contact with General Surgery (Dr. Guaman) who felt patient's case was too complicated and recommended transfer to a tertiary care center.  Attempts to transfer to the [facility] ER were performed but due to bed availability patient was ultimately transferred to [facility].  Discussions occurred between the ED (Dr. Brooks), General Surgery (Dr. Gonzalez) and Hospitalist (Dr. Monaco) at [facility] and patient was admitted under General Surgery with verbal request for Hospitalist consult upon arrival on 12/11/23. Patient was taken to the OR on 12/11/23 where they found a perforated marginal ulcer which they fixed with an omental patch repair.      Hospitalist team consulting. General surgery is the primary team.     Perforated Marginal Ulcer s/p Omental Patch Repair 12/11/23   Elevated inflammatory CRP  Abdominal pain    - General surgery is managing.                -Defer analgesic/pain management, DVT prophylaxis, PT/OT.       - Unable to get G tube placed on 12/13/23. Patient had NG tube placed on 12/14  - Tube feeds per surgery       - Hgb: 11.6 > 9.5 > 9 > 10.6     - Continue to monitor     - Encourage utilization of incentive spirometer.     - Continue IV Zosyn  per Surgery    - Surgery discontinued IV Fluconazole     Leukocytosis: Resolving   Likely related to recent surgery    - Continue to monitor     Hypernatremia  Likely related to NPO status. Hopefully with the start of tube feeds and free water flushes this will improve.    - Continue to monitor.  IVF discontinued.    Alcohol withdrawal  Alcohol use D/O with history of withdrawal with seizures  Patient states that she drink a box of wine over 2-3 days and roughly drinks 10 -15 glasses of wine a day. On 12/13 was notified by nursing that they suspect patient is drinking the mouthwash. Advised nursing to use mouthwash with  supervision only and not to leave in room.     - CIWA ordered with PRN Ativan ordered.   - Has not needed PRNs.  Ativan discontinued.  Resumed patient's PTA clonazepam.    - Stopped banana bag     - Start PO Vitamins and calcium carbonate per Nutrition via feeding tube    - Chem dep saw patient and have provided resources at discharge      Hypomagnesemia  Hypokalemia  Hypophosphatemia   -Replace electrolytes per protocol     Severe Malnutrition  - Tube feeds per nutrition and surgery  - Start PO Vitamins and calcium carbonate per Nutrition via feeding tube    Chronic Medical Problems:     Heart murmur  Noted slight heart murmur on examination.  Patient denied every being told about a murmur in the past. Previous ECHO completed 10/2023 and without aortic stenosis.    - Monitor     Coagulopathy thought to be secondary to Alcohol dependence  INR and PTT checked after assessing the patient and within normal limits.    - Monitor PRN.       Fatty liver disease  - Follow up with PCP as an outpatient.       GERD  Has not taken any medications in the last month as she ran out.    - Continue IV Protonix 40 mg/d for now      Chronic anemia (B12 and Iron)  Acute anemia secondary to blood loss  - Start PO Vitamins and calcium carbonate per Nutrition via feeding tube.  Monitor hemoglobin.  Has been in the 8/10 range postoperatively.     Emphysema/COPD   Recently quit smoking in the last 2 months and was previously smoking 2 packs per day.  Not currently taking any medications.   - Monitor.       Cannabis use D/O  - Counseled regarding cessation  - Chem dep consulted      Seizure D/O  Likely due to alcohol withdrawal.  Patient not prescribed antiepileptics per chart review.       MDD with anxiety  PTSD  Has not taken any medications in the last month or two as she ran out.      -Resumed patient's PTA BuSpar, Lexapro, Effexor and Klonopin.    Insomnia  Hypersomnia with sleep apnea  Noted on chart review.  Does not appear to have  been prescribed any medications.       RLS  Noted on chart review.  Does not appear to have been prescribed any medications.       Genital herpes  Noted on chart review.  Does not appear to have been prescribed any medications.        Osteoporosis  - Hold TPA Evista as patient has not been taking this medication.     Clinically Significant Risk Factors        # Hypokalemia: Lowest K = 2.7 mmol/L in last 2 days, will replace as needed  # Hypernatremia: Highest Na = 146 mmol/L in last 2 days, will monitor as appropriate    # Hypomagnesemia: Lowest Mg = 1.6 mg/dL in last 2 days, will replace as needed   # Hypoalbuminemia: Lowest albumin = 2.6 g/dL at 12/12/2023  7:08 AM, will monitor as appropriate             # Severe Malnutrition: based on nutrition assessment    # Financial/Environmental Concerns: unable to afford medication(s);other (see comments) (Spouse makes too much to qualify for food support etc. Needs to get SS because of the inability to work)           Diet: NPO per Anesthesia Guidelines for Procedure/Surgery Except for: Meds  Adult Formula Drip Feeding: Continuous Osmolite 1.5; Nasojejunal; Goal Rate: 40; mL/hr; remain @ 15 mL/hr for 24 hours. Then (pending tolerance/labs) start advancement-- advance to 25 mL/hr. After 24 hours, advance to goal rate.; Do not advance tu...     DVT Prophylaxis: Defer to primary service   Barney Catheter: Not present  Lines: None     Cardiac Monitoring: None  Code Status: Full Code      Disposition Plan       Expected Discharge Date: 12/18/2023      Destination: inpatient rehabilitation facility;nursing home        Entered: Claudia Clements MD 12/15/2023, 6:01 PM       Disposition: Hospitalist team will continue to follow along while patient is admitted. Discharge timing per Surgery     Claudia Clements MD  Hospitalist Service   [facility]  Securely message with the Vocera Web Console (learn more here)  Text page via [employer] Paging/Directory          Medical Decision Making       45 MINUTES SPENT BY ME on the date of service doing chart review, history, exam, documentation & further activities per the note.           Interval History     No acute overnight events.  Pain being managed with pain medications.  Patient wanted to resume her PTA antidepressants/anxiety medications, after discussion with her these were resumed.  See above.  No nausea or vomiting.    -Data reviewed today: I reviewed all new labs and imaging results over the last 24 hours.   Physical Exam   Temp: 98.6  F (37  C) Temp src: Oral BP: (!) 149/89 Pulse: 88   Resp: 16 SpO2: 96 % O2 Device: None (Room air)    Vitals:    12/11/23 1546 12/15/23 0658 12/15/23 1306   Weight: 45.4 kg (100 lb) 53.7 kg (118 lb 6.2 oz) 55.3 kg (122 lb)     Vital Signs with Ranges  Temp:  [98.2  F (36.8  C)-98.6  F (37  C)] 98.6  F (37  C)  Pulse:  [51-88] 88  Resp:  [16] 16  BP: (109-149)/(69-89) 149/89  SpO2:  [96 %-99 %] 96 %  I/O last 3 completed shifts:  In: 1064 [NG/GT:1064]  Out: 440 [Urine:375; Drains:65]      Constitutional: Awake, alert, cooperative, no apparent distress  Pulmonary: Clear breath sounds bilaterally, nonlabored breathing  Cardiovascular: Regular rate and rhythm, normal S1 and S2  GI: soft, multiple surgical incisions in place and covered with bandage, ARSENIO drain noted on left abdomen with bloody drainage, some tenderness with palpation, bowel sounds heard  Neuro: Moves all 4 extremities, no tremor noted   Psych:  Alert and oriented x 3. Normal affect.  Extremities: No lower extremity edema noted      Medications    dextrose        busPIRone  15 mg Per Feeding Tube TID    calcium carbonate  500 mg Per Feeding Tube TID    enoxaparin ANTICOAGULANT  40 mg Subcutaneous Q24H    escitalopram  30 mg Oral or Feeding Tube Daily    [Held by provider] ferrous fumarate 65 mg ([place]. FE)-Vitamin C 125 mg  1 tablet Oral Daily    folic acid  1 mg Per Feeding Tube Daily    magnesium sulfate  2 g Intravenous  Q6H    multivitamins w/minerals  15 mL Per Feeding Tube BID 09 12    pantoprazole  40 mg Intravenous Daily with breakfast    piperacillin-tazobactam  3.375 g Intravenous Q6H    potassium phosphate  30 mmol Intravenous BID    sodium chloride (PF)  3 mL Intracatheter Q8H    venlafaxine  37.5 mg Oral or Feeding Tube Daily    cholecalciferol  125 mcg Per Feeding Tube Daily       Data   Recent Labs   Lab 12/15/23  0730 12/15/23  0658 12/14/23  2121 12/14/23  0920 12/14/23  0903 12/14/23  0758 12/13/23  1303 12/13/23  0724 12/12/23  2035 12/12/23  0708 12/11/23  1419 12/11/23  0459   WBC 10.1  --   --  14.3*  --   --   --  14.4*  --  17.5*  --  9.0   HGB 8.8*  --   --  10.6*  --   --   --  9.0*  --  9.5*  --  11.6*   MCV 97  --   --  100  --   --   --  101*  --  98  --  92     --   --  403  --   --   --  351  --  358  --  460*   INR  --   --   --   --   --   --   --   --   --   --  1.11  --    *  --   --  146*  --   --   --  140  --  141  --  135   POTASSIUM 2.7*  --   --  3.9  --  3.7  --  3.6   < > 2.9*  --  4.0   CHLORIDE 115*  --   --  113*  --   --   --  111*  --  110*  --  102   CO2 24  --   --  21*  --   --   --  19*  --  24  --  18*   BUN 12.7  --   --  12.8  --   --   --  14.4  --  10.1  --  8.7   CR 0.74  --   --  0.75  --   --   --  0.84  --  0.73  --  0.69   ANIONGAP 7  --   --  12  --   --   --  10  --  7  --  15   SHON 7.8*  --   --  8.2*  --   --   --  7.8*  --  7.8*  --  8.6*   * 98 93 128*   < >  --    < > 63*  --  120*  --  115*   ALBUMIN  --   --   --   --   --   --   --   --   --  2.6*  --  3.1*   PROTTOTAL  --   --   --   --   --   --   --   --   --  5.3*  --  7.0   BILITOTAL  --   --   --   --   --   --   --   --   --  0.7  --  1.6*   ALKPHOS  --   --   --   --   --   --   --   --   --  97  --  175*   ALT  --   --   --   --   --   --   --   --   --  5  --  6   AST  --   --   --   --   --   --   --   --   --  21  --  16   LIPASE  --   --   --   --   --   --   --   --   --   --   --   65*    < > = values in this interval not displayed.       No results found for this or any previous visit (from the past 24 hour(s)).

## 2023-12-17 LAB
ANION GAP SERPL CALCULATED.3IONS-SCNC: 7 MMOL/L (ref 7–15)
ANION GAP SERPL CALCULATED.3IONS-SCNC: 7 MMOL/L (ref 7–15)
BUN SERPL-MCNC: 4.1 MG/DL (ref 8–23)
BUN SERPL-MCNC: 4.5 MG/DL (ref 8–23)
CALCIUM SERPL-MCNC: 7.9 MG/DL (ref 8.8–10.2)
CALCIUM SERPL-MCNC: 7.9 MG/DL (ref 8.8–10.2)
CHLORIDE SERPL-SCNC: 103 MMOL/L (ref 98–107)
CHLORIDE SERPL-SCNC: 105 MMOL/L (ref 98–107)
CREAT SERPL-MCNC: 0.59 MG/DL (ref 0.51–0.95)
CREAT SERPL-MCNC: 0.62 MG/DL (ref 0.51–0.95)
DEPRECATED HCO3 PLAS-SCNC: 27 MMOL/L (ref 22–29)
DEPRECATED HCO3 PLAS-SCNC: 27 MMOL/L (ref 22–29)
EGFRCR SERPLBLD CKD-EPI 2021: >90 ML/MIN/1.73M2
EGFRCR SERPLBLD CKD-EPI 2021: >90 ML/MIN/1.73M2
GLUCOSE BLDC GLUCOMTR-MCNC: 104 MG/DL (ref 70–99)
GLUCOSE BLDC GLUCOMTR-MCNC: 110 MG/DL (ref 70–99)
GLUCOSE BLDC GLUCOMTR-MCNC: 93 MG/DL (ref 70–99)
GLUCOSE SERPL-MCNC: 106 MG/DL (ref 70–99)
GLUCOSE SERPL-MCNC: 109 MG/DL (ref 70–99)
MAGNESIUM SERPL-MCNC: 1.7 MG/DL (ref 1.7–2.3)
MAGNESIUM SERPL-MCNC: 1.8 MG/DL (ref 1.7–2.3)
PHOSPHATE SERPL-MCNC: 2.9 MG/DL (ref 2.5–4.5)
PHOSPHATE SERPL-MCNC: 3 MG/DL (ref 2.5–4.5)
POTASSIUM SERPL-SCNC: 4.1 MMOL/L (ref 3.4–5.3)
POTASSIUM SERPL-SCNC: 4.2 MMOL/L (ref 3.4–5.3)
SODIUM SERPL-SCNC: 137 MMOL/L (ref 135–145)
SODIUM SERPL-SCNC: 139 MMOL/L (ref 135–145)

## 2023-12-17 PROCEDURE — 84100 ASSAY OF PHOSPHORUS: CPT | Performed by: STUDENT IN AN ORGANIZED HEALTH CARE EDUCATION/TRAINING PROGRAM

## 2023-12-17 PROCEDURE — 250N000011 HC RX IP 250 OP 636: Mod: JZ | Performed by: SURGERY

## 2023-12-17 PROCEDURE — 36415 COLL VENOUS BLD VENIPUNCTURE: CPT | Performed by: STUDENT IN AN ORGANIZED HEALTH CARE EDUCATION/TRAINING PROGRAM

## 2023-12-17 PROCEDURE — 80048 BASIC METABOLIC PNL TOTAL CA: CPT | Performed by: STUDENT IN AN ORGANIZED HEALTH CARE EDUCATION/TRAINING PROGRAM

## 2023-12-17 PROCEDURE — 250N000013 HC RX MED GY IP 250 OP 250 PS 637: Performed by: STUDENT IN AN ORGANIZED HEALTH CARE EDUCATION/TRAINING PROGRAM

## 2023-12-17 PROCEDURE — 99231 SBSQ HOSP IP/OBS SF/LOW 25: CPT | Performed by: HOSPITALIST

## 2023-12-17 PROCEDURE — C9113 INJ PANTOPRAZOLE SODIUM, VIA: HCPCS | Performed by: STUDENT IN AN ORGANIZED HEALTH CARE EDUCATION/TRAINING PROGRAM

## 2023-12-17 PROCEDURE — 120N000001 HC R&B MED SURG/OB

## 2023-12-17 PROCEDURE — 250N000013 HC RX MED GY IP 250 OP 250 PS 637: Performed by: SURGERY

## 2023-12-17 PROCEDURE — 250N000011 HC RX IP 250 OP 636: Mod: JZ | Performed by: STUDENT IN AN ORGANIZED HEALTH CARE EDUCATION/TRAINING PROGRAM

## 2023-12-17 PROCEDURE — 250N000011 HC RX IP 250 OP 636: Performed by: STUDENT IN AN ORGANIZED HEALTH CARE EDUCATION/TRAINING PROGRAM

## 2023-12-17 PROCEDURE — 83735 ASSAY OF MAGNESIUM: CPT | Performed by: STUDENT IN AN ORGANIZED HEALTH CARE EDUCATION/TRAINING PROGRAM

## 2023-12-17 PROCEDURE — 250N000013 HC RX MED GY IP 250 OP 250 PS 637: Performed by: HOSPITALIST

## 2023-12-17 RX ADMIN — PANTOPRAZOLE SODIUM 40 MG: 40 INJECTION, POWDER, FOR SOLUTION INTRAVENOUS at 08:40

## 2023-12-17 RX ADMIN — FOLIC ACID 1 MG: 1 TABLET ORAL at 13:31

## 2023-12-17 RX ADMIN — HYDROMORPHONE HYDROCHLORIDE 0.2 MG: 0.2 INJECTION, SOLUTION INTRAMUSCULAR; INTRAVENOUS; SUBCUTANEOUS at 13:30

## 2023-12-17 RX ADMIN — BUSPIRONE HYDROCHLORIDE 15 MG: 15 TABLET ORAL at 08:40

## 2023-12-17 RX ADMIN — HYDROMORPHONE HYDROCHLORIDE 0.2 MG: 0.2 INJECTION, SOLUTION INTRAMUSCULAR; INTRAVENOUS; SUBCUTANEOUS at 21:25

## 2023-12-17 RX ADMIN — CLONAZEPAM 0.5 MG: 0.5 TABLET ORAL at 09:57

## 2023-12-17 RX ADMIN — HYDROMORPHONE HYDROCHLORIDE 0.2 MG: 0.2 INJECTION, SOLUTION INTRAMUSCULAR; INTRAVENOUS; SUBCUTANEOUS at 05:27

## 2023-12-17 RX ADMIN — CALCIUM CARBONATE (ANTACID) CHEW TAB 500 MG 500 MG: 500 CHEW TAB at 08:40

## 2023-12-17 RX ADMIN — ENOXAPARIN SODIUM 40 MG: 40 INJECTION SUBCUTANEOUS at 16:30

## 2023-12-17 RX ADMIN — Medication 15 ML: at 13:31

## 2023-12-17 RX ADMIN — HYDROMORPHONE HYDROCHLORIDE 0.2 MG: 0.2 INJECTION, SOLUTION INTRAMUSCULAR; INTRAVENOUS; SUBCUTANEOUS at 00:21

## 2023-12-17 RX ADMIN — HYDROMORPHONE HYDROCHLORIDE 0.2 MG: 0.2 INJECTION, SOLUTION INTRAMUSCULAR; INTRAVENOUS; SUBCUTANEOUS at 18:57

## 2023-12-17 RX ADMIN — BUSPIRONE HYDROCHLORIDE 15 MG: 15 TABLET ORAL at 21:25

## 2023-12-17 RX ADMIN — Medication 125 MCG: at 08:39

## 2023-12-17 RX ADMIN — PIPERACILLIN AND TAZOBACTAM 3.38 G: 3; .375 INJECTION, POWDER, FOR SOLUTION INTRAVENOUS at 03:15

## 2023-12-17 RX ADMIN — CALCIUM CARBONATE (ANTACID) CHEW TAB 500 MG 500 MG: 500 CHEW TAB at 21:24

## 2023-12-17 RX ADMIN — Medication 15 ML: at 08:39

## 2023-12-17 RX ADMIN — ESCITALOPRAM 30 MG: 5 SOLUTION ORAL at 08:51

## 2023-12-17 RX ADMIN — BUSPIRONE HYDROCHLORIDE 15 MG: 15 TABLET ORAL at 16:30

## 2023-12-17 RX ADMIN — VENLAFAXINE 37.5 MG: 37.5 TABLET ORAL at 08:40

## 2023-12-17 RX ADMIN — HYDROMORPHONE HYDROCHLORIDE 0.2 MG: 0.2 INJECTION, SOLUTION INTRAMUSCULAR; INTRAVENOUS; SUBCUTANEOUS at 08:40

## 2023-12-17 RX ADMIN — CALCIUM CARBONATE (ANTACID) CHEW TAB 500 MG 500 MG: 500 CHEW TAB at 16:30

## 2023-12-17 RX ADMIN — HYDROMORPHONE HYDROCHLORIDE 0.2 MG: 0.2 INJECTION, SOLUTION INTRAMUSCULAR; INTRAVENOUS; SUBCUTANEOUS at 16:30

## 2023-12-17 ASSESSMENT — ACTIVITIES OF DAILY LIVING (ADL)
ADLS_ACUITY_SCORE: 24
ADLS_ACUITY_SCORE: 24
ADLS_ACUITY_SCORE: 22
ADLS_ACUITY_SCORE: 24
ADLS_ACUITY_SCORE: 22
ADLS_ACUITY_SCORE: 24

## 2023-12-17 NOTE — PLAN OF CARE
Date & Time: 12/16/23 0700-1990  Surgery/POD#: POD5 from omental patch repair and POD3 from aborted G-tube placement  Behavior & Aggression: Green  Fall Risk: no  Orientation: AOx4  ABNL VS/O2:VSS on RA   ABNL Labs: WNL  Pain Management: IV dilaudid x3, Heat packs   Bowel/Bladder: Up to bathroom, BMx2  IV/Drains/Incisions: LR @ 75ml/hr. NG tube feedings, IV SL, ARSENIO drain  Diet: NPO - NG tube feedings  Activity Level: IND  Tests/Procedures: Upper GI on 12/18  Anticipated  DC Date: Next Week    Significant Information: Electrolytes WNL, Tube feeding advanced to 25/hr per order. Will advance to goal rate tomorrow at 1200 if tolerating current rate. ARSENIO dressing changed x1, less leakage than yesterday 12/15

## 2023-12-17 NOTE — PROGRESS NOTES
12/17/23; [id number]    POD#6 Omental Patch Repair; POD#4 Aborted G-Tube Placement    A&O X4; Ind in room; pain managed with IV Dilaudid given X2; ARSENIO drain w/ serosanguinous drainage; saturated dressing changed and CDI at this time; tube feeding increased to 30/hr ( goal rate 40/hr) and pt is tolerating; electrolytes WNL; NJ tube patent with feedings and medication.

## 2023-12-17 NOTE — PLAN OF CARE
Date & Time: 8538-8642  Surgery/POD#: POD 6 from omental patch repair and POD 4 from aborted G-tube placement  Behavior & Aggression: Green  Fall Risk: No  Orientation: A&Ox4  ABNL VS/O2:VSS on RA  ABNL Labs: WNL. Rechecks in AM  Pain Management: PRN IV dilaudid and heat packs  Bowel/Bladder: Continent. Voiding adequately. Passing gas, loose BM during night  Drains: PIV SL. NG tube feedings. ARSENIO drain   Diet: NPO  Activity Level: IND  Tests/Procedures: Upper GI on 12/18  Anticipated  DC Date: Pending  Significant Information: Tube feeding at 25ml/hr. Advancement to goal rate at 1200 if tolerating and labs WNL. ARSENIO dressing changed x2. CIWA scoring 0.  Ambulated in halls x1

## 2023-12-17 NOTE — PROGRESS NOTES
"[facility]  GENERAL SURGERY Progress Note    Admission Date: 2023         Assessment and Plan:     Pavithra Raines is a 68 year old female with a h/o RNY Gastric Bypass, presented with a perforated marginal ulcer, S/P diagnostic laparoscopy with omental patch repair  and diagnostic laparoscopy with aborted G tube placement .       - NPO, continue tube feeds  - Pain control- Dilaudid PRN  - WBC 7.9, discontinue Zosyn today  - Continue ARSENIO, reinforce dressing as needed  - Plan for UGI tomorrow, hold tube feeds at midnight  - Continue Lovenox for DVT ppx  - Continue Protonix  - Ambulate with assist 4x day and encourage IS  - Med management per hospitalist, much appreciate your assistance             Interval History:     Pain controlled, tolerating tube feeds, +BM, UO adequate, ambulating.                     Physical Exam:   Blood pressure 125/84, pulse 74, temperature 99.1  F (37.3  C), temperature source Oral, resp. rate 19, height 1.626 m (5' 4\"), weight 53.7 kg (118 lb 4.8 oz), SpO2 98%, not currently breastfeeding.  Temperature Temp  Av.1  F (37.3  C)  Min: 98.9  F (37.2  C)  Max: 99.3  F (37.4  C)   I/O last 3 completed shifts:  In: 867 [NG/GT:867]  Out: 2230 [Urine:2200; Drains:30]  Constitutional:  Awake and in no apparent distress.   Abdomen: Soft, non-distended, non-tender. ARSENIO drain intact, serous.   Wound(s): Clean, dry, and intact. No erythema or drainage.    Extremities: No edema or calf tenderness. +SCDs          Data:      Recent Labs   Lab Test 23  0621 23  0719    139 143   POTASSIUM 4.2 3.6 3.9   CHLORIDE 105 107 112*   CO2 27 24 25   BUN 4.1* 3.8* 4.9*   CR 0.62 0.58 0.63       Kathryn Guardado PA-C  [facility]  [phone]   "

## 2023-12-17 NOTE — PROGRESS NOTES
[facility]  Hospitalist Progress Note    Assessment & Plan   Pavithra Raines is a 68 year old female admitted on 12/11/2023.     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       Patient presented to [facility] emergency department due to abdominal pain and chest pain.  Patient reported that she began to feel unwell the morning of 12/10/2023 when she started to develop watery diarrhea.  Patient was unable to consume any alcohol throughout the day of 12/10 because she continued to feel unwell and developed nausea symptoms as well as some vomiting.  Around 3 PM and 12/10 she developed severe abdominal pain.  This pain became so intolerable that she called 911 and was brought into the ED.  Patient endorsed concerns of going into alcohol withdrawal and was noted to be tremulous in the ED.     Workup in the ED included a CMP that revealed a CO2 of 18, calcium of 8.6, magnesium of 1.4, albumin of 3.1, alk phos of 175, total bili of 1.6, glucose of 115 otherwise within normal limits.  Lactic acid level was within normal limits at 1.8.  Lipase level was mildly elevated at 65.  Inflammatory CRP was elevated at 8.32.  CBC with differential revealed a hemoglobin of 11.6 and platelet count of 460 otherwise within normal limits.  UA revealed trace leukocyte Estrace with 3 RBCs and 2 squamous epithelial cells with mucus present and hyaline casts of 3.  Abdomen/pelvis CT with contrast revealed free intraperitoneal gas and fluid consistent with  perforated viscus (the site of perforation is not certain though may be from the stomach/gastric bypass).     The ED was in contact with General Surgery (Dr. Guaman) who felt patient's case was too complicated and recommended transfer to a tertiary care center.  Attempts to transfer to the [facility] were performed but due to bed availability patient was ultimately transferred to [facility].  Discussions occurred between the ED (Dr. Brooks), General Surgery (Dr. Gonzalez) and Hospitalist (Dr. Monaco) at [facility] and patient was admitted under General Surgery with verbal request for Hospitalist consult upon arrival on 12/11/23. Patient was taken to the OR on 12/11/23 where they found a perforated marginal ulcer which they fixed with an omental patch repair.      Hospitalist team consulting. General surgery is the primary team.     Perforated Marginal Ulcer s/p Omental Patch Repair 12/11/23   Elevated CRP  Abdominal pain    - General surgery is managing.                -Defer analgesic/pain management, DVT prophylaxis, PT/OT.       - Unable to get G tube placed on 12/13/23. Patient had NG tube placed on 12/14  - Tube feeds per surgery.  Plan for upper GI series on 12/18.     - Hgb: 11.6 > 9.5 > 9 > 10.6     - Continue to monitor     - Encourage utilization of incentive spirometer.     - Continue IV Zosyn  per Surgery, and on 12/17.    - Surgery discontinued IV Fluconazole     Leukocytosis: Resolved  Likely related to recent surgery    - Continue to monitor     Hypernatremia, resolved  Likely related to NPO status. Hopefully with the start of tube feeds and free water flushes this will improve.    - Continue to monitor.  IVF discontinued.    Alcohol withdrawal, resolved  Alcohol use D/O with history of withdrawal with seizures  Patient states that she drink a box of wine over 2-3 days and roughly drinks 10 -15 glasses of wine a day. On 12/13 was notified by nursing that they suspect patient is  drinking the mouthwash. Advised nursing to use mouthwash with supervision only and not to leave in room.     - CIWA ordered with PRN Ativan ordered.   - Has not needed PRNs.  Ativan discontinued.  Resumed patient's PTA clonazepam.    - Stopped banana bag     - Start PO Vitamins and calcium carbonate per Nutrition via feeding tube    - Chem dep saw patient and have provided resources at discharge      Hypomagnesemia  Hypokalemia  Hypophosphatemia  ?  Mild refeeding syndrome   -Replace electrolytes per protocol.     Severe Malnutrition  - Tube feeds per nutrition and surgery  - Start PO Vitamins and calcium carbonate per Nutrition via feeding tube    Chronic Medical Problems:     Heart murmur  Noted slight heart murmur on examination.  Patient denied every being told about a murmur in the past. Previous ECHO completed 10/2023 and without aortic stenosis.    - Monitor     Coagulopathy thought to be secondary to Alcohol dependence  INR and PTT checked after assessing the patient and within normal limits.    - Monitor PRN.       Fatty liver disease  - Follow up with PCP as an outpatient.       GERD  Has not taken any medications in the last month as she ran out.    - Continue IV Protonix 40 mg/d for now      Chronic anemia (B12 and Iron)  Acute anemia secondary to blood loss  - Start PO Vitamins and calcium carbonate per Nutrition via feeding tube.  Monitor hemoglobin.  Has been in the 8/10 range postoperatively.     Emphysema/COPD   Recently quit smoking in the last 2 months and was previously smoking 2 packs per day.  Not currently taking any medications.   - Monitor.       Cannabis use D/O  - Counseled regarding cessation  - Chem dep consulted      Seizure D/O  Likely due to alcohol withdrawal.  Patient not prescribed antiepileptics per chart review.       MDD with anxiety  PTSD  Has not taken any medications in the last month or two as she ran out.      -Resumed patient's PTA BuSpar, Lexapro, Effexor and  Klonopin.    Insomnia  Hypersomnia with sleep apnea  Noted on chart review.  Does not appear to have been prescribed any medications.       RLS  Noted on chart review.  Does not appear to have been prescribed any medications.       Genital herpes  Noted on chart review.  Does not appear to have been prescribed any medications.        Osteoporosis  - Hold TPA Evista as patient has not been taking this medication.     Clinically Significant Risk Factors              # Hypoalbuminemia: Lowest albumin = 2.6 g/dL at 12/12/2023  7:08 AM, will monitor as appropriate             # Severe Malnutrition: based on nutrition assessment    # Financial/Environmental Concerns: unable to afford medication(s);other (see comments) (Spouse makes too much to qualify for food support etc. Needs to get SS because of the inability to work)           Diet: NPO per Anesthesia Guidelines for Procedure/Surgery Except for: Meds  Adult Formula Drip Feeding: Continuous Osmolite 1.5; Nasojejunal; Goal Rate: 40; mL/hr; remain @ 15 mL/hr for 24 hours. Then (pending tolerance/labs) start advancement-- advance to 25 mL/hr. After 24 hours, advance to goal rate.; Do not advance tu...     DVT Prophylaxis: Defer to primary service   Barney Catheter: Not present  Lines: None     Cardiac Monitoring: None  Code Status: Full Code      Disposition Plan       Expected Discharge Date: 12/19/2023      Destination: inpatient rehabilitation facility;nursing home        Entered: Claudia Clements MD 12/17/2023, 2:43 PM       Disposition: Hospitalist team will continue to follow along while patient is admitted. Discharge timing per Surgery     Claudia Clements MD  Hospitalist Service   [facility]  Securely message with the Vocera Web Console (learn more here)  Text page via [employer] Paging/Directory         Medical Decision Making       25 MINUTES SPENT BY ME on the date of service doing chart review, history, exam, documentation & further  activities per the note.           Interval History     No acute overnight events.  Pain is being controlled.  She was getting wound cares when seen.  Did not offer any complaints.  Notes that she is trying to move around more.  Plans to go to the gift shop later today.    -Data reviewed today: I reviewed all new labs and imaging results over the last 24 hours.   Physical Exam   Temp: 99.1  F (37.3  C) Temp src: Oral BP: 125/84 Pulse: 74   Resp: 19 SpO2: 98 % O2 Device: None (Room air)    Vitals:    12/15/23 1306 12/16/23 0448 12/17/23 0610   Weight: 55.3 kg (122 lb) 55.5 kg (122 lb 4.8 oz) 53.7 kg (118 lb 4.8 oz)     Vital Signs with Ranges  Temp:  [98.9  F (37.2  C)-99.3  F (37.4  C)] 99.1  F (37.3  C)  Pulse:  [73-87] 74  Resp:  [16-19] 19  BP: (124-137)/(83-89) 125/84  SpO2:  [95 %-98 %] 98 %  I/O last 3 completed shifts:  In: 867 [NG/GT:867]  Out: 2230 [Urine:2200; Drains:30]      Constitutional: Awake, alert, cooperative, no apparent distress  Pulmonary: Clear breath sounds bilaterally, nonlabored breathing  Cardiovascular: Regular rate and rhythm, normal S1 and S2  GI: soft, multiple surgical incisions in place and covered with bandage, ARSENIO drain noted on left abdomen with bloody drainage, some tenderness with palpation, bowel sounds heard  Neuro: Moves all 4 extremities, no tremor noted   Psych:  Alert and oriented x 3. Normal affect.  Extremities: Trace ankle edema noted      Medications    dextrose        busPIRone  15 mg Per Feeding Tube TID    calcium carbonate  500 mg Per Feeding Tube TID    enoxaparin ANTICOAGULANT  40 mg Subcutaneous Q24H    escitalopram  30 mg Oral or Feeding Tube Daily    [Held by provider] ferrous fumarate 65 mg ([place]. FE)-Vitamin C 125 mg  1 tablet Oral Daily    folic acid  1 mg Per Feeding Tube Daily    multivitamins w/minerals  15 mL Per Feeding Tube BID 09 12    pantoprazole  40 mg Intravenous Daily with breakfast    sodium chloride (PF)  3 mL Intracatheter Q8H    venlafaxine   37.5 mg Oral or Feeding Tube Daily    cholecalciferol  125 mcg Per Feeding Tube Daily       Data   Recent Labs   Lab 12/17/23  0621 12/17/23  0612 12/16/23  2148 12/16/23 1958 12/16/23 0719 12/15/23  1842 12/15/23  0730 12/14/23  2121 12/14/23  0920 12/12/23  2035 12/12/23  0708 12/11/23  1419 12/11/23  0459   WBC  --   --   --   --  7.9  --  10.1  --  14.3*   < > 17.5*  --  9.0   HGB  --   --   --   --  8.6*  --  8.8*  --  10.6*   < > 9.5*  --  11.6*   MCV  --   --   --   --  96  --  97  --  100   < > 98  --  92   PLT  --   --   --   --  290  --  333  --  403   < > 358  --  460*   INR  --   --   --   --   --   --   --   --   --   --   --  1.11  --      --   --  139 143   < > 146*  --  146*   < > 141  --  135   POTASSIUM 4.2  --   --  3.6 3.9   < > 2.7*  --  3.9   < > 2.9*  --  4.0   CHLORIDE 105  --   --  107 112*   < > 115*  --  113*   < > 110*  --  102   CO2 27  --   --  24 25   < > 24  --  21*   < > 24  --  18*   BUN 4.1*  --   --  3.8* 4.9*   < > 12.7  --  12.8   < > 10.1  --  8.7   CR 0.62  --   --  0.58 0.63   < > 0.74  --  0.75   < > 0.73  --  0.69   ANIONGAP 7  --   --  8 6*   < > 7  --  12   < > 7  --  15   SHON 7.9*  --   --  7.4* 7.4*   < > 7.8*  --  8.2*   < > 7.8*  --  8.6*   * 110* 100* 105* 103*   < > 109*   < > 128*   < > 120*  --  115*   ALBUMIN  --   --   --   --   --   --   --   --   --   --  2.6*  --  3.1*   PROTTOTAL  --   --   --   --   --   --   --   --   --   --  5.3*  --  7.0   BILITOTAL  --   --   --   --   --   --   --   --   --   --  0.7  --  1.6*   ALKPHOS  --   --   --   --   --   --   --   --   --   --  97  --  175*   ALT  --   --   --   --   --   --   --   --   --   --  5  --  6   AST  --   --   --   --   --   --   --   --   --   --  21  --  16   LIPASE  --   --   --   --   --   --   --   --   --   --   --   --  65*    < > = values in this interval not displayed.       No results found for this or any previous visit (from the past 24 hour(s)).

## 2023-12-18 ENCOUNTER — APPOINTMENT (OUTPATIENT)
Dept: GENERAL RADIOLOGY | Facility: CLINIC | Age: 68
DRG: 326 | End: 2023-12-18
Attending: PHYSICIAN ASSISTANT
Payer: MEDICARE

## 2023-12-18 LAB
ANION GAP SERPL CALCULATED.3IONS-SCNC: 8 MMOL/L (ref 7–15)
BUN SERPL-MCNC: 4.7 MG/DL (ref 8–23)
CALCIUM SERPL-MCNC: 8.1 MG/DL (ref 8.8–10.2)
CHLORIDE SERPL-SCNC: 103 MMOL/L (ref 98–107)
CREAT SERPL-MCNC: 0.55 MG/DL (ref 0.51–0.95)
DEPRECATED HCO3 PLAS-SCNC: 26 MMOL/L (ref 22–29)
EGFRCR SERPLBLD CKD-EPI 2021: >90 ML/MIN/1.73M2
GLUCOSE BLDC GLUCOMTR-MCNC: 107 MG/DL (ref 70–99)
GLUCOSE BLDC GLUCOMTR-MCNC: 97 MG/DL (ref 70–99)
GLUCOSE SERPL-MCNC: 87 MG/DL (ref 70–99)
MAGNESIUM SERPL-MCNC: 1.8 MG/DL (ref 1.7–2.3)
PHOSPHATE SERPL-MCNC: 3.7 MG/DL (ref 2.5–4.5)
POTASSIUM SERPL-SCNC: 4.5 MMOL/L (ref 3.4–5.3)
SODIUM SERPL-SCNC: 137 MMOL/L (ref 135–145)

## 2023-12-18 PROCEDURE — 74240 X-RAY XM UPR GI TRC 1CNTRST: CPT

## 2023-12-18 PROCEDURE — 83735 ASSAY OF MAGNESIUM: CPT | Performed by: STUDENT IN AN ORGANIZED HEALTH CARE EDUCATION/TRAINING PROGRAM

## 2023-12-18 PROCEDURE — 80048 BASIC METABOLIC PNL TOTAL CA: CPT | Performed by: STUDENT IN AN ORGANIZED HEALTH CARE EDUCATION/TRAINING PROGRAM

## 2023-12-18 PROCEDURE — 120N000001 HC R&B MED SURG/OB

## 2023-12-18 PROCEDURE — 84100 ASSAY OF PHOSPHORUS: CPT | Performed by: STUDENT IN AN ORGANIZED HEALTH CARE EDUCATION/TRAINING PROGRAM

## 2023-12-18 PROCEDURE — 250N000013 HC RX MED GY IP 250 OP 250 PS 637: Performed by: STUDENT IN AN ORGANIZED HEALTH CARE EDUCATION/TRAINING PROGRAM

## 2023-12-18 PROCEDURE — 250N000013 HC RX MED GY IP 250 OP 250 PS 637: Performed by: HOSPITALIST

## 2023-12-18 PROCEDURE — 36415 COLL VENOUS BLD VENIPUNCTURE: CPT | Performed by: STUDENT IN AN ORGANIZED HEALTH CARE EDUCATION/TRAINING PROGRAM

## 2023-12-18 PROCEDURE — C9113 INJ PANTOPRAZOLE SODIUM, VIA: HCPCS | Performed by: STUDENT IN AN ORGANIZED HEALTH CARE EDUCATION/TRAINING PROGRAM

## 2023-12-18 PROCEDURE — 250N000011 HC RX IP 250 OP 636: Mod: JZ | Performed by: SURGERY

## 2023-12-18 PROCEDURE — 250N000011 HC RX IP 250 OP 636: Mod: JZ | Performed by: STUDENT IN AN ORGANIZED HEALTH CARE EDUCATION/TRAINING PROGRAM

## 2023-12-18 PROCEDURE — 250N000013 HC RX MED GY IP 250 OP 250 PS 637: Performed by: SURGERY

## 2023-12-18 PROCEDURE — 99231 SBSQ HOSP IP/OBS SF/LOW 25: CPT | Performed by: HOSPITALIST

## 2023-12-18 RX ADMIN — HYDROMORPHONE HYDROCHLORIDE 0.2 MG: 0.2 INJECTION, SOLUTION INTRAMUSCULAR; INTRAVENOUS; SUBCUTANEOUS at 12:40

## 2023-12-18 RX ADMIN — ENOXAPARIN SODIUM 40 MG: 40 INJECTION SUBCUTANEOUS at 15:42

## 2023-12-18 RX ADMIN — CALCIUM CARBONATE (ANTACID) CHEW TAB 500 MG 500 MG: 500 CHEW TAB at 22:21

## 2023-12-18 RX ADMIN — VENLAFAXINE 37.5 MG: 37.5 TABLET ORAL at 08:41

## 2023-12-18 RX ADMIN — ESCITALOPRAM 30 MG: 5 SOLUTION ORAL at 08:41

## 2023-12-18 RX ADMIN — CALCIUM CARBONATE (ANTACID) CHEW TAB 500 MG 500 MG: 500 CHEW TAB at 15:41

## 2023-12-18 RX ADMIN — Medication 15 ML: at 08:41

## 2023-12-18 RX ADMIN — BUSPIRONE HYDROCHLORIDE 15 MG: 15 TABLET ORAL at 08:41

## 2023-12-18 RX ADMIN — FOLIC ACID 1 MG: 1 TABLET ORAL at 13:47

## 2023-12-18 RX ADMIN — Medication 15 ML: at 13:47

## 2023-12-18 RX ADMIN — HYDROMORPHONE HYDROCHLORIDE 0.2 MG: 0.2 INJECTION, SOLUTION INTRAMUSCULAR; INTRAVENOUS; SUBCUTANEOUS at 08:41

## 2023-12-18 RX ADMIN — CALCIUM CARBONATE (ANTACID) CHEW TAB 500 MG 500 MG: 500 CHEW TAB at 08:41

## 2023-12-18 RX ADMIN — HYDROMORPHONE HYDROCHLORIDE 0.2 MG: 0.2 INJECTION, SOLUTION INTRAMUSCULAR; INTRAVENOUS; SUBCUTANEOUS at 14:56

## 2023-12-18 RX ADMIN — Medication 125 MCG: at 08:42

## 2023-12-18 RX ADMIN — HYDROMORPHONE HYDROCHLORIDE 0.2 MG: 0.2 INJECTION, SOLUTION INTRAMUSCULAR; INTRAVENOUS; SUBCUTANEOUS at 05:05

## 2023-12-18 RX ADMIN — HYDROMORPHONE HYDROCHLORIDE 0.2 MG: 0.2 INJECTION, SOLUTION INTRAMUSCULAR; INTRAVENOUS; SUBCUTANEOUS at 19:52

## 2023-12-18 RX ADMIN — PANTOPRAZOLE SODIUM 40 MG: 40 INJECTION, POWDER, FOR SOLUTION INTRAVENOUS at 08:40

## 2023-12-18 RX ADMIN — CLONAZEPAM 0.5 MG: 0.5 TABLET ORAL at 08:45

## 2023-12-18 RX ADMIN — BUSPIRONE HYDROCHLORIDE 15 MG: 15 TABLET ORAL at 22:21

## 2023-12-18 RX ADMIN — HYDROMORPHONE HYDROCHLORIDE 0.2 MG: 0.2 INJECTION, SOLUTION INTRAMUSCULAR; INTRAVENOUS; SUBCUTANEOUS at 22:27

## 2023-12-18 RX ADMIN — HYDROMORPHONE HYDROCHLORIDE 0.2 MG: 0.2 INJECTION, SOLUTION INTRAMUSCULAR; INTRAVENOUS; SUBCUTANEOUS at 16:57

## 2023-12-18 RX ADMIN — BUSPIRONE HYDROCHLORIDE 15 MG: 15 TABLET ORAL at 15:41

## 2023-12-18 RX ADMIN — HYDROMORPHONE HYDROCHLORIDE 0.2 MG: 0.2 INJECTION, SOLUTION INTRAMUSCULAR; INTRAVENOUS; SUBCUTANEOUS at 02:00

## 2023-12-18 ASSESSMENT — ACTIVITIES OF DAILY LIVING (ADL)
ADLS_ACUITY_SCORE: 26
ADLS_ACUITY_SCORE: 22
ADLS_ACUITY_SCORE: 26
ADLS_ACUITY_SCORE: 22
ADLS_ACUITY_SCORE: 26
ADLS_ACUITY_SCORE: 26
ADLS_ACUITY_SCORE: 22
ADLS_ACUITY_SCORE: 26

## 2023-12-18 NOTE — PLAN OF CARE
Goal Outcome Evaluation:    Patient is A&O X4. VS on RA temp slightly elevated 99.1. POD 7 from Diagnostic Laparoscopy, Laparoscopic Patching of Marginal Ulcer. POD 5 from Exploratory Laparoscopy with Repeat Gastrography. Lap sites sterile strips covered CDI. ARSENIO in place to bulb suction, serosanguinous, minimal output. ABD dressing on CDI. Pain managed with 0.2 mL IV Dilaudid. NPO, NJ tube in place, enteral feeding stopped per anesthesia orders. On K, Mg and Phos protocol. Up independently to the bathroom. Voiding adequately. Onel wipes done. Continue with plan of care.

## 2023-12-18 NOTE — CONSULTS
"SPIRITUAL HEALTH SERVICES - Consult Note    SH  Gen Surg    Referral Source/Reason for Visit: Patient request/consult    Summary and Recommendations -    Shanika is \"hopeful\" after a successful procedure to place her feeding tube and eager to continue to advance her diet and get home.  Shanika knows she has many obstacles to face, but also has plans in place for abstaining from alcohol, getting her finances in order and surrounding herself with supportive people.  Shanika expressed a desire to write a memoir and an ethical will and would like more information on how to create one.    Plan: Spiritual Health will follow up with Shanika prior to discharge with information on ethical edgar. Please consult for any other additional needs.    Adriana Montoya M.Div.    Chaplain Resident    SHS available 24/7 for emergent requests/referrals, either by paging the on-call  or by entering an ASAP/STAT consult in [employer], which will also page the on-call .    Assessment    Saw pt \"Shanika\" Andriyer per patient request.      Patient/Family Understanding of Illness and Goals of Care -   Shanika described herself as \"hopeful\" in that her procedure for her feeding tube was successful.  Shanika's goals include moving toward solid, chopped food so she can be discharged and \"get back to life.\"  She said she feels like she is \"strong enough\" to \"stay away from alcohol\" and has created a plan for how she will make this happen.      Distress and Loss -   \"Being an alcoholic\" has been a big source of distress for Shanika and she knows it has caused many of her health concerns.  Shanika has some challenging relationships in with people in her life that she is unable or unwilling to remove herself from.  Shanika named financial concerns she \"needs to work on.\" She developed a plan for getting them taken care of slowly when she gets home.      Strengths, Coping, and Resources -   Shanika has friends \"that have become family\" who are supportive and present.  Shanika has " "particular plans in place for making changes in her life and can name strategies for following up on them.      Meaning, Beliefs, and Spirituality -   With conversation, Shanika feels that she \"will not be part of any particular Orthodoxy\" but that it is important to her to \"have a relationship with\" God.  The primary importance of spirituality for Shanika is \"connection and meaning\" rather than an adherence to particular traditions.  Shanika requested that we share a prayer and we took turns sharing the feelings and needs we discussed with God.  "

## 2023-12-18 NOTE — PLAN OF CARE
Goal Outcome Evaluation:      Plan of Care Reviewed With: patient    Overall Patient Progress: improvingOverall Patient Progress: improving    Outcome Evaluation: TF at goal rate, tolerating. CLD-started supplements    Maggi Bhat RD, LD

## 2023-12-18 NOTE — PLAN OF CARE
Goal Outcome Evaluation:      Plan of Care Reviewed With: patient    Date & Time: 12/18/23 1440  Surgery/POD#: 7  Behavior & Aggression: calm, cooperative   Fall Risk: no  Orientation:alert and oriented  ABNL VS/O2:room air lungs clear  ABNL Labs: on K, mag, phos protocal, all within normal range, redraw in am  Pain Management:dilaudid as needed for pain   Bowel/Bladder: good urine output, passing flatus,   Drains: liana drain in place, moderate amount of leaking around drain, dressing change twice this shift,   Diet:tube feedings running at goal rate, started on clear liquids, tolerates well  Activity Level: up independently  Tests/Procedures: had upper GI this am  Anticipated  DC Date: unknown  Significant Information: abd lap sites clean dry and intact,

## 2023-12-18 NOTE — PROGRESS NOTES
[facility]  Hospitalist Progress Note    Assessment & Plan   Pavithra Raines is a 68 year old female admitted on 12/11/2023.     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       Patient presented to [facility] emergency department due to abdominal pain and chest pain.  Patient reported that she began to feel unwell the morning of 12/10/2023 when she started to develop watery diarrhea.  Patient was unable to consume any alcohol throughout the day of 12/10 because she continued to feel unwell and developed nausea symptoms as well as some vomiting.  Around 3 PM and 12/10 she developed severe abdominal pain.  This pain became so intolerable that she called 911 and was brought into the ED.  Patient endorsed concerns of going into alcohol withdrawal and was noted to be tremulous in the ED.     Workup in the ED included a CMP that revealed a CO2 of 18, calcium of 8.6, magnesium of 1.4, albumin of 3.1, alk phos of 175, total bili of 1.6, glucose of 115 otherwise within normal limits.  Lactic acid level was within normal limits at 1.8.  Lipase level was mildly elevated at 65.  Inflammatory CRP was elevated at 8.32.  CBC with differential revealed a hemoglobin of 11.6 and platelet count of 460 otherwise within normal limits.  UA revealed trace leukocyte Estrace with 3 RBCs and 2 squamous epithelial cells with mucus present and hyaline casts of 3.  Abdomen/pelvis CT with contrast revealed free intraperitoneal gas and fluid consistent with  perforated viscus (the site of perforation is not certain though may be from the stomach/gastric bypass).     The ED was in contact with General Surgery (Dr. Guaman) who felt patient's case was too complicated and recommended transfer to a tertiary care center.  Attempts to transfer to the [facility] were performed but due to bed availability patient was ultimately transferred to [facility].  Discussions occurred between the ED (Dr. Brooks), General Surgery (Dr. Gonzalez) and Hospitalist (Dr. Monaco) at [facility] and patient was admitted under General Surgery with verbal request for Hospitalist consult upon arrival on 12/11/23. Patient was taken to the OR on 12/11/23 where they found a perforated marginal ulcer which they fixed with an omental patch repair.      Hospitalist team consulting. General surgery is the primary team.     Perforated Marginal Ulcer s/p Omental Patch Repair 12/11/23   Elevated CRP  Abdominal pain    - General surgery is managing.                -Defer analgesic/pain management, DVT prophylaxis, PT/OT.       - Unable to get G tube placed on 12/13/23. Patient had NG tube placed on 12/14  - Tube feeds per surgery.  Upper GI series with no leak on 12/18.  Started on clear liquid diet per surgery.     - Hgb: 11.6 > 9.5 > 9 > 10.6     - Continue to monitor     - Encourage utilization of incentive spirometer.     - Continue IV Zosyn  per Surgery, and on 12/17.  Patient had low-grade temp evening of 12/17.  Continue to monitor.  If recurrent fevers will need further workup.  Check CBC tomorrow.    - Surgery discontinued IV Fluconazole     Leukocytosis: Resolved  Likely related to recent surgery    - Continue to monitor     Hypernatremia, resolved  Likely related to NPO status. Hopefully with the start of tube feeds and free water flushes this will improve.    - Continue to monitor.  IVF discontinued.    Alcohol withdrawal, resolved  Alcohol use D/O with history of withdrawal with  seizures  Patient states that she drink a box of wine over 2-3 days and roughly drinks 10 -15 glasses of wine a day. On 12/13 was notified by nursing that they suspect patient is drinking the mouthwash. Advised nursing to use mouthwash with supervision only and not to leave in room.     - CIWA ordered with PRN Ativan ordered.   - Has not needed PRNs.  Ativan discontinued.  Resumed patient's PTA clonazepam.    - Stopped banana bag     - Start PO Vitamins and calcium carbonate per Nutrition via feeding tube    - Chem dep saw patient and have provided resources at discharge      Hypomagnesemia  Hypokalemia  Hypophosphatemia  ?  Mild refeeding syndrome   -Replace electrolytes per protocol.     Severe Malnutrition  - Tube feeds per nutrition and surgery  - Start PO Vitamins and calcium carbonate per Nutrition via feeding tube    Chronic Medical Problems:     Heart murmur  Noted slight heart murmur on examination.  Patient denied every being told about a murmur in the past. Previous ECHO completed 10/2023 and without aortic stenosis.    - Monitor     Coagulopathy thought to be secondary to Alcohol dependence  INR and PTT checked after assessing the patient and within normal limits.    - Monitor PRN.       Fatty liver disease  - Follow up with PCP as an outpatient.       GERD  Has not taken any medications in the last month as she ran out.    - Continue IV Protonix 40 mg/d for now      Chronic anemia (B12 and Iron)  Acute anemia secondary to blood loss  - Start PO Vitamins and calcium carbonate per Nutrition via feeding tube.  Monitor hemoglobin.  Has been in the 8/10 range postoperatively.     Emphysema/COPD   Recently quit smoking in the last 2 months and was previously smoking 2 packs per day.  Not currently taking any medications.   - Monitor.       Cannabis use D/O  - Counseled regarding cessation  - Chem dep consulted      Seizure D/O  Likely due to alcohol withdrawal.  Patient not prescribed antiepileptics per chart  review.       MDD with anxiety  PTSD  Has not taken any medications in the last month or two as she ran out.      -Resumed patient's PTA BuSpar, Lexapro, Effexor and Klonopin.    Insomnia  Hypersomnia with sleep apnea  Noted on chart review.  Does not appear to have been prescribed any medications.       RLS  Noted on chart review.  Does not appear to have been prescribed any medications.       Genital herpes  Noted on chart review.  Does not appear to have been prescribed any medications.        Osteoporosis  - Hold TPA Evista as patient has not been taking this medication.     Clinically Significant Risk Factors              # Hypoalbuminemia: Lowest albumin = 2.6 g/dL at 12/12/2023  7:08 AM, will monitor as appropriate             # Severe Malnutrition: based on nutrition assessment    # Financial/Environmental Concerns: unable to afford medication(s);other (see comments) (Spouse makes too much to qualify for food support etc. Needs to get SS because of the inability to work)           Diet: Adult Formula Drip Feeding: Continuous Osmolite 1.5; Nasojejunal; Goal Rate: 40; mL/hr; when TF ok to re-start, re-start @ 30 mL/hr. continue advancement to goal after 24 hours; Do not advance tube feeding rate unless K+ is = or > 3.0, Mg++ is = o...  Clear Liquid Diet  Snacks/Supplements Adult: Gelatein Plus; Between Meals     DVT Prophylaxis: Defer to primary service   Barney Catheter: Not present  Lines: None     Cardiac Monitoring: None  Code Status: Full Code      Disposition Plan       Expected Discharge Date: 12/20/2023      Destination: inpatient rehabilitation facility;nursing home  Discharge Comments: pending improved diet      Entered: Claudia Clements MD 12/18/2023, 4:37 PM       Disposition: Hospitalist team will continue to follow along while patient is admitted. Discharge timing per Surgery     Claudia Clements MD  Hospitalist Service   [facility]  Securely message with the Vocera Web  Console (learn more here)  Text page via [facility] Paging/Directory         Medical Decision Making       25 MINUTES SPENT BY ME on the date of service doing chart review, history, exam, documentation & further activities per the note.           Interval History     Mild fever of 100.5 overnight.  Of note the Zosyn completed yesterday.  Started on clear liquid diet today per surgery.  Patient is feeling well this morning.  Very encouraged by progress.  Monitor for ongoing fevers, will need cultures further workup if spiking significant temps.      -Data reviewed today: I reviewed all new labs and imaging results over the last 24 hours.   Physical Exam   Temp: 100.1  F (37.8  C) Temp src: Oral BP: 120/77 Pulse: 80   Resp: 17 SpO2: 99 % O2 Device: None (Room air)    Vitals:    12/15/23 1306 12/16/23 0448 12/17/23 0610   Weight: 55.3 kg (122 lb) 55.5 kg (122 lb 4.8 oz) 53.7 kg (118 lb 4.8 oz)     Vital Signs with Ranges  Temp:  [99.1  F (37.3  C)-100.1  F (37.8  C)] 100.1  F (37.8  C)  Pulse:  [79-84] 80  Resp:  [16-17] 17  BP: (120-132)/(77-86) 120/77  SpO2:  [96 %-99 %] 99 %  I/O last 3 completed shifts:  In: 765 [P.O.:360; NG/GT:225]  Out: 3525 [Urine:3475; Drains:50]      Constitutional: Awake, alert, cooperative, no apparent distress  Pulmonary: Clear breath sounds bilaterally, nonlabored breathing  Cardiovascular: Regular rate and rhythm, normal S1 and S2  GI: soft, multiple surgical incisions in place and covered with bandage, ARSENIO drain noted on left abdomen with bloody drainage, some tenderness with palpation, bowel sounds heard  Neuro: Moves all 4 extremities, no tremor noted   Psych:  Alert and oriented x 3. Normal affect.  Extremities: Trace ankle edema noted      Medications    dextrose        busPIRone  15 mg Per Feeding Tube TID    calcium carbonate  500 mg Per Feeding Tube TID    enoxaparin ANTICOAGULANT  40 mg Subcutaneous Q24H    escitalopram  30 mg Oral or Feeding Tube Daily    [Held by provider] ferrous  fumarate 65 mg ([place]. FE)-Vitamin C 125 mg  1 tablet Oral Daily    folic acid  1 mg Per Feeding Tube Daily    multivitamins w/minerals  15 mL Per Feeding Tube BID 09 12    pantoprazole  40 mg Intravenous Daily with breakfast    sodium chloride (PF)  3 mL Intracatheter Q8H    venlafaxine  37.5 mg Oral or Feeding Tube Daily    cholecalciferol  125 mcg Per Feeding Tube Daily       Data   Recent Labs   Lab 12/18/23  0826 12/18/23  0523 12/17/23  2216 12/17/23  1755 12/17/23  1439 12/17/23  0621 12/16/23  1958 12/16/23  0719 12/15/23  1842 12/15/23  0730 12/14/23  2121 12/14/23  0920 12/12/23  2035 12/12/23  0708   WBC  --   --   --   --   --   --   --  7.9  --  10.1  --  14.3*   < > 17.5*   HGB  --   --   --   --   --   --   --  8.6*  --  8.8*  --  10.6*   < > 9.5*   MCV  --   --   --   --   --   --   --  96  --  97  --  100   < > 98   PLT  --   --   --   --   --   --   --  290  --  333  --  403   < > 358     --   --  137  --  139   < > 143   < > 146*  --  146*   < > 141   POTASSIUM 4.5  --   --  4.1  --  4.2   < > 3.9   < > 2.7*  --  3.9   < > 2.9*   CHLORIDE 103  --   --  103  --  105   < > 112*   < > 115*  --  113*   < > 110*   CO2 26  --   --  27  --  27   < > 25   < > 24  --  21*   < > 24   BUN 4.7*  --   --  4.5*  --  4.1*   < > 4.9*   < > 12.7  --  12.8   < > 10.1   CR 0.55  --   --  0.59  --  0.62   < > 0.63   < > 0.74  --  0.75   < > 0.73   ANIONGAP 8  --   --  7  --  7   < > 6*   < > 7  --  12   < > 7   SHON 8.1*  --   --  7.9*  --  7.9*   < > 7.4*   < > 7.8*  --  8.2*   < > 7.8*   GLC 87 97 93 109*   < > 106*   < > 103*   < > 109*   < > 128*   < > 120*   ALBUMIN  --   --   --   --   --   --   --   --   --   --   --   --   --  2.6*   PROTTOTAL  --   --   --   --   --   --   --   --   --   --   --   --   --  5.3*   BILITOTAL  --   --   --   --   --   --   --   --   --   --   --   --   --  0.7   ALKPHOS  --   --   --   --   --   --   --   --   --   --   --   --   --  97   ALT  --   --   --   --   --   --    --   --   --   --   --   --   --  5   AST  --   --   --   --   --   --   --   --   --   --   --   --   --  21    < > = values in this interval not displayed.       Recent Results (from the past 24 hour(s))   XR Upper GI Water Soluble    Narrative    UPPER GI December 18, 2023 8:14 AM     HISTORY: Status post perforated marginal ulcer, history of gastric  bypass.    COMPARISON: None.    FLUOROSCOPY TIME: 0.9 minutes.    SPOT FILMS: Three.    TECHNIQUE: Single contrast upper GI.      Impression    IMPRESSION: Unremarkable stomach, gastric pouch, and proximal small  bowel without leak.    DAWN FREEMAN MD         SYSTEM ID:  [id number]

## 2023-12-18 NOTE — PLAN OF CARE
Goal Outcome Evaluation:      Plan of Care Reviewed With: patient    Overall Patient Progress: no changeOverall Patient Progress: no change    2785-4250 A&OX4, VSS, pain (LUQ) controlled with IV dilaudid.  NG feeding tube in place with bridle. Tolerating feeds and clear liquids without nausea.  Abd lap sites with steristrips CD&I.  ARSENIO site leaking large amounts of serosang drainage, changed times 1 at approximately 1700.  Voiding well.  Up IND.

## 2023-12-18 NOTE — PROGRESS NOTES
CLINICAL NUTRITION SERVICES - REASSESSMENT NOTE      Recommendations Ordered by Registered Dietitian (RD):   Ordered BID cherry Gel+  Continue w/ TF as ordered  Decreased FWF to patency w/ PO intake      Malnutrition:   % Weight Loss:  Weight loss does not meet criteria for malnutrition (12/12)  % Intake:  </= 50% for >/= 1 month (severe malnutrition) (12/12)-- improving w/ TF  Subcutaneous Fat Loss:  global severe (12/12)  Muscle Loss:  global severe (12/12)  Fluid Retention:  Does not meet criteria-- trace bilateral ankle, feet edema    Malnutrition Diagnosis: Severe malnutrition in the context of --  Acute illness or injury  Chronic illness or disease         EVALUATION OF PROGRESS TOWARD GOALS   Diet: Clear Liquid Diet      Intake/Tolerance:  Advanced to CLD today   Pt ordered first meal today-- lunch of broth, jello, lemon ice, tea, flavored water  Pt would like to try cherry Gel+ to promote PO intake    Nutrition Support: Enteral  Type of Feeding Tube: Nasojejunal   Enteral Frequency: Continuous   Enteral Regimen: Osmolite 1.5 @ 40 mL/hr   Total Enteral Provisions: 960 mL, 1440 kcal (27 kcal/kg), 60 g protein (1.11 g/kg; 94% min needs), 731 mL free water, 195 g CHO, and 0 g fiber daily   Free Water Flush: 120 mL q 4 hours (1451 mL)     Per chart review, pt is tolerating TF  Slow TF advancement, to reach goal rate this afternoon.  Visited w/ pt this afternoon-- TF to be increased to 40 mL/hr. Surgeon also present in room. Pt may be able to have FT pulled in a few days. Until then, pt happy to receive the extra nutrition. She reported to be tolerating it well. Had no concerns for RD.       ASSESSED NUTRITION NEEDS:  Dosing Weight: 53.7 kg (12/15)  Estimated Energy Needs: 4554-6227+ kcals (25-30+ Kcal/Kg)  Justification: maintenance   Estimated Protein Needs: 64-81+ grams protein (1.2-1.5+ g pro/Kg)  Justification: repletion  Estimated Fluid Needs: 1 mL/kcal   Justification: maintenance         NEW FINDINGS:    Weight: wt appears stable since 12/15  Date/Time Weight Weight Method   12/17/23 0610 53.7 kg (118 lb 4.8 oz) Standing scale   12/16/23 0448 55.5 kg (122 lb 4.8 oz) Standing scale   12/15/23 1306 55.3 kg (122 lb) Standing scale   12/15/23 0658 53.7 kg (118 lb 6.2 oz) Bed scale   12/11/23 1546 45.4 kg (100 lb)-- suspect inaccurate, self-reported      I/O: Net IO Since Admission: -965.33 mL [12/18/23 1108].  2x BM yesterday, 2x on 12/16, 2x on 12/15  15 mL drain output so far today, 20 mL on 12/17, 30 mL on 12/16, 70 mL on 12/15    Labs: reviewed   Component Value Date   BUN 4.7 (L) 12/18/2023     Lab 12/18/23  0826 12/18/23  0523 12/17/23  2216 12/17/23  1755 12/17/23  1439 12/17/23  0621   GLC 87 97 93 109* 104* 106*      Medications: reviewed    calcium carbonate  500 mg Per Feeding Tube TID    escitalopram  30 mg Oral or Feeding Tube Daily    [Held by provider] ferrous fumarate 65 mg ([place]. FE)-Vitamin C 125 mg  1 tablet Oral Daily    folic acid  1 mg Per Feeding Tube Daily    multivitamins w/minerals  15 mL Per Feeding Tube BID 09 12    pantoprazole  40 mg Intravenous Daily with breakfast    venlafaxine  37.5 mg Oral or Feeding Tube Daily    cholecalciferol  125 mcg Per Feeding Tube Daily           Previous Goals:   TF to advance to goal rate w/in 3-4 days  Evaluation: Met-- will reach goal rate today  At goal, TF to provide % of estimated nutrition needs  Evaluation: Met-- will reach goal rate today    Previous Nutrition Diagnosis:   Altered GI function related to gastric perforation as evidenced by need for prolonged NPO status per surgery and TF to meet estimated nutrition needs   Evaluation: Improving        MALNUTRITION  % Weight Loss:  Weight loss does not meet criteria for malnutrition (12/12)  % Intake:  </= 50% for >/= 1 month (severe malnutrition) (12/12)-- improving w/ TF  Subcutaneous Fat Loss:  global severe (12/12)  Muscle Loss:  global severe (12/12)  Fluid Retention:  Does not meet  criteria-- trace bilateral ankle, feet edema    Malnutrition Diagnosis: Severe malnutrition in the context of --  Acute illness or injury  Chronic illness or disease           CURRENT NUTRITION DIAGNOSIS  Inadequate oral intake related to slow diet advancement as evidenced by NPO x7 days, CLD today, continued need for TF at this time        INTERVENTIONS  Recommendations / Nutrition Prescription  Ordered BID cherry Gel+  Continue w/ TF as ordered  Decreased FWF to patency w/ PO intake       Implementation  Collaboration with other providers  Feeding tube flush  Medical food supplement therapy    Goals  TF + PO to meet % of estimated nutrition needs   Diet adv past CLD w/in 48 hours         MONITORING AND EVALUATION:  Progress towards goals will be monitored and evaluated per protocol and Practice Guidelines      Maggi Bhat RD, LD  Pager: [phone]

## 2023-12-18 NOTE — PROGRESS NOTES
"Acute Care Surgery Progress Note    Subjective: continue to tolerating TF. Having bowel functions. No nausea or emesis.     Objective: /86 (BP Location: Right arm, Cuff Size: Adult Small)   Pulse 79   Temp 99.1  F (37.3  C) (Oral)   Resp 16   Ht 1.626 m (5' 4\")   Wt 53.7 kg (118 lb 4.8 oz)   LMP  (LMP Unknown)   SpO2 96%   BMI 20.31 kg/m    Gen: not in acute distress  CV: normal sinus  Pulm: nonlabored breathing on room air  Abd: soft, appropriately tender, non-distended. ARSENIO drain with serous   Ext: moving all extremities, strength grossly normal    Drain output 20(15)    Assessment/Plan:   Pavithra Raines is a 68 year old female with a history of sudha-en-y gastric bypass, alcohol use disorder, coagulopathy secondary to alcohol dependence, COPD, and multiple other medical issues who presented with perforated marginal ulcer s/p diagnostic laparoscopy with omental patch repair on 12/11 and s/p diagnostic laparoscopy and aborted G-tube placement on 12/13. UGI without leak on 12/18.     POD7 from omental patch repair and POD5 from aborted G-tube placement    --start clear liquid today  --TF per [place] feeding tube, currently at 30 ml/hr  --RD assisting with TF  --high risk for refeeding syndrome, daily BMP, Mg and Thomas lab  --daily Protonix  --daily Lovenox for DVT ppx  --appreciate Hospitalist assistance in medical management    --anticipate 1-2 more days of inpatient, pending diet advance     Sangeetha Taylor MD   PGY4 General Surgery Resident  [facility]                "

## 2023-12-18 NOTE — PLAN OF CARE
POD 6 a from patching of marginal ulcer and 4 from a Ex. Lap w/ repeat Gastrorrhaphy. A&Ox4. CMS intact. Bowel sounds audible, + flatus, tolerating NPO diet, on tube feeding, to be paused at midnght. VSS on RA, ex temp 100.5. Dressing CDI to lap sites.ARSENIO drain in place, dressing changed. Up independent. C/o moderate  pain, decreased with dilaudid. Pt scoring green on the Aggression Stop Light Tool. Plan: Upper GI 12/18. Continue to monitor. Discharge pending improvement.

## 2023-12-19 LAB
ANION GAP SERPL CALCULATED.3IONS-SCNC: 5 MMOL/L (ref 7–15)
BUN SERPL-MCNC: 6.5 MG/DL (ref 8–23)
CALCIUM SERPL-MCNC: 7.6 MG/DL (ref 8.8–10.2)
CHLORIDE SERPL-SCNC: 104 MMOL/L (ref 98–107)
CREAT SERPL-MCNC: 0.59 MG/DL (ref 0.51–0.95)
DEPRECATED HCO3 PLAS-SCNC: 27 MMOL/L (ref 22–29)
EGFRCR SERPLBLD CKD-EPI 2021: >90 ML/MIN/1.73M2
ERYTHROCYTE [DISTWIDTH] IN BLOOD BY AUTOMATED COUNT: 14.6 % (ref 10–15)
GLUCOSE BLDC GLUCOMTR-MCNC: 109 MG/DL (ref 70–99)
GLUCOSE BLDC GLUCOMTR-MCNC: 115 MG/DL (ref 70–99)
GLUCOSE BLDC GLUCOMTR-MCNC: 122 MG/DL (ref 70–99)
GLUCOSE SERPL-MCNC: 101 MG/DL (ref 70–99)
HCT VFR BLD AUTO: 29.2 % (ref 35–47)
HGB BLD-MCNC: 9.4 G/DL (ref 11.7–15.7)
MAGNESIUM SERPL-MCNC: 1.8 MG/DL (ref 1.7–2.3)
MCH RBC QN AUTO: 30.6 PG (ref 26.5–33)
MCHC RBC AUTO-ENTMCNC: 32.2 G/DL (ref 31.5–36.5)
MCV RBC AUTO: 95 FL (ref 78–100)
PHOSPHATE SERPL-MCNC: 3.3 MG/DL (ref 2.5–4.5)
PLATELET # BLD AUTO: 464 10E3/UL (ref 150–450)
POTASSIUM SERPL-SCNC: 4.3 MMOL/L (ref 3.4–5.3)
RBC # BLD AUTO: 3.07 10E6/UL (ref 3.8–5.2)
SODIUM SERPL-SCNC: 136 MMOL/L (ref 135–145)
WBC # BLD AUTO: 10.3 10E3/UL (ref 4–11)

## 2023-12-19 PROCEDURE — 36415 COLL VENOUS BLD VENIPUNCTURE: CPT | Performed by: STUDENT IN AN ORGANIZED HEALTH CARE EDUCATION/TRAINING PROGRAM

## 2023-12-19 PROCEDURE — 82310 ASSAY OF CALCIUM: CPT | Performed by: STUDENT IN AN ORGANIZED HEALTH CARE EDUCATION/TRAINING PROGRAM

## 2023-12-19 PROCEDURE — 83735 ASSAY OF MAGNESIUM: CPT | Performed by: STUDENT IN AN ORGANIZED HEALTH CARE EDUCATION/TRAINING PROGRAM

## 2023-12-19 PROCEDURE — 99231 SBSQ HOSP IP/OBS SF/LOW 25: CPT | Performed by: HOSPITALIST

## 2023-12-19 PROCEDURE — 250N000013 HC RX MED GY IP 250 OP 250 PS 637: Performed by: HOSPITALIST

## 2023-12-19 PROCEDURE — 84100 ASSAY OF PHOSPHORUS: CPT | Performed by: STUDENT IN AN ORGANIZED HEALTH CARE EDUCATION/TRAINING PROGRAM

## 2023-12-19 PROCEDURE — 85027 COMPLETE CBC AUTOMATED: CPT | Performed by: HOSPITALIST

## 2023-12-19 PROCEDURE — 250N000011 HC RX IP 250 OP 636: Mod: JZ | Performed by: SURGERY

## 2023-12-19 PROCEDURE — 250N000013 HC RX MED GY IP 250 OP 250 PS 637: Performed by: STUDENT IN AN ORGANIZED HEALTH CARE EDUCATION/TRAINING PROGRAM

## 2023-12-19 PROCEDURE — 250N000013 HC RX MED GY IP 250 OP 250 PS 637: Performed by: SURGERY

## 2023-12-19 PROCEDURE — 250N000011 HC RX IP 250 OP 636: Performed by: STUDENT IN AN ORGANIZED HEALTH CARE EDUCATION/TRAINING PROGRAM

## 2023-12-19 PROCEDURE — 250N000013 HC RX MED GY IP 250 OP 250 PS 637: Performed by: PHYSICIAN ASSISTANT

## 2023-12-19 PROCEDURE — C9113 INJ PANTOPRAZOLE SODIUM, VIA: HCPCS | Performed by: STUDENT IN AN ORGANIZED HEALTH CARE EDUCATION/TRAINING PROGRAM

## 2023-12-19 PROCEDURE — 120N000001 HC R&B MED SURG/OB

## 2023-12-19 RX ORDER — POLYETHYLENE GLYCOL 3350 17 G/17G
17 POWDER, FOR SOLUTION ORAL DAILY PRN
Status: DISCONTINUED | OUTPATIENT
Start: 2023-12-19 | End: 2023-12-21 | Stop reason: HOSPADM

## 2023-12-19 RX ORDER — SENNOSIDES 8.6 MG
8.6 TABLET ORAL 2 TIMES DAILY
Status: DISCONTINUED | OUTPATIENT
Start: 2023-12-19 | End: 2023-12-21 | Stop reason: HOSPADM

## 2023-12-19 RX ORDER — CYCLOBENZAPRINE HCL 10 MG
10 TABLET ORAL 3 TIMES DAILY
Status: DISCONTINUED | OUTPATIENT
Start: 2023-12-19 | End: 2023-12-21 | Stop reason: HOSPADM

## 2023-12-19 RX ORDER — OXYCODONE HYDROCHLORIDE 5 MG/1
5-10 TABLET ORAL EVERY 4 HOURS PRN
Status: DISCONTINUED | OUTPATIENT
Start: 2023-12-19 | End: 2023-12-20

## 2023-12-19 RX ORDER — PANTOPRAZOLE SODIUM 40 MG/1
40 TABLET, DELAYED RELEASE ORAL
Status: DISCONTINUED | OUTPATIENT
Start: 2023-12-20 | End: 2023-12-19

## 2023-12-19 RX ADMIN — CYCLOBENZAPRINE 10 MG: 10 TABLET, FILM COATED ORAL at 22:14

## 2023-12-19 RX ADMIN — HYDROMORPHONE HYDROCHLORIDE 0.2 MG: 0.2 INJECTION, SOLUTION INTRAMUSCULAR; INTRAVENOUS; SUBCUTANEOUS at 08:57

## 2023-12-19 RX ADMIN — SENNOSIDES 8.6 MG: 8.6 TABLET, FILM COATED ORAL at 11:00

## 2023-12-19 RX ADMIN — VENLAFAXINE 37.5 MG: 37.5 TABLET ORAL at 08:58

## 2023-12-19 RX ADMIN — CYCLOBENZAPRINE 10 MG: 10 TABLET, FILM COATED ORAL at 15:47

## 2023-12-19 RX ADMIN — Medication 125 MCG: at 08:57

## 2023-12-19 RX ADMIN — ENOXAPARIN SODIUM 40 MG: 40 INJECTION SUBCUTANEOUS at 15:47

## 2023-12-19 RX ADMIN — HYDROMORPHONE HYDROCHLORIDE 0.2 MG: 0.2 INJECTION, SOLUTION INTRAMUSCULAR; INTRAVENOUS; SUBCUTANEOUS at 04:35

## 2023-12-19 RX ADMIN — CALCIUM CARBONATE (ANTACID) CHEW TAB 500 MG 500 MG: 500 CHEW TAB at 15:47

## 2023-12-19 RX ADMIN — CYCLOBENZAPRINE 10 MG: 10 TABLET, FILM COATED ORAL at 11:00

## 2023-12-19 RX ADMIN — FOLIC ACID 1 MG: 1 TABLET ORAL at 14:39

## 2023-12-19 RX ADMIN — BUSPIRONE HYDROCHLORIDE 15 MG: 15 TABLET ORAL at 08:57

## 2023-12-19 RX ADMIN — OXYCODONE HYDROCHLORIDE 5 MG: 5 TABLET ORAL at 13:14

## 2023-12-19 RX ADMIN — BUSPIRONE HYDROCHLORIDE 15 MG: 15 TABLET ORAL at 22:14

## 2023-12-19 RX ADMIN — HYDROMORPHONE HYDROCHLORIDE 0.2 MG: 0.2 INJECTION, SOLUTION INTRAMUSCULAR; INTRAVENOUS; SUBCUTANEOUS at 06:44

## 2023-12-19 RX ADMIN — Medication 15 ML: at 11:00

## 2023-12-19 RX ADMIN — CLONAZEPAM 0.5 MG: 0.5 TABLET ORAL at 09:05

## 2023-12-19 RX ADMIN — Medication 15 ML: at 08:58

## 2023-12-19 RX ADMIN — ESCITALOPRAM 30 MG: 5 SOLUTION ORAL at 08:57

## 2023-12-19 RX ADMIN — PANTOPRAZOLE SODIUM 40 MG: 40 INJECTION, POWDER, FOR SOLUTION INTRAVENOUS at 08:57

## 2023-12-19 RX ADMIN — OXYCODONE HYDROCHLORIDE 5 MG: 5 TABLET ORAL at 20:00

## 2023-12-19 RX ADMIN — CALCIUM CARBONATE (ANTACID) CHEW TAB 500 MG 500 MG: 500 CHEW TAB at 22:13

## 2023-12-19 RX ADMIN — CALCIUM CARBONATE (ANTACID) CHEW TAB 500 MG 500 MG: 500 CHEW TAB at 09:05

## 2023-12-19 RX ADMIN — SENNOSIDES 8.6 MG: 8.6 TABLET, FILM COATED ORAL at 20:00

## 2023-12-19 RX ADMIN — BUSPIRONE HYDROCHLORIDE 15 MG: 15 TABLET ORAL at 15:47

## 2023-12-19 ASSESSMENT — ACTIVITIES OF DAILY LIVING (ADL)
ADLS_ACUITY_SCORE: 23
ADLS_ACUITY_SCORE: 22
ADLS_ACUITY_SCORE: 22
ADLS_ACUITY_SCORE: 26
ADLS_ACUITY_SCORE: 26
ADLS_ACUITY_SCORE: 22
ADLS_ACUITY_SCORE: 23
ADLS_ACUITY_SCORE: 26
ADLS_ACUITY_SCORE: 22
ADLS_ACUITY_SCORE: 26
ADLS_ACUITY_SCORE: 23
ADLS_ACUITY_SCORE: 22

## 2023-12-19 NOTE — PROGRESS NOTES
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 UGI without leak on 12/18.      POD8 from omental patch repair and POD6 from aborted G-tube placement    PLAN:  - Closely monitor vitals given complicated course. Leukocytosis resolved.  - Will continue to go slow with diet advancement and likely advance to full liquid diet this evening.   - Hopeful to remove NJ as intake increases, appreciate dietitian management of tube feeds at this time.  - Continue protonix for GI prophylaxis.  - Transition to oral analgesia as able. Flexeril TID and oxycodone prn. Hold on scheduled tylenol given coagulapathy secondary to alcohol dependence.  - Continue ARSENIO drain. Monitor output.  - Senokot BID and miralax daily prn.  - Completed 6 days of IV zosyn on 12/17.   - Ambulate QID to assist with bowel function, PCDs for DVT prophylaxis.   - Acute blood loss anemia secondary to surgical losses. Hgb now stable and on lovenox for DVT chemoprophylaxis.  - Encourage deep breathe and IS.   - Appreciate medical management per hospitalist team.    DISPOSITION:  - Discharge pending diet advancement and off of IV narcotics. Ultimately, goal would be weaned off NJ tube feeds but patient would need to demonstrate adequate nutrition to dietitian. Expect discharge on full liquid vs pureed diet later this week.        Interval History:   Pavithra Raines is seen on surgical rounds. She states clears are going down well, no nausea or increased bloating. Remains on 40 ml/hr tube feeds. She is having BM's, soft but no  longer liquid. Moving around some but still needing dilaudid around the clock for abdominal pain, although this continues to improve. Tmax 100.1 yesterday, no tachycardia.          Physical Exam:   Temp: 99.3  F (37.4  C) Temp src: Oral BP: 112/74 Pulse: 71   Resp: 16 SpO2: 98 % O2 Device: None (Room air)      I/O last 3 completed shifts:  In: 890 [P.O.:480; NG/GT:230]  Out: 3195 [Urine:3150; Drains:45]    GENERAL: VS reviewed, alert, oriented, no acute distress  LUNGS: Normal respiratory effort, no wheezing  ABDOMEN:  Soft, appropriately tender, non-distended, ARSENIO drain with serous output (45 ml/24 hours)  INCISION: Steris in place. Incisions are clean, dry, and intact. No surrounding erythema.  EXTREMITIES: Moving all extremities, no gross deformities  NEUROLOGICAL: Grossly non-focal, mood & affect appropriate    Data   Recent Labs   Lab 12/19/23  0744 12/19/23  0612 12/19/23  0148 12/18/23  2217 12/18/23  0826 12/17/23  2216 12/17/23  1755 12/16/23  1958 12/16/23  0719 12/15/23  1842 12/15/23  0730   WBC 10.3  --   --   --   --   --   --   --  7.9  --  10.1   HGB 9.4*  --   --   --   --   --   --   --  8.6*  --  8.8*   MCV 95  --   --   --   --   --   --   --  96  --  97   *  --   --   --   --   --   --   --  290  --  333     --   --   --  137  --  137   < > 143   < > 146*   POTASSIUM 4.3  --   --   --  4.5  --  4.1   < > 3.9   < > 2.7*   CHLORIDE 104  --   --   --  103  --  103   < > 112*   < > 115*   CO2 27  --   --   --  26  --  27   < > 25   < > 24   BUN 6.5*  --   --   --  4.7*  --  4.5*   < > 4.9*   < > 12.7   CR 0.59  --   --   --  0.55  --  0.59   < > 0.63   < > 0.74   ANIONGAP 5*  --   --   --  8  --  7   < > 6*   < > 7   SHON 7.6*  --   --   --  8.1*  --  7.9*   < > 7.4*   < > 7.8*   * 122* 115*   < > 87   < > 109*   < > 103*   < > 109*    < > = values in this interval not displayed.       Julianne Luis PA-C    Please use [employer] to page 7:30am-4pm, page on-call surgeon after  "4pm  Office number: [phone]    I saw and examined the patient independently of Julianne Luis PA-C.    I reviewed all available labs, images, vitals.    Exam:  /74   Pulse 71   Temp 99.3  F (37.4  C) (Oral)   Resp 16   Ht 1.626 m (5' 4\")   Wt 49.8 kg (109 lb 12.8 oz)   LMP  (LMP Unknown)   SpO2 98%   BMI 18.85 kg/m    Abd: Soft, non tender, drain output is clear.    Assessment and Plan: Pavithra Raines is a 68 year old female with a marginal ulcer  1.  Advance diet to a pureed diet which is what I want her to go home on.   If we can get her onto that and dietary is happy with the amount she is taking, we can remove the NJ feeding tube, possibly tomorrow.  2.  She will need long term follow up and needs long term bariatric surgery follow up.  I do bariatrics at [facility] and can offer to have her see me there.  Her surgeon who did her surgery, Tito Pop,  is no longer practicing close to where she lives.      Antoni Gonzalez M.D., F.A.C.SFreeman [facility]  [facility]  [facility] Romero    "
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
## 2023-12-19 NOTE — PROGRESS NOTES
Care Management Follow Up    Length of Stay (days): 8    Expected Discharge Date: 12/20/2023     Concerns to be Addressed: financial/insurance, mental health, medication   (interested in dual diagnosis CD/MI inpatient)  Patient plan of care discussed at interdisciplinary rounds: Yes    Anticipated Discharge Disposition:       Anticipated Discharge Services:  to be determined  Anticipated Discharge DME:      Patient/family educated on Medicare website which has current facility and service quality ratings:    Education Provided on the Discharge Plan:    Patient/Family in Agreement with the Plan:      Referrals Placed by CM/SW:    Private pay costs discussed: Not applicable    Additional Information:  Upon chart review home care services noted. Writer called [facility] care and confirmed that patient is discharged from their services due to non compliance, alcohol intoxication and safety concerns.     Radha Cesar RN   [facility]   Phone [phone] or [phone]

## 2023-12-19 NOTE — PLAN OF CARE
Goal Outcome Evaluation:      Plan of Care Reviewed With: patient    Date & Time: 12/19 1550  Surgery/POD#: 8  Behavior & Aggression: calm  Fall Risk: no  Orientation:alert and oriented   ABNL VS/O2:  ABNL Labs:   Pain Management:oral pain medications started with good pain control   Bowel/Bladder: continent, good urine output, + BM this shift   Drains: ARSENIO drain, dressing change this shift, leaking noted around drain   Diet:advanced to pureed diet   Activity Level: up in room independently  Tests/Procedures: need to see dietician for educations on diet for home,  Anticipated  DC Date:   Significant Information:

## 2023-12-19 NOTE — PROGRESS NOTES
[facility]  Hospitalist Progress Note    Assessment & Plan   Pavithra Raines is a 68 year old female admitted on 12/11/2023.     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       Patient presented to [facility] emergency department due to abdominal pain and chest pain.  Patient reported that she began to feel unwell the morning of 12/10/2023 when she started to develop watery diarrhea.  Patient was unable to consume any alcohol throughout the day of 12/10 because she continued to feel unwell and developed nausea symptoms as well as some vomiting.  Around 3 PM and 12/10 she developed severe abdominal pain.  This pain became so intolerable that she called 911 and was brought into the ED.  Patient endorsed concerns of going into alcohol withdrawal and was noted to be tremulous in the ED.     Workup in the ED included a CMP that revealed a CO2 of 18, calcium of 8.6, magnesium of 1.4, albumin of 3.1, alk phos of 175, total bili of 1.6, glucose of 115 otherwise within normal limits.  Lactic acid level was within normal limits at 1.8.  Lipase level was mildly elevated at 65.  Inflammatory CRP was elevated at 8.32.  CBC with differential revealed a hemoglobin of 11.6 and platelet count of 460 otherwise within normal limits.  UA revealed trace leukocyte Estrace with 3 RBCs and 2 squamous epithelial cells with mucus present and hyaline casts of 3.  Abdomen/pelvis CT with contrast revealed free intraperitoneal gas and fluid consistent with  perforated viscus (the site of perforation is not certain though may be from the stomach/gastric bypass).     The ED was in contact with General Surgery (Dr. Guaman) who felt patient's case was too complicated and recommended transfer to a tertiary care center.  Attempts to transfer to the [facility] were performed but due to bed availability patient was ultimately transferred to [facility].  Discussions occurred between the ED (Dr. Brooks), General Surgery (Dr. Gonzalez) and Hospitalist (Dr. Monaco) at [facility] and patient was admitted under General Surgery with verbal request for Hospitalist consult upon arrival on 12/11/23. Patient was taken to the OR on 12/11/23 where they found a perforated marginal ulcer which they fixed with an omental patch repair.      Hospitalist team consulting. General surgery is the primary team.     Perforated Marginal Ulcer s/p Omental Patch Repair 12/11/23   Elevated CRP  Abdominal pain    - General surgery is managing.                -Defer analgesic/pain management, DVT prophylaxis, PT/OT.       - Unable to get G tube placed on 12/13/23. Patient had NG tube placed on 12/14  - Tube feeds per surgery.  Upper GI series with no leak on 12/18.  Started on clear liquid diet per surgery.  Advance to FLD on 12/19.     - Hgb: 11.6 > 9.5 > 9 > 10.6     - Continue to monitor     - Encourage utilization of incentive spirometer.     - Continue IV Zosyn  per Surgery, and on 12/17.  Patient had low-grade temp evening of 12/17, 12/18.  Continue to monitor.  If recurrent fevers will need further workup.  Normal WBC on 12/19.  Continue to monitor.    - Surgery discontinued IV Fluconazole   -Pain management per surgery team.    Leukocytosis: Resolved  Likely related to recent surgery    - Continue to monitor     Hypernatremia, resolved  Likely related to NPO status. Hopefully with the start of tube feeds and free water flushes this will improve.    - Continue to monitor.  IVF  discontinued.    Alcohol withdrawal, resolved  Alcohol use D/O with history of withdrawal with seizures  Patient states that she drink a box of wine over 2-3 days and roughly drinks 10 -15 glasses of wine a day. On 12/13 was notified by nursing that they suspect patient is drinking the mouthwash. Advised nursing to use mouthwash with supervision only and not to leave in room.     - CIWA ordered with PRN Ativan ordered.   - Has not needed PRNs.  Ativan discontinued.  Resumed patient's PTA clonazepam.    - Stopped banana bag     - Start PO Vitamins and calcium carbonate per Nutrition via feeding tube    - Chem dep saw patient and have provided resources at discharge      Hypomagnesemia  Hypokalemia  Hypophosphatemia  ?  Mild refeeding syndrome   -Replace electrolytes per protocol.     Severe Malnutrition  - Tube feeds per nutrition and surgery  - Start PO Vitamins and calcium carbonate per Nutrition via feeding tube    Chronic Medical Problems:     Heart murmur  Noted slight heart murmur on examination.  Patient denied every being told about a murmur in the past. Previous ECHO completed 10/2023 and without aortic stenosis.    - Monitor     Coagulopathy thought to be secondary to Alcohol dependence  INR and PTT checked after assessing the patient and within normal limits.    - Monitor PRN.       Fatty liver disease  - Follow up with PCP as an outpatient.       GERD  Has not taken any medications in the last month as she ran out.    - Continue IV Protonix 40 mg/d for now      Chronic anemia (B12 and Iron)  Acute anemia secondary to blood loss  - Start PO Vitamins and calcium carbonate per Nutrition via feeding tube.  Monitor hemoglobin.  Has been in the 8/10 range postoperatively.     Emphysema/COPD   Recently quit smoking in the last 2 months and was previously smoking 2 packs per day.  Not currently taking any medications.   - Monitor.       Cannabis use D/O  - Counseled regarding cessation  - Chem dep consulted       Seizure D/O  Likely due to alcohol withdrawal.  Patient not prescribed antiepileptics per chart review.       MDD with anxiety  PTSD  Has not taken any medications in the last month or two as she ran out.      -Resumed patient's PTA BuSpar, Lexapro, Effexor and Klonopin.    Insomnia  Hypersomnia with sleep apnea  Noted on chart review.  Does not appear to have been prescribed any medications.       RLS  Noted on chart review.  Does not appear to have been prescribed any medications.       Genital herpes  Noted on chart review.  Does not appear to have been prescribed any medications.        Osteoporosis  - Hold TPA Evista as patient has not been taking this medication.     Clinically Significant Risk Factors              # Hypoalbuminemia: Lowest albumin = 2.6 g/dL at 12/12/2023  7:08 AM, will monitor as appropriate             # Severe Malnutrition: based on nutrition assessment    # Financial/Environmental Concerns: unable to afford medication(s);other (see comments) (Spouse makes too much to qualify for food support etc. Needs to get SS because of the inability to work)           Diet: Adult Formula Drip Feeding: Continuous Osmolite 1.5; Nasojejunal; Goal Rate: 40; mL/hr; when TF ok to re-start, re-start @ 30 mL/hr. continue advancement to goal after 24 hours; Do not advance tube feeding rate unless K+ is = or > 3.0, Mg++ is = o...  Snacks/Supplements Adult: Gelatein Plus; Between Meals  Full Liquid Diet  Combination Diet Regular Diet; Pureed Diet (level 4); Thin Liquids (level 0)     DVT Prophylaxis: Defer to primary service   Barney Catheter: Not present  Lines: None     Cardiac Monitoring: None  Code Status: Full Code      Disposition Plan       Expected Discharge Date: 12/20/2023      Destination: inpatient rehabilitation facility;nursing home  Discharge Comments: pending improved diet      Entered: Claudia Clements MD 12/19/2023, 1:18 PM       Disposition: Hospitalist team will continue to follow along while  patient is admitted. Discharge timing per Surgery     Claudia Clements MD  Hospitalist Service   [facility]  Securely message with the [employer] Web Console (learn more here)  Text page via [employer] Paging/Directory         Medical Decision Making       25 MINUTES SPENT BY ME on the date of service doing chart review, history, exam, documentation & further activities per the note.           Interval History     Afebrile this morning.  No fever overnight.  Patient is doing well, tolerating clear liquid diet, anticipating advancing to FLD today.  Is having soft BMs.      -Data reviewed today: I reviewed all new labs and imaging results over the last 24 hours.   Physical Exam   Temp: 99.3  F (37.4  C) Temp src: Oral BP: 112/74 Pulse: 71   Resp: 16 SpO2: 98 % O2 Device: None (Room air)    Vitals:    12/16/23 0448 12/17/23 0610 12/19/23 0619   Weight: 55.5 kg (122 lb 4.8 oz) 53.7 kg (118 lb 4.8 oz) 49.8 kg (109 lb 12.8 oz)     Vital Signs with Ranges  Temp:  [99  F (37.2  C)-100.1  F (37.8  C)] 99.3  F (37.4  C)  Pulse:  [71-81] 71  Resp:  [16-17] 16  BP: (112-122)/(74-77) 112/74  SpO2:  [98 %-99 %] 98 %  I/O last 3 completed shifts:  In: 890 [P.O.:480; NG/GT:230]  Out: 3195 [Urine:3150; Drains:45]      Constitutional: Awake, alert, cooperative, no apparent distress  Pulmonary: Clear breath sounds bilaterally, nonlabored breathing  Cardiovascular: Regular rate and rhythm, normal S1 and S2  GI: soft, multiple surgical incisions in place and covered with bandage, ARSENIO drain noted on left abdomen   Neuro: Moves all 4 extremities, no tremor noted   Psych:  Alert and oriented x 3. Normal affect.  Extremities: Trace ankle edema noted      Medications    dextrose        busPIRone  15 mg Per Feeding Tube TID    calcium carbonate  500 mg Per Feeding Tube TID    cyclobenzaprine  10 mg Oral TID    enoxaparin ANTICOAGULANT  40 mg Subcutaneous Q24H    escitalopram  30 mg Oral or Feeding Tube Daily    [Held by  provider] ferrous fumarate 65 mg ([place]. FE)-Vitamin C 125 mg  1 tablet Oral Daily    folic acid  1 mg Per Feeding Tube Daily    multivitamins w/minerals  15 mL Per Feeding Tube BID 09 12    [START ON 12/20/2023] pantoprazole  40 mg Per Feeding Tube QAM AC    sennosides  8.6 mg Oral BID    sodium chloride (PF)  3 mL Intracatheter Q8H    venlafaxine  37.5 mg Oral or Feeding Tube Daily    cholecalciferol  125 mcg Per Feeding Tube Daily       Data   Recent Labs   Lab 12/19/23  0744 12/19/23  0612 12/19/23  0148 12/18/23  2217 12/18/23  0826 12/17/23  2216 12/17/23  1755 12/16/23  1958 12/16/23  0719 12/15/23  1842 12/15/23  0730   WBC 10.3  --   --   --   --   --   --   --  7.9  --  10.1   HGB 9.4*  --   --   --   --   --   --   --  8.6*  --  8.8*   MCV 95  --   --   --   --   --   --   --  96  --  97   *  --   --   --   --   --   --   --  290  --  333     --   --   --  137  --  137   < > 143   < > 146*   POTASSIUM 4.3  --   --   --  4.5  --  4.1   < > 3.9   < > 2.7*   CHLORIDE 104  --   --   --  103  --  103   < > 112*   < > 115*   CO2 27  --   --   --  26  --  27   < > 25   < > 24   BUN 6.5*  --   --   --  4.7*  --  4.5*   < > 4.9*   < > 12.7   CR 0.59  --   --   --  0.55  --  0.59   < > 0.63   < > 0.74   ANIONGAP 5*  --   --   --  8  --  7   < > 6*   < > 7   SHON 7.6*  --   --   --  8.1*  --  7.9*   < > 7.4*   < > 7.8*   * 122* 115*   < > 87   < > 109*   < > 103*   < > 109*    < > = values in this interval not displayed.       No results found for this or any previous visit (from the past 24 hour(s)).

## 2023-12-19 NOTE — PLAN OF CARE
Date & Time: 12/18/23-19/19/23 5200-7868    Surgery/POD#: POD 8 patching marginal ulcer. POD 6 ex lap w/ repeat gastrography    Behavior & Aggression: GREEN  Fall Risk: no  Orientation: A&Ox4  ABNL VS/O2: VSS on RA  ABNL Labs: n/a  Pain Management: PRN dilaudid  Bowel/Bladder: Continent B/B, voids and BM in the BR  Drains: ARSENIO in place, NG w/ tube feedings w/ bridle  IV: PIV SL  Diet: Clear liquids  Activity Level: Ind  Tests/Procedures: n/a  Anticipated  DC Date: Pending improvement  Significant Information: No concerns overnight

## 2023-12-19 NOTE — CONSULTS
SPIRITUAL HEALTH SERVICES - Consult Note   SH Gen Surg    Referral Source: Consult for follow-up care   Saw pt. Shanika Raines. Had short follow-up conversation related to ethical edgar and dropped off some resources that she had requested.     Plan: Spiritual Health will follow up while patient remains on the unit.      Adriana Montoya  Chaplain Resident    [facility] routine referrals *[phone]    [facility] available 24/7 for emergent requests/referrals, either by paging the on-call  or by entering an ASAP/STAT consult in Epic (this will also page the on-call ).

## 2023-12-20 LAB
ANION GAP SERPL CALCULATED.3IONS-SCNC: 3 MMOL/L (ref 7–15)
BUN SERPL-MCNC: 12.5 MG/DL (ref 8–23)
CALCIUM SERPL-MCNC: 8.1 MG/DL (ref 8.8–10.2)
CHLORIDE SERPL-SCNC: 105 MMOL/L (ref 98–107)
CREAT SERPL-MCNC: 0.6 MG/DL (ref 0.51–0.95)
DEPRECATED HCO3 PLAS-SCNC: 29 MMOL/L (ref 22–29)
EGFRCR SERPLBLD CKD-EPI 2021: >90 ML/MIN/1.73M2
GLUCOSE BLDC GLUCOMTR-MCNC: 100 MG/DL (ref 70–99)
GLUCOSE SERPL-MCNC: 109 MG/DL (ref 70–99)
MAGNESIUM SERPL-MCNC: 1.8 MG/DL (ref 1.7–2.3)
PHOSPHATE SERPL-MCNC: 3.4 MG/DL (ref 2.5–4.5)
POTASSIUM SERPL-SCNC: 4.3 MMOL/L (ref 3.4–5.3)
SODIUM SERPL-SCNC: 137 MMOL/L (ref 135–145)

## 2023-12-20 PROCEDURE — 250N000013 HC RX MED GY IP 250 OP 250 PS 637: Performed by: STUDENT IN AN ORGANIZED HEALTH CARE EDUCATION/TRAINING PROGRAM

## 2023-12-20 PROCEDURE — 250N000013 HC RX MED GY IP 250 OP 250 PS 637: Performed by: SURGERY

## 2023-12-20 PROCEDURE — 120N000001 HC R&B MED SURG/OB

## 2023-12-20 PROCEDURE — 250N000013 HC RX MED GY IP 250 OP 250 PS 637: Performed by: PHYSICIAN ASSISTANT

## 2023-12-20 PROCEDURE — 83735 ASSAY OF MAGNESIUM: CPT | Performed by: STUDENT IN AN ORGANIZED HEALTH CARE EDUCATION/TRAINING PROGRAM

## 2023-12-20 PROCEDURE — 250N000011 HC RX IP 250 OP 636: Mod: JZ | Performed by: SURGERY

## 2023-12-20 PROCEDURE — 36415 COLL VENOUS BLD VENIPUNCTURE: CPT | Performed by: STUDENT IN AN ORGANIZED HEALTH CARE EDUCATION/TRAINING PROGRAM

## 2023-12-20 PROCEDURE — 80048 BASIC METABOLIC PNL TOTAL CA: CPT | Performed by: STUDENT IN AN ORGANIZED HEALTH CARE EDUCATION/TRAINING PROGRAM

## 2023-12-20 PROCEDURE — 84100 ASSAY OF PHOSPHORUS: CPT | Performed by: STUDENT IN AN ORGANIZED HEALTH CARE EDUCATION/TRAINING PROGRAM

## 2023-12-20 PROCEDURE — 99207 PR NO CHARGE LOS: CPT | Performed by: HOSPITALIST

## 2023-12-20 PROCEDURE — 250N000013 HC RX MED GY IP 250 OP 250 PS 637: Performed by: HOSPITALIST

## 2023-12-20 RX ORDER — OXYCODONE HYDROCHLORIDE 5 MG/1
5 TABLET ORAL EVERY 4 HOURS PRN
Status: DISCONTINUED | OUTPATIENT
Start: 2023-12-20 | End: 2023-12-21 | Stop reason: HOSPADM

## 2023-12-20 RX ORDER — SENNOSIDES 8.6 MG
1-2 TABLET ORAL 2 TIMES DAILY PRN
Qty: 20 TABLET | Refills: 0 | Status: SHIPPED | OUTPATIENT
Start: 2023-12-20 | End: 2024-02-01

## 2023-12-20 RX ORDER — OXYCODONE HYDROCHLORIDE 5 MG/1
5 TABLET ORAL EVERY 4 HOURS PRN
Qty: 12 TABLET | Refills: 0 | Status: SHIPPED | OUTPATIENT
Start: 2023-12-20 | End: 2024-02-01

## 2023-12-20 RX ORDER — CYCLOBENZAPRINE HCL 10 MG
10 TABLET ORAL 3 TIMES DAILY
Qty: 15 TABLET | Refills: 0 | Status: SHIPPED | OUTPATIENT
Start: 2023-12-20 | End: 2023-12-25

## 2023-12-20 RX ADMIN — Medication 40 MG: at 06:29

## 2023-12-20 RX ADMIN — ESCITALOPRAM 30 MG: 5 SOLUTION ORAL at 08:43

## 2023-12-20 RX ADMIN — BUSPIRONE HYDROCHLORIDE 15 MG: 15 TABLET ORAL at 16:49

## 2023-12-20 RX ADMIN — OXYCODONE HYDROCHLORIDE 5 MG: 5 TABLET ORAL at 13:54

## 2023-12-20 RX ADMIN — BUSPIRONE HYDROCHLORIDE 15 MG: 15 TABLET ORAL at 08:26

## 2023-12-20 RX ADMIN — OXYCODONE HYDROCHLORIDE 5 MG: 5 TABLET ORAL at 00:06

## 2023-12-20 RX ADMIN — Medication 15 ML: at 12:00

## 2023-12-20 RX ADMIN — CALCIUM CARBONATE (ANTACID) CHEW TAB 500 MG 500 MG: 500 CHEW TAB at 21:28

## 2023-12-20 RX ADMIN — FOLIC ACID 1 MG: 1 TABLET ORAL at 13:54

## 2023-12-20 RX ADMIN — ENOXAPARIN SODIUM 40 MG: 40 INJECTION SUBCUTANEOUS at 16:49

## 2023-12-20 RX ADMIN — OXYCODONE HYDROCHLORIDE 5 MG: 5 TABLET ORAL at 06:25

## 2023-12-20 RX ADMIN — Medication 15 ML: at 08:26

## 2023-12-20 RX ADMIN — CALCIUM CARBONATE (ANTACID) CHEW TAB 500 MG 500 MG: 500 CHEW TAB at 08:26

## 2023-12-20 RX ADMIN — CYCLOBENZAPRINE 10 MG: 10 TABLET, FILM COATED ORAL at 21:28

## 2023-12-20 RX ADMIN — CYCLOBENZAPRINE 10 MG: 10 TABLET, FILM COATED ORAL at 16:49

## 2023-12-20 RX ADMIN — OXYCODONE HYDROCHLORIDE 5 MG: 5 TABLET ORAL at 18:04

## 2023-12-20 RX ADMIN — CYCLOBENZAPRINE 10 MG: 10 TABLET, FILM COATED ORAL at 08:26

## 2023-12-20 RX ADMIN — VENLAFAXINE 37.5 MG: 37.5 TABLET ORAL at 08:26

## 2023-12-20 RX ADMIN — CLONAZEPAM 0.5 MG: 0.5 TABLET ORAL at 08:43

## 2023-12-20 RX ADMIN — Medication 125 MCG: at 08:26

## 2023-12-20 RX ADMIN — BUSPIRONE HYDROCHLORIDE 15 MG: 15 TABLET ORAL at 21:28

## 2023-12-20 RX ADMIN — CALCIUM CARBONATE (ANTACID) CHEW TAB 500 MG 500 MG: 500 CHEW TAB at 16:49

## 2023-12-20 ASSESSMENT — ACTIVITIES OF DAILY LIVING (ADL)
ADLS_ACUITY_SCORE: 22

## 2023-12-20 NOTE — PLAN OF CARE
Goal Outcome Evaluation:                      Summary POD 8 patching marginal ulcer. POD 6 ex lap w/ repeat gastrography    Hx withdrawal with seizures, coagulopathy thought to be secondary to Alcohol dependence, Fatty liver disease, GERD, Chronic anemia (B12, Iron), Emphysema/COPD, Cannabis use D/O, Seizure D/O, MDD with anxiety, PTSD, Insomnia, Hypersomnia with sleep apnea, RLS, Genital herpes, Malnutrition     12/19/2023 [id number]    Orientation A & O X 4     Vitals/Tele VSS on Rm air     IV Access/drains PIV SL pain at site & leaking resource to place new IV , NF tube  feed, ARSENIO drain     Diet Pureed tolerating well, (Tube feed at goal rate & with Q 4 water flush     Mobility Ind     GI/ up to bathroom ind, reported BM earlier today     Wound/Skin Changed ARSENIO dressing, blanchable sacrum, encouraged to T/R, gave extra pillows     Consults SW/ CC following     Discharge Plan pending     See Flow sheets for assessment

## 2023-12-20 NOTE — PROGRESS NOTES
Chart reviewed. Patient has been stable, tolerating advancing diet. Remains afebrile. Reviewed labs- all stable and acceptable. No changes from hospitalist team. She may likely be discharged as early as tomorrow. Have completed all her routine PTA discharge med rec. Hospitalist team will sign off. Please call us with any questions/ concerns.

## 2023-12-20 NOTE — PLAN OF CARE
Goal Outcome Evaluation:  Orientations: A&Ox4  Vitals/Pain: VSS on RA. Pain controlled with prn oxy.   Tele: N/a  Lines/Drains: PIVx1 SL. NJ tube. ARSENIO drain to bulb suction.   Skin/Wounds: Lap sites, ARSENIO site.   GI/: Pureed diet. TF running @ 40mL/h w/ Q4H free water. Adequate UOP. Large loose BM.   Labs: Abnormal/Trends, Electrolyte Replacement- Electrolyte protocols, all in range.   Ambulation/Assist: IND  Sleep Quality: Good  Plan: Possible discharge tomorrow. Per dietician, she wants to continue TF until discontinued by provider to help with nutrition. Continue to encourage oral intake. Walks and OOB encouraged. Using IS.

## 2023-12-20 NOTE — PROGRESS NOTES
Care Management Follow Up    Length of Stay (days): 9    Expected Discharge Date: 12/21/2023     Concerns to be Addressed: financial/insurance, mental health, medication   (interested in dual diagnosis CD/MI inpatient)  Patient plan of care discussed at interdisciplinary rounds: Yes    Anticipated Discharge Disposition:       Anticipated Discharge Services:    Anticipated Discharge DME:      Patient/family educated on Medicare website which has current facility and service quality ratings:    Education Provided on the Discharge Plan:    Patient/Family in Agreement with the Plan:      Referrals Placed by CM/SW:    Private pay costs discussed: Not applicable    Additional Information:  Writer called [facility]  working with patient. Jenny and  attempted to brain storm resources for patient. Patient has been given financial resources numerous times but as of yet, does not follow through. Patient needs to apply for medical assistance and also complete social security application. Until then, resources are very limited, especially with transportation to and from appointments. Patient would have out of pocket costs for any medical appointment rides at this point from [place] to [place] and perhaps there is someone she could see closer to her home. SW following.     ANUP Hurd

## 2023-12-20 NOTE — PROGRESS NOTES
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 UGI without leak on 12/18.      POD9 from omental patch repair and POD7 from aborted G-tube placement    PLAN:  - Very soft pureed diet today. Hopeful to remove NJ later today as intake increases. Greatly appreciate dietitian education for discharge diet and for managing tube feeds during patient's stay.  - Continue protonix for GI prophylaxis.  - Transitioned to oral analgesia. Flexeril TID and oxycodone prn. Hold on scheduled tylenol given coagulapathy secondary to alcohol dependence.  - Continue ARSENIO drain. Monitor output, likely removal prior to discharge.  - Senokot BID and miralax daily prn.  - Completed 6 days of IV zosyn on 12/17.   - Ambulate QID to assist with bowel function, PCDs for DVT prophylaxis.   - Acute blood loss anemia secondary to surgical losses. Hgb now stable and on lovenox for DVT chemoprophylaxis.  - Encourage deep breathe and IS.   - Appreciate medical management per hospitalist team.    DISPOSITION:  - If patient tolerating very soft pureed diet today, plan to remove NJ this evening and discharge as early as tomorrow.  - Patient [place] [facility] [place] as she needs a robust bariatric program.         Interval History:   Pavithra Raines is seen on surgical rounds. She is happy to have full liquids and understanding of very thin pureed diet for discharge. She denies nausea, still having BM's. Abdominal pain better controlled, attempting to space out oxycodone. ARSENIO drain still with  leakage around it. Ambulating, voiding. Tmax yesterday 99.2, vitals otherwise wnl.         Physical Exam:   Temp: 99.2  F (37.3  C) Temp src: Oral BP: 110/71 Pulse: 78   Resp: 17 SpO2: 95 % O2 Device: None (Room air)      I/O last 3 completed shifts:  In: 1100 [P.O.:720; NG/GT:220]  Out: 2358 [Urine:2350; Drains:8]    GENERAL: VS reviewed, alert, oriented, no acute distress  LUNGS: Normal respiratory effort, no wheezing  ABDOMEN:  Soft, appropriately tender, non-distended, ARSENIO drain with serous output (8 ml/24 hours)  INCISION: Steris in place. Incisions are clean, dry, and intact. No surrounding erythema.  EXTREMITIES: Moving all extremities, no gross deformities  NEUROLOGICAL: Grossly non-focal, mood & affect appropriate    Data   Recent Labs   Lab 12/20/23  0711 12/20/23  0602 12/19/23  2130 12/19/23  0744 12/18/23  2217 12/18/23  0826 12/16/23  1958 12/16/23  0719 12/15/23  1842 12/15/23  0730   WBC  --   --   --  10.3  --   --   --  7.9  --  10.1   HGB  --   --   --  9.4*  --   --   --  8.6*  --  8.8*   MCV  --   --   --  95  --   --   --  96  --  97   PLT  --   --   --  464*  --   --   --  290  --  333     --   --  136  --  137   < > 143   < > 146*   POTASSIUM 4.3  --   --  4.3  --  4.5   < > 3.9   < > 2.7*   CHLORIDE 105  --   --  104  --  103   < > 112*   < > 115*   CO2 29  --   --  27  --  26   < > 25   < > 24   BUN 12.5  --   --  6.5*  --  4.7*   < > 4.9*   < > 12.7   CR 0.60  --   --  0.59  --  0.55   < > 0.63   < > 0.74   ANIONGAP 3*  --   --  5*  --  8   < > 6*   < > 7   SHON 8.1*  --   --  7.6*  --  8.1*   < > 7.4*   < > 7.8*   * 100* 109* 101*   < > 87   < > 103*   < > 109*    < > = values in this interval not displayed.       Julianne Luis PA-C    Please use seedtagera to page 7:30am-4pm, page on-call surgeon after 4pm  Office number: [phone]

## 2023-12-20 NOTE — PLAN OF CARE
Date & Time: 12/20/23 6556-3557    Surgery/POD#: POD 9 patching marginal ulcer. POD 7 ex lap w/ repeat gastrography    Behavior & Aggression: GREEN  Fall Risk: no  Orientation: A&Ox4  ABNL VS/O2: VSS on RA  ABNL Labs: hgb 9.4  Pain Management: PRN oxy  Bowel/Bladder: Continent B/B, voids and BM in the BR  Drains: ARSENIO in place - changed dressing due to copious amount leaking - minimal output, NG w/ tube feedings w/ bridle  IV: New PIV SL  Diet: Full liquids/Pureed  Activity Level: Ind  Tests/Procedures: Potential plan to take out NG today  Anticipated  DC Date: Pending improvement  Significant Information: No concerns overnight

## 2023-12-20 NOTE — PROGRESS NOTES
"CLINICAL NUTRITION SERVICES - BRIEF NOTE     Consult received for: Provider Order - Nutrition Education - \"Very thin pureed diet for discharge until follow up with Dr. Gonzalez\"  Also received page asking RD to assess nutrition status     See RD note on 12/18 for most recent full nutrition assessment.     NEW FINDINGS   Reviewed chart-   Surgeon note today = \"Very soft pureed diet today. Hopeful to remove NJ later today as intake increases.\"  Diet advanced to Pureed w/ thin liquids today. % intakes documented per nursing flow sheet.   Would prefer for TF to continue at goal rate until discontinued per provider as pt remains w/ underwt BMI and met severe malnutrition criteria per nutrition assessment on 12/12.     Visited w/ pt this morning. Pt was sipping on strawberry Ensure Enlive. Reviewed diet education-- emphasized importance of small frequent meals that are nutrient dense. Discussed having 3 pureed meals/day and having oral protein supplements between as snacks. Discussed thinning out pureed foods w/ oil, creams to increase kcal provisions. Pt in agreement regarding wt repletion. She purchased a portable  that she can take with her if she eats out.     INTERVENTIONS  Implementation  Nutrition education for recommended modifications  Assessed learning needs, learning preferences, and willingness to learn  Nutrition Education (Content):  Provided handout: \"IDDSI Level 4 Pureed Nutrition Therapy\"  Discussed importance of thinning out pureed foods, utilizing oral protein supplements that are high in kcal AND protein to meet kcal and protein needs while on restricted diet. Thin out pureed foods w/ creams, oils to increase kcal provisions. Encouraged pt to have small, frequent meals. Discussed plans to follow diet until follow-up w/ surgeon per provider consult.   Provided packet of [employer] (Ensure product) coupons  Nutrition Education (Application):  Discussed eating habits and recommended alternative food " choices  Patient verbalizes understanding of diet by no further questions  Anticipate good compliance  Diet Education - refer to Education Flowsheet    Maggi Bhat RD, LD  Pager: [phone]

## 2023-12-21 VITALS
OXYGEN SATURATION: 98 % | RESPIRATION RATE: 16 BRPM | BODY MASS INDEX: 18.22 KG/M2 | TEMPERATURE: 99.4 F | SYSTOLIC BLOOD PRESSURE: 113 MMHG | HEIGHT: 64 IN | HEART RATE: 83 BPM | DIASTOLIC BLOOD PRESSURE: 77 MMHG | WEIGHT: 106.7 LBS

## 2023-12-21 LAB
ANION GAP SERPL CALCULATED.3IONS-SCNC: 6 MMOL/L (ref 7–15)
BUN SERPL-MCNC: 15.7 MG/DL (ref 8–23)
CALCIUM SERPL-MCNC: 8.4 MG/DL (ref 8.8–10.2)
CHLORIDE SERPL-SCNC: 104 MMOL/L (ref 98–107)
CREAT SERPL-MCNC: 0.62 MG/DL (ref 0.51–0.95)
DEPRECATED HCO3 PLAS-SCNC: 28 MMOL/L (ref 22–29)
EGFRCR SERPLBLD CKD-EPI 2021: >90 ML/MIN/1.73M2
GLUCOSE BLDC GLUCOMTR-MCNC: 93 MG/DL (ref 70–99)
GLUCOSE SERPL-MCNC: 105 MG/DL (ref 70–99)
MAGNESIUM SERPL-MCNC: 1.9 MG/DL (ref 1.7–2.3)
PHOSPHATE SERPL-MCNC: 3.1 MG/DL (ref 2.5–4.5)
POTASSIUM SERPL-SCNC: 4.5 MMOL/L (ref 3.4–5.3)
SODIUM SERPL-SCNC: 138 MMOL/L (ref 135–145)

## 2023-12-21 PROCEDURE — 250N000013 HC RX MED GY IP 250 OP 250 PS 637: Performed by: STUDENT IN AN ORGANIZED HEALTH CARE EDUCATION/TRAINING PROGRAM

## 2023-12-21 PROCEDURE — 250N000013 HC RX MED GY IP 250 OP 250 PS 637: Performed by: SURGERY

## 2023-12-21 PROCEDURE — 250N000013 HC RX MED GY IP 250 OP 250 PS 637: Performed by: PHYSICIAN ASSISTANT

## 2023-12-21 PROCEDURE — 36415 COLL VENOUS BLD VENIPUNCTURE: CPT | Performed by: STUDENT IN AN ORGANIZED HEALTH CARE EDUCATION/TRAINING PROGRAM

## 2023-12-21 PROCEDURE — 250N000013 HC RX MED GY IP 250 OP 250 PS 637: Performed by: HOSPITALIST

## 2023-12-21 PROCEDURE — 83735 ASSAY OF MAGNESIUM: CPT | Performed by: STUDENT IN AN ORGANIZED HEALTH CARE EDUCATION/TRAINING PROGRAM

## 2023-12-21 PROCEDURE — 80048 BASIC METABOLIC PNL TOTAL CA: CPT | Performed by: STUDENT IN AN ORGANIZED HEALTH CARE EDUCATION/TRAINING PROGRAM

## 2023-12-21 PROCEDURE — 84100 ASSAY OF PHOSPHORUS: CPT | Performed by: STUDENT IN AN ORGANIZED HEALTH CARE EDUCATION/TRAINING PROGRAM

## 2023-12-21 RX ORDER — OMEPRAZOLE 40 MG/1
CAPSULE, DELAYED RELEASE ORAL
Qty: 30 CAPSULE | Refills: 0 | Status: SHIPPED | OUTPATIENT
Start: 2023-12-21 | End: 2024-02-01

## 2023-12-21 RX ORDER — CLONAZEPAM 0.5 MG/1
0.5 TABLET ORAL DAILY PRN
Qty: 10 TABLET | Refills: 0 | Status: SHIPPED | OUTPATIENT
Start: 2023-12-21 | End: 2023-12-29

## 2023-12-21 RX ORDER — RALOXIFENE HYDROCHLORIDE 60 MG/1
1 TABLET, FILM COATED ORAL DAILY
Qty: 30 TABLET | Refills: 0 | Status: SHIPPED | OUTPATIENT
Start: 2023-12-21 | End: 2024-03-21

## 2023-12-21 RX ADMIN — Medication 125 MCG: at 09:30

## 2023-12-21 RX ADMIN — Medication 40 MG: at 07:01

## 2023-12-21 RX ADMIN — CLONAZEPAM 0.5 MG: 0.5 TABLET ORAL at 09:30

## 2023-12-21 RX ADMIN — BUSPIRONE HYDROCHLORIDE 15 MG: 15 TABLET ORAL at 09:30

## 2023-12-21 RX ADMIN — OXYCODONE HYDROCHLORIDE 5 MG: 5 TABLET ORAL at 12:25

## 2023-12-21 RX ADMIN — CYCLOBENZAPRINE 10 MG: 10 TABLET, FILM COATED ORAL at 09:31

## 2023-12-21 RX ADMIN — VENLAFAXINE 37.5 MG: 37.5 TABLET ORAL at 09:31

## 2023-12-21 RX ADMIN — ESCITALOPRAM 30 MG: 5 SOLUTION ORAL at 09:36

## 2023-12-21 RX ADMIN — Medication 15 ML: at 09:31

## 2023-12-21 RX ADMIN — OXYCODONE HYDROCHLORIDE 5 MG: 5 TABLET ORAL at 07:08

## 2023-12-21 RX ADMIN — OXYCODONE HYDROCHLORIDE 5 MG: 5 TABLET ORAL at 00:37

## 2023-12-21 RX ADMIN — CALCIUM CARBONATE (ANTACID) CHEW TAB 500 MG 500 MG: 500 CHEW TAB at 09:30

## 2023-12-21 RX ADMIN — SENNOSIDES 8.6 MG: 8.6 TABLET, FILM COATED ORAL at 09:30

## 2023-12-21 RX ADMIN — Medication 15 ML: at 12:25

## 2023-12-21 ASSESSMENT — ACTIVITIES OF DAILY LIVING (ADL)
ADLS_ACUITY_SCORE: 22

## 2023-12-21 NOTE — PROGRESS NOTES
Care Management Discharge Note    Discharge Date: 12/21/2023       Discharge Disposition:      Discharge Services:      Discharge DME:      Discharge Transportation:      Private pay costs discussed:  cost of medications for today being filled is approximately $163. Patient agrees to this cost    Does the patient's insurance plan have a 3 day qualifying hospital stay waiver?  No    PAS Confirmation Code:    Patient/family educated on Medicare website which has current facility and service quality ratings:      Education Provided on the Discharge Plan:  yes  Persons Notified of Discharge Plans: patient, RN  Patient/Family in Agreement with the Plan:      Handoff Referral Completed: Yes    Additional Information:  Discussion with patient which home medications she needed help filling. Patient states she needs Klonopin, Omeprazole and Evista filled in addition to meds prescribed by surgery team. Meds will be filled here at the [facility] pharmacy with listed costs of above. Writer and patient went through her home medication bottles together. Patient has PCP follow up on 1/4 if more prescriptions needed.     Writer called Mari Hoffman at Dr. Gonzalez's office at [facility] [place] for follow up appointment. Mari's phone number is [phone]. Writer spoke to Mari and she will call patient to register her and will have a January 10th appointment at 1 pm.     Radha Cesar RN   [facility]   Phone [phone] or [phone]

## 2023-12-21 NOTE — PLAN OF CARE
A&O, VSS on RA, Oxycodone for pain, continent, tolerating diet, discharge instruction done, questions encouraged and answered. Patient acknowledged understanding.

## 2023-12-21 NOTE — PLAN OF CARE
Shift: 3017-3146     POD9 from omental patch repair and POD7 from aborted G-tube placement  Orientations: A&Ox4  Vitals/Pain: VSS on RA. Pain controlled with prn oxy 5mg. Denies SOB, CARVALHO, or chest pain. Denies N/V/D. Denies dizziness or lightheadedness.  Tele: n/a  Lines/Drains: PIVx1 SL. NJ tube. ARSENIO drain to bulb suction w/ no output.   Skin/Wounds: 3 lap sites D/I/ old bloody drainage; ARSENIO site, dressing change d/t serous/pale yellow discharge.  GI/: Very soft pureed diet. TF running @ 40mL/h w/ Q4H free water. Adequate UOP. Large loose BMs x2.   Labs: Abnormal/Trends, Electrolyte Replacement- Electrolyte protocols, all in range.   Ambulation/Assist: Independent   Plan: Possible discharge tomorrow, SW/CM following for safe disposition. Per dietician, she wants to continue TF until discontinued by provider. Continue to encourage oral intake. Walks and OOB encouraged. Using IS.

## 2023-12-21 NOTE — DISCHARGE SUMMARY
[facility] Discharge Summary    Pavithra Raines MRN# [id number]   Age: 68 year old YOB: 1955     Date of Admission:  12/11/2023  Date of Discharge:  12/21/2023  Admitting Provider:  Antoni Gonzalez MD  Discharge Provider:  Julianne Luis PA-C  Discharging Service: General Surgery     Primary Provider: Soha Boggs  Primary Care Physician Phone Number: [phone]          Admission Diagnoses:   Principle Diagnosis: Marginal ulcer [K28.9]  Secondary Diagnoses:          Discharge Diagnosis:     Marginal ulcer [K28.9]          Procedures:     Procedure(s): ? diagnostic laparoscopy with omental patch repair on 12/11  ? diagnostic laparoscopy and aborted G-tube placement on 12/13             Discharge Medications:     Discharge Medication List as of 12/21/2023  1:46 PM      START taking these medications    Details   cyclobenzaprine (FLEXERIL) 10 MG tablet Take 1 tablet (10 mg) by mouth 3 times daily for 5 days, Disp-15 tablet, R-0, E-Prescribe      oxyCODONE (ROXICODONE) 5 MG tablet Take 1 tablet (5 mg) by mouth every 4 hours as needed for moderate pain, Disp-12 tablet, R-0, E-Prescribe      sennosides (SENOKOT) 8.6 MG tablet Take 1-2 tablets by mouth 2 times daily as needed for constipation (while taking oxycodone), Disp-20 tablet, R-0, E-Prescribe         CONTINUE these medications which have CHANGED    Details   clonazePAM (KLONOPIN) 0.5 MG tablet Take 1 tablet (0.5 mg) by mouth daily as needed for anxiety, Disp-10 tablet, R-0, E-Prescribe      omeprazole (PRILOSEC) 40 MG DR capsule TAKE ONE CAPSULE BY MOUTH TWICE A DAY, Disp-30 capsule, R-0, E-Prescribe      raloxifene (EVISTA) 60 MG tablet Take 1 tablet (60 mg) by mouth daily, Disp-30 tablet, R-0, E-Prescribe         CONTINUE these medications which have NOT CHANGED    Details   busPIRone (BUSPAR) 15 MG tablet Take 1 tablet (15 mg) by mouth 3 times daily, Disp-90 tablet, R-3, E-Prescribe      calcium carbonate  "(OS-SHON 500 MG [place]) 1250 MG tablet Take 1 tablet by mouth 2 times daily, Historical      cyanocobalamin (CYANOCOBALAMIN) 1000 MCG/ML injection Inject 1 mL into the muscle every 30 days, Historical      escitalopram (LEXAPRO) 20 MG tablet Take 1.5 tablets (30 mg) by mouth daily, Disp-135 tablet, R-3, E-Prescribe      ferrous gluconate (FERGON) 324 (38 Fe) MG tablet TAKE 1 TABLET (324 MG) BY MOUTH DAILY (WITH BREAKFAST), Disp-90 tablet, R-1, E-Prescribe      folic acid (FOLVITE) 1 MG tablet Take 1 tablet (1,000 mcg) by mouth daily, Disp-90 tablet, R-3, E-Prescribe      multivitamin w/minerals (THERA-VIT-M) tablet Take 1 tablet by mouth daily, Historical      thiamine (B-1) 100 MG tablet Take 1 tablet (100 mg) by mouth daily, Transitional      triamcinolone (KENALOG) 0.1 % external cream APPLY SPARINGLY TO ITCHY RASH AREAS UP TO THREE TIMES A DAY AS NEEDEDDisp-80 g, [id number]      venlafaxine (EFFEXOR XR) 37.5 MG 24 hr capsule Take 1 capsule (37.5 mg) by mouth daily, Disp-90 capsule, R-1, E-Prescribe      Insulin Syringe-Needle U-100 (B-D INSULIN SYRINGE) 25G X 1\" 1 ML MISC Use once every 30 days for B12 injection, Disp-3 each, R-3, E-Prescribe                 Allergies:         Allergies   Allergen Reactions     Codeine Itching     Vicodin [Hydrocodone-Acetaminophen] Itching             Brief History of Illness:    Reason for your hospital stay      Diagnostic revision of bariatric surgery and recovery            Pavithra Raines is a 68-year-old female with alcohol abuse disorder, chronic smoker, COPD, Tom-en-Y gastric bypass 2008, cholecystectomy who presented to the ED on 12/11 with acute abdominal pain.  CT demonstrated gastric perforation, likely ulcer perforation.  Reportedly she had not been taking her PTA meds including omeprazole for the past month because she can't afford them.  Dr. Gonzalez discussed with the patient that she will need surgical intervention urgently.     After discussing the " risks, benefits, and possible complications, informed consent was obtained and the patient underwent diagnostic laparoscopy with omental patch repair for perforated marginal ulcer on 12/11.  Please see the Operative Report for full details.           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  Patient's healing potential is poor given her numerous medical issues so laparoscopic G-tube placement in the remnant stomach was attempted on 12/13.  Unfortunately attempting to identify a safe window for G-tube placement was further risking disruption of the recent omental patch repair so G-tube was aborted.  NJ was placed for enteral nutrition which was managed by dietitian.  She had UGI on 12/18 without leak and she was started on clear liquid diet. Her diet was slowly advanced to very soft pureed diet while continuing tube feeds through NJ which was ultimately removed on the day of discharge.  ARSENIO drain had scant output and was also removed.  On the day of discharge, she is tolerating diet, pain controlled with oral analgesia, ambulating, and voiding.    The patient was discharged to home in stable condition and will follow up with [facility] on 1/10.  She verbalized understanding of all discharge instructions and felt comfortable with the discharge plan.  She was asked to call with any further questions or concerns.         Condition on Discharge:        Discharge condition: Stable   Discharge vitals: Blood pressure 113/77, pulse 83, temperature 99.4  F (37.4  C), temperature source Oral,  "resp. rate 16, height 1.626 m (5' 4\"), weight 48.4 kg (106 lb 11.2 oz), SpO2 98%, not currently breastfeeding.           Discharge Disposition:     Discharged to home          Discharge Instructions and Follow-Up:      Pavithra Raines was asked to follow up with surgical team in 1-2 weeks.      Julianne Luis PA-C  [facility]  [phone]       "

## 2023-12-21 NOTE — PROGRESS NOTES
[facility]    General Surgery  Short Note    Received page from pharmacy clarifying patient's home meds since she has not been taking them for a month. She may need refills sent for discharge. Hospitalist signed off yesterday.    I spoke with nursing about page to hospitalist on service for home medication reconciliation with possible refills and recommendations for PCP follow up. Can place formal reconsult if needed from their end.    Julianne Luis PA-C

## 2023-12-21 NOTE — PROGRESS NOTES
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 UGI without leak on 12/18.      POD9 from omental patch repair and POD7 from aborted G-tube placement    PLAN:  - Tolerating very soft pureed diet. NJ removal today. Greatly appreciate dietitian education for discharge diet and for managing tube feeds during patient's stay.  - Continue protonix for GI prophylaxis.  - Transitioned to oral analgesia. Flexeril TID and oxycodone prn. Hold on scheduled tylenol given coagulapathy secondary to alcohol dependence.  - Only scant ARSENIO drain output and removed today. Cover site as needed for drainage.  - Senokot BID and miralax daily prn.  - Completed 6 days of IV zosyn on 12/17.   - Ambulate QID to assist with bowel function, PCDs for DVT prophylaxis.   - Acute blood loss anemia secondary to surgical losses. Hgb now stable and on lovenox for DVT chemoprophylaxis.  - Encourage deep breathe and IS.   - Appreciate medical management per hospitalist team.    DISPOSITION:  - Discharge home today on very soft pureed diet with high protein intake.  - Patient to follow up with Dr. Gonzalez [place] [facility] [place] as she needs a robust bariatric program.   **addendum**  Spoke with Social Work about patient follow up. Dr. Gonzalez has reached out to his team who is aware of patient and patient has clinic RN number in discharge paperwork to arrange timing of her visit.        Interval History:   Pavithra Raines is seen on surgical rounds. She did well with very soft pureed diet yesterday. Denies nausea, continues  to move bowels. Dietitian continued NJ feedings while her diet was being advanced, will remove prior to discharge today. Ambulating, voiding. Tmax 99.4 this morning, otherwise wnl.          Physical Exam:   Temp: 99.4  F (37.4  C) Temp src: Oral BP: 113/77 Pulse: 83   Resp: 16 SpO2: 98 % O2 Device: None (Room air)      I/O last 3 completed shifts:  In: 1320 [P.O.:780; NG/GT:540]  Out: 1203 [Urine:1200; Drains:3]    GENERAL: VS reviewed, alert, oriented, no acute distress  LUNGS: Normal respiratory effort, no wheezing  ABDOMEN:  Soft, appropriately tender, non-distended, ARSENIO drain with serous output (3 ml/24 hours). Tolerated ARSENIO removal.  INCISION: Steris in place. Incisions are clean, dry, and intact. No surrounding erythema.  EXTREMITIES: Moving all extremities, no gross deformities  NEUROLOGICAL: Grossly non-focal, mood & affect appropriate    Data   Recent Labs   Lab 12/21/23  0723 12/21/23  0547 12/20/23  0711 12/19/23  2130 12/19/23  0744 12/16/23  1958 12/16/23  0719 12/15/23  1842 12/15/23  0730   WBC  --   --   --   --  10.3  --  7.9  --  10.1   HGB  --   --   --   --  9.4*  --  8.6*  --  8.8*   MCV  --   --   --   --  95  --  96  --  97   PLT  --   --   --   --  464*  --  290  --  333     --  137  --  136   < > 143   < > 146*   POTASSIUM 4.5  --  4.3  --  4.3   < > 3.9   < > 2.7*   CHLORIDE 104  --  105  --  104   < > 112*   < > 115*   CO2 28  --  29  --  27   < > 25   < > 24   BUN 15.7  --  12.5  --  6.5*   < > 4.9*   < > 12.7   CR 0.62  --  0.60  --  0.59   < > 0.63   < > 0.74   ANIONGAP 6*  --  3*  --  5*   < > 6*   < > 7   SHON 8.4*  --  8.1*  --  7.6*   < > 7.4*   < > 7.8*   * 93 109*   < > 101*   < > 103*   < > 109*    < > = values in this interval not displayed.       Julianne Luis PA-C    Please use Vocera to page 7:30am-4pm, page on-call surgeon after 4pm  Office number: [phone]

## 2023-12-21 NOTE — PLAN OF CARE
Goal Outcome Evaluation:    POD 10 - Diagnostic laparoscopy, laparoscopic patching of marginal ulcer  POD 8 - Exploratory Laparoscopy with Repeat Gastrorrhaphy    Orientation/Cognitive: A&Ox4  Mobility Level/Assist Equipment: Independent  Pain Management: prn oxycodone  Tele/VS/O2: VSS on RA  ABNL Lab/BG: Electrolyte stable  Diet: Pureed diet. TF running @ 40 ml/h with  free water 60 ml q 4h.   Bowel/Bladder: Continent of B&B   Skin Concerns: 3 lap sites CDI. Scattered bruises  Drains/Devices: ARSENIO drain site changed x1 d/t   moderate drainage with no output. Leaking at site. PIV SL. NJ tube intact & TF running  Other Important info for NEXT shift: CMS intact. Slept comfortably at bedtime  Anticipated DC date & active delays: Pending discharge placement. SW following.

## 2023-12-21 NOTE — CONSULTS
"SPIRITUAL HEALTH SERVICES - Consult Note   SH Gen Surg    Referral Source: Consult per patient request   Saw pt. Shanika Méndezraymondrosa elena briefly prior to discharge. We shared a prayer and words of support as she goes home to \"see my puppies\" and tackle \"all the dirty dishes.\" She has mixed emotions and reminded herself to \"take one day at a time.\" We reviewed some of the coping strategies we had previously discussed and she said she is \"ready.\"    Plan: Spiritual Health remains available.      Adriana Montoya  Chaplain Resident    [facility] routine referrals *[phone]    [facility] available 24/7 for emergent requests/referrals, either by paging the on-call  or by entering an ASAP/STAT consult in Epic (this will also page the on-call ).    "

## 2023-12-22 ENCOUNTER — PATIENT OUTREACH (OUTPATIENT)
Dept: CARE COORDINATION | Facility: CLINIC | Age: 68
End: 2023-12-22

## 2023-12-22 NOTE — PROGRESS NOTES
Clinic Care Coordination Contact  [facility]: Post-Discharge Note  SITUATION                                                      Admission:    Admission Date: 12/11/23   Reason for Admission: chest pain, abdominal pain  Discharge:   Discharge Date: 12/21/23  Discharge Diagnosis: Marginal Ulcer    BACKGROUND                                                      Per hospital discharge summary and inpatient provider notes:  Pavithra Raines was admitted post-operatively and hospitalist was consulted for medical management given patient's alcohol use D/O with history of withdrawal with seizures, coagulopathy secondary to alcohol dependence, fatty liver disease, GERD, chronic anemia (B12, Iron), emphysema/COPD, cannabis use D/O, MDD with anxiety, PTSD, insomnia, hypersomnia with sleep apnea, RLS, genital herpes, osteoporosis, and severe malnutrition.  Patient's healing potential is poor given her numerous medical issues so laparoscopic G-tube placement in the remnant stomach was attempted on 12/13.  Unfortunately attempting to identify a safe window for G-tube placement was further risking disruption of the recent omental patch repair so G-tube was aborted.  NJ was placed for enteral nutrition which was managed by dietitian.  She had UGI on 12/18 without leak and she was started on clear liquid diet. Her diet was slowly advanced to very soft pureed diet while continuing tube feeds through NJ which was ultimately removed on the day of discharge.  ARSENIO drain had scant output and was also removed.  On the day of discharge, she is tolerating diet, pain controlled with oral analgesia, ambulating, and voiding.     The patient was discharged to home in stable condition and will follow up with Dr. Gonzalez at [facility] bariatric clinic on 1/10.  She verbalized understanding of all discharge instructions and felt comfortable with the discharge plan.  She was asked to call with any further questions or concerns.    ASSESSMENT            Discharge Assessment  How are you doing now that you are home?: doing ok  How are your symptoms? (Red Flag symptoms escalate to triage hotline per guidelines): Improved  Do you feel your condition is stable enough to be safe at home until your provider visit?: Yes  Does the patient have their discharge instructions? : Yes  Does the patient have questions regarding their discharge instructions? : No  Were you started on any new medications or were there changes to any of your previous medications? : Yes  Do you have questions regarding any of your medications? : No  Do you have all of your needed medical supplies or equipment (DME)?  (i.e. oxygen tank, CPAP, cane, etc.): Yes  Discharge follow-up appointment scheduled within 14 calendar days? : Yes  Discharge Follow Up Appointment Date: 01/04/24  Discharge Follow Up Appointment Scheduled with?: Primary Care Provider    Post-op ([facility] Only)  If the patient had a surgery or procedure, do they have any questions for a nurse?: Yes (see comment)    Post-op (Clinicians Only)  Did the patient have surgery or a procedure: Yes  PO Intake: soft foods (pureed food)    Pt had no questions related to her hospital stay and first night home.  She will need transportation assistance for getting to her appointments and will discuss next week.  She was up and playing cribbage with her .  She is working to get used to the puree/soft diet. Was not able to talk about any medication concerns yet she is aware she needs to get insurance and maybe apply for disability.  Will further discuss on call next week.     PLAN                                                      Outpatient Plan:  pt to take medications as prescribed and follow diet.     Future Appointments   Date Time Provider Department Center   1/4/2024  3:20 PM Geronimo Hussein MD [facility]         For any urgent concerns, please contact our 24 hour nurse triage line: [phone]  ([phone])         ANUP Walker

## 2023-12-27 ENCOUNTER — PATIENT OUTREACH (OUTPATIENT)
Dept: CARE COORDINATION | Facility: CLINIC | Age: 68
End: 2023-12-27

## 2023-12-27 DIAGNOSIS — F41.9 ANXIETY: ICD-10-CM

## 2023-12-27 ASSESSMENT — ACTIVITIES OF DAILY LIVING (ADL): DEPENDENT_IADLS:: INDEPENDENT

## 2023-12-27 NOTE — TELEPHONE ENCOUNTER
Clinic Care Coordination Contact  Program: County Benefit/yasemin care/ Certain population/ Energy assistance   County: [facility] Case #:  [facility] Worker:   Kevin #:   Subscriber #:   Renewal:  Date Applied:     FRW Outreach:   12/27/23-  Frw called pt to follow on her [facility] benefit, energy assistance and yasemin care application status. Pt stated that she hasn't received any thing from the [facility] or from me as she was hospitalized. Frw emailed and will mail both application again. Frw also schedule an appt with pt to follow up on her [facility] benefit on 1/4 at 9 am. Frw will follow up within 30 days.  Leona Negrete  Financial Resource Worker  [facility]  [phone]     12/6/23- Frw called pt at appt time she state that she has no income. Frw assisted pt in applying for [facility] benefit- snap/cash, Energy assistance and yasemin care. Pt state that someone from [facility] had helped her with Cert pop and mailed the AVS form. Frw informed pt that she needs to send the AVS form asap. Frw will mail yasemin care and energy assistance applications to the pt so she can sign. Frw provided next instruction regarding all 3 applications. Frw will follow up with pt within 30 days.  Leona Negrete  Financial Resource Worker  [facility]  [phone]     11/28/23- Frw established contact with pt, she state that she had been hospitalized and currently doesn't income. Frw ask pt gather income infor from prior to her hospitalization as she is self-employed. Frw schedule an appt on 12/6 at 11 am to screen and apply for whatever she is eligible.   Leona Negrete  Financial Resource Worker  [facility]  [phone]         Health Insurance:      Referral/Screening:  W Screening    Row Name 11/27/23 0946       [facility] Benefits   Is patient requesting help applying for [facility] benefits? Yes       Have you  recently applied for any county benefits? No       How many people in your household? 2       Do you buy/eat food together? Yes       What is the monthly gross income for the household (wages, social security, workers comp, and pension)? 1900       Insurance:   Was [place] for [place] insurance? No       Is this an insurance renewal? No       Is this a new insurance application request? Yes       Have you recently applied for insurance? No       How many people in your household? 2       Do you file taxes? No       What is the monthly gross income for the household (wages, social security, workers comp, and pension)? 1900       OTHER   Is this a yasemin care application? Yes       Any other information for the FRW? to apply for SNAP and any programs she qualifies for including [place], utilities

## 2023-12-28 DIAGNOSIS — G89.18 POSTOPERATIVE PAIN: Primary | ICD-10-CM

## 2023-12-28 RX ORDER — CYCLOBENZAPRINE HCL 10 MG
10 TABLET ORAL 3 TIMES DAILY PRN
COMMUNITY
End: 2023-12-28

## 2023-12-29 RX ORDER — CLONAZEPAM 0.5 MG/1
0.5 TABLET ORAL DAILY PRN
Qty: 30 TABLET | Refills: 0 | Status: SHIPPED | OUTPATIENT
Start: 2023-12-29 | End: 2024-02-01

## 2023-12-30 ENCOUNTER — NURSE TRIAGE (OUTPATIENT)
Dept: NURSING | Facility: CLINIC | Age: 68
End: 2023-12-30

## 2023-12-30 ENCOUNTER — HOSPITAL ENCOUNTER (EMERGENCY)
Facility: CLINIC | Age: 68
Discharge: HOME | End: 2023-12-31
Attending: FAMILY MEDICINE | Admitting: FAMILY MEDICINE
Payer: MEDICARE

## 2023-12-30 DIAGNOSIS — U07.1 COVID-19 VIRUS INFECTION: ICD-10-CM

## 2023-12-30 PROCEDURE — 99283 EMERGENCY DEPT VISIT LOW MDM: CPT | Performed by: FAMILY MEDICINE

## 2023-12-30 PROCEDURE — 250N000013 HC RX MED GY IP 250 OP 250 PS 637: Performed by: FAMILY MEDICINE

## 2023-12-30 PROCEDURE — 87637 SARSCOV2&INF A&B&RSV AMP PRB: CPT | Performed by: FAMILY MEDICINE

## 2023-12-30 RX ORDER — ACETAMINOPHEN 500 MG
1000 TABLET ORAL ONCE
Status: COMPLETED | OUTPATIENT
Start: 2023-12-30 | End: 2023-12-30

## 2023-12-30 RX ADMIN — ACETAMINOPHEN 1000 MG: 500 TABLET ORAL at 23:31

## 2023-12-31 VITALS
HEART RATE: 81 BPM | TEMPERATURE: 98.2 F | DIASTOLIC BLOOD PRESSURE: 82 MMHG | OXYGEN SATURATION: 96 % | RESPIRATION RATE: 20 BRPM | SYSTOLIC BLOOD PRESSURE: 125 MMHG

## 2023-12-31 LAB
FLUAV RNA SPEC QL NAA+PROBE: NEGATIVE
FLUBV RNA RESP QL NAA+PROBE: NEGATIVE
RSV RNA SPEC NAA+PROBE: NEGATIVE
SARS-COV-2 RNA RESP QL NAA+PROBE: POSITIVE

## 2023-12-31 ASSESSMENT — ENCOUNTER SYMPTOMS
MYALGIAS: 1
CHILLS: 1
PALPITATIONS: 0
NEUROLOGICAL NEGATIVE: 1
CONSTIPATION: 0
NERVOUS/ANXIOUS: 1
DIARRHEA: 0
ENDOCRINE NEGATIVE: 1
SHORTNESS OF BREATH: 0
VOMITING: 0
BLOOD IN STOOL: 0
COUGH: 1
EYES NEGATIVE: 1
FEVER: 1
FATIGUE: 1
NAUSEA: 0

## 2023-12-31 ASSESSMENT — ACTIVITIES OF DAILY LIVING (ADL): ADLS_ACUITY_SCORE: 37

## 2023-12-31 NOTE — ED PROVIDER NOTES
History     Chief Complaint   Patient presents with    Fatigue     HPI  Pavithra Raines is a 68 year old female who presented to the emergency room via ambulance from her home secondary to desire to be tested for COVID-19.  Patient states that her  has symptoms suggestive of COVID-19 with congestion, sore throat, body aches, and mild cough.  He has had symptoms for the last 3 to 4 days but she started getting symptoms earlier this morning.  Patient has a complicated medical history including chronic alcoholism.  Patient states that she stopped both alcohol and tobacco use after her most recent surgery for repair of a perforated bowel/ulcer.  Patient states that she did receive her pneumonia shot but has not received the most recent COVID-19 immunization as yet.  She is concerned about possible complications of the COVID-19 infection given her age and multiple medical issues.    Allergies:  Allergies   Allergen Reactions    Codeine Itching    Vicodin [Hydrocodone-Acetaminophen] Itching       Problem List:    Patient Active Problem List    Diagnosis Date Noted    Marginal ulcer 12/11/2023     Priority: Medium    Patient ventilator dyssynchrony (H) 10/11/2023     Priority: Medium    Coagulopathy (H24) 10/11/2023     Priority: Medium    Pulmonary hypertension (H) 10/11/2023     Priority: Medium    Acute respiratory failure with hypoxia (H) 10/10/2023     Priority: Medium    Alcohol withdrawal, with delirium (H) 10/10/2023     Priority: Medium    Hypophosphatemia 10/10/2023     Priority: Medium    Hypokalemia 10/10/2023     Priority: Medium    Thrombocytopenia (H24) 10/10/2023     Priority: Medium    Anemia, unspecified type 10/10/2023     Priority: Medium    Hypoalbuminemia 10/09/2023     Priority: Medium    Septic shock (H) 10/08/2023     Priority: Medium    Bacteremia due to Streptococcus pneumoniae 10/08/2023     Priority: Medium    Hypomagnesemia 10/08/2023     Priority: Medium    History of  seizure due to alcohol withdrawal 10/08/2023     Priority: Medium    Emphysema/COPD (H) 10/08/2023     Priority: Medium    Tobacco abuse 10/08/2023     Priority: Medium    Malnutrition - suspected 10/08/2023     Priority: Medium    Dehydration 10/07/2023     Priority: Medium    Hyponatremia 10/07/2023     Priority: Medium    Weakness generalized 10/07/2023     Priority: Medium    Alcohol use disorder, severe, dependence (H) 10/07/2023     Priority: Medium    Multifocal pneumonia 10/07/2023     Priority: Medium    Alcohol dependence in remission (H) 01/08/2022     Priority: Medium    Episode of recurrent major depressive disorder, unspecified depression episode severity (H24) 01/08/2022     Priority: Medium    Seizure disorder (H) 01/08/2022     Priority: Medium    Fatty liver 12/13/2020     Priority: Medium    PTSD (post-traumatic stress disorder) 12/13/2020     Priority: Medium    Underweight 12/13/2020     Priority: Medium    Macrocytosis without anemia 12/13/2020     Priority: Medium    Age-related osteoporosis without current pathological fracture 10/15/2020     Priority: Medium    Anemia due to vitamin B12 deficiency, unspecified B12 deficiency type 12/04/2017     Priority: Medium    Insomnia, unspecified type 12/04/2017     Priority: Medium    Other iron deficiency anemia 12/04/2017     Priority: Medium    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 grade 3 04/07/2011     Priority: Medium     12/15/06 ASCUS + high risk HPV  colpo in 2007 showed high grade KAITLYN and LEEP was recommended  LEEP was done in June,2007 with CAN 2/3 confirmed  10/15/07 NIL  9/30/08 NIL/neg HPV  10/21/09 NIL/neg HPV  4/5/11 NIL- repeat in one year (4/2012 12/1/17 NIL pap/neg HR HPV. Will need pap screening until 2027, regardless of age.  Plan: cotest due 3 yr.      MDD (major depressive disorder) 04/05/2011     Priority: Medium     Needs to est primary care.      CARDIOVASCULAR SCREENING; LDL GOAL LESS THAN 160 10/31/2010     Priority:  Medium    Genital herpes      Priority: Medium     IMO update changed this record. Please review for accuracy      Anxiety 08/27/2008     Priority: Medium     Patient is followed by MIMI GARZA for ongoing prescription of benzodiazepines.  All refills should be approved by this provider, or covering partner.    Medication(s): Clonazepam.   Maximum quantity per month: 30  Clinic visit frequency required:      Controlled substance agreement on file: No  Benzodiazepine use reviewed by psychiatry:  No    Last [facility] website verification:  done on 4/5/19  [email or web address]        History of Tom-en-Y gastric bypass March 2008 03/25/2008     Priority: Medium     Performed at [facility].      Restless legs syndrome (RLS) 05/15/2007     Priority: Medium    Hypersomnia with sleep apnea 05/15/2007     Priority: Medium     Problem list name updated by automated process. Provider to review      Esophageal reflux 04/18/2007     Priority: Medium        Past Medical History:    Past Medical History:   Diagnosis Date    Abnormal Papanicolaou smear of cervix and cervical HPV 04/01/2007    COPD (chronic obstructive pulmonary disease) (H)     Genital herpes     History of colposcopy with cervical biopsy 06/01/2007    Hypersomnia with sleep apnea, unspecified     Other and unspecified ovarian cyst 2002 or so    Pneumonia 10/23/2023    Sleep apnea     Uncomplicated asthma        Past Surgical History:    Past Surgical History:   Procedure Laterality Date    CHOLECYSTECTOMY, OPEN  age 20s    COLONOSCOPY  03/21/2011    COMBINED COLONOSCOPY, REMOVE TUMOR/POLYP/LESION BY SNARE performed by JUAN FRANCISCO HERNANDEZ at  GI    COLONOSCOPY N/A 10/09/2017    polyps, repeat 3 years    COLPOSCOPY CERVIX, LOOP ELECTRODE BIOPSY, COMBINED  06/2007    CAN 2/3-patient requires yearly pap smears    dental pegs      ESOPHAGOSCOPY, GASTROSCOPY, DUODENOSCOPY (EGD), COMBINED N/A 02/09/2018    Procedure: COMBINED  ESOPHAGOSCOPY, GASTROSCOPY, DUODENOSCOPY (EGD);  EGD;  Surgeon: Oneal Sanchez MD;  [facility]: [facility]    GASTRIC BYPASS  03/25/2008    At [facility]    HC DILATION/CURETTAGE DIAG/THER NON OB  2002    HC ENLARGE BREAST WITH IMPLANT      HC LAPAROSCOPIC MYOMECTOMY, 1 - 4 INTRAMURAL MYOMAS =<250 GM  2002    HC REMOVAL OF BREAST IMPLANT      LAPAROSCOPIC ASSISTED INSERTION TUBE GASTROTOMY N/A 12/13/2023    Procedure: EXPLORATORY LAPAROSCOPY WITH REPEAT GASTRORRHAPHY;  Surgeon: Antoni Gonzalez MD;  Location:  OR    LAPAROSCOPY DIAGNOSTIC (GENERAL) N/A 12/11/2023    Procedure: Diagnostic laparoscopy, laparoscopic patching of marginal ulcer;  Surgeon: Antoni Gonzalez MD;  Location:  OR    SALPINGO OOPHORECTOMY,R/L/MATTHEW  2002    Bilateral salpingectomy and unilateral oophorectomy       Family History:    Family History   Problem Relation Age of Onset    Chronic Obstructive Pulmonary Disease Mother     Unknown/Adopted Father         doesn't know birth father    Lung Cancer Brother     Family History Negative Other        Social History:  Marital Status:   [2]  Social History     Tobacco Use    Smoking status: Every Day     Packs/day: 2.00     Years: 10.00     Additional pack years: 0.00     Total pack years: 20.00     Types: Cigarettes    Smokeless tobacco: Never    Tobacco comments:     2 ppd at this time (chain smoking) 10/2012   Vaping Use    Vaping Use: Every day    Substances: Nicotine, THC    Devices: Pre-filled or refillable cartridge   Substance Use Topics    Alcohol use: Yes     Comment: daily, drinks a box    Drug use: Yes     Types: Marijuana     Comment: medical        Medications:    nirmatrelvir and ritonavir (PAXLOVID) 300 mg/100 mg therapy pack  busPIRone (BUSPAR) 15 MG tablet  calcium carbonate (OS-SHON 500 MG [place]. [place]) 1250 MG tablet  clonazePAM (KLONOPIN) 0.5 MG tablet  cyanocobalamin (CYANOCOBALAMIN) 1000 MCG/ML injection  cyclobenzaprine (FLEXERIL) 10 MG tablet  escitalopram (LEXAPRO) 20 MG  "tablet  ferrous gluconate (FERGON) 324 (38 Fe) MG tablet  folic acid (FOLVITE) 1 MG tablet  Insulin Syringe-Needle U-100 (B-D INSULIN SYRINGE) 25G X 1\" 1 ML MISC  multivitamin w/minerals (THERA-VIT-M) tablet  omeprazole (PRILOSEC) 40 MG DR capsule  oxyCODONE (ROXICODONE) 5 MG tablet  raloxifene (EVISTA) 60 MG tablet  sennosides (SENOKOT) 8.6 MG tablet  thiamine (B-1) 100 MG tablet  triamcinolone (KENALOG) 0.1 % external cream  venlafaxine (EFFEXOR XR) 37.5 MG 24 hr capsule          Review of Systems   Constitutional:  Positive for chills, fatigue and fever.   HENT:  Positive for congestion.    Eyes: Negative.    Respiratory:  Positive for cough. Negative for shortness of breath.    Cardiovascular:  Negative for chest pain, palpitations and leg swelling.   Gastrointestinal:  Negative for blood in stool, constipation, diarrhea, nausea and vomiting.   Endocrine: Negative.    Genitourinary: Negative.    Musculoskeletal:  Positive for myalgias.   Skin: Negative.    Neurological: Negative.    Psychiatric/Behavioral:  The patient is nervous/anxious.    All other systems reviewed and are negative.      Physical Exam   BP: 125/82  Pulse: 84  Temp: 98.2  F (36.8  C)  Resp: 20  SpO2: 97 %      Physical Exam  Vitals and nursing note reviewed.   Constitutional:       General: She is in acute distress (mild).      Comments: Patient is familiar to me from multiple prior ER visits and she actually looks in better health today than she has in a long time.  She appears to be in no respiratory distress.   HENT:      Nose: Congestion present.      Mouth/Throat:      Pharynx: Oropharynx is clear.   Eyes:      Conjunctiva/sclera: Conjunctivae normal.      Pupils: Pupils are equal, round, and reactive to light.   Cardiovascular:      Rate and Rhythm: Normal rate.      Pulses: Normal pulses.   Pulmonary:      Effort: Pulmonary effort is normal. No respiratory distress.      Breath sounds: No stridor. No wheezing, rhonchi or rales. "   Abdominal:      Tenderness: There is no abdominal tenderness. There is no guarding or rebound.   Musculoskeletal:         General: No swelling.      Cervical back: Neck supple.      Right lower leg: No edema.      Left lower leg: No edema.   Skin:     Capillary Refill: Capillary refill takes less than 2 seconds.      Coloration: Skin is not jaundiced.      Findings: No bruising.   Neurological:      Mental Status: She is alert and oriented to person, place, and time.   Psychiatric:         Mood and Affect: Mood normal.         Behavior: Behavior normal.         ED Course                 Procedures              Critical Care time:  none               Results for orders placed or performed during the hospital encounter of 12/30/23 (from the past 24 hour(s))   Symptomatic Influenza A/B, RSV, & SARS-CoV2 PCR (COVID-19) Nose    Specimen: Nose; Swab   Result Value Ref Range    Influenza A PCR Negative Negative    Influenza B PCR Negative Negative    RSV PCR Negative Negative    SARS CoV2 PCR Positive (A) Negative    Narrative    Testing was performed using the Xpert Xpress CoV2/Flu/RSV Assay on the [employer] GeneXpert Instrument. This test should be ordered for the detection of SARS-CoV-2, influenza, and RSV viruses in individuals who meet clinical and/or epidemiological criteria. Test performance is unknown in asymptomatic patients. This test is for in vitro diagnostic use under the FDA EUA for laboratories certified under CLIA to perform high or moderate complexity testing. This test has not been FDA cleared or approved. A negative result does not rule out the presence of PCR inhibitors in the specimen or target RNA in concentration below the limit of detection for the assay. If only one viral target is positive but coinfection with multiple targets is suspected, the sample should be re-tested with another FDA cleared, approved, or authorized test, if coinfection would change clinical management. This test was validated  by the [facility]. These laboratories are certified under the Clinical Laboratory Improvement Amendments of 1988 (CLIA-88) as qualified to perform high complexity laboratory testing.       Medications   acetaminophen (TYLENOL) tablet 1,000 mg (1,000 mg Oral $Given 12/30/23 1884)       Assessments & Plan (with Medical Decision Making)  68-year-old female with multiple medical issues to the ER desiring COVID testing secondary to symptoms of myalgia, congestion, sore throat, and mild cough.  Her  has had similar symptoms for the last several days.  Exam findings consistent with COVID-19 infection with positive test result.  Patient stable through the ER course.  Given her multiple medical issues and age patient is a candidate for Paxlovid antiviral medication.  In review of her medications she is told to reduce her BuSpar dose in half for the 5 days she is on the Paxlovid.  She is also told to avoid the oxycodone while on Paxlovid.  She may take her other medications as previously directed.  Patient was given instructions about Paxlovid and also the potential risk for metallic taste in her mouth.  She was also given instructions about isolation given her COVID-19 infection.  She was also given a handout discussing care of herself with COVID-19 and the signs and symptoms that would be of concern and when to return to the ER should she have increased or worsening symptoms.  Patient discharged to care of her family.     I have reviewed the nursing notes.    I have reviewed the findings, diagnosis, plan and need for follow up with the patient.           Medical Decision Making  The patient's presentation was of moderate complexity (an acute illness with systemic symptoms).    The patient's evaluation involved:  ordering and/or review of 1 test(s) in this encounter (see separate area of note for details)    The patient's management necessitated moderate risk (prescription drug management including  medications given in the ED).        Discharge Medication List as of 12/31/2023  1:14 AM        START taking these medications    Details   nirmatrelvir and ritonavir (PAXLOVID) 300 mg/100 mg therapy pack Take 3 tablets by mouth 2 times daily for 5 days, Disp-30 tablet, R-0, E-PrescribeDate of symptom onset: 12/29/23; Risk criteria met: Yes; Weight >40 kg Yes; Renal fxn: normal;  Drug-Drug interactions reviewed & addressed: Yes. She will be reducing her  Buspar dose in half while taking Mora                  I verbally discussed the findings of the evaluation today in the ER. I have verbally discussed with Pavithra the suggested treatment(s) as described in the discharge instructions and handouts. I have prescribed the above listed medications and instructed her on appropriate use of these medications.      I have verbally suggested she follow-up in her clinic or return to the ER for increased symptoms. See the follow-up recommendations documented  in the after visit summary in this visit's EPIC chart.      Disclaimer: This note consists of words and symbols derived from keyboarding and dictation using voice recognition software.  As a result, there may be errors that have gone undetected.  Please consider this when interpreting information found in this note.      Final diagnoses:   COVID-19 virus infection       12/30/2023   [facility] EMERGENCY DEPT       Kimani Palacios,   12/31/23 0234

## 2023-12-31 NOTE — TELEPHONE ENCOUNTER
"Nurse Triage SBAR    Is this a 2nd Level Triage? NO    Situation: Suspected Covid    Background: :Patient has not tested self for Covid.    Assessment: She reports onset of symptoms on 12/28/2023 including headache, chills, sore throat, dizziness, nasal congestion, and fatigue. She reports oxygen is 96%. Patient is afebrile. Patient reports \"tightness in lungs\". She reports disorientation.    Protocol Recommended Disposition:   According to the protocol, patient should call 911.  Care advice given. Patient verbalizes understanding and agrees with plan of care. Patient plans to call 911.    Shanice Fischer RN  12/30/23 10:56 PM  [facility] Nurse Advisor    Reason for Disposition   Difficult to awaken or acting confused (e.g., disoriented, slurred speech)    Additional Information   Negative: SEVERE difficulty breathing (e.g., struggling for each breath, speaks in single words)    Protocols used: Coronavirus (COVID-19) Diagnosed or [facility]    "

## 2023-12-31 NOTE — DISCHARGE INSTRUCTIONS
, You have COVID-19.  I prescribed a medicine called Paxlovid that can help you get over this infection with less complications.  Please decrease your BuSpar dose by 1/2 to 1/2 tablet daily for the 5 days while you take the Paxlovid.  Also avoid use of the oxycodone medication as the Paxlovid could increase the effects of oxycodone and there is some risk for you overdosing on the oxycodone.

## 2024-01-02 ENCOUNTER — PATIENT OUTREACH (OUTPATIENT)
Dept: CARE COORDINATION | Facility: CLINIC | Age: 69
End: 2024-01-02
Payer: COMMERCIAL

## 2024-01-02 ENCOUNTER — TELEPHONE (OUTPATIENT)
Dept: SURGERY | Facility: CLINIC | Age: 69
End: 2024-01-02
Payer: COMMERCIAL

## 2024-01-02 RX ORDER — CYCLOBENZAPRINE HCL 10 MG
10 TABLET ORAL 2 TIMES DAILY PRN
Qty: 10 TABLET | Refills: 0 | Status: SHIPPED | OUTPATIENT
Start: 2024-01-02 | End: 2024-02-01

## 2024-01-02 NOTE — TELEPHONE ENCOUNTER
PRIOR AUTHORIZATION DENIED    Medication: CYCLOBENZAPRINE HCL 10 MG PO TABS  Insurance Company: [employer] - Phone [phone] Fax [phone]  Denial Date: 1/2/2024  Denial Reason(s):     Appeal Information:       Patient Notified: No

## 2024-01-02 NOTE — PROGRESS NOTES
Clinic Care Coordination Contact    Situation: Patient chart reviewed by care coordinator.    Background: received notification of pt admitted/discharged from ED.    Assessment: pt was to ED for Covid.  She was stable and provided medication and education and discharged home.     Plan/Recommendations: will not call related to ED visit and will follow up as previously planned.     ANUP Walker   [facility] - Care Coordination  [facility]   [phone]

## 2024-01-03 ENCOUNTER — VIRTUAL VISIT (OUTPATIENT)
Dept: PHARMACY | Facility: CLINIC | Age: 69
End: 2024-01-03
Attending: SURGERY

## 2024-01-03 DIAGNOSIS — F33.9 EPISODE OF RECURRENT MAJOR DEPRESSIVE DISORDER, UNSPECIFIED DEPRESSION EPISODE SEVERITY (H): ICD-10-CM

## 2024-01-03 DIAGNOSIS — Z98.84 HISTORY OF ROUX-EN-Y GASTRIC BYPASS: ICD-10-CM

## 2024-01-03 DIAGNOSIS — U07.1 COVID-19 VIRUS INFECTION: Primary | ICD-10-CM

## 2024-01-03 DIAGNOSIS — F41.9 ANXIETY: ICD-10-CM

## 2024-01-03 DIAGNOSIS — F10.21 ALCOHOL DEPENDENCE IN REMISSION (H): ICD-10-CM

## 2024-01-03 DIAGNOSIS — M81.0 AGE-RELATED OSTEOPOROSIS WITHOUT CURRENT PATHOLOGICAL FRACTURE: ICD-10-CM

## 2024-01-03 DIAGNOSIS — K28.9 MARGINAL ULCER: ICD-10-CM

## 2024-01-03 PROCEDURE — 99207 PR NO CHARGE LOS: CPT | Mod: 93 | Performed by: PHARMACIST

## 2024-01-03 NOTE — PATIENT INSTRUCTIONS
"Recommendations from today's MTM visit:                                                    MTM (medication therapy management) is a service provided by a clinical pharmacist designed to help you get the most of out of your medicines.   Today we reviewed what your medicines are for, how to know if they are working, that your medicines are safe and how to make your medicine regimen as easy as possible.       Continue to take your current medications. Verify with enclosed medication list that you have and are taking everything as prescribed.     Follow-up: As Needed    It was great speaking with you today.  I value your experience and would be very thankful for your time in providing feedback in our clinic survey. In the next few days, you may receive an email or text message from [facility] with a link to a survey related to your  clinical pharmacist.\"     To schedule another MTM appointment, please call the clinic directly or you may call the MTM scheduling line at [phone] or toll-free at [phone].     My Clinical Pharmacist's contact information:                                                      Please feel free to contact me with any questions or concerns you have.      Tylor Somers, Carl, [facility]  Medication Therapy Management Pharmacist  Pager: [phone]  "

## 2024-01-03 NOTE — PROGRESS NOTES
Medication Therapy Management (MTM) Encounter    ASSESSMENT:                            Medication Adherence/Access: See below for considerations    COVID-19: Improving per patient.     Depression/Anxiety: Somewhat stable per patient. Has care coordination follow-up regarding financial resources.     Ulcer: Improved per patient.     History of Tom-en-Y/History of alcohol dependence: Stable.     Osteoporosis: Stable.     PLAN:                            Continue to take your current medications. Verify with enclosed medication list that you have and are taking everything as prescribed.     Follow-up: As Needed    SUBJECTIVE/OBJECTIVE:                          Shanika Raines is a 68 year old female called for a transitions of care visit. She was discharged from [facility] ED on 12/31/2023 for COVID-19 infection.      Reason for visit: Hospital/ED Follow-Up.    Allergies/ADRs: Reviewed in chart  Past Medical History: Reviewed in chart  Tobacco: She reports that she has been smoking cigarettes. She has a 20 pack-year smoking history. She has never used smokeless tobacco.Nicotine/Tobacco Cessation Plan:   Information offered: Patient not interested at this time  Alcohol: history of alcohol dependence    Medication Adherence/Access: unclear adherence/fill history - patient stated that she was taking everything, but also mentioned financial hardships/selling stock to help her get more medications. Has had hospitalizations and transitional care stays that make it difficult to assess her current supply.     COVID-19: Patient was started on 5-day course of Paxlovid. She was told to reduce her Buspar and she states clonazepam.  Seems a little confused about her medications. Feeling much better - only issue is metallic taste.     Depression/Anxiety:  Buspirone 15 mg three times daily (taking half dose right now).  Clonazepam 0.5 mg daily as needed (unclear how frequent - hold off right now)  Escitalopram 30  "mg daily  Venlafaxine XR 37.5   Patient reports no current medication side effects - besides some \"zaps\" with adjustments after starting Paxlovid.  Patient reports symptoms are somewhat stable.      6/14/2022     9:48 AM 4/3/2023    12:39 PM 5/11/2023     2:28 PM   PHQ-9 SCORE   PHQ-9 Total Score MyChart 5 (Mild depression) 8 (Mild depression)    PHQ-9 Total Score 5 8 9         3/23/2022     1:30 PM 4/20/2022     2:02 PM 4/3/2023    12:46 PM   DANYEL-7 SCORE   Total Score   9 (mild anxiety)   Total Score 19 9 9       Ulcer:   Omeprazole 40 mg twice daily - states she is taking but not clear if it was filled   Patient reports no current symptoms.       History of Tom-en-Y/History of alcohol dependence:   Calcium carbonate 500 mg twice daily  Vitamin B12 1,000 mcg every 30 days  Ferrous gluconate 324 mg daily  Folic acid 1 mg daily  Daily multivitamin  Thiamine 100 mg daily  Denies any issues.     Osteoporosis:   raloxifene (Evista) 60mg daily (has been on current therapy 2-3 years unclear if gap)  Patient is not experiencing side effects.  DEXA History: 10/12/2020  Risk factors: post-menopausal  chronic corticosteroid use  Last vitamin D level:  Lab Results   Component Value Date    VITDT 44 06/14/2022    VITDT <4 (L) 04/27/2020       Today's Vitals: LMP  (LMP Unknown)     BP Readings from Last 3 Encounters:   12/30/23 125/82   12/21/23 113/77   12/11/23 110/75     Wt Readings from Last 5 Encounters:   12/21/23 106 lb 11.2 oz (48.4 kg)   12/11/23 100 lb (45.4 kg)   11/26/23 100 lb (45.4 kg)   11/24/23 100 lb (45.4 kg)   11/01/23 100 lb (45.4 kg)     ----------------  Post Discharge Medication Reconciliation Status: discharge medications reconciled, continue medications without change.    I spent 20 minutes with this patient today. A copy of the visit note was provided to the patient's provider(s).    A summary of these recommendations was mailed to the patient.    Tylor Somers, PharmD, BCACP  Medication Therapy Management " Pharmacist  Pager: [phone]    Telemedicine Visit Details  Type of service:  Telephone visit  Start Time:  11:00 AM  End Time:  11:20 AM     Medication Therapy Recommendations  No medication therapy recommendations to display

## 2024-01-04 ENCOUNTER — VIRTUAL VISIT (OUTPATIENT)
Dept: FAMILY MEDICINE | Facility: CLINIC | Age: 69
End: 2024-01-04
Payer: MEDICARE

## 2024-01-04 ENCOUNTER — PATIENT OUTREACH (OUTPATIENT)
Dept: CARE COORDINATION | Facility: CLINIC | Age: 69
End: 2024-01-04
Payer: MEDICARE

## 2024-01-04 DIAGNOSIS — F10.21 ALCOHOL DEPENDENCE IN REMISSION (H): Primary | ICD-10-CM

## 2024-01-04 DIAGNOSIS — D64.9 ANEMIA, UNSPECIFIED TYPE: ICD-10-CM

## 2024-01-04 DIAGNOSIS — R78.81 BACTEREMIA DUE TO STREPTOCOCCUS PNEUMONIAE: ICD-10-CM

## 2024-01-04 DIAGNOSIS — D68.9 COAGULOPATHY (H): ICD-10-CM

## 2024-01-04 DIAGNOSIS — B95.3 BACTEREMIA DUE TO STREPTOCOCCUS PNEUMONIAE: ICD-10-CM

## 2024-01-04 DIAGNOSIS — F33.9 EPISODE OF RECURRENT MAJOR DEPRESSIVE DISORDER, UNSPECIFIED DEPRESSION EPISODE SEVERITY (H): ICD-10-CM

## 2024-01-04 DIAGNOSIS — Z86.59 HISTORY OF SEIZURE DUE TO ALCOHOL WITHDRAWAL: ICD-10-CM

## 2024-01-04 DIAGNOSIS — D75.89 MACROCYTOSIS WITHOUT ANEMIA: ICD-10-CM

## 2024-01-04 DIAGNOSIS — K28.9 MARGINAL ULCER: ICD-10-CM

## 2024-01-04 DIAGNOSIS — Z72.0 TOBACCO ABUSE: ICD-10-CM

## 2024-01-04 DIAGNOSIS — Z98.84 HISTORY OF ROUX-EN-Y GASTRIC BYPASS: ICD-10-CM

## 2024-01-04 DIAGNOSIS — Z87.898 HISTORY OF SEIZURE DUE TO ALCOHOL WITHDRAWAL: ICD-10-CM

## 2024-01-04 DIAGNOSIS — D69.6 THROMBOCYTOPENIA (H): ICD-10-CM

## 2024-01-04 DIAGNOSIS — U07.1 INFECTION DUE TO 2019 NOVEL CORONAVIRUS: ICD-10-CM

## 2024-01-04 DIAGNOSIS — J43.9 PULMONARY EMPHYSEMA, UNSPECIFIED EMPHYSEMA TYPE (H): ICD-10-CM

## 2024-01-04 PROCEDURE — 99441 PR PHYSICIAN TELEPHONE EVALUATION 5-10 MIN: CPT | Mod: 93 | Performed by: STUDENT IN AN ORGANIZED HEALTH CARE EDUCATION/TRAINING PROGRAM

## 2024-01-04 ASSESSMENT — PATIENT HEALTH QUESTIONNAIRE - PHQ9
10. IF YOU CHECKED OFF ANY PROBLEMS, HOW DIFFICULT HAVE THESE PROBLEMS MADE IT FOR YOU TO DO YOUR WORK, TAKE CARE OF THINGS AT HOME, OR GET ALONG WITH OTHER PEOPLE: SOMEWHAT DIFFICULT
SUM OF ALL RESPONSES TO PHQ QUESTIONS 1-9: 9
SUM OF ALL RESPONSES TO PHQ QUESTIONS 1-9: 9

## 2024-01-04 NOTE — PROGRESS NOTES
"    Instructions Relayed to Patient by Virtual Roomer:     Patient is active on [employer]:   Relayed following to patient: \"It looks like you are active on [employer], are you able to join the visit this way? If not, do you need us to send you a link now or would you like your provider to send a link via text or email when they are ready to initiate the visit?\"    Reminded patient to ensure they were logged on to virtual visit by arrival time listed. Documented in appointment notes if patient had flexibility to initiate visit sooner than arrival time. If pediatric virtual visit, ensured pediatric patient along with parent/guardian will be present for video visit.     Patient offered the website [email or web address] and/or phone number to [employer] Help line: [phone]     Shanika is a 68 year old who is being evaluated via a billable telephone visit.      What phone number would you like to be contacted at? [phone]   How would you like to obtain your AVS? 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  No complications and does have upcoming follow-up with surgery.  Importance of continued alcohol cessation and smoking cessation discussed in detail today. Following with psychiatry and she has been on chronic clonazepam. Thinks things " are stable at this time and feels comfortable in her recovery.      MED REC REQUIRED  Post Medication Reconciliation Status:  Discharge medications reconciled and changed, see notes/orders      Subjective   Shanika is a 68 year old, presenting for the following health issues:  Covid Concern (Tested Positive on 12/30/2023)        1/4/2024    12:48 PM   Additional Questions   Roomed by Marsha BAPTISTE   Accompanied by Self       HPI       COVID-19 Symptom Review  How many days ago did these symptoms start? 12/29/2023    Are any of the following symptoms significant for you?  New or worsening difficulty breathing? No  Worsening cough? Yes, I am coughing up mucus.  Fever or chills? No  Headache: No  Sore throat: No  Chest pain: No  Diarrhea: No  Body aches? No    What treatments has patient tried? None    Does patient live in a nursing home, group home, or shelter? No  Does patient have a way to get food/medications during quarantined? Yes, I have a friend or family member who can help me.          ED/UC Followup:    Facility:  [facility] Emergency Dept   Date of visit: 12/30/2023  Reason for visit: Covid Symptoms   Current Status: Improving at Home     Recent ER visit for COVID and treated with Paxil bid.  No complications at that time.  Symptoms improving now.  Significant hospitalization previously for ulcer with a history of Tom-en-Y and following with surgery Dr. Gonzalez.  Doing well on her alcohol use and has been sober since discharge.  Following with nutritionist.  Does have follow-up with psychology.    Review of Systems   Constitutional, HEENT, cardiovascular, pulmonary, GI, , musculoskeletal, neuro, skin, endocrine and psych systems are negative, except as otherwise noted.      Objective           Vitals:  No vitals were obtained today due to virtual visit.    Physical Exam   healthy, alert, and no distress  PSYCH: Alert and oriented times 3; coherent speech, normal   rate and volume, able to articulate  logical thoughts, able   to abstract reason, no tangential thoughts, no hallucinations   or delusions    RESP: No cough, no audible wheezing, able to talk in full sentences  Remainder of exam unable to be completed due to telephone visits          7 minutes      Answers submitted by the patient for this visit:  Patient Health Questionnaire (Submitted on 1/4/2024)  If you checked off any problems, how difficult have these problems made it for you to do your work, take care of things at home, or get along with other people?: Somewhat difficult  PHQ9 TOTAL SCORE: 9

## 2024-01-04 NOTE — TELEPHONE ENCOUNTER
Clinic Care Coordination Contact  Program: [facility] Benefit/yasemin care/ Certain population/ Energy assistance   County: [facility] Case #:  [facility] Worker:   Negraure #:   Subscriber #:   Renewal:  Date Applied:     FRW Outreach:   1/4/24-  Frw called pt at appt time to follow up with [facility] regarding [facility] benefit. She said that she received a letter from them and she is denied due to income. Pt is approved for social security 1300 a month. Frw confirmed that pt received all of the other 3 application. Pt was a bit confused but she finally confirmed it. She stated that she will sent it today as she was waiting for her partner's pay stubs. Frw will follow within 30 days.  Leona Negrete  Financial Resource Worker  [facility] Care Coordination  [phone]     12/27/23-  w called pt to follow on her [facility] benefit, energy assistance and yasemin care application status. Pt stated that she hasn't received any thing from the [facility] or from me as she was hospitalized. Frw emailed and will mail both application again. Frw also schedule an appt with pt to follow up on her [facility] benefit on 1/4 at 9 am. Frw will follow up within 30 days.  Leona Negrete  Financial Resource Worker  [facility] Care Coordination  [phone]     12/6/23- Frw called pt at appt time she state that she has no income. Frw assisted pt in applying for [facility] benefit- snap/cash, Energy assistance and yasemin care. Pt state that someone from [facility] had helped her with Cert pop and mailed the AVS form. Frw informed pt that she needs to send the AVS form asap. Frw will mail yasemin care and energy assistance applications to the pt so she can sign. w provided next instruction regarding all 3 applications. Frw will follow up with pt within 30 days.  Leona Negrete  Financial Resource Worker  [facility] Care Coordination  [phone]     11/28/23- [place] established contact with  pt, she state that she had been hospitalized and currently doesn't income. Frw ask pt gather income infor from prior to her hospitalization as she is self-employed. Frw schedule an appt on 12/6 at 11 am to screen and apply for whatever she is eligible.   Leona Negrete  Financial Resource Worker  [facility]  Clinic Care Coordination  [phone]         Health Insurance:      Referral/Screening:  FRW Screening    Row Name 11/27/23 0946       County Benefits   Is patient requesting help applying for [facility] benefits? Yes       Have you recently applied for any county benefits? No       How many people in your household? 2       Do you buy/eat food together? Yes       What is the monthly gross income for the household (wages, social security, workers comp, and pension)? 1900       Insurance:   [place]? No       Is this an insurance renewal? No       Is this a new insurance application request? Yes       Have you recently applied for insurance? No       How many people in your household? 2       Do you file taxes? No       What is the monthly gross income for the household (wages, social security, workers comp, and pension)? 1900       OTHER   Is this a yasemin care application? Yes       Any other information for the FRW? to apply for SNAP and any programs she qualifies for including [place], utilities

## 2024-01-08 DIAGNOSIS — L28.0 NEURODERMATITIS: ICD-10-CM

## 2024-01-09 RX ORDER — LEVOCETIRIZINE DIHYDROCHLORIDE 5 MG/1
5 TABLET, FILM COATED ORAL EVERY MORNING
Qty: 90 TABLET | Refills: 0 | OUTPATIENT
Start: 2024-01-09

## 2024-01-19 ENCOUNTER — PATIENT OUTREACH (OUTPATIENT)
Dept: CARE COORDINATION | Facility: CLINIC | Age: 69
End: 2024-01-19
Payer: COMMERCIAL

## 2024-01-22 ENCOUNTER — TELEPHONE (OUTPATIENT)
Dept: FAMILY MEDICINE | Facility: CLINIC | Age: 69
End: 2024-01-22
Payer: COMMERCIAL

## 2024-01-22 DIAGNOSIS — H91.90 DECREASED HEARING, UNSPECIFIED LATERALITY: Primary | ICD-10-CM

## 2024-01-22 NOTE — TELEPHONE ENCOUNTER
Order/Referral Request    Who is requesting: PATIENT    Orders being requested: audiology     Reason service is needed/diagnosis: having a hard time hearing    When are orders needed by: asap    Has this been discussed with Provider: No    Does patient have a preference on a Group/Provider/Facility? [facility]    Does patient have an appointment scheduled?: No    Where to send orders: N/A    Could we send this information to you in [facility] or would you prefer to receive a phone call?:   Patient would prefer a phone call   Okay to leave a detailed message?: Yes at Cell number on file:    Telephone Information:   Mobile [phone]

## 2024-01-22 NOTE — TELEPHONE ENCOUNTER
Patient was notified that covering provider placed referral for her for Audiology. Number given that she can call to schedule the appointment. Catrina Payne LPN

## 2024-01-29 ENCOUNTER — PATIENT OUTREACH (OUTPATIENT)
Dept: CARE COORDINATION | Facility: CLINIC | Age: 69
End: 2024-01-29
Payer: COMMERCIAL

## 2024-01-29 NOTE — LETTER
JALEN [address] [address]  Clinic: [phone]      1/29/2024      Pavithra Raines  [address] [facility] [address]      Dear  Pavithra,    It was a pleasure to speak with you.  I would like to provide you with the enclosed information for your records.  As part of care coordination, we are developing care plans to assist in accomplishing your health care goals.  When we speak next, please feel free to let me know if you want to add or change any information on your care plans.    As always, feel free to contact me if you have any questions or concerns.  I look forward to working with you in the effort to achieve your health care and wellness goals .        Sincerely,      Jenny Hewitt, [facility]  Clinic Care Coordination  [phone]        [facility]  Patient Centered Plan of Care  About Me:        Patient Name:  Pavithra Raines    YOB: 1955  Age:         68 year old   [place] MRN:    [id number] Telephone Information:  Home Phone [phone]   Mobile [phone]       Address:  [address] [facility] [address] Email address:  [email or web address]      Emergency Contact(s)    Name Relationship Lgl Grd Work Phone Home Phone Mobile Phone   1. RIANFRAN Friend   [phone]   2. SAMANTA GENAO Spouse   [phone]   3. KANIKA LOPEZ Friend   [phone]            Primary language:  English     needed? No   [place] [facility]:  [phone] op. 1  Other communication barriers:Other (does not do well if addressed with a negative tone or other person coming across accusatory/lieing, any triggers similar to  past experiences can cause lack of communication)    Preferred Method of Communication:  Mail  Current living arrangement: I live in a private home with spouse    Mobility Status/ Medical Equipment: Independent        Health Maintenance  Health Maintenance  Reviewed: Due/Overdue   Health Maintenance Due   Topic Date Due    SPIROMETRY  Never done    COPD ACTION PLAN  Never done    RSV VACCINE (Pregnancy & 60+) (1 - 1-dose 60+ series) Never done    COLORECTAL CANCER SCREENING  10/09/2020    MEDICARE ANNUAL WELLNESS VISIT  09/17/2022    COVID-19 Vaccine (4 - 2023-24 season) 09/01/2023           My Access Plan  Medical Emergency 911   Primary Clinic Line [facility] - [phone]   24 Hour Appointment Line [phone] or  [phone]) (toll-free)   24 Hour Nurse Line [phone] (toll-free)   Preferred Urgent Care [facility], [phone]     Preferred Hospital [facility]  [phone]     Preferred Pharmacy [facility] [place] - [address]      Behavioral Health Crisis Line The National Suicide Prevention Lifeline at 1-585.361.8320 or Text/Call 478           My Care Team Members  Patient Care Team         Relationship Specialty Notifications Start End    Soha Boggs MD PCP - General Family Medicine  6/1/23     Phone: [phone] Fax: [phone]          [place] [address]    Soha Boggs MD Assigned PCP   12/31/17     Phone: [phone] Fax: [phone] [address] [place] [address]    Jenny Hewitt LSW Lead Care Coordinator Primary Care - CC Admissions 4/11/23     Phone: [phone] Fax: [phone]        Selina Lester APRN CNP Nurse Practitioner Gastroenterology  5/18/23     Phone: [phone] Fax: [phone]         [address] [place] [address]    Leona Negrete MA Financial Resource Worker   11/27/23     Phone: [phone]         Armando Somers [facility] Pharmacist Pharmacist Clinician- Clinical Pharmacy Specialist  1/3/24     Phone: [phone] Fax: [phone] [address] [facility] [address]    Armando Somers, [facility] Assigned [facility]  Pharmacist   1/6/24     Phone: [phone] Fax: [phone] [address] [facility] [address] [facility] [address]    Danie Peters, HARRYW Community Health Worker Primary Care - CC Admissions 1/29/24    MIESHA Pereira [phone]               My Care Plans  Self Management and Treatment Plan    Care Plan  Care Plan: Financial Wellbeing       Problem: [employer] Benefit/ Colleen care/       Note:     Goal statement: I will apply for county benefits   CASH/SNAP/ Emergency assistance within the next 90 days.     Strengths: patient is accepting of support.  Patient expressed understanding of goal: Yes     Action steps to achieve this goal:   1.  I will answer my phone when I am contacted by the [facility] FRW to schedule an initial appointment.   2.  I will provide all necessary documentation and information to complete a [facility] application for benefits with the support of the FRW.   3.  I will call my FRW if I have questions or concerns regarding my [facility] DIRAmed application.     Goal Statement: I will apply for [facility]leen Care within the next 1-2 weeks if I am eligible.   Strengths: I would like help with my [facility] Medical bills   Barriers:  I am unclear the steps I have to take    Patient expressed understanding of goal: yes   Action steps to achieve this goal:  I understand a referral was placed to the Financial Resource Worker, I will receive a call within the next 3 business days.    I understand that the FRW will Mail out the application for me to Sign and send in to the [facility]  I understand the financial worker will remover herself from the care team once I receive the application in the mail and I know to follow up with the Care Coordination team if I still need help with this goal.              Long-Range Goal: Create an action plan to increase financial stability       Start Date: 5/25/2023 Expected End Date: 5/14/2024    This Visit's Progress: 30% Recent Progress: 20%    Note:     Barriers: limited income, miss  deadlines  Strengths: understanding the need  Patient expressed understanding of goal: yes  Action steps to achieve this goal:  1. I will answer the Financial resource worker call and work with them to apply for insurance  2. I will reach out to resources sent  3. I will reach out to Jenny as needs arise between scheduled calls.                                 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    Genital herpes    CARDIOVASCULAR SCREENING; LDL GOAL LESS THAN 160    MDD (major depressive disorder)    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 grade 3    Anemia due to vitamin B12 deficiency, unspecified B12 deficiency type    Insomnia, unspecified type    Other iron deficiency anemia    Age-related osteoporosis without current pathological fracture    Fatty liver    PTSD (post-traumatic stress disorder)    Underweight    Macrocytosis without anemia    Alcohol dependence in remission (H)    Episode of recurrent major depressive disorder, unspecified depression episode severity (H24)    Seizure disorder (H)    Dehydration    Hyponatremia    Weakness generalized    Alcohol use disorder, severe, dependence (H)    Multifocal pneumonia    Septic shock (H)    Bacteremia due to Streptococcus pneumoniae    Hypomagnesemia    History of seizure due to alcohol withdrawal    Emphysema/COPD (H)    Tobacco abuse    Malnutrition - suspected    Hypoalbuminemia    Acute respiratory failure with hypoxia (H)    Alcohol withdrawal, with delirium (H)    Hypophosphatemia    Hypokalemia    Thrombocytopenia (H24)    Anemia, unspecified type    Patient  "ventilator dyssynchrony (H)    Coagulopathy (H24)    Pulmonary hypertension (H)    Marginal ulcer      Current Medications and Allergies:     Allergies   Allergen Reactions    Codeine Itching    Vicodin [Hydrocodone-Acetaminophen] Itching         Current Outpatient Medications:     busPIRone (BUSPAR) 15 MG tablet, Take 1 tablet (15 mg) by mouth 3 times daily, Disp: 90 tablet, Rfl: 3    calcium carbonate (OS-SHON 500 MG [place]. [place]) 1250 MG tablet, Take 1 tablet by mouth 2 times daily, Disp: , Rfl:     clonazePAM (KLONOPIN) 0.5 MG tablet, TAKE 1 TABLET (0.5 MG) BY MOUTH DAILY AS NEEDED FOR ANXIETY, Disp: 30 tablet, Rfl: 0    cyanocobalamin (CYANOCOBALAMIN) 1000 MCG/ML injection, Inject 1 mL into the muscle every 30 days, Disp: , Rfl:     cyclobenzaprine (FLEXERIL) 10 MG tablet, Take 1 tablet (10 mg) by mouth 2 times daily as needed for muscle spasms, Disp: 10 tablet, Rfl: 0    escitalopram (LEXAPRO) 20 MG tablet, Take 1.5 tablets (30 mg) by mouth daily, Disp: 135 tablet, Rfl: 3    ferrous gluconate (FERGON) 324 (38 Fe) MG tablet, TAKE 1 TABLET (324 MG) BY MOUTH DAILY (WITH BREAKFAST), Disp: 90 tablet, Rfl: 1    folic acid (FOLVITE) 1 MG tablet, Take 1 tablet (1,000 mcg) by mouth daily, Disp: 90 tablet, Rfl: 3    Insulin Syringe-Needle U-100 (B-D INSULIN SYRINGE) 25G X 1\" 1 ML MISC, Use once every 30 days for B12 injection, Disp: 3 each, Rfl: 3    multivitamin w/minerals (THERA-VIT-M) tablet, Take 1 tablet by mouth daily, Disp: , Rfl:     omeprazole (PRILOSEC) 40 MG DR capsule, TAKE ONE CAPSULE BY MOUTH TWICE A DAY, Disp: 30 capsule, Rfl: 0    oxyCODONE (ROXICODONE) 5 MG tablet, Take 1 tablet (5 mg) by mouth every 4 hours as needed for moderate pain, Disp: 12 tablet, Rfl: 0    raloxifene (EVISTA) 60 MG tablet, Take 1 tablet (60 mg) by mouth daily, Disp: 30 tablet, Rfl: 0    sennosides (SENOKOT) 8.6 MG tablet, Take 1-2 tablets by mouth 2 times daily as needed for constipation (while taking oxycodone), Disp: 20 tablet, Rfl: " 0    thiamine (B-1) 100 MG tablet, Take 1 tablet (100 mg) by mouth daily, Disp: , Rfl:     triamcinolone (KENALOG) 0.1 % external cream, APPLY SPARINGLY TO ITCHY RASH AREAS UP TO THREE TIMES A DAY AS NEEDED, Disp: 80 g, Rfl: 0    venlafaxine (EFFEXOR XR) 37.5 MG 24 hr capsule, Take 1 capsule (37.5 mg) by mouth daily, Disp: 90 capsule, Rfl: 1      Care Coordination Start Date: 4/10/2023   Frequency of Care Coordination: monthly, more frequently as needed     Form Last Updated: 01/29/2024

## 2024-01-29 NOTE — PROGRESS NOTES
Clinic Care Coordination Contact  Follow Up Progress Note      Assessment: patient reports that she was denied SNAP and utility support due to spouse not sharing financial information.  She has been able to be submitted at all of these document's that were required and is hopeful to get support.  She does have medical assistance starting February 1, and will be able to start scheduling appointments.    Patient reports that she is doing well with refraining from alcohol and smoking.  She is noticing some cravings or desires especially when she sees commercials that include alcohol or drinking.  She notes that she does not have access to any and has not been leaving the house to get any.  She is feeling good and working on trying to increase her nutrition yet still struggles with this due to the level and length of her malnutrition.  She notes she is craving sugar which she says is common due to stopping the alcohol.    Once she gets help with SNAP and utilities her financial situation will improve along with the medical assistance.    Care Gaps:    Health Maintenance Due   Topic Date Due    SPIROMETRY  Never done    COPD ACTION PLAN  Never done    RSV VACCINE (Pregnancy & 60+) (1 - 1-dose 60+ series) Never done    COLORECTAL CANCER SCREENING  10/09/2020    MEDICARE ANNUAL WELLNESS VISIT  09/17/2022    COVID-19 Vaccine (4 - 2023-24 season) 09/01/2023       Postponed to future calls after she gets her insurance.     Care Plans  Care Plan: Financial Wellbeing       Problem: [facility] Benefit/ Colleen care/       Note:     Goal statement: I will apply for [facility] benefits   CASH/SNAP/ Emergency assistance within the next 90 days.     Strengths: patient is accepting of support.  Patient expressed understanding of goal: Yes     Action steps to achieve this goal:   1.  I will answer my phone when I am contacted by the [facility] FRW to schedule an initial appointment.   2.  I will provide all necessary documentation and information to  complete a [facility] application for benefits with the support of the FRW.   3.  I will call my FRW if I have questions or concerns regarding my [facility] benefits application.     Goal Statement: I will apply for [facility]leen Care within the next 1-2 weeks if I am eligible.   Strengths: I would like help with my  Medical bills   Barriers:  I am unclear the steps I have to take    Patient expressed understanding of goal: yes   Action steps to achieve this goal:  I understand a referral was placed to the Financial Resource Worker, I will receive a call within the next 3 business days.    I understand that the FRW will Mail out the application for me to Sign and send in to the [facility]  I understand the financial worker will remover herself from the care team once I receive the application in the mail and I know to follow up with the Care Coordination team if I still need help with this goal.              Long-Range Goal: Create an action plan to increase financial stability       Start Date: 5/25/2023 Expected End Date: 5/14/2024    This Visit's Progress: 30% Recent Progress: 20%    Note:     Barriers: limited income, miss deadlines  Strengths: understanding the need  Patient expressed understanding of goal: yes  Action steps to achieve this goal:  1. I will answer the Financial resource worker call and work with them to apply for insurance  2. I will reach out to resources sent  3. I will reach out to Jenny as needs arise between scheduled calls.                                 Intervention/Education provided during outreach: Congratulated patient's on her progress and noted that she sounds more alert.  She recognizes that she feels more energy and that keeping busy has helped her with not going back to the alcohol and smoking.    Review community health worker and she was in agreement with monthly phone calls continuing.     Outreach Frequency: monthly, more frequently as needed      Plan:   Patient to continue to  work on her financial situation and refraining from alcohol use.  Care Coordinator will follow up in 1 month with CHW calls and  to do 6-week chart reviews.  Care plan mailed to patient today.    ANUP Walker  [facility] Primary Care - Care Coordinator   1/29/2024   8:50 AM  [phone]

## 2024-01-29 NOTE — Clinical Note
I added you to care team for calls monthly,  very nice - very recent work on recovery from alcohol abuse and stopping smoking.  Has had major health issues and malnutrition.  Should be getting insurance 2/1/24 so now can follow up and if transportation is a challenge then should be able to set up MA transport - may not know about this

## 2024-02-01 ENCOUNTER — VIRTUAL VISIT (OUTPATIENT)
Dept: FAMILY MEDICINE | Facility: CLINIC | Age: 69
End: 2024-02-01
Payer: COMMERCIAL

## 2024-02-01 DIAGNOSIS — G25.81 RESTLESS LEGS SYNDROME (RLS): ICD-10-CM

## 2024-02-01 DIAGNOSIS — E46 MALNUTRITION, UNSPECIFIED TYPE (H): ICD-10-CM

## 2024-02-01 DIAGNOSIS — G47.00 INSOMNIA, UNSPECIFIED TYPE: ICD-10-CM

## 2024-02-01 DIAGNOSIS — L28.0 NEURODERMATITIS: ICD-10-CM

## 2024-02-01 DIAGNOSIS — Z86.59 HISTORY OF SEIZURE DUE TO ALCOHOL WITHDRAWAL: ICD-10-CM

## 2024-02-01 DIAGNOSIS — F41.9 ANXIETY: ICD-10-CM

## 2024-02-01 DIAGNOSIS — K28.9 GASTROJEJUNAL ULCER: ICD-10-CM

## 2024-02-01 DIAGNOSIS — M81.0 AGE-RELATED OSTEOPOROSIS WITHOUT CURRENT PATHOLOGICAL FRACTURE: ICD-10-CM

## 2024-02-01 DIAGNOSIS — Z87.898 HISTORY OF SEIZURE DUE TO ALCOHOL WITHDRAWAL: ICD-10-CM

## 2024-02-01 DIAGNOSIS — F10.931 ALCOHOL WITHDRAWAL, WITH DELIRIUM (H): ICD-10-CM

## 2024-02-01 DIAGNOSIS — Z79.899 CHRONIC PRESCRIPTION BENZODIAZEPINE USE: ICD-10-CM

## 2024-02-01 DIAGNOSIS — F10.21 ALCOHOL DEPENDENCE IN REMISSION (H): Primary | ICD-10-CM

## 2024-02-01 DIAGNOSIS — F33.9 EPISODE OF RECURRENT MAJOR DEPRESSIVE DISORDER, UNSPECIFIED DEPRESSION EPISODE SEVERITY (H): ICD-10-CM

## 2024-02-01 DIAGNOSIS — I27.20 PULMONARY HYPERTENSION (H): ICD-10-CM

## 2024-02-01 PROBLEM — G40.909 SEIZURE DISORDER (H): Status: RESOLVED | Noted: 2022-01-08 | Resolved: 2024-02-01

## 2024-02-01 PROBLEM — J95.859: Status: RESOLVED | Noted: 2023-10-11 | Resolved: 2024-02-01

## 2024-02-01 PROCEDURE — 99443 PR PHYSICIAN TELEPHONE EVALUATION 21-30 MIN: CPT | Mod: 93 | Performed by: STUDENT IN AN ORGANIZED HEALTH CARE EDUCATION/TRAINING PROGRAM

## 2024-02-01 RX ORDER — NALTREXONE HYDROCHLORIDE 50 MG/1
50 TABLET, FILM COATED ORAL DAILY
Qty: 90 TABLET | Refills: 0 | Status: SHIPPED | OUTPATIENT
Start: 2024-02-01 | End: 2024-05-06

## 2024-02-01 RX ORDER — OMEPRAZOLE 40 MG/1
CAPSULE, DELAYED RELEASE ORAL
Qty: 90 CAPSULE | Refills: 3 | Status: SHIPPED | OUTPATIENT
Start: 2024-02-01 | End: 2024-09-27

## 2024-02-01 RX ORDER — CYCLOBENZAPRINE HCL 10 MG
10 TABLET ORAL
Qty: 60 TABLET | Refills: 0 | Status: SHIPPED | OUTPATIENT
Start: 2024-02-01

## 2024-02-01 RX ORDER — CLONAZEPAM 0.5 MG/1
0.5 TABLET ORAL DAILY PRN
Qty: 30 TABLET | Refills: 0 | Status: SHIPPED | OUTPATIENT
Start: 2024-02-06 | End: 2024-03-20

## 2024-02-01 RX ORDER — VENLAFAXINE HYDROCHLORIDE 37.5 MG/1
37.5 CAPSULE, EXTENDED RELEASE ORAL DAILY
Qty: 90 CAPSULE | Refills: 1 | Status: SHIPPED | OUTPATIENT
Start: 2024-02-01 | End: 2024-09-30

## 2024-02-01 RX ORDER — RALOXIFENE HYDROCHLORIDE 60 MG/1
1 TABLET, FILM COATED ORAL DAILY
Qty: 30 TABLET | Refills: 0 | Status: CANCELLED | OUTPATIENT
Start: 2024-02-01

## 2024-02-01 RX ORDER — TRAZODONE HYDROCHLORIDE 50 MG/1
50 TABLET, FILM COATED ORAL
Qty: 30 TABLET | Refills: 1 | Status: SHIPPED | OUTPATIENT
Start: 2024-02-01 | End: 2024-07-08

## 2024-02-01 RX ORDER — TRAZODONE HYDROCHLORIDE 50 MG/1
50 TABLET, FILM COATED ORAL AT BEDTIME
COMMUNITY
End: 2024-02-01

## 2024-02-01 RX ORDER — TRIAMCINOLONE ACETONIDE 1 MG/G
CREAM TOPICAL
Qty: 80 G | Refills: 0 | Status: SHIPPED | OUTPATIENT
Start: 2024-02-01

## 2024-02-01 NOTE — PROGRESS NOTES
"  Instructions Relayed to Patient by Virtual Roomer:     Patient is active on [employer]ala[employer]:   Relayed following to patient: \"It looks like you are active on [employer], are you able to join the visit this way? If not, do you need us to send you a link now or would you like your provider to send a link via text or email when they are ready to initiate the visit?\"    Reminded patient to ensure they were logged on to virtual visit by arrival time listed. Documented in appointment notes if patient had flexibility to initiate visit sooner than arrival time. If pediatric virtual visit, ensured pediatric patient along with parent/guardian will be present for video visit.     Patient offered the website [email or web address] and/or phone number to [employer]ala[employer] Help line: [phone]    Shanika is a 68 year old who is being evaluated via a billable telephone visit.      What phone number would you like to be contacted at? [phone]   How would you like to obtain your AVS? [employer] and Mail it    Distant Location (provider location):  On-site    Assessment & Plan   Problem List Items Addressed This Visit          Nervous and Auditory    Alcohol withdrawal, with delirium (H)       Digestive    Malnutrition - suspected       Circulatory    Pulmonary hypertension (H)       Musculoskeletal and Integumentary    Restless legs syndrome (RLS)    Relevant Medications    cyclobenzaprine (FLEXERIL) 10 MG tablet    Age-related osteoporosis without current pathological fracture    Relevant Medications    cyclobenzaprine (FLEXERIL) 10 MG tablet       Behavioral    Episode of recurrent major depressive disorder, unspecified depression episode severity (H24)    Relevant Medications    venlafaxine (EFFEXOR XR) 37.5 MG 24 hr capsule    traZODone (DESYREL) 50 MG tablet       Other    Anxiety    Relevant Medications    clonazePAM (KLONOPIN) 0.5 MG tablet (Start on 2/6/2024)    venlafaxine (EFFEXOR XR) 37.5 MG 24 hr capsule    traZODone " (DESYREL) 50 MG tablet    Insomnia, unspecified type    Relevant Medications    traZODone (DESYREL) 50 MG tablet    Alcohol dependence in remission (H) - Primary    Relevant Medications    naltrexone (DEPADE/REVIA) 50 MG tablet    History of seizure due to alcohol withdrawal     Other Visit Diagnoses       Gastrojejunal ulcer        Relevant Medications    omeprazole (PRILOSEC) 40 MG DR capsule    Chronic prescription benzodiazepine use        Neurodermatitis        Relevant Medications    triamcinolone (KENALOG) 0.1 % external cream           Patient taking iron supplement for restless legs.  We did discuss alternative treatment options but will do trial of muscle relaxer as needed if this has been helpful previously.  Otherwise can try gabapentin in the future.  Provided small amount of trazodone to have on hand as needed for sleep but she will not use nightly and interaction of Effexor and Lexapro discussed but she has done well and stable on these now.  I did refill her benzodiazepine with PCP out and risk of chronic benzodiazepine use including respiratory suppression and addiction and withdrawal discussed.  Follow-up with nutrition and bariatric surgery.  Follow-up with PCP in March prior to her trip to [place].        Martha Voss is a 68 year old, presenting for the following health issues:  Medication Request  - Would like to start something for cravings of drinking especially with vacation coming up.   - Wanting to restart on Trazodone for sleep and something for RLS (has cyclobenzaprine)      2/1/2024     2:36 PM   Additional Questions   Roomed by Tim RODRIGUES   Accompanied by Self     History of Present Illness       Reason for visit:  Med Check    She eats 4 or more servings of fruits and vegetables daily.She consumes 2 sweetened beverage(s) daily.She exercises with enough effort to increase her heart rate 9 or less minutes per day.  She exercises with enough effort to increase her heart rate 3 or less  days per week.   She is taking medications regularly.     Patient following up regarding her anxiety and insomnia. Has been on both Effexor and venlafaxine and following with psychology. Has been on trazodone in the past as needed for rare use. Tolerated well.  Continues on chronic benzodiazepine use for her anxiety from her PCP who is out currently.  Recently saw bariatric surgery and does have follow-up with EGD upcoming as well as follow-up with nutritionist.  Hopefully getting dentures which will help but doing well with oral intake now per patient on softs and liquids.  She has had a couple dreams about alcohol and wondering something to help prevent use going forward as we have discussed naltrexone in the past.  She is interested in this now.  No opioid use now but some previously after hospitalization.  Liver stable.  Restless legs worse and she does wonder if muscle relaxer would be helpful again as it has been in the past.      Review of Systems  Constitutional, neuro, ENT, endocrine, pulmonary, cardiac, gastrointestinal, genitourinary, musculoskeletal, integument and psychiatric systems are negative, except as otherwise noted.      Objective           Vitals:  No vitals were obtained today due to virtual visit.    Physical Exam   General: Alert and no distress //Respiratory: No audible wheeze, cough, or shortness of breath // Psychiatric:  Appropriate affect, tone, and pace of words          Phone call duration: 21 minutes  Signed Electronically by: Geronimo Hussein MD

## 2024-02-04 ENCOUNTER — HEALTH MAINTENANCE LETTER (OUTPATIENT)
Age: 69
End: 2024-02-04

## 2024-02-08 ENCOUNTER — PATIENT OUTREACH (OUTPATIENT)
Dept: CARE COORDINATION | Facility: CLINIC | Age: 69
End: 2024-02-08
Payer: MEDICARE

## 2024-02-15 ENCOUNTER — PATIENT OUTREACH (OUTPATIENT)
Dept: CARE COORDINATION | Facility: CLINIC | Age: 69
End: 2024-02-15
Payer: MEDICARE
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
## 2024-02-21 ENCOUNTER — PATIENT OUTREACH (OUTPATIENT)
Dept: CARE COORDINATION | Facility: CLINIC | Age: 69
End: 2024-02-21
Payer: MEDICARE
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
## 2024-02-21 NOTE — PROGRESS NOTES
Clinic Care Coordination Contact  Follow Up Progress Note      Assessment: call to check in with pt after last call.  She is feeling increased safety and not as jumpy. She has an appointment set up with  to revisit supports and trying to reach the [facility] for additional supports.     She did not sound stressed and speech was clear.  She agreed that she was doing better.  She needed to go as a phone call was coming in.     Care Gaps:    Health Maintenance Due   Topic Date Due    SPIROMETRY  Never done    COPD ACTION PLAN  Never done    RSV VACCINE (Pregnancy & 60+) (1 - 1-dose 60+ series) Never done    COLORECTAL CANCER SCREENING  10/09/2020    MEDICARE ANNUAL WELLNESS VISIT  09/17/2022    COVID-19 Vaccine (4 - 2023-24 season) 09/01/2023       Unable to talk about during today's call    Care Plans  Care Plan: Financial Wellbeing       Problem: [facility] Benefit/ Colleen care/       Note:     Goal statement: I will apply for county benefits   CASH/SNAP/ Emergency assistance within the next 90 days.     Strengths: patient is accepting of support.  Patient expressed understanding of goal: Yes     Action steps to achieve this goal:   1.  I will answer my phone when I am contacted by the [facility] FRW to schedule an initial appointment.   2.  I will provide all necessary documentation and information to complete a [facility] application for benefits with the support of the FRW.   3.  I will call my FRW if I have questions or concerns regarding my [facility] benefits application.     Goal Statement: I will apply for Colleen Care within the next 1-2 weeks if I am eligible.   Strengths: I would like help with my  Medical bills   Barriers:  I am unclear the steps I have to take    Patient expressed understanding of goal: yes   Action steps to achieve this goal:  I understand a referral was placed to the Financial Resource Worker, I will receive a call within the next 3 business days.    I understand that the FRW will Mail out  the application for me to Sign and send in to the [facility]  I understand the financial worker will remover herself from the care team once I receive the application in the mail and I know to follow up with the Care Coordination team if I still need help with this goal.              Long-Range Goal: Create an action plan to increase financial stability       Start Date: 5/25/2023 Expected End Date: 5/14/2024    This Visit's Progress: 30% Recent Progress: 20%    Note:     Barriers: limited income, miss deadlines  Strengths: understanding the need  Patient expressed understanding of goal: yes  Action steps to achieve this goal:  1. I will answer the Financial resource worker call and work with them to apply for insurance  2. I will reach out to resources sent  3. I will reach out to Jenny as needs arise between scheduled calls.                                 Intervention/Education provided during outreach: encouraged pt to continue to work on her supports and reducing stress.      Outreach Frequency: monthly, more frequently as needed    Plan:   Pt to meet with  and schedule with [facility] for supports.   Care Coordinator will follow up in two weeks by CHW for check in and as planned for  chart review.     ANUP Walker  [facility] - Care Coordinator   2/21/2024   10:17 AM  [phone]

## 2024-02-26 DIAGNOSIS — D75.89 MACROCYTOSIS WITHOUT ANEMIA: ICD-10-CM

## 2024-02-27 RX ORDER — FOLIC ACID 1 MG/1
1000 TABLET ORAL DAILY
Qty: 90 TABLET | Refills: 0 | Status: SHIPPED | OUTPATIENT
Start: 2024-02-27 | End: 2024-05-06

## 2024-02-27 NOTE — TELEPHONE ENCOUNTER
Filling 3 month supply. Has follow-up scheduled with PCP in early April and can discuss any need for updated labs and/or future plans for continued fills at that time.

## 2024-03-05 ENCOUNTER — NURSE TRIAGE (OUTPATIENT)
Dept: FAMILY MEDICINE | Facility: CLINIC | Age: 69
End: 2024-03-05

## 2024-03-05 ENCOUNTER — VIRTUAL VISIT (OUTPATIENT)
Dept: URGENT CARE | Facility: CLINIC | Age: 69
End: 2024-03-05
Payer: COMMERCIAL

## 2024-03-05 DIAGNOSIS — N30.00 ACUTE CYSTITIS WITHOUT HEMATURIA: Primary | ICD-10-CM

## 2024-03-05 PROCEDURE — 99443 PR PHYSICIAN TELEPHONE EVALUATION 21-30 MIN: CPT | Mod: 93

## 2024-03-05 RX ORDER — NITROFURANTOIN 25; 75 MG/1; MG/1
100 CAPSULE ORAL 2 TIMES DAILY
Qty: 10 CAPSULE | Refills: 0 | Status: SHIPPED | OUTPATIENT
Start: 2024-03-05 | End: 2024-03-10

## 2024-03-05 NOTE — PROGRESS NOTES
Shanika is a 68 year old who is being evaluated via a billable telephone visit.      What phone number would you like to be contacted at? [phone]  How would you like to obtain your AVS? [place]  Originating Location (pt. Location): Home    Distant Location (provider location):  Off-site    Assessment & Plan       Plan: nitroFURantoin macrocrystal-monohydrate         (MACROBID) 100 MG capsule    RICARDO Richardson CNP      Subjective   Shanika is a 68 year old, presenting for the following health issues:  UTI    HPI   Sx of uti burning urgency frequency X 2 days ago  No hematuria fever chills vomiting back pain  Hydrated        Objective           Vitals:  No vitals were obtained today due to virtual visit.    Physical Exam   General: Alert and no distress //Respiratory: No audible wheeze, cough, or shortness of breath // Psychiatric:  Appropriate affect, tone, and pace of words        Phone call duration: 25 minutes  Signed Electronically by: Virtual Urgent Care

## 2024-03-05 NOTE — TELEPHONE ENCOUNTER
Nurse Triage SBAR    Is this a 2nd Level Triage? NO    Situation: Patient calling in with concerns for UTI symptoms. Patient is unable to submit e-visit and cannot drive so cannot go to urgent care.     Background: Has had UTIs in past    Assessment: patient is having increased frequency and burning with urination. No back or flank pain. Is able to urinate. No fevers.     Protocol Recommended Disposition:   See in Office Today    Recommendation: Patient schedule with virtual urgent care telephone call for tonight as she cannot submit and e-visit and does not know how to work camera for a video visit. She does not drive so cannot get to urgent care and would need to go to ED otherwise.  Patient will call back to schedule in person appointment if virtual urgent care provider does not find telephone call appropriate.     Does the patient meet one of the following criteria for ADS visit consideration? No    Isabelle Zabala RN on 3/5/2024 at 4:49 PM    Reason for Disposition   Urinating more frequently than usual (i.e., frequency)    Additional Information   Negative: Shock suspected (e.g., cold/pale/clammy skin, too weak to stand, low BP, rapid pulse)   Negative: Sounds like a life-threatening emergency to the triager   Negative: Followed a female genital area injury (e.g., labia, vagina, vulva)   Negative: Followed a male genital area injury (penis, scrotum)   Negative: Vaginal discharge   Negative: Pus (white, yellow) or bloody discharge from end of penis   Negative: Pain or burning with passing urine (urination) and pregnant   Negative: Pain or burning with passing urine (urination) and female   Negative: Pain or burning with passing urine (urination) and male   Negative: Pain or itching in the vulvar area   Negative: Pain in scrotum is main symptom   Negative: Blood in the urine is main symptom   Negative: Symptoms arising from use of a urinary catheter (e.g., Coude, Barney)   Negative: Unable to urinate (or only  a few drops) > 4 hours and bladder feels very full (e.g., palpable bladder or strong urge to urinate)   Negative: Decreased urination and drinking very little and dehydration suspected (e.g., dark urine, no urine > 12 hours, very dry mouth, very lightheaded)   Negative: Patient sounds very sick or weak to the triager   Negative: Fever > 100.4 F  (38.0 C)   Negative: Side (flank) or lower back pain present   Negative: Bad or foul-smelling urine    Protocols used: Urinary Symptoms-A-OH

## 2024-03-06 ENCOUNTER — TELEPHONE (OUTPATIENT)
Dept: FAMILY MEDICINE | Facility: CLINIC | Age: 69
End: 2024-03-06

## 2024-03-06 ENCOUNTER — PATIENT OUTREACH (OUTPATIENT)
Dept: CARE COORDINATION | Facility: CLINIC | Age: 69
End: 2024-03-06

## 2024-03-06 NOTE — TELEPHONE ENCOUNTER
Patient calling in about UTI and her virtual visit yesterday. She was confused if she needed to talk to anyone else and let patient know she did talk with virtual urgent care provider who sent in a antibiotic to the [facility] pharmacy for her. Patient will contact pharmacy and see if prescription is ready to .     Isabelle Zabala RN on 3/6/2024 at 11:34 AM

## 2024-03-06 NOTE — PROGRESS NOTES
Clinic Care Coordination Contact  Community Health Worker Follow Up    Care Gaps:     Health Maintenance Due   Topic Date Due    SPIROMETRY  Never done    COPD ACTION PLAN  Never done    RSV VACCINE (Pregnancy & 60+) (1 - 1-dose 60+ series) Never done    COLORECTAL CANCER SCREENING  10/09/2020    MEDICARE ANNUAL WELLNESS VISIT  09/17/2022    COVID-19 Vaccine (4 - 2023-24 season) 09/01/2023       Postponed to Next CHW outreach due to personal concerns     Care Plan:   Care Plan: Financial Wellbeing       Problem: [facility] Benefit/ Colleen care/       Note:     Goal statement: I will apply for [facility] benefits   CASH/SNAP/ Emergency assistance within the next 90 days.     Strengths: patient is accepting of support.  Patient expressed understanding of goal: Yes     Action steps to achieve this goal:   1.  I will answer my phone when I am contacted by the [facility] FRW to schedule an initial appointment.   2.  I will provide all necessary documentation and information to complete a [facility] application for benefits with the support of the FRW.   3.  I will call my FRW if I have questions or concerns regarding my [facility] [email or web address] application.     Goal Statement: I will apply for [facility]leen Care within the next 1-2 weeks if I am eligible.   Strengths: I would like help with my [facility] bills   Barriers:  I am unclear the steps I have to take    Patient expressed understanding of goal: yes   Action steps to achieve this goal:  I understand a referral was placed to the Financial Resource Worker, I will receive a call within the next 3 business days.    I understand that the FRW will Mail out the application for me to Sign and send in to the to [place]  I understand the financial worker will remover herself from the care team once I receive the application in the mail and I know to follow up with the Care Coordination team if I still need help with this goal.              Long-Range Goal: Create an action plan to increase financial  stability       Start Date: 5/25/2023 Expected End Date: 5/14/2024    This Visit's Progress: 30% Recent Progress: 30%    Note:     Barriers: limited income, miss deadlines  Strengths: understanding the need  Patient expressed understanding of goal: yes  Action steps to achieve this goal:  1. I will answer the Financial resource worker call and work with them to apply for insurance (Has insurance at this time 3/6/24) Only Medicare Part A and [facility]  2. I will reach out to the FRW to work on the Energy Assistance application and also Colleen Care.  3. I will reach out to resources sent  4. I will reach out to Jenny as needs arise between scheduled calls.                                 Intervention and Education during outreach: The CHW updated the care plan per the conversation with the patient. Has changes in her social interaction. Has a Order of Protection in place and locks will be changed as of today 3/6/24. The  will no longer be in her house. The patient stated that [place] and [employer] needs the husbands information and that needs to be updated per the changes. The patient stated that she will need to get the Social Security Number of her , but there is no communication between the patient and her . The CHW gave the patient the number for the FRW that called the patient on 2/8/24. The patient is also wanting to apply for [facility]leen Care.    CHW Plan: Follow up in one month    MIESHA Helton  [phone]  [facility]  [facility]

## 2024-03-11 ENCOUNTER — PATIENT OUTREACH (OUTPATIENT)
Dept: CARE COORDINATION | Facility: CLINIC | Age: 69
End: 2024-03-11
Payer: MEDICARE

## 2024-03-11 NOTE — PROGRESS NOTES
Clinic Care Coordination Contact  Care Coordination Clinician Chart Review    Situation: Patient chart reviewed by Care Coordinator.       Background: Care Coordination Program started: 4/10/2023. Initial assessment completed and patient-centered care plan(s) were developed with participation from patient. Lead CC handed patient off to CHW for continued outreaches.       Assessment: Per chart review, patient outreach completed by CC CHW on 3/6/24.  Patient is actively working to accomplish goal(s). Patient's goal(s) appropriate and relevant at this time. Patient is due for updated Plan of Care.  Assessments will be completed annually or as needed/with change of patient status.      Care Plan: Financial Wellbeing       Problem: Patient expresses financial resource strain       Goal: Create an action plan to increase financial stability       Start Date: 5/25/2023 Expected End Date: 5/14/2024    This Visit's Progress: 30%    Note:     Barriers: limited income, miss deadlines  Strengths: understanding the need  Patient expressed understanding of goal: yes  Action steps to achieve this goal:  1. I will answer the Financial resource worker call and work with them to apply for insurance  2. I will reach out to resources sent  3. I will reach out to Jenny as needs arise between scheduled calls.                                  Plan/Recommendations: The patient will continue working with Care Coordination to achieve goal(s) as above. CHW will continue outreaches at minimum every 30 days and will involve Lead CC as needed or if patient is ready to move to Maintenance. Lead CC will continue to monitor CHW outreaches and patient's progress to goal(s) every 6 weeks.     Plan of Care updated and sent to patient: No    ANUP Walker  [facility] Primary Care - Care Coordinator   3/11/2024   10:25 AM  [phone]

## 2024-03-12 ENCOUNTER — PATIENT OUTREACH (OUTPATIENT)
Dept: CARE COORDINATION | Facility: CLINIC | Age: 69
End: 2024-03-12
Payer: MEDICARE

## 2024-03-19 DIAGNOSIS — F41.9 ANXIETY: ICD-10-CM

## 2024-03-20 RX ORDER — CLONAZEPAM 0.5 MG/1
0.5 TABLET ORAL DAILY PRN
Qty: 30 TABLET | Refills: 0 | Status: SHIPPED | OUTPATIENT
Start: 2024-03-20 | End: 2024-05-06

## 2024-03-21 DIAGNOSIS — M81.0 AGE-RELATED OSTEOPOROSIS WITHOUT CURRENT PATHOLOGICAL FRACTURE: ICD-10-CM

## 2024-03-22 ENCOUNTER — HOSPITAL ENCOUNTER (EMERGENCY)
Facility: CLINIC | Age: 69
Discharge: HOME | End: 2024-03-22
Attending: EMERGENCY MEDICINE | Admitting: EMERGENCY MEDICINE
Payer: COMMERCIAL

## 2024-03-22 ENCOUNTER — APPOINTMENT (OUTPATIENT)
Dept: GENERAL RADIOLOGY | Facility: CLINIC | Age: 69
End: 2024-03-22
Attending: EMERGENCY MEDICINE
Payer: COMMERCIAL

## 2024-03-22 ENCOUNTER — PATIENT OUTREACH (OUTPATIENT)
Dept: CARE COORDINATION | Facility: CLINIC | Age: 69
End: 2024-03-22

## 2024-03-22 VITALS
DIASTOLIC BLOOD PRESSURE: 79 MMHG | TEMPERATURE: 98.2 F | HEIGHT: 64 IN | OXYGEN SATURATION: 90 % | BODY MASS INDEX: 15.88 KG/M2 | SYSTOLIC BLOOD PRESSURE: 117 MMHG | WEIGHT: 93 LBS | RESPIRATION RATE: 20 BRPM | HEART RATE: 80 BPM

## 2024-03-22 DIAGNOSIS — E87.6 HYPOKALEMIA: ICD-10-CM

## 2024-03-22 DIAGNOSIS — R06.02 SHORTNESS OF BREATH: ICD-10-CM

## 2024-03-22 DIAGNOSIS — J21.0 RSV BRONCHIOLITIS: ICD-10-CM

## 2024-03-22 LAB
ALBUMIN SERPL BCG-MCNC: 3.6 G/DL (ref 3.5–5.2)
ALP SERPL-CCNC: 143 U/L (ref 40–150)
ALT SERPL W P-5'-P-CCNC: 7 U/L (ref 0–50)
ANION GAP SERPL CALCULATED.3IONS-SCNC: 14 MMOL/L (ref 7–15)
AST SERPL W P-5'-P-CCNC: 17 U/L (ref 0–45)
BASOPHILS # BLD AUTO: 0 10E3/UL (ref 0–0.2)
BASOPHILS NFR BLD AUTO: 0 %
BILIRUB SERPL-MCNC: 0.5 MG/DL
BUN SERPL-MCNC: 12 MG/DL (ref 8–23)
CALCIUM SERPL-MCNC: 8.7 MG/DL (ref 8.8–10.2)
CHLORIDE SERPL-SCNC: 95 MMOL/L (ref 98–107)
CREAT SERPL-MCNC: 0.86 MG/DL (ref 0.51–0.95)
DEPRECATED HCO3 PLAS-SCNC: 24 MMOL/L (ref 22–29)
EGFRCR SERPLBLD CKD-EPI 2021: 74 ML/MIN/1.73M2
EOSINOPHIL # BLD AUTO: 0.1 10E3/UL (ref 0–0.7)
EOSINOPHIL NFR BLD AUTO: 1 %
ERYTHROCYTE [DISTWIDTH] IN BLOOD BY AUTOMATED COUNT: 17.5 % (ref 10–15)
ETHANOL SERPL-MCNC: <0.01 G/DL
FLUAV RNA SPEC QL NAA+PROBE: NEGATIVE
FLUBV RNA RESP QL NAA+PROBE: NEGATIVE
GLUCOSE SERPL-MCNC: 105 MG/DL (ref 70–99)
HCT VFR BLD AUTO: 36 % (ref 35–47)
HGB BLD-MCNC: 12 G/DL (ref 11.7–15.7)
IMM GRANULOCYTES # BLD: 0 10E3/UL
IMM GRANULOCYTES NFR BLD: 0 %
LYMPHOCYTES # BLD AUTO: 2.7 10E3/UL (ref 0.8–5.3)
LYMPHOCYTES NFR BLD AUTO: 26 %
MCH RBC QN AUTO: 30.8 PG (ref 26.5–33)
MCHC RBC AUTO-ENTMCNC: 33.3 G/DL (ref 31.5–36.5)
MCV RBC AUTO: 93 FL (ref 78–100)
MONOCYTES # BLD AUTO: 1 10E3/UL (ref 0–1.3)
MONOCYTES NFR BLD AUTO: 9 %
NEUTROPHILS # BLD AUTO: 6.6 10E3/UL (ref 1.6–8.3)
NEUTROPHILS NFR BLD AUTO: 64 %
NRBC # BLD AUTO: 0 10E3/UL
NRBC BLD AUTO-RTO: 0 /100
NT-PROBNP SERPL-MCNC: 180 PG/ML (ref 0–900)
PLATELET # BLD AUTO: 304 10E3/UL (ref 150–450)
POTASSIUM SERPL-SCNC: 2.9 MMOL/L (ref 3.4–5.3)
PROT SERPL-MCNC: 7.8 G/DL (ref 6.4–8.3)
RBC # BLD AUTO: 3.89 10E6/UL (ref 3.8–5.2)
RSV RNA SPEC NAA+PROBE: POSITIVE
SARS-COV-2 RNA RESP QL NAA+PROBE: NEGATIVE
SODIUM SERPL-SCNC: 133 MMOL/L (ref 135–145)
TROPONIN T SERPL HS-MCNC: 7 NG/L
WBC # BLD AUTO: 10.4 10E3/UL (ref 4–11)

## 2024-03-22 PROCEDURE — 250N000011 HC RX IP 250 OP 636: Performed by: EMERGENCY MEDICINE

## 2024-03-22 PROCEDURE — 71045 X-RAY EXAM CHEST 1 VIEW: CPT

## 2024-03-22 PROCEDURE — 82077 ASSAY SPEC XCP UR&BREATH IA: CPT | Performed by: EMERGENCY MEDICINE

## 2024-03-22 PROCEDURE — 93010 ELECTROCARDIOGRAM REPORT: CPT | Performed by: EMERGENCY MEDICINE

## 2024-03-22 PROCEDURE — 99285 EMERGENCY DEPT VISIT HI MDM: CPT | Mod: 25 | Performed by: EMERGENCY MEDICINE

## 2024-03-22 PROCEDURE — 250N000013 HC RX MED GY IP 250 OP 250 PS 637: Performed by: EMERGENCY MEDICINE

## 2024-03-22 PROCEDURE — 83880 ASSAY OF NATRIURETIC PEPTIDE: CPT | Performed by: EMERGENCY MEDICINE

## 2024-03-22 PROCEDURE — 99284 EMERGENCY DEPT VISIT MOD MDM: CPT | Mod: 25 | Performed by: EMERGENCY MEDICINE

## 2024-03-22 PROCEDURE — 80053 COMPREHEN METABOLIC PANEL: CPT | Performed by: EMERGENCY MEDICINE

## 2024-03-22 PROCEDURE — 84484 ASSAY OF TROPONIN QUANT: CPT | Performed by: EMERGENCY MEDICINE

## 2024-03-22 PROCEDURE — 87637 SARSCOV2&INF A&B&RSV AMP PRB: CPT | Performed by: EMERGENCY MEDICINE

## 2024-03-22 PROCEDURE — 36415 COLL VENOUS BLD VENIPUNCTURE: CPT | Performed by: EMERGENCY MEDICINE

## 2024-03-22 PROCEDURE — 93005 ELECTROCARDIOGRAM TRACING: CPT | Performed by: EMERGENCY MEDICINE

## 2024-03-22 PROCEDURE — 96365 THER/PROPH/DIAG IV INF INIT: CPT | Performed by: EMERGENCY MEDICINE

## 2024-03-22 PROCEDURE — 85025 COMPLETE CBC W/AUTO DIFF WBC: CPT | Performed by: EMERGENCY MEDICINE

## 2024-03-22 PROCEDURE — 96375 TX/PRO/DX INJ NEW DRUG ADDON: CPT | Performed by: EMERGENCY MEDICINE

## 2024-03-22 RX ORDER — POTASSIUM CHLORIDE 1500 MG/1
40 TABLET, EXTENDED RELEASE ORAL ONCE
Status: COMPLETED | OUTPATIENT
Start: 2024-03-22 | End: 2024-03-22

## 2024-03-22 RX ORDER — POTASSIUM CHLORIDE 7.45 MG/ML
10 INJECTION INTRAVENOUS ONCE
Status: COMPLETED | OUTPATIENT
Start: 2024-03-22 | End: 2024-03-22

## 2024-03-22 RX ORDER — RALOXIFENE HYDROCHLORIDE 60 MG/1
1 TABLET, FILM COATED ORAL DAILY
Qty: 90 TABLET | Refills: 1 | Status: SHIPPED | OUTPATIENT
Start: 2024-03-22 | End: 2024-09-30

## 2024-03-22 RX ORDER — METHYLPREDNISOLONE SODIUM SUCCINATE 125 MG/2ML
125 INJECTION, POWDER, LYOPHILIZED, FOR SOLUTION INTRAMUSCULAR; INTRAVENOUS ONCE
Status: COMPLETED | OUTPATIENT
Start: 2024-03-22 | End: 2024-03-22

## 2024-03-22 RX ORDER — POTASSIUM CHLORIDE 1500 MG/1
20 TABLET, EXTENDED RELEASE ORAL DAILY
Qty: 5 TABLET | Refills: 0 | Status: SHIPPED | OUTPATIENT
Start: 2024-03-22 | End: 2024-03-27

## 2024-03-22 RX ADMIN — POTASSIUM CHLORIDE 10 MEQ: 7.46 INJECTION, SOLUTION INTRAVENOUS at 04:57

## 2024-03-22 RX ADMIN — METHYLPREDNISOLONE SODIUM SUCCINATE 125 MG: 125 INJECTION, POWDER, FOR SOLUTION INTRAMUSCULAR; INTRAVENOUS at 03:57

## 2024-03-22 RX ADMIN — POTASSIUM CHLORIDE 40 MEQ: 1500 TABLET, EXTENDED RELEASE ORAL at 04:55

## 2024-03-22 ASSESSMENT — COLUMBIA-SUICIDE SEVERITY RATING SCALE - C-SSRS
6. HAVE YOU EVER DONE ANYTHING, STARTED TO DO ANYTHING, OR PREPARED TO DO ANYTHING TO END YOUR LIFE?: NO
2. HAVE YOU ACTUALLY HAD ANY THOUGHTS OF KILLING YOURSELF IN THE PAST MONTH?: NO
1. IN THE PAST MONTH, HAVE YOU WISHED YOU WERE DEAD OR WISHED YOU COULD GO TO SLEEP AND NOT WAKE UP?: NO

## 2024-03-22 ASSESSMENT — ACTIVITIES OF DAILY LIVING (ADL)
ADLS_ACUITY_SCORE: 37

## 2024-03-22 NOTE — ED PROVIDER NOTES
"  History     Chief Complaint   Patient presents with    Shortness of Breath     HPI  Pavithra Raines is a 68 year old female who presents via EMS from home for concern of shortness of breath.  Symptoms have been present for the last 1 week.  She says \"I think I have pneumonia\".  Has been more short of breath, productive cough, and having intermittent chills and sweats.  Has not taken her temperature at home.  Has been gagging but has not been vomiting.  She says that \"there is nothing to vomit\" as she has not been taking in much to eat or drink.  Was treated for urinary tract infection at the beginning of this month, and also had some diarrhea, which may have been a side effect of her antibiotic treatment.  No known sick contacts.    Allergies:  Allergies   Allergen Reactions    Codeine Itching    Vicodin [Hydrocodone-Acetaminophen] Itching       Problem List:    Patient Active Problem List    Diagnosis Date Noted    Marginal ulcer 12/11/2023     Priority: Medium    Coagulopathy (H24) 10/11/2023     Priority: Medium    Pulmonary hypertension (H) 10/11/2023     Priority: Medium    Acute respiratory failure with hypoxia (H) 10/10/2023     Priority: Medium    Alcohol withdrawal, with delirium (H) 10/10/2023     Priority: Medium    Hypophosphatemia 10/10/2023     Priority: Medium    Hypokalemia 10/10/2023     Priority: Medium    Thrombocytopenia (H24) 10/10/2023     Priority: Medium    Anemia, unspecified type 10/10/2023     Priority: Medium    Hypoalbuminemia 10/09/2023     Priority: Medium    Septic shock (H) 10/08/2023     Priority: Medium    Bacteremia due to Streptococcus pneumoniae 10/08/2023     Priority: Medium    Hypomagnesemia 10/08/2023     Priority: Medium    History of seizure due to alcohol withdrawal 10/08/2023     Priority: Medium    Emphysema/COPD (H) 10/08/2023     Priority: Medium    Tobacco abuse 10/08/2023     Priority: Medium    Malnutrition - suspected 10/08/2023     Priority: Medium    " Dehydration 10/07/2023     Priority: Medium    Hyponatremia 10/07/2023     Priority: Medium    Weakness generalized 10/07/2023     Priority: Medium    Alcohol use disorder, severe, dependence (H) 10/07/2023     Priority: Medium    Multifocal pneumonia 10/07/2023     Priority: Medium    Alcohol dependence in remission (H) 01/08/2022     Priority: Medium    Episode of recurrent major depressive disorder, unspecified depression episode severity (H24) 01/08/2022     Priority: Medium    Fatty liver 12/13/2020     Priority: Medium    PTSD (post-traumatic stress disorder) 12/13/2020     Priority: Medium    Underweight 12/13/2020     Priority: Medium    Macrocytosis without anemia 12/13/2020     Priority: Medium    Age-related osteoporosis without current pathological fracture 10/15/2020     Priority: Medium    Anemia due to vitamin B12 deficiency, unspecified B12 deficiency type 12/04/2017     Priority: Medium    Insomnia, unspecified type 12/04/2017     Priority: Medium    Other iron deficiency anemia 12/04/2017     Priority: Medium    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 grade 3 04/07/2011     Priority: Medium     12/15/06 ASCUS + high risk HPV  colpo in 2007 showed high grade KAITLYN and LEEP was recommended  LEEP was done in June,2007 with CAN 2/3 confirmed  10/15/07 NIL  9/30/08 NIL/neg HPV  10/21/09 NIL/neg HPV  4/5/11 NIL- repeat in one year (4/2012 12/1/17 NIL pap/neg HR HPV. Will need pap screening until 2027, regardless of age.  Plan: cotest due 3 yr.      MDD (major depressive disorder) 04/05/2011     Priority: Medium     Needs to est primary care.      CARDIOVASCULAR SCREENING; LDL GOAL LESS THAN 160 10/31/2010     Priority: Medium    Genital herpes      Priority: Medium     IMO update changed this record. Please review for accuracy      Anxiety 08/27/2008     Priority: Medium     Patient is followed by MIMI GARZA for ongoing prescription of benzodiazepines.  All refills should be approved  by this provider, or covering partner.    Medication(s): Clonazepam.   Maximum quantity per month: 30  Clinic visit frequency required:      Controlled substance agreement on file: No  Benzodiazepine use reviewed by psychiatry:  No    Last [facility] website verification:  done on 4/5/19  [email or web address]        History of Tom-en-Y gastric bypass March 2008 03/25/2008     Priority: Medium     Performed at [facility].      Restless legs syndrome (RLS) 05/15/2007     Priority: Medium    Hypersomnia with sleep apnea 05/15/2007     Priority: Medium     Problem list name updated by automated process. Provider to review      Esophageal reflux 04/18/2007     Priority: Medium        Past Medical History:    Past Medical History:   Diagnosis Date    Abnormal Papanicolaou smear of cervix and cervical HPV 04/01/2007    COPD (chronic obstructive pulmonary disease) (H)     Genital herpes     History of colposcopy with cervical biopsy 06/01/2007    Hypersomnia with sleep apnea, unspecified     Other and unspecified ovarian cyst 2002 or so    Pneumonia 10/23/2023    Sleep apnea     Uncomplicated asthma        Past Surgical History:    Past Surgical History:   Procedure Laterality Date    CHOLECYSTECTOMY, OPEN  age 20s    COLONOSCOPY  03/21/2011    COMBINED COLONOSCOPY, REMOVE TUMOR/POLYP/LESION BY SNARE performed by JUAN FRANCISCO HERNANDEZ at  GI    COLONOSCOPY N/A 10/09/2017    polyps, repeat 3 years    COLPOSCOPY CERVIX, LOOP ELECTRODE BIOPSY, COMBINED  06/2007    CAN 2/3-patient requires yearly pap smears    dental pegs      ESOPHAGOSCOPY, GASTROSCOPY, DUODENOSCOPY (EGD), COMBINED N/A 02/09/2018    Procedure: COMBINED ESOPHAGOSCOPY, GASTROSCOPY, DUODENOSCOPY (EGD);  EGD;  Surgeon: Oneal Sanchez MD;  Location:  GI    GASTRIC BYPASS  03/25/2008    At [facility]    HC DILATION/CURETTAGE DIAG/THER NON OB  2002    HC ENLARGE BREAST WITH IMPLANT      HC LAPAROSCOPIC MYOMECTOMY, 1 - 4 INTRAMURAL MYOMAS =<250  "GM  2002    HC REMOVAL OF BREAST IMPLANT      LAPAROSCOPIC ASSISTED INSERTION TUBE GASTROTOMY N/A 12/13/2023    Procedure: EXPLORATORY LAPAROSCOPY WITH REPEAT GASTRORRHAPHY;  Surgeon: Antoni Gonzalez MD;  Location: [facility] OR    LAPAROSCOPY DIAGNOSTIC (GENERAL) N/A 12/11/2023    Procedure: Diagnostic laparoscopy, laparoscopic patching of marginal ulcer;  Surgeon: Antoni Gonzalez MD;  Location: [facility] OR    SALPINGO OOPHORECTOMY,R/L/MATTHEW  2002    Bilateral salpingectomy and unilateral oophorectomy       Family History:    Family History   Problem Relation Age of Onset    Chronic Obstructive Pulmonary Disease Mother     Unknown/Adopted Father         doesn't know birth father    Lung Cancer Brother     Family History Negative Other        Social History:  Marital Status:   [2]  Social History     Tobacco Use    Smoking status: Every Day     Packs/day: 2.00     Years: 10.00     Additional pack years: 0.00     Total pack years: 20.00     Types: Cigarettes    Smokeless tobacco: Never    Tobacco comments:     2 ppd at this time (chain smoking) 10/2012   Vaping Use    Vaping Use: Every day    Substances: Nicotine, THC    Devices: Pre-filled or refillable cartridge   Substance Use Topics    Alcohol use: Yes     Comment: daily, drinks a box    Drug use: Yes     Types: Marijuana     Comment: medical        Medications:    potassium chloride vianney ER (KLOR-CON M20) 20 MEQ CR tablet  busPIRone (BUSPAR) 15 MG tablet  calcium carbonate (OS-SHON 500 MG [place]. [place]) 1250 MG tablet  clonazePAM (KLONOPIN) 0.5 MG tablet  cyanocobalamin (CYANOCOBALAMIN) 1000 MCG/ML injection  cyclobenzaprine (FLEXERIL) 10 MG tablet  ferrous gluconate (FERGON) 324 (38 Fe) MG tablet  folic acid (FOLVITE) 1 MG tablet  Insulin Syringe-Needle U-100 (B-D INSULIN SYRINGE) 25G X 1\" 1 ML MISC  multivitamin w/minerals (THERA-VIT-M) tablet  naltrexone (DEPADE/REVIA) 50 MG tablet  omeprazole (PRILOSEC) 40 MG DR capsule  raloxifene (EVISTA) 60 MG tablet  thiamine (B-1) 100 MG " "tablet  traZODone (DESYREL) 50 MG tablet  triamcinolone (KENALOG) 0.1 % external cream  venlafaxine (EFFEXOR XR) 37.5 MG 24 hr capsule          Review of Systems   All other systems reviewed and are negative.      Physical Exam   BP: 105/76  Pulse: 80  Temp: 98.2  F (36.8  C)  Resp: 20  Height: 162.6 cm (5' 4\")  Weight: 42.2 kg (93 lb)  SpO2: 94 %      Physical Exam  Vitals and nursing note reviewed.   Constitutional:       Appearance: She is not toxic-appearing or diaphoretic.   HENT:      Head: Normocephalic.      Mouth/Throat:      Mouth: Mucous membranes are moist.   Cardiovascular:      Rate and Rhythm: Normal rate and regular rhythm.   Pulmonary:      Effort: Pulmonary effort is normal.      Breath sounds: Normal breath sounds. No wheezing.   Skin:     General: Skin is warm and dry.   Neurological:      Mental Status: She is alert.         ED Course        Procedures              EKG Interpretation:      Interpreted by Lali Connolly DO  Time reviewed: 0400  Symptoms at time of EKG: Shortness of breath  Rhythm: normal sinus   Rate: normal  Axis: normal  Ectopy: none  Conduction: normal  ST Segments/ T Waves: No ST-T wave changes  Q Waves: none  Comparison to prior: Unchanged    Clinical Impression: normal EKG      Critical Care time:  none               Results for orders placed or performed during the hospital encounter of 03/22/24 (from the past 24 hour(s))   CBC with platelets differential    Narrative    The following orders were created for panel order CBC with platelets differential.  Procedure                               Abnormality         Status                     ---------                               -----------         ------                     CBC with platelets and d...[[id number]]  Abnormal            Final result                 Please view results for these tests on the individual orders.   Comprehensive metabolic panel   Result Value Ref Range    Sodium 133 (L) 135 - 145 mmol/L    " Potassium 2.9 (L) 3.4 - 5.3 mmol/L    Carbon Dioxide (CO2) 24 22 - 29 mmol/L    Anion Gap 14 7 - 15 mmol/L    Urea Nitrogen 12.0 8.0 - 23.0 mg/dL    Creatinine 0.86 0.51 - 0.95 mg/dL    GFR Estimate 74 >60 mL/min/1.73m2    Calcium 8.7 (L) 8.8 - 10.2 mg/dL    Chloride 95 (L) 98 - 107 mmol/L    Glucose 105 (H) 70 - 99 mg/dL    Alkaline Phosphatase 143 40 - 150 U/L    AST 17 0 - 45 U/L    ALT 7 0 - 50 U/L    Protein Total 7.8 6.4 - 8.3 g/dL    Albumin 3.6 3.5 - 5.2 g/dL    Bilirubin Total 0.5 <=1.2 mg/dL   Troponin T, High Sensitivity   Result Value Ref Range    Troponin T, High Sensitivity 7 <=14 ng/L   Nt probnp inpatient (BNP)   Result Value Ref Range    N terminal Pro BNP Inpatient 180 0 - 900 pg/mL   Symptomatic Influenza A/B, RSV, & SARS-CoV2 PCR (COVID-19) Nose    Specimen: Nose; Swab   Result Value Ref Range    Influenza A PCR Negative Negative    Influenza B PCR Negative Negative    RSV PCR Positive (A) Negative    SARS CoV2 PCR Negative Negative    Narrative    Testing was performed using the Xpert Xpress CoV2/Flu/RSV Assay on the [employer] GeneXpert Instrument. This test should be ordered for the detection of SARS-CoV-2, influenza, and RSV viruses in individuals who meet clinical and/or epidemiological criteria. Test performance is unknown in asymptomatic patients. This test is for in vitro diagnostic use under the FDA EUA for laboratories certified under CLIA to perform high or moderate complexity testing. This test has not been FDA cleared or approved. A negative result does not rule out the presence of PCR inhibitors in the specimen or target RNA in concentration below the limit of detection for the assay. If only one viral target is positive but coinfection with multiple targets is suspected, the sample should be re-tested with another FDA cleared, approved, or authorized test, if coinfection would change clinical management. This test was validated by the [facility]. These laboratories  are certified under the Clinical Laboratory Improvement Amendments of 1988 (CLIA-88) as qualified to perform high complexity laboratory testing.   Ethyl Alcohol Level   Result Value Ref Range    Alcohol ethyl <0.01 <=0.01 g/dL   CBC with platelets and differential   Result Value Ref Range    WBC Count 10.4 4.0 - 11.0 10e3/uL    RBC Count 3.89 3.80 - 5.20 10e6/uL    Hemoglobin 12.0 11.7 - 15.7 g/dL    Hematocrit 36.0 35.0 - 47.0 %    MCV 93 78 - 100 fL    MCH 30.8 26.5 - 33.0 pg    MCHC 33.3 31.5 - 36.5 g/dL    RDW 17.5 (H) 10.0 - 15.0 %    Platelet Count 304 150 - 450 10e3/uL    % Neutrophils 64 %    % Lymphocytes 26 %    % Monocytes 9 %    % Eosinophils 1 %    % Basophils 0 %    % Immature Granulocytes 0 %    NRBCs per 100 WBC 0 <1 /100    Absolute Neutrophils 6.6 1.6 - 8.3 10e3/uL    Absolute Lymphocytes 2.7 0.8 - 5.3 10e3/uL    Absolute Monocytes 1.0 0.0 - 1.3 10e3/uL    Absolute Eosinophils 0.1 0.0 - 0.7 10e3/uL    Absolute Basophils 0.0 0.0 - 0.2 10e3/uL    Absolute Immature Granulocytes 0.0 <=0.4 10e3/uL    Absolute NRBCs 0.0 10e3/uL   XR Chest Port 1 View    Narrative    EXAM: XR CHEST PORT 1 VIEW  LOCATION: [facility]  DATE: 3/22/2024    INDICATION: Shortness of breath.  COMPARISON: Portable chest single view 11/26/2023 at 2005 hours.      Impression    IMPRESSION: Deep inspiration. Both lungs are otherwise clear. Prominent nipple shadows. No adenopathy or effusion. Normal cardiac size and pulmonary vascularity. Mildly atherosclerotic thoracic aorta. Healed left fourth rib fracture deformity   anterolaterally. Degenerative changes both shoulders and the spine. Monitoring leads have been placed overlying the chest. Otherwise, no significant change.       Medications   potassium chloride 10 mEq in 100 mL sterile water infusion (10 mEq Intravenous $New Bag 3/22/24 3065)   methylPREDNISolone sodium succinate (solu-MEDROL) injection 125 mg (125 mg Intravenous $Given 3/22/24 9315)    potassium chloride vianney ER (KLOR-CON M20) CR tablet 40 mEq (40 mEq Oral $Given 3/22/24 1328)       Assessments & Plan (with Medical Decision Making)  Shanika is a 68-year-old female presenting via EMS from home over concern of shortness of breath.  See history and physical exam as above  68-year-old female in no acute respiratory distress, is 97% on room air, no increased work of breathing.  Lungs are clear to auscultation bilaterally.  She is otherwise vitally stable and the remainder of her physical exam is unremarkable.  Will check blood work, chest x-ray, viral swab, and give a dose of Solu-Medrol due to her underlying diagnosis of COPD.  Since no wheezing currently appreciated, will hold off on nebulizer treatment until viral swab has resulted  Labs and imaging as above.  White blood cell count is within normal limits.  Chest x-ray did not reveal any sign of consolidation, pleural effusion, pneumothorax.  Potassium is low at 2.9, so was repleted with oral and IV dosing.  Remainder of lab work did not show any acute worrisome findings.  Viral swab was positive for RSV.  Patient was updated on these findings and the recommendations for management.  She is maintaining appropriate saturations on room air, no indication for supplemental oxygen or need for hospitalization.  She does not have a detectable blood alcohol level, and reports trying to wean off the alcohol on her own.  She was requesting something for her withdrawal symptoms while she was here, but did not notice any sign of significant withdrawal symptoms that would require medication.  She also has clonazepam at home as needed for anxiety.  Advised on supportive cares for viral illness.  Will also send prescription for additional potassium for her to take over the next 5 days.  She will follow-up with her primary doctor within 1 to 2 weeks.  Discussed ED return precautions.  Potassium was finished and she was calling for a ride, discharged at 0620 in  stable condition     I have reviewed the nursing notes.    I have reviewed the findings, diagnosis, plan and need for follow up with the patient.           Medical Decision Making  The patient's presentation was of moderate complexity (an acute illness with systemic symptoms).    The patient's evaluation involved:  ordering and/or review of 3+ test(s) in this encounter (see separate area of note for details)    The patient's management necessitated moderate risk (prescription drug management including medications given in the ED).        New Prescriptions    POTASSIUM CHLORIDE MINNA ER (KLOR-CON M20) 20 MEQ CR TABLET    Take 1 tablet (20 mEq) by mouth daily for 5 days       Final diagnoses:   Hypokalemia   Shortness of breath   RSV bronchiolitis       3/22/2024   [facility] EMERGENCY DEPT       Lali Connolly DO  03/22/24 0617

## 2024-03-22 NOTE — PROGRESS NOTES
Clinic Care Coordination Contact    Situation: Patient chart reviewed by care coordinator.    Background: Pt presented to [facility] ED 3/22/24 for evaluation of SOB.     Assessment: Rx given. Pt has PCP appt 4/3/24.     Plan/Recommendations: Lead LALO ZUÑIGA and CHW to outreach as previously indicated.     ANUP Small   Social Work Primary Care Clinic Care Coordinator   [facility]  Covering for [facility]

## 2024-03-22 NOTE — DISCHARGE INSTRUCTIONS
Your viral swab was positive for RSV.  This is a viral respiratory infection that may be causing you to feel short of breath.  Since it is a virus, it will not respond to antibiotics.  You do not have any sign of pneumonia on your chest x-ray    You also had low potassium.  You were given potassium replacement through the IV and orally in the ER.  You should take the potassium supplements for the next 5 days.  Should follow-up with your primary doctor in clinic to recheck your potassium levels    Continue all your other medicines as previously prescribed    If you develop any new or worsening symptoms do not hesitate to return to the emergency room for evaluation

## 2024-03-26 DIAGNOSIS — F41.9 ANXIETY: ICD-10-CM

## 2024-03-26 NOTE — TELEPHONE ENCOUNTER
Pt is requesting a script for Escitalopram 20 tablets - taking one and one-half tablets daily.     Did not see on Pt med list .       Please contact Pt with any questions

## 2024-03-27 NOTE — TELEPHONE ENCOUNTER
Looks like this was marked as stopped when she had visit with Dr. PENNINGTON in Feb. She is supposedly taking Venlafaxine (Effexor) and lexapro but need to verify. Please call patient and clarify whether she has been taking this or not, and whether she is taking effexor, and trazodone.  Soha Boggs MD

## 2024-03-28 ENCOUNTER — TELEPHONE (OUTPATIENT)
Dept: FAMILY MEDICINE | Facility: CLINIC | Age: 69
End: 2024-03-28
Payer: MEDICARE

## 2024-03-28 ENCOUNTER — PATIENT OUTREACH (OUTPATIENT)
Dept: CARE COORDINATION | Facility: CLINIC | Age: 69
End: 2024-03-28
Payer: MEDICARE

## 2024-03-28 RX ORDER — ESCITALOPRAM OXALATE 20 MG/1
30 TABLET ORAL DAILY
Qty: 135 TABLET | Refills: 3 | Status: SHIPPED | OUTPATIENT
Start: 2024-03-28

## 2024-03-28 NOTE — TELEPHONE ENCOUNTER
Called and LM for patient to call back. Please relay below and gather information needed.   Zakia Romero MA

## 2024-03-28 NOTE — TELEPHONE ENCOUNTER
Patient returned call.    Patient is taking BOTH the Effexor and the Lexapro, she infrequently takes the trazadone.    She said that to her knowledge none of the medications were stopped and she has been taking them as prescribed.    Zaida Zabala XRO/

## 2024-03-28 NOTE — TELEPHONE ENCOUNTER
Patient was recently ar [facility] and wanted to let pcp know the bed weight they took was incorrect.  She has not lost 20 lbs.  Patient was at bariatric center recently and was only down a few pounds.  Patient also does not want to decrease any anxiety medication at time.  She is going through divorce.

## 2024-04-03 ENCOUNTER — VIRTUAL VISIT (OUTPATIENT)
Dept: FAMILY MEDICINE | Facility: CLINIC | Age: 69
End: 2024-04-03
Payer: COMMERCIAL

## 2024-04-03 DIAGNOSIS — R63.6 UNDERWEIGHT: ICD-10-CM

## 2024-04-03 DIAGNOSIS — I27.20 PULMONARY HYPERTENSION (H): ICD-10-CM

## 2024-04-03 DIAGNOSIS — Z87.898 HISTORY OF DOMESTIC VIOLENCE: ICD-10-CM

## 2024-04-03 DIAGNOSIS — J21.0 RSV BRONCHIOLITIS: Primary | ICD-10-CM

## 2024-04-03 DIAGNOSIS — F10.20 ALCOHOL USE DISORDER, SEVERE, DEPENDENCE (H): ICD-10-CM

## 2024-04-03 DIAGNOSIS — F41.9 ANXIETY: ICD-10-CM

## 2024-04-03 PROCEDURE — 99442 PR PHYSICIAN TELEPHONE EVALUATION 11-20 MIN: CPT | Mod: 93 | Performed by: FAMILY MEDICINE
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
## 2024-04-04 ENCOUNTER — NURSE TRIAGE (OUTPATIENT)
Dept: FAMILY MEDICINE | Facility: CLINIC | Age: 69
End: 2024-04-04
Payer: COMMERCIAL

## 2024-04-04 NOTE — TELEPHONE ENCOUNTER
Nurse Triage SBAR    Is this a 2nd Level Triage? YES, LICENSED PRACTITIONER REVIEW IS REQUIRED    Situation: Patient reports she is a  and went to groom a client's pet raccoon yesterday and was bit on her R hand and wrist area. She states the owner has always had the raccoon as a pet and it has been vaccinated against rabies and does not go outside. Patient states she washed area out well and applied hydrogen peroxide to it. She said it is now a little swollen, but is not having any increased redness and is already scabbing over.    Background: Bite was unprovoked, patient does not have proof of the rabies vaccination for the animal    Assessment: Patient was encouraged to be seen in ED but she declined. She is asking for antibiotics. Writer again stated she should be seen in ED as there is not proof of the vaccination of the animal and that a raccoon is a high risk. She said she will watch for signs of infection and if she needs to go to ED later, she will. She said she is aware of the signs of infection.     Protocol Recommended Disposition:   Go to ED Now    Recommendation: Patient asking for antibiotics or to be seen in clinic. She was told that PCP is out of office today. Writer did recommend ED due to concerns of a high risk animal bite.    Routed to provider    Does the patient meet one of the following criteria for ADS visit consideration? No    YAYO Goyal, RN          Reason for Disposition   Any break in skin from BITE (e.g., cut, puncture, or scratch) and WILD animal at risk for RABIES (e.g., bat, raccoon, rivera, skunk, coyote, other carnivores; see Background for list)   Any break in skin from BITE (e.g., cut, puncture, or scratch) and PET animal (e.g., dog, cat, or ferret) at risk for RABIES (e.g., sick, stray, unprovoked bite, developing country)    Additional Information   Negative: Major bleeding (actively dripping or spurting) that can't be stopped   Negative: Sounds like a  life-threatening emergency to the triager    Protocols used: Animal Bite-A-OH

## 2024-04-04 NOTE — TELEPHONE ENCOUNTER
Patient returned call and was relayed the message that she needs to go to ED. She said she will go to urgent care for sure when she gets off work, or possibly ED this evening. Either way she said she will get in to see a provider today. Writer educated patient on the concern of rabies and infection risk. She verbalized understanding.    Isabela Donovan,LARTICEN, RN

## 2024-04-04 NOTE — TELEPHONE ENCOUNTER
RN Triage    Patient Contact    Attempt # 1    Was call answered?  No.  Left message on voicemail with information to call me back.    Liz Soto RN on 4/4/2024 at 11:08 AM

## 2024-04-05 ENCOUNTER — PATIENT OUTREACH (OUTPATIENT)
Dept: CARE COORDINATION | Facility: CLINIC | Age: 69
End: 2024-04-05
Payer: COMMERCIAL

## 2024-04-05 NOTE — PROGRESS NOTES
Clinic Care Coordination Contact  [facility]/Voicemail    Clinical Data: Care Coordinator Outreach    Outreach Documentation Number of Outreach Attempt   4/5/2024   3:42 PM 1       Left message on patient's voicemail with call back information and requested return call.    Plan: Care Coordinator will try to reach patient again within 10 business days.    MIESHA Helton  [phone]  [facility]

## 2024-04-12 ENCOUNTER — OFFICE VISIT (OUTPATIENT)
Dept: AUDIOLOGY | Facility: CLINIC | Age: 69
End: 2024-04-12
Attending: PHYSICIAN ASSISTANT
Payer: COMMERCIAL

## 2024-04-12 DIAGNOSIS — H91.90 DECREASED HEARING, UNSPECIFIED LATERALITY: ICD-10-CM

## 2024-04-12 DIAGNOSIS — H90.3 SENSORINEURAL HEARING LOSS, BILATERAL: Primary | ICD-10-CM

## 2024-04-12 PROCEDURE — 92557 COMPREHENSIVE HEARING TEST: CPT | Performed by: AUDIOLOGIST

## 2024-04-12 PROCEDURE — 92550 TYMPANOMETRY & REFLEX THRESH: CPT | Performed by: AUDIOLOGIST

## 2024-04-12 NOTE — PROGRESS NOTES
AUDIOLOGY REPORT    BACKGROUND:  Referred by CHAPITO Dalal PA-C patient notes she is struggling to hear conversation, needs increased volume on TV and on her phone. She reported brief episodes of tinnitus. Denied problems with dizziness and vertigo. No falls in the last year.     RESULTS:  Otoscopy showed clear canals and visible landmarks right ear and left. Tympanograms showed normal mobility and normal pressure right ear and excessive mobility and normal pressure left ear. Ipsi and contra reflexes were present right and left. Pure tones showed low normal sloping to mild-moderate sensorineural hearing loss bilaterally. Good SRT / PTA agreement. Good word understanding in quiet 88% right and 182% left, 50-word list.     RECOMMENDATIONS:  Discussed hearing loss and hearing aid candidacy.   Recommended hearing aid consultation.   Return in 1-2 years for audiology evaluation, sooner if there are concerns with changes in hearing.     Andrzej Armstrong Licensed Audiologist #[id number]

## 2024-04-17 ENCOUNTER — PATIENT OUTREACH (OUTPATIENT)
Dept: CARE COORDINATION | Facility: CLINIC | Age: 69
End: 2024-04-17
Payer: COMMERCIAL

## 2024-04-17 DIAGNOSIS — Z71.89 OTHER SPECIFIED COUNSELING: Primary | Chronic | ICD-10-CM

## 2024-04-17 NOTE — PROGRESS NOTES
"Clinic Care Coordination Contact  Community Health Worker Follow Up    Care Gaps:     Health Maintenance Due   Topic Date Due    SPIROMETRY  Never done    COPD ACTION PLAN  Never done    RSV VACCINE (Pregnancy & 60+) (1 - 1-dose 60+ series) Never done    COLORECTAL CANCER SCREENING  10/09/2020    MEDICARE ANNUAL WELLNESS VISIT  09/17/2022    COVID-19 Vaccine (4 - 2023-24 season) 09/01/2023       Scheduled 5/6/24      Care Plan:   Care Plan: Financial Wellbeing       Problem: Patient expresses financial resource strain       Goal: Create an action plan to increase financial stability       Start Date: 5/25/2023 Expected End Date: 5/14/2024    This Visit's Progress: 40% Recent Progress: 30%    Note:     Barriers: limited income, miss deadlines  Strengths: understanding the need  Patient expressed understanding of goal: yes  Action steps to achieve this goal:  1. I will answer the Financial resource worker call and work with them to apply for insurance (4/17 - FRW referral placed again)  2. I will reach out to resources sent  3. I will reach out to Jenny as needs arise between scheduled calls.     4/17 - Working on re-applying for everything. Getting food from [employer] and that is helping with meals at this time.                              Intervention and Education during outreach: The CHW updated the Care plan per the conversation. The patient informed the CHW that she had court on Monday, Protection order is still in effect. 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 The patient will be getting to go for a three day trip in the summer for time away after \"the past year\". The patient was able to get all of the locks changed in the home and also was able to add security cameras outside and also in her dog grooming rooms. At this time the patient stated that she has a lot of forms to fill out due to no longer having her Ex in the home. The CHW did place a referral for the FRWs for a Colleen " Care Application due to the income change with the Ex being court ordered to be out of the home.      CHW Plan: Follow up in one month    MIESHA Helton  [phone]  [facility]

## 2024-04-19 ENCOUNTER — PATIENT OUTREACH (OUTPATIENT)
Dept: CARE COORDINATION | Facility: CLINIC | Age: 69
End: 2024-04-19
Payer: COMMERCIAL

## 2024-04-19 NOTE — PROGRESS NOTES
Clinic Care Coordination Contact  Care Coordination Clinician Chart Review    Situation: Patient chart reviewed by Care Coordinator.       Background: Care Coordination Program started: 4/10/2023. Initial assessment completed and patient-centered care plan(s) were developed with participation from patient. Lead CC handed patient off to CHW for continued outreaches.       Assessment: Per chart review, patient outreach completed by CC CHW on 4/17/24.  Patient is actively working to accomplish goal(s). Patient's goal(s) appropriate and relevant at this time. Patient is due for updated Plan of Care.  Assessments will be completed annually or as needed/with change of patient status.      Care Plan: Financial Wellbeing       Problem: Patient expresses financial resource strain       Goal: Create an action plan to increase financial stability       Start Date: 5/25/2023 Expected End Date: 10/19/2024    Recent Progress: 40%    Note:     Barriers: limited income, miss deadlines  Strengths: understanding the need  Patient expressed understanding of goal: yes  Action steps to achieve this goal:  1. I will answer the Financial resource worker call and work with them to apply for insurance (4/17 - FRW referral placed again)  2. I will reach out to resources sent  3. I will reach out to Jenny as needs arise between scheduled calls.     4/17 - Working on re-applying for everything. Getting food from [employer] and that is helping with meals at this time.                                 Plan/Recommendations: The patient will continue working with Care Coordination to achieve goal(s) as above. CHW will continue outreaches at minimum every 30 days and will involve Lead CC as needed or if patient is ready to move to Maintenance. Lead CC will continue to monitor CHW outreaches and patient's progress to goal(s) every 6 weeks.     Plan of Care updated and sent to patient: Yes, via Sita Hewitt [facility]  Primary Care - Care Coordination  [facility]   [phone]

## 2024-04-19 NOTE — LETTER
I would like to provide you with the enclosed information for your records.  As part of care coordination, we are developing care plans to assist in accomplishing your health care goals.  When we speak next, please feel free to let me know if you want to add or change any information on your care plans.    As always, feel free to contact me if you have any questions or concerns.  I look forward to working with you in the effort to achieve your health care and wellness goals .        Sincerely,      Jenny Hewitt, [facility]  Clinic Care Coordination  [phone]    [facility]  Patient Centered Plan of Care  About Me:        Patient Name:  Pavithra Raines    YOB: 1955  Age:         68 year old   [place] MRN:    [id number] Telephone Information:  Home Phone [phone]   Mobile [phone]       Address:  [address] [address] Email address:  [email or web address]      Emergency Contact(s)    Name Relationship Lgl Grd Work Phone Home Phone Mobile Phone   1. RIANFRAN Friend   [phone]   2. SAMANTA GENAO Spouse   [phone]   3. KANIKA LOPEZ Friend   [phone]            Primary language:  English     needed? No   [place] [employer]:  [phone] op. 1  Other communication barriers:Other (does not do well if addressed with a negative tone or other person coming across accusatory/lieing, any triggers similar to  past experiences can cause lack of communication)    Preferred Method of Communication:  Mail  Current living arrangement: I live in a private home with spouse    Mobility Status/ Medical Equipment: Independent        Health Maintenance  Health Maintenance Reviewed: Due/Overdue   Health Maintenance Due   Topic Date Due    SPIROMETRY  Never done    COPD ACTION PLAN  Never done    RSV VACCINE (Pregnancy & 60+) (1 - 1-dose 60+ series) Never done    COLORECTAL CANCER SCREENING  10/09/2020    MEDICARE ANNUAL  WELLNESS VISIT  09/17/2022    COVID-19 Vaccine (4 - 2023-24 season) 09/01/2023           My Access Plan  Medical Emergency 911   Primary Clinic Line [facility] [phone]   24 Hour Appointment Line [phone] or  [phone]) (toll-free)   24 Hour Nurse Line [phone] (toll-free)   Preferred Urgent Care [facility], [phone]     Preferred Hospital [facility]  [phone]     Preferred Pharmacy [facility], [place], [place] - [address]      Behavioral Health Crisis Line The National Suicide Prevention Lifeline at 1-976.207.4081 or Text/Call 118           My Care Team Members  Patient Care Team         Relationship Specialty Notifications Start End    Soha Boggs MD PCP - General Family Medicine  6/1/23     Phone: [phone] Fax: [phone]         [address] [place] [address]    Soha Boggs MD Assigned PCP   12/31/17     Phone: [phone] Fax: [phone]         [address] [place] [address]    Jenny Hewitt LSW Lead Care Coordinator Primary Care - CC Admissions 4/11/23     Phone: [phone] Fax: [phone]        Selina Lester APRN CNP Nurse Practitioner Gastroenterology  5/18/23     Phone: [phone] Fax: [phone]         [address] [place] [address]    Armando Somers, [facility] Assigned MTM Pharmacist   1/6/24     Phone: [phone] Fax: [phone] [address] [place] [address]    Danie Peters, W Community Health Worker Primary Care - CC Admissions 1/29/24     Leydi Salmon Financial Resource Worker   4/17/24     Phone: [phone]                     My Care Plans  Self Management and Treatment Plan    Care Plan  Care Plan: Financial Wellbeing       Problem: Patient expresses financial resource strain       Goal: Create an action plan to increase financial stability       Start  Date: 5/25/2023 Expected End Date: 10/19/2024    Recent Progress: 40%    Note:     Barriers: limited income, miss deadlines  Strengths: understanding the need  Patient expressed understanding of goal: yes  Action steps to achieve this goal:  1. I will answer the Financial resource worker call and work with them to apply for insurance (4/17 - FRW referral placed again)  2. I will reach out to resources sent  3. I will reach out to Jenny as needs arise between scheduled calls.     4/17 - Working on re-applying for everything. Getting food from [employer] and that is helping with meals at this time.                              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    Genital herpes    CARDIOVASCULAR SCREENING; LDL GOAL LESS THAN 160    MDD (major depressive disorder)    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 grade 3    Anemia due to vitamin B12 deficiency, unspecified B12 deficiency type    Insomnia, unspecified type    Other iron deficiency anemia    Age-related osteoporosis without current pathological fracture    Fatty liver    PTSD (post-traumatic stress disorder)    Underweight    Macrocytosis without anemia    Alcohol dependence in remission (H)    Episode of recurrent major depressive disorder, unspecified depression episode severity (H24)    Dehydration    Hyponatremia    Weakness generalized    Alcohol use disorder, severe, dependence (H)    Multifocal pneumonia    Septic shock (H)    Bacteremia due to Streptococcus pneumoniae    Hypomagnesemia    History of seizure due to alcohol withdrawal    Emphysema/COPD (H)     "Tobacco abuse    Malnutrition - suspected    Hypoalbuminemia    Acute respiratory failure with hypoxia (H)    Alcohol withdrawal, with delirium (H)    Hypophosphatemia    Hypokalemia    Thrombocytopenia (H24)    Anemia, unspecified type    Coagulopathy (H24)    Pulmonary hypertension (H)    Marginal ulcer      Current Medications and Allergies:     Allergies   Allergen Reactions    Codeine Itching    Vicodin [Hydrocodone-Acetaminophen] Itching         Current Outpatient Medications:     busPIRone (BUSPAR) 15 MG tablet, Take 1 tablet (15 mg) by mouth 3 times daily, Disp: 90 tablet, Rfl: 3    calcium carbonate (OS-SHON 500 MG [place]. [place]) 1250 MG tablet, Take 1 tablet by mouth 2 times daily, Disp: , Rfl:     clonazePAM (KLONOPIN) 0.5 MG tablet, TAKE ONE TABLET BY MOUTH ONCE DAILY AS NEEDED FOR ANXIETY, Disp: 30 tablet, Rfl: 0    cyanocobalamin (CYANOCOBALAMIN) 1000 MCG/ML injection, Inject 1 mL into the muscle every 30 days, Disp: , Rfl:     cyclobenzaprine (FLEXERIL) 10 MG tablet, Take 1 tablet (10 mg) by mouth nightly as needed for muscle spasms, Disp: 60 tablet, Rfl: 0    escitalopram (LEXAPRO) 20 MG tablet, Take 1.5 tablets (30 mg) by mouth daily, Disp: 135 tablet, Rfl: 3    ferrous gluconate (FERGON) 324 (38 Fe) MG tablet, TAKE 1 TABLET (324 MG) BY MOUTH DAILY (WITH BREAKFAST), Disp: 90 tablet, Rfl: 1    folic acid (FOLVITE) 1 MG tablet, Take 1 tablet (1,000 mcg) by mouth daily, Disp: 90 tablet, Rfl: 0    Insulin Syringe-Needle U-100 (B-D INSULIN SYRINGE) 25G X 1\" 1 ML MISC, Use once every 30 days for B12 injection, Disp: 3 each, Rfl: 3    multivitamin w/minerals (THERA-VIT-M) tablet, Take 1 tablet by mouth daily, Disp: , Rfl:     naltrexone (DEPADE/REVIA) 50 MG tablet, Take 1 tablet (50 mg) by mouth daily, Disp: 90 tablet, Rfl: 0    omeprazole (PRILOSEC) 40 MG DR capsule, TAKE ONE CAPSULE BY MOUTH TWICE A DAY, Disp: 90 capsule, Rfl: 3    raloxifene (EVISTA) 60 MG tablet, Take 1 tablet (60 mg) by mouth daily, Disp: " 90 tablet, Rfl: 1    thiamine (B-1) 100 MG tablet, Take 1 tablet (100 mg) by mouth daily, Disp: , Rfl:     traZODone (DESYREL) 50 MG tablet, Take 1 tablet (50 mg) by mouth nightly as needed for sleep, Disp: 30 tablet, Rfl: 1    triamcinolone (KENALOG) 0.1 % external cream, APPLY SPARINGLY TO ITCHY RASH AREAS UP TO THREE TIMES A DAY AS NEEDED, Disp: 80 g, Rfl: 0    venlafaxine (EFFEXOR XR) 37.5 MG 24 hr capsule, Take 1 capsule (37.5 mg) by mouth daily, Disp: 90 capsule, Rfl: 1      Care Coordination Start Date: 4/10/2023   Frequency of Care Coordination: monthly, more frequently as needed     Form Last Updated: 04/19/2024

## 2024-04-22 ENCOUNTER — PATIENT OUTREACH (OUTPATIENT)
Dept: CARE COORDINATION | Facility: CLINIC | Age: 69
End: 2024-04-22
Payer: COMMERCIAL

## 2024-05-01 ENCOUNTER — APPOINTMENT (OUTPATIENT)
Dept: CARE COORDINATION | Facility: CLINIC | Age: 69
End: 2024-05-01
Payer: COMMERCIAL

## 2024-05-03 DIAGNOSIS — F41.9 ANXIETY: ICD-10-CM

## 2024-05-06 ENCOUNTER — OFFICE VISIT (OUTPATIENT)
Dept: FAMILY MEDICINE | Facility: CLINIC | Age: 69
End: 2024-05-06
Payer: COMMERCIAL

## 2024-05-06 VITALS
HEART RATE: 72 BPM | HEIGHT: 63 IN | DIASTOLIC BLOOD PRESSURE: 80 MMHG | RESPIRATION RATE: 20 BRPM | OXYGEN SATURATION: 100 % | WEIGHT: 107 LBS | TEMPERATURE: 98.7 F | BODY MASS INDEX: 18.96 KG/M2 | SYSTOLIC BLOOD PRESSURE: 120 MMHG

## 2024-05-06 DIAGNOSIS — E53.8 VITAMIN B12 DEFICIENCY (NON ANEMIC): ICD-10-CM

## 2024-05-06 DIAGNOSIS — Z29.11 NEED FOR VACCINATION AGAINST RESPIRATORY SYNCYTIAL VIRUS: ICD-10-CM

## 2024-05-06 DIAGNOSIS — E87.6 HYPOKALEMIA: ICD-10-CM

## 2024-05-06 DIAGNOSIS — D50.8 OTHER IRON DEFICIENCY ANEMIA: ICD-10-CM

## 2024-05-06 DIAGNOSIS — F41.9 ANXIETY: ICD-10-CM

## 2024-05-06 DIAGNOSIS — F10.20 UNCOMPLICATED ALCOHOL DEPENDENCE (H): Primary | ICD-10-CM

## 2024-05-06 DIAGNOSIS — Z71.6 ENCOUNTER FOR TOBACCO USE CESSATION COUNSELING: ICD-10-CM

## 2024-05-06 DIAGNOSIS — N39.41 URGE INCONTINENCE OF URINE: ICD-10-CM

## 2024-05-06 DIAGNOSIS — D75.89 MACROCYTOSIS WITHOUT ANEMIA: ICD-10-CM

## 2024-05-06 DIAGNOSIS — F10.20 ALCOHOL USE DISORDER, SEVERE, DEPENDENCE (H): ICD-10-CM

## 2024-05-06 PROBLEM — J18.9 MULTIFOCAL PNEUMONIA: Status: RESOLVED | Noted: 2023-10-07 | Resolved: 2024-05-06

## 2024-05-06 PROBLEM — F10.21 ALCOHOL DEPENDENCE IN REMISSION (H): Status: RESOLVED | Noted: 2022-01-08 | Resolved: 2024-05-06

## 2024-05-06 PROBLEM — Z87.898 HISTORY OF DOMESTIC VIOLENCE: Status: ACTIVE | Noted: 2024-05-06

## 2024-05-06 PROBLEM — D68.9 COAGULOPATHY (H): Status: RESOLVED | Noted: 2023-10-11 | Resolved: 2024-05-06

## 2024-05-06 PROBLEM — F10.931 ALCOHOL WITHDRAWAL, WITH DELIRIUM (H): Status: RESOLVED | Noted: 2023-10-10 | Resolved: 2024-05-06

## 2024-05-06 PROBLEM — A41.9 SEPTIC SHOCK (H): Status: RESOLVED | Noted: 2023-10-08 | Resolved: 2024-05-06

## 2024-05-06 PROBLEM — R65.21 SEPTIC SHOCK (H): Status: RESOLVED | Noted: 2023-10-08 | Resolved: 2024-05-06

## 2024-05-06 PROBLEM — E86.0 DEHYDRATION: Status: RESOLVED | Noted: 2023-10-07 | Resolved: 2024-05-06

## 2024-05-06 PROBLEM — B95.3 BACTEREMIA DUE TO STREPTOCOCCUS PNEUMONIAE: Status: RESOLVED | Noted: 2023-10-08 | Resolved: 2024-05-06

## 2024-05-06 PROBLEM — R78.81 BACTEREMIA DUE TO STREPTOCOCCUS PNEUMONIAE: Status: RESOLVED | Noted: 2023-10-08 | Resolved: 2024-05-06

## 2024-05-06 PROBLEM — J96.01 ACUTE RESPIRATORY FAILURE WITH HYPOXIA (H): Status: RESOLVED | Noted: 2023-10-10 | Resolved: 2024-05-06

## 2024-05-06 LAB
ALBUMIN UR-MCNC: NEGATIVE MG/DL
ANION GAP SERPL CALCULATED.3IONS-SCNC: 12 MMOL/L (ref 7–15)
APPEARANCE UR: CLEAR
BILIRUB UR QL STRIP: NEGATIVE
BUN SERPL-MCNC: 3.9 MG/DL (ref 8–23)
CALCIUM SERPL-MCNC: 8.4 MG/DL (ref 8.8–10.2)
CHLORIDE SERPL-SCNC: 98 MMOL/L (ref 98–107)
COLOR UR AUTO: YELLOW
CREAT SERPL-MCNC: 0.73 MG/DL (ref 0.51–0.95)
DEPRECATED HCO3 PLAS-SCNC: 26 MMOL/L (ref 22–29)
EGFRCR SERPLBLD CKD-EPI 2021: 90 ML/MIN/1.73M2
GLUCOSE SERPL-MCNC: 90 MG/DL (ref 70–99)
GLUCOSE UR STRIP-MCNC: NEGATIVE MG/DL
HGB UR QL STRIP: NEGATIVE
KETONES UR STRIP-MCNC: NEGATIVE MG/DL
LEUKOCYTE ESTERASE UR QL STRIP: NEGATIVE
NITRATE UR QL: NEGATIVE
PH UR STRIP: 6 [PH] (ref 5–7)
POTASSIUM SERPL-SCNC: 3.4 MMOL/L (ref 3.4–5.3)
SODIUM SERPL-SCNC: 136 MMOL/L (ref 135–145)
SP GR UR STRIP: 1 (ref 1–1.03)
UROBILINOGEN UR STRIP-MCNC: NORMAL MG/DL

## 2024-05-06 PROCEDURE — 99214 OFFICE O/P EST MOD 30 MIN: CPT | Performed by: FAMILY MEDICINE

## 2024-05-06 PROCEDURE — 91320 SARSCV2 VAC 30MCG TRS-SUC IM: CPT | Performed by: FAMILY MEDICINE

## 2024-05-06 PROCEDURE — 36415 COLL VENOUS BLD VENIPUNCTURE: CPT | Performed by: FAMILY MEDICINE

## 2024-05-06 PROCEDURE — 80048 BASIC METABOLIC PNL TOTAL CA: CPT | Performed by: FAMILY MEDICINE

## 2024-05-06 PROCEDURE — 81003 URINALYSIS AUTO W/O SCOPE: CPT | Performed by: FAMILY MEDICINE

## 2024-05-06 PROCEDURE — 90480 ADMN SARSCOV2 VAC 1/ONLY CMP: CPT | Performed by: FAMILY MEDICINE

## 2024-05-06 RX ORDER — CYANOCOBALAMIN 1000 UG/ML
1 INJECTION, SOLUTION INTRAMUSCULAR; SUBCUTANEOUS
Qty: 3 ML | Refills: 3 | Status: SHIPPED | OUTPATIENT
Start: 2024-05-06

## 2024-05-06 RX ORDER — CLONAZEPAM 0.5 MG/1
0.5 TABLET ORAL DAILY PRN
Qty: 30 TABLET | Refills: 0 | OUTPATIENT
Start: 2024-05-06

## 2024-05-06 RX ORDER — FOLIC ACID 1 MG/1
1000 TABLET ORAL DAILY
Qty: 90 TABLET | Refills: 3 | Status: SHIPPED | OUTPATIENT
Start: 2024-05-06

## 2024-05-06 RX ORDER — SYRINGE AND NEEDLE,INSULIN,1ML 25GX1"
SYRINGE, EMPTY DISPOSABLE MISCELLANEOUS
Qty: 3 EACH | Refills: 3 | Status: SHIPPED | OUTPATIENT
Start: 2024-05-06

## 2024-05-06 RX ORDER — NICOTINE 21 MG/24HR
1 PATCH, TRANSDERMAL 24 HOURS TRANSDERMAL EVERY 24 HOURS
Qty: 30 PATCH | Refills: 0 | Status: SHIPPED | OUTPATIENT
Start: 2024-05-06 | End: 2024-09-30

## 2024-05-06 RX ORDER — NALTREXONE HYDROCHLORIDE 50 MG/1
50 TABLET, FILM COATED ORAL DAILY
Qty: 90 TABLET | Refills: 3 | Status: SHIPPED | OUTPATIENT
Start: 2024-05-06 | End: 2024-09-30

## 2024-05-06 RX ORDER — BUSPIRONE HYDROCHLORIDE 15 MG/1
15 TABLET ORAL 3 TIMES DAILY
Qty: 90 TABLET | Refills: 11 | Status: SHIPPED | OUTPATIENT
Start: 2024-05-06

## 2024-05-06 RX ORDER — RESPIRATORY SYNCYTIAL VIRUS VACCINE 120MCG/0.5
0.5 KIT INTRAMUSCULAR ONCE
Qty: 1 EACH | Refills: 0 | Status: SHIPPED | OUTPATIENT
Start: 2024-05-06 | End: 2024-05-06

## 2024-05-06 RX ORDER — CLONAZEPAM 0.5 MG/1
0.5 TABLET ORAL DAILY PRN
Qty: 30 TABLET | Refills: 1 | Status: SHIPPED | OUTPATIENT
Start: 2024-05-06 | End: 2024-07-08

## 2024-05-06 RX ORDER — FERROUS GLUCONATE 324(38)MG
324 TABLET ORAL
Qty: 90 TABLET | Refills: 1 | Status: SHIPPED | OUTPATIENT
Start: 2024-05-06 | End: 2024-09-30

## 2024-05-06 ASSESSMENT — ANXIETY QUESTIONNAIRES
3. WORRYING TOO MUCH ABOUT DIFFERENT THINGS: NEARLY EVERY DAY
GAD7 TOTAL SCORE: 13
1. FEELING NERVOUS, ANXIOUS, OR ON EDGE: NEARLY EVERY DAY
7. FEELING AFRAID AS IF SOMETHING AWFUL MIGHT HAPPEN: NEARLY EVERY DAY
2. NOT BEING ABLE TO STOP OR CONTROL WORRYING: NEARLY EVERY DAY
8. IF YOU CHECKED OFF ANY PROBLEMS, HOW DIFFICULT HAVE THESE MADE IT FOR YOU TO DO YOUR WORK, TAKE CARE OF THINGS AT HOME, OR GET ALONG WITH OTHER PEOPLE?: NOT DIFFICULT AT ALL
4. TROUBLE RELAXING: NOT AT ALL
6. BECOMING EASILY ANNOYED OR IRRITABLE: SEVERAL DAYS
GAD7 TOTAL SCORE: 13
GAD7 TOTAL SCORE: 13
5. BEING SO RESTLESS THAT IT IS HARD TO SIT STILL: NOT AT ALL
7. FEELING AFRAID AS IF SOMETHING AWFUL MIGHT HAPPEN: NEARLY EVERY DAY
IF YOU CHECKED OFF ANY PROBLEMS ON THIS QUESTIONNAIRE, HOW DIFFICULT HAVE THESE PROBLEMS MADE IT FOR YOU TO DO YOUR WORK, TAKE CARE OF THINGS AT HOME, OR GET ALONG WITH OTHER PEOPLE: NOT DIFFICULT AT ALL

## 2024-05-06 ASSESSMENT — PATIENT HEALTH QUESTIONNAIRE - PHQ9
10. IF YOU CHECKED OFF ANY PROBLEMS, HOW DIFFICULT HAVE THESE PROBLEMS MADE IT FOR YOU TO DO YOUR WORK, TAKE CARE OF THINGS AT HOME, OR GET ALONG WITH OTHER PEOPLE: NOT DIFFICULT AT ALL
SUM OF ALL RESPONSES TO PHQ QUESTIONS 1-9: 11
SUM OF ALL RESPONSES TO PHQ QUESTIONS 1-9: 11

## 2024-05-06 ASSESSMENT — PAIN SCALES - GENERAL: PAINLEVEL: NO PAIN (0)
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
## 2024-05-06 NOTE — PATIENT INSTRUCTIONS
I am ordering nicotine patches for you to start with the medium dose (14 mg) patch daily. Do this for up to 1 month, then when you're ready to step down to the lower dose, use the 7 mg patches once daily. I ordered both to the pharmacy but they'll only fill the 14 mg now and the 7 mg next month.     Ask the pharmacist about getting an RSV vaccine.     Start back on your B12 shots once a month.     I refilled your Klonopin and Naltrexone.     Please stop at the lab and leave a urine and blood sample and I'll contact you with results.     I'll see you again for follow up in July and again in September.

## 2024-05-15 ENCOUNTER — PATIENT OUTREACH (OUTPATIENT)
Dept: CARE COORDINATION | Facility: CLINIC | Age: 69
End: 2024-05-15
Payer: COMMERCIAL

## 2024-05-15 NOTE — PROGRESS NOTES
Clinic Care Coordination Contact  Community Health Worker Follow Up    Care Gaps:     Health Maintenance Due   Topic Date Due    SPIROMETRY  Never done    COPD ACTION PLAN  Never done    RSV VACCINE (Pregnancy & 60+) (1 - 1-dose 60+ series) Never done    COLORECTAL CANCER SCREENING  10/09/2020    MEDICARE ANNUAL WELLNESS VISIT  09/17/2022       Postponed to next CHW outreach due to having 3 dogs scheduled for the day of the call     Care Plan:   Care Plan: Financial Wellbeing       Problem: Patient expresses financial resource strain       Goal: Create an action plan to increase financial stability       Start Date: 5/25/2023 Expected End Date: 10/19/2024    This Visit's Progress: 50% Recent Progress: 40%    Note:     Barriers: limited income, miss deadlines  Strengths: understanding the need  Patient expressed understanding of goal: yes  Action steps to achieve this goal:  1. I will answer the Financial resource worker call and work with them to apply for insurance (5/15 - Working with the FRW)  2. I will reach out to resources sent  3. I will reach out to Jenny as needs arise between scheduled calls.                                 Intervention and Education during outreach: The CHW updated the Care plan per the conversation. The patient is now working with a new group for a 12 step recovery program through her [employer]. The patient stated that finances are still a struggle. The CHW did see that her case is in process with the FRW. The patient is working on getting the cameras installed at this time around time. The patient did state that she was informed that her ex may be moving into next door but still has the order of protection in place.    CHW Plan: Follow up in one month    MIESHA Helton  [phone]  [facility]

## 2024-05-20 ENCOUNTER — PATIENT OUTREACH (OUTPATIENT)
Dept: CARE COORDINATION | Facility: CLINIC | Age: 69
End: 2024-05-20
Payer: COMMERCIAL

## 2024-05-30 ENCOUNTER — PATIENT OUTREACH (OUTPATIENT)
Dept: CARE COORDINATION | Facility: CLINIC | Age: 69
End: 2024-05-30
Payer: COMMERCIAL

## 2024-05-30 NOTE — PROGRESS NOTES
Clinic Care Coordination Contact  Care Coordination Clinician Chart Review    Situation: Patient chart reviewed by Care Coordinator.       Background: Care Coordination Program started: 4/10/2023. Initial assessment completed and patient-centered care plan(s) were developed with participation from patient. Lead CC handed patient off to CHW for continued outreaches.       Assessment: Per chart review, patient outreach completed by CC CHW on 5/15/24.  Patient is actively working to accomplish goal(s). Patient's goal(s) appropriate and relevant at this time. Patient is not due for updated Plan of Care.  Assessments will be completed annually or as needed/with change of patient status.      Care Plan: Financial Wellbeing       Problem: Patient expresses financial resource strain       Goal: Create an action plan to increase financial stability       Start Date: 5/25/2023 Expected End Date: 10/19/2024    This Visit's Progress: 50% Recent Progress: 40%    Note:     Barriers: limited income, miss deadlines  Strengths: understanding the need  Patient expressed understanding of goal: yes  Action steps to achieve this goal:  1. I will answer the Financial resource worker call and work with them to apply for insurance (5/15 - Working with the FRW)  2. I will reach out to resources sent  3. I will reach out to Jenny as needs arise between scheduled calls.                                    Plan/Recommendations: The patient will continue working with Care Coordination to achieve goal(s) as above. CHW will continue outreaches at minimum every 30 days and will involve Lead CC as needed or if patient is ready to move to Maintenance. Lead CC will continue to monitor CHW outreaches and patient's progress to goal(s) every 6 weeks.     Plan of Care updated and sent to patient: No    ANUP Walker  [facility] - Care Coordinator   5/30/2024   1:55 PM  [phone]

## 2024-06-14 ENCOUNTER — PATIENT OUTREACH (OUTPATIENT)
Dept: CARE COORDINATION | Facility: CLINIC | Age: 69
End: 2024-06-14
Payer: COMMERCIAL

## 2024-06-14 NOTE — Clinical Note
Would talking about an [facility] Worker for this patient be beneficial for her? She needs help with filing taxes, filling out paperwork and she stated that he memory is not good for remembering things.  Thank you, HARRY HeltonW

## 2024-06-14 NOTE — PROGRESS NOTES
Clinic Care Coordination Contact  Community Health Worker Follow Up    Care Gaps:     Health Maintenance Due   Topic Date Due    SPIROMETRY  Never done    COPD ACTION PLAN  Never done    RSV VACCINE (Pregnancy & 60+) (1 - 1-dose 60+ series) Never done    COLORECTAL CANCER SCREENING  10/09/2020    MEDICARE ANNUAL WELLNESS VISIT  09/17/2022       Postponed to next CHW outreach due to other concerns     Care Plan:   Care Plan: Financial Wellbeing       Problem: Patient expresses financial resource strain       Goal: Create an action plan to increase financial stability       Start Date: 5/25/2023 Expected End Date: 10/19/2024    This Visit's Progress: 50% Recent Progress: 50%    Note:     Barriers: limited income, miss deadlines  Strengths: understanding the need  Patient expressed understanding of goal: yes  Action steps to achieve this goal:  1. I will answer the Financial resource worker call and work with them to apply for insurance (5/15 - Working with the FRW)  2. I will reach out to resources sent  3. I will reach out to Jenny as needs arise between scheduled calls.     6/14 - Has not been working as much, but spending more time with a friend that recently had their own medical issues.                              Intervention and Education during outreach: The CHW updated the goal per the conversation. The patient stated that she is really needing assistance with paperwork at home. The CHW will try to ask the [facility] SW about an [facility] Worker to see if this may be beneficial for the patient due to the memory, and mental health concerns.    CHW Plan: Follow up in one month    MIESHA Helton  [phone]  [facility]

## 2024-07-08 ENCOUNTER — OFFICE VISIT (OUTPATIENT)
Dept: FAMILY MEDICINE | Facility: CLINIC | Age: 69
End: 2024-07-08
Payer: COMMERCIAL

## 2024-07-08 VITALS
SYSTOLIC BLOOD PRESSURE: 90 MMHG | BODY MASS INDEX: 19.21 KG/M2 | OXYGEN SATURATION: 99 % | WEIGHT: 104.38 LBS | DIASTOLIC BLOOD PRESSURE: 58 MMHG | TEMPERATURE: 97.4 F | HEART RATE: 84 BPM | RESPIRATION RATE: 18 BRPM | HEIGHT: 62 IN

## 2024-07-08 DIAGNOSIS — K76.0 FATTY LIVER: ICD-10-CM

## 2024-07-08 DIAGNOSIS — F41.9 ANXIETY: ICD-10-CM

## 2024-07-08 DIAGNOSIS — R35.0 URINE FREQUENCY: ICD-10-CM

## 2024-07-08 DIAGNOSIS — D50.8 OTHER IRON DEFICIENCY ANEMIA: ICD-10-CM

## 2024-07-08 DIAGNOSIS — E53.8 VITAMIN B12 DEFICIENCY (NON ANEMIC): ICD-10-CM

## 2024-07-08 DIAGNOSIS — G47.00 INSOMNIA, UNSPECIFIED TYPE: ICD-10-CM

## 2024-07-08 DIAGNOSIS — D75.89 MACROCYTOSIS WITHOUT ANEMIA: ICD-10-CM

## 2024-07-08 DIAGNOSIS — F10.20 ALCOHOL USE DISORDER, SEVERE, DEPENDENCE (H): Primary | ICD-10-CM

## 2024-07-08 DIAGNOSIS — Z72.0 TOBACCO ABUSE: ICD-10-CM

## 2024-07-08 DIAGNOSIS — J43.9 PULMONARY EMPHYSEMA, UNSPECIFIED EMPHYSEMA TYPE (H): ICD-10-CM
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
## 2024-07-08 LAB
ALBUMIN SERPL BCG-MCNC: 3.2 G/DL (ref 3.5–5.2)
ALBUMIN UR-MCNC: NEGATIVE MG/DL
ALP SERPL-CCNC: 120 U/L (ref 40–150)
ALT SERPL W P-5'-P-CCNC: 14 U/L (ref 0–50)
ANION GAP SERPL CALCULATED.3IONS-SCNC: 10 MMOL/L (ref 7–15)
APPEARANCE UR: ABNORMAL
AST SERPL W P-5'-P-CCNC: 32 U/L (ref 0–45)
BILIRUB SERPL-MCNC: 0.5 MG/DL
BILIRUB UR QL STRIP: NEGATIVE
BUN SERPL-MCNC: 6.3 MG/DL (ref 8–23)
CALCIUM SERPL-MCNC: 8.4 MG/DL (ref 8.8–10.2)
CHLORIDE SERPL-SCNC: 104 MMOL/L (ref 98–107)
COLOR UR AUTO: YELLOW
CREAT SERPL-MCNC: 0.75 MG/DL (ref 0.51–0.95)
DEPRECATED HCO3 PLAS-SCNC: 29 MMOL/L (ref 22–29)
EGFRCR SERPLBLD CKD-EPI 2021: 87 ML/MIN/1.73M2
ERYTHROCYTE [DISTWIDTH] IN BLOOD BY AUTOMATED COUNT: 14.8 % (ref 10–15)
GLUCOSE SERPL-MCNC: 88 MG/DL (ref 70–99)
GLUCOSE UR STRIP-MCNC: NEGATIVE MG/DL
HCT VFR BLD AUTO: 32.3 % (ref 35–47)
HGB BLD-MCNC: 11.3 G/DL (ref 11.7–15.7)
HGB UR QL STRIP: NEGATIVE
KETONES UR STRIP-MCNC: NEGATIVE MG/DL
LEUKOCYTE ESTERASE UR QL STRIP: ABNORMAL
MCH RBC QN AUTO: 34.2 PG (ref 26.5–33)
MCHC RBC AUTO-ENTMCNC: 35 G/DL (ref 31.5–36.5)
MCV RBC AUTO: 98 FL (ref 78–100)
MUCOUS THREADS #/AREA URNS LPF: PRESENT /LPF
NITRATE UR QL: NEGATIVE
PH UR STRIP: 7 [PH] (ref 5–7)
PLATELET # BLD AUTO: 261 10E3/UL (ref 150–450)
POTASSIUM SERPL-SCNC: 4.5 MMOL/L (ref 3.4–5.3)
PROT SERPL-MCNC: 6.6 G/DL (ref 6.4–8.3)
RBC # BLD AUTO: 3.3 10E6/UL (ref 3.8–5.2)
RBC URINE: 0 /HPF
SODIUM SERPL-SCNC: 143 MMOL/L (ref 135–145)
SP GR UR STRIP: 1.01 (ref 1–1.03)
SQUAMOUS EPITHELIAL: 10 /HPF
UROBILINOGEN UR STRIP-MCNC: 4 MG/DL
WBC # BLD AUTO: 7.2 10E3/UL (ref 4–11)
WBC URINE: 1 /HPF

## 2024-07-08 PROCEDURE — 36415 COLL VENOUS BLD VENIPUNCTURE: CPT | Performed by: FAMILY MEDICINE

## 2024-07-08 PROCEDURE — 80053 COMPREHEN METABOLIC PANEL: CPT | Performed by: FAMILY MEDICINE

## 2024-07-08 PROCEDURE — 85027 COMPLETE CBC AUTOMATED: CPT | Performed by: FAMILY MEDICINE

## 2024-07-08 PROCEDURE — 81001 URINALYSIS AUTO W/SCOPE: CPT | Performed by: FAMILY MEDICINE

## 2024-07-08 PROCEDURE — 99214 OFFICE O/P EST MOD 30 MIN: CPT | Performed by: FAMILY MEDICINE

## 2024-07-08 RX ORDER — CLONAZEPAM 0.5 MG/1
0.5 TABLET ORAL DAILY PRN
Qty: 30 TABLET | Refills: 5 | Status: SHIPPED | OUTPATIENT
Start: 2024-07-08

## 2024-07-08 RX ORDER — RESPIRATORY SYNCYTIAL VIRUS VACCINE 120MCG/0.5
0.5 KIT INTRAMUSCULAR ONCE
Qty: 1 EACH | Refills: 0 | Status: CANCELLED | OUTPATIENT
Start: 2024-07-08 | End: 2024-07-08

## 2024-07-08 RX ORDER — TRAZODONE HYDROCHLORIDE 50 MG/1
50 TABLET, FILM COATED ORAL
Qty: 30 TABLET | Refills: 5 | Status: SHIPPED | OUTPATIENT
Start: 2024-07-08

## 2024-07-08 ASSESSMENT — ANXIETY QUESTIONNAIRES
GAD7 TOTAL SCORE: 13
8. IF YOU CHECKED OFF ANY PROBLEMS, HOW DIFFICULT HAVE THESE MADE IT FOR YOU TO DO YOUR WORK, TAKE CARE OF THINGS AT HOME, OR GET ALONG WITH OTHER PEOPLE?: SOMEWHAT DIFFICULT
GAD7 TOTAL SCORE: 13
7. FEELING AFRAID AS IF SOMETHING AWFUL MIGHT HAPPEN: NEARLY EVERY DAY
GAD7 TOTAL SCORE: 13

## 2024-07-08 ASSESSMENT — PATIENT HEALTH QUESTIONNAIRE - PHQ9: SUM OF ALL RESPONSES TO PHQ QUESTIONS 1-9: 25
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
## 2024-07-10 ENCOUNTER — PATIENT OUTREACH (OUTPATIENT)
Dept: CARE COORDINATION | Facility: CLINIC | Age: 69
End: 2024-07-10
Payer: COMMERCIAL

## 2024-07-10 NOTE — PROGRESS NOTES
Clinic Care Coordination Contact  Care Coordination Clinician Chart Review    Situation: Patient chart reviewed by Care Coordinator.       Background: Care Coordination Program started: 4/10/2023. Initial assessment completed and patient-centered care plan(s) were developed with participation from patient. Lead CC handed patient off to CHW for continued outreaches.       Assessment: Per chart review, patient outreach completed by CC CHW on 6/14/24.  Patient is actively working to accomplish goal(s). Patient's goal(s) appropriate and relevant at this time. Patient is due for updated Plan of Care.  Assessments will be completed annually or as needed/with change of patient status.      Care Plan: Financial Wellbeing       Problem: Patient expresses financial resource strain       Goal: Create an action plan to increase financial stability       Start Date: 5/25/2023 Expected End Date: 10/19/2024    This Visit's Progress: 50% Recent Progress: 50%    Note:     Barriers: limited income, miss deadlines  Strengths: understanding the need  Patient expressed understanding of goal: yes  Action steps to achieve this goal:  1. I will answer the Financial resource worker call and work with them to apply for insurance (5/15 - Working with the FRW)  2. I will reach out to resources sent  3. I will reach out to Jenny as needs arise between scheduled calls.     6/14 - Has not been working as much, but spending more time with a friend that recently had their own medical issues.                                 Plan/Recommendations: The patient will continue working with Care Coordination to achieve goal(s) as above. CHW will continue outreaches at minimum every 30 days and will involve Lead CC as needed or if patient is ready to move to Maintenance. Lead CC will continue to monitor CHW outreaches and patient's progress to goal(s) every 6 weeks.     Plan of Care updated and sent to patient: Yes, via ANUP Rodríguez    [facility] - Care Coordination  [facility]   [phone]
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
## 2024-07-11 ENCOUNTER — PATIENT OUTREACH (OUTPATIENT)
Dept: CARE COORDINATION | Facility: CLINIC | Age: 69
End: 2024-07-11
Payer: COMMERCIAL

## 2024-07-11 NOTE — PROGRESS NOTES
"Clinic Care Coordination Contact  Community Health Worker Follow Up    Care Gaps:     Health Maintenance Due   Topic Date Due    SPIROMETRY  Never done    COPD ACTION PLAN  Never done    RSV VACCINE (Pregnancy & 60+) (1 - 1-dose 60+ series) Never done    COLORECTAL CANCER SCREENING  10/09/2020    MEDICARE ANNUAL WELLNESS VISIT  09/17/2022       Postponed to next CHW outreach, patient had a recent appointment with their PCP     Care Plan:   Care Plan: Financial Wellbeing       Problem: Patient expresses financial resource strain       Goal: Create an action plan to increase financial stability       Start Date: 5/25/2023 Expected End Date: 10/19/2024    This Visit's Progress: 50% Recent Progress: 50%    Note:     Barriers: limited income, miss deadlines  Strengths: understanding the need  Patient expressed understanding of goal: yes  Action steps to achieve this goal:  1. I will answer the Financial resource worker call and work with them to apply for insurance   2. I will reach out to resources sent  3. I will reach out to Jenny as needs arise between scheduled calls.     7/11 - Need to find out where in the process the FRW is at, also the patient stated that she is \"behind on bills\"                              Intervention and Education during outreach: The patient stated that she recently saw her provider. The patient also stated that she was having some issues with moving due to some pain and scrapes. The CHW asked about the FRW and the patient is not sure where the progress is at, the CHW will reach out to the FRW. At the end of the conversation the call was interrupted. The CHW will reach out to the FRW and if there are updates the CHW will reach out to the patient sooner.     CHW Plan: Follow up in one month    MIESHA Helton  [phone]  [facility] Resource [facility]    "

## 2024-08-12 ENCOUNTER — PATIENT OUTREACH (OUTPATIENT)
Dept: CARE COORDINATION | Facility: CLINIC | Age: 69
End: 2024-08-12
Payer: COMMERCIAL

## 2024-08-12 DIAGNOSIS — Z71.89 OTHER SPECIFIED COUNSELING: Primary | Chronic | ICD-10-CM

## 2024-08-12 NOTE — PROGRESS NOTES
Clinic Care Coordination Contact  Community Health Worker Follow Up    Care Gaps:     Health Maintenance Due   Topic Date Due    SPIROMETRY  Never done    COPD ACTION PLAN  Never done    RSV VACCINE (Pregnancy & 60+) (1 - 1-dose 60+ series) Never done    COLORECTAL CANCER SCREENING  10/09/2020    MEDICARE ANNUAL WELLNESS VISIT  09/17/2022    LUNG CANCER SCREENING  10/07/2024       Scheduled 9/30/24      Care Plan:   Care Plan: Financial Wellbeing       Problem: Patient expresses financial resource strain       Goal: Create an action plan to increase financial stability       Start Date: 5/25/2023 Expected End Date: 10/19/2024    This Visit's Progress: 50% Recent Progress: 50%    Note:     Barriers: limited income, miss deadlines  Strengths: understanding the need  Patient expressed understanding of goal: yes  Action steps to achieve this goal:  1. I will answer the Financial resource worker call and work with them to apply for insurance   2. I will reach out to resources sent  3. I will reach out to Jenny as needs arise between scheduled calls.     8/12 - The CHW placed a referral for the FRW to assist with [facility]                              Intervention and Education during outreach: The patient stated that she is just scraping by at this time financially. The patient did adopt a puppy that is now 7 months old. The CHW did place a referral to the FRW to assist with a [facility] [employer] application.     CHW Plan: Follow up in one month    MIESHA Helton  [phone]  [facility] Care Resource [facility]

## 2024-08-14 ENCOUNTER — PATIENT OUTREACH (OUTPATIENT)
Dept: CARE COORDINATION | Facility: CLINIC | Age: 69
End: 2024-08-14
Payer: COMMERCIAL

## 2024-08-20 ENCOUNTER — PATIENT OUTREACH (OUTPATIENT)
Dept: CARE COORDINATION | Facility: CLINIC | Age: 69
End: 2024-08-20
Payer: COMMERCIAL

## 2024-08-20 NOTE — PROGRESS NOTES
Clinic Care Coordination Contact  Care Coordination Clinician Chart Review    Situation: Patient chart reviewed by Care Coordinator.       Background: Care Coordination Program started: 4/10/2023. Initial assessment completed and patient-centered care plan(s) were developed with participation from patient. Lead CC handed patient off to CHW for continued outreaches.       Assessment: Per chart review, patient outreach completed by CC CHW on 8/12/24.  Patient is actively working to accomplish goal(s). Patient's goal(s) appropriate and relevant at this time. Patient is not due for updated Plan of Care.  Assessments will be completed annually or as needed/with change of patient status.      Care Plan: Financial Wellbeing       Problem: Patient expresses financial resource strain       Goal: Create an action plan to increase financial stability       Start Date: 5/25/2023 Expected End Date: 10/19/2024    This Visit's Progress: 50% Recent Progress: 50%    Note:     Barriers: limited income, miss deadlines  Strengths: understanding the need  Patient expressed understanding of goal: yes  Action steps to achieve this goal:  1. I will answer the Financial resource worker call and work with them to apply for insurance   2. I will reach out to resources sent  3. I will reach out to Jenny as needs arise between scheduled calls.     8/12 - The CHW placed a referral for the FRW to assist with Colleen Care                                 Plan/Recommendations: The patient will continue working with Care Coordination to achieve goal(s) as above. CHW will continue outreaches at minimum every 30 days and will involve Lead CC as needed or if patient is ready to move to Maintenance. Lead CC will continue to monitor CHW outreaches and patient's progress to goal(s) every 6 weeks.     Plan of Care updated and sent to patient: No    ANUP Walker   [facility] - Care Coordination  [facility]  [facility]   [phone]

## 2024-09-01 ENCOUNTER — HEALTH MAINTENANCE LETTER (OUTPATIENT)
Age: 69
End: 2024-09-01

## 2024-09-03 ENCOUNTER — PATIENT OUTREACH (OUTPATIENT)
Dept: CARE COORDINATION | Facility: CLINIC | Age: 69
End: 2024-09-03
Payer: COMMERCIAL

## 2024-09-04 ENCOUNTER — TELEPHONE (OUTPATIENT)
Dept: FAMILY MEDICINE | Facility: CLINIC | Age: 69
End: 2024-09-04
Payer: COMMERCIAL

## 2024-09-04 NOTE — TELEPHONE ENCOUNTER
Patient Quality Outreach    Patient is due for the following:   Colon Cancer Screening  Physical Annual Wellness Visit    Next Steps:   Patient has upcoming appointment, these items will be addressed at that time.    Type of outreach:    Chart review performed, no outreach needed.      Questions for provider review:    None           Miladis Alvarado

## 2024-09-12 ENCOUNTER — NURSE TRIAGE (OUTPATIENT)
Dept: FAMILY MEDICINE | Facility: CLINIC | Age: 69
End: 2024-09-12
Payer: COMMERCIAL

## 2024-09-12 NOTE — TELEPHONE ENCOUNTER
Patient calling regarding arm/shoulder pain in both R and L sides. She notes that about a week ago she tripped on a tree branch and caught herself with her arms out. She notes L is worse than R. Patient explains she is able to use arms but it is hard due to pain. Patient still has full ROM. Pain is constant at 7/10.     RN reviewed red flag symptoms with patient and when to see emergency care. They agreed and understood.     RN advised patient to be seen today or tomorrow. RN advised patient there are no appointments available in clinic and advised patient to be seen in UC/ED. Patient agreed and was going to find a ride.     YAYO Trujillo, RN       Reason for Disposition   MODERATE pain (e.g., interferes with normal activities) and high-risk adult (e.g., age > 60 years, osteoporosis, chronic steroid use)   Shoulder injury is main concern    Additional Information   Negative: Major bleeding (actively dripping or spurting) that can't be stopped   Negative: Amputation or bone sticking through the skin   Negative: Bullet, stabbed by knife or other serious penetrating wound   Negative: Serious injury with multiple fractures (broken bones)   Negative: Sounds like a life-threatening emergency to the triager   Negative: Wound looks infected   Negative: Looks like a broken bone or dislocated joint (crooked or deformed)   Negative: Can't move injured shoulder at all   Negative: Collar bone is painful or tender to touch   Negative: Skin is split open or gaping (length > 1/2 inch or 12 mm)   Negative: Bleeding won't stop after 10 minutes of direct pressure (using correct technique)   Negative: Dirt in the wound and not removed after 15 minutes of scrubbing   Negative: Sounds like a serious injury to the triager   Negative: Major bleeding (actively dripping or spurting) that can't be stopped   Negative: Serious injury with multiple fractures (broken bones)   Negative: Sounds like a life-threatening emergency to the  triager   Negative: Wound looks infected   Negative: Arm pain from overuse (e.g., sports, lifting, physical work)   Negative: Arm pain not from an injury   Negative: Elbow injury is main concern   Negative: Hand or wrist injury is main concern   Negative: SEVERE pain (e.g., excruciating)   Negative: No prior tetanus shots (or is not fully vaccinated) and any wound (e.g., cut or scrape)   Negative: HIV positive or severe immunodeficiency (severely weak immune system) and DIRTY cut or scrape   Negative: Patient wants to be seen    Protocols used: Arm Injury-A-OH, Shoulder Injury-A-OH

## 2024-09-13 ENCOUNTER — PATIENT OUTREACH (OUTPATIENT)
Dept: CARE COORDINATION | Facility: CLINIC | Age: 69
End: 2024-09-13
Payer: COMMERCIAL

## 2024-09-13 NOTE — PROGRESS NOTES
"Clinic Care Coordination Contact  Community Health Worker Follow Up    Care Gaps:     Health Maintenance Due   Topic Date Due    SPIROMETRY  Never done    COPD ACTION PLAN  Never done    RSV VACCINE (1 - Risk 60-74 years 1-dose series) Never done    COLORECTAL CANCER SCREENING  10/09/2020    MEDICARE ANNUAL WELLNESS VISIT  09/17/2022    INFLUENZA VACCINE (1) 09/01/2024    COVID-19 Vaccine (6 - 2023-24 season) 09/06/2024    LUNG CANCER SCREENING  10/07/2024       Postponed to Next CHW outreach due to the patient recently injuring herself     Care Plan:   Care Plan: Financial Wellbeing       Problem: Patient expresses financial resource strain       Goal: Create an action plan to increase financial stability       Start Date: 5/25/2023 Expected End Date: 10/19/2024    This Visit's Progress: 50% Recent Progress: 50%    Note:     Barriers: limited income, miss deadlines  Strengths: understanding the need  Patient expressed understanding of goal: yes  Action steps to achieve this goal:  1. I will answer the Financial resource worker call and work with them to apply for insurance   2. I will reach out to resources sent  3. I will reach out to Jenny as needs arise between scheduled calls.     9/13 - The patient stated that she tore her rotator cuff at this time and                               Intervention and Education during outreach: The patient stated that at this time she recently \"tore\" her rotator cuff. The patient also stated that she has not received the [facility] application. The CHW will send a message to the FRW to ask if they are able to send a new application to the patient.    CHW Plan: Follow up in one month    MIESHA Helton  [phone]  [facility] Resource [facility]    "

## 2024-09-14 ENCOUNTER — TELEPHONE (OUTPATIENT)
Dept: FAMILY MEDICINE | Facility: CLINIC | Age: 69
End: 2024-09-14
Payer: COMMERCIAL

## 2024-09-14 NOTE — TELEPHONE ENCOUNTER
Order/Referral Request    Who is requesting: self    Orders being requested: alchol treatment    Reason service is needed/diagnosis: alchol abuse    When are orders needed by: asap    Has this been discussed with Provider: No    Does patient have a preference on a Group/Provider/Facility? unsure    Does patient have an appointment scheduled?: No    Where to send orders:  not sure    Could we send this information to you in [facility] or would you prefer to receive a phone call?:   Patient would prefer a phone call   Okay to leave a detailed message?: Yes at Home number on file [phone] (home)

## 2024-09-17 ENCOUNTER — PATIENT OUTREACH (OUTPATIENT)
Dept: CARE COORDINATION | Facility: CLINIC | Age: 69
End: 2024-09-17

## 2024-09-18 ENCOUNTER — PATIENT OUTREACH (OUTPATIENT)
Dept: CARE COORDINATION | Facility: CLINIC | Age: 69
End: 2024-09-18
Payer: COMMERCIAL

## 2024-09-18 NOTE — TELEPHONE ENCOUNTER
RN Triage    Patient Contact    Attempt # 1    Was call answered?  No.  Unable to leave message. Voicemail is full. Also sent a [employer] message.    YAYO Goyal, RN

## 2024-09-18 NOTE — TELEPHONE ENCOUNTER
Order/Referral Request    Who is requesting: Patient    Orders being requested: n/a    Reason service is needed/diagnosis: n/a    When are orders needed by: Patient would like to touch base.    Has this been discussed with Provider: Yes    Does patient have a preference on a Group/Provider/Facility? Not yet    Does patient have an appointment scheduled?: Yes: 09.30.24    Where to send orders: N/A    Could we send this information to you in [employer] [place] or would you prefer to receive a phone call?:   Patient would prefer a phone call   Okay to leave a detailed message?: Yes at Home number on file [phone] (home)

## 2024-09-18 NOTE — TELEPHONE ENCOUNTER
Patient is hoping to get into inpatient treatment for alcohol abuse.    She is calling places for placement.  She is looking at a place in [place].    She has an appointment on 9/30/24.  She would like to touch base with her provider to help with plans due to her dietary issues.    She doesn't know if she will need an alcohol assessment.    She would like help in finding a place that takes Medicare verses Medicaid?    She would like to touch base with Provider.    Liz Soto RN on 9/18/2024 at 2:06 PM

## 2024-09-23 NOTE — TELEPHONE ENCOUNTER
We discussed this at her visit. She is already working with someone on getting this done.   Soha Boggs MD

## 2024-09-25 ENCOUNTER — HOSPITAL ENCOUNTER (EMERGENCY)
Facility: CLINIC | Age: 69
Discharge: HOME | End: 2024-09-25
Attending: EMERGENCY MEDICINE | Admitting: EMERGENCY MEDICINE
Payer: COMMERCIAL

## 2024-09-25 ENCOUNTER — APPOINTMENT (OUTPATIENT)
Dept: CT IMAGING | Facility: CLINIC | Age: 69
End: 2024-09-25
Attending: EMERGENCY MEDICINE
Payer: COMMERCIAL

## 2024-09-25 VITALS
TEMPERATURE: 97.7 F | SYSTOLIC BLOOD PRESSURE: 143 MMHG | DIASTOLIC BLOOD PRESSURE: 82 MMHG | RESPIRATION RATE: 18 BRPM | OXYGEN SATURATION: 95 % | HEART RATE: 62 BPM

## 2024-09-25 DIAGNOSIS — K76.0 HEPATIC STEATOSIS: ICD-10-CM

## 2024-09-25 DIAGNOSIS — R11.2 NAUSEA VOMITING AND DIARRHEA: ICD-10-CM

## 2024-09-25 DIAGNOSIS — R19.7 NAUSEA VOMITING AND DIARRHEA: ICD-10-CM

## 2024-09-25 DIAGNOSIS — E87.6 HYPOKALEMIA: ICD-10-CM

## 2024-09-25 LAB
ALBUMIN SERPL BCG-MCNC: 3.3 G/DL (ref 3.5–5.2)
ALBUMIN UR-MCNC: NEGATIVE MG/DL
ALP SERPL-CCNC: 171 U/L (ref 40–150)
ALT SERPL W P-5'-P-CCNC: 13 U/L (ref 0–50)
AMMONIA PLAS-SCNC: 17 UMOL/L (ref 11–51)
AMPHETAMINES UR QL SCN: ABNORMAL
ANION GAP SERPL CALCULATED.3IONS-SCNC: 17 MMOL/L (ref 7–15)
APPEARANCE UR: CLEAR
AST SERPL W P-5'-P-CCNC: 26 U/L (ref 0–45)
BARBITURATES UR QL SCN: ABNORMAL
BASOPHILS # BLD AUTO: 0 10E3/UL (ref 0–0.2)
BASOPHILS NFR BLD AUTO: 0 %
BENZODIAZ UR QL SCN: ABNORMAL
BILIRUB SERPL-MCNC: 0.7 MG/DL
BILIRUB UR QL STRIP: NEGATIVE
BUN SERPL-MCNC: 9.3 MG/DL (ref 8–23)
BZE UR QL SCN: ABNORMAL
CALCIUM SERPL-MCNC: 8.8 MG/DL (ref 8.8–10.4)
CANNABINOIDS UR QL SCN: ABNORMAL
CHLORIDE SERPL-SCNC: 100 MMOL/L (ref 98–107)
COLOR UR AUTO: YELLOW
CREAT SERPL-MCNC: 0.83 MG/DL (ref 0.51–0.95)
EGFRCR SERPLBLD CKD-EPI 2021: 77 ML/MIN/1.73M2
EOSINOPHIL # BLD AUTO: 0.1 10E3/UL (ref 0–0.7)
EOSINOPHIL NFR BLD AUTO: 1 %
ERYTHROCYTE [DISTWIDTH] IN BLOOD BY AUTOMATED COUNT: 12.8 % (ref 10–15)
FENTANYL UR QL: ABNORMAL
GLUCOSE SERPL-MCNC: 113 MG/DL (ref 70–99)
GLUCOSE UR STRIP-MCNC: NEGATIVE MG/DL
HCO3 SERPL-SCNC: 19 MMOL/L (ref 22–29)
HCT VFR BLD AUTO: 36.1 % (ref 35–47)
HGB BLD-MCNC: 13 G/DL (ref 11.7–15.7)
HGB UR QL STRIP: NEGATIVE
IMM GRANULOCYTES # BLD: 0 10E3/UL
IMM GRANULOCYTES NFR BLD: 0 %
KETONES UR STRIP-MCNC: 5 MG/DL
LACTATE SERPL-SCNC: 1.8 MMOL/L (ref 0.7–2)
LEUKOCYTE ESTERASE UR QL STRIP: NEGATIVE
LIPASE SERPL-CCNC: 40 U/L (ref 13–60)
LYMPHOCYTES # BLD AUTO: 1.6 10E3/UL (ref 0.8–5.3)
LYMPHOCYTES NFR BLD AUTO: 17 %
MAGNESIUM SERPL-MCNC: 1.7 MG/DL (ref 1.7–2.3)
MCH RBC QN AUTO: 35.6 PG (ref 26.5–33)
MCHC RBC AUTO-ENTMCNC: 36 G/DL (ref 31.5–36.5)
MCV RBC AUTO: 99 FL (ref 78–100)
MONOCYTES # BLD AUTO: 0.9 10E3/UL (ref 0–1.3)
MONOCYTES NFR BLD AUTO: 10 %
MUCOUS THREADS #/AREA URNS LPF: PRESENT /LPF
NEUTROPHILS # BLD AUTO: 6.8 10E3/UL (ref 1.6–8.3)
NEUTROPHILS NFR BLD AUTO: 72 %
NITRATE UR QL: NEGATIVE
NRBC # BLD AUTO: 0 10E3/UL
NRBC BLD AUTO-RTO: 0 /100
OPIATES UR QL SCN: ABNORMAL
PCP QUAL URINE (ROCHE): ABNORMAL
PH UR STRIP: 5 [PH] (ref 5–7)
PLATELET # BLD AUTO: 331 10E3/UL (ref 150–450)
POTASSIUM SERPL-SCNC: 3.1 MMOL/L (ref 3.4–5.3)
PROCALCITONIN SERPL IA-MCNC: 0.05 NG/ML
PROT SERPL-MCNC: 7.2 G/DL (ref 6.4–8.3)
RBC # BLD AUTO: 3.65 10E6/UL (ref 3.8–5.2)
RBC URINE: 0 /HPF
SODIUM SERPL-SCNC: 136 MMOL/L (ref 135–145)
SP GR UR STRIP: 1.02 (ref 1–1.03)
SQUAMOUS EPITHELIAL: 2 /HPF
UROBILINOGEN UR STRIP-MCNC: NORMAL MG/DL
WBC # BLD AUTO: 9.4 10E3/UL (ref 4–11)
WBC URINE: 0 /HPF

## 2024-09-25 PROCEDURE — 83735 ASSAY OF MAGNESIUM: CPT | Performed by: EMERGENCY MEDICINE

## 2024-09-25 PROCEDURE — 96365 THER/PROPH/DIAG IV INF INIT: CPT | Mod: 59

## 2024-09-25 PROCEDURE — 96361 HYDRATE IV INFUSION ADD-ON: CPT

## 2024-09-25 PROCEDURE — 258N000003 HC RX IP 258 OP 636: Performed by: EMERGENCY MEDICINE

## 2024-09-25 PROCEDURE — 80307 DRUG TEST PRSMV CHEM ANLYZR: CPT | Performed by: EMERGENCY MEDICINE

## 2024-09-25 PROCEDURE — 82140 ASSAY OF AMMONIA: CPT | Performed by: EMERGENCY MEDICINE

## 2024-09-25 PROCEDURE — 250N000011 HC RX IP 250 OP 636: Performed by: EMERGENCY MEDICINE

## 2024-09-25 PROCEDURE — 80053 COMPREHEN METABOLIC PANEL: CPT | Performed by: EMERGENCY MEDICINE

## 2024-09-25 PROCEDURE — 99284 EMERGENCY DEPT VISIT MOD MDM: CPT | Performed by: EMERGENCY MEDICINE

## 2024-09-25 PROCEDURE — 74177 CT ABD & PELVIS W/CONTRAST: CPT

## 2024-09-25 PROCEDURE — 250N000013 HC RX MED GY IP 250 OP 250 PS 637: Performed by: EMERGENCY MEDICINE

## 2024-09-25 PROCEDURE — 96375 TX/PRO/DX INJ NEW DRUG ADDON: CPT

## 2024-09-25 PROCEDURE — 99285 EMERGENCY DEPT VISIT HI MDM: CPT | Mod: 25

## 2024-09-25 PROCEDURE — 83605 ASSAY OF LACTIC ACID: CPT | Performed by: EMERGENCY MEDICINE

## 2024-09-25 PROCEDURE — 83690 ASSAY OF LIPASE: CPT | Performed by: EMERGENCY MEDICINE

## 2024-09-25 PROCEDURE — 84145 PROCALCITONIN (PCT): CPT | Performed by: EMERGENCY MEDICINE

## 2024-09-25 PROCEDURE — 85025 COMPLETE CBC W/AUTO DIFF WBC: CPT | Performed by: EMERGENCY MEDICINE

## 2024-09-25 PROCEDURE — 81001 URINALYSIS AUTO W/SCOPE: CPT | Mod: XU | Performed by: EMERGENCY MEDICINE

## 2024-09-25 PROCEDURE — 36415 COLL VENOUS BLD VENIPUNCTURE: CPT | Performed by: EMERGENCY MEDICINE

## 2024-09-25 PROCEDURE — 250N000009 HC RX 250: Performed by: EMERGENCY MEDICINE

## 2024-09-25 RX ORDER — POTASSIUM CHLORIDE 7.45 MG/ML
10 INJECTION INTRAVENOUS ONCE
Status: COMPLETED | OUTPATIENT
Start: 2024-09-25 | End: 2024-09-25

## 2024-09-25 RX ORDER — LORAZEPAM 2 MG/ML
1 INJECTION INTRAMUSCULAR ONCE
Status: COMPLETED | OUTPATIENT
Start: 2024-09-25 | End: 2024-09-25

## 2024-09-25 RX ORDER — IOPAMIDOL 755 MG/ML
500 INJECTION, SOLUTION INTRAVASCULAR ONCE
Status: COMPLETED | OUTPATIENT
Start: 2024-09-25 | End: 2024-09-25

## 2024-09-25 RX ORDER — ONDANSETRON 2 MG/ML
4 INJECTION INTRAMUSCULAR; INTRAVENOUS EVERY 30 MIN PRN
Status: DISCONTINUED | OUTPATIENT
Start: 2024-09-25 | End: 2024-09-25 | Stop reason: HOSPADM

## 2024-09-25 RX ORDER — ONDANSETRON 4 MG/1
4 TABLET, ORALLY DISINTEGRATING ORAL EVERY 6 HOURS PRN
Qty: 20 TABLET | Refills: 0 | Status: SHIPPED | OUTPATIENT
Start: 2024-09-25 | End: 2024-09-28

## 2024-09-25 RX ORDER — POTASSIUM CHLORIDE 1500 MG/1
40 TABLET, EXTENDED RELEASE ORAL ONCE
Status: COMPLETED | OUTPATIENT
Start: 2024-09-25 | End: 2024-09-25

## 2024-09-25 RX ORDER — LOPERAMIDE HYDROCHLORIDE 2 MG/1
2 TABLET ORAL 4 TIMES DAILY PRN
Qty: 30 TABLET | Refills: 0 | Status: SHIPPED | OUTPATIENT
Start: 2024-09-25

## 2024-09-25 RX ADMIN — SODIUM CHLORIDE 60 ML: 9 INJECTION, SOLUTION INTRAVENOUS at 08:22

## 2024-09-25 RX ADMIN — SODIUM CHLORIDE 1000 ML: 9 INJECTION, SOLUTION INTRAVENOUS at 08:10

## 2024-09-25 RX ADMIN — ONDANSETRON 4 MG: 2 INJECTION INTRAMUSCULAR; INTRAVENOUS at 11:17

## 2024-09-25 RX ADMIN — IOPAMIDOL 51 ML: 755 INJECTION, SOLUTION INTRAVENOUS at 08:22

## 2024-09-25 RX ADMIN — LORAZEPAM 1 MG: 2 INJECTION INTRAMUSCULAR; INTRAVENOUS at 08:10

## 2024-09-25 RX ADMIN — POTASSIUM CHLORIDE 10 MEQ: 7.46 INJECTION, SOLUTION INTRAVENOUS at 09:56

## 2024-09-25 RX ADMIN — POTASSIUM CHLORIDE 40 MEQ: 1500 TABLET, EXTENDED RELEASE ORAL at 09:56

## 2024-09-25 ASSESSMENT — ACTIVITIES OF DAILY LIVING (ADL)
ADLS_ACUITY_SCORE: 37

## 2024-09-25 ASSESSMENT — COLUMBIA-SUICIDE SEVERITY RATING SCALE - C-SSRS
1. IN THE PAST MONTH, HAVE YOU WISHED YOU WERE DEAD OR WISHED YOU COULD GO TO SLEEP AND NOT WAKE UP?: NO
6. HAVE YOU EVER DONE ANYTHING, STARTED TO DO ANYTHING, OR PREPARED TO DO ANYTHING TO END YOUR LIFE?: NO
2. HAVE YOU ACTUALLY HAD ANY THOUGHTS OF KILLING YOURSELF IN THE PAST MONTH?: NO

## 2024-09-25 NOTE — ED TRIAGE NOTES
Triage Assessment (Adult)       Row Name 09/25/24 0651          Triage Assessment    Airway WDL WDL        Respiratory WDL    Respiratory WDL WDL                   Has had Diarrhea, Nasuea, and Vomitting for last two weeks.  Abdominal pain worse this am

## 2024-09-25 NOTE — DISCHARGE INSTRUCTIONS
Your workup today was relatively reassuring.  I do not see evidence of acute infection.  Your CT scan did not show any worrisome findings to explain your vomiting and diarrhea.  You do not have sign of a bowel obstruction or other intra-abdominal infection.  However, you do have changes to your liver, which seem to have progressed from your last CT scan.  This was likely due to your continued alcohol intake, so I am glad that you have stopped drinking.    You may use the Zofran every 6 hours as needed to help with ongoing nausea or vomiting.  Hopefully this will allow you to stay well-hydrated and take in fluids    If you need medication to help with diarrhea, you may take the Imodium up to 4 times daily.    You had a slightly low potassium, but were given replacement potassium here in the ED.  Your primary doctor can recheck this in clinic    If you develop any new or worsening symptoms do not hesitate to return to the emergency room for evaluation.    Best of luck finding a rehab facility, hopefully something will open up and you will get further support while you are working to remain sober
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
## 2024-09-26 ENCOUNTER — PATIENT OUTREACH (OUTPATIENT)
Dept: CARE COORDINATION | Facility: CLINIC | Age: 69
End: 2024-09-26
Payer: COMMERCIAL
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
## 2024-09-26 NOTE — PROGRESS NOTES
Clinic Care Coordination Contact  [facility]/Voicemail    Clinical Data: Care Coordinator Outreach    Outreach Documentation Number of Outreach Attempt   9/26/2024   9:40 AM 1       Unable to leave a message as vm was full    Plan: Care Coordinator will try to reach patient again in 1-2 business days.    ANUP Walker   [facility] - Care Coordination  [facility]   [phone]

## 2024-09-27 ENCOUNTER — HOSPITAL ENCOUNTER (EMERGENCY)
Facility: CLINIC | Age: 69
Discharge: HOME | End: 2024-09-27
Attending: NURSE PRACTITIONER | Admitting: NURSE PRACTITIONER
Payer: COMMERCIAL

## 2024-09-27 ENCOUNTER — NURSE TRIAGE (OUTPATIENT)
Dept: FAMILY MEDICINE | Facility: CLINIC | Age: 69
End: 2024-09-27
Payer: COMMERCIAL

## 2024-09-27 VITALS
DIASTOLIC BLOOD PRESSURE: 78 MMHG | HEART RATE: 76 BPM | RESPIRATION RATE: 16 BRPM | SYSTOLIC BLOOD PRESSURE: 140 MMHG | OXYGEN SATURATION: 99 % | TEMPERATURE: 98 F

## 2024-09-27 DIAGNOSIS — E86.0 DEHYDRATION: ICD-10-CM

## 2024-09-27 DIAGNOSIS — R10.13 EPIGASTRIC PAIN: ICD-10-CM

## 2024-09-27 DIAGNOSIS — K29.20 ACUTE ALCOHOLIC GASTRITIS WITHOUT HEMORRHAGE: ICD-10-CM

## 2024-09-27 DIAGNOSIS — K28.9 GASTROJEJUNAL ULCER: ICD-10-CM

## 2024-09-27 DIAGNOSIS — K21.00 GASTROESOPHAGEAL REFLUX DISEASE WITH ESOPHAGITIS, UNSPECIFIED WHETHER HEMORRHAGE: ICD-10-CM

## 2024-09-27 LAB
ALBUMIN SERPL BCG-MCNC: 3.2 G/DL (ref 3.5–5.2)
ALP SERPL-CCNC: 146 U/L (ref 40–150)
ALT SERPL W P-5'-P-CCNC: 10 U/L (ref 0–50)
ANION GAP SERPL CALCULATED.3IONS-SCNC: 17 MMOL/L (ref 7–15)
AST SERPL W P-5'-P-CCNC: 26 U/L (ref 0–45)
BASOPHILS # BLD AUTO: 0.1 10E3/UL (ref 0–0.2)
BASOPHILS NFR BLD AUTO: 1 %
BILIRUB SERPL-MCNC: 0.8 MG/DL
BUN SERPL-MCNC: 5.5 MG/DL (ref 8–23)
CALCIUM SERPL-MCNC: 8.6 MG/DL (ref 8.8–10.4)
CHLORIDE SERPL-SCNC: 103 MMOL/L (ref 98–107)
CREAT SERPL-MCNC: 0.79 MG/DL (ref 0.51–0.95)
EGFRCR SERPLBLD CKD-EPI 2021: 82 ML/MIN/1.73M2
EOSINOPHIL # BLD AUTO: 0.1 10E3/UL (ref 0–0.7)
EOSINOPHIL NFR BLD AUTO: 1 %
ERYTHROCYTE [DISTWIDTH] IN BLOOD BY AUTOMATED COUNT: 12.7 % (ref 10–15)
GLUCOSE SERPL-MCNC: 92 MG/DL (ref 70–99)
HCO3 SERPL-SCNC: 21 MMOL/L (ref 22–29)
HCT VFR BLD AUTO: 36.4 % (ref 35–47)
HGB BLD-MCNC: 12.8 G/DL (ref 11.7–15.7)
IMM GRANULOCYTES # BLD: 0 10E3/UL
IMM GRANULOCYTES NFR BLD: 0 %
LACTATE SERPL-SCNC: 1.2 MMOL/L (ref 0.7–2)
LIPASE SERPL-CCNC: 19 U/L (ref 13–60)
LYMPHOCYTES # BLD AUTO: 1.6 10E3/UL (ref 0.8–5.3)
LYMPHOCYTES NFR BLD AUTO: 17 %
MCH RBC QN AUTO: 35.6 PG (ref 26.5–33)
MCHC RBC AUTO-ENTMCNC: 35.2 G/DL (ref 31.5–36.5)
MCV RBC AUTO: 101 FL (ref 78–100)
MONOCYTES # BLD AUTO: 1 10E3/UL (ref 0–1.3)
MONOCYTES NFR BLD AUTO: 11 %
NEUTROPHILS # BLD AUTO: 6.5 10E3/UL (ref 1.6–8.3)
NEUTROPHILS NFR BLD AUTO: 70 %
NRBC # BLD AUTO: 0 10E3/UL
NRBC BLD AUTO-RTO: 0 /100
PLATELET # BLD AUTO: 347 10E3/UL (ref 150–450)
POTASSIUM SERPL-SCNC: 3.4 MMOL/L (ref 3.4–5.3)
PROT SERPL-MCNC: 6.9 G/DL (ref 6.4–8.3)
RBC # BLD AUTO: 3.6 10E6/UL (ref 3.8–5.2)
SODIUM SERPL-SCNC: 141 MMOL/L (ref 135–145)
WBC # BLD AUTO: 9.2 10E3/UL (ref 4–11)

## 2024-09-27 PROCEDURE — 99284 EMERGENCY DEPT VISIT MOD MDM: CPT | Performed by: NURSE PRACTITIONER

## 2024-09-27 PROCEDURE — 96374 THER/PROPH/DIAG INJ IV PUSH: CPT | Performed by: NURSE PRACTITIONER

## 2024-09-27 PROCEDURE — 99284 EMERGENCY DEPT VISIT MOD MDM: CPT | Mod: 25 | Performed by: NURSE PRACTITIONER

## 2024-09-27 PROCEDURE — 85004 AUTOMATED DIFF WBC COUNT: CPT | Performed by: NURSE PRACTITIONER

## 2024-09-27 PROCEDURE — 83690 ASSAY OF LIPASE: CPT | Performed by: NURSE PRACTITIONER

## 2024-09-27 PROCEDURE — 96375 TX/PRO/DX INJ NEW DRUG ADDON: CPT | Performed by: NURSE PRACTITIONER

## 2024-09-27 PROCEDURE — 83605 ASSAY OF LACTIC ACID: CPT | Performed by: NURSE PRACTITIONER

## 2024-09-27 PROCEDURE — 82040 ASSAY OF SERUM ALBUMIN: CPT | Performed by: NURSE PRACTITIONER

## 2024-09-27 PROCEDURE — 96376 TX/PRO/DX INJ SAME DRUG ADON: CPT | Performed by: NURSE PRACTITIONER

## 2024-09-27 PROCEDURE — 250N000011 HC RX IP 250 OP 636: Performed by: NURSE PRACTITIONER

## 2024-09-27 PROCEDURE — 250N000009 HC RX 250: Performed by: NURSE PRACTITIONER

## 2024-09-27 PROCEDURE — 258N000003 HC RX IP 258 OP 636: Performed by: NURSE PRACTITIONER

## 2024-09-27 PROCEDURE — 96361 HYDRATE IV INFUSION ADD-ON: CPT | Performed by: NURSE PRACTITIONER

## 2024-09-27 PROCEDURE — 36415 COLL VENOUS BLD VENIPUNCTURE: CPT | Performed by: NURSE PRACTITIONER

## 2024-09-27 RX ORDER — OXYCODONE HYDROCHLORIDE 5 MG/1
5 TABLET ORAL EVERY 6 HOURS PRN
Qty: 10 TABLET | Refills: 0 | Status: SHIPPED | OUTPATIENT
Start: 2024-09-27 | End: 2024-09-30

## 2024-09-27 RX ORDER — ONDANSETRON 2 MG/ML
4 INJECTION INTRAMUSCULAR; INTRAVENOUS EVERY 30 MIN PRN
Status: DISCONTINUED | OUTPATIENT
Start: 2024-09-27 | End: 2024-09-27 | Stop reason: HOSPADM

## 2024-09-27 RX ORDER — HYDROMORPHONE HYDROCHLORIDE 1 MG/ML
0.5 INJECTION, SOLUTION INTRAMUSCULAR; INTRAVENOUS; SUBCUTANEOUS EVERY 30 MIN PRN
Status: DISCONTINUED | OUTPATIENT
Start: 2024-09-27 | End: 2024-09-27 | Stop reason: HOSPADM

## 2024-09-27 RX ORDER — OMEPRAZOLE 40 MG/1
CAPSULE, DELAYED RELEASE ORAL
Qty: 90 CAPSULE | Refills: 3 | Status: SHIPPED | OUTPATIENT
Start: 2024-09-27 | End: 2024-09-30

## 2024-09-27 RX ORDER — FAMOTIDINE 20 MG/1
20 TABLET, FILM COATED ORAL 2 TIMES DAILY PRN
Qty: 30 TABLET | Refills: 0 | Status: SHIPPED | OUTPATIENT
Start: 2024-09-27 | End: 2024-09-30

## 2024-09-27 RX ADMIN — FAMOTIDINE 40 MG: 10 INJECTION, SOLUTION INTRAVENOUS at 18:42

## 2024-09-27 RX ADMIN — ONDANSETRON 4 MG: 2 INJECTION INTRAMUSCULAR; INTRAVENOUS at 17:57

## 2024-09-27 RX ADMIN — HYDROMORPHONE HYDROCHLORIDE 0.5 MG: 1 INJECTION, SOLUTION INTRAMUSCULAR; INTRAVENOUS; SUBCUTANEOUS at 18:38

## 2024-09-27 RX ADMIN — SODIUM CHLORIDE 1000 ML: 9 INJECTION, SOLUTION INTRAVENOUS at 17:48

## 2024-09-27 RX ADMIN — PANTOPRAZOLE SODIUM 40 MG: 40 INJECTION, POWDER, FOR SOLUTION INTRAVENOUS at 18:40

## 2024-09-27 RX ADMIN — HYDROMORPHONE HYDROCHLORIDE 0.5 MG: 1 INJECTION, SOLUTION INTRAMUSCULAR; INTRAVENOUS; SUBCUTANEOUS at 17:57

## 2024-09-27 ASSESSMENT — ACTIVITIES OF DAILY LIVING (ADL)
ADLS_ACUITY_SCORE: 37
ADLS_ACUITY_SCORE: 35
ADLS_ACUITY_SCORE: 37

## 2024-09-27 ASSESSMENT — COLUMBIA-SUICIDE SEVERITY RATING SCALE - C-SSRS
6. HAVE YOU EVER DONE ANYTHING, STARTED TO DO ANYTHING, OR PREPARED TO DO ANYTHING TO END YOUR LIFE?: NO
1. IN THE PAST MONTH, HAVE YOU WISHED YOU WERE DEAD OR WISHED YOU COULD GO TO SLEEP AND NOT WAKE UP?: NO
2. HAVE YOU ACTUALLY HAD ANY THOUGHTS OF KILLING YOURSELF IN THE PAST MONTH?: NO

## 2024-09-27 NOTE — TELEPHONE ENCOUNTER
Delay in documenting due to unexpected Epic Downtime    Priority call for severe abd pain, writer located in [place]    Patient went to [facility] ED on 9/25 for similar symptoms, after reviewing chart looks like ED provider noted progressing hepatic stenosis, also suspect viral gastroenteritis vs ETOH withdrawal. Appears PCP trying to find inpatient tx - patient states last alcoholic drink was about a week ago.     Currently having 8/10 severe mid abd pain and feels it's worsening, unable o keep much food down, sipping on tea today but hasn't had much else to drink. Dry heaving, does have zofran but states this isn't helping much. States she got fluids in ED but not much since.     Patient states she's unable to manage this pain at home, requesting pain medication.     Writer recommended if pain is too severe to manage at home, we would recommend heading back in to ED to come up with pain plan, as she does have follow up appt with PCP on 9/30, but patient does not feel she can wait until then. Patient agreeable to return to ED d/t no improvement and some worsening in abd pain. No further questions or concerns.      Miladis Lawrence, RN, BSN  [facility]    Reason for Disposition   SEVERE abdominal pain (e.g., excruciating)    Additional Information   Negative: Passed out (i.e., fainted, collapsed and was not responding)   Negative: Shock suspected (e.g., cold/pale/clammy skin, too weak to stand, low BP, rapid pulse)   Negative: Sounds like a life-threatening emergency to the triager   Negative: Followed an abdomen (stomach) injury   Negative: Chest pain   Negative: Abdominal pain and pregnant < 20 weeks   Negative: Abdominal pain and pregnant 20 or more weeks   Negative: Pain is mainly in upper abdomen (if needed ask: 'is it mainly above the belly button?')   Negative: Abdomen bloating or swelling are main symptoms    Answer Assessment - Initial Assessment Questions  1. LOCATION:  "\"Where does it hurt?\"       Middle, lower abd - sometimes radiates to the right of abd  2. RADIATION: \"Does the pain shoot anywhere else?\" (e.g., chest, back)      No, other than R side of abd  3. ONSET: \"When did the pain begin?\" (e.g., minutes, hours or days ago)       2 weeks ago, but worsening  4. SUDDEN: \"Gradual or sudden onset?\"      Sudden  5. PATTERN \"Does the pain come and go, or is it constant?\"     - If it comes and goes: \"How long does it last?\" \"Do you have pain now?\"      (Note: Comes and goes means the pain is intermittent. It goes away completely between bouts.)     - If constant: \"Is it getting better, staying the same, or getting worse?\"       (Note: Constant means the pain never goes away completely; most serious pain is constant and gets worse.)       Constant  6. SEVERITY: \"How bad is the pain?\"  (e.g., Scale 1-10; mild, moderate, or severe)     - MILD (1-3): Doesn't interfere with normal activities, abdomen soft and not tender to touch.      - MODERATE (4-7): Interferes with normal activities or awakens from sleep, abdomen tender to touch.      - SEVERE (8-10): Excruciating pain, doubled over, unable to do any normal activities.        8/10  7. RECURRENT SYMPTOM: \"Have you ever had this type of stomach pain before?\" If Yes, ask: \"When was the last time?\" and \"What happened that time?\"       Not sure  8. CAUSE: \"What do you think is causing the stomach pain?\"      Not sure  9. RELIEVING/AGGRAVATING FACTORS: \"What makes it better or worse?\" (e.g., antacids, bending or twisting motion, bowel movement)      Also not sure, states she got Ativan at some point recently and this helped the pain  10. OTHER SYMPTOMS: \"Do you have any other symptoms?\" (e.g., back pain, diarrhea, fever, urination pain, vomiting)        Nausea and diarrhea, states zofran is making it so she isn't having vomiting but is still having dry heaves consistently  11. PREGNANCY: \"Is there any chance you are pregnant?\" \"When was " "your last menstrual period?\"        Not asked    Protocols used: Abdominal Pain - Female-A-[place]    "

## 2024-09-27 NOTE — DISCHARGE INSTRUCTIONS
Omeprazole 40 mg twice a day.   (This medication was noted on your medication list and has been recommended you be on indefinitely after your upper endoscopy done in 2018).  Pepcid 20 mg twice a day as needed for epigastric pain or esophageal reflux.  Tylenol 650 mg every 6 hours as needed for mild pain.  Oxycodone 5 mg every 6 hours as needed for severe pain.  --This is only for your acute pain for the next couple days/short-term use only.  Do not take clonazepam with this medication.  You also stated that you are not on naltrexone which was listed on your medication list.  Avoid ibuprofen.  Do not drink alcohol.    I have sent referral for you to get scheduled for upper GI endoscopy for further evaluation.  Someone should call you to set this up.    Schedule an appointment to see your regular clinic provider within the next couple weeks.
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
## 2024-09-30 ENCOUNTER — OFFICE VISIT (OUTPATIENT)
Dept: FAMILY MEDICINE | Facility: CLINIC | Age: 69
End: 2024-09-30
Payer: COMMERCIAL

## 2024-09-30 VITALS
TEMPERATURE: 97.1 F | HEART RATE: 61 BPM | DIASTOLIC BLOOD PRESSURE: 76 MMHG | HEIGHT: 64 IN | SYSTOLIC BLOOD PRESSURE: 122 MMHG | OXYGEN SATURATION: 100 % | WEIGHT: 100 LBS | BODY MASS INDEX: 17.07 KG/M2

## 2024-09-30 DIAGNOSIS — F10.20 ALCOHOL USE DISORDER, SEVERE, DEPENDENCE (H): ICD-10-CM

## 2024-09-30 DIAGNOSIS — F33.9 EPISODE OF RECURRENT MAJOR DEPRESSIVE DISORDER, UNSPECIFIED DEPRESSION EPISODE SEVERITY (H): ICD-10-CM

## 2024-09-30 DIAGNOSIS — D75.89 MACROCYTOSIS WITHOUT ANEMIA: ICD-10-CM

## 2024-09-30 DIAGNOSIS — Z00.00 ENCOUNTER FOR MEDICARE ANNUAL WELLNESS EXAM: Primary | ICD-10-CM

## 2024-09-30 DIAGNOSIS — G47.30 HYPERSOMNIA WITH SLEEP APNEA: ICD-10-CM

## 2024-09-30 DIAGNOSIS — Z29.11 NEED FOR VACCINATION AGAINST RESPIRATORY SYNCYTIAL VIRUS: ICD-10-CM

## 2024-09-30 DIAGNOSIS — R63.6 UNDERWEIGHT: ICD-10-CM

## 2024-09-30 DIAGNOSIS — J43.9 PULMONARY EMPHYSEMA, UNSPECIFIED EMPHYSEMA TYPE (H): ICD-10-CM

## 2024-09-30 DIAGNOSIS — Z98.84 HISTORY OF ROUX-EN-Y GASTRIC BYPASS: ICD-10-CM

## 2024-09-30 DIAGNOSIS — F41.9 ANXIETY: ICD-10-CM

## 2024-09-30 DIAGNOSIS — K28.9 GASTROJEJUNAL ULCER: ICD-10-CM

## 2024-09-30 DIAGNOSIS — D50.8 OTHER IRON DEFICIENCY ANEMIA: ICD-10-CM

## 2024-09-30 DIAGNOSIS — R10.13 EPIGASTRIC PAIN: ICD-10-CM

## 2024-09-30 DIAGNOSIS — Z12.31 ENCOUNTER FOR SCREENING MAMMOGRAM FOR MALIGNANT NEOPLASM OF BREAST: ICD-10-CM

## 2024-09-30 DIAGNOSIS — Z12.11 SCREEN FOR COLON CANCER: ICD-10-CM

## 2024-09-30 DIAGNOSIS — Z72.0 TOBACCO ABUSE: ICD-10-CM

## 2024-09-30 DIAGNOSIS — G47.10 HYPERSOMNIA WITH SLEEP APNEA: ICD-10-CM

## 2024-09-30 DIAGNOSIS — K76.0 FATTY LIVER: ICD-10-CM

## 2024-09-30 DIAGNOSIS — E53.8 VITAMIN B12 DEFICIENCY (NON ANEMIC): ICD-10-CM

## 2024-09-30 DIAGNOSIS — M81.0 AGE-RELATED OSTEOPOROSIS WITHOUT CURRENT PATHOLOGICAL FRACTURE: ICD-10-CM

## 2024-09-30 PROBLEM — Z71.6 ENCOUNTER FOR TOBACCO USE CESSATION COUNSELING: Status: RESOLVED | Noted: 2024-05-06 | Resolved: 2024-09-30

## 2024-09-30 PROCEDURE — 90480 ADMN SARSCOV2 VAC 1/ONLY CMP: CPT | Performed by: FAMILY MEDICINE

## 2024-09-30 PROCEDURE — 91320 SARSCV2 VAC 30MCG TRS-SUC IM: CPT | Performed by: FAMILY MEDICINE

## 2024-09-30 PROCEDURE — G0438 PPPS, INITIAL VISIT: HCPCS | Performed by: FAMILY MEDICINE

## 2024-09-30 PROCEDURE — 90662 IIV NO PRSV INCREASED AG IM: CPT | Performed by: FAMILY MEDICINE

## 2024-09-30 PROCEDURE — 99214 OFFICE O/P EST MOD 30 MIN: CPT | Mod: 25 | Performed by: FAMILY MEDICINE

## 2024-09-30 PROCEDURE — G0008 ADMIN INFLUENZA VIRUS VAC: HCPCS | Performed by: FAMILY MEDICINE

## 2024-09-30 RX ORDER — OXYCODONE HYDROCHLORIDE 5 MG/1
5 TABLET ORAL 2 TIMES DAILY PRN
Qty: 20 TABLET | Refills: 0 | Status: SHIPPED | OUTPATIENT
Start: 2024-09-30

## 2024-09-30 RX ORDER — VENLAFAXINE HYDROCHLORIDE 37.5 MG/1
37.5 CAPSULE, EXTENDED RELEASE ORAL DAILY
Qty: 90 CAPSULE | Refills: 3 | Status: SHIPPED | OUTPATIENT
Start: 2024-09-30

## 2024-09-30 RX ORDER — FERROUS GLUCONATE 324(38)MG
324 TABLET ORAL
Qty: 90 TABLET | Refills: 3 | Status: SHIPPED | OUTPATIENT
Start: 2024-09-30

## 2024-09-30 RX ORDER — RALOXIFENE HYDROCHLORIDE 60 MG/1
1 TABLET, FILM COATED ORAL DAILY
Qty: 90 TABLET | Refills: 3 | Status: SHIPPED | OUTPATIENT
Start: 2024-09-30

## 2024-09-30 RX ORDER — LANOLIN ALCOHOL/MO/W.PET/CERES
100 CREAM (GRAM) TOPICAL DAILY
Qty: 90 TABLET | Refills: 3 | Status: SHIPPED | OUTPATIENT
Start: 2024-09-30

## 2024-09-30 RX ORDER — ASPIRIN 325 MG
1 TABLET ORAL DAILY
Qty: 90 TABLET | Refills: 3 | Status: SHIPPED | OUTPATIENT
Start: 2024-09-30

## 2024-09-30 RX ORDER — FAMOTIDINE 20 MG/1
20 TABLET, FILM COATED ORAL 2 TIMES DAILY PRN
Qty: 60 TABLET | Refills: 11 | Status: SHIPPED | OUTPATIENT
Start: 2024-09-30

## 2024-09-30 RX ORDER — OMEPRAZOLE 40 MG/1
CAPSULE, DELAYED RELEASE ORAL
Qty: 180 CAPSULE | Refills: 3 | Status: SHIPPED | OUTPATIENT
Start: 2024-09-30

## 2024-09-30 SDOH — HEALTH STABILITY: PHYSICAL HEALTH: ON AVERAGE, HOW MANY DAYS PER WEEK DO YOU ENGAGE IN MODERATE TO STRENUOUS EXERCISE (LIKE A BRISK WALK)?: 6 DAYS

## 2024-09-30 SDOH — HEALTH STABILITY: PHYSICAL HEALTH: ON AVERAGE, HOW MANY MINUTES DO YOU ENGAGE IN EXERCISE AT THIS LEVEL?: 30 MIN

## 2024-09-30 ASSESSMENT — SOCIAL DETERMINANTS OF HEALTH (SDOH): HOW OFTEN DO YOU GET TOGETHER WITH FRIENDS OR RELATIVES?: TWICE A WEEK

## 2024-09-30 ASSESSMENT — PATIENT HEALTH QUESTIONNAIRE - PHQ9
SUM OF ALL RESPONSES TO PHQ QUESTIONS 1-9: 23
SUM OF ALL RESPONSES TO PHQ QUESTIONS 1-9: 23
10. IF YOU CHECKED OFF ANY PROBLEMS, HOW DIFFICULT HAVE THESE PROBLEMS MADE IT FOR YOU TO DO YOUR WORK, TAKE CARE OF THINGS AT HOME, OR GET ALONG WITH OTHER PEOPLE: EXTREMELY DIFFICULT
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
## 2024-09-30 NOTE — PROGRESS NOTES
Subjective    Patient is here for a physical. She reports that she has been feeling unwell for the past two weeks. She has been experiencing constant dry heaves and severe diarrhea, to the point where she has to wear diapers. She also reports having pain, which has been managed with oxycodone prescribed by a nurse practitioner. The patient has been able to eat a little bit more recently, but still struggles with keeping food down. She also mentions that she has an 8-month-old Romansh Adames that she is training to be a service dog. The dog is expected to help her with tasks such as standing up and fetching her cell phone. She also mentions that she has been experiencing issues with balance even when not drinking alcohol. The patient also reports that she was relieved to find out that her liver enzymes were normal as she was worried about having cirrhosis. However, she acknowledges that her liver is enlarged and suffering from alcohol use. She reports coughing in the morning, producing clear sputum. She also mentions a lump on her upper back which she describes as itchy and feels like scabs that won't go away. She also reports feeling a bump in her veins in her legs.      Objective    - Vitals: Patient's weight has decreased by four pounds since the last visit.    - Physical exam: No signs of blood clots in the legs. Lump on the upper back identified as a lipoma. No worrisome skin conditions observed. No swelling in the legs. No lumps or pain in the breasts.     - Imaging results: CT scan shows hepatomegaly.      Assessment    - Chronic diarrhea and intractable retching: Patient reports chronic diarrhea and persistent intractable retching. This could be related to her ethanol consumption or other underlying gastrointestinal issues.    - Pain management: Patient has been prescribed oxycodone by a nurse practitioner for pain management.    - Alcohol use disorder: Patient acknowledges her struggle with alcohol use  disorder and plans to seek treatment.    - Hepatic steatosis (steatosis): CT scan shows hepatomegaly, likely secondary to chronic ethanol consumption.    - Lipoma: Patient has a lipoma on her upper back.      Plan    - Advise patient to continue with her treatment plan for alcohol use disorder.    - Advise patient to continue taking prescribed analgesics and gastrointestinal medications.    - Advise patient to receive influenza vaccination and COVID-19 vaccination today.    - Advise patient to receive respiratory syncytial virus (RSV) vaccination at the pharmacy.    - Advise patient to schedule mammography and bone densitometry.    - Advise patient to schedule colonoscopy and esophagogastroduodenoscopy (EGD).    - Renewed prescription for pain medication (oxycodone) for 10 more days.    - Recommendation of oral nutritional supplements such as Boost or SlimFast to aid in nutritional intake and alleviate gastrointestinal discomfort.    - Referral for care coordinator to help with treatment programs.    - Schedule for esophagogastroduodenoscopy (EGD) and colonoscopy contingent upon treatment plans.    - Influenza and COVID-19 vaccinations to be administered by the nurse.    - Pulmonary function test to be scheduled at a later date.

## 2024-09-30 NOTE — PATIENT INSTRUCTIONS
Patient Education   Preventive Care Advice   This is general advice given by our system to help you stay healthy. However, your care team may have specific advice just for you. Please talk to your care team about your preventive care needs.  Nutrition  Eat 5 or more servings of fruits and vegetables each day.  Try wheat bread, brown rice and whole grain pasta (instead of white bread, rice, and pasta).  Get enough calcium and vitamin D. Check the label on foods and aim for 100% of the RDA (recommended daily allowance).  Lifestyle  Exercise at least 150 minutes each week  (30 minutes a day, 5 days a week).  Do muscle strengthening activities 2 days a week. These help control your weight and prevent disease.  No smoking.  Wear sunscreen to prevent skin cancer.  Have a dental exam and cleaning every 6 months.  Yearly exams  See your health care team every year to talk about:  Any changes in your health.  Any medicines your care team has prescribed.  Preventive care, family planning, and ways to prevent chronic diseases.  Shots (vaccines)   HPV shots (up to age 26), if you've never had them before.  Hepatitis B shots (up to age 59), if you've never had them before.  COVID-19 shot: Get this shot when it's due.  Flu shot: Get a flu shot every year.  Tetanus shot: Get a tetanus shot every 10 years.  Pneumococcal, hepatitis A, and RSV shots: Ask your care team if you need these based on your risk.  Shingles shot (for age 50 and up)  General health tests  Diabetes screening:  Starting at age 35, Get screened for diabetes at least every 3 years.  If you are younger than age 35, ask your care team if you should be screened for diabetes.  Cholesterol test: At age 39, start having a cholesterol test every 5 years, or more often if advised.  Bone density scan (DEXA): At age 50, ask your care team if you should have this scan for osteoporosis (brittle bones).  Hepatitis C: Get tested at least once in your life.  STIs (sexually  transmitted infections)  Before age 24: Ask your care team if you should be screened for STIs.  After age 24: Get screened for STIs if you're at risk. You are at risk for STIs (including HIV) if:  You are sexually active with more than one person.  You don't use condoms every time.  You or a partner was diagnosed with a sexually transmitted infection.  If you are at risk for HIV, ask about PrEP medicine to prevent HIV.  Get tested for HIV at least once in your life, whether you are at risk for HIV or not.  Cancer screening tests  Cervical cancer screening: If you have a cervix, begin getting regular cervical cancer screening tests starting at age 21.  Breast cancer scan (mammogram): If you've ever had breasts, begin having regular mammograms starting at age 40. This is a scan to check for breast cancer.  Colon cancer screening: It is important to start screening for colon cancer at age 45.  Have a colonoscopy test every 10 years (or more often if you're at risk) Or, ask your provider about stool tests like a FIT test every year or Cologuard test every 3 years.  To learn more about your testing options, visit:   .  For help making a decision, visit:   https://bit.ly/ot61654.  Prostate cancer screening test: If you have a prostate, ask your care team if a prostate cancer screening test (PSA) at age 55 is right for you.  Lung cancer screening: If you are a current or former smoker ages 50 to 80, ask your care team if ongoing lung cancer screenings are right for you.  For informational purposes only. Not to replace the advice of your health care provider. Copyright   2023 [facility]. All rights reserved. Clinically reviewed by the [facility] Transitions Program. anfix [id number] - REV 01/24.  Learning About Activities of Daily Living  What are activities of daily living?     Activities of daily living (ADLs) are the basic self-care tasks you do every day. These include eating, bathing, dressing,  and moving around.  As you age, and if you have health problems, you may find that it's harder to do some of these tasks. If so, your doctor can suggest ideas that may help.  To measure what kind of help you may need, your doctor will ask how well you are able to do ADLs. Let your doctor know if there are any tasks that you are having trouble doing. This is an important first step to getting help. And when you have the help you need, you can stay as independent as possible.  How will a doctor assess your ADLs?  Asking about ADLs is part of a routine health checkup your doctor will likely do as you age. Your health check might be done in a doctor's office, in your home, or at a hospital. The goal is to find out if you are having any problems that could make it hard to care for yourself or that make it unsafe for you to be on your own.  To measure your ADLs, your doctor will ask how hard it is for you to do routine tasks. Your doctor may also want to know if you have changed the way you do a task because of a health problem. Your doctor may watch how you:  Walk back and forth.  Keep your balance while you stand or walk.  Move from sitting to standing or from a bed to a chair.  Button or unbutton a shirt or sweater.  Remove and put on your shoes.  It's common to feel a little worried or anxious if you find you can't do all the things you used to be able to do. Talking with your doctor about ADLs is a way to make sure you're as safe as possible and able to care for yourself as well as you can. You may want to bring a caregiver, friend, or family member to your checkup. They can help you talk to your doctor.  Follow-up care is a key part of your treatment and safety. Be sure to make and go to all appointments, and call your doctor if you are having problems. It's also a good idea to know your test results and keep a list of the medicines you take.  Current as of: October 24, 2023  Content Version: 14.2 2024 [facility]  [employer].   Care instructions adapted under license by your healthcare professional. If you have questions about a medical condition or this instruction, always ask your healthcare professional. [employer] disclaims any warranty or liability for your use of this information.    Preventing Falls: Care Instructions  Injuries and health problems such as trouble walking or poor eyesight can increase your risk of falling. So can some medicines. But there are things you can do to help prevent falls. You can exercise to get stronger. You can also arrange your home to make it safer.    Talk to your doctor about the medicines you take. Ask if any of them increase the risk of falls and whether they can be changed or stopped.   Try to exercise regularly. It can help improve your strength and balance. This can help lower your risk of falling.     Practice fall safety and prevention.    Wear low-heeled shoes that fit well and give your feet good support. Talk to your doctor if you have foot problems that make this hard.  Carry a cellphone or wear a medical alert device that you can use to call for help.  Use stepladders instead of chairs to reach high objects. Don't climb if you're at risk for falls. Ask for help, if needed.  Wear the correct eyeglasses, if you need them.    Make your home safer.    Remove rugs, cords, clutter, and furniture from walkways.  Keep your house well lit. Use night-lights in hallways and bathrooms.  Install and use sturdy handrails on stairways.  Wear nonskid footwear, even inside. Don't walk barefoot or in socks without shoes.    Be safe outside.    Use handrails, curb cuts, and ramps whenever possible.  Keep your hands free by using a shoulder bag or backpack.  Try to walk in well-lit areas. Watch out for uneven ground, changes in pavement, and debris.  Be careful in the winter. Walk on the grass or gravel when sidewalks are slippery. Use de-icer on steps and walkways. Add  "non-slip devices to shoes.    Put grab bars and nonskid mats in your shower or tub and near the toilet. Try to use a shower chair or bath bench when bathing.   Get into a tub or shower by putting in your weaker leg first. Get out with your strong side first. Have a phone or medical alert device in the bathroom with you.   Where can you learn more?  Go to https://www.[employer]/patiented  Enter G117 in the search box to learn more about \"Preventing Falls: Care Instructions.\"  Current as of: July 17, 2023               Content Version: 14.0    7984-7629 [employer].   Care instructions adapted under license by your healthcare professional. If you have questions about a medical condition or this instruction, always ask your healthcare professional. [employer] disclaims any warranty or liability for your use of this information.      Hearing Loss: Care Instructions  Overview     Hearing loss is a sudden or slow decrease in how well you hear. It can range from slight to profound. Permanent hearing loss can occur with aging. It also can happen when you are exposed long-term to loud noise. Examples include listening to loud music, riding motorcycles, or being around other loud machines.  Hearing loss can affect your work and home life. It can make you feel lonely or depressed. You may feel that you have lost your independence. But hearing aids and other devices can help you hear better and feel connected to others.  Follow-up care is a key part of your treatment and safety. Be sure to make and go to all appointments, and call your doctor if you are having problems. It's also a good idea to know your test results and keep a list of the medicines you take.  How can you care for yourself at home?  Avoid loud noises whenever possible. This helps keep your hearing from getting worse.  Always wear hearing protection around loud noises.  Wear a hearing aid as directed.  A professional can help you " "pick a hearing aid that will work best for you.  You can also get hearing aids over the counter for mild to moderate hearing loss.  Have hearing tests as your doctor suggests. They can show whether your hearing has changed. Your hearing aid may need to be adjusted.  Use other devices as needed. These may include:  Telephone amplifiers and hearing aids that can connect to a television, stereo, radio, or microphone.  Devices that use lights or vibrations. These alert you to the doorbell, a ringing telephone, or a baby monitor.  Television closed-captioning. This shows the words at the bottom of the screen. Most new TVs can do this.  TTY (text telephone). This lets you type messages back and forth on the telephone instead of talking or listening. These devices are also called TDD. When messages are typed on the keyboard, they are sent over the phone line to a receiving TTY. The message is shown on a monitor.  Use text messaging, social media, and email if it is hard for you to communicate by telephone.  Try to learn a listening technique called speechreading. It is not lipreading. You pay attention to people's gestures, expressions, posture, and tone of voice. These clues can help you understand what a person is saying. Face the person you are talking to, and have them face you. Make sure the lighting is good. You need to see the other person's face clearly.  Think about counseling if you need help to adjust to your hearing loss.  When should you call for help?  Watch closely for changes in your health, and be sure to contact your doctor if:    You think your hearing is getting worse.     You have new symptoms, such as dizziness or nausea.   Where can you learn more?  Go to https://www.Rol[employer].net/patiented  Enter R798 in the search box to learn more about \"Hearing Loss: Care Instructions.\"  Current as of: September 27, 2023               Content Version: 14.0    6244-6403 [employer]wise, Incorporated.   Care " instructions adapted under license by your healthcare professional. If you have questions about a medical condition or this instruction, always ask your healthcare professional. Healthwise, [facility] disclaims any warranty or liability for your use of this information.      Learning About Stress  What is stress?     Stress is your body's response to a hard situation. Your body can have a physical, emotional, or mental response. Stress is a fact of life for most people, and it affects everyone differently. What causes stress for you may not be stressful for someone else.  A lot of things can cause stress. You may feel stress when you go on a job interview, take a test, or run a race. This kind of short-term stress is normal and even useful. It can help you if you need to work hard or react quickly. For example, stress can help you finish an important job on time.  Long-term stress is caused by ongoing stressful situations or events. Examples of long-term stress include long-term health problems, ongoing problems at work, or conflicts in your family. Long-term stress can harm your health.  How does stress affect your health?  When you are stressed, your body responds as though you are in danger. It makes hormones that speed up your heart, make you breathe faster, and give you a burst of energy. This is called the fight-or-flight stress response. If the stress is over quickly, your body goes back to normal and no harm is done.  But if stress happens too often or lasts too long, it can have bad effects. Long-term stress can make you more likely to get sick, and it can make symptoms of some diseases worse. If you tense up when you are stressed, you may develop neck, shoulder, or low back pain. Stress is linked to high blood pressure and heart disease.  Stress also harms your emotional health. It can make you hammond, tense, or depressed. Your relationships may suffer, and you may not do well at work or school.  What can  you do to manage stress?  You can try these things to help manage stress:   Do something active. Exercise or activity can help reduce stress. Walking is a great way to get started. Even everyday activities such as housecleaning or yard work can help.  Try yoga or robb chi. These techniques combine exercise and meditation. You may need some training at first to learn them.  Do something you enjoy. For example, listen to music or go to a movie. Practice your hobby or do volunteer work.  Meditate. This can help you relax, because you are not worrying about what happened before or what may happen in the future.  Do guided imagery. Imagine yourself in any setting that helps you feel calm. You can use online videos, books, or a teacher to guide you.  Do breathing exercises. For example:  From a standing position, bend forward from the waist with your knees slightly bent. Let your arms dangle close to the floor.  Breathe in slowly and deeply as you return to a standing position. Roll up slowly and lift your head last.  Hold your breath for just a few seconds in the standing position.  Breathe out slowly and bend forward from the waist.  Let your feelings out. Talk, laugh, cry, and express anger when you need to. Talking with supportive friends or family, a counselor, or a dori leader about your feelings is a healthy way to relieve stress. Avoid discussing your feelings with people who make you feel worse.  Write. It may help to write about things that are bothering you. This helps you find out how much stress you feel and what is causing it. When you know this, you can find better ways to cope.  What can you do to prevent stress?  You might try some of these things to help prevent stress:  Manage your time. This helps you find time to do the things you want and need to do.  Get enough sleep. Your body recovers from the stresses of the day while you are sleeping.  Get support. Your family, friends, and community can make a  "difference in how you experience stress.  Limit your news feed. Avoid or limit time on social media or news that may make you feel stressed.  Do something active. Exercise or activity can help reduce stress. Walking is a great way to get started.  Where can you learn more?  Go to https://www.appCREAR.net/patiented  Enter N032 in the search box to learn more about \"Learning About Stress.\"  Current as of: October 24, 2023               Content Version: 14.0    0242-9523 [employer].   Care instructions adapted under license by your healthcare professional. If you have questions about a medical condition or this instruction, always ask your healthcare professional. [employer] disclaims any warranty or liability for your use of this information.      Learning About Sleeping Well  What does sleeping well mean?     Sleeping well means getting enough sleep to feel good and stay healthy. How much sleep is enough varies among people.  The number of hours you sleep and how you feel when you wake up are both important. If you do not feel refreshed, you probably need more sleep. Another sign of not getting enough sleep is feeling tired during the day.  Experts recommend that adults get at least 7 or more hours of sleep per day. Children and older adults need more sleep.  Why is getting enough sleep important?  Getting enough quality sleep is a basic part of good health. When your sleep suffers, your physical health, mood, and your thoughts can suffer too. You may find yourself feeling more grumpy or stressed. Not getting enough sleep also can lead to serious problems, including injury, accidents, anxiety, and depression.  What might cause poor sleeping?  Many things can cause sleep problems, including:  Changes to your sleep schedule.  Stress. Stress can be caused by fear about a single event, such as giving a speech. Or you may have ongoing stress, such as worry about work or school.  Depression, " "anxiety, and other mental or emotional conditions.  Changes in your sleep habits or surroundings. This includes changes that happen where you sleep, such as noise, light, or sleeping in a different bed. It also includes changes in your sleep pattern, such as having jet lag or working a late shift.  Health problems, such as pain, breathing problems, and restless legs syndrome.  Lack of regular exercise.  Using alcohol, nicotine, or caffeine before bed.  How can you help yourself?  Here are some tips that may help you sleep more soundly and wake up feeling more refreshed.  Your sleeping area   Use your bedroom only for sleeping and sex. A bit of light reading may help you fall asleep. But if it doesn't, do your reading elsewhere in the house. Try not to use your TV, computer, smartphone, or tablet while you are in bed.  Be sure your bed is big enough to stretch out comfortably, especially if you have a sleep partner.  Keep your bedroom quiet, dark, and cool. Use curtains, blinds, or a sleep mask to block out light. To block out noise, use earplugs, soothing music, or a \"white noise\" machine.  Your evening and bedtime routine   Create a relaxing bedtime routine. You might want to take a warm shower or bath, or listen to soothing music.  Go to bed at the same time every night. And get up at the same time every morning, even if you feel tired.  What to avoid   Limit caffeine (coffee, tea, caffeinated sodas) during the day, and don't have any for at least 6 hours before bedtime.  Avoid drinking alcohol before bedtime. Alcohol can cause you to wake up more often during the night.  Try not to smoke or use tobacco, especially in the evening. Nicotine can keep you awake.  Limit naps during the day, especially close to bedtime.  Avoid lying in bed awake for too long. If you can't fall asleep or if you wake up in the middle of the night and can't get back to sleep within about 20 minutes, get out of bed and go to another room " "until you feel sleepy.  Avoid taking medicine right before bed that may keep you awake or make you feel hyper or energized. Your doctor can tell you if your medicine may do this and if you can take it earlier in the day.  If you can't sleep   Imagine yourself in a peaceful, pleasant scene. Focus on the details and feelings of being in a place that is relaxing.  Get up and do a quiet or boring activity until you feel sleepy.  Avoid drinking any liquids before going to bed to help prevent waking up often to use the bathroom.  Where can you learn more?  Go to https://www.AppThwack.net/patiented  Enter J942 in the search box to learn more about \"Learning About Sleeping Well.\"  Current as of: July 10, 2023  Content Version: 14.1 2006-2024 [employer].   Care instructions adapted under license by your healthcare professional. If you have questions about a medical condition or this instruction, always ask your healthcare professional. [employer] disclaims any warranty or liability for your use of this information.    Bladder Training: Care Instructions  Your Care Instructions     Bladder training is used to treat urge incontinence and stress incontinence. Urge incontinence means that the need to urinate comes on so fast that you can't get to a toilet in time. Stress incontinence means that you leak urine because of pressure on your bladder. For example, it may happen when you laugh, cough, or lift something heavy.  Bladder training can increase how long you can wait before you have to urinate. It can also help your bladder hold more urine. And it can give you better control over the urge to urinate.  It is important to remember that bladder training takes a few weeks to a few months to make a difference. You may not see results right away, but don't give up.  Follow-up care is a key part of your treatment and safety. Be sure to make and go to all appointments, and call your doctor if you are " having problems. It's also a good idea to know your test results and keep a list of the medicines you take.  How can you care for yourself at home?  Work with your doctor to come up with a bladder training program that is right for you. You may use one or more of the following methods.  Delayed urination  In the beginning, try to keep from urinating for 5 minutes after you first feel the need to go.  While you wait, take deep, slow breaths to relax. Kegel exercises can also help you delay the need to go to the bathroom.  After some practice, when you can easily wait 5 minutes to urinate, try to wait 10 minutes before you urinate.  Slowly increase the waiting period until you are able to control when you have to urinate.  Scheduled urination  Empty your bladder when you first wake up in the morning.  Schedule times throughout the day when you will urinate.  Start by going to the bathroom every hour, even if you don't need to go.  Slowly increase the time between trips to the bathroom.  When you have found a schedule that works well for you, keep doing it.  If you wake up during the night and have to urinate, do it. Apply your schedule to waking hours only.  Kegel exercises  These tighten and strengthen pelvic muscles, which can help you control the flow of urine. (If doing these exercises causes pain, stop doing them and talk with your doctor.) To do Kegel exercises:  Squeeze your muscles as if you were trying not to pass gas. Or squeeze your muscles as if you were stopping the flow of urine. Your belly, legs, and buttocks shouldn't move.  Hold the squeeze for 3 seconds, then relax for 5 to 10 seconds.  Start with 3 seconds, then add 1 second each week until you are able to squeeze for 10 seconds.  Repeat the exercise 10 times a session. Do 3 to 8 sessions a day.  When should you call for help?  Watch closely for changes in your health, and be sure to contact your doctor if:    Your incontinence is getting worse.      "You do not get better as expected.   Where can you learn more?  Go to https://www.PayMins.net/patiented  Enter V684 in the search box to learn more about \"Bladder Training: Care Instructions.\"  Current as of: November 15, 2023               Content Version: 14.0    [id number] [employer].   Care instructions adapted under license by your healthcare professional. If you have questions about a medical condition or this instruction, always ask your healthcare professional. [employer] disclaims any warranty or liability for your use of this information.      Learning About Depression Screening  What is depression screening?  Depression screening is a way to see if you have depression symptoms. It may be done by a doctor or counselor. It's often part of a routine checkup. That's because your mental health is just as important as your physical health.  Depression is a mental health condition that affects how you feel, think, and act. You may:  Have less energy.  Lose interest in your daily activities.  Feel sad and grouchy for a long time.  Depression is very common. It affects people of all ages.  Many things can lead to depression. Some people become depressed after they have a stroke or find out they have a major illness like cancer or heart disease. The death of a loved one or a breakup may lead to depression. It can run in families. Most experts believe that a combination of inherited genes and stressful life events can cause it.  What happens during screening?  You may be asked to fill out a form about your depression symptoms. You and the doctor will discuss your answers. The doctor may ask you more questions to learn more about how you think, act, and feel.  What happens after screening?  If you have symptoms of depression, your doctor will talk to you about your options.  Doctors usually treat depression with medicines or counseling. Often, combining the two works best. Many people " "don't get help because they think that they'll get over the depression on their own. But people with depression may not get better unless they get treatment.  The cause of depression is not well understood. There may be many factors involved. But if you have depression, it's not your fault.  A serious symptom of depression is thinking about death or suicide. If you or someone you care about talks about this or about feeling hopeless, get help right away.  It's important to know that depression can be treated. Medicine, counseling, and self-care may help.  Where can you learn more?  Go to https://www.Truly.net/patiented  Enter T185 in the search box to learn more about \"Learning About Depression Screening.\"  Current as of: June 24, 2023  Content Version: 14.1 2006-2024 [employer].   Care instructions adapted under license by your healthcare professional. If you have questions about a medical condition or this instruction, always ask your healthcare professional. [employer] disclaims any warranty or liability for your use of this information.    9 Ways to Cut Back on Drinking  Maybe you've found yourself drinking more alcohol than you'd prefer. If you want to cut back, here are some ideas to try.    Think before you drink.  Do you really want a drink, or is it just a habit? If you're used to having a drink at a certain time, try doing something else then.     Look for substitutes.  Find some no-alcohol drinks that you enjoy, like flavored seltzer water, tea with honey, or tonic with a slice of lime. Or try alcohol-free beer or \"virgin\" cocktails (without the alcohol).     Drink more water.  Use water to quench your thirst. Drink a glass of water before you have any alcohol. Have another glass along with every drink or between drinks.     Shrink your drink.  For example, have a bottle of beer instead of a pint. Use a smaller glass for wine. Choose drinks with lower alcohol content " "(ABV%). Or use less liquor and more mixer in cocktails.     Slow down.  It's easy to drink quickly and without thinking about it. Pay attention, and make each drink last longer.     Do the math.  Total up how much you spend on alcohol each month. How much is that a year? If you cut back, what could you do with the money you save?     Take a break.  Choose a day or two each week when you won't drink at all. Notice how you feel on those days, physically and emotionally. How did you sleep? Do you feel better? Over time, add more break days.     Count calories.  Would you like to lose some weight? For some people that's a good motivator for cutting back. Figure out how many calories are in each drink. How many does that add up to in a day? In a week? In a month?     Practice saying no.  Be ready when someone offers you a drink. Try: \"Thanks, I've had enough.\" Or \"Thanks, but I'm cutting back.\" Or \"No, thanks. I feel better when I drink less.\"   Current as of: November 15, 2023  Content Version: 14.1 2006-2024 [employer].   Care instructions adapted under license by your healthcare professional. If you have questions about a medical condition or this instruction, always ask your healthcare professional. [employer] disclaims any warranty or liability for your use of this information.     "

## 2024-10-01 ENCOUNTER — ALLIED HEALTH/NURSE VISIT (OUTPATIENT)
Dept: FAMILY MEDICINE | Facility: CLINIC | Age: 69
End: 2024-10-01
Payer: COMMERCIAL

## 2024-10-01 ENCOUNTER — PATIENT OUTREACH (OUTPATIENT)
Dept: GASTROENTEROLOGY | Facility: CLINIC | Age: 69
End: 2024-10-01

## 2024-10-01 DIAGNOSIS — J43.9 PULMONARY EMPHYSEMA, UNSPECIFIED EMPHYSEMA TYPE (H): ICD-10-CM

## 2024-10-01 LAB
FEF 25/75: NORMAL
FEV-1: NORMAL
FEV1/FVC: NORMAL
FVC: NORMAL

## 2024-10-01 PROCEDURE — 94010 BREATHING CAPACITY TEST: CPT

## 2024-10-01 NOTE — PROGRESS NOTES
Clinic Care Coordination Contact  Care Team Conversations    Received a new referral for pt to help with V[facility] program.  Pt had denied this when SW asked the business day before the appt with provider.     Will ask [facility] to check on needs on their next call within a week.     .felipe

## 2024-10-02 ENCOUNTER — TELEPHONE (OUTPATIENT)
Dept: GASTROENTEROLOGY | Facility: CLINIC | Age: 69
End: 2024-10-02
Payer: COMMERCIAL

## 2024-10-02 NOTE — TELEPHONE ENCOUNTER
"Endoscopy Scheduling Screen    Have you had any respiratory illness or flu-like symptoms in the last 10 days?  No    What is your communication preference for Instructions and/or Bowel Prep?   Mail/USPS    What insurance is in the chart?  Other:  [employer]    Ordering/Referring Provider: MIMI GARZA   (If ordering provider performs procedure, schedule with ordering provider unless otherwise instructed. )    BMI: Estimated body mass index is 17.43 kg/m  as calculated from the following:    Height as of 9/30/24: 1.613 m (5' 3.5\").    Weight as of 9/30/24: 45.4 kg (100 lb).     Sedation Ordered  MAC/deep sedation.   BMI<= 45 45 < BMI <= 48 48 < BMI < = 50  BMI > 50   No Restrictions No [facility]  No [facility]  [facility] with exceptions Hospital Only OR Only       Do you have a history of malignant hyperthermia?  No    (Females) Are you currently pregnant?   No     Have you been diagnosed or told you have pulmonary hypertension?   Yes MAC required in hospital setting only. PAC evaluation required if scheduled at [facility].    Do you have an LVAD?  No    Have you been told you have moderate to severe sleep apnea?  No.    Have you been told you have COPD, asthma, or any other lung disease?  No    Do you have any heart conditions?  No     Have you ever had or are you waiting for an organ transplant?  No. Continue scheduling, no site restrictions.    Have you had a stroke or transient ischemic attack (TIA aka \"mini stroke\" in the last 6 months?   No    Have you been diagnosed with or been told you have cirrhosis of the liver?   No.    Are you currently on dialysis?   No    Do you need assistance transferring?   No    BMI: Estimated body mass index is 17.43 kg/m  as calculated from the following:    Height as of 9/30/24: 1.613 m (5' 3.5\").    Weight as of 9/30/24: 45.4 kg (100 lb).     Is patients BMI > 40 and scheduling location [facility]?  No    Do you take an injectable or oral medication for weight loss or diabetes " (excluding insulin)?  No    Do you take the medication Naltrexone?  No    Do you take blood thinners?  No       Prep   Are you currently on dialysis or do you have chronic kidney disease?  No    Do you have a diagnosis of diabetes?  No    Do you have a diagnosis of cystic fibrosis (CF)?  No    On a regular basis do you go 3 -5 days between bowel movements?  No    BMI > 40?  No    Preferred Pharmacy:    [place], [place] - [address]  [address] [place] [address]  Phone: [phone] Fax: [phone]    Final Scheduling Details     Procedure scheduled  Colonoscopy / Upper endoscopy (EGD)    Surgeon:  ELDA     Date of procedure:  12/4/24     Pre-OP / PAC:   No - Not required for this site.    Location  [facility] - Per order.    Sedation   MAC/Deep Sedation - Per RN assessment.      Patient Reminders:   You will receive a call from a Nurse to review instructions and health history.  This assessment must be completed prior to your procedure.  Failure to complete the Nurse assessment may result in the procedure being cancelled.      On the day of your procedure, please designate an adult(s) who can drive you home stay with you for the next 24 hours. The medicines used in the exam will make you sleepy. You will not be able to drive.      You cannot take public transportation, ride share services, or non-medical taxi service without a responsible caregiver.  Medical transport services are allowed with the requirement that a responsible caregiver will receive you at your destination.  We require that drivers and caregivers are confirmed prior to your procedure.

## 2024-10-02 NOTE — LETTER
November 13, 2024      Pavithra Raines  [address]  [facility] [address]              Dear Pavithra,          Bhavani Miralax Bowel Prep   Prep instructions for your colonoscopy     [facility]; [address] , [place] [address]  For prep questions, please call: [facility] - [phone]    Please read these instructions carefully at least 7 days prior to your colonoscopy procedure. Be sure to follow all directions completely. The inside of your colon must be clean to allow for a complete examination for the presence of any growths, polyps, and/or abnormalities, as well as their biopsy or removal. A number of tips are included in order to make this part of the procedure as comfortable as possible.    Getting ready   Purchase the following items over-the-counter/off the shelf at the drug store:    Four (4) - Dulcolax laxative (Bisacodyl) 5mg tablets (Do not use Dulcolax stool softener)   8.3 ounce bottle of Miralax powder (ClearLAX, SmoothLAX, PowderLAX)  64 ounces of Gatorade or similar sports drink. Not red or purple. (Pedialyte, Propel, Gatorade G2/Zero, Powerade, Powerade Zero)   10 ounce bottle of clear Magnesium Citrate    A nurse will call you to go over your appointment details and prep instructions. Not completing the nurse call could result with your appointment being cancelled.    You must arrange for an adult to drive you home after your exam. Your colonoscopy cannot be done unless you have a ride. If you need to use public transportation, someone must ride with you and stay with you for up to 24 hours.       7 days before procedure   Medications that may need to be held before procedure:     GLP-1 agonist medication for diabetes or weight loss: such as Mounjaro (Tirzepatide).  Ozempic (Semaglutide). Rybelsus (Semaglutide), Tirzepatide-Weight Management (Zepbound), Wegovy (Semaglutide) or others, holding times may vary based on how you take this medication. This may be up to  a 7 day hold. Our pre assessment nurses will call and discuss holding recommendations 1-2 weeks before scheduled procedure.     Blood thinning and/or anti platelet medications: such as Coumadin, Plavix, Xarelto, Eliquis, Lovenox or others, ask your your prescribing provider about holding recommendations.     If you take insulin for diabetes, ask your prescribing provider for instructions on how to manage this medication while preparing for a colonoscopy.     Stop taking iron (ferrous sulfate), multivitamins that contain iron, and/or fiber supplements (Metamucil, Benefiber, Psyllium husk powder, Fibercon, Bran, etc.) 7 days before procedure.     Stop eating whole kernel corn, popcorn, nuts, and foods that contain seeds. These can stay in the colon for many days and they can clog up the colonoscope.       3 days before procedure     Begin a low-fiber diet (see examples below). No Olestra (a fat substitute).    Consume no more than 10-15 grams of fiber each day.     It is important to stay hydrated. Drink at least eight 8-ounce glasses of water a day.                                    LOW FIBER DIET   You can have:   Do not have:    Starches: White bread, rolls, biscuits, croissants, Christina toast, white flour tortillas, waffles, pancakes, Kinyarwanda toast; white rice, noodles, pasta, macaroni; cooked and peeled potatoes; plain crackers, saltines; cooked farina or cream of rice; puffed rice, corn flakes, Rice Krispies, Special K      Vegetables: tender cooked and canned, vegetable broths     Fruits and fruit juices: Strained fruit juice, canned fruit without seeds or skin (not pineapple), applesauce, pear sauce, ripe bananas, melons (not watermelon)     Milk products: Milk (plain or flavored), cheese, cottage cheese, yogurt (no berries), custard, ice cream       Proteins: Tender, well-cooked ground beef, lamb, veal, ham, pork, chicken, turkey, fish or organ meat, Tofu, eggs, creamy peanut butter      Fats and condiments:   Margarine, butter, oils, mayonnaise, sour cream, salad dressing, plain gravy; spices, cooked herbs; sugar, clear jelly, honey, syrup      Snacks, sweets and drinks: Pretzels, hard candy; plain cakes and cookies (no nuts or seeds); gelatin, plain pudding, sherbet, Popsicles; coffee, tea, carbonated ( fizzy ) drinks  Starches: Breads or rolls that contain nuts, seeds or fruit; whole wheat or whole grain breads that contain more than 2 grams of fiber per serving; cornbread; corn or whole wheat tortillas; potatoes with skin; brown rice, wild rice, quinoa, kasha (buckwheat), and oatmeal      Vegetables: Any raw or steamed vegetables; vegetables with seeds; corn in any form      Fruits and fruit juices: Prunes, prune juice, raisins and other dried fruits, berries and other fruits with seeds, canned pineapple juices with pulp such as orange, grapefruit, pineapple or tomato juice     Milk products: Any yogurt with nuts, seeds or berries      Proteins: Tough, fibrous meats with gristle; cooked dried beans, peas or lentils; crunchy peanut butter     Fats and condiments: Pickles, olives, relish, horseradish; jam, marmalade, preserves      Snacks, sweets and drinks: Popcorn, nuts, seeds, granola, coconut, candies made with nuts or seeds; all desserts that contain nuts, seeds, raisins and other dried fruits, coconut, whole grains or bran.                       1 day before procedure       Start a clear liquid diet (see examples below). Do not eat any solid food.      Drink at least eight to ten 8-ounce glasses of water throughout the day. ? ? ? ? ? ? ? ?      CLEAR LIQUID DIET:  You can have: Do not have:    Water, tea, coffee (no milk or cream)   Soda pop, Gatorade (not red or purple)   Coconut water   Jell-O, Popsicles (no milk or fruit pieces - not red or purple)   Fat-free soup broth or bouillon   Plain hard candy, such as clear life savers (not red or purple)   Clear juices and fruit-flavored drinks, such as apple juice,  white grape juice, Hi-C, and Panfilo-Aid (not red or purple)  Milk or milk products such as ice cream, malts or shakes, or coffee creamer   Red or purple drinks of any kind such as cranberry juice, grape juice or Panfilo-Aid. Avoid red or purple Jell-O, Popsicles, sorbet, sherbet and candy   Juices with pulp such as orange, grapefruit, pineapple or tomato juice   Cream soups of any kind   Alcohol and beer   Protein drinks or protein powder     Step 1     At 4 PM, take 2 Dulcolax (Bisacodyl) tablets.   At 5 PM, mix the entire bottle of Miralax with 64 ounces of Gatorade in a pitcher and stir to dissolve the powder. Start drinking one 8-ounce glass of the Miralax and Gatorade mixture every 15 minutes until the pitcher is HALF empty (about 4 glasses).  Drink each glass quickly. Store the rest in the refrigerator.   Continue to drink clear liquids.    Step 2     At 10 PM, take 2 Dulcolax (Bisacodyl) tablets  At 10 PM start drinking the remainder of the Miralax and Gatorade mixture. Drink one 8-ounce glass of Miralax and Gatorade mixture every 15 minutes until the pitcher is empty (about 4 glasses). Drink each glass quickly.     Step 3     If you arrive for your procedure BEFORE 11 AM:  6 hours prior to your scheduled arrival to the endoscopy unit, drink 10 ounces of clear Magnesium Citrate.    If you arrive for your procedure AFTER 11 AM:  At 6 AM on the day of the exam drink 10 ounces of clear Magnesium Citrate.       Reminders While Drinking Laxatives:     After you start drinking the solution, stay near a toilet. You may have watery stools (diarrhea), mild cramping, bloating, and nausea. You may want to use Vaseline on the skin around your anus after each bowel movement or use wet wipes to prevent irritation. Bowel movements will be liquid and dark in color at first and then should turn clear yellow in color.      Some find it easier to drink the Miralax and Gatorade mixture when it is chilled. Do not add ice as this will  dilute the laxative. Drinking from a straw can be helpful to drink the liquid faster.     If you have nausea or vomiting during drinking the solution, rinse your mouth with water and take a 15-30 minute break and then continue drinking solution.       Day of procedure     2 hours before your arrival time stop drinking all liquids, including water.   Do not smoke or swallow anything, including water or gum for at least 2 hours before your arrival time. This is a safety issue. Your procedure could be cancelled if you do not follow directions.  No chewing tobacco 6 hours prior to procedure arrival time.     You may take your necessary morning medications with sips of water (4 ounces).   Do not take diabetes medicine by mouth until after your exam.  If you have asthma, bring your inhalers.  Please perform your nebulizer treatments and airway clearance therapy in the morning prior to the procedure (if applicable).    Arrive with a responsible adult who can drive you home and stay with you for up to 24 hours. The medications used during the procedure will make you sleepy, so you won't be able to drive yourself home.   You cannot use public transportation, ride-share services, or non-medical taxi services without a responsible caregiver. Medical transport services are okay, but a caregiver must be there to receive you at your destination.  Please check in with your  when you arrive. Drivers should stay on campus.    Expect to be at the procedure center for about 1.5-2.5 hours.    Do not wear jewelry (i.e. earrings, rings, necklaces, watches, etc.). Leave your purse, billfold, credit cards, and other valuables at home.      Bring insurance card and ID.                     Answers to Commonly Asked Questions     How soon can I eat after the procedure?  You may resume your normal diet when you feel ready, unless advised otherwise by the doctor performing your procedure. We recommend starting with a light meal.   Do not  drink alcohol for 24 hours after your procedure.  You may resume normal activities (work, exercise, etc.) after 24 hours.    How might I feel after the procedure?  It is normal to feel bloated and gassy after your procedure. Walking will help move the air through your colon. You can take non-aspirin pain relievers that contain acetaminophen (Tylenol).  If you are having sedation, we require a responsible adult to take you home for your safety. The sedation medicines used to relax you during the procedure can impair your judgement and reaction time, and make you forgetful and possibly a little unsteady.  Do not drive, make any important decisions, or sign any legal documents for 24 hours after your procedure.    When will I get my test results?  You should have your procedure results and any lab results (if applicable) by letter, [employer] message, or phone call within 2 weeks. If you have any questions, please call the doctor that referred you for the procedure.    How do I know if my colon is cleaned out?   After completing the bowel prep, your bowel movements should be all liquid and yellow. Your bowel movements will look similar to urine in the toilet. If there are pieces of stool (poop) in the toilet, or if you can't see to the bottom of the toilet, please call our office for advice. Call [phone] and ask to speak with a nurse.    Why is the Miralax bowel prep taken in several steps?   The stool is flushed out by a large wave of fluid going through the colon. Just sipping a large volume of the solution will not achieve the desired result. Studies have shown that two smaller waves (or more in some cases) are better than one large one.      Why do I need to drink the magnesium citrate so close to the procedure arrival time?   The intestine continues to produce mucus and waste. Longer intervals between the prep and the exam can lead to less than desired results. However, the stomach must be empty at the time of  the exam in order to allow safe sedation. Therefore, there should be nothing by mouth 2 hours before the exam is started.    What if I need to cancel or reschedule my procedure?  Contact our endoscopy scheduling team at [phone], option 2. Monday through Friday, 7:00am-5:00pm.

## 2024-10-02 NOTE — RESULT ENCOUNTER NOTE
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 No change in treatment plan at this time.   Soha Boggs MD

## 2024-10-04 ENCOUNTER — HOSPITAL ENCOUNTER (EMERGENCY)
Facility: CLINIC | Age: 69
Discharge: HOME | End: 2024-10-04
Attending: STUDENT IN AN ORGANIZED HEALTH CARE EDUCATION/TRAINING PROGRAM | Admitting: STUDENT IN AN ORGANIZED HEALTH CARE EDUCATION/TRAINING PROGRAM
Payer: COMMERCIAL

## 2024-10-04 ENCOUNTER — APPOINTMENT (OUTPATIENT)
Dept: CT IMAGING | Facility: CLINIC | Age: 69
End: 2024-10-04
Attending: STUDENT IN AN ORGANIZED HEALTH CARE EDUCATION/TRAINING PROGRAM
Payer: COMMERCIAL

## 2024-10-04 VITALS
WEIGHT: 100 LBS | HEIGHT: 64 IN | BODY MASS INDEX: 17.07 KG/M2 | DIASTOLIC BLOOD PRESSURE: 82 MMHG | HEART RATE: 83 BPM | SYSTOLIC BLOOD PRESSURE: 129 MMHG | OXYGEN SATURATION: 99 % | TEMPERATURE: 97.9 F | RESPIRATION RATE: 16 BRPM

## 2024-10-04 DIAGNOSIS — S02.31XA CLOSED BLOW-OUT FRACTURE OF RIGHT ORBITAL FLOOR (H): ICD-10-CM

## 2024-10-04 DIAGNOSIS — S09.90XA CLOSED HEAD INJURY, INITIAL ENCOUNTER: ICD-10-CM

## 2024-10-04 DIAGNOSIS — S02.40CA: ICD-10-CM
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
## 2024-10-04 LAB — ALCOHOL BREATH TEST: 0 (ref 0–0.01)

## 2024-10-04 PROCEDURE — 250N000013 HC RX MED GY IP 250 OP 250 PS 637: Performed by: STUDENT IN AN ORGANIZED HEALTH CARE EDUCATION/TRAINING PROGRAM

## 2024-10-04 PROCEDURE — 70486 CT MAXILLOFACIAL W/O DYE: CPT

## 2024-10-04 PROCEDURE — 82075 ASSAY OF BREATH ETHANOL: CPT | Performed by: STUDENT IN AN ORGANIZED HEALTH CARE EDUCATION/TRAINING PROGRAM

## 2024-10-04 PROCEDURE — 72125 CT NECK SPINE W/O DYE: CPT

## 2024-10-04 PROCEDURE — 99284 EMERGENCY DEPT VISIT MOD MDM: CPT | Performed by: STUDENT IN AN ORGANIZED HEALTH CARE EDUCATION/TRAINING PROGRAM

## 2024-10-04 PROCEDURE — 70450 CT HEAD/BRAIN W/O DYE: CPT

## 2024-10-04 RX ORDER — OXYCODONE HYDROCHLORIDE 5 MG/1
5 TABLET ORAL ONCE
Status: COMPLETED | OUTPATIENT
Start: 2024-10-04 | End: 2024-10-04

## 2024-10-04 RX ORDER — OXYCODONE HYDROCHLORIDE 5 MG/1
5 TABLET ORAL EVERY 6 HOURS PRN
Qty: 12 TABLET | Refills: 0 | Status: SHIPPED | OUTPATIENT
Start: 2024-10-04 | End: 2024-10-07

## 2024-10-04 RX ORDER — FLUCONAZOLE 150 MG/1
150 TABLET ORAL ONCE
Qty: 1 TABLET | Refills: 0 | Status: SHIPPED | OUTPATIENT
Start: 2024-10-04 | End: 2024-10-04

## 2024-10-04 RX ADMIN — AMOXICILLIN AND CLAVULANATE POTASSIUM 1 TABLET: 875; 125 TABLET, COATED ORAL at 22:02

## 2024-10-04 RX ADMIN — OXYCODONE HYDROCHLORIDE 5 MG: 5 TABLET ORAL at 20:15

## 2024-10-04 ASSESSMENT — ACTIVITIES OF DAILY LIVING (ADL)
ADLS_ACUITY_SCORE: 35
ADLS_ACUITY_SCORE: 37
ADLS_ACUITY_SCORE: 37

## 2024-10-04 NOTE — Clinical Note
Pavithra Raines was seen and treated in our emergency department on 10/4/2024.  She may return to work on 10/06/2024.       If you have any questions or concerns, please don't hesitate to call.      Abad Shahid MD

## 2024-10-05 NOTE — ED PROVIDER NOTES
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 infraorbital tenderness, but no evidence of proptosis.  Extraocular movements were full and nonpainful.  Visual acuity normal.  Case was discussed with ENT, reviewed CT findings and exam.  They believe these injuries are all nonoperative and do not require specific follow-up, though did recommend 10 days of Augmentin for infection prophylaxis.  Reviewed prescription monitoring database and she has had some recent controlled substance prescriptions on top of history of alcohol abuse.  However, she does have a new set of facial fractures that require additional pain control over the next week.  Will discharge home with Augmentin, oxycodone.  Recommended close PCP follow-up.  Patient agrees to return to the emergency department sooner for any new or worsening symptoms.    Discharge Medication List as of 10/4/2024 10:18 PM        START taking these medications    Details   amoxicillin-clavulanate (AUGMENTIN) 875-125 MG tablet Take 1 tablet by mouth 2 times daily for 10 days., Disp-20 tablet, R-0, E-Prescribe      fluconazole (DIFLUCAN) 150 MG tablet Take 1 tablet (150 mg) by mouth once for 1 dose., Disp-1 tablet, R-0, E-PrescribeTake after completion of antibiotics      !! oxyCODONE (ROXICODONE) 5 MG tablet Take 1 tablet (5 mg) by mouth  every 6 hours as needed for pain., Disp-12 tablet, R-0, E-Prescribe       !! - Potential duplicate medications found. Please discuss with provider.        Final diagnoses:   Closed blow-out fracture of right orbital floor (H)   Closed fracture of right maxillary sinus (H)   Closed head injury, initial encounter   Fall, initial encounter     10/4/2024   [facility] EMERGENCY DEPT       Abad Shahid MD  10/05/24 0005

## 2024-10-05 NOTE — ED TRIAGE NOTES
Pt states she was walking her dog home from the dog [place] and c/o head injury after fall from standing to ground concrete - R side of head. C/o some dizziness.      Triage Assessment (Adult)       Row Name 10/04/24 1955          Triage Assessment    Airway WDL WDL        Respiratory WDL    Respiratory WDL WDL        Cardiac WDL    Cardiac WDL WDL        Peripheral/Neurovascular WDL    Peripheral Neurovascular WDL WDL

## 2024-10-05 NOTE — DISCHARGE INSTRUCTIONS
You sustained several small fractures to the bones around your eye (orbit) and to the sinuses and your face.  I reviewed these with our facial surgeons and fortunately they will not require surgical repair.    Use Tylenol/ibuprofen for inflammation.  You can also use the oxycodone for breakthrough pain.  Applying ice to the area can help with swelling.    Take the antibiotics as instructed until entirely gone in order to prevent infection.    Follow-up with your primary doctor for recheck.  Return to the emergency department sooner for any new or worsening symptoms, particularly any severe pain despite medications, vision changes, inability to move the eye, fevers.

## 2024-10-07 ENCOUNTER — PATIENT OUTREACH (OUTPATIENT)
Dept: CARE COORDINATION | Facility: CLINIC | Age: 69
End: 2024-10-07
Payer: COMMERCIAL

## 2024-10-07 NOTE — PROGRESS NOTES
Clinic Care Coordination Contact  [facility]/Voicemail    Clinical Data: Care Coordinator Outreach    Outreach Documentation Number of Outreach Attempt   9/26/2024   9:40 AM 1   10/7/2024  11:31 AM 1       The CHW was not able to leave a voicemail for the patient at this time.    Plan: Care Coordinator will try to reach patient again in 10 business days.    MIESHA Helton  [phone]  [facility]

## 2024-10-10 ENCOUNTER — HOSPITAL ENCOUNTER (OUTPATIENT)
Dept: MAMMOGRAPHY | Facility: CLINIC | Age: 69
Discharge: HOME | End: 2024-10-10
Attending: FAMILY MEDICINE | Admitting: FAMILY MEDICINE
Payer: COMMERCIAL

## 2024-10-10 ENCOUNTER — DOCUMENTATION ONLY (OUTPATIENT)
Dept: OTHER | Facility: CLINIC | Age: 69
End: 2024-10-10
Payer: COMMERCIAL

## 2024-10-10 DIAGNOSIS — Z12.31 ENCOUNTER FOR SCREENING MAMMOGRAM FOR MALIGNANT NEOPLASM OF BREAST: ICD-10-CM

## 2024-10-10 PROCEDURE — 77063 BREAST TOMOSYNTHESIS BI: CPT

## 2024-10-22 ENCOUNTER — NURSE TRIAGE (OUTPATIENT)
Dept: FAMILY MEDICINE | Facility: CLINIC | Age: 69
End: 2024-10-22
Payer: COMMERCIAL

## 2024-10-22 ENCOUNTER — HOSPITAL ENCOUNTER (EMERGENCY)
Facility: CLINIC | Age: 69
Discharge: HOME | End: 2024-10-22
Attending: EMERGENCY MEDICINE | Admitting: EMERGENCY MEDICINE
Payer: COMMERCIAL

## 2024-10-22 VITALS
SYSTOLIC BLOOD PRESSURE: 138 MMHG | DIASTOLIC BLOOD PRESSURE: 83 MMHG | RESPIRATION RATE: 18 BRPM | TEMPERATURE: 97.8 F | OXYGEN SATURATION: 100 % | HEART RATE: 93 BPM

## 2024-10-22 DIAGNOSIS — S02.85XD CLOSED FRACTURE OF ORBIT WITH ROUTINE HEALING, SUBSEQUENT ENCOUNTER: ICD-10-CM

## 2024-10-22 PROCEDURE — 99283 EMERGENCY DEPT VISIT LOW MDM: CPT | Performed by: EMERGENCY MEDICINE

## 2024-10-22 PROCEDURE — 99282 EMERGENCY DEPT VISIT SF MDM: CPT | Performed by: EMERGENCY MEDICINE

## 2024-10-22 ASSESSMENT — COLUMBIA-SUICIDE SEVERITY RATING SCALE - C-SSRS
2. HAVE YOU ACTUALLY HAD ANY THOUGHTS OF KILLING YOURSELF IN THE PAST MONTH?: NO
1. IN THE PAST MONTH, HAVE YOU WISHED YOU WERE DEAD OR WISHED YOU COULD GO TO SLEEP AND NOT WAKE UP?: NO
6. HAVE YOU EVER DONE ANYTHING, STARTED TO DO ANYTHING, OR PREPARED TO DO ANYTHING TO END YOUR LIFE?: NO

## 2024-10-22 ASSESSMENT — ACTIVITIES OF DAILY LIVING (ADL): ADLS_ACUITY_SCORE: 37

## 2024-10-22 NOTE — ED TRIAGE NOTES
Patient upset with me asking questions and states I am making her do to many things. States had some kind of fall andhas headache a while back.

## 2024-10-22 NOTE — DISCHARGE INSTRUCTIONS
You may resume your Lexapro.  Fortunately, it looks like your fractures are healing up well.  No signs of serious problems from this.  I am so glad to hear you are not drinking and are on a path to a healthier life.
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
## 2024-10-22 NOTE — TELEPHONE ENCOUNTER
"  S: headache    B: patient calling in to report she is still not feeling well, in fact feel worse. She states she was recently in the ED on 10/4 due to a fall and facial fractures. She finished her antibiotic and she took her last oxycodone today. She reports she is more dizzy today and lightheaded. She is feeling a \"whooshing\" almost constantly in her head today. When she turns her head, at rest and in both ears. She has a severe headache, has had it for the past week. It is unchanged today. Vision is \"off\" today, feels unfocused.     A: Advised patient to be seen today by provider per protocol. RN reviewed red flag symptoms with patient and when to seek emergency care.     R: Patient verbalized understanding and is agreeable. She reports she does not have a ride to get in to clinic or . She said she will come in to the ED as soon as she is able to. She will call 911 for red flag symptom's.         Reason for Disposition   Patient wants to be seen    Additional Information   Negative: Difficult to awaken or acting confused (e.g., disoriented, slurred speech)   Negative: Weakness of the face, arm or leg on one side of the body and new-onset   Negative: Numbness of the face, arm or leg on one side of the body and new-onset   Negative: Loss of speech or garbled speech and new-onset   Negative: Passed out (i.e., fainted, collapsed and was not responding)   Negative: Sounds like a life-threatening emergency to the triager   Negative: Followed a head injury within last 3 days   Negative: Traumatic Brain Injury (TBI) is suspected   Negative: Sinus pain of forehead and yellow or green nasal discharge   Negative: Pregnant   Negative: Unable to walk without falling   Negative: Stiff neck (can't touch chin to chest)   Negative: Possibility of carbon monoxide exposure   Negative: New headache and weak immune system (e.g., HIV positive, cancer chemo, splenectomy, organ transplant, chronic steroids)   Negative: Fever present " > 3 days (72 hours)   Negative: Fever > 103 F (39.4 C)   Negative: Fever > 100.0 F (37.8 C) and has diabetes mellitus or a weak immune system (e.g., HIV positive, cancer chemotherapy, organ transplant, splenectomy, chronic steroids)   Negative: SEVERE headache (e.g., excruciating) and has had severe headaches before   Negative: SEVERE headache and not relieved by pain meds   Negative: SEVERE headache and vomiting   Negative: SEVERE headache and fever   Negative: SEVERE headache, states 'worst headache' of life   Negative: SEVERE headache, sudden-onset (i.e., reaching maximum intensity within seconds to 1 hour)   Negative: Severe pain in one eye   Negative: Loss of vision or double vision  (Exception: Same as prior migraines.)   Negative: Patient sounds very sick or weak to the triager    Protocols used: Headache-A-OH

## 2024-10-23 ENCOUNTER — PATIENT OUTREACH (OUTPATIENT)
Dept: CARE COORDINATION | Facility: CLINIC | Age: 69
End: 2024-10-23
Payer: COMMERCIAL

## 2024-10-23 NOTE — Clinical Note
Patient has been unreachable for the past 2 outreaches. Planning on sending out UTC letter and reaching out in one month.  Thank you, HARRY HeltonW

## 2024-10-23 NOTE — PROGRESS NOTES
Clinic Care Coordination Contact  [facility]/Voicemail    Clinical Data: Care Coordinator Outreach    Outreach Documentation Number of Outreach Attempt   9/26/2024   9:40 AM 1   10/7/2024  11:31 AM 1   10/23/2024  10:55 AM 2       Unable to leave a message due to: Phone line ringing for over a minute straight. CHW hung up after 15 rings      Plan: Care Coordinator will send unable to contact letter with care coordinator contact information via Site [employer]. Care Coordinator will try to reach patient again in 1 month.    HARRY HeltonW  [phone]  [facility] Care Resource [facility]

## 2024-10-23 NOTE — LETTER
[facility]  [address]   [place] [address]    October 23, 2024    Pavithra Raines  [address]  [place] [address]      Dear Shanika,    I have been attempting to reach you since our last contact. I would like to continue to work with you and provide any additional support you may need on achieving your health care related goals. I would appreciate if you would give me a call at [phone] to let me know if you would like to continue working together. I know that there are many things that can affect our ability to communicate and I hope we can continue to work together.    We are focused on providing you with the highest-quality healthcare experience possible.    Sincerely,    MIESHA Arias

## 2024-10-24 ENCOUNTER — PATIENT OUTREACH (OUTPATIENT)
Dept: CARE COORDINATION | Facility: CLINIC | Age: 69
End: 2024-10-24
Payer: COMMERCIAL

## 2024-11-08 ENCOUNTER — PATIENT OUTREACH (OUTPATIENT)
Dept: CARE COORDINATION | Facility: CLINIC | Age: 69
End: 2024-11-08
Payer: COMMERCIAL

## 2024-11-13 ENCOUNTER — OFFICE VISIT (OUTPATIENT)
Dept: FAMILY MEDICINE | Facility: CLINIC | Age: 69
End: 2024-11-13
Payer: COMMERCIAL

## 2024-11-13 VITALS
DIASTOLIC BLOOD PRESSURE: 68 MMHG | TEMPERATURE: 97.4 F | WEIGHT: 106.6 LBS | OXYGEN SATURATION: 97 % | BODY MASS INDEX: 18.2 KG/M2 | HEART RATE: 96 BPM | SYSTOLIC BLOOD PRESSURE: 128 MMHG | RESPIRATION RATE: 18 BRPM | HEIGHT: 64 IN

## 2024-11-13 DIAGNOSIS — S02.85XD CLOSED FRACTURE OF ORBIT WITH ROUTINE HEALING, SUBSEQUENT ENCOUNTER: Primary | ICD-10-CM

## 2024-11-13 DIAGNOSIS — E53.8 VITAMIN B12 DEFICIENCY (NON ANEMIC): ICD-10-CM

## 2024-11-13 DIAGNOSIS — N39.41 URGE INCONTINENCE OF URINE: ICD-10-CM

## 2024-11-13 DIAGNOSIS — Z72.0 TOBACCO ABUSE: ICD-10-CM

## 2024-11-13 DIAGNOSIS — E44.0 MODERATE PROTEIN-CALORIE MALNUTRITION (H): ICD-10-CM

## 2024-11-13 DIAGNOSIS — J43.9 PULMONARY EMPHYSEMA, UNSPECIFIED EMPHYSEMA TYPE (H): ICD-10-CM

## 2024-11-13 DIAGNOSIS — M62.81 GENERALIZED MUSCLE WEAKNESS: ICD-10-CM

## 2024-11-13 DIAGNOSIS — L50.9 URTICARIA: ICD-10-CM

## 2024-11-13 DIAGNOSIS — F10.21 ALCOHOL DEPENDENCE IN REMISSION (H): ICD-10-CM

## 2024-11-13 PROCEDURE — 99215 OFFICE O/P EST HI 40 MIN: CPT | Performed by: NURSE PRACTITIONER

## 2024-11-13 PROCEDURE — G2211 COMPLEX E/M VISIT ADD ON: HCPCS | Performed by: NURSE PRACTITIONER

## 2024-11-13 RX ORDER — CYANOCOBALAMIN 1000 UG/ML
1 INJECTION, SOLUTION INTRAMUSCULAR; SUBCUTANEOUS
Qty: 3 ML | Refills: 3 | Status: SHIPPED | OUTPATIENT
Start: 2024-11-13

## 2024-11-13 RX ORDER — SYRINGE AND NEEDLE,INSULIN,1ML 25GX1"
SYRINGE, EMPTY DISPOSABLE MISCELLANEOUS
Qty: 3 EACH | Refills: 3 | Status: SHIPPED | OUTPATIENT
Start: 2024-11-13

## 2024-11-13 RX ORDER — HYDROXYZINE HYDROCHLORIDE 10 MG/1
10 TABLET, FILM COATED ORAL 3 TIMES DAILY PRN
Qty: 30 TABLET | Refills: 3 | Status: SHIPPED | OUTPATIENT
Start: 2024-11-13

## 2024-11-13 ASSESSMENT — PATIENT HEALTH QUESTIONNAIRE - PHQ9
10. IF YOU CHECKED OFF ANY PROBLEMS, HOW DIFFICULT HAVE THESE PROBLEMS MADE IT FOR YOU TO DO YOUR WORK, TAKE CARE OF THINGS AT HOME, OR GET ALONG WITH OTHER PEOPLE: SOMEWHAT DIFFICULT
SUM OF ALL RESPONSES TO PHQ QUESTIONS 1-9: 7
SUM OF ALL RESPONSES TO PHQ QUESTIONS 1-9: 7

## 2024-11-13 ASSESSMENT — PAIN SCALES - GENERAL: PAINLEVEL_OUTOF10: NO PAIN (0)

## 2024-11-13 NOTE — TELEPHONE ENCOUNTER
Standard Miralax Bowel Prep recommended due to standard bowel prep. Instructions were sent via  [employer] and letter .       Sarita Bergeron RN  Endoscopy Procedure Pre Assessment   [phone] option 2
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
## 2024-11-14 ENCOUNTER — TELEPHONE (OUTPATIENT)
Dept: FAMILY MEDICINE | Facility: CLINIC | Age: 69
End: 2024-11-14
Payer: COMMERCIAL

## 2024-11-14 NOTE — TELEPHONE ENCOUNTER
Dr. Boggs, home care requesting orders. Unsure if you are able to sign because you will not be following patient for duration. Unsure if needing to consult with Dr. Armstrong who patient is scheduled to establish care with 01/2025.     Abad Ryan RN on 11/14/2024 at 12:41 PM      Home Care is calling regarding an established patient with [facility].    Requesting orders from: Soha Boggs  Provider is following patient: No       Orders Requested    Skilled Nursing  Request for initial certification (first set of orders)   Frequency:  1x/wk for 3 wks  then Every Other week x/wk for 6 wks      Physical Therapy  Request for initial evaluation and treatment (one time) (first set of orders)       Occupational Therapy  Request for initial evaluation and treatment (one time) (first set of orders)     Information was gathered and will be sent to provider to confirm provider will be following patient.  RN will contact Home Care with information after provider review.  Confirmed ok to leave a detailed message with call back.  Contact information confirmed and updated as needed.    Abad Ryan RN

## 2024-11-19 NOTE — TELEPHONE ENCOUNTER
I can approve current orders request and I agree with requested orders. Future orders will go to covering provider until established with Dr. Armstrong.   Soha Boggs MD

## 2024-11-19 NOTE — TELEPHONE ENCOUNTER
Writer spoke with DIAMOND Grace, November 19, 2024 at 8:13 AM gave verbal per Dr. Boggs approval for Home Care orders as requested.    Abad Ryan RN on 11/19/2024 at 8:13 AM

## 2024-11-22 ENCOUNTER — TELEPHONE (OUTPATIENT)
Dept: FAMILY MEDICINE | Facility: CLINIC | Age: 69
End: 2024-11-22
Payer: COMMERCIAL

## 2024-11-22 NOTE — TELEPHONE ENCOUNTER
Reason for Call:  Form, our goal is to have forms completed with 72 hours, however, some forms may require a visit or additional information.    Type of letter, form or note:  Home Health Certification    Who is the form from?: Home care    Where did the form come from: form was faxed in    What clinic location was the form placed at?: [facility]    Where the form was placed: Given to physician    What number is listed as a contact on the form?: [phone]       Additional comments: [facility]    Call taken on 11/22/2024 at 9:29 AM by Julia Jenkins

## 2024-11-27 ENCOUNTER — TELEPHONE (OUTPATIENT)
Dept: FAMILY MEDICINE | Facility: CLINIC | Age: 69
End: 2024-11-27
Payer: COMMERCIAL

## 2024-11-27 NOTE — TELEPHONE ENCOUNTER
Home Care is calling regarding an established patient with [facility].       Requesting orders from: Soha Boggs  Provider is following patient: Yes  Is this a 60-day recertification request?  No    Orders Requested    Occupational Therapy  Request for discontinuation of care     Would like okay to cancel evaluation for OT; RN homecare and PT don't feel she needs OT.     Information was gathered and will be sent to provider for review.  RN will contact Home Care with information after provider review.  Confirmed ok to leave a detailed message with call back.  Contact information confirmed and updated as needed.    Jenny Harrell, RN

## 2024-11-27 NOTE — TELEPHONE ENCOUNTER
OK to discontinue OT if her PT and home care and patient all feel she doesn't need it.   Soha Boggs MD

## 2024-11-27 NOTE — TELEPHONE ENCOUNTER
Writer spoke with ILDA Awad November 27, 2024 at 2:17 PM gave verbal per Dr. Boggs's approval for Home Care orders as requested.    Sarah Maravilla RN on 11/27/2024 at 2:17 PM

## 2024-12-02 ENCOUNTER — PATIENT OUTREACH (OUTPATIENT)
Dept: CARE COORDINATION | Facility: CLINIC | Age: 69
End: 2024-12-02
Payer: COMMERCIAL

## 2024-12-02 NOTE — LETTER
[facility] CARE COORDINATION        December 2, 2024      Pavithra Raines  [address]  [place] [address]        Dear Shanika,                                                                      The Financial Resource team has attempted to reach to you to assist you with any financial barriers you may be facing in your healthcare journey.  We would like to provide any additional support you may need related to financial support for your healthcare.  Our team are Certified MNSure Navigators, and we offer support with application assistance for Medical Assistance, MNSure Applications, Energy Assistance, Emergency Assistance, Food Assistance and many other initial applications for [facility] benefits.     I would appreciate if you would call me at [phone] to let me know if you would like assistance.      Sincerely,                                                                          Leona Negrete  Financial Resource Worker  [facility] Care Coordination  [phone]

## 2024-12-02 NOTE — TELEPHONE ENCOUNTER
[facility] update:    12/2/24- Established outreach attempted x 2. Couldn't leave a vm as it's set up   Plan: FRW closed the FRW program, FAM Case, FAM Tracker and will send an unreachable letter. Patient can be referred back to [facility] if needed.
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
## 2024-12-04 ENCOUNTER — HOSPITAL ENCOUNTER (OUTPATIENT)
Facility: CLINIC | Age: 69
Discharge: HOME | End: 2024-12-04
Attending: INTERNAL MEDICINE | Admitting: INTERNAL MEDICINE
Payer: COMMERCIAL

## 2024-12-04 ENCOUNTER — ANESTHESIA EVENT (OUTPATIENT)
Dept: GASTROENTEROLOGY | Facility: CLINIC | Age: 69
End: 2024-12-04
Payer: COMMERCIAL

## 2024-12-04 ENCOUNTER — ANESTHESIA (OUTPATIENT)
Dept: GASTROENTEROLOGY | Facility: CLINIC | Age: 69
End: 2024-12-04
Payer: COMMERCIAL

## 2024-12-04 VITALS
RESPIRATION RATE: 16 BRPM | HEART RATE: 75 BPM | DIASTOLIC BLOOD PRESSURE: 88 MMHG | OXYGEN SATURATION: 98 % | TEMPERATURE: 97.8 F | SYSTOLIC BLOOD PRESSURE: 132 MMHG

## 2024-12-04 PROBLEM — D12.6 ADENOMATOUS POLYP OF COLON: Status: ACTIVE | Noted: 2024-12-04

## 2024-12-04 LAB
COLONOSCOPY: NORMAL
UPPER GI ENDOSCOPY: NORMAL

## 2024-12-04 PROCEDURE — 250N000011 HC RX IP 250 OP 636: Performed by: NURSE ANESTHETIST, CERTIFIED REGISTERED

## 2024-12-04 PROCEDURE — 258N000003 HC RX IP 258 OP 636: Performed by: NURSE ANESTHETIST, CERTIFIED REGISTERED

## 2024-12-04 PROCEDURE — 250N000009 HC RX 250: Performed by: NURSE ANESTHETIST, CERTIFIED REGISTERED

## 2024-12-04 PROCEDURE — 88305 TISSUE EXAM BY PATHOLOGIST: CPT | Mod: TC | Performed by: INTERNAL MEDICINE

## 2024-12-04 PROCEDURE — 43239 EGD BIOPSY SINGLE/MULTIPLE: CPT | Performed by: INTERNAL MEDICINE

## 2024-12-04 PROCEDURE — 45378 DIAGNOSTIC COLONOSCOPY: CPT | Performed by: INTERNAL MEDICINE

## 2024-12-04 PROCEDURE — 370N000017 HC ANESTHESIA TECHNICAL FEE, PER MIN: Performed by: INTERNAL MEDICINE

## 2024-12-04 RX ORDER — PROPOFOL 10 MG/ML
INJECTION, EMULSION INTRAVENOUS PRN
Status: DISCONTINUED | OUTPATIENT
Start: 2024-12-04 | End: 2024-12-04

## 2024-12-04 RX ORDER — LIDOCAINE HYDROCHLORIDE 20 MG/ML
INJECTION, SOLUTION INFILTRATION; PERINEURAL PRN
Status: DISCONTINUED | OUTPATIENT
Start: 2024-12-04 | End: 2024-12-04

## 2024-12-04 RX ORDER — OXYCODONE HYDROCHLORIDE 5 MG/1
10 TABLET ORAL
Status: DISCONTINUED | OUTPATIENT
Start: 2024-12-04 | End: 2024-12-04 | Stop reason: HOSPADM

## 2024-12-04 RX ORDER — ONDANSETRON 2 MG/ML
4 INJECTION INTRAMUSCULAR; INTRAVENOUS EVERY 30 MIN PRN
Status: DISCONTINUED | OUTPATIENT
Start: 2024-12-04 | End: 2024-12-04 | Stop reason: HOSPADM

## 2024-12-04 RX ORDER — PROPOFOL 10 MG/ML
INJECTION, EMULSION INTRAVENOUS CONTINUOUS PRN
Status: DISCONTINUED | OUTPATIENT
Start: 2024-12-04 | End: 2024-12-04

## 2024-12-04 RX ORDER — ONDANSETRON 4 MG/1
4 TABLET, ORALLY DISINTEGRATING ORAL EVERY 30 MIN PRN
Status: DISCONTINUED | OUTPATIENT
Start: 2024-12-04 | End: 2024-12-04 | Stop reason: HOSPADM

## 2024-12-04 RX ORDER — DEXAMETHASONE SODIUM PHOSPHATE 10 MG/ML
4 INJECTION, SOLUTION INTRAMUSCULAR; INTRAVENOUS
Status: DISCONTINUED | OUTPATIENT
Start: 2024-12-04 | End: 2024-12-04 | Stop reason: HOSPADM

## 2024-12-04 RX ORDER — OXYCODONE HYDROCHLORIDE 5 MG/1
5 TABLET ORAL
Status: DISCONTINUED | OUTPATIENT
Start: 2024-12-04 | End: 2024-12-04 | Stop reason: HOSPADM

## 2024-12-04 RX ORDER — NALOXONE HYDROCHLORIDE 0.4 MG/ML
0.1 INJECTION, SOLUTION INTRAMUSCULAR; INTRAVENOUS; SUBCUTANEOUS
Status: DISCONTINUED | OUTPATIENT
Start: 2024-12-04 | End: 2024-12-04 | Stop reason: HOSPADM

## 2024-12-04 RX ADMIN — LIDOCAINE HYDROCHLORIDE 50 MG: 20 INJECTION, SOLUTION INFILTRATION; PERINEURAL at 13:53

## 2024-12-04 RX ADMIN — SODIUM CHLORIDE, POTASSIUM CHLORIDE, SODIUM LACTATE AND CALCIUM CHLORIDE 500 ML: 600; 310; 30; 20 INJECTION, SOLUTION INTRAVENOUS at 15:04

## 2024-12-04 RX ADMIN — PROPOFOL 100 MG: 10 INJECTION, EMULSION INTRAVENOUS at 13:53

## 2024-12-04 RX ADMIN — PROPOFOL 200 MCG/KG/MIN: 10 INJECTION, EMULSION INTRAVENOUS at 13:53

## 2024-12-04 ASSESSMENT — ACTIVITIES OF DAILY LIVING (ADL)
ADLS_ACUITY_SCORE: 58

## 2024-12-04 ASSESSMENT — LIFESTYLE VARIABLES: TOBACCO_USE: 1

## 2024-12-04 ASSESSMENT — COPD QUESTIONNAIRES
CAT_SEVERITY: MODERATE
COPD: 1

## 2024-12-04 ASSESSMENT — ENCOUNTER SYMPTOMS: SEIZURES: 1

## 2024-12-04 NOTE — LETTER
December 9, 2024      Shanika Raines  [address]  [place] [address]        Dear ,    We are writing to inform you of your test results.    Your test results fall within the expected range(s) or remain unchanged from previous results.  Please continue with current treatment plan.    Resulted Orders   Surgical Pathology Exam   Result Value Ref Range    Case Report       Surgical Pathology Report                         Case: [id number]                                  Authorizing Provider:  Otoniel Lyn MD        Collected:           12/04/2024 01:55 PM          Ordering Location:     [facility]          Received:            12/04/2024 02:28 PM                                 [facility]                                                          Pathologist:           Feliciano Tolliver MD                                                                           Specimen:    Stomach, Body, gastric biopsy r/o H Pylori                                                 Final Diagnosis       Stomach, body, biopsy:  - Gastric body mucosa without diagnostic abnormality.  - Negative for Helicobacter pylori on H&E examination.       Clinical Information       Procedure:  Colonoscopy  ESOPHAGOGASTRODUODENOSCOPY, WITH BIOPSY      Gross Description       A(1). Stomach, Body, gastric biopsy r/o H Pylori:  The specimen is received in formalin, labeled with the patient's name, medical record number and other identifying information and designated  gastric biopsy rule out H. pylori . It consists of 3 tan soft tissue fragments ranging from 0.2-0.5 cm. Entirely submitted in one cassette.   (СВЕТЛАНА Childress (ASCP) 12/5/2024 7:50 AM       Microscopic Description       A microscopic examination is performed.       Performing Labs       The technical component of this testing was completed at [facility]  [facility].    Stain controls for all stains resulted within this report have been reviewed and show appropriate reactivity.       Case Images         If you have any questions or concerns, please call the clinic at the number listed above.       Sincerely,      Otoniel Lyn MD

## 2024-12-04 NOTE — ANESTHESIA PREPROCEDURE EVALUATION
Anesthesia Pre-Procedure Evaluation    Patient: Pavithra Raines   MRN: [id number] : 1955        Procedure : Procedure(s):  Colonoscopy  Esophagoscopy, gastroscopy, duodenoscopy (EGD), combined          Past Medical History:   Diagnosis Date    Abnormal Papanicolaou smear of cervix and cervical HPV 2007    Colposcopy = HSIL    Bacteremia due to Streptococcus pneumoniae 10/08/2023    COPD (chronic obstructive pulmonary disease) (H)     Genital herpes     History of colposcopy with cervical biopsy 2007    HSIL- LEEP recommended    Hypersomnia with sleep apnea, unspecified     CPAP started     Multifocal pneumonia 10/07/2023    Other and unspecified ovarian cyst  or so    s/p resection of ovary and tubes, d&c    Pneumonia 10/23/2023    Septic shock (H) 10/08/2023    Sleep apnea     Uncomplicated asthma       Past Surgical History:   Procedure Laterality Date    CHOLECYSTECTOMY, OPEN  age 20s    COLONOSCOPY  2011    COMBINED COLONOSCOPY, REMOVE TUMOR/POLYP/LESION BY SNARE performed by JUAN FRANCISCO HERNANDEZ at  GI    COLONOSCOPY N/A 10/09/2017    polyps, repeat 3 years    COLPOSCOPY CERVIX, LOOP ELECTRODE BIOPSY, COMBINED  2007    CAN 2/3-patient requires yearly pap smears    dental pegs      ESOPHAGOSCOPY, GASTROSCOPY, DUODENOSCOPY (EGD), COMBINED N/A 2018    Procedure: COMBINED ESOPHAGOSCOPY, GASTROSCOPY, DUODENOSCOPY (EGD);  EGD;  Surgeon: Oneal [place] MD;  Location:  GI    GASTRIC BYPASS  2008    At [facility]    HC DILATION/CURETTAGE DIAG/THER NON OB      HC ENLARGE BREAST WITH IMPLANT      HC LAPAROSCOPIC MYOMECTOMY, 1 - 4 INTRAMURAL MYOMAS =<250 GM      HC REMOVAL OF BREAST IMPLANT      LAPAROSCOPIC ASSISTED INSERTION TUBE GASTROTOMY N/A 2023    Procedure: EXPLORATORY LAPAROSCOPY WITH REPEAT GASTRORRHAPHY;  Surgeon: Antoni Gonzalez MD;  Location:  OR    LAPAROSCOPY DIAGNOSTIC (GENERAL) N/A 2023    Procedure: Diagnostic  laparoscopy, laparoscopic patching of marginal ulcer;  Surgeon: Antoni Gonzalez MD;  Location: [facility] OR    SALPINGO OOPHORECTOMY,R/L/MATTHEW      Bilateral salpingectomy and unilateral oophorectomy      Allergies   Allergen Reactions    Codeine Itching    Vicodin [Hydrocodone-Acetaminophen] Itching      Social History     Tobacco Use    Smoking status: Every Day     Current packs/day: 2.00     Average packs/day: 2.0 packs/day for 10.0 years (20.0 ttl pk-yrs)     Types: Cigarettes    Smokeless tobacco: Never    Tobacco comments:     2 ppd at this time (chain smoking) 10/2012   Substance Use Topics    Alcohol use: Yes     Comment: daily, drinks a box      Wt Readings from Last 1 Encounters:   24 48.4 kg (106 lb 9.6 oz)        Anesthesia Evaluation   Pt has had prior anesthetic.     No history of anesthetic complications       ROS/MED HX  ENT/Pulmonary:     (+) sleep apnea, doesn't use CPAP,              tobacco use, Past use,        moderate,  COPD,    recent URI, resolved,         Neurologic:     (+)       seizures,  features: WITHDRAWAL SEIZURE,                       Cardiovascular:     (+)  - -   -  - -                                pulmonary hypertension, Previous cardiac testing   Echo: Date: 10-9-23 Results:  Reading Physician Reading Date Result Priority  Moisés Garcia MD  [phone] 10/9/2023     Result Text  [id number]  [id number]  [id number]  [id number]     [facility]  Echocardiography Laboratory  [address] [place] [address]     Name: LUIS ALBERTO OAKLEY  MRN: [id number]  : 1955  Study Date: 10/09/2023 07:47 AM  Age: 68 yrs  Gender: Female  Patient Location: [facility]  Reason For Study: Syncope  Ordering Physician: IAN CHRISTIANSEN  Referring Physician: MIMI GARZA  Performed By: Marbella Dobbs RDCS     BSA: 1.5 m2  Height: 64 in  Weight: 107 lb  ______________________________________________________________________________  Procedure  Complete  Portable Echo Adult.  ______________________________________________________________________________  Interpretation Summary     The left ventricle is normal in size.  Left ventricular systolic function is normal. The visual ejection fraction is  55-60%.  No regional wall motion abnormalities noted.  The right ventricle is normal in structure, function and size.  There is mild (1+) tricuspid regurgitation.  Right ventricular systolic pressure is elevated, consistent with mild  pulmonary hypertension.  There is no comparison study available.  _________________      Stress Test:  Date: Results:    ECG Reviewed:  Date: 3-22-24 Results:  Sinus Rhythm   WITHIN NORMAL LIMITS  Cath:  Date: Results:   (-) pacemaker, stent, pacemaker and ICD   METS/Exercise Tolerance: 1 - Eating, dressing    Hematologic: Comments: Electrolyte abnormalities likely in the setting of malnutrition and ongoing alcoholism    (+)      anemia,          Musculoskeletal:  - neg musculoskeletal ROS     GI/Hepatic: Comment: S/p gastric bypass    MARGINAL ULCER STOMACH S/P OMENTAL PATCH    (+) GERD,       bowel prep,     liver disease,       Renal/Genitourinary:       Endo: Comment: HYPOGLYCEMIC ON THE FLOOR , TREATED WITH D50   (-) Type II DM and obesity   Psychiatric/Substance Use:     (+)   alcohol abuse  Recreational drug usage: Cannabis.    Infectious Disease:  - neg infectious disease ROS     Malignancy:  - neg malignancy ROS     Other:  - neg other ROS          Physical Exam    Airway        Mallampati: II   TM distance: > 3 FB   Neck ROM: full   Mouth opening: > 3 cm    Respiratory Devices and Support         Dental       (+) Edentulous      Cardiovascular   cardiovascular exam normal       Rhythm and rate: regular and normal     Pulmonary   pulmonary exam normal        breath sounds clear to auscultation           OUTSIDE LABS:  CBC:   Lab Results   Component Value Date    WBC 9.2 09/27/2024    WBC 9.4 09/25/2024    HGB 12.8 09/27/2024    HGB  13.0 09/25/2024    HCT 36.4 09/27/2024    HCT 36.1 09/25/2024     09/27/2024     09/25/2024     BMP:   Lab Results   Component Value Date     09/27/2024     09/25/2024    POTASSIUM 3.4 09/27/2024    POTASSIUM 3.1 (L) 09/25/2024    CHLORIDE 103 09/27/2024    CHLORIDE 100 09/25/2024    CO2 21 (L) 09/27/2024    CO2 19 (L) 09/25/2024    BUN 5.5 (L) 09/27/2024    BUN 9.3 09/25/2024    CR 0.79 09/27/2024    CR 0.83 09/25/2024    GLC 92 09/27/2024     (H) 09/25/2024     COAGS:   Lab Results   Component Value Date    PTT 28 12/11/2023    INR 1.11 12/11/2023     POC:   Lab Results   Component Value Date    BGM 91 10/16/2017    HCG Negative 04/17/2013     HEPATIC:   Lab Results   Component Value Date    ALBUMIN 3.2 (L) 09/27/2024    PROTTOTAL 6.9 09/27/2024    ALT 10 09/27/2024    AST 26 09/27/2024    ALKPHOS 146 09/27/2024    BILITOTAL 0.8 09/27/2024    LIZZY 17 09/25/2024     OTHER:   Lab Results   Component Value Date    PH 7.55 (H) 10/17/2023    LACT 1.2 09/27/2024    A1C 4.4 10/09/2023    SHON 8.6 (L) 09/27/2024    PHOS 3.1 12/21/2023    MAG 1.7 09/25/2024    LIPASE 19 09/27/2024    AMYLASE 60 09/01/2021    TSH 0.64 10/08/2023    CRP <2.9 03/01/2020    SED 45 (H) 12/18/2017       Anesthesia Plan    ASA Status:  3    NPO Status:  NPO Appropriate    Anesthesia Type: MAC.     - Reason for MAC: straight local not clinically adequate, chronic cardiopulmonary disease   Induction: Intravenous, Propofol.   Maintenance: TIVA.        Consents    Anesthesia Plan(s) and associated risks, benefits, and realistic alternatives discussed. Questions answered and patient/representative(s) expressed understanding.     - Discussed:     - Discussed with:  Patient      - Extended Intubation/Ventilatory Support Discussed: No.      - Patient is DNR/DNI Status: No     Use of blood products discussed: No .     Postoperative Care       PONV prophylaxis: Background Propofol Infusion     Comments:    Other Comments:  The risks and benefits of anesthesia, and the alternatives where applicable, have been discussed with the patient, and they wish to proceed.              RICARDO Rodríguez CRNA    I have reviewed the pertinent notes and labs in the chart from the past 30 days and (re)examined the patient.  Any updates or changes from those notes are reflected in this note.                             # Financial/Environmental Concerns:

## 2024-12-04 NOTE — ANESTHESIA CARE TRANSFER NOTE
Patient: Pavithra Raines    Procedure: Procedure(s):  Colonoscopy  ESOPHAGOGASTRODUODENOSCOPY, WITH BIOPSY       Diagnosis: Screen for colon cancer [Z12.11]  Epigastric pain [R10.13]  Acute alcoholic gastritis without hemorrhage [K29.20]  Gastroesophageal reflux disease with esophagitis, unspecified whether hemorrhage [K21.00]  Diagnosis Additional Information: No value filed.    Anesthesia Type:   MAC     Note:    Oropharynx: oropharynx clear of all foreign objects  Level of Consciousness: drowsy  Oxygen Supplementation: room air    Independent Airway: airway patency satisfactory and stable  Dentition: dentition unchanged  Vital Signs Stable: post-procedure vital signs reviewed and stable  Report to RN Given: handoff report given  Patient transferred to: Phase II    Handoff Report: Identifed the Patient, Identified the Reponsible Provider, Reviewed the pertinent medical history, Discussed the surgical course, Reviewed Intra-OP anesthesia mangement and issues during anesthesia, Set expectations for post-procedure period and Allowed opportunity for questions and acknowledgement of understanding      Vitals:  Vitals Value Taken Time   BP 77/58 12/04/24 1426   Temp     Pulse 80 12/04/24 1426   Resp     SpO2 97 % 12/04/24 1427   Vitals shown include unfiled device data.    Electronically Signed By: RICARDO Caballero CRNA  December 4, 2024  2:28 PM

## 2024-12-04 NOTE — ANESTHESIA POSTPROCEDURE EVALUATION
Patient: Pavithra Raines    Procedure: Procedure(s):  Colonoscopy  ESOPHAGOGASTRODUODENOSCOPY, WITH BIOPSY       Anesthesia Type:  MAC    Note:  Disposition: Outpatient   Postop Pain Control: Uneventful            Sign Out: Well controlled pain   PONV: No   Neuro/Psych: Uneventful            Sign Out: Acceptable/Baseline neuro status   Airway/Respiratory: Uneventful            Sign Out: Acceptable/Baseline resp. status   CV/Hemodynamics: Uneventful            Sign Out: Acceptable CV status   Other NRE: NONE   DID A NON-ROUTINE EVENT OCCUR? No    Event details/Postop Comments:  Pt was happy with anesthesia care.  No complications.  I will follow up with the pt if needed.           Last vitals:  Vitals Value Taken Time   /88 12/04/24 1510   Temp     Pulse 75 12/04/24 1510   Resp 16 12/04/24 1450   SpO2 98 % 12/04/24 1510       Electronically Signed By: RICARDO Harris CRNA  December 4, 2024  3:36 PM

## 2024-12-04 NOTE — DISCHARGE INSTRUCTIONS
[facility]    Home Care Following Endoscopy          Activity:  You have just undergone an endoscopic procedure usually performed with conscious sedation.  Do not work or operate machinery (including a car) for at least 12 hours.    I encourage you to walk and attempt to pass this air as soon as possible.    Diet:  Return to the diet you were on before your procedure but eat lightly for the first 12-24 hours.  Drink plenty of water.  Resume any regular medications unless otherwise advised by your physician.  Please begin any new medication prescribed as a result of your procedure as directed by your physician.   If you had any biopsy or polyp removed please refrain from aspirin or aspirin products for 2 days.  If on Coumadin please restart as instructed by your physician.   Pain:  You may take Tylenol as needed for pain.  Expected Recovery:  You can expect some mild abdominal fullness and/or discomfort due to the air used to inflate your intestinal tract. It is also normal to have a mild sore throat after upper endoscopy.    Call Your Physician if You Have:  After Upper Endoscopy:  Shoulder, back or chest pain.  Difficulty breathing or swallowing.  Vomiting blood.  After Colonoscopy:  Worsening persisting abdominal pain which is worse with activity.  Fevers (>101 degrees F), chills or shakes.  Passage of continued blood with bowel movements.     Any questions or concerns about your recovery, please call [phone] or after hours [phone] ([phone]) Nurse Advice Line.    Follow-up Care:  IF you did have polyps/biopsy tissue sample(s) removed.  The polyps/biopsy tissue sample(s) will be sent to pathology.    You should receive letter in your My Chart from MD Riki with your results within 1-2 weeks. If you do not participate in My Chart a physical letter will come in the mail in 2-3 weeks.  Please call if you have not received a notification of your results.  If asked to return to clinic  please make an appointment 1 week after your procedure.  Call [phone].

## 2024-12-06 LAB
PATH REPORT.COMMENTS IMP SPEC: NORMAL
PATH REPORT.COMMENTS IMP SPEC: NORMAL
PATH REPORT.FINAL DX SPEC: NORMAL
PATH REPORT.GROSS SPEC: NORMAL
PATH REPORT.MICROSCOPIC SPEC OTHER STN: NORMAL
PATH REPORT.RELEVANT HX SPEC: NORMAL
PHOTO IMAGE: NORMAL

## 2024-12-06 PROCEDURE — 88305 TISSUE EXAM BY PATHOLOGIST: CPT | Mod: 26 | Performed by: PATHOLOGY

## 2024-12-12 DIAGNOSIS — M54.50 CHRONIC LOW BACK PAIN, UNSPECIFIED BACK PAIN LATERALITY, UNSPECIFIED WHETHER SCIATICA PRESENT: Primary | ICD-10-CM

## 2024-12-12 DIAGNOSIS — G89.29 CHRONIC LOW BACK PAIN, UNSPECIFIED BACK PAIN LATERALITY, UNSPECIFIED WHETHER SCIATICA PRESENT: Primary | ICD-10-CM

## 2024-12-15 RX ORDER — CYCLOBENZAPRINE HCL 10 MG
TABLET ORAL
Qty: 60 TABLET | Refills: 0 | Status: SHIPPED | OUTPATIENT
Start: 2024-12-15

## 2024-12-19 ENCOUNTER — PATIENT OUTREACH (OUTPATIENT)
Dept: CARE COORDINATION | Facility: CLINIC | Age: 69
End: 2024-12-19
Payer: COMMERCIAL

## 2024-12-19 DIAGNOSIS — Z71.89 OTHER SPECIFIED COUNSELING: Primary | Chronic | ICD-10-CM

## 2024-12-19 NOTE — PROGRESS NOTES
Clinic Care Coordination Contact  Community Health Worker Follow Up    Care Gaps:     Health Maintenance Due   Topic Date Due    COPD ACTION PLAN  Never done    LUNG CANCER SCREENING  10/07/2024    NICOTINE/TOBACCO CESSATION COUNSELING Q 1 YR  01/03/2025       Not discussed due to scheduling a Re-assessment    Care Plan:   Care Plan: Financial Wellbeing       Problem: Patient expresses financial resource strain       Goal: Create an action plan to increase financial stability       Start Date: 5/25/2023 Expected End Date: 3/27/2025    This Visit's Progress: 50% Recent Progress: 50%    Note:     Barriers: limited income, miss deadlines  Strengths: understanding the need  Patient expressed understanding of goal: yes  Action steps to achieve this goal:  1. I will answer the Financial resource worker call and work with them to apply for insurance   2. I will reach out to resources sent  3. I will reach out to Jenny as needs arise between scheduled calls.     12/19 - Re-Assessment scheduled for 12/20/24                              Intervention and Education during outreach: Wants to discuss with the SW about, memory concerns, transportation, medical bills and financial concerns. The CHW did place a referral to the FRW regarding the medical bills at this time.    CHW Plan: Follow up in one month    MIESHA Helton  [phone]  Connected Care Resource [facility]

## 2024-12-23 ENCOUNTER — TELEPHONE (OUTPATIENT)
Dept: FAMILY MEDICINE | Facility: CLINIC | Age: 69
End: 2024-12-23
Payer: COMMERCIAL

## 2024-12-23 DIAGNOSIS — R53.81 DEBILITY: ICD-10-CM

## 2024-12-23 DIAGNOSIS — M62.81 HAND MUSCLE WEAKNESS: ICD-10-CM

## 2024-12-23 DIAGNOSIS — R27.9 DISCOORDINATION: Primary | ICD-10-CM

## 2024-12-23 NOTE — TELEPHONE ENCOUNTER
Order/Referral Request    Who is requesting: Patient     Orders being requested: for occupational therapy     Reason service is needed/diagnosis: to get better mentally ??     When are orders needed by: asap    Has this been discussed with Provider: No    Does patient have a preference on a Group/Provider/Facility? Pt states they come to her house     Does patient have an appointment scheduled?: Yes: pt was a pt of 's has an appt with  on 1/15/2025 pt states there is a time limit on this and can not wait until 1/15/2025     Where to send orders: Place orders within Epic ????     Could we send this information to you in [employer] or would you prefer to receive a phone call?:   No preference   Okay to leave a detailed message?: Yes at Home number on file [phone] (home)

## 2024-12-26 NOTE — TELEPHONE ENCOUNTER
We can review some initial memory testing and possible labs at her appointment with Dr. Armstrong.  Physical therapy can help with conditioning and her upper body.  Can you ask her where the time sensitivity plays role in waiting until she meets with Dr. Armstrong.  Cary Nunn, CNP

## 2024-12-26 NOTE — TELEPHONE ENCOUNTER
Patient states that she has trouble remembering things.  She has trouble remembering people.    She states she also has upper body weakness. She states they are working on her lower body.    She feels like she is getting more tired lately.    She states she would like to get brain exercises to help her remember.    She would also like upper body exercises to help strengthen her upper body- she did injure her rotator cuffs on both side during a fall from summer.    Liz Soto RN on 12/26/2024 at 12:19 PM

## 2024-12-27 NOTE — TELEPHONE ENCOUNTER
Writer returned call. She says she is having a hard time remembering things. She says she is dropping things and does not have hand/arm strength. She is receiving PT home care services, but in encounter dated 11/27, they declined OT eval as Home care did not feel warranted.     She is buying can openers because she thinks the can opener is broken, but then she realizes her hands don't have the strength.     She has flooded the bathroom because she hasn't closed tub door tightly.    Patient does live alone, has friend that is coming over and keeping eye on patient.      Regarding urgency, patient thinks that PT is going to discharge her soon and she won't have any services at home anymore. She wants order for OT before she is dropped.     Abad Ryan RN on 12/27/2024 at 10:51 AM

## 2024-12-29 NOTE — TELEPHONE ENCOUNTER
Pt with F2F visit 11/13/24 I have no personally seen pt. Order placed. Has follow up with me on 1/15/25

## 2024-12-30 NOTE — TELEPHONE ENCOUNTER
Per chart Dr Armstrong placed home care referral that went to pt's current home care agency requesting additional supports/assessments.  At this time there is nothing for care coordination to do and will follow up as planned.      ANUP Walker   [facility] - Care Coordination  [facility]   [phone]

## 2025-01-06 ENCOUNTER — TELEPHONE (OUTPATIENT)
Dept: FAMILY MEDICINE | Facility: CLINIC | Age: 70
End: 2025-01-06
Payer: COMMERCIAL

## 2025-01-06 NOTE — TELEPHONE ENCOUNTER
FYI - Status Update    Who is Calling: patient    Update:  pt will want to  two copy of the letter on 9/30/2024 by Soha Boggs MD  for her service animal.     Does caller want a call/response back: No pt is on her way state its important need it by today. Coming to the .

## 2025-01-15 ENCOUNTER — OFFICE VISIT (OUTPATIENT)
Dept: FAMILY MEDICINE | Facility: CLINIC | Age: 70
End: 2025-01-15
Payer: COMMERCIAL

## 2025-01-15 VITALS
SYSTOLIC BLOOD PRESSURE: 112 MMHG | TEMPERATURE: 97.6 F | HEIGHT: 63 IN | BODY MASS INDEX: 19.6 KG/M2 | OXYGEN SATURATION: 100 % | WEIGHT: 110.6 LBS | DIASTOLIC BLOOD PRESSURE: 76 MMHG | RESPIRATION RATE: 18 BRPM | HEART RATE: 87 BPM

## 2025-01-15 DIAGNOSIS — R53.81 DEBILITY: Chronic | ICD-10-CM

## 2025-01-15 DIAGNOSIS — I27.20 PULMONARY HYPERTENSION (H): ICD-10-CM

## 2025-01-15 DIAGNOSIS — B19.20 HEPATITIS C VIRUS INFECTION WITHOUT HEPATIC COMA, UNSPECIFIED CHRONICITY: ICD-10-CM

## 2025-01-15 DIAGNOSIS — Z13.1 SCREENING FOR DIABETES MELLITUS: ICD-10-CM

## 2025-01-15 DIAGNOSIS — R27.9 DISCOORDINATION: ICD-10-CM

## 2025-01-15 DIAGNOSIS — J43.9 PULMONARY EMPHYSEMA, UNSPECIFIED EMPHYSEMA TYPE (H): ICD-10-CM

## 2025-01-15 DIAGNOSIS — F17.200 NICOTINE DEPENDENCE, UNCOMPLICATED, UNSPECIFIED NICOTINE PRODUCT TYPE: Primary | ICD-10-CM

## 2025-01-15 DIAGNOSIS — Z87.891 PERSONAL HISTORY OF NICOTINE DEPENDENCE: ICD-10-CM

## 2025-01-15 DIAGNOSIS — M62.81 HAND MUSCLE WEAKNESS: ICD-10-CM

## 2025-01-15 DIAGNOSIS — F10.220 ALCOHOL DEPENDENCE WITH UNCOMPLICATED INTOXICATION (H): ICD-10-CM

## 2025-01-15 DIAGNOSIS — Z11.59 ENCOUNTER FOR SCREENING FOR OTHER VIRAL DISEASES: ICD-10-CM

## 2025-01-15 DIAGNOSIS — F33.9 EPISODE OF RECURRENT MAJOR DEPRESSIVE DISORDER, UNSPECIFIED DEPRESSION EPISODE SEVERITY: ICD-10-CM

## 2025-01-15 LAB
ALBUMIN SERPL BCG-MCNC: 4 G/DL (ref 3.5–5.2)
ALP SERPL-CCNC: 133 U/L (ref 40–150)
ALT SERPL W P-5'-P-CCNC: 10 U/L (ref 0–50)
ANION GAP SERPL CALCULATED.3IONS-SCNC: 12 MMOL/L (ref 7–15)
AST SERPL W P-5'-P-CCNC: 19 U/L (ref 0–45)
BILIRUB SERPL-MCNC: 0.2 MG/DL
BUN SERPL-MCNC: 6.7 MG/DL (ref 8–23)
CALCIUM SERPL-MCNC: 9 MG/DL (ref 8.8–10.4)
CHLORIDE SERPL-SCNC: 106 MMOL/L (ref 98–107)
CREAT SERPL-MCNC: 0.82 MG/DL (ref 0.51–0.95)
EGFRCR SERPLBLD CKD-EPI 2021: 78 ML/MIN/1.73M2
ERYTHROCYTE [DISTWIDTH] IN BLOOD BY AUTOMATED COUNT: 13.4 % (ref 10–15)
EST. AVERAGE GLUCOSE BLD GHB EST-MCNC: 100 MG/DL
GLUCOSE SERPL-MCNC: 90 MG/DL (ref 70–99)
HBA1C MFR BLD: 5.1 %
HCO3 SERPL-SCNC: 21 MMOL/L (ref 22–29)
HCT VFR BLD AUTO: 38 % (ref 35–47)
HGB BLD-MCNC: 12.6 G/DL (ref 11.7–15.7)
MAGNESIUM SERPL-MCNC: 1.7 MG/DL (ref 1.7–2.3)
MCH RBC QN AUTO: 29.6 PG (ref 26.5–33)
MCHC RBC AUTO-ENTMCNC: 33.2 G/DL (ref 31.5–36.5)
MCV RBC AUTO: 89 FL (ref 78–100)
PLATELET # BLD AUTO: 352 10E3/UL (ref 150–450)
POTASSIUM SERPL-SCNC: 3.6 MMOL/L (ref 3.4–5.3)
PROT SERPL-MCNC: 7.6 G/DL (ref 6.4–8.3)
RBC # BLD AUTO: 4.25 10E6/UL (ref 3.8–5.2)
SODIUM SERPL-SCNC: 139 MMOL/L (ref 135–145)
WBC # BLD AUTO: 10.1 10E3/UL (ref 4–11)

## 2025-01-15 PROCEDURE — 83735 ASSAY OF MAGNESIUM: CPT | Performed by: FAMILY MEDICINE

## 2025-01-15 PROCEDURE — 80053 COMPREHEN METABOLIC PANEL: CPT | Performed by: FAMILY MEDICINE

## 2025-01-15 PROCEDURE — 85027 COMPLETE CBC AUTOMATED: CPT | Performed by: FAMILY MEDICINE

## 2025-01-15 PROCEDURE — 86803 HEPATITIS C AB TEST: CPT | Mod: GZ | Performed by: FAMILY MEDICINE

## 2025-01-15 PROCEDURE — 83036 HEMOGLOBIN GLYCOSYLATED A1C: CPT | Performed by: FAMILY MEDICINE

## 2025-01-15 PROCEDURE — 36415 COLL VENOUS BLD VENIPUNCTURE: CPT | Performed by: FAMILY MEDICINE

## 2025-01-15 PROCEDURE — 80074 ACUTE HEPATITIS PANEL: CPT | Mod: GZ | Performed by: FAMILY MEDICINE

## 2025-01-15 ASSESSMENT — ANXIETY QUESTIONNAIRES
GAD7 TOTAL SCORE: 16
3. WORRYING TOO MUCH ABOUT DIFFERENT THINGS: NEARLY EVERY DAY
7. FEELING AFRAID AS IF SOMETHING AWFUL MIGHT HAPPEN: MORE THAN HALF THE DAYS
GAD7 TOTAL SCORE: 16
7. FEELING AFRAID AS IF SOMETHING AWFUL MIGHT HAPPEN: MORE THAN HALF THE DAYS
8. IF YOU CHECKED OFF ANY PROBLEMS, HOW DIFFICULT HAVE THESE MADE IT FOR YOU TO DO YOUR WORK, TAKE CARE OF THINGS AT HOME, OR GET ALONG WITH OTHER PEOPLE?: SOMEWHAT DIFFICULT
GAD7 TOTAL SCORE: 16
2. NOT BEING ABLE TO STOP OR CONTROL WORRYING: NEARLY EVERY DAY
5. BEING SO RESTLESS THAT IT IS HARD TO SIT STILL: SEVERAL DAYS
4. TROUBLE RELAXING: NEARLY EVERY DAY
IF YOU CHECKED OFF ANY PROBLEMS ON THIS QUESTIONNAIRE, HOW DIFFICULT HAVE THESE PROBLEMS MADE IT FOR YOU TO DO YOUR WORK, TAKE CARE OF THINGS AT HOME, OR GET ALONG WITH OTHER PEOPLE: SOMEWHAT DIFFICULT
1. FEELING NERVOUS, ANXIOUS, OR ON EDGE: NEARLY EVERY DAY
6. BECOMING EASILY ANNOYED OR IRRITABLE: SEVERAL DAYS

## 2025-01-15 ASSESSMENT — PATIENT HEALTH QUESTIONNAIRE - PHQ9
10. IF YOU CHECKED OFF ANY PROBLEMS, HOW DIFFICULT HAVE THESE PROBLEMS MADE IT FOR YOU TO DO YOUR WORK, TAKE CARE OF THINGS AT HOME, OR GET ALONG WITH OTHER PEOPLE: SOMEWHAT DIFFICULT
SUM OF ALL RESPONSES TO PHQ QUESTIONS 1-9: 10
SUM OF ALL RESPONSES TO PHQ QUESTIONS 1-9: 10

## 2025-01-15 ASSESSMENT — PAIN SCALES - GENERAL: PAINLEVEL_OUTOF10: NO PAIN (0)

## 2025-01-15 NOTE — PROGRESS NOTES
Assessment & Plan     (F17.200) Nicotine dependence, uncomplicated, unspecified nicotine product type  (primary encounter diagnosis)  Comment:    Signs reviewed.  Patient is qualifying for lung cancer screening with low-dose CT.  These orders have been placed.  She was interested in nicotine cessation products.  Initially set with lozenges/gum however reports prefer patches given dental issues.  Plan: [place] Quit Partner Referral, nicotine polacrilex         (NICORETTE) 4 MG gum, SMOKING CESSATION         COUNSELING >10 MIN, CT Chest Lung Cancer Screen        Low Dose Without    (I27.20) Pulmonary hypertension (H)  Comment: Patient asymptomatic at this juncture with stable O2.  Will continue to monitor.    (J43.9) Pulmonary emphysema, unspecified emphysema type (H)  Comment: Related to longstanding smoking.  Clear to auscultation bilaterally today.    (F10.220) Alcohol dependence with uncomplicated intoxication (H)  Comment: Reports alcohol cessation for the last 4 to 5 months.  Cannot rule the last time that she drank.  Labs have seemed to be improving overall.  Previously had macrocytosis now improved with B12 injections.  Will continue to monitor.  Plan: CBC with platelets, Comprehensive metabolic         panel (BMP + Alb, Alk Phos, ALT, AST, Total.         Bili, TP), Acute hepatitis panel, Hepatitis C         antibody, Adult Mental Health          Referral    (Z87.891) Personal history of nicotine dependence  Plan: CT Chest Lung Cancer Screen Low Dose Without    (Z13.1) Screening for diabetes mellitus  Comment: Normal labs  Plan: Hemoglobin A1c    (M62.81) Hand muscle weakness  Comment: OT referral has been done.  Please see prior encounters for orders.  Will continue to monitor    (R53.81) Debility  Comment: Needs continued physical therapy.  Will continue to monitor  Plan: Magnesium    (R27.9) Discoordination  Comment: Needs continued physical therapy and home assessment for safety.    (Z11.59)  Encounter for screening for other viral diseases  Plan: Acute hepatitis panel    (F33.9) Episode of recurrent major depressive disorder, unspecified depression episode severity  Comment: Patient will need med review.  Is currently on an SNRI as well as an SSRI as well as buspirone and a benzodiazepine.  Given her history this level of polypharmacy would not be recommended.  She is having memory lapses and difficulties with med administration.  Will need a comprehensive med review  Plan: Adult Mental Health  Referral    (B19.20) Hepatitis C virus infection without hepatic coma, unspecified chronicity  Comment: Viral load is pending         The longitudinal plan of care for the diagnosis(es)/condition(s) as documented were addressed during this visit. Due to the added complexity in care, I will continue to support Shanika in the subsequent management and with ongoing continuity of care.      Nicotine/Tobacco Cessation  She reports that she has been smoking cigarettes. She has a 20 pack-year smoking history. She has never used smokeless tobacco.  Nicotine/Tobacco Cessation Plan  Phone counseling: Place order for Quit Partner Referral        Work on PT/OT needs and will need to follow up for med management.     Subjective   Shanika is a 69 year old, presenting for the following health issues:  Establish Care and MH Follow Up      1/15/2025     4:08 PM   Additional Questions   Roomed by Trang TORREZ LPN     History of Present Illness       Mental Health Follow-up:  Patient presents to follow-up on Depression & Anxiety.Patient's depression since last visit has been:  Better  The patient is not having other symptoms associated with depression.  Patient's anxiety since last visit has been:  Better  The patient is not having other symptoms associated with anxiety.  Any significant life events: relationship concerns, job concerns, financial concerns and health concerns  Patient is feeling anxious or having panic  "attacks.  Patient has no concerns about alcohol or drug use.    Reason for visit:  Unknown    She eats 0-1 servings of fruits and vegetables daily.She consumes 2 sweetened beverage(s) daily.She exercises with enough effort to increase her heart rate 9 or less minutes per day.  She exercises with enough effort to increase her heart rate 4 days per week. She is missing 2 dose(s) of medications per week.  She is not taking prescribed medications regularly due to remembering to take and other.     Patient is a 69-year-old female with a history of alcohol use disorder currently in remission with history of nicotine dependence and multiple years of smoking with pulmonary emphysema.  Most recently patient has debility and discoordination after traumatic injury to her face.  She is on multiple medications did not bring with her today.  She is here to establish care.        Objective    /76   Pulse 87   Temp 97.6  F (36.4  C) (Temporal)   Resp 18   Ht 1.6 m (5' 3\")   Wt 50.2 kg (110 lb 9.6 oz)   LMP  (LMP Unknown)   SpO2 100%   BMI 19.59 kg/m    Body mass index is 19.59 kg/m .  Physical Exam  Constitutional:       General: She is not in acute distress.     Appearance: She is not ill-appearing, toxic-appearing or diaphoretic.   HENT:      Head: Normocephalic.      Mouth/Throat:      Mouth: Mucous membranes are moist.   Eyes:      Pupils: Pupils are equal, round, and reactive to light.   Cardiovascular:      Rate and Rhythm: Normal rate and regular rhythm.      Pulses: Normal pulses.      Heart sounds: Murmur heard.   Pulmonary:      Effort: Pulmonary effort is normal. No respiratory distress.      Breath sounds: Normal breath sounds. No stridor. No wheezing, rhonchi or rales.   Abdominal:      General: Abdomen is flat.      Palpations: Abdomen is soft.   Skin:     General: Skin is warm and dry.      Capillary Refill: Capillary refill takes less than 2 seconds.   Neurological:      Mental Status: She is alert. " Mental status is at baseline.   Psychiatric:      Comments: Often tangential but redirectable.        Results for orders placed or performed in visit on 01/15/25   CBC with platelets     Status: Normal   Result Value Ref Range    WBC Count 10.1 4.0 - 11.0 10e3/uL    RBC Count 4.25 3.80 - 5.20 10e6/uL    Hemoglobin 12.6 11.7 - 15.7 g/dL    Hematocrit 38.0 35.0 - 47.0 %    MCV 89 78 - 100 fL    MCH 29.6 26.5 - 33.0 pg    MCHC 33.2 31.5 - 36.5 g/dL    RDW 13.4 10.0 - 15.0 %    Platelet Count 352 150 - 450 10e3/uL   Comprehensive metabolic panel (BMP + Alb, Alk Phos, ALT, AST, Total. Bili, TP)     Status: Abnormal   Result Value Ref Range    Sodium 139 135 - 145 mmol/L    Potassium 3.6 3.4 - 5.3 mmol/L    Carbon Dioxide (CO2) 21 (L) 22 - 29 mmol/L    Anion Gap 12 7 - 15 mmol/L    Urea Nitrogen 6.7 (L) 8.0 - 23.0 mg/dL    Creatinine 0.82 0.51 - 0.95 mg/dL    GFR Estimate 78 >60 mL/min/1.73m2    Calcium 9.0 8.8 - 10.4 mg/dL    Chloride 106 98 - 107 mmol/L    Glucose 90 70 - 99 mg/dL    Alkaline Phosphatase 133 40 - 150 U/L    AST 19 0 - 45 U/L    ALT 10 0 - 50 U/L    Protein Total 7.6 6.4 - 8.3 g/dL    Albumin 4.0 3.5 - 5.2 g/dL    Bilirubin Total 0.2 <=1.2 mg/dL   Hemoglobin A1c     Status: Normal   Result Value Ref Range    Estimated Average Glucose 100 <117 mg/dL    Hemoglobin A1C 5.1 <5.7 %   Magnesium     Status: Normal   Result Value Ref Range    Magnesium 1.7 1.7 - 2.3 mg/dL   Acute hepatitis panel     Status: Abnormal   Result Value Ref Range    Hepatitis A Antibody IgM Nonreactive Nonreactive    Hepatitis B Core Antibody IgM Nonreactive Nonreactive    Hepatitis C Antibody Reactive (A) Nonreactive    Hepatitis B Surface Antigen Nonreactive Nonreactive   Hepatitis C antibody     Status: Abnormal   Result Value Ref Range    Hepatitis C Antibody Reactive (A) Nonreactive             Signed Electronically by: Nikole Armstrong MD

## 2025-01-16 ENCOUNTER — TELEPHONE (OUTPATIENT)
Dept: FAMILY MEDICINE | Facility: CLINIC | Age: 70
End: 2025-01-16
Payer: COMMERCIAL

## 2025-01-16 DIAGNOSIS — F17.200 NICOTINE DEPENDENCE, UNCOMPLICATED, UNSPECIFIED NICOTINE PRODUCT TYPE: Primary | ICD-10-CM

## 2025-01-16 LAB
HAV IGM SERPL QL IA: NONREACTIVE
HBV CORE IGM SERPL QL IA: NONREACTIVE
HBV SURFACE AG SERPL QL IA: NONREACTIVE
HCV AB SERPL QL IA: REACTIVE
HCV AB SERPL QL IA: REACTIVE

## 2025-01-16 RX ORDER — NICOTINE 21 MG/24HR
1 PATCH, TRANSDERMAL 24 HOURS TRANSDERMAL EVERY 24 HOURS
Qty: 28 PATCH | Refills: 1 | Status: SHIPPED | OUTPATIENT
Start: 2025-01-16

## 2025-01-16 NOTE — TELEPHONE ENCOUNTER
Last seen 1/6/25    Patient called and stated MD ordered Nicorette gum for smoking cessation and patient does NOT have teeth.    Please change RX to appropriate smoking cessation med.     Aga Chatman RN  [facility] - Registered Nurse  Clinic Triage [place]   January 16, 2025

## 2025-01-17 ENCOUNTER — TELEPHONE (OUTPATIENT)
Dept: FAMILY MEDICINE | Facility: CLINIC | Age: 70
End: 2025-01-17

## 2025-01-24 ENCOUNTER — HOSPITAL ENCOUNTER (OUTPATIENT)
Dept: CT IMAGING | Facility: CLINIC | Age: 70
Discharge: HOME | End: 2025-01-24
Attending: FAMILY MEDICINE | Admitting: FAMILY MEDICINE
Payer: COMMERCIAL

## 2025-01-24 DIAGNOSIS — Z87.891 PERSONAL HISTORY OF NICOTINE DEPENDENCE: ICD-10-CM

## 2025-01-24 DIAGNOSIS — F17.200 NICOTINE DEPENDENCE, UNCOMPLICATED, UNSPECIFIED NICOTINE PRODUCT TYPE: ICD-10-CM

## 2025-01-24 PROCEDURE — 71271 CT THORAX LUNG CANCER SCR C-: CPT

## 2025-01-24 NOTE — PROGRESS NOTES
Appropriate assistive devices provided during their visit. no (Yes, No, N/A) na (list device) usually use cane she did not bring today    Exam table and/or cart  placed in the lowest position. yes (Yes, No, N/A)    Brakes on tables/carts/wheelchairs used at all times. yes (Yes, No, N/A)    Non slip footwear applied. na (Yes, No, NA)    Patient was accompanied by staff throughout visit. na (Yes, No, N/A)    Equipment safety straps used. na (Yes, No, N/A)    Assist with toileting. na (Yes, No, N/A)

## 2025-01-29 ENCOUNTER — PATIENT OUTREACH (OUTPATIENT)
Dept: CARE COORDINATION | Facility: CLINIC | Age: 70
End: 2025-01-29
Payer: COMMERCIAL

## 2025-01-29 NOTE — PROGRESS NOTES
Clinic Care Coordination Contact  [facility]/Voicemail    Clinical Data: Care Coordinator Outreach    Outreach Documentation Number of Outreach Attempt   1/17/2025   2:11 PM 1   1/29/2025   2:10 PM 2       Unable to leave a message due to: phone kept ringing and did not go to .      Plan: Care Coordinator will send unable to contact letter with care coordinator contact information via [employer]. Care Coordinator will try to reach patient again in 1 month.    Laurel Melton, MIESHA  [phone]  [facility]

## 2025-01-29 NOTE — LETTER
Dear Shanika,    I have been attempting to reach you since our last contact. I would like to continue to work with you and provide any additional support you may need on achieving your health care related goals. I would appreciate if you would give me a call at [phone] to let me know if you would like to continue working together. I know that there are many things that can affect our ability to communicate and I hope we can continue to work together.    We are focused on providing you with the highest-quality healthcare experience possible.    Sincerely,    MIESHA Dillon

## 2025-01-30 ENCOUNTER — PATIENT OUTREACH (OUTPATIENT)
Dept: CARE COORDINATION | Facility: CLINIC | Age: 70
End: 2025-01-30
Payer: COMMERCIAL

## 2025-01-30 ENCOUNTER — TELEPHONE (OUTPATIENT)
Dept: FAMILY MEDICINE | Facility: CLINIC | Age: 70
End: 2025-01-30
Payer: COMMERCIAL

## 2025-01-30 NOTE — TELEPHONE ENCOUNTER
Home Care is calling regarding an established patient with [facility].       Requesting orders from:  Nathaniel  Provider is following patient: Yes  Is this a 60-day recertification request?  No    Orders Requested    Physical Therapy  Request for initial evaluation and treatment (one time)       Verbal orders given.  Home Care will send orders for provider to sign.  Confirmed ok to leave a detailed message with call back.  Contact information confirmed and updated as needed.    Liz Soto RN

## 2025-01-30 NOTE — PROGRESS NOTES
Clinic Care Coordination Contact  Care Coordination Clinician Chart Review    Situation: Patient chart reviewed by Care Coordinator.       Background: Care Coordination Program started: 4/10/2023. Initial assessment completed and patient-centered care plan(s) were developed with participation from patient. Lead CC handed patient off to CHW for continued outreaches.       Assessment: Per chart review, patient outreach completed by CC CHW on 1/29/25, this was the second attempt and an [facility] letter was sent.  Patient is potentially actively working to accomplish goal(s). Patient's goal(s) appropriate and relevant at this time. Patient is not due for updated Plan of Care.  Assessments will be completed annually or as needed/with change of patient status.      Care Plan: Financial Wellbeing       Problem: Patient expresses financial resource strain       Long-Range Goal: Create an action plan to increase financial stability       Start Date: 5/25/2023 Expected End Date: 12/20/2025    Recent Progress: 50%    Priority: High    Note:     Barriers: limited income, miss deadlines  Strengths: understanding the need  Patient expressed understanding of goal: yes  Action steps to achieve this goal:  1. I will answer the Financial resource worker call and work with them to apply for insurance   2. I will reach out to resources sent  3. I will complete application for Medical assistance and other programs with the [facility]  4. I will reach out to MIESHA Pereira at [phone] or LALO Alvarado at [phone] as needs arise between scheduled calls.                                 Care Plan: General       Problem: HP GENERAL PROBLEM       Long-Range Goal: I want to improve my daily self care skills       Start Date: 12/20/2024 Expected End Date: 12/20/2025    Priority: Medium    Note:     Barriers: weakness, memory concerns  Strengths: willing to work on this  Patient expressed understanding of goal: yes  Action steps to achieve this  goal:  1. I will track my showers on my calendar so I know the last time I took one and schedule next one  2. I will put my paperwork to be completed in one spot that I see daily so I know what needs to be done  3. I will call transportation [place] by calling a few days in advance of transportation need  4. I will check my calendar for appointments that are in the next few days to verify I have set up transportation  5. I will have a list of things I struggle with to review with therapy  6. I will identify needs and write them down to work on plans to be safe                                      Plan/Recommendations: The patient will continue working with Care Coordination to achieve goal(s) as above. CHW will continue outreaches at minimum every 30 days and will involve Lead CC as needed or if patient is ready to move to Maintenance. Lead CC will continue to monitor CHW outreaches and patient's progress to goal(s) every 6 weeks.     Plan of Care updated and sent to patient: No    ANUP Walker   [facility] - Care Coordination  [facility]   [phone]

## 2025-02-01 ENCOUNTER — HOSPITAL ENCOUNTER (EMERGENCY)
Facility: CLINIC | Age: 70
Discharge: HOME | End: 2025-02-01
Attending: EMERGENCY MEDICINE | Admitting: EMERGENCY MEDICINE
Payer: COMMERCIAL

## 2025-02-01 ENCOUNTER — APPOINTMENT (OUTPATIENT)
Dept: GENERAL RADIOLOGY | Facility: CLINIC | Age: 70
End: 2025-02-01
Attending: EMERGENCY MEDICINE
Payer: COMMERCIAL

## 2025-02-01 VITALS
OXYGEN SATURATION: 99 % | SYSTOLIC BLOOD PRESSURE: 150 MMHG | DIASTOLIC BLOOD PRESSURE: 80 MMHG | HEIGHT: 64 IN | BODY MASS INDEX: 18.98 KG/M2 | TEMPERATURE: 98.2 F | HEART RATE: 92 BPM | RESPIRATION RATE: 16 BRPM

## 2025-02-01 DIAGNOSIS — S63.91XA SPRAIN OF RIGHT HAND, INITIAL ENCOUNTER: ICD-10-CM

## 2025-02-01 PROCEDURE — 99283 EMERGENCY DEPT VISIT LOW MDM: CPT | Performed by: EMERGENCY MEDICINE

## 2025-02-01 PROCEDURE — 73130 X-RAY EXAM OF HAND: CPT | Mod: RT

## 2025-02-01 ASSESSMENT — ACTIVITIES OF DAILY LIVING (ADL): ADLS_ACUITY_SCORE: 58

## 2025-02-02 NOTE — ED TRIAGE NOTES
Pt reports about 1500 today she slipped on the ice and fell on her right wrist and is now having pain to that area. Pt arrived wearing a wrist brace.
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
## 2025-02-02 NOTE — DISCHARGE INSTRUCTIONS
Return to the ER if new or worsening symptoms.  Your x-ray today did not show any acute fractures.  It showed some healed old fracture of your ulnar styloid.  Most likely you sprained or bruised your hand.  This should get better with rest ice and time.  It was a pleasure to see you today.  I hope you get better quickly.  You can buy a wrist brace at the pharmacy if your hand continues to bother you.

## 2025-02-03 ENCOUNTER — PATIENT OUTREACH (OUTPATIENT)
Dept: CARE COORDINATION | Facility: CLINIC | Age: 70
End: 2025-02-03
Payer: COMMERCIAL

## 2025-02-03 NOTE — PROGRESS NOTES
Clinic Care Coordination Contact    Situation: Patient chart reviewed by care coordinator.    Background: pt was to the ED on 2/1/25 after a fall with hand injury.     Assessment: pt assessed and found to have a sprain of mcdonald hand. She was treated and discharged home.     Plan/Recommendations: will not call at this time and will continue with planned outreaches as previously scheduled.     ANUP Walker   [facility] - Care Coordination  [facility]   [phone]

## 2025-02-05 ENCOUNTER — TELEPHONE (OUTPATIENT)
Dept: FAMILY MEDICINE | Facility: CLINIC | Age: 70
End: 2025-02-05
Payer: COMMERCIAL

## 2025-02-06 NOTE — TELEPHONE ENCOUNTER
Detailed approval left on confidential home care voicemail    Liz Soto RN on 2/6/2025 at 11:41 AM

## 2025-02-07 ENCOUNTER — TELEPHONE (OUTPATIENT)
Dept: FAMILY MEDICINE | Facility: CLINIC | Age: 70
End: 2025-02-07
Payer: COMMERCIAL

## 2025-02-07 NOTE — TELEPHONE ENCOUNTER
Home Care is calling regarding an established patient with [facility].        2/5/2025     4:15 PM   Home Care Information    Name/Phone Number Antonio [phone]   Home Care agency [facility]     Requesting orders from: No Ref-Primary, Physician  Provider is following patient: Yes  Is this a 60-day recertification request?  No    Orders Requested    Occupational Therapy  Request for initial certification (first set of orders)   Frequency:  1x/wk for 5 wks  Then 1 time every other week for 2 weeks.   To work on cognition and ADLs.     Reporting SLUMS Score: 22/30 indicated mild cognitive impairment. Performed today 02/07/2025.     Information was gathered and will be sent to provider for review.  RN will contact Home Care with information after provider review.  Confirmed ok to leave a detailed message with call back.  Contact information confirmed and updated as needed.    Abad Ryan RN

## 2025-02-11 NOTE — TELEPHONE ENCOUNTER
Delmi, with [facility] calling to follow up on request for OT orders. HC unable to see patient until orders are signed. Did review and provider has not signed off yet. Provider out of office this week. Will route to covering provider to see if able to address in providers absence. Jaycoby with [facility] would like call back regarding update, once addressed [phone].    Justa Durand RN on 2/11/2025 at 9:58 AM

## 2025-02-13 NOTE — TELEPHONE ENCOUNTER
Called and notified Ally with [facility] of the verbal approval for requested orders, per provider note below. She had no further questions.     LATRICE GuerraN, RN

## 2025-02-27 ENCOUNTER — MEDICAL CORRESPONDENCE (OUTPATIENT)
Dept: HEALTH INFORMATION MANAGEMENT | Facility: CLINIC | Age: 70
End: 2025-02-27

## 2025-02-27 ENCOUNTER — PATIENT OUTREACH (OUTPATIENT)
Dept: CARE COORDINATION | Facility: CLINIC | Age: 70
End: 2025-02-27
Payer: COMMERCIAL

## 2025-02-27 ENCOUNTER — TELEPHONE (OUTPATIENT)
Dept: FAMILY MEDICINE | Facility: CLINIC | Age: 70
End: 2025-02-27
Payer: COMMERCIAL

## 2025-02-27 NOTE — PROGRESS NOTES
Clinic Care Coordination Contact  Community Health Worker Follow Up    Care Gaps:     Health Maintenance Due   Topic Date Due    HEPATITIS B IMMUNIZATION (1 of 3 - Risk 3-dose series) Never done    DEPRESSION 6 MO INDEX REPEAT PHQ-9  01/29/2025       Not discussed today.    Care Plan:   Care Plan: Financial Wellbeing       Problem: Patient expresses financial resource strain       Long-Range Goal: Create an action plan to increase financial stability       Start Date: 5/25/2023 Expected End Date: 12/20/2025    Recent Progress: 50%    Priority: High    Note:     Barriers: limited income, miss deadlines  Strengths: understanding the need  Patient expressed understanding of goal: yes  Action steps to achieve this goal:  1. I will answer the Financial resource worker call and work with them to apply for insurance   2. I will reach out to resources sent  3. I will complete application for Medical assistance and other programs with the county  4. I will reach out to MIESHA Pereira at [phone] or LALO Alvarado at [phone] as needs arise between scheduled calls.                                 Care Plan: General       Problem: HP GENERAL PROBLEM       Long-Range Goal: I want to improve my daily self care skills       Start Date: 12/20/2024 Expected End Date: 12/20/2025    Priority: Medium    Note:     Barriers: weakness, memory concerns  Strengths: willing to work on this  Patient expressed understanding of goal: yes  Action steps to achieve this goal:  1. I will track my showers on my calendar so I know the last time I took one and schedule next one  2. I will put my paperwork to be completed in one spot that I see daily so I know what needs to be done  3. I will call transportation companies that are sent in [employer] for help with transportation by calling a few days in advance of transportation need  4. I will check my calendar for appointments that are in the next few days to verify I have set up  "transportation  5. I will have a list of things I struggle with to review with therapy  6. I will identify needs and write them down to work on plans to be safe                                   Intervention and Education during outreach: Patient reported she is having pain on her sternum. Believes it was from her dog and is debating on going into to UC or ED. Says a friend is available to take her. CHW advised patient to call triage to discuss and patient stated she talked to her friend who is a nurse. CHW asked if she felt like it was her heart, patient stated no and that \"it feels like my sternum is bruised\". CHW advised patient to call 911 if symptoms worsen or to call clinic for support if she needs it. Did not follow up on goals at this time.    CHW Plan: Follow up in one month.    MIESHA Santiago  [phone]  [facility]    "

## 2025-02-27 NOTE — TELEPHONE ENCOUNTER
Reason for Call:  Form, our goal is to have forms completed with 72 hours, however, some forms may require a visit or additional information.    Type of letter, form or note:  Home Health Certification    Who is the form from?: Home care    Where did the form come from: form was faxed in    What clinic location was the form placed at?: [facility]    Where the form was placed: Given to physician    What number is listed as a contact on the form?: [phone]       Additional comments: [facility]    Call taken on 2/27/2025 at 1:00 PM by Ziada Zabala

## 2025-03-12 ENCOUNTER — PATIENT OUTREACH (OUTPATIENT)
Dept: CARE COORDINATION | Facility: CLINIC | Age: 70
End: 2025-03-12
Payer: COMMERCIAL

## 2025-03-12 NOTE — PROGRESS NOTES
Clinic Care Coordination Contact  Care Coordination Clinician Chart Review    Situation: Patient chart reviewed by Care Coordinator.       Background: Care Coordination Program started: 4/10/2023. Initial assessment completed and patient-centered care plan(s) were developed with participation from patient. Lead CC handed patient off to CHW for continued outreaches.       Assessment: Per chart review, patient outreach completed by CC CHW on 2/27/25.  Patient is actively working to accomplish goal(s). Patient's goal(s) appropriate and relevant at this time. Patient is due for updated Plan of Care.  Assessments will be completed annually or as needed/with change of patient status.      Care Plan: Financial Wellbeing       Problem: Patient expresses financial resource strain       Long-Range Goal: Create an action plan to increase financial stability       Start Date: 5/25/2023 Expected End Date: 12/20/2025    Recent Progress: 50%    Priority: High    Note:     Barriers: limited income, miss deadlines  Strengths: understanding the need  Patient expressed understanding of goal: yes  Action steps to achieve this goal:  1. I will answer the Financial resource worker call and work with them to apply for insurance   2. I will reach out to resources sent  3. I will complete application for Medical assistance and other programs with the county  4. I will reach out to MIESHA Pereira at [phone] or LALO Alvarado at [phone] as needs arise between scheduled calls.                                 Care Plan: General       Problem: HP GENERAL PROBLEM       Long-Range Goal: I want to improve my daily self care skills       Start Date: 12/20/2024 Expected End Date: 12/20/2025    Priority: Medium    Note:     Barriers: weakness, memory concerns  Strengths: willing to work on this  Patient expressed understanding of goal: yes  Action steps to achieve this goal:  1. I will track my showers on my calendar so I know the last time I  took one and schedule next one  2. I will put my paperwork to be completed in one spot that I see daily so I know what needs to be done  3. I will call transportation companies that are sent in [employer] for help with transportation by calling a few days in advance of transportation need  4. I will check my calendar for appointments that are in the next few days to verify I have set up transportation  5. I will have a list of things I struggle with to review with therapy  6. I will identify needs and write them down to work on plans to be safe                                      Plan/Recommendations: The patient will continue working with Care Coordination to achieve goal(s) as above. CHW will continue outreaches at minimum every 30 days and will involve Lead CC as needed or if patient is ready to move to Maintenance. Lead CC will continue to monitor CHW outreaches and patient's progress to goal(s) every 6 weeks.     Plan of Care updated and sent to patient: Yes, via [employer]  ANUP Walker   [facility] - Care Coordination  [facility]   [phone]
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
## 2025-03-13 ENCOUNTER — TELEPHONE (OUTPATIENT)
Dept: FAMILY MEDICINE | Facility: CLINIC | Age: 70
End: 2025-03-13
Payer: COMMERCIAL

## 2025-03-13 NOTE — TELEPHONE ENCOUNTER
Home Care is calling regarding an established patient with [facility].  Requesting orders from: No Ref-Primary, Physician  RN APPROVED: RN able to provide verbal orders.  Home Care will send orders for signature.  RN will close encounter.  Is this a request for a temporary pause in the home care episode?  No    Orders Requested    Occupational Therapy  Request for continuation of care with increase in frequency  Frequency: Add one OT visit this week  RN gave verbal order: Yes        Caller: GEOVANNA Vasquez  Home Care Agency: [facility]  Phone number Home Care can be reached at: [phone]  Okay to leave a detailed message?: Yes    Isabela Donovan RN

## 2025-03-25 ENCOUNTER — PATIENT OUTREACH (OUTPATIENT)
Dept: CARE COORDINATION | Facility: CLINIC | Age: 70
End: 2025-03-25
Payer: COMMERCIAL

## 2025-03-25 NOTE — TELEPHONE ENCOUNTER
[facility] Update:    3/25/25- [facility] called pt to follow up on her [facility] application. Pt state that she doesn't remember sending it back however she has a SW from [place] who will come by to assist. [facility] informed pt that application is already fill out so I will mail it again for her signature.

## 2025-03-29 ENCOUNTER — HEALTH MAINTENANCE LETTER (OUTPATIENT)
Age: 70
End: 2025-03-29

## 2025-03-31 ENCOUNTER — TELEPHONE (OUTPATIENT)
Dept: FAMILY MEDICINE | Facility: CLINIC | Age: 70
End: 2025-03-31
Payer: COMMERCIAL

## 2025-03-31 ENCOUNTER — PATIENT OUTREACH (OUTPATIENT)
Dept: CARE COORDINATION | Facility: CLINIC | Age: 70
End: 2025-03-31
Payer: COMMERCIAL

## 2025-03-31 DIAGNOSIS — Z53.9 DIAGNOSIS NOT YET DEFINED: Primary | ICD-10-CM

## 2025-03-31 PROCEDURE — G0179 MD RECERTIFICATION HHA PT: HCPCS | Performed by: FAMILY MEDICINE

## 2025-03-31 NOTE — PROGRESS NOTES
Clinic Care Coordination Contact  Community Health Worker Follow Up    Care Gaps:     Health Maintenance Due   Topic Date Due    HEPATITIS B IMMUNIZATION (1 of 3 - Risk 3-dose series) Never done    DEPRESSION 6 MO INDEX REPEAT PHQ-9  01/29/2025    COVID-19 Vaccine (7 - 2024-25 season) 03/30/2025       Postponed to next CHW follow up     Care Plan:   Care Plan: Financial Wellbeing       Problem: Patient expresses financial resource strain       Long-Range Goal: Create an action plan to increase financial stability       Start Date: 5/25/2023 Expected End Date: 12/20/2025    Recent Progress: 50%    Priority: High    Note:     Barriers: limited income, miss deadlines  Strengths: understanding the need  Patient expressed understanding of goal: yes  Action steps to achieve this goal:  1. I will answer the Financial resource worker call and work with them to apply for insurance (3/31 - Working with the FRW)  2. I will reach out to resources sent  3. I will complete application for Medical assistance and other programs with the [facility]  4. I will reach out to MIESHA Pereira at [phone] or LALO Alvarado at [phone] as needs arise between scheduled calls.                                 Care Plan: General       Problem: HP GENERAL PROBLEM       Long-Range Goal: I want to improve my daily self care skills       Start Date: 12/20/2024 Expected End Date: 12/20/2025    This Visit's Progress: 20%    Priority: Medium    Note:     Barriers: weakness, memory concerns  Strengths: willing to work on this  Patient expressed understanding of goal: yes  Action steps to achieve this goal:  1. I will track my showers on my calendar so I know the last time I took one and schedule next one (3/31 - Working on this and it is going well)  2. I will put my paperwork to be completed in one spot that I see daily so I know what needs to be done (3/31 - Will come up with a plan with the SW that comes into the home for a better plan)  3. I  will call transportation companies that are sent in [employer] for help with transportation by calling a few days in advance of transportation need  4. I will check my calendar for appointments that are in the next few days to verify I have set up transportation  5. I will have a list of things I struggle with to review with therapy  6. I will identify needs and write them down to work on plans to be safe                                   Intervention and Education during outreach: The patient stated that her memory was not doing well. The patient was unsure who the CHW was and didn't remember past conversations. The CHW asked about the FRW assistance and the patient has not received the application to sign and send back.    CHW Plan: Follow up in one month    MIESHA Helton  [phone]  Connected Care Resource [facility]

## 2025-03-31 NOTE — TELEPHONE ENCOUNTER
Reason for Call:  Form, our goal is to have forms completed with 72 hours, however, some forms may require a visit or additional information.    Type of letter, form or note:  Home Health Certification    Who is the form from?: Home care, [facility]    Where did the form come from: form was faxed in    What clinic location was the form placed at?: [facility]     Where the form was placed: Given to physician, Dr. Armstrong    What number is listed as a contact on the form?:   Fax # [phone]  Phone # [phone]       Additional comments: put in Dr. Armstrong's basket    Call taken on 3/31/2025 at 10:18 AM by Claribel Fairchild

## 2025-04-09 ENCOUNTER — TRANSFERRED RECORDS (OUTPATIENT)
Dept: HEALTH INFORMATION MANAGEMENT | Facility: CLINIC | Age: 70
End: 2025-04-09
Payer: COMMERCIAL

## 2025-04-14 ENCOUNTER — TELEPHONE (OUTPATIENT)
Dept: FAMILY MEDICINE | Facility: CLINIC | Age: 70
End: 2025-04-14
Payer: COMMERCIAL

## 2025-04-15 ENCOUNTER — OFFICE VISIT (OUTPATIENT)
Dept: FAMILY MEDICINE | Facility: CLINIC | Age: 70
End: 2025-04-15
Payer: COMMERCIAL

## 2025-04-15 VITALS
RESPIRATION RATE: 12 BRPM | SYSTOLIC BLOOD PRESSURE: 122 MMHG | HEIGHT: 64 IN | HEART RATE: 96 BPM | BODY MASS INDEX: 20.08 KG/M2 | TEMPERATURE: 97.3 F | WEIGHT: 117.6 LBS | DIASTOLIC BLOOD PRESSURE: 80 MMHG | OXYGEN SATURATION: 99 %

## 2025-04-15 DIAGNOSIS — R27.9 DISCOORDINATION: ICD-10-CM

## 2025-04-15 DIAGNOSIS — D75.89 MACROCYTOSIS WITHOUT ANEMIA: ICD-10-CM

## 2025-04-15 DIAGNOSIS — R53.81 DEBILITY: ICD-10-CM

## 2025-04-15 DIAGNOSIS — Z23 NEED FOR PROPHYLACTIC VACCINATION AGAINST HEPATITIS B VIRUS: ICD-10-CM

## 2025-04-15 DIAGNOSIS — F33.1 MODERATE RECURRENT MAJOR DEPRESSION (H): ICD-10-CM

## 2025-04-15 DIAGNOSIS — F10.21 ALCOHOL DEPENDENCE IN REMISSION (H): ICD-10-CM

## 2025-04-15 DIAGNOSIS — F17.200 NICOTINE DEPENDENCE, UNCOMPLICATED, UNSPECIFIED NICOTINE PRODUCT TYPE: ICD-10-CM

## 2025-04-15 DIAGNOSIS — J43.9 PULMONARY EMPHYSEMA, UNSPECIFIED EMPHYSEMA TYPE (H): Primary | ICD-10-CM

## 2025-04-15 DIAGNOSIS — R41.3 MEMORY DEFICIT: ICD-10-CM

## 2025-04-15 DIAGNOSIS — F41.9 ANXIETY: ICD-10-CM

## 2025-04-15 PROCEDURE — 3079F DIAST BP 80-89 MM HG: CPT | Performed by: FAMILY MEDICINE

## 2025-04-15 PROCEDURE — G2211 COMPLEX E/M VISIT ADD ON: HCPCS | Performed by: FAMILY MEDICINE

## 2025-04-15 PROCEDURE — 99214 OFFICE O/P EST MOD 30 MIN: CPT | Performed by: FAMILY MEDICINE

## 2025-04-15 PROCEDURE — 3074F SYST BP LT 130 MM HG: CPT | Performed by: FAMILY MEDICINE

## 2025-04-15 RX ORDER — FOLIC ACID 1 MG/1
1000 TABLET ORAL DAILY
Qty: 90 TABLET | Refills: 3 | Status: SHIPPED | OUTPATIENT
Start: 2025-04-15

## 2025-04-15 RX ORDER — ESCITALOPRAM OXALATE 20 MG/1
30 TABLET ORAL DAILY
Qty: 135 TABLET | Refills: 3 | Status: SHIPPED | OUTPATIENT
Start: 2025-04-15

## 2025-04-15 RX ORDER — NORTRIPTYLINE HYDROCHLORIDE 25 MG/1
75 CAPSULE ORAL AT BEDTIME
Qty: 60 CAPSULE | Refills: 2 | Status: SHIPPED | OUTPATIENT
Start: 2025-04-15

## 2025-04-15 ASSESSMENT — PATIENT HEALTH QUESTIONNAIRE - PHQ9
SUM OF ALL RESPONSES TO PHQ QUESTIONS 1-9: 8
SUM OF ALL RESPONSES TO PHQ QUESTIONS 1-9: 8
10. IF YOU CHECKED OFF ANY PROBLEMS, HOW DIFFICULT HAVE THESE PROBLEMS MADE IT FOR YOU TO DO YOUR WORK, TAKE CARE OF THINGS AT HOME, OR GET ALONG WITH OTHER PEOPLE: EXTREMELY DIFFICULT

## 2025-04-15 NOTE — PROGRESS NOTES
Assessment & Plan     (J43.9) Pulmonary emphysema, unspecified emphysema type (H)  (primary encounter diagnosis)  Comment: CTAB with normal WOB. Maintain chronic meds.     (R53.81) Debility  Comment: Chronic issues utilizing PT/OT at home.   Plan: Adult Neurology  Referral, MR Brain         w/o Contrast    (R27.9) Discoordination  Comment: as above. Follow up with neurology   Plan: Adult Neurology  Referral, MR Brain         w/o Contrast    (F10.21) Alcohol dependence in remission (H)  Comment: reports being clean/sober. Had numerous nutritional deficiencies now corrected after supplementation and sobriety     (R41.3) Memory deficit  Comment: ref to neurology MRI ordered. Small vessel disease on last imaging several years ago. MRI ordered.   Plan: Adult Neurology  Referral, MR Brain         w/o Contrast    (F33.1) Moderate recurrent major depression (H)  Comment: no si/hi/avh. Will continue with antidepressants. Apparently had med rec with psych. Pt is on bupropion, SNRI AND selective serotonin reuptake inhibitor. No signs of serotonin syndrome.     (F41.9) Anxiety  Comment: as above  Plan: escitalopram (LEXAPRO) 20 MG tablet    (D75.89) Macrocytosis without anemia  Comment: continue folic acid and b12 supplementation.   Plan: folic acid (FOLVITE) 1 MG tablet    (F17.200) Nicotine dependence, uncomplicated, unspecified nicotine product type  Comment: trial TCA as additive med.   Plan: [place] Quit [facility] Referral, nortriptyline         (PAMELOR) 25 MG capsule      The longitudinal plan of care for the diagnosis(es)/condition(s) as documented were addressed during this visit. Due to the added complexity in care, I will continue to support Shanika in the subsequent management and with ongoing continuity of care.          Follow up for neurology     Subjective   Shanika is a 69 year old, presenting for the following health issues:  Recheck Medication (Requesting referral to Neurologist. )       "4/15/2025     9:04 AM   Additional Questions   Roomed by Flakito     History of Present Illness       Reason for visit:  Medication recheck She is missing 4 dose(s) of medications per week.  She is not taking prescribed medications regularly due to remembering to take.      Patient is a 68 y/o F with hx of ETOH use d/o with anemia an d b12 deficiency with depressive d/o. Presents to [facility] for follow up.   Has debility and difficulties with ambulation and coordination. Now reporting worsening memory with difficulties reported by  regarding recognizing  and not remembering times/dates/work that has been done prior.       Objective    /80   Pulse 96   Temp 97.3  F (36.3  C) (Temporal)   Resp 12   Ht 1.626 m (5' 4\")   Wt 53.3 kg (117 lb 9.6 oz)   LMP  (LMP Unknown)   SpO2 99%   BMI 20.19 kg/m    Body mass index is 20.19 kg/m .  Physical Exam  Constitutional:       Appearance: Normal appearance.   HENT:      Head: Normocephalic.      Mouth/Throat:      Mouth: Mucous membranes are moist.   Eyes:      Pupils: Pupils are equal, round, and reactive to light.   Cardiovascular:      Rate and Rhythm: Normal rate and regular rhythm.      Pulses: Normal pulses.      Heart sounds: No murmur heard.  Pulmonary:      Effort: Pulmonary effort is normal. No respiratory distress.      Breath sounds: Normal breath sounds. No stridor. No wheezing, rhonchi or rales.   Abdominal:      General: Abdomen is flat. There is no distension.      Palpations: Abdomen is soft. There is no mass.      Tenderness: There is no abdominal tenderness. There is no guarding or rebound.      Hernia: No hernia is present.   Skin:     General: Skin is warm.      Capillary Refill: Capillary refill takes less than 2 seconds.   Neurological:      Mental Status: She is alert. Mental status is at baseline.   Psychiatric:         Mood and Affect: Mood normal.         Judgment: Judgment normal.        No results found for any visits on " 04/15/25.          Signed Electronically by: Nikole Armstrong MD

## 2025-04-23 ENCOUNTER — PATIENT OUTREACH (OUTPATIENT)
Dept: CARE COORDINATION | Facility: CLINIC | Age: 70
End: 2025-04-23
Payer: COMMERCIAL

## 2025-04-23 NOTE — PROGRESS NOTES
Clinic Care Coordination Contact  Care Coordination Clinician Chart Review    Situation: Patient chart reviewed by Care Coordinator.       Background: Care Coordination Program started: 4/10/2023. Initial assessment completed and patient-centered care plan(s) were developed with participation from patient. Lead CC handed patient off to CHW for continued outreaches.       Assessment: Per chart review, patient outreach completed by CC CHW on 3/31/25.  Patient is actively working to accomplish goal(s). Patient's goal(s) appropriate and relevant at this time. Patient is not due for updated Plan of Care.  Assessments will be completed annually or as needed/with change of patient status.    Pt is experiencing memory challenges.  Per last OV referrals were made for neurology and MRI of brain - both have been scheduled.      Care Plan: Financial Wellbeing       Problem: Patient expresses financial resource strain       Long-Range Goal: Create an action plan to increase financial stability       Start Date: 5/25/2023 Expected End Date: 12/20/2025    Recent Progress: 50%    Priority: High    Note:     Barriers: limited income, miss deadlines  Strengths: understanding the need  Patient expressed understanding of goal: yes  Action steps to achieve this goal:  1. I will answer the Financial resource worker call and work with them to apply for insurance (3/31 - Working with the W)  2. I will reach out to resources sent  3. I will complete application for Medical assistance and other programs with the [facility]  4. I will reach out to MIESHA Pereira at [phone] or LALO Alvarado at [phone] as needs arise between scheduled calls.                                 Care Plan: General       Problem: HP GENERAL PROBLEM       Long-Range Goal: I want to improve my daily self care skills       Start Date: 12/20/2024 Expected End Date: 12/20/2025    This Visit's Progress: 20%    Priority: Medium    Note:     Barriers: weakness, memory  concerns  Strengths: willing to work on this  Patient expressed understanding of goal: yes  Action steps to achieve this goal:  1. I will track my showers on my calendar so I know the last time I took one and schedule next one (3/31 - Working on this and it is going well)  2. I will put my paperwork to be completed in one spot that I see daily so I know what needs to be done (3/31 - Will come up with a plan with the SW that comes into the home for a better plan)  3. I will call transportation companies that are sent [place] [employer] [facility] for help with transportation by calling a few days in advance of transportation need  4. I will check my calendar for appointments that are in the next few days to verify I have set up transportation  5. I will have a list of things I struggle with to review with therapy  6. I will identify needs and write them down to work on plans to be safe                                      Plan/Recommendations: The patient will continue working with Care Coordination to achieve goal(s) as above. CHW will continue outreaches at minimum every 30 days and will involve Lead CC as needed or if patient is ready to move to Maintenance. Lead CC will continue to monitor CHW outreaches and patient's progress to goal(s) every 6 weeks.     Plan of Care updated and sent to patient: No    ANUP Walker   [facility] - Care Coordination  [facility]   [phone]

## 2025-04-24 ENCOUNTER — HOSPITAL ENCOUNTER (OUTPATIENT)
Dept: MRI IMAGING | Facility: CLINIC | Age: 70
Discharge: HOME | End: 2025-04-24
Attending: FAMILY MEDICINE
Payer: COMMERCIAL

## 2025-04-24 DIAGNOSIS — R41.3 MEMORY DEFICIT: ICD-10-CM

## 2025-04-24 DIAGNOSIS — R27.9 DISCOORDINATION: ICD-10-CM

## 2025-04-24 DIAGNOSIS — R53.81 DEBILITY: ICD-10-CM

## 2025-04-24 PROCEDURE — 70551 MRI BRAIN STEM W/O DYE: CPT

## 2025-04-25 DIAGNOSIS — F41.9 ANXIETY: ICD-10-CM

## 2025-04-28 RX ORDER — CLONAZEPAM 0.5 MG/1
0.5 TABLET ORAL
Qty: 30 TABLET | Refills: 1 | Status: SHIPPED | OUTPATIENT
Start: 2025-04-28

## 2025-04-29 ENCOUNTER — PATIENT OUTREACH (OUTPATIENT)
Dept: CARE COORDINATION | Facility: CLINIC | Age: 70
End: 2025-04-29
Payer: COMMERCIAL

## 2025-04-29 NOTE — TELEPHONE ENCOUNTER
Frw update:    4/29/25- Established patient outreach attempted x 1 was unable to reach. Couldn't leave a vm as mailbox wasn't set up  Plan: Current outreach date reflects FRW 's follow up within 30 days.

## 2025-05-01 ENCOUNTER — PATIENT OUTREACH (OUTPATIENT)
Dept: CARE COORDINATION | Facility: CLINIC | Age: 70
End: 2025-05-01
Payer: COMMERCIAL

## 2025-05-01 NOTE — PROGRESS NOTES
Clinic Care Coordination Contact  Community Health Worker Follow Up    Care Gaps:     Health Maintenance Due   Topic Date Due    HEPATITIS B IMMUNIZATION (1 of 3 - Risk 3-dose series) Never done    COVID-19 Vaccine (7 - 2024-25 season) 03/30/2025    DEPRESSION 6 MO INDEX REPEAT PHQ-9  04/29/2025       Postponed to next CHW outreach     Care Plan:   Care Plan: Financial Wellbeing       Problem: Patient expresses financial resource strain       Long-Range Goal: Create an action plan to increase financial stability       Start Date: 5/25/2023 Expected End Date: 12/20/2025    Recent Progress: 50%    Priority: High    Note:     Barriers: limited income, miss deadlines  Strengths: understanding the need  Patient expressed understanding of goal: yes    Action steps to achieve this goal:  1. I will answer the Financial resource worker call and work with them to apply for insurance (3/31 - Working with the FRW)  2. I will reach out to resources sent  3. I will complete application for Medical assistance and other programs with the [facility]  4. I will reach out to MIESHA Pereira at [phone] or LALO Alvarado at [phone] as needs arise between scheduled calls.     5/1 - Scheduled for a Re-Assessment                            Care Plan: General       Problem: HP GENERAL PROBLEM       Long-Range Goal: I want to improve my daily self care skills       Start Date: 12/20/2024 Expected End Date: 12/20/2025    Recent Progress: 20%    Priority: Medium    Note:     Barriers: weakness, memory concerns  Strengths: willing to work on this  Patient expressed understanding of goal: yes    Action steps to achieve this goal:  1. I will track my showers on my calendar so I know the last time I took one and schedule next one (3/31 - Working on this and it is going well)  2. I will put my paperwork to be completed in one spot that I see daily so I know what needs to be done (3/31 - Will come up with a plan with the LALO that comes into the  home for a better plan)  3. I will call transportation companies that are sent in [employer] for help with transportation by calling a few days in advance of transportation need  4. I will check my calendar for appointments that are in the next few days to verify I have set up transportation  5. I will have a list of things I struggle with to review with therapy  6. I will identify needs and write them down to work on plans to be safe     5/1 - Scheduled for a Re-Assessment                              Intervention and Education during outreach: The CHW did not follow up on the care plans due to scheduling a Re-assessment due to the patients changes in her life.     CHW Plan: Follow up in one month    MIESHA Helton  [phone]  [facility] Care Resource [facility]

## 2025-05-13 ENCOUNTER — TELEPHONE (OUTPATIENT)
Dept: FAMILY MEDICINE | Facility: CLINIC | Age: 70
End: 2025-05-13
Payer: COMMERCIAL

## 2025-05-13 DIAGNOSIS — R41.3 MEMORY DEFICIT: ICD-10-CM

## 2025-05-13 DIAGNOSIS — R53.81 DEBILITY: Primary | ICD-10-CM

## 2025-05-13 DIAGNOSIS — R27.9 DISCOORDINATION: ICD-10-CM

## 2025-05-13 NOTE — TELEPHONE ENCOUNTER
Home Care is calling regarding an established patient with [facility].  Requesting orders from: Nikole Armstrong  PROVIDER AUTHORIZATION REQUIRED: RN unable to provide verbal approval for all orders.  See below for additional information.  RN will contact Home care with information after provider review.  Is this a request for a temporary pause in the home care episode?  No    Provider Authorization Required:  GEOVANNA June from [facility] asking that PCP place order for outpatient OT for cognition.     Orders Requested    Occupational Therapy  Request for discontinuation of care   Goals have been met/progressing.  Frequency: Discharging from  occupational therapy today 5/13/25  RN gave verbal order: Yes          Contacts       Contact Date/Time Type Contact Phone/Fax    05/13/2025 12:24 PM CDT Phone (Incoming) GEOVANNA June - [facility] (Home Care) [phone]     [facility]          Justa Durand RN

## 2025-05-15 DIAGNOSIS — E51.9 VITAMIN B1 DEFICIENCY: ICD-10-CM

## 2025-05-15 DIAGNOSIS — R41.3 MEMORY LOSS: Primary | ICD-10-CM

## 2025-05-15 DIAGNOSIS — D50.8 IRON DEFICIENCY ANEMIA SECONDARY TO INADEQUATE DIETARY IRON INTAKE: ICD-10-CM

## 2025-05-15 DIAGNOSIS — E53.8 VITAMIN B12 DEFICIENCY: ICD-10-CM

## 2025-05-15 DIAGNOSIS — E53.8 FOLATE DEFICIENCY: ICD-10-CM

## 2025-05-27 ENCOUNTER — PATIENT OUTREACH (OUTPATIENT)
Dept: CARE COORDINATION | Facility: CLINIC | Age: 70
End: 2025-05-27
Payer: COMMERCIAL

## 2025-05-27 NOTE — PROGRESS NOTES
Clinic Care Coordination Contact  [facility]/Voicemail    Clinical Data: Care Coordinator Outreach    Outreach Documentation Number of Outreach Attempt   5/27/2025  10:00 AM 1       Unable to leave a message due to: no answer      Plan: Care Coordinator will try to reach patient again in 7-10 business days.    ANUP Walker   [facility] - Care Coordination  [facility]   [phone]

## 2025-06-02 ENCOUNTER — LAB (OUTPATIENT)
Dept: LAB | Facility: CLINIC | Age: 70
End: 2025-06-02
Payer: COMMERCIAL

## 2025-06-02 DIAGNOSIS — E51.9 VITAMIN B1 DEFICIENCY: ICD-10-CM

## 2025-06-02 DIAGNOSIS — E53.8 VITAMIN B12 DEFICIENCY: ICD-10-CM

## 2025-06-02 DIAGNOSIS — R41.3 MEMORY LOSS: ICD-10-CM

## 2025-06-02 DIAGNOSIS — D50.8 IRON DEFICIENCY ANEMIA SECONDARY TO INADEQUATE DIETARY IRON INTAKE: ICD-10-CM

## 2025-06-02 DIAGNOSIS — E53.8 FOLATE DEFICIENCY: ICD-10-CM

## 2025-06-02 LAB
FERRITIN SERPL-MCNC: 168 NG/ML (ref 11–328)
FOLATE SERPL-MCNC: 19.3 NG/ML (ref 4.6–34.8)
IRON BINDING CAPACITY (ROCHE): 299 UG/DL (ref 240–430)
IRON SATN MFR SERPL: 14 % (ref 15–46)
IRON SERPL-MCNC: 42 UG/DL (ref 37–145)
LAB ORDER RESULT STATUS: NORMAL
Lab: NORMAL
PERFORMING LABORATORY: NORMAL
TEST NAME: NORMAL
VIT B12 SERPL-MCNC: 506 PG/ML (ref 232–1245)

## 2025-06-03 ENCOUNTER — PATIENT OUTREACH (OUTPATIENT)
Dept: CARE COORDINATION | Facility: CLINIC | Age: 70
End: 2025-06-03
Payer: COMMERCIAL

## 2025-06-03 NOTE — TELEPHONE ENCOUNTER
w update:    6/3/25- [place] called pt to follow up on her [facility] application. She is aware that she is pending for verification. w closed the referral and removed pt from panel.

## 2025-06-05 LAB — VIT B1 SERPL-SCNC: 90 NMOL/L

## 2025-06-06 ENCOUNTER — THERAPY VISIT (OUTPATIENT)
Dept: OCCUPATIONAL THERAPY | Facility: CLINIC | Age: 70
End: 2025-06-06
Attending: FAMILY MEDICINE
Payer: COMMERCIAL

## 2025-06-06 DIAGNOSIS — R27.9 DISCOORDINATION: ICD-10-CM

## 2025-06-06 DIAGNOSIS — R41.3 MEMORY DEFICIT: ICD-10-CM

## 2025-06-06 DIAGNOSIS — R53.81 DEBILITY: ICD-10-CM

## 2025-06-06 PROCEDURE — 97165 OT EVAL LOW COMPLEX 30 MIN: CPT | Mod: GO

## 2025-06-06 NOTE — PROGRESS NOTES
OCCUPATIONAL THERAPY EVALUATION  Type of Visit: Evaluation        Fall Risk Screen:  Have you fallen 2 or more times in the past year?: Yes  Have you fallen and had an injury in the past year?: Yes      Subjective      Condition type:  Recurrent (multiple episodes of < 3 months)  Cause of current episode:  Unspecified     Nature of treatment:  Rehabilitative  Functional ability:  Minimal functional limitations  Documented POC (choose all that apply):  Measurable short and long term/discharge treatment goals related to physical and functional deficits.;Frequency of treatment visits and treatment activities to address deficit areas.;Patient agrees to program participation including home program  Briefly describe symptoms:  Patient reports having memory concerns and decreased coordination.  How did the symptoms start:  Unknown  Average pain/intensity last 24 hours:  0/10  Average pain/intensity past week:     Frequency of Symptoms:  Frequently (51-75% of the time)  Symptom impact on ADLs:  A little bit  Condition change since eval:  N/A (first visit)  General health reported by patient:  Good     Presenting condition or subjective complaint: ot  Date of onset: 05/14/25    Relevant medical history: Anemia; Bladder or bowel problems; COPD; Concussions; Depression; Dizziness; Emphysema; Hearing problems   Dates & types of surgery: unsure    Prior diagnostic imaging/testing results: MRI; CT scan; X-ray; EMG; Other unsure   Prior therapy history for the same diagnosis, illness or injury: Yes at home    Prior Level of Function  Transfers: Independent  Ambulation: Independent  ADL: Independent  IADL: Driving, Finances, Housekeeping, Laundry, Meal preparation, Medication management    Living Environment  Social support: Alone   Type of home: House   Stairs to enter the home: Yes 8 Is there a railing: No     Ramp: No   Stairs inside the home: Yes 8 Is there a railing: Yes     Help at home: None  Equipment owned: Straight Cane;  "Four-point cane     Employment: Not Applicable    Hobbies/Interests: garden pets [employer] friends    Patient goals for therapy: memory balance weakness       Objective     Cognitive Status Examination  Orientation: Oriented to person, place and time   Level of Consciousness: Alert, Anxious  Follows Commands and Answers Questions: 100% of the time, Follows multi step instructions, Follows single step instructions  Personal Safety and Judgement: Intact  Memory: Intact, Patient reports she notices herself walking into rooms to grab/do something, but forgets what she was going to do.  Attention: No deficits identified  Organization/Problem Solving: No deficits identified  Executive Function: No deficits identified    SLUMS    The SLUMS ([facility] Mental Status Exam) is a 30-point standardized cognitive screen used to identify the presence of cognitive deficits and/or to identify a change in cognition over time.  This screen assesses cognitive abilities in various domains.  ([email or web address])    Patient's performance was as follows:    Orientation and Attention:  3/3: Patient able to accurately state what day of the week it is, what the year is, and what state we are in.     Calculation and Registration:  3/3: Patient able to accurately state how much money was spent and then how much remains.     Category Naming with Time Contraint:  3/3: Patient able to accurately state 15+ animals within 1 minute.     Memory Delayed Recall with Interference:  5: Patient able to accurately recall 4 out of the 5 objects.    Registration and Digit Span:  1/2: Patient able to accurately state the three digit number backwards but was unable to accurately state the four digit number backwards.     Clock drawin/4: Patient able to accurately place the hour markers on the clock and then draw the correct time.      Visual Spatial:  2/2: Patient able to accurately place an \"x\" in the triangle and then point to the largest shape.    "   Story Recall with Executive Function:  8/8: Patient able to accurately answer 4 out of the 4 questions related to the short story.         Total Score: 28/30         Scoring If High School Educated If Less than High School Educated   Normal 27-30 25-30   Mild Neurocognitive Disorder 21-26 20-24   Dementia 1-20 1-19       Score Interpretation: Score places patient in the Normal category.  Patient demonstrates deficits in the following areas: See above.  Please note that this examination is used to screen individuals to look for the presence of cognitive deficits and to identify changes in cognition over time.  This is not a diagnosis.  This examination can be followed by further cognitive assessments if appropriate and deemed necessary.    VISUAL SKILLS  Visual Acuity: No deficits identified  Visual Field: Appears normal  Visual Attention: Appears normal  Oculomotor: Appears normal    SENSATION: UE Sensation WNL    POSTURE: WFL  RANGE OF MOTION: UE AROM WFL, UE PROM WFL  STRENGTH: UE Strength WFL  Average  Strength:  Right Hand: 45 lbs  Left Hand: 40 lbs  MUSCLE TONE: WFL  COORDINATION:   Left Hand, Nine Hole Peg Test (sec): 22.3  Right Hand, Nine Hole Peg Test (sec): 27  Right Hand, Box and Blocks Test (cubes transferred in 1 minute): 58  Left Hand, Box and Blocks Test (cubes transferred in 1 minute): 52  BALANCE: WFL    FUNCTIONAL MOBILITY  Assistive Device(s): None  Ambulation: IND  Wheelchair: NA    BED MOBILITY: Independent    TRANSFERS: Independent    BATHING: Independent  Equipment:     UPPER BODY DRESSING: Independent  Equipment:     LOWER BODY DRESSING: Independent  Equipment:     TOILETING: Independent  Equipment:     GROOMING: Independent  Equipment:     EATING/SELF FEEDING: Independent   Equipment:     ACTIVITY TOLERANCE: Moderate    INSTRUMENTAL ACTIVITIES OF DAILY LIVING (IADL):   Meal Planning/Prep: Patient reports being IND with meal planning/preparation.  Home/Financial Management: Patient  reports being IND with home and financial management.  Communication/Computer Use: Patient reports being IND with communication and computer use.  Community Mobility: IND  Care of Others:- NA      Assessment & Plan   CLINICAL IMPRESSIONS  Medical Diagnosis: Debility (R53.81)    Discoordination (R27.9)    Memory deficit (R41.3)      Treatment Diagnosis: Memory and FMC concerns      Impression/Assessment: Pt is a 69 year old female presenting to Occupational Therapy due to concerns with coordination and memory.  The following significant findings have been identified: Impaired cognition, Impaired coordination, and Impaired strength.  These identified deficits interfere with their ability to perform household chores and medication management as compared to previous level of function.     Clinical Decision Making (Complexity):  Assessment of Occupational Performance: 1-3 Performance Deficits  Occupational Performance Limitations: health management and maintenance and home establishment and management  Clinical Decision Making (Complexity): Low complexity    PLAN OF CARE  Treatment Interventions:  Interventions: Cognitive Skills, Therapeutic Activity, Therapeutic Exercise    Long Term Goals   OT Goal 1  Goal Identifier: Cognition  Goal Description: Patient will complete moderate level cognitive tasks with various demands (problem solving, functional calculations, divided/alternating attention, EF, error detection/correction) with no more than min assist x3 trials to increase problem solving skills, attention, and overall processing speed for improved performance in ADL, IADL, leisure, and work related tasks.  Goal Progress: New Goal  Target Date: 09/03/25  OT Goal 2  Goal Identifier: Dexterity  Goal Description: As a measure of improved hand coordination required for independence in leisure,work, and self-cares, Pavithra will be able to complete mod complex dexterity task within independence, on 50% of oppurtunities,  within a 90 day reporting period.  OT Goal 3  Goal Identifier: Cognition  Goal Description: Patient will verbalize and demonstrate up to 5 memory accommodations or compensation techniques she can utilize within her home and community for improved performance of ADLs/IADLs, on 50% of opportunities, within 90 days.      Frequency of Treatment: 1 x week  Duration of Treatment: 90 days     Recommended Referrals to Other Professionals: None at this time.  Education Assessment: Learner/Method: Patient;Listening     Risks and benefits of evaluation/treatment have been explained.   Patient/Family/caregiver agrees with Plan of Care.     Evaluation Time:    OT Eval, Low Complexity Minutes (33680): 40         Signing Clinician: IRAIDA Huff Jr [facility]                                                                                   OUTPATIENT OCCUPATIONAL THERAPY      PLAN OF TREATMENT FOR OUTPATIENT REHABILITATION   Patient's Last Name, First Name, JALENTOMASA RainesPavithra  M YOB: 1955   Provider's Name   [facility]   Medical Record No.  [id number]     Onset Date: 05/14/25 Start of Care Date: 06/06/25     Medical Diagnosis:  Debility (R53.81)    Discoordination (R27.9)    Memory deficit (R41.3)      OT Treatment Diagnosis:  Memory and FMC concerns Plan of Treatment  Frequency/Duration:1 x week/90 days    Certification date from 06/06/25   To 09/03/25        See note for plan of treatment details and functional goals     Elijah Tineo Jr, OTR                         I CERTIFY THE NEED FOR THESE SERVICES FURNISHED UNDER        THIS PLAN OF TREATMENT AND WHILE UNDER MY CARE     (Physician attestation of this document indicates review and certification of the therapy plan).              Referring Provider:  Nikole Armstrong    Initial Assessment  See Epic Evaluation- 06/06/25

## 2025-06-09 ENCOUNTER — NURSE TRIAGE (OUTPATIENT)
Dept: FAMILY MEDICINE | Facility: CLINIC | Age: 70
End: 2025-06-09
Payer: COMMERCIAL

## 2025-06-09 NOTE — TELEPHONE ENCOUNTER
Nurse Triage SBAR    Is this a 2nd Level Triage? NO    Situation: Patient concerned about bronchitis exposure.     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 Her boyfriend recently dx with bronchitis. Unsure if viral vs bacterial. She also quit smoking 2 weeks ago.     Assessment: Patient states she has a cough with clear phlegm production. Started after quitting smoking before boyfriend developed symptoms. She is walking her dogs an hour a day and does get SOB with that activity. Denies worsening SOB. She denies fever.     Protocol Recommended Disposition:   Home Care    Recommendation: Advised home care. Reviewed symptoms that would warrant a visit. Patient verbalized understanding.  Advised viral bronchitits could last more than 2 weeks. Advised patient to ask boyfriend to reach out to their provider for further guidelines on quarantine.     Abad Ryan RN on 6/9/2025 at 9:42 AM     Reason for Disposition   Cough with no complications    Protocols used: Cough-A-OH

## 2025-06-10 LAB — LABCORP INTERFACED MISCELLANEOUS TEST RESULT: NORMAL

## 2025-06-26 ENCOUNTER — NURSE TRIAGE (OUTPATIENT)
Dept: FAMILY MEDICINE | Facility: CLINIC | Age: 70
End: 2025-06-26
Payer: COMMERCIAL

## 2025-06-26 DIAGNOSIS — F10.24 ALCOHOL DEPENDENCE WITH ALCOHOL-INDUCED MOOD DISORDER (H): Primary | ICD-10-CM

## 2025-06-26 NOTE — TELEPHONE ENCOUNTER
Nurse Triage SBAR    Is this a 2nd Level Triage? NO    Situation: patient states she started drinking again. She has been trying to wean off. She states she is having withdrawal symptoms.    Background: patient is a chronic alcoholic    Assessment: patient feels she needs to go to a detox center. She does not feel like she will hurt herself. No abdominal pain. She is not vomiting much unless she eats something.  She states she has an even coming up and she is hoping to get detox set up for after the event. The event is scheduled for Saturday.  She states she has having some shaking in the morning.    Protocol Recommended Disposition:   See in Office Within 2 Weeks    Recommendation: per protocol patient should be seen within 2 weeks. Patient knows when she should go to the ER if her withdrawals are bad.  She states she can not do this herself she is wanted to get set up with detox.    Routed to provider    Liz Soto RN on 6/26/2025 at 3:36 PM    Reason for Disposition   Unhealthy alcohol use, known or suspected    Additional Information   Negative: Coma (e.g., not moving, not talking, not responding to stimuli)   Negative: Difficult to awaken or acting confused (e.g., disoriented, slurred speech)   Negative: Seeing, hearing, or feeling things that are not there (i.e., visual, auditory, or tactile hallucinations)   Negative: Slow, shallow and weak breathing   Negative: Seizure   Negative: Violent behavior, or threatening to physically hurt or kill someone   Negative: Patient attempted suicide   Negative: Threatening suicide   Negative: Sounds like a life-threatening emergency to the triager   Negative: Questions or concerns about substance use (drug use), unhealthy drug use, intoxication, or withdrawal   Negative: Depression is main problem or symptom (e.g., feelings of sadness or hopelessness)   Negative: SEVERE abdominal pain (e.g., excruciating)   Negative: Constant abdominal pain lasting > 2 hours    Negative: Bloody, black, or tarry bowel movements  (Exception: Chronic-unchanged black-grey bowel movements and is taking iron pills or Pepto-Bismol.)   Negative: Vomiting red blood or black (coffee ground) material  (Exception: Few red streaks in vomit that only happened once.)   Negative: Multiple episodes of vomiting and lasting more than 2 hours   Negative: Feeling very shaky (i.e., visible tremors of hands)   Negative: Patient sounds very sick or weak to the triager   Negative: White of the eyes have turned yellow (i.e., jaundice)   Negative: Fever > 101 F (38.3 C)   Negative: Drinking alcohol daily and prior alcohol withdrawal seizures   Negative: Drinking alcohol daily and prior DTs (delirium tremens)   Negative: Patient wants to be seen   Negative: Pregnant and intoxicated or admits to alcohol use problem   Negative: Requesting admission for alcohol use, addiction, or withdrawal   Negative: Requesting to talk with a counselor (mental health worker, psychiatrist, etc.)   Negative: Alcohol use interferes with work or school    Protocols used: Alcohol Use and [id number]-EV

## 2025-06-26 NOTE — TELEPHONE ENCOUNTER
Called patient and notified her of provider response, as noted below. She states she does not currently have symptoms, because she is still drinking. States in the morning is when she gets the shakes and has to drink again to manage the symptoms. She states she will wait for care management to reach out. She had no other questions or concerns at this time.     Leydi Torres, LATRICEN, RN

## 2025-07-01 DIAGNOSIS — F41.9 ANXIETY: ICD-10-CM

## 2025-07-02 RX ORDER — CLONAZEPAM 0.5 MG/1
0.5 TABLET ORAL
Qty: 30 TABLET | Refills: 1 | Status: SHIPPED | OUTPATIENT
Start: 2025-07-02

## 2025-07-09 DIAGNOSIS — L28.0 NEURODERMATITIS: ICD-10-CM

## 2025-07-10 RX ORDER — TRIAMCINOLONE ACETONIDE 1 MG/G
CREAM TOPICAL
Qty: 80 G | Refills: 0 | Status: SHIPPED | OUTPATIENT
Start: 2025-07-10

## 2025-07-30 NOTE — TELEPHONE ENCOUNTER
REASON FOR VISIT: Debility, Discoordination, Memory deficit    DATE OF APPT: 7/31/2025   NOTES (FOR ALL VISITS) STATUS DETAILS   OFFICE NOTE from referring provider [facility]  Nikole Armstrong MD 4/15/2025   MEDICATION LIST N/A    IMAGING  (FOR ALL VISITS)     XR N/A    MRI (HEAD, NECK, SPINE) [facility]  MR Brain 4/24/2025   CT (HEAD, NECK, SPINE) N/A

## 2025-07-31 ENCOUNTER — PRE VISIT (OUTPATIENT)
Dept: NEUROLOGY | Facility: CLINIC | Age: 70
End: 2025-07-31

## 2025-08-04 DIAGNOSIS — F10.20 UNCOMPLICATED ALCOHOL DEPENDENCE (H): ICD-10-CM

## 2025-08-05 RX ORDER — NALTREXONE HYDROCHLORIDE 50 MG/1
50 TABLET, FILM COATED ORAL DAILY
Qty: 90 TABLET | Refills: 3 | Status: SHIPPED | OUTPATIENT
Start: 2025-08-05

## 2025-08-07 ENCOUNTER — PATIENT OUTREACH (OUTPATIENT)
Dept: CARE COORDINATION | Facility: CLINIC | Age: 70
End: 2025-08-07
Payer: COMMERCIAL

## 2025-08-11 ENCOUNTER — NURSE TRIAGE (OUTPATIENT)
Dept: FAMILY MEDICINE | Facility: CLINIC | Age: 70
End: 2025-08-11

## 2025-08-11 ENCOUNTER — HOSPITAL ENCOUNTER (EMERGENCY)
Facility: CLINIC | Age: 70
Discharge: HOME | End: 2025-08-11
Attending: PHYSICIAN ASSISTANT | Admitting: PHYSICIAN ASSISTANT
Payer: COMMERCIAL

## 2025-08-11 VITALS
BODY MASS INDEX: 20.8 KG/M2 | DIASTOLIC BLOOD PRESSURE: 87 MMHG | TEMPERATURE: 97.2 F | HEART RATE: 72 BPM | WEIGHT: 121.2 LBS | RESPIRATION RATE: 18 BRPM | OXYGEN SATURATION: 100 % | SYSTOLIC BLOOD PRESSURE: 125 MMHG

## 2025-08-11 DIAGNOSIS — R21 RASH: Primary | ICD-10-CM

## 2025-08-11 PROCEDURE — 99283 EMERGENCY DEPT VISIT LOW MDM: CPT

## 2025-08-11 PROCEDURE — 99282 EMERGENCY DEPT VISIT SF MDM: CPT | Performed by: PHYSICIAN ASSISTANT

## 2025-08-11 RX ORDER — PERMETHRIN 50 MG/G
CREAM TOPICAL ONCE
Qty: 30 G | Refills: 1 | Status: SHIPPED | OUTPATIENT
Start: 2025-08-11 | End: 2025-08-11

## 2025-08-11 RX ORDER — CETIRIZINE HYDROCHLORIDE 10 MG/1
10 TABLET ORAL ONCE
Status: DISCONTINUED | OUTPATIENT
Start: 2025-08-11 | End: 2025-08-11 | Stop reason: HOSPADM

## 2025-08-11 RX ORDER — DIPHENHYDRAMINE HCL 25 MG
50 CAPSULE ORAL ONCE
Status: DISCONTINUED | OUTPATIENT
Start: 2025-08-11 | End: 2025-08-11 | Stop reason: HOSPADM

## 2025-08-11 ASSESSMENT — ACTIVITIES OF DAILY LIVING (ADL): ADLS_ACUITY_SCORE: 58

## 2025-08-11 ASSESSMENT — COLUMBIA-SUICIDE SEVERITY RATING SCALE - C-SSRS
1. IN THE PAST MONTH, HAVE YOU WISHED YOU WERE DEAD OR WISHED YOU COULD GO TO SLEEP AND NOT WAKE UP?: NO
2. HAVE YOU ACTUALLY HAD ANY THOUGHTS OF KILLING YOURSELF IN THE PAST MONTH?: NO
6. HAVE YOU EVER DONE ANYTHING, STARTED TO DO ANYTHING, OR PREPARED TO DO ANYTHING TO END YOUR LIFE?: NO

## 2025-08-12 ENCOUNTER — PATIENT OUTREACH (OUTPATIENT)
Dept: CARE COORDINATION | Facility: CLINIC | Age: 70
End: 2025-08-12

## 2025-08-12 DIAGNOSIS — L50.9 URTICARIA: ICD-10-CM

## 2025-08-12 RX ORDER — HYDROXYZINE HYDROCHLORIDE 10 MG/1
10 TABLET, FILM COATED ORAL 3 TIMES DAILY PRN
Qty: 30 TABLET | Refills: 2 | Status: SHIPPED | OUTPATIENT
Start: 2025-08-12

## 2025-08-13 ENCOUNTER — TELEPHONE (OUTPATIENT)
Dept: FAMILY MEDICINE | Facility: CLINIC | Age: 70
End: 2025-08-13
Payer: COMMERCIAL

## 2025-08-13 DIAGNOSIS — L28.0 NEURODERMATITIS: ICD-10-CM

## 2025-08-13 RX ORDER — TRIAMCINOLONE ACETONIDE 1 MG/G
CREAM TOPICAL
Qty: 80 G | Refills: 0 | Status: SHIPPED | OUTPATIENT
Start: 2025-08-13

## 2025-08-19 ENCOUNTER — TELEPHONE (OUTPATIENT)
Dept: FAMILY MEDICINE | Facility: CLINIC | Age: 70
End: 2025-08-19
Payer: COMMERCIAL

## 2025-08-19 DIAGNOSIS — B86 SCABIES: Primary | ICD-10-CM

## 2025-08-19 RX ORDER — PERMETHRIN 50 MG/G
CREAM TOPICAL
Qty: 60 G | Refills: 1 | Status: SHIPPED | OUTPATIENT
Start: 2025-08-19

## 2025-08-25 ENCOUNTER — NURSE TRIAGE (OUTPATIENT)
Dept: FAMILY MEDICINE | Facility: CLINIC | Age: 70
End: 2025-08-25
Payer: COMMERCIAL

## 2025-08-28 ENCOUNTER — OFFICE VISIT (OUTPATIENT)
Dept: FAMILY MEDICINE | Facility: CLINIC | Age: 70
End: 2025-08-28
Payer: COMMERCIAL

## 2025-08-28 SDOH — HEALTH STABILITY: PHYSICAL HEALTH: ON AVERAGE, HOW MANY MINUTES DO YOU ENGAGE IN EXERCISE AT THIS LEVEL?: 60 MIN

## 2025-08-28 SDOH — HEALTH STABILITY: PHYSICAL HEALTH: ON AVERAGE, HOW MANY DAYS PER WEEK DO YOU ENGAGE IN MODERATE TO STRENUOUS EXERCISE (LIKE A BRISK WALK)?: 7 DAYS

## 2025-08-28 ASSESSMENT — PATIENT HEALTH QUESTIONNAIRE - PHQ9
SUM OF ALL RESPONSES TO PHQ QUESTIONS 1-9: 19
10. IF YOU CHECKED OFF ANY PROBLEMS, HOW DIFFICULT HAVE THESE PROBLEMS MADE IT FOR YOU TO DO YOUR WORK, TAKE CARE OF THINGS AT HOME, OR GET ALONG WITH OTHER PEOPLE: SOMEWHAT DIFFICULT
SUM OF ALL RESPONSES TO PHQ QUESTIONS 1-9: 19
SUM OF ALL RESPONSES TO PHQ QUESTIONS 1-9: 19

## 2025-08-28 ASSESSMENT — PAIN SCALES - GENERAL: PAINLEVEL_OUTOF10: SEVERE PAIN (9)

## 2025-09-01 ENCOUNTER — PATIENT OUTREACH (OUTPATIENT)
Dept: CARE COORDINATION | Facility: CLINIC | Age: 70
End: 2025-09-01
Payer: COMMERCIAL

## 2025-09-03 ENCOUNTER — PATIENT OUTREACH (OUTPATIENT)
Dept: CARE COORDINATION | Facility: CLINIC | Age: 70
End: 2025-09-03
Payer: COMMERCIAL

## (undated) DEVICE — GLOVE BIOGEL PI MICRO SZ 8.0 48580

## (undated) DEVICE — BLADE KNIFE SURG 15 371115

## (undated) DEVICE — ESU HARMONIC ACE LAP SHEARS ETHICON ACE+ 7 5MMX36CM HARH36

## (undated) DEVICE — ESU HOLDER LAP INST DISP PURPLE LONG 330MM H-PRO-330

## (undated) DEVICE — KIT ENDO TURNOVER/PROCEDURE CARRY-ON 101822

## (undated) DEVICE — NDL SPINAL 22GA 3.5" QUINCKE 405181

## (undated) DEVICE — SOL WATER IRRIG 1000ML BOTTLE 2F7114

## (undated) DEVICE — ENDO TROCAR SLEEVE KII Z-THREADED 12X100MM CTS22

## (undated) DEVICE — SOL NACL 0.9% INJ 1000ML BAG 2B1324X

## (undated) DEVICE — DRSG BANDAID 1X3" FABRIC CURITY LATEX FREE KC44101

## (undated) DEVICE — DRSG STERI STRIP 1/2X4" R1547

## (undated) DEVICE — SU VICRYL 2-0 CT-2 27" J333H

## (undated) DEVICE — SU MONOCRYL 4-0 PS-2 18" UND Y496G

## (undated) DEVICE — ESU GROUND PAD UNIVERSAL W/O CORD

## (undated) DEVICE — SU VICRYL 0 UR-6 27" J603H

## (undated) DEVICE — DRAIN JACKSON PRATT RESERVOIR 100ML SU130-1305

## (undated) DEVICE — NEEDLE SPINAL DISP 22GA X 5" QUINCKE 3333355

## (undated) DEVICE — DRAIN CHANNEL ROUND 15FR FLUTED 072188

## (undated) DEVICE — DECANTER BAG 2002S

## (undated) DEVICE — ENDO TROCAR FIRST ENTRY KII FIOS Z-THRD 11X100MM CTF33

## (undated) DEVICE — TUBING SUCTION 12"X1/4" N612

## (undated) DEVICE — ENDO TROCAR SLEEVE KII Z-THREADED 05X100MM CTS02

## (undated) DEVICE — TUBING SUCTION 6"X3/16" N56A

## (undated) DEVICE — ENDO TROCAR FIRST ENTRY KII FIOS Z-THRD 12X100MM CTF73

## (undated) DEVICE — LUBRICATING JELLY 4.25OZ

## (undated) DEVICE — ESU ENDO SCISSORS 5MM CVD 5DCS

## (undated) DEVICE — CLIP APPLIER ENDO 5MM M/L LIGAMAX EL5ML

## (undated) DEVICE — ENDO SCOPE WARMER LF TM500

## (undated) DEVICE — ESU LIGASURE LAPAROSCOPIC BLUNT TIP SEALER 5MMX37CM LF1837

## (undated) DEVICE — NDL INSUFFLATION 120MM VERRES 172015

## (undated) DEVICE — DEVICE ENDO STITCH APPLIER 10MM 173016

## (undated) DEVICE — ENDO TROCAR BLUNT TIP KII BALLOON 12X130MM C0R50

## (undated) DEVICE — ENDO FORCEP ENDOJAW BIOPSY 2.8MMX230CM FB-220U

## (undated) DEVICE — CATH TRAY FOLEY SURESTEP 16FR W/URNE MTR STLK LATEX A303316A

## (undated) DEVICE — ESU PENCIL W/SMOKE EVAC NEPTUNE STRYKER 0703-046-000

## (undated) DEVICE — SUCTION MANIFOLD NEPTUNE 2 SYS 1 PORT 702-025-000

## (undated) DEVICE — SUCTION IRR STRYKERFLOW II W/TIP 250-070-520

## (undated) DEVICE — BLADE CLIPPER 4406

## (undated) DEVICE — LINEN TOWEL PACK X5 5464

## (undated) DEVICE — DRSG GAUZE 4X4" 3033

## (undated) DEVICE — DRAIN JACKSON PRATT 15FR ROUND SU130-1323

## (undated) DEVICE — PREP CHLORAPREP 26ML TINTED HI-LITE ORANGE 930815

## (undated) DEVICE — SCISSOR LAPAROSCOPIC EPIX 45CMX5MM CB040

## (undated) DEVICE — DRAPE LAP W/ARMBOARD 29410

## (undated) DEVICE — TUBE GASTROSTOMY MIC 22FR 0100-22

## (undated) DEVICE — ANTIFOG SOLUTION W/FOAM PAD 31142527

## (undated) DEVICE — GOWN IMPERVIOUS SPECIALTY XLG/XLONG 32474

## (undated) DEVICE — DEVICE SUTURE PASSER 14GA WECK EFX EFXSP2

## (undated) DEVICE — DEVICE SUTURE GRASPER TROCAR CLOSURE 14GA PMITCSG

## (undated) DEVICE — SU SILK 2-0 FSL 18" 677G

## (undated) DEVICE — SU VICRYL 2-0 CT-2 27" UND J269H

## (undated) DEVICE — GLOVE BIOGEL PI MICRO INDICATOR UNDERGLOVE SZ 8.0 48980

## (undated) DEVICE — SOL NACL 0.9% IRRIG 1000ML BOTTLE 2F7124

## (undated) DEVICE — ENDO TROCAR FIRST ENTRY KII FIOS Z-THRD 05X100MM CTF03

## (undated) DEVICE — PACK LAP CHOLE SLC15LCFSD

## (undated) DEVICE — SU ENDO STITCH POLYSORB 2-0 ES-9 48"  170053

## (undated) DEVICE — EVAC SYSTEM CLEAR FLOW SC082500

## (undated) RX ORDER — FENTANYL CITRATE 50 UG/ML
INJECTION, SOLUTION INTRAMUSCULAR; INTRAVENOUS
Status: DISPENSED
Start: 2023-12-11

## (undated) RX ORDER — FENTANYL CITRATE 50 UG/ML
INJECTION, SOLUTION INTRAMUSCULAR; INTRAVENOUS
Status: DISPENSED
Start: 2018-05-11

## (undated) RX ORDER — DEXAMETHASONE SODIUM PHOSPHATE 4 MG/ML
INJECTION, SOLUTION INTRA-ARTICULAR; INTRALESIONAL; INTRAMUSCULAR; INTRAVENOUS; SOFT TISSUE
Status: DISPENSED
Start: 2023-12-11

## (undated) RX ORDER — FENTANYL CITRATE 50 UG/ML
INJECTION, SOLUTION INTRAMUSCULAR; INTRAVENOUS
Status: DISPENSED
Start: 2023-12-13

## (undated) RX ORDER — PIPERACILLIN SODIUM, TAZOBACTAM SODIUM 3; .375 G/15ML; G/15ML
INJECTION, POWDER, LYOPHILIZED, FOR SOLUTION INTRAVENOUS
Status: DISPENSED
Start: 2023-12-11

## (undated) RX ORDER — BUPIVACAINE HYDROCHLORIDE AND EPINEPHRINE 5; 5 MG/ML; UG/ML
INJECTION, SOLUTION EPIDURAL; INTRACAUDAL; PERINEURAL
Status: DISPENSED
Start: 2023-12-11

## (undated) RX ORDER — HEPARIN SODIUM 5000 [USP'U]/.5ML
INJECTION, SOLUTION INTRAVENOUS; SUBCUTANEOUS
Status: DISPENSED
Start: 2023-12-11

## (undated) RX ORDER — FENTANYL CITRATE 50 UG/ML
INJECTION, SOLUTION INTRAMUSCULAR; INTRAVENOUS
Status: DISPENSED
Start: 2018-02-09

## (undated) RX ORDER — DIPHENHYDRAMINE HYDROCHLORIDE 50 MG/ML
INJECTION INTRAMUSCULAR; INTRAVENOUS
Status: DISPENSED
Start: 2018-02-09

## (undated) RX ORDER — ONDANSETRON 2 MG/ML
INJECTION INTRAMUSCULAR; INTRAVENOUS
Status: DISPENSED
Start: 2023-12-11

## (undated) RX ORDER — DIPHENHYDRAMINE HYDROCHLORIDE 50 MG/ML
INJECTION INTRAMUSCULAR; INTRAVENOUS
Status: DISPENSED
Start: 2018-05-11

## (undated) RX ORDER — MAGNESIUM SULFATE HEPTAHYDRATE 40 MG/ML
INJECTION, SOLUTION INTRAVENOUS
Status: DISPENSED
Start: 2023-12-11

## (undated) RX ORDER — BUPIVACAINE HYDROCHLORIDE 2.5 MG/ML
INJECTION, SOLUTION EPIDURAL; INFILTRATION; INTRACAUDAL
Status: DISPENSED
Start: 2023-12-13

## (undated) RX ORDER — PROPOFOL 10 MG/ML
INJECTION, EMULSION INTRAVENOUS
Status: DISPENSED
Start: 2024-12-04

## (undated) RX ORDER — HYDROMORPHONE HYDROCHLORIDE 1 MG/ML
INJECTION, SOLUTION INTRAMUSCULAR; INTRAVENOUS; SUBCUTANEOUS
Status: DISPENSED
Start: 2023-12-13

## (undated) RX ORDER — PROPOFOL 10 MG/ML
INJECTION, EMULSION INTRAVENOUS
Status: DISPENSED
Start: 2023-12-11

## (undated) RX ORDER — LIDOCAINE HYDROCHLORIDE 20 MG/ML
JELLY TOPICAL
Status: DISPENSED
Start: 2023-12-14

## (undated) RX ORDER — SIMETHICONE 20 MG/.3ML
EMULSION ORAL
Status: DISPENSED
Start: 2018-05-11

## (undated) RX ORDER — EPINEPHRINE 1 MG/ML
INJECTION, SOLUTION INTRAMUSCULAR; SUBCUTANEOUS
Status: DISPENSED
Start: 2023-12-13

## (undated) RX ORDER — SIMETHICONE 20 MG/.3ML
EMULSION ORAL
Status: DISPENSED
Start: 2018-02-09